# Patient Record
Sex: MALE | Race: WHITE | NOT HISPANIC OR LATINO | Employment: OTHER | URBAN - METROPOLITAN AREA
[De-identification: names, ages, dates, MRNs, and addresses within clinical notes are randomized per-mention and may not be internally consistent; named-entity substitution may affect disease eponyms.]

---

## 2017-01-11 ENCOUNTER — TRANSFERRED RECORDS (OUTPATIENT)
Dept: HEALTH INFORMATION MANAGEMENT | Facility: CLINIC | Age: 72
End: 2017-01-11

## 2017-02-08 DIAGNOSIS — J45.50 SEVERE PERSISTENT ASTHMA (H): Primary | ICD-10-CM

## 2017-02-08 DIAGNOSIS — J30.2 SEASONAL ALLERGIC RHINITIS: ICD-10-CM

## 2017-02-08 RX ORDER — MONTELUKAST SODIUM 10 MG/1
10 TABLET ORAL AT BEDTIME
Qty: 90 TABLET | Refills: 1 | Status: SHIPPED | OUTPATIENT
Start: 2017-02-08 | End: 2017-08-04

## 2017-02-08 NOTE — TELEPHONE ENCOUNTER
Montelukast Sodium 10 mg tablet   Last Written Prescription Date: 11/08/16  Last Fill Quantity: 90,  # refills: 0   Last Office Visit with [facility], [facility] or [facility] prescribing provider: 09/27/16    Aminah Mitchell CPhT  On Behalf of [facility]

## 2017-02-27 ENCOUNTER — OFFICE VISIT (OUTPATIENT)
Dept: ORTHOPEDICS | Facility: CLINIC | Age: 72
End: 2017-02-27
Payer: COMMERCIAL

## 2017-02-27 VITALS
SYSTOLIC BLOOD PRESSURE: 123 MMHG | WEIGHT: 160 LBS | BODY MASS INDEX: 25.06 KG/M2 | HEART RATE: 77 BPM | DIASTOLIC BLOOD PRESSURE: 75 MMHG

## 2017-02-27 DIAGNOSIS — G89.29 CHRONIC PAIN OF BOTH KNEES: Primary | ICD-10-CM

## 2017-02-27 DIAGNOSIS — M25.562 CHRONIC PAIN OF BOTH KNEES: Primary | ICD-10-CM

## 2017-02-27 DIAGNOSIS — M17.0 BILATERAL PRIMARY OSTEOARTHRITIS OF KNEE: ICD-10-CM

## 2017-02-27 DIAGNOSIS — M25.561 CHRONIC PAIN OF BOTH KNEES: Primary | ICD-10-CM

## 2017-02-27 PROCEDURE — 20610 DRAIN/INJ JOINT/BURSA W/O US: CPT | Mod: 50 | Performed by: PHYSICIAN ASSISTANT

## 2017-02-27 NOTE — PROGRESS NOTES
Sports Medicine Clinic Visit     Prior Visit Date 2/15/16    PCP: Connor Anderson    Raul Wilhelm is a 71 year old male who is seen in f/u up for    Chronic pain of both knees  Bilateral primary osteoarthritis of knee. Since last visit on 2/15/2016 patient has noted good relief with the steroid injections. He is requesting repeat today.      Symptoms are better with: Rest and steroid injections  Symptoms are worse with: increased time on feet.  Additional Features:   Positive:    Negative: swelling, bruising, popping, grinding, catching, locking, instability, paresthesias, numbness, weakness, pain in other joints and systemic symptoms    Social History: works for a [employer] company        Review of Systems  Musculoskeletal: as above  Remainder of review of systems is negative including constitutional, CV, pulmonary, GI, Skin and Neurologic except as noted in HPI or medical history.    Family history, medical history and surgical history have all been discussed with patient and appended to medical chart below.    Past Medical History   Diagnosis Date     Chronic airway obstruction, not elsewhere classified      Other and unspecified hyperlipidemia      Past Surgical History   Procedure Laterality Date     Ent surgery  as a child     tonsillectomy     Family History   Problem Relation Age of Onset     DIABETES Mother      HEART DISEASE Mother      C.A.D. Mother      CANCER Father      Hypertension Father      CEREBROVASCULAR DISEASE Paternal Grandfather      Asthma Sister      Breast Cancer No family hx of      Cancer - colorectal No family hx of      Prostate Cancer No family hx of      Objective  /75  Pulse 77  Wt 160 lb (72.6 kg)  BMI 25.06 kg/m2  Constitutional:well-developed, well-nourished, and in no distress.   Cardiovascular: Intact distal pulses.    Neurological: alert. Gait normal  Skin: Skin is warm and dry.   Psychiatric: Mood and affect normal.   Respiratory: unlabored, speaks in full  sentences  Hematologic/Lymphatic/Immunologic: neg lymphadenopathy, neg lymphedema    Exam:  Bilateral Knee exam    ROM:        Full active and passive ROM with flexion and extension    Inspection:       no visible ecchymosis        effusion noted mild    Patellar Motion:        Normal patellar tracking noted through range of motion       Crepitus with flexion and extension    Tender:        medial joint line       lateral joint line    Non Tender:         remainder of knee area    Special Tests:        neg (-) Memo       neg (-) Lachman       neg (-) varus at 0, 30       neg (-) valgus at 0, 30    Evaluation of ipsilateral kinetic chain       normal strength with hip extension and abduction       decreased strength single leg squat on  Both side(s)        Assessment:  1. Chronic pain of both knees    2. Bilateral primary osteoarthritis of knee        Plan:  Discussed the assessment with the patient.  Topical Treatments: Ice, Heat or Topical Analgesics  Over the counter medication: Acetaminophen (Tylenol) 1000mg every 6 hours with food (Maximum of 3000mg/day)  Naproxen (Aleve) maximum of 440mg two times a day with food  Observe and monitor symptoms  Activity:  As tolerated  Steroid injection of the bilateral knee was performed today in clinic  Icing for the next 1-2 days may be helpful for pain. Injection may take 10-14 days to see the full effect.  Follow up: as needed    PROCEEDURE:  Benefits, risks, and alternatives of the procedure were discussed with the patient, including bleeding, infection, and pain.  Patient elected to proceed and informed consent was signed. Area was prepped with betadine and alcohol preps and Ethyl Chloride was used for topical anesthetic purposes.  Using standard precautions, a 27-gauge, 1.5-inch was used to inject 2 cc of 1% Lidocaine without Epinephrine and 80 mg Depo-medrol into the intra-articular space of the bilateral knee via a inferior medial parapatellar approach.  I have  recommended ibuprofen and ice should soreness and stiffness develop.  An educational handout was provided to the patient.              Raquel Briscoe PA-C  [facility]  [facility]      Disclaimer: This note consists of symbols derived from keyboarding, dictation and/or voice recognition software. As a result, there may be errors in the script that have gone undetected. Please consider this when interpreting information found in this chart.

## 2017-02-27 NOTE — NURSING NOTE
"Chief Complaint   Patient presents with     Knee left     Knee right       Initial /75  Pulse 77  Wt 160 lb (72.6 kg)  BMI 25.06 kg/m2 Estimated body mass index is 25.06 kg/(m^2) as calculated from the following:    Height as of 9/27/16: 5' 7\" (1.702 m).    Weight as of this encounter: 160 lb (72.6 kg).  Medication Reconciliation: complete  "

## 2017-02-27 NOTE — MR AVS SNAPSHOT
"              After Visit Summary   2017    Raul Wilhelm    MRN: [id number]           Patient Information     Date Of Birth          1945        Visit Information        Provider Department      2017 8:40 AM Raquel Briscoe PA-C [facility]        Today's Diagnoses     Chronic pain of both knees    -  1    Bilateral primary osteoarthritis of knee           Follow-ups after your visit        Who to contact     If you have questions or need follow up information about today's clinic visit or your schedule please contact [facility] directly at [phone].  Normal or non-critical lab and imaging results will be communicated to you by Upshothart, letter or phone within 4 business days after the clinic has received the results. If you do not hear from us within 7 days, please contact the clinic through Sykiot or phone. If you have a critical or abnormal lab result, we will notify you by phone as soon as possible.  Submit refill requests through [employer] or call your pharmacy and they will forward the refill request to us. Please allow 3 business days for your refill to be completed.          Additional Information About Your Visit        MyChart Information     [employer] lets you send messages to your doctor, view your test results, renew your prescriptions, schedule appointments and more. To sign up, go to [email or web address] . Click on \"Log in\" on the left side of the screen, which will take you to the Welcome page. Then click on \"Sign up Now\" on the right side of the page.     You will be asked to enter the access code listed below, as well as some personal information. Please follow the directions to create your username and password.     Your access code is: [email or web address]  Expires: 2017 11:09 AM     Your access code will  in 90 days. If you need help or a new code, please call your [place] clinic or [phone].   "      Care EveryWhere ID     This is your Care EveryWhere ID. This could be used by other organizations to access your [place] medical records  [id number]        Your Vitals Were     Pulse BMI (Body Mass Index)                77 25.06 kg/m2           Blood Pressure from Last 3 Encounters:   02/27/17 123/75   11/10/16 120/72   09/27/16 131/82    Weight from Last 3 Encounters:   02/27/17 160 lb (72.6 kg)   11/10/16 164 lb (74.4 kg)   09/27/16 166 lb 6.4 oz (75.5 kg)              We Performed the Following     Depo Medrol  80 MG      []     Large Joint/Bursa injection and/or drainage (Shoulder, Knee)          Today's Medication Changes          These changes are accurate as of: 2/27/17 11:59 PM.  If you have any questions, ask your nurse or doctor.               Start taking these medicines.        Dose/Directions    methylPREDNISolone acetate 80 MG/ML injection   Commonly known as:  DEPO-MEDROL   Used for:  Chronic pain of both knees, Bilateral primary osteoarthritis of knee   Started by:  Raquel Briscoe PA-C        Dose:  80 mg   1 mL (80 mg) by INTRA-ARTICULAR route once for 1 dose   Quantity:  2 mL   Refills:  0            Where to get your medicines      Some of these will need a paper prescription and others can be bought over the counter.  Ask your nurse if you have questions.     You don't need a prescription for these medications     methylPREDNISolone acetate 80 MG/ML injection                Primary Care Provider Office Phone # Fax #    Connor Anderson [phone]       [facility] [address] [facility] [address]        Thank you!     Thank you for choosing [facility]  for your care. Our goal is always to provide you with excellent care. Hearing back from our patients is one way we can continue to improve our services. Please take a few minutes to complete the written survey that you may receive in the mail after your  visit with us. Thank you!             Your Updated Medication List - Protect others around you: Learn how to safely use, store and throw away your medicines at www.disposemymeds.org.          This list is accurate as of: 2/27/17 11:59 PM.  Always use your most recent med list.                   Brand Name Dispense Instructions for use    * albuterol 108 (90 BASE) MCG/ACT Inhaler    PROAIR HFA/PROVENTIL HFA/VENTOLIN HFA    1 Inhaler    Inhale 2 puffs into the lungs every 6 hours as needed for shortness of breath / dyspnea or wheezing       * albuterol (2.5 MG/3ML) 0.083% neb solution     30 vial    Take 1 vial (2.5 mg) by nebulization every 6 hours as needed for shortness of breath / dyspnea or wheezing       aspirin 81 MG EC tablet     30 tablet    Take 1 tablet (81 mg) by mouth daily       atorvastatin 20 MG tablet    LIPITOR    90 tablet    Take 1 tablet (20 mg) by mouth daily       carvedilol 6.25 MG tablet    COREG    180 tablet    Take 1 tablet (6.25 mg) by mouth 2 times daily       clobetasol 0.05 % ointment    TEMOVATE    45 g    Apply sparingly to affected area twice daily as needed.  Do not apply to face.       clopidogrel 75 MG tablet    PLAVIX    90 tablet    Take 1 tablet daily       EPINEPHrine 0.3 MG/0.3ML injection     0.6 mL    Inject 0.3 mLs (0.3 mg) into the muscle as needed for anaphylaxis       fexofenadine 180 MG tablet    ALLEGRA    90 tablet    Take 1 tablet by mouth daily.       ipratropium - albuterol 0.5 mg/2.5 mg/3 mL 0.5-2.5 (3) MG/3ML neb solution    DUONEB    1 Box    Take 1 vial (3 mLs) by nebulization every 4 hours as needed for shortness of breath / dyspnea       latanoprost 0.005 % ophthalmic solution    XALATAN     Place 1 drop into both eyes At Bedtime       losartan-hydrochlorothiazide 50-12.5 MG per tablet    HYZAAR    90 tablet    Take 1 tablet by mouth daily       methylPREDNISolone acetate 80 MG/ML injection    DEPO-MEDROL    2 mL    1 mL (80 mg) by INTRA-ARTICULAR route once  for 1 dose       montelukast 10 MG tablet    SINGULAIR    90 tablet    Take 1 tablet (10 mg) by mouth At Bedtime       nitroglycerin 0.4 MG sublingual tablet    NITROSTAT    25 tablet    Place 1 tablet (0.4 mg) under the tongue every 5 minutes as needed for chest pain If you are still having symptoms after 3 doses (15 minutes) call 911.       omeprazole 20 MG CR capsule    priLOSEC    90 capsule    Take 1 capsule (20 mg) by mouth daily Take 30-60 minutes before a meal.       order for DME     1 Device    Equipment being ordered: Nebulizer Respironics, MiniElite--Neb Machine and tubing and mouth piece.       order for DME     1 Device    Equipment being ordered: arm blood pressure cuff       * Notice:  This list has 2 medication(s) that are the same as other medications prescribed for you. Read the directions carefully, and ask your doctor or other care provider to review them with you.

## 2017-02-28 RX ORDER — METHYLPREDNISOLONE ACETATE 80 MG/ML
80 INJECTION, SUSPENSION INTRA-ARTICULAR; INTRALESIONAL; INTRAMUSCULAR; SOFT TISSUE ONCE
Qty: 2 ML | Refills: 0 | OUTPATIENT
Start: 2017-02-28 | End: 2017-02-28

## 2017-03-06 DIAGNOSIS — Z79.82 ON ASPIRIN AT HOME: ICD-10-CM

## 2017-03-06 NOTE — TELEPHONE ENCOUNTER
Omeprazole 20mg      Last Written Prescription Date: 8/31/16  Last Fill Quantity: 90,  # refills: 1   Last Office Visit with [facility], [facility] or [facility] prescribing provider: 9/27/16      Demetri Acosta CPhT  [facility]    On behalf of [facility]

## 2017-03-06 NOTE — TELEPHONE ENCOUNTER
Prescription approved per [facility] Refill Protocol.  Stephanie Alberts [facility]   [phone]

## 2017-04-10 DIAGNOSIS — J44.9 CHRONIC OBSTRUCTIVE PULMONARY DISEASE, UNSPECIFIED COPD TYPE (H): ICD-10-CM

## 2017-04-10 DIAGNOSIS — J45.50 UNCOMPLICATED SEVERE PERSISTENT ASTHMA (H): ICD-10-CM

## 2017-04-10 NOTE — TELEPHONE ENCOUNTER
Patient is requesting a refill of Prednisone 10mg from a previous ED visit. He is also looking to fill Duoneb. Please contact patient with questions.  Thanks,   Jeimy Escamilla  Certified Pharmacy Technician  [facility]  [phone]

## 2017-04-14 ENCOUNTER — ALLIED HEALTH/NURSE VISIT (OUTPATIENT)
Dept: FAMILY MEDICINE | Facility: CLINIC | Age: 72
End: 2017-04-14
Payer: COMMERCIAL

## 2017-04-14 DIAGNOSIS — J45.50 UNCOMPLICATED SEVERE PERSISTENT ASTHMA (H): Primary | ICD-10-CM

## 2017-04-14 PROCEDURE — 99207 ZZC NO CHARGE NURSE ONLY: CPT

## 2017-04-14 RX ORDER — IPRATROPIUM BROMIDE AND ALBUTEROL SULFATE 2.5; .5 MG/3ML; MG/3ML
3 SOLUTION RESPIRATORY (INHALATION) EVERY 4 HOURS PRN
Qty: 1 BOX | Refills: 2 | Status: SHIPPED | OUTPATIENT
Start: 2017-04-14 | End: 2018-03-27

## 2017-04-14 RX ORDER — PREDNISONE 10 MG/1
TABLET ORAL
Qty: 32 TABLET | Refills: 1 | Status: SHIPPED | OUTPATIENT
Start: 2017-04-14 | End: 2017-11-13

## 2017-04-14 NOTE — TELEPHONE ENCOUNTER
History of asthma, wanting refills of prednisone to have on hand as he usually needs this in the spring.  Also wanting a refill of duoneb for the same.    Monica Burgos RN

## 2017-04-14 NOTE — MR AVS SNAPSHOT
"              After Visit Summary   2017    Raul Wilhelm    MRN: [id number]           Patient Information     Date Of Birth          1945        Visit Information        Provider Department      2017 9:30 AM RAUL BERMUDEZ/SRINIVAS KISER [facility]        Today's Diagnoses     Uncomplicated severe persistent asthma    -  1       Follow-ups after your visit        Who to contact     If you have questions or need follow up information about today's clinic visit or your schedule please contact [facility] directly at [phone].  Normal or non-critical lab and imaging results will be communicated to you by Next Generation Systemshart, letter or phone within 4 business days after the clinic has received the results. If you do not hear from us within 7 days, please contact the clinic through Next Generation Systemshart or phone. If you have a critical or abnormal lab result, we will notify you by phone as soon as possible.  Submit refill requests through [employer] or call your pharmacy and they will forward the refill request to us. Please allow 3 business days for your refill to be completed.          Additional Information About Your Visit        MyChart Information     [employer] lets you send messages to your doctor, view your test results, renew your prescriptions, schedule appointments and more. To sign up, go to [email or web address] . Click on \"Log in\" on the left side of the screen, which will take you to the Welcome page. Then click on \"Sign up Now\" on the right side of the page.     You will be asked to enter the access code listed below, as well as some personal information. Please follow the directions to create your username and password.     Your access code is: [email or web address]  Expires: 2017 12:09 PM     Your access code will  in 90 days. If you need help or a new code, please call your [facility] or [phone].        Care EveryWhere ID     This is your Care EveryWhere ID. This could be used by " other organizations to access your [place] medical records  [id number]         Blood Pressure from Last 3 Encounters:   02/27/17 123/75   11/10/16 120/72   09/27/16 131/82    Weight from Last 3 Encounters:   02/27/17 160 lb (72.6 kg)   11/10/16 164 lb (74.4 kg)   09/27/16 166 lb 6.4 oz (75.5 kg)              Today, you had the following     No orders found for display       Primary Care Provider Office Phone # Fax #    Connor Anderson [phone]       [facility] [address] [facility] [address]        Thank you!     Thank you for choosing [facility]  for your care. Our goal is always to provide you with excellent care. Hearing back from our patients is one way we can continue to improve our services. Please take a few minutes to complete the written survey that you may receive in the mail after your visit with us. Thank you!             Your Updated Medication List - Protect others around you: Learn how to safely use, store and throw away your medicines at www.disposemymeds.org.          This list is accurate as of: 4/14/17  9:31 AM.  Always use your most recent med list.                   Brand Name Dispense Instructions for use    * albuterol 108 (90 BASE) MCG/ACT Inhaler    PROAIR HFA/PROVENTIL HFA/VENTOLIN HFA    1 Inhaler    Inhale 2 puffs into the lungs every 6 hours as needed for shortness of breath / dyspnea or wheezing       * albuterol (2.5 MG/3ML) 0.083% neb solution     30 vial    Take 1 vial (2.5 mg) by nebulization every 6 hours as needed for shortness of breath / dyspnea or wheezing       aspirin 81 MG EC tablet     30 tablet    Take 1 tablet (81 mg) by mouth daily       atorvastatin 20 MG tablet    LIPITOR    90 tablet    Take 1 tablet (20 mg) by mouth daily       carvedilol 6.25 MG tablet    COREG    180 tablet    Take 1 tablet (6.25 mg) by mouth 2 times daily       clobetasol 0.05 % ointment    TEMOVATE    45 g    Apply sparingly to affected  area twice daily as needed.  Do not apply to face.       clopidogrel 75 MG tablet    PLAVIX    90 tablet    Take 1 tablet daily       EPINEPHrine 0.3 MG/0.3ML injection     0.6 mL    Inject 0.3 mLs (0.3 mg) into the muscle as needed for anaphylaxis       fexofenadine 180 MG tablet    ALLEGRA    90 tablet    Take 1 tablet by mouth daily.       ipratropium - albuterol 0.5 mg/2.5 mg/3 mL 0.5-2.5 (3) MG/3ML neb solution    DUONEB    1 Box    Take 1 vial (3 mLs) by nebulization every 4 hours as needed for shortness of breath / dyspnea       latanoprost 0.005 % ophthalmic solution    XALATAN     Place 1 drop into both eyes At Bedtime       losartan-hydrochlorothiazide 50-12.5 MG per tablet    HYZAAR    90 tablet    Take 1 tablet by mouth daily       montelukast 10 MG tablet    SINGULAIR    90 tablet    Take 1 tablet (10 mg) by mouth At Bedtime       nitroglycerin 0.4 MG sublingual tablet    NITROSTAT    25 tablet    Place 1 tablet (0.4 mg) under the tongue every 5 minutes as needed for chest pain If you are still having symptoms after 3 doses (15 minutes) call 911.       omeprazole 20 MG CR capsule    priLOSEC    90 capsule    Take 1 capsule (20 mg) by mouth daily Take 30-60 minutes before a meal.       order for DME     1 Device    Equipment being ordered: Nebulizer Respironics, MiniElite--Neb Machine and tubing and mouth piece.       order for DME     1 Device    Equipment being ordered: arm blood pressure cuff       predniSONE 10 MG tablet    DELTASONE    32 tablet    Take 4 tablets daily for 5 days,  take 2 tablets daily for 3 days, take 1 tablet daily for 3 days, take half a tablet for 3 days.       * Notice:  This list has 2 medication(s) that are the same as other medications prescribed for you. Read the directions carefully, and ask your doctor or other care provider to review them with you.

## 2017-04-14 NOTE — NURSING NOTE
Patient here today to check the status of his refill request for duoneb and prednisone.  Patient explains that he has a history of asthma and likes to have these on hand during allergy season incase they are needed.  Denies any symptoms today.  Refill request sent to provider in the refill encounter.    Monica Burgos RN

## 2017-05-04 ENCOUNTER — OFFICE VISIT (OUTPATIENT)
Dept: FAMILY MEDICINE | Facility: CLINIC | Age: 72
End: 2017-05-04
Payer: COMMERCIAL

## 2017-05-04 ENCOUNTER — RADIANT APPOINTMENT (OUTPATIENT)
Dept: GENERAL RADIOLOGY | Facility: CLINIC | Age: 72
End: 2017-05-04
Attending: FAMILY MEDICINE
Payer: COMMERCIAL

## 2017-05-04 VITALS
TEMPERATURE: 98.2 F | HEART RATE: 86 BPM | HEIGHT: 67 IN | WEIGHT: 168.2 LBS | BODY MASS INDEX: 26.4 KG/M2 | DIASTOLIC BLOOD PRESSURE: 68 MMHG | SYSTOLIC BLOOD PRESSURE: 117 MMHG | OXYGEN SATURATION: 96 %

## 2017-05-04 DIAGNOSIS — Z23 NEED FOR PROPHYLACTIC VACCINATION AND INOCULATION AGAINST INFLUENZA: ICD-10-CM

## 2017-05-04 DIAGNOSIS — J45.50 UNCOMPLICATED SEVERE PERSISTENT ASTHMA (H): ICD-10-CM

## 2017-05-04 DIAGNOSIS — Z23 NEED FOR VACCINATION: ICD-10-CM

## 2017-05-04 DIAGNOSIS — J44.9 CHRONIC OBSTRUCTIVE PULMONARY DISEASE, UNSPECIFIED COPD TYPE (H): ICD-10-CM

## 2017-05-04 DIAGNOSIS — I21.21 ST ELEVATION MYOCARDIAL INFARCTION INVOLVING LEFT CIRCUMFLEX CORONARY ARTERY (H): ICD-10-CM

## 2017-05-04 DIAGNOSIS — M54.50 ACUTE BILATERAL LOW BACK PAIN WITHOUT SCIATICA: Primary | ICD-10-CM

## 2017-05-04 DIAGNOSIS — J67.2: ICD-10-CM

## 2017-05-04 DIAGNOSIS — Z11.59 NEED FOR HEPATITIS C SCREENING TEST: ICD-10-CM

## 2017-05-04 DIAGNOSIS — M54.50 ACUTE BILATERAL LOW BACK PAIN WITHOUT SCIATICA: ICD-10-CM

## 2017-05-04 LAB
ERYTHROCYTE [DISTWIDTH] IN BLOOD BY AUTOMATED COUNT: 12.2 % (ref 10–15)
HCT VFR BLD AUTO: 48.2 % (ref 40–53)
HGB BLD-MCNC: 16.9 G/DL (ref 13.3–17.7)
MCH RBC QN AUTO: 32.3 PG (ref 26.5–33)
MCHC RBC AUTO-ENTMCNC: 35.1 G/DL (ref 31.5–36.5)
MCV RBC AUTO: 92 FL (ref 78–100)
PLATELET # BLD AUTO: 246 10E9/L (ref 150–450)
RBC # BLD AUTO: 5.23 10E12/L (ref 4.4–5.9)
WBC # BLD AUTO: 8.9 10E9/L (ref 4–11)

## 2017-05-04 PROCEDURE — 86618 LYME DISEASE ANTIBODY: CPT | Performed by: FAMILY MEDICINE

## 2017-05-04 PROCEDURE — 36415 COLL VENOUS BLD VENIPUNCTURE: CPT | Performed by: FAMILY MEDICINE

## 2017-05-04 PROCEDURE — 85027 COMPLETE CBC AUTOMATED: CPT | Performed by: FAMILY MEDICINE

## 2017-05-04 PROCEDURE — 72100 X-RAY EXAM L-S SPINE 2/3 VWS: CPT

## 2017-05-04 PROCEDURE — 90670 PCV13 VACCINE IM: CPT | Performed by: FAMILY MEDICINE

## 2017-05-04 PROCEDURE — 99214 OFFICE O/P EST MOD 30 MIN: CPT | Mod: 25 | Performed by: FAMILY MEDICINE

## 2017-05-04 PROCEDURE — 86803 HEPATITIS C AB TEST: CPT | Performed by: FAMILY MEDICINE

## 2017-05-04 PROCEDURE — G0009 ADMIN PNEUMOCOCCAL VACCINE: HCPCS | Performed by: FAMILY MEDICINE

## 2017-05-04 NOTE — LETTER
[facility]  [address] [address]  [phone]      May 5, 2017      Raul Wilhelm  [address]  [place] [address]        Dear Raul,        We are writing to inform you of the results from your recent lab work, included is a copy of those results.  Component      Latest Ref Rng & Units 5/4/2017   WBC      4.0 - 11.0 10e9/L 8.9   RBC Count      4.4 - 5.9 10e12/L 5.23   Hemoglobin      13.3 - 17.7 g/dL 16.9   Hematocrit      40.0 - 53.0 % 48.2   MCV      78 - 100 fl 92   MCH      26.5 - 33.0 pg 32.3   MCHC      31.5 - 36.5 g/dL 35.1   RDW      10.0 - 15.0 % 12.2   Platelet Count      150 - 450 10e9/L 246   Lyme Disease Antibodies Serum      0.00 - 0.89 0.04   Hepatitis C Antibody      NR Nonreactive . . .     No Lyme's disease.  Normal Blood counts.  No Hepatitis C.  If you have any questions, please do not hesitate to call our office.      Sincerely,  Connor Anderson MD

## 2017-05-04 NOTE — LETTER
My Asthma Action Plan  Name: Raul Wilhelm   YOB: 1945  Date: 5/4/2017   My doctor: Connor Anderson MD   My clinic: [facility]        My Control Medicine: Montelukast (Singulair) -  10 mg daily  My Rescue Medicine: Albuterol/ipratroprium  (Duoneb) nebulizer solution neb every 4 hours if needed  My Oral Steroid Medicine: prednisone 40mg daily for 5 days My Asthma Severity: severe persistent  Avoid your asthma triggers: upper respiratory infections and pollens               GREEN ZONE     Good Control    I feel good    No cough or wheeze    Can work, sleep and play without asthma symptoms       Take your asthma control medicine every day.     1. If exercise triggers your asthma, take your rescue medication    15 minutes before exercise or sports, and    During exercise if you have asthma symptoms  2. Spacer to use with inhaler: If you have a spacer, make sure to use it with your inhaler             YELLOW ZONE     Getting Worse  I have ANY of these:    I do not feel good    Cough or wheeze    Chest feels tight    Wake up at night   1. Keep taking your Green Zone medications  2. Start taking your rescue medicine:    every 20 minutes for up to 1 hour. Then every 4 hours for 24-48 hours.  3. If you stay in the Yellow Zone for more than 12-24 hours, contact your doctor.  4. If you do not return to the Green Zone in 12-24 hours or you get worse, start taking your oral steroid medicine if prescribed by your provider.           RED ZONE     Medical Alert - Get Help  I have ANY of these:    I feel awful    Medicine is not helping    Breathing getting harder    Trouble walking or talking    Nose opens wide to breathe       1. Take your rescue medicine NOW  2. If your provider has prescribed an oral steroid medicine, start taking it NOW  3. Call your doctor NOW  4. If you are still in the Red Zone after 20 minutes and you have not reached your doctor:    Take your rescue medicine again  and    Call 911 or go to the emergency room right away    See your regular doctor within 2 weeks of an Emergency Room or Urgent Care visit for follow-up treatment.        Electronically signed by: Dr. Connor Anderson, May 4, 2017    Annual Reminders:  Meet with Asthma Educator,  Flu Shot in the Fall, consider Pneumonia Vaccination for patients with asthma (aged 19 and older).    Pharmacy: [facility] [place] [id number] [facility]                    Asthma Triggers  How To Control Things That Make Your Asthma Worse    Triggers are things that make your asthma worse.  Look at the list below to help you find your triggers and what you can do about them.  You can help prevent asthma flare-ups by staying away from your triggers.      Trigger                                                          What you can do   Cigarette Smoke  Tobacco smoke can make asthma worse. Do not allow smoking in your home, car or around you.  Be sure no one smokes at a child s day care or school.  If you smoke, ask your health care provider for ways to help you quit.  Ask family members to quit too.  Ask your health care provider for a referral to Quit Plan to help you quit smoking, or call 4-143-652-PLAN.     Colds, Flu, Bronchitis  These are common triggers of asthma. Wash your hands often.  Don t touch your eyes, nose or mouth.  Get a flu shot every year.     Dust Mites  These are tiny bugs that live in cloth or carpet. They are too small to see. Wash sheets and blankets in hot water every week.   Encase pillows and mattress in dust mite proof covers.  Avoid having carpet if you can. If you have carpet, vacuum weekly.   Use a dust mask and HEPA vacuum.   Pollen and Outdoor Mold  Some people are allergic to trees, grass, or weed pollen, or molds. Try to keep your windows closed.  Limit time out doors when pollen count is high.   Ask you health care provider about taking medicine during allergy season.     Animal Dander  Some  people are allergic to skin flakes, urine or saliva from pets with fur or feathers. Keep pets with fur or feathers out of your home.    If you can t keep the pet outdoors, then keep the pet out of your bedroom.  Keep the bedroom door closed.  Keep pets off cloth furniture and away from stuffed toys.     Mice, Rats, and Cockroaches  Some people are allergic to the waste from these pests.   Cover food and garbage.  Clean up spills and food crumbs.  Store grease in the refrigerator.   Keep food out of the bedroom.   Indoor Mold  This can be a trigger if your home has high moisture. Fix leaking faucets, pipes, or other sources of water.   Clean moldy surfaces.  Dehumidify basement if it is damp and smelly.   Smoke, Strong Odors, and Sprays  These can reduce air quality. Stay away from strong odors and sprays, such as perfume, powder, hair spray, paints, smoke incense, paint, cleaning products, candles and new carpet.   Exercise or Sports  Some people with asthma have this trigger. Be active!  Ask your doctor about taking medicine before sports or exercise to prevent symptoms.    Warm up for 5-10 minutes before and after sports or exercise.     Other Triggers of Asthma  Cold air:  Cover your nose and mouth with a scarf.  Sometimes laughing or crying can be a trigger.  Some medicines and food can trigger asthma.

## 2017-05-04 NOTE — PATIENT INSTRUCTIONS
1. To lab for blood work    2. Physical therapy will call you to schedule for the back      Thank you for choosing [facility].  You may be receiving a survey in the mail from Sally Kaufman regarding your visit today.  Please take a few minutes to complete and return the survey to let us know how we are doing.      If you have questions or concerns, please contact us via [employer] or you can contact your care team at [phone].    Our Clinic hours are:  Monday 6:40 am  to 7:00 pm  Tuesday -Friday 6:40 am to 5:00 pm    The [place] outpatient lab hours are:  Monday - Friday 6:10 am to 4:45 pm  Saturdays 7:00 am to 11:00 am  Appointments are required, call [phone]    If you have clinical questions after hours or would like to schedule an appointment,  call the clinic at [phone].

## 2017-05-04 NOTE — PROGRESS NOTES
"  Injectable Influenza Immunization Documentation    1.  Is the person to be vaccinated sick today? {YES/NO DEFAULT NO:76404::\" No\"}    2. Does the person to be vaccinated have an allergy to eggs or to a component of the vaccine? {YES/NO DEFAULT NO:72571::\" No\"}    3. Has the person to be vaccinated today ever had a serious reaction to influenza vaccine in the past? {YES/NO DEFAULT NO:78593::\" No\"}    4. Has the person to be vaccinated ever had Guillain-[place] syndrome? {YES/NO DEFAULT NO:20966::\" No\"}     Form completed by ***    "

## 2017-05-04 NOTE — NURSING NOTE
"Chief Complaint   Patient presents with     Back Pain     low back pain for about 1 month; has seen chiropractor with no relief; no known cause     Recheck Medication     patient wants to discuss use of ibuprofen - was told he should not take due to counterindication with other meds - takes Aleve with no relief to back pain       Initial /68  Pulse 86  Temp 98.2  F (36.8  C) (Tympanic)  Ht 5' 7\" (1.702 m)  Wt 168 lb 3.2 oz (76.3 kg)  SpO2 96%  BMI 26.34 kg/m2 Estimated body mass index is 26.34 kg/(m^2) as calculated from the following:    Height as of this encounter: 5' 7\" (1.702 m).    Weight as of this encounter: 168 lb 3.2 oz (76.3 kg).  Medication Reconciliation: complete    Screening Questionnaire for Adult Immunization    Are you sick today?   No   Do you have allergies to medications, food, a vaccine component or latex?   No   Have you ever had a serious reaction after receiving a vaccination?   No   Do you have a long-term health problem with heart disease, lung disease, asthma, kidney disease, metabolic disease (e.g. diabetes), anemia, or other blood disorder?   No   Do you have cancer, leukemia, HIV/AIDS, or any other immune system problem?   No   In the past 3 months, have you taken medications that affect  your immune system, such as prednisone, other steroids, or anticancer drugs; drugs for the treatment of rheumatoid arthritis, Crohn s disease, or psoriasis; or have you had radiation treatments?  YES   Have you had a seizure, or a brain or other nervous system problem?   No   During the past year, have you received a transfusion of blood or blood     products, or been given immune (gamma) globulin or antiviral drug?   YES   For women: Are you pregnant or is there a chance you could become        pregnant during the next month?   No   Have you received any vaccinations in the past 4 weeks?   No     Immunization questionnaire answers were all negative.      MNVFC doesn't apply on this " patient    Per orders of Dr. Connor Anderson, injection of PCV13 given by Kathryn Castellanos. Patient instructed to remain in clinic for 20 minutes afterwards, and to report any adverse reaction to me immediately.       Screening performed by Kathryn Castellanos on 5/4/2017 at 1:30 PM.

## 2017-05-04 NOTE — PROGRESS NOTES
SUBJECTIVE:                                                    Raul Wilhelm is a 71 year old male who presents to clinic today for the following health issues:      Back Pain      Duration: 1 month        Specific cause: none    Description:   Location of pain: low back both  Character of pain: sharp, stabbing and intermittent  Pain radiation:radiates into the right buttocks and radiates into the left buttocks  New numbness or weakness in legs, not attributed to pain:  no     Intensity: severe    History:   Pain interferes with job: YES  History of back problems: recurrent self limited episodes of low back pain in the past  Any previous MRI or X-rays: None  Sees a specialist for back pain:  No  Therapies tried without relief: chiropractor    Alleviating factors:   Improved by: sitting      Precipitating factors:  Worsened by: Lifting and Walking    Functional and Psychosocial Screen ([employer] STarT Back):      Most recent score:    FOSTER START BACK TOTAL SCORE 5/4/2017   Total Score (all 9) 5          Accompanying Signs & Symptoms:  Risk of Fracture:  None  Risk of Cauda Equina:  None  Risk of Infection:  None  Risk of Cancer:  None  Risk of Ankylosing Spondylitis:  Onset at age <35, male, AND morning back stiffness. no                Asthma/COPD: breathing has been stable on singulair and allegra.    Problem list and histories reviewed & adjusted, as indicated.  Additional history: as documented    Reviewed and updated as needed this visit by clinical staff  Tobacco  Allergies  Med Hx  Surg Hx  Fam Hx  Soc Hx      Reviewed and updated as needed this visit by Provider         ROS:  C: NEGATIVE for fever, chills, change in weight  R: NEGATIVE for significant change in cough or SOB  CV: NEGATIVE for chest pain, palpitations or peripheral edema    OBJECTIVE:                                                           Physical Exam:     Vitals:    05/04/17 1319   BP: 117/68   Pulse: 86   Temp: 98.2  F (36.8  C)  "  TempSrc: Tympanic   SpO2: 96%   Weight: 168 lb 3.2 oz (76.3 kg)   Height: 5' 7\" (1.702 m)     Body mass index is 26.34 kg/(m^2).  GENERAL:: healthy, alert and no distress  Lumber/Thoracic Spine Exam: Tender:  Very mild tenderness of left SI joint, right SI joint    Non-tender:  lumbar spinous processes, left para lumbar muscles, right para lumbar muscles, left sciatic notch, right sciatic notch    Range of Motion:  lumbar flexion  full, lumbar extension  decreased, left lateral lumbar bending  decreased, right lateral lumbar bending  decreased, left lateral lumbar rotation  full, right lateral lumbar rotation  full    Strength and Neuro:    L4: Squat and Rise 5/5, knee extension 5/5 symmetric and Patella reflex 2+ symmetric, medial foot normal sensation  L5:  Heel Walking, dorsiflexion of ankle/great toe 5/5 symmetric; Sensation dorsal foot normal  S1: Walk on toes, plantarflexion of ankle/toes 5/5 symmetric; Achilles 2+ symmetric sensation lateral foot normal;   Gait: significantly bow legged (varus), good arm swing  Minimal lumbar lordosis very flat  Hip Exam: Hip ROM full, left greater trocanter non-tender, right greater trocanter non-tender  NEURO: Lower extremity light touch sensory exam grossly normal, Reflexes 2+ symmetric at patella and achilles.          Diagnostic Test Results:  Lumbar spine Xray shows scoliosis with narrowing and degeneration because of the curvature at L2-3     ASSESSMENT/PLAN:                                                        Raul was seen today for back pain, recheck medication and imm/inj.    Diagnoses and all orders for this visit:    Acute bilateral low back pain without sciatica: with arthritis and degeneration of disk from scoliosis  -patient concerned with blood counts and possible Lymes so rule these out with labs  -Physical therapy will call to schedule  -     XR Lumbar Spine 2/3 Views; Future  -     **Lyme Disease Kalpana with reflex to WB Serum FUTURE 14d; Future  -     " CBC with platelets  -     PHYSICAL THERAPY REFERRAL    Uncomplicated severe persistent asthma: moderate to severe asthma/COPD    Need for hepatitis C screening test  -     **Hepatitis C Screen Reflex to RNA FUTURE anytime; Future  -     **Hepatitis C Screen Reflex to RNA FUTURE anytime    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

## 2017-05-04 NOTE — MR AVS SNAPSHOT
After Visit Summary   5/4/2017    Raul Wilhelm    MRN: [id number]           Patient Information     Date Of Birth          1945        Visit Information        Provider Department      5/4/2017 1:20 PM Connor Anderson MD [facility]        Today's Diagnoses     Acute bilateral low back pain without sciatica    -  1    Uncomplicated severe persistent asthma        Need for hepatitis C screening test        ST elevation myocardial infarction involving left circumflex coronary artery (H)        Chronic obstructive pulmonary disease, unspecified COPD type (H)        Pneumonitis, hypersensitivity, naa (H)        Need for vaccination        Need for prophylactic vaccination and inoculation against influenza          Care Instructions    1. To lab for blood work    2. Physical therapy will call you to schedule for the back      Thank you for choosing [facility].  You may be receiving a survey in the mail from [employer] regarding your visit today.  Please take a few minutes to complete and return the survey to let us know how we are doing.      If you have questions or concerns, please contact us via [employer] or you can contact your care team at [phone].    Our Clinic hours are:  Monday 6:40 am  to 7:00 pm  Tuesday -Friday 6:40 am to 5:00 pm    The [place] outpatient lab hours are:  Monday - Friday 6:10 am to 4:45 pm  Saturdays 7:00 am to 11:00 am  Appointments are required, call [phone]    If you have clinical questions after hours or would like to schedule an appointment,  call the clinic at [phone].        Follow-ups after your visit        Additional Services     PHYSICAL THERAPY REFERRAL       *This therapy referral will be filtered to a centralized scheduling office at [facility] and the patient will receive a call to schedule an appointment at a [place] location most convenient for them. *     [facility]  "provides Physical Therapy evaluation and treatment and many specialty services across the [place] system.  If requesting a specialty program, please choose from the list below.    If you have not heard from the scheduling office within 2 business days, please call [phone] for all locations, with the exception of [place], please call [phone].  Treatment: Evaluation & Treatment  Special Instructions/Modalities:   Special Programs: None    Please be aware that coverage of these services is subject to the terms and limitations of your health insurance plan.  Call member services at your health plan with any benefit or coverage questions.      **Note to Provider:  If you are referring outside of [facility] for the therapy appointment, please list the name of the location in the  special instructions  above, print the referral and give to the patient to schedule the appointment.                  Who to contact     If you have questions or need follow up information about today's clinic visit or your schedule please contact [facility] directly at [phone].  Normal or non-critical lab and imaging results will be communicated to you by MyChart, letter or phone within 4 business days after the clinic has received the results. If you do not hear from us within 7 days, please contact the clinic through MyChart or phone. If you have a critical or abnormal lab result, we will notify you by phone as soon as possible.  Submit refill requests through [facility] or call your pharmacy and they will forward the refill request to us. Please allow 3 business days for your refill to be completed.          Additional Information About Your Visit        [employer] Information     [facility] lets you send messages to your doctor, view your test results, renew your prescriptions, schedule appointments and more. To sign up, go to [email or web address] . Click on \"Log in\" on the left side of the screen, which will take you to " "the Welcome page. Then click on \"Sign up Now\" on the right side of the page.     You will be asked to enter the access code listed below, as well as some personal information. Please follow the directions to create your username and password.     Your access code is: [email or web address]  Expires: 2017 12:09 PM     Your access code will  in 90 days. If you need help or a new code, please call your [place] clinic or [phone].        Care EveryWhere ID     This is your Care EveryWhere ID. This could be used by other organizations to access your [place] medical records  [id number]        Your Vitals Were     Pulse Temperature Height Pulse Oximetry BMI (Body Mass Index)       86 98.2  F (36.8  C) (Tympanic) 5' 7\" (1.702 m) 96% 26.34 kg/m2        Blood Pressure from Last 3 Encounters:   17 117/68   17 123/75   11/10/16 120/72    Weight from Last 3 Encounters:   17 168 lb 3.2 oz (76.3 kg)   17 160 lb (72.6 kg)   11/10/16 164 lb (74.4 kg)              We Performed the Following     **Hepatitis C Screen Reflex to RNA FUTURE anytime     **Lyme Disease Kalpana with reflex to WB Serum FUTURE 14d     ADMIN MEDICARE: Pneumococcal Vaccine ()     Asthma Action Plan (AAP)     CBC with platelets     PHYSICAL THERAPY REFERRAL     Pneumococcal vaccine 13 valent PCV13 IM (Prevnar) [62395]        Primary Care Provider Office Phone # Fax #    Connor Anderson [phone]       [facility] [address] [facility] [address]        Thank you!     Thank you for choosing [facility]  for your care. Our goal is always to provide you with excellent care. Hearing back from our patients is one way we can continue to improve our services. Please take a few minutes to complete the written survey that you may receive in the mail after your visit with us. Thank you!             Your Updated Medication List - Protect others around you: Learn how to safely use, store " and throw away your medicines at www.disposemymeds.org.          This list is accurate as of: 5/4/17  2:10 PM.  Always use your most recent med list.                   Brand Name Dispense Instructions for use    * albuterol 108 (90 BASE) MCG/ACT Inhaler    PROAIR HFA/PROVENTIL HFA/VENTOLIN HFA    1 Inhaler    Inhale 2 puffs into the lungs every 6 hours as needed for shortness of breath / dyspnea or wheezing       * albuterol (2.5 MG/3ML) 0.083% neb solution     30 vial    Take 1 vial (2.5 mg) by nebulization every 6 hours as needed for shortness of breath / dyspnea or wheezing       aspirin 81 MG EC tablet     30 tablet    Take 1 tablet (81 mg) by mouth daily       atorvastatin 20 MG tablet    LIPITOR    90 tablet    Take 1 tablet (20 mg) by mouth daily       carvedilol 6.25 MG tablet    COREG    180 tablet    Take 1 tablet (6.25 mg) by mouth 2 times daily       clobetasol 0.05 % ointment    TEMOVATE    45 g    Apply sparingly to affected area twice daily as needed.  Do not apply to face.       clopidogrel 75 MG tablet    PLAVIX    90 tablet    Take 1 tablet daily       EPINEPHrine 0.3 MG/0.3ML injection     0.6 mL    Inject 0.3 mLs (0.3 mg) into the muscle as needed for anaphylaxis       fexofenadine 180 MG tablet    ALLEGRA    90 tablet    Take 1 tablet by mouth daily.       ipratropium - albuterol 0.5 mg/2.5 mg/3 mL 0.5-2.5 (3) MG/3ML neb solution    DUONEB    1 Box    Take 1 vial (3 mLs) by nebulization every 4 hours as needed for shortness of breath / dyspnea       latanoprost 0.005 % ophthalmic solution    XALATAN     Place 1 drop into both eyes At Bedtime       losartan-hydrochlorothiazide 50-12.5 MG per tablet    HYZAAR    90 tablet    Take 1 tablet by mouth daily       montelukast 10 MG tablet    SINGULAIR    90 tablet    Take 1 tablet (10 mg) by mouth At Bedtime       nitroglycerin 0.4 MG sublingual tablet    NITROSTAT    25 tablet    Place 1 tablet (0.4 mg) under the tongue every 5 minutes as needed for  chest pain If you are still having symptoms after 3 doses (15 minutes) call 911.       omeprazole 20 MG CR capsule    priLOSEC    90 capsule    Take 1 capsule (20 mg) by mouth daily Take 30-60 minutes before a meal.       order for DME     1 Device    Equipment being ordered: Nebulizer Respironics, MiniElite--Neb Machine and tubing and mouth piece.       order for DME     1 Device    Equipment being ordered: arm blood pressure cuff       predniSONE 10 MG tablet    DELTASONE    32 tablet    Take 4 tablets daily for 5 days,  take 2 tablets daily for 3 days, take 1 tablet daily for 3 days, take half a tablet for 3 days.       * Notice:  This list has 2 medication(s) that are the same as other medications prescribed for you. Read the directions carefully, and ask your doctor or other care provider to review them with you.

## 2017-05-05 LAB
B BURGDOR IGG+IGM SER QL: 0.04 (ref 0–0.89)
HCV AB SERPL QL IA: NORMAL

## 2017-05-05 ASSESSMENT — ASTHMA QUESTIONNAIRES: ACT_TOTALSCORE: 22

## 2017-05-10 ENCOUNTER — HOSPITAL ENCOUNTER (OUTPATIENT)
Dept: PHYSICAL THERAPY | Facility: CLINIC | Age: 72
Setting detail: THERAPIES SERIES
End: 2017-05-10
Attending: FAMILY MEDICINE
Payer: MEDICARE

## 2017-05-10 PROCEDURE — 40000718 ZZHC STATISTIC PT DEPARTMENT ORTHO VISIT: Performed by: PHYSICAL THERAPIST

## 2017-05-10 PROCEDURE — G8984 CARRY CURRENT STATUS: HCPCS | Mod: GP,CJ | Performed by: PHYSICAL THERAPIST

## 2017-05-10 PROCEDURE — 97161 PT EVAL LOW COMPLEX 20 MIN: CPT | Mod: GP | Performed by: PHYSICAL THERAPIST

## 2017-05-10 PROCEDURE — G8985 CARRY GOAL STATUS: HCPCS | Mod: GP,CI | Performed by: PHYSICAL THERAPIST

## 2017-05-10 PROCEDURE — 97110 THERAPEUTIC EXERCISES: CPT | Mod: GP | Performed by: PHYSICAL THERAPIST

## 2017-05-10 PROCEDURE — 97140 MANUAL THERAPY 1/> REGIONS: CPT | Mod: GP | Performed by: PHYSICAL THERAPIST

## 2017-05-10 NOTE — PROGRESS NOTES
[facility]          OUTPATIENT PHYSICAL THERAPY ORTHOPEDIC EVALUATION  PLAN OF TREATMENT FOR OUTPATIENT REHABILITATION  (COMPLETE FOR INITIAL CLAIMS ONLY)  Patient's Last Name, First Name, M.I.  YOB: 1945  Raul Wilhelm       Provider s Name:  [facility]   Medical Record No.  [id number]   Start of Care Date:  05/10/17   Onset Date:  03/15/17   Type:     _X__PT   ___OT   ___SLP Medical Diagnosis:  acute LBP     PT Diagnosis:  LBP   Visits from SOC:  1      _________________________________________________________________________________  Plan of Treatment/Functional Goals:  manual therapy, neuromuscular re-education, ROM, strengthening, stretching (posture/ body mechanics)     Goals  Goal Description: 1.  Pt will be able to walk X 10 min w/ pain no > 3/10  Target Date: 06/29/17    Goal Description: 2.  Pt able to lift and carry 10-15# for household tasks w/ pain no > 3/10  Target Date: 06/29/17    Goal Description: 3.  Independent and consistent w/ HEP and increased awareness of body mechanics  Target Date: 06/29/17    Therapy Frequency:  2 times/Week  Predicted Duration of Therapy Intervention:  2 weeks then 1X/wk X 4  = 8 visits    Kim Ramírez, PT                 I CERTIFY THE NEED FOR THESE SERVICES FURNISHED UNDER        THIS PLAN OF TREATMENT AND WHILE UNDER MY CARE     (Physician co-signature of this document indicates review and certification of the therapy plan).                       Certification Date From:  05/10/17   Certification Date To:  06/29/17    Referring Provider:  Connor Anderson MD    Initial Assessment        See Epic Evaluation Start of Care Date: 05/10/17

## 2017-05-10 NOTE — PROGRESS NOTES
05/10/17 0800   General Information   Type of Visit Initial OP Ortho PT Evaluation   Start of Care Date 05/10/17   Referring Physician Connor Anderson MD   Patient/Family Goals Statement To relieve some of the pain   Orders Evaluate and Treat   Date of Order 05/04/17   Insurance Type Medicare  ([employer])   Medical Diagnosis acute LBP   Body Part(s)   Body Part(s) Lumbar Spine/SI   Presentation and Etiology   Pertinent history of current problem (include personal factors and/or comorbidities that impact the POC) Pt presents w/ B LBP x approx 6 weeks.  No specific injury.  Pain @ best 1/10,  @ worst 9/10.  No radiating pain.  Negative numbness/ tingling/ bowel / bladder.   Negative cough / sneeze.   Xray in chart:  disc narrowing L2-L4.  Scoliotic curve.  Meds:  d/c taking alleve--no benefit.  PMHX:   MI 2015--2 stents.  Asthma carries inhaler.  HBP,  B knee arthritis.   Mod complexity: B knee arthritis   Impairments A. Pain;D. Decreased ROM;E. Decreased flexibility;H. Impaired gait   Onset date of current episode/exacerbation 03/15/17   Pain quality C. Aching   Pain exacerbation comment Immediate problem w/ walking but this varies.  Trying to avoid lifting/ carrying or minimizing his load.     Pain/symptoms eased by A. Sitting   Progression of symptoms since onset: Improved  (recently)   Prior Level of Function   Functional Level Prior Comment yardwork/ gardening/ cuts wood/ /  works on his cars.   Pt attends cardiac rehab 3X/wk 90 min sessions   Current Level of Function   Patient role/employment history A. Employed;F. Retired   Employment Comments Tool ----bothers to walk the length of the building.   Fall Risk Screen   Fall screen completed by PT   Per patient - Fall 2 or more times in past year? No   Per patient - Fall with injury in past year? No   Is patient a fall risk? No   Lumbar Spine/SI Objective Findings   Observation increased pronation   Posture Decreased lumbar lordosis.  R PSIS/  crest lower.  L LE longer supine   Gait/Locomotion trendelenburg gait (pt reports due to his knees).  Able to heel and toe walk but noted knee > back pain w/ heel walk   Flexion ROM 80% w/ stiffnes   Extension ROM 30% w/  a little LBP   Right Side Bending ROM 80%   Left Side Bending ROM 80%   Pelvic Screen + R FB test   Hip Screen PROM ER B WNL,  IR R 18*,  L 15*.  R scour and FADIR bothered LBP , L neg and B SHIRLEY noted some pull in legs   Hip Flexion (L2) Strength B 5/5   Hip Abduction Strength B 4/5   Knee Flexion Strength B 5/5   Knee Extension (L3) Strength B 5/5   Ankle Dorsiflexion (L4) Strength B 5/5   Great Toe Extension (L5) Strength B 5/5   Hamstring Flexibility mild to mod tightness   Obers Flexibility neg B   SLR neg B   Crossover SLR neg B    Slump Test B noted a little LBP   Lumbar/SI Special Tests Comments DKTC a little worse   Segmental Mobility significant hypomobility lumbar spine.  Mild tenderness w/ PA testing and pressure at SI   Palpation Mild tenderness B QL and lumbar paraspinals   Planned Therapy Interventions   Planned Therapy Interventions manual therapy;neuromuscular re-education;ROM;strengthening;stretching  (posture/ body mechanics)   Clinical Impression   Criteria for Skilled Therapeutic Interventions Met yes, treatment indicated   PT Diagnosis LBP   Influenced by the following impairments pain, stiffness   Functional limitations due to impairments walking, lifting/ carrying   Clinical Presentation Stable/Uncomplicated   Clinical Presentation Rationale No recent changes in LB complaints---gradually improving   Clinical Decision Making (Complexity) Low complexity   Therapy Frequency 2 times/Week   Predicted Duration of Therapy Intervention (days/wks) 2 weeks then 1X/wk X 4  = 8 visits   Risk & Benefits of therapy have been explained Yes   Patient, Family & other staff in agreement with plan of care Yes   Education Assessment   Barriers to Learning No barriers   Ortho Goal 1   Goal  Description 1.  Pt will be able to walk X 10 min w/ pain no > 3/10   Target Date 06/29/17   Ortho Goal 2   Goal Description 2.  Pt able to lift and carry 10-15# for household tasks w/ pain no > 3/10   Target Date 06/29/17   Ortho Goal 3   Goal Description 3.  Independent and consistent w/ HEP and increased awareness of body mechanics   Target Date 06/29/17   Total Evaluation Time   Total Evaluation Time 30   Therapy Certification   Certification date from 05/10/17   Certification date to 06/29/17   Medical Diagnosis acute LBP       Thank you for this referral,    Kim Ramírez, PT,  CEAS   #[phone]  [facility].  [phone]

## 2017-05-12 ENCOUNTER — HOSPITAL ENCOUNTER (OUTPATIENT)
Dept: PHYSICAL THERAPY | Facility: CLINIC | Age: 72
Setting detail: THERAPIES SERIES
End: 2017-05-12
Attending: FAMILY MEDICINE
Payer: MEDICARE

## 2017-05-12 PROCEDURE — 40000718 ZZHC STATISTIC PT DEPARTMENT ORTHO VISIT: Performed by: PHYSICAL THERAPIST

## 2017-05-12 PROCEDURE — 97110 THERAPEUTIC EXERCISES: CPT | Mod: GP | Performed by: PHYSICAL THERAPIST

## 2017-05-12 PROCEDURE — 97140 MANUAL THERAPY 1/> REGIONS: CPT | Mod: GP | Performed by: PHYSICAL THERAPIST

## 2017-05-19 ENCOUNTER — HOSPITAL ENCOUNTER (OUTPATIENT)
Dept: PHYSICAL THERAPY | Facility: CLINIC | Age: 72
Setting detail: THERAPIES SERIES
End: 2017-05-19
Attending: FAMILY MEDICINE
Payer: MEDICARE

## 2017-05-19 PROCEDURE — 97140 MANUAL THERAPY 1/> REGIONS: CPT | Mod: GP | Performed by: PHYSICAL THERAPIST

## 2017-05-19 PROCEDURE — 40000718 ZZHC STATISTIC PT DEPARTMENT ORTHO VISIT: Performed by: PHYSICAL THERAPIST

## 2017-05-19 PROCEDURE — 97110 THERAPEUTIC EXERCISES: CPT | Mod: GP | Performed by: PHYSICAL THERAPIST

## 2017-05-31 ENCOUNTER — HOSPITAL ENCOUNTER (OUTPATIENT)
Dept: PHYSICAL THERAPY | Facility: CLINIC | Age: 72
Setting detail: THERAPIES SERIES
End: 2017-05-31
Attending: FAMILY MEDICINE
Payer: MEDICARE

## 2017-05-31 PROCEDURE — 97110 THERAPEUTIC EXERCISES: CPT | Mod: GP | Performed by: PHYSICAL THERAPIST

## 2017-05-31 PROCEDURE — 97140 MANUAL THERAPY 1/> REGIONS: CPT | Mod: GP | Performed by: PHYSICAL THERAPIST

## 2017-05-31 PROCEDURE — 40000718 ZZHC STATISTIC PT DEPARTMENT ORTHO VISIT: Performed by: PHYSICAL THERAPIST

## 2017-06-07 DIAGNOSIS — I21.21 ST ELEVATION MYOCARDIAL INFARCTION INVOLVING LEFT CIRCUMFLEX CORONARY ARTERY (H): ICD-10-CM

## 2017-06-07 RX ORDER — NITROGLYCERIN 0.4 MG/1
0.4 TABLET SUBLINGUAL EVERY 5 MIN PRN
Qty: 25 TABLET | Refills: 1 | Status: SHIPPED | OUTPATIENT
Start: 2017-06-07 | End: 2020-05-19

## 2017-06-07 NOTE — TELEPHONE ENCOUNTER
Nitroglycerin      Last Written Prescription Date: unknown  Last Fill Quantity: 25,  # refills: 0   Last Office Visit with G, P or [facility] prescribing provider: 5/4/17                                            Thank you,  Gabriela Blair - Pharmacy Technician  [facility]  [phone]

## 2017-06-09 ENCOUNTER — HOSPITAL ENCOUNTER (OUTPATIENT)
Dept: PHYSICAL THERAPY | Facility: CLINIC | Age: 72
Setting detail: THERAPIES SERIES
End: 2017-06-09
Attending: FAMILY MEDICINE
Payer: MEDICARE

## 2017-06-09 PROCEDURE — 40000718 ZZHC STATISTIC PT DEPARTMENT ORTHO VISIT: Performed by: PHYSICAL THERAPIST

## 2017-06-09 PROCEDURE — 97140 MANUAL THERAPY 1/> REGIONS: CPT | Mod: GP | Performed by: PHYSICAL THERAPIST

## 2017-06-09 PROCEDURE — 97110 THERAPEUTIC EXERCISES: CPT | Mod: GP | Performed by: PHYSICAL THERAPIST

## 2017-06-19 ENCOUNTER — HOSPITAL ENCOUNTER (OUTPATIENT)
Dept: PHYSICAL THERAPY | Facility: CLINIC | Age: 72
Setting detail: THERAPIES SERIES
End: 2017-06-19
Attending: FAMILY MEDICINE
Payer: MEDICARE

## 2017-06-19 PROCEDURE — 97140 MANUAL THERAPY 1/> REGIONS: CPT | Mod: GP | Performed by: PHYSICAL THERAPIST

## 2017-06-19 PROCEDURE — 40000718 ZZHC STATISTIC PT DEPARTMENT ORTHO VISIT: Performed by: PHYSICAL THERAPIST

## 2017-06-19 PROCEDURE — 97110 THERAPEUTIC EXERCISES: CPT | Mod: GP | Performed by: PHYSICAL THERAPIST

## 2017-06-21 ENCOUNTER — OFFICE VISIT (OUTPATIENT)
Dept: FAMILY MEDICINE | Facility: CLINIC | Age: 72
End: 2017-06-21
Payer: COMMERCIAL

## 2017-06-21 VITALS
BODY MASS INDEX: 26.37 KG/M2 | WEIGHT: 168 LBS | DIASTOLIC BLOOD PRESSURE: 68 MMHG | TEMPERATURE: 96.7 F | HEART RATE: 86 BPM | HEIGHT: 67 IN | SYSTOLIC BLOOD PRESSURE: 100 MMHG

## 2017-06-21 DIAGNOSIS — B07.9 VIRAL WART ON FINGER: Primary | ICD-10-CM

## 2017-06-21 PROCEDURE — 99213 OFFICE O/P EST LOW 20 MIN: CPT | Performed by: NURSE PRACTITIONER

## 2017-06-21 ASSESSMENT — ANXIETY QUESTIONNAIRES
3. WORRYING TOO MUCH ABOUT DIFFERENT THINGS: SEVERAL DAYS
IF YOU CHECKED OFF ANY PROBLEMS ON THIS QUESTIONNAIRE, HOW DIFFICULT HAVE THESE PROBLEMS MADE IT FOR YOU TO DO YOUR WORK, TAKE CARE OF THINGS AT HOME, OR GET ALONG WITH OTHER PEOPLE: NOT DIFFICULT AT ALL
1. FEELING NERVOUS, ANXIOUS, OR ON EDGE: NOT AT ALL
6. BECOMING EASILY ANNOYED OR IRRITABLE: NOT AT ALL
GAD7 TOTAL SCORE: 1
2. NOT BEING ABLE TO STOP OR CONTROL WORRYING: NOT AT ALL
7. FEELING AFRAID AS IF SOMETHING AWFUL MIGHT HAPPEN: NOT AT ALL
4. TROUBLE RELAXING: NOT AT ALL
5. BEING SO RESTLESS THAT IT IS HARD TO SIT STILL: NOT AT ALL

## 2017-06-21 NOTE — PATIENT INSTRUCTIONS
Small wart on your pinky finger    Following treatment with liquid nitrogen your skin may get a blister. Try to keep this intact and keep the area clean and dry.  If no blister occurs, it is ok to use a pumice stone or nail file to gently file down the thickened warty tissue (do not use the same nail file that you use on your finger nails or it could spread the virus).  You may need to return for additional treatment if the wart does not completely resolve.  Plan to return after roughly 2-3 weeks once the area has healed, if still showing signs of persistent warty tissue.      May continue to use OTC treatments (Mediplast or Dr. Smith) if treatment ineffective.      The nodules on your fingers are Heberden's nodules, commonly seen in patients with rheumatoid arthritis and osteoarthritis, they are benign, but occasionally might cause some pain in fingers, unfortunately there is no treatment for ostearthritis.

## 2017-06-21 NOTE — MR AVS SNAPSHOT
After Visit Summary   6/21/2017    Raul Wilhelm    MRN: [id number]           Patient Information     Date Of Birth          1945        Visit Information        Provider Department      6/21/2017 8:00 AM Graciela Evans APRN CNP [facility]        Today's Diagnoses     Viral wart on finger    -  1      Care Instructions    Small wart on your pinky finger    Following treatment with liquid nitrogen your skin may get a blister. Try to keep this intact and keep the area clean and dry.  If no blister occurs, it is ok to use a pumice stone or nail file to gently file down the thickened warty tissue (do not use the same nail file that you use on your finger nails or it could spread the virus).  You may need to return for additional treatment if the wart does not completely resolve.  Plan to return after roughly 2-3 weeks once the area has healed, if still showing signs of persistent warty tissue.      May continue to use OTC treatments (Mediplast or Dr. Smith) if treatment ineffective.      The nodules on your fingers are Heberden's nodules, commonly seen in patients with rheumatoid arthritis and osteoarthritis, they are benign, but occasionally might cause some pain in fingers, unfortunately there is no treatment for ostearthritis.               Follow-ups after your visit        Your next 10 appointments already scheduled     Jun 28, 2017  7:30 AM CDT   Ortho Treatment with Kim Ramírez PT   [facility] ([facility])    [address] [address] [phone]              Who to contact     If you have questions or need follow up information about today's clinic visit or your schedule please contact [facility] directly at [phone].  Normal or non-critical lab and imaging results will be communicated to you by MyChart, letter or phone within 4 business days after the clinic has received the  "results. If you do not hear from us within 7 days, please contact the clinic through [employer] or phone. If you have a critical or abnormal lab result, we will notify you by phone as soon as possible.  Submit refill requests through [employer] or call your pharmacy and they will forward the refill request to us. Please allow 3 business days for your refill to be completed.          Additional Information About Your Visit        [employer] Information     [employer] lets you send messages to your doctor, view your test results, renew your prescriptions, schedule appointments and more. To sign up, go to [email or web address] . Click on \"Log in\" on the left side of the screen, which will take you to the Welcome page. Then click on \"Sign up Now\" on the right side of the page.     You will be asked to enter the access code listed below, as well as some personal information. Please follow the directions to create your username and password.     Your access code is: [email or web address]  Expires: 2017  8:16 AM     Your access code will  in 90 days. If you need help or a new code, please call your [facility] clinic or [phone].        Care EveryWhere ID     This is your Care EveryWhere ID. This could be used by other organizations to access your [facility] medical records  [id number]        Your Vitals Were     Pulse Temperature Height BMI (Body Mass Index)          86 96.7  F (35.9  C) (Tympanic) 5' 7\" (1.702 m) 26.31 kg/m2         Blood Pressure from Last 3 Encounters:   17 100/68   17 117/68   17 123/75    Weight from Last 3 Encounters:   17 168 lb (76.2 kg)   17 168 lb 3.2 oz (76.3 kg)   17 160 lb (72.6 kg)              Today, you had the following     No orders found for display       Primary Care Provider Office Phone # Fax #    Connor Anderson [phone]       [address] [address]        Equal Access to Services     [facility] " GAAR : Hadii aad ku wilver Valentine, waaxda luqadaha, qaybta kaedgardda gustavo, daniel lindy darekshey prescott deonnaalejandrina flores . [facility] [phone].    ATENCIÓN: Si habla español, tiene a mccarthy disposición servicios gratuitos de asistencia lingüística. Llame al [phone].    We comply with applicable federal civil rights laws and [place] laws. We do not discriminate on the basis of race, color, national origin, age, disability sex, sexual orientation or gender identity.            Thank you!     Thank you for choosing [facility]  for your care. Our goal is always to provide you with excellent care. Hearing back from our patients is one way we can continue to improve our services. Please take a few minutes to complete the written survey that you may receive in the mail after your visit with us. Thank you!             Your Updated Medication List - Protect others around you: Learn how to safely use, store and throw away your medicines at www.disposemymeds.org.          This list is accurate as of: 6/21/17  8:16 AM.  Always use your most recent med list.                   Brand Name Dispense Instructions for use Diagnosis    * albuterol 108 (90 BASE) MCG/ACT Inhaler    PROAIR HFA/PROVENTIL HFA/VENTOLIN HFA    1 Inhaler    Inhale 2 puffs into the lungs every 6 hours as needed for shortness of breath / dyspnea or wheezing    Uncomplicated severe persistent asthma, Chronic obstructive pulmonary disease, unspecified COPD type (H)       * albuterol (2.5 MG/3ML) 0.083% neb solution     30 vial    Take 1 vial (2.5 mg) by nebulization every 6 hours as needed for shortness of breath / dyspnea or wheezing    Acute bronchitis, unspecified organism       aspirin 81 MG EC tablet     30 tablet    Take 1 tablet (81 mg) by mouth daily    STEMI involving left circumflex coronary artery       atorvastatin 20 MG tablet    LIPITOR    90 tablet    Take 1 tablet (20 mg) by mouth daily    ST elevation myocardial infarction  involving left circumflex coronary artery (H)       carvedilol 6.25 MG tablet    COREG    180 tablet    Take 1 tablet (6.25 mg) by mouth 2 times daily    HTN (hypertension)       clobetasol 0.05 % ointment    TEMOVATE    45 g    Apply sparingly to affected area twice daily as needed.  Do not apply to face.    Psoriasis       clopidogrel 75 MG tablet    PLAVIX    90 tablet    Take 1 tablet daily    ST elevation myocardial infarction involving left circumflex coronary artery (H)       EPINEPHrine 0.3 MG/0.3ML injection     0.6 mL    Inject 0.3 mLs (0.3 mg) into the muscle as needed for anaphylaxis    Bee sting allergy       fexofenadine 180 MG tablet    ALLEGRA    90 tablet    Take 1 tablet by mouth daily.    Seasonal allergic rhinitis       ipratropium - albuterol 0.5 mg/2.5 mg/3 mL 0.5-2.5 (3) MG/3ML neb solution    DUONEB    1 Box    Take 1 vial (3 mLs) by nebulization every 4 hours as needed for shortness of breath / dyspnea    Uncomplicated severe persistent asthma, Chronic obstructive pulmonary disease, unspecified COPD type (H)       latanoprost 0.005 % ophthalmic solution    XALATAN     Place 1 drop into both eyes At Bedtime        losartan-hydrochlorothiazide 50-12.5 MG per tablet    HYZAAR    90 tablet    Take 1 tablet by mouth daily    Essential hypertension, benign       montelukast 10 MG tablet    SINGULAIR    90 tablet    Take 1 tablet (10 mg) by mouth At Bedtime    Severe persistent asthma, Seasonal allergic rhinitis       nitroglycerin 0.4 MG sublingual tablet    NITROSTAT    25 tablet    Place 1 tablet (0.4 mg) under the tongue every 5 minutes as needed for chest pain If you are still having symptoms after 3 doses (15 minutes) call 911.    ST elevation myocardial infarction involving left circumflex coronary artery (H)       omeprazole 20 MG CR capsule    priLOSEC    90 capsule    Take 1 capsule (20 mg) by mouth daily Take 30-60 minutes before a meal.    On aspirin at home       order for DME     1  Device    Equipment being ordered: Nebulizer Respironics, MiniElite--Neb Machine and tubing and mouth piece.    Extrinsic asthma, unspecified       order for DME     1 Device    Equipment being ordered: arm blood pressure cuff    Essential hypertension, benign       predniSONE 10 MG tablet    DELTASONE    32 tablet    Take 4 tablets daily for 5 days,  take 2 tablets daily for 3 days, take 1 tablet daily for 3 days, take half a tablet for 3 days.    Uncomplicated severe persistent asthma, Chronic obstructive pulmonary disease, unspecified COPD type (H)       * Notice:  This list has 2 medication(s) that are the same as other medications prescribed for you. Read the directions carefully, and ask your doctor or other care provider to review them with you.

## 2017-06-21 NOTE — NURSING NOTE
"Chief Complaint   Patient presents with     Finger     Bump on left pinky finger for 3 months.       Initial /68  Pulse 86  Temp 96.7  F (35.9  C) (Tympanic)  Ht 5' 7\" (1.702 m)  Wt 168 lb (76.2 kg)  BMI 26.31 kg/m2 Estimated body mass index is 26.31 kg/(m^2) as calculated from the following:    Height as of this encounter: 5' 7\" (1.702 m).    Weight as of this encounter: 168 lb (76.2 kg).  Medication Reconciliation: complete  "

## 2017-06-22 ASSESSMENT — PATIENT HEALTH QUESTIONNAIRE - PHQ9: SUM OF ALL RESPONSES TO PHQ QUESTIONS 1-9: 1

## 2017-06-22 ASSESSMENT — ANXIETY QUESTIONNAIRES: GAD7 TOTAL SCORE: 1

## 2017-06-28 ENCOUNTER — HOSPITAL ENCOUNTER (OUTPATIENT)
Dept: PHYSICAL THERAPY | Facility: CLINIC | Age: 72
Setting detail: THERAPIES SERIES
End: 2017-06-28
Attending: FAMILY MEDICINE
Payer: MEDICARE

## 2017-06-28 PROCEDURE — 97110 THERAPEUTIC EXERCISES: CPT | Mod: GP | Performed by: PHYSICAL THERAPIST

## 2017-06-28 PROCEDURE — G8984 CARRY CURRENT STATUS: HCPCS | Mod: GP,CI | Performed by: PHYSICAL THERAPIST

## 2017-06-28 PROCEDURE — 40000718 ZZHC STATISTIC PT DEPARTMENT ORTHO VISIT: Performed by: PHYSICAL THERAPIST

## 2017-06-28 PROCEDURE — G8985 CARRY GOAL STATUS: HCPCS | Mod: GP,CI | Performed by: PHYSICAL THERAPIST

## 2017-06-28 PROCEDURE — 97140 MANUAL THERAPY 1/> REGIONS: CPT | Mod: GP | Performed by: PHYSICAL THERAPIST

## 2017-06-28 NOTE — PROGRESS NOTES
OUTPATIENT PHYSICAL THERAPY PROGRESS NOTE   Connor Anderson MD 5/10/17 to 06/28/17 0700   Signing Clinician's Name / Credentials   Signing clinician's name / credentials Kim Ramírez,PT [id number]   Session Number   Session Number 7  [facility]/ [employer]   PT Medicare Only G-code   G-code Carry: Carrying, Moving & Handling Objects   Carry: Carrying, Moving & Handling Objects   Carry Current Status,  (eval/re-eval & every progress note) CI: 1-19% impairment   Current Carry Modifier Rationale improving tolerance to carrying groceries/ light items   Carry Goal,  (eval/re-eval, every progress note, & discharge) CI: 1-19% impairment   Ortho Goal 1   Goal Description 1.  Pt will be able to walk X 10 min w/ pain no > 3/10.  5/31/17 pt has not tried walking 10 min due to living on busy road.  6/19/17 walks around the yard pain 4-5/10.  6/28/17 able to walk 10-15 min pain 3-4/10.     Target Date 06/29/17   Ortho Goal 2   Goal Description 2.  Pt able to lift and carry 10-15# for household tasks w/ pain no > 3/10.  5/3/17  can go up to 6-7/10 depending on the time of day.  Pt notes he breaks up his activity.   6/28/17  able to do and pain level 3-4/10.     Target Date 06/29/17   Ortho Goal 3   Goal Description 3.  Independent and consistent w/ HEP and increased awareness of body mechanics.   6/28/17 doing ex 5X/wk and goes to cardiac rehab MET   Target Date 06/29/17   Subjective Report   Subjective Report LBP 3-4/10.   Pt notes the back is feeling pretty good now his knees are bothering him more.     Objective Measures   Objective Measure PSIS/ crest level.     Details Mild to Moderate tightness w/ SLR.   Objective Measure Lumbar flex 75%   Details Moderate trendelenburg   Therapeutic Procedure/exercise   Patient Response LTRS, SKTC, seated trunk flex, seated TA sets, clam , bridge  and piriformis stretch on own.     Pt cont to need some cuing w/ supine TA set and marching.     Treatment Detail Seated hip add w/ toes  in/out.   Cheng pose using counter also w/ rotation.  Supine TA set w/ marching.     Wall squats X 15   Manual Therapy   Patient Response SLR very mild tightness after.   LROM flex afterwards 90%   Treatment Detail  Activation to diaphrapm, psoas, quad and SI B ( for HS)     Plan   Home program ex as above.  Ice after increased activity   Plan Pt to cont on own w/ HEP.  Chart open X 30 days in case of a flareup   Comments   Comments Progress towards goals as noted above

## 2017-07-06 ENCOUNTER — OFFICE VISIT (OUTPATIENT)
Dept: FAMILY MEDICINE | Facility: CLINIC | Age: 72
End: 2017-07-06
Payer: COMMERCIAL

## 2017-07-06 VITALS
HEART RATE: 85 BPM | WEIGHT: 170.1 LBS | TEMPERATURE: 97.9 F | SYSTOLIC BLOOD PRESSURE: 123 MMHG | BODY MASS INDEX: 26.7 KG/M2 | DIASTOLIC BLOOD PRESSURE: 78 MMHG | HEIGHT: 67 IN

## 2017-07-06 DIAGNOSIS — B07.9 VIRAL WART ON FINGER: Primary | ICD-10-CM

## 2017-07-06 PROCEDURE — 99212 OFFICE O/P EST SF 10 MIN: CPT | Performed by: NURSE PRACTITIONER

## 2017-07-06 NOTE — PROGRESS NOTES
"  SUBJECTIVE:                                                    Raul Wilhelm is a 71 year old male who presents to clinic today for the following health issues: wart treatment follow up, one wart on the left pinky finger.      Problem list and histories reviewed & adjusted, as indicated.  Additional history: as documented.    Reviewed and updated as needed this visit by clinical staff  Tobacco  Allergies  Med Hx  Surg Hx  Fam Hx  Soc Hx      Reviewed and updated as needed this visit by Provider         ROS:  Constitutional, HEENT, cardiovascular, pulmonary, gi and gu systems are negative, except as otherwise noted.    OBJECTIVE:     /78  Pulse 85  Temp 97.9  F (36.6  C) (Tympanic)  Ht 5' 7\" (1.702 m)  Wt 170 lb 1.6 oz (77.2 kg)  BMI 26.64 kg/m2  Body mass index is 26.64 kg/(m^2).  GENERAL: healthy, alert and no distress  SKIN: small wart on the left pinky finger    Diagnostic Test Results:  none     ASSESSMENT/PLAN:     1. Viral wart on finger   The lesion was frozen with LN2 x6. Patient tolerated procedure well.   WART CARE DISCUSSED. USE OF OTC PRODUCT STARTING IN FEW DAYS. GENTLE ABRAISION WITH PUMICE STONE OR EMERY BOARD WITH GOOD HANDWASHING AFTER. RETURN IN TWO WEEKS FOR REFREEZING UNTIL RESOLVED.      See Patient Instructions    [facility]  "

## 2017-07-06 NOTE — MR AVS SNAPSHOT
"              After Visit Summary   7/6/2017    Raul Wilhelm    MRN: [id number]           Patient Information     Date Of Birth          1945        Visit Information        Provider Department      7/6/2017 1:00 PM Graciela Evans APRN CNP [facility]        Care Instructions    Use Mediplast patches every 48 hours              Follow-ups after your visit        Your next 10 appointments already scheduled     Jul 06, 2017  1:00 PM CDT   SHORT with OSCAR Sterling CNP   [facility]    [address] [address]   [phone]              Who to contact     If you have questions or need follow up information about today's clinic visit or your schedule please contact [facility] directly at [phone].  Normal or non-critical lab and imaging results will be communicated to you by MyChart, letter or phone within 4 business days after the clinic has received the results. If you do not hear from us within 7 days, please contact the clinic through MyChart or phone. If you have a critical or abnormal lab result, we will notify you by phone as soon as possible.  Submit refill requests through [employer] or call your pharmacy and they will forward the refill request to us. Please allow 3 business days for your refill to be completed.          Additional Information About Your Visit        MyChart Information     [employer] lets you send messages to your doctor, view your test results, renew your prescriptions, schedule appointments and more. To sign up, go to [email or web address] . Click on \"Log in\" on the left side of the screen, which will take you to the Welcome page. Then click on \"Sign up Now\" on the right side of the page.     You will be asked to enter the access code listed below, as well as some personal information. Please follow the directions to create your username and password.     Your access " "code is: [email or web address]  Expires: 2017  8:16 AM     Your access code will  in 90 days. If you need help or a new code, please call your [place] clinic or [phone].        Care EveryWhere ID     This is your Care EveryWhere ID. This could be used by other organizations to access your [place] medical records  [id number]        Your Vitals Were     Pulse Temperature Height BMI (Body Mass Index)          85 97.9  F (36.6  C) (Tympanic) 5' 7\" (1.702 m) 26.64 kg/m2         Blood Pressure from Last 3 Encounters:   17 123/78   17 100/68   17 117/68    Weight from Last 3 Encounters:   17 170 lb 1.6 oz (77.2 kg)   17 168 lb (76.2 kg)   17 168 lb 3.2 oz (76.3 kg)              Today, you had the following     No orders found for display       Primary Care Provider Office Phone # Fax #    Connor Anderson [phone]       [facility] [address] [address]        Equal Access to Services     DIDIER VINES AH: Hadii buffy ku hadasho Soomaali, waaxda luqadaha, qaybta kaalmada adeegyada, waxay brodyin hayfernandon genie flores ah. So [facility] [phone].    ATENCIÓN: Si habla español, tiene a mccarthy disposición servicios gratuitos de asistencia lingüística. Llame al [phone].    We comply with applicable federal civil rights laws and [place] laws. We do not discriminate on the basis of race, color, national origin, age, disability sex, sexual orientation or gender identity.            Thank you!     Thank you for choosing [facility]  for your care. Our goal is always to provide you with excellent care. Hearing back from our patients is one way we can continue to improve our services. Please take a few minutes to complete the written survey that you may receive in the mail after your visit with us. Thank you!             Your Updated Medication List - Protect others around you: Learn how to safely use, store and throw away " your medicines at www.disposemymeds.org.          This list is accurate as of: 7/6/17 12:59 PM.  Always use your most recent med list.                   Brand Name Dispense Instructions for use Diagnosis    * albuterol 108 (90 BASE) MCG/ACT Inhaler    PROAIR HFA/PROVENTIL HFA/VENTOLIN HFA    1 Inhaler    Inhale 2 puffs into the lungs every 6 hours as needed for shortness of breath / dyspnea or wheezing    Uncomplicated severe persistent asthma, Chronic obstructive pulmonary disease, unspecified COPD type (H)       * albuterol (2.5 MG/3ML) 0.083% neb solution     30 vial    Take 1 vial (2.5 mg) by nebulization every 6 hours as needed for shortness of breath / dyspnea or wheezing    Acute bronchitis, unspecified organism       aspirin 81 MG EC tablet     30 tablet    Take 1 tablet (81 mg) by mouth daily    STEMI involving left circumflex coronary artery       atorvastatin 20 MG tablet    LIPITOR    90 tablet    Take 1 tablet (20 mg) by mouth daily    ST elevation myocardial infarction involving left circumflex coronary artery (H)       carvedilol 6.25 MG tablet    COREG    180 tablet    Take 1 tablet (6.25 mg) by mouth 2 times daily    HTN (hypertension)       clobetasol 0.05 % ointment    TEMOVATE    45 g    Apply sparingly to affected area twice daily as needed.  Do not apply to face.    Psoriasis       clopidogrel 75 MG tablet    PLAVIX    90 tablet    Take 1 tablet daily    ST elevation myocardial infarction involving left circumflex coronary artery (H)       EPINEPHrine 0.3 MG/0.3ML injection     0.6 mL    Inject 0.3 mLs (0.3 mg) into the muscle as needed for anaphylaxis    Bee sting allergy       fexofenadine 180 MG tablet    ALLEGRA    90 tablet    Take 1 tablet by mouth daily.    Seasonal allergic rhinitis       ipratropium - albuterol 0.5 mg/2.5 mg/3 mL 0.5-2.5 (3) MG/3ML neb solution    DUONEB    1 Box    Take 1 vial (3 mLs) by nebulization every 4 hours as needed for shortness of breath / dyspnea     Uncomplicated severe persistent asthma, Chronic obstructive pulmonary disease, unspecified COPD type (H)       latanoprost 0.005 % ophthalmic solution    XALATAN     Place 1 drop into both eyes At Bedtime        losartan-hydrochlorothiazide 50-12.5 MG per tablet    HYZAAR    90 tablet    Take 1 tablet by mouth daily    Essential hypertension, benign       montelukast 10 MG tablet    SINGULAIR    90 tablet    Take 1 tablet (10 mg) by mouth At Bedtime    Severe persistent asthma, Seasonal allergic rhinitis       nitroGLYcerin 0.4 MG sublingual tablet    NITROSTAT    25 tablet    Place 1 tablet (0.4 mg) under the tongue every 5 minutes as needed for chest pain If you are still having symptoms after 3 doses (15 minutes) call 911.    ST elevation myocardial infarction involving left circumflex coronary artery (H)       omeprazole 20 MG CR capsule    priLOSEC    90 capsule    Take 1 capsule (20 mg) by mouth daily Take 30-60 minutes before a meal.    On aspirin at home       order for DME     1 Device    Equipment being ordered: Nebulizer Respironics, MiniElite--Neb Machine and tubing and mouth piece.    Extrinsic asthma, unspecified       order for DME     1 Device    Equipment being ordered: arm blood pressure cuff    Essential hypertension, benign       predniSONE 10 MG tablet    DELTASONE    32 tablet    Take 4 tablets daily for 5 days,  take 2 tablets daily for 3 days, take 1 tablet daily for 3 days, take half a tablet for 3 days.    Uncomplicated severe persistent asthma, Chronic obstructive pulmonary disease, unspecified COPD type (H)       * Notice:  This list has 2 medication(s) that are the same as other medications prescribed for you. Read the directions carefully, and ask your doctor or other care provider to review them with you.

## 2017-07-06 NOTE — NURSING NOTE
"Chief Complaint   Patient presents with     Wart     Second treatment for wart on left pinky finger.        Initial /78  Pulse 85  Temp 97.9  F (36.6  C) (Tympanic)  Ht 5' 7\" (1.702 m)  Wt 170 lb 1.6 oz (77.2 kg)  BMI 26.64 kg/m2 Estimated body mass index is 26.64 kg/(m^2) as calculated from the following:    Height as of this encounter: 5' 7\" (1.702 m).    Weight as of this encounter: 170 lb 1.6 oz (77.2 kg).  Medication Reconciliation: complete  "

## 2017-08-04 DIAGNOSIS — J30.2 SEASONAL ALLERGIC RHINITIS: ICD-10-CM

## 2017-08-04 DIAGNOSIS — J45.50 SEVERE PERSISTENT ASTHMA (H): ICD-10-CM

## 2017-08-04 NOTE — ADDENDUM NOTE
Encounter addended by: Kim Ramírez, PT on: 8/4/2017 11:42 AM<BR>     Actions taken: Sign clinical note, Flowsheet accepted, Episode resolved

## 2017-08-04 NOTE — TELEPHONE ENCOUNTER
Montelukast       Last Written Prescription Date: 02/08/17  Last Fill Quantity: 90, # refills: 1    Last Office Visit with [facility], [facility] or [facility] prescribing provider:  07/06/17   Future Office Visit:       Date of Last Asthma Action Plan Letter:   Asthma Action Plan Q1 Year    Topic Date Due     Asthma Action Plan - yearly  05/04/2018      Asthma Control Test:   ACT Total Scores 5/4/2017   ACT TOTAL SCORE -   ASTHMA ER VISITS -   ASTHMA HOSPITALIZATIONS -   ACT TOTAL SCORE (Goal Greater than or Equal to 20) 22   In the past 12 months, how many times did you visit the emergency room for your asthma without being admitted to the hospital? 0   In the past 12 months, how many times were you hospitalized overnight because of your asthma? 0       Date of Last Spirometry Test:   No results found for this or any previous visit.

## 2017-08-04 NOTE — PROGRESS NOTES
OUTPATIENT PHYSICAL THERAPY DISCHARGE SUMMARY   Connor Anderson MD 5/10/17 to 06/28/17 0700   Signing Clinician's Name / Credentials   Signing clinician's name / credentials Kim Ramírez,PT [id number]   Session Number   Session Number 7  [facility]   Ortho Goal 1   Goal Description 1.  Pt will be able to walk X 10 min w/ pain no > 3/10.  5/31/17 pt has not tried walking 10 min due to [place] busy [place].  6/19/17 walks around the yard pain 4-5/10.  6/28/17 able to walk 10-15 min pain 3-4/10.     Target Date 06/29/17   Ortho Goal 2   Goal Description 2.  Pt able to lift and carry 10-15# for household tasks w/ pain no > 3/10.  5/3/17  can go up to 6-7/10 depending on the time of day.  Pt notes he breaks up his activity.   6/28/17  able to do and pain level 3-4/10.     Target Date 06/29/17   Ortho Goal 3   Goal Description 3.  Independent and consistent w/ HEP and increased awareness of body mechanics.   6/28/17 doing ex 5X/wk and goes to cardiac rehab [facility]   Target Date 06/29/17   Subjective Report   Subjective Report LBP 3-4/10.   Pt notes the back is feeling pretty good now his knees are bothering him more.     Objective Measures   Objective Measure PSIS/ crest level.     Details Mild to Moderate tightness w/ SLR.   Objective Measure Lumbar flex 75%   Details Moderate trendelenburg   Therapeutic Procedure/exercise   Patient Response LTRS, SKTC, seated trunk flex, seated TA sets, clam , bridge  and piriformis stretch on own.     Pt cont to need some cuing w/ supine TA set and marching.     Treatment Detail Seated hip add w/ toes in/out.   Cheng pose using counter also w/ rotation.  Supine TA set w/ marching.     Wall squats X 15   Manual Therapy   Patient Response SLR very mild tightness after.   LROM flex afterwards 90%   Treatment Detail  Activation to diaphrapm, psoas, quad and SI B ( for HS)     Plan   Home program ex as above.  Ice after increased activity   Plan Pt to cont on own w/ HEP. Discharge from  physical therapy.   Comments   Comments Progress towards goals as noted above.  Completed updated G code on final visit day but not a d/c G code as chart was kept open in case of flare up.

## 2017-08-07 RX ORDER — MONTELUKAST SODIUM 10 MG/1
TABLET ORAL
Qty: 90 TABLET | Refills: 3 | Status: SHIPPED | OUTPATIENT
Start: 2017-08-07 | End: 2018-08-27

## 2017-09-06 ENCOUNTER — ALLIED HEALTH/NURSE VISIT (OUTPATIENT)
Dept: FAMILY MEDICINE | Facility: CLINIC | Age: 72
End: 2017-09-06
Payer: COMMERCIAL

## 2017-09-06 DIAGNOSIS — I10 HTN (HYPERTENSION): ICD-10-CM

## 2017-09-06 DIAGNOSIS — Z23 NEED FOR PROPHYLACTIC VACCINATION AND INOCULATION AGAINST INFLUENZA: Primary | ICD-10-CM

## 2017-09-06 PROCEDURE — G0008 ADMIN INFLUENZA VIRUS VAC: HCPCS

## 2017-09-06 PROCEDURE — 90662 IIV NO PRSV INCREASED AG IM: CPT

## 2017-09-06 PROCEDURE — 99207 ZZC NO CHARGE NURSE ONLY: CPT

## 2017-09-06 RX ORDER — CARVEDILOL 6.25 MG/1
TABLET ORAL
Qty: 180 TABLET | Refills: 2 | Status: SHIPPED | OUTPATIENT
Start: 2017-09-06 | End: 2018-07-13

## 2017-09-06 NOTE — MR AVS SNAPSHOT
"              After Visit Summary   2017    Raul Wilhelm    MRN: [id number]           Patient Information     Date Of Birth          1945        Visit Information        Provider Department      2017 8:45 AM [facility] FLU SHOT [facility]IC [facility]        Today's Diagnoses     Need for prophylactic vaccination and inoculation against influenza    -  1       Follow-ups after your visit        Who to contact     If you have questions or need follow up information about today's clinic visit or your schedule please contact [facility] directly at [phone].  Normal or non-critical lab and imaging results will be communicated to you by [employer], letter or phone within 4 business days after the clinic has received the results. If you do not hear from us within 7 days, please contact the clinic through "[employer]t or phone. If you have a critical or abnormal lab result, we will notify you by phone as soon as possible.  Submit refill requests through Netech[employer] or call your pharmacy and they will forward the refill request to us. Please allow 3 business days for your refill to be completed.          Additional Information About Your Visit        MyChart Information     [employer] lets you send messages to your doctor, view your test results, renew your prescriptions, schedule appointments and more. To sign up, go to [email or web address] . Click on \"Log in\" on the left side of the screen, which will take you to the Welcome page. Then click on \"Sign up Now\" on the right side of the page.     You will be asked to enter the access code listed below, as well as some personal information. Please follow the directions to create your username and password.     Your access code is: [email or web address]  Expires: 2017  8:16 AM     Your access code will  in 90 days. If you need help or a new code, please call your [facility] or [phone].        Care EveryWhere ID     This is your Care " EveryWhere ID. This could be used by other organizations to access your [place] medical records  [id number]         Blood Pressure from Last 3 Encounters:   07/06/17 123/78   06/21/17 100/68   05/04/17 117/68    Weight from Last 3 Encounters:   07/06/17 170 lb 1.6 oz (77.2 kg)   06/21/17 168 lb (76.2 kg)   05/04/17 168 lb 3.2 oz (76.3 kg)              We Performed the Following     ADMIN INFLUENZA (For MEDICARE Patients ONLY) []     FLU VACCINE, INCREASED ANTIGEN, PRESV FREE, AGE 65+ [69699]        Primary Care Provider Office Phone # Fax #    Connor Anderson [phone] [address] [address]        Equal Access to Services     DIDIER VINES : Hadii aad ku hadasho Sonikhil, waaxda luqadaha, qaybta kaalmada gustavo, daniel flores . So [facility] [phone].    ATENCIÓN: Si habla español, tiene a mccarthy disposición servicios gratuitos de asistencia lingüística. Desirae al [phone].    We comply with applicable federal civil rights laws and [place] laws. We do not discriminate on the basis of race, color, national origin, age, disability sex, sexual orientation or gender identity.            Thank you!     Thank you for choosing [facility]  for your care. Our goal is always to provide you with excellent care. Hearing back from our patients is one way we can continue to improve our services. Please take a few minutes to complete the written survey that you may receive in the mail after your visit with us. Thank you!             Your Updated Medication List - Protect others around you: Learn how to safely use, store and throw away your medicines at www.disposemymeds.org.          This list is accurate as of: 9/6/17  9:58 AM.  Always use your most recent med list.                   Brand Name Dispense Instructions for use Diagnosis    * albuterol 108 (90 BASE) MCG/ACT Inhaler    PROAIR HFA/PROVENTIL HFA/VENTOLIN HFA    1 Inhaler     Inhale 2 puffs into the lungs every 6 hours as needed for shortness of breath / dyspnea or wheezing    Uncomplicated severe persistent asthma, Chronic obstructive pulmonary disease, unspecified COPD type (H)       * albuterol (2.5 MG/3ML) 0.083% neb solution     30 vial    Take 1 vial (2.5 mg) by nebulization every 6 hours as needed for shortness of breath / dyspnea or wheezing    Acute bronchitis, unspecified organism       aspirin 81 MG EC tablet     30 tablet    Take 1 tablet (81 mg) by mouth daily    STEMI involving left circumflex coronary artery       atorvastatin 20 MG tablet    LIPITOR    90 tablet    Take 1 tablet (20 mg) by mouth daily    ST elevation myocardial infarction involving left circumflex coronary artery (H)       carvedilol 6.25 MG tablet    COREG    180 tablet    Take 1 tablet (6.25 mg) by mouth 2 times daily    HTN (hypertension)       clobetasol 0.05 % ointment    TEMOVATE    45 g    Apply sparingly to affected area twice daily as needed.  Do not apply to face.    Psoriasis       clopidogrel 75 MG tablet    PLAVIX    90 tablet    Take 1 tablet daily    ST elevation myocardial infarction involving left circumflex coronary artery (H)       EPINEPHrine 0.3 MG/0.3ML injection 2-pack    EPIPEN/ADRENACLICK/or ANY BX GENERIC EQUIV    0.6 mL    Inject 0.3 mLs (0.3 mg) into the muscle as needed for anaphylaxis    Bee sting allergy       fexofenadine 180 MG tablet    ALLEGRA    90 tablet    Take 1 tablet by mouth daily.    Seasonal allergic rhinitis       ipratropium - albuterol 0.5 mg/2.5 mg/3 mL 0.5-2.5 (3) MG/3ML neb solution    DUONEB    1 Box    Take 1 vial (3 mLs) by nebulization every 4 hours as needed for shortness of breath / dyspnea    Uncomplicated severe persistent asthma, Chronic obstructive pulmonary disease, unspecified COPD type (H)       latanoprost 0.005 % ophthalmic solution    XALATAN     Place 1 drop into both eyes At Bedtime        losartan-hydrochlorothiazide 50-12.5 MG per  tablet    HYZAAR    90 tablet    Take 1 tablet by mouth daily    Essential hypertension, benign       montelukast 10 MG tablet    SINGULAIR    90 tablet    TAKE ONE TABLET BY MOUTH EVERY NIGHT AT BEDTIME    Severe persistent asthma, Seasonal allergic rhinitis       nitroGLYcerin 0.4 MG sublingual tablet    NITROSTAT    25 tablet    Place 1 tablet (0.4 mg) under the tongue every 5 minutes as needed for chest pain If you are still having symptoms after 3 doses (15 minutes) call 911.    ST elevation myocardial infarction involving left circumflex coronary artery (H)       omeprazole 20 MG CR capsule    priLOSEC    90 capsule    Take 1 capsule (20 mg) by mouth daily Take 30-60 minutes before a meal.    On aspirin at home       order for DME     1 Device    Equipment being ordered: Nebulizer Respironics, MiniElite--Neb Machine and tubing and mouth piece.    Extrinsic asthma, unspecified       order for DME     1 Device    Equipment being ordered: arm blood pressure cuff    Essential hypertension, benign       predniSONE 10 MG tablet    DELTASONE    32 tablet    Take 4 tablets daily for 5 days,  take 2 tablets daily for 3 days, take 1 tablet daily for 3 days, take half a tablet for 3 days.    Uncomplicated severe persistent asthma, Chronic obstructive pulmonary disease, unspecified COPD type (H)       * Notice:  This list has 2 medication(s) that are the same as other medications prescribed for you. Read the directions carefully, and ask your doctor or other care provider to review them with you.

## 2017-09-06 NOTE — PROGRESS NOTES
Injectable Influenza Immunization Documentation    1.  Is the person to be vaccinated sick today?  No    2. Does the person to be vaccinated have an allergy to eggs or to a component of the vaccine?  No    3. Has the person to be vaccinated today ever had a serious reaction to influenza vaccine in the past?  No    4. Has the person to be vaccinated ever had Guillain-Medina syndrome?  No     Form completed by Tete Mitchell CMA ([facility]) 9:55 AM 9/6/2017

## 2017-09-06 NOTE — TELEPHONE ENCOUNTER
carvedilol (COREG) 6.25 MG tablet      Last Written Prescription Date: 12/27/16  Last Fill Quantity: 180, # refills: 2  Last Office Visit with G, P or [facility] prescribing provider: 7/6/17       Potassium   Date Value Ref Range Status   04/28/2016 4.5 3.4 - 5.3 mmol/L Final     Creatinine   Date Value Ref Range Status   04/28/2016 0.95 0.66 - 1.25 mg/dL Final     BP Readings from Last 3 Encounters:   07/06/17 123/78   06/21/17 100/68   05/04/17 117/68

## 2017-09-13 ENCOUNTER — OFFICE VISIT (OUTPATIENT)
Dept: ORTHOPEDICS | Facility: CLINIC | Age: 72
End: 2017-09-13
Payer: COMMERCIAL

## 2017-09-13 ENCOUNTER — RADIANT APPOINTMENT (OUTPATIENT)
Dept: GENERAL RADIOLOGY | Facility: CLINIC | Age: 72
End: 2017-09-13
Attending: PEDIATRICS
Payer: COMMERCIAL

## 2017-09-13 VITALS
SYSTOLIC BLOOD PRESSURE: 125 MMHG | DIASTOLIC BLOOD PRESSURE: 72 MMHG | HEIGHT: 67 IN | WEIGHT: 170 LBS | BODY MASS INDEX: 26.68 KG/M2

## 2017-09-13 DIAGNOSIS — M17.0 BILATERAL PRIMARY OSTEOARTHRITIS OF KNEE: Primary | ICD-10-CM

## 2017-09-13 DIAGNOSIS — M25.562 CHRONIC PAIN OF BOTH KNEES: ICD-10-CM

## 2017-09-13 DIAGNOSIS — M25.561 CHRONIC PAIN OF BOTH KNEES: ICD-10-CM

## 2017-09-13 DIAGNOSIS — G89.29 CHRONIC PAIN OF BOTH KNEES: ICD-10-CM

## 2017-09-13 DIAGNOSIS — M17.0 BILATERAL PRIMARY OSTEOARTHRITIS OF KNEE: ICD-10-CM

## 2017-09-13 PROCEDURE — 73562 X-RAY EXAM OF KNEE 3: CPT | Mod: RT

## 2017-09-13 PROCEDURE — 99213 OFFICE O/P EST LOW 20 MIN: CPT | Mod: 25 | Performed by: PEDIATRICS

## 2017-09-13 PROCEDURE — 20610 DRAIN/INJ JOINT/BURSA W/O US: CPT | Mod: RT | Performed by: PEDIATRICS

## 2017-09-13 NOTE — PROGRESS NOTES
Sports Medicine Clinic Visit - Interim History September 13, 2017        PCP: Connor Anderson    Raul Wilhelm is a 71 year old male who is seen in f/u up for    Bilateral primary osteoarthritis of knee  Chronic pain of both knees.    Since last visit on 2/27/17 with Raquel Briscoe where he underwent bilateral knee injections, the patient has begun to have an increase in pain over the past few months.      Symptoms are better with: activity   Symptoms are worse with: prolonged walking, stairs   Additional Features:   Positive: none   Negative: swelling, bruising, popping, grinding, catching, locking, instability, paresthesias, numbness, weakness, pain in other joints and systemic symptoms    Social History: tool , computer job     Review of Systems  Skin: no bruising, no swelling  Musculoskeletal: as above  Neurologic: no numbness, paresthesias  Remainder of review of systems is negative including constitutional, CV, pulmonary, GI, except as noted in HPI or medical history.    Patient's current problem list, past medical and surgical history, and family history were reviewed.    Patient Active Problem List   Diagnosis     Family hx of colon cancer     Glaucoma     Hypertension     Cerebral infarction (H)     Hyperlipidemia LDL goal <100     Pneumonitis, hypersensitivity, naa (H)     Severe persistent asthma     COPD (chronic obstructive pulmonary disease) (H)     OA (osteoarthritis) of knee     Hoarseness of voice     Eczema     Impaired fasting blood sugar     Hypokalemia     ST elevation myocardial infarction involving left circumflex coronary artery (H)     Past Medical History:   Diagnosis Date     Chronic airway obstruction, not elsewhere classified      Other and unspecified hyperlipidemia      Past Surgical History:   Procedure Laterality Date     ENT SURGERY  as a child    tonsillectomy     Family History   Problem Relation Age of Onset     DIABETES Mother      HEART DISEASE Mother       "C.A.D. Mother      CANCER Father      Hypertension Father      CEREBROVASCULAR DISEASE Paternal Grandfather      Asthma Sister      Breast Cancer No family hx of      Cancer - colorectal No family hx of      Prostate Cancer No family hx of        Objective  /72  Ht 5' 7\" (1.702 m)  Wt 170 lb (77.1 kg)  BMI 26.63 kg/m2    GENERAL APPEARANCE: healthy, alert and no distress   GAIT: NORMAL  SKIN: no suspicious lesions or rashes  HEENT: Sclera clear, anicteric  CV: good peripheral pulses  RESP: Breathing not labored  NEURO: Normal strength and tone, mentation intact and speech normal  PSYCH:  mentation appears normal and affect normal/bright    Bilateral Knee exam  Inspection:      no effusion, swelling of bruising bilateral    Patella:      Mobility -       hypomobile bilateral       Crepitus noted in the patellofemoral joint bilateral    Tender:      medial joint line bilateral    Non Tender:      remainder of knee area bilateral    Knee ROM:      Full active and passive ROM with flexion and extension bilateral    Hip ROM:     Full active and passive ROM bilateral    Strength:      5/5 with knee extension bilateral    Special Tests:     neg (-) Memo bilateral       neg (-) Lachmans bilateral       neg (-) anterior drawer bilateral       neg (-) posterior drawer bilateral       neg (-) varus at 0 deg and 30 deg bilateral       neg (-) valgus at 0 deg and 30 deg bilateral    Neurovascular:      2+ peripheral pulses bilaterally and brisk capillary refill       sensation grossly intact    Radiology  I ordered, visualized and reviewed these images with the patient  XR KNEE BILATERAL 3 VW 9/13/2017 1:42 PM  HISTORY: Bilateral osteoarthritis.  COMPARISON: 10/24/2012  FINDINGS: 3 views of the right knee. Complete loss of medial  tibiofemoral compartment joint space with associated marginal  osteophyte formation. Patellofemoral compartment joint space is  preserved. No joint effusion. No fracture or " malalignment.  3 views of the left knee. Moderate loss of medial tibiofemoral  compartment joint space. Moderate tricompartmental marginal  osteophytes. Patellofemoral compartment joint space is preserved. No  joint effusion. No fracture or malalignment.  IMPRESSION: Moderate osteoarthritis, right greater than left.    Assessment:  1. Bilateral primary osteoarthritis of knee    2. Chronic pain of both knees      Discussed nature of degenerative arthrosis of the knee. Discussed symptom treatment with over-the-counter medications, ice or heat, topical treatments, and rest if needed. Discussed use of sleeve or wrap for comfort. Discussed benefits of exercise and weight loss to reduce pressure at the knee. Discussed injection therapy. Also briefly discussed future consideration of referral to orthopedic surgery for further evaluation and discussion of arthroplasty.  - Will proceed with injection of right knee today.  Due to overlying scab/scar (well healing, no sign of infection) in the area I would inject the left knee, will defer left knee injection 2-3 weeks.    Plan:  - Today's Plan of Care:  Steroid injection of the right knee was performed today in clinic    -We also discussed other future treatment options:  Returning to clinic for injection of the left knee     Follow Up: 1-2 months    Concerning signs and symptoms were reviewed.  The patient expressed understanding of this management plan and all questions were answered at this time.    Sandra Grier MD CA  Primary Care Sports Medicine  [facility]

## 2017-09-13 NOTE — PATIENT INSTRUCTIONS
Plan:  - Today's Plan of Care:  Steroid injection of the right knee was performed today in clinic    -We also discussed other future treatment options:  Returning to clinic for injection of the left knee     Follow Up: 1-2 months        After the Injection     After the injection, strenuous and repetitive activity should be minimized for approximately 48 hours.   Ice should be applied to the injected area at least for the next 48 hours.   Apply ice to the injected area at least 3 - 4 times a day for 20 minutes each time for the next 48 hours. This can reduce the painful  flare  reaction that can follow an injection the next day. This reaction can cause the area that was injected to hurt more the next day just from the injection. This will resolve within a day if it does occur.     Use over-the-counter pain medications such as Tylenol to help with the pain if necessary.     After 48 hours, icing the area may be continued if you find it beneficial.     The lidocaine or marcaine (commonly called Novocain) is an anesthetic agent that is injected with the steroid will typically relieve your pain for a few hours following the injection. If the  Novocain  and steroid are injected near a nerve, you may experience local numbness or weakness from the nerve block until it wears off. After this wears off your pain may return until the steroid takes effect.   The steroid may be effective immediately after the injection. Do not be concerned if the injection is not effective in relieving your symptoms immediately. In some cases, it may take up to two weeks for the steroid to work.   If you are diabetic, the corticosteroid may cause your blood sugar to become elevated for several days following the injection. This response usually lasts about 2-4 days before it returns to your normal level.   You should report any adverse reaction to you doctor. Call if there are any questions.

## 2017-09-13 NOTE — MR AVS SNAPSHOT
After Visit Summary   9/13/2017    Raul Wilhelm    MRN: [id number]           Patient Information     Date Of Birth          1945        Visit Information        Provider Department      9/13/2017 1:20 PM Sandra Grier MD [facility]        Today's Diagnoses     Bilateral primary osteoarthritis of knee    -  1    Chronic pain of both knees          Care Instructions    After the Injection     After the injection, strenuous and repetitive activity should be minimized for approximately 48 hours.   Ice should be applied to the injected area at least for the next 48 hours.   Apply ice to the injected area at least 3 - 4 times a day for 20 minutes each time for the next 48 hours. This can reduce the painful  flare  reaction that can follow an injection the next day. This reaction can cause the area that was injected to hurt more the next day just from the injection. This will resolve within a day if it does occur.     Use over-the-counter pain medications such as Tylenol to help with the pain if necessary.     After 48 hours, icing the area may be continued if you find it beneficial.     The lidocaine or marcaine (commonly called Novocain) is an anesthetic agent that is injected with the steroid will typically relieve your pain for a few hours following the injection. If the  Novocain  and steroid are injected near a nerve, you may experience local numbness or weakness from the nerve block until it wears off. After this wears off your pain may return until the steroid takes effect.   The steroid may be effective immediately after the injection. Do not be concerned if the injection is not effective in relieving your symptoms immediately. In some cases, it may take up to two weeks for the steroid to work.   If you are diabetic, the corticosteroid may cause your blood sugar to become elevated for several days following the injection. This response usually lasts about  "2-4 days before it returns to your normal level.   You should report any adverse reaction to you doctor. Call if there are any questions.             Follow-ups after your visit        Who to contact     If you have questions or need follow up information about today's clinic visit or your schedule please contact [facility] directly at [phone].  Normal or non-critical lab and imaging results will be communicated to you by MyChart, letter or phone within 4 business days after the clinic has received the results. If you do not hear from us within 7 days, please contact the clinic through [employer] or phone. If you have a critical or abnormal lab result, we will notify you by phone as soon as possible.  Submit refill requests through [employer] or call your pharmacy and they will forward the refill request to us. Please allow 3 business days for your refill to be completed.          Additional Information About Your Visit        [employer] Information     [employer] lets you send messages to your doctor, view your test results, renew your prescriptions, schedule appointments and more. To sign up, go to [email or web address] . Click on \"Log in\" on the left side of the screen, which will take you to the Welcome page. Then click on \"Sign up Now\" on the right side of the page.     You will be asked to enter the access code listed below, as well as some personal information. Please follow the directions to create your username and password.     Your access code is: [email or web address]  Expires: 2017  8:16 AM     Your access code will  in 90 days. If you need help or a new code, please call your [facility] clinic or [phone].        Care EveryWhere ID     This is your Care EveryWhere ID. This could be used by other organizations to access your [facility] medical records  [id number]        Your Vitals Were     Height BMI (Body Mass Index)                5' 7\" (1.702 m) 26.63 kg/m2           " Blood Pressure from Last 3 Encounters:   09/13/17 125/72   07/06/17 123/78   06/21/17 100/68    Weight from Last 3 Encounters:   09/13/17 170 lb (77.1 kg)   07/06/17 170 lb 1.6 oz (77.2 kg)   06/21/17 168 lb (76.2 kg)               Primary Care Provider Office Phone # Fax #    Connor Anderson [phone] [address] [facility] [address]        Equal Access to Services     [facility] : Hadii aad ku hadasho Soomaali, waaxda luqadaha, qaybta kaalmada adeegyada, daniel flores . So [facility] [phone].    ATENCIÓN: Si habla español, tiene a mccarthy disposición servicios gratuitos de asistencia lingüística. [facility] [phone].    We comply with applicable federal civil rights laws and [place] laws. We do not discriminate on the basis of race, color, national origin, age, disability sex, sexual orientation or gender identity.            Thank you!     Thank you for choosing [facility]  for your care. Our goal is always to provide you with excellent care. Hearing back from our patients is one way we can continue to improve our services. Please take a few minutes to complete the written survey that you may receive in the mail after your visit with us. Thank you!             Your Updated Medication List - Protect others around you: Learn how to safely use, store and throw away your medicines at www.disposemymeds.org.          This list is accurate as of: 9/13/17  2:06 PM.  Always use your most recent med list.                   Brand Name Dispense Instructions for use Diagnosis    * albuterol 108 (90 BASE) MCG/ACT Inhaler    PROAIR HFA/PROVENTIL HFA/VENTOLIN HFA    1 Inhaler    Inhale 2 puffs into the lungs every 6 hours as needed for shortness of breath / dyspnea or wheezing    Uncomplicated severe persistent asthma, Chronic obstructive pulmonary disease, unspecified COPD type (H)       * albuterol (2.5 MG/3ML) 0.083% neb solution      30 vial    Take 1 vial (2.5 mg) by nebulization every 6 hours as needed for shortness of breath / dyspnea or wheezing    Acute bronchitis, unspecified organism       aspirin 81 MG EC tablet     30 tablet    Take 1 tablet (81 mg) by mouth daily    STEMI involving left circumflex coronary artery       atorvastatin 20 MG tablet    LIPITOR    90 tablet    Take 1 tablet (20 mg) by mouth daily    ST elevation myocardial infarction involving left circumflex coronary artery (H)       carvedilol 6.25 MG tablet    COREG    180 tablet    TAKE ONE TABLET BY MOUTH TWICE A DAY    HTN (hypertension)       clobetasol 0.05 % ointment    TEMOVATE    45 g    Apply sparingly to affected area twice daily as needed.  Do not apply to face.    Psoriasis       clopidogrel 75 MG tablet    PLAVIX    90 tablet    Take 1 tablet daily    ST elevation myocardial infarction involving left circumflex coronary artery (H)       EPINEPHrine 0.3 MG/0.3ML injection 2-pack    EPIPEN/ADRENACLICK/or ANY BX GENERIC EQUIV    0.6 mL    Inject 0.3 mLs (0.3 mg) into the muscle as needed for anaphylaxis    Bee sting allergy       fexofenadine 180 MG tablet    ALLEGRA    90 tablet    Take 1 tablet by mouth daily.    Seasonal allergic rhinitis       ipratropium - albuterol 0.5 mg/2.5 mg/3 mL 0.5-2.5 (3) MG/3ML neb solution    DUONEB    1 Box    Take 1 vial (3 mLs) by nebulization every 4 hours as needed for shortness of breath / dyspnea    Uncomplicated severe persistent asthma, Chronic obstructive pulmonary disease, unspecified COPD type (H)       latanoprost 0.005 % ophthalmic solution    XALATAN     Place 1 drop into both eyes At Bedtime        losartan-hydrochlorothiazide 50-12.5 MG per tablet    HYZAAR    90 tablet    Take 1 tablet by mouth daily    Essential hypertension, benign       montelukast 10 MG tablet    SINGULAIR    90 tablet    TAKE ONE TABLET BY MOUTH EVERY NIGHT AT BEDTIME    Severe persistent asthma, Seasonal allergic rhinitis        nitroGLYcerin 0.4 MG sublingual tablet    NITROSTAT    25 tablet    Place 1 tablet (0.4 mg) under the tongue every 5 minutes as needed for chest pain If you are still having symptoms after 3 doses (15 minutes) call 911.    ST elevation myocardial infarction involving left circumflex coronary artery (H)       omeprazole 20 MG CR capsule    priLOSEC    90 capsule    Take 1 capsule (20 mg) by mouth daily Take 30-60 minutes before a meal.    On aspirin at home       order for DME     1 Device    Equipment being ordered: Nebulizer Respironics, MiniElite--Neb Machine and tubing and mouth piece.    Extrinsic asthma, unspecified       order for DME     1 Device    Equipment being ordered: arm blood pressure cuff    Essential hypertension, benign       predniSONE 10 MG tablet    DELTASONE    32 tablet    Take 4 tablets daily for 5 days,  take 2 tablets daily for 3 days, take 1 tablet daily for 3 days, take half a tablet for 3 days.    Uncomplicated severe persistent asthma, Chronic obstructive pulmonary disease, unspecified COPD type (H)       * Notice:  This list has 2 medication(s) that are the same as other medications prescribed for you. Read the directions carefully, and ask your doctor or other care provider to review them with you.

## 2017-09-20 RX ORDER — TRIAMCINOLONE ACETONIDE 40 MG/ML
40 INJECTION, SUSPENSION INTRA-ARTICULAR; INTRAMUSCULAR ONCE
Qty: 1 ML | Refills: 0 | OUTPATIENT
Start: 2017-09-20 | End: 2017-09-20

## 2017-10-04 DIAGNOSIS — Z79.82 ON ASPIRIN AT HOME: ICD-10-CM

## 2017-10-04 NOTE — TELEPHONE ENCOUNTER
Omeprazole      Last Written Prescription Date: 03/06/2017  Last Fill Quantity: 90,  # refills: 1   Last Office Visit with G, P or [facility] prescribing provider: 05/04/2017                                         Next 5 appointments (look out 90 days)     Oct 06, 2017  1:20 PM CDT   Return Visit with Sandra Grier MD   [facility] ([facility])    [address] [address]   [phone]                  Nilay ESTRADA)

## 2017-10-05 ENCOUNTER — OFFICE VISIT (OUTPATIENT)
Dept: FAMILY MEDICINE | Facility: CLINIC | Age: 72
End: 2017-10-05
Payer: COMMERCIAL

## 2017-10-05 VITALS
RESPIRATION RATE: 16 BRPM | TEMPERATURE: 98 F | HEIGHT: 67 IN | OXYGEN SATURATION: 94 % | WEIGHT: 166.8 LBS | SYSTOLIC BLOOD PRESSURE: 113 MMHG | DIASTOLIC BLOOD PRESSURE: 71 MMHG | HEART RATE: 76 BPM | BODY MASS INDEX: 26.18 KG/M2

## 2017-10-05 DIAGNOSIS — Z51.89 VISIT FOR WOUND CHECK: Primary | ICD-10-CM

## 2017-10-05 PROCEDURE — 99213 OFFICE O/P EST LOW 20 MIN: CPT | Performed by: NURSE PRACTITIONER

## 2017-10-05 NOTE — PATIENT INSTRUCTIONS
Wound Check, No Infection  Your wound is healing as expected. There are no signs of infection.   Home care  Continue to care for your wound as directed.    Cover your wound with a bandage unless your healthcare provider tells you not to.    Gently clean your wound with soap and water when you shower.     Unless told otherwise, avoid swimming and taking tub baths until your wound has healed.  Follow-up care  Follow up with your healthcare provider as advised.    If you have sutures or staples, return as directed to have them removed. If they are not taken out on time, they may be harder to remove and scarring may be worse. Infection may develop.    If surgical tape strips were used, you can remove them yourself if they have not fallen off by 10 days after they were applied.   When to seek medical advice  Call your healthcare provider right away if any of these occur:    Fever of 100.4 F (38 C) or higher, or as directed by your health care provider    Symptoms of a wound infection, including:    Redness or swelling around the wound    Warmth coming from the wound    New or worsening pain    Red streaks around the wound    Draining pus  Date Last Reviewed: 8/24/2015 2000-2017 [employer]. [address] [place] [address], [place] [address]. All rights reserved. This information is not intended as a substitute for professional medical care. Always follow your healthcare professional's instructions.

## 2017-10-05 NOTE — NURSING NOTE
"Chief Complaint   Patient presents with     Derm Problem       Initial /71 (BP Location: Left arm, Patient Position: Sitting, Cuff Size: Adult Regular)  Pulse 76  Temp 98  F (36.7  C) (Tympanic)  Resp 16  Ht 5' 7\" (1.702 m)  Wt 166 lb 12.8 oz (75.7 kg)  SpO2 94%  BMI 26.12 kg/m2 Estimated body mass index is 26.12 kg/(m^2) as calculated from the following:    Height as of this encounter: 5' 7\" (1.702 m).    Weight as of this encounter: 166 lb 12.8 oz (75.7 kg).  Medication Reconciliation: complete  "

## 2017-10-05 NOTE — PROGRESS NOTES
SUBJECTIVE:   Raul Wilhelm is a 71 year old male who presents to clinic today for the following health issues:      Sore on knee.      Duration: 1 week ago- got some puss out 3-4 days ago and it was healing. Was kneeling on it yesterday and thinks he aggravated it but now today it seems mostly gone again. 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 Wants to get it checked because he has a knee injection scheduled tomorrow.    Description (location/character/radiation): Sore on left knee    Intensity:  Mild, improving    Accompanying signs and symptoms: redness, pain with applied pressure, did drain 3 or 4 days ago- after that he has been cleaning it.    History (similar episodes/previous evaluation): none, needs a knee injection tomorrow and wants to get it checked prior to this.    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  Additional history: as documented    Patient Active Problem List   Diagnosis     Family hx of colon cancer     Glaucoma     Hypertension     Cerebral infarction (H)     Hyperlipidemia LDL goal <100     Pneumonitis, hypersensitivity, naa (H)     Severe persistent asthma     COPD (chronic obstructive pulmonary disease) (H)     OA (osteoarthritis) of knee     Hoarseness of voice     Eczema     Impaired fasting blood sugar     Hypokalemia     ST elevation myocardial infarction involving left circumflex coronary artery (H)     Past Surgical History:   Procedure Laterality Date     ENT SURGERY  as a child    tonsillectomy       Social History   Substance Use Topics     Smoking status: Never Smoker     Smokeless tobacco: Never Used     Alcohol use Yes      Comment: Rare     Family History   Problem Relation Age of Onset     DIABETES Mother       "HEART DISEASE Mother      C.A.D. Mother      CANCER Father      Hypertension Father      CEREBROVASCULAR DISEASE Paternal Grandfather      Asthma Sister      Breast Cancer No family hx of      Cancer - colorectal No family hx of      Prostate Cancer No family hx of              Reviewed and updated as needed this visit by clinical staff     Reviewed and updated as needed this visit by Provider         ROS:  Constitutional, HEENT, cardiovascular, pulmonary, gi and gu systems are negative, except as otherwise noted.      OBJECTIVE:   /71 (BP Location: Left arm, Patient Position: Sitting, Cuff Size: Adult Regular)  Pulse 76  Temp 98  F (36.7  C) (Tympanic)  Resp 16  Ht 5' 7\" (1.702 m)  Wt 166 lb 12.8 oz (75.7 kg)  SpO2 94%  BMI 26.12 kg/m2  Body mass index is 26.12 kg/(m^2).  GENERAL: healthy, alert and no distress  MS: no gross musculoskeletal defects noted, no edema  SKIN: small pink 8-9mm macule on left knee with a central inflamed hair follicle. No drainage, tenderness, warmth, or erythema.   NEURO: Normal strength and tone, mentation intact and speech normal    Diagnostic Test Results:  none     ASSESSMENT/PLAN:       ICD-10-CM    1. Visit for wound check Z51.89        Suspect inflamed hair follicle, resolving, no infection, aggravated by frequent laborious work that requires kneeling. Patient is not wearing knee pads as they don't stay put. Suggest he use an ace bandage with some guaze for padding. No current infection, continue to monitor. May proceed with knee injection tomorrow as long as no signs of infection become evident tomorrow, see patient instructions.     Patient Instructions     Wound Check, No Infection  Your wound is healing as expected. There are no signs of infection.   Home care  Continue to care for your wound as directed.    Cover your wound with a bandage unless your healthcare provider tells you not to.    Gently clean your wound with soap and water when you shower.     Unless " told otherwise, avoid swimming and taking tub baths until your wound has healed.  Follow-up care  Follow up with your healthcare provider as advised.    If you have sutures or staples, return as directed to have them removed. If they are not taken out on time, they may be harder to remove and scarring may be worse. Infection may develop.    If surgical tape strips were used, you can remove them yourself if they have not fallen off by 10 days after they were applied.   When to seek medical advice  Call your healthcare provider right away if any of these occur:    Fever of 100.4 F (38 C) or higher, or as directed by your health care provider    Symptoms of a wound infection, including:    Redness or swelling around the wound    Warmth coming from the wound    New or worsening pain    Red streaks around the wound    Draining pus  Date Last Reviewed: 8/24/2015 2000-2017 [employer]. [address] [place] [address], [place] [address]. All rights reserved. This information is not intended as a substitute for professional medical care. Always follow your healthcare professional's instructions.            OSCAR Vasquez [facility]

## 2017-10-05 NOTE — MR AVS SNAPSHOT
After Visit Summary   10/5/2017    Raul Wilhelm    MRN: [id number]           Patient Information     Date Of Birth          1945        Visit Information        Provider Department      10/5/2017 1:00 PM Merissa Gleason APRN [facility]        Today's Diagnoses     Visit for wound check    -  1      Care Instructions      Wound Check, No Infection  Your wound is healing as expected. There are no signs of infection.   Home care  Continue to care for your wound as directed.    Cover your wound with a bandage unless your healthcare provider tells you not to.    Gently clean your wound with soap and water when you shower.     Unless told otherwise, avoid swimming and taking tub baths until your wound has healed.  Follow-up care  Follow up with your healthcare provider as advised.    If you have sutures or staples, return as directed to have them removed. If they are not taken out on time, they may be harder to remove and scarring may be worse. Infection may develop.    If surgical tape strips were used, you can remove them yourself if they have not fallen off by 10 days after they were applied.   When to seek medical advice  Call your healthcare provider right away if any of these occur:    Fever of 100.4 F (38 C) or higher, or as directed by your health care provider    Symptoms of a wound infection, including:    Redness or swelling around the wound    Warmth coming from the wound    New or worsening pain    Red streaks around the wound    Draining pus  Date Last Reviewed: 8/24/2015 2000-2017 [employer]. [address] [place] [address]. All rights reserved. This information is not intended as a substitute for professional medical care. Always follow your healthcare professional's instructions.                Follow-ups after your visit        Your next 10 appointments already scheduled     Oct 06, 2017  1:20 PM CDT   Return Visit with Sandra  "MD Cherrie   [facility] [place] ([facility])    [address]  [address]  [place] [address] [phone]              Who to contact     If you have questions or need follow up information about today's clinic visit or your schedule please contact [facility] directly at [phone].  Normal or non-critical lab and imaging results will be communicated to you by MyChart, letter or phone within 4 business days after the clinic has received the results. If you do not hear from us within 7 days, please contact the clinic through MyChart or phone. If you have a critical or abnormal lab result, we will notify you by phone as soon as possible.  Submit refill requests through [employer] or call your pharmacy and they will forward the refill request to us. Please allow 3 business days for your refill to be completed.          Additional Information About Your Visit        [employer] Information     [employer] lets you send messages to your doctor, view your test results, renew your prescriptions, schedule appointments and more. To sign up, go to [email or web address] . Click on \"Log in\" on the left side of the screen, which will take you to the Welcome page. Then click on \"Sign up Now\" on the right side of the page.     You will be asked to enter the access code listed below, as well as some personal information. Please follow the directions to create your username and password.     Your access code is: [email or web address]  Expires: 1/3/2018  1:15 PM     Your access code will  in 90 days. If you need help or a new code, please call your [facility] or [phone].        Care EveryWhere ID     This is your Care EveryWhere ID. This could be used by other organizations to access your [facility] medical records  [id number]        Your Vitals Were     Pulse Temperature Respirations Height Pulse Oximetry BMI (Body Mass Index)    76 98  F (36.7  C) (Tympanic) 16 5' 7\" " (1.702 m) 94% 26.12 kg/m2       Blood Pressure from Last 3 Encounters:   10/05/17 113/71   09/13/17 125/72   07/06/17 123/78    Weight from Last 3 Encounters:   10/05/17 166 lb 12.8 oz (75.7 kg)   09/13/17 170 lb (77.1 kg)   07/06/17 170 lb 1.6 oz (77.2 kg)              Today, you had the following     No orders found for display       Primary Care Provider Office Phone # Fax #    Connor Anderson [phone] [address] [facility] [address]        Equal Access to Services     DIDIER VINES : Hadii buffy pettit Sonikhil, waaxda rogerio, qagallota kaalmada adevijaya, daniel acuna. So [facility] [phone].    ATENCIÓN: Si habla español, tiene a mccarthy disposición servicios gratuitos de asistencia lingüística. Llame al [phone].    We comply with applicable federal civil rights laws and [place] laws. We do not discriminate on the basis of race, color, national origin, age, disability, sex, sexual orientation, or gender identity.            Thank you!     Thank you for choosing [facility]  for your care. Our goal is always to provide you with excellent care. Hearing back from our patients is one way we can continue to improve our services. Please take a few minutes to complete the written survey that you may receive in the mail after your visit with us. Thank you!             Your Updated Medication List - Protect others around you: Learn how to safely use, store and throw away your medicines at www.disposemymeds.org.          This list is accurate as of: 10/5/17  1:15 PM.  Always use your most recent med list.                   Brand Name Dispense Instructions for use Diagnosis    * albuterol 108 (90 BASE) MCG/ACT Inhaler    PROAIR HFA/PROVENTIL HFA/VENTOLIN HFA    1 Inhaler    Inhale 2 puffs into the lungs every 6 hours as needed for shortness of breath / dyspnea or wheezing    Uncomplicated severe persistent asthma, Chronic obstructive pulmonary  disease, unspecified COPD type (H)       * albuterol (2.5 MG/3ML) 0.083% neb solution     30 vial    Take 1 vial (2.5 mg) by nebulization every 6 hours as needed for shortness of breath / dyspnea or wheezing    Acute bronchitis, unspecified organism       aspirin 81 MG EC tablet     30 tablet    Take 1 tablet (81 mg) by mouth daily    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  Do not apply to face.    Psoriasis       clopidogrel 75 MG tablet    PLAVIX    90 tablet    Take 1 tablet daily    ST elevation myocardial infarction involving left circumflex coronary artery (H)       EPINEPHrine 0.3 MG/0.3ML injection 2-pack    EPIPEN/ADRENACLICK/or ANY BX GENERIC EQUIV    0.6 mL    Inject 0.3 mLs (0.3 mg) into the muscle as needed for anaphylaxis    Bee sting allergy       fexofenadine 180 MG tablet    ALLEGRA    90 tablet    Take 1 tablet by mouth daily.    Seasonal allergic rhinitis       ipratropium - albuterol 0.5 mg/2.5 mg/3 mL 0.5-2.5 (3) MG/3ML neb solution    DUONEB    1 Box    Take 1 vial (3 mLs) by nebulization every 4 hours as needed for shortness of breath / dyspnea    Uncomplicated severe persistent asthma, Chronic obstructive pulmonary disease, unspecified COPD type (H)       latanoprost 0.005 % ophthalmic solution    XALATAN     Place 1 drop into both eyes At Bedtime        losartan-hydrochlorothiazide 50-12.5 MG per tablet    HYZAAR    90 tablet    Take 1 tablet by mouth daily    Essential hypertension, benign       montelukast 10 MG tablet    SINGULAIR    90 tablet    TAKE ONE TABLET BY MOUTH  EVERY NIGHT AT BEDTIME    Severe persistent asthma, Seasonal allergic rhinitis       nitroGLYcerin 0.4 MG sublingual tablet    NITROSTAT    25 tablet    Place 1 tablet (0.4 mg) under the tongue every 5 minutes as needed for chest pain If you are still having symptoms after 3 doses (15 minutes) call 911.    ST elevation myocardial infarction involving left circumflex coronary artery (H)       omeprazole 20 MG CR capsule    priLOSEC    90 capsule    TAKE ONE CAPSULE BY MOUTH EVERY DAY 30 TO 60 MINUTES BEFORE A MEAL    On aspirin at home       order for DME     1 Device    Equipment being ordered: Nebulizer Respironics, MiniElite--Neb Machine and tubing and mouth piece.    Extrinsic asthma, unspecified       order for DME     1 Device    Equipment being ordered: arm blood pressure cuff    Essential hypertension, benign       predniSONE 10 MG tablet    DELTASONE    32 tablet    Take 4 tablets daily for 5 days,  take 2 tablets daily for 3 days, take 1 tablet daily for 3 days, take half a tablet for 3 days.    Uncomplicated severe persistent asthma, Chronic obstructive pulmonary disease, unspecified COPD type (H)       * Notice:  This list has 2 medication(s) that are the same as other medications prescribed for you. Read the directions carefully, and ask your doctor or other care provider to review them with you.

## 2017-10-06 ENCOUNTER — OFFICE VISIT (OUTPATIENT)
Dept: ORTHOPEDICS | Facility: CLINIC | Age: 72
End: 2017-10-06
Payer: COMMERCIAL

## 2017-10-06 VITALS
HEART RATE: 80 BPM | WEIGHT: 166.8 LBS | DIASTOLIC BLOOD PRESSURE: 71 MMHG | BODY MASS INDEX: 26.18 KG/M2 | SYSTOLIC BLOOD PRESSURE: 122 MMHG | HEIGHT: 67 IN

## 2017-10-06 DIAGNOSIS — M17.0 BILATERAL PRIMARY OSTEOARTHRITIS OF KNEE: Primary | ICD-10-CM

## 2017-10-06 PROCEDURE — 20610 DRAIN/INJ JOINT/BURSA W/O US: CPT | Mod: LT | Performed by: PEDIATRICS

## 2017-10-06 NOTE — MR AVS SNAPSHOT
After Visit Summary   10/6/2017    Raul Wilhelm    MRN: [id number]           Patient Information     Date Of Birth          1945        Visit Information        Provider Department      10/6/2017 1:20 PM Sandra Grier MD [facility]        Today's Diagnoses     Bilateral primary osteoarthritis of knee    -  1      Care Instructions    Plan:  - Today's Plan of Care:  Steroid injection of the left knee was performed today in clinic    Follow Up: as needed    After the Injection     After the injection, strenuous and repetitive activity should be minimized for approximately 48 hours.   Ice should be applied to the injected area at least for the next 48 hours.   Apply ice to the injected area at least 3 - 4 times a day for 20 minutes each time for the next 48 hours. This can reduce the painful  flare  reaction that can follow an injection the next day. This reaction can cause the area that was injected to hurt more the next day just from the injection. This will resolve within a day if it does occur.     Use over-the-counter pain medications such as Tylenol to help with the pain if necessary.     After 48 hours, icing the area may be continued if you find it beneficial.     The lidocaine or marcaine (commonly called Novocain) is an anesthetic agent that is injected with the steroid will typically relieve your pain for a few hours following the injection. If the  Novocain  and steroid are injected near a nerve, you may experience local numbness or weakness from the nerve block until it wears off. After this wears off your pain may return until the steroid takes effect.   The steroid may be effective immediately after the injection. Do not be concerned if the injection is not effective in relieving your symptoms immediately. In some cases, it may take up to two weeks for the steroid to work.   If you are diabetic, the corticosteroid may cause your blood sugar to  "become elevated for several days following the injection. This response usually lasts about 2-4 days before it returns to your normal level.   You should report any adverse reaction to you doctor. Call if there are any questions.             Follow-ups after your visit        Who to contact     If you have questions or need follow up information about today's clinic visit or your schedule please contact [facility] directly at [phone].  Normal or non-critical lab and imaging results will be communicated to you by [employer], letter or phone within 4 business days after the clinic has received the results. If you do not hear from us within 7 days, please contact the clinic through [employer] or phone. If you have a critical or abnormal lab result, we will notify you by phone as soon as possible.  Submit refill requests through [employer] or call your pharmacy and they will forward the refill request to us. Please allow 3 business days for your refill to be completed.          Additional Information About Your Visit        Unda Information     [employer] lets you send messages to your doctor, view your test results, renew your prescriptions, schedule appointments and more. To sign up, go to [email or web address] . Click on \"Log in\" on the left side of the screen, which will take you to the Welcome page. Then click on \"Sign up Now\" on the right side of the page.     You will be asked to enter the access code listed below, as well as some personal information. Please follow the directions to create your username and password.     Your access code is: [email or web address]  Expires: 1/3/2018  1:15 PM     Your access code will  in 90 days. If you need help or a new code, please call your [place] clinic or [phone].        Care EveryWhere ID     This is your Care EveryWhere ID. This could be used by other organizations to access your [place] medical records  [id number]        Your Vitals " "Were     Pulse Height BMI (Body Mass Index)             80 5' 7\" (1.702 m) 26.12 kg/m2          Blood Pressure from Last 3 Encounters:   10/06/17 122/71   10/05/17 113/71   09/13/17 125/72    Weight from Last 3 Encounters:   10/06/17 166 lb 12.8 oz (75.7 kg)   10/05/17 166 lb 12.8 oz (75.7 kg)   09/13/17 170 lb (77.1 kg)              Today, you had the following     No orders found for display       Primary Care Provider Office Phone # Fax #    Connor Anderson [phone] [address] [facility] [address]        Equal Access to Services     DIDIER VINES : Paige whaleno Sonikhil, waaxda luqadaha, qaybta kaalmada adeegyada, daniel acuna. So [facility] [phone].    ATENCIÓN: Si habla español, tiene a mccarthy disposición servicios gratuitos de asistencia lingüística. [facility] [phone].    We comply with applicable federal civil rights laws and [place] laws. We do not discriminate on the basis of race, color, national origin, age, disability, sex, sexual orientation, or gender identity.            Thank you!     Thank you for choosing [facility]  for your care. Our goal is always to provide you with excellent care. Hearing back from our patients is one way we can continue to improve our services. Please take a few minutes to complete the written survey that you may receive in the mail after your visit with us. Thank you!             Your Updated Medication List - Protect others around you: Learn how to safely use, store and throw away your medicines at www.disposemymeds.org.          This list is accurate as of: 10/6/17  1:21 PM.  Always use your most recent med list.                   Brand Name Dispense Instructions for use Diagnosis    * albuterol 108 (90 BASE) MCG/ACT Inhaler    PROAIR HFA/PROVENTIL HFA/VENTOLIN HFA    1 Inhaler    Inhale 2 puffs into the lungs every 6 hours as needed for shortness of breath / dyspnea or " wheezing    Uncomplicated severe persistent asthma, Chronic obstructive pulmonary disease, unspecified COPD type (H)       * albuterol (2.5 MG/3ML) 0.083% neb solution     30 vial    Take 1 vial (2.5 mg) by nebulization every 6 hours as needed for shortness of breath / dyspnea or wheezing    Acute bronchitis, unspecified organism       aspirin 81 MG EC tablet     30 tablet    Take 1 tablet (81 mg) by mouth daily    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  Do not apply to face.    Psoriasis       clopidogrel 75 MG tablet    PLAVIX    90 tablet    Take 1 tablet daily    ST elevation myocardial infarction involving left circumflex coronary artery (H)       EPINEPHrine 0.3 MG/0.3ML injection 2-pack    EPIPEN/ADRENACLICK/or ANY BX GENERIC EQUIV    0.6 mL    Inject 0.3 mLs (0.3 mg) into the muscle as needed for anaphylaxis    Bee sting allergy       fexofenadine 180 MG tablet    ALLEGRA    90 tablet    Take 1 tablet by mouth daily.    Seasonal allergic rhinitis       ipratropium - albuterol 0.5 mg/2.5 mg/3 mL 0.5-2.5 (3) MG/3ML neb solution    DUONEB    1 Box    Take 1 vial (3 mLs) by nebulization every 4 hours as needed for shortness of breath / dyspnea    Uncomplicated severe persistent asthma, Chronic obstructive pulmonary disease, unspecified COPD type (H)       latanoprost 0.005 % ophthalmic solution    XALATAN     Place 1 drop into both eyes At Bedtime        losartan-hydrochlorothiazide 50-12.5 MG per tablet    HYZAAR    90 tablet    Take 1 tablet by mouth daily    Essential hypertension, benign        montelukast 10 MG tablet    SINGULAIR    90 tablet    TAKE ONE TABLET BY MOUTH EVERY NIGHT AT BEDTIME    Severe persistent asthma, Seasonal allergic rhinitis       nitroGLYcerin 0.4 MG sublingual tablet    NITROSTAT    25 tablet    Place 1 tablet (0.4 mg) under the tongue every 5 minutes as needed for chest pain If you are still having symptoms after 3 doses (15 minutes) call 911.    ST elevation myocardial infarction involving left circumflex coronary artery (H)       omeprazole 20 MG CR capsule    priLOSEC    90 capsule    TAKE ONE CAPSULE BY MOUTH EVERY DAY 30 TO 60 MINUTES BEFORE A MEAL    On aspirin at home       order for DME     1 Device    Equipment being ordered: Nebulizer Respironics, MiniElite--Neb Machine and tubing and mouth piece.    Extrinsic asthma, unspecified       order for DME     1 Device    Equipment being ordered: arm blood pressure cuff    Essential hypertension, benign       predniSONE 10 MG tablet    DELTASONE    32 tablet    Take 4 tablets daily for 5 days,  take 2 tablets daily for 3 days, take 1 tablet daily for 3 days, take half a tablet for 3 days.    Uncomplicated severe persistent asthma, Chronic obstructive pulmonary disease, unspecified COPD type (H)       * Notice:  This list has 2 medication(s) that are the same as other medications prescribed for you. Read the directions carefully, and ask your doctor or other care provider to review them with you.

## 2017-10-06 NOTE — PATIENT INSTRUCTIONS
Plan:  - Today's Plan of Care:  Steroid injection of the left knee was performed today in clinic    Follow Up: as needed    After the Injection     After the injection, strenuous and repetitive activity should be minimized for approximately 48 hours.   Ice should be applied to the injected area at least for the next 48 hours.   Apply ice to the injected area at least 3 - 4 times a day for 20 minutes each time for the next 48 hours. This can reduce the painful  flare  reaction that can follow an injection the next day. This reaction can cause the area that was injected to hurt more the next day just from the injection. This will resolve within a day if it does occur.     Use over-the-counter pain medications such as Tylenol to help with the pain if necessary.     After 48 hours, icing the area may be continued if you find it beneficial.     The lidocaine or marcaine (commonly called Novocain) is an anesthetic agent that is injected with the steroid will typically relieve your pain for a few hours following the injection. If the  Novocain  and steroid are injected near a nerve, you may experience local numbness or weakness from the nerve block until it wears off. After this wears off your pain may return until the steroid takes effect.   The steroid may be effective immediately after the injection. Do not be concerned if the injection is not effective in relieving your symptoms immediately. In some cases, it may take up to two weeks for the steroid to work.   If you are diabetic, the corticosteroid may cause your blood sugar to become elevated for several days following the injection. This response usually lasts about 2-4 days before it returns to your normal level.   You should report any adverse reaction to you doctor. Call if there are any questions.

## 2017-10-06 NOTE — PROGRESS NOTES
Sports Medicine Clinic Visit - Interim History October 6, 2017    Initial Visit Date 9/13/2017    PCP: Connor Anderson    Raul Wilhelm is a 71 year old male who is seen in f/u up for Bilateral primary osteoarthritis of knee. Since last visit on 9/13/2017 patient has been the same.  Would like to proceede with left knee injection today.  Left knee scab is improved.    Initial History 9/13/2017: Since last visit on 2/27/17 with Raquel Briscoe where he underwent bilateral knee injections, the patient has begun to have an increase in pain over the past few months.      Symptoms are better with: activity   Symptoms are worse with: prolonged walking, stairs   Additional Features:                        Positive: none                        Negative: swelling, bruising, popping, grinding, catching, locking, instability, paresthesias, numbness, weakness, pain in other joints and systemic symptoms     Social History: tool , computer job     Review of Systems  Skin: no bruising, no swelling  Musculoskeletal: as above  Neurologic: no numbness, paresthesias  Remainder of review of systems is negative including constitutional, CV, pulmonary, GI, except as noted in HPI or medical history.    Patient's current problem list, past medical and surgical history, and family history were reviewed.    Patient Active Problem List   Diagnosis     Family hx of colon cancer     Glaucoma     Hypertension     Cerebral infarction (H)     Hyperlipidemia LDL goal <100     Pneumonitis, hypersensitivity, naa (H)     Severe persistent asthma     COPD (chronic obstructive pulmonary disease) (H)     OA (osteoarthritis) of knee     Hoarseness of voice     Eczema     Impaired fasting blood sugar     Hypokalemia     ST elevation myocardial infarction involving left circumflex coronary artery (H)     Past Medical History:   Diagnosis Date     Chronic airway obstruction, not elsewhere classified      Other and unspecified hyperlipidemia   "    Past Surgical History:   Procedure Laterality Date     ENT SURGERY  as a child    tonsillectomy     Family History   Problem Relation Age of Onset     DIABETES Mother      HEART DISEASE Mother      C.A.D. Mother      CANCER Father      Hypertension Father      CEREBROVASCULAR DISEASE Paternal Grandfather      Asthma Sister      Breast Cancer No family hx of      Cancer - colorectal No family hx of      Prostate Cancer No family hx of        Objective  /71  Pulse 80  Ht 5' 7\" (1.702 m)  Wt 166 lb 12.8 oz (75.7 kg)  BMI 26.12 kg/m2    GENERAL APPEARANCE: healthy, alert and no distress   GAIT: NORMAL  SKIN: no suspicious lesions or rashes  HEENT: Sclera clear, anicteric  CV: good peripheral pulses  RESP: Breathing not labored  NEURO: Normal strength and tone, mentation intact and speech normal  PSYCH:  mentation appears normal and affect normal/bright    Bilateral Knee exam  Inspection:      no effusion, swelling of bruising bilateral     Patella:      Mobility -       hypomobile bilateral       Crepitus noted in the patellofemoral joint bilateral     Tender:      medial joint line bilateral     Non Tender:      remainder of knee area bilateral     Knee ROM:      Full active and passive ROM with flexion and extension bilateral     Hip ROM:     Full active and passive ROM bilateral     Strength:      5/5 with knee extension bilateral     Special Tests:     neg (-) Memo bilateral       neg (-) Lachmans bilateral       neg (-) anterior drawer bilateral       neg (-) posterior drawer bilateral       neg (-) varus at 0 deg and 30 deg bilateral       neg (-) valgus at 0 deg and 30 deg bilateral     Neurovascular:      2+ peripheral pulses bilaterally and brisk capillary refill       sensation grossly intact    Radiology  I visualized and reviewed these images with the patient  XR KNEE BILATERAL 3 VW 9/13/2017 1:42 PM  HISTORY: Bilateral osteoarthritis.  COMPARISON: 10/24/2012  FINDINGS: 3 views of the right " knee. Complete loss of medial  tibiofemoral compartment joint space with associated marginal  osteophyte formation. Patellofemoral compartment joint space is  preserved. No joint effusion. No fracture or malalignment.  3 views of the left knee. Moderate loss of medial tibiofemoral  compartment joint space. Moderate tricompartmental marginal  osteophytes. Patellofemoral compartment joint space is preserved. No  joint effusion. No fracture or malalignment.  IMPRESSION: Moderate osteoarthritis, right greater than left.    Assessment:  1. Bilateral primary osteoarthritis of knee      Discussed nature of degenerative arthrosis of the knee. Discussed symptom treatment with over-the-counter medications, ice or heat, topical treatments, and rest if needed. Discussed use of sleeve or wrap for comfort. Discussed benefits of exercise and weight loss to reduce pressure at the knee. Discussed injection therapy. Also briefly discussed future consideration of referral to orthopedic surgery for further evaluation and discussion of arthroplasty.    Plan:  - Today's Plan of Care:  Steroid injection of the left knee was performed today in clinic    Follow Up: as needed    Procedure Note:  The risks, benefits and complications of steroid injection were discussed with the patient (including but not limited to: bleeding, infection, pain, scar, damage to adjacent structures, atrophy or necrosis of soft tissue, skin blanching, failure to relieve symptoms, worsening of symptoms, allergic reaction).  After this discussion all questions were addressed and answered and the patient elected to proceed.  Written informed consent was obtained.  The correct procedural site was identified and confirmed.  Using sterile technique, the area was first prepped with betadyne and an alcohol swab.  A 25 gauge  needle was used to inject 40 mg Kenalog and 2 cc of 1% lidocaine into the knee using an anterolateral approach on the left.  Sterile band aid  applied.  Raul  tolerated the procedure well without complications.  Also discussed that if diabetic, recommend close monitoring of blood sugars over the next week as cortisone injections can temporarily elevate blood sugars.    Concerning signs and symptoms were reviewed.  The patient expressed understanding of this management plan and all questions were answered at this time.    Sandra Grier MD CAQ  Primary Care Sports Medicine  [facility]

## 2017-10-06 NOTE — NURSING NOTE
"Chief Complaint   Patient presents with     Follow Up For     left knee       Initial /71  Pulse 80  Ht 5' 7\" (1.702 m)  Wt 166 lb 12.8 oz (75.7 kg)  BMI 26.12 kg/m2 Estimated body mass index is 26.12 kg/(m^2) as calculated from the following:    Height as of this encounter: 5' 7\" (1.702 m).    Weight as of this encounter: 166 lb 12.8 oz (75.7 kg).  Medication Reconciliation: complete    "

## 2017-10-09 RX ORDER — LIDOCAINE HYDROCHLORIDE 10 MG/ML
2 INJECTION, SOLUTION INFILTRATION; PERINEURAL ONCE
Qty: 2 ML | Refills: 0 | OUTPATIENT
Start: 2017-10-09 | End: 2017-10-09

## 2017-10-09 RX ORDER — TRIAMCINOLONE ACETONIDE 40 MG/ML
40 INJECTION, SUSPENSION INTRA-ARTICULAR; INTRAMUSCULAR ONCE
Qty: 1 ML | Refills: 0 | OUTPATIENT
Start: 2017-10-09 | End: 2017-10-09

## 2017-10-27 ENCOUNTER — HOSPITAL ENCOUNTER (OUTPATIENT)
Dept: CARDIOLOGY | Facility: CLINIC | Age: 72
Discharge: HOME | End: 2017-10-27
Attending: INTERNAL MEDICINE | Admitting: INTERNAL MEDICINE
Payer: MEDICARE

## 2017-10-27 DIAGNOSIS — I21.21 ST ELEVATION MYOCARDIAL INFARCTION INVOLVING LEFT CIRCUMFLEX CORONARY ARTERY (H): ICD-10-CM

## 2017-10-27 PROCEDURE — 93306 TTE W/DOPPLER COMPLETE: CPT

## 2017-10-27 PROCEDURE — 93306 TTE W/DOPPLER COMPLETE: CPT | Mod: 26 | Performed by: INTERNAL MEDICINE

## 2017-10-31 ENCOUNTER — OFFICE VISIT (OUTPATIENT)
Dept: CARDIOLOGY | Facility: CLINIC | Age: 72
End: 2017-10-31
Attending: INTERNAL MEDICINE
Payer: COMMERCIAL

## 2017-10-31 VITALS
BODY MASS INDEX: 26 KG/M2 | WEIGHT: 166 LBS | SYSTOLIC BLOOD PRESSURE: 122 MMHG | OXYGEN SATURATION: 95 % | HEART RATE: 86 BPM | DIASTOLIC BLOOD PRESSURE: 68 MMHG

## 2017-10-31 DIAGNOSIS — I21.21 ST ELEVATION MYOCARDIAL INFARCTION INVOLVING LEFT CIRCUMFLEX CORONARY ARTERY (H): ICD-10-CM

## 2017-10-31 DIAGNOSIS — E78.2 MIXED HYPERLIPIDEMIA: Primary | ICD-10-CM

## 2017-10-31 PROCEDURE — 99214 OFFICE O/P EST MOD 30 MIN: CPT | Performed by: PHYSICIAN ASSISTANT

## 2017-10-31 NOTE — LETTER
"10/31/2017    Connor Anderson MD  [id number] [facility] [id number]    RE: Raul Wilhelm       Dear Colleague,    I had the pleasure of seeing Raul Wilhelm in the [facility].    HPI:   I had the pleasure of seeing Raul today when he came for routine follow-up. He is a pleasant 71-year-old with a history of CVA and some resultant memory issues who in 11/2015 sustained an inferior wall myocardial infarction. This is treated with a thrombectomy and drug-eluting stent to the left PDA as well as a drug-eluting stent to the circumflex. He had a residual LAD lesion of 80% and 70% first obtuse marginal lesion for which follow-up stress test 1/2016 was negative. He saw Dr. Galindo in 11/2016 and atorvastatin was increased to 20 mg daily. Dr. Galindo noted that he had started him on a low-dose atorvastatin secondary to problems in the past with simvastatin. Annual follow-up was recommended and he comes back today.    Overall, Raul really feels good. He is eager to get off of this many medications as he can. He denies any problems with them, but \"doesn't like taking pills.\" He denies any from his with chest discomfort, tightness or pressure. He does note shortness of breath that correlates with his asthma/allergy season (Fall/Spring) he continues to exercise 3 days weekly at cardiac rehabilitation where he has no limiting chest discomfort or shortness of breath. He had any problems on the increased dose of atorvastatin, specifically denying change in his urine or increase in his trileaflet course is.    Overall, he is doing well.    Echo 10/2017 showed stable EF 50-55%  Carotid 1/2016 showed <50% stenoses bilaterally  Angiogram 11/2015 (with [facility]MI) Residual 80% LAD lesion , OM1 70%. Non-dominant RCA with mild disease. Had L PDA treated with thrombectomy and REDD and REDD to Cx done at that time.      Assessment & Plan:    1. CAD s/p STEMI 2015 - as above, he had 2 drug-eluting stents " placed. He continues to have LAD and obtuse marginal lesions, with a negative stress test 1/2016. Echocardiogram showed an EF of 50-55%.    He is on appropriate cardiac medications and Dr. Galindo had left him on dual antiplatelet therapy as he was having no issues and his DAPT score was 2. At this time, he would like to get off as much as he can wonders if he really needs to stay on both Plavix and aspirin. Today we discussed the risks and benefits of this, including increased risk of late stent thrombosis and his residual lesions.   * DC Plavix 75 mg daily   * Continue low dose ASA, BB, ARB and statin therapy (see below)   * Dr. Galindo to determine timing of routine stress tests to evaluate residual lesions    2. Dyslipidemia - he did not have his lipids rechecked after initiating a higher dose of atorvastatin. We will do so and I can contact him with the results.   * Check fasting lipids. I will contact him with results.    3. HTN - blood pressure looks good today.  I explained the rationale for continuing him on ARB and beta blockade therapy in particular. He understands the rationale for not discontinuing these. He will continue to get routine blood pressure checks and cardiac rehabilitation.   * Continue his current medications    I will contact him with results of his lipid panel, and otherwise he will see Dr. Galindo in 11/2018. We would be happy to see him in the interim if needed.      Roseline Bermudez PA-C, MSPAS      Orders Placed This Encounter   Procedures     Lipid Profile     ALT     No orders of the defined types were placed in this encounter.    Medications Discontinued During This Encounter   Medication Reason     clopidogrel (PLAVIX) 75 MG tablet Therapy completed     omeprazole (PRILOSEC) 20 MG CR capsule Stopped by Patient         Encounter Diagnoses   Name Primary?     ST elevation myocardial infarction involving left circumflex coronary artery (H)      Mixed hyperlipidemia Yes       CURRENT  MEDICATIONS:  Current Outpatient Prescriptions   Medication Sig Dispense Refill     carvedilol (COREG) 6.25 MG tablet TAKE ONE TABLET BY MOUTH TWICE A  tablet 2     montelukast (SINGULAIR) 10 MG tablet TAKE ONE TABLET BY MOUTH EVERY NIGHT AT BEDTIME 90 tablet 3     nitroglycerin (NITROSTAT) 0.4 MG sublingual tablet Place 1 tablet (0.4 mg) under the tongue every 5 minutes as needed for chest pain If you are still having symptoms after 3 doses (15 minutes) call 911. 25 tablet 1     ipratropium - albuterol 0.5 mg/2.5 mg/3 mL (DUONEB) 0.5-2.5 (3) MG/3ML neb solution Take 1 vial (3 mLs) by nebulization every 4 hours as needed for shortness of breath / dyspnea 1 Box 2     predniSONE (DELTASONE) 10 MG tablet Take 4 tablets daily for 5 days,  take 2 tablets daily for 3 days, take 1 tablet daily for 3 days, take half a tablet for 3 days. 32 tablet 1     losartan-hydrochlorothiazide (HYZAAR) 50-12.5 MG per tablet Take 1 tablet by mouth daily 90 tablet 3     atorvastatin (LIPITOR) 20 MG tablet Take 1 tablet (20 mg) by mouth daily 90 tablet 3     clobetasol (TEMOVATE) 0.05 % ointment Apply sparingly to affected area twice daily as needed.  Do not apply to face. 45 g 1     EPINEPHrine 0.3 MG/0.3ML injection 2-pack Inject 0.3 mLs (0.3 mg) into the muscle as needed for anaphylaxis 0.6 mL 0     albuterol (2.5 MG/3ML) 0.083% nebulizer solution Take 1 vial (2.5 mg) by nebulization every 6 hours as needed for shortness of breath / dyspnea or wheezing 30 vial 0     albuterol (PROAIR HFA, PROVENTIL HFA, VENTOLIN HFA) 108 (90 BASE) MCG/ACT inhaler Inhale 2 puffs into the lungs every 6 hours as needed for shortness of breath / dyspnea or wheezing 1 Inhaler 5     order for DME Equipment being ordered: arm blood pressure cuff 1 Device 0     aspirin EC 81 MG EC tablet Take 1 tablet (81 mg) by mouth daily 30 tablet 1     ORDER FOR DME Equipment being ordered: Nebulizer Respironics, MiniElite--Neb Machine and tubing and mouth piece. 1  Device 0     latanoprost (XALATAN) 0.005 % ophthalmic solution Place 1 drop into both eyes At Bedtime       fexofenadine (ALLEGRA) 180 MG tablet Take 1 tablet by mouth daily. 90 tablet 1       ALLERGIES     Allergies   Allergen Reactions     Simvastatin Swelling     Severe muscle pain, swelling, and bruising     Dust Mites        PAST MEDICAL HISTORY:  Past Medical History:   Diagnosis Date     Chronic airway obstruction, not elsewhere classified      Other and unspecified hyperlipidemia        PAST SURGICAL HISTORY:  Past Surgical History:   Procedure Laterality Date     ENT SURGERY  as a child    tonsillectomy       FAMILY HISTORY:  Family History   Problem Relation Age of Onset     DIABETES Mother      HEART DISEASE Mother      C.A.D. Mother      CANCER Father      Hypertension Father      CEREBROVASCULAR DISEASE Paternal Grandfather      Asthma Sister      Breast Cancer No family hx of      Cancer - colorectal No family hx of      Prostate Cancer No family hx of        SOCIAL HISTORY:  Social History     Social History     Marital status:      Spouse name: N/A     Number of children: N/A     Years of education: N/A     Social History Main Topics     Smoking status: Never Smoker     Smokeless tobacco: Never Used     Alcohol use Yes      Comment: Rare     Drug use: No     Sexual activity: Yes     Partners: Female     Other Topics Concern     Occupational Exposure Yes     worked for years as  exposed to carbon dust and other chemicals     Hobby Hazards Yes     Raises pigeons     Parent/Sibling W/ Cabg, Mi Or Angioplasty Before 65f 55m? No     Social History Narrative       Review of Systems:  Skin:  Positive for bruising   Eyes:  Positive for glasses  ENT:  Negative    Respiratory:  Negative for dyspnea on exertion;shortness of breath  Cardiovascular:  Negative for;palpitations;chest pain;edema;syncope or near-syncope;exercise intolerance;fatigue;lightheadedness;dizziness    Gastroenterology:  Negative for melena;hematochezia  Genitourinary:  Negative    Musculoskeletal:  Positive for joint pain  Neurologic:  Negative    Psychiatric:  Negative    Heme/Lymph/Imm:  Negative    Endocrine:  Negative      Physical Exam:  Vitals: /68  Pulse 86  Wt 75.3 kg (166 lb)  SpO2 95%  BMI 26 kg/m2    Constitutional:  cooperative, alert and oriented, well developed, well nourished, in no acute distress        Skin:  warm and dry to the touch, no apparent skin lesions or masses noted        Head:  normocephalic, no masses or lesions        Eyes:  pupils equal and round;conjunctivae and lids unremarkable;sclera white        ENT:  no pallor or cyanosis, dentition good        Neck:  no carotid bruit;JVP normal        Chest:  normal breath sounds, clear to auscultation, normal A-P diameter, normal symmetry, normal respiratory excursion, no use of accessory muscles        Cardiac: regular rhythm;no murmurs, gallops or rubs detected                  Abdomen:  abdomen soft        Vascular: pulses full and equal                                      Extremities and Back:  no deformities, clubbing, cyanosis, erythema observed;no edema        Neurological:  no gross motor deficits          Recent Lab Results:  LIPID RESULTS:  Lab Results   Component Value Date    CHOL 140 01/15/2016    HDL 43 01/15/2016    LDL 69 01/15/2016    TRIG 139 01/15/2016    CHOLHDLRATIO 3.4 03/13/2015       LIVER ENZYME RESULTS:  Lab Results   Component Value Date    AST 12 04/28/2016    ALT 34 04/28/2016       CBC RESULTS:  Lab Results   Component Value Date    WBC 8.9 05/04/2017    RBC 5.23 05/04/2017    HGB 16.9 05/04/2017    HCT 48.2 05/04/2017    MCV 92 05/04/2017    MCH 32.3 05/04/2017    MCHC 35.1 05/04/2017    RDW 12.2 05/04/2017     05/04/2017       BMP RESULTS:  Lab Results   Component Value Date     04/28/2016    POTASSIUM 4.5 04/28/2016    CHLORIDE 101 04/28/2016    CO2 30 04/28/2016    ANIONGAP 6 04/28/2016     (H)  04/28/2016    BUN 19 04/28/2016    CR 0.95 04/28/2016    GFRESTIMATED 78 04/28/2016    GFRESTBLACK >90   GFR Calc   04/28/2016    JOSE RAFAEL 9.2 04/28/2016        A1C RESULTS:  Lab Results   Component Value Date    A1C 5.5 01/15/2016       INR RESULTS:  Lab Results   Component Value Date    INR 0.87 11/14/2015       Thank you for allowing me to participate in the care of your patient.    Sincerely,     Barbara Bermudez PA-C     [facility]

## 2017-10-31 NOTE — MR AVS SNAPSHOT
After Visit Summary   10/31/2017    Raul Wilhelm    MRN: [id number]           Patient Information     Date Of Birth          1945        Visit Information        Provider Department      10/31/2017 1:30 PM Barbara Bermudez PA-C [facility]        Today's Diagnoses     Mixed hyperlipidemia    -  1    ST elevation myocardial infarction involving left circumflex coronary artery (H)          Care Instructions    Thank you for your  Heart Care visit today with Roseline Bermudez PA-C. We reviewed your medications and echocardiogram showing stable good pumping function.    Medication Changes:  As we discussed, continue aspirin 81 daily  OK to discontinue Plavix (clopidogrel) 75 mg daily as we discussed. This should improve your bruising/bleeding  OK from heart perspective to stop omeprazole (stomach medicine). Restart this if you get more heart burn    Recommendations:  Get fasting blood work done to check cholesterol and we'll contact you with results.    Follow-up:  See Dr. Galindo in 11/2018 but CALL if need to be seen sooner!!!!! [phone] (our nurses Hortencia/Elke)    We kindly ask that you call cardiology scheduling at [phone] three months prior to requested revisit date to schedule future cardiology appointments.    Reminder:  1. Please bring in your current medication list or your medication, over the counter supplements and vitamin bottles as we will review these at each office visit.                 [facility]~[address]~[place], [place]~[address]  Questions about your visit today?  Call your Cardiology Clinic RN's-Hortencia Butt and/or Elke Luna at [phone].                Follow-ups after your visit        Future tests that were ordered for you today     Open Future Orders        Priority Expected Expires Ordered    Lipid Profile Routine 11/7/2017 10/31/2018 10/31/2017    ALT  "Routine 2017 10/31/2018 10/31/2017            Who to contact     If you have questions or need follow up information about today's clinic visit or your schedule please contact [facility] directly at [phone].  Normal or non-critical lab and imaging results will be communicated to you by MyChart, letter or phone within 4 business days after the clinic has received the results. If you do not hear from us within 7 days, please contact the clinic through MyChart or phone. If you have a critical or abnormal lab result, we will notify you by phone as soon as possible.  Submit refill requests through [email or web address] or call your pharmacy and they will forward the refill request to us. Please allow 3 business days for your refill to be completed.          Additional Information About Your Visit        [employer] Information     [email or web address] lets you send messages to your doctor, view your test results, renew your prescriptions, schedule appointments and more. To sign up, go to [email or web address] . Click on \"Log in\" on the left side of the screen, which will take you to the Welcome page. Then click on \"Sign up Now\" on the right side of the page.     You will be asked to enter the access code listed below, as well as some personal information. Please follow the directions to create your username and password.     Your access code is: [email or web address]  Expires: 1/3/2018  1:15 PM     Your access code will  in 90 days. If you need help or a new code, please call your [facility] clinic or [phone].        Care EveryWhere ID     This is your Care EveryWhere ID. This could be used by other organizations to access your [facility] medical records  [id number]        Your Vitals Were     Pulse Pulse Oximetry BMI (Body Mass Index)             86 95% 26 kg/m2          Blood Pressure from Last 3 Encounters:   10/31/17 122/68   10/06/17 122/71   10/05/17 113/71    Weight from Last 3 Encounters: "   10/31/17 75.3 kg (166 lb)   10/06/17 75.7 kg (166 lb 12.8 oz)   10/05/17 75.7 kg (166 lb 12.8 oz)              We Performed the Following     Follow-Up with Cardiologist        Primary Care Provider Office Phone # Fax #    Connor Anderson [phone] [address] [address]        Equal Access to Services     DIDIER VINES : Hadii aad ku hadasho Soomaali, waaxda luqadaha, qaybta kaalmada adeegyada, waxay idiin hayaan adeeg kharash la'aan . So [facility] [phone].    ATENCIÓN: Si habla español, tiene a mccarthy disposición servicios gratuitos de asistencia lingüística. [facility] [phone].    We comply with applicable federal civil rights laws and [place] laws. We do not discriminate on the basis of race, color, national origin, age, disability, sex, sexual orientation, or gender identity.            Thank you!     Thank you for choosing [facility]  for your care. Our goal is always to provide you with excellent care. Hearing back from our patients is one way we can continue to improve our services. Please take a few minutes to complete the written survey that you may receive in the mail after your visit with us. Thank you!             Your Updated Medication List - Protect others around you: Learn how to safely use, store and throw away your medicines at www.disposemymeds.org.          This list is accurate as of: 10/31/17  1:45 PM.  Always use your most recent med list.                   Brand Name Dispense Instructions for use Diagnosis    * albuterol 108 (90 BASE) MCG/ACT Inhaler    PROAIR HFA/PROVENTIL HFA/VENTOLIN HFA    1 Inhaler    Inhale 2 puffs into the lungs every 6 hours as needed for shortness of breath / dyspnea or wheezing    Uncomplicated severe persistent asthma, Chronic obstructive pulmonary disease, unspecified COPD type (H)       * albuterol (2.5 MG/3ML) 0.083% neb solution     30 vial    Take 1 vial (2.5 mg) by  nebulization every 6 hours as needed for shortness of breath / dyspnea or wheezing    Acute bronchitis, unspecified organism       aspirin 81 MG EC tablet     30 tablet    Take 1 tablet (81 mg) by mouth daily    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  Do not apply to face.    Psoriasis       EPINEPHrine 0.3 MG/0.3ML injection 2-pack    EPIPEN/ADRENACLICK/or ANY BX GENERIC EQUIV    0.6 mL    Inject 0.3 mLs (0.3 mg) into the muscle as needed for anaphylaxis    Bee sting allergy       fexofenadine 180 MG tablet    ALLEGRA    90 tablet    Take 1 tablet by mouth daily.    Seasonal allergic rhinitis       ipratropium - albuterol 0.5 mg/2.5 mg/3 mL 0.5-2.5 (3) MG/3ML neb solution    DUONEB    1 Box    Take 1 vial (3 mLs) by nebulization every 4 hours as needed for shortness of breath / dyspnea    Uncomplicated severe persistent asthma, Chronic obstructive pulmonary disease, unspecified COPD type (H)       latanoprost 0.005 % ophthalmic solution    XALATAN     Place 1 drop into both eyes At Bedtime        losartan-hydrochlorothiazide 50-12.5 MG per tablet    HYZAAR    90 tablet    Take 1 tablet by mouth daily    Essential hypertension, benign       montelukast 10 MG tablet    SINGULAIR    90 tablet    TAKE ONE TABLET BY MOUTH EVERY NIGHT AT BEDTIME    Severe persistent asthma, Seasonal allergic rhinitis       nitroGLYcerin 0.4 MG sublingual tablet    NITROSTAT    25 tablet    Place 1 tablet (0.4 mg) under the tongue every 5 minutes as needed for chest pain If you are still having symptoms after 3 doses (15  minutes) call 911.    ST elevation myocardial infarction involving left circumflex coronary artery (H)       order for DME     1 Device    Equipment being ordered: Nebulizer Respironics, MiniElite--Neb Machine and tubing and mouth piece.    Extrinsic asthma, unspecified       order for DME     1 Device    Equipment being ordered: arm blood pressure cuff    Essential hypertension, benign       predniSONE 10 MG tablet    DELTASONE    32 tablet    Take 4 tablets daily for 5 days,  take 2 tablets daily for 3 days, take 1 tablet daily for 3 days, take half a tablet for 3 days.    Uncomplicated severe persistent asthma, Chronic obstructive pulmonary disease, unspecified COPD type (H)       * Notice:  This list has 2 medication(s) that are the same as other medications prescribed for you. Read the directions carefully, and ask your doctor or other care provider to review them with you.

## 2017-10-31 NOTE — PATIENT INSTRUCTIONS
Thank you for your [facility] visit today with Roseline Bermudez PA-C. We reviewed your medications and echocardiogram showing stable good pumping function.    Medication Changes:  As we discussed, continue aspirin 81 daily  OK to discontinue Plavix (clopidogrel) 75 mg daily as we discussed. This should improve your bruising/bleeding  OK from heart perspective to stop omeprazole (stomach medicine). Restart this if you get more heart burn    Recommendations:  Get fasting blood work done to check cholesterol and we'll contact you with results.    Follow-up:  See Dr. Galindo in 11/2018 but CALL if need to be seen sooner!!!!! [phone] (our nurses Hortencia/Elke)    We kindly ask that you call cardiology scheduling at [phone] three months prior to requested revisit date to schedule future cardiology appointments.    Reminder:  1. Please bring in your current medication list or your medication, over the counter supplements and vitamin bottles as we will review these at each office visit.                 [facility]ARE  [facility]~[address]~[place], [place]~[address]  Questions about your visit today?  Call your Cardiology Clinic RN's-Hortencia Butt and/or Elke Luna at [phone].

## 2017-10-31 NOTE — PROGRESS NOTES
"HPI:   I had the pleasure of seeing Raul today when he came for routine follow-up. He is a pleasant 71-year-old with a history of CVA and some resultant memory issues who in 11/2015 sustained an inferior wall myocardial infarction. This is treated with a thrombectomy and drug-eluting stent to the left PDA as well as a drug-eluting stent to the circumflex. He had a residual LAD lesion of 80% and 70% first obtuse marginal lesion for which follow-up stress test 1/2016 was negative. He saw Dr. Galindo in 11/2016 and atorvastatin was increased to 20 mg daily. Dr. Galindo noted that he had started him on a low-dose atorvastatin secondary to problems in the past with simvastatin. Annual follow-up was recommended and he comes back today.    Overall, Raul really feels good. He is eager to get off of this many medications as he can. He denies any problems with them, but \"doesn't like taking pills.\" He denies any from his with chest discomfort, tightness or pressure. He does note shortness of breath that correlates with his asthma/allergy season (Fall/Spring) he continues to exercise 3 days weekly at cardiac rehabilitation where he has no limiting chest discomfort or shortness of breath. He had any problems on the increased dose of atorvastatin, specifically denying change in his urine or increase in his trileaflet course is.    Overall, he is doing well.    Echo 10/2017 showed stable EF 50-55%  Carotid 1/2016 showed <50% stenoses bilaterally  Angiogram 11/2015 (with IWMI) Residual 80% LAD lesion , OM1 70%. Non-dominant RCA with mild disease. Had L PDA treated with thrombectomy and REDD and REDD to Cx done at that time.      Assessment & Plan:    1. CAD s/p STEMI 2015 - as above, he had 2 drug-eluting stents placed. He continues to have LAD and obtuse marginal lesions, with a negative stress test 1/2016. Echocardiogram showed an EF of 50-55%.    He is on appropriate cardiac medications and Dr. Galindo had left him on dual antiplatelet " therapy as he was having no issues and his DAPT score was 2. At this time, he would like to get off as much as he can wonders if he really needs to stay on both Plavix and aspirin. Today we discussed the risks and benefits of this, including increased risk of late stent thrombosis and his residual lesions.   * DC Plavix 75 mg daily   * Continue low dose ASA, BB, ARB and statin therapy (see below)   * Dr. Galindo to determine timing of routine stress tests to evaluate residual lesions    2. Dyslipidemia - he did not have his lipids rechecked after initiating a higher dose of atorvastatin. We will do so and I can contact him with the results.   * Check fasting lipids. I will contact him with results.    3. HTN - blood pressure looks good today.  I explained the rationale for continuing him on ARB and beta blockade therapy in particular. He understands the rationale for not discontinuing these. He will continue to get routine blood pressure checks and cardiac rehabilitation.   * Continue his current medications    I will contact him with results of his lipid panel, and otherwise he will see Dr. Galindo in 11/2018. We would be happy to see him in the interim if needed.      Roseline Bermudez PA-C, MSPAS      Orders Placed This Encounter   Procedures     Lipid Profile     ALT     No orders of the defined types were placed in this encounter.    Medications Discontinued During This Encounter   Medication Reason     clopidogrel (PLAVIX) 75 MG tablet Therapy completed     omeprazole (PRILOSEC) 20 MG CR capsule Stopped by Patient         Encounter Diagnoses   Name Primary?     ST elevation myocardial infarction involving left circumflex coronary artery (H)      Mixed hyperlipidemia Yes       CURRENT MEDICATIONS:  Current Outpatient Prescriptions   Medication Sig Dispense Refill     carvedilol (COREG) 6.25 MG tablet TAKE ONE TABLET BY MOUTH TWICE A  tablet 2     montelukast (SINGULAIR) 10 MG tablet TAKE ONE TABLET BY MOUTH EVERY  NIGHT AT BEDTIME 90 tablet 3     nitroglycerin (NITROSTAT) 0.4 MG sublingual tablet Place 1 tablet (0.4 mg) under the tongue every 5 minutes as needed for chest pain If you are still having symptoms after 3 doses (15 minutes) call 911. 25 tablet 1     ipratropium - albuterol 0.5 mg/2.5 mg/3 mL (DUONEB) 0.5-2.5 (3) MG/3ML neb solution Take 1 vial (3 mLs) by nebulization every 4 hours as needed for shortness of breath / dyspnea 1 Box 2     predniSONE (DELTASONE) 10 MG tablet Take 4 tablets daily for 5 days,  take 2 tablets daily for 3 days, take 1 tablet daily for 3 days, take half a tablet for 3 days. 32 tablet 1     losartan-hydrochlorothiazide (HYZAAR) 50-12.5 MG per tablet Take 1 tablet by mouth daily 90 tablet 3     atorvastatin (LIPITOR) 20 MG tablet Take 1 tablet (20 mg) by mouth daily 90 tablet 3     clobetasol (TEMOVATE) 0.05 % ointment Apply sparingly to affected area twice daily as needed.  Do not apply to face. 45 g 1     EPINEPHrine 0.3 MG/0.3ML injection 2-pack Inject 0.3 mLs (0.3 mg) into the muscle as needed for anaphylaxis 0.6 mL 0     albuterol (2.5 MG/3ML) 0.083% nebulizer solution Take 1 vial (2.5 mg) by nebulization every 6 hours as needed for shortness of breath / dyspnea or wheezing 30 vial 0     albuterol (PROAIR HFA, PROVENTIL HFA, VENTOLIN HFA) 108 (90 BASE) MCG/ACT inhaler Inhale 2 puffs into the lungs every 6 hours as needed for shortness of breath / dyspnea or wheezing 1 Inhaler 5     order for DME Equipment being ordered: arm blood pressure cuff 1 Device 0     aspirin EC 81 MG EC tablet Take 1 tablet (81 mg) by mouth daily 30 tablet 1     ORDER FOR DME Equipment being ordered: Nebulizer Respironics, MiniElite--Neb Machine and tubing and mouth piece. 1 Device 0     latanoprost (XALATAN) 0.005 % ophthalmic solution Place 1 drop into both eyes At Bedtime       fexofenadine (ALLEGRA) 180 MG tablet Take 1 tablet by mouth daily. 90 tablet 1       ALLERGIES     Allergies   Allergen Reactions      Simvastatin Swelling     Severe muscle pain, swelling, and bruising     Dust Mites        PAST MEDICAL HISTORY:  Past Medical History:   Diagnosis Date     Chronic airway obstruction, not elsewhere classified      Other and unspecified hyperlipidemia        PAST SURGICAL HISTORY:  Past Surgical History:   Procedure Laterality Date     ENT SURGERY  as a child    tonsillectomy       FAMILY HISTORY:  Family History   Problem Relation Age of Onset     DIABETES Mother      HEART DISEASE Mother      C.A.D. Mother      CANCER Father      Hypertension Father      CEREBROVASCULAR DISEASE Paternal Grandfather      Asthma Sister      Breast Cancer No family hx of      Cancer - colorectal No family hx of      Prostate Cancer No family hx of        SOCIAL HISTORY:  Social History     Social History     Marital status:      Spouse name: N/A     Number of children: N/A     Years of education: N/A     Social History Main Topics     Smoking status: Never Smoker     Smokeless tobacco: Never Used     Alcohol use Yes      Comment: Rare     Drug use: No     Sexual activity: Yes     Partners: Female     Other Topics Concern     Occupational Exposure Yes     worked for years as  exposed to carbon dust and other chemicals     Hobby Hazards Yes     Raises pigeons     Parent/Sibling W/ Cabg, Mi Or Angioplasty Before 65f 55m? No     Social History Narrative       Review of Systems:  Skin:  Positive for bruising   Eyes:  Positive for glasses  ENT:  Negative    Respiratory:  Negative for dyspnea on exertion;shortness of breath  Cardiovascular:  Negative for;palpitations;chest pain;edema;syncope or near-syncope;exercise intolerance;fatigue;lightheadedness;dizziness    Gastroenterology: Negative for melena;hematochezia  Genitourinary:  Negative    Musculoskeletal:  Positive for joint pain  Neurologic:  Negative    Psychiatric:  Negative    Heme/Lymph/Imm:  Negative    Endocrine:  Negative      Physical Exam:  Vitals: /68   Pulse 86  Wt 75.3 kg (166 lb)  SpO2 95%  BMI 26 kg/m2    Constitutional:  cooperative, alert and oriented, well developed, well nourished, in no acute distress        Skin:  warm and dry to the touch, no apparent skin lesions or masses noted        Head:  normocephalic, no masses or lesions        Eyes:  pupils equal and round;conjunctivae and lids unremarkable;sclera white        ENT:  no pallor or cyanosis, dentition good        Neck:  no carotid bruit;JVP normal        Chest:  normal breath sounds, clear to auscultation, normal A-P diameter, normal symmetry, normal respiratory excursion, no use of accessory muscles        Cardiac: regular rhythm;no murmurs, gallops or rubs detected                  Abdomen:  abdomen soft        Vascular: pulses full and equal                                      Extremities and Back:  no deformities, clubbing, cyanosis, erythema observed;no edema        Neurological:  no gross motor deficits          Recent Lab Results:  LIPID RESULTS:  Lab Results   Component Value Date    CHOL 140 01/15/2016    HDL 43 01/15/2016    LDL 69 01/15/2016    TRIG 139 01/15/2016    CHOLHDLRATIO 3.4 03/13/2015       LIVER ENZYME RESULTS:  Lab Results   Component Value Date    AST 12 04/28/2016    ALT 34 04/28/2016       CBC RESULTS:  Lab Results   Component Value Date    WBC 8.9 05/04/2017    RBC 5.23 05/04/2017    HGB 16.9 05/04/2017    HCT 48.2 05/04/2017    MCV 92 05/04/2017    MCH 32.3 05/04/2017    MCHC 35.1 05/04/2017    RDW 12.2 05/04/2017     05/04/2017       BMP RESULTS:  Lab Results   Component Value Date     04/28/2016    POTASSIUM 4.5 04/28/2016    CHLORIDE 101 04/28/2016    CO2 30 04/28/2016    ANIONGAP 6 04/28/2016     (H) 04/28/2016    BUN 19 04/28/2016    CR 0.95 04/28/2016    GFRESTIMATED 78 04/28/2016    GFRESTBLACK >90   GFR Calc   04/28/2016    JOSE RAFAEL 9.2 04/28/2016        A1C RESULTS:  Lab Results   Component Value Date    A1C 5.5 01/15/2016        INR RESULTS:  Lab Results   Component Value Date    INR 0.87 11/14/2015

## 2017-11-01 DIAGNOSIS — E78.2 MIXED HYPERLIPIDEMIA: ICD-10-CM

## 2017-11-01 LAB
ALT SERPL W P-5'-P-CCNC: 31 U/L (ref 0–70)
CHOLEST SERPL-MCNC: 157 MG/DL
HDLC SERPL-MCNC: 60 MG/DL
LDLC SERPL CALC-MCNC: 64 MG/DL
NONHDLC SERPL-MCNC: 97 MG/DL
TRIGL SERPL-MCNC: 163 MG/DL

## 2017-11-01 PROCEDURE — 80061 LIPID PANEL: CPT | Performed by: PHYSICIAN ASSISTANT

## 2017-11-01 PROCEDURE — 36415 COLL VENOUS BLD VENIPUNCTURE: CPT | Performed by: PHYSICIAN ASSISTANT

## 2017-11-01 PROCEDURE — 84460 ALANINE AMINO (ALT) (SGPT): CPT | Performed by: PHYSICIAN ASSISTANT

## 2017-11-13 DIAGNOSIS — J45.50 UNCOMPLICATED SEVERE PERSISTENT ASTHMA (H): ICD-10-CM

## 2017-11-13 DIAGNOSIS — J44.9 CHRONIC OBSTRUCTIVE PULMONARY DISEASE, UNSPECIFIED COPD TYPE (H): ICD-10-CM

## 2017-11-13 NOTE — TELEPHONE ENCOUNTER
PREDNISONE 10MG      Last Written Prescription Date: 04/14/2017  Last Fill Quantity: 32,  # refills: 1   Last Office Visit with [facility], [facility] or [facility] prescribing provider: 07/06/2017 W/FAMILY PRACTICE    Malka Rader  [facility]  [phone]

## 2017-11-14 RX ORDER — PREDNISONE 10 MG/1
TABLET ORAL
Qty: 32 TABLET | Refills: 1 | Status: SHIPPED | OUTPATIENT
Start: 2017-11-14 | End: 2018-02-16

## 2017-11-14 NOTE — TELEPHONE ENCOUNTER
Refill sent. Have discussed with patient in the past to use for asthma/COPD flares as on action plan.  Connor Anderson MD

## 2017-11-14 NOTE — TELEPHONE ENCOUNTER
Routing refill request to provider for review/approval because:  Drug not on the [facility] refill protocol     Ashly Hong RN

## 2017-12-06 ENCOUNTER — TELEPHONE (OUTPATIENT)
Dept: FAMILY MEDICINE | Facility: CLINIC | Age: 72
End: 2017-12-06

## 2017-12-06 NOTE — LETTER
[facility]  [address]  [place] [facility] [address]  [phone]        December 6, 2017  Raul Wilhelm  [address]  [place] [address]    Dear Raul,    I care about your health and have reviewed your health plan. I have reviewed your medical conditions, medication list, and lab results and am making recommendations based on this review, to better manage your health.     You are in particular need of attention regarding:  -Colon Cancer Screening    I am recommending that you:  -schedule a COLONOSCOPY to look for colon cancer (due every 10 years or 5 years in higher risk situations.)   Colon cancer is now the second leading cause of death in the [place] for both men and women and there are over 130,000 new cases and 50,000 deaths per year from colon cancer.  Colonoscopies can prevent 90-95% of these deaths.  Problem lesions can be removed before they ever become cancer.  This test is not only looking for cancer, but also getting rid of precancerious lesions.  If you do not wish to do a colonoscopy or cannot afford to do one, at this time, there is another option. It is called a FIT test or Fecal Immunochemical Occult Blood Test (take home stool sample kit).  It does not replace the colonoscopy for colorectal cancer screening, but it can detect hidden bleeding in the lower colon.  It does need to be repeated every year and if a positive result is obtained, you would be referred for a colonoscopy.  If you have completed either one of these tests at another facility, please have the records sent to our clinic so that we can best coordinate your care.    Here is a list of Health Maintenance topics that are due now or due soon:  Health Maintenance Due   Topic Date Due     COLON CANCER SCREEN (SYSTEM ASSIGNED)  12/25/1995     COPD ACTION PLAN Q1 YR  06/02/2016     ASTHMA CONTROL TEST Q6 MOS  11/04/2017       Please call us at [phone] (or use [employer]) to address  the above recommendations.     Thank you for trusting [facility] and we appreciate the opportunity to serve you.  We look forward to supporting your healthcare needs in the future.    Healthy Regards,    [facility] team

## 2017-12-06 NOTE — TELEPHONE ENCOUNTER
Panel Management Review      Patient has the following on his problem list: None      Composite cancer screening  Chart review shows that this patient is due/due soon for the following Colonoscopy  Summary:    Patient is due/failing the following:   COLONOSCOPY    Action needed:   Patient needs referral/order: Colonoscopy     Type of outreach:    Sent letter.    Questions for provider review:    None                                                                                                                                    Abby Gardner

## 2017-12-11 DIAGNOSIS — I10 ESSENTIAL HYPERTENSION, BENIGN: ICD-10-CM

## 2017-12-11 DIAGNOSIS — J20.9 ACUTE BRONCHITIS, UNSPECIFIED ORGANISM: ICD-10-CM

## 2017-12-11 RX ORDER — LOSARTAN POTASSIUM AND HYDROCHLOROTHIAZIDE 12.5; 5 MG/1; MG/1
1 TABLET ORAL DAILY
Qty: 90 TABLET | Refills: 3 | Status: SHIPPED | OUTPATIENT
Start: 2017-12-11 | End: 2019-01-18

## 2017-12-11 NOTE — TELEPHONE ENCOUNTER
Albuterol      Last Written Prescription Date: 04/25/16  Last Fill Quantity: 30 vial, # refills: 0    Last Office Visit with G, [facility] or [facility] prescribing provider:  10/05/17   Future Office Visit:       Date of Last Asthma Action Plan Letter:   Asthma Action Plan Q1 Year    Topic Date Due     Asthma Action Plan - yearly  05/04/2018      Asthma Control Test:   ACT Total Scores 5/4/2017   ACT TOTAL SCORE -   ASTHMA ER VISITS -   ASTHMA HOSPITALIZATIONS -   ACT TOTAL SCORE (Goal Greater than or Equal to 20) 22   In the past 12 months, how many times did you visit the emergency room for your asthma without being admitted to the hospital? 0   In the past 12 months, how many times were you hospitalized overnight because of your asthma? 0       Date of Last Spirometry Test:   No results found for this or any previous visit.

## 2017-12-12 RX ORDER — ALBUTEROL SULFATE 0.83 MG/ML
1 SOLUTION RESPIRATORY (INHALATION) EVERY 6 HOURS PRN
Qty: 30 VIAL | Refills: 1 | Status: SHIPPED | OUTPATIENT
Start: 2017-12-12 | End: 2018-10-22

## 2017-12-18 DIAGNOSIS — I21.21 ST ELEVATION MYOCARDIAL INFARCTION INVOLVING LEFT CIRCUMFLEX CORONARY ARTERY (H): ICD-10-CM

## 2017-12-18 RX ORDER — ATORVASTATIN CALCIUM 20 MG/1
20 TABLET, FILM COATED ORAL DAILY
Qty: 90 TABLET | Refills: 3 | Status: SHIPPED | OUTPATIENT
Start: 2017-12-18 | End: 2019-01-18

## 2017-12-18 RX ORDER — ATORVASTATIN CALCIUM 20 MG/1
20 TABLET, FILM COATED ORAL DAILY
Qty: 90 TABLET | Refills: 3 | Status: SHIPPED | OUTPATIENT
Start: 2017-12-18 | End: 2017-12-18

## 2017-12-18 NOTE — PROGRESS NOTES
Sent new Rx for atorvastatin 20 mg, 90 day with 3 refill. Will recheck lipids 11/2018 at BA's appt

## 2018-02-16 DIAGNOSIS — J44.9 CHRONIC OBSTRUCTIVE PULMONARY DISEASE, UNSPECIFIED COPD TYPE (H): ICD-10-CM

## 2018-02-16 DIAGNOSIS — J45.50 UNCOMPLICATED SEVERE PERSISTENT ASTHMA (H): ICD-10-CM

## 2018-02-20 RX ORDER — PREDNISONE 10 MG/1
TABLET ORAL
Qty: 32 TABLET | Refills: 1 | Status: SHIPPED | OUTPATIENT
Start: 2018-02-20 | End: 2018-03-23

## 2018-03-23 ENCOUNTER — OFFICE VISIT (OUTPATIENT)
Dept: FAMILY MEDICINE | Facility: CLINIC | Age: 73
End: 2018-03-23
Payer: COMMERCIAL

## 2018-03-23 ENCOUNTER — RADIANT APPOINTMENT (OUTPATIENT)
Dept: GENERAL RADIOLOGY | Facility: CLINIC | Age: 73
End: 2018-03-23
Attending: FAMILY MEDICINE
Payer: COMMERCIAL

## 2018-03-23 VITALS
OXYGEN SATURATION: 94 % | WEIGHT: 170.6 LBS | HEART RATE: 83 BPM | TEMPERATURE: 97.2 F | DIASTOLIC BLOOD PRESSURE: 75 MMHG | SYSTOLIC BLOOD PRESSURE: 121 MMHG | BODY MASS INDEX: 26.78 KG/M2 | HEIGHT: 67 IN

## 2018-03-23 DIAGNOSIS — J20.9 ACUTE BRONCHITIS WITH SYMPTOMS > 10 DAYS: ICD-10-CM

## 2018-03-23 DIAGNOSIS — R07.89 ATYPICAL CHEST PAIN: ICD-10-CM

## 2018-03-23 DIAGNOSIS — R07.89 ATYPICAL CHEST PAIN: Primary | ICD-10-CM

## 2018-03-23 PROCEDURE — 99214 OFFICE O/P EST MOD 30 MIN: CPT | Performed by: FAMILY MEDICINE

## 2018-03-23 PROCEDURE — 93000 ELECTROCARDIOGRAM COMPLETE: CPT | Performed by: FAMILY MEDICINE

## 2018-03-23 PROCEDURE — 71046 X-RAY EXAM CHEST 2 VIEWS: CPT | Mod: FY

## 2018-03-23 RX ORDER — PREDNISONE 10 MG/1
TABLET ORAL
COMMUNITY
Start: 2018-02-20 | End: 2018-03-30

## 2018-03-23 RX ORDER — PENICILLIN V POTASSIUM 500 MG/1
500 TABLET, FILM COATED ORAL 3 TIMES DAILY
Qty: 30 TABLET | Refills: 0 | Status: SHIPPED | OUTPATIENT
Start: 2018-03-23 | End: 2018-03-29

## 2018-03-23 ASSESSMENT — PAIN SCALES - GENERAL: PAINLEVEL: NO PAIN (0)

## 2018-03-23 NOTE — MR AVS SNAPSHOT
"              After Visit Summary   3/23/2018    Raul Wilhelm    MRN: [id number]           Patient Information     Date Of Birth          1945        Visit Information        Provider Department      3/23/2018 8:00 AM Justine Byrd MD [facility]        Today's Diagnoses     Atypical chest pain    -  1    Acute bronchitis with symptoms > 10 days           Follow-ups after your visit        Who to contact     If you have questions or need follow up information about today's clinic visit or your schedule please contact [facility] directly at [phone].  Normal or non-critical lab and imaging results will be communicated to you by [employer], letter or phone within 4 business days after the clinic has received the results. If you do not hear from us within 7 days, please contact the clinic through [employer] or phone. If you have a critical or abnormal lab result, we will notify you by phone as soon as possible.  Submit refill requests through [employer] or call your pharmacy and they will forward the refill request to us. Please allow 3 business days for your refill to be completed.          Additional Information About Your Visit        MyChart Information     [employer] lets you send messages to your doctor, view your test results, renew your prescriptions, schedule appointments and more. To sign up, go to [email or web address] . Click on \"Log in\" on the left side of the screen, which will take you to the Welcome page. Then click on \"Sign up Now\" on the right side of the page.     You will be asked to enter the access code listed below, as well as some personal information. Please follow the directions to create your username and password.     Your access code is: [email or web address]  Expires: 2018  8:46 AM     Your access code will  in 90 days. If you need help or a new code, please call your [facility] or [phone].        Care EveryWhere ID     This is your Care " "EveryWhere ID. This could be used by other organizations to access your [facility] medical records  [id number]        Your Vitals Were     Pulse Temperature Height Pulse Oximetry BMI (Body Mass Index)       83 97.2  F (36.2  C) (Tympanic) 5' 7\" (1.702 m) 94% 26.72 kg/m2        Blood Pressure from Last 3 Encounters:   03/23/18 121/75   10/31/17 122/68   10/06/17 122/71    Weight from Last 3 Encounters:   03/23/18 170 lb 9.6 oz (77.4 kg)   10/31/17 166 lb (75.3 kg)   10/06/17 166 lb 12.8 oz (75.7 kg)              We Performed the Following     EKG 12-lead complete w/read - Clinics          Today's Medication Changes          These changes are accurate as of 3/23/18  8:46 AM.  If you have any questions, ask your nurse or doctor.               Start taking these medicines.        Dose/Directions    penicillin V potassium 500 MG tablet   Commonly known as:  VEETID   Used for:  Acute bronchitis with symptoms > 10 days   Started by:  Justine Byrd MD        Dose:  500 mg   Take 1 tablet (500 mg) by mouth 3 times daily   Quantity:  30 tablet   Refills:  0         These medicines have changed or have updated prescriptions.        Dose/Directions    predniSONE 10 MG tablet   Commonly known as:  DELTASONE   This may have changed:  Another medication with the same name was removed. Continue taking this medication, and follow the directions you see here.   Changed by:  Justine Byrd MD        Refills:  0            Where to get your medicines      These medications were sent to [facility] [address]  [address] [address]     Phone:  [phone]     penicillin V potassium 500 MG tablet                Primary Care Provider Office Phone # Fax #    Connor Anderson [phone] [address] [address]        Equal Access to Services     DIDIER VINES AH: Paige Valentine, ana beatty, naomi chen, " daniel juarez genie hart'aan ah. So [facility] [phone].    ATENCIÓN: Si habla ellis, tiene a mccarthy disposición servicios gratuitos de asistencia lingüística. Desirae whitt [phone].    We comply with applicable federal civil rights laws and [place] laws. We do not discriminate on the basis of race, color, national origin, age, disability, sex, sexual orientation, or gender identity.            Thank you!     Thank you for choosing [facility]  for your care. Our goal is always to provide you with excellent care. Hearing back from our patients is one way we can continue to improve our services. Please take a few minutes to complete the written survey that you may receive in the mail after your visit with us. Thank you!             Your Updated Medication List - Protect others around you: Learn how to safely use, store and throw away your medicines at www.disposemymeds.org.          This list is accurate as of 3/23/18  8:46 AM.  Always use your most recent med list.                   Brand Name Dispense Instructions for use Diagnosis    * albuterol 108 (90 BASE) MCG/ACT Inhaler    PROAIR HFA/PROVENTIL HFA/VENTOLIN HFA    1 Inhaler    Inhale 2 puffs into the lungs every 6 hours as needed for shortness of breath / dyspnea or wheezing    Uncomplicated severe persistent asthma, Chronic obstructive pulmonary disease, unspecified COPD type (H)       * albuterol (2.5 MG/3ML) 0.083% neb solution     30 vial    Take 1 vial (2.5 mg) by nebulization every 6 hours as needed for shortness of breath / dyspnea or wheezing    Acute bronchitis, unspecified organism       aspirin 81 MG EC tablet     30 tablet    Take 1 tablet (81 mg) by mouth daily    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  Do not apply to face.    Psoriasis       EPINEPHrine 0.3 MG/0.3ML injection 2-pack    EPIPEN/ADRENACLICK/or ANY BX GENERIC EQUIV    0.6 mL    Inject 0.3 mLs (0.3 mg) into the muscle as needed for anaphylaxis    Bee sting allergy       fexofenadine 180 MG tablet    ALLEGRA    90 tablet    Take 1 tablet by mouth daily.    Seasonal allergic rhinitis       ipratropium - albuterol 0.5 mg/2.5 mg/3 mL 0.5-2.5 (3) MG/3ML neb solution    DUONEB    1 Box    Take 1 vial (3 mLs) by nebulization every 4 hours as needed for shortness of breath / dyspnea    Uncomplicated severe persistent asthma, Chronic obstructive pulmonary disease, unspecified COPD type (H)       latanoprost 0.005 % ophthalmic solution    XALATAN     Place 1 drop into both eyes At Bedtime        losartan-hydrochlorothiazide 50-12.5 MG per tablet    HYZAAR    90 tablet    Take 1 tablet by mouth daily    Essential hypertension, benign       montelukast 10 MG tablet    SINGULAIR    90 tablet    TAKE ONE TABLET BY MOUTH EVERY NIGHT AT BEDTIME    Severe persistent asthma, Seasonal allergic rhinitis       nitroGLYcerin 0.4 MG sublingual tablet    NITROSTAT    25 tablet    Place 1 tablet (0.4 mg) under the tongue every 5 minutes as needed for chest pain If you are still having symptoms after 3 doses (15 minutes) call 911.    ST elevation myocardial infarction involving left circumflex coronary artery (H)       omeprazole 20 MG CR capsule    priLOSEC          order for DME     1 Device    Equipment being ordered: Nebulizer Respironics, MiniElite--Neb Machine and tubing and mouth piece.    Extrinsic asthma, unspecified       order for DME     1 Device    Equipment being ordered: arm blood pressure cuff    Essential hypertension, benign       penicillin V potassium 500 MG tablet     VEETID    30 tablet    Take 1 tablet (500 mg) by mouth 3 times daily    Acute bronchitis with symptoms > 10 days       predniSONE 10 MG tablet    DELTASONE          * Notice:  This list has 2 medication(s) that are the same as other medications prescribed for you. Read the directions carefully, and ask your doctor or other care provider to review them with you.

## 2018-03-23 NOTE — NURSING NOTE
"Initial /75  Pulse 83  Temp 97.2  F (36.2  C) (Tympanic)  Ht 5' 7\" (1.702 m)  Wt 170 lb 9.6 oz (77.4 kg)  SpO2 94%  BMI 26.72 kg/m2 Estimated body mass index is 26.72 kg/(m^2) as calculated from the following:    Height as of this encounter: 5' 7\" (1.702 m).    Weight as of this encounter: 170 lb 9.6 oz (77.4 kg). .      "

## 2018-03-23 NOTE — PROGRESS NOTES
SUBJECTIVE:                                                    Raul Wilhelm is 72 year old male   Chief Complaint   Patient presents with     Ent Problem     Patient states that he has had very heavy phlegm for the last two weeks, is able to cough it up. States that he usually has issues with this in early spring all of his life (has COPD and asthma). He has tried nebulizers and inhalers, nebulizer is somehat helpful. Also states that he has been having low back and apper abdominal/chest pain. He is wondering if this is due to fluid in the lungs. States he is going out of town and the nearest hospital is an hour away.     No current pain in back or chest. He states he usually has random pains when he wakes int he morning that will go away within an hour.  States he had a high bp reading yesterday after work 145/90, checked it later on in the days and was much lower.    Problem list and histories reviewed & adjusted, as indicated.  Additional history: as documented    Patient Active Problem List   Diagnosis     Family hx of colon cancer     Glaucoma     Hypertension     Cerebral infarction (H)     Hyperlipidemia LDL goal <100     Pneumonitis, hypersensitivity, naa (H)     Severe persistent asthma     COPD (chronic obstructive pulmonary disease) (H)     OA (osteoarthritis) of knee     Hoarseness of voice     Eczema     Impaired fasting blood sugar     Hypokalemia     ST elevation myocardial infarction involving left circumflex coronary artery (H)     Past Surgical History:   Procedure Laterality Date     ENT SURGERY  as a child    tonsillectomy       Social History   Substance Use Topics     Smoking status: Never Smoker     Smokeless tobacco: Never Used     Alcohol use Yes      Comment: Rare     Family History   Problem Relation Age of Onset     DIABETES Mother      HEART DISEASE Mother      C.A.D. Mother      CANCER Father      Hypertension Father      CEREBROVASCULAR DISEASE Paternal Grandfather      Asthma  Sister      Breast Cancer No family hx of      Cancer - colorectal No family hx of      Prostate Cancer No family hx of          Current Outpatient Prescriptions   Medication Sig Dispense Refill     omeprazole (PRILOSEC) 20 MG CR capsule        atorvastatin (LIPITOR) 20 MG tablet Take 1 tablet (20 mg) by mouth daily 90 tablet 3     albuterol (2.5 MG/3ML) 0.083% neb solution Take 1 vial (2.5 mg) by nebulization every 6 hours as needed for shortness of breath / dyspnea or wheezing 30 vial 1     losartan-hydrochlorothiazide (HYZAAR) 50-12.5 MG per tablet Take 1 tablet by mouth daily 90 tablet 3     carvedilol (COREG) 6.25 MG tablet TAKE ONE TABLET BY MOUTH TWICE A  tablet 2     montelukast (SINGULAIR) 10 MG tablet TAKE ONE TABLET BY MOUTH EVERY NIGHT AT BEDTIME 90 tablet 3     clobetasol (TEMOVATE) 0.05 % ointment Apply sparingly to affected area twice daily as needed.  Do not apply to face. 45 g 1     albuterol (PROAIR HFA, PROVENTIL HFA, VENTOLIN HFA) 108 (90 BASE) MCG/ACT inhaler Inhale 2 puffs into the lungs every 6 hours as needed for shortness of breath / dyspnea or wheezing 1 Inhaler 5     aspirin EC 81 MG EC tablet Take 1 tablet (81 mg) by mouth daily 30 tablet 1     latanoprost (XALATAN) 0.005 % ophthalmic solution Place 1 drop into both eyes At Bedtime       nitroglycerin (NITROSTAT) 0.4 MG sublingual tablet Place 1 tablet (0.4 mg) under the tongue every 5 minutes as needed for chest pain If you are still having symptoms after 3 doses (15 minutes) call 911. (Patient not taking: Reported on 3/23/2018) 25 tablet 1     ipratropium - albuterol 0.5 mg/2.5 mg/3 mL (DUONEB) 0.5-2.5 (3) MG/3ML neb solution Take 1 vial (3 mLs) by nebulization every 4 hours as needed for shortness of breath / dyspnea 1 Box 2     EPINEPHrine 0.3 MG/0.3ML injection 2-pack Inject 0.3 mLs (0.3 mg) into the muscle as needed for anaphylaxis (Patient not taking: Reported on 3/23/2018) 0.6 mL 0     order for DME Equipment being ordered:  arm blood pressure cuff 1 Device 0     ORDER FOR DME Equipment being ordered: Nebulizer Respironics, MiniElite--Neb Machine and tubing and mouth piece. 1 Device 0     fexofenadine (ALLEGRA) 180 MG tablet Take 1 tablet by mouth daily. (Patient not taking: Reported on 3/23/2018) 90 tablet 1     Allergies   Allergen Reactions     Simvastatin Swelling     Severe muscle pain, swelling, and bruising     Dust Mites      Recent Labs   Lab Test  11/01/17   0611  04/28/16   1240  01/15/16   0825   11/15/15   0630   03/13/15   0707   11/19/13   0731   01/10/11   1744   03/22/10   0729   A1C   --    --   5.5   --   6.1*   --    --    --   5.4   < >   --    --    --    LDL  64   --   69   --    --    --   107   --   139*   < >   --    --   145*   HDL  60   --   43   --    --    --   54   --   49   < >   --    --   49   TRIG  163*   --   139   --    --    --   127   --   215*   < >   --    --   191*   ALT  31  34  34   --    --    < >   --    --   45   < >   --    < >  30   CR   --   0.95  1.07   < >  1.08   < >  1.16   < >   --    < >   --    < >  1.14   GFRESTIMATED   --   78  68   < >  68   < >  62   < >   --    < >   --    < >  65   GFRESTBLACK   --   >90   GFR Calc    83   < >  82   < >  75   < >   --    < >   --    < >  78   POTASSIUM   --   4.5  4.1   < >  4.0   < >  3.3*   < >   --    < >   --    < >  4.4   TSH   --    --    --    --    --    --    --    --    --    --   4.81   --   2.74    < > = values in this interval not displayed.      BP Readings from Last 3 Encounters:   03/23/18 121/75   10/31/17 122/68   10/06/17 122/71    Wt Readings from Last 3 Encounters:   03/23/18 170 lb 9.6 oz (77.4 kg)   10/31/17 166 lb (75.3 kg)   10/06/17 166 lb 12.8 oz (75.7 kg)         ROS:  Constitutional, HEENT, cardiovascular, pulmonary, gi and gu systems are negative, except as otherwise noted.    OBJECTIVE:                                                    /75  Pulse 83  Temp 97.2  F (36.2  C) (Tympanic)   "Ht 5' 7\" (1.702 m)  Wt 170 lb 9.6 oz (77.4 kg)  SpO2 94%  BMI 26.72 kg/m2  GENERAL APPEARANCE ADULT: alert, appears ill, no distress, tired appearing, hoarse voice  HENT: right TM abnormal, dull, left TM abnormal, dull, throat/mouth:mild erythema, mucous membranes moist  RESP: diminished breath sounds bilateraly  CV: normal rate, regular rhythm, no murmur or gallop  Diagnostic Test Results:  EKG was done and reviewed by myself and with patient.  EKG: appears normal, NSR, rate 77, normal axis, normal intervals, no acute ST/T changes c/w ischemia, no LVH by voltage criteria, unchanged from previous tracings  Chest xray reviewed with Raul, radiology reads normal.       ASSESSMENT/PLAN:                                                    1. Atypical chest pain  Ekg is normal, no other indications to evaluate further, chest pain resolved yesterday, muscular vs pleuritic  - XR Chest 2 Views; Future  - EKG 12-lead complete w/read - Clinics    2. Acute bronchitis with symptoms > 10 days  Viral vs bacterial.  Trial of antibiotics but if not effective viral infection and self limiting.    - penicillin V potassium (VEETID) 500 MG tablet; Take 1 tablet (500 mg) by mouth 3 times daily  Dispense: 30 tablet; Refill: 0    Justine Byrd MD  [facility]    "

## 2018-03-24 ASSESSMENT — ASTHMA QUESTIONNAIRES: ACT_TOTALSCORE: 16

## 2018-03-27 ENCOUNTER — OFFICE VISIT (OUTPATIENT)
Dept: FAMILY MEDICINE | Facility: CLINIC | Age: 73
End: 2018-03-27
Payer: COMMERCIAL

## 2018-03-27 VITALS
WEIGHT: 166 LBS | SYSTOLIC BLOOD PRESSURE: 122 MMHG | BODY MASS INDEX: 26.06 KG/M2 | OXYGEN SATURATION: 94 % | DIASTOLIC BLOOD PRESSURE: 76 MMHG | TEMPERATURE: 96.3 F | HEIGHT: 67 IN | HEART RATE: 90 BPM

## 2018-03-27 DIAGNOSIS — J45.50 UNCOMPLICATED SEVERE PERSISTENT ASTHMA (H): ICD-10-CM

## 2018-03-27 DIAGNOSIS — J40 BRONCHITIS: Primary | ICD-10-CM

## 2018-03-27 PROCEDURE — 99214 OFFICE O/P EST MOD 30 MIN: CPT | Performed by: FAMILY MEDICINE

## 2018-03-27 RX ORDER — PREDNISONE 10 MG/1
40 TABLET ORAL DAILY
Qty: 20 TABLET | Refills: 0 | Status: SHIPPED | OUTPATIENT
Start: 2018-03-27 | End: 2019-01-31

## 2018-03-27 RX ORDER — IPRATROPIUM BROMIDE AND ALBUTEROL SULFATE 2.5; .5 MG/3ML; MG/3ML
3 SOLUTION RESPIRATORY (INHALATION) EVERY 4 HOURS PRN
Qty: 1 BOX | Refills: 2 | Status: SHIPPED | OUTPATIENT
Start: 2018-03-27 | End: 2019-04-19

## 2018-03-27 NOTE — PATIENT INSTRUCTIONS
Thank you for choosing [facility].  You may be receiving a survey in the mail from Sally Kaufman regarding your visit today.  Please take a few minutes to complete and return the survey to let us know how we are doing.      If you have questions or concerns, please contact us via [employer] or you can contact your care team at [phone].    Our Clinic hours are:  Monday 6:40 am  to 7:00 pm  Tuesday -Friday 6:40 am to 5:00 pm    The [place] outpatient lab hours are:  Monday - Friday 6:10 am to 4:45 pm  Saturdays 7:00 am to 11:00 am  Appointments are required, call [phone]    If you have clinical questions after hours or would like to schedule an appointment,  call the clinic at [phone].  Bronchitis, Antibiotic Treatment (Adult)    Bronchitis is an infection of the air passages (bronchial tubes) in your lungs. It often occurs when you have a cold. This illness is contagious during the first few days and is spread through the air by coughing and sneezing, or by direct contact (touching the sick person and then touching your own eyes, nose, or mouth).  Symptoms of bronchitis include cough with mucus (phlegm) and low-grade fever. Bronchitis usually lasts 7 to 14 days. Mild cases can be treated with simple home remedies. More severe infection is treated with an antibiotic.  Home care  Follow these guidelines when caring for yourself at home:    If your symptoms are severe, rest at home for the first 2 to 3 days. When you go back to your usual activities, don't let yourself get too tired.    Do not smoke. Also avoid being exposed to secondhand smoke.    You may use over-the-counter medicines to control fever or pain, unless another medicine was prescribed. (Note: If you have chronic liver or kidney disease or have ever had a stomach ulcer or gastrointestinal bleeding, talk with your healthcare provider before using these medicines. Also talk to your provider if you are taking medicine to prevent blood  clots.) Aspirin should never be given to anyone younger than 18 years of age who is ill with a viral infection or fever. It may cause severe liver or brain damage.    Your appetite may be poor, so a light diet is fine. Avoid dehydration by drinking 6 to 8 glasses of fluids per day (such as water, soft drinks, sports drinks, juices, tea, or soup). Extra fluids will help loosen secretions in the nose and lungs.    Over-the-counter cough, cold, and sore-throat medicines will not shorten the length of the illness, but they may be helpful to reduce symptoms. (Note: Do not use decongestants if you have high blood pressure.)    Finish all antibiotic medicine. Do this even if you are feeling better after only a few days.  Follow-up care  Follow up with your healthcare provider, or as advised. If you had an X-ray or ECG (electrocardiogram), a specialist will review it. You will be notified of any new findings that may affect your care.  Note: If you are age 65 or older, or if you have a chronic lung disease or condition that affects your immune system, or you smoke, talk to your healthcare provider about having pneumococcal vaccinations and a yearly influenza vaccination (flu shot).  When to seek medical advice  Call your healthcare provider right away if any of these occur:    Fever of 100.4 F (38 C) or higher    Coughing up increased amounts of colored sputum    Weakness, drowsiness, headache, facial pain, ear pain, or a stiff neck  Call 911, or get immediate medical care  Contact emergency services right away if any of these occur.    Coughing up blood    Worsening weakness, drowsiness, headache, or stiff neck    Trouble breathing, wheezing, or pain with breathing  Date Last Reviewed: 9/13/2015 2000-2017 [employer] [address] [place] [address], [place] [address]. All rights reserved. This information is not intended as a substitute for professional medical care. Always follow your healthcare professional's  instructions.

## 2018-03-27 NOTE — PROGRESS NOTES
SUBJECTIVE:   Raul Wilhelm is a 72 year old male who presents to clinic today for the following health issues:      Acute Illness   Acute illness concerns: cough sob   Onset: 1 mo    Fever: no    Chills/Sweats: YES    Headache (location?): no    Sinus Pressure:no    Conjunctivitis:  no    Ear Pain: no    Rhinorrhea: no     Congestion: YES- chest     Sore Throat: no     Cough: YES-productive of yellow sputum, with shortness of breath    Wheeze: YES    Decreased Appetite: YES    Nausea: no     Vomiting: no    Diarrhea:  no    Dysuria/Freq.: no    Fatigue/Achiness: YES    Sick/Strep Exposure: no     Therapies Tried and outcome: Patient was seen 3-23-18 and given PCN x10 day still has 6 days left but  still is not feeling better       Patient is a 72 yr old male here for follow up on cough and shortness of breath. Was seen last week Friday started  on penicillins for a bronchitis. He reports minimal improvement. Still coughing but says sputum is clear. Still endorses some shortness of breath. Has been using medication as prescribed . Uses nebs twice a day .     Problem list and histories reviewed & adjusted, as indicated.  Additional history: as documented    Patient Active Problem List   Diagnosis     Family hx of colon cancer     Glaucoma     Hypertension     Cerebral infarction (H)     Hyperlipidemia LDL goal <100     Pneumonitis, hypersensitivity, naa (H)     Severe persistent asthma     COPD (chronic obstructive pulmonary disease) (H)     OA (osteoarthritis) of knee     Hoarseness of voice     Eczema     Impaired fasting blood sugar     Hypokalemia     ST elevation myocardial infarction involving left circumflex coronary artery (H)     Past Surgical History:   Procedure Laterality Date     ENT SURGERY  as a child    tonsillectomy       Social History   Substance Use Topics     Smoking status: Never Smoker     Smokeless tobacco: Never Used     Alcohol use Yes      Comment: Rare     Family History   Problem  Relation Age of Onset     DIABETES Mother      HEART DISEASE Mother      C.A.D. Mother      CANCER Father      Hypertension Father      CEREBROVASCULAR DISEASE Paternal Grandfather      Asthma Sister      Breast Cancer No family hx of      Cancer - colorectal No family hx of      Prostate Cancer No family hx of          Current Outpatient Prescriptions   Medication Sig Dispense Refill     predniSONE (DELTASONE) 10 MG tablet Take 4 tablets (40 mg) by mouth daily for 5 days 20 tablet 0     ipratropium - albuterol 0.5 mg/2.5 mg/3 mL (DUONEB) 0.5-2.5 (3) MG/3ML neb solution Take 1 vial (3 mLs) by nebulization every 4 hours as needed for shortness of breath / dyspnea 1 Box 2     omeprazole (PRILOSEC) 20 MG CR capsule        penicillin V potassium (VEETID) 500 MG tablet Take 1 tablet (500 mg) by mouth 3 times daily 30 tablet 0     atorvastatin (LIPITOR) 20 MG tablet Take 1 tablet (20 mg) by mouth daily 90 tablet 3     albuterol (2.5 MG/3ML) 0.083% neb solution Take 1 vial (2.5 mg) by nebulization every 6 hours as needed for shortness of breath / dyspnea or wheezing 30 vial 1     losartan-hydrochlorothiazide (HYZAAR) 50-12.5 MG per tablet Take 1 tablet by mouth daily 90 tablet 3     carvedilol (COREG) 6.25 MG tablet TAKE ONE TABLET BY MOUTH TWICE A  tablet 2     montelukast (SINGULAIR) 10 MG tablet TAKE ONE TABLET BY MOUTH EVERY NIGHT AT BEDTIME 90 tablet 3     clobetasol (TEMOVATE) 0.05 % ointment Apply sparingly to affected area twice daily as needed.  Do not apply to face. 45 g 1     EPINEPHrine 0.3 MG/0.3ML injection 2-pack Inject 0.3 mLs (0.3 mg) into the muscle as needed for anaphylaxis 0.6 mL 0     albuterol (PROAIR HFA, PROVENTIL HFA, VENTOLIN HFA) 108 (90 BASE) MCG/ACT inhaler Inhale 2 puffs into the lungs every 6 hours as needed for shortness of breath / dyspnea or wheezing 1 Inhaler 5     aspirin EC 81 MG EC tablet Take 1 tablet (81 mg) by mouth daily 30 tablet 1     latanoprost (XALATAN) 0.005 %  "ophthalmic solution Place 1 drop into both eyes At Bedtime       predniSONE (DELTASONE) 10 MG tablet        nitroglycerin (NITROSTAT) 0.4 MG sublingual tablet Place 1 tablet (0.4 mg) under the tongue every 5 minutes as needed for chest pain If you are still having symptoms after 3 doses (15 minutes) call 911. (Patient not taking: Reported on 3/23/2018) 25 tablet 1     [DISCONTINUED] ipratropium - albuterol 0.5 mg/2.5 mg/3 mL (DUONEB) 0.5-2.5 (3) MG/3ML neb solution Take 1 vial (3 mLs) by nebulization every 4 hours as needed for shortness of breath / dyspnea 1 Box 2     order for DME Equipment being ordered: arm blood pressure cuff 1 Device 0     ORDER FOR DME Equipment being ordered: Nebulizer Respironics, MiniElite--Neb Machine and tubing and mouth piece. 1 Device 0     fexofenadine (ALLEGRA) 180 MG tablet Take 1 tablet by mouth daily. (Patient not taking: Reported on 3/23/2018) 90 tablet 1     Allergies   Allergen Reactions     Simvastatin Swelling     Severe muscle pain, swelling, and bruising     Dust Mites      BP Readings from Last 3 Encounters:   03/27/18 122/76   03/23/18 121/75   10/31/17 122/68    Wt Readings from Last 3 Encounters:   03/27/18 166 lb (75.3 kg)   03/23/18 170 lb 9.6 oz (77.4 kg)   10/31/17 166 lb (75.3 kg)                  Labs reviewed in EPIC    Reviewed and updated as needed this visit by clinical staff  Tobacco  Allergies  Med Hx  Surg Hx  Fam Hx  Soc Hx      Reviewed and updated as needed this visit by Provider         ROS:  Constitutional, HEENT, cardiovascular, pulmonary, gi and gu systems are negative, except as otherwise noted.    OBJECTIVE:     /76 (BP Location: Left arm, Cuff Size: Adult Regular)  Pulse 90  Temp 96.3  F (35.7  C) (Tympanic)  Ht 5' 7\" (1.702 m)  Wt 166 lb (75.3 kg)  SpO2 94%  BMI 26 kg/m2  Body mass index is 26 kg/(m^2).  GENERAL: healthy, alert and no distress  NECK: no adenopathy, no asymmetry, masses, or scars and thyroid normal to " palpation  RESP: lungs clear to auscultation - no rales, rhonchi or wheezes and decreased breath sounds throughout  CV: regular rate and rhythm, normal S1 S2, no S3 or S4, no murmur, click or rub, no peripheral edema and peripheral pulses strong  ABDOMEN: soft, nontender, no hepatosplenomegaly, no masses and bowel sounds normal  MS: no gross musculoskeletal defects noted, no edema    Diagnostic Test Results:  none     ASSESSMENT/PLAN:       1. Bronchitis  Added prednisone to his regimen . Asked that he use the nebs at least three times a day   - predniSONE (DELTASONE) 10 MG tablet; Take 4 tablets (40 mg) by mouth daily for 5 days  Dispense: 20 tablet; Refill: 0    2. Uncomplicated severe persistent asthma  Medication faxed   - ipratropium - albuterol 0.5 mg/2.5 mg/3 mL (DUONEB) 0.5-2.5 (3) MG/3ML neb solution; Take 1 vial (3 mLs) by nebulization every 4 hours as needed for shortness of breath / dyspnea  Dispense: 1 Box; Refill: 2    FUTURE APPOINTMENTS:       - Follow-up visit as needed.  Patient Instructions         Thank you for choosing [facility].  You may be receiving a survey in the mail from [employer] regarding your visit today.  Please take a few minutes to complete and return the survey to let us know how we are doing.      If you have questions or concerns, please contact us via [facility] or you can contact your care team at [phone].    Our Clinic hours are:  Monday 6:40 am  to 7:00 pm  Tuesday -Friday 6:40 am to 5:00 pm    The [place] outpatient lab hours are:  Monday - Friday 6:10 am to 4:45 pm  Saturdays 7:00 am to 11:00 am  Appointments are required, call [phone]    If you have clinical questions after hours or would like to schedule an appointment,  call the clinic at [phone].  Bronchitis, Antibiotic Treatment (Adult)    Bronchitis is an infection of the air passages (bronchial tubes) in your lungs. It often occurs when you have a cold. This illness is contagious during the first  few days and is spread through the air by coughing and sneezing, or by direct contact (touching the sick person and then touching your own eyes, nose, or mouth).  Symptoms of bronchitis include cough with mucus (phlegm) and low-grade fever. Bronchitis usually lasts 7 to 14 days. Mild cases can be treated with simple home remedies. More severe infection is treated with an antibiotic.  Home care  Follow these guidelines when caring for yourself at home:    If your symptoms are severe, rest at home for the first 2 to 3 days. When you go back to your usual activities, don't let yourself get too tired.    Do not smoke. Also avoid being exposed to secondhand smoke.    You may use over-the-counter medicines to control fever or pain, unless another medicine was prescribed. (Note: If you have chronic liver or kidney disease or have ever had a stomach ulcer or gastrointestinal bleeding, talk with your healthcare provider before using these medicines. Also talk to your provider if you are taking medicine to prevent blood clots.) Aspirin should never be given to anyone younger than 18 years of age who is ill with a viral infection or fever. It may cause severe liver or brain damage.    Your appetite may be poor, so a light diet is fine. Avoid dehydration by drinking 6 to 8 glasses of fluids per day (such as water, soft drinks, sports drinks, juices, tea, or soup). Extra fluids will help loosen secretions in the nose and lungs.    Over-the-counter cough, cold, and sore-throat medicines will not shorten the length of the illness, but they may be helpful to reduce symptoms. (Note: Do not use decongestants if you have high blood pressure.)    Finish all antibiotic medicine. Do this even if you are feeling better after only a few days.  Follow-up care  Follow up with your healthcare provider, or as advised. If you had an X-ray or ECG (electrocardiogram), a specialist will review it. You will be notified of any new findings that may  affect your care.  Note: If you are age 65 or older, or if you have a chronic lung disease or condition that affects your immune system, or you smoke, talk to your healthcare provider about having pneumococcal vaccinations and a yearly influenza vaccination (flu shot).  When to seek medical advice  Call your healthcare provider right away if any of these occur:    Fever of 100.4 F (38 C) or higher    Coughing up increased amounts of colored sputum    Weakness, drowsiness, headache, facial pain, ear pain, or a stiff neck  Call 911, or get immediate medical care  Contact emergency services right away if any of these occur.    Coughing up blood    Worsening weakness, drowsiness, headache, or stiff neck    Trouble breathing, wheezing, or pain with breathing  Date Last Reviewed: 9/13/2015 2000-2017 [employer]. [address] [place] [address], [place] [address]. All rights reserved. This information is not intended as a substitute for professional medical care. Always follow your healthcare professional's instructions.            Bjorn Galvin MD  [facility]

## 2018-03-27 NOTE — NURSING NOTE
"Chief Complaint   Patient presents with     Cough       Initial /76 (BP Location: Left arm, Cuff Size: Adult Regular)  Pulse 90  Temp 96.3  F (35.7  C) (Tympanic)  Ht 5' 7\" (1.702 m)  Wt 166 lb (75.3 kg)  SpO2 94%  BMI 26 kg/m2 Estimated body mass index is 26 kg/(m^2) as calculated from the following:    Height as of this encounter: 5' 7\" (1.702 m).    Weight as of this encounter: 166 lb (75.3 kg).  Medication Reconciliation: complete  "

## 2018-03-28 ENCOUNTER — NURSE TRIAGE (OUTPATIENT)
Dept: NURSING | Facility: CLINIC | Age: 73
End: 2018-03-28

## 2018-03-28 ENCOUNTER — HOSPITAL ENCOUNTER (EMERGENCY)
Facility: CLINIC | Age: 73
Discharge: HOME | End: 2018-03-29
Attending: EMERGENCY MEDICINE | Admitting: EMERGENCY MEDICINE
Payer: MEDICARE

## 2018-03-28 DIAGNOSIS — R19.7 DIARRHEA, UNSPECIFIED TYPE: ICD-10-CM

## 2018-03-28 DIAGNOSIS — N28.9 RENAL INSUFFICIENCY: ICD-10-CM

## 2018-03-28 DIAGNOSIS — E86.0 DEHYDRATION: ICD-10-CM

## 2018-03-28 PROCEDURE — 99285 EMERGENCY DEPT VISIT HI MDM: CPT | Mod: 25 | Performed by: EMERGENCY MEDICINE

## 2018-03-28 NOTE — ED AVS SNAPSHOT
[facility] Emergency Department    [address] [address]    Phone:  [phone]    Fax:  [phone]                                       Raul Wilhelm   MRN: [id number]    Department:  [facility] Emergency Department   Date of Visit:  3/28/2018           After Visit Summary Signature Page     I have received my discharge instructions, and my questions have been answered. I have discussed any challenges I see with this plan with the nurse or doctor.    ..........................................................................................................................................  Patient/Patient Representative Signature      ..........................................................................................................................................  Patient Representative Print Name and Relationship to Patient    ..................................................               ................................................  Date                                            Time    ..........................................................................................................................................  Reviewed by Signature/Title    ...................................................              ..............................................  Date                                                            Time

## 2018-03-28 NOTE — ED AVS SNAPSHOT
[facility] Emergency Department    [address] [facility] [address]    Phone:  [phone]    Fax:  [phone]                                       Raul Wilhelm   MRN: [id number]    Department:  [facility] Emergency Department   Date of Visit:  3/28/2018           Patient Information     Date Of Birth          1945        Your diagnoses for this visit were:     Diarrhea, unspecified type Probable C. difficile colitis    Dehydration     Renal insufficiency        You were seen by Wicho Cruz MD.      Follow-up Information     Follow up with Connor Anderson MD. Schedule an appointment as soon as possible for a visit in 2 days.    Specialty:  Family Practice    Why:  For follow up evaluation and repeat blood work    Contact information:    [address] [place] [address]  [phone]          Go to [facility] Emergency Department.    Specialty:  EMERGENCY MEDICINE    Why:  As needed, If symptoms worsen    Contact information:    [address] [place] [id number] [phone]    Additional information:    The medical center is located at   [address] [place] [address] (between [address] and   [facility] in [place], [place] miles north   of [place]).      Discharge References/Attachments     DIARRHEA, UNKNOWN CAUSE (ENGLISH)    VOMITING OR DIARRHEA (ADULT), DIET FOR (ENGLISH)    CLOSTRIDIUM DIFFICILE TOXIN (STOOL) (ENGLISH)      24 Hour Appointment Hotline       To make an appointment at any [facility] clinic, call [phone] ([phone]). If you don't have a family doctor or clinic, we will help you find one. [facility] clinics are conveniently located to serve the needs of you and your family.             Review of your medicines      START taking        Dose / Directions Last dose taken    metroNIDAZOLE 500 MG tablet   Commonly known as:  FLAGYL   Dose:  500 mg   Quantity:  42 tablet        Take 1 tablet (500 mg) by mouth 3 times daily for 14 days    Refills:  0          Our records show that you are taking the medicines listed below. If these are incorrect, please call your family doctor or clinic.        Dose / Directions Last dose taken    * albuterol 108 (90 BASE) MCG/ACT Inhaler   Commonly known as:  PROAIR HFA/PROVENTIL HFA/VENTOLIN HFA   Dose:  2 puff   Quantity:  1 Inhaler        Inhale 2 puffs into the lungs every 6 hours as needed for shortness of breath / dyspnea or wheezing   Refills:  5        * albuterol (2.5 MG/3ML) 0.083% neb solution   Dose:  1 vial   Quantity:  30 vial        Take 1 vial (2.5 mg) by nebulization every 6 hours as needed for shortness of breath / dyspnea or wheezing   Refills:  1        aspirin 81 MG EC tablet   Dose:  81 mg   Quantity:  30 tablet        Take 1 tablet (81 mg) by mouth daily   Refills:  1        atorvastatin 20 MG tablet   Commonly known as:  LIPITOR   Dose:  20 mg   Quantity:  90 tablet        Take 1 tablet (20 mg) by mouth daily   Refills:  3        carvedilol 6.25 MG tablet   Commonly known as:  COREG   Quantity:  180 tablet        TAKE ONE TABLET BY MOUTH TWICE A DAY   Refills:  2        clobetasol 0.05 % ointment   Commonly known as:  TEMOVATE   Quantity:  45 g        Apply sparingly to affected area twice daily as needed.  Do not apply to face.   Refills:  1        EPINEPHrine 0.3 MG/0.3ML injection 2-pack   Commonly known as:  EPIPEN/ADRENACLICK/or ANY BX GENERIC EQUIV   Dose:  0.3 mg   Quantity:  0.6 mL        Inject 0.3 mLs (0.3 mg) into the muscle as needed for anaphylaxis   Refills:  0        fexofenadine 180 MG tablet   Commonly known as:  ALLEGRA   Dose:  180 mg   Quantity:  90 tablet        Take 1 tablet by mouth daily.   Refills:  1        ipratropium - albuterol 0.5 mg/2.5 mg/3 mL 0.5-2.5 (3) MG/3ML neb solution   Commonly known as:  DUONEB   Dose:  3 mL   Quantity:  1 Box        Take 1 vial (3 mLs) by nebulization every 4 hours as needed for shortness of breath / dyspnea   Refills:  2         latanoprost 0.005 % ophthalmic solution   Commonly known as:  XALATAN   Dose:  1 drop        Place 1 drop into both eyes At Bedtime   Refills:  0        losartan-hydrochlorothiazide 50-12.5 MG per tablet   Commonly known as:  HYZAAR   Dose:  1 tablet   Quantity:  90 tablet        Take 1 tablet by mouth daily   Refills:  3        montelukast 10 MG tablet   Commonly known as:  SINGULAIR   Quantity:  90 tablet        TAKE ONE TABLET BY MOUTH EVERY NIGHT AT BEDTIME   Refills:  3        nitroGLYcerin 0.4 MG sublingual tablet   Commonly known as:  NITROSTAT   Dose:  0.4 mg   Quantity:  25 tablet        Place 1 tablet (0.4 mg) under the tongue every 5 minutes as needed for chest pain If you are still having symptoms after 3 doses (15 minutes) call 911.   Refills:  1        omeprazole 20 MG CR capsule   Commonly known as:  priLOSEC        Refills:  0        order for DME   Quantity:  1 Device        Equipment being ordered: Nebulizer Respironics, MiniElite--Neb Machine and tubing and mouth piece.   Refills:  0        order for DME   Quantity:  1 Device        Equipment being ordered: arm blood pressure cuff   Refills:  0        * predniSONE 10 MG tablet   Commonly known as:  DELTASONE        Refills:  0        * predniSONE 10 MG tablet   Commonly known as:  DELTASONE   Dose:  40 mg   Quantity:  20 tablet        Take 4 tablets (40 mg) by mouth daily for 5 days   Refills:  0        * Notice:  This list has 4 medication(s) that are the same as other medications prescribed for you. Read the directions carefully, and ask your doctor or other care provider to review them with you.      STOP taking        Dose Reason for stopping Comments    penicillin V potassium 500 MG tablet   Commonly known as:  VEETID                      Prescriptions were sent or printed at these locations (1 Prescription)                   [facility] [place], [place] - [address] [facility] [address] [facility]   [address] [facility] [address]    Telephone:   [phone]   Fax:  [phone]   Hours:                  E-Prescribed (1 of 1)         metroNIDAZOLE (FLAGYL) 500 MG tablet                Procedures and tests performed during your visit     Abdomen, flat/upright (2 views)    CBC with platelets differential    Chest XR,  PA & LAT    Clostridium difficile toxin B PCR    Comprehensive metabolic panel    EKG 12-lead, tracing only    Enteric Bacteria and Virus Panel by ROBEL Stool    Lactic acid whole blood    Troponin I      Orders Needing Specimen Collection     None      Pending Results     Date and Time Order Name Status Description    3/29/2018 0028 Enteric Bacteria and Virus Panel by ROBEL Stool In process     3/29/2018 0028 Clostridium difficile toxin B PCR In process             Pending Culture Results     Date and Time Order Name Status Description    3/29/2018 0028 Enteric Bacteria and Virus Panel by ROBEL Stool In process     3/29/2018 0028 Clostridium difficile toxin B PCR In process             Pending Results Instructions     If you had any lab results that were not finalized at the time of your Discharge, you can call the ED Lab Result RN at [phone]. You will be contacted by this team for any positive Lab results or changes in treatment. The nurses are available 7 days a week from 10A to 6:30P.  You can leave a message 24 hours per day and they will return your call.        Test Results From Your Hospital Stay        3/29/2018 12:45 AM      Component Results     Component Value Ref Range & Units Status    WBC 10.3 4.0 - 11.0 10e9/L Final    RBC Count 5.69 4.4 - 5.9 10e12/L Final    Hemoglobin 17.1 13.3 - 17.7 g/dL Final    Hematocrit 50.5 40.0 - 53.0 % Final    MCV 89 78 - 100 fl Final    MCH 30.1 26.5 - 33.0 pg Final    MCHC 33.9 31.5 - 36.5 g/dL Final    RDW 12.4 10.0 - 15.0 % Final    Platelet Count 262 150 - 450 10e9/L Final    Diff Method Automated Method  Final    % Neutrophils 72.9 % Final    % Lymphocytes 13.0 % Final    % Monocytes 13.0 %  Final    % Eosinophils 0.7 % Final    % Basophils 0.1 % Final    % Immature Granulocytes 0.3 % Final    Absolute Neutrophil 7.5 1.6 - 8.3 10e9/L Final    Absolute Lymphocytes 1.3 0.8 - 5.3 10e9/L Final    Absolute Monocytes 1.3 0.0 - 1.3 10e9/L Final    Absolute Eosinophils 0.1 0.0 - 0.7 10e9/L Final    Absolute Basophils 0.0 0.0 - 0.2 10e9/L Final    Abs Immature Granulocytes 0.0 0 - 0.4 10e9/L Final         3/29/2018  1:04 AM      Component Results     Component Value Ref Range & Units Status    Sodium 138 133 - 144 mmol/L Final    Potassium 3.7 3.4 - 5.3 mmol/L Final    Chloride 104 94 - 109 mmol/L Final    Carbon Dioxide 24 20 - 32 mmol/L Final    Anion Gap 10 3 - 14 mmol/L Final    Glucose 147 (H) 70 - 99 mg/dL Final    Urea Nitrogen 32 (H) 7 - 30 mg/dL Final    Creatinine 1.32 (H) 0.66 - 1.25 mg/dL Final    GFR Estimate 53 (L) >60 mL/min/1.7m2 Final    Non  GFR Calc    GFR Estimate If Black 64 >60 mL/min/1.7m2 Final    African American GFR Calc    Calcium 8.2 (L) 8.5 - 10.1 mg/dL Final    Bilirubin Total 1.4 (H) 0.2 - 1.3 mg/dL Final    Albumin 3.9 3.4 - 5.0 g/dL Final    Protein Total 7.6 6.8 - 8.8 g/dL Final    Alkaline Phosphatase 61 40 - 150 U/L Final    ALT 33 0 - 70 U/L Final    AST 15 0 - 45 U/L Final         3/29/2018 12:57 AM      Component Results     Component Value Ref Range & Units Status    Lactic Acid 1.0 0.7 - 2.0 mmol/L Final         3/29/2018  1:04 AM      Component Results     Component Value Ref Range & Units Status    Troponin I ES <0.015 0.000 - 0.045 ug/L Final    The 99th percentile for upper reference range is 0.045 ug/L.  Troponin values   in the range of 0.045 - 0.120 ug/L may be associated with risks of adverse   clinical events.           3/29/2018  3:01 AM         3/29/2018  3:01 AM         3/29/2018  1:32 AM      Narrative     XR CHEST 2 VW   3/29/2018 1:20 AM     INDICATION: Cough and dyspnea.    COMPARISON: 3/23/2018.        Impression     IMPRESSION: No  "infiltrates or other acute findings. Heart size is  within normal limits. Faint, indeterminate 0.7 cm nodule in the right  lower lung laterally. Mild degenerative changes in the lower thoracic  spine.    GIA PÉREZ MD         3/29/2018  1:33 AM      Narrative     XR ABDOMEN 2 VW   3/29/2018 1:21 AM     INDICATION: Abdominal pain with diarrhea and nausea.    COMPARISON: None.        Impression     IMPRESSION: Air-fluid levels in the colon and in a few small bowel  loops. Findings may be due to enteritis. No convincing bowel  obstruction. No free air.    GIA PÉREZ MD                Thank you for choosing [facility]       Thank you for choosing [facility] for your care. Our goal is always to provide you with excellent care. Hearing back from our patients is one way we can continue to improve our services. Please take a few minutes to complete the written survey that you may receive in the mail after you visit with us. Thank you!        [employer] Information     [employer] lets you send messages to your doctor, view your test results, renew your prescriptions, schedule appointments and more. To sign up, go to [email or web address] . Click on \"Log in\" on the left side of the screen, which will take you to the Welcome page. Then click on \"Sign up Now\" on the right side of the page.     You will be asked to enter the access code listed below, as well as some personal information. Please follow the directions to create your username and password.     Your access code is: [email or web address]  Expires: 2018  8:46 AM     Your access code will  in 90 days. If you need help or a new code, please call your [facility] clinic or [phone].        Care EveryWhere ID     This is your Care EveryWhere ID. This could be used by other organizations to access your [facility] medical records  [id number]        Equal Access to Services     DIDIER VINES AH: ana Mclean qaybta kaalmada adeegyada, " daniel hart'aan ah. So [facility] [phone].    ATENCIÓN: Si habla español, tiene a mccarthy disposición servicios gratuitos de asistencia lingüística. Llame al [phone].    We comply with applicable federal civil rights laws and [place] laws. We do not discriminate on the basis of race, color, national origin, age, disability, sex, sexual orientation, or gender identity.            After Visit Summary       This is your record. Keep this with you and show to your community pharmacist(s) and doctor(s) at your next visit.

## 2018-03-29 ENCOUNTER — APPOINTMENT (OUTPATIENT)
Dept: GENERAL RADIOLOGY | Facility: CLINIC | Age: 73
End: 2018-03-29
Attending: EMERGENCY MEDICINE
Payer: MEDICARE

## 2018-03-29 ENCOUNTER — TELEPHONE (OUTPATIENT)
Dept: EMERGENCY MEDICINE | Facility: CLINIC | Age: 73
End: 2018-03-29

## 2018-03-29 VITALS
OXYGEN SATURATION: 95 % | RESPIRATION RATE: 18 BRPM | SYSTOLIC BLOOD PRESSURE: 140 MMHG | HEART RATE: 92 BPM | DIASTOLIC BLOOD PRESSURE: 88 MMHG | TEMPERATURE: 97.9 F

## 2018-03-29 LAB
ALBUMIN SERPL-MCNC: 3.9 G/DL (ref 3.4–5)
ALP SERPL-CCNC: 61 U/L (ref 40–150)
ALT SERPL W P-5'-P-CCNC: 33 U/L (ref 0–70)
ANION GAP SERPL CALCULATED.3IONS-SCNC: 10 MMOL/L (ref 3–14)
AST SERPL W P-5'-P-CCNC: 15 U/L (ref 0–45)
BASOPHILS # BLD AUTO: 0 10E9/L (ref 0–0.2)
BASOPHILS NFR BLD AUTO: 0.1 %
BILIRUB SERPL-MCNC: 1.4 MG/DL (ref 0.2–1.3)
BUN SERPL-MCNC: 32 MG/DL (ref 7–30)
C COLI+JEJUNI+LARI FUSA STL QL NAA+PROBE: NOT DETECTED
C DIFF TOX B STL QL: NEGATIVE
CALCIUM SERPL-MCNC: 8.2 MG/DL (ref 8.5–10.1)
CHLORIDE SERPL-SCNC: 104 MMOL/L (ref 94–109)
CO2 SERPL-SCNC: 24 MMOL/L (ref 20–32)
CREAT SERPL-MCNC: 1.32 MG/DL (ref 0.66–1.25)
DIFFERENTIAL METHOD BLD: NORMAL
EC STX1 GENE STL QL NAA+PROBE: NOT DETECTED
EC STX2 GENE STL QL NAA+PROBE: NOT DETECTED
ENTERIC PATHOGEN COMMENT: ABNORMAL
EOSINOPHIL # BLD AUTO: 0.1 10E9/L (ref 0–0.7)
EOSINOPHIL NFR BLD AUTO: 0.7 %
ERYTHROCYTE [DISTWIDTH] IN BLOOD BY AUTOMATED COUNT: 12.4 % (ref 10–15)
GFR SERPL CREATININE-BSD FRML MDRD: 53 ML/MIN/1.7M2
GLUCOSE SERPL-MCNC: 147 MG/DL (ref 70–99)
HCT VFR BLD AUTO: 50.5 % (ref 40–53)
HGB BLD-MCNC: 17.1 G/DL (ref 13.3–17.7)
IMM GRANULOCYTES # BLD: 0 10E9/L (ref 0–0.4)
IMM GRANULOCYTES NFR BLD: 0.3 %
LACTATE BLD-SCNC: 1 MMOL/L (ref 0.7–2)
LYMPHOCYTES # BLD AUTO: 1.3 10E9/L (ref 0.8–5.3)
LYMPHOCYTES NFR BLD AUTO: 13 %
MCH RBC QN AUTO: 30.1 PG (ref 26.5–33)
MCHC RBC AUTO-ENTMCNC: 33.9 G/DL (ref 31.5–36.5)
MCV RBC AUTO: 89 FL (ref 78–100)
MONOCYTES # BLD AUTO: 1.3 10E9/L (ref 0–1.3)
MONOCYTES NFR BLD AUTO: 13 %
NEUTROPHILS # BLD AUTO: 7.5 10E9/L (ref 1.6–8.3)
NEUTROPHILS NFR BLD AUTO: 72.9 %
NOROV GI+II ORF1-ORF2 JNC STL QL NAA+PR: ABNORMAL
PLATELET # BLD AUTO: 262 10E9/L (ref 150–450)
POTASSIUM SERPL-SCNC: 3.7 MMOL/L (ref 3.4–5.3)
PROT SERPL-MCNC: 7.6 G/DL (ref 6.8–8.8)
RBC # BLD AUTO: 5.69 10E12/L (ref 4.4–5.9)
RVA NSP5 STL QL NAA+PROBE: NOT DETECTED
SALMONELLA SP RPOD STL QL NAA+PROBE: NOT DETECTED
SHIGELLA SP+EIEC IPAH STL QL NAA+PROBE: NOT DETECTED
SODIUM SERPL-SCNC: 138 MMOL/L (ref 133–144)
SPECIMEN SOURCE: NORMAL
TROPONIN I SERPL-MCNC: <0.015 UG/L (ref 0–0.04)
V CHOL+PARA RFBL+TRKH+TNAA STL QL NAA+PR: NOT DETECTED
WBC # BLD AUTO: 10.3 10E9/L (ref 4–11)
Y ENTERO RECN STL QL NAA+PROBE: NOT DETECTED

## 2018-03-29 PROCEDURE — 74019 RADEX ABDOMEN 2 VIEWS: CPT

## 2018-03-29 PROCEDURE — 25000125 ZZHC RX 250: Performed by: EMERGENCY MEDICINE

## 2018-03-29 PROCEDURE — 83605 ASSAY OF LACTIC ACID: CPT | Performed by: EMERGENCY MEDICINE

## 2018-03-29 PROCEDURE — 94640 AIRWAY INHALATION TREATMENT: CPT

## 2018-03-29 PROCEDURE — 25000132 ZZH RX MED GY IP 250 OP 250 PS 637: Mod: GY | Performed by: EMERGENCY MEDICINE

## 2018-03-29 PROCEDURE — 25000128 H RX IP 250 OP 636: Performed by: EMERGENCY MEDICINE

## 2018-03-29 PROCEDURE — 87493 C DIFF AMPLIFIED PROBE: CPT | Performed by: EMERGENCY MEDICINE

## 2018-03-29 PROCEDURE — A9270 NON-COVERED ITEM OR SERVICE: HCPCS | Mod: GY | Performed by: EMERGENCY MEDICINE

## 2018-03-29 PROCEDURE — 96360 HYDRATION IV INFUSION INIT: CPT | Performed by: EMERGENCY MEDICINE

## 2018-03-29 PROCEDURE — 93005 ELECTROCARDIOGRAM TRACING: CPT | Performed by: EMERGENCY MEDICINE

## 2018-03-29 PROCEDURE — 84484 ASSAY OF TROPONIN QUANT: CPT | Performed by: EMERGENCY MEDICINE

## 2018-03-29 PROCEDURE — 85025 COMPLETE CBC W/AUTO DIFF WBC: CPT | Performed by: EMERGENCY MEDICINE

## 2018-03-29 PROCEDURE — 71046 X-RAY EXAM CHEST 2 VIEWS: CPT

## 2018-03-29 PROCEDURE — 80053 COMPREHEN METABOLIC PANEL: CPT | Performed by: EMERGENCY MEDICINE

## 2018-03-29 PROCEDURE — 96361 HYDRATE IV INFUSION ADD-ON: CPT | Performed by: EMERGENCY MEDICINE

## 2018-03-29 PROCEDURE — 87506 IADNA-DNA/RNA PROBE TQ 6-11: CPT | Performed by: EMERGENCY MEDICINE

## 2018-03-29 PROCEDURE — 93010 ELECTROCARDIOGRAM REPORT: CPT | Mod: Z6 | Performed by: EMERGENCY MEDICINE

## 2018-03-29 RX ORDER — METRONIDAZOLE 500 MG/1
500 TABLET ORAL ONCE
Status: COMPLETED | OUTPATIENT
Start: 2018-03-29 | End: 2018-03-29

## 2018-03-29 RX ORDER — IPRATROPIUM BROMIDE AND ALBUTEROL SULFATE 2.5; .5 MG/3ML; MG/3ML
3 SOLUTION RESPIRATORY (INHALATION) ONCE
Status: COMPLETED | OUTPATIENT
Start: 2018-03-29 | End: 2018-03-29

## 2018-03-29 RX ORDER — METRONIDAZOLE 500 MG/1
500 TABLET ORAL 3 TIMES DAILY
Qty: 42 TABLET | Refills: 0 | Status: SHIPPED | OUTPATIENT
Start: 2018-03-29 | End: 2019-01-31

## 2018-03-29 RX ORDER — ONDANSETRON 2 MG/ML
4 INJECTION INTRAMUSCULAR; INTRAVENOUS EVERY 30 MIN PRN
Status: DISCONTINUED | OUTPATIENT
Start: 2018-03-29 | End: 2018-03-29 | Stop reason: HOSPADM

## 2018-03-29 RX ADMIN — IPRATROPIUM BROMIDE AND ALBUTEROL SULFATE 3 ML: .5; 3 SOLUTION RESPIRATORY (INHALATION) at 01:29

## 2018-03-29 RX ADMIN — METRONIDAZOLE 500 MG: 500 TABLET ORAL at 02:40

## 2018-03-29 RX ADMIN — SODIUM CHLORIDE, POTASSIUM CHLORIDE, SODIUM LACTATE AND CALCIUM CHLORIDE 1000 ML: 600; 310; 30; 20 INJECTION, SOLUTION INTRAVENOUS at 00:32

## 2018-03-29 ASSESSMENT — ENCOUNTER SYMPTOMS
WEAKNESS: 0
VOMITING: 0
WHEEZING: 1
PALPITATIONS: 0
SHORTNESS OF BREATH: 1
LIGHT-HEADEDNESS: 0
APPETITE CHANGE: 1
CHILLS: 0
DYSURIA: 0
DIARRHEA: 1
NAUSEA: 1
ABDOMINAL PAIN: 1
HEADACHES: 0
ABDOMINAL DISTENTION: 1
FATIGUE: 1
BACK PAIN: 0
COUGH: 1
FEVER: 0
CHEST TIGHTNESS: 1

## 2018-03-29 NOTE — ED PROVIDER NOTES
History     Chief Complaint   Patient presents with     Diarrhea     Shortness of Breath     HPI  Raul Wilhelm is a 72 year old male with history of previous MI status post stenting, hypertension, hyperlipidemia, and COPD who presents for evaluation of difficulty breathing as well as abdominal cramping, nausea, diarrhea.  Patient was recently started on treatment for bronchitis with penicillin and prednisone.  Approximately 4 days after initiation of treatment he developed the abdominal cramping and nausea as well as profuse watery, foul-smelling diarrhea.  Denies blood in the stool.  Patient reports continued foul-smelling frequent diarrhea every few hours and feels he is having difficulty maintaining adequate hydration.  Patient is a poor p.o. intake as well due to his nausea.  Patient denies any chest pain but does report ongoing difficulty breathing associated with his bronchitis/COPD.  Last nebulizer treatment was done around noon today.    Problem List:    Patient Active Problem List    Diagnosis Date Noted     ST elevation myocardial infarction involving left circumflex coronary artery (H) 11/14/2015     Priority: Medium     Impaired fasting blood sugar 03/16/2015     Priority: Medium     Hypokalemia 03/16/2015     Priority: Medium     Hoarseness of voice 02/05/2013     Priority: Medium     Eczema 02/05/2013     Priority: Medium     OA (osteoarthritis) of knee 03/07/2012     Priority: Medium     Pneumonitis, hypersensitivity, naa (H) 05/03/2011     Priority: Medium     Severe persistent asthma 05/03/2011     Priority: Medium     COPD (chronic obstructive pulmonary disease) (H) 05/03/2011     Priority: Medium     Cerebral infarction (H) 02/25/2011     Priority: Medium     Small infarct - seen on MRI  Diagnosis updated by automated process. Provider to review and confirm.       Hyperlipidemia LDL goal <100 02/25/2011     Priority: Medium     Hypertension 03/15/2010     Priority: Medium     Glaucoma  03/12/2010     Priority: Medium     Worsened pressures with prednisone use.   Recommended by eye doctor to stop inhaled steroids if possible, but has been on for so many years would potentially put him into more resp problems.   Follows with Dr. [facility].  Needs eye exam if prednisone course is longer than 10 days         Family hx of colon cancer 02/18/2009     Priority: Medium     (Problem list name updated by automated process. Provider to review and confirm.)          Past Medical History:    Past Medical History:   Diagnosis Date     Chronic airway obstruction, not elsewhere classified      Other and unspecified hyperlipidemia        Past Surgical History:    Past Surgical History:   Procedure Laterality Date     ENT SURGERY  as a child    tonsillectomy       Family History:    Family History   Problem Relation Age of Onset     DIABETES Mother      HEART DISEASE Mother      C.A.D. Mother      CANCER Father      Hypertension Father      CEREBROVASCULAR DISEASE Paternal Grandfather      Asthma Sister      Breast Cancer No family hx of      Cancer - colorectal No family hx of      Prostate Cancer No family hx of        Social History:  Marital Status:   [2]  Social History   Substance Use Topics     Smoking status: Never Smoker     Smokeless tobacco: Never Used     Alcohol use Yes      Comment: Rare        Medications:      metroNIDAZOLE (FLAGYL) 500 MG tablet   predniSONE (DELTASONE) 10 MG tablet   ipratropium - albuterol 0.5 mg/2.5 mg/3 mL (DUONEB) 0.5-2.5 (3) MG/3ML neb solution   omeprazole (PRILOSEC) 20 MG CR capsule   predniSONE (DELTASONE) 10 MG tablet   atorvastatin (LIPITOR) 20 MG tablet   albuterol (2.5 MG/3ML) 0.083% neb solution   losartan-hydrochlorothiazide (HYZAAR) 50-12.5 MG per tablet   carvedilol (COREG) 6.25 MG tablet   montelukast (SINGULAIR) 10 MG tablet   nitroglycerin (NITROSTAT) 0.4 MG sublingual tablet   clobetasol (TEMOVATE) 0.05 % ointment   EPINEPHrine  0.3 MG/0.3ML injection 2-pack   albuterol (PROAIR HFA, PROVENTIL HFA, VENTOLIN HFA) 108 (90 BASE) MCG/ACT inhaler   order for DME   aspirin EC 81 MG EC tablet   ORDER FOR DME   latanoprost (XALATAN) 0.005 % ophthalmic solution   fexofenadine (ALLEGRA) 180 MG tablet         Review of Systems   Constitutional: Positive for appetite change and fatigue. Negative for chills and fever.   HENT: Negative for congestion.    Respiratory: Positive for cough, chest tightness, shortness of breath and wheezing.    Cardiovascular: Negative for chest pain, palpitations and leg swelling.   Gastrointestinal: Positive for abdominal distention, abdominal pain, diarrhea and nausea. Negative for vomiting.   Genitourinary: Positive for decreased urine volume. Negative for dysuria.   Musculoskeletal: Negative for back pain.   Skin: Negative for rash.   Neurological: Negative for weakness, light-headedness and headaches.   All other systems reviewed and are negative.      Physical Exam   BP: (!) 160/120  Pulse: 111  Heart Rate: 90  Temp: 97.7  F (36.5  C)  Resp: 18  SpO2: 95 %      Physical Exam   Constitutional: He is oriented to person, place, and time. He appears well-developed and well-nourished. No distress.   HENT:   Head: Normocephalic and atraumatic.   Eyes: Conjunctivae are normal.   Cardiovascular: Regular rhythm.  Tachycardia present.    Pulmonary/Chest: Effort normal. He has wheezes (diffuse).   Abdominal: Soft. He exhibits distension. Bowel sounds are increased. There is no tenderness.   Musculoskeletal: Normal range of motion. He exhibits no edema.   Neurological: He is alert and oriented to person, place, and time.   Skin: Skin is warm and dry.   Psychiatric: He has a normal mood and affect.   Nursing note and vitals reviewed.      ED Course     ED Course     Procedures               EKG Interpretation:      Interpreted by Wicho Cruz  Time reviewed: 0025  Symptoms at time of EKG: dyspnea   Rhythm: Sinus  tachycardia  Rate: 104  Axis: Normal  Ectopy: none  Conduction: normal  ST Segments/ T Waves: No acute ischemic changes  Q Waves: II, III and aVf  Comparison to prior: New sinus tachycardia but otherwise similar to previous EKG 3/23/2018    Clinical Impression: Sinus tachycardia with old inferior infarct but otherwise unchanged.          Results for orders placed or performed during the hospital encounter of 03/28/18 (from the past 24 hour(s))   CBC with platelets differential   Result Value Ref Range    WBC 10.3 4.0 - 11.0 10e9/L    RBC Count 5.69 4.4 - 5.9 10e12/L    Hemoglobin 17.1 13.3 - 17.7 g/dL    Hematocrit 50.5 40.0 - 53.0 %    MCV 89 78 - 100 fl    MCH 30.1 26.5 - 33.0 pg    MCHC 33.9 31.5 - 36.5 g/dL    RDW 12.4 10.0 - 15.0 %    Platelet Count 262 150 - 450 10e9/L    Diff Method Automated Method     % Neutrophils 72.9 %    % Lymphocytes 13.0 %    % Monocytes 13.0 %    % Eosinophils 0.7 %    % Basophils 0.1 %    % Immature Granulocytes 0.3 %    Absolute Neutrophil 7.5 1.6 - 8.3 10e9/L    Absolute Lymphocytes 1.3 0.8 - 5.3 10e9/L    Absolute Monocytes 1.3 0.0 - 1.3 10e9/L    Absolute Eosinophils 0.1 0.0 - 0.7 10e9/L    Absolute Basophils 0.0 0.0 - 0.2 10e9/L    Abs Immature Granulocytes 0.0 0 - 0.4 10e9/L   Comprehensive metabolic panel   Result Value Ref Range    Sodium 138 133 - 144 mmol/L    Potassium 3.7 3.4 - 5.3 mmol/L    Chloride 104 94 - 109 mmol/L    Carbon Dioxide 24 20 - 32 mmol/L    Anion Gap 10 3 - 14 mmol/L    Glucose 147 (H) 70 - 99 mg/dL    Urea Nitrogen 32 (H) 7 - 30 mg/dL    Creatinine 1.32 (H) 0.66 - 1.25 mg/dL    GFR Estimate 53 (L) >60 mL/min/1.7m2    GFR Estimate If Black 64 >60 mL/min/1.7m2    Calcium 8.2 (L) 8.5 - 10.1 mg/dL    Bilirubin Total 1.4 (H) 0.2 - 1.3 mg/dL    Albumin 3.9 3.4 - 5.0 g/dL    Protein Total 7.6 6.8 - 8.8 g/dL    Alkaline Phosphatase 61 40 - 150 U/L    ALT 33 0 - 70 U/L    AST 15 0 - 45 U/L   Lactic acid whole blood   Result Value Ref Range    Lactic Acid 1.0  0.7 - 2.0 mmol/L   Troponin I   Result Value Ref Range    Troponin I ES <0.015 0.000 - 0.045 ug/L   Chest XR,  PA & LAT    Narrative    XR CHEST 2 VW   3/29/2018 1:20 AM     INDICATION: Cough and dyspnea.    COMPARISON: 3/23/2018.      Impression    IMPRESSION: No infiltrates or other acute findings. Heart size is  within normal limits. Faint, indeterminate 0.7 cm nodule in the right  lower lung laterally. Mild degenerative changes in the lower thoracic  spine.    GIA PÉREZ MD   Abdomen, flat/upright (2 views)    Narrative    XR ABDOMEN 2 VW   3/29/2018 1:21 AM     INDICATION: Abdominal pain with diarrhea and nausea.    COMPARISON: None.      Impression    IMPRESSION: Air-fluid levels in the colon and in a few small bowel  loops. Findings may be due to enteritis. No convincing bowel  obstruction. No free air.    GIA PÉREZ MD   Clostridium difficile toxin B PCR   Result Value Ref Range    Specimen Description Feces     C Diff Toxin B PCR Negative NEG^Negative   Enteric Bacteria and Virus Panel by ROBEL Stool   Result Value Ref Range    Campylobacter group by ROBEL Not Detected NDET^Not Detected    Salmonella species by ROBEL Not Detected NDET^Not Detected    Shigella species by ROBEL Not Detected NDET^Not Detected    Vibrio group by ROBEL Not Detected NDET^Not Detected    Rotavirus A by ROBEL Not Detected NDET^Not Detected    Shiga toxin 1 gene by ROBEL Not Detected NDET^Not Detected    Shiga toxin 2 gene by ROBEL Not Detected NDET^Not Detected    Norovirus I and II by ROBEL Detected, Abnormal Result (A) NDET^Not Detected    Yersinia enterocolitica by ROBEL Not Detected NDET^Not Detected    Enteric pathogen comment       Testing performed by multiplexed, qualitative PCR using the [email or web address] Enteric   Pathogens Nucleic Acid Test. Results should not be used as the sole basis for diagnosis,   treatment, or other patient management decisions.         Medications   lactated ringers BOLUS 1,000 mL (0 mLs Intravenous  Stopped 3/29/18 0205)   ipratropium - albuterol 0.5 mg/2.5 mg/3 mL (DUONEB) neb solution 3 mL (3 mLs Nebulization Given 3/29/18 0129)   metroNIDAZOLE (FLAGYL) tablet 500 mg (500 mg Oral Given 3/29/18 0240)     2:32 AM: Reviewed labs.  There is collected foul-smelling green stool suggestive of C. difficile colitis.  He has no leukocytosis or left shift.  Creatinine is elevated but less than 1.5 times his baseline.  Normal heart rate but mildly hypertensive.  Symptoms consistent with a mild to moderate episode of C. difficile colitis.  Oral metronidazole started    2:45 AM: PT advised of plan and re-assessed. Still has not urinated but has had 4 bowel movements. Giving second liter LR now.     4:22 AM: PT re-assessed. Has urinated twice. Feeling better. Diarrhea slowing.  Patient feels comfortable enough to discharge home      Assessments & Plan (with Medical Decision Making)  72-year-old male with a history of COPD, coronary artery disease, and hypertension presenting for evaluation of severe diarrhea.  Patient was recently started on penicillin for an upper respiratory infection.  Symptoms of GI upset began approximately 4 days later.  He has had GI upset over the past day.  Tachycardic and hypertensive on arrival.  IV access established patient was given IV fluids as well as neb for his wheezing.  Patient provided stool sample which included green foul-smelling stool.  Suspicious given his recent antibiotic use for possible C. difficile colitis.  Screening x-rays showed no pneumonia and nonspecific findings on abdominal exam.  Other differentials to include neurovirus as this has been prevalent in the area recently.  Recommended.  Treatment with Flagyl given the potential high risk associated with this disease.  Stool culture results pending.  Patient had mild elevated creatinine as well although less than 50% increase from baseline.  Patient was given IV fluids and felt significantly better with normalization of  his vital signs.  Advised follow-up with primary care within 1-2 days for repeat blood work to make sure creatinine improves.  Patient encouraged to maintain oral hydration with a goal for urination approximately every 4 hours while awake.     I have reviewed the nursing notes.    I have reviewed the findings, diagnosis, plan and need for follow up with the patient.       Discharge Medication List as of 3/29/2018  4:26 AM      START taking these medications    Details   metroNIDAZOLE (FLAGYL) 500 MG tablet Take 1 tablet (500 mg) by mouth 3 times daily for 14 days, Disp-42 tablet, R-0, E-Prescribe             Final diagnoses:   Diarrhea, unspecified type - Probable C. difficile colitis   Dehydration   Renal insufficiency       3/28/2018   [facility] EMERGENCY DEPARTMENT     Cruz, Wicho Valentin MD  03/29/18 5918

## 2018-03-29 NOTE — TELEPHONE ENCOUNTER
[facility] Emergency Department Lab result notification:    [facility] ED lab result protocol used  Enteric bacteria and virus panel    Reason for call  Notify of lab results, assess symptoms,  review ED providers recommendations/discharge instructions (if necessary) and advise per ED lab result f/u protocol    Lab Result  Final Enteric Bacteria and Virus Panel by ROBEL Stool is POSITIVE for Norovirus I and II by ROBEL  Patient to be notified, symptom's assessed and advised per [facility] ED lab result f/u protocol.  Information table from ED Provider visit on 3/28/18  Symptoms reported at ED visit (Chief complaint, HPI) Chief Complaint   Patient presents with     Diarrhea     Shortness of Breath    HPI  Raul Wilhelm is a 72 year old male with history of previous MI status post stenting, hypertension, hyperlipidemia, and COPD who presents for evaluation of difficulty breathing as well as abdominal cramping, nausea, diarrhea.  Patient was recently started on treatment for bronchitis with penicillin and prednisone.  Approximately 4 days after initiation of treatment he developed the abdominal cramping and nausea as well as profuse watery, foul-smelling diarrhea.  Denies blood in the stool.  Patient reports continued foul-smelling frequent diarrhea every few hours and feels he is having difficulty maintaining adequate hydration.  Patient is a poor p.o. intake as well due to his nausea.  Patient denies any chest pain but does report ongoing difficulty breathing associated with his bronchitis/COPD.  Last nebulizer treatment was done around noon today.     ED providers Impression and Plan (applicable information) 2:32 AM: Reviewed labs.  There is collected foul-smelling green stool suggestive of C. difficile colitis.  He has no leukocytosis or left shift.  Creatinine is elevated but less than 1.5 times his baseline.  Normal heart rate but mildly hypertensive.  Symptoms consistent with a mild to moderate episode of C.  "difficile colitis.  Oral metronidazole started     2:45 AM: PT advised of plan and re-assessed. Still has not urinated but has had 4 bowel movements. Giving second liter LR now.      4:22 AM: PT re-assessed. Has urinated twice. Feeling better. Diarrhea slowing.  Patient feels comfortable enough to discharge home      Assessments & Plan (with Medical Decision Making)  72-year-old male with a history of COPD, coronary artery disease, and hypertension presenting for evaluation of severe diarrhea.  Patient was recently started on penicillin      Miscellaneous information Cruz, Wicho Valentin MD       RN Assessment (Patient s current Symptoms), include time called.  [Insert Left message here if message left]  4:45 pm Pt states, \"I am doing better. Diarrhea  has slowed up some.\" Pt said the ED provider stopped his Penicillin and started him on metronidazole because of possible C Difficile. His C diff test came back negative.   RN Recommendations/Instructions per [facility] ED lab result protocol  He is seeing his doctor tomorrow morning for ED follow up.  Advised to ask her if he should be on either antibiotic. He was started on Penicillin for Bronchitis 3/23/18. Stopped Penicillin and started on flagyl for C diff which is negative. 3/28/18   He was positive for Norovirus. He said his breathing didn't really improve on the penicillin. I put a note in his appt notes for the provider to decide on medication needed at this time.   Please Contact your PCP clinic or return to the Emergency department if your:    Symptoms return.    Symptoms do not improve after 3 days on antibiotic.    Symptoms do not resolve after completing antibiotic.    Symptoms worsen or other concerning symptom's.    PCP follow-up Questions asked: YES       Yudith Garcia RN  [facility] RN  Lung Nodule and ED Lab Result F/u RN  Epic pool (ED late result f/u RN): [id number]  Ph# [phone]            "

## 2018-03-29 NOTE — ED NOTES
Pt has completed total of 2 Liters IV fluids. Pt reports he was able to void small amount now. Pt reports slight improvement to dizziness/lightheadedness.

## 2018-03-29 NOTE — ED NOTES
Presents with c/o diarrhea for the past 30 hours, cough and SOB for 2 weeks. Has been seen for the cough, is on penicillin and prednisone. Had nausea with emesis yesterday, none today. Is concerned he is unable to keep hydrated. Is having 10-15 loose stools per day, stool watery/clear.

## 2018-03-29 NOTE — TELEPHONE ENCOUNTER
Reason for Disposition    Severe difficulty breathing (e.g., struggling for each breath, speaks in single words)    Protocols used: ABDOMINAL PAIN - UPPER-ADULT-    Raul calls and says that he has upper abdominal pain and has had the diarrhea 10-15 times today. Pt. Says that he feels nauseated and cannot eat or drink. Pt. Says that he does not feel good. Pt. Refuses to call 911. Pt. Says that his wife will take him to an ER now.

## 2018-03-30 ENCOUNTER — OFFICE VISIT (OUTPATIENT)
Dept: FAMILY MEDICINE | Facility: CLINIC | Age: 73
End: 2018-03-30
Payer: COMMERCIAL

## 2018-03-30 VITALS
HEART RATE: 85 BPM | RESPIRATION RATE: 24 BRPM | TEMPERATURE: 97.3 F | BODY MASS INDEX: 25.27 KG/M2 | SYSTOLIC BLOOD PRESSURE: 125 MMHG | OXYGEN SATURATION: 94 % | HEIGHT: 67 IN | WEIGHT: 161 LBS | DIASTOLIC BLOOD PRESSURE: 77 MMHG

## 2018-03-30 DIAGNOSIS — A08.11 GASTROENTERITIS DUE TO NOROVIRUS: Primary | ICD-10-CM

## 2018-03-30 PROCEDURE — 99213 OFFICE O/P EST LOW 20 MIN: CPT | Performed by: INTERNAL MEDICINE

## 2018-03-30 NOTE — PROGRESS NOTES
SUBJECTIVE:   Raul Wilhelm is a 72 year old male who presents to clinic today for the following health issues:      ED/UC Followup:    Facility:  [facility]  Date of visit: 3/28/2018  Reason for visit: Diarrhea, dehydration  Current Status: Tested positive for Norovirus.  Unsure if he should be back on the penicillin for his bronchitis that was discontinued on 3/28/18 or stay on Flagyl that was given in the ER for presumptive C. Diff that was negative.  Not feeling much better.  Still having diarrhea.  Stopped all medication that was given in the last week- Prednisone, Penicillin and Flagyle     Raul reports that he is still having 10-15 watery bowel movements per day.  However he does think this has improved slightly.  He has not noticed any blood in the stool.  He is having chills and gas.  He is having mild abdominal pain, but this is tolerated.  He did have some mild lightheadedness this morning.  His urinary frequency is down, but he does not feel that his urine is dark.  He has been trying to keep up on fluids and is drinking a lot.  He was also recently seen for bronchitis.  He says that he still has some cough and a bit of wheezing, however the symptoms are tolerable.  He is managing this with nebulizers at home with improvement.    Problem list and histories reviewed & adjusted, as indicated.  Additional history: as documented    Patient Active Problem List   Diagnosis     Family hx of colon cancer     Glaucoma     Hypertension     Cerebral infarction (H)     Hyperlipidemia LDL goal <100     Pneumonitis, hypersensitivity, naa (H)     Severe persistent asthma     COPD (chronic obstructive pulmonary disease) (H)     OA (osteoarthritis) of knee     Hoarseness of voice     Eczema     Impaired fasting blood sugar     Hypokalemia     ST elevation myocardial infarction involving left circumflex coronary artery (H)     Past Surgical History:   Procedure Laterality Date     ENT SURGERY  as a child     tonsillectomy       Social History   Substance Use Topics     Smoking status: Never Smoker     Smokeless tobacco: Never Used     Alcohol use Yes      Comment: Rare     Family History   Problem Relation Age of Onset     DIABETES Mother      HEART DISEASE Mother      C.A.D. Mother      CANCER Father      Hypertension Father      CEREBROVASCULAR DISEASE Paternal Grandfather      Asthma Sister      Breast Cancer No family hx of      Cancer - colorectal No family hx of      Prostate Cancer No family hx of          Current Outpatient Prescriptions   Medication Sig Dispense Refill     ipratropium - albuterol 0.5 mg/2.5 mg/3 mL (DUONEB) 0.5-2.5 (3) MG/3ML neb solution Take 1 vial (3 mLs) by nebulization every 4 hours as needed for shortness of breath / dyspnea 1 Box 2     omeprazole (PRILOSEC) 20 MG CR capsule        albuterol (2.5 MG/3ML) 0.083% neb solution Take 1 vial (2.5 mg) by nebulization every 6 hours as needed for shortness of breath / dyspnea or wheezing 30 vial 1     losartan-hydrochlorothiazide (HYZAAR) 50-12.5 MG per tablet Take 1 tablet by mouth daily 90 tablet 3     carvedilol (COREG) 6.25 MG tablet TAKE ONE TABLET BY MOUTH TWICE A  tablet 2     montelukast (SINGULAIR) 10 MG tablet TAKE ONE TABLET BY MOUTH EVERY NIGHT AT BEDTIME 90 tablet 3     albuterol (PROAIR HFA, PROVENTIL HFA, VENTOLIN HFA) 108 (90 BASE) MCG/ACT inhaler Inhale 2 puffs into the lungs every 6 hours as needed for shortness of breath / dyspnea or wheezing 1 Inhaler 5     latanoprost (XALATAN) 0.005 % ophthalmic solution Place 1 drop into both eyes At Bedtime       metroNIDAZOLE (FLAGYL) 500 MG tablet Take 1 tablet (500 mg) by mouth 3 times daily for 14 days 42 tablet 0     predniSONE (DELTASONE) 10 MG tablet Take 4 tablets (40 mg) by mouth daily for 5 days 20 tablet 0     atorvastatin (LIPITOR) 20 MG tablet Take 1 tablet (20 mg) by mouth daily 90 tablet 3     nitroglycerin (NITROSTAT) 0.4 MG sublingual tablet Place 1 tablet (0.4 mg)  "under the tongue every 5 minutes as needed for chest pain If you are still having symptoms after 3 doses (15 minutes) call 911. (Patient not taking: Reported on 3/23/2018) 25 tablet 1     clobetasol (TEMOVATE) 0.05 % ointment Apply sparingly to affected area twice daily as needed.  Do not apply to face. 45 g 1     EPINEPHrine 0.3 MG/0.3ML injection 2-pack Inject 0.3 mLs (0.3 mg) into the muscle as needed for anaphylaxis 0.6 mL 0     order for DME Equipment being ordered: arm blood pressure cuff 1 Device 0     aspirin EC 81 MG EC tablet Take 1 tablet (81 mg) by mouth daily 30 tablet 1     ORDER FOR DME Equipment being ordered: Nebulizer Respironics, MiniElite--Neb Machine and tubing and mouth piece. 1 Device 0     fexofenadine (ALLEGRA) 180 MG tablet Take 1 tablet by mouth daily. (Patient not taking: Reported on 3/23/2018) 90 tablet 1     Allergies   Allergen Reactions     Simvastatin Swelling     Severe muscle pain, swelling, and bruising     Dust Mites        Reviewed and updated as needed this visit by clinical staff       Reviewed and updated as needed this visit by Provider         ROS:  Constitutional, HEENT, cardiovascular, pulmonary, gi and gu systems are negative, except as otherwise noted.    OBJECTIVE:     /77 (BP Location: Left arm, Patient Position: Chair, Cuff Size: Adult Regular)  Pulse 85  Temp 97.3  F (36.3  C) (Tympanic)  Resp 24  Ht 5' 7\" (1.702 m)  Wt 161 lb (73 kg)  SpO2 94%  BMI 25.22 kg/m2  Body mass index is 25.22 kg/(m^2).    GENERAL: healthy, alert and no distress  RESP: lungs clear to auscultation - no rales, rhonchi or wheezes  CV: regular rate and rhythm, normal S1 S2, no S3 or S4, no murmur, click or rub  ABDOMEN: soft, mild generalized tenderness, no rebound or guarding, no hepatosplenomegaly, no masses and bowel sounds normal      ASSESSMENT/PLAN:       1. Gastroenteritis due to norovirus    Raul continues to have diarrhea from normal virus infection, however he does " think that he is maybe starting to improve.  Sounds like he is keeping up on his hydration okay at this point.  His vital signs are normal and his exam is fairly benign.  He is still having some mild cough shortness of breath from bronchitis.  However he feels that the symptoms are tolerable off the prednisone.  Continue cares as detailed in patient instructions below.      Patient Instructions     You can resume the prednisone if your breathing gets worse.  You do not need the antibiotics anymore.  I think it would be fine if you use an anti-diarrhea medication.      Hold the losartan-HCTZ for a couple more days until your diarrhea improves to make sure your blood pressure is not getting too low and your are not getting too dehydrated since this blood pressure medication has a water pill component in it.      Understanding Norovirus  Norovirus is a virus that can infect the stomach and intestines. It is the most common cause of diarrhea and vomiting. The virus spreads easily in areas of close human contact, such as schools and cruise ships.  What causes norovirus infection?  You can be infected with norovirus by coming into contact with a person who has the virus or by touching a contaminated surface. Norovirus is very contagious. Washing your hands well can lower your risk of getting the virus.  You can also get it by consuming food and water contaminated with the virus. Foods most likely to become tainted include:    Shellfish    Ready-to-eat salads and sandwiches    Produce such as celery, melons, and leafy vegetables  What are the symptoms of norovirus?  Some people may have no symptoms. In people who do, symptoms show up suddenly,  typically  within a day of being exposed. The illness lasts 1 to 3 days. Symptoms include:    Fever    Diarrhea    Nausea    Vomiting    Headache    Achiness    Stomach cramping  More severe cases are often seen in infants, older adults, and people with other health problems.  Symptoms may last longer and be more severe in these groups.  How is  norovirus treated?  There is no medicine to cure norovirus. Treatment includes:    Rest. You may feel better faster if you get plenty of rest.    Fluids. Drinking lots of fluids will help you stay hydrated. Don t drink alcohol or beverages with caffeine. They can make your symptoms worse.    Medicine. Over-the-counter medicines for diarrhea may ease symptoms. These should be used only by adults. Pain relievers, such as acetaminophen or ibuprofen, can help with headaches and body aches.  What are the possible complications of norovirus?  Dehydrationis the main concern with norovirus infection. Severe dehydration may need to be treated in the hospital. You may need to get fluids through an IV (directly into a vein).  When should I call my  healthcare provider?  Call your healthcare provider right away if you have any of these:    Fever of 100.4 F (38 C) or higher, or as directed    Belly (abdominal) pain that gets worse    Severe dizziness, especially when getting up from bed    Vomiting so severe that you can t keep fluids down   Date Last Reviewed: 3/28/2016    2029-2011 [employer]. [address] [place] [address]. All rights reserved. This information is not intended as a substitute for professional medical care. Always follow your healthcare professional's instructions.            Swapnil Moyer MD  [facility]    Chart documentation with done using Dragon dictation software. Although reviewed after completion, some errors may remain.

## 2018-03-30 NOTE — PATIENT INSTRUCTIONS
You can resume the prednisone if your breathing gets worse.  You do not need the antibiotics anymore.  I think it would be fine if you use an anti-diarrhea medication.      Hold the losartan-HCTZ for a couple more days until your diarrhea improves to make sure your blood pressure is not getting too low and your are not getting too dehydrated since this blood pressure medication has a water pill component in it.      Understanding Norovirus  Norovirus is a virus that can infect the stomach and intestines. It is the most common cause of diarrhea and vomiting. The virus spreads easily in areas of close human contact, such as schools and cruise ships.  What causes norovirus infection?  You can be infected with norovirus by coming into contact with a person who has the virus or by touching a contaminated surface. Norovirus is very contagious. Washing your hands well can lower your risk of getting the virus.  You can also get it by consuming food and water contaminated with the virus. Foods most likely to become tainted include:    Shellfish    Ready-to-eat salads and sandwiches    Produce such as celery, melons, and leafy vegetables  What are the symptoms of norovirus?  Some people may have no symptoms. In people who do, symptoms show up suddenly,  typically  within a day of being exposed. The illness lasts 1 to 3 days. Symptoms include:    Fever    Diarrhea    Nausea    Vomiting    Headache    Achiness    Stomach cramping  More severe cases are often seen in infants, older adults, and people with other health problems. Symptoms may last longer and be more severe in these groups.  How is  norovirus treated?  There is no medicine to cure norovirus. Treatment includes:    Rest. You may feel better faster if you get plenty of rest.    Fluids. Drinking lots of fluids will help you stay hydrated. Don t drink alcohol or beverages with caffeine. They can make your symptoms worse.    Medicine. Over-the-counter medicines for  diarrhea may ease symptoms. These should be used only by adults. Pain relievers, such as acetaminophen or ibuprofen, can help with headaches and body aches.  What are the possible complications of norovirus?  Dehydrationis the main concern with norovirus infection. Severe dehydration may need to be treated in the hospital. You may need to get fluids through an IV (directly into a vein).  When should I call my  healthcare provider?  Call your healthcare provider right away if you have any of these:    Fever of 100.4 F (38 C) or higher, or as directed    Belly (abdominal) pain that gets worse    Severe dizziness, especially when getting up from bed    Vomiting so severe that you can t keep fluids down   Date Last Reviewed: 3/28/2016    0935-6724 [employer] [address] [place] [address], [place] [address]. All rights reserved. This information is not intended as a substitute for professional medical care. Always follow your healthcare professional's instructions.

## 2018-03-30 NOTE — MR AVS SNAPSHOT
After Visit Summary   3/30/2018    Raul Wilhelm    MRN: [id number]           Patient Information     Date Of Birth          1945        Visit Information        Provider Department      3/30/2018 8:40 AM Swapnil Moyer MD [facility]        Care Instructions    You can resume the prednisone if your breathing gets worse.  You do not need the antibiotics anymore.  I think it would be fine if you use an anti-diarrhea medication.      Hold the losartan-HCTZ for a couple more days until your diarrhea improves to make sure your blood pressure is not getting too low and your are not getting too dehydrated since this blood pressure medication has a water pill component in it.      Understanding Norovirus  Norovirus is a virus that can infect the stomach and intestines. It is the most common cause of diarrhea and vomiting. The virus spreads easily in areas of close human contact, such as schools and cruise ships.  What causes norovirus infection?  You can be infected with norovirus by coming into contact with a person who has the virus or by touching a contaminated surface. Norovirus is very contagious. Washing your hands well can lower your risk of getting the virus.  You can also get it by consuming food and water contaminated with the virus. Foods most likely to become tainted include:    Shellfish    Ready-to-eat salads and sandwiches    Produce such as celery, melons, and leafy vegetables  What are the symptoms of norovirus?  Some people may have no symptoms. In people who do, symptoms show up suddenly,  typically  within a day of being exposed. The illness lasts 1 to 3 days. Symptoms include:    Fever    Diarrhea    Nausea    Vomiting    Headache    Achiness    Stomach cramping  More severe cases are often seen in infants, older adults, and people with other health problems. Symptoms may last longer and be more severe in these groups.  How is  norovirus treated?  There is no medicine  to cure norovirus. Treatment includes:    Rest. You may feel better faster if you get plenty of rest.    Fluids. Drinking lots of fluids will help you stay hydrated. Don t drink alcohol or beverages with caffeine. They can make your symptoms worse.    Medicine. Over-the-counter medicines for diarrhea may ease symptoms. These should be used only by adults. Pain relievers, such as acetaminophen or ibuprofen, can help with headaches and body aches.  What are the possible complications of norovirus?  Dehydrationis the main concern with norovirus infection. Severe dehydration may need to be treated in the hospital. You may need to get fluids through an IV (directly into a vein).  When should I call my  healthcare provider?  Call your healthcare provider right away if you have any of these:    Fever of 100.4 F (38 C) or higher, or as directed    Belly (abdominal) pain that gets worse    Severe dizziness, especially when getting up from bed    Vomiting so severe that you can t keep fluids down   Date Last Reviewed: 3/28/2016    0440-6964 [employer]. [address] [place] [address]. All rights reserved. This information is not intended as a substitute for professional medical care. Always follow your healthcare professional's instructions.                Follow-ups after your visit        Who to contact     If you have questions or need follow up information about today's clinic visit or your schedule please contact [facility] directly at [phone].  Normal or non-critical lab and imaging results will be communicated to you by MyChart, letter or phone within 4 business days after the clinic has received the results. If you do not hear from us within 7 days, please contact the clinic through MyChart or phone. If you have a critical or abnormal lab result, we will notify you by phone as soon as possible.  Submit refill requests through [employer] or call your pharmacy and they will  "forward the refill request to us. Please allow 3 business days for your refill to be completed.          Additional Information About Your Visit        [employer] Information     [employer] lets you send messages to your doctor, view your test results, renew your prescriptions, schedule appointments and more. To sign up, go to [email or web address] . Click on \"Log in\" on the left side of the screen, which will take you to the Welcome page. Then click on \"Sign up Now\" on the right side of the page.     You will be asked to enter the access code listed below, as well as some personal information. Please follow the directions to create your username and password.     Your access code is: [email or web address]  Expires: 2018  8:46 AM     Your access code will  in 90 days. If you need help or a new code, please call your [place] clinic or [phone].        Care EveryWhere ID     This is your Care EveryWhere ID. This could be used by other organizations to access your [place] medical records  [id number]        Your Vitals Were     Pulse Temperature Respirations Height Pulse Oximetry BMI (Body Mass Index)    85 97.3  F (36.3  C) (Tympanic) 24 5' 7\" (1.702 m) 94% 25.22 kg/m2       Blood Pressure from Last 3 Encounters:   18 125/77   18 140/88   18 122/76    Weight from Last 3 Encounters:   18 161 lb (73 kg)   18 166 lb (75.3 kg)   18 170 lb 9.6 oz (77.4 kg)              Today, you had the following     No orders found for display         Today's Medication Changes          These changes are accurate as of 3/30/18  9:15 AM.  If you have any questions, ask your nurse or doctor.               These medicines have changed or have updated prescriptions.        Dose/Directions    predniSONE 10 MG tablet   Commonly known as:  DELTASONE   This may have changed:  Another medication with the same name was removed. Continue taking this medication, and follow the directions you see here.   Used " for:  Bronchitis   Changed by:  Swapnil Moyer MD        Dose:  40 mg   Take 4 tablets (40 mg) by mouth daily for 5 days   Quantity:  20 tablet   Refills:  0                Primary Care Provider Office Phone # Fax #    Connor Anderson [phone] [address] [facility] [address]        Equal Access to Services     [facility] AH: Hadii aad ku hadasho Soomaali, waaxda luqadaha, qaybta kaalmada adeegyada, waxay idiin hayaan adeeg khjeramiealejandrina lamagan . So [facility] [phone].    ATENCIÓN: Si habla español, tiene a mccarthy disposición servicios gratuitos de asistencia lingüística. Llame al [phone].    We comply with applicable federal civil rights laws and [place] laws. We do not discriminate on the basis of race, color, national origin, age, disability, sex, sexual orientation, or gender identity.            Thank you!     Thank you for choosing [facility]  for your care. Our goal is always to provide you with excellent care. Hearing back from our patients is one way we can continue to improve our services. Please take a few minutes to complete the written survey that you may receive in the mail after your visit with us. Thank you!             Your Updated Medication List - Protect others around you: Learn how to safely use, store and throw away your medicines at www.disposemymeds.org.          This list is accurate as of 3/30/18  9:15 AM.  Always use your most recent med list.                   Brand Name Dispense Instructions for use Diagnosis    * albuterol 108 (90 BASE) MCG/ACT Inhaler    PROAIR HFA/PROVENTIL HFA/VENTOLIN HFA    1 Inhaler    Inhale 2 puffs into the lungs every 6 hours as needed for shortness of breath / dyspnea or wheezing    Uncomplicated severe persistent asthma, Chronic obstructive pulmonary disease, unspecified COPD type (H)       * albuterol (2.5 MG/3ML) 0.083% neb solution     30 vial    Take 1 vial (2.5 mg) by nebulization every 6 hours as needed for  shortness of breath / dyspnea or wheezing    Acute bronchitis, unspecified organism       aspirin 81 MG EC tablet     30 tablet    Take 1 tablet (81 mg) by mouth daily    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  Do not apply to face.    Psoriasis       EPINEPHrine 0.3 MG/0.3ML injection 2-pack    EPIPEN/ADRENACLICK/or ANY BX GENERIC EQUIV    0.6 mL    Inject 0.3 mLs (0.3 mg) into the muscle as needed for anaphylaxis    Bee sting allergy       fexofenadine 180 MG tablet    ALLEGRA    90 tablet    Take 1 tablet by mouth daily.    Seasonal allergic rhinitis       ipratropium - albuterol 0.5 mg/2.5 mg/3 mL 0.5-2.5 (3) MG/3ML neb solution    DUONEB    1 Box    Take 1 vial (3 mLs) by nebulization every 4 hours as needed for shortness of breath / dyspnea    Uncomplicated severe persistent asthma       latanoprost 0.005 % ophthalmic solution    XALATAN     Place 1 drop into both eyes At Bedtime        losartan-hydrochlorothiazide 50-12.5 MG per tablet    HYZAAR    90 tablet    Take 1 tablet by mouth daily    Essential hypertension, benign       metroNIDAZOLE 500 MG tablet    FLAGYL    42 tablet    Take 1 tablet (500 mg) by mouth 3 times daily for 14 days        montelukast 10 MG tablet    SINGULAIR    90 tablet    TAKE ONE TABLET BY MOUTH EVERY NIGHT AT BEDTIME    Severe persistent asthma, Seasonal allergic rhinitis       nitroGLYcerin 0.4 MG sublingual tablet    NITROSTAT    25 tablet    Place 1 tablet (0.4 mg) under the tongue every 5 minutes as needed for chest pain If you are still having symptoms after 3  doses (15 minutes) call 911.    ST elevation myocardial infarction involving left circumflex coronary artery (H)       omeprazole 20 MG CR capsule    priLOSEC          order for DME     1 Device    Equipment being ordered: Nebulizer Respironics, MiniElite--Neb Machine and tubing and mouth piece.    Extrinsic asthma, unspecified       order for DME     1 Device    Equipment being ordered: arm blood pressure cuff    Essential hypertension, benign       predniSONE 10 MG tablet    DELTASONE    20 tablet    Take 4 tablets (40 mg) by mouth daily for 5 days    Bronchitis       * Notice:  This list has 2 medication(s) that are the same as other medications prescribed for you. Read the directions carefully, and ask your doctor or other care provider to review them with you.

## 2018-03-30 NOTE — NURSING NOTE
"Chief Complaint   Patient presents with     ER F/U       Initial /77 (BP Location: Left arm, Patient Position: Chair, Cuff Size: Adult Regular)  Pulse 85  Temp 97.3  F (36.3  C) (Tympanic)  Resp 24  Ht 5' 7\" (1.702 m)  Wt 161 lb (73 kg)  SpO2 94%  BMI 25.22 kg/m2 Estimated body mass index is 25.22 kg/(m^2) as calculated from the following:    Height as of this encounter: 5' 7\" (1.702 m).    Weight as of this encounter: 161 lb (73 kg).      Health Maintenance that is potentially due pending provider review:  NONE        Is there anyone who you would like to be able to receive your results? No  If yes have patient fill out [id number]      "

## 2018-04-16 ENCOUNTER — ALLIED HEALTH/NURSE VISIT (OUTPATIENT)
Dept: FAMILY MEDICINE | Facility: CLINIC | Age: 73
End: 2018-04-16
Payer: COMMERCIAL

## 2018-04-16 ENCOUNTER — OFFICE VISIT (OUTPATIENT)
Dept: FAMILY MEDICINE | Facility: CLINIC | Age: 73
End: 2018-04-16
Payer: COMMERCIAL

## 2018-04-16 VITALS
RESPIRATION RATE: 24 BRPM | TEMPERATURE: 97.7 F | DIASTOLIC BLOOD PRESSURE: 83 MMHG | BODY MASS INDEX: 25.71 KG/M2 | WEIGHT: 163.8 LBS | HEIGHT: 67 IN | HEART RATE: 84 BPM | OXYGEN SATURATION: 97 % | SYSTOLIC BLOOD PRESSURE: 128 MMHG

## 2018-04-16 DIAGNOSIS — J44.9 CHRONIC OBSTRUCTIVE PULMONARY DISEASE, UNSPECIFIED COPD TYPE (H): Primary | ICD-10-CM

## 2018-04-16 DIAGNOSIS — J44.1 COPD EXACERBATION (H): Primary | ICD-10-CM

## 2018-04-16 PROCEDURE — 99214 OFFICE O/P EST MOD 30 MIN: CPT | Performed by: NURSE PRACTITIONER

## 2018-04-16 PROCEDURE — 99207 ZZC NO CHARGE NURSE ONLY: CPT

## 2018-04-16 RX ORDER — PREDNISONE 20 MG/1
40 TABLET ORAL DAILY
Qty: 10 TABLET | Refills: 0 | Status: SHIPPED | OUTPATIENT
Start: 2018-04-16 | End: 2019-01-31

## 2018-04-16 RX ORDER — AZITHROMYCIN 250 MG/1
TABLET, FILM COATED ORAL
Qty: 6 TABLET | Refills: 0 | Status: SHIPPED | OUTPATIENT
Start: 2018-04-16 | End: 2018-05-03

## 2018-04-16 NOTE — MR AVS SNAPSHOT
"              After Visit Summary   4/16/2018    Raul Wilhelm    MRN: [id number]           Patient Information     Date Of Birth          1945        Visit Information        Provider Department      4/16/2018 8:40 AM Ami Mahan APRN CNP [facility]        Today's Diagnoses     COPD exacerbation (H)    -  1       Follow-ups after your visit        Your next 10 appointments already scheduled     Apr 24, 2018  1:20 PM CDT   PROCEDURE with Karel Bustillos,    [facility] ([facility])    [address]  [address]  [place] [facility] [place] [address]   [phone]              Who to contact     If you have questions or need follow up information about today's clinic visit or your schedule please contact [facility] directly at [phone].  Normal or non-critical lab and imaging results will be communicated to you by MyChart, letter or phone within 4 business days after the clinic has received the results. If you do not hear from us within 7 days, please contact the clinic through MyChart or phone. If you have a critical or abnormal lab result, we will notify you by phone as soon as possible.  Submit refill requests through [employer] or call your pharmacy and they will forward the refill request to us. Please allow 3 business days for your refill to be completed.          Additional Information About Your Visit        MyChart Information     [employer] lets you send messages to your doctor, view your test results, renew your prescriptions, schedule appointments and more. To sign up, go to [email or web address] . Click on \"Log in\" on the left side of the screen, which will take you to the Welcome page. Then click on \"Sign up Now\" on the right side of the page.     You will be asked to enter the access code listed below, as well as some personal information. Please follow the directions to create your username and " "password.     Your access code is: [email or web address]  Expires: 2018  8:46 AM     Your access code will  in 90 days. If you need help or a new code, please call your [place] clinic or [phone].        Care EveryWhere ID     This is your Care EveryWhere ID. This could be used by other organizations to access your [place] medical records  [id number]        Your Vitals Were     Pulse Temperature Respirations Height Pulse Oximetry BMI (Body Mass Index)    84 97.7  F (36.5  C) (Tympanic) 24 5' 7\" (1.702 m) 97% 25.65 kg/m2       Blood Pressure from Last 3 Encounters:   18 128/83   18 125/77   18 140/88    Weight from Last 3 Encounters:   18 163 lb 12.8 oz (74.3 kg)   18 161 lb (73 kg)   18 166 lb (75.3 kg)              Today, you had the following     No orders found for display         Today's Medication Changes          These changes are accurate as of 18  9:00 AM.  If you have any questions, ask your nurse or doctor.               Start taking these medicines.        Dose/Directions    azithromycin 250 MG tablet   Commonly known as:  ZITHROMAX   Used for:  COPD exacerbation (H)   Started by:  Ami Mahan APRN CNP        Two tablets first day, then one tablet daily for four days.   Quantity:  6 tablet   Refills:  0       predniSONE 20 MG tablet   Commonly known as:  DELTASONE   Used for:  COPD exacerbation (H)   Started by:  Ami Mahan APRN CNP        Dose:  40 mg   Take 2 tablets (40 mg) by mouth daily for 5 days   Quantity:  10 tablet   Refills:  0            Where to get your medicines      These medications were sent to [facility] [address]  [address] [address]     Phone:  [phone]     azithromycin 250 MG tablet    predniSONE 20 MG tablet                Primary Care Provider Office Phone # Fax #    oCnnor Anderson [phone] [address]  [facility] [address]        Equal Access to Services     DIDIER VINES : Hadii aad ku hadshailamorgan Mariahali, waashlynda luqadaha, qaybta kaedgardcarol chen, daniel acuna. So [facility] [phone].    ATENCIÓN: Si habla español, tiene a mccarthy disposición servicios gratuitos de asistencia lingüística. Desirae al [phone].    We comply with applicable federal civil rights laws and [place] laws. We do not discriminate on the basis of race, color, national origin, age, disability, sex, sexual orientation, or gender identity.            Thank you!     Thank you for choosing [facility]  for your care. Our goal is always to provide you with excellent care. Hearing back from our patients is one way we can continue to improve our services. Please take a few minutes to complete the written survey that you may receive in the mail after your visit with us. Thank you!             Your Updated Medication List - Protect others around you: Learn how to safely use, store and throw away your medicines at www.disposemymeds.org.          This list is accurate as of 4/16/18  9:00 AM.  Always use your most recent med list.                   Brand Name Dispense Instructions for use Diagnosis    * albuterol 108 (90 Base) MCG/ACT Inhaler    PROAIR HFA/PROVENTIL HFA/VENTOLIN HFA    1 Inhaler    Inhale 2 puffs into the lungs every 6 hours as needed for shortness of breath / dyspnea or wheezing    Uncomplicated severe persistent asthma, Chronic obstructive pulmonary disease, unspecified COPD type (H)       * albuterol (2.5 MG/3ML) 0.083% neb solution     30 vial    Take 1 vial (2.5 mg) by nebulization every 6 hours as needed for shortness of breath / dyspnea or wheezing    Acute bronchitis, unspecified organism       aspirin 81 MG EC tablet     30 tablet    Take 1 tablet (81 mg) by mouth daily    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    COPD exacerbation (H)       carvedilol 6.25 MG tablet    COREG    180 tablet    TAKE ONE TABLET BY MOUTH TWICE A DAY    HTN (hypertension)       clobetasol 0.05 % ointment    TEMOVATE    45 g    Apply sparingly to affected area twice daily as needed.  Do not apply to face.    Psoriasis       EPINEPHrine 0.3 MG/0.3ML injection 2-pack    EPIPEN/ADRENACLICK/or ANY BX GENERIC EQUIV    0.6 mL    Inject 0.3 mLs (0.3 mg) into the muscle as needed for anaphylaxis    Bee sting allergy       fexofenadine 180 MG tablet    ALLEGRA    90 tablet    Take 1 tablet by mouth daily.    Seasonal allergic rhinitis       ipratropium - albuterol 0.5 mg/2.5 mg/3 mL 0.5-2.5 (3) MG/3ML neb solution    DUONEB    1 Box    Take 1 vial (3 mLs) by nebulization every 4 hours as needed for shortness of breath / dyspnea    Uncomplicated severe persistent asthma       latanoprost 0.005 % ophthalmic solution    XALATAN     Place 1 drop into both eyes At Bedtime        losartan-hydrochlorothiazide 50-12.5 MG per tablet    HYZAAR    90 tablet    Take 1 tablet by mouth daily    Essential hypertension, benign       montelukast 10 MG tablet    SINGULAIR    90 tablet    TAKE ONE TABLET BY MOUTH EVERY NIGHT AT BEDTIME    Severe persistent asthma, Seasonal allergic rhinitis       nitroGLYcerin 0.4 MG sublingual tablet    NITROSTAT    25 tablet    Place 1 tablet (0.4 mg) under the tongue every 5 minutes as needed for chest pain If you are still having symptoms after 3 doses (15 minutes) call 911.    ST elevation myocardial infarction involving left circumflex coronary artery (H)       omeprazole 20 MG CR capsule    priLOSEC          order for DME     1 Device    Equipment being ordered: Nebulizer Respironics, MiniElite--Neb  Machine and tubing and mouth piece.    Extrinsic asthma, unspecified       order for DME     1 Device    Equipment being ordered: arm blood pressure cuff    Essential hypertension, benign       predniSONE 20 MG tablet    DELTASONE    10 tablet    Take 2 tablets (40 mg) by mouth daily for 5 days    COPD exacerbation (H)       * Notice:  This list has 2 medication(s) that are the same as other medications prescribed for you. Read the directions carefully, and ask your doctor or other care provider to review them with you.

## 2018-04-16 NOTE — MR AVS SNAPSHOT
After Visit Summary   4/16/2018    Raul Wilhelm    MRN: [id number]           Patient Information     Date Of Birth          1945        Visit Information        Provider Department      4/16/2018 9:00 AM RAUL ALICEA FP/IM RN [facility]        Today's Diagnoses     Chronic obstructive pulmonary disease, unspecified COPD type (H)    -  1       Follow-ups after your visit        Your next 10 appointments already scheduled     Apr 16, 2018  8:40 AM CDT   SHORT with Ami Mahan APRN CNP   [facility]    [address] [address]   [phone]            Apr 16, 2018  9:00 AM CDT   Nurse Only with RAUL ALICEA FP/IM RN   [facility] ([facility])    [address] [facility] [address]   [phone]            Apr 24, 2018  1:20 PM CDT   PROCEDURE with Karel Bustillos DO   [facility] ([facility])    [address]  [address]  [place] [facility] [address]   [phone]              Who to contact     If you have questions or need follow up information about today's clinic visit or your schedule please contact [facility] directly at [phone].  Normal or non-critical lab and imaging results will be communicated to you by [employer] USAhart, letter or phone within 4 business days after the clinic has received the results. If you do not hear from us within 7 days, please contact the clinic through MyChart or phone. If you have a critical or abnormal lab result, we will notify you by phone as soon as possible.  Submit refill requests through [employer] or call your pharmacy and they will forward the refill request to us. Please allow 3 business days for your refill to be completed.          Additional Information About Your Visit        Serious USAharDacentec Information     [employer] lets you send messages to your doctor, view your test results,  "renew your prescriptions, schedule appointments and more. To sign up, go to [email or web address] . Click on \"Log in\" on the left side of the screen, which will take you to the Welcome page. Then click on \"Sign up Now\" on the right side of the page.     You will be asked to enter the access code listed below, as well as some personal information. Please follow the directions to create your username and password.     Your access code is: [email or web address]  Expires: 2018  8:46 AM     Your access code will  in 90 days. If you need help or a new code, please call your [place] clinic or [phone].        Care EveryWhere ID     This is your Care EveryWhere ID. This could be used by other organizations to access your [place] medical records  [id number]         Blood Pressure from Last 3 Encounters:   18 125/77   18 140/88   18 122/76    Weight from Last 3 Encounters:   18 161 lb (73 kg)   18 166 lb (75.3 kg)   18 170 lb 9.6 oz (77.4 kg)              Today, you had the following     No orders found for display       Primary Care Provider Office Phone # Fax #    Connor Anderson [phone] [address] [address]        Equal Access to Services     DIDIER VINES AH: Hadii aad ku hadasho Soomaali, waaxda luqadaha, qaybta kaalmada adeegyada, daniel flores . So [facility] [phone].    ATENCIÓN: Si habla español, tiene a mccarthy disposición servicios gratuitos de asistencia lingüística. Desirae whitt [phone].    We comply with applicable federal civil rights laws and [place] laws. We do not discriminate on the basis of race, color, national origin, age, disability, sex, sexual orientation, or gender identity.            Thank you!     Thank you for choosing [facility]  for your care. Our goal is always to provide you with excellent care. Hearing back from our patients is one way we can continue to improve " our services. Please take a few minutes to complete the written survey that you may receive in the mail after your visit with us. Thank you!             Your Updated Medication List - Protect others around you: Learn how to safely use, store and throw away your medicines at www.disposemymeds.org.          This list is accurate as of 4/16/18  8:29 AM.  Always use your most recent med list.                   Brand Name Dispense Instructions for use Diagnosis    * albuterol 108 (90 Base) MCG/ACT Inhaler    PROAIR HFA/PROVENTIL HFA/VENTOLIN HFA    1 Inhaler    Inhale 2 puffs into the lungs every 6 hours as needed for shortness of breath / dyspnea or wheezing    Uncomplicated severe persistent asthma, Chronic obstructive pulmonary disease, unspecified COPD type (H)       * albuterol (2.5 MG/3ML) 0.083% neb solution     30 vial    Take 1 vial (2.5 mg) by nebulization every 6 hours as needed for shortness of breath / dyspnea or wheezing    Acute bronchitis, unspecified organism       aspirin 81 MG EC tablet     30 tablet    Take 1 tablet (81 mg) by mouth daily    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  Do not apply to face.    Psoriasis       EPINEPHrine 0.3 MG/0.3ML injection 2-pack    EPIPEN/ADRENACLICK/or ANY BX GENERIC EQUIV    0.6 mL    Inject 0.3 mLs (0.3 mg) into the muscle as needed for anaphylaxis    Bee sting allergy       fexofenadine 180 MG tablet    ALLEGRA    90 tablet    Take 1 tablet by mouth daily.    Seasonal allergic rhinitis       ipratropium - albuterol 0.5 mg/2.5 mg/3 mL 0.5-2.5 (3)  MG/3ML neb solution    DUONEB    1 Box    Take 1 vial (3 mLs) by nebulization every 4 hours as needed for shortness of breath / dyspnea    Uncomplicated severe persistent asthma       latanoprost 0.005 % ophthalmic solution    XALATAN     Place 1 drop into both eyes At Bedtime        losartan-hydrochlorothiazide 50-12.5 MG per tablet    HYZAAR    90 tablet    Take 1 tablet by mouth daily    Essential hypertension, benign       montelukast 10 MG tablet    SINGULAIR    90 tablet    TAKE ONE TABLET BY MOUTH EVERY NIGHT AT BEDTIME    Severe persistent asthma, Seasonal allergic rhinitis       nitroGLYcerin 0.4 MG sublingual tablet    NITROSTAT    25 tablet    Place 1 tablet (0.4 mg) under the tongue every 5 minutes as needed for chest pain If you are still having symptoms after 3 doses (15 minutes) call 911.    ST elevation myocardial infarction involving left circumflex coronary artery (H)       omeprazole 20 MG CR capsule    priLOSEC          order for DME     1 Device    Equipment being ordered: Nebulizer Respironics, MiniElite--Neb Machine and tubing and mouth piece.    Extrinsic asthma, unspecified       order for DME     1 Device    Equipment being ordered: arm blood pressure cuff    Essential hypertension, benign       * Notice:  This list has 2 medication(s) that are the same as other medications prescribed for you. Read the directions carefully, and ask your doctor or other care provider to review them with you.

## 2018-04-16 NOTE — PROGRESS NOTES
"  SUBJECTIVE:   Raul Wilhelm is a 72 year old male who presents to clinic today for the following health issues:  Chief Complaint   Patient presents with     Cough     x 1 month, seen 3/23/18 an 3/27/18, a lot of phlegm      Health Maintenance     patient deferred colonoscopy     ENT Symptoms             Symptoms: cc Present Absent Comment   Fever/Chills   x    Fatigue  x     Muscle Aches   x    Eye Irritation   x    Sneezing  x     Nasal Felipe/Drg   x    Sinus Pressure/Pain   x    Loss of smell   x    Dental pain   x    Sore Throat  x  A little bit   Swollen Glands   x    Ear Pain/Fullness   x    Cough x x  A lot phlegm, cough up and spitting out in the a.m., at night a bit bad too.     Wheeze  x     Chest Pain  x  tightness   Shortness of breath  x     Rash   x    Other         Symptom duration:  x 1 month - felt like he was better, but then worsened again over the weekend   Symptom severity:  moderate   Treatments tried:  prednisone, nebulizer 2 - 3 times daily.    Contacts:  no known, Typically gets symptoms Spring and Fall     Nebs twice per day.  Albuterol inhaler if needed.  Started prednisone 20 mg daily 2 days ago          Problem list and histories reviewed & adjusted, as indicated.  Additional history: as documented    Reviewed and updated as needed this visit by clinical staff  Tobacco  Allergies  Med Hx  Surg Hx  Fam Hx  Soc Hx      Reviewed and updated as needed this visit by Provider         ROS:  Constitutional, HEENT, cardiovascular, pulmonary, gi and gu systems are negative, except as otherwise noted.    OBJECTIVE:     /83 (BP Location: Left arm, Patient Position: Chair, Cuff Size: Adult Regular)  Pulse 84  Temp 97.7  F (36.5  C) (Tympanic)  Resp 24  Ht 5' 7\" (1.702 m)  Wt 163 lb 12.8 oz (74.3 kg)  SpO2 97%  BMI 25.65 kg/m2  Body mass index is 25.65 kg/(m^2).  GENERAL: healthy, alert and no distress  NECK: no adenopathy, no asymmetry, masses, or scars and thyroid normal to " palpation  RESP: lungs clear to auscultation - no rales, rhonchi or wheezes  CV: regular rate and rhythm, normal S1 S2, no S3 or S4, no murmur, click or rub, no peripheral edema and peripheral pulses strong  MS: no gross musculoskeletal defects noted, no edema        ASSESSMENT/PLAN:       ICD-10-CM    1. COPD exacerbation (H) J44.1 predniSONE (DELTASONE) 20 MG tablet     azithromycin (ZITHROMAX) 250 MG tablet     Complete antibiotics as prescribed.  Complete prednisone as prescribed.  Continue to use nebulizer - increase to 3-4 times daily.  May continue albuterol inhaler if needed.  Call in 3-5 days if no improvement.      The risks, benefits and treatment options of prescribed medications or other treatments have been discussed with the patient. The patient verbalized their understanding and should call or follow up if no improvement or if they develop further problems.      OSCAR Perla [facility]

## 2018-04-16 NOTE — PROGRESS NOTES
Pt in for nurse visit asking if he should be on PCN or Flagyl - reviewed chart notes, he should be on neither right now. Still having cough - appt made for him to be seen today at 0840 with provider.    Magali KIRK RN

## 2018-04-16 NOTE — NURSING NOTE
"Chief Complaint   Patient presents with     Cough     x 1 month, seen 3/23/18 an 3/27/18, a lot of phlegm      Health Maintenance     patient deferred colonoscopy       Initial /83 (BP Location: Left arm, Patient Position: Chair, Cuff Size: Adult Regular)  Pulse 84  Temp 97.7  F (36.5  C) (Tympanic)  Resp 24  Ht 5' 7\" (1.702 m)  Wt 163 lb 12.8 oz (74.3 kg)  SpO2 91%  BMI 25.65 kg/m2 Estimated body mass index is 25.65 kg/(m^2) as calculated from the following:    Height as of this encounter: 5' 7\" (1.702 m).    Weight as of this encounter: 163 lb 12.8 oz (74.3 kg).  Medication Reconciliation: complete   Elke ALEXANDER CMA (AAMA)    "

## 2018-04-18 DIAGNOSIS — J45.50 UNCOMPLICATED SEVERE PERSISTENT ASTHMA (H): ICD-10-CM

## 2018-04-18 DIAGNOSIS — J44.9 CHRONIC OBSTRUCTIVE PULMONARY DISEASE, UNSPECIFIED COPD TYPE (H): ICD-10-CM

## 2018-04-18 NOTE — TELEPHONE ENCOUNTER
"Requested Prescriptions   Pending Prescriptions Disp Refills     albuterol (PROAIR HFA/PROVENTIL HFA/VENTOLIN HFA) 108 (90 Base) MCG/ACT Inhaler  Last Written Prescription Date:  02/26/2016  Last Fill Quantity: 1,  # refills: 5   Last office visit: 4/16/2018 with prescribing provider:  Kalli   Future Office Visit:     1 Inhaler 5     Sig: Inhale 2 puffs into the lungs every 6 hours as needed for shortness of breath / dyspnea or wheezing    Asthma Maintenance Inhalers - Anticholinergics Failed    4/18/2018  8:19 AM       Failed - Asthma control assessment score within normal limits in last 6 months    Please review ACT score.          Passed - Patient is age 12 years or older       Passed - Recent (6 mo) or future (30 days) visit within the authorizing provider's specialty    Patient had office visit in the last 6 months or has a visit in the next 30 days with authorizing provider or within the authorizing provider's specialty.  See \"Patient Info\" tab in inbasket, or \"Choose Columns\" in Meds & Orders section of the refill encounter.            Nilay Armstrong RT (R)    "

## 2018-04-19 RX ORDER — ALBUTEROL SULFATE 90 UG/1
2 AEROSOL, METERED RESPIRATORY (INHALATION) EVERY 6 HOURS PRN
Qty: 1 INHALER | Refills: 5 | Status: SHIPPED | OUTPATIENT
Start: 2018-04-19 | End: 2019-04-19

## 2018-04-24 ENCOUNTER — OFFICE VISIT (OUTPATIENT)
Dept: ORTHOPEDICS | Facility: CLINIC | Age: 73
End: 2018-04-24
Payer: COMMERCIAL

## 2018-04-24 VITALS
BODY MASS INDEX: 25.11 KG/M2 | HEIGHT: 67 IN | WEIGHT: 160 LBS | SYSTOLIC BLOOD PRESSURE: 138 MMHG | DIASTOLIC BLOOD PRESSURE: 84 MMHG

## 2018-04-24 DIAGNOSIS — M25.562 CHRONIC PAIN OF BOTH KNEES: ICD-10-CM

## 2018-04-24 DIAGNOSIS — M25.561 CHRONIC PAIN OF BOTH KNEES: ICD-10-CM

## 2018-04-24 DIAGNOSIS — M17.0 BILATERAL PRIMARY OSTEOARTHRITIS OF KNEE: Primary | ICD-10-CM

## 2018-04-24 DIAGNOSIS — G89.29 CHRONIC PAIN OF BOTH KNEES: ICD-10-CM

## 2018-04-24 PROCEDURE — 99213 OFFICE O/P EST LOW 20 MIN: CPT | Mod: 25 | Performed by: FAMILY MEDICINE

## 2018-04-24 PROCEDURE — 20611 DRAIN/INJ JOINT/BURSA W/US: CPT | Mod: 50 | Performed by: FAMILY MEDICINE

## 2018-04-24 NOTE — MR AVS SNAPSHOT
"              After Visit Summary   2018    Raul Wilhelm    MRN: [id number]           Patient Information     Date Of Birth          1945        Visit Information        Provider Department      2018 1:20 PM Karel Bustillos,  [facility]        Today's Diagnoses     Bilateral primary osteoarthritis of knee    -  1    Chronic pain of both knees           Follow-ups after your visit        Who to contact     If you have questions or need follow up information about today's clinic visit or your schedule please contact [facility] directly at [phone].  Normal or non-critical lab and imaging results will be communicated to you by Active Endpointshart, letter or phone within 4 business days after the clinic has received the results. If you do not hear from us within 7 days, please contact the clinic through Active Endpointshart or phone. If you have a critical or abnormal lab result, we will notify you by phone as soon as possible.  Submit refill requests through MediaV or call your pharmacy and they will forward the refill request to us. Please allow 3 business days for your refill to be completed.          Additional Information About Your Visit        MyChart Information     MediaV lets you send messages to your doctor, view your test results, renew your prescriptions, schedule appointments and more. To sign up, go to [email or web address] . Click on \"Log in\" on the left side of the screen, which will take you to the Welcome page. Then click on \"Sign up Now\" on the right side of the page.     You will be asked to enter the access code listed below, as well as some personal information. Please follow the directions to create your username and password.     Your access code is: [email or web address]  Expires: 2018  8:46 AM     Your access code will  in 90 days. If you need help or a new code, please call your [place] clinic or [phone].   " "     Care EveryWhere ID     This is your Care EveryWhere ID. This could be used by other organizations to access your [place] medical records  [id number]        Your Vitals Were     Height BMI (Body Mass Index)                5' 7\" (1.702 m) 25.06 kg/m2           Blood Pressure from Last 3 Encounters:   04/24/18 138/84   04/16/18 128/83   03/30/18 125/77    Weight from Last 3 Encounters:   04/24/18 160 lb (72.6 kg)   04/16/18 163 lb 12.8 oz (74.3 kg)   03/30/18 161 lb (73 kg)              We Performed the Following     HC ARTHROCENTESIS ASPIR&/INJ MAJOR JT/BURSA W/US     SYNVISC-ONE INJECTION          Today's Medication Changes          These changes are accurate as of 4/24/18  2:29 PM.  If you have any questions, ask your nurse or doctor.               Start taking these medicines.        Dose/Directions    hylan 48 MG/6ML injection   Commonly known as:  SYNVISC ONE   Used for:  Bilateral primary osteoarthritis of knee, Chronic pain of both knees   Started by:  Karel Bustillos DO        Dose:  96 mg   12 mLs (96 mg) by INTRA-ARTICULAR route once for 1 dose   Quantity:  12 mL   Refills:  0            Where to get your medicines      Some of these will need a paper prescription and others can be bought over the counter.  Ask your nurse if you have questions.     You don't need a prescription for these medications     hylan 48 MG/6ML injection                Primary Care Provider Office Phone # Fax #    Connor Anderson [phone] [address] [facility] [address]        Equal Access to Services     [facility]: Hadii buffy tesfaye hadasho Soomaali, waaxda luqadaha, qaybta kaalmada adeegyacarol, daniel acuna. So [facility] [place] [facility] [phone].    ATENCIÓN: Si habla español, tiene a mccarthy disposición servicios gratuitos de asistencia lingüística. Llame al [phone].    We comply with applicable federal civil rights laws and [place] laws. We do not discriminate on the " basis of race, color, national origin, age, disability, sex, sexual orientation, or gender identity.            Thank you!     Thank you for choosing [facility]  for your care. Our goal is always to provide you with excellent care. Hearing back from our patients is one way we can continue to improve our services. Please take a few minutes to complete the written survey that you may receive in the mail after your visit with us. Thank you!             Your Updated Medication List - Protect others around you: Learn how to safely use, store and throw away your medicines at www.disposemymeds.org.          This list is accurate as of 4/24/18  2:29 PM.  Always use your most recent med list.                   Brand Name Dispense Instructions for use Diagnosis    * albuterol (2.5 MG/3ML) 0.083% neb solution     30 vial    Take 1 vial (2.5 mg) by nebulization every 6 hours as needed for shortness of breath / dyspnea or wheezing    Acute bronchitis, unspecified organism       * albuterol 108 (90 Base) MCG/ACT Inhaler    PROAIR HFA/PROVENTIL HFA/VENTOLIN HFA    1 Inhaler    Inhale 2 puffs into the lungs every 6 hours as needed for shortness of breath / dyspnea or wheezing    Uncomplicated severe persistent asthma, Chronic obstructive pulmonary disease, unspecified COPD type (H)       aspirin 81 MG EC tablet     30 tablet    Take 1 tablet (81 mg) by mouth daily    STEMI involving left circumflex coronary artery (H)       atorvastatin 20 MG tablet    LIPITOR    90 tablet    Take 1 tablet (20 mg) by mouth daily    ST elevation myocardial infarction involving left circumflex coronary artery (H)       azithromycin 250 MG tablet    ZITHROMAX    6 tablet    Two tablets first day, then one tablet daily for four days.    COPD exacerbation (H)       carvedilol 6.25 MG tablet    COREG    180 tablet    TAKE ONE TABLET BY MOUTH TWICE A DAY    HTN (hypertension)       clobetasol 0.05 % ointment    TEMOVATE     45 g    Apply sparingly to affected area twice daily as needed.  Do not apply to face.    Psoriasis       EPINEPHrine 0.3 MG/0.3ML injection 2-pack    EPIPEN/ADRENACLICK/or ANY BX GENERIC EQUIV    0.6 mL    Inject 0.3 mLs (0.3 mg) into the muscle as needed for anaphylaxis    Bee sting allergy       fexofenadine 180 MG tablet    ALLEGRA    90 tablet    Take 1 tablet by mouth daily.    Seasonal allergic rhinitis       hylan 48 MG/6ML injection    SYNVISC ONE    12 mL    12 mLs (96 mg) by INTRA-ARTICULAR route once for 1 dose    Bilateral primary osteoarthritis of knee, Chronic pain of both knees       ipratropium - albuterol 0.5 mg/2.5 mg/3 mL 0.5-2.5 (3) MG/3ML neb solution    DUONEB    1 Box    Take 1 vial (3 mLs) by nebulization every 4 hours as needed for shortness of breath / dyspnea    Uncomplicated severe persistent asthma       latanoprost 0.005 % ophthalmic solution    XALATAN     Place 1 drop into both eyes At Bedtime        losartan-hydrochlorothiazide 50-12.5 MG per tablet    HYZAAR    90 tablet    Take 1 tablet by mouth daily    Essential hypertension, benign       montelukast 10 MG tablet    SINGULAIR    90 tablet    TAKE ONE TABLET BY MOUTH EVERY NIGHT AT BEDTIME    Severe persistent asthma, Seasonal allergic rhinitis       nitroGLYcerin 0.4 MG sublingual tablet    NITROSTAT    25 tablet    Place 1 tablet (0.4 mg) under the tongue every 5 minutes as needed for chest pain If you are still having symptoms after 3 doses (15 minutes) call 911.    ST elevation myocardial infarction involving left circumflex coronary artery (H)       omeprazole 20 MG CR capsule    priLOSEC          order for DME     1 Device    Equipment being ordered: Nebulizer Respironics, MiniElite--Neb Machine and tubing and mouth piece.    Extrinsic asthma, unspecified       order for DME     1 Device    Equipment being ordered: arm blood pressure cuff    Essential hypertension, benign       * Notice:  This list has 2 medication(s) that  are the same as other medications prescribed for you. Read the directions carefully, and ask your doctor or other care provider to review them with you.

## 2018-04-24 NOTE — LETTER
2018         RE: Raul Wilhelm  [address]  [place] [address]        Dear Colleague,    Thank you for referring your patient, Raul Wilhelm, to the [facility]. Please see a copy of my visit note below.    Raul Wilhelm  :  1945  DOS: 2018  MRN: [id number]    Sports Medicine Evaluation    Initial History 2017: Since last visit on 17 with Raquel Briscoe where he underwent bilateral knee injections, the patient has begun to have an increase in pain over the past few months.      Symptoms are better with: activity   Symptoms are worse with: prolonged walking, stairs   Additional Features:                        Positive: none                        Negative: swelling, bruising, popping, grinding, catching, locking, instability, paresthesias, numbness, weakness, pain in other joints and systemic symptoms     Social History: tool , computer job     Interim History - 2018  Since last visit on 10/2/17 with Dr Grier patient has moderate bilateral knee pain over the last ~ 2 months.  Bilateral knee steroid injection completed on 2017 provided good relief for ~ 5 - 6 months.  He reports that pain has been waxing/waning based off his activity.  Desires repeat injections today.  Moderate antalgic gait noted.  No new injury in the interim.    Review of Systems  Skin: no bruising, no swelling  Musculoskeletal: as above  Neurologic: no numbness, paresthesias  Remainder of review of systems is negative including constitutional, CV, pulmonary, GI, except as noted in HPI or medical history.    Patient's current problem list, past medical and surgical history, and family history were reviewed.    Patient Active Problem List   Diagnosis     Family hx of colon cancer     Glaucoma     Hypertension     Cerebral infarction (H)     Hyperlipidemia LDL goal <100     Pneumonitis, hypersensitivity, naa (H)     Severe persistent  "asthma     COPD (chronic obstructive pulmonary disease) (H)     OA (osteoarthritis) of knee     Hoarseness of voice     Eczema     Impaired fasting blood sugar     Hypokalemia     ST elevation myocardial infarction involving left circumflex coronary artery (H)     Past Medical History:   Diagnosis Date     Chronic airway obstruction, not elsewhere classified      Other and unspecified hyperlipidemia      Past Surgical History:   Procedure Laterality Date     ENT SURGERY  as a child    tonsillectomy     Family History   Problem Relation Age of Onset     DIABETES Mother      HEART DISEASE Mother      C.A.D. Mother      CANCER Father      Hypertension Father      CEREBROVASCULAR DISEASE Paternal Grandfather      Asthma Sister      Breast Cancer No family hx of      Cancer - colorectal No family hx of      Prostate Cancer No family hx of        Objective  Ht 5' 7\" (1.702 m)  Wt 160 lb (72.6 kg)  BMI 25.06 kg/m2    GENERAL APPEARANCE: healthy, alert and no distress   GAIT: NORMAL  SKIN: no suspicious lesions or rashes  HEENT: Sclera clear, anicteric  CV: good peripheral pulses  RESP: Breathing not labored  NEURO: Normal strength and tone, mentation intact and speech normal  PSYCH:  mentation appears normal and affect normal/bright    Bilateral Knee exam  Inspection:      no effusion, swelling of bruising bilateral     Patella:      Mobility -       hypomobile bilateral       Crepitus noted in the patellofemoral joint bilateral     Tender:      medial joint line bilateral     Non Tender:      remainder of knee area bilateral     Knee ROM:      Full active and passive ROM with flexion and extension bilateral     Hip ROM:     Full active and passive ROM bilateral     Strength:      5/5 with knee extension bilateral     Special Tests:     neg (-) Memo bilateral       neg (-) Lachmans bilateral       neg (-) anterior drawer bilateral       neg (-) posterior drawer bilateral       neg (-) varus at 0 deg and 30 deg " bilateral       neg (-) valgus at 0 deg and 30 deg bilateral     Neurovascular:      2+ peripheral pulses bilaterally and brisk capillary refill       sensation grossly intact    Sports Medicine Clinic Procedure  Ultrasound Guided Bilateral Intra-Articular Knee SynviscOne Injection    Technique: The risks of the procedure were explained to the patient.  A consent was signed for the intra-articular knee procedure.  The patient was evaluated with a [employer] ultrasound machine using a 12 MHz linear probe.   The Bilateral knee was prepped and draped in a sterile manner.  Ultrasound identification of the patella, suprapatellar pouch, quadriceps tendon and femur in both long and short axis. The probe was placed in long axis to the Bilateral femur.  A 1.5 inch 18 gauge needle was placed under ultrasound guidance into the superior knee joint.  12 ml of straw colored fluid was aspirated from left, 4 ml from right.   6ml of SynviscOne (48 units) into the bilateral knee. The needle was removed and there was good hemostasis without complications.  There was ultrasound documentation of needle placement and injection.    Radiology  I visualized and reviewed these images with the patient  XR KNEE BILATERAL 3 VW 9/13/2017 1:42 PM  HISTORY: Bilateral osteoarthritis.  COMPARISON: 10/24/2012  FINDINGS: 3 views of the right knee. Complete loss of medial  tibiofemoral compartment joint space with associated marginal  osteophyte formation. Patellofemoral compartment joint space is  preserved. No joint effusion. No fracture or malalignment.  3 views of the left knee. Moderate loss of medial tibiofemoral  compartment joint space. Moderate tricompartmental marginal  osteophytes. Patellofemoral compartment joint space is preserved. No  joint effusion. No fracture or malalignment.  IMPRESSION: Moderate osteoarthritis, right greater than left.    Assessment:  1. Bilateral primary osteoarthritis of knee    2. Chronic pain of both knees           Plan:  F/u prn  Trial of b/l SynviscOne today  Prior imaging reviewed in detail with patient at time of visit  Given recent prednisone bursts would prefer to avoid CSI if posible for now  Has had good response to visco in the past  Reviewed wt loss, activity modification and progressive increase in activity as tolerated and guided by pain  Reviewed options for potential steroid vs viscosupplementation injections and the possibility for future orthopedic referral prn  Reviewed safe and appropriate OTC medication choices, try tylenol first  Up to 3000mg daily of tylenol is generally safe, NSAID dosing and duration limitations reviewed  Discussed nature of degenerative arthrosis of the knee.   Discussed symptom treatment with ice or heat, topical treatments, and rest if needed.   Expectations and limitations of synvisc were reviewed in detail  Often 4-6 weeks before full effect may be noticed  Usually covered up to every 6 months by insurance, but does not need to be repeated unless pain returns, at which point we would re-evaluate  Potential use of CSI in future for flares of pain reviewed in detail  Encouraged modified progressive pain-free activity as tolerated  HEP and Supportive care reviewed  All questions were answered today  Contact us with additional questions or concerns  Signs and sx of concern reviewed      Karel Bustillos DO, CAARIA  Primary Care Sports Medicine  [facility]           Again, thank you for allowing me to participate in the care of your patient.        Sincerely,        Karel Bustillos DO

## 2018-04-24 NOTE — PROGRESS NOTES
Raul Wilhelm  :  1945  DOS: 2018  MRN: [id number]    Sports Medicine Evaluation    Initial History 2017: Since last visit on 17 with Raquel Briscoe where he underwent bilateral knee injections, the patient has begun to have an increase in pain over the past few months.      Symptoms are better with: activity   Symptoms are worse with: prolonged walking, stairs   Additional Features:                        Positive: none                        Negative: swelling, bruising, popping, grinding, catching, locking, instability, paresthesias, numbness, weakness, pain in other joints and systemic symptoms     Social History: tool , computer job     Interim History - 2018  Since last visit on 10/2/17 with Dr Grier patient has moderate bilateral knee pain over the last ~ 2 months.  Bilateral knee steroid injection completed on 2017 provided good relief for ~ 5 - 6 months.  He reports that pain has been waxing/waning based off his activity.  Desires repeat injections today.  Moderate antalgic gait noted.  No new injury in the interim.    Review of Systems  Skin: no bruising, no swelling  Musculoskeletal: as above  Neurologic: no numbness, paresthesias  Remainder of review of systems is negative including constitutional, CV, pulmonary, GI, except as noted in HPI or medical history.    Patient's current problem list, past medical and surgical history, and family history were reviewed.    Patient Active Problem List   Diagnosis     Family hx of colon cancer     Glaucoma     Hypertension     Cerebral infarction (H)     Hyperlipidemia LDL goal <100     Pneumonitis, hypersensitivity, naa (H)     Severe persistent asthma     COPD (chronic obstructive pulmonary disease) (H)     OA (osteoarthritis) of knee     Hoarseness of voice     Eczema     Impaired fasting blood sugar     Hypokalemia     ST elevation myocardial infarction involving left circumflex coronary artery (H)  "    Past Medical History:   Diagnosis Date     Chronic airway obstruction, not elsewhere classified      Other and unspecified hyperlipidemia      Past Surgical History:   Procedure Laterality Date     ENT SURGERY  as a child    tonsillectomy     Family History   Problem Relation Age of Onset     DIABETES Mother      HEART DISEASE Mother      C.A.D. Mother      CANCER Father      Hypertension Father      CEREBROVASCULAR DISEASE Paternal Grandfather      Asthma Sister      Breast Cancer No family hx of      Cancer - colorectal No family hx of      Prostate Cancer No family hx of        Objective  Ht 5' 7\" (1.702 m)  Wt 160 lb (72.6 kg)  BMI 25.06 kg/m2    GENERAL APPEARANCE: healthy, alert and no distress   GAIT: NORMAL  SKIN: no suspicious lesions or rashes  HEENT: Sclera clear, anicteric  CV: good peripheral pulses  RESP: Breathing not labored  NEURO: Normal strength and tone, mentation intact and speech normal  PSYCH:  mentation appears normal and affect normal/bright    Bilateral Knee exam  Inspection:      no effusion, swelling of bruising bilateral     Patella:      Mobility -       hypomobile bilateral       Crepitus noted in the patellofemoral joint bilateral     Tender:      medial joint line bilateral     Non Tender:      remainder of knee area bilateral     Knee ROM:      Full active and passive ROM with flexion and extension bilateral     Hip ROM:     Full active and passive ROM bilateral     Strength:      5/5 with knee extension bilateral     Special Tests:     neg (-) Memo bilateral       neg (-) Lachmans bilateral       neg (-) anterior drawer bilateral       neg (-) posterior drawer bilateral       neg (-) varus at 0 deg and 30 deg bilateral       neg (-) valgus at 0 deg and 30 deg bilateral     Neurovascular:      2+ peripheral pulses bilaterally and brisk capillary refill       sensation grossly intact    Sports Medicine Clinic Procedure  Ultrasound Guided Bilateral Intra-Articular Knee " SynviscOne Injection    Technique: The risks of the procedure were explained to the patient.  A consent was signed for the intra-articular knee procedure.  The patient was evaluated with a [employer] ultrasound machine using a 12 MHz linear probe.   The Bilateral knee was prepped and draped in a sterile manner.  Ultrasound identification of the patella, suprapatellar pouch, quadriceps tendon and femur in both long and short axis. The probe was placed in long axis to the Bilateral femur.  A 1.5 inch 18 gauge needle was placed under ultrasound guidance into the superior knee joint.  12 ml of straw colored fluid was aspirated from left, 4 ml from right.   6ml of SynviscOne (48 units) into the bilateral knee. The needle was removed and there was good hemostasis without complications.  There was ultrasound documentation of needle placement and injection.    Radiology  I visualized and reviewed these images with the patient  XR KNEE BILATERAL 3 VW 9/13/2017 1:42 PM  HISTORY: Bilateral osteoarthritis.  COMPARISON: 10/24/2012  FINDINGS: 3 views of the right knee. Complete loss of medial  tibiofemoral compartment joint space with associated marginal  osteophyte formation. Patellofemoral compartment joint space is  preserved. No joint effusion. No fracture or malalignment.  3 views of the left knee. Moderate loss of medial tibiofemoral  compartment joint space. Moderate tricompartmental marginal  osteophytes. Patellofemoral compartment joint space is preserved. No  joint effusion. No fracture or malalignment.  IMPRESSION: Moderate osteoarthritis, right greater than left.    Assessment:  1. Bilateral primary osteoarthritis of knee    2. Chronic pain of both knees          Plan:  F/u prn  Trial of b/l SynviscOne today  Prior imaging reviewed in detail with patient at time of visit  Given recent prednisone bursts would prefer to avoid CSI if posible for now  Has had good response to visco in the past  Reviewed wt loss, activity  modification and progressive increase in activity as tolerated and guided by pain  Reviewed options for potential steroid vs viscosupplementation injections and the possibility for future orthopedic referral prn  Reviewed safe and appropriate OTC medication choices, try tylenol first  Up to 3000mg daily of tylenol is generally safe, NSAID dosing and duration limitations reviewed  Discussed nature of degenerative arthrosis of the knee.   Discussed symptom treatment with ice or heat, topical treatments, and rest if needed.   Expectations and limitations of synvisc were reviewed in detail  Often 4-6 weeks before full effect may be noticed  Usually covered up to every 6 months by insurance, but does not need to be repeated unless pain returns, at which point we would re-evaluate  Potential use of CSI in future for flares of pain reviewed in detail  Encouraged modified progressive pain-free activity as tolerated  HEP and Supportive care reviewed  All questions were answered today  Contact us with additional questions or concerns  Signs and sx of concern reviewed      Karel Bustillos DO, MELINDA  Primary Care Sports Medicine  [facility]

## 2018-05-03 ENCOUNTER — OFFICE VISIT (OUTPATIENT)
Dept: FAMILY MEDICINE | Facility: CLINIC | Age: 73
End: 2018-05-03
Payer: COMMERCIAL

## 2018-05-03 VITALS
HEART RATE: 93 BPM | DIASTOLIC BLOOD PRESSURE: 90 MMHG | WEIGHT: 165 LBS | OXYGEN SATURATION: 94 % | SYSTOLIC BLOOD PRESSURE: 115 MMHG | TEMPERATURE: 98.6 F | BODY MASS INDEX: 25.84 KG/M2

## 2018-05-03 DIAGNOSIS — R25.2 MUSCLE CRAMPING: ICD-10-CM

## 2018-05-03 DIAGNOSIS — J45.51 SEVERE PERSISTENT ASTHMA WITH ACUTE EXACERBATION (H): Primary | ICD-10-CM

## 2018-05-03 PROCEDURE — 99214 OFFICE O/P EST MOD 30 MIN: CPT | Performed by: INTERNAL MEDICINE

## 2018-05-03 RX ORDER — PREDNISONE 20 MG/1
TABLET ORAL
Qty: 20 TABLET | Refills: 0 | Status: SHIPPED | OUTPATIENT
Start: 2018-05-03 | End: 2018-07-03

## 2018-05-03 NOTE — PROGRESS NOTES
SUBJECTIVE:   Raul Wilhelm is a 72 year old male who presents to clinic today for the following health issues:      Pt doent believe to have COPD  Pt concern about excess of Sputum in the morning.   Pt would like to have some medication for pain.     BP is being lower than usual: 120/60 - pt believe this numbers are too low. Pt is taking BP medication.     Asthma Follow-Up    Was ACT completed today?    Yes    ACT Total Scores 3/23/2018   ACT TOTAL SCORE -   ASTHMA ER VISITS -   ASTHMA HOSPITALIZATIONS -   ACT TOTAL SCORE (Goal Greater than or Equal to 20) 16   In the past 12 months, how many times did you visit the emergency room for your asthma without being admitted to the hospital? 0   In the past 12 months, how many times were you hospitalized overnight because of your asthma? 0       Recent asthma triggers that patient is dealing with: smoke, dust mites, animal dander, mold, emotions, cold air and Gastric Reflux    Amount of exercise or physical activity: None    Problems taking medications regularly: No    Medication side effects: none    Diet: regular (no restrictions)    He was seen 3/23, 3/27, 3/30, 4/16 for bronchitis - all different providers    He is not sure if he has asthma or COPD.    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    --since last visit on 4/16, he got zpak and prednisone and did feel that he got better, although not totally.  He still felt mildly wheezy.    --today, he does feel wheezy.  Is using nebulizer 2 x day.  It does help.    --his productive cough did improve somewhat, but not totally.  --he reports he cannot afford Advair.  He doesn't think he will qualify       Joint Pain    Onset: 4 to 5 days ago    Description:   Location: both calves   Character: Sharp and Stabbing    Intensity: severe, 8/10    Progression of Symptoms: worse, intermittent    Accompanying Signs &  Symptoms:  Other symptoms: radiation of pain to the foot.    History:   Previous similar pain: no       Precipitating factors:   Trauma or overuse: no     Alleviating factors:Improved by: nothing    Therapies Tried and outcome: gopi    Had 'rooster comb' injection on April 24th.      Since the injection, he has had bilateral calf cramping with tenderness to palpation.    He does cardiac rehab 3 days a week.      Concern - SOB  Onset: 4 to 5 days    Description:   Pt doesn't believe to have COPD. Pt verbalize to had bronchitis and feels like it never went away. Unable to walk more than 10 feet without stopping.  Pt verbalize that the medication for asthma is not working. The nebulizer medication is the only one that really helps.     Intensity: severe, 9/10 at worst.     Progression of Symptoms:  worsening and intermittent    Accompanying Signs & Symptoms:  No heart palpitation, no fever present in the past 2 weeks.    Previous history of similar problem:   na    Precipitating factors:   Worsened by: na    Alleviating factors:  Improved by: na    Therapies Tried and outcome: na      Blood pressure:  He thinks it is too low.  We discussed this is not pathologic.    Current Outpatient Prescriptions   Medication Sig Dispense Refill     albuterol (2.5 MG/3ML) 0.083% neb solution Take 1 vial (2.5 mg) by nebulization every 6 hours as needed for shortness of breath / dyspnea or wheezing 30 vial 1     albuterol (PROAIR HFA/PROVENTIL HFA/VENTOLIN HFA) 108 (90 Base) MCG/ACT Inhaler Inhale 2 puffs into the lungs every 6 hours as needed for shortness of breath / dyspnea or wheezing 1 Inhaler 5     aspirin EC 81 MG EC tablet Take 1 tablet (81 mg) by mouth daily 30 tablet 1     atorvastatin (LIPITOR) 20 MG tablet Take 1 tablet (20 mg) by mouth daily 90 tablet 3     azithromycin (ZITHROMAX) 250 MG tablet Two tablets first day, then one tablet daily for four days. 6 tablet 0     carvedilol (COREG) 6.25 MG tablet TAKE ONE TABLET BY  MOUTH TWICE A  tablet 2     ipratropium - albuterol 0.5 mg/2.5 mg/3 mL (DUONEB) 0.5-2.5 (3) MG/3ML neb solution Take 1 vial (3 mLs) by nebulization every 4 hours as needed for shortness of breath / dyspnea 1 Box 2     latanoprost (XALATAN) 0.005 % ophthalmic solution Place 1 drop into both eyes At Bedtime       losartan-hydrochlorothiazide (HYZAAR) 50-12.5 MG per tablet Take 1 tablet by mouth daily 90 tablet 3     montelukast (SINGULAIR) 10 MG tablet TAKE ONE TABLET BY MOUTH EVERY NIGHT AT BEDTIME 90 tablet 3     omeprazole (PRILOSEC) 20 MG CR capsule        ORDER FOR DME Equipment being ordered: Nebulizer Respironics, MiniElite--Neb Machine and tubing and mouth piece. 1 Device 0     order for DME Equipment being ordered: arm blood pressure cuff 1 Device 0     clobetasol (TEMOVATE) 0.05 % ointment Apply sparingly to affected area twice daily as needed.  Do not apply to face. (Patient not taking: Reported on 5/3/2018) 45 g 1     EPINEPHrine 0.3 MG/0.3ML injection 2-pack Inject 0.3 mLs (0.3 mg) into the muscle as needed for anaphylaxis (Patient not taking: Reported on 5/3/2018) 0.6 mL 0     fexofenadine (ALLEGRA) 180 MG tablet Take 1 tablet by mouth daily. (Patient not taking: Reported on 3/23/2018) 90 tablet 1     nitroglycerin (NITROSTAT) 0.4 MG sublingual tablet Place 1 tablet (0.4 mg) under the tongue every 5 minutes as needed for chest pain If you are still having symptoms after 3 doses (15 minutes) call 911. (Patient not taking: Reported on 3/23/2018) 25 tablet 1       Reviewed and updated as needed this visit by clinical staff  Tobacco  Allergies  Meds  Problems  Med Hx  Surg Hx  Fam Hx  Soc Hx        Reviewed and updated as needed this visit by Provider  Allergies  Meds  Problems         ROS:  Constitutional, HEENT, cardiovascular, pulmonary, gi and gu systems are negative, except as otherwise noted.    OBJECTIVE:     /90 (BP Location: Left arm, Patient Position: Chair, Cuff Size: Adult  Large)  Pulse 93  Temp 98.6  F (37  C) (Tympanic)  Wt 165 lb (74.8 kg)  SpO2 94%  BMI 25.84 kg/m2  Body mass index is 25.84 kg/(m^2).  GENERAL APPEARANCE: alert and no distress  EYES: Eyes grossly normal to inspection, PERRL and conjunctivae and sclerae normal  HENT: ear canals and TM's normal and nose and mouth without ulcers or lesions  NECK: no adenopathy, no asymmetry, masses, or scars and thyroid normal to palpation  RESP: Inspiratory and expiratory wheezing in all lung fields  CV: regular rates and rhythm, normal S1 S2, no S3 or S4 and no murmur, click or rub  MS: Mild pain with palpation in bilateral calves.  Negative Homans sign.  Normal gait.  Normal muscle tone.  SKIN: Normal skin overlying lower legs      ASSESSMENT/PLAN:     1. Severe persistent asthma with acute exacerbation patient now with his fifth visit since the end of March for-wheezing or other respiratory complaints.  His asthma is clearly poorly controlled, but he cannot afford controller inhaler.  Given contact information for financial wrap as well as advised him to speak with the pharmacist about a controller inhaler but is cheap or would be covered well under his insurance.  I would be willing to provide any neb or inhaler and inhaled steroids with his asthma.  We will do a prolonged course of prednisone since he is still very symptomatic from his asthma.  Return to clinic if worse.  No antibiotics indicated.  - predniSONE (DELTASONE) 20 MG tablet; Take 3 tabs (60 mg) by mouth daily x 3 days, 2 tabs (40 mg) daily x 3 days, 1 tab (20 mg) daily x 3 days, then 1/2 tab (10 mg) x 3 days.  Dispense: 20 tablet; Refill: 0    2. Muscle cramping -wonder about steroid myopathy, since he has had multiple courses of prednisone.  They have not been long enough or high enough for this to be a likely diagnosis.  Advised heat and rest and monitoring.      Patient Instructions   1. The blood pressure is not pathologically low.  Recommend to continue to  monitor.  2. Try heat to the calf.  If this pain is worsening, come back and see Dr. Hennessy   3. For the breathing, try mucinex  4. Tylenol or acetaminophen, max 3000 mg/day.  Try topicals such as Aspercream with Lidocaine, Capsaicin, Icy Hot, Biofreeze, Salon Pas  5.  Can contact Cyndie Cottrell at [phone] for prescription drug cost assistance.   Search engine for cost-savings:  www.[employer].org   6. Go to pharmacy and ask them for cheapest inhaled steroid or nebulizer.  Combo with long acting bronchodilator is fine.  7. Longer steroid course.  No antibiotic needed              Sarita Hennessy, DO  [facility]

## 2018-05-03 NOTE — MR AVS SNAPSHOT
After Visit Summary   5/3/2018    Raul Wilhelm    MRN: [id number]           Patient Information     Date Of Birth          1945        Visit Information        Provider Department      5/3/2018 1:40 PM Sarita Hennessy, DO [facility]        Today's Diagnoses     Severe persistent asthma with acute exacerbation    -  1    Severe persistent asthma without complication        Screen for colon cancer          Care Instructions    1. The blood pressure is not pathologically low.  Recommend to continue to monitor.  2. Try heat to the calf.  If this pain is worsening, come back and see Dr. Hennessy   3. For the breathing, try mucinex  4. Tylenol or acetaminophen, max 3000 mg/day.  Try topicals such as Aspercream with Lidocaine, Capsaicin, Icy Hot, Biofreeze, Salon Pas  5.  Can contact Cyndie Cottrell at [phone] for prescription drug cost assistance.   Search engine for cost-savings:  www.[employer].org   6. Go to pharmacy and ask them for cheapest inhaled steroid or nebulizer.  Combo with long acting bronchodilator is fine.  7. Longer steroid course.  No antibiotic needed                  Follow-ups after your visit        Who to contact     If you have questions or need follow up information about today's clinic visit or your schedule please contact [facility] directly at [phone].  Normal or non-critical lab and imaging results will be communicated to you by MyChart, letter or phone within 4 business days after the clinic has received the results. If you do not hear from us within 7 days, please contact the clinic through MyChart or phone. If you have a critical or abnormal lab result, we will notify you by phone as soon as possible.  Submit refill requests through [employer] or call your pharmacy and they will forward the refill request to us. Please allow 3 business days for your refill to be completed.          Additional Information About Your Visit       "  MyChart Information     [employer] lets you send messages to your doctor, view your test results, renew your prescriptions, schedule appointments and more. To sign up, go to [email or web address] . Click on \"Log in\" on the left side of the screen, which will take you to the Welcome page. Then click on \"Sign up Now\" on the right side of the page.     You will be asked to enter the access code listed below, as well as some personal information. Please follow the directions to create your username and password.     Your access code is: [email or web address]  Expires: 2018  8:46 AM     Your access code will  in 90 days. If you need help or a new code, please call your [facility] clinic or [phone].        Care EveryWhere ID     This is your Care EveryWhere ID. This could be used by other organizations to access your [facility] medical records  [id number]        Your Vitals Were     Pulse Temperature Pulse Oximetry BMI (Body Mass Index)          93 98.6  F (37  C) (Tympanic) 94% 25.84 kg/m2         Blood Pressure from Last 3 Encounters:   18 115/90   18 138/84   18 128/83    Weight from Last 3 Encounters:   18 165 lb (74.8 kg)   18 160 lb (72.6 kg)   18 163 lb 12.8 oz (74.3 kg)              Today, you had the following     No orders found for display         Today's Medication Changes          These changes are accurate as of 5/3/18  2:44 PM.  If you have any questions, ask your nurse or doctor.               Start taking these medicines.        Dose/Directions    predniSONE 20 MG tablet   Commonly known as:  DELTASONE   Used for:  Severe persistent asthma with acute exacerbation   Started by:  Sarita Hennessy, DO        Take 3 tabs (60 mg) by mouth daily x 3 days, 2 tabs (40 mg) daily x 3 days, 1 tab (20 mg) daily x 3 days, then 1/2 tab (10 mg) x 3 days.   Quantity:  20 tablet   Refills:  0         Stop taking these medicines if you haven't already. Please contact your care " team if you have questions.     azithromycin 250 MG tablet   Commonly known as:  ZITHROMAX   Stopped by:  Sarita Hennessy, DO                Where to get your medicines      These medications were sent to [facility] - [place], [place] - [address]  [address] [address]     Phone:  [phone]     predniSONE 20 MG tablet                Primary Care Provider Office Phone # Fax #    Connor Anderson [phone] [address] [address]        Equal Access to Services     [facility]: Hadii aad ku hadasho Soomaali, waaxda luqadaha, qaybta kaalmada adeegyada, waxlydia flores . So [facility] [phone].    ATENCIÓN: Si habla español, tiene a mccarthy disposición servicios gratuitos de asistencia lingüística. [facility] [phone].    We comply with applicable federal civil rights laws and [place] laws. We do not discriminate on the basis of race, color, national origin, age, disability, sex, sexual orientation, or gender identity.            Thank you!     Thank you for choosing [facility]  for your care. Our goal is always to provide you with excellent care. Hearing back from our patients is one way we can continue to improve our services. Please take a few minutes to complete the written survey that you may receive in the mail after your visit with us. Thank you!             Your Updated Medication List - Protect others around you: Learn how to safely use, store and throw away your medicines at www.disposemymeds.org.          This list is accurate as of 5/3/18  2:44 PM.  Always use your most recent med list.                   Brand Name Dispense Instructions for use Diagnosis    * albuterol (2.5 MG/3ML) 0.083% neb solution     30 vial    Take 1 vial (2.5 mg) by nebulization every 6 hours as needed for shortness of breath / dyspnea or wheezing    Acute bronchitis, unspecified organism       * albuterol 108 (90  Base) MCG/ACT Inhaler    PROAIR HFA/PROVENTIL HFA/VENTOLIN HFA    1 Inhaler    Inhale 2 puffs into the lungs every 6 hours as needed for shortness of breath / dyspnea or wheezing    Uncomplicated severe persistent asthma, Chronic obstructive pulmonary disease, unspecified COPD type (H)       aspirin 81 MG EC tablet     30 tablet    Take 1 tablet (81 mg) by mouth daily    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  Do not apply to face.    Psoriasis       EPINEPHrine 0.3 MG/0.3ML injection 2-pack    EPIPEN/ADRENACLICK/or ANY BX GENERIC EQUIV    0.6 mL    Inject 0.3 mLs (0.3 mg) into the muscle as needed for anaphylaxis    Bee sting allergy       fexofenadine 180 MG tablet    ALLEGRA    90 tablet    Take 1 tablet by mouth daily.    Seasonal allergic rhinitis       ipratropium - albuterol 0.5 mg/2.5 mg/3 mL 0.5-2.5 (3) MG/3ML neb solution    DUONEB    1 Box    Take 1 vial (3 mLs) by nebulization every 4 hours as needed for shortness of breath / dyspnea    Uncomplicated severe persistent asthma       latanoprost 0.005 % ophthalmic solution    XALATAN     Place 1 drop into both eyes At Bedtime        losartan-hydrochlorothiazide 50-12.5 MG per tablet    HYZAAR    90 tablet    Take 1 tablet by mouth daily    Essential hypertension, benign       montelukast 10 MG tablet    SINGULAIR    90 tablet    TAKE ONE TABLET BY MOUTH EVERY NIGHT AT BEDTIME    Severe persistent asthma, Seasonal allergic rhinitis       nitroGLYcerin 0.4 MG sublingual tablet    NITROSTAT    25 tablet    Place 1 tablet (0.4 mg) under the tongue  every 5 minutes as needed for chest pain If you are still having symptoms after 3 doses (15 minutes) call 911.    ST elevation myocardial infarction involving left circumflex coronary artery (H)       omeprazole 20 MG CR capsule    priLOSEC          order for DME     1 Device    Equipment being ordered: Nebulizer Respironics, MiniElite--Neb Machine and tubing and mouth piece.    Extrinsic asthma, unspecified       order for DME     1 Device    Equipment being ordered: arm blood pressure cuff    Essential hypertension, benign       predniSONE 20 MG tablet    DELTASONE    20 tablet    Take 3 tabs (60 mg) by mouth daily x 3 days, 2 tabs (40 mg) daily x 3 days, 1 tab (20 mg) daily x 3 days, then 1/2 tab (10 mg) x 3 days.    Severe persistent asthma with acute exacerbation       * Notice:  This list has 2 medication(s) that are the same as other medications prescribed for you. Read the directions carefully, and ask your doctor or other care provider to review them with you.

## 2018-05-03 NOTE — PATIENT INSTRUCTIONS
1. The blood pressure is not pathologically low.  Recommend to continue to monitor.  2. Try heat to the calf.  If this pain is worsening, come back and see Dr. Hennessy   3. For the breathing, try mucinex  4. Tylenol or acetaminophen, max 3000 mg/day.  Try topicals such as Aspercream with Lidocaine, Capsaicin, Icy Hot, Biofreeze, Salon Pas  5.  Can contact Cyndie Cottrell at [phone] for prescription drug cost assistance.   Search engine for cost-savings:  www.[employer].org   6. Go to pharmacy and ask them for cheapest inhaled steroid or nebulizer.  Combo with long acting bronchodilator is fine.  7. Longer steroid course.  No antibiotic needed

## 2018-05-23 ENCOUNTER — TELEPHONE (OUTPATIENT)
Dept: FAMILY MEDICINE | Facility: CLINIC | Age: 73
End: 2018-05-23

## 2018-05-23 DIAGNOSIS — J45.50 SEVERE PERSISTENT ASTHMA WITHOUT COMPLICATION (H): Primary | ICD-10-CM

## 2018-05-23 RX ORDER — FLUTICASONE PROPIONATE 220 UG/1
2 AEROSOL, METERED RESPIRATORY (INHALATION) 2 TIMES DAILY
Qty: 1 INHALER | Refills: 11 | Status: SHIPPED | OUTPATIENT
Start: 2018-05-23 | End: 2019-04-19

## 2018-05-23 NOTE — TELEPHONE ENCOUNTER
Order placed for Flovent.  Spoke with PHarmD - since he is medicare, doesn't qualify for coupons.  pharmD will work with patient to find cheapest inhaled steroid

## 2018-05-23 NOTE — TELEPHONE ENCOUNTER
Patient drops off a note addressed to Dr. Hennessy.  The note states he wants us to figure out what long term asthma medication he should be on and send it to our pharmacy.  Attempted to call the patient to clarify what this means.  There are a few things on his med list for asthma and last office visit he was to ask the pharmacy for a   MDI with a steroid in it that was cheap.  Cinthya NEWMAN RN

## 2018-05-23 NOTE — TELEPHONE ENCOUNTER
Dr. Hennessy,    Patient returns our call.  He never went to the pharmacy to ask about steroid inhalers.  I have looked up the formulary.  Most are tier 3,4,5.  The tier 3 medications listed are pulmicort, flovent, and arnuity ellipta.  I updated his ACT.  He states he feels better although the ACT is almost unchanged.  Went over his nebs and his MDI.  Please advise. Cinthya NEWMAN RN      Asthma Control Test 5/23/2018   ACT TOTAL SCORE    ACT Total Score (Goal Greater than or Equal to 20) 17

## 2018-05-24 ASSESSMENT — ASTHMA QUESTIONNAIRES: ACT_TOTALSCORE: 17

## 2018-07-03 ENCOUNTER — OFFICE VISIT (OUTPATIENT)
Dept: ORTHOPEDICS | Facility: CLINIC | Age: 73
End: 2018-07-03
Payer: COMMERCIAL

## 2018-07-03 VITALS — WEIGHT: 164 LBS | HEIGHT: 67 IN | BODY MASS INDEX: 25.74 KG/M2

## 2018-07-03 DIAGNOSIS — M25.562 CHRONIC PAIN OF BOTH KNEES: ICD-10-CM

## 2018-07-03 DIAGNOSIS — G89.29 CHRONIC PAIN OF BOTH KNEES: ICD-10-CM

## 2018-07-03 DIAGNOSIS — M25.561 CHRONIC PAIN OF BOTH KNEES: ICD-10-CM

## 2018-07-03 DIAGNOSIS — M17.0 BILATERAL PRIMARY OSTEOARTHRITIS OF KNEE: Primary | ICD-10-CM

## 2018-07-03 PROCEDURE — 20611 DRAIN/INJ JOINT/BURSA W/US: CPT | Mod: 50 | Performed by: FAMILY MEDICINE

## 2018-07-03 RX ADMIN — TRIAMCINOLONE ACETONIDE 40 MG: 40 INJECTION, SUSPENSION INTRA-ARTICULAR; INTRAMUSCULAR at 13:56

## 2018-07-03 NOTE — PROGRESS NOTES
Raul Wilhelm  :  1945  DOS: 18  MRN: [id number]    Sports Medicine Evaluation    Initial History 2017: Since last visit on 17 with Raquel Briscoe where he underwent bilateral knee injections, the patient has begun to have an increase in pain over the past few months.      Symptoms are better with: activity   Symptoms are worse with: prolonged walking, stairs   Additional Features:                        Positive: none                        Negative: swelling, bruising, popping, grinding, catching, locking, instability, paresthesias, numbness, weakness, pain in other joints and systemic symptoms     Social History: tool , computer job     Interim History - 2018  Since last visit on 10/2/17 with Dr Grier patient has moderate bilateral knee pain over the last ~ 2 months.  Bilateral knee steroid injection completed on 2017 provided good relief for ~ 5 - 6 months.  He reports that pain has been waxing/waning based off his activity.  Desires repeat injections today.  Moderate antalgic gait noted.  No new injury in the interim.    Interim History - July 3, 2018  Since last visit on 2018 patient has moderate bilateral knee pain.  Bilateral knee SynviscOne injection completed on / provided mild relief.  Continues to have significant discomfort, interested in repeat steroid injections today.  No new injury in the interim.    Review of Systems  Skin: no bruising, no swelling  Musculoskeletal: as above  Neurologic: no numbness, paresthesias  Remainder of review of systems is negative including constitutional, CV, pulmonary, GI, except as noted in HPI or medical history.    Patient's current problem list, past medical and surgical history, and family history were reviewed.    Patient Active Problem List   Diagnosis     Family hx of colon cancer     Glaucoma     Hypertension     Cerebral infarction (H)     Hyperlipidemia LDL goal <100     Pneumonitis,  "hypersensitivity, naa (H)     Severe persistent asthma     COPD (chronic obstructive pulmonary disease) (H)     OA (osteoarthritis) of knee     Hoarseness of voice     Eczema     Impaired fasting blood sugar     Hypokalemia     ST elevation myocardial infarction involving left circumflex coronary artery (H)     Past Medical History:   Diagnosis Date     Chronic airway obstruction, not elsewhere classified      Other and unspecified hyperlipidemia      Past Surgical History:   Procedure Laterality Date     ENT SURGERY  as a child    tonsillectomy     Family History   Problem Relation Age of Onset     Diabetes Mother      HEART DISEASE Mother      C.A.D. Mother      Cancer Father      Hypertension Father      Cerebrovascular Disease Paternal Grandfather      Asthma Sister      Breast Cancer No family hx of      Cancer - colorectal No family hx of      Prostate Cancer No family hx of        Objective  BP (P) 129/74  Ht 5' 7\" (1.702 m)  Wt 164 lb (74.4 kg)  BMI 25.69 kg/m2    GENERAL APPEARANCE: healthy, alert and no distress   GAIT: NORMAL  SKIN: no suspicious lesions or rashes  HEENT: Sclera clear, anicteric  CV: good peripheral pulses  RESP: Breathing not labored  NEURO: Normal strength and tone, mentation intact and speech normal  PSYCH:  mentation appears normal and affect normal/bright    Bilateral Knee exam  Inspection:      no effusion, swelling of bruising bilateral     Patella:      Mobility -       hypomobile bilateral       Crepitus noted in the patellofemoral joint bilateral     Tender:      medial joint line bilateral > lateral joint line, PF joint laterally     Non Tender:      remainder of knee area bilateral     Knee ROM:      Full active and passive ROM with flexion and extension bilateral     Hip ROM:     Full active and passive ROM bilateral     Strength:      5/5 with knee extension bilateral     Special Tests:     painful Memo bilateral      + PF grind       neg (-) varus at 0 deg and 30 " deg bilateral       neg (-) valgus at 0 deg and 30 deg bilateral     Neurovascular:      2+ peripheral pulses bilaterally and brisk capillary refill       sensation grossly intact    Large Joint Injection/Arthocentesis  Date/Time: 7/3/2018 1:56 PM  Performed by: BERNARDA PATINO  Authorized by: BERNARDA PATINO     Indications:  Osteoarthritis and pain  Needle Size:  18 G  Guidance: ultrasound    Approach:  Superolateral  Location:  Knee  Laterality:  Bilateral  Site:  Bilateral knee  Medications (Right):  40 mg triamcinolone acetonide 40 MG/ML  Aspirate amount (mL):  4  Aspirate:  Serous  Medications (Left):  40 mg triamcinolone acetonide 40 MG/ML  Aspirate amount (mL):  20  Aspirate:  Serous  Outcome:  Tolerated well, no immediate complications  Procedure discussed: discussed risks, benefits, and alternatives    Consent Given by:  Patient  Prep: patient was prepped and draped in usual sterile fashion        Radiology  I visualized and reviewed these images with the patient  XR KNEE BILATERAL 3 VW 9/13/2017 1:42 PM  HISTORY: Bilateral osteoarthritis.  COMPARISON: 10/24/2012  FINDINGS: 3 views of the right knee. Complete loss of medial  tibiofemoral compartment joint space with associated marginal  osteophyte formation. Patellofemoral compartment joint space is  preserved. No joint effusion. No fracture or malalignment.  3 views of the left knee. Moderate loss of medial tibiofemoral  compartment joint space. Moderate tricompartmental marginal  osteophytes. Patellofemoral compartment joint space is preserved. No  joint effusion. No fracture or malalignment.  IMPRESSION: Moderate osteoarthritis, right greater than left.    Assessment:  1. Bilateral primary osteoarthritis of knee    2. 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   Discussed symptom treatment with ice or heat, topical treatments, and rest if needed.   Expectations and goals of CSI reviewed  Often 2-3 days for steroid effect, and can take up to two weeks for maximum effect  We discussed modified progressive pain-free activity as tolerated  Do not overuse in first two weeks if feeling better due to concern for vulnerability while steroid is working  Supportive care reviewed  All questions were answered today  Contact us with additional questions or concerns  Signs and sx of concern reviewed      Karel Bustillos DO, MELINDA  Primary Care Sports Medicine  [facility]

## 2018-07-03 NOTE — MR AVS SNAPSHOT
"              After Visit Summary   7/3/2018    Raul Wilhelm    MRN: [id number]           Patient Information     Date Of Birth          1945        Visit Information        Provider Department      7/3/2018 1:00 PM Karel Bustillos DO [facility]        Today's Diagnoses     Bilateral primary osteoarthritis of knee    -  1    Chronic pain of both knees           Follow-ups after your visit        Who to contact     If you have questions or need follow up information about today's clinic visit or your schedule please contact [facility] directly at [phone].  Normal or non-critical lab and imaging results will be communicated to you by MyChart, letter or phone within 4 business days after the clinic has received the results. If you do not hear from us within 7 days, please contact the clinic through MyChart or phone. If you have a critical or abnormal lab result, we will notify you by phone as soon as possible.  Submit refill requests through [email or web address] or call your pharmacy and they will forward the refill request to us. Please allow 3 business days for your refill to be completed.          Additional Information About Your Visit        Care EveryWhere ID     This is your Care EveryWhere ID. This could be used by other organizations to access your [facility] medical records  [id number]        Your Vitals Were     Height BMI (Body Mass Index)                5' 7\" (1.702 m) 25.69 kg/m2           Blood Pressure from Last 3 Encounters:   07/03/18 (P) 129/74   05/03/18 115/90   04/24/18 138/84    Weight from Last 3 Encounters:   07/03/18 164 lb (74.4 kg)   05/03/18 165 lb (74.8 kg)   04/24/18 160 lb (72.6 kg)              We Performed the Following     Large Joint Injection/Arthocentesis        Primary Care Provider Office Phone # Fax #    Connor Anderson [phone] [address] [facility] [address]      "   Equal Access to Services     [facility] : Hadii buffy tesfaye kobymorgan Mariahali, waashlynda luqadaha, qaybta kaedgarddaniel mcconnell. So [facility] [phone].    ATENCIÓN: Si habla español, tiene a mccarthy disposición servicios gratuitos de asistencia lingüística. Travame al [phone].    We comply with applicable federal civil rights laws and [place] laws. We do not discriminate on the basis of race, color, national origin, age, disability, sex, sexual orientation, or gender identity.            Thank you!     Thank you for choosing [facility]  for your care. Our goal is always to provide you with excellent care. Hearing back from our patients is one way we can continue to improve our services. Please take a few minutes to complete the written survey that you may receive in the mail after your visit with us. Thank you!             Your Updated Medication List - Protect others around you: Learn how to safely use, store and throw away your medicines at www.disposemymeds.org.          This list is accurate as of 7/3/18 11:59 PM.  Always use your most recent med list.                   Brand Name Dispense Instructions for use Diagnosis    * albuterol (2.5 MG/3ML) 0.083% neb solution     30 vial    Take 1 vial (2.5 mg) by nebulization every 6 hours as needed for shortness of breath / dyspnea or wheezing    Acute bronchitis, unspecified organism       * albuterol 108 (90 Base) MCG/ACT Inhaler    PROAIR HFA/PROVENTIL HFA/VENTOLIN HFA    1 Inhaler    Inhale 2 puffs into the lungs every 6 hours as needed for shortness of breath / dyspnea or wheezing    Uncomplicated severe persistent asthma, Chronic obstructive pulmonary disease, unspecified COPD type (H)       aspirin 81 MG EC tablet     30 tablet    Take 1 tablet (81 mg) by mouth daily    STEMI involving left circumflex coronary artery (H)       atorvastatin 20 MG tablet    LIPITOR    90 tablet    Take 1 tablet (20  mg) by mouth daily    ST elevation myocardial infarction involving left circumflex coronary artery (H)       carvedilol 6.25 MG tablet    COREG    180 tablet    TAKE ONE TABLET BY MOUTH TWICE A DAY    HTN (hypertension)       clobetasol 0.05 % ointment    TEMOVATE    45 g    Apply sparingly to affected area twice daily as needed.  Do not apply to face.    Psoriasis       EPINEPHrine 0.3 MG/0.3ML injection 2-pack    EPIPEN/ADRENACLICK/or ANY BX GENERIC EQUIV    0.6 mL    Inject 0.3 mLs (0.3 mg) into the muscle as needed for anaphylaxis    Bee sting allergy       fexofenadine 180 MG tablet    ALLEGRA    90 tablet    Take 1 tablet by mouth daily.    Seasonal allergic rhinitis       fluticasone 220 MCG/ACT Inhaler    FLOVENT HFA    1 Inhaler    Inhale 2 puffs into the lungs 2 times daily    Severe persistent asthma without complication       ipratropium - albuterol 0.5 mg/2.5 mg/3 mL 0.5-2.5 (3) MG/3ML neb solution    DUONEB    1 Box    Take 1 vial (3 mLs) by nebulization every 4 hours as needed for shortness of breath / dyspnea    Uncomplicated severe persistent asthma       latanoprost 0.005 % ophthalmic solution    XALATAN     Place 1 drop into both eyes At Bedtime        losartan-hydrochlorothiazide 50-12.5 MG per tablet    HYZAAR    90 tablet    Take 1 tablet by mouth daily    Essential hypertension, benign       montelukast 10 MG tablet    SINGULAIR    90 tablet    TAKE ONE TABLET BY MOUTH EVERY NIGHT AT BEDTIME    Severe persistent asthma, Seasonal allergic rhinitis       nitroGLYcerin 0.4 MG sublingual tablet    NITROSTAT    25 tablet    Place 1 tablet (0.4 mg) under the tongue every 5 minutes as needed for chest pain If you are still having symptoms after 3 doses (15 minutes) call 911.    ST elevation myocardial infarction involving left circumflex coronary artery (H)       omeprazole 20 MG CR capsule    priLOSEC          order for DME     1 Device    Equipment being ordered: Nebulizer Respironics,  MiniElite--Neb Machine and tubing and mouth piece.    Extrinsic asthma, unspecified       order for DME     1 Device    Equipment being ordered: arm blood pressure cuff    Essential hypertension, benign       * Notice:  This list has 2 medication(s) that are the same as other medications prescribed for you. Read the directions carefully, and ask your doctor or other care provider to review them with you.

## 2018-07-03 NOTE — LETTER
7/3/2018         RE: Raul Wilhelm  [address]  [place] [address]        Dear Colleague,    Thank you for referring your patient, Raul Wilhelm, to the [facility]. Please see a copy of my visit note below.    Raul Wilhelm  :  1945  DOS: 18  MRN: [id number]    Sports Medicine Evaluation    Initial History 2017: Since last visit on 17 with Raquel Briscoe where he underwent bilateral knee injections, the patient has begun to have an increase in pain over the past few months.      Symptoms are better with: activity   Symptoms are worse with: prolonged walking, stairs   Additional Features:                        Positive: none                        Negative: swelling, bruising, popping, grinding, catching, locking, instability, paresthesias, numbness, weakness, pain in other joints and systemic symptoms     Social History: tool , computer job     Interim History - 2018  Since last visit on 10/2/17 with Dr Grier patient has moderate bilateral knee pain over the last ~ 2 months.  Bilateral knee steroid injection completed on 2017 provided good relief for ~ 5 - 6 months.  He reports that pain has been waxing/waning based off his activity.  Desires repeat injections today.  Moderate antalgic gait noted.  No new injury in the interim.    Interim History - July 3, 2018  Since last visit on 2018 patient has moderate bilateral knee pain.  Bilateral knee SynviscOne injection completed on / provided mild relief.  Continues to have significant discomfort, interested in repeat steroid injections today.  No new injury in the interim.    Review of Systems  Skin: no bruising, no swelling  Musculoskeletal: as above  Neurologic: no numbness, paresthesias  Remainder of review of systems is negative including constitutional, CV, pulmonary, GI, except as noted in HPI or medical history.    Patient's current problem list, past  "medical and surgical history, and family history were reviewed.    Patient Active Problem List   Diagnosis     Family hx of colon cancer     Glaucoma     Hypertension     Cerebral infarction (H)     Hyperlipidemia LDL goal <100     Pneumonitis, hypersensitivity, naa (H)     Severe persistent asthma     COPD (chronic obstructive pulmonary disease) (H)     OA (osteoarthritis) of knee     Hoarseness of voice     Eczema     Impaired fasting blood sugar     Hypokalemia     ST elevation myocardial infarction involving left circumflex coronary artery (H)     Past Medical History:   Diagnosis Date     Chronic airway obstruction, not elsewhere classified      Other and unspecified hyperlipidemia      Past Surgical History:   Procedure Laterality Date     ENT SURGERY  as a child    tonsillectomy     Family History   Problem Relation Age of Onset     Diabetes Mother      HEART DISEASE Mother      C.A.D. Mother      Cancer Father      Hypertension Father      Cerebrovascular Disease Paternal Grandfather      Asthma Sister      Breast Cancer No family hx of      Cancer - colorectal No family hx of      Prostate Cancer No family hx of        Objective  BP (P) 129/74  Ht 5' 7\" (1.702 m)  Wt 164 lb (74.4 kg)  BMI 25.69 kg/m2    GENERAL APPEARANCE: healthy, alert and no distress   GAIT: NORMAL  SKIN: no suspicious lesions or rashes  HEENT: Sclera clear, anicteric  CV: good peripheral pulses  RESP: Breathing not labored  NEURO: Normal strength and tone, mentation intact and speech normal  PSYCH:  mentation appears normal and affect normal/bright    Bilateral Knee exam  Inspection:      no effusion, swelling of bruising bilateral     Patella:      Mobility -       hypomobile bilateral       Crepitus noted in the patellofemoral joint bilateral     Tender:      medial joint line bilateral > lateral joint line, PF joint laterally     Non Tender:      remainder of knee area bilateral     Knee ROM:      Full active and passive ROM " with flexion and extension bilateral     Hip ROM:     Full active and passive ROM bilateral     Strength:      5/5 with knee extension bilateral     Special Tests:      painful Memo bilateral       + PF grind       neg (-) varus at 0 deg and 30 deg bilateral       neg (-) valgus at 0 deg and 30 deg bilateral     Neurovascular:      2+ peripheral pulses bilaterally and brisk capillary refill       sensation grossly intact    Large Joint Injection/Arthocentesis  Date/Time: 7/3/2018 1:56 PM  Performed by: BERNARDA PATINO  Authorized by: BERNARDA PATINO     Indications:  Osteoarthritis and pain  Needle Size:  18 G  Guidance: ultrasound    Approach:  Superolateral  Location:  Knee  Laterality:  Bilateral  Site:  Bilateral knee  Medications (Right):  40 mg triamcinolone acetonide 40 MG/ML  Aspirate amount (mL):  4  Aspirate:  Serous  Medications (Left):  40 mg triamcinolone acetonide 40 MG/ML  Aspirate amount (mL):  20  Aspirate:  Serous  Outcome:  Tolerated well, no immediate complications  Procedure discussed: discussed risks, benefits, and alternatives    Consent Given by:  Patient  Prep: patient was prepped and draped in usual sterile fashion        Radiology  I visualized and reviewed these images with the patient  XR KNEE BILATERAL 3 VW 9/13/2017 1:42 PM  HISTORY: Bilateral osteoarthritis.  COMPARISON: 10/24/2012  FINDINGS: 3 views of the right knee. Complete loss of medial  tibiofemoral compartment joint space with associated marginal  osteophyte formation. Patellofemoral compartment joint space is  preserved. No joint effusion. No fracture or malalignment.  3 views of the left knee. Moderate loss of medial tibiofemoral  compartment joint space. Moderate tricompartmental marginal  osteophytes. Patellofemoral compartment joint space is preserved. No  joint effusion. No fracture or malalignment.  IMPRESSION: Moderate osteoarthritis, right greater than left.    Assessment:  1. Bilateral primary  osteoarthritis of knee    2. 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   Discussed symptom treatment with ice or heat, topical treatments, and rest if needed.   Expectations and goals of CSI reviewed  Often 2-3 days for steroid effect, and can take up to two weeks for maximum effect  We discussed modified progressive pain-free activity as tolerated  Do not overuse in first two weeks if feeling better due to concern for vulnerability while steroid is working  Supportive care reviewed  All questions were answered today  Contact us with additional questions or concerns  Signs and sx of concern reviewed      Karel Bustillos DO, MELINDA  Primary Care Sports Medicine  [facility]           Again, thank you for allowing me to participate in the care of your patient.        Sincerely,        Karel Bustillos DO

## 2018-07-09 RX ORDER — TRIAMCINOLONE ACETONIDE 40 MG/ML
40 INJECTION, SUSPENSION INTRA-ARTICULAR; INTRAMUSCULAR
Status: DISCONTINUED | OUTPATIENT
Start: 2018-07-03 | End: 2019-01-31

## 2018-07-13 DIAGNOSIS — I10 HYPERTENSION: Primary | ICD-10-CM

## 2018-07-13 RX ORDER — CARVEDILOL 6.25 MG/1
6.25 TABLET ORAL 2 TIMES DAILY
Qty: 180 TABLET | Refills: 2 | Status: SHIPPED | OUTPATIENT
Start: 2018-07-13 | End: 2019-06-05

## 2018-07-13 NOTE — TELEPHONE ENCOUNTER
"Requested Prescriptions   Pending Prescriptions Disp Refills     carvedilol (COREG) 6.25 MG tablet  Last Written Prescription Date:  9/6/2017  Last Fill Quantity: 180,  # refills: 2   Last office visit: 5/3/2018 with  provider:  Gerhard   Future Office Visit:     180 tablet 2     Sig: Take 1 tablet (6.25 mg) by mouth 2 times daily    Beta-Blockers Protocol Failed    7/13/2018 11:20 AM       Failed - Blood pressure under 140/90 in past 12 months    BP Readings from Last 3 Encounters:   07/03/18 (P) 129/74   05/03/18 115/90   04/24/18 138/84                Passed - Patient is age 6 or older       Passed - Recent (12 mo) or future (30 days) visit within the authorizing provider's specialty    Patient had office visit in the last 12 months or has a visit in the next 30 days with authorizing provider or within the authorizing provider's specialty.  See \"Patient Info\" tab in inbasket, or \"Choose Columns\" in Meds & Orders section of the refill encounter.              "

## 2018-07-23 DIAGNOSIS — J45.51 SEVERE PERSISTENT ASTHMA WITH ACUTE EXACERBATION (H): ICD-10-CM

## 2018-07-23 NOTE — TELEPHONE ENCOUNTER
Requested Prescriptions   Pending Prescriptions Disp Refills     predniSONE (DELTASONE) 20 MG tablet  Last Written Prescription Date:  04/16/18  Ended: 04/21/18  Last Fill Quantity: 10,  # refills: 0   Last office visit: 5/3/2018 with prescribing provider:  05/03/   Future Office Visit:     20 tablet 0     Sig: Take 3 tabs (60 mg) by mouth daily x 3 days, 2 tabs (40 mg) daily x 3 days, 1 tab (20 mg) daily x 3 days, then 1/2 tab (10 mg) x 3 days.    There is no refill protocol information for this order

## 2018-07-25 NOTE — TELEPHONE ENCOUNTER
Patient returning call to clinic  He reports that he keeps an emergency stash of Prednisone on hand in case of an exacerbation of asthma, [place].  Last prescribed 5/3/18  LOV 5/3/18    Routing to provider.    Kristin SLADE Rn

## 2018-07-25 NOTE — TELEPHONE ENCOUNTER
Per pt he is seeing Dr eHnnessy and would like his medication refills to go through her. He uses the prednisone for emergencies and keeps some on hand at home.    [facility]  Clinic Station [place]

## 2018-07-31 RX ORDER — PREDNISONE 20 MG/1
TABLET ORAL
Qty: 20 TABLET | Refills: 1 | Status: SHIPPED | OUTPATIENT
Start: 2018-07-31 | End: 2019-01-10

## 2018-08-03 ENCOUNTER — TELEPHONE (OUTPATIENT)
Dept: FAMILY MEDICINE | Facility: CLINIC | Age: 73
End: 2018-08-03

## 2018-08-03 DIAGNOSIS — K21.9 GASTROESOPHAGEAL REFLUX DISEASE WITHOUT ESOPHAGITIS: Primary | ICD-10-CM

## 2018-08-03 NOTE — TELEPHONE ENCOUNTER
Routing refill request to provider for review/approval because:  Medication is reported/historical  Patient is 71 yo     Mayela BRISCOE RN

## 2018-08-03 NOTE — TELEPHONE ENCOUNTER
Pt would like dr. Hennessy to refill Omeprazole 20 mg. Medication was reported please call pt if have questions.                   Thank You,  Rene Moya, [facility]  On behalf of [facility]

## 2018-08-08 ENCOUNTER — TELEPHONE (OUTPATIENT)
Dept: FAMILY MEDICINE | Facility: CLINIC | Age: 73
End: 2018-08-08

## 2018-08-08 NOTE — LETTER
Raul Wilhelm  [address]  [place] [address]          08/24/18      Dear Raul Wilhelm        Your most recent blood pressure reading preformed at our clinic was higher than we like to see it. The goal is to have it under 140/90 in clinic.    Please call our clinic [phone] to schedule a blood pressure recheck with our RN staff. This appointment is free of charges and it takes about 15 minutes to be complete.     Be sure to take all of your blood pressure medications, and avoid stimulants like caffeine, cold medicines, sudafed, or tobacco prior to your recheck.    If your blood pressure medication was changed by your provider recently wait 2 weeks before making this recheck appointment.     Thank you for trusting us with your health care.    Sincerely,    Your [facility] Team

## 2018-08-08 NOTE — TELEPHONE ENCOUNTER
Panel Management:    Patient's blood pressure was elevated at last clinic visit with Dr. Hennessy.  It is important to get blood pressure < 140/80, especially for those with a history of diabetes or vascular disease.      Please contact the patient and have them schedule RN visit for free blood pressure check.

## 2018-08-09 NOTE — TELEPHONE ENCOUNTER
(1st attempt) Left a voice msg for pt to call back  When he does schedule BP recheck with RN (free visit).  Albina Puga CMA ([facility])   (aka: Adrianna Puga)

## 2018-08-16 NOTE — TELEPHONE ENCOUNTER
(2nd attempt) Left a voice msg for pt to call back  When he does schedule BP recheck with RN (free visit).  Albina Puga CMA ([facility])   (aka: Adrianna Puga)

## 2018-08-24 NOTE — TELEPHONE ENCOUNTER
(3rd attempt) Left a voice msg for pt to call back  When he does schedule BP recheck with RN (free visit).  Letter sent  Albina Puga CMA ([facility])   (aka: Adrianna Puga)

## 2018-08-27 DIAGNOSIS — J30.2 SEASONAL ALLERGIC RHINITIS: ICD-10-CM

## 2018-08-27 DIAGNOSIS — J45.50 SEVERE PERSISTENT ASTHMA (H): ICD-10-CM

## 2018-08-28 RX ORDER — MONTELUKAST SODIUM 10 MG/1
TABLET ORAL
Qty: 90 TABLET | Refills: 3 | Status: SHIPPED | OUTPATIENT
Start: 2018-08-28 | End: 2019-11-11

## 2018-08-28 NOTE — TELEPHONE ENCOUNTER
"Requested Prescriptions   Pending Prescriptions Disp Refills     montelukast (SINGULAIR) 10 MG tablet  Last Written Prescription Date:  8/7/2017  Last Fill Quantity: 90,  # refills: 3   Last office visit: 5/3/2018 with provider:  Justin    Future Office Visit:     90 tablet 3    Leukotriene Inhibitors Protocol Failed    8/27/2018  9:08 AM       Failed - Asthma control assessment score within normal limits in last 6 months    Please review ACT score.          Passed - Patient is age 12 or older    If patient is under 16, ok to refill using age based dosing.          Passed - Recent (6 mo) or future (30 days) visit within the authorizing provider's specialty    Patient had office visit in the last 6 months or has a visit in the next 30 days with authorizing provider or within the authorizing provider's specialty.  See \"Patient Info\" tab in inbasket, or \"Choose Columns\" in Meds & Orders section of the refill encounter.              "

## 2018-08-28 NOTE — TELEPHONE ENCOUNTER
Prescription approved per [facility] [place] [place] Refill Protocol.  Sona CORNEJO RN

## 2018-08-29 ASSESSMENT — ASTHMA QUESTIONNAIRES: ACT_TOTALSCORE: 20

## 2018-08-30 ENCOUNTER — TELEPHONE (OUTPATIENT)
Dept: FAMILY MEDICINE | Facility: CLINIC | Age: 73
End: 2018-08-30

## 2018-08-30 ENCOUNTER — ALLIED HEALTH/NURSE VISIT (OUTPATIENT)
Dept: FAMILY MEDICINE | Facility: CLINIC | Age: 73
End: 2018-08-30
Payer: COMMERCIAL

## 2018-08-30 VITALS — SYSTOLIC BLOOD PRESSURE: 138 MMHG | DIASTOLIC BLOOD PRESSURE: 80 MMHG

## 2018-08-30 DIAGNOSIS — I10 HYPERTENSION: Primary | ICD-10-CM

## 2018-08-30 PROCEDURE — 99207 ZZC NO CHARGE NURSE ONLY: CPT

## 2018-08-30 NOTE — TELEPHONE ENCOUNTER
Agree with RN advice for shortness of breath/wheezing.    No changes to blood pressure medications

## 2018-08-30 NOTE — MR AVS SNAPSHOT
After Visit Summary   8/30/2018    Raul Wilhelm    MRN: [id number]           Patient Information     Date Of Birth          1945        Visit Information        Provider Department      8/30/2018 1:15 PM RAUL BERMUDEZ/[facility]        Today's Diagnoses     Hypertension    -  1       Follow-ups after your visit        Who to contact     If you have questions or need follow up information about today's clinic visit or your schedule please contact [facility] directly at [phone].  Normal or non-critical lab and imaging results will be communicated to you by MyChart, letter or phone within 4 business days after the clinic has received the results. If you do not hear from us within 7 days, please contact the clinic through MyChart or phone. If you have a critical or abnormal lab result, we will notify you by phone as soon as possible.  Submit refill requests through [employer] or call your pharmacy and they will forward the refill request to us. Please allow 3 business days for your refill to be completed.          Additional Information About Your Visit        Care EveryWhere ID     This is your Care EveryWhere ID. This could be used by other organizations to access your [facility] medical records  [id number]         Blood Pressure from Last 3 Encounters:   08/30/18 138/80   07/03/18 (P) 129/74   05/03/18 115/90    Weight from Last 3 Encounters:   07/03/18 164 lb (74.4 kg)   05/03/18 165 lb (74.8 kg)   04/24/18 160 lb (72.6 kg)              Today, you had the following     No orders found for display       Primary Care Provider Office Phone # Fax #    Sarita Leonor Hennessy [phone] [address] [facility] [address]        Equal Access to Services     DIDIER VINES : Hadii buffy Valentine, ana beatty, qaybta daniel borden. So [facility] [phone].    ATENCIÓN: rohan Briones  a mccarthy disposición servicios gratuitos de asistencia lingüística. Desirae whitt [phone].    We comply with applicable federal civil rights laws and [place] laws. We do not discriminate on the basis of race, color, national origin, age, disability, sex, sexual orientation, or gender identity.            Thank you!     Thank you for choosing [facility]  for your care. Our goal is always to provide you with excellent care. Hearing back from our patients is one way we can continue to improve our services. Please take a few minutes to complete the written survey that you may receive in the mail after your visit with us. Thank you!             Your Updated Medication List - Protect others around you: Learn how to safely use, store and throw away your medicines at www.disposemymeds.org.          This list is accurate as of 8/30/18  1:30 PM.  Always use your most recent med list.                   Brand Name Dispense Instructions for use Diagnosis    * albuterol (2.5 MG/3ML) 0.083% neb solution     30 vial    Take 1 vial (2.5 mg) by nebulization every 6 hours as needed for shortness of breath / dyspnea or wheezing    Acute bronchitis, unspecified organism       * albuterol 108 (90 Base) MCG/ACT inhaler    PROAIR HFA/PROVENTIL HFA/VENTOLIN HFA    1 Inhaler    Inhale 2 puffs into the lungs every 6 hours as needed for shortness of breath / dyspnea or wheezing    Uncomplicated severe persistent asthma, Chronic obstructive pulmonary disease, unspecified COPD type (H)       aspirin 81 MG EC tablet     30 tablet    Take 1 tablet (81 mg) by mouth daily    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  Do not apply to face.    Psoriasis       EPINEPHrine 0.3 MG/0.3ML injection 2-pack    EPIPEN/ADRENACLICK/or ANY BX GENERIC EQUIV    0.6 mL    Inject 0.3 mLs (0.3 mg) into the muscle as needed for anaphylaxis    Bee sting allergy       fexofenadine 180 MG tablet    ALLEGRA    90 tablet    Take 1 tablet by mouth daily.    Seasonal allergic rhinitis       fluticasone 220 MCG/ACT Inhaler    FLOVENT HFA    1 Inhaler    Inhale 2 puffs into the lungs 2 times daily    Severe persistent asthma without complication       ipratropium - albuterol 0.5 mg/2.5 mg/3 mL 0.5-2.5 (3) MG/3ML neb solution    DUONEB    1 Box    Take 1 vial (3 mLs) by nebulization every 4 hours as needed for shortness of breath / dyspnea    Uncomplicated severe persistent asthma       latanoprost 0.005 % ophthalmic solution    XALATAN     Place 1 drop into both eyes At Bedtime        losartan-hydrochlorothiazide 50-12.5 MG per tablet    HYZAAR    90 tablet    Take 1 tablet by mouth daily    Essential hypertension, benign       montelukast 10 MG tablet    SINGULAIR    90 tablet    TAKE ONE TABLET BY MOUTH EVERY NIGHT AT BEDTIME    Severe persistent asthma, Seasonal allergic rhinitis       nitroGLYcerin 0.4 MG sublingual tablet    NITROSTAT    25 tablet    Place 1 tablet (0.4 mg) under the tongue every 5 minutes as needed for chest pain If you are still having symptoms after 3 doses (15 minutes) call 911.    ST elevation myocardial infarction involving left circumflex coronary artery (H)       omeprazole 20 MG CR capsule    priLOSEC    90 capsule    Take 1 capsule (20 mg) by mouth daily    Gastroesophageal reflux disease without esophagitis       order for DME     1 Device    Equipment being ordered: Nebulizer Respironics, MiniElite--Neb Machine and tubing and mouth piece.    Extrinsic asthma, unspecified       order for DME     1 Device    Equipment being  ordered: arm blood pressure cuff    Essential hypertension, benign       predniSONE 20 MG tablet    DELTASONE    20 tablet    Take 3 tabs (60 mg) by mouth daily x 3 days, 2 tabs (40 mg) daily x 3 days, 1 tab (20 mg) daily x 3 days, then 1/2 tab (10 mg) x 3 days.    Severe persistent asthma with acute exacerbation       * Notice:  This list has 2 medication(s) that are the same as other medications prescribed for you. Read the directions carefully, and ask your doctor or other care provider to review them with you.

## 2018-08-30 NOTE — TELEPHONE ENCOUNTER
Raul Wilhelm is a 72 year old year old patient who comes in today for a Blood Pressure check because of ongoing blood pressure monitoring.  Vital Signs as repeated by RASHEL García  Patient is taking medication as prescribed.  Patient is tolerating medications well.  Patient is not monitoring Blood Pressure at home.      Current complaints: some wheezing noted today due to asthma/COPD, patient reported coughing fit a few minutes before appointment. encouraged patient to use rescue albuterol inhalers for acute wheezing episodes, patient is currently only using Flovent inhaler daily.   Wheezing has decreased by end of visit today and is no longer audible.    No added salt in diet.     BP Readings from Last 3 Encounters:   08/30/18 138/80   07/03/18 (P) 129/74   05/03/18 115/90       Disposition:  Routed to provider for review.     Patient request message to be routed to Dr. Hennessy and for PC to be changed to Dr. Hennessy.     [facility]  [phone] ok to leave detailed message.

## 2018-08-30 NOTE — NURSING NOTE
Raul Wilhelm is a 72 year old year old patient who comes in today for a Blood Pressure check because of ongoing blood pressure monitoring.  Vital Signs as repeated by RASHEL García  Patient is taking medication as prescribed.  Patient is tolerating medications well.  Patient is not monitoring Blood Pressure at home.       Current complaints: some wheezing noted today due to asthma/COPD, patient reported coughing fit a few minutes before appointment. encouraged patient to use rescue albuterol inhalers for acute wheezing episodes, patient is currently only using Flovent inhaler daily.   Wheezing has decreased by end of visit today and is no longer audible.    No added salt in diet.          BP Readings from Last 3 Encounters:   08/30/18 138/80   07/03/18 (P) 129/74   05/03/18 115/90         Disposition:  Routed to provider for review.      Patient request message to be routed to Dr. Hennessy and for PC to be changed to Dr. Hennessy. Changed as requested.     [facility]  [phone] ok to leave detailed message.

## 2018-09-17 ENCOUNTER — ALLIED HEALTH/NURSE VISIT (OUTPATIENT)
Dept: FAMILY MEDICINE | Facility: CLINIC | Age: 73
End: 2018-09-17
Payer: COMMERCIAL

## 2018-09-17 DIAGNOSIS — Z23 NEED FOR PROPHYLACTIC VACCINATION AND INOCULATION AGAINST INFLUENZA: Primary | ICD-10-CM

## 2018-09-17 PROCEDURE — 90662 IIV NO PRSV INCREASED AG IM: CPT

## 2018-09-17 PROCEDURE — 90472 IMMUNIZATION ADMIN EACH ADD: CPT

## 2018-09-17 PROCEDURE — 90714 TD VACC NO PRESV 7 YRS+ IM: CPT

## 2018-09-17 PROCEDURE — 90471 IMMUNIZATION ADMIN: CPT

## 2018-09-17 NOTE — MR AVS SNAPSHOT
After Visit Summary   9/17/2018    Raul Wilhelm    MRN: [id number]           Patient Information     Date Of Birth          1945        Visit Information        Provider Department      9/17/2018 8:30 AM [facility]U [facility]OT [facility]        Today's Diagnoses     Need for prophylactic vaccination and inoculation against influenza    -  1       Follow-ups after your visit        Who to contact     If you have questions or need follow up information about today's clinic visit or your schedule please contact [facility] directly at [phone].  Normal or non-critical lab and imaging results will be communicated to you by MyChart, letter or phone within 4 business days after the clinic has received the results. If you do not hear from us within 7 days, please contact the clinic through MyChart or phone. If you have a critical or abnormal lab result, we will notify you by phone as soon as possible.  Submit refill requests through [employer] or call your pharmacy and they will forward the refill request to us. Please allow 3 business days for your refill to be completed.          Additional Information About Your Visit        Care EveryWhere ID     This is your Care EveryWhere ID. This could be used by other organizations to access your [facility] medical records  [id number]         Blood Pressure from Last 3 Encounters:   08/30/18 138/80   07/03/18 (P) 129/74   05/03/18 115/90    Weight from Last 3 Encounters:   07/03/18 164 lb (74.4 kg)   05/03/18 165 lb (74.8 kg)   04/24/18 160 lb (72.6 kg)              We Performed the Following     EA ADD'L VACCINE     FLU VACCINE, INCREASED ANTIGEN, PRESV FREE, AGE 65+ [64921]     TD PRSERV FREE >=7 YRS ADS IM     Vaccine Administration, Initial [99172]        Primary Care Provider Office Phone # Fax #    Sarita Hennessy [phone] [address] [facility] [address]        Equal Access to Services      DIDIER VINES : Hadii aad ku wilver Valentine, waaxda luqadaha, qaybta kaalmada gustavo, daniel lindy darekshey prescott deonnaalejandrina flores . So [facility] [phone].    ATENCIÓN: Si habla español, tiene a mccarthy disposición servicios gratuitos de asistencia lingüística. Llame al [phone].    We comply with applicable federal civil rights laws and [place] laws. We do not discriminate on the basis of race, color, national origin, age, disability, sex, sexual orientation, or gender identity.            Thank you!     Thank you for choosing [facility]  for your care. Our goal is always to provide you with excellent care. Hearing back from our patients is one way we can continue to improve our services. Please take a few minutes to complete the written survey that you may receive in the mail after your visit with us. Thank you!             Your Updated Medication List - Protect others around you: Learn how to safely use, store and throw away your medicines at www.disposemymeds.org.          This list is accurate as of 9/17/18  8:33 AM.  Always use your most recent med list.                   Brand Name Dispense Instructions for use Diagnosis    * albuterol (2.5 MG/3ML) 0.083% neb solution     30 vial    Take 1 vial (2.5 mg) by nebulization every 6 hours as needed for shortness of breath / dyspnea or wheezing    Acute bronchitis, unspecified organism       * albuterol 108 (90 Base) MCG/ACT inhaler    PROAIR HFA/PROVENTIL HFA/VENTOLIN HFA    1 Inhaler    Inhale 2 puffs into the lungs every 6 hours as needed for shortness of breath / dyspnea or wheezing    Uncomplicated severe persistent asthma, Chronic obstructive pulmonary disease, unspecified COPD type (H)       aspirin 81 MG EC tablet     30 tablet    Take 1 tablet (81 mg) by mouth daily    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  Do not apply to face.    Psoriasis       EPINEPHrine 0.3 MG/0.3ML injection 2-pack    EPIPEN/ADRENACLICK/or ANY BX GENERIC EQUIV    0.6 mL    Inject 0.3 mLs (0.3 mg) into the muscle as needed for anaphylaxis    Bee sting allergy       fexofenadine 180 MG tablet    ALLEGRA    90 tablet    Take 1 tablet by mouth daily.    Seasonal allergic rhinitis       fluticasone 220 MCG/ACT Inhaler    FLOVENT HFA    1 Inhaler    Inhale 2 puffs into the lungs 2 times daily    Severe persistent asthma without complication       ipratropium - albuterol 0.5 mg/2.5 mg/3 mL 0.5-2.5 (3) MG/3ML neb solution    DUONEB    1 Box    Take 1 vial (3 mLs) by nebulization every 4 hours as needed for shortness of breath / dyspnea    Uncomplicated severe persistent asthma       latanoprost 0.005 % ophthalmic solution    XALATAN     Place 1 drop into both eyes At Bedtime        losartan-hydrochlorothiazide 50-12.5 MG per tablet    HYZAAR    90 tablet    Take 1 tablet by mouth daily    Essential hypertension, benign       montelukast 10 MG tablet    SINGULAIR    90 tablet    TAKE ONE TABLET BY MOUTH EVERY NIGHT AT BEDTIME    Severe persistent asthma, Seasonal allergic rhinitis       nitroGLYcerin 0.4 MG sublingual tablet    NITROSTAT    25 tablet    Place 1 tablet (0.4 mg) under the tongue every 5 minutes as needed for chest pain If you are still having symptoms after 3 doses (15 minutes) call 911.    ST elevation myocardial infarction involving left circumflex coronary artery (H)       omeprazole 20 MG CR capsule    priLOSEC    90 capsule    Take 1 capsule (20 mg) by mouth daily    Gastroesophageal reflux disease without esophagitis       order for DME     1  Device    Equipment being ordered: Nebulizer Respironics, MiniElite--Neb Machine and tubing and mouth piece.    Extrinsic asthma, unspecified       order for DME     1 Device    Equipment being ordered: arm blood pressure cuff    Essential hypertension, benign       predniSONE 20 MG tablet    DELTASONE    20 tablet    Take 3 tabs (60 mg) by mouth daily x 3 days, 2 tabs (40 mg) daily x 3 days, 1 tab (20 mg) daily x 3 days, then 1/2 tab (10 mg) x 3 days.    Severe persistent asthma with acute exacerbation       * Notice:  This list has 2 medication(s) that are the same as other medications prescribed for you. Read the directions carefully, and ask your doctor or other care provider to review them with you.

## 2018-09-17 NOTE — PROGRESS NOTES

## 2018-10-22 DIAGNOSIS — J20.9 ACUTE BRONCHITIS, UNSPECIFIED ORGANISM: ICD-10-CM

## 2018-10-23 RX ORDER — ALBUTEROL SULFATE 0.83 MG/ML
2.5 SOLUTION RESPIRATORY (INHALATION) EVERY 6 HOURS PRN
Qty: 30 VIAL | Refills: 3 | Status: SHIPPED | OUTPATIENT
Start: 2018-10-23 | End: 2019-04-19

## 2018-10-23 NOTE — TELEPHONE ENCOUNTER
"Requested Prescriptions   Pending Prescriptions Disp Refills     albuterol (2.5 MG/3ML) 0.083% neb solution  Last Written Prescription Date:  12/12/2017  Last Fill Quantity: 30 vial,  # refills: 1   Last office visit: 5/30/2018 with prescribing provider:  Gerhard    Future Office Visit:     30 vial 1     Sig: Take 1 vial (2.5 mg) by nebulization every 6 hours as needed for shortness of breath / dyspnea or wheezing    Asthma Maintenance Inhalers - Anticholinergics Passed    10/22/2018 11:32 AM       Passed - Patient is age 12 years or older       Passed - Asthma control assessment score within normal limits in last 6 months    Please review ACT score.          Passed - Recent (6 mo) or future (30 days) visit within the authorizing provider's specialty    Patient had office visit in the last 6 months or has a visit in the next 30 days with authorizing provider or within the authorizing provider's specialty.  See \"Patient Info\" tab in inbasket, or \"Choose Columns\" in Meds & Orders section of the refill encounter.              "

## 2019-01-02 DIAGNOSIS — J45.51 SEVERE PERSISTENT ASTHMA WITH ACUTE EXACERBATION (H): ICD-10-CM

## 2019-01-02 NOTE — TELEPHONE ENCOUNTER
Covering for PCP:  Is he currently having respiratory symptoms?  Sounds like overall his breathing symptoms have not been well controlled per chart review.  I would recommend clinic visit to readdress.    Swapnil Moyer MD

## 2019-01-02 NOTE — TELEPHONE ENCOUNTER
Requested Prescriptions   Pending Prescriptions Disp Refills     predniSONE (DELTASONE) 20 MG tablet 20 tablet 1     Sig: Take 3 tabs (60 mg) by mouth daily x 3 days, 2 tabs (40 mg) daily x 3 days, 1 tab (20 mg) daily x 3 days, then 1/2 tab (10 mg) x 3 days..    There is no refill protocol information for this order         predniSONE (DELTASONE) 20 MG tablet        Last Written Prescription Date:  07/31/18  Last Fill Quantity: 20,   # refills: 1  Last Office Visit: 05/03/18  CATHERINE Hennessy  Future Office visit:       Routing refill request to provider for review/approval because:  Drug not on the [facility] or [facility] refill protocol or controlled substance

## 2019-01-03 RX ORDER — PREDNISONE 20 MG/1
TABLET ORAL
Qty: 20 TABLET | Refills: 1 | OUTPATIENT
Start: 2019-01-03

## 2019-01-03 NOTE — TELEPHONE ENCOUNTER
"Patient informed of message below from provider.  Patient reports he is not having respiraotory symptoms such as wheezing or Shortness of breath just requests to have on hand in emergency.   Patient states \"we will pass on it then.\"   "

## 2019-01-09 NOTE — PROGRESS NOTES
SUBJECTIVE:   Raul Wilhelm is a 73 year old male who presents to clinic today for the following health issues:    Chief Complaint   Patient presents with     Refill Request     Prednisone- states if his asthma flares up he likes to keep this on hand with him      Health Maintenance     Declined fit test and colonoscopy        Asthma Follow-Up    Was ACT completed today?  No      Respiratory symptoms:   Cough: No   Wheezing: No   Shortness of breath: No    Taking controlled (daily) meds as prescribed: Yes    ER/UC visits or hospital admissions since last visit: none     Recent asthma triggers that patient is dealing with: None       Amount of exercise or physical activity: 2-3 days/week for an average of greater than 60 minutes    Problems taking medications regularly: No    Medication side effects: none    Diet: regular (no restrictions)    He wants to have prednisone on hand.  He travels to [facility] and clinic is 1.5 hours from his cabin there.    Today he feels a little wheezy      He wasn't able to afford controller inhaler, but our pharmacist was able to help him get Flovent.  He is using 1 puff daily, forgets to use twice daily. He forgets it entirely 2-3 days/week.    Albuterol neb - use is 3 x week - finds very helpful.  Often uses pre-emptively.    Rarely uses albuterol inhaler.    Last prednisone use was 1 month ago but cannot recall the dose or length.  He has used 2 tapers of prednisone since July.  Symptoms that trigger prednisone are shortness of breath.    He has multiple courses of prednisone in 2018 - at least 4 maybe as much as 6.  He reports syncope during last PFT years ago.  Saw Pulm but years ago.  He thinks he has asthma due to 'small lung capacity'.    His eye doctor has noted increased eye pressure starting 6-7 years ago.      Current Outpatient Medications   Medication Sig Dispense Refill     albuterol (2.5 MG/3ML) 0.083% neb solution Take 1 vial (2.5 mg) by nebulization every 6 hours as  needed for shortness of breath / dyspnea or wheezing 30 vial 3     albuterol (PROAIR HFA/PROVENTIL HFA/VENTOLIN HFA) 108 (90 Base) MCG/ACT Inhaler Inhale 2 puffs into the lungs every 6 hours as needed for shortness of breath / dyspnea or wheezing 1 Inhaler 5     aspirin EC 81 MG EC tablet Take 1 tablet (81 mg) by mouth daily 30 tablet 1     atorvastatin (LIPITOR) 20 MG tablet Take 1 tablet (20 mg) by mouth daily 90 tablet 3     carvedilol (COREG) 6.25 MG tablet Take 1 tablet (6.25 mg) by mouth 2 times daily 180 tablet 2     fluticasone (FLOVENT HFA) 220 MCG/ACT Inhaler Inhale 2 puffs into the lungs 2 times daily 1 Inhaler 11     ipratropium - albuterol 0.5 mg/2.5 mg/3 mL (DUONEB) 0.5-2.5 (3) MG/3ML neb solution Take 1 vial (3 mLs) by nebulization every 4 hours as needed for shortness of breath / dyspnea 1 Box 2     losartan-hydrochlorothiazide (HYZAAR) 50-12.5 MG per tablet Take 1 tablet by mouth daily 90 tablet 3     montelukast (SINGULAIR) 10 MG tablet TAKE ONE TABLET BY MOUTH EVERY NIGHT AT BEDTIME 90 tablet 3     omeprazole (PRILOSEC) 20 MG CR capsule Take 1 capsule (20 mg) by mouth daily 90 capsule 3     clobetasol (TEMOVATE) 0.05 % ointment Apply sparingly to affected area twice daily as needed.  Do not apply to face. (Patient not taking: Reported on 5/3/2018) 45 g 1     EPINEPHrine 0.3 MG/0.3ML injection 2-pack Inject 0.3 mLs (0.3 mg) into the muscle as needed for anaphylaxis (Patient not taking: Reported on 1/10/2019) 0.6 mL 0     fexofenadine (ALLEGRA) 180 MG tablet Take 1 tablet by mouth daily. (Patient not taking: Reported on 3/23/2018) 90 tablet 1     latanoprost (XALATAN) 0.005 % ophthalmic solution Place 1 drop into both eyes At Bedtime       nitroglycerin (NITROSTAT) 0.4 MG sublingual tablet Place 1 tablet (0.4 mg) under the tongue every 5 minutes as needed for chest pain If you are still having symptoms after 3 doses (15 minutes) call 911. (Patient not taking: Reported on 3/23/2018) 25 tablet 1      "order for DME Equipment being ordered: arm blood pressure cuff 1 Device 0     ORDER FOR DME Equipment being ordered: Nebulizer Respironics, MiniElite--Neb Machine and tubing and mouth piece. 1 Device 0       Reviewed and updated as needed this visit by clinical staff       Reviewed and updated as needed this visit by Provider         ROS:  Constitutional, HEENT, cardiovascular, pulmonary, gi and gu systems are negative, except as otherwise noted.    OBJECTIVE:     /80 (BP Location: Left arm, Patient Position: Sitting, Cuff Size: Adult Large)   Pulse 85   Temp 97.3  F (36.3  C) (Tympanic)   Ht 1.702 m (5' 7\")   Wt 77.8 kg (171 lb 8 oz)   SpO2 95%   BMI 26.86 kg/m    Body mass index is 26.86 kg/m .  GENERAL APPEARANCE: healthy, alert and no distress  RESP: faint wheezing in all lung fields  CV: regular rates and rhythm, normal S1 S2, no S3 or S4 and no murmur, click or rub      ASSESSMENT/PLAN:     1. Pneumonitis, hypersensitivity, naa (H) -history of working/reason pigeons for decades.  He has seen a lung doctor but years ago, records are not available.  This likely contributes to his asthma.  Discussed getting CT, updated PFTs, referral to pulmonary medicine.  He has had multiple courses of steroids in 2018.  Patient is reluctant to pursue any further testing, and would prefer just to use prednisone.  Discussed the risks of intermittent bursts of prednisone.  Patient seems unconcerned with this risk.  He did have an episode of syncope after PFTs.  I discussed the pathophysiology of syncope during this test, discussed that it is not uncommon for this to occur, and should not preclude future PFTs.  Patient still hesitant despite lengthy discussion  - CT Chest w/o Contrast; Future    2. Severe persistent asthma without complication  - CT Chest w/o Contrast; Future  - General PFT Lab (Please always keep checked); Future  - Pulmonary Function Test; Future  - PULMONARY MEDICINE REFERRAL    3. Severe " persistent asthma with acute exacerbation -only gave 1 course of prednisone for patient to have on hand.  I would like to see him during his next flareup of asthma if possible, to ensure that he actually needs prednisone, to avoid overuse  - predniSONE (DELTASONE) 20 MG tablet; Take 3 tabs (60 mg) by mouth daily x 3 days, 2 tabs (40 mg) daily x 3 days, 1 tab (20 mg) daily x 3 days, then 1/2 tab (10 mg) x 3 days..  Dispense: 20 tablet; Refill: 0    4. Chronic obstructive pulmonary disease, unspecified COPD type (H) -last PFT in 2009 did show COPD, likely due to long-standing severe asthma.  May consider adding Spiriva, but cost is a concern for patient      Patient Instructions   1. Refill of prednisone  2. Fill the albuterol inhaler  3. You need to have breathing tests done.  Please call the Pulmonary lab at [phone] to schedule  4. Get CT scan  5. See Lung doctor      Sarita Hennessy,   [facility]

## 2019-01-10 ENCOUNTER — OFFICE VISIT (OUTPATIENT)
Dept: FAMILY MEDICINE | Facility: CLINIC | Age: 74
End: 2019-01-10
Payer: COMMERCIAL

## 2019-01-10 VITALS
BODY MASS INDEX: 26.92 KG/M2 | DIASTOLIC BLOOD PRESSURE: 80 MMHG | TEMPERATURE: 97.3 F | HEIGHT: 67 IN | WEIGHT: 171.5 LBS | SYSTOLIC BLOOD PRESSURE: 122 MMHG | OXYGEN SATURATION: 95 % | HEART RATE: 85 BPM

## 2019-01-10 DIAGNOSIS — J67.2: Primary | ICD-10-CM

## 2019-01-10 DIAGNOSIS — J45.50 SEVERE PERSISTENT ASTHMA WITHOUT COMPLICATION (H): ICD-10-CM

## 2019-01-10 DIAGNOSIS — J44.9 CHRONIC OBSTRUCTIVE PULMONARY DISEASE, UNSPECIFIED COPD TYPE (H): ICD-10-CM

## 2019-01-10 DIAGNOSIS — J45.51 SEVERE PERSISTENT ASTHMA WITH ACUTE EXACERBATION (H): ICD-10-CM

## 2019-01-10 PROCEDURE — 99214 OFFICE O/P EST MOD 30 MIN: CPT | Performed by: INTERNAL MEDICINE

## 2019-01-10 RX ORDER — PREDNISONE 20 MG/1
TABLET ORAL
Qty: 20 TABLET | Refills: 0 | Status: SHIPPED | OUTPATIENT
Start: 2019-01-10 | End: 2019-04-19

## 2019-01-10 ASSESSMENT — MIFFLIN-ST. JEOR: SCORE: 1481.55

## 2019-01-10 NOTE — PATIENT INSTRUCTIONS
1. Refill of prednisone  2. Fill the albuterol inhaler  3. You need to have breathing tests done.  Please call the Pulmonary lab at [phone] to schedule  4. Get CT scan  5. See Lung doctor

## 2019-01-11 ASSESSMENT — ASTHMA QUESTIONNAIRES: ACT_TOTALSCORE: 21

## 2019-01-18 ENCOUNTER — TELEPHONE (OUTPATIENT)
Dept: FAMILY MEDICINE | Facility: CLINIC | Age: 74
End: 2019-01-18

## 2019-01-18 DIAGNOSIS — I21.21 ST ELEVATION MYOCARDIAL INFARCTION INVOLVING LEFT CIRCUMFLEX CORONARY ARTERY (H): ICD-10-CM

## 2019-01-18 DIAGNOSIS — I10 ESSENTIAL HYPERTENSION, BENIGN: ICD-10-CM

## 2019-01-18 RX ORDER — LOSARTAN POTASSIUM AND HYDROCHLOROTHIAZIDE 12.5; 5 MG/1; MG/1
1 TABLET ORAL DAILY
Qty: 90 TABLET | Refills: 3 | Status: SHIPPED | OUTPATIENT
Start: 2019-01-18 | End: 2020-04-10

## 2019-01-18 RX ORDER — ATORVASTATIN CALCIUM 20 MG/1
20 TABLET, FILM COATED ORAL DAILY
Qty: 90 TABLET | Refills: 0 | Status: SHIPPED | OUTPATIENT
Start: 2019-01-18 | End: 2019-01-31

## 2019-01-18 NOTE — TELEPHONE ENCOUNTER
"Requested Prescriptions   Pending Prescriptions Disp Refills     losartan-hydrochlorothiazide (HYZAAR) 50-12.5 MG tablet 90 tablet 3     Sig: Take 1 tablet by mouth daily    Angiotensin-II Receptors Failed - 1/18/2019  9:28 AM       Failed - Normal serum creatinine on file in past 12 months    Recent Labs   Lab Test 03/29/18  0028   CR 1.32*            Passed - Blood pressure under 140/90 in past 12 months    BP Readings from Last 3 Encounters:   01/10/19 122/80   08/30/18 138/80   07/03/18 (P) 129/74                Passed - Recent (12 mo) or future (30 days) visit within the authorizing provider's specialty    Patient had office visit in the last 12 months or has a visit in the next 30 days with authorizing provider or within the authorizing provider's specialty.  See \"Patient Info\" tab in inbasket, or \"Choose Columns\" in Meds & Orders section of the refill encounter.             Passed - Medication is active on med list       Passed - Patient is age 18 or older       Passed - Normal serum potassium on file in past 12 months    Recent Labs   Lab Test 03/29/18  0028   POTASSIUM 3.7                      "

## 2019-01-18 NOTE — TELEPHONE ENCOUNTER
Routing refill request to provider for review/approval because:  Labs out of range:  Gautam SLADE RN

## 2019-01-25 DIAGNOSIS — E78.5 HYPERLIPIDEMIA LDL GOAL <100: Primary | ICD-10-CM

## 2019-01-26 DIAGNOSIS — E78.5 HYPERLIPIDEMIA LDL GOAL <100: ICD-10-CM

## 2019-01-26 LAB
ALT SERPL W P-5'-P-CCNC: 32 U/L (ref 0–70)
CHOLEST SERPL-MCNC: 163 MG/DL
HDLC SERPL-MCNC: 42 MG/DL
LDLC SERPL CALC-MCNC: 90 MG/DL
NONHDLC SERPL-MCNC: 121 MG/DL
TRIGL SERPL-MCNC: 157 MG/DL

## 2019-01-26 PROCEDURE — 80061 LIPID PANEL: CPT | Performed by: PHYSICIAN ASSISTANT

## 2019-01-26 PROCEDURE — 84460 ALANINE AMINO (ALT) (SGPT): CPT | Performed by: PHYSICIAN ASSISTANT

## 2019-01-26 PROCEDURE — 36415 COLL VENOUS BLD VENIPUNCTURE: CPT | Performed by: PHYSICIAN ASSISTANT

## 2019-01-31 ENCOUNTER — OFFICE VISIT (OUTPATIENT)
Dept: CARDIOLOGY | Facility: CLINIC | Age: 74
End: 2019-01-31
Payer: COMMERCIAL

## 2019-01-31 VITALS
SYSTOLIC BLOOD PRESSURE: 156 MMHG | HEART RATE: 88 BPM | BODY MASS INDEX: 26.63 KG/M2 | WEIGHT: 170 LBS | DIASTOLIC BLOOD PRESSURE: 84 MMHG | OXYGEN SATURATION: 97 %

## 2019-01-31 DIAGNOSIS — I21.21 ST ELEVATION MYOCARDIAL INFARCTION INVOLVING LEFT CIRCUMFLEX CORONARY ARTERY (H): Primary | ICD-10-CM

## 2019-01-31 PROCEDURE — 99214 OFFICE O/P EST MOD 30 MIN: CPT | Performed by: INTERNAL MEDICINE

## 2019-01-31 RX ORDER — ATORVASTATIN CALCIUM 40 MG/1
40 TABLET, FILM COATED ORAL DAILY
Qty: 90 TABLET | Refills: 3 | Status: SHIPPED | OUTPATIENT
Start: 2019-01-31 | End: 2020-02-05

## 2019-01-31 NOTE — LETTER
1/31/2019      Sarita Hennessy, DO  [address] [facility] [address]      RE: Raul Wilhelm       Dear Colleague,    I had the pleasure of seeing Raul Wilhelm in the [facility].    Service Date: 01/31/2019      HISTORY OF PRESENT ILLNESS:  I had the pleasure of seeing Mr. Wilhelm in followup at the [facility] today.  He is a very pleasant 73-year-old gentleman who I last saw in 11/2016.  He has a history of an acute inferior/inferolateral ST elevation myocardial infarction in 11/2015.  At that time, coronary angiography demonstrated a 99% stenosis in the mid circumflex as well as 100% thrombotic occlusion of the left-sided PDA.  He underwent successful revascularization of the left PDA as well as the mid circumflex with drug-eluting stents at the time.  Of note, he had an approximately 80% stenosis in the mid-LAD and a 70% stenosis in the first obtuse marginal.  These were not intervened on at the time, and a subsequent stress nuclear perfusion study demonstrated a large inferior and inferoseptal infarction with no evidence of rian-infarct ischemia.  Given these findings and his complete lack of symptoms, we have elected to treat his residual coronary artery disease medically.      At his office visit with me in 2016, we had discussed indefinite dual antiplatelet therapy given the extent and severity of his coronary artery disease.  At that point he was agreeable to taking Plavix long-term, but subsequently he saw Roseline Bermudez PA-C, in 10/2017 and discontinued Plavix at the time.      Today, he presents feeling well overall from a cardiac standpoint, denying chest discomfort, dyspnea on exertion, PND, orthopnea or lower extremity edema.  He denies any syncope or presyncope.  He continues to work out at cardiac rehab 3 times a week and also works on a regular basis without any limitations.  He has been mostly compliant with his medications,  although he does occasionally forget to take his daily medications.  He denies any syncope or presyncope.      PHYSICAL EXAMINATION:  Dictated below.  Of note, I did recheck his blood pressure and it was 150/65 on my recheck.      IMPRESSION:   1.  Coronary artery disease status post inferior/inferolateral ST elevation myocardial infarction with drug-eluting stent placement to the mid circumflex and left-sided PDA in 2015.   2.  80% LAD and 70% obtuse marginal lesions which were not intervened on upon the time of his myocardial infarction.  There is currently no evidence of angina and previous stress testing has been negative for ischemia.   3.  Dyslipidemia.   4.  Hypertension.      Mr. Wilhelm is doing well overall from a cardiovascular standpoint.  His risk factors, however, do need to be addressed further.      He insists that his systolic blood pressures have generally been around 120 and indeed most of his readings over the past year have been in the normotensive range.  I have asked him to keep track of his blood pressure for the next 2 weeks going forward so we can decide if any adjustment of his antihypertensive therapy is indicated.      His lipids have also shown an interval decline, most likely interval deterioration, most likely associated with weight gain.  His LDL on 01/26/2019 was 90 compared to 64 back in 2017.  He has been compliant with his statin therapy, so I have asked him to increase his atorvastatin to 40 mg daily.      To reassess the ischemic status of the myocardium, I will order a stress nuclear perfusion study.      Finally, we discussed indefinite dual antiplatelet therapy, which I still recommend.  The patient, however, is extremely reluctant to take any more medications as necessary and wishes to think about this further.      It was a pleasure seeing him in followup today.         SHAYAN CHIU MD             D: 01/31/2019   T: 01/31/2019   MT: MANISHA      Name:     LESLIEGIA    MRN:      [id number]        Account:      [id number]   :      1945           Service Date: 2019      Document: [id number]         Outpatient Encounter Medications as of 2019   Medication Sig Dispense Refill     albuterol (2.5 MG/3ML) 0.083% neb solution Take 1 vial (2.5 mg) by nebulization every 6 hours as needed for shortness of breath / dyspnea or wheezing 30 vial 3     albuterol (PROAIR HFA/PROVENTIL HFA/VENTOLIN HFA) 108 (90 Base) MCG/ACT Inhaler Inhale 2 puffs into the lungs every 6 hours as needed for shortness of breath / dyspnea or wheezing 1 Inhaler 5     aspirin EC 81 MG EC tablet Take 1 tablet (81 mg) by mouth daily 30 tablet 1     atorvastatin (LIPITOR) 40 MG tablet Take 1 tablet (40 mg) by mouth daily 90 tablet 3     carvedilol (COREG) 6.25 MG tablet Take 1 tablet (6.25 mg) by mouth 2 times daily 180 tablet 2     clobetasol (TEMOVATE) 0.05 % ointment Apply sparingly to affected area twice daily as needed.  Do not apply to face. 45 g 1     EPINEPHrine 0.3 MG/0.3ML injection 2-pack Inject 0.3 mLs (0.3 mg) into the muscle as needed for anaphylaxis 0.6 mL 0     fexofenadine (ALLEGRA) 180 MG tablet Take 1 tablet by mouth daily. 90 tablet 1     fluticasone (FLOVENT HFA) 220 MCG/ACT Inhaler Inhale 2 puffs into the lungs 2 times daily 1 Inhaler 11     ipratropium - albuterol 0.5 mg/2.5 mg/3 mL (DUONEB) 0.5-2.5 (3) MG/3ML neb solution Take 1 vial (3 mLs) by nebulization every 4 hours as needed for shortness of breath / dyspnea 1 Box 2     latanoprost (XALATAN) 0.005 % ophthalmic solution Place 1 drop into both eyes At Bedtime       losartan-hydrochlorothiazide (HYZAAR) 50-12.5 MG tablet Take 1 tablet by mouth daily 90 tablet 3     montelukast (SINGULAIR) 10 MG tablet TAKE ONE TABLET BY MOUTH EVERY NIGHT AT BEDTIME 90 tablet 3     nitroglycerin (NITROSTAT) 0.4 MG sublingual tablet Place 1 tablet (0.4 mg) under the tongue every 5 minutes as needed for chest pain If you are still having  symptoms after 3 doses (15 minutes) call 911. 25 tablet 1     omeprazole (PRILOSEC) 20 MG CR capsule Take 1 capsule (20 mg) by mouth daily 90 capsule 3     order for DME Equipment being ordered: arm blood pressure cuff 1 Device 0     ORDER FOR DME Equipment being ordered: Nebulizer Respironics, MiniElite--Neb Machine and tubing and mouth piece. 1 Device 0     [] hylan (SYNVISC ONE) 48 MG/6ML injection 12 mLs (96 mg) by INTRA-ARTICULAR route once for 1 dose 12 mL 0     predniSONE (DELTASONE) 20 MG tablet Take 3 tabs (60 mg) by mouth daily x 3 days, 2 tabs (40 mg) daily x 3 days, 1 tab (20 mg) daily x 3 days, then 1/2 tab (10 mg) x 3 days.. (Patient not taking: Reported on 2019.) 20 tablet 0     [DISCONTINUED] atorvastatin (LIPITOR) 20 MG tablet Take 1 tablet (20 mg) by mouth daily 90 tablet 0     [DISCONTINUED] metroNIDAZOLE (FLAGYL) 500 MG tablet Take 1 tablet (500 mg) by mouth 3 times daily for 14 days 42 tablet 0     [DISCONTINUED] predniSONE (DELTASONE) 10 MG tablet Take 4 tablets (40 mg) by mouth daily for 5 days 20 tablet 0     [DISCONTINUED] predniSONE (DELTASONE) 20 MG tablet Take 2 tablets (40 mg) by mouth daily for 5 days 10 tablet 0     [DISCONTINUED] triamcinolone acetonide (KENALOG-40) injection 40 mg        [DISCONTINUED] triamcinolone acetonide (KENALOG-40) injection 40 mg        No facility-administered encounter medications on file as of 2019.        Again, thank you for allowing me to participate in the care of your patient.      Sincerely,    Alcides Galindo MD     [facility]

## 2019-01-31 NOTE — LETTER
1/31/2019    Sarita Hennessy, DO  [address] [facility] [address]    RE: Raul Wilhelm       Dear Colleague,    I had the pleasure of seeing Raul Wilhelm in the [facility].    Service Date: 01/31/2019      HISTORY OF PRESENT ILLNESS:  I had the pleasure of seeing Mr. Wilhelm in followup at the [facility] today.  He is a very pleasant 73-year-old gentleman who I last saw in 11/2016.  He has a history of an acute inferior/inferolateral ST elevation myocardial infarction in 11/2015.  At that time, coronary angiography demonstrated a 99% stenosis in the mid circumflex as well as 100% thrombotic occlusion of the left-sided PDA.  He underwent successful revascularization of the left PDA as well as the mid circumflex with drug-eluting stents at the time.  Of note, he had an approximately 80% stenosis in the mid-LAD and a 70% stenosis in the first obtuse marginal.  These were not intervened on at the time, and a subsequent stress nuclear perfusion study demonstrated a large inferior and inferoseptal infarction with no evidence of rian-infarct ischemia.  Given these findings and his complete lack of symptoms, we have elected to treat his residual coronary artery disease medically.      At his office visit with me in 2016, we had discussed indefinite dual antiplatelet therapy given the extent and severity of his coronary artery disease.  At that point he was agreeable to taking Plavix long-term, but subsequently he saw Roseline Bermudez PA-C, in 10/2017 and discontinued Plavix at the time.      Today, he presents feeling well overall from a cardiac standpoint, denying chest discomfort, dyspnea on exertion, PND, orthopnea or lower extremity edema.  He denies any syncope or presyncope.  He continues to work out at cardiac rehab 3 times a week and also works on a regular basis without any limitations.  He has been mostly compliant with his medications, although  he does occasionally forget to take his daily medications.  He denies any syncope or presyncope.      PHYSICAL EXAMINATION:  Dictated below.  Of note, I did recheck his blood pressure and it was 150/65 on my recheck.      IMPRESSION:   1.  Coronary artery disease status post inferior/inferolateral ST elevation myocardial infarction with drug-eluting stent placement to the mid circumflex and left-sided PDA in 2015.   2.  80% LAD and 70% obtuse marginal lesions which were not intervened on upon the time of his myocardial infarction.  There is currently no evidence of angina and previous stress testing has been negative for ischemia.   3.  Dyslipidemia.   4.  Hypertension.      Mr. Nelson is doing well overall from a cardiovascular standpoint.  His risk factors, however, do need to be addressed further.      He insists that his systolic blood pressures have generally been around 120 and indeed most of his readings over the past year have been in the normotensive range.  I have asked him to keep track of his blood pressure for the next 2 weeks going forward so we can decide if any adjustment of his antihypertensive therapy is indicated.      His lipids have also shown an interval decline, most likely interval deterioration, most likely associated with weight gain.  His LDL on 01/26/2019 was 90 compared to 64 back in 2017.  He has been compliant with his statin therapy, so I have asked him to increase his atorvastatin to 40 mg daily.      To reassess the ischemic status of the myocardium, I will order a stress nuclear perfusion study.      Finally, we discussed indefinite dual antiplatelet therapy, which I still recommend.  The patient, however, is extremely reluctant to take any more medications as necessary and wishes to think about this further.      It was a pleasure seeing him in followup today.         SHAYAN CHIU MD             D: 01/31/2019   T: 01/31/2019   MT: MANISHA      Name:     GIA NELSON   MRN:       [id number]        Account:      [id number]   :      1945           Service Date: 2019      Document: [id number]      HPI and Plan:   See dictation    Orders Placed This Encounter   Procedures     NM Lexiscan stress test (nuc card)       Orders Placed This Encounter   Medications     atorvastatin (LIPITOR) 40 MG tablet     Sig: Take 1 tablet (40 mg) by mouth daily     Dispense:  90 tablet     Refill:  3       Medications Discontinued During This Encounter   Medication Reason     triamcinolone acetonide (KENALOG-40) injection 40 mg      triamcinolone acetonide (KENALOG-40) injection 40 mg      predniSONE (DELTASONE) 20 MG tablet      predniSONE (DELTASONE) 10 MG tablet      metroNIDAZOLE (FLAGYL) 500 MG tablet      atorvastatin (LIPITOR) 20 MG tablet          Encounter Diagnosis   Name Primary?     ST elevation myocardial infarction involving left circumflex coronary artery (H) Yes       CURRENT MEDICATIONS:  Current Outpatient Medications   Medication Sig Dispense Refill     albuterol (2.5 MG/3ML) 0.083% neb solution Take 1 vial (2.5 mg) by nebulization every 6 hours as needed for shortness of breath / dyspnea or wheezing 30 vial 3     albuterol (PROAIR HFA/PROVENTIL HFA/VENTOLIN HFA) 108 (90 Base) MCG/ACT Inhaler Inhale 2 puffs into the lungs every 6 hours as needed for shortness of breath / dyspnea or wheezing 1 Inhaler 5     aspirin EC 81 MG EC tablet Take 1 tablet (81 mg) by mouth daily 30 tablet 1     atorvastatin (LIPITOR) 40 MG tablet Take 1 tablet (40 mg) by mouth daily 90 tablet 3     carvedilol (COREG) 6.25 MG tablet Take 1 tablet (6.25 mg) by mouth 2 times daily 180 tablet 2     clobetasol (TEMOVATE) 0.05 % ointment Apply sparingly to affected area twice daily as needed.  Do not apply to face. 45 g 1     EPINEPHrine 0.3 MG/0.3ML injection 2-pack Inject 0.3 mLs (0.3 mg) into the muscle as needed for anaphylaxis 0.6 mL 0     fexofenadine (ALLEGRA) 180 MG tablet Take 1 tablet by mouth  daily. 90 tablet 1     fluticasone (FLOVENT HFA) 220 MCG/ACT Inhaler Inhale 2 puffs into the lungs 2 times daily 1 Inhaler 11     ipratropium - albuterol 0.5 mg/2.5 mg/3 mL (DUONEB) 0.5-2.5 (3) MG/3ML neb solution Take 1 vial (3 mLs) by nebulization every 4 hours as needed for shortness of breath / dyspnea 1 Box 2     latanoprost (XALATAN) 0.005 % ophthalmic solution Place 1 drop into both eyes At Bedtime       losartan-hydrochlorothiazide (HYZAAR) 50-12.5 MG tablet Take 1 tablet by mouth daily 90 tablet 3     montelukast (SINGULAIR) 10 MG tablet TAKE ONE TABLET BY MOUTH EVERY NIGHT AT BEDTIME 90 tablet 3     nitroglycerin (NITROSTAT) 0.4 MG sublingual tablet Place 1 tablet (0.4 mg) under the tongue every 5 minutes as needed for chest pain If you are still having symptoms after 3 doses (15 minutes) call 911. 25 tablet 1     omeprazole (PRILOSEC) 20 MG CR capsule Take 1 capsule (20 mg) by mouth daily 90 capsule 3     order for DME Equipment being ordered: arm blood pressure cuff 1 Device 0     ORDER FOR DME Equipment being ordered: Nebulizer Respironics, MiniElite--Neb Machine and tubing and mouth piece. 1 Device 0     predniSONE (DELTASONE) 20 MG tablet Take 3 tabs (60 mg) by mouth daily x 3 days, 2 tabs (40 mg) daily x 3 days, 1 tab (20 mg) daily x 3 days, then 1/2 tab (10 mg) x 3 days.. (Patient not taking: Reported on 1/31/2019.) 20 tablet 0       ALLERGIES     Allergies   Allergen Reactions     Simvastatin Swelling     Severe muscle pain, swelling, and bruising     Dust Mites        PAST MEDICAL HISTORY:  Past Medical History:   Diagnosis Date     Chronic airway obstruction, not elsewhere classified      Other and unspecified hyperlipidemia        PAST SURGICAL HISTORY:  Past Surgical History:   Procedure Laterality Date     ENT SURGERY  as a child    tonsillectomy       FAMILY HISTORY:  Family History   Problem Relation Age of Onset     Diabetes Mother      Heart Disease Mother      C.A.D. Mother      Cancer  Father      Hypertension Father      Cerebrovascular Disease Paternal Grandfather      Asthma Sister      Breast Cancer No family hx of      Cancer - colorectal No family hx of      Prostate Cancer No family hx of        SOCIAL HISTORY:  Social History     Socioeconomic History     Marital status:      Spouse name: None     Number of children: None     Years of education: None     Highest education level: None   Social Needs     Financial resource strain: None     Food insecurity - worry: None     Food insecurity - inability: None     Transportation needs - medical: None     Transportation needs - non-medical: None   Occupational History     None   Tobacco Use     Smoking status: Never Smoker     Smokeless tobacco: Never Used   Substance and Sexual Activity     Alcohol use: Yes     Comment: Rare     Drug use: No     Sexual activity: Yes     Partners: Female   Other Topics Concern      Service Not Asked     Blood Transfusions Not Asked     Caffeine Concern Not Asked     Occupational Exposure Yes     Comment: worked for years as  exposed to carbon dust and other chemicals     Hobby Hazards Yes     Comment: Raises pigeons     Sleep Concern Not Asked     Stress Concern Not Asked     Weight Concern Not Asked     Special Diet Not Asked     Back Care Not Asked     Exercise Not Asked     Bike Helmet Not Asked     Seat Belt Not Asked     Self-Exams Not Asked     Parent/sibling w/ CABG, MI or angioplasty before 65F 55M? No   Social History Narrative     None       Review of Systems:  Skin:  Positive for bruising     Eyes:  Positive for glasses    ENT:  Negative      Respiratory:  Positive for   COPD   Cardiovascular:  Negative for;palpitations;chest pain;edema;syncope or near-syncope;exercise intolerance;fatigue;lightheadedness;dizziness Positive for See HPI  Gastroenterology: Negative for melena;hematochezia    Genitourinary:  Negative      Musculoskeletal:  Positive for joint pain Erika  horses  Neurologic:  Negative      Psychiatric:  Negative      Heme/Lymph/Imm:  Negative      Endocrine:  Negative        Physical Exam:  Vitals: /84   Pulse 88   Wt 77.1 kg (170 lb)   SpO2 97%   BMI 26.63 kg/m       Constitutional:  cooperative, alert and oriented, well developed, well nourished, in no acute distress        Skin:  warm and dry to the touch, no apparent skin lesions or masses noted          Head:  normocephalic, no masses or lesions        Eyes:  pupils equal and round;conjunctivae and lids unremarkable;sclera white        Lymph:      ENT:  no pallor or cyanosis, dentition good        Neck:  no carotid bruit;JVP normal        Respiratory:  normal breath sounds, clear to auscultation, normal A-P diameter, normal symmetry, normal respiratory excursion, no use of accessory muscles expiratory wheezes(at bases)       Cardiac: regular rhythm;no murmurs, gallops or rubs detected                pulses full and equal                               right femoral bruit (-)   Fading ecchymosis R groin; 0.5 x 0.5 cm hematoma - nontender. No bruit    GI:  abdomen soft        Extremities and Muscular Skeletal:  no deformities, clubbing, cyanosis, erythema observed;no edema              Neurological:  no gross motor deficits        Psych:  Alert and Oriented x 3        CC  No referring provider defined for this encounter.                Thank you for allowing me to participate in the care of your patient.      Sincerely,     Alcides Galindo MD     [facility] [place]    cc:   No referring provider defined for this encounter.

## 2019-01-31 NOTE — PROGRESS NOTES
Service Date: 01/31/2019      HISTORY OF PRESENT ILLNESS:  I had the pleasure of seeing Mr. Wilhelm in followup at the [facility] today.  He is a very pleasant 73-year-old gentleman who I last saw in 11/2016.  He has a history of an acute inferior/inferolateral ST elevation myocardial infarction in 11/2015.  At that time, coronary angiography demonstrated a 99% stenosis in the mid circumflex as well as 100% thrombotic occlusion of the left-sided PDA.  He underwent successful revascularization of the left PDA as well as the mid circumflex with drug-eluting stents at the time.  Of note, he had an approximately 80% stenosis in the mid-LAD and a 70% stenosis in the first obtuse marginal.  These were not intervened on at the time, and a subsequent stress nuclear perfusion study demonstrated a large inferior and inferoseptal infarction with no evidence of rian-infarct ischemia.  Given these findings and his complete lack of symptoms, we have elected to treat his residual coronary artery disease medically.      At his office visit with me in 2016, we had discussed indefinite dual antiplatelet therapy given the extent and severity of his coronary artery disease.  At that point he was agreeable to taking Plavix long-term, but subsequently he saw Roseline Bermudez PA-C, in 10/2017 and discontinued Plavix at the time.      Today, he presents feeling well overall from a cardiac standpoint, denying chest discomfort, dyspnea on exertion, PND, orthopnea or lower extremity edema.  He denies any syncope or presyncope.  He continues to work out at cardiac rehab 3 times a week and also works on a regular basis without any limitations.  He has been mostly compliant with his medications, although he does occasionally forget to take his daily medications.  He denies any syncope or presyncope.      PHYSICAL EXAMINATION:  Dictated below.  Of note, I did recheck his blood pressure and it was 150/65 on my recheck.       IMPRESSION:   1.  Coronary artery disease status post inferior/inferolateral ST elevation myocardial infarction with drug-eluting stent placement to the mid circumflex and left-sided PDA in 2015.   2.  80% LAD and 70% obtuse marginal lesions which were not intervened on upon the time of his myocardial infarction.  There is currently no evidence of angina and previous stress testing has been negative for ischemia.   3.  Dyslipidemia.   4.  Hypertension.      Mr. Nelson is doing well overall from a cardiovascular standpoint.  His risk factors, however, do need to be addressed further.      He insists that his systolic blood pressures have generally been around 120 and indeed most of his readings over the past year have been in the normotensive range.  I have asked him to keep track of his blood pressure for the next 2 weeks going forward so we can decide if any adjustment of his antihypertensive therapy is indicated.      His lipids have also shown an interval decline, most likely interval deterioration, most likely associated with weight gain.  His LDL on 2019 was 90 compared to 64 back in 2017.  He has been compliant with his statin therapy, so I have asked him to increase his atorvastatin to 40 mg daily.      To reassess the ischemic status of the myocardium, I will order a stress nuclear perfusion study.      Finally, we discussed indefinite dual antiplatelet therapy, which I still recommend.  The patient, however, is extremely reluctant to take any more medications as necessary and wishes to think about this further.      It was a pleasure seeing him in followup today.         SHAYAN CHIU MD             D: 2019   T: 2019   MT: MANISHA      Name:     GIA NELSON   MRN:      [id number]        Account:      [id number]   :      1945           Service Date: 2019      Document: [id number]

## 2019-02-18 ENCOUNTER — TELEPHONE (OUTPATIENT)
Dept: CARDIOLOGY | Facility: CLINIC | Age: 74
End: 2019-02-18

## 2019-02-18 ENCOUNTER — HOSPITAL ENCOUNTER (OUTPATIENT)
Dept: NUCLEAR MEDICINE | Facility: CLINIC | Age: 74
Setting detail: NUCLEAR MEDICINE
Discharge: HOME | End: 2019-02-18
Attending: STUDENT IN AN ORGANIZED HEALTH CARE EDUCATION/TRAINING PROGRAM | Admitting: STUDENT IN AN ORGANIZED HEALTH CARE EDUCATION/TRAINING PROGRAM
Payer: COMMERCIAL

## 2019-02-18 DIAGNOSIS — I21.21 ST ELEVATION MYOCARDIAL INFARCTION INVOLVING LEFT CIRCUMFLEX CORONARY ARTERY (H): ICD-10-CM

## 2019-02-18 DIAGNOSIS — E78.5 HYPERLIPIDEMIA LDL GOAL <100: Primary | ICD-10-CM

## 2019-02-18 PROCEDURE — 93017 CV STRESS TEST TRACING ONLY: CPT

## 2019-02-18 PROCEDURE — 25000128 H RX IP 250 OP 636: Performed by: INTERNAL MEDICINE

## 2019-02-18 PROCEDURE — A9502 TC99M TETROFOSMIN: HCPCS

## 2019-02-18 PROCEDURE — 34300033 ZZH RX 343

## 2019-02-18 PROCEDURE — 78452 HT MUSCLE IMAGE SPECT MULT: CPT | Performed by: INTERNAL MEDICINE

## 2019-02-18 PROCEDURE — 93016 CV STRESS TEST SUPVJ ONLY: CPT | Performed by: FAMILY MEDICINE

## 2019-02-18 PROCEDURE — 78452 HT MUSCLE IMAGE SPECT MULT: CPT

## 2019-02-18 PROCEDURE — 93018 CV STRESS TEST I&R ONLY: CPT | Performed by: INTERNAL MEDICINE

## 2019-02-18 RX ORDER — REGADENOSON 0.08 MG/ML
0.4 INJECTION, SOLUTION INTRAVENOUS ONCE
Status: COMPLETED | OUTPATIENT
Start: 2019-02-18 | End: 2019-02-18

## 2019-02-18 RX ADMIN — TETROFOSMIN 10.7 MCI.: 1.38 INJECTION, POWDER, LYOPHILIZED, FOR SOLUTION INTRAVENOUS at 08:27

## 2019-02-18 RX ADMIN — REGADENOSON 0.4 MG: 0.08 INJECTION, SOLUTION INTRAVENOUS at 10:32

## 2019-02-18 RX ADMIN — TETROFOSMIN 32.8 MCI.: 1.38 INJECTION, POWDER, LYOPHILIZED, FOR SOLUTION INTRAVENOUS at 10:39

## 2019-02-18 NOTE — TELEPHONE ENCOUNTER
Pt had Lexiscan today the results of which are as follows: : infarct without significant rian-infarct ischemia. Will discuss with Dr Galindo re: follow up. Elke Luna RN Cardiology February 18, 2019, 3:34 PM

## 2019-02-18 NOTE — TELEPHONE ENCOUNTER
Follow up in a year is fine thanks.     ADDENDUM: LM for patient to call back to discuss. Order for follow up placed. Elke Luna RN Cardiology February 19, 2019, 8:15 AM    ADDENDUM: Pt called back to discuss. Questions answered. Confirmed one year follow up. Elke Luna RN Cardiology February 20, 2019, 9:32 AM

## 2019-02-18 NOTE — RESULT ENCOUNTER NOTE
Results noted: infarct without significant rian-infarct ischemia. Will discuss with Dr Galindo re: follow up

## 2019-02-28 ENCOUNTER — OFFICE VISIT (OUTPATIENT)
Dept: ORTHOPEDICS | Facility: CLINIC | Age: 74
End: 2019-02-28
Payer: COMMERCIAL

## 2019-02-28 VITALS
DIASTOLIC BLOOD PRESSURE: 70 MMHG | BODY MASS INDEX: 25.9 KG/M2 | SYSTOLIC BLOOD PRESSURE: 132 MMHG | HEIGHT: 67 IN | WEIGHT: 165 LBS

## 2019-02-28 DIAGNOSIS — M17.0 BILATERAL PRIMARY OSTEOARTHRITIS OF KNEE: Primary | ICD-10-CM

## 2019-02-28 DIAGNOSIS — M25.562 CHRONIC PAIN OF BOTH KNEES: ICD-10-CM

## 2019-02-28 DIAGNOSIS — G89.29 CHRONIC PAIN OF BOTH KNEES: ICD-10-CM

## 2019-02-28 DIAGNOSIS — M25.561 CHRONIC PAIN OF BOTH KNEES: ICD-10-CM

## 2019-02-28 PROCEDURE — 20611 DRAIN/INJ JOINT/BURSA W/US: CPT | Mod: 50 | Performed by: FAMILY MEDICINE

## 2019-02-28 PROCEDURE — 99213 OFFICE O/P EST LOW 20 MIN: CPT | Mod: 25 | Performed by: FAMILY MEDICINE

## 2019-02-28 RX ADMIN — TRIAMCINOLONE ACETONIDE 40 MG: 40 INJECTION, SUSPENSION INTRA-ARTICULAR; INTRAMUSCULAR at 14:30

## 2019-02-28 RX ADMIN — ROPIVACAINE HYDROCHLORIDE 3 ML: 5 INJECTION, SOLUTION EPIDURAL; INFILTRATION; PERINEURAL at 14:30

## 2019-02-28 ASSESSMENT — MIFFLIN-ST. JEOR: SCORE: 1452.07

## 2019-02-28 NOTE — LETTER
2019         RE: Raul Wilhelm  [address]  [place] [address]        Dear Colleague,    Thank you for referring your patient, Raul Wilhelm, to the [facility]. Please see a copy of my visit note below.    Raul Wilhelm  :  1945  DOS: 19  MRN: [id number]    Sports Medicine Evaluation    Initial History 2017: Since last visit on 17 with Raquel Briscoe where he underwent bilateral knee injections, the patient has begun to have an increase in pain over the past few months.      Symptoms are better with: activity   Symptoms are worse with: prolonged walking, stairs   Additional Features:                        Positive: none                        Negative: swelling, bruising, popping, grinding, catching, locking, instability, paresthesias, numbness, weakness, pain in other joints and systemic symptoms     Social History: tool , computer job     Interim History - 2018  Since last visit on 10/2/17 with Dr Grier patient has moderate bilateral knee pain over the last ~ 2 months.  Bilateral knee steroid injection completed on 2017 provided good relief for ~ 5 - 6 months.  He reports that pain has been waxing/waning based off his activity.  Desires repeat injections today.  Moderate antalgic gait noted.  No new injury in the interim.    Interim History - July 3, 2018  Since last visit on 2018 patient has moderate bilateral knee pain.  Bilateral knee SynviscOne injection completed on / provided mild relief.  Continues to have significant discomfort, interested in repeat steroid injections today.  No new injury in the interim.    Interim History - 2019  Since last visit on 7/3/18 patient has moderate intermittent bilateral knee pain.  Bilateral knee steroid injection completed on 7/3 provided good relief for ~ 4 - 5 months.  Patient reports waxing and waning pain over the last ~ 2 months.  Noted some  "relief in early Jan after completing prednisone course for his asthma.  No new injury in the interim.    Review of Systems  Skin: no bruising, no swelling  Musculoskeletal: as above  Neurologic: no numbness, paresthesias  Remainder of review of systems is negative including constitutional, CV, pulmonary, GI, except as noted in HPI or medical history.    Patient's current problem list, past medical and surgical history, and family history were reviewed.    Patient Active Problem List   Diagnosis     Family hx of colon cancer     Glaucoma     Hypertension     Cerebral infarction (H)     Hyperlipidemia LDL goal <100     Pneumonitis, hypersensitivity, naa (H)     Severe persistent asthma     COPD (chronic obstructive pulmonary disease) (H)     OA (osteoarthritis) of knee     Hoarseness of voice     Eczema     Impaired fasting blood sugar     Hypokalemia     ST elevation myocardial infarction involving left circumflex coronary artery (H)     Past Medical History:   Diagnosis Date     Chronic airway obstruction, not elsewhere classified      Other and unspecified hyperlipidemia      Past Surgical History:   Procedure Laterality Date     ENT SURGERY  as a child    tonsillectomy     Family History   Problem Relation Age of Onset     Diabetes Mother      Heart Disease Mother      C.A.D. Mother      Cancer Father      Hypertension Father      Cerebrovascular Disease Paternal Grandfather      Asthma Sister      Breast Cancer No family hx of      Cancer - colorectal No family hx of      Prostate Cancer No family hx of        Objective  /70   Ht 1.702 m (5' 7\")   Wt 74.8 kg (165 lb)   BMI 25.84 kg/m       GENERAL APPEARANCE: healthy, alert and no distress   GAIT: NORMAL  SKIN: no suspicious lesions or rashes  HEENT: Sclera clear, anicteric  CV: good peripheral pulses  RESP: Breathing not labored  NEURO: Normal strength and tone, mentation intact and speech normal  PSYCH:  mentation appears normal and affect " normal/bright    Bilateral Knee exam  Inspection:      no effusion, swelling of bruising bilateral     Patella:      Mobility -       hypomobile bilateral       Crepitus noted in the patellofemoral joint bilateral     Tender:      medial joint line bilateral > lateral joint line, PF joint laterally     Non Tender:      remainder of knee area bilateral     Knee ROM:      Full active and passive ROM with flexion and extension bilateral     Hip ROM:     Full active and passive ROM bilateral     Strength:      5/5 with knee extension bilateral     Special Tests:     painful Memo bilateral      + PF grind       neg (-) varus at 0 deg and 30 deg bilateral       neg (-) valgus at 0 deg and 30 deg bilateral     Neurovascular:      2+ peripheral pulses bilaterally and brisk capillary refill       sensation grossly intact    Large Joint Injection/Arthocentesis: bilateral knee  Date/Time: 2/28/2019 2:30 PM  Performed by: Karel Bustillos DO  Authorized by: Karel Bustillos DO     Indications:  Pain  Needle Size:  18 G  Guidance: ultrasound    Approach:  Superolateral  Location:  Knee  Laterality:  Bilateral  Site:  Bilateral knee  Medications (Right):  40 mg triamcinolone 40 MG/ML; 3 mL ropivacaine 5 MG/ML  Aspirate amount (mL):  7  Aspirate:  Serous and yellow  Medications (Left):  40 mg triamcinolone 40 MG/ML; 3 mL ropivacaine 5 MG/ML  Aspirate amount (mL):  17  Aspirate:  Serous and yellow  Outcome:  Tolerated well, no immediate complications  Procedure discussed: discussed risks, benefits, and alternatives    Consent Given by:  Patient  Prep: patient was prepped and draped in usual sterile fashion               Radiology  I visualized and reviewed these images with the patient  XR KNEE BILATERAL 3 VW 9/13/2017 1:42 PM  HISTORY: Bilateral osteoarthritis.  COMPARISON: 10/24/2012  FINDINGS: 3 views of the right knee. Complete loss of medial  tibiofemoral compartment joint space with associated  marginal  osteophyte formation. Patellofemoral compartment joint space is  preserved. No joint effusion. No fracture or malalignment.  3 views of the left knee. Moderate loss of medial tibiofemoral  compartment joint space. Moderate tricompartmental marginal  osteophytes. Patellofemoral compartment joint space is preserved. No  joint effusion. No fracture or malalignment.  IMPRESSION: Moderate osteoarthritis, right greater than left.    Assessment:  1. Bilateral primary osteoarthritis of knee    2. 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   Discussed symptom treatment with ice or heat, topical treatments, and rest if needed.   Expectations and goals of CSI reviewed  Often 2-3 days for steroid effect, and can take up to two weeks for maximum effect  We discussed modified progressive pain-free activity as tolerated  Do not overuse in first two weeks if feeling better due to concern for vulnerability while steroid is working  Supportive care reviewed  All questions were answered today  Contact us with additional questions or concerns  Signs and sx of concern reviewed      Karel Bustillos DO, CAARIA  Primary Care Sports Medicine  [facility]           Again, thank you for allowing me to participate in the care of your patient.        Sincerely,        Karel Bustillos, DO

## 2019-02-28 NOTE — PROGRESS NOTES
Raul Wilhelm  :  1945  DOS: 19  MRN: [id number]    Sports Medicine Evaluation    Initial History 2017: Since last visit on 17 with Raquel Briscoe where he underwent bilateral knee injections, the patient has begun to have an increase in pain over the past few months.      Symptoms are better with: activity   Symptoms are worse with: prolonged walking, stairs   Additional Features:                        Positive: none                        Negative: swelling, bruising, popping, grinding, catching, locking, instability, paresthesias, numbness, weakness, pain in other joints and systemic symptoms     Social History: tool , computer job     Interim History - 2018  Since last visit on 10/2/17 with Dr Grier patient has moderate bilateral knee pain over the last ~ 2 months.  Bilateral knee steroid injection completed on 2017 provided good relief for ~ 5 - 6 months.  He reports that pain has been waxing/waning based off his activity.  Desires repeat injections today.  Moderate antalgic gait noted.  No new injury in the interim.    Interim History - July 3, 2018  Since last visit on 2018 patient has moderate bilateral knee pain.  Bilateral knee SynviscOne injection completed on / provided mild relief.  Continues to have significant discomfort, interested in repeat steroid injections today.  No new injury in the interim.    Interim History - 2019  Since last visit on 7/3/18 patient has moderate intermittent bilateral knee pain.  Bilateral knee steroid injection completed on 7/3 provided good relief for ~ 4 - 5 months.  Patient reports waxing and waning pain over the last ~ 2 months.  Noted some relief in early  after completing prednisone course for his asthma.  No new injury in the interim.    Review of Systems  Skin: no bruising, no swelling  Musculoskeletal: as above  Neurologic: no numbness, paresthesias  Remainder of review of systems is  "negative including constitutional, CV, pulmonary, GI, except as noted in HPI or medical history.    Patient's current problem list, past medical and surgical history, and family history were reviewed.    Patient Active Problem List   Diagnosis     Family hx of colon cancer     Glaucoma     Hypertension     Cerebral infarction (H)     Hyperlipidemia LDL goal <100     Pneumonitis, hypersensitivity, naa (H)     Severe persistent asthma     COPD (chronic obstructive pulmonary disease) (H)     OA (osteoarthritis) of knee     Hoarseness of voice     Eczema     Impaired fasting blood sugar     Hypokalemia     ST elevation myocardial infarction involving left circumflex coronary artery (H)     Past Medical History:   Diagnosis Date     Chronic airway obstruction, not elsewhere classified      Other and unspecified hyperlipidemia      Past Surgical History:   Procedure Laterality Date     ENT SURGERY  as a child    tonsillectomy     Family History   Problem Relation Age of Onset     Diabetes Mother      Heart Disease Mother      C.A.D. Mother      Cancer Father      Hypertension Father      Cerebrovascular Disease Paternal Grandfather      Asthma Sister      Breast Cancer No family hx of      Cancer - colorectal No family hx of      Prostate Cancer No family hx of        Objective  /70   Ht 1.702 m (5' 7\")   Wt 74.8 kg (165 lb)   BMI 25.84 kg/m      GENERAL APPEARANCE: healthy, alert and no distress   GAIT: NORMAL  SKIN: no suspicious lesions or rashes  HEENT: Sclera clear, anicteric  CV: good peripheral pulses  RESP: Breathing not labored  NEURO: Normal strength and tone, mentation intact and speech normal  PSYCH:  mentation appears normal and affect normal/bright    Bilateral Knee exam  Inspection:      no effusion, swelling of bruising bilateral     Patella:      Mobility -       hypomobile bilateral       Crepitus noted in the patellofemoral joint bilateral     Tender:      medial joint line bilateral > " lateral joint line, PF joint laterally     Non Tender:      remainder of knee area bilateral     Knee ROM:      Full active and passive ROM with flexion and extension bilateral     Hip ROM:     Full active and passive ROM bilateral     Strength:      5/5 with knee extension bilateral     Special Tests:     painful Memo bilateral      + PF grind       neg (-) varus at 0 deg and 30 deg bilateral       neg (-) valgus at 0 deg and 30 deg bilateral     Neurovascular:      2+ peripheral pulses bilaterally and brisk capillary refill       sensation grossly intact    Large Joint Injection/Arthocentesis: bilateral knee  Date/Time: 2/28/2019 2:30 PM  Performed by: Karel Bustillos DO  Authorized by: Karel Bustillos DO     Indications:  Pain  Needle Size:  18 G  Guidance: ultrasound    Approach:  Superolateral  Location:  Knee  Laterality:  Bilateral  Site:  Bilateral knee  Medications (Right):  40 mg triamcinolone 40 MG/ML; 3 mL ropivacaine 5 MG/ML  Aspirate amount (mL):  7  Aspirate:  Serous and yellow  Medications (Left):  40 mg triamcinolone 40 MG/ML; 3 mL ropivacaine 5 MG/ML  Aspirate amount (mL):  17  Aspirate:  Serous and yellow  Outcome:  Tolerated well, no immediate complications  Procedure discussed: discussed risks, benefits, and alternatives    Consent Given by:  Patient  Prep: patient was prepped and draped in usual sterile fashion               Radiology  I visualized and reviewed these images with the patient  XR KNEE BILATERAL 3 VW 9/13/2017 1:42 PM  HISTORY: Bilateral osteoarthritis.  COMPARISON: 10/24/2012  FINDINGS: 3 views of the right knee. Complete loss of medial  tibiofemoral compartment joint space with associated marginal  osteophyte formation. Patellofemoral compartment joint space is  preserved. No joint effusion. No fracture or malalignment.  3 views of the left knee. Moderate loss of medial tibiofemoral  compartment joint space. Moderate tricompartmental marginal  osteophytes.  Patellofemoral compartment joint space is preserved. No  joint effusion. No fracture or malalignment.  IMPRESSION: Moderate osteoarthritis, right greater than left.    Assessment:  1. Bilateral primary osteoarthritis of knee    2. 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   Discussed symptom treatment with ice or heat, topical treatments, and rest if needed.   Expectations and goals of CSI reviewed  Often 2-3 days for steroid effect, and can take up to two weeks for maximum effect  We discussed modified progressive pain-free activity as tolerated  Do not overuse in first two weeks if feeling better due to concern for vulnerability while steroid is working  Supportive care reviewed  All questions were answered today  Contact us with additional questions or concerns  Signs and sx of concern reviewed      Karel Bustillos DO, MELINDA  Primary Care Sports Medicine  [facility]

## 2019-03-02 RX ORDER — TRIAMCINOLONE ACETONIDE 40 MG/ML
40 INJECTION, SUSPENSION INTRA-ARTICULAR; INTRAMUSCULAR
Status: DISCONTINUED | OUTPATIENT
Start: 2019-02-28 | End: 2019-09-24 | Stop reason: ALTCHOICE

## 2019-03-02 RX ORDER — ROPIVACAINE HYDROCHLORIDE 5 MG/ML
3 INJECTION, SOLUTION EPIDURAL; INFILTRATION; PERINEURAL
Status: DISCONTINUED | OUTPATIENT
Start: 2019-02-28 | End: 2019-09-24 | Stop reason: ALTCHOICE

## 2019-04-02 ENCOUNTER — ALLIED HEALTH/NURSE VISIT (OUTPATIENT)
Dept: FAMILY MEDICINE | Facility: CLINIC | Age: 74
End: 2019-04-02
Payer: COMMERCIAL

## 2019-04-02 DIAGNOSIS — J44.9 COPD (CHRONIC OBSTRUCTIVE PULMONARY DISEASE) (H): Primary | ICD-10-CM

## 2019-04-02 PROCEDURE — 99207 ZZC NO CHARGE NURSE ONLY: CPT

## 2019-04-03 ENCOUNTER — OFFICE VISIT (OUTPATIENT)
Dept: FAMILY MEDICINE | Facility: CLINIC | Age: 74
End: 2019-04-03
Payer: COMMERCIAL

## 2019-04-03 VITALS
SYSTOLIC BLOOD PRESSURE: 142 MMHG | BODY MASS INDEX: 26.21 KG/M2 | RESPIRATION RATE: 20 BRPM | HEIGHT: 67 IN | WEIGHT: 167 LBS | TEMPERATURE: 97.3 F | OXYGEN SATURATION: 94 % | DIASTOLIC BLOOD PRESSURE: 70 MMHG | HEART RATE: 81 BPM

## 2019-04-03 DIAGNOSIS — K14.3 COATED TONGUE: Primary | ICD-10-CM

## 2019-04-03 PROCEDURE — 99213 OFFICE O/P EST LOW 20 MIN: CPT | Performed by: NURSE PRACTITIONER

## 2019-04-03 ASSESSMENT — MIFFLIN-ST. JEOR: SCORE: 1461.14

## 2019-04-03 NOTE — PATIENT INSTRUCTIONS
Do the salt water gargles we talked about and brush your tongue.  Keep rinsing your mouth after Flovent inhaler.    If you start seeing white spots on tongue and/or sides of your mouth be sure and see your primary care provider for further evaluation.    Follow-up with your primary care provider next week and as needed.    Indications for emergent return to emergency department discussed with patient, who verbalized good understanding and agreement.  Patient understands the limitations of today's evaluation.

## 2019-04-03 NOTE — PROGRESS NOTES
SUBJECTIVE:   Raul Wilhelm is a 73 year old male who presents to clinic today for the following health issues:      Thrush       Duration: Monday     Description (location/character/radiation): Thrush     Intensity:  mild    Accompanying signs and symptoms: started spitting up green a substance, tongue is white, no cold sx's    History (similar episodes/previous evaluation): None    Precipitating or alleviating factors: None    Therapies tried and outcome: None           Problem list and histories reviewed & adjusted, as indicated.  Additional history: as documented    Patient Active Problem List   Diagnosis     Family hx of colon cancer     Glaucoma     Hypertension     Cerebral infarction (H)     Hyperlipidemia LDL goal <100     Pneumonitis, hypersensitivity, naa (H)     Severe persistent asthma     COPD (chronic obstructive pulmonary disease) (H)     OA (osteoarthritis) of knee     Hoarseness of voice     Eczema     Impaired fasting blood sugar     Hypokalemia     ST elevation myocardial infarction involving left circumflex coronary artery (H)     Past Surgical History:   Procedure Laterality Date     ENT SURGERY  as a child    tonsillectomy       Social History     Tobacco Use     Smoking status: Never Smoker     Smokeless tobacco: Never Used   Substance Use Topics     Alcohol use: Yes     Comment: Rare     Family History   Problem Relation Age of Onset     Diabetes Mother      Heart Disease Mother      C.A.D. Mother      Cancer Father      Hypertension Father      Cerebrovascular Disease Paternal Grandfather      Asthma Sister      Breast Cancer No family hx of      Cancer - colorectal No family hx of      Prostate Cancer No family hx of          Current Outpatient Medications   Medication Sig Dispense Refill     albuterol (2.5 MG/3ML) 0.083% neb solution Take 1 vial (2.5 mg) by nebulization every 6 hours as needed for shortness of breath / dyspnea or wheezing 30 vial 3     albuterol (PROAIR  HFA/PROVENTIL HFA/VENTOLIN HFA) 108 (90 Base) MCG/ACT Inhaler Inhale 2 puffs into the lungs every 6 hours as needed for shortness of breath / dyspnea or wheezing 1 Inhaler 5     aspirin EC 81 MG EC tablet Take 1 tablet (81 mg) by mouth daily 30 tablet 1     atorvastatin (LIPITOR) 40 MG tablet Take 1 tablet (40 mg) by mouth daily 90 tablet 3     carvedilol (COREG) 6.25 MG tablet Take 1 tablet (6.25 mg) by mouth 2 times daily 180 tablet 2     clobetasol (TEMOVATE) 0.05 % ointment Apply sparingly to affected area twice daily as needed.  Do not apply to face. 45 g 1     EPINEPHrine 0.3 MG/0.3ML injection 2-pack Inject 0.3 mLs (0.3 mg) into the muscle as needed for anaphylaxis 0.6 mL 0     fluticasone (FLOVENT HFA) 220 MCG/ACT Inhaler Inhale 2 puffs into the lungs 2 times daily 1 Inhaler 11     ipratropium - albuterol 0.5 mg/2.5 mg/3 mL (DUONEB) 0.5-2.5 (3) MG/3ML neb solution Take 1 vial (3 mLs) by nebulization every 4 hours as needed for shortness of breath / dyspnea 1 Box 2     latanoprost (XALATAN) 0.005 % ophthalmic solution Place 1 drop into both eyes At Bedtime       losartan-hydrochlorothiazide (HYZAAR) 50-12.5 MG tablet Take 1 tablet by mouth daily 90 tablet 3     montelukast (SINGULAIR) 10 MG tablet TAKE ONE TABLET BY MOUTH EVERY NIGHT AT BEDTIME 90 tablet 3     nitroglycerin (NITROSTAT) 0.4 MG sublingual tablet Place 1 tablet (0.4 mg) under the tongue every 5 minutes as needed for chest pain If you are still having symptoms after 3 doses (15 minutes) call 911. 25 tablet 1     omeprazole (PRILOSEC) 20 MG CR capsule Take 1 capsule (20 mg) by mouth daily 90 capsule 3     order for DME Equipment being ordered: arm blood pressure cuff 1 Device 0     ORDER FOR DME Equipment being ordered: Nebulizer Respironics, MiniElite--Neb Machine and tubing and mouth piece. 1 Device 0     predniSONE (DELTASONE) 20 MG tablet Take 3 tabs (60 mg) by mouth daily x 3 days, 2 tabs (40 mg) daily x 3 days, 1 tab (20 mg) daily x 3 days,  "then 1/2 tab (10 mg) x 3 days.. (Patient not taking: Reported on 1/31/2019.) 20 tablet 0     Allergies   Allergen Reactions     Simvastatin Swelling     Severe muscle pain, swelling, and bruising     Dust Mites      Labs reviewed in EPIC    Reviewed and updated as needed this visit by clinical staff  Tobacco  Allergies  Meds  Problems  Med Hx  Surg Hx  Fam Hx  Soc Hx        Reviewed and updated as needed this visit by Provider  Tobacco  Allergies  Meds  Problems  Med Hx  Surg Hx  Fam Hx         ROS:  Constitutional, HEENT, cardiovascular, pulmonary, GI, , musculoskeletal, neuro, skin, endocrine and psych systems are negative, except as otherwise noted.    OBJECTIVE:     /70   Pulse 81   Temp 97.3  F (36.3  C) (Tympanic)   Resp 20   Ht 1.702 m (5' 7\")   Wt 75.8 kg (167 lb)   SpO2 94%   BMI 26.16 kg/m    Body mass index is 26.16 kg/m .   GENERAL: healthy, alert and no distress, nontoxic in appearance  EYES: Eyes grossly normal to inspection, PERRL and conjunctivae and sclerae normal  HENT: ear canals and TM's normal, nose and mouth without ulcers or lesions, no thrush, tongue just has a light coating on it no infection and he states it is better.  NECK: no adenopathy, supple with full ROM  RESP: lungs clear to auscultation - no rales, rhonchi or wheezes  CV: regular rate and rhythm, normal S1 S2, no S3 or S4, no murmur, click or rub, no peripheral edema   ABDOMEN: soft, nontender, no hepatosplenomegaly, no masses and bowel sounds normal  MS: no gross musculoskeletal defects noted, no edema  No rash    Diagnostic Test Results:  No results found for this or any previous visit (from the past 24 hour(s)).    ASSESSMENT/PLAN:     Problem List Items Addressed This Visit     None      Visit Diagnoses     Coated tongue    -  Primary               Patient Instructions   Do the salt water gargles we talked about and brush your tongue.  Keep rinsing your mouth after Flovent inhaler.    If you start " seeing white spots on tongue and/or sides of your mouth be sure and see your primary care provider for further evaluation.    Follow-up with your primary care provider next week and as needed.    Indications for emergent return to emergency department discussed with patient, who verbalized good understanding and agreement.  Patient understands the limitations of today's evaluation.             OSCAR Monge [facility]

## 2019-04-19 ENCOUNTER — OFFICE VISIT (OUTPATIENT)
Dept: FAMILY MEDICINE | Facility: CLINIC | Age: 74
End: 2019-04-19
Payer: COMMERCIAL

## 2019-04-19 VITALS
SYSTOLIC BLOOD PRESSURE: 130 MMHG | WEIGHT: 166 LBS | DIASTOLIC BLOOD PRESSURE: 70 MMHG | RESPIRATION RATE: 12 BRPM | BODY MASS INDEX: 26 KG/M2 | OXYGEN SATURATION: 95 % | HEART RATE: 85 BPM | TEMPERATURE: 97.3 F

## 2019-04-19 DIAGNOSIS — J44.9 CHRONIC OBSTRUCTIVE PULMONARY DISEASE, UNSPECIFIED COPD TYPE (H): Primary | ICD-10-CM

## 2019-04-19 DIAGNOSIS — J67.2: ICD-10-CM

## 2019-04-19 DIAGNOSIS — J45.51 SEVERE PERSISTENT ASTHMA WITH ACUTE EXACERBATION (H): ICD-10-CM

## 2019-04-19 DIAGNOSIS — J45.50 SEVERE PERSISTENT ASTHMA WITHOUT COMPLICATION (H): ICD-10-CM

## 2019-04-19 PROCEDURE — 99214 OFFICE O/P EST MOD 30 MIN: CPT | Performed by: INTERNAL MEDICINE

## 2019-04-19 RX ORDER — ALBUTEROL SULFATE 90 UG/1
2 AEROSOL, METERED RESPIRATORY (INHALATION) EVERY 6 HOURS PRN
Qty: 18 G | Refills: 11 | Status: SHIPPED | OUTPATIENT
Start: 2019-04-19 | End: 2020-08-27

## 2019-04-19 RX ORDER — FEXOFENADINE HCL 180 MG/1
180 TABLET ORAL DAILY
Qty: 90 TABLET | Refills: 3 | Status: SHIPPED | OUTPATIENT
Start: 2019-04-19 | End: 2020-05-08

## 2019-04-19 RX ORDER — PREDNISONE 20 MG/1
TABLET ORAL
Qty: 20 TABLET | Refills: 0 | Status: SHIPPED | OUTPATIENT
Start: 2019-04-19 | End: 2019-09-05

## 2019-04-19 RX ORDER — FLUTICASONE PROPIONATE 220 UG/1
2 AEROSOL, METERED RESPIRATORY (INHALATION) 2 TIMES DAILY
Qty: 1 INHALER | Refills: 11 | Status: SHIPPED | OUTPATIENT
Start: 2019-04-19 | End: 2020-05-19

## 2019-04-19 RX ORDER — ALBUTEROL SULFATE 0.83 MG/ML
2.5 SOLUTION RESPIRATORY (INHALATION) EVERY 6 HOURS PRN
Qty: 30 VIAL | Refills: 11 | Status: SHIPPED | OUTPATIENT
Start: 2019-04-19 | End: 2020-05-08

## 2019-04-19 NOTE — PROGRESS NOTES
"  SUBJECTIVE:   Raul Wilhelm is a 73 year old male who presents to clinic today for the following   health issues:  Chief Complaint   Patient presents with     Asthma     Eye Problem       Vaccination: Pt would like to know if he should have a measles vaccine (MMR) nothing on [facility] or in his records shows that he was vaccinated - please review.    Pt verbalize that spring and fall are harder on his breathing.     Asthma Follow-Up    Was ACT completed today?    Yes    ACT Total Scores 1/10/2019   ACT TOTAL SCORE -   ASTHMA ER VISITS -   ASTHMA HOSPITALIZATIONS -   ACT TOTAL SCORE (Goal Greater than or Equal to 20) 21   In the past 12 months, how many times did you visit the emergency room for your asthma without being admitted to the hospital? 0   In the past 12 months, how many times were you hospitalized overnight because of your asthma? 0       Recent asthma triggers that patient is dealing with: smoke, upper respiratory infections, dust mites, mold, humidity, aspirin, strong odors and fumes, emotions, cold air and Gastric Reflux        Amount of exercise or physical activity: 2-3 days/week for an average of greater than 60 minutes    Problems taking medications regularly: No    Medication side effects: none    Diet: regular (no restrictions)    He reports increase in wheezing x 1 month.    We discussed spacer for inhaler but he declines.    Last course of prednisone was 1-2 months ago, does not recall.  I last filled 1/20.    From last visit in Jan \"He wasn't able to afford controller inhaler, but our pharmacist was able to help him get Flovent.  He is using 1 puff daily, forgets to use twice daily. He forgets it entirely 2-3 days/week.    Albuterol neb - use is 3 x week - finds very helpful.  Often uses pre-emptively.    Rarely uses albuterol inhaler.    Last prednisone use was 1 month ago but cannot recall the dose or length.  He has used 2 tapers of prednisone since July.  Symptoms that trigger " "prednisone are shortness of breath.     He has multiple courses of prednisone in 2018 - at least 4 maybe as much as 6.  He reports syncope during last PFT years ago in 2009.  Saw Pulm but years ago.  He thinks he has asthma due to 'small lung capacity'.\"  He then states his asthma is due to stress his mother had while she was pregnant w/him.  He then states his asthma is a result of hepatits he had as a child.    --last visit in Jan I recommended repeat PFT, CT, Pulm consultationg.        Concern - Eye problem  Onset: Since Jan/2019    Description:   Pt verbalize that he has a surgery cataract surgery back in Jan/2019 and has  Noticed that his eyes became sometimes very dry and other times watering, therefor pt would like an advise about this situation.    Intensity: moderate    Progression of Symptoms:  same and constant    Accompanying Signs & Symptoms:  No pain present    Previous history of similar problem:   na    Precipitating factors:   Worsened by: na    Alleviating factors:Improved by: na    Therapies Tried and outcome: na        Reviewed  and updated as needed this visit by clinical staff  Tobacco  Allergies  Meds  Med Hx  Surg Hx  Fam Hx  Soc Hx        Reviewed and updated as needed this visit by Provider         Current Outpatient Medications   Medication Sig Dispense Refill     albuterol (2.5 MG/3ML) 0.083% neb solution Take 1 vial (2.5 mg) by nebulization every 6 hours as needed for shortness of breath / dyspnea or wheezing 30 vial 3     albuterol (PROAIR HFA/PROVENTIL HFA/VENTOLIN HFA) 108 (90 Base) MCG/ACT inhaler Inhale 2 puffs into the lungs every 6 hours as needed for shortness of breath / dyspnea or wheezing 18 g 11     aspirin EC 81 MG EC tablet Take 1 tablet (81 mg) by mouth daily 30 tablet 1     atorvastatin (LIPITOR) 40 MG tablet Take 1 tablet (40 mg) by mouth daily 90 tablet 3     carvedilol (COREG) 6.25 MG tablet Take 1 tablet (6.25 mg) by mouth 2 times daily 180 tablet 2     " fexofenadine (ALLEGRA) 180 MG tablet Take 1 tablet (180 mg) by mouth daily 90 tablet 3     fluticasone (FLOVENT HFA) 220 MCG/ACT inhaler Inhale 2 puffs into the lungs 2 times daily 1 Inhaler 11     latanoprost (XALATAN) 0.005 % ophthalmic solution Place 1 drop into both eyes At Bedtime       losartan-hydrochlorothiazide (HYZAAR) 50-12.5 MG tablet Take 1 tablet by mouth daily 90 tablet 3     montelukast (SINGULAIR) 10 MG tablet TAKE ONE TABLET BY MOUTH EVERY NIGHT AT BEDTIME 90 tablet 3     omeprazole (PRILOSEC) 20 MG CR capsule Take 1 capsule (20 mg) by mouth daily 90 capsule 3     nitroglycerin (NITROSTAT) 0.4 MG sublingual tablet Place 1 tablet (0.4 mg) under the tongue every 5 minutes as needed for chest pain If you are still having symptoms after 3 doses (15 minutes) call 911. (Patient not taking: Reported on 4/19/2019) 25 tablet 1     order for DME Equipment being ordered: arm blood pressure cuff (Patient not taking: Reported on 4/19/2019) 1 Device 0     ORDER FOR DME Equipment being ordered: Nebulizer Respironics, MiniElite--Neb Machine and tubing and mouth piece. (Patient not taking: Reported on 4/19/2019) 1 Device 0     predniSONE (DELTASONE) 20 MG tablet Take 3 tabs (60 mg) by mouth daily x 3 days, 2 tabs (40 mg) daily x 3 days, 1 tab (20 mg) daily x 3 days, then 1/2 tab (10 mg) x 3 days.. (Patient not taking: Reported on 1/31/2019.) 20 tablet 0       ROS:  Constitutional, HEENT, cardiovascular, pulmonary, gi and gu systems are negative, except as otherwise noted.    OBJECTIVE:     /70   Pulse 85   Temp 97.3  F (36.3  C) (Tympanic)   Resp 12   Wt 75.3 kg (166 lb)   SpO2 95%   BMI 26.00 kg/m    Body mass index is 26 kg/m .  GENERAL APPEARANCE: alert and no distress  RESP: expiratory wheezing in all lung fields.  Very distant, hyperinflated  CV: regular rates and rhythm, normal S1 S2, no S3 or S4 and no murmur, click or rub      ASSESSMENT/PLAN:       1. Chronic obstructive pulmonary disease,  unspecified COPD type (H) -severe persistent asthma that has progressed to COPD.  He is not able to afford most inhalers, and is only able to afford the Flovent.  He often forgets to take it twice a day.  We discussed a spacer, he declines repeatedly.  He is firm in not repeating PFTs, due to his past syncope during PFTs.  Discussed that his need for 3+ courses of high-dose, prolonged prednisone per year put him at high risk for immunosuppression, bone density problems, diabetes, other complications.  Also discussed how this is an indication that his asthma is poorly controlled, putting him at high risk for hospitalization and even death.  I again reiterated the need to see a pulmonologist to help improve his asthma control, he is still resistant.    2. Severe persistent asthma without complication -refills provided  - fluticasone (FLOVENT HFA) 220 MCG/ACT inhaler; Inhale 2 puffs into the lungs 2 times daily  Dispense: 1 Inhaler; Refill: 11  - albuterol (PROAIR HFA/PROVENTIL HFA/VENTOLIN HFA) 108 (90 Base) MCG/ACT inhaler; Inhale 2 puffs into the lungs every 6 hours as needed for shortness of breath / dyspnea or wheezing  Dispense: 18 g; Refill: 11  - fexofenadine (ALLEGRA) 180 MG tablet; Take 1 tablet (180 mg) by mouth daily  Dispense: 90 tablet; Refill: 3  - albuterol (PROVENTIL) (2.5 MG/3ML) 0.083% neb solution; Take 1 vial (2.5 mg) by nebulization every 6 hours as needed for shortness of breath / dyspnea or wheezing  Dispense: 30 vial; Refill: 11    3. Pneumonitis, hypersensitivity, naa (H) -have discussed repeat CT, patient declines    4. Severe persistent asthma with acute exacerbation -advised to take the high-dose, prolonged course, as shorter courses of 40 mg daily for 5 days have not resulted in resolution of symptoms in the past for him.  - predniSONE (DELTASONE) 20 MG tablet; Take 3 tabs (60 mg) by mouth daily x 3 days, 2 tabs (40 mg) daily x 3 days, 1 tab (20 mg) daily x 3 days, then 1/2 tab (10 mg) x  3 days..  Dispense: 20 tablet; Refill: 0    Patient Instructions   1. Recommend to see Lung Doctor - [facility] [phone]  2. Refills available of all asthma medications  3. Refill of prednisone - due to wheezing, reasonable to start a course - longer course over 12 days.  4. Order for Allegra (allergy medication)  5. Try to remember to take the Flovent twice daily      Dr. Sarita Hennessy, DO  [facility]

## 2019-04-19 NOTE — LETTER
My COPD Action Plan     Name: Raul Wilhelm    YOB: 1945   Date: 4/19/2019    My doctor: Sarita Hennessy, DO   My [place]: [facility] [place]    [address] [address]  [phone]  My Controller Medicine: Flovent   Dose: twice daily     My Rescue Medicine: Albuterol (Proair/Ventolin/Proventil) inhaler   Dose: as needed     My Flare Up Medicine: Prednisone   Dose: as needed     My COPD Severity: Moderate = FeV1 < 79% -50%      Use of Oxygen: Oxygen Not Prescribed      Make sure you've had your pneumonia   vaccines.          GREEN ZONE       Doing well today      Usual level of activity and exercise    Usual amount of cough and mucus    No shortness of breath    Usual level of health (thinking clearly, sleeping well, feel like eating) Actions:      Take daily medicines    Use oxygen as prescribed    Follow regular exercise and diet plan    Avoid cigarette smoke and other irritants that harm the lungs           YELLOW ZONE          Having a bad day or flare up      Short of breath more than usual    A lot more sputum (mucus) than usual    Sputum looks yellow, green, tan, brown or bloody    More coughing or wheezing    Fever or chills    Less energy; trouble completing activities    Trouble thinking or focusing    Using quick relief inhaler or nebulizer more often    Poor sleep; symptoms wake me up    Do not feel like eating Actions:      Get plenty of rest    Take daily medicines    Use quick relief inhaler every 2-4 hours    If you use oxygen, call you doctor to see if you should adjust your oxygen    Do breathing exercises or other things to help you relax    Let a loved one, friend or neighbor know you are feeling worse    Call your care team if you have 2 or more symptoms.  Start taking steroids or antibiotics if directed by your care team           RED ZONE       Need medical care now      Severe shortness of breath (feel you can't  breathe)    Fever, chills    Not enough breath to do any activity    Trouble coughing up mucus, walking or talking    Blood in mucus    Frequent coughing   Rescue medicines are not working    Not able to sleep because of breathing    Feel confused or drowsy    Chest pain    Actions:      Call your health care team.  If you cannot reach your care team, call 911 or go to the emergency room.        Annual Reminders:  Meet with Care Team, Flu Shot every Fall  Pharmacy: [facility], [place] - [address]

## 2019-04-20 ASSESSMENT — ASTHMA QUESTIONNAIRES: ACT_TOTALSCORE: 18

## 2019-06-03 DIAGNOSIS — I10 HYPERTENSION: ICD-10-CM

## 2019-06-03 NOTE — TELEPHONE ENCOUNTER
"Requested Prescriptions   Pending Prescriptions Disp Refills     carvedilol (COREG) 6.25 MG tablet 180 tablet 2     Sig: Take 1 tablet (6.25 mg) by mouth 2 times daily   Last Written Prescription Date:  7/13/18  Last Fill Quantity: 180 tab,  # refills: 2   Last office visit: 4/19/2019 with prescribing provider:  Sarita Hennessy     Future Office Visit:        Beta-Blockers Protocol Passed - 6/3/2019  2:49 PM        Passed - Blood pressure under 140/90 in past 12 months     BP Readings from Last 3 Encounters:   04/19/19 130/70   04/03/19 142/70   02/28/19 132/70                 Passed - Patient is age 6 or older        Passed - Recent (12 mo) or future (30 days) visit within the authorizing provider's specialty     Patient had office visit in the last 12 months or has a visit in the next 30 days with authorizing provider or within the authorizing provider's specialty.  See \"Patient Info\" tab in inbasket, or \"Choose Columns\" in Meds & Orders section of the refill encounter.              Passed - Medication is active on med list          "

## 2019-06-05 RX ORDER — CARVEDILOL 6.25 MG/1
6.25 TABLET ORAL 2 TIMES DAILY
Qty: 180 TABLET | Refills: 3 | Status: SHIPPED | OUTPATIENT
Start: 2019-06-05 | End: 2020-05-08

## 2019-07-12 ENCOUNTER — OFFICE VISIT (OUTPATIENT)
Dept: FAMILY MEDICINE | Facility: CLINIC | Age: 74
End: 2019-07-12
Payer: COMMERCIAL

## 2019-07-12 VITALS
TEMPERATURE: 97.2 F | SYSTOLIC BLOOD PRESSURE: 112 MMHG | DIASTOLIC BLOOD PRESSURE: 62 MMHG | BODY MASS INDEX: 26.47 KG/M2 | OXYGEN SATURATION: 95 % | HEART RATE: 63 BPM | WEIGHT: 169 LBS | RESPIRATION RATE: 12 BRPM

## 2019-07-12 DIAGNOSIS — M21.42 ACQUIRED PES PLANUS OF BOTH FEET: Primary | ICD-10-CM

## 2019-07-12 DIAGNOSIS — M21.41 ACQUIRED PES PLANUS OF BOTH FEET: Primary | ICD-10-CM

## 2019-07-12 DIAGNOSIS — J44.9 CHRONIC OBSTRUCTIVE PULMONARY DISEASE, UNSPECIFIED COPD TYPE (H): ICD-10-CM

## 2019-07-12 PROCEDURE — 99213 OFFICE O/P EST LOW 20 MIN: CPT | Performed by: INTERNAL MEDICINE

## 2019-07-12 PROCEDURE — 99207 C PAF COMPLETED  NO CHARGE: CPT | Performed by: INTERNAL MEDICINE

## 2019-07-12 NOTE — PATIENT INSTRUCTIONS
1.  Recommend to see DR. Carver podiatrist for consideration of Orthotics  2. Can meet with Pharmacist - Jules or Evens      1. Can contact Cyndie Cottrell at [phone] for prescription drug cost assistance.  2. Search engine for cost-savings:  www.needymeds.org   3. List of websites for [place] pharmacies:    [email or web address]

## 2019-07-12 NOTE — PROGRESS NOTES
Subjective     Raul Wilhelm is a 73 year old male who presents to clinic today for the following health issues:    HPI     Quality: Pt declined Colonoscopy and FIT test.    Joint Swollen    Onset: not sure but noticed a week ago    Description:   Location: Right foot  Character: na    Intensity: mild    Progression of Symptoms: same    Accompanying Signs & Symptoms:  Other symptoms: swelling    History:   Previous similar pain: no       Precipitating factors:   Trauma or overuse: no     Alleviating factors:Improved by: nothing    Therapies Tried and outcome: na    MSK Cyst: in right thigh.    Asthma: has not made appointment with Pulm.  Too far to drive  Flovent he stopped because too expensive.  Was cheaper, now more expensive.  He doesn't think it helped.  He doesn't take regularly.  He is using albuterol neb 4x week.  He finds this very helpful.        Current Outpatient Medications   Medication Sig Dispense Refill     aspirin EC 81 MG EC tablet Take 1 tablet (81 mg) by mouth daily 30 tablet 1     atorvastatin (LIPITOR) 40 MG tablet Take 1 tablet (40 mg) by mouth daily 90 tablet 3     carvedilol (COREG) 6.25 MG tablet Take 1 tablet (6.25 mg) by mouth 2 times daily 180 tablet 3     fexofenadine (ALLEGRA) 180 MG tablet Take 1 tablet (180 mg) by mouth daily 90 tablet 3     fluticasone (FLOVENT HFA) 220 MCG/ACT inhaler Inhale 2 puffs into the lungs 2 times daily 1 Inhaler 11     latanoprost (XALATAN) 0.005 % ophthalmic solution Place 1 drop into both eyes At Bedtime       losartan-hydrochlorothiazide (HYZAAR) 50-12.5 MG tablet Take 1 tablet by mouth daily 90 tablet 3     montelukast (SINGULAIR) 10 MG tablet TAKE ONE TABLET BY MOUTH EVERY NIGHT AT BEDTIME 90 tablet 3     nitroglycerin (NITROSTAT) 0.4 MG sublingual tablet Place 1 tablet (0.4 mg) under the tongue every 5 minutes as needed for chest pain If you are still having symptoms after 3 doses (15 minutes) call 911. 25 tablet 1     omeprazole (PRILOSEC) 20 MG  CR capsule Take 1 capsule (20 mg) by mouth daily 90 capsule 3     order for DME Equipment being ordered: arm blood pressure cuff 1 Device 0     ORDER FOR DME Equipment being ordered: Nebulizer Respironics, MiniElite--Neb Machine and tubing and mouth piece. 1 Device 0     predniSONE (DELTASONE) 20 MG tablet Take 3 tabs (60 mg) by mouth daily x 3 days, 2 tabs (40 mg) daily x 3 days, 1 tab (20 mg) daily x 3 days, then 1/2 tab (10 mg) x 3 days.. 20 tablet 0     albuterol (PROAIR HFA/PROVENTIL HFA/VENTOLIN HFA) 108 (90 Base) MCG/ACT inhaler Inhale 2 puffs into the lungs every 6 hours as needed for shortness of breath / dyspnea or wheezing (Patient not taking: Reported on 7/12/2019) 18 g 11     albuterol (PROVENTIL) (2.5 MG/3ML) 0.083% neb solution Take 1 vial (2.5 mg) by nebulization every 6 hours as needed for shortness of breath / dyspnea or wheezing (Patient not taking: Reported on 7/12/2019) 30 vial 11         Reviewed and updated as needed this visit by Provider         Review of Systems   ROS COMP: Constitutional, HEENT, cardiovascular, pulmonary, gi and gu systems are negative, except as otherwise noted.      Objective    /62   Pulse 63   Temp 97.2  F (36.2  C) (Tympanic)   Resp 12   Wt 76.7 kg (169 lb)   SpO2 95%   BMI 26.47 kg/m    Body mass index is 26.47 kg/m .  Physical Exam   GENERAL APPEARANCE: alert and no distress  MS: significant pes planus bilateral but R>L  SKIN: no skin changes overlying medal arch          Assessment & Plan     1. Acquired pes planus of both feet - the 'lump' he is noting his anatomic changes due to his severe pes planus.  Advised to see podiatry for consideration of orthotics  - ORTHO  REFERRAL    2. Chronic obstructive pulmonary disease, unspecified COPD type (H) -cost is the main issue for patient.  He also has severe asthma that has developed into his COPD.  Gave patient several ways to help reduce the price of medications           Patient Instructions   1.   Recommend to see DR. Carver podiatrist for consideration of Orthotics  2. Can meet with Pharmacist - Jules or Evens      1. Can contact Cyndie Cottrell at [phone] for prescription drug cost assistance.  2. Search engine for cost-savings:  www.needEcaleds.org   3. List of websites for [place] pharmacies:    [email or web address]         Return in about 3 months (around 10/12/2019) for Routine Follow-Up/Med Check.    Dr. Sarita Hennessy,   [facility] Internal Medicine

## 2019-07-13 ASSESSMENT — ASTHMA QUESTIONNAIRES: ACT_TOTALSCORE: 21

## 2019-09-04 DIAGNOSIS — J45.51 SEVERE PERSISTENT ASTHMA WITH ACUTE EXACERBATION (H): ICD-10-CM

## 2019-09-04 NOTE — TELEPHONE ENCOUNTER
Requested Prescriptions   Pending Prescriptions Disp Refills     predniSONE (DELTASONE) 20 MG tablet 20 tablet 0     Sig: Take 3 tabs (60 mg) by mouth daily x 3 days, 2 tabs (40 mg) daily x 3 days, 1 tab (20 mg) daily x 3 days, then 1/2 tab (10 mg) x 3 days.       There is no refill protocol information for this order        Last Written Prescription Date:  4/19/19  Last Fill Quantity: 20,  # refills: 0   Last office visit: 12/8/2005 with prescribing provider:  Gerhard   Future Office Visit:   Next 5 appointments (look out 90 days)    Sep 12, 2019  1:00 PM CDT  Return Visit with Karel Bustillos DO  [facility] ([facility]) [address]  [address]  [facility] [address] [phone]

## 2019-09-05 RX ORDER — PREDNISONE 20 MG/1
TABLET ORAL
Qty: 20 TABLET | Refills: 0 | Status: SHIPPED | OUTPATIENT
Start: 2019-09-05 | End: 2020-02-27

## 2019-09-05 NOTE — TELEPHONE ENCOUNTER
Routing refill request to provider for review/approval because:  Drug not on the [facility] refill protocol   Associated Diagnoses   Severe persistent asthma with acute exacerbation [J45.51]          JUNE OwensN, RN

## 2019-09-16 ENCOUNTER — IMMUNIZATION (OUTPATIENT)
Dept: FAMILY MEDICINE | Facility: CLINIC | Age: 74
End: 2019-09-16
Payer: COMMERCIAL

## 2019-09-16 DIAGNOSIS — Z23 NEED FOR PROPHYLACTIC VACCINATION AND INOCULATION AGAINST INFLUENZA: Primary | ICD-10-CM

## 2019-09-16 PROCEDURE — 90662 IIV NO PRSV INCREASED AG IM: CPT

## 2019-09-16 PROCEDURE — G0008 ADMIN INFLUENZA VIRUS VAC: HCPCS

## 2019-09-16 PROCEDURE — 99207 ZZC NO CHARGE NURSE ONLY: CPT

## 2019-09-24 ENCOUNTER — OFFICE VISIT (OUTPATIENT)
Dept: ORTHOPEDICS | Facility: CLINIC | Age: 74
End: 2019-09-24
Payer: COMMERCIAL

## 2019-09-24 VITALS
WEIGHT: 169 LBS | HEIGHT: 67 IN | DIASTOLIC BLOOD PRESSURE: 76 MMHG | SYSTOLIC BLOOD PRESSURE: 129 MMHG | BODY MASS INDEX: 26.53 KG/M2

## 2019-09-24 DIAGNOSIS — M25.562 CHRONIC PAIN OF BOTH KNEES: ICD-10-CM

## 2019-09-24 DIAGNOSIS — G89.29 CHRONIC PAIN OF BOTH KNEES: ICD-10-CM

## 2019-09-24 DIAGNOSIS — M17.0 BILATERAL PRIMARY OSTEOARTHRITIS OF KNEE: Primary | ICD-10-CM

## 2019-09-24 DIAGNOSIS — M25.561 CHRONIC PAIN OF BOTH KNEES: ICD-10-CM

## 2019-09-24 PROCEDURE — 20611 DRAIN/INJ JOINT/BURSA W/US: CPT | Mod: 50 | Performed by: FAMILY MEDICINE

## 2019-09-24 PROCEDURE — 99213 OFFICE O/P EST LOW 20 MIN: CPT | Mod: 25 | Performed by: FAMILY MEDICINE

## 2019-09-24 RX ADMIN — TRIAMCINOLONE ACETONIDE 40 MG: 40 INJECTION, SUSPENSION INTRA-ARTICULAR; INTRAMUSCULAR at 14:05

## 2019-09-24 RX ADMIN — ROPIVACAINE HYDROCHLORIDE 3 ML: 5 INJECTION, SOLUTION EPIDURAL; INFILTRATION; PERINEURAL at 14:05

## 2019-09-24 ASSESSMENT — MIFFLIN-ST. JEOR: SCORE: 1470.21

## 2019-09-24 NOTE — PROGRESS NOTES
Raul Wilhelm  :  1945  DOS: 19  MRN: [id number]    Sports Medicine Evaluation    Initial History 2017: Since last visit on 17 with Raquel Briscoe where he underwent bilateral knee injections, the patient has begun to have an increase in pain over the past few months.      Symptoms are better with: activity   Symptoms are worse with: prolonged walking, stairs   Additional Features:                        Positive: none                        Negative: swelling, bruising, popping, grinding, catching, locking, instability, paresthesias, numbness, weakness, pain in other joints and systemic symptoms     Social History: tool , computer job     Interim History - 2018  Since last visit on 10/2/17 with Dr Grier patient has moderate bilateral knee pain over the last ~ 2 months.  Bilateral knee steroid injection completed on 2017 provided good relief for ~ 5 - 6 months.  He reports that pain has been waxing/waning based off his activity.  Desires repeat injections today.  Moderate antalgic gait noted.  No new injury in the interim.    Interim History - July 3, 2018  Since last visit on 2018 patient has moderate bilateral knee pain.  Bilateral knee SynviscOne injection completed on / provided mild relief.  Continues to have significant discomfort, interested in repeat steroid injections today.  No new injury in the interim.    Interim History - 2019  Since last visit on 7/3/18 patient has moderate intermittent bilateral knee pain.  Bilateral knee steroid injection completed on 7/3 provided good relief for ~ 4 - 5 months.  Patient reports waxing and waning pain over the last ~ 2 months.  Noted some relief in early  after completing prednisone course for his asthma.  No new injury in the interim.    Interim History - 2019  Since last visit on 19 patient has moderate bilateral knee pain over the past ~ 1 - 2 months.  Bilateral knees  "steroid injection completed on 2/28 provided good relief for ~ 5 - 6 months.  Desires repeat injection today.  Attending wellness program thru cardiac therapy 3x/week.  No new injury in the interim.    Review of Systems  Skin: no bruising, no swelling  Musculoskeletal: as above  Neurologic: no numbness, paresthesias  Remainder of review of systems is negative including constitutional, CV, pulmonary, GI, except as noted in HPI or medical history.    Patient's current problem list, past medical and surgical history, and family history were reviewed.    Patient Active Problem List   Diagnosis     Family hx of colon cancer     Glaucoma     Hypertension     Cerebral infarction (H)     Hyperlipidemia LDL goal <100     Pneumonitis, hypersensitivity, naa (H)     Severe persistent asthma     COPD (chronic obstructive pulmonary disease) (H)     OA (osteoarthritis) of knee     Hoarseness of voice     Eczema     Impaired fasting blood sugar     Hypokalemia     ST elevation myocardial infarction involving left circumflex coronary artery (H)     CKD (chronic kidney disease) stage 3, GFR 30-59 ml/min (H)     Past Medical History:   Diagnosis Date     Chronic airway obstruction, not elsewhere classified      Other and unspecified hyperlipidemia      Past Surgical History:   Procedure Laterality Date     ENT SURGERY  as a child    tonsillectomy     Family History   Problem Relation Age of Onset     Diabetes Mother      Heart Disease Mother      C.A.D. Mother      Cancer Father      Hypertension Father      Cerebrovascular Disease Paternal Grandfather      Asthma Sister      Breast Cancer No family hx of      Cancer - colorectal No family hx of      Prostate Cancer No family hx of        Objective  /76   Ht 1.702 m (5' 7\")   Wt 76.7 kg (169 lb)   BMI 26.47 kg/m      GENERAL APPEARANCE: healthy, alert and no distress   GAIT: NORMAL  SKIN: no suspicious lesions or rashes  HEENT: Sclera clear, anicteric  CV: good peripheral " pulses  RESP: Breathing not labored  NEURO: Normal strength and tone, mentation intact and speech normal  PSYCH:  mentation appears normal and affect normal/bright    Bilateral Knee exam  Inspection:      no effusion, swelling of bruising bilateral     Patella:      Mobility -       hypomobile bilateral       Crepitus noted in the patellofemoral joint bilateral     Tender:      medial joint line bilateral > lateral joint line, PF joint laterally     Non Tender:      remainder of knee area bilateral     Knee ROM:      Full active and passive ROM with flexion and extension bilateral     Hip ROM:     Full active and passive ROM bilateral     Strength:      5/5 with knee extension bilateral     Special Tests:     painful Memo bilateral      + PF grind       neg (-) varus at 0 deg and 30 deg bilateral       neg (-) valgus at 0 deg and 30 deg bilateral     Neurovascular:      2+ peripheral pulses bilaterally and brisk capillary refill       sensation grossly intact    Large Joint Injection/Arthocentesis: bilateral knee  Date/Time: 9/24/2019 2:05 PM  Performed by: Karel Bustillos DO  Authorized by: Karel Bustillos DO     Indications:  Pain  Needle Size:  21 G  Guidance: ultrasound    Approach:  Superolateral  Location:  Knee  Laterality:  Bilateral      Medications (Right):  40 mg triamcinolone 40 MG/ML; 3 mL ropivacaine 5 MG/ML  Aspirate amount (mL):  11  Aspirate:  Serous and yellow  Medications (Left):  40 mg triamcinolone 40 MG/ML; 3 mL ropivacaine 5 MG/ML  Aspirate amount (mL):  11  Aspirate:  Serous and yellow  Outcome:  Tolerated well, no immediate complications  Procedure discussed: discussed risks, benefits, and alternatives    Consent Given by:  Patient  Timeout: timeout called immediately prior to procedure    Prep: patient was prepped and draped in usual sterile fashion           Radiology  I visualized and reviewed these images with the patient  XR KNEE BILATERAL 3 VW 9/13/2017 1:42  PM  HISTORY: Bilateral osteoarthritis.  COMPARISON: 10/24/2012  FINDINGS: 3 views of the right knee. Complete loss of medial  tibiofemoral compartment joint space with associated marginal  osteophyte formation. Patellofemoral compartment joint space is  preserved. No joint effusion. No fracture or malalignment.  3 views of the left knee. Moderate loss of medial tibiofemoral  compartment joint space. Moderate tricompartmental marginal  osteophytes. Patellofemoral compartment joint space is preserved. No  joint effusion. No fracture or malalignment.  IMPRESSION: Moderate osteoarthritis, right greater than left.    Assessment:  1. Bilateral primary osteoarthritis of knee    2. Chronic pain of both knees          Plan:  F/u prn  Trial of b/l SynviscOne not helpful   Prior imaging reviewed in detail with patient at time of visit  Repeat CSI today, will continue to base future recommendations on quality and duration of benefit  Surgical referral available anytime prn for discussion of TKA  Reviewed activity modification and progressive increase in activity as tolerated and guided by pain  Reviewed options for potential steroid vs PRP injections and the possibility for future orthopedic referral prn  Reviewed safe and appropriate OTC medication choices, try tylenol first  Up to 3000mg daily of tylenol is generally safe, NSAID dosing and duration limitations reviewed  Discussed nature of degenerative arthrosis of the knee.   Discussed symptom treatment with ice or heat, topical treatments, and rest if needed.   Expectations and goals of CSI reviewed  Often 2-3 days for steroid effect, and can take up to two weeks for maximum effect  We discussed modified progressive pain-free activity as tolerated  Do not overuse in first two weeks if feeling better due to concern for vulnerability while steroid is working  Supportive care reviewed  All questions were answered today  Contact us with additional questions or concerns  Signs  and sx of concern reviewed      Karel Bustillos DO, MELINDA  Primary Care Sports Medicine  [facility]

## 2019-09-24 NOTE — LETTER
2019         RE: Raul Wilhelm  [address]  [place] [address]        Dear Colleague,    Thank you for referring your patient, Raul Wilhelm, to the [facility]. Please see a copy of my visit note below.    Raul Wilhelm  :  1945  DOS: 19  MRN: [id number]    Sports Medicine Evaluation    Initial History 2017: Since last visit on 17 with Raquel Briscoe where he underwent bilateral knee injections, the patient has begun to have an increase in pain over the past few months.      Symptoms are better with: activity   Symptoms are worse with: prolonged walking, stairs   Additional Features:                        Positive: none                        Negative: swelling, bruising, popping, grinding, catching, locking, instability, paresthesias, numbness, weakness, pain in other joints and systemic symptoms     Social History: tool , computer job     Interim History - 2018  Since last visit on 10/2/17 with Dr Grier patient has moderate bilateral knee pain over the last ~ 2 months.  Bilateral knee steroid injection completed on 2017 provided good relief for ~ 5 - 6 months.  He reports that pain has been waxing/waning based off his activity.  Desires repeat injections today.  Moderate antalgic gait noted.  No new injury in the interim.    Interim History - July 3, 2018  Since last visit on 2018 patient has moderate bilateral knee pain.  Bilateral knee SynviscOne injection completed on / provided mild relief.  Continues to have significant discomfort, interested in repeat steroid injections today.  No new injury in the interim.    Interim History - 2019  Since last visit on 7/3/18 patient has moderate intermittent bilateral knee pain.  Bilateral knee steroid injection completed on 7/3 provided good relief for ~ 4 - 5 months.  Patient reports waxing and waning pain over the last ~ 2 months.  Noted some  relief in early Jan after completing prednisone course for his asthma.  No new injury in the interim.    Interim History - September 24, 2019  Since last visit on 2/28/19 patient has moderate bilateral knee pain over the past ~ 1 - 2 months.  Bilateral knees steroid injection completed on 2/28 provided good relief for ~ 5 - 6 months.  Desires repeat injection today.  Attending wellness program thru cardiac therapy 3x/week.  No new injury in the interim.    Review of Systems  Skin: no bruising, no swelling  Musculoskeletal: as above  Neurologic: no numbness, paresthesias  Remainder of review of systems is negative including constitutional, CV, pulmonary, GI, except as noted in HPI or medical history.    Patient's current problem list, past medical and surgical history, and family history were reviewed.    Patient Active Problem List   Diagnosis     Family hx of colon cancer     Glaucoma     Hypertension     Cerebral infarction (H)     Hyperlipidemia LDL goal <100     Pneumonitis, hypersensitivity, naa (H)     Severe persistent asthma     COPD (chronic obstructive pulmonary disease) (H)     OA (osteoarthritis) of knee     Hoarseness of voice     Eczema     Impaired fasting blood sugar     Hypokalemia     ST elevation myocardial infarction involving left circumflex coronary artery (H)     CKD (chronic kidney disease) stage 3, GFR 30-59 ml/min (H)     Past Medical History:   Diagnosis Date     Chronic airway obstruction, not elsewhere classified      Other and unspecified hyperlipidemia      Past Surgical History:   Procedure Laterality Date     ENT SURGERY  as a child    tonsillectomy     Family History   Problem Relation Age of Onset     Diabetes Mother      Heart Disease Mother      C.A.D. Mother      Cancer Father      Hypertension Father      Cerebrovascular Disease Paternal Grandfather      Asthma Sister      Breast Cancer No family hx of      Cancer - colorectal No family hx of      Prostate Cancer No family hx  "of        Objective  /76   Ht 1.702 m (5' 7\")   Wt 76.7 kg (169 lb)   BMI 26.47 kg/m       GENERAL APPEARANCE: healthy, alert and no distress   GAIT: NORMAL  SKIN: no suspicious lesions or rashes  HEENT: Sclera clear, anicteric  CV: good peripheral pulses  RESP: Breathing not labored  NEURO: Normal strength and tone, mentation intact and speech normal  PSYCH:  mentation appears normal and affect normal/bright    Bilateral Knee exam  Inspection:      no effusion, swelling of bruising bilateral     Patella:      Mobility -       hypomobile bilateral       Crepitus noted in the patellofemoral joint bilateral     Tender:      medial joint line bilateral > lateral joint line, PF joint laterally     Non Tender:      remainder of knee area bilateral     Knee ROM:      Full active and passive ROM with flexion and extension bilateral     Hip ROM:     Full active and passive ROM bilateral     Strength:      5/5 with knee extension bilateral     Special Tests:     painful Memo bilateral      + PF grind       neg (-) varus at 0 deg and 30 deg bilateral       neg (-) valgus at 0 deg and 30 deg bilateral     Neurovascular:      2+ peripheral pulses bilaterally and brisk capillary refill       sensation grossly intact    Large Joint Injection/Arthocentesis: bilateral knee  Date/Time: 9/24/2019 2:05 PM  Performed by: Karel Bustillos DO  Authorized by: Karel Bustillos DO     Indications:  Pain  Needle Size:  21 G  Guidance: ultrasound    Approach:  Superolateral  Location:  Knee  Laterality:  Bilateral      Medications (Right):  40 mg triamcinolone 40 MG/ML; 3 mL ropivacaine 5 MG/ML  Aspirate amount (mL):  11  Aspirate:  Serous and yellow  Medications (Left):  40 mg triamcinolone 40 MG/ML; 3 mL ropivacaine 5 MG/ML  Aspirate amount (mL):  11  Aspirate:  Serous and yellow  Outcome:  Tolerated well, no immediate complications  Procedure discussed: discussed risks, benefits, and alternatives    Consent " Given by:  Patient  Timeout: timeout called immediately prior to procedure    Prep: patient was prepped and draped in usual sterile fashion           Radiology  I visualized and reviewed these images with the patient  XR KNEE BILATERAL 3 VW 9/13/2017 1:42 PM  HISTORY: Bilateral osteoarthritis.  COMPARISON: 10/24/2012  FINDINGS: 3 views of the right knee. Complete loss of medial  tibiofemoral compartment joint space with associated marginal  osteophyte formation. Patellofemoral compartment joint space is  preserved. No joint effusion. No fracture or malalignment.  3 views of the left knee. Moderate loss of medial tibiofemoral  compartment joint space. Moderate tricompartmental marginal  osteophytes. Patellofemoral compartment joint space is preserved. No  joint effusion. No fracture or malalignment.  IMPRESSION: Moderate osteoarthritis, right greater than left.    Assessment:  1. Bilateral primary osteoarthritis of knee    2. Chronic pain of both knees          Plan:  F/u prn  Trial of b/l SynviscOne not helpful   Prior imaging reviewed in detail with patient at time of visit  Repeat CSI today, will continue to base future recommendations on quality and duration of benefit  Surgical referral available anytime prn for discussion of TKA  Reviewed activity modification and progressive increase in activity as tolerated and guided by pain  Reviewed options for potential steroid vs PRP injections and the possibility for future orthopedic referral prn  Reviewed safe and appropriate OTC medication choices, try tylenol first  Up to 3000mg daily of tylenol is generally safe, NSAID dosing and duration limitations reviewed  Discussed nature of degenerative arthrosis of the knee.   Discussed symptom treatment with ice or heat, topical treatments, and rest if needed.   Expectations and goals of CSI reviewed  Often 2-3 days for steroid effect, and can take up to two weeks for maximum effect  We discussed modified progressive  pain-free activity as tolerated  Do not overuse in first two weeks if feeling better due to concern for vulnerability while steroid is working  Supportive care reviewed  All questions were answered today  Contact us with additional questions or concerns  Signs and sx of concern reviewed      Karel Bustillos DO, MELINDA  Primary Care Sports Medicine  [facility]           Again, thank you for allowing me to participate in the care of your patient.        Sincerely,        Karel Bustillos DO

## 2019-11-06 ENCOUNTER — OFFICE VISIT (OUTPATIENT)
Dept: FAMILY MEDICINE | Facility: CLINIC | Age: 74
End: 2019-11-06
Payer: COMMERCIAL

## 2019-11-06 VITALS
WEIGHT: 169.1 LBS | DIASTOLIC BLOOD PRESSURE: 70 MMHG | HEART RATE: 83 BPM | OXYGEN SATURATION: 94 % | SYSTOLIC BLOOD PRESSURE: 130 MMHG | HEIGHT: 67 IN | TEMPERATURE: 97.8 F | BODY MASS INDEX: 26.54 KG/M2

## 2019-11-06 DIAGNOSIS — N18.30 CKD (CHRONIC KIDNEY DISEASE) STAGE 3, GFR 30-59 ML/MIN (H): ICD-10-CM

## 2019-11-06 DIAGNOSIS — L30.9 DERMATITIS: Primary | ICD-10-CM

## 2019-11-06 LAB
ANION GAP SERPL CALCULATED.3IONS-SCNC: 1 MMOL/L (ref 3–14)
BUN SERPL-MCNC: 18 MG/DL (ref 7–30)
CALCIUM SERPL-MCNC: 9.4 MG/DL (ref 8.5–10.1)
CHLORIDE SERPL-SCNC: 105 MMOL/L (ref 94–109)
CO2 SERPL-SCNC: 31 MMOL/L (ref 20–32)
CREAT SERPL-MCNC: 0.96 MG/DL (ref 0.66–1.25)
GFR SERPL CREATININE-BSD FRML MDRD: 78 ML/MIN/{1.73_M2}
GLUCOSE SERPL-MCNC: 146 MG/DL (ref 70–99)
POTASSIUM SERPL-SCNC: 4.3 MMOL/L (ref 3.4–5.3)
SODIUM SERPL-SCNC: 137 MMOL/L (ref 133–144)

## 2019-11-06 PROCEDURE — 80048 BASIC METABOLIC PNL TOTAL CA: CPT | Performed by: NURSE PRACTITIONER

## 2019-11-06 PROCEDURE — 36415 COLL VENOUS BLD VENIPUNCTURE: CPT | Performed by: NURSE PRACTITIONER

## 2019-11-06 PROCEDURE — 99214 OFFICE O/P EST MOD 30 MIN: CPT | Performed by: NURSE PRACTITIONER

## 2019-11-06 RX ORDER — TRIAMCINOLONE ACETONIDE 1 MG/G
CREAM TOPICAL 2 TIMES DAILY
Qty: 80 G | Refills: 0 | Status: SHIPPED | OUTPATIENT
Start: 2019-11-06 | End: 2019-11-29

## 2019-11-06 ASSESSMENT — MIFFLIN-ST. JEOR: SCORE: 1470.66

## 2019-11-06 NOTE — LETTER
[facility]  [address]  [facility] [address]  Phone: [phone]      11/6/2019     Raul Wilhelm  [address] [place] [address]      Dear Raul:    Thank you for allowing me to participate in your care. Your recent test results were reviewed and listed below.      Your kidney function is improved and now within normal range. Electrolytes are normal. Glucose was slightly elevated, 146, however this can be from recent use of Prednisone.     Results for orders placed or performed in visit on 11/06/19   Basic metabolic panel     Status: Abnormal   Result Value Ref Range    Sodium 137 133 - 144 mmol/L    Potassium 4.3 3.4 - 5.3 mmol/L    Chloride 105 94 - 109 mmol/L    Carbon Dioxide 31 20 - 32 mmol/L    Anion Gap 1 (L) 3 - 14 mmol/L    Glucose 146 (H) 70 - 99 mg/dL    Urea Nitrogen 18 7 - 30 mg/dL    Creatinine 0.96 0.66 - 1.25 mg/dL    GFR Estimate 78 >60 mL/min/[1.73_m2]    GFR Estimate If Black 90 >60 mL/min/[1.73_m2]    Calcium 9.4 8.5 - 10.1 mg/dL     Thank you for choosing [facility]. If you have any further questions or concerns, please do not hesitate to contact us.      Sincerely,      Graciela MOORE CNP/Marina Joy, [facility]

## 2019-11-06 NOTE — PROGRESS NOTES
"Subjective     Raul Wilhelm is a 73 year old male who presents to clinic today for the following health issues:    HPI   Rash      Duration: off and one on for one year     Description  Location: left hand and left ankle   Itching: severe, oozing and draining yellow puss     Intensity:  moderate    Accompanying signs and symptoms: Was seen for this last year, was given an ointment and it was helpful     History (similar episodes/previous evaluation): YES     Precipitating or alleviating factors:  New exposures:  None  Recent travel: no      Therapies tried and outcome: otc ointment- has not been helpful       Reviewed and updated as needed this visit by Provider         Review of Systems   ROS COMP: Constitutional, HEENT, cardiovascular, pulmonary, gi and gu systems are negative, except as otherwise noted.      Objective    /70 (BP Location: Right arm, Patient Position: Sitting, Cuff Size: Adult Regular)   Pulse 83   Temp 97.8  F (36.6  C) (Tympanic)   Ht 1.702 m (5' 7\")   Wt 76.7 kg (169 lb 1.6 oz)   SpO2 94%   BMI 26.48 kg/m    Body mass index is 26.48 kg/m .  Physical Exam   GENERAL: healthy, alert and no distress  CV: regular rate and rhythm, normal S1 S2, no S3 or S4, no murmur, click or rub, no peripheral edema and peripheral pulses strong  SKIN: erythematous rash with dry scaly skin on the left hand and right ankle   PSYCH: mentation appears normal, affect normal/bright    Diagnostic Test Results:  Labs reviewed in Epic        Assessment & Plan     1. Dermatitis  -likely eczema   -Kenalog cream for up to 2 weeks, if no improvement follow up with dermatologist   - triamcinolone (KENALOG) 0.1 % external cream; Apply topically 2 times daily  Dispense: 80 g; Refill: 0    2. CKD (chronic kidney disease) stage 3, GFR 30-59 ml/min (H)  -the patient told me he was not aware that he has CKD  -he is due for labs  -BP well controlled   - Basic metabolic panel     See Patient Instructions    Return in " about 2 weeks (around 11/20/2019), or if symptoms worsen or fail to improve.    OSCAR Alvarado [facility]

## 2019-11-08 DIAGNOSIS — J45.50 SEVERE PERSISTENT ASTHMA (H): ICD-10-CM

## 2019-11-08 DIAGNOSIS — J30.2 SEASONAL ALLERGIC RHINITIS: ICD-10-CM

## 2019-11-08 NOTE — TELEPHONE ENCOUNTER
"Requested Prescriptions   Pending Prescriptions Disp Refills     montelukast (SINGULAIR) 10 MG tablet 90 tablet 3     Sig: TAKE ONE TABLET BY MOUTH EVERY NIGHT AT BEDTIME       Leukotriene Inhibitors Protocol Passed - 11/8/2019 10:56 AM        Passed - Patient is age 12 or older     If patient is under 16, ok to refill using age based dosing.           Passed - Asthma control assessment score within normal limits in last 6 months     Please review ACT score.   ACT Total Scores 1/10/2019 4/19/2019 7/12/2019   ACT TOTAL SCORE - - -   ASTHMA ER VISITS - - -   ASTHMA HOSPITALIZATIONS - - -   ACT TOTAL SCORE (Goal Greater than or Equal to 20) 21 18 21   In the past 12 months, how many times did you visit the emergency room for your asthma without being admitted to the hospital? 0 0 0   In the past 12 months, how many times were you hospitalized overnight because of your asthma? 0 0 0             Passed - Medication is active on med list        Passed - Recent (6 mo) or future (30 days) visit within the authorizing provider's specialty     Patient had office visit in the last 6 months or has a visit in the next 30 days with authorizing provider or within the authorizing provider's specialty.  See \"Patient Info\" tab in inbasket, or \"Choose Columns\" in Meds & Orders section of the refill encounter.              "

## 2019-11-08 NOTE — TELEPHONE ENCOUNTER
Last filled 07-   Last qty 90  Last office visit 11-      Mora Torre [facility]  Certified Pharmacy Technician  Phone:[phone]  Fax:[phone]

## 2019-11-11 RX ORDER — MONTELUKAST SODIUM 10 MG/1
TABLET ORAL
Qty: 90 TABLET | Refills: 3 | Status: SHIPPED | OUTPATIENT
Start: 2019-11-11 | End: 2020-08-27

## 2019-11-11 RX ORDER — TRIAMCINOLONE ACETONIDE 40 MG/ML
40 INJECTION, SUSPENSION INTRA-ARTICULAR; INTRAMUSCULAR
Status: DISCONTINUED | OUTPATIENT
Start: 2019-09-24 | End: 2020-02-11

## 2019-11-11 RX ORDER — ROPIVACAINE HYDROCHLORIDE 5 MG/ML
3 INJECTION, SOLUTION EPIDURAL; INFILTRATION; PERINEURAL
Status: DISCONTINUED | OUTPATIENT
Start: 2019-09-24 | End: 2020-02-11

## 2019-11-29 DIAGNOSIS — L30.9 DERMATITIS: ICD-10-CM

## 2019-11-29 NOTE — TELEPHONE ENCOUNTER
"Requested Prescriptions   Pending Prescriptions Disp Refills     triamcinolone (KENALOG) 0.1 % external cream [Pharmacy Med Name: TRIAMCINOLONE ACETONIDE 0.1% CREA] 80 g 0     Sig: APPLY TO AFFECTED AREA(S) TWO TIMES A DAY       Topical Steroids and Nonsteroidals Protocol Passed - 11/29/2019  8:23 AM        Passed - Patient is age 6 or older        Passed - Authorizing prescriber's most recent note related to this medication read.     If refill request is for ophthalmic use, please forward request to provider for approval.          Passed - High potency steroid not ordered        Passed - Recent (12 mo) or future (30 days) visit within the authorizing provider's specialty     Patient has had an office visit with the authorizing provider or a provider within the authorizing providers department within the previous 12 mos or has a future within next 30 days. See \"Patient Info\" tab in inbasket, or \"Choose Columns\" in Meds & Orders section of the refill encounter.              Passed - Medication is active on med list        Last Written Prescription Date:  11/6/19  Last Fill Quantity: 80,  # refills: 0   Last office visit: 11/6/2019 with prescribing provider:  Gerhard   Future Office Visit:   Next 5 appointments (look out 90 days)    Feb 11, 2020  2:30 PM CST  Return Visit with Barbara Bermudez PA-C  [facility] [address] [place] [address] [phone]           "

## 2019-12-02 NOTE — TELEPHONE ENCOUNTER
1. Dermatitis  -likely eczema   -Kenalog cream for up to 2 weeks, if no improvement follow up with dermatologist   - triamcinolone (KENALOG) 0.1 % external cream; Apply topically 2 times daily  Dispense: 80 g; Refill: 0  Return in about 2 weeks (around 11/20/2019), or if symptoms worsen or fail to improve.     OSCAR Alvarado [facility]    Left message on answering machine for patient to call back. May need to see dermatology now if not improved ?   Or return to see Graciela?     Arleen Ruiz RNC

## 2019-12-04 NOTE — TELEPHONE ENCOUNTER
"S; patient requested refill,   B: see note below, and I called him,   \"I got rid of it there, but now I have it in a couple of other spots. \"    Patient reports lower back and the other leg have now areas broken out, and the area that he was in with has cleared up.   A; rash, has moved, kenalog did help in the area he had it before.   Wants refill     R: note to provider:   Graciela, do you want to refill or should he see derm?   Thanks,   Arleen Ruiz RNC        "

## 2019-12-05 RX ORDER — TRIAMCINOLONE ACETONIDE 1 MG/G
CREAM TOPICAL
Qty: 80 G | Refills: 0 | Status: SHIPPED | OUTPATIENT
Start: 2019-12-05 | End: 2020-05-19

## 2020-01-28 DIAGNOSIS — E78.5 HYPERLIPIDEMIA LDL GOAL <100: Primary | ICD-10-CM

## 2020-01-31 DIAGNOSIS — E78.5 HYPERLIPIDEMIA LDL GOAL <100: ICD-10-CM

## 2020-01-31 LAB
ALT SERPL W P-5'-P-CCNC: 36 U/L (ref 0–70)
CHOLEST SERPL-MCNC: 135 MG/DL
HDLC SERPL-MCNC: 45 MG/DL
LDLC SERPL CALC-MCNC: 71 MG/DL
NONHDLC SERPL-MCNC: 90 MG/DL
TRIGL SERPL-MCNC: 95 MG/DL

## 2020-01-31 PROCEDURE — 36415 COLL VENOUS BLD VENIPUNCTURE: CPT | Performed by: INTERNAL MEDICINE

## 2020-01-31 PROCEDURE — 84460 ALANINE AMINO (ALT) (SGPT): CPT | Performed by: INTERNAL MEDICINE

## 2020-01-31 PROCEDURE — 80061 LIPID PANEL: CPT | Performed by: INTERNAL MEDICINE

## 2020-01-31 NOTE — RESULT ENCOUNTER NOTE
Results noted: lipids improved from previous and now at goal; ALT WNL. To be discussed at OV with Roseline CODY on 2/11/20

## 2020-02-05 DIAGNOSIS — I21.21 ST ELEVATION MYOCARDIAL INFARCTION INVOLVING LEFT CIRCUMFLEX CORONARY ARTERY (H): ICD-10-CM

## 2020-02-05 RX ORDER — ATORVASTATIN CALCIUM 40 MG/1
40 TABLET, FILM COATED ORAL DAILY
Qty: 90 TABLET | Refills: 0 | Status: SHIPPED | OUTPATIENT
Start: 2020-02-05 | End: 2020-02-11

## 2020-02-09 NOTE — PROGRESS NOTES
"HPI:   I had the pleasure of seeing Raul when he came for annual follow up.  This 74 year old sees Dr. Galindo for his history of:    1. CAD with acute inferior/inferolateral STEMI 11/2015. Mid Cx and thrombotic L PDA lesions treated with REDD. Residual mLAD 80% and OM1 70%, currently tx'd medically  2. Benign Essential HTN  3. Dyslipidemia. Atorvastatin increased from 20-40 mg 1/2019  4. Asthma and COPD with occasional prednisone bursts.  5. Knee arthritis  Sees Dr. Bustillos for injections. Plan for bilateral TKAs    Dr. Galindo saw him in routine follow-up 1/2019 at which time routine stress testing was recommended. He again recommended lifelong DAPT but Raul deferred.  Atorvastatin was increased from 20 to 40 mg daily. BP was high, and he was asked to check this routinely x 2 weeks.    Routine nuclear stress test showed no evidence of ischemia and annual follow-up was recommended.    Interval History:  Does Cardiac Rehab 3 days per week and brings BPs in ... they look great - 100-140s.  No c/o CP, SOB, edema, orthopnea, sudden weight gain.    Has been trying to eat a \"bit healthier\" and we note a drop in triglycerides as a result. No issues with the higher dose of atorvastatin.      Recent Diagnostic Testing:  Nuclear Stress Test 2/2019 showed fixed inferior/inferoseptal and basal inferolateral infarct. No rian-infarct ischemia. Normal EF. No change c/w 1/2016  Echocardiogam 10/2017 showed EF 50-55%. Grade I diastolic dysfunction. Normal RV. Trace MR. Trace TR with RVSP 23+RAP  Carotids 1/2016 without significant stenosis  Angiogram 11/2015 in setting of acute inferior STEMI showed normal LM. Mid LAD 80%, Mid Cx 99% terminating in large L % thrombotic occlusion. OM 1 70%. RCA mild luminal irregularities. Had 3.5 x 20 mm REDD to mCx. L PDA treated with 2.5 x 24 mm REDD.  Component      Latest Ref Rng & Units 11/1/2017 1/26/2019 1/31/2020   Cholesterol      <200 mg/dL 157 163 135   Triglycerides      <150 mg/dL 163 " (H) 157 (H) 95   HDL Cholesterol      >39 mg/dL 60 42 45   LDL Cholesterol Calculated      <100 mg/dL 64 90 71   Non HDL Cholesterol      <130 mg/dL 97 121 90   ALT      0 - 70 U/L 31 32 36     Assessment & Plan:    1. CAD/STEMI 2015    EF 50-55% on last echo; was normal on nuclear stress test 2/2019    Lifelong DAPT recommended given residual lesions. He has refused    Stress test 2/2019 without ischemia    Lipids as above    On Coreg 6.25 mg BID, losartan/HCTZ 50/12.5 daily & ASA 81 mg daily    PLAN:    Continue current medications    See me in 1 year with fasting lipids    2. Dyslipidemia    Lipids as above look really good    On atorvastatin 40    PLAN:    Continue atorvastatin 40 mg daily (I sent new Rx in)    See me in 1 year with lipids    3. Benign Essential HTN    On Coreg 12.5 mg BID and Hyzaar 50/12.5 mg daily    SBPs look great at Cardiac Rehab    PLAN:    No changes    4. Upcoming knee surgery    No date set yet, and he continues to get injections    No c/o CP, SOB or any concerning sxs    PLAN:    Reviewed his recent stress test 2/2019 showing no ischemia and normal EF.    OK from cardiac perspective to proceed with knee surgery without further cardiac testing. Would continue ASA and BB therapy in rian-operative if possible.  If ASA must be stopped before surgery, OK to do so and restart when OK'd by surgeon      Roseline Bermudez PA-C, MSPAS      Orders Placed This Encounter   Procedures     Lipid Profile     ALT     Follow-Up with Cardiac Advanced Practice Provider     Orders Placed This Encounter   Medications     atorvastatin (LIPITOR) 40 MG tablet     Sig: Take 1 tablet (40 mg) by mouth daily     Dispense:  90 tablet     Refill:  3     Medications Discontinued During This Encounter   Medication Reason     ropivacaine (NAROPIN) injection 3 mL      ropivacaine (NAROPIN) injection 3 mL      triamcinolone (KENALOG-40) injection 40 mg      triamcinolone (KENALOG-40) injection 40 mg      atorvastatin  (LIPITOR) 40 MG tablet          Encounter Diagnoses   Name Primary?     Hyperlipidemia LDL goal <100      ST elevation myocardial infarction involving left circumflex coronary artery (H)        CURRENT MEDICATIONS:  Current Outpatient Medications   Medication Sig Dispense Refill     albuterol (PROAIR HFA/PROVENTIL HFA/VENTOLIN HFA) 108 (90 Base) MCG/ACT inhaler Inhale 2 puffs into the lungs every 6 hours as needed for shortness of breath / dyspnea or wheezing 18 g 11     albuterol (PROVENTIL) (2.5 MG/3ML) 0.083% neb solution Take 1 vial (2.5 mg) by nebulization every 6 hours as needed for shortness of breath / dyspnea or wheezing 30 vial 11     aspirin EC 81 MG EC tablet Take 1 tablet (81 mg) by mouth daily 30 tablet 1     atorvastatin (LIPITOR) 40 MG tablet Take 1 tablet (40 mg) by mouth daily 90 tablet 3     carvedilol (COREG) 6.25 MG tablet Take 1 tablet (6.25 mg) by mouth 2 times daily 180 tablet 3     fexofenadine (ALLEGRA) 180 MG tablet Take 1 tablet (180 mg) by mouth daily 90 tablet 3     fluticasone (FLOVENT HFA) 220 MCG/ACT inhaler Inhale 2 puffs into the lungs 2 times daily 1 Inhaler 11     latanoprost (XALATAN) 0.005 % ophthalmic solution Place 1 drop into both eyes At Bedtime       losartan-hydrochlorothiazide (HYZAAR) 50-12.5 MG tablet Take 1 tablet by mouth daily 90 tablet 3     montelukast (SINGULAIR) 10 MG tablet TAKE ONE TABLET BY MOUTH EVERY NIGHT AT BEDTIME 90 tablet 3     nitroglycerin (NITROSTAT) 0.4 MG sublingual tablet Place 1 tablet (0.4 mg) under the tongue every 5 minutes as needed for chest pain If you are still having symptoms after 3 doses (15 minutes) call 911. 25 tablet 1     omeprazole (PRILOSEC) 20 MG DR capsule TAKE ONE CAPSULE BY MOUTH EVERY DAY 90 capsule 3     predniSONE (DELTASONE) 20 MG tablet Take 3 tabs (60 mg) by mouth daily x 3 days, 2 tabs (40 mg) daily x 3 days, 1 tab (20 mg) daily x 3 days, then 1/2 tab (10 mg) x 3 days. 20 tablet 0     triamcinolone (KENALOG) 0.1 %  external cream APPLY TO AFFECTED AREA(S) TWO TIMES A DAY 80 g 0     order for DME Equipment being ordered: arm blood pressure cuff (Patient not taking: Reported on 2/11/2020) 1 Device 0     ORDER FOR DME Equipment being ordered: Nebulizer Respironics, MiniElite--Neb Machine and tubing and mouth piece. (Patient not taking: Reported on 2/11/2020) 1 Device 0       ALLERGIES     Allergies   Allergen Reactions     Simvastatin Swelling     Severe muscle pain, swelling, and bruising     Dust Mites        PAST MEDICAL HISTORY:  Past Medical History:   Diagnosis Date     Chronic airway obstruction, not elsewhere classified      Other and unspecified hyperlipidemia        PAST SURGICAL HISTORY:  Past Surgical History:   Procedure Laterality Date     ENT SURGERY  as a child    tonsillectomy       FAMILY HISTORY:  Family History   Problem Relation Age of Onset     Diabetes Mother      Heart Disease Mother      C.A.D. Mother      Cancer Father      Hypertension Father      Cerebrovascular Disease Paternal Grandfather      Asthma Sister      Breast Cancer No family hx of      Cancer - colorectal No family hx of      Prostate Cancer No family hx of        SOCIAL HISTORY:  Social History     Socioeconomic History     Marital status:      Spouse name: None     Number of children: None     Years of education: None     Highest education level: None   Occupational History     None   Social Needs     Financial resource strain: None     Food insecurity:     Worry: None     Inability: None     Transportation needs:     Medical: None     Non-medical: None   Tobacco Use     Smoking status: Never Smoker     Smokeless tobacco: Never Used   Substance and Sexual Activity     Alcohol use: Yes     Comment: Rare     Drug use: No     Sexual activity: Yes     Partners: Female   Lifestyle     Physical activity:     Days per week: None     Minutes per session: None     Stress: None   Relationships     Social connections:     Talks on phone:  None     Gets together: None     Attends Jainism service: None     Active member of club or organization: None     Attends meetings of clubs or organizations: None     Relationship status: None     Intimate partner violence:     Fear of current or ex partner: None     Emotionally abused: None     Physically abused: None     Forced sexual activity: None   Other Topics Concern      Service Not Asked     Blood Transfusions Not Asked     Caffeine Concern Not Asked     Occupational Exposure Yes     Comment: worked for years as  exposed to carbon dust and other chemicals     Hobby Hazards Yes     Comment: Raises pigeons     Sleep Concern Not Asked     Stress Concern Not Asked     Weight Concern Not Asked     Special Diet Not Asked     Back Care Not Asked     Exercise Not Asked     Bike Helmet Not Asked     Seat Belt Not Asked     Self-Exams Not Asked     Parent/sibling w/ CABG, MI or angioplasty before 65F 55M? No   Social History Narrative     None       Review of Systems:  Skin:  Positive for itching;scaling   Eyes:  Positive for glasses  ENT:  Negative    Respiratory:  Negative for dyspnea on exertion;shortness of breath  Cardiovascular:  Negative for;palpitations;chest pain;dizziness;lightheadedness;fatigue    Gastroenterology: Negative for melena;hematochezia  Genitourinary:  Positive for urgency  Musculoskeletal:  Positive for joint pain;joint swelling;joint stiffness  Neurologic:  Negative    Psychiatric:  Positive for sleep disturbances  Heme/Lymph/Imm:  Negative    Endocrine:  Negative      Physical Exam:  Vitals: /63 (BP Location: Right arm, Patient Position: Sitting, Cuff Size: Adult Regular)   Pulse 80   Wt 77 kg (169 lb 12.8 oz)   SpO2 95%   BMI 26.59 kg/m      Constitutional:  cooperative, alert and oriented, well developed, well nourished, in no acute distress        Skin:  warm and dry to the touch, no apparent skin lesions or masses noted        Head:  normocephalic, no  masses or lesions        Eyes:  pupils equal and round;conjunctivae and lids unremarkable;sclera white        ENT:  no pallor or cyanosis, dentition good        Neck:  no carotid bruit;JVP normal        Chest:  normal breath sounds, clear to auscultation, normal A-P diameter, normal symmetry, normal respiratory excursion, no use of accessory muscles expiratory wheezes(at bases)      Cardiac: regular rhythm;no murmurs, gallops or rubs detected                  Abdomen:  abdomen soft        Vascular: pulses full and equal                                      Extremities and Back:  no deformities, clubbing, cyanosis, erythema observed;no edema        Neurological:  no gross motor deficits        Recent Lab Results:  LIPID RESULTS:  Lab Results   Component Value Date    CHOL 135 01/31/2020    HDL 45 01/31/2020    LDL 71 01/31/2020    TRIG 95 01/31/2020    CHOLHDLRATIO 3.4 03/13/2015       LIVER ENZYME RESULTS:  Lab Results   Component Value Date    AST 15 03/29/2018    ALT 36 01/31/2020       CBC RESULTS:  Lab Results   Component Value Date    WBC 10.3 03/29/2018    RBC 5.69 03/29/2018    HGB 17.1 03/29/2018    HCT 50.5 03/29/2018    MCV 89 03/29/2018    MCH 30.1 03/29/2018    MCHC 33.9 03/29/2018    RDW 12.4 03/29/2018     03/29/2018       BMP RESULTS:  Lab Results   Component Value Date     11/06/2019    POTASSIUM 4.3 11/06/2019    CHLORIDE 105 11/06/2019    CO2 31 11/06/2019    ANIONGAP 1 (L) 11/06/2019     (H) 11/06/2019    BUN 18 11/06/2019    CR 0.96 11/06/2019    GFRESTIMATED 78 11/06/2019    GFRESTBLACK 90 11/06/2019    JOSE RAFAEL 9.4 11/06/2019

## 2020-02-11 ENCOUNTER — OFFICE VISIT (OUTPATIENT)
Dept: CARDIOLOGY | Facility: CLINIC | Age: 75
End: 2020-02-11
Attending: INTERNAL MEDICINE
Payer: COMMERCIAL

## 2020-02-11 VITALS
SYSTOLIC BLOOD PRESSURE: 115 MMHG | DIASTOLIC BLOOD PRESSURE: 63 MMHG | OXYGEN SATURATION: 95 % | WEIGHT: 169.8 LBS | BODY MASS INDEX: 26.59 KG/M2 | HEART RATE: 80 BPM

## 2020-02-11 DIAGNOSIS — E78.5 HYPERLIPIDEMIA LDL GOAL <100: ICD-10-CM

## 2020-02-11 DIAGNOSIS — I21.21 ST ELEVATION MYOCARDIAL INFARCTION INVOLVING LEFT CIRCUMFLEX CORONARY ARTERY (H): ICD-10-CM

## 2020-02-11 PROCEDURE — 99214 OFFICE O/P EST MOD 30 MIN: CPT | Performed by: PHYSICIAN ASSISTANT

## 2020-02-11 RX ORDER — ATORVASTATIN CALCIUM 40 MG/1
40 TABLET, FILM COATED ORAL DAILY
Qty: 90 TABLET | Refills: 3 | Status: SHIPPED | OUTPATIENT
Start: 2020-02-11 | End: 2021-04-23

## 2020-02-11 NOTE — LETTER
"2/11/2020    Sarita Hennessy, DO  [address] [facility] [address]    RE: Raul Choco       Dear Colleague,    I had the pleasure of seeing Raul Wilhelm in the [facility].    HPI:   I had the pleasure of seeing Raul when he came for annual follow up.  This 74 year old sees Dr. Galindo for his history of:    1. CAD with acute inferior/inferolateral STEMI 11/2015. Mid Cx and thrombotic L PDA lesions treated with REDD. Residual mLAD 80% and OM1 70%, currently tx'd medically  2. Benign Essential HTN  3. Dyslipidemia. Atorvastatin increased from 20-40 mg 1/2019  4. Asthma and COPD with occasional prednisone bursts.  5. Knee arthritis  Sees Dr. Bustillos for injections. Plan for bilateral TKAs    Dr. Galindo saw him in routine follow-up 1/2019 at which time routine stress testing was recommended. He again recommended lifelong DAPT but Raul deferred.  Atorvastatin was increased from 20 to 40 mg daily. BP was high, and he was asked to check this routinely x 2 weeks.    Routine nuclear stress test showed no evidence of ischemia and annual follow-up was recommended.    Interval History:  Does Cardiac Rehab 3 days per week and brings BPs in ... they look great - 100-140s.  No c/o CP, SOB, edema, orthopnea, sudden weight gain.    Has been trying to eat a \"bit healthier\" and we note a drop in triglycerides as a result. No issues with the higher dose of atorvastatin.      Recent Diagnostic Testing:  Nuclear Stress Test 2/2019 showed fixed inferior/inferoseptal and basal inferolateral infarct. No rian-infarct ischemia. Normal EF. No change c/w 1/2016  Echocardiogam 10/2017 showed EF 50-55%. Grade I diastolic dysfunction. Normal RV. Trace MR. Trace TR with RVSP 23+RAP  Carotids 1/2016 without significant stenosis  Angiogram 11/2015 in setting of acute inferior STEMI showed normal LM. Mid LAD 80%, Mid Cx 99% terminating in large L % thrombotic occlusion. OM 1 70%. RCA mild luminal " irregularities. Had 3.5 x 20 mm REDD to mCx. L PDA treated with 2.5 x 24 mm REDD.  Component      Latest Ref Rng & Units 11/1/2017 1/26/2019 1/31/2020   Cholesterol      <200 mg/dL 157 163 135   Triglycerides      <150 mg/dL 163 (H) 157 (H) 95   HDL Cholesterol      >39 mg/dL 60 42 45   LDL Cholesterol Calculated      <100 mg/dL 64 90 71   Non HDL Cholesterol      <130 mg/dL 97 121 90   ALT      0 - 70 U/L 31 32 36     Assessment & Plan:    1. CAD/STEMI 2015    EF 50-55% on last echo; was normal on nuclear stress test 2/2019    Lifelong DAPT recommended given residual lesions. He has refused    Stress test 2/2019 without ischemia    Lipids as above    On Coreg 6.25 mg BID, losartan/HCTZ 50/12.5 daily & ASA 81 mg daily    PLAN:    Continue current medications    See me in 1 year with fasting lipids    2. Dyslipidemia    Lipids as above look really good    On atorvastatin 40    PLAN:    Continue atorvastatin 40 mg daily (I sent new Rx in)    See me in 1 year with lipids    3. Benign Essential HTN    On Coreg 12.5 mg BID and Hyzaar 50/12.5 mg daily    SBPs look great at Cardiac Rehab    PLAN:    No changes    4. Upcoming knee surgery    No date set yet, and he continues to get injections    No c/o CP, SOB or any concerning sxs    PLAN:    Reviewed his recent stress test 2/2019 showing no ischemia and normal EF.    OK from cardiac perspective to proceed with knee surgery without further cardiac testing. Would continue ASA and BB therapy in rian-operative if possible.  If ASA must be stopped before surgery, OK to do so and restart when OK'd by surgeon      Roseline Bermudez PA-C, MSPAS      Orders Placed This Encounter   Procedures     Lipid Profile     ALT     Follow-Up with Cardiac Advanced Practice Provider     Orders Placed This Encounter   Medications     atorvastatin (LIPITOR) 40 MG tablet     Sig: Take 1 tablet (40 mg) by mouth daily     Dispense:  90 tablet     Refill:  3     Medications Discontinued During This  Encounter   Medication Reason     ropivacaine (NAROPIN) injection 3 mL      ropivacaine (NAROPIN) injection 3 mL      triamcinolone (KENALOG-40) injection 40 mg      triamcinolone (KENALOG-40) injection 40 mg      atorvastatin (LIPITOR) 40 MG tablet          Encounter Diagnoses   Name Primary?     Hyperlipidemia LDL goal <100      ST elevation myocardial infarction involving left circumflex coronary artery (H)        CURRENT MEDICATIONS:  Current Outpatient Medications   Medication Sig Dispense Refill     albuterol (PROAIR HFA/PROVENTIL HFA/VENTOLIN HFA) 108 (90 Base) MCG/ACT inhaler Inhale 2 puffs into the lungs every 6 hours as needed for shortness of breath / dyspnea or wheezing 18 g 11     albuterol (PROVENTIL) (2.5 MG/3ML) 0.083% neb solution Take 1 vial (2.5 mg) by nebulization every 6 hours as needed for shortness of breath / dyspnea or wheezing 30 vial 11     aspirin EC 81 MG EC tablet Take 1 tablet (81 mg) by mouth daily 30 tablet 1     atorvastatin (LIPITOR) 40 MG tablet Take 1 tablet (40 mg) by mouth daily 90 tablet 3     carvedilol (COREG) 6.25 MG tablet Take 1 tablet (6.25 mg) by mouth 2 times daily 180 tablet 3     fexofenadine (ALLEGRA) 180 MG tablet Take 1 tablet (180 mg) by mouth daily 90 tablet 3     fluticasone (FLOVENT HFA) 220 MCG/ACT inhaler Inhale 2 puffs into the lungs 2 times daily 1 Inhaler 11     latanoprost (XALATAN) 0.005 % ophthalmic solution Place 1 drop into both eyes At Bedtime       losartan-hydrochlorothiazide (HYZAAR) 50-12.5 MG tablet Take 1 tablet by mouth daily 90 tablet 3     montelukast (SINGULAIR) 10 MG tablet TAKE ONE TABLET BY MOUTH EVERY NIGHT AT BEDTIME 90 tablet 3     nitroglycerin (NITROSTAT) 0.4 MG sublingual tablet Place 1 tablet (0.4 mg) under the tongue every 5 minutes as needed for chest pain If you are still having symptoms after 3 doses (15 minutes) call 911. 25 tablet 1     omeprazole (PRILOSEC) 20 MG DR capsule TAKE ONE CAPSULE BY MOUTH EVERY DAY 90 capsule 3      predniSONE (DELTASONE) 20 MG tablet Take 3 tabs (60 mg) by mouth daily x 3 days, 2 tabs (40 mg) daily x 3 days, 1 tab (20 mg) daily x 3 days, then 1/2 tab (10 mg) x 3 days. 20 tablet 0     triamcinolone (KENALOG) 0.1 % external cream APPLY TO AFFECTED AREA(S) TWO TIMES A DAY 80 g 0     order for DME Equipment being ordered: arm blood pressure cuff (Patient not taking: Reported on 2/11/2020) 1 Device 0     ORDER FOR DME Equipment being ordered: Nebulizer Respironics, MiniElite--Neb Machine and tubing and mouth piece. (Patient not taking: Reported on 2/11/2020) 1 Device 0       ALLERGIES     Allergies   Allergen Reactions     Simvastatin Swelling     Severe muscle pain, swelling, and bruising     Dust Mites        PAST MEDICAL HISTORY:  Past Medical History:   Diagnosis Date     Chronic airway obstruction, not elsewhere classified      Other and unspecified hyperlipidemia        PAST SURGICAL HISTORY:  Past Surgical History:   Procedure Laterality Date     ENT SURGERY  as a child    tonsillectomy       FAMILY HISTORY:  Family History   Problem Relation Age of Onset     Diabetes Mother      Heart Disease Mother      C.A.D. Mother      Cancer Father      Hypertension Father      Cerebrovascular Disease Paternal Grandfather      Asthma Sister      Breast Cancer No family hx of      Cancer - colorectal No family hx of      Prostate Cancer No family hx of        SOCIAL HISTORY:  Social History     Socioeconomic History     Marital status:      Spouse name: None     Number of children: None     Years of education: None     Highest education level: None   Occupational History     None   Social Needs     Financial resource strain: None     Food insecurity:     Worry: None     Inability: None     Transportation needs:     Medical: None     Non-medical: None   Tobacco Use     Smoking status: Never Smoker     Smokeless tobacco: Never Used   Substance and Sexual Activity     Alcohol use: Yes     Comment: Rare     Drug  use: No     Sexual activity: Yes     Partners: Female   Lifestyle     Physical activity:     Days per week: None     Minutes per session: None     Stress: None   Relationships     Social connections:     Talks on phone: None     Gets together: None     Attends Latter day service: None     Active member of club or organization: None     Attends meetings of clubs or organizations: None     Relationship status: None     Intimate partner violence:     Fear of current or ex partner: None     Emotionally abused: None     Physically abused: None     Forced sexual activity: None   Other Topics Concern      Service Not Asked     Blood Transfusions Not Asked     Caffeine Concern Not Asked     Occupational Exposure Yes     Comment: worked for years as  exposed to carbon dust and other chemicals     Hobby Hazards Yes     Comment: Raises pigeons     Sleep Concern Not Asked     Stress Concern Not Asked     Weight Concern Not Asked     Special Diet Not Asked     Back Care Not Asked     Exercise Not Asked     Bike Helmet Not Asked     Seat Belt Not Asked     Self-Exams Not Asked     Parent/sibling w/ CABG, MI or angioplasty before 65F 55M? No   Social History Narrative     None       Review of Systems:  Skin:  Positive for itching;scaling   Eyes:  Positive for glasses  ENT:  Negative    Respiratory:  Negative for dyspnea on exertion;shortness of breath  Cardiovascular:  Negative for;palpitations;chest pain;dizziness;lightheadedness;fatigue    Gastroenterology: Negative for melena;hematochezia  Genitourinary:  Positive for urgency  Musculoskeletal:  Positive for joint pain;joint swelling;joint stiffness  Neurologic:  Negative    Psychiatric:  Positive for sleep disturbances  Heme/Lymph/Imm:  Negative    Endocrine:  Negative      Physical Exam:  Vitals: /63 (BP Location: Right arm, Patient Position: Sitting, Cuff Size: Adult Regular)   Pulse 80   Wt 77 kg (169 lb 12.8 oz)   SpO2 95%   BMI 26.59 kg/m        Constitutional:  cooperative, alert and oriented, well developed, well nourished, in no acute distress        Skin:  warm and dry to the touch, no apparent skin lesions or masses noted        Head:  normocephalic, no masses or lesions        Eyes:  pupils equal and round;conjunctivae and lids unremarkable;sclera white        ENT:  no pallor or cyanosis, dentition good        Neck:  no carotid bruit;JVP normal        Chest:  normal breath sounds, clear to auscultation, normal A-P diameter, normal symmetry, normal respiratory excursion, no use of accessory muscles expiratory wheezes(at bases)      Cardiac: regular rhythm;no murmurs, gallops or rubs detected                  Abdomen:  abdomen soft        Vascular: pulses full and equal                                      Extremities and Back:  no deformities, clubbing, cyanosis, erythema observed;no edema        Neurological:  no gross motor deficits        Recent Lab Results:  LIPID RESULTS:  Lab Results   Component Value Date    CHOL 135 01/31/2020    HDL 45 01/31/2020    LDL 71 01/31/2020    TRIG 95 01/31/2020    CHOLHDLRATIO 3.4 03/13/2015       LIVER ENZYME RESULTS:  Lab Results   Component Value Date    AST 15 03/29/2018    ALT 36 01/31/2020       CBC RESULTS:  Lab Results   Component Value Date    WBC 10.3 03/29/2018    RBC 5.69 03/29/2018    HGB 17.1 03/29/2018    HCT 50.5 03/29/2018    MCV 89 03/29/2018    MCH 30.1 03/29/2018    MCHC 33.9 03/29/2018    RDW 12.4 03/29/2018     03/29/2018       BMP RESULTS:  Lab Results   Component Value Date     11/06/2019    POTASSIUM 4.3 11/06/2019    CHLORIDE 105 11/06/2019    CO2 31 11/06/2019    ANIONGAP 1 (L) 11/06/2019     (H) 11/06/2019    BUN 18 11/06/2019    CR 0.96 11/06/2019    GFRESTIMATED 78 11/06/2019    GFRESTBLACK 90 11/06/2019    JOSE RAFAEL 9.4 11/06/2019          Thank you for allowing me to participate in the care of your patient.      Sincerely,     Barbara Bermudez PA-C      [facility]

## 2020-02-11 NOTE — PATIENT INSTRUCTIONS
At your visit today we reviewed your improved cholesterol levels. Continue your healthier eating and the atorvatatin 40 mg daily    Your BPs look great!     Medication Changes:    No changes today    Recommendations:    OK to have knee surgery from a cardiac standpoint without further testing. Your stress test in 2019 looked good!    Follow-up:    See me or Dr. Galindo for cardiology follow up in 1 year with cholesterol panel but CALL Cardiology nurses Elke & Elizabeth @ [phone] for any issues, questions or concerns in the interim.      To schedule a future appointment, we kindly ask that you call cardiology scheduling at [phone] three months prior to requested visit date.    Important Phone Numbers for [facility]):    Cardiology [phone]  Lab [phone]  [facility]: [phone]  Nurses Elizabeth & Elke: [phone]

## 2020-02-15 ENCOUNTER — TELEPHONE (OUTPATIENT)
Dept: ORTHOPEDICS | Facility: CLINIC | Age: 75
End: 2020-02-15

## 2020-02-15 DIAGNOSIS — M17.0 BILATERAL PRIMARY OSTEOARTHRITIS OF KNEE: Primary | ICD-10-CM

## 2020-02-15 DIAGNOSIS — G89.29 CHRONIC PAIN OF BOTH KNEES: ICD-10-CM

## 2020-02-15 DIAGNOSIS — M25.561 CHRONIC PAIN OF BOTH KNEES: ICD-10-CM

## 2020-02-15 DIAGNOSIS — M25.562 CHRONIC PAIN OF BOTH KNEES: ICD-10-CM

## 2020-02-15 NOTE — TELEPHONE ENCOUNTER
Reviewed patient's chart prior to his appointment on 2/27/20 for follow up on his chronic bilateral knee pain.  Per appointment notes is appears that he would like to discuss surgery options.  Would be able to offer patient referral to Ortho Surgeon to discuss options, if desired without follow up appointment.  If he wanted to repeat steroid injections and discuss surgical referral then he may follow up in clinic as currently scheduled.  Ortho  order pended at this time.    M for return call to discuss his appointment on 2/27.  Clinic staff review information from above with patient when he returns phone call.    Angelito Snyder ATC

## 2020-02-17 NOTE — TELEPHONE ENCOUNTER
Pt LM with ortho AFR - Please return his call after 2 pm tomorrow @: [phone]    Denise Behrendt  [facility] CSS

## 2020-02-18 NOTE — TELEPHONE ENCOUNTER
Spoke to patient discussed surgical referral, which he desires at this time and scheduling info was provided for [facility].  Patient will follow up on 2/27 as currently scheduled for likely repeat bilateral knee steroid injections given previous relief.    Angelito Snyder ATC

## 2020-02-27 ENCOUNTER — OFFICE VISIT (OUTPATIENT)
Dept: ORTHOPEDICS | Facility: CLINIC | Age: 75
End: 2020-02-27
Payer: COMMERCIAL

## 2020-02-27 VITALS
HEIGHT: 67 IN | SYSTOLIC BLOOD PRESSURE: 144 MMHG | DIASTOLIC BLOOD PRESSURE: 73 MMHG | BODY MASS INDEX: 26.53 KG/M2 | WEIGHT: 169 LBS

## 2020-02-27 DIAGNOSIS — M25.562 CHRONIC PAIN OF BOTH KNEES: ICD-10-CM

## 2020-02-27 DIAGNOSIS — G89.29 CHRONIC PAIN OF BOTH KNEES: ICD-10-CM

## 2020-02-27 DIAGNOSIS — M17.0 BILATERAL PRIMARY OSTEOARTHRITIS OF KNEE: Primary | ICD-10-CM

## 2020-02-27 DIAGNOSIS — M25.561 CHRONIC PAIN OF BOTH KNEES: ICD-10-CM

## 2020-02-27 PROCEDURE — 99213 OFFICE O/P EST LOW 20 MIN: CPT | Mod: 25 | Performed by: FAMILY MEDICINE

## 2020-02-27 PROCEDURE — 20611 DRAIN/INJ JOINT/BURSA W/US: CPT | Mod: 50 | Performed by: FAMILY MEDICINE

## 2020-02-27 RX ORDER — ROPIVACAINE HYDROCHLORIDE 5 MG/ML
3 INJECTION, SOLUTION EPIDURAL; INFILTRATION; PERINEURAL
Status: DISCONTINUED | OUTPATIENT
Start: 2020-02-27 | End: 2020-05-28

## 2020-02-27 RX ORDER — TRIAMCINOLONE ACETONIDE 40 MG/ML
40 INJECTION, SUSPENSION INTRA-ARTICULAR; INTRAMUSCULAR
Status: DISCONTINUED | OUTPATIENT
Start: 2020-02-27 | End: 2020-05-28

## 2020-02-27 RX ADMIN — ROPIVACAINE HYDROCHLORIDE 3 ML: 5 INJECTION, SOLUTION EPIDURAL; INFILTRATION; PERINEURAL at 13:15

## 2020-02-27 RX ADMIN — TRIAMCINOLONE ACETONIDE 40 MG: 40 INJECTION, SUSPENSION INTRA-ARTICULAR; INTRAMUSCULAR at 13:15

## 2020-02-27 ASSESSMENT — MIFFLIN-ST. JEOR: SCORE: 1465.21

## 2020-02-27 NOTE — PROGRESS NOTES
Raul Wilhelm  :  1945  DOS: 20  MRN: [id number]    Sports Medicine Evaluation    Initial History 2017: Since last visit on 17 with Raquel Briscoe where he underwent bilateral knee injections, the patient has begun to have an increase in pain over the past few months.      Symptoms are better with: activity   Symptoms are worse with: prolonged walking, stairs   Additional Features:                        Positive: none                        Negative: swelling, bruising, popping, grinding, catching, locking, instability, paresthesias, numbness, weakness, pain in other joints and systemic symptoms     Social History: tool , computer job     Interim History - 2018  Since last visit on 10/2/17 with Dr Grier patient has moderate bilateral knee pain over the last ~ 2 months.  Bilateral knee steroid injection completed on 2017 provided good relief for ~ 5 - 6 months.  He reports that pain has been waxing/waning based off his activity.  Desires repeat injections today.  Moderate antalgic gait noted.  No new injury in the interim.    Interim History - July 3, 2018  Since last visit on 2018 patient has moderate bilateral knee pain.  Bilateral knee SynviscOne injection completed on / provided mild relief.  Continues to have significant discomfort, interested in repeat steroid injections today.  No new injury in the interim.    Interim History - 2019  Since last visit on 7/3/18 patient has moderate intermittent bilateral knee pain.  Bilateral knee steroid injection completed on 7/3 provided good relief for ~ 4 - 5 months.  Patient reports waxing and waning pain over the last ~ 2 months.  Noted some relief in early  after completing prednisone course for his asthma.  No new injury in the interim.    Interim History - 2019  Since last visit on 19 patient has moderate bilateral knee pain over the past ~ 1 - 2 months.  Bilateral knees  steroid injection completed on 2/28 provided good relief for ~ 5 - 6 months.  Desires repeat injection today.  Attending wellness program thru cardiac therapy 3x/week.  No new injury in the interim.    Interim History - February 27, 2020  Since last visit on 9/24/2019 patient has moderate bilateral knee pain, left>>>right.  Bilateral knee steroid injection completed on 9/24 provided good relief for ~ 3.5 - 4 months.  Patient consulted with Dr Downey @ [facility] and is scheduled for left total knee arthroplasty on 4/16/20.  No new injury in the interim.      Review of Systems  Skin: no bruising, no swelling  Musculoskeletal: as above  Neurologic: no numbness, paresthesias  Remainder of review of systems is negative including constitutional, CV, pulmonary, GI, except as noted in HPI or medical history.    Patient's current problem list, past medical and surgical history, and family history were reviewed.    Patient Active Problem List   Diagnosis     Family hx of colon cancer     Glaucoma     Hypertension     Cerebral infarction (H)     Hyperlipidemia LDL goal <100     Pneumonitis, hypersensitivity, naa (H)     Severe persistent asthma     COPD (chronic obstructive pulmonary disease) (H)     OA (osteoarthritis) of knee     Hoarseness of voice     Eczema     Impaired fasting blood sugar     Hypokalemia     ST elevation myocardial infarction involving left circumflex coronary artery (H)     CKD (chronic kidney disease) stage 3, GFR 30-59 ml/min (H)     Past Medical History:   Diagnosis Date     Chronic airway obstruction, not elsewhere classified      Other and unspecified hyperlipidemia      Past Surgical History:   Procedure Laterality Date     ENT SURGERY  as a child    tonsillectomy     Family History   Problem Relation Age of Onset     Diabetes Mother      Heart Disease Mother      C.A.D. Mother      Cancer Father      Hypertension Father      Cerebrovascular Disease Paternal Grandfather      Asthma Sister       "Breast Cancer No family hx of      Cancer - colorectal No family hx of      Prostate Cancer No family hx of        Objective  BP (!) 144/73   Ht 1.702 m (5' 7\")   Wt 76.7 kg (169 lb)   BMI 26.47 kg/m      GENERAL APPEARANCE: healthy, alert and no distress   GAIT: NORMAL  SKIN: no suspicious lesions or rashes  HEENT: Sclera clear, anicteric  CV: good peripheral pulses  RESP: Breathing not labored  NEURO: Normal strength and tone, mentation intact and speech normal  PSYCH:  mentation appears normal and affect normal/bright    Bilateral Knee exam  Inspection:      no effusion, swelling of bruising bilateral     Patella:      Mobility -       hypomobile bilateral       Crepitus noted in the patellofemoral joint bilateral     Tender:      medial joint line bilateral > lateral joint line, PF joint laterally     Non Tender:      remainder of knee area bilateral     Knee ROM:      Full active and passive ROM with flexion and extension bilateral     Hip ROM:     Full active and passive ROM bilateral     Strength:      5/5 with knee extension bilateral     Special Tests:     painful Memo bilateral      + PF grind       neg (-) varus at 0 deg and 30 deg bilateral       neg (-) valgus at 0 deg and 30 deg bilateral     Neurovascular:      2+ peripheral pulses bilaterally and brisk capillary refill       sensation grossly intact    Large Joint Injection/Arthocentesis: bilateral knee  Date/Time: 2/27/2020 1:15 PM  Performed by: Karel Bustillos DO  Authorized by: Karel Bustillos DO     Indications:  Pain  Needle Size:  21 G  Guidance: ultrasound    Approach:  Superolateral  Location:  Knee  Laterality:  Bilateral      Medications (Right):  40 mg triamcinolone 40 MG/ML; 3 mL ropivacaine 5 MG/ML  Aspirate amount (mL):  8  Aspirate:  Serous and yellow  Medications (Left):  40 mg triamcinolone 40 MG/ML; 3 mL ropivacaine 5 MG/ML  Aspirate amount (mL):  9  Aspirate:  Serous, yellow and blood-tinged  Outcome:  " Tolerated well, no immediate complications  Procedure discussed: discussed risks, benefits, and alternatives    Consent Given by:  Patient  Timeout: timeout called immediately prior to procedure    Prep: patient was prepped and draped in usual sterile fashion         Radiology  I visualized and reviewed these images with the patient  XR KNEE BILATERAL 3 VW 9/13/2017 1:42 PM  HISTORY: Bilateral osteoarthritis.  COMPARISON: 10/24/2012  FINDINGS: 3 views of the right knee. Complete loss of medial  tibiofemoral compartment joint space with associated marginal  osteophyte formation. Patellofemoral compartment joint space is  preserved. No joint effusion. No fracture or malalignment.  3 views of the left knee. Moderate loss of medial tibiofemoral  compartment joint space. Moderate tricompartmental marginal  osteophytes. Patellofemoral compartment joint space is preserved. No  joint effusion. No fracture or malalignment.  IMPRESSION: Moderate osteoarthritis, right greater than left.    Assessment:  1. Bilateral primary osteoarthritis of knee    2. Chronic pain of both knees          Plan:  F/u prn  Trial of b/l SynviscOne not helpful   Prior imaging reviewed in detail with patient at time of visit  Repeat CSI today, with permission from Dr Downey, having left TKA 4/16/2020  Reviewed safe and appropriate OTC medication choices, try tylenol first  Discussed nature of degenerative arthrosis of the knee.   Discussed symptom treatment with ice or heat, topical treatments, and rest if needed.   Expectations and goals of CSI reviewed  Often 2-3 days for steroid effect, and can take up to two weeks for maximum effect  We discussed modified progressive pain-free activity as tolerated  Do not overuse in first two weeks if feeling better due to concern for vulnerability while steroid is working  Supportive care reviewed  All questions were answered today  Contact us with additional questions or concerns  Signs and sx of concern  reviewed      Karel Bustillos DO, CAQ  Primary Care Sports Medicine  [facility]

## 2020-02-27 NOTE — LETTER
2020         RE: Raul Wilhelm  [address]  [place] [address]        Dear Colleague,    Thank you for referring your patient, Raul Wilhelm, to the [facility]. Please see a copy of my visit note below.    Raul Wilhelm  :  1945  DOS: 20  MRN: [id number]    Sports Medicine Evaluation    Initial History 2017: Since last visit on 17 with Raquel Briscoe where he underwent bilateral knee injections, the patient has begun to have an increase in pain over the past few months.      Symptoms are better with: activity   Symptoms are worse with: prolonged walking, stairs   Additional Features:                        Positive: none                        Negative: swelling, bruising, popping, grinding, catching, locking, instability, paresthesias, numbness, weakness, pain in other joints and systemic symptoms     Social History: tool , computer job     Interim History - 2018  Since last visit on 10/2/17 with Dr Grier patient has moderate bilateral knee pain over the last ~ 2 months.  Bilateral knee steroid injection completed on 2017 provided good relief for ~ 5 - 6 months.  He reports that pain has been waxing/waning based off his activity.  Desires repeat injections today.  Moderate antalgic gait noted.  No new injury in the interim.    Interim History - July 3, 2018  Since last visit on 2018 patient has moderate bilateral knee pain.  Bilateral knee SynviscOne injection completed on / provided mild relief.  Continues to have significant discomfort, interested in repeat steroid injections today.  No new injury in the interim.    Interim History - 2019  Since last visit on 7/3/18 patient has moderate intermittent bilateral knee pain.  Bilateral knee steroid injection completed on 7/3 provided good relief for ~ 4 - 5 months.  Patient reports waxing and waning pain over the last ~ 2 months.  Noted some  relief in early Jan after completing prednisone course for his asthma.  No new injury in the interim.    Interim History - September 24, 2019  Since last visit on 2/28/19 patient has moderate bilateral knee pain over the past ~ 1 - 2 months.  Bilateral knees steroid injection completed on 2/28 provided good relief for ~ 5 - 6 months.  Desires repeat injection today.  Attending wellness program thru cardiac therapy 3x/week.  No new injury in the interim.    Interim History - February 27, 2020  Since last visit on 9/24/2019 patient has moderate bilateral knee pain, left>>>right.  Bilateral knee steroid injection completed on 9/24 provided good relief for ~ 3.5 - 4 months.  Patient consulted with Dr Downey @ [facility] and is scheduled for left total knee arthroplasty on 4/16/20.  No new injury in the interim.      Review of Systems  Skin: no bruising, no swelling  Musculoskeletal: as above  Neurologic: no numbness, paresthesias  Remainder of review of systems is negative including constitutional, CV, pulmonary, GI, except as noted in HPI or medical history.    Patient's current problem list, past medical and surgical history, and family history were reviewed.    Patient Active Problem List   Diagnosis     Family hx of colon cancer     Glaucoma     Hypertension     Cerebral infarction (H)     Hyperlipidemia LDL goal <100     Pneumonitis, hypersensitivity, naa (H)     Severe persistent asthma     COPD (chronic obstructive pulmonary disease) (H)     OA (osteoarthritis) of knee     Hoarseness of voice     Eczema     Impaired fasting blood sugar     Hypokalemia     ST elevation myocardial infarction involving left circumflex coronary artery (H)     CKD (chronic kidney disease) stage 3, GFR 30-59 ml/min (H)     Past Medical History:   Diagnosis Date     Chronic airway obstruction, not elsewhere classified      Other and unspecified hyperlipidemia      Past Surgical History:   Procedure Laterality Date     ENT SURGERY  as a  "child    tonsillectomy     Family History   Problem Relation Age of Onset     Diabetes Mother      Heart Disease Mother      C.A.D. Mother      Cancer Father      Hypertension Father      Cerebrovascular Disease Paternal Grandfather      Asthma Sister      Breast Cancer No family hx of      Cancer - colorectal No family hx of      Prostate Cancer No family hx of        Objective  BP (!) 144/73   Ht 1.702 m (5' 7\")   Wt 76.7 kg (169 lb)   BMI 26.47 kg/m       GENERAL APPEARANCE: healthy, alert and no distress   GAIT: NORMAL  SKIN: no suspicious lesions or rashes  HEENT: Sclera clear, anicteric  CV: good peripheral pulses  RESP: Breathing not labored  NEURO: Normal strength and tone, mentation intact and speech normal  PSYCH:  mentation appears normal and affect normal/bright    Bilateral Knee exam  Inspection:      no effusion, swelling of bruising bilateral     Patella:      Mobility -       hypomobile bilateral       Crepitus noted in the patellofemoral joint bilateral     Tender:      medial joint line bilateral > lateral joint line, PF joint laterally     Non Tender:      remainder of knee area bilateral     Knee ROM:      Full active and passive ROM with flexion and extension bilateral     Hip ROM:     Full active and passive ROM bilateral     Strength:      5/5 with knee extension bilateral     Special Tests:     painful Meom bilateral      + PF grind       neg (-) varus at 0 deg and 30 deg bilateral       neg (-) valgus at 0 deg and 30 deg bilateral     Neurovascular:      2+ peripheral pulses bilaterally and brisk capillary refill       sensation grossly intact    Large Joint Injection/Arthocentesis: bilateral knee  Date/Time: 2/27/2020 1:15 PM  Performed by: Karel Bustillos DO  Authorized by: Karel Bustillos DO     Indications:  Pain  Needle Size:  21 G  Guidance: ultrasound    Approach:  Superolateral  Location:  Knee  Laterality:  Bilateral      Medications (Right):  40 mg " triamcinolone 40 MG/ML; 3 mL ropivacaine 5 MG/ML  Aspirate amount (mL):  8  Aspirate:  Serous and yellow  Medications (Left):  40 mg triamcinolone 40 MG/ML; 3 mL ropivacaine 5 MG/ML  Aspirate amount (mL):  9  Aspirate:  Serous, yellow and blood-tinged  Outcome:  Tolerated well, no immediate complications  Procedure discussed: discussed risks, benefits, and alternatives    Consent Given by:  Patient  Timeout: timeout called immediately prior to procedure    Prep: patient was prepped and draped in usual sterile fashion         Radiology  I visualized and reviewed these images with the patient  XR KNEE BILATERAL 3 VW 9/13/2017 1:42 PM  HISTORY: Bilateral osteoarthritis.  COMPARISON: 10/24/2012  FINDINGS: 3 views of the right knee. Complete loss of medial  tibiofemoral compartment joint space with associated marginal  osteophyte formation. Patellofemoral compartment joint space is  preserved. No joint effusion. No fracture or malalignment.  3 views of the left knee. Moderate loss of medial tibiofemoral  compartment joint space. Moderate tricompartmental marginal  osteophytes. Patellofemoral compartment joint space is preserved. No  joint effusion. No fracture or malalignment.  IMPRESSION: Moderate osteoarthritis, right greater than left.    Assessment:  1. Bilateral primary osteoarthritis of knee    2. Chronic pain of both knees          Plan:  F/u prn  Trial of b/l SynviscOne not helpful   Prior imaging reviewed in detail with patient at time of visit  Repeat CSI today, with permission from Dr Downey, having left TKA 4/16/2020  Reviewed safe and appropriate OTC medication choices, try tylenol first  Discussed nature of degenerative arthrosis of the knee.   Discussed symptom treatment with ice or heat, topical treatments, and rest if needed.   Expectations and goals of CSI reviewed  Often 2-3 days for steroid effect, and can take up to two weeks for maximum effect  We discussed modified progressive pain-free activity  as tolerated  Do not overuse in first two weeks if feeling better due to concern for vulnerability while steroid is working  Supportive care reviewed  All questions were answered today  Contact us with additional questions or concerns  Signs and sx of concern reviewed      Karel Bustillos DO, CAQ  Primary Care Sports Medicine  [facility]           Again, thank you for allowing me to participate in the care of your patient.        Sincerely,        Karel Bustillos DO

## 2020-03-18 DIAGNOSIS — J45.51 SEVERE PERSISTENT ASTHMA WITH ACUTE EXACERBATION (H): ICD-10-CM

## 2020-03-18 NOTE — TELEPHONE ENCOUNTER
Requested Prescriptions   Pending Prescriptions Disp Refills     predniSONE (DELTASONE) 20 MG tablet 20 tablet 0     Sig: Take 3 tabs (60 mg) by mouth daily x 3 days, 2 tabs (40 mg) daily x 3 days, 1 tab (20 mg) daily x 3 days, then 1/2 tab (10 mg) x 3 days.       There is no refill protocol information for this order        Last Written Prescription Date:  12/26/06  Last Fill Quantity: 10,  # refills: 0   Last office visit: 12/8/2005 with prescribing provider:  Gerhard   Future Office Visit:   Next 5 appointments (look out 90 days)    Mar 25, 2020  9:00 AM CDT  Pre-Op physical with Sarita Hennessy,   [facility]  Arrive at: Clinic [facility] [address] [facility] [id number] [phone]

## 2020-03-19 RX ORDER — PREDNISONE 20 MG/1
TABLET ORAL
Qty: 20 TABLET | Refills: 0 | Status: SHIPPED | OUTPATIENT
Start: 2020-03-19 | End: 2020-05-19

## 2020-03-20 ENCOUNTER — TELEPHONE (OUTPATIENT)
Dept: INTERNAL MEDICINE | Facility: CLINIC | Age: 75
End: 2020-03-20

## 2020-03-20 NOTE — TELEPHONE ENCOUNTER
Pt is schedule for a preop wih Dr. Hennessy. Staff called today and ask for pt to call back to see if surgery is still going, if so ASK THE  TRAVEL QUESTIONS.   If the surgery will be cancel than cancel Dr. Hennessy appointment as well.  Albina Puga CMA (ALBINA)   (aka: Adrianna Puga)

## 2020-04-01 ENCOUNTER — TRANSFERRED RECORDS (OUTPATIENT)
Dept: MULTI SPECIALTY CLINIC | Facility: CLINIC | Age: 75
End: 2020-04-01

## 2020-04-01 LAB — RETINOPATHY: NORMAL

## 2020-04-10 DIAGNOSIS — I10 ESSENTIAL HYPERTENSION, BENIGN: ICD-10-CM

## 2020-04-10 RX ORDER — LOSARTAN POTASSIUM AND HYDROCHLOROTHIAZIDE 12.5; 5 MG/1; MG/1
TABLET ORAL
Qty: 90 TABLET | Refills: 0 | Status: SHIPPED | OUTPATIENT
Start: 2020-04-10 | End: 2020-08-27

## 2020-04-10 NOTE — TELEPHONE ENCOUNTER
"Requested Prescriptions   Pending Prescriptions Disp Refills     losartan-hydrochlorothiazide (HYZAAR) 50-12.5 MG tablet [Pharmacy Med Name: LOSARTAN-HCTZ 50/12.5 MG TAB] 90 tablet 3     Sig: TAKE ONE TABLET BY MOUTH EVERY DAY       Angiotensin-II Receptors Failed - 4/10/2020  3:30 PM        Failed - Last blood pressure under 140/90 in past 12 months     BP Readings from Last 3 Encounters:   02/27/20 (!) 144/73   02/11/20 115/63   11/06/19 130/70                 Passed - Recent (12 mo) or future (30 days) visit within the authorizing provider's specialty     Patient has had an office visit with the authorizing provider or a provider within the authorizing providers department within the previous 12 mos or has a future within next 30 days. See \"Patient Info\" tab in inbasket, or \"Choose Columns\" in Meds & Orders section of the refill encounter.              Passed - Medication is active on med list        Passed - Patient is age 18 or older        Passed - Normal serum creatinine on file in past 12 months     Recent Labs   Lab Test 11/06/19  0859   CR 0.96       Ok to refill medication if creatinine is low          Passed - Normal serum potassium on file in past 12 months     Recent Labs   Lab Test 11/06/19  0859   POTASSIUM 4.3                   Diuretics (Including Combos) Protocol Failed - 4/10/2020  3:30 PM        Failed - Blood pressure under 140/90 in past 12 months     BP Readings from Last 3 Encounters:   02/27/20 (!) 144/73   02/11/20 115/63   11/06/19 130/70                 Passed - Recent (12 mo) or future (30 days) visit within the authorizing provider's specialty     Patient has had an office visit with the authorizing provider or a provider within the authorizing providers department within the previous 12 mos or has a future within next 30 days. See \"Patient Info\" tab in inbasket, or \"Choose Columns\" in Meds & Orders section of the refill encounter.              Passed - Medication is active on med list "        Passed - Patient is age 18 or older        Passed - Normal serum creatinine on file in past 12 months     Recent Labs   Lab Test 11/06/19  0859   CR 0.96              Passed - Normal serum potassium on file in past 12 months     Recent Labs   Lab Test 11/06/19  0859   POTASSIUM 4.3                    Passed - Normal serum sodium on file in past 12 months     Recent Labs   Lab Test 11/06/19  0859                    Routing refill request to provider for review/approval because:  Labs out of range:  BP

## 2020-05-04 DIAGNOSIS — J45.50 SEVERE PERSISTENT ASTHMA WITHOUT COMPLICATION (H): ICD-10-CM

## 2020-05-04 DIAGNOSIS — I10 ESSENTIAL HYPERTENSION: Primary | ICD-10-CM

## 2020-05-04 DIAGNOSIS — I10 HYPERTENSION: ICD-10-CM

## 2020-05-05 DIAGNOSIS — J45.50 SEVERE PERSISTENT ASTHMA WITHOUT COMPLICATION (H): ICD-10-CM

## 2020-05-05 NOTE — LETTER
May 8, 2020      Raul Wilhelm  [address] [place] [address]        Dear Raul,     We received a refill request for your albuterol inhaler medication.  This medication has been refilled however you are due for a video visit for an asthma/COPD follow up for further refills.  Please call [phone] to schedule an appointment.        Sincerely,        Sarita Hennessy, DO

## 2020-05-06 NOTE — TELEPHONE ENCOUNTER
"Requested Prescriptions   Pending Prescriptions Disp Refills     albuterol (PROVENTIL) (2.5 MG/3ML) 0.083% neb solution [Pharmacy Med Name: ALBUTEROL SULFATE 2.5 MG/3ML NEBU] 90 mL 11     Sig: NEBULIZE CONTENTS OF ONE VIAL EVERY 6 HOURS AS NEEDED FOR SHORTNESS OF BREATH/DYSPNEA OR WHEEZING       Asthma Maintenance Inhalers - Anticholinergics Failed - 5/5/2020  9:46 AM        Failed - Asthma control assessment score within normal limits in last 6 months     Please review ACT score.           Failed - Recent (6 mo) or future (30 days) visit within the authorizing provider's specialty     Patient had office visit in the last 6 months or has a visit in the next 30 days with authorizing provider or within the authorizing provider's specialty.  See \"Patient Info\" tab in inbasket, or \"Choose Columns\" in Meds & Orders section of the refill encounter.            Passed - Patient is age 12 years or older        Passed - Medication is active on med list       Short-Acting Beta Agonist Inhalers Protocol  Failed - 5/5/2020  9:46 AM        Failed - Asthma control assessment score within normal limits in last 6 months     Please review ACT score.           Failed - Recent (6 mo) or future (30 days) visit within the authorizing provider's specialty     Patient had office visit in the last 6 months or has a visit in the next 30 days with authorizing provider or within the authorizing provider's specialty.  See \"Patient Info\" tab in inbasket, or \"Choose Columns\" in Meds & Orders section of the refill encounter.            Passed - Patient is age 12 or older        Passed - Medication is active on med list           Last Written Prescription Date:  4/19/19  Last Fill Quantity: 30,  # refills: 11   Last office visit: 11/6/2019 with prescribing provider:  Gerhard   Future Office Visit:            "

## 2020-05-06 NOTE — TELEPHONE ENCOUNTER
"Requested Prescriptions   Pending Prescriptions Disp Refills     fexofenadine (ALLEGRA) 180 MG tablet [Pharmacy Med Name: FEXOFENADINE HCL 180MG TABS] 90 tablet 3     Sig: TAKE ONE TABLET BY MOUTH ONCE DAILY       Antihistamines Protocol Failed - 5/4/2020  2:34 PM        Failed - Patient is 3-64 years of age     Apply weight-based dosing for peds patients age 3 - 12 years of age.    Forward request to provider for patients under the age of 3 or over the age of 64.          Passed - Recent (12 mo) or future (30 days) visit within the authorizing provider's specialty     Patient has had an office visit with the authorizing provider or a provider within the authorizing providers department within the previous 12 mos or has a future within next 30 days. See \"Patient Info\" tab in inbasket, or \"Choose Columns\" in Meds & Orders section of the refill encounter.              Passed - Medication is active on med list      Last Written Prescription Date:  4/19/19  Last Fill Quantity: 90,  # refills: 3   Last office visit: 11/6/2019 with prescribing provider:  Gerhard   Future Office Visit:               carvedilol (COREG) 6.25 MG tablet [Pharmacy Med Name: CARVEDILOL 6.25MG TABS] 180 tablet 3     Sig: TAKE ONE TABLET BY MOUTH TWICE A DAY       Beta-Blockers Protocol Failed - 5/4/2020  2:34 PM        Failed - Blood pressure under 140/90 in past 12 months     BP Readings from Last 3 Encounters:   02/27/20 (!) 144/73   02/11/20 115/63   11/06/19 130/70                 Passed - Patient is age 6 or older        Passed - Recent (12 mo) or future (30 days) visit within the authorizing provider's specialty     Patient has had an office visit with the authorizing provider or a provider within the authorizing providers department within the previous 12 mos or has a future within next 30 days. See \"Patient Info\" tab in inbasket, or \"Choose Columns\" in Meds & Orders section of the refill encounter.              Passed - Medication is " active on med list           Last Written Prescription Date:  6/5/19  Last Fill Quantity: 180,  # refills: 3   Last office visit: 11/6/2019 with prescribing provider:  Gerhard Saavedra Office Visit:

## 2020-05-07 NOTE — TELEPHONE ENCOUNTER
Routing refill request to provider for review/approval because:  B/P not at goal.  Patient is greater than 64 years of age for the Allegra.    RASHEL Walsh

## 2020-05-08 RX ORDER — ALBUTEROL SULFATE 0.83 MG/ML
SOLUTION RESPIRATORY (INHALATION)
Qty: 30 ML | Refills: 0 | Status: SHIPPED | OUTPATIENT
Start: 2020-05-08 | End: 2020-05-19

## 2020-05-08 RX ORDER — CARVEDILOL 6.25 MG/1
TABLET ORAL
Qty: 180 TABLET | Refills: 3 | Status: SHIPPED | OUTPATIENT
Start: 2020-05-08 | End: 2021-06-02

## 2020-05-08 RX ORDER — FEXOFENADINE HCL 180 MG/1
TABLET ORAL
Qty: 90 TABLET | Refills: 3 | Status: SHIPPED | OUTPATIENT
Start: 2020-05-08 | End: 2021-06-02

## 2020-05-08 NOTE — TELEPHONE ENCOUNTER
#30 sent, please have patient schedule video visit with PCP for asthma/COPD follow up prior to more refills

## 2020-05-11 DIAGNOSIS — Z11.59 ENCOUNTER FOR SCREENING FOR OTHER VIRAL DISEASES: Primary | ICD-10-CM

## 2020-05-19 ENCOUNTER — OFFICE VISIT (OUTPATIENT)
Dept: FAMILY MEDICINE | Facility: CLINIC | Age: 75
End: 2020-05-19
Payer: COMMERCIAL

## 2020-05-19 VITALS
HEIGHT: 67 IN | TEMPERATURE: 97.9 F | DIASTOLIC BLOOD PRESSURE: 88 MMHG | SYSTOLIC BLOOD PRESSURE: 138 MMHG | HEART RATE: 111 BPM | RESPIRATION RATE: 18 BRPM | WEIGHT: 164.4 LBS | BODY MASS INDEX: 25.8 KG/M2 | OXYGEN SATURATION: 100 %

## 2020-05-19 DIAGNOSIS — Z86.73 HISTORY OF ARTERIAL ISCHEMIC STROKE: ICD-10-CM

## 2020-05-19 DIAGNOSIS — E11.9 TYPE 2 DIABETES MELLITUS WITHOUT COMPLICATION, WITHOUT LONG-TERM CURRENT USE OF INSULIN (H): Primary | ICD-10-CM

## 2020-05-19 DIAGNOSIS — I25.10 CORONARY ARTERY DISEASE INVOLVING NATIVE CORONARY ARTERY OF NATIVE HEART WITHOUT ANGINA PECTORIS: ICD-10-CM

## 2020-05-19 DIAGNOSIS — I21.21 ST ELEVATION MYOCARDIAL INFARCTION INVOLVING LEFT CIRCUMFLEX CORONARY ARTERY (H): ICD-10-CM

## 2020-05-19 DIAGNOSIS — N18.30 CKD (CHRONIC KIDNEY DISEASE) STAGE 3, GFR 30-59 ML/MIN (H): ICD-10-CM

## 2020-05-19 DIAGNOSIS — L30.9 DERMATITIS: ICD-10-CM

## 2020-05-19 DIAGNOSIS — J44.9 CHRONIC OBSTRUCTIVE PULMONARY DISEASE, UNSPECIFIED COPD TYPE (H): ICD-10-CM

## 2020-05-19 DIAGNOSIS — E78.5 HYPERLIPIDEMIA LDL GOAL <70: ICD-10-CM

## 2020-05-19 DIAGNOSIS — I10 ESSENTIAL HYPERTENSION: ICD-10-CM

## 2020-05-19 DIAGNOSIS — M17.12 PRIMARY OSTEOARTHRITIS OF LEFT KNEE: ICD-10-CM

## 2020-05-19 DIAGNOSIS — Z01.818 PREOP GENERAL PHYSICAL EXAM: Primary | ICD-10-CM

## 2020-05-19 DIAGNOSIS — R73.01 IMPAIRED FASTING BLOOD SUGAR: ICD-10-CM

## 2020-05-19 DIAGNOSIS — J67.2: ICD-10-CM

## 2020-05-19 DIAGNOSIS — R73.01 IMPAIRED FASTING BLOOD SUGAR: Primary | ICD-10-CM

## 2020-05-19 DIAGNOSIS — J45.50 SEVERE PERSISTENT ASTHMA WITHOUT COMPLICATION (H): ICD-10-CM

## 2020-05-19 LAB
ANION GAP SERPL CALCULATED.3IONS-SCNC: 3 MMOL/L (ref 3–14)
BUN SERPL-MCNC: 20 MG/DL (ref 7–30)
CALCIUM SERPL-MCNC: 9.1 MG/DL (ref 8.5–10.1)
CHLORIDE SERPL-SCNC: 103 MMOL/L (ref 94–109)
CO2 SERPL-SCNC: 31 MMOL/L (ref 20–32)
CREAT SERPL-MCNC: 1.09 MG/DL (ref 0.66–1.25)
ERYTHROCYTE [DISTWIDTH] IN BLOOD BY AUTOMATED COUNT: 12 % (ref 10–15)
GFR SERPL CREATININE-BSD FRML MDRD: 66 ML/MIN/{1.73_M2}
GLUCOSE SERPL-MCNC: 193 MG/DL (ref 70–99)
HBA1C MFR BLD: 8.7 % (ref 0–5.6)
HCT VFR BLD AUTO: 47.1 % (ref 40–53)
HGB BLD-MCNC: 15.6 G/DL (ref 13.3–17.7)
MCH RBC QN AUTO: 29.8 PG (ref 26.5–33)
MCHC RBC AUTO-ENTMCNC: 33.1 G/DL (ref 31.5–36.5)
MCV RBC AUTO: 90 FL (ref 78–100)
PLATELET # BLD AUTO: 263 10E9/L (ref 150–450)
POTASSIUM SERPL-SCNC: 4.1 MMOL/L (ref 3.4–5.3)
RBC # BLD AUTO: 5.23 10E12/L (ref 4.4–5.9)
SODIUM SERPL-SCNC: 137 MMOL/L (ref 133–144)
WBC # BLD AUTO: 7.8 10E9/L (ref 4–11)

## 2020-05-19 PROCEDURE — 99215 OFFICE O/P EST HI 40 MIN: CPT | Performed by: INTERNAL MEDICINE

## 2020-05-19 PROCEDURE — 93000 ELECTROCARDIOGRAM COMPLETE: CPT | Performed by: INTERNAL MEDICINE

## 2020-05-19 PROCEDURE — 80048 BASIC METABOLIC PNL TOTAL CA: CPT | Performed by: INTERNAL MEDICINE

## 2020-05-19 PROCEDURE — 83036 HEMOGLOBIN GLYCOSYLATED A1C: CPT | Performed by: INTERNAL MEDICINE

## 2020-05-19 PROCEDURE — 36415 COLL VENOUS BLD VENIPUNCTURE: CPT | Performed by: INTERNAL MEDICINE

## 2020-05-19 PROCEDURE — 85027 COMPLETE CBC AUTOMATED: CPT | Performed by: INTERNAL MEDICINE

## 2020-05-19 RX ORDER — NITROGLYCERIN 0.4 MG/1
0.4 TABLET SUBLINGUAL EVERY 5 MIN PRN
Qty: 25 TABLET | Refills: 1 | Status: SHIPPED | OUTPATIENT
Start: 2020-05-19 | End: 2021-09-29

## 2020-05-19 RX ORDER — TRIAMCINOLONE ACETONIDE 1 MG/G
CREAM TOPICAL
Qty: 80 G | Refills: 3 | Status: SHIPPED | OUTPATIENT
Start: 2020-05-19 | End: 2020-10-13

## 2020-05-19 RX ORDER — GLUCOSAMINE HCL/CHONDROITIN SU 500-400 MG
CAPSULE ORAL
Qty: 100 EACH | Refills: 3 | Status: SHIPPED | OUTPATIENT
Start: 2020-05-19 | End: 2021-08-21

## 2020-05-19 RX ORDER — LANCETS
EACH MISCELLANEOUS
Qty: 100 EACH | Refills: 6 | Status: SHIPPED | OUTPATIENT
Start: 2020-05-19 | End: 2021-08-21

## 2020-05-19 RX ORDER — ALBUTEROL SULFATE 0.83 MG/ML
2.5 SOLUTION RESPIRATORY (INHALATION) EVERY 6 HOURS PRN
Qty: 30 ML | Refills: 3 | Status: SHIPPED | OUTPATIENT
Start: 2020-05-19 | End: 2020-08-27

## 2020-05-19 ASSESSMENT — MIFFLIN-ST. JEOR: SCORE: 1444.34

## 2020-05-19 NOTE — H&P (VIEW-ONLY)
[facility]  [address] [address]  [phone]  Dept: [phone]    PRE-OP EVALUATION:  Today's date: 2020    Raul Wilhelm (: 1945) presents for pre-operative evaluation assessment as requested by Dr. Sanjay Downey.  He requires evaluation and anesthesia risk assessment prior to undergoing surgery/procedure for treatment of left knee .    Proposed Surgery/ Procedure:   Left Total Knee Arthroplasty  Left  Spinal        Date of Surgery/ Procedure: 2020  Time of Surgery/ Procedure: yet to be determined  Hospital/Surgical Facility: [facility]  Primary Physician: Sarita Hennessy  Type of Anesthesia Anticipated: Spinal    Patient has a Health Care Directive or Living Will:  YES     1. YES - Do you have a history of heart attack, stroke, stent, bypass or surgery on an artery in the head, neck, heart or legs? STEMI   2. NO - Do you ever have any pain or discomfort in your chest?  3. NO - Do you have a history of  Heart Failure?  4. NO - Are you troubled by shortness of breath when: walking on the level, up a slight hill or at night?  5. NO - Do you currently have a cold, bronchitis or other respiratory infection?  6. NO - Do you have a cough, shortness of breath or wheezing?  7. NO - Do you sometimes get pains in the calves of your legs when you walk?  8. NO - Do you or anyone in your family have previous history of blood clots?  9. NO - Do you or does anyone in your family have a serious bleeding problem such as prolonged bleeding following surgeries or cuts?  10. NO - Have you ever had problems with anemia or been told to take iron pills?  11. NO - Have you had any abnormal blood loss such as black, tarry or bloody stools, or abnormal vaginal bleeding?  12. NO - Have you ever had a blood transfusion?  13. NO - Have you or any of your relatives ever had problems with anesthesia?  14. YES - Do you have sleep apnea, excessive snoring or daytime  drowsiness? Some snoring  15. NO - Do you have any prosthetic heart valves?  16. NO - Do you have prosthetic joints?  17. NO - Is there any chance that you may be pregnant?      HPI:     HPI related to upcoming procedure: OA of knee failing conservative care    CAD:  STEMI 11/2015.  At that time, coronary angiography demonstrated a 99% stenosis in the mid circumflex as well as 100% thrombotic occlusion of the left-sided PDA.  He underwent successful revascularization of the left PDA as well as the mid circumflex with drug-eluting stents at the time.  Of note, he had an approximately 80% stenosis in the mid-LAD and a 70% stenosis in the first obtuse marginal.  These were not intervened on at the time, and a subsequent stress nuclear perfusion study demonstrated a large inferior and inferoseptal infarction with no evidence of rian-infarct ischemia.  Given these findings and his complete lack of symptoms, we have elected to treat his residual coronary artery disease medically.  Long term DAPT was recommended but he declined.  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  Patient had syncope with PFT and declines further PFT  FEV1 1.99 61% in 2009  --is not able to afford controller inhalers  --he feels breathing is well controlled.  Using albuterol neb 5 x week.  Finds it more helpful than inhaler.  Rare use of albuterol inhaler      See problem list for active medical problems.  Problems all longstanding and stable, except as noted/documented.  See ROS for pertinent symptoms related to these conditions.      MEDICAL HISTORY:     Patient Active Problem List    Diagnosis Date Noted     Coronary artery disease involving native coronary artery of native heart without angina pectoris 05/19/2020     Priority: Medium     STEMI 11/2015.  At that time, coronary angiography demonstrated a 99%  stenosis in the mid circumflex as well as 100% thrombotic occlusion of the left-sided PDA.  He underwent successful revascularization of the left PDA as well as the mid circumflex with drug-eluting stents at the time.  Of note, he had an approximately 80% stenosis in the mid-LAD and a 70% stenosis in the first obtuse marginal.  These were not intervened on at the time, and a subsequent stress nuclear perfusion study demonstrated a large inferior and inferoseptal infarction with no evidence of rian-infarct ischemia.  Given these findings and his complete lack of symptoms, we have elected to treat his residual coronary artery disease medically.  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  Patient had syncope with PFT and declines further PFT  FEV1 1.99 61% in 2009       History of arterial ischemic stroke 02/25/2011     Priority: Medium     Small infarct - seen on MRI  Diagnosis updated by automated process. Provider to review and confirm.       Hyperlipidemia LDL goal <70 02/25/2011     Priority: Medium     Essential hypertension 03/15/2010     Priority: Medium     Glaucoma 03/12/2010     Priority: Medium     Worsened pressures with prednisone use.   Recommended by eye doctor to stop inhaled steroids if possible,  but has been on for so many years would potentially put him into more resp problems.   Follows with Dr. Merrill Martin  [facility].  Needs eye exam if prednisone course is longer than 10 days         Family hx of colon cancer 02/18/2009     Priority: Medium     (Problem list name updated by automated process. Provider to review and confirm.)        Past Medical History:   Diagnosis Date     Chronic airway obstruction, not elsewhere classified      Other and unspecified hyperlipidemia      Past Surgical History:   Procedure Laterality Date     ENT SURGERY  as a child    tonsillectomy     Current Outpatient Medications   Medication Sig Dispense Refill     albuterol (PROAIR HFA/PROVENTIL HFA/VENTOLIN HFA) 108 (90 Base) MCG/ACT inhaler Inhale 2 puffs into the lungs every 6 hours as needed for shortness of breath / dyspnea or wheezing 18 g 11     albuterol (PROVENTIL) (2.5 MG/3ML) 0.083% neb solution NEBULIZE CONTENTS OF ONE VIAL EVERY 6 HOURS AS NEEDED FOR SHORTNESS OF BREATH/DYSPNEA OR WHEEZING 30 mL 0     aspirin EC 81 MG EC tablet Take 1 tablet (81 mg) by mouth daily 30 tablet 1     atorvastatin (LIPITOR) 40 MG tablet Take 1 tablet (40 mg) by mouth daily 90 tablet 3     carvedilol (COREG) 6.25 MG tablet TAKE ONE TABLET BY MOUTH TWICE A  tablet 3     fexofenadine (ALLEGRA) 180 MG tablet TAKE ONE TABLET BY MOUTH ONCE DAILY 90 tablet 3     losartan-hydrochlorothiazide (HYZAAR) 50-12.5 MG tablet TAKE ONE TABLET BY MOUTH EVERY DAY 90 tablet 0     montelukast (SINGULAIR) 10 MG tablet TAKE ONE TABLET BY MOUTH EVERY NIGHT AT BEDTIME 90 tablet 3     omeprazole (PRILOSEC) 20 MG DR capsule TAKE ONE CAPSULE BY MOUTH EVERY DAY 90 capsule 3     nitroglycerin (NITROSTAT) 0.4 MG sublingual tablet Place 1 tablet (0.4 mg) under the tongue every 5 minutes as needed for chest pain If you are still having symptoms after 3 doses (15 minutes) call 911. 25 tablet 1     order for DME Equipment being ordered: arm blood  "pressure cuff (Patient not taking: Reported on 2/11/2020) 1 Device 0     ORDER FOR DME Equipment being ordered: Nebulizer Respironics, MiniElite--Neb Machine and tubing and mouth piece. (Patient not taking: Reported on 2/11/2020) 1 Device 0     triamcinolone (KENALOG) 0.1 % external cream APPLY TO AFFECTED AREA(S) TWO TIMES A DAY (Patient not taking: Reported on 5/19/2020) 80 g 0     OTC products: None, except as noted above    Allergies   Allergen Reactions     Simvastatin Swelling     Severe muscle pain, swelling, and bruising     Dust Mites       Latex Allergy: NO    Social History     Tobacco Use     Smoking status: Never Smoker     Smokeless tobacco: Never Used   Substance Use Topics     Alcohol use: Yes     Comment: Rare     History   Drug Use No       REVIEW OF SYSTEMS:   Constitutional, neuro, ENT, endocrine, pulmonary, cardiac, gastrointestinal, genitourinary, musculoskeletal, integument and psychiatric systems are negative, except as otherwise noted.    EXAM:   /88   Pulse 111   Temp 97.9  F (36.6  C) (Tympanic)   Resp 18   Ht 1.702 m (5' 7\")   Wt 74.6 kg (164 lb 6.4 oz)   SpO2 100%   BMI 25.75 kg/m      GENERAL APPEARANCE: healthy, alert and no distress     EYES: EOMI,  PERRL     HENT: ear canals and TM's normal and nose and mouth without ulcers or lesions     NECK: no adenopathy, no asymmetry, masses, or scars and thyroid normal to palpation     RESP: lungs clear to auscultation - no rales, rhonchi or wheezes     CV: regular rates and rhythm, normal S1 S2, no S3 or S4 and no murmur, click or rub     ABDOMEN:  soft, nontender, no HSM or masses and bowel sounds normal     MS: extremities normal- no gross deformities noted, no evidence of inflammation in joints, FROM in all extremities.     SKIN: no suspicious lesions or rashes     NEURO: Normal strength and tone, sensory exam grossly normal, mentation intact and speech normal     PSYCH: mentation appears normal. and affect normal/bright     " LYMPHATICS: No cervical adenopathy    DIAGNOSTICS:     EKG: appears normal, NSR, normal axis, normal intervals, no acute ST/T changes c/w ischemia, no LVH by voltage criteria, unchanged from previous tracings  Labs Drawn and in Process:   Unresulted Labs Ordered in the Past 30 Days of this Admission     No orders found from 4/19/2020 to 5/20/2020.          Recent Labs   Lab Test 11/06/19  0859 03/29/18  0028 05/04/17  1413  01/15/16  0825  11/15/15  0630 11/14/15  0950   HGB  --  17.1 16.9   < >  --   --  15.8 16.7   PLT  --  262 246   < >  --   --  250 262   INR  --   --   --   --   --   --   --  0.87    138  --    < > 142   < > 138 138   POTASSIUM 4.3 3.7  --    < > 4.1   < > 4.0 4.3   CR 0.96 1.32*  --    < > 1.07   < > 1.08 0.97   A1C  --   --   --   --  5.5  --  6.1*  --     < > = values in this interval not displayed.        IMPRESSION:   Reason for surgery/procedure: knee replacement  Diagnosis/reason for consult: pre-op evaluation    The proposed surgical procedure is considered INTERMEDIATE risk.    REVISED CARDIAC RISK INDEX  The patient has the following serious cardiovascular risks for perioperative complications such as (MI, PE, VFib and 3  AV Block):  Coronary Artery Disease (MI, positive stress test, angina, Qs on EKG)  Cerebrovascular Disease (TIA or CVA)  INTERPRETATION: 2 risks: Class III (moderate risk - 6.6% complication rate)    The patient has the following additional risks for perioperative complications:  The ASCVD Risk score (Jess CARMEN Jr., et al., 2013) failed to calculate for the following reasons:    The patient has a prior MI or stroke diagnosis      ICD-10-CM    1. Preop general physical exam  Z01.818    2. Primary osteoarthritis of left knee  M17.12    3. Dermatitis  L30.9 triamcinolone (KENALOG) 0.1 % external cream   4. Chronic obstructive pulmonary disease, unspecified COPD type (H)  J44.9    5. CKD (chronic kidney disease) stage 3, GFR 30-59 ml/min (H)  N18.3 Basic metabolic  panel     CBC with platelets     EKG 12-lead complete w/read - Clinics   6. Essential hypertension  I10 EKG 12-lead complete w/read - Clinics   7. Hyperlipidemia LDL goal <70  E78.5    8. History of arterial ischemic stroke  Z86.73    9. Pneumonitis, hypersensitivity, naa (H)  J67.2    10. Severe persistent asthma without complication  J45.50    11. Coronary artery disease involving native coronary artery of native heart without angina pectoris  I25.10    12. Impaired fasting blood sugar  R73.01    13. ST elevation myocardial infarction involving left circumflex coronary artery (H)  I21.21        RECOMMENDATIONS:     --Consult hospital rounder / IM to assist post-op medical management    Cardiovascular Risk  Performs 4 METs exercise without symptoms (Light housework (dusting, washing dishes), Climb a flight of stairs and Walk on level ground at 15 minutes per mile (4 miles/hour)) .   Patient is already on a Beta Blocker. Continue Betablocker therapy after surgery, using Beta blocker order set as necessary for NPO status.      Pulmonary Risk  Incentive spirometry post op  Respiratory Therapy (Respiratory Care IP Consult)  post op      --Patient is to take all scheduled medications on the day of surgery EXCEPT for modifications listed below.    APPROVAL GIVEN to proceed with proposed procedure, without further diagnostic evaluation      Addendum: after visit, glucose was elevated, added on A1C which is elevated at 8.7, diagnostic of new diabetes.  Discussed the diagnosis with patient.  Advised to start metformin but he declines.  He is willing to meet with diabetes educator and nutritionist.  Advised to start checking blood sugar once a day.  Discussed risks of uncontrolled blood sugar on wound healing and infection in the perioperative period.  Did notify his surgeon about the new diagnosis and we felt it was safe to proceed with surgery       Signed Electronically by: Sarita Hennessy,     Copy of this  evaluation report is provided to requesting physician.    [facility] Preop Guidelines    Revised Cardiac Risk Index

## 2020-05-19 NOTE — PROGRESS NOTES
[facility]  [address] [place] [address]  [phone]  Dept: [phone]    PRE-OP EVALUATION:  Today's date: 2020    Raul Wilhelm (: 1945) presents for pre-operative evaluation assessment as requested by Dr. Sanjay Downey.  He requires evaluation and anesthesia risk assessment prior to undergoing surgery/procedure for treatment of left knee .    Proposed Surgery/ Procedure:   Left Total Knee Arthroplasty  Left  Spinal        Date of Surgery/ Procedure: 2020  Time of Surgery/ Procedure: yet to be determined  Hospital/Surgical Facility: [facility]  Primary Physician: Sarita Hennessy  Type of Anesthesia Anticipated: Spinal    Patient has a Health Care Directive or Living Will:  YES     1. YES - Do you have a history of heart attack, stroke, stent, bypass or surgery on an artery in the head, neck, heart or legs? STEMI   2. NO - Do you ever have any pain or discomfort in your chest?  3. NO - Do you have a history of  Heart Failure?  4. NO - Are you troubled by shortness of breath when: walking on the level, up a slight hill or at night?  5. NO - Do you currently have a cold, bronchitis or other respiratory infection?  6. NO - Do you have a cough, shortness of breath or wheezing?  7. NO - Do you sometimes get pains in the calves of your legs when you walk?  8. NO - Do you or anyone in your family have previous history of blood clots?  9. NO - Do you or does anyone in your family have a serious bleeding problem such as prolonged bleeding following surgeries or cuts?  10. NO - Have you ever had problems with anemia or been told to take iron pills?  11. NO - Have you had any abnormal blood loss such as black, tarry or bloody stools, or abnormal vaginal bleeding?  12. NO - Have you ever had a blood transfusion?  13. NO - Have you or any of your relatives ever had problems with anesthesia?  14. YES - Do you have sleep apnea, excessive snoring or daytime  drowsiness? Some snoring  15. NO - Do you have any prosthetic heart valves?  16. NO - Do you have prosthetic joints?  17. NO - Is there any chance that you may be pregnant?      HPI:     HPI related to upcoming procedure: OA of knee failing conservative care    CAD:  STEMI 11/2015.  At that time, coronary angiography demonstrated a 99% stenosis in the mid circumflex as well as 100% thrombotic occlusion of the left-sided PDA.  He underwent successful revascularization of the left PDA as well as the mid circumflex with drug-eluting stents at the time.  Of note, he had an approximately 80% stenosis in the mid-LAD and a 70% stenosis in the first obtuse marginal.  These were not intervened on at the time, and a subsequent stress nuclear perfusion study demonstrated a large inferior and inferoseptal infarction with no evidence of rian-infarct ischemia.  Given these findings and his complete lack of symptoms, we have elected to treat his residual coronary artery disease medically.  Long term DAPT was recommended but he declined.  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  Patient had syncope with PFT and declines further PFT  FEV1 1.99 61% in 2009  --is not able to afford controller inhalers  --he feels breathing is well controlled.  Using albuterol neb 5 x week.  Finds it more helpful than inhaler.  Rare use of albuterol inhaler      See problem list for active medical problems.  Problems all longstanding and stable, except as noted/documented.  See ROS for pertinent symptoms related to these conditions.      MEDICAL HISTORY:     Patient Active Problem List    Diagnosis Date Noted     Coronary artery disease involving native coronary artery of native heart without angina pectoris 05/19/2020     Priority: Medium     STEMI 11/2015.  At that time, coronary angiography demonstrated a 99%  stenosis in the mid circumflex as well as 100% thrombotic occlusion of the left-sided PDA.  He underwent successful revascularization of the left PDA as well as the mid circumflex with drug-eluting stents at the time.  Of note, he had an approximately 80% stenosis in the mid-LAD and a 70% stenosis in the first obtuse marginal.  These were not intervened on at the time, and a subsequent stress nuclear perfusion study demonstrated a large inferior and inferoseptal infarction with no evidence of rian-infarct ischemia.  Given these findings and his complete lack of symptoms, we have elected to treat his residual coronary artery disease medically.  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  Patient had syncope with PFT and declines further PFT  FEV1 1.99 61% in 2009       History of arterial ischemic stroke 02/25/2011     Priority: Medium     Small infarct - seen on MRI  Diagnosis updated by automated process. Provider to review and confirm.       Hyperlipidemia LDL goal <70 02/25/2011     Priority: Medium     Essential hypertension 03/15/2010     Priority: Medium     Glaucoma 03/12/2010     Priority: Medium     Worsened pressures with prednisone use.   Recommended by eye doctor to stop inhaled steroids if possible,  but has been on for so many years would potentially put him into more resp problems.   Follows with Dr. Merrill Martin  [facility].  Needs eye exam if prednisone course is longer than 10 days         Family hx of colon cancer 02/18/2009     Priority: Medium     (Problem list name updated by automated process. Provider to review and confirm.)        Past Medical History:   Diagnosis Date     Chronic airway obstruction, not elsewhere classified      Other and unspecified hyperlipidemia      Past Surgical History:   Procedure Laterality Date     ENT SURGERY  as a child    tonsillectomy     Current Outpatient Medications   Medication Sig Dispense Refill     albuterol (PROAIR HFA/PROVENTIL HFA/VENTOLIN HFA) 108 (90 Base) MCG/ACT inhaler Inhale 2 puffs into the lungs every 6 hours as needed for shortness of breath / dyspnea or wheezing 18 g 11     albuterol (PROVENTIL) (2.5 MG/3ML) 0.083% neb solution NEBULIZE CONTENTS OF ONE VIAL EVERY 6 HOURS AS NEEDED FOR SHORTNESS OF BREATH/DYSPNEA OR WHEEZING 30 mL 0     aspirin EC 81 MG EC tablet Take 1 tablet (81 mg) by mouth daily 30 tablet 1     atorvastatin (LIPITOR) 40 MG tablet Take 1 tablet (40 mg) by mouth daily 90 tablet 3     carvedilol (COREG) 6.25 MG tablet TAKE ONE TABLET BY MOUTH TWICE A  tablet 3     fexofenadine (ALLEGRA) 180 MG tablet TAKE ONE TABLET BY MOUTH ONCE DAILY 90 tablet 3     losartan-hydrochlorothiazide (HYZAAR) 50-12.5 MG tablet TAKE ONE TABLET BY MOUTH EVERY DAY 90 tablet 0     montelukast (SINGULAIR) 10 MG tablet TAKE ONE TABLET BY MOUTH EVERY NIGHT AT BEDTIME 90 tablet 3     omeprazole (PRILOSEC) 20 MG DR capsule TAKE ONE CAPSULE BY MOUTH EVERY DAY 90 capsule 3     nitroglycerin (NITROSTAT) 0.4 MG sublingual tablet Place 1 tablet (0.4 mg) under the tongue every 5 minutes as needed for chest pain If you are still having symptoms after 3 doses (15 minutes) call 911. 25 tablet 1     order for DME Equipment being ordered: arm blood  "pressure cuff (Patient not taking: Reported on 2/11/2020) 1 Device 0     ORDER FOR DME Equipment being ordered: Nebulizer Respironics, MiniElite--Neb Machine and tubing and mouth piece. (Patient not taking: Reported on 2/11/2020) 1 Device 0     triamcinolone (KENALOG) 0.1 % external cream APPLY TO AFFECTED AREA(S) TWO TIMES A DAY (Patient not taking: Reported on 5/19/2020) 80 g 0     OTC products: None, except as noted above    Allergies   Allergen Reactions     Simvastatin Swelling     Severe muscle pain, swelling, and bruising     Dust Mites       Latex Allergy: NO    Social History     Tobacco Use     Smoking status: Never Smoker     Smokeless tobacco: Never Used   Substance Use Topics     Alcohol use: Yes     Comment: Rare     History   Drug Use No       REVIEW OF SYSTEMS:   Constitutional, neuro, ENT, endocrine, pulmonary, cardiac, gastrointestinal, genitourinary, musculoskeletal, integument and psychiatric systems are negative, except as otherwise noted.    EXAM:   /88   Pulse 111   Temp 97.9  F (36.6  C) (Tympanic)   Resp 18   Ht 1.702 m (5' 7\")   Wt 74.6 kg (164 lb 6.4 oz)   SpO2 100%   BMI 25.75 kg/m      GENERAL APPEARANCE: healthy, alert and no distress     EYES: EOMI,  PERRL     HENT: ear canals and TM's normal and nose and mouth without ulcers or lesions     NECK: no adenopathy, no asymmetry, masses, or scars and thyroid normal to palpation     RESP: lungs clear to auscultation - no rales, rhonchi or wheezes     CV: regular rates and rhythm, normal S1 S2, no S3 or S4 and no murmur, click or rub     ABDOMEN:  soft, nontender, no HSM or masses and bowel sounds normal     MS: extremities normal- no gross deformities noted, no evidence of inflammation in joints, FROM in all extremities.     SKIN: no suspicious lesions or rashes     NEURO: Normal strength and tone, sensory exam grossly normal, mentation intact and speech normal     PSYCH: mentation appears normal. and affect normal/bright     " LYMPHATICS: No cervical adenopathy    DIAGNOSTICS:     EKG: appears normal, NSR, normal axis, normal intervals, no acute ST/T changes c/w ischemia, no LVH by voltage criteria, unchanged from previous tracings  Labs Drawn and in Process:   Unresulted Labs Ordered in the Past 30 Days of this Admission     No orders found from 4/19/2020 to 5/20/2020.          Recent Labs   Lab Test 11/06/19  0859 03/29/18  0028 05/04/17  1413  01/15/16  0825  11/15/15  0630 11/14/15  0950   HGB  --  17.1 16.9   < >  --   --  15.8 16.7   PLT  --  262 246   < >  --   --  250 262   INR  --   --   --   --   --   --   --  0.87    138  --    < > 142   < > 138 138   POTASSIUM 4.3 3.7  --    < > 4.1   < > 4.0 4.3   CR 0.96 1.32*  --    < > 1.07   < > 1.08 0.97   A1C  --   --   --   --  5.5  --  6.1*  --     < > = values in this interval not displayed.        IMPRESSION:   Reason for surgery/procedure: knee replacement  Diagnosis/reason for consult: pre-op evaluation    The proposed surgical procedure is considered INTERMEDIATE risk.    REVISED CARDIAC RISK INDEX  The patient has the following serious cardiovascular risks for perioperative complications such as (MI, PE, VFib and 3  AV Block):  Coronary Artery Disease (MI, positive stress test, angina, Qs on EKG)  Cerebrovascular Disease (TIA or CVA)  INTERPRETATION: 2 risks: Class III (moderate risk - 6.6% complication rate)    The patient has the following additional risks for perioperative complications:  The ASCVD Risk score (Jess CARMEN Jr., et al., 2013) failed to calculate for the following reasons:    The patient has a prior MI or stroke diagnosis      ICD-10-CM    1. Preop general physical exam  Z01.818    2. Primary osteoarthritis of left knee  M17.12    3. Dermatitis  L30.9 triamcinolone (KENALOG) 0.1 % external cream   4. Chronic obstructive pulmonary disease, unspecified COPD type (H)  J44.9    5. CKD (chronic kidney disease) stage 3, GFR 30-59 ml/min (H)  N18.3 Basic metabolic  panel     CBC with platelets     EKG 12-lead complete w/read - Clinics   6. Essential hypertension  I10 EKG 12-lead complete w/read - Clinics   7. Hyperlipidemia LDL goal <70  E78.5    8. History of arterial ischemic stroke  Z86.73    9. Pneumonitis, hypersensitivity, naa (H)  J67.2    10. Severe persistent asthma without complication  J45.50    11. Coronary artery disease involving native coronary artery of native heart without angina pectoris  I25.10    12. Impaired fasting blood sugar  R73.01    13. ST elevation myocardial infarction involving left circumflex coronary artery (H)  I21.21        RECOMMENDATIONS:     --Consult hospital rounder / IM to assist post-op medical management    Cardiovascular Risk  Performs 4 METs exercise without symptoms (Light housework (dusting, washing dishes), Climb a flight of stairs and Walk on level ground at 15 minutes per mile (4 miles/hour)) .   Patient is already on a Beta Blocker. Continue Betablocker therapy after surgery, using Beta blocker order set as necessary for NPO status.      Pulmonary Risk  Incentive spirometry post op  Respiratory Therapy (Respiratory Care IP Consult)  post op      --Patient is to take all scheduled medications on the day of surgery EXCEPT for modifications listed below.    APPROVAL GIVEN to proceed with proposed procedure, without further diagnostic evaluation      Addendum: after visit, glucose was elevated, added on A1C which is elevated at 8.7, diagnostic of new diabetes.  Discussed the diagnosis with patient.  Advised to start metformin but he declines.  He is willing to meet with diabetes educator and nutritionist.  Advised to start checking blood sugar once a day.  Discussed risks of uncontrolled blood sugar on wound healing and infection in the perioperative period.  Did notify his surgeon about the new diagnosis and we felt it was safe to proceed with surgery       Signed Electronically by: Sarita Hennessy,     Copy of this  evaluation report is provided to requesting physician.    [place] Preop Guidelines    Revised Cardiac Risk Index

## 2020-05-19 NOTE — PATIENT INSTRUCTIONS
Before Your Surgery      Call your surgeon if there is any change in your health. This includes signs of a cold or flu (such as a sore throat, runny nose, cough, rash or fever).    Do not smoke, drink alcohol or take over the counter medicine (unless your surgeon or primary care doctor tells you to) for the 24 hours before and after surgery.    If you take prescribed drugs: Follow your doctor s orders about which medicines to take and which to stop until after surgery.    Eating and drinking prior to surgery: follow the instructions from your surgeon    Take a shower or bath the night before surgery. Use the soap your surgeon gave you to gently clean your skin. If you do not have soap from your surgeon, use your regular soap. Do not shave or scrub the surgery site.  Wear clean pajamas and have clean sheets on your bed.       1. Labs and EKG today  2. Continue Aspirin up through surgery.  The Orthopedic office may say something different, but continuing aspirin up until surgery was recommended by your cardiologist  3. Continue all medications as normal up through surgery.  4. Someone will call you to schedule the COVID test  5. Nitroglycerin refilled to have on hand.  Also refilled triamcinolone cream

## 2020-05-22 ASSESSMENT — ACTIVITIES OF DAILY LIVING (ADL): COGNITION: 0 - NO COGNITION ISSUES REPORTED

## 2020-05-26 ENCOUNTER — TELEPHONE (OUTPATIENT)
Dept: INTERNAL MEDICINE | Facility: CLINIC | Age: 75
End: 2020-05-26

## 2020-05-26 DIAGNOSIS — Z11.59 ENCOUNTER FOR SCREENING FOR OTHER VIRAL DISEASES: ICD-10-CM

## 2020-05-26 PROCEDURE — 87635 SARS-COV-2 COVID-19 AMP PRB: CPT | Performed by: ORTHOPAEDIC SURGERY

## 2020-05-26 PROCEDURE — 99207 ZZC NO CHARGE NURSE ONLY: CPT

## 2020-05-26 PROCEDURE — 99000 SPECIMEN HANDLING OFFICE-LAB: CPT | Performed by: ORTHOPAEDIC SURGERY

## 2020-05-26 NOTE — TELEPHONE ENCOUNTER
Diabetes Education Scheduling Outreach #1:    Call to patient to schedule. Patient is having surgery soon and will call us back to schedule after surgery.    Plan for 2nd outreach attempt within 1 week.    Tere Morrison OnCall  Diabetes and Nutrition Scheduling

## 2020-05-26 NOTE — PROGRESS NOTES
Patient presents to clinic today for covid testing, NP swab obtained by Mert SLADE CMA  , assisted by Jeanine VILLANUEVA CMA.

## 2020-05-27 ENCOUNTER — ANESTHESIA EVENT (OUTPATIENT)
Dept: SURGERY | Facility: CLINIC | Age: 75
End: 2020-05-27
Payer: COMMERCIAL

## 2020-05-27 LAB
SARS-COV-2 RNA SPEC QL NAA+PROBE: NOT DETECTED
SPECIMEN SOURCE: NORMAL

## 2020-05-28 ENCOUNTER — HOSPITAL ENCOUNTER (OUTPATIENT)
Facility: CLINIC | Age: 75
Discharge: HOME | End: 2020-05-29
Attending: ORTHOPAEDIC SURGERY | Admitting: ORTHOPAEDIC SURGERY
Payer: COMMERCIAL

## 2020-05-28 ENCOUNTER — APPOINTMENT (OUTPATIENT)
Dept: GENERAL RADIOLOGY | Facility: CLINIC | Age: 75
End: 2020-05-28
Attending: ORTHOPAEDIC SURGERY
Payer: COMMERCIAL

## 2020-05-28 ENCOUNTER — ANESTHESIA (OUTPATIENT)
Dept: SURGERY | Facility: CLINIC | Age: 75
End: 2020-05-28
Payer: COMMERCIAL

## 2020-05-28 DIAGNOSIS — Z96.652 STATUS POST TOTAL LEFT KNEE REPLACEMENT: Primary | ICD-10-CM

## 2020-05-28 PROBLEM — N18.30 CKD (CHRONIC KIDNEY DISEASE) STAGE 3, GFR 30-59 ML/MIN (H): Status: ACTIVE | Noted: 2019-11-06

## 2020-05-28 PROBLEM — Z96.659 STATUS POST TOTAL KNEE REPLACEMENT: Status: ACTIVE | Noted: 2020-05-28

## 2020-05-28 LAB
GLUCOSE BLDC GLUCOMTR-MCNC: 144 MG/DL (ref 70–99)
GLUCOSE BLDC GLUCOMTR-MCNC: 157 MG/DL (ref 70–99)
GLUCOSE BLDC GLUCOMTR-MCNC: 167 MG/DL (ref 70–99)
GLUCOSE BLDC GLUCOMTR-MCNC: 262 MG/DL (ref 70–99)

## 2020-05-28 PROCEDURE — 82962 GLUCOSE BLOOD TEST: CPT

## 2020-05-28 PROCEDURE — 25000128 H RX IP 250 OP 636: Performed by: PHYSICIAN ASSISTANT

## 2020-05-28 PROCEDURE — 27210794 ZZH OR GENERAL SUPPLY STERILE: Performed by: ORTHOPAEDIC SURGERY

## 2020-05-28 PROCEDURE — 40000986 XR KNEE PORT LT 1/2 VW: Mod: LT

## 2020-05-28 PROCEDURE — 27110028 ZZH OR GENERAL SUPPLY NON-STERILE: Performed by: ORTHOPAEDIC SURGERY

## 2020-05-28 PROCEDURE — 27810169 ZZH OR IMPLANT GENERAL: Performed by: ORTHOPAEDIC SURGERY

## 2020-05-28 PROCEDURE — 25000128 H RX IP 250 OP 636: Performed by: ORTHOPAEDIC SURGERY

## 2020-05-28 PROCEDURE — 37000009 ZZH ANESTHESIA TECHNICAL FEE, EACH ADDTL 15 MIN: Performed by: ORTHOPAEDIC SURGERY

## 2020-05-28 PROCEDURE — 40000306 ZZH STATISTIC PRE PROC ASSESS II: Performed by: ORTHOPAEDIC SURGERY

## 2020-05-28 PROCEDURE — 25000132 ZZH RX MED GY IP 250 OP 250 PS 637: Performed by: NURSE ANESTHETIST, CERTIFIED REGISTERED

## 2020-05-28 PROCEDURE — 25800030 ZZH RX IP 258 OP 636: Performed by: NURSE ANESTHETIST, CERTIFIED REGISTERED

## 2020-05-28 PROCEDURE — 25000132 ZZH RX MED GY IP 250 OP 250 PS 637: Performed by: ORTHOPAEDIC SURGERY

## 2020-05-28 PROCEDURE — 71000012 ZZH RECOVERY PHASE 1 LEVEL 1 FIRST HR: Performed by: ORTHOPAEDIC SURGERY

## 2020-05-28 PROCEDURE — 25000128 H RX IP 250 OP 636: Performed by: NURSE ANESTHETIST, CERTIFIED REGISTERED

## 2020-05-28 PROCEDURE — 36000093 ZZH SURGERY LEVEL 4 1ST 30 MIN: Performed by: ORTHOPAEDIC SURGERY

## 2020-05-28 PROCEDURE — 25000125 ZZHC RX 250: Performed by: PHYSICIAN ASSISTANT

## 2020-05-28 PROCEDURE — 36000063 ZZH SURGERY LEVEL 4 EA 15 ADDTL MIN: Performed by: ORTHOPAEDIC SURGERY

## 2020-05-28 PROCEDURE — 25000132 ZZH RX MED GY IP 250 OP 250 PS 637: Performed by: PHYSICIAN ASSISTANT

## 2020-05-28 PROCEDURE — 25800030 ZZH RX IP 258 OP 636: Performed by: ORTHOPAEDIC SURGERY

## 2020-05-28 PROCEDURE — C1776 JOINT DEVICE (IMPLANTABLE): HCPCS | Performed by: ORTHOPAEDIC SURGERY

## 2020-05-28 PROCEDURE — 25000125 ZZHC RX 250: Performed by: NURSE ANESTHETIST, CERTIFIED REGISTERED

## 2020-05-28 PROCEDURE — 37000008 ZZH ANESTHESIA TECHNICAL FEE, 1ST 30 MIN: Performed by: ORTHOPAEDIC SURGERY

## 2020-05-28 DEVICE — IMPLANTABLE DEVICE: Type: IMPLANTABLE DEVICE | Site: KNEE | Status: FUNCTIONAL

## 2020-05-28 DEVICE — IMP TIB BASE JJ ATTUNE FX BR SYS CEM SZ4 150670004: Type: IMPLANTABLE DEVICE | Site: KNEE | Status: FUNCTIONAL

## 2020-05-28 DEVICE — BONE CEMENT PALACOS 00-1112-140-01: Type: IMPLANTABLE DEVICE | Site: KNEE | Status: FUNCTIONAL

## 2020-05-28 RX ORDER — MONTELUKAST SODIUM 10 MG/1
10 TABLET ORAL AT BEDTIME
Status: DISCONTINUED | OUTPATIENT
Start: 2020-05-28 | End: 2020-05-29 | Stop reason: HOSPADM

## 2020-05-28 RX ORDER — HYDROXYZINE HYDROCHLORIDE 25 MG/1
25-50 TABLET, FILM COATED ORAL EVERY 6 HOURS PRN
Qty: 50 TABLET | Refills: 0 | Status: SHIPPED | OUTPATIENT
Start: 2020-05-28 | End: 2020-08-27

## 2020-05-28 RX ORDER — CEFAZOLIN SODIUM 2 G/100ML
2 INJECTION, SOLUTION INTRAVENOUS
Status: COMPLETED | OUTPATIENT
Start: 2020-05-28 | End: 2020-05-28

## 2020-05-28 RX ORDER — CARVEDILOL 6.25 MG/1
6.25 TABLET ORAL 2 TIMES DAILY
Status: DISCONTINUED | OUTPATIENT
Start: 2020-05-28 | End: 2020-05-29 | Stop reason: HOSPADM

## 2020-05-28 RX ORDER — HYDROXYZINE HYDROCHLORIDE 25 MG/1
25-50 TABLET, FILM COATED ORAL EVERY 6 HOURS PRN
Status: DISCONTINUED | OUTPATIENT
Start: 2020-05-28 | End: 2020-05-29 | Stop reason: HOSPADM

## 2020-05-28 RX ORDER — BUPIVACAINE HYDROCHLORIDE 7.5 MG/ML
INJECTION, SOLUTION INTRASPINAL PRN
Status: DISCONTINUED | OUTPATIENT
Start: 2020-05-28 | End: 2020-05-28

## 2020-05-28 RX ORDER — TRANEXAMIC ACID 10 MG/ML
1 INJECTION, SOLUTION INTRAVENOUS
Status: COMPLETED | OUTPATIENT
Start: 2020-05-28 | End: 2020-05-28

## 2020-05-28 RX ORDER — ONDANSETRON 4 MG/1
4 TABLET, ORALLY DISINTEGRATING ORAL EVERY 6 HOURS PRN
Status: DISCONTINUED | OUTPATIENT
Start: 2020-05-28 | End: 2020-05-29 | Stop reason: HOSPADM

## 2020-05-28 RX ORDER — LIDOCAINE 40 MG/G
CREAM TOPICAL
Status: DISCONTINUED | OUTPATIENT
Start: 2020-05-28 | End: 2020-05-28 | Stop reason: HOSPADM

## 2020-05-28 RX ORDER — AMOXICILLIN 250 MG
1-2 CAPSULE ORAL DAILY PRN
Qty: 10 TABLET | Refills: 0 | Status: SHIPPED | OUTPATIENT
Start: 2020-05-28 | End: 2020-08-27

## 2020-05-28 RX ORDER — PROCHLORPERAZINE MALEATE 5 MG
5 TABLET ORAL EVERY 6 HOURS PRN
Status: DISCONTINUED | OUTPATIENT
Start: 2020-05-28 | End: 2020-05-29 | Stop reason: HOSPADM

## 2020-05-28 RX ORDER — ACETAMINOPHEN 325 MG/1
975 TABLET ORAL EVERY 8 HOURS
Status: DISCONTINUED | OUTPATIENT
Start: 2020-05-28 | End: 2020-05-29 | Stop reason: HOSPADM

## 2020-05-28 RX ORDER — SODIUM CHLORIDE, SODIUM LACTATE, POTASSIUM CHLORIDE, CALCIUM CHLORIDE 600; 310; 30; 20 MG/100ML; MG/100ML; MG/100ML; MG/100ML
INJECTION, SOLUTION INTRAVENOUS CONTINUOUS
Status: DISCONTINUED | OUTPATIENT
Start: 2020-05-28 | End: 2020-05-29 | Stop reason: HOSPADM

## 2020-05-28 RX ORDER — CALCIUM CARBONATE 500 MG/1
1000 TABLET, CHEWABLE ORAL 4 TIMES DAILY PRN
Status: DISCONTINUED | OUTPATIENT
Start: 2020-05-28 | End: 2020-05-29 | Stop reason: HOSPADM

## 2020-05-28 RX ORDER — SODIUM CHLORIDE, SODIUM LACTATE, POTASSIUM CHLORIDE, CALCIUM CHLORIDE 600; 310; 30; 20 MG/100ML; MG/100ML; MG/100ML; MG/100ML
INJECTION, SOLUTION INTRAVENOUS CONTINUOUS
Status: DISCONTINUED | OUTPATIENT
Start: 2020-05-28 | End: 2020-05-28 | Stop reason: HOSPADM

## 2020-05-28 RX ORDER — GABAPENTIN 300 MG/1
300 CAPSULE ORAL ONCE
Status: COMPLETED | OUTPATIENT
Start: 2020-05-28 | End: 2020-05-28

## 2020-05-28 RX ORDER — CEFAZOLIN SODIUM 2 G/100ML
2 INJECTION, SOLUTION INTRAVENOUS EVERY 8 HOURS
Status: COMPLETED | OUTPATIENT
Start: 2020-05-28 | End: 2020-05-29

## 2020-05-28 RX ORDER — OXYCODONE HYDROCHLORIDE 5 MG/1
5-10 TABLET ORAL
Qty: 56 TABLET | Refills: 0 | Status: SHIPPED | OUTPATIENT
Start: 2020-05-28 | End: 2020-08-27

## 2020-05-28 RX ORDER — HYDRALAZINE HYDROCHLORIDE 20 MG/ML
2.5-5 INJECTION INTRAMUSCULAR; INTRAVENOUS EVERY 10 MIN PRN
Status: DISCONTINUED | OUTPATIENT
Start: 2020-05-28 | End: 2020-05-28 | Stop reason: HOSPADM

## 2020-05-28 RX ORDER — METOCLOPRAMIDE 10 MG/1
10 TABLET ORAL EVERY 6 HOURS PRN
Status: DISCONTINUED | OUTPATIENT
Start: 2020-05-28 | End: 2020-05-28 | Stop reason: HOSPADM

## 2020-05-28 RX ORDER — CEFAZOLIN SODIUM 1 G/50ML
1 INJECTION, SOLUTION INTRAVENOUS SEE ADMIN INSTRUCTIONS
Status: DISCONTINUED | OUTPATIENT
Start: 2020-05-28 | End: 2020-05-28 | Stop reason: HOSPADM

## 2020-05-28 RX ORDER — DEXAMETHASONE SODIUM PHOSPHATE 4 MG/ML
4 INJECTION, SOLUTION INTRA-ARTICULAR; INTRALESIONAL; INTRAMUSCULAR; INTRAVENOUS; SOFT TISSUE EVERY 10 MIN PRN
Status: DISCONTINUED | OUTPATIENT
Start: 2020-05-28 | End: 2020-05-28 | Stop reason: HOSPADM

## 2020-05-28 RX ORDER — LIDOCAINE 40 MG/G
CREAM TOPICAL
Status: DISCONTINUED | OUTPATIENT
Start: 2020-05-28 | End: 2020-05-29 | Stop reason: HOSPADM

## 2020-05-28 RX ORDER — DEXTROSE MONOHYDRATE 25 G/50ML
25-50 INJECTION, SOLUTION INTRAVENOUS
Status: DISCONTINUED | OUTPATIENT
Start: 2020-05-28 | End: 2020-05-29 | Stop reason: HOSPADM

## 2020-05-28 RX ORDER — ROPIVACAINE HYDROCHLORIDE 7.5 MG/ML
INJECTION, SOLUTION EPIDURAL; PERINEURAL PRN
Status: DISCONTINUED | OUTPATIENT
Start: 2020-05-28 | End: 2020-05-28

## 2020-05-28 RX ORDER — ALBUTEROL SULFATE 0.83 MG/ML
2.5 SOLUTION RESPIRATORY (INHALATION) EVERY 4 HOURS PRN
Status: DISCONTINUED | OUTPATIENT
Start: 2020-05-28 | End: 2020-05-28 | Stop reason: HOSPADM

## 2020-05-28 RX ORDER — MEPERIDINE HYDROCHLORIDE 25 MG/ML
12.5 INJECTION INTRAMUSCULAR; INTRAVENOUS; SUBCUTANEOUS
Status: DISCONTINUED | OUTPATIENT
Start: 2020-05-28 | End: 2020-05-28 | Stop reason: HOSPADM

## 2020-05-28 RX ORDER — ONDANSETRON 2 MG/ML
4 INJECTION INTRAMUSCULAR; INTRAVENOUS EVERY 30 MIN PRN
Status: DISCONTINUED | OUTPATIENT
Start: 2020-05-28 | End: 2020-05-28 | Stop reason: HOSPADM

## 2020-05-28 RX ORDER — LIDOCAINE HYDROCHLORIDE 10 MG/ML
INJECTION, SOLUTION INFILTRATION; PERINEURAL PRN
Status: DISCONTINUED | OUTPATIENT
Start: 2020-05-28 | End: 2020-05-28

## 2020-05-28 RX ORDER — PROPOFOL 10 MG/ML
INJECTION, EMULSION INTRAVENOUS CONTINUOUS PRN
Status: DISCONTINUED | OUTPATIENT
Start: 2020-05-28 | End: 2020-05-28

## 2020-05-28 RX ORDER — OXYCODONE HYDROCHLORIDE 5 MG/1
5-10 TABLET ORAL
Status: DISCONTINUED | OUTPATIENT
Start: 2020-05-28 | End: 2020-05-29 | Stop reason: HOSPADM

## 2020-05-28 RX ORDER — ALBUTEROL SULFATE 0.83 MG/ML
2.5 SOLUTION RESPIRATORY (INHALATION) ONCE
Status: COMPLETED | OUTPATIENT
Start: 2020-05-28 | End: 2020-05-28

## 2020-05-28 RX ORDER — METOCLOPRAMIDE 5 MG/1
5 TABLET ORAL EVERY 6 HOURS PRN
Status: DISCONTINUED | OUTPATIENT
Start: 2020-05-28 | End: 2020-05-29 | Stop reason: HOSPADM

## 2020-05-28 RX ORDER — ONDANSETRON 2 MG/ML
4 INJECTION INTRAMUSCULAR; INTRAVENOUS EVERY 6 HOURS PRN
Status: DISCONTINUED | OUTPATIENT
Start: 2020-05-28 | End: 2020-05-29 | Stop reason: HOSPADM

## 2020-05-28 RX ORDER — ALBUTEROL SULFATE 0.83 MG/ML
2.5 SOLUTION RESPIRATORY (INHALATION) EVERY 6 HOURS PRN
Status: DISCONTINUED | OUTPATIENT
Start: 2020-05-28 | End: 2020-05-29 | Stop reason: HOSPADM

## 2020-05-28 RX ORDER — FENTANYL CITRATE 50 UG/ML
INJECTION, SOLUTION INTRAMUSCULAR; INTRAVENOUS PRN
Status: DISCONTINUED | OUTPATIENT
Start: 2020-05-28 | End: 2020-05-28

## 2020-05-28 RX ORDER — CELECOXIB 200 MG/1
200 CAPSULE ORAL DAILY
Qty: 30 CAPSULE | Refills: 0 | Status: SHIPPED | OUTPATIENT
Start: 2020-05-28 | End: 2020-08-27

## 2020-05-28 RX ORDER — NALOXONE HYDROCHLORIDE 0.4 MG/ML
.1-.4 INJECTION, SOLUTION INTRAMUSCULAR; INTRAVENOUS; SUBCUTANEOUS
Status: DISCONTINUED | OUTPATIENT
Start: 2020-05-28 | End: 2020-05-29 | Stop reason: HOSPADM

## 2020-05-28 RX ORDER — FENTANYL CITRATE 50 UG/ML
25-50 INJECTION, SOLUTION INTRAMUSCULAR; INTRAVENOUS
Status: DISCONTINUED | OUTPATIENT
Start: 2020-05-28 | End: 2020-05-28 | Stop reason: HOSPADM

## 2020-05-28 RX ORDER — HYDROMORPHONE HYDROCHLORIDE 1 MG/ML
.3-.5 INJECTION, SOLUTION INTRAMUSCULAR; INTRAVENOUS; SUBCUTANEOUS
Status: DISCONTINUED | OUTPATIENT
Start: 2020-05-28 | End: 2020-05-29 | Stop reason: HOSPADM

## 2020-05-28 RX ORDER — FENTANYL CITRATE 50 UG/ML
25-50 INJECTION, SOLUTION INTRAMUSCULAR; INTRAVENOUS
Status: DISCONTINUED | OUTPATIENT
Start: 2020-05-28 | End: 2020-05-28

## 2020-05-28 RX ORDER — ONDANSETRON 4 MG/1
4 TABLET, ORALLY DISINTEGRATING ORAL EVERY 30 MIN PRN
Status: DISCONTINUED | OUTPATIENT
Start: 2020-05-28 | End: 2020-05-28 | Stop reason: HOSPADM

## 2020-05-28 RX ORDER — DEXAMETHASONE SODIUM PHOSPHATE 4 MG/ML
4 INJECTION, SOLUTION INTRA-ARTICULAR; INTRALESIONAL; INTRAMUSCULAR; INTRAVENOUS; SOFT TISSUE ONCE
Status: COMPLETED | OUTPATIENT
Start: 2020-05-28 | End: 2020-05-28

## 2020-05-28 RX ORDER — ATORVASTATIN CALCIUM 20 MG/1
40 TABLET, FILM COATED ORAL AT BEDTIME
Status: DISCONTINUED | OUTPATIENT
Start: 2020-05-28 | End: 2020-05-29 | Stop reason: HOSPADM

## 2020-05-28 RX ORDER — ACETAMINOPHEN 325 MG/1
975 TABLET ORAL ONCE
Status: DISCONTINUED | OUTPATIENT
Start: 2020-05-28 | End: 2020-05-28 | Stop reason: HOSPADM

## 2020-05-28 RX ORDER — METOCLOPRAMIDE HYDROCHLORIDE 5 MG/ML
10 INJECTION INTRAMUSCULAR; INTRAVENOUS EVERY 6 HOURS PRN
Status: DISCONTINUED | OUTPATIENT
Start: 2020-05-28 | End: 2020-05-28 | Stop reason: HOSPADM

## 2020-05-28 RX ORDER — LOSARTAN POTASSIUM 50 MG/1
50 TABLET ORAL DAILY
Status: DISCONTINUED | OUTPATIENT
Start: 2020-05-29 | End: 2020-05-29 | Stop reason: HOSPADM

## 2020-05-28 RX ORDER — LOSARTAN POTASSIUM AND HYDROCHLOROTHIAZIDE 12.5; 5 MG/1; MG/1
1 TABLET ORAL DAILY
Status: DISCONTINUED | OUTPATIENT
Start: 2020-05-29 | End: 2020-05-28 | Stop reason: CLARIF

## 2020-05-28 RX ORDER — METOCLOPRAMIDE HYDROCHLORIDE 5 MG/ML
5 INJECTION INTRAMUSCULAR; INTRAVENOUS EVERY 6 HOURS PRN
Status: DISCONTINUED | OUTPATIENT
Start: 2020-05-28 | End: 2020-05-29 | Stop reason: HOSPADM

## 2020-05-28 RX ORDER — ACETAMINOPHEN 325 MG/1
650 TABLET ORAL EVERY 4 HOURS PRN
Qty: 100 TABLET | Refills: 0 | Status: ON HOLD
Start: 2020-05-28 | End: 2020-11-03

## 2020-05-28 RX ORDER — NICOTINE POLACRILEX 4 MG
15-30 LOZENGE BUCCAL
Status: DISCONTINUED | OUTPATIENT
Start: 2020-05-28 | End: 2020-05-29 | Stop reason: HOSPADM

## 2020-05-28 RX ORDER — CELECOXIB 200 MG/1
200 CAPSULE ORAL ONCE
Status: COMPLETED | OUTPATIENT
Start: 2020-05-28 | End: 2020-05-28

## 2020-05-28 RX ORDER — NALOXONE HYDROCHLORIDE 0.4 MG/ML
.1-.4 INJECTION, SOLUTION INTRAMUSCULAR; INTRAVENOUS; SUBCUTANEOUS
Status: DISCONTINUED | OUTPATIENT
Start: 2020-05-28 | End: 2020-05-28 | Stop reason: HOSPADM

## 2020-05-28 RX ORDER — ACETAMINOPHEN 325 MG/1
975 TABLET ORAL ONCE
Status: COMPLETED | OUTPATIENT
Start: 2020-05-28 | End: 2020-05-28

## 2020-05-28 RX ORDER — HYDROCHLOROTHIAZIDE 12.5 MG/1
12.5 CAPSULE ORAL DAILY
Status: DISCONTINUED | OUTPATIENT
Start: 2020-05-29 | End: 2020-05-29 | Stop reason: HOSPADM

## 2020-05-28 RX ADMIN — MIDAZOLAM 2 MG: 1 INJECTION INTRAMUSCULAR; INTRAVENOUS at 13:16

## 2020-05-28 RX ADMIN — ACETAMINOPHEN 975 MG: 325 TABLET, FILM COATED ORAL at 11:51

## 2020-05-28 RX ADMIN — BUPIVACAINE HYDROCHLORIDE IN DEXTROSE 1.4 ML: 7.5 INJECTION, SOLUTION SUBARACHNOID at 13:23

## 2020-05-28 RX ADMIN — CEFAZOLIN SODIUM 2 G: 2 INJECTION, SOLUTION INTRAVENOUS at 13:30

## 2020-05-28 RX ADMIN — LIDOCAINE HYDROCHLORIDE 30 MG: 10 INJECTION, SOLUTION INFILTRATION; PERINEURAL at 13:23

## 2020-05-28 RX ADMIN — PROPOFOL 25 MCG/KG/MIN: 10 INJECTION, EMULSION INTRAVENOUS at 13:35

## 2020-05-28 RX ADMIN — ACETAMINOPHEN 975 MG: 325 TABLET, FILM COATED ORAL at 17:43

## 2020-05-28 RX ADMIN — LIDOCAINE HYDROCHLORIDE 1 ML: 10 INJECTION, SOLUTION EPIDURAL; INFILTRATION; INTRACAUDAL; PERINEURAL at 11:50

## 2020-05-28 RX ADMIN — ATORVASTATIN CALCIUM 40 MG: 20 TABLET, FILM COATED ORAL at 21:49

## 2020-05-28 RX ADMIN — ALBUTEROL SULFATE 2.5 MG: 2.5 SOLUTION RESPIRATORY (INHALATION) at 12:17

## 2020-05-28 RX ADMIN — CEFAZOLIN SODIUM 2 G: 2 INJECTION, SOLUTION INTRAVENOUS at 22:01

## 2020-05-28 RX ADMIN — TRANEXAMIC ACID 1 G: 10 INJECTION, SOLUTION INTRAVENOUS at 13:16

## 2020-05-28 RX ADMIN — OXYCODONE HYDROCHLORIDE 5 MG: 5 TABLET ORAL at 17:43

## 2020-05-28 RX ADMIN — ASPIRIN 325 MG: 325 TABLET, COATED ORAL at 17:43

## 2020-05-28 RX ADMIN — MONTELUKAST 10 MG: 10 TABLET, FILM COATED ORAL at 21:49

## 2020-05-28 RX ADMIN — GABAPENTIN 300 MG: 300 CAPSULE ORAL at 11:55

## 2020-05-28 RX ADMIN — CELECOXIB 200 MG: 200 CAPSULE ORAL at 11:55

## 2020-05-28 RX ADMIN — SODIUM CHLORIDE, POTASSIUM CHLORIDE, SODIUM LACTATE AND CALCIUM CHLORIDE: 600; 310; 30; 20 INJECTION, SOLUTION INTRAVENOUS at 17:44

## 2020-05-28 RX ADMIN — HYDROMORPHONE HYDROCHLORIDE 0.5 MG: 1 INJECTION, SOLUTION INTRAMUSCULAR; INTRAVENOUS; SUBCUTANEOUS at 20:25

## 2020-05-28 RX ADMIN — OXYCODONE HYDROCHLORIDE 10 MG: 5 TABLET ORAL at 21:49

## 2020-05-28 RX ADMIN — SODIUM CHLORIDE, POTASSIUM CHLORIDE, SODIUM LACTATE AND CALCIUM CHLORIDE: 600; 310; 30; 20 INJECTION, SOLUTION INTRAVENOUS at 13:40

## 2020-05-28 RX ADMIN — CARVEDILOL 6.25 MG: 6.25 TABLET, FILM COATED ORAL at 20:11

## 2020-05-28 RX ADMIN — DEXAMETHASONE SODIUM PHOSPHATE 4 MG: 4 INJECTION, SOLUTION INTRAMUSCULAR; INTRAVENOUS at 17:44

## 2020-05-28 RX ADMIN — ROPIVACAINE HYDROCHLORIDE 20 ML: 7.5 INJECTION, SOLUTION EPIDURAL; PERINEURAL at 15:15

## 2020-05-28 RX ADMIN — SODIUM CHLORIDE, POTASSIUM CHLORIDE, SODIUM LACTATE AND CALCIUM CHLORIDE: 600; 310; 30; 20 INJECTION, SOLUTION INTRAVENOUS at 11:49

## 2020-05-28 RX ADMIN — FENTANYL CITRATE 100 MCG: 50 INJECTION, SOLUTION INTRAMUSCULAR; INTRAVENOUS at 13:17

## 2020-05-28 SDOH — HEALTH STABILITY: MENTAL HEALTH: CURRENT SMOKER: 0

## 2020-05-28 ASSESSMENT — COPD QUESTIONNAIRES: COPD: 1

## 2020-05-28 ASSESSMENT — MIFFLIN-ST. JEOR: SCORE: 1447.07

## 2020-05-28 NOTE — PROGRESS NOTES
"[facility] [place]    Patient admitted to room 2310 at approximately 1630 via cart from surgery. Patient was accompanied by transport tech.     Verbal SBAR report received from Katie prior to patient arrival.     Patient trasferred to bed via air vicki. Patient alert and oriented X 3. The patient is not having any pain. 0-10 Pain Scale: 4. Admission vital signs: Blood pressure (!) 146/82, pulse 66, temperature 97.5  F (36.4  C), temperature source Oral, resp. rate 20, height 1.702 m (5' 7\"), weight 74.8 kg (165 lb), SpO2 96 %. Patient was oriented to plan of care, call light, bed controls, tv, telephone, bathroom and visiting hours.     Risk Assessment    The following safety risks were identified during admission: none. Yellow risk band applied: NO.     Skin Initial Assessment    This writer admitted this patient and completed a full skin assessment and Joseph score in the Adult PCS flowsheet. Appropriate interventions initiated as needed.     Secondary skin check completed by CAREY Estrada RN.         Education    Patient has a Bryant to Observation order: No  Observation education completed and documented: N/A      Leela Foss RN    "

## 2020-05-28 NOTE — OR NURSING
preop meds given. cherelle well. States breathing is ok with asthma. Lungs diminished preop.  in to see pt preop. Glucose 157

## 2020-05-28 NOTE — PLAN OF CARE
Pt resting in bed, LLE in full extension. + CMS, +2 pedal pulse. States tingling in BLE. Premedicated with 5 mg Oxycodone. VSS, afebrile. Tolerating PO food & fluids well. Dressing CDI. Call light in reach. PCD on RLE. Leela Foss RN BSN

## 2020-05-28 NOTE — ANESTHESIA PROCEDURE NOTES
Procedure note : intrathecal  Staff -         Performed By: CRNA        Pre-Procedure    Location: OR      Pre-Anesthestic Checklist: patient identified, IV checked, site marked, risks and benefits discussed, informed consent, monitors and equipment checked, pre-op evaluation, at physician/surgeon's request and post-op pain management    Timeout  Correct Patient: Yes   Correct Procedure: Yes   Correct Site: Yes   Correct Laterality: Yes   Correct Position: Yes   Site Marked: Yes   .   Procedure Documentation  ASA 3  .    Procedure: intrathecal, .   Patient Position:sitting Insertion Site:L3-4  (midline approach)     Patient Prep/Sterile Barriers; mask, sterile gloves, povidone-iodine 7.5% surgical scrub, patient draped.  .  Needle:  Spinal Needle (gauge): 25  Spinal/LP Needle Length (inches): 3.5 # of attempts: 1 and  # of redirects:  1 Introducer used Introducer: 20 G .

## 2020-05-28 NOTE — PROGRESS NOTES
"SPIRITUAL HEALTH SERVICES  [facility] - Surgery    Referral Source: Raul had requested  visit prior to TKA surgery    - Raul talked about missing his regular workouts in the Cardiac Rehab department and about the \"great group of guys\" he would spend time with every M-W-F.      - He will be needing to have the other knee replaced soon.  He reflected on his many years of using his knees well - hockey, running, standing on cement floors - and is grateful for the chance for a replacement.    - He welcomed prayer for a successful surgery    Plan:  will remain available for any ongoing support needs during LOS.    Issac Mcgee M.A., [facility]  Lead   [facility]  Office: [phone]  Cell: [phone]  Pager [phone]    "

## 2020-05-28 NOTE — PROCEDURES
05/28/20    PREOPERATIVE DIAGNOSES: Primary left knee degenerative joint disease.   POSTOPERATIVE DIAGNOSIS: Primary left knee degenerative joint disease.   PROCEDURE: Left total knee arthroplasty.   SURGEON: Sanjay Downey MD   ASSISTANT: Lida Pinedo PA-C The presence of the PA was necessary for safe progression of the case.   ANESTHESIA: Spinal with MAC.   ESTIMATED BLOOD LOSS: 25 mL.   TOURNIQUET TIME: 40 minutes at 250 mmHg.   COMPLICATIONS: None apparent.   DISPOSITION: Stable to PACU.    IMPLANTS USED: DePuy Attune size 5 posterior stabilized femoral component with a size 4 S+ tibial component, size 5 by 7mm posterior stabilized polyethylene and 38 mm patella.     INDICATIONS: Raul is a 74 year old male with a history progressive left knee pain due to end stage arthritis. Unfortunately, she failed conservative management including therapy and injections. After discussing risks, benefits and surgery, she elected to proceed with a left total knee replacement understanding the risks of infection, damage to vessels and nerves, blood clots, stiffness, ongoing pain, need for revision surgery, need for transfusion.     PROCEDURE: The patient was brought to the preoperative holding area where the left knee was marked. Consent was reviewed. She then was transferred to the operating theater. After induction of successful spinal anesthesia, she was placed supine with a bump under the left hip.  A timeout was performed, verifying correct patient, surgery, location. She received preoperative antibiotics as well as transexemic acid. The left lower extremity was then prepped and draped in standard sterile fashion.     We exsanguinated the leg and inflated the tourniquet. We then made a longitudinal incision over the anterior aspect of the knee. Dissection was carried down through skin and subcutaneous tissue. A medial parapatellar arthrotomy was made, exposing end stage medial compartment arthritis.  Synovial tissue  from the suprapatellar pouch excised. The anterior horn of the medial meniscus was released and a medial release was performed. Then, the remainder of the fat pad was excised. We turned our attention to the patella. Using a free hand technique, this was resected down to 12 mm and sized to a 38mm, then punched and a metal protector plate was placed. We then turned our attention to the femur. Preoperatively, there was a 0 degree flexion contracture.  Therefore, using an intramedullary alignment guide, 9 mm of distal femur was resected in 5 degrees of valgus.     We then turned our attention to the tibia.  An extramedullary alignment cut was used to resect 1mm off the low medial side, perpendicular to the mechanical axis.  We then turned our attention back to the distal femur and sized it to a size 5.  The epicondylar axis was established and we placed our 4-in-1 cutting block perpendicular to it, in 2 degrees of external rotation. With this in place, our anterior, posterior and chamfer distal femur cuts were made.  We then used a laminar  to open the joint in order to remove medial and lateral meniscus remnants as well as posterior osteophytes.  The jig was utilized to cut the distal femoral box for the PS component.  A variety of polyethylene were trialed, and we felt a 7mm was best, with range of motion from full extension to 135 of flexion, stability to varus and valgus stress, normal POLO test, and the patella tracked well.  After this, sized our tibial component to a 4.  We then drilled and punched our tibial component, lining it with the medial 1/3 of the tibial tubercle. The knee was thoroughly irrigated using pulse lavage. The final components were then cemented in place. All excess cement was removed and the knee was placed into full extension while the cement was curing. Also, the wound was instilled with dilute Betadine solution. Once the cement had cured, we retrialed our components with a 7mm poly  with findings as above. We then placed the final poly.  The tourniquet was deflated with hemostasis achieved.  The capsular layer was closed with #1 Stratafix, at which point there was 130 degrees of flexion with gravity.  Subcutaneous tissues with 2-0 Vicryl, skin with a 3-0 Monocryl. A sterile dressing was applied and the patient was awoken from anesthesia and transferred to PACU in stable condition.     POSTOPERATIVE PLAN:   1. Weightbearing as tolerated left lower extremity with PT for mobilization.   2. Deep venous thrombosis prophylaxis: Aspirin 325mg daily x 6 weeks.   3. Perioperative antibiotics.   4. Follow up in 2 weeks for wound check.     MYNOR ENRIQUEZ MD

## 2020-05-28 NOTE — ANESTHESIA PREPROCEDURE EVALUATION
Anesthesia Pre-Procedure Evaluation    Patient: Raul Wilhelm   MRN: [id number] : 1945          Preoperative Diagnosis: Arthritis of left knee [M17.12]    Procedure(s):  Left Total Knee Arthroplasty    Past Medical History:   Diagnosis Date     Chronic airway obstruction, not elsewhere classified      Other and unspecified hyperlipidemia      Past Surgical History:   Procedure Laterality Date     ENT SURGERY  as a child    tonsillectomy       Anesthesia Evaluation     . Pt has had prior anesthetic.            ROS/MED HX    ENT/Pulmonary:     (+)asthma COPD, , . .    Neurologic:     (+)CVA     Cardiovascular:     (+) hypertension--CAD, --. : . . . :. .       METS/Exercise Tolerance:     Hematologic:         Musculoskeletal:         GI/Hepatic:         Renal/Genitourinary:         Endo:     (+) type II DM .      Psychiatric:         Infectious Disease:         Malignancy:         Other:                          Physical Exam  Normal systems: cardiovascular, pulmonary and dental    Airway   Mallampati: I  TM distance: >3 FB  Neck ROM: full    Dental     Cardiovascular   Rhythm and rate: regular and normal      Pulmonary    breath sounds clear to auscultation            Lab Results   Component Value Date    WBC 7.8 2020    HGB 15.6 2020    HCT 47.1 2020     2020    CRP 5.5 01/10/2011    SED 1 01/10/2011     2020    POTASSIUM 4.1 2020    CHLORIDE 103 2020    CO2 31 2020    BUN 20 2020    CR 1.09 2020     (H) 2020    JOSE RAFAEL 9.1 2020    MAG 2.4 (H) 2011    ALBUMIN 3.9 2018    PROTTOTAL 7.6 2018    ALT 36 2020    AST 15 2018    ALKPHOS 61 2018    BILITOTAL 1.4 (H) 2018    LIPASE 193 2015    INR 0.87 2015    TSH 4.81 01/10/2011       Preop Vitals  BP Readings from Last 3 Encounters:   20 (!) (P) 151/88   20 138/88   20 (!) 144/73    Pulse Readings from  "Last 3 Encounters:   05/19/20 111   02/11/20 80   11/06/19 83      Resp Readings from Last 3 Encounters:   05/28/20 (P) 20   05/19/20 18   07/12/19 12    SpO2 Readings from Last 3 Encounters:   05/28/20 95%   05/19/20 100%   02/11/20 95%      Temp Readings from Last 1 Encounters:   05/28/20 (P) 36.8  C (98.2  F)    Ht Readings from Last 1 Encounters:   05/28/20 (P) 1.702 m (5' 7\")      Wt Readings from Last 1 Encounters:   05/28/20 (P) 74.8 kg (165 lb)    Estimated body mass index is 25.84 kg/m  (pended) as calculated from the following:    Height as of this encounter: (P) 1.702 m (5' 7\").    Weight as of this encounter: (P) 74.8 kg (165 lb).       Anesthesia Plan      History & Physical Review  History and physical reviewed and following examination; no interval change.    ASA Status:  3 .    NPO Status:  > 6 hours    Plan for Spinal and Peripheral Nerve Block with Intravenous induction. Maintenance will be Balanced.        The patient is not a current smoker , Patient not instructed to abstain from smoking on day of procedure and patient did not smoke on day of surgery     Postoperative Care  Postoperative pain management:  IV analgesics, Oral pain medications and Neuraxial analgesia.      Consents  Anesthetic plan, risks, benefits and alternatives discussed with:  Patient..                 OSCAR Ann CRNA  "

## 2020-05-28 NOTE — ANESTHESIA PROCEDURE NOTES
Procedure note : Adductor canal  Staff -         Performed By: CRNA        Pre-Procedure    Location:     Pre-Anesthestic Checklist: patient identified, IV checked, site marked, risks and benefits discussed, informed consent, monitors and equipment checked, pre-op evaluation, at physician/surgeon's request and post-op pain management    Timeout  Correct Patient: Yes   Correct Procedure: Yes   Correct Site: Yes   Correct Laterality: Yes   Correct Position: Yes   Site Marked: Yes   .   Procedure Documentation    .    Procedure: Adductor canal, left.     Ultrasound used to identify targeted nerve, plexus, or vascular marker and placed a needle adjacent to it.   Patient Prep/Sterile Barriers; mask, sterile gloves, chlorhexidine gluconate and isopropyl alcohol, patient draped.  .  Needle: short bevel   Needle Gauge: 22.    Needle Length (Inches) 4   Insertion Method: Single Shot.        Assessment/Narrative  Paresthesias: No.  .  The placement was negative for: blood aspirated, painful injection and site bleeding.  .

## 2020-05-29 ENCOUNTER — APPOINTMENT (OUTPATIENT)
Dept: PHYSICAL THERAPY | Facility: CLINIC | Age: 75
End: 2020-05-29
Attending: ORTHOPAEDIC SURGERY
Payer: COMMERCIAL

## 2020-05-29 VITALS
BODY MASS INDEX: 25.9 KG/M2 | HEIGHT: 67 IN | HEART RATE: 87 BPM | OXYGEN SATURATION: 92 % | SYSTOLIC BLOOD PRESSURE: 117 MMHG | WEIGHT: 165 LBS | TEMPERATURE: 96.3 F | RESPIRATION RATE: 18 BRPM | DIASTOLIC BLOOD PRESSURE: 56 MMHG

## 2020-05-29 LAB
ANION GAP SERPL CALCULATED.3IONS-SCNC: 5 MMOL/L (ref 3–14)
BUN SERPL-MCNC: 20 MG/DL (ref 7–30)
CALCIUM SERPL-MCNC: 8.4 MG/DL (ref 8.5–10.1)
CHLORIDE SERPL-SCNC: 103 MMOL/L (ref 94–109)
CO2 SERPL-SCNC: 30 MMOL/L (ref 20–32)
CREAT SERPL-MCNC: 1.05 MG/DL (ref 0.66–1.25)
GFR SERPL CREATININE-BSD FRML MDRD: 69 ML/MIN/{1.73_M2}
GLUCOSE BLDC GLUCOMTR-MCNC: 163 MG/DL (ref 70–99)
GLUCOSE BLDC GLUCOMTR-MCNC: 214 MG/DL (ref 70–99)
GLUCOSE BLDC GLUCOMTR-MCNC: 288 MG/DL (ref 70–99)
GLUCOSE SERPL-MCNC: 242 MG/DL (ref 70–99)
HGB BLD-MCNC: 13.3 G/DL (ref 13.3–17.7)
POTASSIUM SERPL-SCNC: 4.8 MMOL/L (ref 3.4–5.3)
SODIUM SERPL-SCNC: 138 MMOL/L (ref 133–144)

## 2020-05-29 PROCEDURE — 97110 THERAPEUTIC EXERCISES: CPT | Mod: GP | Performed by: PHYSICAL THERAPIST

## 2020-05-29 PROCEDURE — 25000131 ZZH RX MED GY IP 250 OP 636 PS 637: Performed by: PHYSICIAN ASSISTANT

## 2020-05-29 PROCEDURE — 82962 GLUCOSE BLOOD TEST: CPT

## 2020-05-29 PROCEDURE — 25800030 ZZH RX IP 258 OP 636: Performed by: ORTHOPAEDIC SURGERY

## 2020-05-29 PROCEDURE — 25000128 H RX IP 250 OP 636: Performed by: ORTHOPAEDIC SURGERY

## 2020-05-29 PROCEDURE — 25000132 ZZH RX MED GY IP 250 OP 250 PS 637: Performed by: PHYSICIAN ASSISTANT

## 2020-05-29 PROCEDURE — 80048 BASIC METABOLIC PNL TOTAL CA: CPT | Performed by: ORTHOPAEDIC SURGERY

## 2020-05-29 PROCEDURE — 85018 HEMOGLOBIN: CPT | Performed by: ORTHOPAEDIC SURGERY

## 2020-05-29 PROCEDURE — 25000132 ZZH RX MED GY IP 250 OP 250 PS 637: Performed by: ORTHOPAEDIC SURGERY

## 2020-05-29 PROCEDURE — 36415 COLL VENOUS BLD VENIPUNCTURE: CPT | Performed by: ORTHOPAEDIC SURGERY

## 2020-05-29 PROCEDURE — 97161 PT EVAL LOW COMPLEX 20 MIN: CPT | Mod: GP | Performed by: PHYSICAL THERAPIST

## 2020-05-29 PROCEDURE — 97116 GAIT TRAINING THERAPY: CPT | Mod: GP | Performed by: PHYSICAL THERAPIST

## 2020-05-29 PROCEDURE — 99214 OFFICE O/P EST MOD 30 MIN: CPT | Performed by: PHYSICIAN ASSISTANT

## 2020-05-29 RX ADMIN — OXYCODONE HYDROCHLORIDE 10 MG: 5 TABLET ORAL at 00:39

## 2020-05-29 RX ADMIN — ACETAMINOPHEN 975 MG: 325 TABLET, FILM COATED ORAL at 08:16

## 2020-05-29 RX ADMIN — ACETAMINOPHEN 975 MG: 325 TABLET, FILM COATED ORAL at 00:39

## 2020-05-29 RX ADMIN — CARVEDILOL 6.25 MG: 6.25 TABLET, FILM COATED ORAL at 08:16

## 2020-05-29 RX ADMIN — SODIUM CHLORIDE, POTASSIUM CHLORIDE, SODIUM LACTATE AND CALCIUM CHLORIDE: 600; 310; 30; 20 INJECTION, SOLUTION INTRAVENOUS at 05:50

## 2020-05-29 RX ADMIN — ACETAMINOPHEN 975 MG: 325 TABLET, FILM COATED ORAL at 16:14

## 2020-05-29 RX ADMIN — LOSARTAN POTASSIUM 50 MG: 50 TABLET, FILM COATED ORAL at 08:16

## 2020-05-29 RX ADMIN — INSULIN ASPART 2 UNITS: 100 INJECTION, SOLUTION INTRAVENOUS; SUBCUTANEOUS at 12:24

## 2020-05-29 RX ADMIN — HYDROCHLOROTHIAZIDE 12.5 MG: 12.5 CAPSULE ORAL at 08:16

## 2020-05-29 RX ADMIN — CEFAZOLIN SODIUM 2 G: 2 INJECTION, SOLUTION INTRAVENOUS at 05:33

## 2020-05-29 RX ADMIN — ASPIRIN 325 MG: 325 TABLET, COATED ORAL at 08:16

## 2020-05-29 RX ADMIN — OXYCODONE HYDROCHLORIDE 10 MG: 5 TABLET ORAL at 10:25

## 2020-05-29 RX ADMIN — INSULIN ASPART 1 UNITS: 100 INJECTION, SOLUTION INTRAVENOUS; SUBCUTANEOUS at 08:17

## 2020-05-29 RX ADMIN — OXYCODONE HYDROCHLORIDE 10 MG: 5 TABLET ORAL at 05:33

## 2020-05-29 RX ADMIN — OXYCODONE HYDROCHLORIDE 10 MG: 5 TABLET ORAL at 14:55

## 2020-05-29 RX ADMIN — OMEPRAZOLE 20 MG: 20 CAPSULE, DELAYED RELEASE ORAL at 08:16

## 2020-05-29 NOTE — PLAN OF CARE
"A&O. Up with SBA and walker to bathroom. Urinal at bedside as well. Regular diet tolerated well with no reports of N&V. IV saline locked. 10 mg Oxy PRN x 2 today for pain management, which has improved. Ice in place.  & 214 today. ACE wrap is clean, dry, and intact. CMS remains intact and unchanged. O2 remains stable on RA. Patient likely to discharge today after Dr. Downey follows up this afternoon. /56 (BP Location: Right arm)   Pulse 87   Temp 96.3  F (35.7  C) (Oral)   Resp 18   Ht 1.702 m (5' 7\")   Wt 74.8 kg (165 lb)   SpO2 92%   BMI 25.84 kg/m  .      Faye Lopez RN on 5/29/2020 at 2:37 PM    "

## 2020-05-29 NOTE — PLAN OF CARE
Discharge Planner PT   Patient plan for discharge: home  Current status: SBA for sit > supine, CGA for sit <> stand, ambulated 60 feet with FWW and CGA, ascend/descend 3 steps x 4 times with CGA, completed LE strengthening exercises.   Barriers to return to prior living situation: medical status  Recommendations for discharge: home with OP PT  Rationale for recommendations: continued PT for improving strength and ROM, able to complete stairs in order to return home, wife at home to help a little bit as needed.        Entered by: Jenniffer Villanueva 05/29/2020 10:03 AM

## 2020-05-29 NOTE — PROGRESS NOTES
05/29/20 0800   Quick Adds   Type of Visit Initial PT Evaluation   Living Environment   Lives With spouse   Living Arrangements house   Home Accessibility stairs to enter home   Number of Stairs, Main Entrance 4   Stair Railings, Main Entrance railing on left side (ascending)   Transportation Anticipated family or friend will provide   Living Environment Comment grand daughter is planning to come stay with him during the day. Worried his wife may not be able to help much.    Self-Care   Regular Exercise No   Equipment Currently Used at Home none   Activity/Exercise/Self-Care Comment has a walker at home   Functional Level Prior   Ambulation 0-->independent   Transferring 0-->independent   Toileting 0-->independent   Bathing 0-->independent   Communication 0-->understands/communicates without difficulty   Swallowing 0-->swallows foods/liquids without difficulty   Cognition 0 - no cognition issues reported   Fall history within last six months no   Which of the above functional risks had a recent onset or change? ambulation   General Information   Onset of Illness/Injury or Date of Surgery - Date 05/28/20   Referring Physician Sanjay Downey   Patient/Family Goals Statement to go home    Pertinent History of Current Problem (include personal factors and/or comorbidities that impact the POC) L TKA   Precautions/Limitations no known precautions/limitations   General Info Comments WBAT, ambulate with assist   Cognitive Status Examination   Orientation orientation to person, place and time   Level of Consciousness alert   Follows Commands and Answers Questions 100% of the time   Personal Safety and Judgment intact   Memory intact   Integumentary/Edema   Integumentary/Edema Comments aCe bandage to L leg   Range of Motion (ROM)   ROM Comment knee flexion in supine: 50 degrees, knee extension supine 3 degrees from 0, hip flexion WNL B, knee flex/ext normal on righ t   Strength   Strength Comments hip flexion R 4+/5, L  "3+/5, knee flexion R 4+/5 L 3+/5,  knee extension R 4+/5, L 3+/5    Bed Mobility   Bed Mobility Comments sit > supine Mod Indp    Transfer Skills   Transfer Comments sit <> stand CGA   Gait   Gait Comments ambulated 60 feet with FWW and CGA   Balance   Balance Comments able to stand with no UE support for short period of time   Sensory Examination   Sensory Perception Comments in tact to light touch B LE    General Therapy Interventions   Planned Therapy Interventions balance training;bed mobility training;gait training;neuromuscular re-education;ROM;strengthening;stretching;transfer training   Clinical Impression   Criteria for Skilled Therapeutic Intervention yes, treatment indicated   PT Diagnosis decreased strength s/p L TKA   Influenced by the following impairments weakness, pain, swelling, decreased knee ROM   Functional limitations due to impairments bed mobility, transfers, gait, stairs   Clinical Presentation Stable/Uncomplicated   Clinical Presentation Rationale clinical decision making and chart review   Clinical Decision Making (Complexity) Low complexity   Therapy Frequency 2x/day   Predicted Duration of Therapy Intervention (days/wks) 2 days   Anticipated Discharge Disposition Home with Outpatient Therapy   Risk & Benefits of therapy have been explained Yes   Patient, Family & other staff in agreement with plan of care Yes   [facility] [employer] TM \"6 Clicks\"   2016, Trustees of [facility], under license to [employer].  All rights reserved.   6 Clicks Short Forms Basic Mobility Inpatient Short Form   [facility] AM-PAC  \"6 Clicks\" V.2 Basic Mobility Inpatient Short Form   1. Turning from your back to your side while in a flat bed without using bedrails? 4 - None   2. Moving from lying on your back to sitting on the side of a flat bed without using bedrails? 4 - None   3. Moving to and from a bed to a chair (including a wheelchair)? 4 - None   4. Standing up from a chair using your " arms (e.g., wheelchair, or bedside chair)? 4 - None   5. To walk in hospital room? 4 - None   6. Climbing 3-5 steps with a railing? 4 - None   Basic Mobility Raw Score (Score out of 24.Lower scores equate to lower levels of function) 24   Total Evaluation Time   Total Evaluation Time (Minutes) 8       Jenniffer Villanueva  PT, DPT       5/29/2020   [address] [address]  [email or web address]  Voicemail: [phone]

## 2020-05-29 NOTE — PROGRESS NOTES
Pt is a/ox3. Pt has dangled and stood at bedside. No numbness or tingling. CMS intact. +2 pulses. 10 oxy for pain. LR 75. Rocephin abx. Reg diet, eating/drinking fine. VSS, on 0.5 LPM NC.

## 2020-05-29 NOTE — PROGRESS NOTES
Ortho    No acute events overnight.  Pain well controlled  Urinating on his own  Up and moving around room  Worried about his wife's ability to care for him at home    AFVSS  Pleasant, NAD  Nonlabored breathing  Left LE: Dressing cdi.  Fires ehl/fhl/gsc/ta c SILT dp/sp/tib n.  Toes warm    IMPRESSION:   1.  s/p left TKA 5.29.20    PLAN:   --WBAT left leg.  PT for mobilization   --DVT prophylaxis: ASA 325mg daily x 42d   --Cont ASA 325mg daily for pain control    --Dispo: Home today vs tomorrow pending pain control, progress with PT.  Will check back later this afternoon    Sanjay Downey MD

## 2020-05-29 NOTE — ANESTHESIA POSTPROCEDURE EVALUATION
Patient: Raul Wilhelm    Procedure(s):  Left Total Knee Arthroplasty    Diagnosis:Arthritis of left knee [M17.12]  Diagnosis Additional Information: No value filed.    Anesthesia Type:  Spinal, Peripheral Nerve Block    Note:  Anesthesia Post Evaluation    Patient location during evaluation: Bedside  Patient participation: Able to fully participate in evaluation  Level of consciousness: awake and alert  Pain management: adequate  Airway patency: patent  Cardiovascular status: acceptable  Respiratory status: acceptable  Hydration status: acceptable  PONV: none     Anesthetic complications: None          Last vitals:  Vitals:    05/28/20 1736 05/28/20 1832 05/28/20 1919   BP: (!) 153/87 (!) 146/88 (!) 140/82   Pulse: 68 74 75   Resp: 18 18 18   Temp: 36.8  C (98.3  F) 36.4  C (97.5  F) 36.4  C (97.5  F)   SpO2: 95% 91% 92%         Electronically Signed By: OSCAR Moreno CRNA  May 28, 2020  7:29 PM

## 2020-05-29 NOTE — PROGRESS NOTES
Writer updated Dr. Downey and informed him that patient was ready to discharge. He said that was ok for patient to discharge this afternoon.

## 2020-05-29 NOTE — PROGRESS NOTES
[facility] Medicine Progress Note  Date of Service: 05/29/2020    Assessment & Plan   Raul Wilhelm is a 74 year old male who presented on 5/28/2020 for scheduled Procedure(s):  Left Total Knee Arthroplasty by Sanjay Downey MD and is being followed by the hospital medicine service for co-management of acute and/or chronic perioperative medical problems.    S/p Procedure(s):  Left Total Knee Arthroplasty   1 Day Post-Op   Hg 13.3.  Pain well managed.    - pain control, wound cares, physical therapy, occupational therapy and DVT prophylaxis per orthopedic surgery service    Coronary Artery Disease  Previously diagnosed, no current concerning symptoms. Follows with cardiology. History of STEMI 2015, underwent stenting to the PDA and mid-circumflex at that time with residual disease known in the mid-LAD (80%) and first obtuse marginal (70%).   - continue home statin, beta blocker, ARB  - hold home ASA while on full dose per ortho then resume 81 mg daily    Diabetes Mellitus, Type II  Chronic.  Last a1C 8.7% on 5/2020. He is not currently on medications for this, states he was recently given a glucometer but is unsure what to do with it or what the plan is for his management of this. Patient is somewhat resistant to discussing further at present states he is wanting to get past this surgery first before learning the glucometer or starting another medication.  - recommended patient call clinic to discuss management and glucometer    Hypertension  Chronic. Blood pressures reviewed, stable.   - continue home losartan-hydrochlorothiazide, carvedilol    History of arterial ischemic stroke  Hyperlipidemia  Chronic.  - continue home atorvastatin  - hold home aspirin while on full dose per ortho then resume daily 81 mg    Severe persistent asthma without complication / COPD   Hypersensitivity pneumonitis  Last PFTs in 2009. Unable to use control inhalers. Managed primarily on  albuterol nebulizer.  - continue home albuterol nebulizer.     CKD (chronic kidney disease) stage 3, GFR 30-59 ml/min (H)  Creatinine 1.05, GFR 69. Stable.    DVT Prophylaxis: as per orthopedic surgery service - aspirin 325 mg daily.  Code Status: Full Code    Lines: peripheral   Diaz catheter: none    Discussion: Medically, the patient appears to be recovering appropriately since surgery, wants to discharge home. Vital signs stable. no medical barriers to discharge at this time.    Disposition: Anticipate discharge today, per ortho     Attestation:  I have reviewed today's vital signs, notes, medications, labs and imaging.  Total time: 15 minutes    Supervising physician Dr. Kishan Trinidad. Plan as above.    NANCY BootheC   [facility] Medicine    Interval History   Patient was seen this afternoon he is dressed and hoping to leave. Pain well managed. Participated in PT without concerns. Good appetite. Passing Gas. no BM. Voiding regularly.    Denies headache, lightheadedness, dizziness, fever, chills, chest pain, palpitations, shortness of breath, cough, abdominal pain, nausea, vomiting, numbness or tingling in bilateral lower extremities.     Physical Exam   Temp:  [96.3  F (35.7  C)-98.3  F (36.8  C)] 96.3  F (35.7  C)  Pulse:  [65-87] 87  Heart Rate:  [65] 65  Resp:  [16-20] 18  BP: (117-153)/(56-95) 117/56  SpO2:  [91 %-96 %] 92 %    Weights:   Vitals:    05/28/20 1103   Weight: 74.8 kg (165 lb)    Body mass index is 25.84 kg/m .    General: Appears well, sitting up in bed dressed. Alert and oriented. Pleasant and cooperative. No distress. Non-toxic. Appears stated age.   CV: Regular rate, normal rhythm. Radial pulses are 2+ bilaterally. Pedal pulses are 2+. No lower extremity edema.  Respiratory: No accessory muscle usage. Lungs tight with scattered wheeze. No crackles or rhonchi.   GI: Bowel sounds normoactive in all quadrants. Soft, non-tender, non-distended.  Skin: Warm, dry, intact. Did not assess  incision directly.  Musculoskeletal: Muscle tone is appropriate. Moves all extremiteis freely with limited range of motion left lower extremity due to pain and bandage.  Neuro: Sensation to light touch of bilateral lower extremities is grossly intact.     Data   Recent Labs   Lab 05/29/20  0458   HGB 13.3      POTASSIUM 4.8   CHLORIDE 103   CO2 30   BUN 20   CR 1.05   ANIONGAP 5   JOSE RAFAEL 8.4*   *     Recent Labs   Lab 05/29/20  1143 05/29/20  0815 05/29/20  0458 05/29/20  0239 05/28/20  2107 05/28/20  1717 05/28/20  1500   GLC  --   --  242*  --   --   --   --    * 163*  --  288* 262* 167* 144*      Unresulted Labs Ordered in the Past 30 Days of this Admission     No orders found for last 31 day(s).         Imaging  Recent Results (from the past 24 hour(s))   XR Knee Port Left 1/2 Views    Narrative    XR PORTABLE LEFT KNEE ONE-TWO VIEWS   5/28/2020 3:31 PM     HISTORY: Postoperative total knee.    COMPARISON: 9/13/2017.      Impression    IMPRESSION: Left total knee arthroplasty in place. Typical soft tissue  air seen anteriorly in this postoperative patient. No evidence of  complication.    GARIMA MCNEAL MD      I reviewed all new labs and imaging results over the last 24 hours. I personally reviewed no images or EKG's today.    Medications     lactated ringers 75 mL/hr at 05/29/20 0550       acetaminophen  975 mg Oral Q8H     aspirin  325 mg Oral Daily     atorvastatin  40 mg Oral At Bedtime     carvedilol  6.25 mg Oral BID     losartan  50 mg Oral Daily    And     hydrochlorothiazide  12.5 mg Oral Daily     insulin aspart  1-7 Units Subcutaneous TID AC     insulin aspart  1-5 Units Subcutaneous At Bedtime     montelukast  10 mg Oral At Bedtime     omeprazole  20 mg Oral Daily     sodium chloride (PF)  3 mL Intracatheter Q8H

## 2020-05-29 NOTE — PLAN OF CARE
[facility] DISCHARGE NOTE    Patient discharged to home at 5:00 PM via wheel chair. Accompanied by staff, wife met patient at main entrance for discharge. Discharge instructions reviewed with patient, opportunity offered to ask questions. Prescriptions sent to pharmacy to be filled upon discharge. All belongings sent with patient.    Tere Mensah RN

## 2020-05-29 NOTE — PLAN OF CARE
Physical Therapy Discharge Summary    Reason for therapy discharge:    Discharged to home with outpatient therapy.    Progress towards therapy goal(s). See goals on Care Plan in [facility] electronic health record for goal details.  Goals partially met.  Barriers to achieving goals:   discharge from facility.    Therapy recommendation(s):    Continued therapy is recommended.  Rationale/Recommendations:  improving strength, ROM and mobility in order for patient to return to previous level of function following TKA.

## 2020-06-02 ENCOUNTER — TELEPHONE (OUTPATIENT)
Dept: INTERNAL MEDICINE | Facility: CLINIC | Age: 75
End: 2020-06-02

## 2020-06-02 NOTE — TELEPHONE ENCOUNTER
Discharge Referral Non Admitted/Order    Signed, faxed to [phone] and sent to [employer]. Gisela Jackson on 6/2/2020 at 11:43 AM

## 2020-06-11 ENCOUNTER — TRANSFERRED RECORDS (OUTPATIENT)
Dept: HEALTH INFORMATION MANAGEMENT | Facility: CLINIC | Age: 75
End: 2020-06-11

## 2020-06-15 ENCOUNTER — HOSPITAL ENCOUNTER (OUTPATIENT)
Dept: PHYSICAL THERAPY | Facility: CLINIC | Age: 75
Setting detail: THERAPIES SERIES
End: 2020-06-15
Attending: ORTHOPAEDIC SURGERY
Payer: COMMERCIAL

## 2020-06-15 PROCEDURE — 97110 THERAPEUTIC EXERCISES: CPT | Mod: GP | Performed by: PHYSICAL THERAPIST

## 2020-06-15 PROCEDURE — 97161 PT EVAL LOW COMPLEX 20 MIN: CPT | Mod: GP | Performed by: PHYSICAL THERAPIST

## 2020-06-15 NOTE — PROGRESS NOTES
"   Physical Therapy Evaluation  06/15/20 1300   General Information   Type of Visit Initial OP Ortho PT Evaluation   Start of Care Date 06/15/20   Referring Physician Dr. Sanjay Downey   Patient/Family Goals Statement \"to be able to walk a couple miles\"   Orders Evaluate and Treat   Orders Comment Knee ROM, quad strengthening and gait training with modalities as indicated   Date of Order 02/24/20   Certification Required? No   Medical Diagnosis S/P L TKA   Surgical/Medical history reviewed Yes   General Information Comments s/p L TKA 5/28/2020   Body Part(s)   Body Part(s) Knee   Presentation and Etiology   Pertinent history of current problem (include personal factors and/or comorbidities that impact the POC) Pt had L TKA on 5/28/2020.  Has been doing minimal exercises since at the hospital.  Wants to be able to garden.   Impairments A. Pain;C. Swelling;E. Decreased flexibility   Functional Limitations perform activities of daily living;perform required work activities;perform desired leisure / sports activities   Symptom Location L knee   How/Where did it occur Other  (Surgical)   Onset date of current episode/exacerbation 05/28/20   Chronicity New   Pain rating (0-10 point scale) Best (/10);Worst (/10)   Best (/10) 1   Worst (/10) 4   Pain quality B. Dull;C. Aching   Frequency of pain/symptoms C. With activity   Pain/symptoms are: The same all the time   Pain/symptoms exacerbated by B. Walking;C. Lifting;D. Carrying   Pain/symptoms eased by A. Sitting;C. Rest;H. Cold   Progression of symptoms since onset: Improved   Current Level of Function   Patient role/employment history A. Employed   Employment Comments Part time   [facility]   Home/community accessibility 3 JUSTA: 13 stairs to bedroom   Current equipment-Gait/Locomotion Walker   Current equipment-ADL Wall grab bar   Fall Risk Screen   Fall screen completed by PT   Have you fallen 2 or more times in the past year? No   Have you fallen " and had an injury in the past year? No   Is patient a fall risk? No   Abuse Screen (yes response referral indicated)   Feels Unsafe at Home or Work/School no   Feels Threatened by Someone no   Does Anyone Try to Keep You From Having Contact with Others or Doing Things Outside Your Home? no   Physical Signs of Abuse Present no   Knee Objective Findings   Side (if bilateral, select both right and left) Left   Integumentary  edema in L knee, minimal edema in L LE and ankle   Gait/Locomotion decreased knee flexion bilaterally, increased lateral shifting, decreased step length, ER feet   Knee Special Test Comments not assessed   Accessory Motion/Joint Mobility patellofemoral and tib-fib hypomobile L   Left Knee Extension AROM L: lacking 13*; R: 0*   Left Knee Extension PROM L lacking 10*   Left Knee Flexion AROM L: 125*; R: 135*   Left Knee Flexion Strength L: 4/5; R: 5/5   Left Knee Extension Strength L: 4-/5; R: 4+/5   Left Hip Abduction Strength 4/5 B   Left Quad Set Strength L fair   L VMO Strength able to perform 10 SLR with minimal extensor lag   Left Gastrocnemius Flexibility restricted L > R   Left Hamstring Flexibility restricted L   Planned Therapy Interventions   Planned Therapy Interventions balance training;gait training;joint mobilization;manual therapy;neuromuscular re-education;ROM;strengthening;stretching   Planned Modality Interventions   Planned Modality Interventions Cryotherapy;Electrical stimulation;Hot packs;TENS;Ultrasound   Planned Modality Interventions Comments only as needed   Clinical Impression   Criteria for Skilled Therapeutic Interventions Met yes, treatment indicated   PT Diagnosis L knee pain s/p L TKA   Influenced by the following impairments decreased ROM, weakness, decreased balance   Functional limitations due to impairments difficulty with ambulation, stairs, floor transfers, and functional mobility   Clinical Presentation Stable/Uncomplicated   Clinical Presentation Rationale (+)  motivation   Clinical Decision Making (Complexity) Low complexity   Therapy Frequency 2 times/Week   Predicted Duration of Therapy Intervention (days/wks) 10 weeks   Risk & Benefits of therapy have been explained Yes   Patient, Family & other staff in agreement with plan of care Yes   Education Assessment   Preferred Learning Style Listening;Demonstration   Barriers to Learning No barriers   ORTHO GOALS   PT Ortho Eval Goals 1;2;3   Ortho Goal 1   Goal Identifier Knee extension ROM   Goal Description Pt will demonstrate lacking 0-2* knee extension in order to improve gait mechanics.   Target Date 07/27/20   Ortho Goal 2   Goal Identifier Stairs   Goal Description Pt will tolerate ambulate a flight of stairs with step over pattern.   Target Date 08/10/20   Ortho Goal 3   Goal Identifier Walking   Goal Description Pt will tolerate walking 1 mile without an increase in symptoms in order to perform tasks around the house and at work.   Target Date 08/24/20   Total Evaluation Time   PT Eval, Low Complexity Minutes (32772) 25   Please contact me with any questions or concerns.    Thank you for your referral,     Avis Jackson, PT, DPT, CLT  Physical Therapist & Certified Lymphedema Therapist  [address] [address]  [phone]

## 2020-06-18 ENCOUNTER — HOSPITAL ENCOUNTER (OUTPATIENT)
Dept: PHYSICAL THERAPY | Facility: CLINIC | Age: 75
Setting detail: THERAPIES SERIES
End: 2020-06-18
Attending: ORTHOPAEDIC SURGERY
Payer: COMMERCIAL

## 2020-06-18 PROCEDURE — 97110 THERAPEUTIC EXERCISES: CPT | Mod: GP | Performed by: PHYSICAL THERAPIST

## 2020-06-22 ENCOUNTER — HOSPITAL ENCOUNTER (OUTPATIENT)
Dept: PHYSICAL THERAPY | Facility: CLINIC | Age: 75
Setting detail: THERAPIES SERIES
End: 2020-06-22
Attending: ORTHOPAEDIC SURGERY
Payer: COMMERCIAL

## 2020-06-22 PROCEDURE — 97110 THERAPEUTIC EXERCISES: CPT | Mod: GP | Performed by: PHYSICAL THERAPIST

## 2020-06-22 PROCEDURE — 97116 GAIT TRAINING THERAPY: CPT | Mod: GP | Performed by: PHYSICAL THERAPIST

## 2020-06-25 ENCOUNTER — HOSPITAL ENCOUNTER (OUTPATIENT)
Dept: PHYSICAL THERAPY | Facility: CLINIC | Age: 75
Setting detail: THERAPIES SERIES
End: 2020-06-25
Attending: ORTHOPAEDIC SURGERY
Payer: COMMERCIAL

## 2020-06-25 PROCEDURE — 97110 THERAPEUTIC EXERCISES: CPT | Mod: GP | Performed by: PHYSICAL THERAPIST

## 2020-06-25 PROCEDURE — 97116 GAIT TRAINING THERAPY: CPT | Mod: GP | Performed by: PHYSICAL THERAPIST

## 2020-06-29 ENCOUNTER — HOSPITAL ENCOUNTER (OUTPATIENT)
Dept: PHYSICAL THERAPY | Facility: CLINIC | Age: 75
Setting detail: THERAPIES SERIES
End: 2020-06-29
Attending: ORTHOPAEDIC SURGERY
Payer: COMMERCIAL

## 2020-06-29 PROCEDURE — 97110 THERAPEUTIC EXERCISES: CPT | Mod: GP | Performed by: PHYSICAL THERAPIST

## 2020-07-02 ENCOUNTER — HOSPITAL ENCOUNTER (OUTPATIENT)
Dept: PHYSICAL THERAPY | Facility: CLINIC | Age: 75
Setting detail: THERAPIES SERIES
End: 2020-07-02
Attending: ORTHOPAEDIC SURGERY
Payer: COMMERCIAL

## 2020-07-02 PROCEDURE — 97110 THERAPEUTIC EXERCISES: CPT | Mod: GP | Performed by: PHYSICAL THERAPIST

## 2020-07-06 ENCOUNTER — HOSPITAL ENCOUNTER (OUTPATIENT)
Dept: PHYSICAL THERAPY | Facility: CLINIC | Age: 75
Setting detail: THERAPIES SERIES
End: 2020-07-06
Attending: ORTHOPAEDIC SURGERY
Payer: COMMERCIAL

## 2020-07-06 PROCEDURE — 97110 THERAPEUTIC EXERCISES: CPT | Mod: GP | Performed by: PHYSICAL THERAPIST

## 2020-07-08 ENCOUNTER — HOSPITAL ENCOUNTER (OUTPATIENT)
Dept: PHYSICAL THERAPY | Facility: CLINIC | Age: 75
Setting detail: THERAPIES SERIES
End: 2020-07-08
Attending: ORTHOPAEDIC SURGERY
Payer: COMMERCIAL

## 2020-07-08 PROCEDURE — 97110 THERAPEUTIC EXERCISES: CPT | Mod: GP | Performed by: PHYSICAL THERAPIST

## 2020-07-08 PROCEDURE — 97140 MANUAL THERAPY 1/> REGIONS: CPT | Mod: GP | Performed by: PHYSICAL THERAPIST

## 2020-07-08 NOTE — PROGRESS NOTES
Outpatient Physical Therapy Progress Note     Patient: Raul Wilhelm  : 1945    Beginning/End Dates of Reporting Period:  6/15/2020 to 2020    Referring Provider: Dr. Sanjay Downey    Therapy Diagnosis: s/p L TKA     Client Self Report: Reports a lot of soreness following last session that lasted 2 days.  Feels better today.    Objective Measurements:  Objective Measure: L knee ROM  Details: 0-3-129    Goals:  Goal Identifier Knee extension ROM (lacking 3-6*)   Goal Description Pt will demonstrate lacking 0-2* knee extension in order to improve gait mechanics.   Target Date 20   Date Met      Progress:     Goal Identifier Stairs (circumducts LE)   Goal Description Pt will tolerate ambulate a flight of stairs with step over pattern.   Target Date 08/10/20   Date Met      Progress:     Goal Identifier Walking   Goal Description Pt will tolerate walking 1 mile without an increase in symptoms in order to perform tasks around the house and at work.   Target Date 20   Date Met      Progress:     Progress Toward Goals:   Progress this reporting period: Pt continues to demonstrate decreased knee extension, lacking 3 degrees.  Pt has good knee flexion ROM.  Also demonstrates decreased quad strength.    Plan:  Continue therapy per current plan of care.    Discharge:  No    Please contact me with any questions or concerns.    Thank you for your referral,     Avis Jackson, PT, DPT, CLT  Physical Therapist & Certified Lymphedema Therapist  [address] [address]  [phone]

## 2020-07-09 ENCOUNTER — TRANSFERRED RECORDS (OUTPATIENT)
Dept: HEALTH INFORMATION MANAGEMENT | Facility: CLINIC | Age: 75
End: 2020-07-09

## 2020-07-13 ENCOUNTER — HOSPITAL ENCOUNTER (OUTPATIENT)
Dept: PHYSICAL THERAPY | Facility: CLINIC | Age: 75
Setting detail: THERAPIES SERIES
End: 2020-07-13
Attending: ORTHOPAEDIC SURGERY
Payer: COMMERCIAL

## 2020-07-13 PROCEDURE — 97110 THERAPEUTIC EXERCISES: CPT | Mod: GP | Performed by: PHYSICAL THERAPIST

## 2020-07-16 ENCOUNTER — HOSPITAL ENCOUNTER (OUTPATIENT)
Dept: PHYSICAL THERAPY | Facility: CLINIC | Age: 75
Setting detail: THERAPIES SERIES
End: 2020-07-16
Attending: ORTHOPAEDIC SURGERY
Payer: COMMERCIAL

## 2020-07-16 PROCEDURE — 97110 THERAPEUTIC EXERCISES: CPT | Mod: GP | Performed by: PHYSICAL THERAPIST

## 2020-07-16 NOTE — PROGRESS NOTES
"Outpatient Physical Therapy Progress Note     Patient: Raul Wilhelm  : 1945    Beginning/End Dates of Reporting Period:  6/15/20 to 2020    Referring Provider: Dr Downey    Therapy Diagnosis: L knee pain s/p TKA     Client Self Report: Stiff in the mornings still. and stairs are hard. But otherwise doing well.     Objective Measurements:  Objective Measure: L knee ROM  Details: 0-4-130  Objective Measure: Strength  Details: Strength knee extension 4+/5, knee flexion 5/5          Goals:  Goal Identifier Knee extension ROM (lacking 3-6*)   Goal Description Pt will demonstrate lacking 0-2* knee extension in order to improve gait mechanics.   Target Date 20   Date Met  (4*)   Progress:     Goal Identifier Stairs    Goal Description Pt will tolerate ambulate a flight of stairs with step over pattern.   Target Date 08/10/20   Date Met  (circumducts LE, able to perform to 4\" step, hard for 6\" step)   Progress:     Goal Identifier Walking   Goal Description Pt will tolerate walking 1 mile without an increase in symptoms in order to perform tasks around the house and at work.   Target Date 20   Date Met  (feels could walk 2-3 blocks)   Progress:         Progress Toward Goals:   Progress this reporting period: Pt has completed x 10 sessions s/p TKA. Is demonstrating good gains in knee flexion, fair in knee extension and good strength gains. Is demonstrating progress toward goals, however appropriate for intervention to obtain terminal knee extension as well as improve strength/endurance to return to PLOF.           Plan:  Continue therapy per current plan of care .Will decrease to 1x/week frequency. Anticipate 4 more weeks.     Discharge:  No  "

## 2020-07-20 ENCOUNTER — HOSPITAL ENCOUNTER (OUTPATIENT)
Dept: PHYSICAL THERAPY | Facility: CLINIC | Age: 75
Setting detail: THERAPIES SERIES
End: 2020-07-20
Attending: ORTHOPAEDIC SURGERY
Payer: COMMERCIAL

## 2020-07-20 PROCEDURE — 97110 THERAPEUTIC EXERCISES: CPT | Mod: GP | Performed by: PHYSICAL THERAPIST

## 2020-07-23 ENCOUNTER — HOSPITAL ENCOUNTER (OUTPATIENT)
Dept: PHYSICAL THERAPY | Facility: CLINIC | Age: 75
Setting detail: THERAPIES SERIES
End: 2020-07-23
Attending: ORTHOPAEDIC SURGERY
Payer: COMMERCIAL

## 2020-07-23 PROCEDURE — 97110 THERAPEUTIC EXERCISES: CPT | Mod: GP | Performed by: PHYSICAL THERAPIST

## 2020-07-23 PROCEDURE — 97112 NEUROMUSCULAR REEDUCATION: CPT | Mod: GP | Performed by: PHYSICAL THERAPIST

## 2020-07-29 ENCOUNTER — HOSPITAL ENCOUNTER (OUTPATIENT)
Dept: PHYSICAL THERAPY | Facility: CLINIC | Age: 75
Setting detail: THERAPIES SERIES
End: 2020-07-29
Attending: ORTHOPAEDIC SURGERY
Payer: COMMERCIAL

## 2020-07-29 PROCEDURE — 97110 THERAPEUTIC EXERCISES: CPT | Mod: GP | Performed by: PHYSICAL THERAPIST

## 2020-07-29 PROCEDURE — 97112 NEUROMUSCULAR REEDUCATION: CPT | Mod: GP | Performed by: PHYSICAL THERAPIST

## 2020-08-05 ENCOUNTER — HOSPITAL ENCOUNTER (OUTPATIENT)
Dept: PHYSICAL THERAPY | Facility: CLINIC | Age: 75
Setting detail: THERAPIES SERIES
End: 2020-08-05
Attending: ORTHOPAEDIC SURGERY
Payer: COMMERCIAL

## 2020-08-05 PROCEDURE — 97112 NEUROMUSCULAR REEDUCATION: CPT | Mod: GP | Performed by: PHYSICAL THERAPIST

## 2020-08-05 PROCEDURE — 97110 THERAPEUTIC EXERCISES: CPT | Mod: GP | Performed by: PHYSICAL THERAPIST

## 2020-08-12 ENCOUNTER — HOSPITAL ENCOUNTER (OUTPATIENT)
Dept: PHYSICAL THERAPY | Facility: CLINIC | Age: 75
Setting detail: THERAPIES SERIES
End: 2020-08-12
Attending: ORTHOPAEDIC SURGERY
Payer: COMMERCIAL

## 2020-08-12 PROCEDURE — 97110 THERAPEUTIC EXERCISES: CPT | Mod: GP | Performed by: PHYSICAL THERAPIST

## 2020-08-12 NOTE — PROGRESS NOTES
Outpatient Physical Therapy Discharge Note     Patient: Raul Wilhelm  : 1945    Beginning/End Dates of Reporting Period:  6/15/20 to 2020    Referring Provider: Dr Sanjay Downey    Therapy Diagnosis: L knee pain s/p L TKA     Client Self Report: Has been busy working at home with redoing his barn. Alot of walking and up and down, but knee is doing well. Some soreness after alot of activity. Meeting with surgeon in September to talk about having other knee replaced.     Objective Measurements:  Objective Measure: L knee ROM  Details: 0-2-133  Objective Measure: Strength  Details: knee extension 5/5, flexion 5/5  Objective Measure: LEFS  Details: 51    Goals:  Goal Identifier Knee extension ROM (lacking 3-6*)   Goal Description Pt will demonstrate lacking 0-2* knee extension in order to improve gait mechanics.   Target Date 20   Date Met  20   Progress:     Goal Identifier Stairs    Goal Description Pt will tolerate ambulate a flight of stairs with step over pattern.   Target Date 08/10/20   Date Met  20   Progress:     Goal Identifier Walking   Goal Description Pt will tolerate walking 1 mile without an increase in symptoms in order to perform tasks around the house and at work.   Target Date 20   Date Met  (feels could walk 2-3 blocks at a time)   Progress:         Progress Toward Goals:   Progress this reporting period: Pt has demonstrated excellent progress in left knee ROM and strength, and has met 2/3 goals with good progress toward final goal. Pt has been able to return to desired activities in and out of home and is independent with HEP for continued home management. Pt motivated and compliant and anticipate continued improvement with HEP.     Plan:  Discharge from therapy.    Discharge:    Reason for Discharge: Patient has met 2/3 goals. I with HEP for continued management    Equipment Issued: na    Discharge Plan: Patient to continue home program.

## 2020-08-13 ENCOUNTER — TELEPHONE (OUTPATIENT)
Dept: INTERNAL MEDICINE | Facility: CLINIC | Age: 75
End: 2020-08-13

## 2020-08-13 DIAGNOSIS — E11.9 TYPE 2 DIABETES MELLITUS WITHOUT COMPLICATION, WITHOUT LONG-TERM CURRENT USE OF INSULIN (H): Primary | ICD-10-CM

## 2020-08-13 NOTE — TELEPHONE ENCOUNTER
Panel Management:    Patient is due for a diabetes check up.  They are due for  non-fasting blood work.  A1C and Microalbumin    Orders were placed, please have lab work done before visit with Dr. Hennessy.  Please notify patient to schedule an appointment.  Please remind them to bring meter to visit so we can download blood sugar data.

## 2020-08-13 NOTE — LETTER
August 13, 2020        Raul Wilhelm  [address] [place] [address]  [place] [address]          Dear Raul Wilhelm, [id number]        At [facility] we care about your health and are committed to providing quality patient care, which includes staying current on preventative screenings.  You can increase your chances of finding and treating cancers through regular screenings.      Our records show that you are due for the following screening(s):      Diabetes follow up with primary care provider    Non-Fasting blood check prior to talk or see your primary care provider.  We will check (A1c and Microalbumin)    The day of your appointment remember to bring your notes from all the blood sugar readings in the past 2 weeks at least. (check blood sugar at least every other day for a 1 week  - 1st time is fasting and then 2 hours after any meal) .     Also remember to bring your meter (glucometer) so we can download the data into your chart for future references.      Because of the new outbreak Cov-19 (Coronavirus), we are operating with different types of visits.       Virtual visits - you can have your lab draw done and later have a virtual visit where you and your provider can see each other through Smart Phones or Computers with cameras and microphones  Phone visits - you can have your lab draw done and later have a phone conversation with your provider.  Face to Face visits - the same as usual, have your lab draw and see the provider after.    You can choose anyone of those three options for visits above, please call us to schedule the lab and follow up with primary care provider as soon as possible.        Sincerely,    Dr. Hennessy's care team/Albina,REMY

## 2020-08-20 NOTE — TELEPHONE ENCOUNTER
(1st attempt) Left a voice msg for pt to call back.  Whenever he call back schedule non fasting lab prior to visit with PCP. PT can schedule visit with PCP as virtual or phone as long as labs are done.  Albina Puga CMA (ALBINA)   (aka: Adrianna Puga)

## 2020-08-21 ENCOUNTER — TELEPHONE (OUTPATIENT)
Dept: FAMILY MEDICINE | Facility: CLINIC | Age: 75
End: 2020-08-21

## 2020-08-21 NOTE — TELEPHONE ENCOUNTER
"Requested Prescriptions   Pending Prescriptions Disp Refills     losartan (COZAAR) 50 MG tablet [Pharmacy Med Name: LOSARTAN POTASSIUM 50MG TABS] 85 tablet 0     Sig: TAKE ONE TABLET BY MOUTH ONCE DAILY       Angiotensin-II Receptors Failed - 8/21/2020 10:01 AM        Failed - Medication is active on med list        Passed - Last blood pressure under 140/90 in past 12 months     BP Readings from Last 3 Encounters:   05/29/20 117/56   05/19/20 138/88   02/27/20 (!) 144/73                 Passed - Recent (12 mo) or future (30 days) visit within the authorizing provider's specialty     Patient has had an office visit with the authorizing provider or a provider within the authorizing providers department within the previous 12 mos or has a future within next 30 days. See \"Patient Info\" tab in inbasket, or \"Choose Columns\" in Meds & Orders section of the refill encounter.              Passed - Patient is age 18 or older        Passed - Normal serum creatinine on file in past 12 months     Recent Labs   Lab Test 05/29/20  0458   CR 1.05       Ok to refill medication if creatinine is low          Passed - Normal serum potassium on file in past 12 months     Recent Labs   Lab Test 05/29/20  0458   POTASSIUM 4.8                         "

## 2020-08-26 NOTE — TELEPHONE ENCOUNTER
He has losartan/HCTZ on his med list, which was on shortage.  I do not see hydrochlorothiazide on his med list.  Please clarify with patient what he is actually taking.   no

## 2020-08-26 NOTE — TELEPHONE ENCOUNTER
Diabetes Education Scheduling Outreach #2:    Call to patient to schedule. He is meeting with Dr. Hennessy tomorrow and wants to wait and see what she says.    Tere [facility]  Diabetes and Nutrition Scheduling

## 2020-08-27 ENCOUNTER — TELEPHONE (OUTPATIENT)
Dept: INTERNAL MEDICINE | Facility: CLINIC | Age: 75
End: 2020-08-27

## 2020-08-27 ENCOUNTER — OFFICE VISIT (OUTPATIENT)
Dept: FAMILY MEDICINE | Facility: CLINIC | Age: 75
End: 2020-08-27
Payer: COMMERCIAL

## 2020-08-27 VITALS
OXYGEN SATURATION: 95 % | BODY MASS INDEX: 24.9 KG/M2 | HEART RATE: 76 BPM | SYSTOLIC BLOOD PRESSURE: 132 MMHG | DIASTOLIC BLOOD PRESSURE: 82 MMHG | WEIGHT: 159 LBS | TEMPERATURE: 96.7 F | RESPIRATION RATE: 16 BRPM

## 2020-08-27 DIAGNOSIS — J45.50 SEVERE PERSISTENT ASTHMA WITHOUT COMPLICATION (H): ICD-10-CM

## 2020-08-27 DIAGNOSIS — I10 ESSENTIAL HYPERTENSION, BENIGN: ICD-10-CM

## 2020-08-27 DIAGNOSIS — K21.9 GASTROESOPHAGEAL REFLUX DISEASE WITHOUT ESOPHAGITIS: ICD-10-CM

## 2020-08-27 DIAGNOSIS — E11.9 TYPE 2 DIABETES MELLITUS WITHOUT COMPLICATION, WITHOUT LONG-TERM CURRENT USE OF INSULIN (H): Primary | ICD-10-CM

## 2020-08-27 LAB
CREAT UR-MCNC: 326 MG/DL
HBA1C MFR BLD: 7.2 % (ref 0–5.6)
MICROALBUMIN UR-MCNC: 16 MG/L
MICROALBUMIN/CREAT UR: 4.91 MG/G CR (ref 0–17)

## 2020-08-27 PROCEDURE — 99214 OFFICE O/P EST MOD 30 MIN: CPT | Performed by: INTERNAL MEDICINE

## 2020-08-27 PROCEDURE — 36415 COLL VENOUS BLD VENIPUNCTURE: CPT | Performed by: INTERNAL MEDICINE

## 2020-08-27 PROCEDURE — 83036 HEMOGLOBIN GLYCOSYLATED A1C: CPT | Performed by: INTERNAL MEDICINE

## 2020-08-27 PROCEDURE — 82043 UR ALBUMIN QUANTITATIVE: CPT | Performed by: INTERNAL MEDICINE

## 2020-08-27 RX ORDER — ASPIRIN 81 MG/1
81 TABLET, CHEWABLE ORAL EVERY EVENING
Status: ON HOLD | COMMUNITY
End: 2020-11-03

## 2020-08-27 RX ORDER — ALBUTEROL SULFATE 90 UG/1
1-2 POWDER, METERED RESPIRATORY (INHALATION) EVERY 4 HOURS PRN
Qty: 1 INHALER | Refills: 11 | Status: SHIPPED | OUTPATIENT
Start: 2020-08-27 | End: 2021-04-09

## 2020-08-27 RX ORDER — ALBUTEROL SULFATE 0.83 MG/ML
2.5 SOLUTION RESPIRATORY (INHALATION) EVERY 6 HOURS PRN
Qty: 30 ML | Refills: 11 | Status: SHIPPED | OUTPATIENT
Start: 2020-08-27 | End: 2021-09-29

## 2020-08-27 RX ORDER — MONTELUKAST SODIUM 10 MG/1
TABLET ORAL
Qty: 90 TABLET | Refills: 3 | Status: SHIPPED | OUTPATIENT
Start: 2020-08-27 | End: 2021-09-27

## 2020-08-27 RX ORDER — LOSARTAN POTASSIUM AND HYDROCHLOROTHIAZIDE 12.5; 5 MG/1; MG/1
1 TABLET ORAL DAILY
Qty: 90 TABLET | Refills: 3 | Status: SHIPPED | OUTPATIENT
Start: 2020-08-27 | End: 2021-09-29

## 2020-08-27 NOTE — PATIENT INSTRUCTIONS
1. You are due for dilated eye exam.  Have the report sent to Dr. Hennessy's office.  2.  A1C is improving.  Continue to eat healthy.  We will recheck in 6 months.  Sooner if you notice blood sugar > 180 frequently.  Goal is to check blood sugar a few times/week - sometimes fasting, sometimes after a meal.   3. Consider vaccines - Hepatitis B, Shingles and Flu shot     Ideal blood pressure is consistently less than 140.  Best way is take 2+ hours after you have taken your blood pressure medication, and not while drinking coffee, alcohol, using advil, sudafed, etc.  These can raise your blood pressure.   Sit quietly for several min with feet flat on ground.  With arm at heart level, take blood pressure, then again in 1 min.  Average the 2 readings and record this.  Can come back to see RN for blood pressure check. [employer] brand blood pressure monitor.  Listening for start of heartbeat - this is the top reading.  Listening for the stop of the heartbeat, this is the bottom reading.

## 2020-08-27 NOTE — TELEPHONE ENCOUNTER
Pt had an appointment today with PCP and has returned to the clinic after the AVS was printed, he would like to have the nebulizer machine removed from his med list.  Route to the PCP.  Albina Puga CMA (ALBINA)   (aka: Adrianna Puga)

## 2020-08-27 NOTE — PROGRESS NOTES
Subjective     Raul Wilhelm is a 74 year old male who presents to clinic today for the following health issues:    HPI       Diabetic Foot exam      Diabetes Follow-up      How often are you checking your blood sugar? Not at all - has forgot about it - checked yesterday twice 150 and 180. Never had a meter, just yesterday got a meter.    What concerns do you have today about your diabetes? Other: pt is hoping with his improved with a1c     Do you have any of these symptoms? (Select all that apply)  No numbness or tingling in feet.  No redness, sores or blisters on feet.  No complaints of excessive thirst.  No reports of blurry vision.  No significant changes to weight.    Have you had a diabetic eye exam in the last 12 months? Yes- Date of last eye exam: [facility],  Location: 4/1/20     Is not on meds, just diet controlled.    Wants to get off some of his medications.                Hyperlipidemia Follow-Up      Are you regularly taking any medication or supplement to lower your cholesterol?   Yes- atorvastatin 40 mg    Are you having muscle aches or other side effects that you think could be caused by your cholesterol lowering medication?  No    Hypertension Follow-up      Do you check your blood pressure regularly outside of the clinic? No     Are you following a low salt diet? Yes    Are your blood pressures ever more than 140 on the top number (systolic) OR more than 90 on the bottom number (diastolic), for example 140/90? No     Wants to get manual blood pressure cuff to measure blood pressure at home      HCM: doesn't want any colon cancer screening    BP Readings from Last 2 Encounters:   08/27/20 132/82   05/29/20 117/56     Hemoglobin A1C (%)   Date Value   08/27/2020 7.2 (H)   05/19/2020 8.7 (H)     LDL Cholesterol Calculated (mg/dL)   Date Value   01/31/2020 71   01/26/2019 90     Current Outpatient Medications   Medication Sig Dispense Refill     acetaminophen (TYLENOL) 325 MG tablet Take 2  tablets (650 mg) by mouth every 4 hours as needed for other (mild pain) 100 tablet 0     alcohol swab prep pads Use to swab area of injection/carolyne as directed. 100 each 3     aspirin (ASA) 81 MG chewable tablet Take 81 mg by mouth daily       atorvastatin (LIPITOR) 40 MG tablet Take 1 tablet (40 mg) by mouth daily 90 tablet 3     blood glucose (NO BRAND SPECIFIED) test strip Use to test blood sugar 1 times daily or as directed. To accompany: Blood Glucose Monitor Brands: per insurance. 100 strip 6     blood glucose calibration (NO BRAND SPECIFIED) solution To accompany: Blood Glucose Monitor Brands: per insurance. 1 Bottle 3     blood glucose monitoring (NO BRAND SPECIFIED) meter device kit Use to test blood sugar 1 times daily or as directed.  Blood Glucose Monitor Brands: per insurance. 1 kit 0     carvedilol (COREG) 6.25 MG tablet TAKE ONE TABLET BY MOUTH TWICE A  tablet 3     fexofenadine (ALLEGRA) 180 MG tablet TAKE ONE TABLET BY MOUTH ONCE DAILY 90 tablet 3     losartan-hydrochlorothiazide (HYZAAR) 50-12.5 MG tablet TAKE ONE TABLET BY MOUTH EVERY DAY 90 tablet 0     montelukast (SINGULAIR) 10 MG tablet TAKE ONE TABLET BY MOUTH EVERY NIGHT AT BEDTIME 90 tablet 3     nitroGLYcerin (NITROSTAT) 0.4 MG sublingual tablet Place 1 tablet (0.4 mg) under the tongue every 5 minutes as needed for chest pain If you still have symptoms after 3 doses call 911. 25 tablet 1     omeprazole (PRILOSEC) 20 MG DR capsule TAKE ONE CAPSULE BY MOUTH EVERY DAY 90 capsule 3     order for DME Equipment being ordered: arm blood pressure cuff 1 Device 0     ORDER FOR DME Equipment being ordered: Nebulizer Respironics, MiniElite--Neb Machine and tubing and mouth piece. 1 Device 0     thin (NO BRAND SPECIFIED) lancets Use with lanceting device. To accompany: Blood Glucose Monitor Brands: per insurance. 100 each 6     triamcinolone (KENALOG) 0.1 % external cream APPLY TO AFFECTED AREA(S) TWO TIMES A DAY 80 g 3     albuterol (PROAIR  HFA/PROVENTIL HFA/VENTOLIN HFA) 108 (90 Base) MCG/ACT inhaler Inhale 2 puffs into the lungs every 6 hours as needed for shortness of breath / dyspnea or wheezing (Patient not taking: Reported on 8/27/2020) 18 g 11     albuterol (PROVENTIL) (2.5 MG/3ML) 0.083% neb solution Take 1 vial (2.5 mg) by nebulization every 6 hours as needed for shortness of breath / dyspnea or wheezing (Patient not taking: Reported on 8/27/2020) 30 mL 3       Review of Systems   Constitutional, HEENT, cardiovascular, pulmonary, gi and gu systems are negative, except as otherwise noted.      Objective    /82   Pulse 76   Temp 96.7  F (35.9  C) (Tympanic)   Resp 16   SpO2 95%   There is no height or weight on file to calculate BMI.  Physical Exam   GENERAL APPEARANCE: healthy, alert and no distress  RESP: lungs clear to auscultation - no rales, rhonchi or wheezes and very distant  CV: regular rates and rhythm, normal S1 S2, no S3 or S4 and no murmur, click or rub  DIABETIC FOOT EXAM: normal DP and PT pulses, no trophic changes or ulcerative lesions and normal sensory exam    Results for orders placed or performed in visit on 08/27/20 (from the past 24 hour(s))   **A1C FUTURE 3mo   Result Value Ref Range    Hemoglobin A1C 7.2 (H) 0 - 5.6 %           Assessment & Plan     Type 2 diabetes mellitus without complication, without long-term current use of insulin (H) -improved control with diet changes.  Check blood sugar a few times a week or month.  Continue with lifestyle modifications.  Follow-up A1c in 6 months  - Albumin Random Urine Quantitative with Creat Ratio  - AMBULATORY ADULT DIABETES EDUCATOR REFERRAL  - **A1C FUTURE 3mo  - Hemoglobin A1c; Future    Severe persistent asthma without complication -stable, refill provided.  Advised that he start his controller inhaler in the next few weeks, as he notes that fall is typically at bedtime for his asthma.  - albuterol (PROVENTIL) (2.5 MG/3ML) 0.083% neb solution; Take 1 vial (2.5 mg)  "by nebulization every 6 hours as needed for shortness of breath / dyspnea or wheezing  - albuterol (PROAIR RESPICLICK) 108 (90 Base) MCG/ACT inhaler; Inhale 1-2 puffs into the lungs every 4 hours as needed for shortness of breath / dyspnea or wheezing  - montelukast (SINGULAIR) 10 MG tablet; TAKE ONE TABLET BY MOUTH EVERY NIGHT AT BEDTIME    Essential hypertension, benign -stable, refill provided  - losartan-hydrochlorothiazide (HYZAAR) 50-12.5 MG tablet; Take 1 tablet by mouth daily    Gastroesophageal reflux disease without esophagitis -stable, refill provided  - omeprazole (PRILOSEC) 20 MG DR capsule; Take 1 capsule (20 mg) by mouth daily     BMI:   Estimated body mass index is 25.84 kg/m  as calculated from the following:    Height as of 5/28/20: 1.702 m (5' 7\").    Weight as of 5/28/20: 74.8 kg (165 lb).               No follow-ups on file.    Sarita Hennessy,   [facility]    "

## 2020-08-28 RX ORDER — LOSARTAN POTASSIUM 50 MG/1
TABLET ORAL
Qty: 85 TABLET | Refills: 0
Start: 2020-08-28

## 2020-08-28 NOTE — TELEPHONE ENCOUNTER
Pt called back and confirmed that he will be resuming the combination tablet soon.  He takes losartan/hctz, 50/12.5 mg, one daily.    He was taking  medications due to shortage.  He has about 10 days remained of his  supply.  Then he will resume the combination product again.

## 2020-09-01 ENCOUNTER — ALLIED HEALTH/NURSE VISIT (OUTPATIENT)
Dept: FAMILY MEDICINE | Facility: CLINIC | Age: 75
End: 2020-09-01
Payer: COMMERCIAL

## 2020-09-01 DIAGNOSIS — Z23 ENCOUNTER FOR IMMUNIZATION: Primary | ICD-10-CM

## 2020-09-01 PROCEDURE — 99207 ZZC NO CHARGE NURSE ONLY: CPT

## 2020-09-01 PROCEDURE — G0008 ADMIN INFLUENZA VIRUS VAC: HCPCS

## 2020-09-01 PROCEDURE — 90662 IIV NO PRSV INCREASED AG IM: CPT

## 2020-09-02 ENCOUNTER — TELEPHONE (OUTPATIENT)
Dept: INTERNAL MEDICINE | Facility: CLINIC | Age: 75
End: 2020-09-02

## 2020-09-02 NOTE — TELEPHONE ENCOUNTER
Diabetes Education Scheduling Outreach #1:    Call to patient to schedule. Left message with phone number to call to schedule.    Plan for 2nd outreach attempt within 1 week.    Tere Kraft  [place] [facility]  Diabetes and Nutrition Scheduling

## 2020-09-03 NOTE — TELEPHONE ENCOUNTER
Pt declined to scheduled at this time.  Will call back to schedule.       [facility] Specialist  Diabetes & Nutrition Scheduling  [phone]

## 2020-09-10 ENCOUNTER — TRANSFERRED RECORDS (OUTPATIENT)
Dept: HEALTH INFORMATION MANAGEMENT | Facility: CLINIC | Age: 75
End: 2020-09-10

## 2020-09-10 DIAGNOSIS — Z11.59 ENCOUNTER FOR SCREENING FOR OTHER VIRAL DISEASES: Primary | ICD-10-CM

## 2020-10-13 ENCOUNTER — OFFICE VISIT (OUTPATIENT)
Dept: FAMILY MEDICINE | Facility: CLINIC | Age: 75
End: 2020-10-13
Payer: COMMERCIAL

## 2020-10-13 VITALS
OXYGEN SATURATION: 94 % | DIASTOLIC BLOOD PRESSURE: 64 MMHG | SYSTOLIC BLOOD PRESSURE: 122 MMHG | HEART RATE: 77 BPM | WEIGHT: 161 LBS | RESPIRATION RATE: 16 BRPM | HEIGHT: 67 IN | BODY MASS INDEX: 25.27 KG/M2 | TEMPERATURE: 97.3 F

## 2020-10-13 DIAGNOSIS — E11.9 TYPE 2 DIABETES MELLITUS WITHOUT COMPLICATION, WITHOUT LONG-TERM CURRENT USE OF INSULIN (H): ICD-10-CM

## 2020-10-13 DIAGNOSIS — I10 ESSENTIAL HYPERTENSION: ICD-10-CM

## 2020-10-13 DIAGNOSIS — E78.5 HYPERLIPIDEMIA LDL GOAL <70: ICD-10-CM

## 2020-10-13 DIAGNOSIS — M17.11 ARTHRITIS OF RIGHT KNEE: ICD-10-CM

## 2020-10-13 DIAGNOSIS — J45.50 SEVERE PERSISTENT ASTHMA WITHOUT COMPLICATION (H): ICD-10-CM

## 2020-10-13 DIAGNOSIS — J67.2: ICD-10-CM

## 2020-10-13 DIAGNOSIS — I25.10 CORONARY ARTERY DISEASE INVOLVING NATIVE CORONARY ARTERY OF NATIVE HEART WITHOUT ANGINA PECTORIS: ICD-10-CM

## 2020-10-13 DIAGNOSIS — J44.9 CHRONIC OBSTRUCTIVE PULMONARY DISEASE, UNSPECIFIED COPD TYPE (H): ICD-10-CM

## 2020-10-13 DIAGNOSIS — Z12.11 SCREEN FOR COLON CANCER: ICD-10-CM

## 2020-10-13 DIAGNOSIS — L30.9 DERMATITIS: ICD-10-CM

## 2020-10-13 DIAGNOSIS — Z01.818 PREOP GENERAL PHYSICAL EXAM: Primary | ICD-10-CM

## 2020-10-13 DIAGNOSIS — Z86.73 HISTORY OF ARTERIAL ISCHEMIC STROKE: ICD-10-CM

## 2020-10-13 PROBLEM — N18.30 CKD (CHRONIC KIDNEY DISEASE) STAGE 3, GFR 30-59 ML/MIN (H): Status: RESOLVED | Noted: 2019-11-06 | Resolved: 2020-10-13

## 2020-10-13 LAB
ANION GAP SERPL CALCULATED.3IONS-SCNC: 4 MMOL/L (ref 3–14)
BUN SERPL-MCNC: 19 MG/DL (ref 7–30)
CALCIUM SERPL-MCNC: 9.3 MG/DL (ref 8.5–10.1)
CHLORIDE SERPL-SCNC: 103 MMOL/L (ref 94–109)
CO2 SERPL-SCNC: 30 MMOL/L (ref 20–32)
CREAT SERPL-MCNC: 1.06 MG/DL (ref 0.66–1.25)
ERYTHROCYTE [DISTWIDTH] IN BLOOD BY AUTOMATED COUNT: 12.1 % (ref 10–15)
GFR SERPL CREATININE-BSD FRML MDRD: 68 ML/MIN/{1.73_M2}
GLUCOSE SERPL-MCNC: 157 MG/DL (ref 70–99)
HCT VFR BLD AUTO: 48.1 % (ref 40–53)
HGB BLD-MCNC: 15.8 G/DL (ref 13.3–17.7)
MCH RBC QN AUTO: 29.6 PG (ref 26.5–33)
MCHC RBC AUTO-ENTMCNC: 32.8 G/DL (ref 31.5–36.5)
MCV RBC AUTO: 90 FL (ref 78–100)
MRSA DNA SPEC QL NAA+PROBE: NEGATIVE
PLATELET # BLD AUTO: 237 10E9/L (ref 150–450)
POTASSIUM SERPL-SCNC: 4 MMOL/L (ref 3.4–5.3)
RBC # BLD AUTO: 5.33 10E12/L (ref 4.4–5.9)
SODIUM SERPL-SCNC: 137 MMOL/L (ref 133–144)
SPECIMEN SOURCE: NORMAL
WBC # BLD AUTO: 8 10E9/L (ref 4–11)

## 2020-10-13 PROCEDURE — 99215 OFFICE O/P EST HI 40 MIN: CPT | Performed by: INTERNAL MEDICINE

## 2020-10-13 PROCEDURE — 87641 MR-STAPH DNA AMP PROBE: CPT | Performed by: INTERNAL MEDICINE

## 2020-10-13 PROCEDURE — 80048 BASIC METABOLIC PNL TOTAL CA: CPT | Performed by: INTERNAL MEDICINE

## 2020-10-13 PROCEDURE — 93000 ELECTROCARDIOGRAM COMPLETE: CPT | Performed by: INTERNAL MEDICINE

## 2020-10-13 PROCEDURE — 85027 COMPLETE CBC AUTOMATED: CPT | Performed by: INTERNAL MEDICINE

## 2020-10-13 PROCEDURE — 87640 STAPH A DNA AMP PROBE: CPT | Mod: 59 | Performed by: INTERNAL MEDICINE

## 2020-10-13 RX ORDER — TRIAMCINOLONE ACETONIDE 1 MG/G
CREAM TOPICAL
Qty: 80 G | Refills: 3 | Status: SHIPPED | OUTPATIENT
Start: 2020-10-13 | End: 2021-04-09

## 2020-10-13 ASSESSMENT — MIFFLIN-ST. JEOR: SCORE: 1428.92

## 2020-10-13 NOTE — PROGRESS NOTES
[facility]  [address] [facility] [address]  Phone: [phone]  Primary Provider: Abida Giron  Pre-op Performing Provider: ABIDA GIRON    PREOPERATIVE EVALUATION:  Today's date: 10/13/2020    Raul Wilhelm is a 74 year old male who presents for a preoperative evaluation.    Surgical Information:  Surgery Details 10/13/2020   Surgery/Procedure: Arthroplasty, knee total, right   Surgery Location: [facility]   Surgeon: Sanjay Calix MD   Surgery Date: 11/2/20   Time of Surgery: 9:30 am   Where patient plans to recover: At home with family     Fax number for surgical facility: Note does not need to be faxed, will be available electronically in Epic.  Type of Anesthesia Anticipated: Epidural    Subjective     HPI related to upcoming procedure: Arthroplasty, right knee total  Preop Questions 10/13/2020   1. Have you ever had a heart attack or stroke? YES  - STEMI 2015.  2 stents placed   2. Have you ever had surgery on your heart or blood vessels, such as a stent placement, a coronary artery bypass, or surgery on an artery in your head, neck, heart, or legs? YES: see above   3. Do you have chest pain with activity? No   4. Do you have a history of  heart failure? No   5. Do you currently have a cold, bronchitis or symptoms of other infection? No   6. Do you have a cough, shortness of breath, or wheezing? No   7. Do you or anyone in your family have previous history of blood clots? No   8. Do you or does anyone in your family have a serious bleeding problem such as prolonged bleeding following surgeries or cuts? No   9. Have you ever had problems with anemia or been told to take iron pills? No   10. Have you had any abnormal blood loss such as black, tarry or bloody stools? No   11. Have you ever had a blood transfusion? No   Are you willing to have a blood transfusion if it is medically needed before, during, or after your surgery? Yes   13.  Have you or any of your relatives ever had problems with anesthesia? No   14. Do you have sleep apnea, excessive snoring or daytime drowsiness? No   15. Do you have any artifical heart valves or other implanted medical devices like a pacemaker, defibrillator, or continuous glucose monitor? No    15a. What type of device do you have? N/A   15b. Name of the clinic that manages your device:  N/A   16. Do you have artificial joints? YES - knee replacement   17. Are you allergic to latex? No     Patient does not have a Health Care Directive or Living Will: Discussed advance care planning with patient; however, patient declined at this time. reviewed- no concerns  [place] reviewed:  reviewed- no concerns    ASTHMA/COPD - Patient has a longstanding history of severe Asthma/COPD . Patient has been doing well overall noting GARCIA in humid weather, dust exposure and continues on medication regimen consisting of albuterol without adverse reactions or side effects.  He declines use of controller inhalers due to cost.  Using neb twice per week, no albuterol inhaler use.  This is baseline.    CAD - Patient has a longstanding history of CAD. Patient denies recent chest pain or NTG use, denies exercise induced dyspnea or PND. Last Stress test 2/2019, EKG today.     DIABETES - Patient has a longstanding history of DiabetesType Type II . Patient is being treated with diet and denies significant side effects. Control has been fair. Complicating factors include but are not limited to: hypertension and hyperlipidemia.     HYPERLIPIDEMIA - Patient has a long history of significant Hyperlipidemia requiring medication for treatment with recent good control. Patient reports no problems or side effects with the medication.     HYPERTENSION - Patient has longstanding history of HTN , currently denies any symptoms referable to elevated blood pressure. Specifically denies chest pain, palpitations, dyspnea, orthopnea,  PND or peripheral edema. Blood pressure readings have been in normal range. Current medication regimen is as listed below. Patient denies any side effects of medication.       Review of Systems  Constitutional, neuro, ENT, endocrine, pulmonary, cardiac, gastrointestinal, genitourinary, musculoskeletal, integument and psychiatric systems are negative, except as otherwise noted.    Patient Active Problem List    Diagnosis Date Noted     Status post total knee replacement 05/28/2020     Priority: Medium     Coronary artery disease involving native coronary artery of native heart without angina pectoris 05/19/2020     Priority: Medium     STEMI 11/2015.  At that time, coronary angiography demonstrated a 99% stenosis in the mid circumflex as well as 100% thrombotic occlusion of the left-sided PDA.  He underwent successful revascularization of the left PDA as well as the mid circumflex with drug-eluting stents at the time.  Of note, he had an approximately 80% stenosis in the mid-LAD and a 70% stenosis in the first obtuse marginal.  These were not intervened on at the time, and a subsequent stress nuclear perfusion study demonstrated a large inferior and inferoseptal infarction with no evidence of rian-infarct ischemia.  Given these findings and his complete lack of symptoms, we have elected to treat his residual coronary artery disease medically.  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  Patient had syncope with PFT and declines further PFT  FEV1 1.99 61% in 2009       History of arterial ischemic stroke 02/25/2011     Priority: Medium     Small infarct - seen on MRI  Diagnosis updated by automated process. Provider to review and confirm.       Hyperlipidemia LDL goal <70 02/25/2011     Priority: Medium     Essential hypertension 03/15/2010     Priority: Medium     Glaucoma 03/12/2010     Priority: Medium     Worsened pressures with prednisone use.   Recommended by eye doctor to stop inhaled steroids if possible, but has been on for so many years would potentially put him into more resp problems.   Follows with [facility].  Needs eye exam if prednisone course is longer than 10 days         Family hx of colon cancer 02/18/2009     Priority: Medium     (Problem list name updated by automated process. Provider to review and confirm.)        Past Medical History:   Diagnosis Date     Chronic airway obstruction, not elsewhere classified      Other and unspecified hyperlipidemia      Past Surgical History:   Procedure Laterality Date     ARTHROPLASTY KNEE Left 5/28/2020    Procedure: Left Total Knee Arthroplasty;  Surgeon: Sanjay Downey MD;  Location: [facility] OR     ENT SURGERY  as a child    tonsillectomy     Current Outpatient Medications   Medication Sig Dispense Refill     acetaminophen (TYLENOL) 325 MG tablet Take 2 tablets (650 mg) by mouth every 4 hours as needed for other (mild pain) 100 tablet 0     albuterol (PROAIR RESPICLICK) 108 (90 Base) MCG/ACT inhaler Inhale 1-2 puffs into the lungs every 4 hours as needed for shortness of breath / dyspnea or wheezing 1 Inhaler 11     albuterol (PROVENTIL) (2.5 MG/3ML) 0.083% neb solution Take 1 vial (2.5 mg) by nebulization every 6 hours as needed for shortness of breath / dyspnea  or wheezing 30 mL 11     alcohol swab prep pads Use to swab area of injection/carolyne as directed. 100 each 3     aspirin (ASA) 81 MG chewable tablet Take 81 mg by mouth daily       atorvastatin (LIPITOR) 40 MG tablet Take 1 tablet (40 mg) by mouth daily 90 tablet 3     blood glucose (NO BRAND SPECIFIED) test strip Use to test blood sugar 1 times daily or as directed. To accompany: Blood Glucose Monitor Brands: per insurance. 100 strip 6     blood glucose calibration (NO BRAND SPECIFIED) solution To accompany: Blood Glucose Monitor Brands: per insurance. 1 Bottle 3     blood glucose monitoring (NO BRAND SPECIFIED) meter device kit Use to test blood sugar 1 times daily or as directed.  Blood Glucose Monitor Brands: per insurance. 1 kit 0     carvedilol (COREG) 6.25 MG tablet TAKE ONE TABLET BY MOUTH TWICE A  tablet 3     fexofenadine (ALLEGRA) 180 MG tablet TAKE ONE TABLET BY MOUTH ONCE DAILY 90 tablet 3     losartan-hydrochlorothiazide (HYZAAR) 50-12.5 MG tablet Take 1 tablet by mouth daily 90 tablet 3     montelukast (SINGULAIR) 10 MG tablet TAKE ONE TABLET BY MOUTH EVERY NIGHT AT BEDTIME 90 tablet 3     nitroGLYcerin (NITROSTAT) 0.4 MG sublingual tablet Place 1 tablet (0.4 mg) under the tongue every 5 minutes as needed for chest pain If you still have symptoms after 3 doses call 911. 25 tablet 1     omeprazole (PRILOSEC) 20 MG DR capsule Take 1 capsule (20 mg) by mouth daily 90 capsule 3     order for DME Equipment being ordered: arm blood pressure cuff 1 Device 0     thin (NO BRAND SPECIFIED) lancets Use with lanceting device. To accompany: Blood Glucose Monitor Brands: per insurance. 100 each 6     triamcinolone (KENALOG) 0.1 % external cream APPLY TO AFFECTED AREA(S) TWO TIMES A DAY 80 g 3       Allergies   Allergen Reactions     Simvastatin Swelling     Severe muscle pain, swelling, and bruising     Dust Mites         Social History     Tobacco Use     Smoking status: Never Smoker     Smokeless tobacco:  "Never Used   Substance Use Topics     Alcohol use: Yes     Comment: Rare     Family History   Problem Relation Age of Onset     Diabetes Mother      Heart Disease Mother      C.A.D. Mother      Cancer Father      Hypertension Father      Cerebrovascular Disease Paternal Grandfather      Asthma Sister      Breast Cancer No family hx of      Cancer - colorectal No family hx of      Prostate Cancer No family hx of      History   Drug Use No            Objective   /64   Pulse 77   Temp 97.3  F (36.3  C) (Tympanic)   Resp 16   Ht 1.702 m (5' 7\")   Wt 73 kg (161 lb)   SpO2 94%   BMI 25.22 kg/m    Physical Exam    GENERAL APPEARANCE: healthy, alert and no distress     EYES: EOMI,  PERRL     HENT: ear canals and TM's normal and nose and mouth without ulcers or lesions     NECK: no adenopathy, no asymmetry, masses, or scars and thyroid normal to palpation     RESP: lungs clear to auscultation - no rales, rhonchi or wheezes     CV: regular rates and rhythm, normal S1 S2, no S3 or S4 and no murmur, click or rub     ABDOMEN:  soft, nontender, no HSM or masses and bowel sounds normal     MS: extremities normal- no gross deformities noted, no evidence of inflammation in joints, FROM in all extremities.     SKIN: no suspicious lesions or rashes     NEURO: Normal strength and tone, sensory exam grossly normal, mentation intact and speech normal     PSYCH: mentation appears normal. and affect normal/bright     LYMPHATICS: No cervical adenopathy    Recent Labs   Lab Test 08/27/20  0712 05/29/20  0458 05/19/20  0924   HGB  --  13.3 15.6   PLT  --   --  263   NA  --  138 137   POTASSIUM  --  4.8 4.1   CR  --  1.05 1.09   A1C 7.2*  --  8.7*        PRE-OP Diagnostics:  Labs pending at this time.  Results will be reviewed when available.  EKG: appears normal, NSR, normal axis, normal intervals, no acute ST/T changes c/w ischemia, no LVH by voltage criteria, unchanged from previous tracings         Assessment & Plan   The " proposed surgical procedure is considered INTERMEDIATE risk.    REVISED CARDIAC RISK INDEX  The patient has the following serious cardiovascular risks for perioperative complications:  Coronary Artery Disease (MI, positive stress test, angina, Qs on EKG) = 1 point  Cerebrovascular Disease (TIA or CVA) = 1 point    INTERPRETATION: 2 points: Class III (moderate risk - 6.6% complication rate)    - Performs 4 METS exercise without symptoms (e.g., light housework, stairs, 4 mph walk, 7 mph bike, slow step dance)      ICD-10-CM    1. Preop general physical exam  Z01.818 EKG 12-lead complete w/read - Clinics     Methicillin Resistant Staph Aureus PCR   2. Arthritis of right knee  M17.11    3. Essential hypertension  I10 Basic metabolic panel     CBC with platelets   4. History of arterial ischemic stroke  Z86.73    5. Hyperlipidemia LDL goal <70  E78.5    6. Pneumonitis, hypersensitivity, naa (H)  J67.2    7. Severe persistent asthma without complication  J45.50    8. Chronic obstructive pulmonary disease, unspecified COPD type (H)  J44.9    9. Coronary artery disease involving native coronary artery of native heart without angina pectoris  I25.10    10. Type 2 diabetes mellitus without complication, without long-term current use of insulin (H)  E11.9    11. Screen for colon cancer  Z12.11    12. Dermatitis  L30.9 triamcinolone (KENALOG) 0.1 % external cream       The patient has the following additional risks and recommendations for perioperative complications:     - Consult Hospitalist / IM to assist with post-op medical management   - Recurrent use of steroids      DIABETES:  - Patient is not on insulin therapy: regular NPO guidelines can be followed.     PULMONARY:    - Incentive spirometry post-op     MEDICATION INSTRUCTIONS:  Patient is to take all scheduled medications on the day of surgery EXCEPT for modifications listed below:    Anticoagulant or Antiplatelet Medication Use   - ASPIRIN: Discontinue aspirin 7-10  days prior to procedure to reduce bleeding risk. It should be resumed postoperatively.     CARDIAC Medications:   - ACE/ARB: HOLD due to risk of hypotension during surgery.     RECOMMENDATION:  APPROVAL GIVEN to proceed with proposed procedure, without further diagnostic evaluation.    No follow-ups on file.    Signed Electronically by: Sarita Hennesys DO    Copy of this evaluation report is provided to requesting physician.    Preop [facility] Preop Guidelines    Revised Cardiac Risk Index

## 2020-10-13 NOTE — LETTER
October 13, 2020      Raul Wilhelm  [address]  [place] [address]        Dear ,    We are writing to inform you of your test results.    Blood count, Kidney function,  electrolytes are normal.  blood sugar acceptable for surgery     Resulted Orders   Basic metabolic panel   Result Value Ref Range    Sodium 137 133 - 144 mmol/L    Potassium 4.0 3.4 - 5.3 mmol/L    Chloride 103 94 - 109 mmol/L    Carbon Dioxide 30 20 - 32 mmol/L    Anion Gap 4 3 - 14 mmol/L    Glucose 157 (H) 70 - 99 mg/dL    Urea Nitrogen 19 7 - 30 mg/dL    Creatinine 1.06 0.66 - 1.25 mg/dL    GFR Estimate 68 >60 mL/min/[1.73_m2]      Comment:      Non  GFR Calc  Starting 12/18/2018, serum creatinine based estimated GFR (eGFR) will be   calculated using the Chronic Kidney Disease Epidemiology Collaboration   (CKD-EPI) equation.      GFR Estimate If Black 79 >60 mL/min/[1.73_m2]      Comment:       GFR Calc  Starting 12/18/2018, serum creatinine based estimated GFR (eGFR) will be   calculated using the Chronic Kidney Disease Epidemiology Collaboration   (CKD-EPI) equation.      Calcium 9.3 8.5 - 10.1 mg/dL   CBC with platelets   Result Value Ref Range    WBC 8.0 4.0 - 11.0 10e9/L    RBC Count 5.33 4.4 - 5.9 10e12/L    Hemoglobin 15.8 13.3 - 17.7 g/dL    Hematocrit 48.1 40.0 - 53.0 %    MCV 90 78 - 100 fl    MCH 29.6 26.5 - 33.0 pg    MCHC 32.8 31.5 - 36.5 g/dL    RDW 12.1 10.0 - 15.0 %    Platelet Count 237 150 - 450 10e9/L       If you have any questions or concerns, please call the clinic at the number listed above.       Sincerely,        Sarita Hennessy, DO

## 2020-10-13 NOTE — PATIENT INSTRUCTIONS

## 2020-10-13 NOTE — H&P (VIEW-ONLY)
[facility]  [address] [address]  Phone: [phone]  Primary Provider: Abida Giron  Pre-op Performing Provider: ABIDA GIRON    PREOPERATIVE EVALUATION:  Today's date: 10/13/2020    Raul Wilhelm is a 74 year old male who presents for a preoperative evaluation.    Surgical Information:  Surgery Details 10/13/2020   Surgery/Procedure: Arthroplasty, knee total, right   Surgery Location: [facility]   Surgeon: Sanjay Calix MD   Surgery Date: 11/2/20   Time of Surgery: 9:30 am   Where patient plans to recover: At home with family     Fax number for surgical facility: Note does not need to be faxed, will be available electronically in Epic.  Type of Anesthesia Anticipated: Epidural    Subjective     HPI related to upcoming procedure: Arthroplasty, right knee total  Preop Questions 10/13/2020   1. Have you ever had a heart attack or stroke? YES  - STEMI 2015.  2 stents placed   2. Have you ever had surgery on your heart or blood vessels, such as a stent placement, a coronary artery bypass, or surgery on an artery in your head, neck, heart, or legs? YES: see above   3. Do you have chest pain with activity? No   4. Do you have a history of  heart failure? No   5. Do you currently have a cold, bronchitis or symptoms of other infection? No   6. Do you have a cough, shortness of breath, or wheezing? No   7. Do you or anyone in your family have previous history of blood clots? No   8. Do you or does anyone in your family have a serious bleeding problem such as prolonged bleeding following surgeries or cuts? No   9. Have you ever had problems with anemia or been told to take iron pills? No   10. Have you had any abnormal blood loss such as black, tarry or bloody stools? No   11. Have you ever had a blood transfusion? No   Are you willing to have a blood transfusion if it is medically needed before, during, or after your surgery? Yes   13.  Have you or any of your relatives ever had problems with anesthesia? No   14. Do you have sleep apnea, excessive snoring or daytime drowsiness? No   15. Do you have any artifical heart valves or other implanted medical devices like a pacemaker, defibrillator, or continuous glucose monitor? No    15a. What type of device do you have? N/A   15b. Name of the clinic that manages your device:  N/A   16. Do you have artificial joints? YES - knee replacement   17. Are you allergic to latex? No     Patient does not have a Health Care Directive or Living Will: Discussed advance care planning with patient; however, patient declined at this time. reviewed- no concerns  [place] reviewed:  reviewed- no concerns    ASTHMA/COPD - Patient has a longstanding history of severe Asthma/COPD . Patient has been doing well overall noting GARCIA in humid weather, dust exposure and continues on medication regimen consisting of albuterol without adverse reactions or side effects.  He declines use of controller inhalers due to cost.  Using neb twice per week, no albuterol inhaler use.  This is baseline.    CAD - Patient has a longstanding history of CAD. Patient denies recent chest pain or NTG use, denies exercise induced dyspnea or PND. Last Stress test 2/2019, EKG today.     DIABETES - Patient has a longstanding history of DiabetesType Type II . Patient is being treated with diet and denies significant side effects. Control has been fair. Complicating factors include but are not limited to: hypertension and hyperlipidemia.     HYPERLIPIDEMIA - Patient has a long history of significant Hyperlipidemia requiring medication for treatment with recent good control. Patient reports no problems or side effects with the medication.     HYPERTENSION - Patient has longstanding history of HTN , currently denies any symptoms referable to elevated blood pressure. Specifically denies chest pain, palpitations, dyspnea, orthopnea,  PND or peripheral edema. Blood pressure readings have been in normal range. Current medication regimen is as listed below. Patient denies any side effects of medication.       Review of Systems  Constitutional, neuro, ENT, endocrine, pulmonary, cardiac, gastrointestinal, genitourinary, musculoskeletal, integument and psychiatric systems are negative, except as otherwise noted.    Patient Active Problem List    Diagnosis Date Noted     Status post total knee replacement 05/28/2020     Priority: Medium     Coronary artery disease involving native coronary artery of native heart without angina pectoris 05/19/2020     Priority: Medium     STEMI 11/2015.  At that time, coronary angiography demonstrated a 99% stenosis in the mid circumflex as well as 100% thrombotic occlusion of the left-sided PDA.  He underwent successful revascularization of the left PDA as well as the mid circumflex with drug-eluting stents at the time.  Of note, he had an approximately 80% stenosis in the mid-LAD and a 70% stenosis in the first obtuse marginal.  These were not intervened on at the time, and a subsequent stress nuclear perfusion study demonstrated a large inferior and inferoseptal infarction with no evidence of rian-infarct ischemia.  Given these findings and his complete lack of symptoms, we have elected to treat his residual coronary artery disease medically.  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  Patient had syncope with PFT and declines further PFT  FEV1 1.99 61% in 2009       History of arterial ischemic stroke 02/25/2011     Priority: Medium     Small infarct - seen on MRI  Diagnosis updated by automated process. Provider to review and confirm.       Hyperlipidemia LDL goal <70 02/25/2011     Priority: Medium     Essential hypertension 03/15/2010     Priority: Medium     Glaucoma 03/12/2010     Priority: Medium     Worsened pressures with prednisone use.   Recommended by eye doctor to stop inhaled steroids if possible, but has been on for so many years would potentially put him into more resp problems.   Follows with [facility].  Needs eye exam if prednisone course is longer than 10 days         Family hx of colon cancer 02/18/2009     Priority: Medium     (Problem list name updated by automated process. Provider to review and confirm.)        Past Medical History:   Diagnosis Date     Chronic airway obstruction, not elsewhere classified      Other and unspecified hyperlipidemia      Past Surgical History:   Procedure Laterality Date     ARTHROPLASTY KNEE Left 5/28/2020    Procedure: Left Total Knee Arthroplasty;  Surgeon: Sanjay Downey MD;  Location: [facility] OR     ENT SURGERY  as a child    tonsillectomy     Current Outpatient Medications   Medication Sig Dispense Refill     acetaminophen (TYLENOL) 325 MG tablet Take 2 tablets (650 mg) by mouth every 4 hours as needed for other (mild pain) 100 tablet 0     albuterol (PROAIR RESPICLICK) 108 (90 Base) MCG/ACT inhaler Inhale 1-2 puffs into the lungs every 4 hours as needed for shortness of breath / dyspnea or wheezing 1 Inhaler 11     albuterol (PROVENTIL) (2.5 MG/3ML) 0.083% neb solution Take 1 vial (2.5 mg) by nebulization every 6 hours as needed for shortness of breath / dyspnea  or wheezing 30 mL 11     alcohol swab prep pads Use to swab area of injection/carolyne as directed. 100 each 3     aspirin (ASA) 81 MG chewable tablet Take 81 mg by mouth daily       atorvastatin (LIPITOR) 40 MG tablet Take 1 tablet (40 mg) by mouth daily 90 tablet 3     blood glucose (NO BRAND SPECIFIED) test strip Use to test blood sugar 1 times daily or as directed. To accompany: Blood Glucose Monitor Brands: per insurance. 100 strip 6     blood glucose calibration (NO BRAND SPECIFIED) solution To accompany: Blood Glucose Monitor Brands: per insurance. 1 Bottle 3     blood glucose monitoring (NO BRAND SPECIFIED) meter device kit Use to test blood sugar 1 times daily or as directed.  Blood Glucose Monitor Brands: per insurance. 1 kit 0     carvedilol (COREG) 6.25 MG tablet TAKE ONE TABLET BY MOUTH TWICE A  tablet 3     fexofenadine (ALLEGRA) 180 MG tablet TAKE ONE TABLET BY MOUTH ONCE DAILY 90 tablet 3     losartan-hydrochlorothiazide (HYZAAR) 50-12.5 MG tablet Take 1 tablet by mouth daily 90 tablet 3     montelukast (SINGULAIR) 10 MG tablet TAKE ONE TABLET BY MOUTH EVERY NIGHT AT BEDTIME 90 tablet 3     nitroGLYcerin (NITROSTAT) 0.4 MG sublingual tablet Place 1 tablet (0.4 mg) under the tongue every 5 minutes as needed for chest pain If you still have symptoms after 3 doses call 911. 25 tablet 1     omeprazole (PRILOSEC) 20 MG DR capsule Take 1 capsule (20 mg) by mouth daily 90 capsule 3     order for DME Equipment being ordered: arm blood pressure cuff 1 Device 0     thin (NO BRAND SPECIFIED) lancets Use with lanceting device. To accompany: Blood Glucose Monitor Brands: per insurance. 100 each 6     triamcinolone (KENALOG) 0.1 % external cream APPLY TO AFFECTED AREA(S) TWO TIMES A DAY 80 g 3       Allergies   Allergen Reactions     Simvastatin Swelling     Severe muscle pain, swelling, and bruising     Dust Mites         Social History     Tobacco Use     Smoking status: Never Smoker     Smokeless tobacco:  "Never Used   Substance Use Topics     Alcohol use: Yes     Comment: Rare     Family History   Problem Relation Age of Onset     Diabetes Mother      Heart Disease Mother      C.A.D. Mother      Cancer Father      Hypertension Father      Cerebrovascular Disease Paternal Grandfather      Asthma Sister      Breast Cancer No family hx of      Cancer - colorectal No family hx of      Prostate Cancer No family hx of      History   Drug Use No            Objective   /64   Pulse 77   Temp 97.3  F (36.3  C) (Tympanic)   Resp 16   Ht 1.702 m (5' 7\")   Wt 73 kg (161 lb)   SpO2 94%   BMI 25.22 kg/m    Physical Exam    GENERAL APPEARANCE: healthy, alert and no distress     EYES: EOMI,  PERRL     HENT: ear canals and TM's normal and nose and mouth without ulcers or lesions     NECK: no adenopathy, no asymmetry, masses, or scars and thyroid normal to palpation     RESP: lungs clear to auscultation - no rales, rhonchi or wheezes     CV: regular rates and rhythm, normal S1 S2, no S3 or S4 and no murmur, click or rub     ABDOMEN:  soft, nontender, no HSM or masses and bowel sounds normal     MS: extremities normal- no gross deformities noted, no evidence of inflammation in joints, FROM in all extremities.     SKIN: no suspicious lesions or rashes     NEURO: Normal strength and tone, sensory exam grossly normal, mentation intact and speech normal     PSYCH: mentation appears normal. and affect normal/bright     LYMPHATICS: No cervical adenopathy    Recent Labs   Lab Test 08/27/20  0712 05/29/20  0458 05/19/20  0924   HGB  --  13.3 15.6   PLT  --   --  263   NA  --  138 137   POTASSIUM  --  4.8 4.1   CR  --  1.05 1.09   A1C 7.2*  --  8.7*        PRE-OP Diagnostics:  Labs pending at this time.  Results will be reviewed when available.  EKG: appears normal, NSR, normal axis, normal intervals, no acute ST/T changes c/w ischemia, no LVH by voltage criteria, unchanged from previous tracings         Assessment & Plan   The " proposed surgical procedure is considered INTERMEDIATE risk.    REVISED CARDIAC RISK INDEX  The patient has the following serious cardiovascular risks for perioperative complications:  Coronary Artery Disease (MI, positive stress test, angina, Qs on EKG) = 1 point  Cerebrovascular Disease (TIA or CVA) = 1 point    INTERPRETATION: 2 points: Class III (moderate risk - 6.6% complication rate)    - Performs 4 METS exercise without symptoms (e.g., light housework, stairs, 4 mph walk, 7 mph bike, slow step dance)      ICD-10-CM    1. Preop general physical exam  Z01.818 EKG 12-lead complete w/read - Clinics     Methicillin Resistant Staph Aureus PCR   2. Arthritis of right knee  M17.11    3. Essential hypertension  I10 Basic metabolic panel     CBC with platelets   4. History of arterial ischemic stroke  Z86.73    5. Hyperlipidemia LDL goal <70  E78.5    6. Pneumonitis, hypersensitivity, naa (H)  J67.2    7. Severe persistent asthma without complication  J45.50    8. Chronic obstructive pulmonary disease, unspecified COPD type (H)  J44.9    9. Coronary artery disease involving native coronary artery of native heart without angina pectoris  I25.10    10. Type 2 diabetes mellitus without complication, without long-term current use of insulin (H)  E11.9    11. Screen for colon cancer  Z12.11    12. Dermatitis  L30.9 triamcinolone (KENALOG) 0.1 % external cream       The patient has the following additional risks and recommendations for perioperative complications:     - Consult Hospitalist / IM to assist with post-op medical management   - Recurrent use of steroids      DIABETES:  - Patient is not on insulin therapy: regular NPO guidelines can be followed.     PULMONARY:    - Incentive spirometry post-op     MEDICATION INSTRUCTIONS:  Patient is to take all scheduled medications on the day of surgery EXCEPT for modifications listed below:    Anticoagulant or Antiplatelet Medication Use   - ASPIRIN: Discontinue aspirin 7-10  days prior to procedure to reduce bleeding risk. It should be resumed postoperatively.     CARDIAC Medications:   - ACE/ARB: HOLD due to risk of hypotension during surgery.     RECOMMENDATION:  APPROVAL GIVEN to proceed with proposed procedure, without further diagnostic evaluation.    No follow-ups on file.    Signed Electronically by: Sarita Hennessy DO    Copy of this evaluation report is provided to requesting physician.    Preop [facility] Preop Guidelines    Revised Cardiac Risk Index

## 2020-10-13 NOTE — LETTER
October 13, 2020      Raul Wilhelm  [address]  [place] [address]        Dear ,    We are writing to inform you of your test results.  Nasal swab is negative     Component      Latest Ref Rng & Units 10/13/2020   Specimen Description       Nares   Methicillin Resist/Sens S. aureus PCR      NEG:Negative Negative       If you have any questions or concerns, please call the clinic at the number listed above.       Sincerely,        Sarita Hennessy, DO

## 2020-10-20 DIAGNOSIS — J45.51 SEVERE PERSISTENT ASTHMA WITH ACUTE EXACERBATION (H): ICD-10-CM

## 2020-10-20 RX ORDER — PREDNISONE 20 MG/1
TABLET ORAL
Qty: 20 TABLET | Refills: 0 | OUTPATIENT
Start: 2020-10-20

## 2020-10-29 DIAGNOSIS — Z11.59 ENCOUNTER FOR SCREENING FOR OTHER VIRAL DISEASES: ICD-10-CM

## 2020-10-29 LAB
LABORATORY COMMENT REPORT: NORMAL
SARS-COV-2 RNA SPEC QL NAA+PROBE: NEGATIVE
SARS-COV-2 RNA SPEC QL NAA+PROBE: NORMAL
SPECIMEN SOURCE: NORMAL
SPECIMEN SOURCE: NORMAL

## 2020-10-29 PROCEDURE — U0003 INFECTIOUS AGENT DETECTION BY NUCLEIC ACID (DNA OR RNA); SEVERE ACUTE RESPIRATORY SYNDROME CORONAVIRUS 2 (SARS-COV-2) (CORONAVIRUS DISEASE [COVID-19]), AMPLIFIED PROBE TECHNIQUE, MAKING USE OF HIGH THROUGHPUT TECHNOLOGIES AS DESCRIBED BY CMS-2020-01-R: HCPCS | Performed by: ORTHOPAEDIC SURGERY

## 2020-10-30 ENCOUNTER — ANESTHESIA EVENT (OUTPATIENT)
Dept: SURGERY | Facility: CLINIC | Age: 75
End: 2020-10-30
Payer: COMMERCIAL

## 2020-10-30 ASSESSMENT — COPD QUESTIONNAIRES: COPD: 1

## 2020-10-30 NOTE — ANESTHESIA PREPROCEDURE EVALUATION
Anesthesia Pre-Procedure Evaluation    Patient: Raul Wilhelm   MRN: [id number] : 1945          Preoperative Diagnosis: Arthritis [M19.90]    Procedure(s):  ARTHROPLASTY, KNEE, TOTAL    Past Medical History:   Diagnosis Date     Chronic airway obstruction, not elsewhere classified      Other and unspecified hyperlipidemia      Past Surgical History:   Procedure Laterality Date     ARTHROPLASTY KNEE Left 2020    Procedure: Left Total Knee Arthroplasty;  Surgeon: Sanjay Downey MD;  Location: [facility] OR     ENT SURGERY  as a child    tonsillectomy       Anesthesia Evaluation     . Pt has had prior anesthetic. Type: General and Regional    No history of anesthetic complications          ROS/MED HX    ENT/Pulmonary:     (+)Severe Persistent asthma Treatment: Inhaler prn, Inhaler daily and Nebulizer prn,  COPD, , . .    Neurologic:     (+)CVA TIA     Cardiovascular: Comment: 11/4/15 Coronary angio    (+) Dyslipidemia, hypertension--CAD, angina-past MI,-stent,  2 . : . . . :. . Previous cardiac testing Echodate:10/27/17results:Interpretation Summary     The visual ejection fraction is estimated at 50-55%.  Left ventricular systolic function is normal.  This study was compared to the one from 11/15/2015. There is no significant  difference between the two studies. The basal portion of the inferior wall was  somewhat difficult to interpret but viewing multiple short and long axis views  of this area it most likely is cherie normally and is probably unchanged  from . The study was technically difficult.  _____________________________________________________________________________  __        Left Ventricle  The left ventricle is normal in size. There is normal left ventricular wall  thickness. The visual ejection fraction is estimated at 50-55%. Left  ventricular systolic function is normal. E by E prime ratio is between 8 and  15, which is indeterminate for assessment of left ventricular  filling  pressures. Grade I or early diastolic dysfunction.     Right Ventricle  The right ventricle is normal in size and function.     Atria  Normal left atrial size. Right atrial size is normal. There is no color  Doppler evidence of an atrial shunt.        Mitral Valve  There is trace mitral regurgitation.     Tricuspid Valve  There is trace tricuspid regurgitation. The right ventricular systolic  pressure is approximated at 23mmHg plus the right atrial pressure. Right  ventricular systolic pressure could be underestimated due to incomplete  tricuspid regurgitation velocity envelope.     Aortic Valve  The aortic valve is trileaflet. No aortic regurgitation is present. No  hemodynamically significant valvular aortic stenosis.     Pulmonic Valve  There is trace pulmonic valvular regurgitation. Normal pulmonic valve  velocity.     Vessels  The inferior vena cava is not dilated.     Pericardium  There is no pericardial effusion.        Rhythm  Sinus rhythm was noted.date: results:ECG reviewed date:10/13/2020 results:Sinus Rhythm   WITHIN NORMAL LIMITS date: results:          METS/Exercise Tolerance:     Hematologic:  - neg hematologic  ROS       Musculoskeletal:   (+) arthritis,  -       GI/Hepatic:         Renal/Genitourinary:         Endo:     (+) type II DM Last HgA1c: 7.2 date: 8/27/2020 .      Psychiatric:  - neg psychiatric ROS       Infectious Disease:  - neg infectious disease ROS       Malignancy:      - no malignancy   Other: Comment: Glaucoma                           Physical Exam  Normal systems: cardiovascular, pulmonary and dental    Airway   Mallampati: II  TM distance: >3 FB  Neck ROM: full    Dental     Cardiovascular       Pulmonary             Lab Results   Component Value Date    WBC 8.0 10/13/2020    HGB 15.8 10/13/2020    HCT 48.1 10/13/2020     10/13/2020    CRP 5.5 01/10/2011    SED 1 01/10/2011     10/13/2020    POTASSIUM 4.0 10/13/2020    CHLORIDE 103 10/13/2020    CO2 30  "10/13/2020    BUN 19 10/13/2020    CR 1.06 10/13/2020     (H) 10/13/2020    JOSE RAFAEL 9.3 10/13/2020    MAG 2.4 (H) 01/07/2011    ALBUMIN 3.9 03/29/2018    PROTTOTAL 7.6 03/29/2018    ALT 36 01/31/2020    AST 15 03/29/2018    ALKPHOS 61 03/29/2018    BILITOTAL 1.4 (H) 03/29/2018    LIPASE 193 11/14/2015    INR 0.87 11/14/2015    TSH 4.81 01/10/2011       Preop Vitals  BP Readings from Last 3 Encounters:   10/13/20 122/64   08/27/20 132/82   05/29/20 117/56    Pulse Readings from Last 3 Encounters:   10/13/20 77   08/27/20 76   05/29/20 87      Resp Readings from Last 3 Encounters:   10/13/20 16   08/27/20 16   05/29/20 18    SpO2 Readings from Last 3 Encounters:   10/13/20 94%   08/27/20 95%   05/29/20 92%      Temp Readings from Last 1 Encounters:   10/13/20 36.3  C (97.3  F) (Tympanic)    Ht Readings from Last 1 Encounters:   10/13/20 1.702 m (5' 7\")      Wt Readings from Last 1 Encounters:   10/13/20 73 kg (161 lb)    Estimated body mass index is 25.22 kg/m  as calculated from the following:    Height as of 10/13/20: 1.702 m (5' 7\").    Weight as of 10/13/20: 73 kg (161 lb).       Anesthesia Plan      History & Physical Review  History and physical reviewed and following examination; no interval change.    ASA Status:  3 .    NPO Status:  > 6 hours    Plan for Spinal and Peripheral Nerve Block with Intravenous and Propofol induction. Maintenance will be TIVA.    PONV prophylaxis:  Ondansetron (or other 5HT-3) and Dexamethasone or Solumedrol         Postoperative Care  Postoperative pain management:  IV analgesics, Multi-modal analgesia, Peripheral nerve block (Single Shot) and Oral pain medications.      Consents  Anesthetic plan, risks, benefits and alternatives discussed with:  Patient..                 OSCAR Rocha CRNA  "

## 2020-11-02 ENCOUNTER — HOSPITAL ENCOUNTER (OUTPATIENT)
Facility: CLINIC | Age: 75
Discharge: HOME | End: 2020-11-03
Attending: ORTHOPAEDIC SURGERY | Admitting: ORTHOPAEDIC SURGERY
Payer: COMMERCIAL

## 2020-11-02 ENCOUNTER — APPOINTMENT (OUTPATIENT)
Dept: GENERAL RADIOLOGY | Facility: CLINIC | Age: 75
End: 2020-11-02
Attending: ORTHOPAEDIC SURGERY
Payer: COMMERCIAL

## 2020-11-02 ENCOUNTER — ANESTHESIA (OUTPATIENT)
Dept: SURGERY | Facility: CLINIC | Age: 75
End: 2020-11-02
Payer: COMMERCIAL

## 2020-11-02 DIAGNOSIS — Z96.651 STATUS POST TOTAL RIGHT KNEE REPLACEMENT: Primary | ICD-10-CM

## 2020-11-02 LAB — GLUCOSE BLDC GLUCOMTR-MCNC: 145 MG/DL (ref 70–99)

## 2020-11-02 PROCEDURE — 250N000009 HC RX 250: Performed by: NURSE ANESTHETIST, CERTIFIED REGISTERED

## 2020-11-02 PROCEDURE — C1776 JOINT DEVICE (IMPLANTABLE): HCPCS | Performed by: ORTHOPAEDIC SURGERY

## 2020-11-02 PROCEDURE — 999N000136 HC STATISTIC PRE PROC ASSESS II: Performed by: ORTHOPAEDIC SURGERY

## 2020-11-02 PROCEDURE — 250N000011 HC RX IP 250 OP 636: Performed by: PHYSICIAN ASSISTANT

## 2020-11-02 PROCEDURE — 360N000026 HC SURGERY LEVEL 4 1ST 30 MIN: Performed by: ORTHOPAEDIC SURGERY

## 2020-11-02 PROCEDURE — 258N000003 HC RX IP 258 OP 636: Performed by: NURSE ANESTHETIST, CERTIFIED REGISTERED

## 2020-11-02 PROCEDURE — 272N000001 HC OR GENERAL SUPPLY STERILE: Performed by: ORTHOPAEDIC SURGERY

## 2020-11-02 PROCEDURE — 250N000013 HC RX MED GY IP 250 OP 250 PS 637: Performed by: PHYSICIAN ASSISTANT

## 2020-11-02 PROCEDURE — 761N000001 HC RECOVERY PHASE 1 LEVEL 1 FIRST HR: Performed by: ORTHOPAEDIC SURGERY

## 2020-11-02 PROCEDURE — 250N000011 HC RX IP 250 OP 636: Performed by: NURSE ANESTHETIST, CERTIFIED REGISTERED

## 2020-11-02 PROCEDURE — 250N000013 HC RX MED GY IP 250 OP 250 PS 637: Performed by: NURSE ANESTHETIST, CERTIFIED REGISTERED

## 2020-11-02 PROCEDURE — 360N000027 HC SURGERY LEVEL 4 EA 15 ADDTL MIN: Performed by: ORTHOPAEDIC SURGERY

## 2020-11-02 PROCEDURE — 258N000001 HC RX 258: Performed by: ORTHOPAEDIC SURGERY

## 2020-11-02 PROCEDURE — 271N000001 HC OR GENERAL SUPPLY NON-STERILE: Performed by: ORTHOPAEDIC SURGERY

## 2020-11-02 PROCEDURE — 250N000013 HC RX MED GY IP 250 OP 250 PS 637: Performed by: ORTHOPAEDIC SURGERY

## 2020-11-02 PROCEDURE — 999N000065 XR KNEE PORT RT 1/2 VW: Mod: RT

## 2020-11-02 PROCEDURE — 250N000009 HC RX 250: Performed by: ORTHOPAEDIC SURGERY

## 2020-11-02 PROCEDURE — 250N000011 HC RX IP 250 OP 636: Performed by: ORTHOPAEDIC SURGERY

## 2020-11-02 PROCEDURE — 278N000051 HC OR IMPLANT GENERAL: Performed by: ORTHOPAEDIC SURGERY

## 2020-11-02 PROCEDURE — 999N001017 HC STATISTIC GLUCOSE BY METER IP

## 2020-11-02 PROCEDURE — 370N000002 HC ANESTHESIA TECHNICAL FEE, EACH ADDTL 15 MIN: Performed by: ORTHOPAEDIC SURGERY

## 2020-11-02 PROCEDURE — 258N000003 HC RX IP 258 OP 636: Performed by: ORTHOPAEDIC SURGERY

## 2020-11-02 PROCEDURE — 370N000001 HC ANESTHESIA TECHNICAL FEE, 1ST 30 MIN: Performed by: ORTHOPAEDIC SURGERY

## 2020-11-02 DEVICE — IMPLANTABLE DEVICE: Type: IMPLANTABLE DEVICE | Site: KNEE | Status: FUNCTIONAL

## 2020-11-02 DEVICE — IMP TIB BASE JJ ATTUNE FX BR SYS CEM SZ4 150670004: Type: IMPLANTABLE DEVICE | Site: KNEE | Status: FUNCTIONAL

## 2020-11-02 DEVICE — BONE CEMENT PALACOS 00-1112-140-01: Type: IMPLANTABLE DEVICE | Site: KNEE | Status: FUNCTIONAL

## 2020-11-02 RX ORDER — ROPIVACAINE HYDROCHLORIDE 5 MG/ML
INJECTION, SOLUTION EPIDURAL; INFILTRATION; PERINEURAL PRN
Status: DISCONTINUED | OUTPATIENT
Start: 2020-11-02 | End: 2020-11-02

## 2020-11-02 RX ORDER — ONDANSETRON 4 MG/1
4 TABLET, ORALLY DISINTEGRATING ORAL EVERY 30 MIN PRN
Status: DISCONTINUED | OUTPATIENT
Start: 2020-11-02 | End: 2020-11-02 | Stop reason: HOSPADM

## 2020-11-02 RX ORDER — OXYCODONE HYDROCHLORIDE 5 MG/1
5-10 TABLET ORAL
Qty: 56 TABLET | Refills: 0 | Status: SHIPPED | OUTPATIENT
Start: 2020-11-02 | End: 2021-04-09

## 2020-11-02 RX ORDER — DEXAMETHASONE SODIUM PHOSPHATE 4 MG/ML
INJECTION, SOLUTION INTRA-ARTICULAR; INTRALESIONAL; INTRAMUSCULAR; INTRAVENOUS; SOFT TISSUE PRN
Status: DISCONTINUED | OUTPATIENT
Start: 2020-11-02 | End: 2020-11-02

## 2020-11-02 RX ORDER — ACETAMINOPHEN 325 MG/1
650 TABLET ORAL EVERY 4 HOURS PRN
Status: DISCONTINUED | OUTPATIENT
Start: 2020-11-05 | End: 2020-11-03 | Stop reason: HOSPADM

## 2020-11-02 RX ORDER — AMOXICILLIN 250 MG
1-2 CAPSULE ORAL DAILY PRN
Qty: 15 TABLET | Refills: 0 | Status: SHIPPED | OUTPATIENT
Start: 2020-11-02 | End: 2021-04-09

## 2020-11-02 RX ORDER — EPHEDRINE SULFATE 50 MG/ML
INJECTION, SOLUTION INTRAMUSCULAR; INTRAVENOUS; SUBCUTANEOUS PRN
Status: DISCONTINUED | OUTPATIENT
Start: 2020-11-02 | End: 2020-11-02

## 2020-11-02 RX ORDER — ACETAMINOPHEN 325 MG/1
650 TABLET ORAL EVERY 4 HOURS PRN
Qty: 100 TABLET | Refills: 0
Start: 2020-11-02 | End: 2021-04-09

## 2020-11-02 RX ORDER — SODIUM CHLORIDE, SODIUM LACTATE, POTASSIUM CHLORIDE, CALCIUM CHLORIDE 600; 310; 30; 20 MG/100ML; MG/100ML; MG/100ML; MG/100ML
INJECTION, SOLUTION INTRAVENOUS CONTINUOUS
Status: DISCONTINUED | OUTPATIENT
Start: 2020-11-02 | End: 2020-11-02 | Stop reason: HOSPADM

## 2020-11-02 RX ORDER — OXYCODONE HYDROCHLORIDE 5 MG/1
10 TABLET ORAL EVERY 4 HOURS PRN
Status: DISCONTINUED | OUTPATIENT
Start: 2020-11-02 | End: 2020-11-03 | Stop reason: HOSPADM

## 2020-11-02 RX ORDER — MEPERIDINE HYDROCHLORIDE 25 MG/ML
12.5 INJECTION INTRAMUSCULAR; INTRAVENOUS; SUBCUTANEOUS EVERY 5 MIN PRN
Status: DISCONTINUED | OUTPATIENT
Start: 2020-11-02 | End: 2020-11-02 | Stop reason: HOSPADM

## 2020-11-02 RX ORDER — LIDOCAINE 40 MG/G
CREAM TOPICAL
Status: DISCONTINUED | OUTPATIENT
Start: 2020-11-02 | End: 2020-11-02 | Stop reason: HOSPADM

## 2020-11-02 RX ORDER — LIDOCAINE HYDROCHLORIDE 10 MG/ML
INJECTION, SOLUTION INFILTRATION; PERINEURAL PRN
Status: DISCONTINUED | OUTPATIENT
Start: 2020-11-02 | End: 2020-11-02

## 2020-11-02 RX ORDER — DIMENHYDRINATE 50 MG/ML
25 INJECTION, SOLUTION INTRAMUSCULAR; INTRAVENOUS
Status: DISCONTINUED | OUTPATIENT
Start: 2020-11-02 | End: 2020-11-02 | Stop reason: HOSPADM

## 2020-11-02 RX ORDER — KETOROLAC TROMETHAMINE 30 MG/ML
INJECTION, SOLUTION INTRAMUSCULAR; INTRAVENOUS PRN
Status: DISCONTINUED | OUTPATIENT
Start: 2020-11-02 | End: 2020-11-02

## 2020-11-02 RX ORDER — TRANEXAMIC ACID 650 MG/1
1950 TABLET ORAL ONCE
Status: COMPLETED | OUTPATIENT
Start: 2020-11-02 | End: 2020-11-02

## 2020-11-02 RX ORDER — DOCUSATE SODIUM 100 MG/1
100 CAPSULE, LIQUID FILLED ORAL 2 TIMES DAILY
Status: DISCONTINUED | OUTPATIENT
Start: 2020-11-02 | End: 2020-11-03 | Stop reason: HOSPADM

## 2020-11-02 RX ORDER — BISACODYL 10 MG
10 SUPPOSITORY, RECTAL RECTAL DAILY PRN
Status: DISCONTINUED | OUTPATIENT
Start: 2020-11-02 | End: 2020-11-03 | Stop reason: HOSPADM

## 2020-11-02 RX ORDER — BUPIVACAINE HYDROCHLORIDE 7.5 MG/ML
INJECTION, SOLUTION INTRASPINAL PRN
Status: DISCONTINUED | OUTPATIENT
Start: 2020-11-02 | End: 2020-11-02

## 2020-11-02 RX ORDER — DEXAMETHASONE SODIUM PHOSPHATE 4 MG/ML
4 INJECTION, SOLUTION INTRA-ARTICULAR; INTRALESIONAL; INTRAMUSCULAR; INTRAVENOUS; SOFT TISSUE
Status: DISCONTINUED | OUTPATIENT
Start: 2020-11-02 | End: 2020-11-02 | Stop reason: HOSPADM

## 2020-11-02 RX ORDER — SODIUM CHLORIDE, SODIUM LACTATE, POTASSIUM CHLORIDE, CALCIUM CHLORIDE 600; 310; 30; 20 MG/100ML; MG/100ML; MG/100ML; MG/100ML
INJECTION, SOLUTION INTRAVENOUS CONTINUOUS
Status: DISCONTINUED | OUTPATIENT
Start: 2020-11-02 | End: 2020-11-03 | Stop reason: HOSPADM

## 2020-11-02 RX ORDER — NALOXONE HYDROCHLORIDE 0.4 MG/ML
.1-.4 INJECTION, SOLUTION INTRAMUSCULAR; INTRAVENOUS; SUBCUTANEOUS
Status: ACTIVE | OUTPATIENT
Start: 2020-11-02 | End: 2020-11-03

## 2020-11-02 RX ORDER — CELECOXIB 200 MG/1
200 CAPSULE ORAL ONCE
Status: COMPLETED | OUTPATIENT
Start: 2020-11-02 | End: 2020-11-02

## 2020-11-02 RX ORDER — GABAPENTIN 300 MG/1
300 CAPSULE ORAL ONCE
Status: COMPLETED | OUTPATIENT
Start: 2020-11-02 | End: 2020-11-02

## 2020-11-02 RX ORDER — HYDROMORPHONE HYDROCHLORIDE 1 MG/ML
0.2 INJECTION, SOLUTION INTRAMUSCULAR; INTRAVENOUS; SUBCUTANEOUS
Status: DISCONTINUED | OUTPATIENT
Start: 2020-11-02 | End: 2020-11-03 | Stop reason: HOSPADM

## 2020-11-02 RX ORDER — PROPOFOL 10 MG/ML
INJECTION, EMULSION INTRAVENOUS CONTINUOUS PRN
Status: DISCONTINUED | OUTPATIENT
Start: 2020-11-02 | End: 2020-11-02

## 2020-11-02 RX ORDER — ONDANSETRON 2 MG/ML
4 INJECTION INTRAMUSCULAR; INTRAVENOUS EVERY 30 MIN PRN
Status: DISCONTINUED | OUTPATIENT
Start: 2020-11-02 | End: 2020-11-02 | Stop reason: HOSPADM

## 2020-11-02 RX ORDER — OXYCODONE HYDROCHLORIDE 5 MG/1
5 TABLET ORAL EVERY 4 HOURS PRN
Status: DISCONTINUED | OUTPATIENT
Start: 2020-11-02 | End: 2020-11-03 | Stop reason: HOSPADM

## 2020-11-02 RX ORDER — HYDROMORPHONE HYDROCHLORIDE 1 MG/ML
.3-.5 INJECTION, SOLUTION INTRAMUSCULAR; INTRAVENOUS; SUBCUTANEOUS EVERY 5 MIN PRN
Status: DISCONTINUED | OUTPATIENT
Start: 2020-11-02 | End: 2020-11-02 | Stop reason: HOSPADM

## 2020-11-02 RX ORDER — CEFAZOLIN SODIUM 2 G/100ML
2 INJECTION, SOLUTION INTRAVENOUS EVERY 8 HOURS
Status: COMPLETED | OUTPATIENT
Start: 2020-11-02 | End: 2020-11-03

## 2020-11-02 RX ORDER — POLYETHYLENE GLYCOL 3350 17 G/17G
17 POWDER, FOR SOLUTION ORAL DAILY
Status: DISCONTINUED | OUTPATIENT
Start: 2020-11-03 | End: 2020-11-03 | Stop reason: HOSPADM

## 2020-11-02 RX ORDER — HYDROXYZINE HYDROCHLORIDE 50 MG/1
50 TABLET, FILM COATED ORAL EVERY 6 HOURS PRN
Status: DISCONTINUED | OUTPATIENT
Start: 2020-11-02 | End: 2020-11-02 | Stop reason: HOSPADM

## 2020-11-02 RX ORDER — FENTANYL CITRATE 50 UG/ML
INJECTION, SOLUTION INTRAMUSCULAR; INTRAVENOUS PRN
Status: DISCONTINUED | OUTPATIENT
Start: 2020-11-02 | End: 2020-11-02

## 2020-11-02 RX ORDER — ACETAMINOPHEN 325 MG/1
975 TABLET ORAL ONCE
Status: COMPLETED | OUTPATIENT
Start: 2020-11-02 | End: 2020-11-02

## 2020-11-02 RX ORDER — CEFAZOLIN SODIUM 1 G/50ML
1 INJECTION, SOLUTION INTRAVENOUS SEE ADMIN INSTRUCTIONS
Status: DISCONTINUED | OUTPATIENT
Start: 2020-11-02 | End: 2020-11-02 | Stop reason: HOSPADM

## 2020-11-02 RX ORDER — CEFAZOLIN SODIUM 2 G/100ML
2 INJECTION, SOLUTION INTRAVENOUS
Status: COMPLETED | OUTPATIENT
Start: 2020-11-02 | End: 2020-11-02

## 2020-11-02 RX ORDER — ONDANSETRON 2 MG/ML
INJECTION INTRAMUSCULAR; INTRAVENOUS PRN
Status: DISCONTINUED | OUTPATIENT
Start: 2020-11-02 | End: 2020-11-02

## 2020-11-02 RX ORDER — ACETAMINOPHEN 325 MG/1
975 TABLET ORAL EVERY 8 HOURS
Status: DISCONTINUED | OUTPATIENT
Start: 2020-11-02 | End: 2020-11-03 | Stop reason: HOSPADM

## 2020-11-02 RX ORDER — FENTANYL CITRATE 50 UG/ML
25-50 INJECTION, SOLUTION INTRAMUSCULAR; INTRAVENOUS
Status: DISCONTINUED | OUTPATIENT
Start: 2020-11-02 | End: 2020-11-02 | Stop reason: HOSPADM

## 2020-11-02 RX ORDER — HYDROMORPHONE HYDROCHLORIDE 1 MG/ML
0.4 INJECTION, SOLUTION INTRAMUSCULAR; INTRAVENOUS; SUBCUTANEOUS
Status: DISCONTINUED | OUTPATIENT
Start: 2020-11-02 | End: 2020-11-03 | Stop reason: HOSPADM

## 2020-11-02 RX ORDER — HYDROXYZINE HYDROCHLORIDE 25 MG/1
25 TABLET, FILM COATED ORAL EVERY 6 HOURS PRN
Status: DISCONTINUED | OUTPATIENT
Start: 2020-11-02 | End: 2020-11-02 | Stop reason: HOSPADM

## 2020-11-02 RX ORDER — LIDOCAINE 40 MG/G
CREAM TOPICAL
Status: DISCONTINUED | OUTPATIENT
Start: 2020-11-02 | End: 2020-11-03 | Stop reason: HOSPADM

## 2020-11-02 RX ORDER — KETAMINE HYDROCHLORIDE 10 MG/ML
INJECTION, SOLUTION INTRAMUSCULAR; INTRAVENOUS PRN
Status: DISCONTINUED | OUTPATIENT
Start: 2020-11-02 | End: 2020-11-02

## 2020-11-02 RX ORDER — AMOXICILLIN 250 MG
1 CAPSULE ORAL 2 TIMES DAILY
Status: DISCONTINUED | OUTPATIENT
Start: 2020-11-02 | End: 2020-11-03 | Stop reason: HOSPADM

## 2020-11-02 RX ORDER — KETOROLAC TROMETHAMINE 15 MG/ML
15 INJECTION, SOLUTION INTRAMUSCULAR; INTRAVENOUS EVERY 6 HOURS
Status: DISCONTINUED | OUTPATIENT
Start: 2020-11-02 | End: 2020-11-03 | Stop reason: HOSPADM

## 2020-11-02 RX ORDER — LIDOCAINE HCL/EPINEPHRINE/PF 2%-1:200K
VIAL (ML) INJECTION PRN
Status: DISCONTINUED | OUTPATIENT
Start: 2020-11-02 | End: 2020-11-02

## 2020-11-02 RX ORDER — ONDANSETRON 4 MG/1
4 TABLET, ORALLY DISINTEGRATING ORAL EVERY 6 HOURS PRN
Status: DISCONTINUED | OUTPATIENT
Start: 2020-11-02 | End: 2020-11-03 | Stop reason: HOSPADM

## 2020-11-02 RX ORDER — PROCHLORPERAZINE MALEATE 5 MG
5 TABLET ORAL EVERY 6 HOURS PRN
Status: DISCONTINUED | OUTPATIENT
Start: 2020-11-02 | End: 2020-11-03 | Stop reason: HOSPADM

## 2020-11-02 RX ORDER — ONDANSETRON 2 MG/ML
4 INJECTION INTRAMUSCULAR; INTRAVENOUS EVERY 6 HOURS PRN
Status: DISCONTINUED | OUTPATIENT
Start: 2020-11-02 | End: 2020-11-03 | Stop reason: HOSPADM

## 2020-11-02 RX ORDER — KETOROLAC TROMETHAMINE 15 MG/ML
15 INJECTION, SOLUTION INTRAMUSCULAR; INTRAVENOUS EVERY 6 HOURS
Status: DISCONTINUED | OUTPATIENT
Start: 2020-11-02 | End: 2020-11-02 | Stop reason: CLARIF

## 2020-11-02 RX ORDER — HYDROXYZINE HYDROCHLORIDE 25 MG/1
25 TABLET, FILM COATED ORAL EVERY 6 HOURS PRN
Qty: 40 TABLET | Refills: 0 | Status: SHIPPED | OUTPATIENT
Start: 2020-11-02 | End: 2021-04-09

## 2020-11-02 RX ADMIN — MIDAZOLAM 2 MG: 1 INJECTION INTRAMUSCULAR; INTRAVENOUS at 09:39

## 2020-11-02 RX ADMIN — PROPOFOL 75 MCG/KG/MIN: 10 INJECTION, EMULSION INTRAVENOUS at 09:55

## 2020-11-02 RX ADMIN — PHENYLEPHRINE HYDROCHLORIDE 100 MCG: 10 INJECTION INTRAVENOUS at 10:11

## 2020-11-02 RX ADMIN — KETAMINE HYDROCHLORIDE 10 MG: 10 INJECTION INTRAMUSCULAR; INTRAVENOUS at 10:45

## 2020-11-02 RX ADMIN — OXYCODONE HYDROCHLORIDE 5 MG: 5 TABLET ORAL at 20:01

## 2020-11-02 RX ADMIN — CEFAZOLIN SODIUM 2 G: 2 INJECTION, SOLUTION INTRAVENOUS at 09:39

## 2020-11-02 RX ADMIN — KETOROLAC TROMETHAMINE 15 MG: 30 INJECTION, SOLUTION INTRAMUSCULAR at 10:50

## 2020-11-02 RX ADMIN — Medication 10 MG: at 10:02

## 2020-11-02 RX ADMIN — CEFAZOLIN SODIUM 2 G: 2 INJECTION, SOLUTION INTRAVENOUS at 17:55

## 2020-11-02 RX ADMIN — PHENYLEPHRINE HYDROCHLORIDE 100 MCG: 10 INJECTION INTRAVENOUS at 10:58

## 2020-11-02 RX ADMIN — DOCUSATE SODIUM AND SENNOSIDES 1 TABLET: 8.6; 5 TABLET ORAL at 20:01

## 2020-11-02 RX ADMIN — CELECOXIB 200 MG: 200 CAPSULE ORAL at 07:42

## 2020-11-02 RX ADMIN — PHENYLEPHRINE HYDROCHLORIDE 100 MCG: 10 INJECTION INTRAVENOUS at 10:28

## 2020-11-02 RX ADMIN — FENTANYL CITRATE 50 MCG: 50 INJECTION, SOLUTION INTRAMUSCULAR; INTRAVENOUS at 09:45

## 2020-11-02 RX ADMIN — SODIUM CHLORIDE, POTASSIUM CHLORIDE, SODIUM LACTATE AND CALCIUM CHLORIDE: 600; 310; 30; 20 INJECTION, SOLUTION INTRAVENOUS at 08:17

## 2020-11-02 RX ADMIN — KETOROLAC TROMETHAMINE 15 MG: 15 INJECTION, SOLUTION INTRAMUSCULAR; INTRAVENOUS at 20:01

## 2020-11-02 RX ADMIN — LIDOCAINE HYDROCHLORIDE 100 MG: 10 INJECTION, SOLUTION INFILTRATION; PERINEURAL at 09:55

## 2020-11-02 RX ADMIN — PHENYLEPHRINE HYDROCHLORIDE 150 MCG: 10 INJECTION INTRAVENOUS at 10:53

## 2020-11-02 RX ADMIN — DEXAMETHASONE SODIUM PHOSPHATE 10 MG: 4 INJECTION, SOLUTION INTRA-ARTICULAR; INTRALESIONAL; INTRAMUSCULAR; INTRAVENOUS; SOFT TISSUE at 09:55

## 2020-11-02 RX ADMIN — ROPIVACAINE HYDROCHLORIDE 22 ML: 5 INJECTION, SOLUTION EPIDURAL; INFILTRATION; PERINEURAL at 11:42

## 2020-11-02 RX ADMIN — ACETAMINOPHEN 975 MG: 325 TABLET, FILM COATED ORAL at 07:41

## 2020-11-02 RX ADMIN — SODIUM CHLORIDE, POTASSIUM CHLORIDE, SODIUM LACTATE AND CALCIUM CHLORIDE: 600; 310; 30; 20 INJECTION, SOLUTION INTRAVENOUS at 14:50

## 2020-11-02 RX ADMIN — Medication 7.5 MG: at 09:58

## 2020-11-02 RX ADMIN — OXYCODONE HYDROCHLORIDE 5 MG: 5 TABLET ORAL at 15:50

## 2020-11-02 RX ADMIN — PHENYLEPHRINE HYDROCHLORIDE 100 MCG: 10 INJECTION INTRAVENOUS at 10:03

## 2020-11-02 RX ADMIN — ACETAMINOPHEN 975 MG: 325 TABLET, FILM COATED ORAL at 23:19

## 2020-11-02 RX ADMIN — PHENYLEPHRINE HYDROCHLORIDE 100 MCG: 10 INJECTION INTRAVENOUS at 10:34

## 2020-11-02 RX ADMIN — BUPIVACAINE HYDROCHLORIDE IN DEXTROSE 1.7 ML: 7.5 INJECTION, SOLUTION SUBARACHNOID at 09:50

## 2020-11-02 RX ADMIN — ONDANSETRON 4 MG: 2 INJECTION INTRAMUSCULAR; INTRAVENOUS at 09:42

## 2020-11-02 RX ADMIN — PHENYLEPHRINE HYDROCHLORIDE 150 MCG: 10 INJECTION INTRAVENOUS at 10:23

## 2020-11-02 RX ADMIN — PHENYLEPHRINE HYDROCHLORIDE 150 MCG: 10 INJECTION INTRAVENOUS at 10:43

## 2020-11-02 RX ADMIN — KETAMINE HYDROCHLORIDE 20 MG: 10 INJECTION INTRAMUSCULAR; INTRAVENOUS at 09:45

## 2020-11-02 RX ADMIN — PHENYLEPHRINE HYDROCHLORIDE 100 MCG: 10 INJECTION INTRAVENOUS at 11:16

## 2020-11-02 RX ADMIN — SODIUM CHLORIDE, POTASSIUM CHLORIDE, SODIUM LACTATE AND CALCIUM CHLORIDE: 600; 310; 30; 20 INJECTION, SOLUTION INTRAVENOUS at 10:45

## 2020-11-02 RX ADMIN — PHENYLEPHRINE HYDROCHLORIDE 100 MCG: 10 INJECTION INTRAVENOUS at 10:17

## 2020-11-02 RX ADMIN — LIDOCAINE HYDROCHLORIDE,EPINEPHRINE BITARTRATE 3 ML: 20; .005 INJECTION, SOLUTION EPIDURAL; INFILTRATION; INTRACAUDAL; PERINEURAL at 11:41

## 2020-11-02 RX ADMIN — Medication 7.5 MG: at 10:20

## 2020-11-02 RX ADMIN — ASPIRIN 325 MG: 325 TABLET, COATED ORAL at 14:44

## 2020-11-02 RX ADMIN — PHENYLEPHRINE HYDROCHLORIDE 100 MCG: 10 INJECTION INTRAVENOUS at 10:50

## 2020-11-02 RX ADMIN — GABAPENTIN 300 MG: 300 CAPSULE ORAL at 07:42

## 2020-11-02 RX ADMIN — LIDOCAINE HYDROCHLORIDE 1 ML: 10 INJECTION, SOLUTION EPIDURAL; INFILTRATION; INTRACAUDAL; PERINEURAL at 08:17

## 2020-11-02 RX ADMIN — TRANEXAMIC ACID 1950 MG: 650 TABLET, FILM COATED ORAL at 07:41

## 2020-11-02 RX ADMIN — ACETAMINOPHEN 975 MG: 325 TABLET, FILM COATED ORAL at 15:50

## 2020-11-02 NOTE — ANESTHESIA PROCEDURE NOTES
Procedure note : Adductor canal      Staff -   CRNA: Kim Fountain APRN CRNA  Performed By: CRNA  Pre-Procedure    Location: post-op      Pre-Anesthestic Checklist: patient identified, IV checked, site marked, risks and benefits discussed, informed consent, monitors and equipment checked, pre-op evaluation, at physician/surgeon's request and post-op pain management    Timeout  Correct Patient: Yes   Correct Procedure: Yes   Correct Site: Yes   Correct Laterality: Yes   Correct Position: Yes   Site Marked: Yes   .   Procedure Documentation    Diagnosis:POST OP PAIN.    Procedure: Adductor canal, right.   Patient Position:supine   Ultrasound used to identify targeted nerve, plexus, or vascular marker and placed a needle adjacent to it., Ultrasound was used to visualize the spread of the anesthetic in close proximity to the above stated nerve. A permanent image is entered into the patient's record.  Patient Prep/Sterile Barriers; mask, sterile gloves, chlorhexidine gluconate and isopropyl alcohol, patient draped.  .  Needle: insulated   Needle Gauge: 21.    Needle Length (Inches) 4   Insertion Method: Single Shot.        Assessment/Narrative  Paresthesias: No.  Injection made incrementally with aspirations every 5 mL..  The placement was negative for: blood aspirated, painful injection and site bleeding.  Bolus given via needle. No blood aspirated via catheter.   Secured via.   Complications: none. Test dose of 3 mL lidocaine 2% w/ 1:200,000 epinephrine at 11:41.  Test dose negative for signs of intravascular, subdural or intrathecal injection. Comments:  Pt under spinal anesthesia.  Tolerated procedure well.

## 2020-11-02 NOTE — OR NURSING
Patient awaiting room placement. Brought to [facility] to await room. Wife called at this time.

## 2020-11-02 NOTE — PROCEDURES
11/02/20    PREOPERATIVE DIAGNOSES: Primary right knee degenerative joint disease.   POSTOPERATIVE DIAGNOSIS: Primary right knee degenerative joint disease.   PROCEDURE: Right total knee arthroplasty.   SURGEON: Sanjay Downey MD   ASSISTANT: Lida Pinedo PA-C The presence of the PA was necessary for safe progression of the case.   ANESTHESIA: Spinal with MAC.   ESTIMATED BLOOD LOSS: 50 mL.   TOURNIQUET TIME: 42 minutes at 250 mmHg.   COMPLICATIONS: None apparent.   DISPOSITION: Stable to PACU.    IMPLANTS USED: DePuy Attune size 5 posterior stabilized femoral component with a size 4 S+ tibial component, size 5 by 10mm posterior stabilized polyethylene and 38 mm patella.     INDICATIONS: Raul is a 74 year old male with a history of progressive right knee pain due to end stage arthritis. Unfortunately, he failed conservative management including therapy and injections. After discussing risks, benefits and surgery, he elected to proceed with a right total knee replacement understanding the risks of infection, damage to vessels and nerves, blood clots, stiffness, ongoing pain, need for revision surgery, need for transfusion.  He previously had his left knee replaced with a DePuy Attune and has done very well.  We therefore elected to match hsi right side.    PROCEDURE: The patient was brought to the preoperative holding area where the right knee was marked. Consent was reviewed. He then was transferred to the operating theater. After induction of successful spinal anesthesia, he was placed supine with a bump under the right hip.  A timeout was performed, verifying correct patient, surgery, location. He received preoperative antibiotics as well as transexemic acid. The right lower extremity was then prepped and draped in standard sterile fashion.     We exsanguinated the leg and inflated the tourniquet. We then made a longitudinal incision over the anterior aspect of the knee. Dissection was carried down through  skin and subcutaneous tissue. A medial parapatellar arthrotomy was made, exposing end stage medial compartment arthritis.  Synovial tissue from the suprapatellar pouch excised. The anterior horn of the medial meniscus was released and a medial release was performed. Then, the remainder of the fat pad was excised. We turned our attention to the patella. Using a free hand technique, this was resected down to 12 mm and sized to a 38mm, then punched and a metal protector plate was placed. We then turned our attention to the femur. Preoperatively, there was a 0 degree flexion contracture.  Therefore, using an intramedullary alignment guide, 9 mm of distal femur was resected in 5 degrees of valgus.     We then turned our attention to the tibia.  An extramedullary alignment cut was used to resect 2mm off the low medial side, perpendicular to the mechanical axis.  We then turned our attention back to the distal femur and sized it to a size 5.  The epicondylar axis was established and we placed our 4-in-1 cutting block perpendicular to it, in 2 degrees of external rotation. With this in place, our anterior, posterior and chamfer distal femur cuts were made.  We then used a laminar  to open the joint in order to remove medial and lateral meniscus remnants as well as posterior osteophytes.  The jig was utilized to cut the distal femoral box for the PS component.  A variety of polyethylene were trialed, and we felt a 10mm was best, with range of motion from full extension to 135 of flexion, stability to varus and valgus stress, normal POLO test, and the patella tracked well.  After this, sized our tibial component to a 4.  We then drilled and punched our tibial component, lining it with the medial 1/3 of the tibial tubercle. The knee was thoroughly irrigated using pulse lavage. The final components were then cemented in place. All excess cement was removed and the knee was placed into full extension while the cement was  curing. Also, the wound was instilled with dilute Betadine solution. Once the cement had cured, we retrialed our components with a 10mm poly with findings as above. We then placed the final poly.  The tourniquet was deflated with hemostasis achieved.  The capsular layer was closed with #1 Stratafix, at which point there was 130 degrees of flexion with gravity.  Subcutaneous tissues with 2-0 Vicryl, skin with a 3-0 Monocryl. A sterile dressing was applied and the patient was awoken from anesthesia and transferred to PACU in stable condition.     POSTOPERATIVE PLAN:   1. Weightbearing as tolerated right lower extremity with PT for mobilization.   2. Deep venous thrombosis prophylaxis: Aspirin 325mg daily x 30 days   3. Perioperative antibiotics.   4. Follow up in 2 weeks for wound check.     MYNOR ENRIQUEZ MD

## 2020-11-02 NOTE — PLAN OF CARE
Patient alert/oriented.  Eating and drinking well.  Denies n/v or pain currently.  95% on 2.5 L of oxygen. VSS. /76   Pulse 68   Temp 97.9  F (36.6  C) (Axillary)   Resp 22   Wt 70.3 kg (155 lb)   SpO2 95%   BMI 24.28 kg/m      Patient vital signs are at baseline: Yes, except oxygen  Patient able to ambulate as they were prior to admission or with assist devices provided by therapies during their stay:  No,  Reason:  Surgery earlier today  Patient MUST void prior to discharge:  Yes  Patient able to tolerate oral intake:  Yes  Pain has adequate pain control using Oral analgesics:  Yes    Ami Álvarez RN on 11/2/2020 at 3:10 PM

## 2020-11-02 NOTE — ANESTHESIA PROCEDURE NOTES
Procedure note : intrathecal      Staff -   CRNA: India Cintron APRN CRNA  Performed By: CRNA  Pre-Procedure    Location: OR      Pre-Anesthestic Checklist: patient identified, IV checked, risks and benefits discussed, informed consent, monitors and equipment checked and pre-op evaluation    Timeout  Correct Patient: Yes   Correct Procedure: Yes   Correct Site: Yes   Correct Laterality: N/A   Correct Position: Yes   Site Marked: N/A   .   Procedure Documentation  ASA 3  .    Procedure: intrathecal, .   Patient Position:sitting Insertion Site:L3-4  (midline approach)     Patient Prep/Sterile Barriers; mask, sterile gloves, povidone-iodine 7.5% surgical scrub, patient draped.  .  Needle:  Spinal Needle (gauge): 25  Spinal/LP Needle Length (inches): 3.5 # of attempts: 1 and  # of redirects:  1 Introducer used Introducer: 20 G .        Assessment/Narrative  .  .  clear CSF fluid removed . Time Injected: 09:50while sitting

## 2020-11-02 NOTE — ANESTHESIA POSTPROCEDURE EVALUATION
Patient: Raul Wilhelm    Procedure(s):  ARTHROPLASTY, KNEE, TOTAL    Diagnosis:Arthritis [M19.90]  Diagnosis Additional Information: No value filed.    Anesthesia Type:  Spinal, Peripheral Nerve Block    Note:  Anesthesia Post Evaluation    Patient location during evaluation: Bedside  Patient participation: Able to participate in evaluation but full recovery from regional anesthesia has not yet ocurrred but is anticipated to occur within 48 hours  Level of consciousness: awake and alert  Pain management: adequate  Airway patency: patent  Cardiovascular status: acceptable  Respiratory status: acceptable  Hydration status: acceptable  PONV: none     Anesthetic complications: None          Last vitals:  Vitals:    11/02/20 1123 11/02/20 1130 11/02/20 1145   BP: 100/65 101/63 119/78   Pulse: 60 57 66   Resp: 14 14 19   Temp: 36.6  C (97.8  F)     SpO2: 94% 94% 97%         Electronically Signed By: OSCAR Moreno CRNA  November 2, 2020  12:05 PM

## 2020-11-02 NOTE — ANESTHESIA CARE TRANSFER NOTE
Patient: Raul Wilhelm    Procedure(s):  ARTHROPLASTY, KNEE, TOTAL    Diagnosis: Arthritis [M19.90]  Diagnosis Additional Information: No value filed.    Anesthesia Type:   Spinal, Peripheral Nerve Block     Note:  Airway :Face Mask  Patient transferred to:PACU  Handoff Report: Identifed the Patient, Identified the Reponsible Provider, Reviewed the pertinent medical history, Discussed the surgical course, Reviewed Intra-OP anesthesia mangement and issues during anesthesia, Set expectations for post-procedure period and Allowed opportunity for questions and acknowledgement of understanding      Vitals: (Last set prior to Anesthesia Care Transfer)    CRNA VITALS  11/2/2020 1049 - 11/2/2020 1121      11/2/2020             Pulse:  58    SpO2:  97 %                Electronically Signed By: OSCAR Rocha CRNA  November 2, 2020  11:21 AM

## 2020-11-03 ENCOUNTER — APPOINTMENT (OUTPATIENT)
Dept: PHYSICAL THERAPY | Facility: CLINIC | Age: 75
End: 2020-11-03
Attending: ORTHOPAEDIC SURGERY
Payer: COMMERCIAL

## 2020-11-03 VITALS
DIASTOLIC BLOOD PRESSURE: 63 MMHG | RESPIRATION RATE: 18 BRPM | HEART RATE: 65 BPM | OXYGEN SATURATION: 97 % | TEMPERATURE: 97.8 F | SYSTOLIC BLOOD PRESSURE: 124 MMHG | BODY MASS INDEX: 24.28 KG/M2 | WEIGHT: 155 LBS

## 2020-11-03 LAB
GLUCOSE SERPL-MCNC: 280 MG/DL (ref 70–99)
HGB BLD-MCNC: 12.9 G/DL (ref 13.3–17.7)

## 2020-11-03 PROCEDURE — 82947 ASSAY GLUCOSE BLOOD QUANT: CPT | Performed by: ORTHOPAEDIC SURGERY

## 2020-11-03 PROCEDURE — 36415 COLL VENOUS BLD VENIPUNCTURE: CPT | Performed by: ORTHOPAEDIC SURGERY

## 2020-11-03 PROCEDURE — 97110 THERAPEUTIC EXERCISES: CPT | Mod: GP | Performed by: PHYSICAL THERAPIST

## 2020-11-03 PROCEDURE — 250N000013 HC RX MED GY IP 250 OP 250 PS 637: Performed by: ORTHOPAEDIC SURGERY

## 2020-11-03 PROCEDURE — 97161 PT EVAL LOW COMPLEX 20 MIN: CPT | Mod: GP | Performed by: PHYSICAL THERAPIST

## 2020-11-03 PROCEDURE — 97116 GAIT TRAINING THERAPY: CPT | Mod: GP | Performed by: PHYSICAL THERAPIST

## 2020-11-03 PROCEDURE — 250N000011 HC RX IP 250 OP 636: Performed by: ORTHOPAEDIC SURGERY

## 2020-11-03 PROCEDURE — 85018 HEMOGLOBIN: CPT | Performed by: ORTHOPAEDIC SURGERY

## 2020-11-03 RX ADMIN — ACETAMINOPHEN 975 MG: 325 TABLET, FILM COATED ORAL at 08:11

## 2020-11-03 RX ADMIN — POLYETHYLENE GLYCOL 3350 17 G: 17 POWDER, FOR SOLUTION ORAL at 08:12

## 2020-11-03 RX ADMIN — CEFAZOLIN SODIUM 2 G: 2 INJECTION, SOLUTION INTRAVENOUS at 01:37

## 2020-11-03 RX ADMIN — ASPIRIN 325 MG: 325 TABLET, COATED ORAL at 08:11

## 2020-11-03 RX ADMIN — KETOROLAC TROMETHAMINE 15 MG: 15 INJECTION, SOLUTION INTRAMUSCULAR; INTRAVENOUS at 01:45

## 2020-11-03 RX ADMIN — BENZOCAINE AND MENTHOL 1 LOZENGE: 15; 3.6 LOZENGE ORAL at 03:51

## 2020-11-03 RX ADMIN — OXYCODONE HYDROCHLORIDE 5 MG: 5 TABLET ORAL at 05:24

## 2020-11-03 RX ADMIN — KETOROLAC TROMETHAMINE 15 MG: 15 INJECTION, SOLUTION INTRAMUSCULAR; INTRAVENOUS at 08:11

## 2020-11-03 NOTE — PLAN OF CARE
Patient vital signs are at baseline: YES  Patient able to ambulate as they were prior to admission or with assist devices provided by therapies during their stay:  YES  Patient MUST void prior to discharge:  Yes  Patient able to tolerate oral intake:  Yes  Pain has adequate pain control using Oral analgesics:  Yes    Vital signs stable.  Tolerating regular diet.  Ambulated in [place] with walker and sba/gait belt.  Voided without difficulty.  Scheduled tylenol and 5mg oxycodone given, patient is comfortable.

## 2020-11-03 NOTE — PLAN OF CARE
[facility]      OUTPATIENT PHYSICAL THERAPY EVALUATION  PLAN OF TREATMENT FOR OUTPATIENT REHABILITATION  (COMPLETE FOR INITIAL CLAIMS ONLY)  Patient's Last Name, First Name, M.I.  YOB: 1945  Raul Wilhelm                        Provider's Name  [facility] Medical Record No.  [id number]                               Onset Date:  11/02/20   Start of Care Date:  11/03/20      Type:     _X_PT   ___OT   ___SLP Medical Diagnosis:  RIght Total knee Arthroplasty                        PT Diagnosis:  Right total knee arthroplasty   Visits from SOC:  1   _________________________________________________________________________________  Plan of Treatment/Functional Goals    Planned Interventions: gait training, home exercise program, ROM (range of motion), stair training, strengthening     Goals: See Physical Therapy Goals on Care Plan in [facility] [employer] electronic health record.    Therapy Frequency: One time eval and treatment only  Predicted Duration of Therapy Intervention:    _________________________________________________________________________________    I CERTIFY THE NEED FOR THESE SERVICES FURNISHED UNDER        THIS PLAN OF TREATMENT AND WHILE UNDER MY CARE     (Physician co-signature of this document indicates review and certification of the therapy plan).                Certification date from: 11/03/20, Certification date to: 11/03/20    Referring Physician: Dr Sanjay Downey MD             Initial Assessment        See Physical Therapy evaluation dated 11/03/20 in Epic electronic health record.

## 2020-11-03 NOTE — CONSULTS
Care Management Discharge Note     Care Transitions received referral to assist with discharge planning.     Per IDT rounds & reviewing the chart, PT states that pt is able to discharge to home & work with Out Patient therapies.     Care Coordination identifies no discharge needs at this time.        JACEK Bey

## 2020-11-03 NOTE — PLAN OF CARE
Pt alert/oriented. Rating pain in right knee 2/10 & states he is comfortable. Declined oxycodone this morning. Taking toradol & scheduled tylenol. Up to the bathroom with SBA, moving well. Right knee ace removed, aquacel dry/intact. +CMS. Using IS. Tolerating reg diet.

## 2020-11-03 NOTE — PLAN OF CARE
OT: Per discussion with physical therapy no IP OT needs identified. Will discontinue orders at this time.    pt aox4 resting comfortable in no acute distress; will continue to monitor/assess

## 2020-11-03 NOTE — PROGRESS NOTES
[facility] DISCHARGE NOTE    Patient discharged to home at 12:41 PM via wheel chair. Accompanied by spouse and staff. Discharge instructions reviewed with patient, opportunity offered to ask questions. Prescriptions sent to patients preferred pharmacy. All belongings sent with patient.    Manasa Hays RN

## 2020-11-03 NOTE — DISCHARGE SUMMARY
DATE OF ADMISSION: 11/2/2020  DATE OF DISCHARGE: 11/3/2020    DISCHARGE DIAGNOSIS: s/p right total knee arthroplasty    HPI AND HOSPITAL COURSE: Raul is a 74 year old male who underwent a right total knee arthroplasty on 11/2/2020. Postoperatively, she was admitted to the floor and followed by the hospitalist.  The postoperative course was uneventful and by post op day 1 , he had progressed with PT, tolerated PO diet, voided, and pain was well controlled on orals meds.      DVT PROPHYLAXIS: ASA 325mg daily x 30d       Review of your medicines      START taking      Dose / Directions   aspirin 325 MG EC tablet  Commonly known as: ASA  Replaces: aspirin 81 MG chewable tablet      Dose: 325 mg  Take 1 tablet (325 mg) by mouth daily  Quantity: 30 tablet  Refills: 0     hydrOXYzine 25 MG tablet  Commonly known as: ATARAX      Dose: 25 mg  Take 1 tablet (25 mg) by mouth every 6 hours as needed for itching or anxiety (with pain, moderate pain)  Quantity: 40 tablet  Refills: 0     oxyCODONE 5 MG tablet  Commonly known as: ROXICODONE      Dose: 5-10 mg  Take 1-2 tablets (5-10 mg) by mouth every 3 hours as needed for pain (Moderate to Severe)  Quantity: 56 tablet  Refills: 0     senna-docusate 8.6-50 MG tablet  Commonly known as: SENOKOT-S/PERICOLACE      Dose: 1-2 tablet  Take 1-2 tablets by mouth daily as needed for constipation Take while on oral narcotics to prevent or treat constipation.  Quantity: 15 tablet  Refills: 0        CONTINUE these medicines which may have CHANGED, or have new prescriptions. If we are uncertain of the size of tablets/capsules you have at home, strength may be listed as something that might have changed.      Dose / Directions   carvedilol 6.25 MG tablet  Commonly known as: COREG  This may have changed: when to take this  Used for: Essential hypertension      TAKE ONE TABLET BY MOUTH TWICE A DAY  Quantity: 180 tablet  Refills: 3        CONTINUE these medicines which have NOT CHANGED      Dose  / Directions   acetaminophen 325 MG tablet  Commonly known as: TYLENOL      Dose: 650 mg  Take 2 tablets (650 mg) by mouth every 4 hours as needed for other (mild pain)  Quantity: 100 tablet  Refills: 0     * albuterol (2.5 MG/3ML) 0.083% neb solution  Commonly known as: PROVENTIL  Used for: Severe persistent asthma without complication      Dose: 2.5 mg  Take 1 vial (2.5 mg) by nebulization every 6 hours as needed for shortness of breath / dyspnea or wheezing  Quantity: 30 mL  Refills: 11     * ProAir RespiClick 108 (90 Base) MCG/ACT inhaler  Used for: Severe persistent asthma without complication  Generic drug: albuterol      Dose: 1-2 puff  Inhale 1-2 puffs into the lungs every 4 hours as needed for shortness of breath / dyspnea or wheezing  Quantity: 1 Inhaler  Refills: 11     alcohol swab prep pads  Used for: Type 2 diabetes mellitus without complication, without long-term current use of insulin (H)      Use to swab area of injection/carolyne as directed.  Quantity: 100 each  Refills: 3     atorvastatin 40 MG tablet  Commonly known as: LIPITOR  Used for: ST elevation myocardial infarction involving left circumflex coronary artery (H)      Dose: 40 mg  Take 1 tablet (40 mg) by mouth daily  Quantity: 90 tablet  Refills: 3     blood glucose monitoring meter device kit  Commonly known as: NO BRAND SPECIFIED  Used for: Type 2 diabetes mellitus without complication, without long-term current use of insulin (H)      Use to test blood sugar 1 times daily or as directed.  Blood Glucose Monitor Brands: per insurance.  Quantity: 1 kit  Refills: 0     fexofenadine 180 MG tablet  Commonly known as: ALLEGRA  Used for: Severe persistent asthma without complication      TAKE ONE TABLET BY MOUTH ONCE DAILY  Quantity: 90 tablet  Refills: 3     losartan-hydrochlorothiazide 50-12.5 MG tablet  Commonly known as: HYZAAR  Used for: Essential hypertension, benign      Dose: 1 tablet  Take 1 tablet by mouth daily  Quantity: 90  tablet  Refills: 3     montelukast 10 MG tablet  Commonly known as: SINGULAIR  Used for: Severe persistent asthma without complication      TAKE ONE TABLET BY MOUTH EVERY NIGHT AT BEDTIME  Quantity: 90 tablet  Refills: 3     nitroGLYcerin 0.4 MG sublingual tablet  Commonly known as: NITROSTAT  Used for: ST elevation myocardial infarction involving left circumflex coronary artery (H)      Dose: 0.4 mg  Place 1 tablet (0.4 mg) under the tongue every 5 minutes as needed for chest pain If you still have symptoms after 3 doses call 911.  Quantity: 25 tablet  Refills: 1     omeprazole 20 MG DR capsule  Commonly known as: priLOSEC  Used for: Gastroesophageal reflux disease without esophagitis      Dose: 20 mg  Take 1 capsule (20 mg) by mouth daily  Quantity: 90 capsule  Refills: 3     order for DME  Used for: Essential hypertension, benign      Equipment being ordered: arm blood pressure cuff  Quantity: 1 Device  Refills: 0     thin lancets  Commonly known as: NO BRAND SPECIFIED  Used for: Type 2 diabetes mellitus without complication, without long-term current use of insulin (H)      Use with lanceting device. To accompany: Blood Glucose Monitor Brands: per insurance.  Quantity: 100 each  Refills: 6     triamcinolone 0.1 % external cream  Commonly known as: KENALOG  Used for: Dermatitis      APPLY TO AFFECTED AREA(S) TWO TIMES A DAY  Quantity: 80 g  Refills: 3         * This list has 2 medication(s) that are the same as other medications prescribed for you. Read the directions carefully, and ask your doctor or other care provider to review them with you.            STOP taking    aspirin 81 MG chewable tablet  Commonly known as: ASA  Replaced by: aspirin 325 MG EC tablet              Where to get your medicines      These medications were sent to [facility] [place], [place] - [address]  [address] [address]    Phone: [phone]     aspirin 325 MG EC tablet    hydrOXYzine 25 MG  tablet    senna-docusate 8.6-50 MG tablet     Some of these will need a paper prescription and others can be bought over the counter. Ask your nurse if you have questions.    Bring a paper prescription for each of these medications    oxyCODONE 5 MG tablet         DISCHARGE INSTRUCTIONS   1. Weight bearing as tolerated.     2. Aquacel dressing is waterproof.  May shower with dressing in place.  Dressing will be removed at 2 week post op check   3. Return to clinic: in 2 weeks as scheduled.  Call  with questions    DISCHARGE DISPOSITION: Home    MYNOR ENRIQUEZ MD

## 2020-11-03 NOTE — PLAN OF CARE
"Pt alert/oriented. Managing pain with toradol, tylenol & oxycodone. Voiding without problem. RA sats 90%, pt states hx asthma & \"has had low oxygen all my life\". Oxygen on at 1 liter, sats 95%. Lungs diminished, clear. Capnography on. Encouraging IS & pulls to 1250. Dressing to right knee intact/dry. Ice on. SCDs on.   "

## 2020-11-03 NOTE — PROGRESS NOTES
11/03/20 0830   Quick Adds   Type of Visit Initial PT Evaluation   Living Environment   People in home spouse   Current Living Arrangements house   Home Accessibility stairs to enter home   Number of Stairs, Main Entrance 3   Living Environment Comments The pt reports his granddaughter will be assisting  at DC; plans to reside  on the main level    Self-Care   Activity/Exercise/Self-Care Comment [place]   Disability/Function   Hearing Difficulty or Deaf no   Wear Glasses or Blind no   Concentrating, Remembering or Making Decisions Difficulty no   Difficulty Communicating no   Walking or Climbing Stairs Difficulty yes   Mobility Management PLOF-  Pt reports indep. ambulation using an AD - prn  following the first surgery , no device recently.   Dressing/Bathing Difficulty no   Toileting no   Doing Errands Independently Difficulty (such as shopping) no   Fall history within last six months no   Change in Functional Status Since Onset of Current Illness/Injury yes  (Ambulation)   General Information   Onset of Illness/Injury or Date of Surgery 11/02/20   Referring Physician Dr Sanjay Downey MD    Patient/Family Therapy Goals Statement (PT) return home   Pertinent History of Current Problem (include personal factors and/or comorbidities that impact the POC) Primary right knee degenerative joint disease. ; s/p Right total knee arthroplasty   ; hx of L TKA  5//20   Weight-Bearing Status - RLE weight-bearing as tolerated   General Observations Pt alert- reports very minimal pain at rest   Pain Assessment   Patient Currently in Pain Yes, see Vital Sign flowsheet   Integumentary/Edema   Integumentary/Edema no deficits were identifed   Posture    Posture Comments bilat. pes planus posture.     Range of Motion (ROM)   ROM Comment 0-4-95   Strength   Manual Muscle Testing Quick Adds Able to perform R SLR   MMT: Knee, Rehab Eval   Knee Flexion - Right Side (3+/5) fair plus, right   Bed Mobility   Comment (Bed  Mobility) Indep   Transfers   Transfer Safety Comments SBA sit<> stand w/ RW    Gait/Stairs (Locomotion)   [place] Level (Gait) supervision   Assistive Device (Gait) walker, front-wheeled   Distance in Feet (Required for LE Total Joints)   (125 feet x2 with RW. SBA)   Pattern (Gait) swing-through   Deviations/Abnormal Patterns (Gait) marcia decreased  (incr lat sway)   Comment (Gait/Stairs) Pt amb 125 feet x2 with RW. SBA; including up/ down steps w/ use of railings/ SBA- aware of the sequence for increased ease- does at times amb w/ carryover pattern.     Balance   Balance Comments good dynamic standing balance   Sensory Examination   Sensory Perception patient reports no sensory changes   Clinical Impression   Criteria for Skilled Therapeutic Intervention yes, treatment indicated   PT Diagnosis (PT) Right total knee arthroplasty   Influenced by the following impairments Decreased  ROM/ strength   Right LE   Functional limitations due to impairments Altered mobility   Clinical Presentation Stable/Uncomplicated   Clinical Presentation Rationale clinical judgement   Clinical Decision Making (Complexity) low complexity   Therapy Frequency (PT) One time eval and treatment only   Planned Therapy Interventions (PT) gait training;home exercise program;ROM (range of motion);stair training;strengthening   Anticipated Equipment Needs at Discharge (PT)   (Pt has a RW, SEC for home use)   Risk & Benefits of therapy have been explained care plan/treatment goals reviewed;patient   PT Discharge Planning    PT Discharge Recommendation (DC Rec) home with outpatient physical therapy   PT Rationale for DC Rec   (POD 1 status)   PT Brief overview of current status  Eval completed-  Pain controlled-  Pt amb w/ RW including up/ down steps w/o difficulty, SBA.  HEP issued.  Pt declines home care- does not want home care-   Pt indep/ active- recommend OP PT; familiar w/ process due to previous surgery on L LE/ motivated-   No OT  needs as pt does not anticipate difficulty w/ ADLS-  ie dressing, showering.

## 2020-11-03 NOTE — PROGRESS NOTES
Ortho    No acute events overnight.  Pain well controlled with tylenol and intermittent oxycodone  Moving well.  Able to urinate this am    AFVSS  Pleasant, NAD  Nonlabored breathing  Right LE: Dressing cdi.  Fires ehl/fhl/gsc/ta c SILT dp/sp/tib n.  Toes warm    IMPRESSION:   1.  s/p right TKA 11.2.20    PLAN:   --WBAT right leg.  PT for mobilization   --DVT prophylaxis: ASA 325mg daily x 30d   --Cont oxycodone for pain control    --Appreciate social working seeing about home care   --Dispo: Home today pending pain control, progress with PT    Sanjay Downey MD

## 2020-11-03 NOTE — PLAN OF CARE
Physical Therapy Discharge Summary    Reason for therapy discharge:    Discharged to home with outpatient therapy.    Progress towards therapy goal(s). See goals on Care Plan in [facility] electronic health record for goal details.  Goals met   Eval completed-  Pain controlled-  Pt amb w/ RW including up/ down steps w/o difficulty, SBA.  HEP issued.  Pt declines home care- does not want home care-   Pt indep/ active- recommend OP PT; familiar w/ process due to previous surgery on L LE/ motivated-       Therapy recommendation(s):    Continued therapy is recommended.  Rationale/Recommendations:  OP PT to progress  ROM/ strength and gait activities .

## 2020-11-09 ENCOUNTER — HOSPITAL ENCOUNTER (OUTPATIENT)
Dept: PHYSICAL THERAPY | Facility: CLINIC | Age: 75
Setting detail: THERAPIES SERIES
End: 2020-11-09
Attending: ORTHOPAEDIC SURGERY
Payer: COMMERCIAL

## 2020-11-09 DIAGNOSIS — Z96.651 STATUS POST TOTAL RIGHT KNEE REPLACEMENT: ICD-10-CM

## 2020-11-09 PROCEDURE — 97110 THERAPEUTIC EXERCISES: CPT | Mod: GP | Performed by: PHYSICAL THERAPIST

## 2020-11-09 PROCEDURE — 97161 PT EVAL LOW COMPLEX 20 MIN: CPT | Mod: GP | Performed by: PHYSICAL THERAPIST

## 2020-11-09 NOTE — PROGRESS NOTES
11/09/20 1000   General Information   Type of Visit Initial OP Ortho PT Evaluation   Start of Care Date 11/09/20   Referring Physician Dr Sanjay Downey   Patient/Family Goals Statement less pain, be able to walk   Orders Evaluate and Treat   Date of Order 11/03/20   Medical Diagnosis Status post total right knee replacement   Surgical/Medical history reviewed Yes   Precautions/Limitations no known precautions/limitations   General Information Comments s/p R TKA 11/2/20, PMH L TKA 5/28/20   Presentation and Etiology   Pertinent history of current problem (include personal factors and/or comorbidities that impact the POC) Pt notes had R TKA performed Nov 2. Having more pain and soreness with this knee. Reports that was able to walk and do stairs before left the hospital without trouble but having quite a bit more difficulty at home.    Impairments A. Pain;B. Decreased WB tolerance;C. Swelling;D. Decreased ROM;E. Decreased flexibility;H. Impaired gait;G. Impaired balance;F. Decreased strength and endurance;J. Burning;K. Numbness   Functional Limitations perform activities of daily living;perform required work activities;perform desired leisure / sports activities   Symptom Location R knee   How/Where did it occur Other  (surgical)   Onset date of current episode/exacerbation 11/02/20   Chronicity New   Pain rating (0-10 point scale) Best (/10);Worst (/10)   Best (/10) 2   Worst (/10) 6   Pain quality A. Sharp;B. Dull;D. Burning;C. Aching;G. Cramping   Frequency of pain/symptoms C. With activity   Pain/symptoms are: Worse during the night   Pain/symptoms exacerbated by B. Walking;C. Lifting;G. Certain positions   Pain/symptoms eased by A. Sitting;C. Rest;E. Changing positions   Progression of symptoms since onset: Unchanged   Prior Level of Function   Prior Level of Function-Mobility Independant   Prior Level of Function-ADLs Independant   Current Level of Function   Patient role/employment history A. Employed    Employment Comments Part time   Living environment [facility]   Home/community accessibility 3 JUSTA, 13 stairs to bedroom   Current equipment-Gait/Locomotion Walker   Current equipment-ADL Wall grab bar   Fall Risk Screen   Have you fallen 2 or more times in the past year? No   Have you fallen and had an injury in the past year? No   Is patient a fall risk? No   Abuse Screen (yes response referral indicated)   Feels Unsafe at Home or Work/School no   Feels Threatened by Someone no   Does Anyone Try to Keep You From Having Contact with Others or Doing Things Outside Your Home? no   Physical Signs of Abuse Present no   Vitals Signs   Heart Rate 92   Blood Pressure 125/77   Knee Objective Findings   Side (if bilateral, select both right and left) Right   Integumentary  Edema in R knee, minimal edema in RLE and ankle   Gait/Locomotion decreased knee flexion bilaterally, increased lateral shifting, decreased step length, ER feet. Lacking terminal knee extension R, heavy UE assist with 2WW   Knee Special Test Comments not assessed due to post surgical status   Accessory Motion/Joint Mobility patellofemoral and tib-fib hypomobile R   Right Knee Extension AROM Lacking 20*, L: lacking 4*   Right Knee Extension PROM Lacking 18*   Right Knee Flexion AROM 100   Right Knee Flexion PROM 104   Right Knee Flexion Strength 4/5, R 5/5   Right Knee Extension Strength 3/5, R 5/5   Right Hip Abduction Strength 4/5 B   Right Quad Set Strength poor   R VMO Strength unable perform SLR   Right Gastrocnemius Flexibility decreased   Right Hamstring Flexibility decreased   Balance/Proprioception (Single Leg Stance) unable   Planned Therapy Interventions   Planned Therapy Interventions balance training;gait training;joint mobilization;manual therapy;neuromuscular re-education;ROM;strengthening;stretching   Planned Modality Interventions   Planned Modality Interventions Cryotherapy;Electrical stimulation;Hot packs;TENS;Ultrasound   Planned  Modality Interventions Comments only as needed   Clinical Impression   Criteria for Skilled Therapeutic Interventions Met yes, treatment indicated   PT Diagnosis R knee pain, decreased ROM and strength s/p R TKA   Influenced by the following impairments decreased ROM, weakness, decreased balance, impaired gait   Functional limitations due to impairments difficulty with ambulation, stairs, floor transfers, and functional mobility   Clinical Presentation Stable/Uncomplicated   Clinical Presentation Rationale motivated, few comorbidities   Clinical Decision Making (Complexity) Low complexity   Therapy Frequency 2 times/Week   Predicted Duration of Therapy Intervention (days/wks) 8 weeks   Risk & Benefits of therapy have been explained Yes   Patient, Family & other staff in agreement with plan of care Yes   Education Assessment   Preferred Learning Style Listening;Demonstration   Barriers to Learning No barriers   ORTHO GOALS   PT Ortho Eval Goals 1;2;3   Ortho Goal 1   Goal Identifier 1   Goal Description Pt will demonstrate < 5* knee extension to improve gait mechanics   Target Date 01/04/21   Ortho Goal 2   Goal Identifier 2   Goal Description Pt will improve quad strength to >/= 4+/5 for ambulation without gait aid   Target Date 01/04/21   Ortho Goal 3   Goal Identifier 3   Goal Description Pt will tolerate ambulation without gait aid for 10 minutes on even and uneven ground for ambulation around house/outdoors    Target Date 01/04/21   Total Evaluation Time   PT Eval, Low Complexity Minutes (87804) 15

## 2020-11-16 ENCOUNTER — HOSPITAL ENCOUNTER (OUTPATIENT)
Dept: PHYSICAL THERAPY | Facility: CLINIC | Age: 75
Setting detail: THERAPIES SERIES
End: 2020-11-16
Attending: ORTHOPAEDIC SURGERY
Payer: COMMERCIAL

## 2020-11-16 PROCEDURE — 97535 SELF CARE MNGMENT TRAINING: CPT | Mod: GP | Performed by: PHYSICAL THERAPIST

## 2020-11-16 PROCEDURE — 97110 THERAPEUTIC EXERCISES: CPT | Mod: GP | Performed by: PHYSICAL THERAPIST

## 2020-11-18 ENCOUNTER — HOSPITAL ENCOUNTER (OUTPATIENT)
Dept: PHYSICAL THERAPY | Facility: CLINIC | Age: 75
Setting detail: THERAPIES SERIES
End: 2020-11-18
Attending: ORTHOPAEDIC SURGERY
Payer: COMMERCIAL

## 2020-11-18 PROCEDURE — 97110 THERAPEUTIC EXERCISES: CPT | Mod: GP | Performed by: PHYSICAL THERAPIST

## 2020-11-19 ENCOUNTER — TRANSFERRED RECORDS (OUTPATIENT)
Dept: HEALTH INFORMATION MANAGEMENT | Facility: CLINIC | Age: 75
End: 2020-11-19

## 2020-11-23 ENCOUNTER — HOSPITAL ENCOUNTER (OUTPATIENT)
Dept: PHYSICAL THERAPY | Facility: CLINIC | Age: 75
Setting detail: THERAPIES SERIES
End: 2020-11-23
Attending: ORTHOPAEDIC SURGERY
Payer: COMMERCIAL

## 2020-11-23 PROCEDURE — 97110 THERAPEUTIC EXERCISES: CPT | Mod: GP | Performed by: PHYSICAL THERAPIST

## 2020-11-23 PROCEDURE — 97116 GAIT TRAINING THERAPY: CPT | Mod: GP | Performed by: PHYSICAL THERAPIST

## 2020-11-25 ENCOUNTER — HOSPITAL ENCOUNTER (OUTPATIENT)
Dept: PHYSICAL THERAPY | Facility: CLINIC | Age: 75
Setting detail: THERAPIES SERIES
End: 2020-11-25
Attending: ORTHOPAEDIC SURGERY
Payer: COMMERCIAL

## 2020-11-25 PROCEDURE — 97110 THERAPEUTIC EXERCISES: CPT | Mod: GP | Performed by: PHYSICAL THERAPIST

## 2020-11-30 ENCOUNTER — HOSPITAL ENCOUNTER (OUTPATIENT)
Dept: PHYSICAL THERAPY | Facility: CLINIC | Age: 75
Setting detail: THERAPIES SERIES
End: 2020-11-30
Attending: ORTHOPAEDIC SURGERY
Payer: COMMERCIAL

## 2020-11-30 DIAGNOSIS — J45.50 SEVERE PERSISTENT ASTHMA WITHOUT COMPLICATION (H): ICD-10-CM

## 2020-11-30 PROCEDURE — 97116 GAIT TRAINING THERAPY: CPT | Mod: GP | Performed by: PHYSICAL THERAPIST

## 2020-11-30 PROCEDURE — 97110 THERAPEUTIC EXERCISES: CPT | Mod: GP | Performed by: PHYSICAL THERAPIST

## 2020-12-02 ENCOUNTER — HOSPITAL ENCOUNTER (OUTPATIENT)
Dept: PHYSICAL THERAPY | Facility: CLINIC | Age: 75
Setting detail: THERAPIES SERIES
End: 2020-12-02
Attending: ORTHOPAEDIC SURGERY
Payer: COMMERCIAL

## 2020-12-02 PROCEDURE — 97110 THERAPEUTIC EXERCISES: CPT | Mod: GP | Performed by: PHYSICAL THERAPIST

## 2020-12-02 RX ORDER — MONTELUKAST SODIUM 10 MG/1
TABLET ORAL
Qty: 90 TABLET | Refills: 3 | OUTPATIENT
Start: 2020-12-02

## 2020-12-04 DIAGNOSIS — J45.50 SEVERE PERSISTENT ASTHMA WITHOUT COMPLICATION (H): ICD-10-CM

## 2020-12-04 RX ORDER — MONTELUKAST SODIUM 10 MG/1
TABLET ORAL
Qty: 90 TABLET | Refills: 3 | Status: CANCELLED | OUTPATIENT
Start: 2020-12-04

## 2020-12-04 NOTE — TELEPHONE ENCOUNTER
"Requested Prescriptions   Pending Prescriptions Disp Refills     montelukast (SINGULAIR) 10 MG tablet 90 tablet 3     Sig: TAKE ONE TABLET BY MOUTH EVERY NIGHT AT BEDTIME       Leukotriene Inhibitors Protocol Failed - 12/4/2020  2:04 PM        Failed - Asthma control assessment score within normal limits in last 6 months     Please review ACT score.           Failed - Recent (6 mo) or future (30 days) visit within the authorizing provider's specialty     Patient had office visit in the last 6 months or has a visit in the next 30 days with authorizing provider or within the authorizing provider's specialty.  See \"Patient Info\" tab in inbasket, or \"Choose Columns\" in Meds & Orders section of the refill encounter.            Passed - Patient is age 12 or older     If patient is under 16, ok to refill using age based dosing.         Passed - Medication is active on med list             "

## 2020-12-07 ENCOUNTER — HOSPITAL ENCOUNTER (OUTPATIENT)
Dept: PHYSICAL THERAPY | Facility: CLINIC | Age: 75
Setting detail: THERAPIES SERIES
End: 2020-12-07
Attending: ORTHOPAEDIC SURGERY
Payer: COMMERCIAL

## 2020-12-07 PROCEDURE — 97110 THERAPEUTIC EXERCISES: CPT | Mod: GP | Performed by: PHYSICAL THERAPIST

## 2020-12-09 ENCOUNTER — HOSPITAL ENCOUNTER (OUTPATIENT)
Dept: PHYSICAL THERAPY | Facility: CLINIC | Age: 75
Setting detail: THERAPIES SERIES
End: 2020-12-09
Attending: ORTHOPAEDIC SURGERY
Payer: COMMERCIAL

## 2020-12-09 PROCEDURE — 97110 THERAPEUTIC EXERCISES: CPT | Mod: GP | Performed by: PHYSICAL THERAPIST

## 2020-12-14 ENCOUNTER — HOSPITAL ENCOUNTER (OUTPATIENT)
Dept: PHYSICAL THERAPY | Facility: CLINIC | Age: 75
Setting detail: THERAPIES SERIES
End: 2020-12-14
Attending: ORTHOPAEDIC SURGERY
Payer: COMMERCIAL

## 2020-12-14 PROCEDURE — 97110 THERAPEUTIC EXERCISES: CPT | Mod: GP | Performed by: PHYSICAL THERAPIST

## 2020-12-14 NOTE — PROGRESS NOTES
Outpatient Physical Therapy Progress Note     Patient: Raul Wilhelm  : 1945    Beginning/End Dates of Reporting Period:  20 to 2020    Referring Provider: Dr Downey    Therapy Diagnosis: R knee pain, decreased ROM and strength s/p R TKA     Client Self Report: F/u with MD Thursday. Most difficulty still with stairs. Sleeping is improving. Still not feeling like walking as much as normally would.     Objective Measurements:  Objective Measure: R knee ROM  Details: 0-5-120  Objective Measure: R knee strength  Details: knee extension 5/5  Objective Measure: LEFS  Details: 34 (initial 18)       Goals:  Goal Identifier 1   Goal Description Pt will demonstrate < 5* knee extension to improve gait mechanics   Target Date 21   Date Met      Progress: improving see ROM above     Goal Identifier 2   Goal Description Pt will improve quad strength to >/= 4+/5 for ambulation without gait aid   Target Date 21   Date Met  20   Progress:     Goal Identifier 3   Goal Description Pt will tolerate ambulation without gait aid for 10 minutes on even and uneven ground for ambulation around house/outdoors    Target Date 21   Date Met      Progress: still using SEC outside of house       Progress Toward Goals:   Progress this reporting period: Pt has completed 10 PT sessions s/p TKA, demonstrating excellent gains in ROM and strength. Progress toward goals noted above. Continues to require intervention to obtain full knee extension and normalize gait mechanics.       Plan:  Changes to therapy plan of care: decrease frequency to 1x/week x 4 additional weeks    Discharge:  No

## 2020-12-17 ENCOUNTER — TRANSFERRED RECORDS (OUTPATIENT)
Dept: HEALTH INFORMATION MANAGEMENT | Facility: CLINIC | Age: 75
End: 2020-12-17

## 2020-12-21 ENCOUNTER — HOSPITAL ENCOUNTER (OUTPATIENT)
Dept: PHYSICAL THERAPY | Facility: CLINIC | Age: 75
Setting detail: THERAPIES SERIES
End: 2020-12-21
Attending: ORTHOPAEDIC SURGERY
Payer: COMMERCIAL

## 2020-12-21 PROCEDURE — 97110 THERAPEUTIC EXERCISES: CPT | Mod: GP | Performed by: PHYSICAL THERAPIST

## 2020-12-28 ENCOUNTER — HOSPITAL ENCOUNTER (OUTPATIENT)
Dept: PHYSICAL THERAPY | Facility: CLINIC | Age: 75
Setting detail: THERAPIES SERIES
End: 2020-12-28
Attending: ORTHOPAEDIC SURGERY
Payer: COMMERCIAL

## 2020-12-28 PROCEDURE — 97116 GAIT TRAINING THERAPY: CPT | Mod: GP | Performed by: PHYSICAL THERAPIST

## 2020-12-28 PROCEDURE — 97110 THERAPEUTIC EXERCISES: CPT | Mod: GP | Performed by: PHYSICAL THERAPIST

## 2021-01-04 ENCOUNTER — HOSPITAL ENCOUNTER (OUTPATIENT)
Dept: PHYSICAL THERAPY | Facility: CLINIC | Age: 76
Setting detail: THERAPIES SERIES
End: 2021-01-04
Attending: ORTHOPAEDIC SURGERY
Payer: COMMERCIAL

## 2021-01-04 PROCEDURE — 97110 THERAPEUTIC EXERCISES: CPT | Mod: GP | Performed by: PHYSICAL MEDICINE & REHABILITATION

## 2021-01-10 ENCOUNTER — TRANSFERRED RECORDS (OUTPATIENT)
Dept: HEALTH INFORMATION MANAGEMENT | Facility: CLINIC | Age: 76
End: 2021-01-10

## 2021-01-10 LAB — RETINOPATHY: NORMAL

## 2021-01-11 ENCOUNTER — HOSPITAL ENCOUNTER (OUTPATIENT)
Dept: PHYSICAL THERAPY | Facility: CLINIC | Age: 76
Setting detail: THERAPIES SERIES
End: 2021-01-11
Attending: ORTHOPAEDIC SURGERY
Payer: COMMERCIAL

## 2021-01-11 PROCEDURE — 97110 THERAPEUTIC EXERCISES: CPT | Mod: GP | Performed by: PHYSICAL MEDICINE & REHABILITATION

## 2021-01-11 PROCEDURE — 97535 SELF CARE MNGMENT TRAINING: CPT | Mod: GP | Performed by: PHYSICAL MEDICINE & REHABILITATION

## 2021-01-11 NOTE — PROGRESS NOTES
Outpatient Physical Therapy Discharge Note     Patient: Raul Wilhelm  : 1945    Beginning/End Dates of Reporting Period:  2020 to 2021    Referring Provider: Dr Downey    Therapy Diagnosis: R knee pain, decreased ROM and strength s/p R TKA     Client Self Report: Pt reports knee feels a little sore today. Notes HEP went well over the last week and stretches seem to help. States knee feels a little swollen. Feels ready to cont HEP independently at home but wondering what he should do if it swells. Notes no difficulty with walking around [employer] for 10+ minutes.     Objective Measurements:  Objective Measure: R knee ROM  Details: 0-127  Objective Measure: R knee strength  Details: flexion: 5/5, extension: 5/5     Outcome Measures (most recent score):  LEFS: 46/80 (18/80 at intial eval)    Goals:  Goal Identifier 1   Goal Description Pt will demonstrate < 5* knee extension to improve gait mechanics   Target Date 21   Date Met  21   Progress:     Goal Identifier 2   Goal Description Pt will improve quad strength to >/= 4+/5 for ambulation without gait aid   Target Date 21   Date Met  20   Progress:     Goal Identifier 3   Goal Description Pt will tolerate ambulation without gait aid for 10 minutes on even and uneven ground for ambulation around house/outdoors    Target Date 21   Date Met  21   Progress:     Progress Toward Goals:   Progress this reporting period: Pt attended 14 PT sessions, achieving 3/3 short term and long term goals. Pt's ROM, strength, pain and gait have improved and pt has returned to PLOF. Pt is appropriate for D/C at this time as he has met all goals and is independent with HEP.     Plan:  Discharge from therapy.    Discharge:    Reason for Discharge: Patient has met all goals.  Patient chooses to discontinue therapy.    Equipment Issued: [employer]    Discharge Plan: Patient to continue home program.    Please contact me with any  questions or concerns.    Thank you for your referral,     Roz Kerns, PT, DPT  Physical Therapist  [address] [address] [phone]

## 2021-02-23 ENCOUNTER — IMMUNIZATION (OUTPATIENT)
Dept: FAMILY MEDICINE | Facility: CLINIC | Age: 76
End: 2021-02-23
Payer: COMMERCIAL

## 2021-02-23 PROCEDURE — 91300 PR COVID VAC PFIZER DIL RECON 30 MCG/0.3 ML IM: CPT

## 2021-02-23 PROCEDURE — 0001A PR COVID VAC PFIZER DIL RECON 30 MCG/0.3 ML IM: CPT

## 2021-03-16 ENCOUNTER — IMMUNIZATION (OUTPATIENT)
Dept: FAMILY MEDICINE | Facility: CLINIC | Age: 76
End: 2021-03-16
Attending: FAMILY MEDICINE
Payer: COMMERCIAL

## 2021-03-16 PROCEDURE — 91300 PR COVID VAC PFIZER DIL RECON 30 MCG/0.3 ML IM: CPT

## 2021-03-16 PROCEDURE — 0002A PR COVID VAC PFIZER DIL RECON 30 MCG/0.3 ML IM: CPT

## 2021-04-09 ENCOUNTER — OFFICE VISIT (OUTPATIENT)
Dept: FAMILY MEDICINE | Facility: CLINIC | Age: 76
End: 2021-04-09
Payer: COMMERCIAL

## 2021-04-09 VITALS
WEIGHT: 164 LBS | BODY MASS INDEX: 25.69 KG/M2 | TEMPERATURE: 97.6 F | OXYGEN SATURATION: 96 % | DIASTOLIC BLOOD PRESSURE: 72 MMHG | HEART RATE: 75 BPM | RESPIRATION RATE: 16 BRPM | SYSTOLIC BLOOD PRESSURE: 124 MMHG

## 2021-04-09 DIAGNOSIS — E11.9 TYPE 2 DIABETES MELLITUS WITHOUT COMPLICATION, WITHOUT LONG-TERM CURRENT USE OF INSULIN (H): ICD-10-CM

## 2021-04-09 DIAGNOSIS — J44.9 CHRONIC OBSTRUCTIVE PULMONARY DISEASE, UNSPECIFIED COPD TYPE (H): ICD-10-CM

## 2021-04-09 DIAGNOSIS — J45.51 SEVERE PERSISTENT ASTHMA WITH ACUTE EXACERBATION (H): Primary | ICD-10-CM

## 2021-04-09 DIAGNOSIS — J45.50 SEVERE PERSISTENT ASTHMA WITHOUT COMPLICATION (H): ICD-10-CM

## 2021-04-09 LAB
CHOLEST SERPL-MCNC: 174 MG/DL
HBA1C MFR BLD: 8.5 % (ref 0–5.6)
HDLC SERPL-MCNC: 46 MG/DL
LDLC SERPL CALC-MCNC: 75 MG/DL
NONHDLC SERPL-MCNC: 128 MG/DL
TRIGL SERPL-MCNC: 263 MG/DL

## 2021-04-09 PROCEDURE — 99214 OFFICE O/P EST MOD 30 MIN: CPT | Performed by: INTERNAL MEDICINE

## 2021-04-09 PROCEDURE — 80061 LIPID PANEL: CPT | Performed by: INTERNAL MEDICINE

## 2021-04-09 PROCEDURE — 36415 COLL VENOUS BLD VENIPUNCTURE: CPT | Performed by: INTERNAL MEDICINE

## 2021-04-09 PROCEDURE — 83036 HEMOGLOBIN GLYCOSYLATED A1C: CPT | Performed by: INTERNAL MEDICINE

## 2021-04-09 RX ORDER — ALBUTEROL SULFATE 90 UG/1
1-2 POWDER, METERED RESPIRATORY (INHALATION) EVERY 4 HOURS PRN
Qty: 1 EACH | Refills: 11 | Status: SHIPPED | OUTPATIENT
Start: 2021-04-09 | End: 2022-03-11

## 2021-04-09 RX ORDER — PREDNISONE 20 MG/1
TABLET ORAL
Qty: 20 TABLET | Refills: 1 | Status: SHIPPED | OUTPATIENT
Start: 2021-04-09 | End: 2022-04-14

## 2021-04-09 NOTE — LETTER
April 12, 2021      Raul Wilhelm  [address]  [place] [address]        Dear ,    We are writing to inform you of your test results.  The cholesterol is higher than 1 year ago.     Resulted Orders   Lipid panel reflex to direct LDL Non-fasting   Result Value Ref Range    Cholesterol 174 <200 mg/dL    Triglycerides 263 (H) <150 mg/dL      Comment:      Borderline high:  150-199 mg/dl  High:             200-499 mg/dl  Very high:       >499 mg/dl      HDL Cholesterol 46 >39 mg/dL    LDL Cholesterol Calculated 75 <100 mg/dL      Comment:      Desirable:       <100 mg/dl    Non HDL Cholesterol 128 <130 mg/dL   Hemoglobin A1c   Result Value Ref Range    Hemoglobin A1C 8.5 (H) 0 - 5.6 %      Comment:      Normal <5.7% Prediabetes 5.7-6.4%  Diabetes 6.5% or higher - adopted from ADA   consensus guidelines.         If you have any questions or concerns, please call the clinic at the number listed above.       Sincerely,      Sarita Hennessy, DO

## 2021-04-09 NOTE — PATIENT INSTRUCTIONS
1. Order for prednisone with 1 refill  2. Prednisone can raise blood sugar  3. Continue with albuterol as you have been  4. Follow-up if symptoms not improving

## 2021-04-09 NOTE — PROGRESS NOTES
"    Assessment & Plan     Severe persistent asthma with acute exacerbation - asthma predominent, longstanding with features of COPD.  Start steroids; provided a refill since he [place] easy access to healthcare  - predniSONE (DELTASONE) 20 MG tablet; Take 3 tabs by mouth daily x 3 days, then 2 tabs daily x 3 days, then 1 tab daily x 3 days, then 1/2 tab daily x 3 days.    Chronic obstructive pulmonary disease, unspecified COPD type (H)  - predniSONE (DELTASONE) 20 MG tablet; Take 3 tabs by mouth daily x 3 days, then 2 tabs daily x 3 days, then 1 tab daily x 3 days, then 1/2 tab daily x 3 days.    Type 2 diabetes mellitus without complication, without long-term current use of insulin (H)  -discussed the prednisone will raise the blood sugar.  He has follow-up with me next week to review blood sugar more detail.  - Lipid panel reflex to direct LDL Fasting; Future             BMI:   Estimated body mass index is 25.69 kg/m  as calculated from the following:    Height as of 10/13/20: 1.702 m (5' 7\").    Weight as of this encounter: 74.4 kg (164 lb).       Patient Instructions   1. Order for prednisone with 1 refill  2. Prednisone can raise blood sugar  3. Continue with albuterol as you have been  4. Follow-up if symptoms not improving      No follow-ups on file.    Sarita Hennessy DO  [facility]    Jonathon Carter is a 75 year old who presents for the following health issues     HPI     Schedule DM follow unit(s)p - LABS TODAY  -  Schedule for next Wednesday face to face   Medicare Visit : do with the DM  Colonoscopy: need orders  Shingles: Check insurances first      Pt never use the glucometer - doesn't know how to used  Schedule RN visit next Wednesday after PCP for DM/    Need a refill for albuterol     Asthma Follow-Up    Was ACT completed today?    Yes    ACT Total Scores 4/9/2021   ACT TOTAL SCORE -   ASTHMA ER VISITS -   ASTHMA " HOSPITALIZATIONS -   ACT TOTAL SCORE (Goal Greater than or Equal to 20) 20   In the past 12 months, how many times did you visit the emergency room for your asthma without being admitted to the hospital? 0   In the past 12 months, how many times were you hospitalized overnight because of your asthma? 0          How many days per week do you miss taking your asthma controller medication?  0    Please describe any recent triggers for your asthma: smoke, pollens, mold, humidity, strong odors and fumes, exercise or sports, emotions, cold air and Gastric Reflux    Have you had any Emergency Room Visits, Urgent Care Visits, or Hospital Admissions since your last office visit?  No    Spring and fall are worse times of year.  Is noting productive cough    Using albuterol 2-4 x day.  Finds it helpful.  Normally doesn't need to use.    Has been on controller inhalers in the past, but never felt like it helped    Would like to have prednisone on [place]      Choking: he reports food can get stuck and needs to cough it out.  Ongoing for decades, not worse      Review of Systems   Constitutional, HEENT, cardiovascular, pulmonary, gi and gu systems are negative, except as otherwise noted.      Objective    /72   Pulse 75   Temp 97.6  F (36.4  C) (Tympanic)   Resp 16   Wt 74.4 kg (164 lb)   SpO2 96%   BMI 25.69 kg/m    Body mass index is 25.69 kg/m .  Physical Exam   GENERAL APPEARANCE: healthy, alert and no distress  EYES: Eyes grossly normal to inspection, PERRL and conjunctivae and sclerae normal  HENT: ear canals and TM's normal and nose and mouth without ulcers or lesions  NECK: no adenopathy, no asymmetry, masses, or scars and thyroid normal to palpation  RESP: diminshed breath sounds in all lung fields, no wheezing, rhonchi or crackles  CV: regular rates and rhythm, normal S1 S2, no S3 or S4 and no murmur, click or rub

## 2021-04-10 ASSESSMENT — ASTHMA QUESTIONNAIRES: ACT_TOTALSCORE: 20

## 2021-04-14 ENCOUNTER — TELEPHONE (OUTPATIENT)
Dept: INTERNAL MEDICINE | Facility: CLINIC | Age: 76
End: 2021-04-14

## 2021-04-14 ENCOUNTER — OFFICE VISIT (OUTPATIENT)
Dept: FAMILY MEDICINE | Facility: CLINIC | Age: 76
End: 2021-04-14
Payer: COMMERCIAL

## 2021-04-14 ENCOUNTER — ALLIED HEALTH/NURSE VISIT (OUTPATIENT)
Dept: FAMILY MEDICINE | Facility: CLINIC | Age: 76
End: 2021-04-14
Payer: COMMERCIAL

## 2021-04-14 VITALS
OXYGEN SATURATION: 94 % | BODY MASS INDEX: 25.43 KG/M2 | SYSTOLIC BLOOD PRESSURE: 120 MMHG | HEIGHT: 67 IN | TEMPERATURE: 97.6 F | DIASTOLIC BLOOD PRESSURE: 70 MMHG | HEART RATE: 77 BPM | RESPIRATION RATE: 16 BRPM | WEIGHT: 162 LBS

## 2021-04-14 DIAGNOSIS — E11.9 TYPE 2 DIABETES MELLITUS WITHOUT COMPLICATION, WITHOUT LONG-TERM CURRENT USE OF INSULIN (H): Primary | ICD-10-CM

## 2021-04-14 DIAGNOSIS — E11.9 TYPE 2 DIABETES MELLITUS WITHOUT COMPLICATION, WITHOUT LONG-TERM CURRENT USE OF INSULIN (H): ICD-10-CM

## 2021-04-14 DIAGNOSIS — Z00.00 ENCOUNTER FOR MEDICARE ANNUAL WELLNESS EXAM: Primary | ICD-10-CM

## 2021-04-14 PROCEDURE — 99207 PR NO CHARGE NURSE ONLY: CPT

## 2021-04-14 PROCEDURE — 99397 PER PM REEVAL EST PAT 65+ YR: CPT | Performed by: INTERNAL MEDICINE

## 2021-04-14 PROCEDURE — 99213 OFFICE O/P EST LOW 20 MIN: CPT | Mod: 25 | Performed by: INTERNAL MEDICINE

## 2021-04-14 ASSESSMENT — MIFFLIN-ST. JEOR: SCORE: 1428.46

## 2021-04-14 ASSESSMENT — ACTIVITIES OF DAILY LIVING (ADL): CURRENT_FUNCTION: NO ASSISTANCE NEEDED

## 2021-04-14 NOTE — PROGRESS NOTES
"  SUBJECTIVE:   Raul Wilhelm is a 75 year old male who presents for Preventive Visit.      Patient has been advised of split billing requirements and indicates understanding: Yes   Are you in the first 12 months of your Medicare coverage?  No    Healthy Habits:    In general, how would you rate your overall health?  Good    Frequency of exercise:  4-5 days/week    Duration of exercise:  15-30 minutes    Do you usually eat at least 4 servings of fruit and vegetables a day, include whole grains    & fiber and avoid regularly eating high fat or \"junk\" foods?  Yes    Taking medications regularly:  Yes    Barriers to taking medications:  Other (Problems swallowing some pills)    Medication side effects:  None    Ability to successfully perform activities of daily living:  No assistance needed    Home Safety:  No safety concerns identified    Hearing Impairment:  No hearing concerns    In the past 6 months, have you been bothered by leaking of urine?  No    In general, how would you rate your overall mental or emotional health?  Good      PHQ-2 Total Score:    Additional concerns today:  Yes         Medicare visit:  Shingles: Check insurance      Diabetes Follow-up      How often are you checking your blood sugar? Not at all because he is too busy    What concerns do you have today about your diabetes? Wants to know who the glucometer works     Do you have any of these symptoms? (Select all that apply)  No numbness or tingling in feet.  No redness, sores or blisters on feet.  No complaints of excessive thirst.  No reports of blurry vision.  No significant changes to weight.    Have you had a diabetic eye exam in the last 12 months? Yes- Date of last eye exam: [facility] ,  Location: 1/10/21    He reports poor diet recently.  Eats a lot of sweets    He has never met with RD or diabetes RN    Has never been on meds for diabetes     He reports he is very busy and cannot make any appointments with diabetes RN.  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    Breakfast - banana with honey, cereal and milk  Lunch - sometimes skips, leftovers, PBJ sandwich   Dinner - sandwich or meat/potatoes                 Hyperlipidemia Follow-Up      Are you regularly taking any medication or supplement to lower your cholesterol?   Yes- atorvastatin 40 mg    Are you having muscle aches or other side effects that you think could be caused by your cholesterol lowering medication?  No    Hypertension Follow-up      Do you check your blood pressure regularly outside of the clinic? No     Are you following a low salt diet? Yes    Are your blood pressures ever more than 140 on the top number (systolic) OR more   than 90 on the bottom number (diastolic), for example 140/90? No    BP Readings from Last 2 Encounters:   04/09/21 124/72   11/03/20 124/63     Hemoglobin A1C (%)   Date Value   04/09/2021 8.5 (H)   08/27/2020 7.2 (H)     LDL Cholesterol Calculated (mg/dL)   Date Value   04/09/2021 75   01/31/2020 71           Do you feel safe in your environment? Yes    Have you ever done Advance Care Planning? (For example, a Health Directive, POLST, or a discussion with a medical provider or your loved ones about your wishes): No, advance care planning information given to patient to review.  Advanced care planning was discussed at today's visit.       Fall risk  Fallen 2 or more times in the past year?: No  Any fall with injury in the past year?: No    Cognitive Screening   1) Repeat 3 items (Leader, Season, Table)  All 3 words repeated back.  2) Clock draw: NORMAL  3) 3 item recall: Recalls 3 objects  Results: 3 items recalled: COGNITIVE IMPAIRMENT LESS LIKELY    Mini-CogTM Copyright S Rafael. Licensed by the author for use in [facility]; reprinted with permission (kev@.[facility]). All rights reserved.      Do you have sleep apnea, excessive snoring or daytime drowsiness?: no    Reviewed and updated as needed this visit by clinical  staff  Tobacco  Allergies    Med Hx  Surg Hx  Fam Hx  Soc Hx        Reviewed and updated as needed this visit by Provider                Social History     Tobacco Use     Smoking status: Never Smoker     Smokeless tobacco: Never Used   Substance Use Topics     Alcohol use: Yes     Comment: Rare     If you drink alcohol do you typically have >3 drinks per day or >7 drinks per week? No    Alcohol Use 4/14/2021   Prescreen: >3 drinks/day or >7 drinks/week? No       Current providers sharing in care for this patient include:     Patient Care Team:  Sarita Hennessy DO as PCP - General (Internal Medicine)  Sarita Hennessy DO as Assigned PCP  Karel Bustillos DO as Assigned Musculoskeletal Provider  Barbara Bermudez PA-C as Assigned Heart and Vascular Provider    The following health maintenance items are reviewed in Epic and correct as of today:  Health Maintenance Due   Topic Date Due     ZOSTER IMMUNIZATION (2 of 3) 02/23/2011     FALL RISK ASSESSMENT  01/10/2020     EYE EXAM  04/01/2021     Current Outpatient Medications   Medication Sig Dispense Refill     albuterol (PROAIR RESPICLICK) 108 (90 Base) MCG/ACT inhaler Inhale 1-2 puffs into the lungs every 4 hours as needed for shortness of breath / dyspnea or wheezing 1 each 11     albuterol (PROVENTIL) (2.5 MG/3ML) 0.083% neb solution Take 1 vial (2.5 mg) by nebulization every 6 hours as needed for shortness of breath / dyspnea or wheezing 30 mL 11     alcohol swab prep pads Use to swab area of injection/carolyne as directed. (Patient not taking: Reported on 4/9/2021) 100 each 3     aspirin (ASA) 325 MG EC tablet Take 1 tablet (325 mg) by mouth daily 30 tablet 0     atorvastatin (LIPITOR) 40 MG tablet Take 1 tablet (40 mg) by mouth daily 90 tablet 3     blood glucose monitoring (NO BRAND SPECIFIED) meter device kit Use to test blood sugar 1 times daily or as directed.  Blood Glucose Monitor Brands: per insurance. 1 kit 0     carvedilol  "(COREG) 6.25 MG tablet TAKE ONE TABLET BY MOUTH TWICE A DAY (Patient taking differently: Take 6.25 mg by mouth 2 times daily (with meals) ) 180 tablet 3     fexofenadine (ALLEGRA) 180 MG tablet TAKE ONE TABLET BY MOUTH ONCE DAILY (Patient taking differently: Take 180 mg by mouth daily ) 90 tablet 3     losartan-hydrochlorothiazide (HYZAAR) 50-12.5 MG tablet Take 1 tablet by mouth daily 90 tablet 3     montelukast (SINGULAIR) 10 MG tablet TAKE ONE TABLET BY MOUTH EVERY NIGHT AT BEDTIME 90 tablet 3     nitroGLYcerin (NITROSTAT) 0.4 MG sublingual tablet Place 1 tablet (0.4 mg) under the tongue every 5 minutes as needed for chest pain If you still have symptoms after 3 doses call 911. (Patient not taking: Reported on 4/9/2021) 25 tablet 1     omeprazole (PRILOSEC) 20 MG DR capsule Take 1 capsule (20 mg) by mouth daily 90 capsule 3     order for DME Equipment being ordered: arm blood pressure cuff 1 Device 0     predniSONE (DELTASONE) 20 MG tablet Take 3 tabs by mouth daily x 3 days, then 2 tabs daily x 3 days, then 1 tab daily x 3 days, then 1/2 tab daily x 3 days. 20 tablet 1     thin (NO BRAND SPECIFIED) lancets Use with lanceting device. To accompany: Blood Glucose Monitor Brands: per insurance. 100 each 6           Review of Systems  Constitutional, HEENT, cardiovascular, pulmonary, GI, , musculoskeletal, neuro, skin, endocrine and psych systems are negative, except as otherwise noted.    OBJECTIVE:   /70   Pulse 77   Temp 97.6  F (36.4  C) (Tympanic)   Resp 16   Ht 1.702 m (5' 7\")   Wt 73.5 kg (162 lb)   SpO2 94%   BMI 25.37 kg/m   Estimated body mass index is 25.37 kg/m  as calculated from the following:    Height as of this encounter: 1.702 m (5' 7\").    Weight as of this encounter: 73.5 kg (162 lb).  Physical Exam  GENERAL APPEARANCE: healthy, alert and no distress        ASSESSMENT / PLAN:   1. Encounter for Medicare annual wellness exam    2. Type 2 diabetes mellitus without complication, " "without long-term current use of insulin (H) - he strongly declined starting meds, seeing RD or diabetes RN.  Feels he can make diet changes on his own.  Discussed healthy diet and exercise.  Follow-up in 3 months.  - **A1C FUTURE 3mo; Future  - OFFICE/OUTPT VISIT,ELAYNE SALAZAR III    Patient has been advised of split billing requirements and indicates understanding: Yes  COUNSELING:  Reviewed preventive health counseling, as reflected in patient instructions    Estimated body mass index is 25.37 kg/m  as calculated from the following:    Height as of this encounter: 1.702 m (5' 7\").    Weight as of this encounter: 73.5 kg (162 lb).        He reports that he has never smoked. He has never used smokeless tobacco.      Appropriate preventive services were discussed with this patient, including applicable screening as appropriate for cardiovascular disease, diabetes, osteopenia/osteoporosis, and glaucoma.  As appropriate for age/gender, discussed screening for colorectal cancer, prostate cancer, breast cancer, and cervical cancer. Checklist reviewing preventive services available has been given to the patient.    Reviewed patients plan of care and provided an AVS. The Intermediate Care Plan ( asthma action plan, low back pain action plan, and migraine action plan) for Raul meets the Care Plan requirement. This Care Plan has been established and reviewed with the Patient.    Counseling Resources:  ATP IV Guidelines  Pooled Cohorts Equation Calculator  Breast Cancer Risk Calculator  Breast Cancer: Medication to Reduce Risk  FRAX Risk Assessment  ICSI Preventive Guidelines  Dietary Guidelines for Americans, 2010  USDA's MyPlate  ASA Prophylaxis  Lung CA Screening    Sarita Hennessy DO  [facility]    Identified Health Risks:  "

## 2021-04-14 NOTE — PATIENT INSTRUCTIONS
Patient Education   Personalized Prevention Plan  You are due for the preventive services outlined below.  Your care team is available to assist you in scheduling these services.  If you have already completed any of these items, please share that information with your care team to update in your medical record.  Health Maintenance Due   Topic Date Due     Annual Wellness Visit  Never done     Zoster (Shingles) Vaccine (2 of 3) 02/23/2011     FALL RISK ASSESSMENT  01/10/2020     Eye Exam  04/01/2021           1. We discussed seeing Nutritionist and diabetes RN educator but you declined  2. Check blood sugar every day or every other day, change times you check, sometimes fasting, sometimes after a meal  3. Work to add more fresh veggies, decrease sugar, decrease carbs (rice, pasta, bread) portion size and frequency.  4. Follow-up in 3 months, non-fasting labs prior to visit.  If blood sugar is still elevated, should consider starting medication        Dr. Hennessy's Tips for a Healthy Diet    1. Add more fresh fruits and vegetables to your diet.  The more colorful with the fruit or vegetable (think berries, spinach, carrots, peppers) the healthier it tends to be.  Juice is not a fruit.  Prepare the vegetables in a healthy way - steam, bake.  Avoid batter/breading, butter/oil to cook.  2. Add more fiber to your diet.  Swap out white bread, white rice, white pasta for whole grain versions.  Reduce the portion size and frequency of carbohydrates/starches.  3. Choose healthier fats such as nuts, olive oil, avocado, etc. Stay away from lard, butter.  4. Decrease the frequency and portion size of 'junk food' -pizza, candy, cookies, potato chips, etc.  5. Watch liquid calories such as coffee drinks, juice, soda, teas.  There tends to be excessive sugar in these beverages.  6. Increase protein in your diet.  Eggs, cheese, yogurt, nuts, lentils, chicken, fish are good healthy choices.  Protein keeps you madrid longer, and you  are less likely to have blood sugar spikes  7. Eat healthy at least 80% of the time.  It is ok for a special treat (Mom's spaghetti dinner, cake on your birthday) every once in a while, just not every day.  When are you going to indulge (think [place] [employer] time), be sure you are eating extra healthy the day before and after.      Resources:    1. [employer] Resources for Health and Wellness:[email or web address]-yes-foods    2.   [employer] Ways To Wellness:    https://www.[employer].[email or web address]/services/ways-to-wellness  Ways to Wellness offers:    Nutrition and weight management     Corrective exercise and fitness training     Lifestyle and behavioral change     Healing services  Our team includes registered dietitians, certified personal trainers, life coaches, as well as a Culinary Educator.    3. [facility] for Cardiovascular Disease Prevention  Consider making an appointment at the [facility] if either of the following describes you:    You are an adult who has risk factors for cardiovascular disease. Risk factors include cholesterol elevations, diabetes, high blood pressure, elevated weight, inactive living, and history of tobacco use.     You don t have traditional risk factors but have a strong family history of cardiovascular disease, which may mean you have a higher of risk of cardiovascular disease.     4. Atomic Habits by Homero Banerjee.  This a good book that looks into our habits and how to sustain good healthy habits and get rid of bad habits.       Glucose Log

## 2021-04-14 NOTE — TELEPHONE ENCOUNTER
Please abstract the following data from this visit with this patient into the appropriate field in Epic:    Tests that can be patient reported without a hard copy:    Eye exam with ophthalmology on this date: 1/10/21 - [facility]     Other Tests found in the patient's chart through Chart Review/Care Everywhere:  Note to Abstraction: If this section is blank, no results were found via Chart Review/Care Everywhere.

## 2021-04-14 NOTE — NURSING NOTE
The patient comes in with his home glucometer and supplies.  This was his wife's glucometer at one time.  I have opened pouch and taken all items out.  Inserted new lancet into clicker.  Showed the patient how this works and where on his fingers to poke.  Turned on the meter and the strips barrel has the correct code as in meter.  Showed the patient this.  The date and time are correct on meter.  The strip is shown to the patient what end goes into meter and what end he puts to his blood.  Patient demonstrates good technique.  Strip is applied to meter, patient cleans his finger with alcohol and lets it dry.  Patient pokes his finger and applies blood to strip.  215.  Advised patient to call us if he has questions he thinks of later on. Cinthya NEWMAN RN

## 2021-04-15 ASSESSMENT — ASTHMA QUESTIONNAIRES: ACT_TOTALSCORE: 22

## 2021-04-23 DIAGNOSIS — I21.21 ST ELEVATION MYOCARDIAL INFARCTION INVOLVING LEFT CIRCUMFLEX CORONARY ARTERY (H): ICD-10-CM

## 2021-04-23 RX ORDER — ATORVASTATIN CALCIUM 40 MG/1
40 TABLET, FILM COATED ORAL DAILY
Qty: 90 TABLET | Refills: 0 | Status: SHIPPED | OUTPATIENT
Start: 2021-04-23 | End: 2021-08-30

## 2021-04-30 ENCOUNTER — ALLIED HEALTH/NURSE VISIT (OUTPATIENT)
Dept: FAMILY MEDICINE | Facility: CLINIC | Age: 76
End: 2021-04-30
Payer: COMMERCIAL

## 2021-04-30 DIAGNOSIS — E11.9 TYPE 2 DIABETES MELLITUS WITHOUT COMPLICATION, WITHOUT LONG-TERM CURRENT USE OF INSULIN (H): Primary | ICD-10-CM

## 2021-04-30 PROCEDURE — 99207 PR NO CHARGE NURSE ONLY: CPT

## 2021-04-30 NOTE — NURSING NOTE
Patient reports he checked his BS this am and got a reading of 400.  He was in for labs today, mentioned this to the lab techs and was then placed on RN schedule.  Patient denies all s/s of hyperglycemia.  States he is taking prednisone prescribed 4-9-21 for asthma and COPD exacerbation.  He was not taking this consistently, just resumed this 2 days ago.  He is not taking insulin or oral medications.  RN advised patient to continue to monitor BS during prednisone treatment.  Rn advised of s/s that would warrant OV and/or ER visit.  Patient agrees with plan and verbalized understanding.    Carmen SLADE, MSN, RN

## 2021-05-21 ENCOUNTER — OFFICE VISIT (OUTPATIENT)
Dept: FAMILY MEDICINE | Facility: CLINIC | Age: 76
End: 2021-05-21
Payer: COMMERCIAL

## 2021-05-21 VITALS
DIASTOLIC BLOOD PRESSURE: 80 MMHG | WEIGHT: 160 LBS | RESPIRATION RATE: 14 BRPM | OXYGEN SATURATION: 96 % | TEMPERATURE: 97.1 F | HEART RATE: 81 BPM | BODY MASS INDEX: 25.06 KG/M2 | SYSTOLIC BLOOD PRESSURE: 128 MMHG

## 2021-05-21 DIAGNOSIS — E11.9 TYPE 2 DIABETES MELLITUS WITHOUT COMPLICATION, WITHOUT LONG-TERM CURRENT USE OF INSULIN (H): Primary | ICD-10-CM

## 2021-05-21 DIAGNOSIS — R35.0 URINARY FREQUENCY: ICD-10-CM

## 2021-05-21 DIAGNOSIS — E11.9 TYPE 2 DIABETES MELLITUS WITHOUT COMPLICATION, WITHOUT LONG-TERM CURRENT USE OF INSULIN (H): ICD-10-CM

## 2021-05-21 LAB
ALBUMIN UR-MCNC: NEGATIVE MG/DL
APPEARANCE UR: CLEAR
BILIRUB UR QL STRIP: NEGATIVE
COLOR UR AUTO: YELLOW
GLUCOSE UR STRIP-MCNC: >=1000 MG/DL
HGB UR QL STRIP: ABNORMAL
KETONES UR STRIP-MCNC: NEGATIVE MG/DL
LEUKOCYTE ESTERASE UR QL STRIP: NEGATIVE
NITRATE UR QL: NEGATIVE
NON-SQ EPI CELLS #/AREA URNS LPF: ABNORMAL /LPF
PH UR STRIP: 5.5 PH (ref 5–7)
RBC #/AREA URNS AUTO: ABNORMAL /HPF
SOURCE: ABNORMAL
SP GR UR STRIP: >1.03 (ref 1–1.03)
URATE CRY #/AREA URNS HPF: ABNORMAL /HPF
UROBILINOGEN UR STRIP-ACNC: 0.2 EU/DL (ref 0.2–1)
WBC #/AREA URNS AUTO: ABNORMAL /HPF

## 2021-05-21 PROCEDURE — 81001 URINALYSIS AUTO W/SCOPE: CPT | Performed by: INTERNAL MEDICINE

## 2021-05-21 PROCEDURE — 99214 OFFICE O/P EST MOD 30 MIN: CPT | Performed by: INTERNAL MEDICINE

## 2021-05-21 NOTE — PATIENT INSTRUCTIONS
diabetes  1. Start metformin 500 mg daily x 2 weeks, then 500 mg twice daily  2. Watch for nausea and diarrhea; if you have these symptoms and are mild, they often go away after a few days  3. Meet with Hannah diabetes educator; can help teach proper meter techniques, calibration of meter, etc.  4. Follow-up with Dr. Hennessy in 3 months, non-fasting labs prior to visit    Tick Bite  1. We would consider preventative antibiotic if the tick attached is a deer tick, has been attached > 36 hours or is engorged

## 2021-05-21 NOTE — PROGRESS NOTES
Assessment & Plan   Problem List Items Addressed This Visit     Type 2 diabetes mellitus without complication, without long-term current use of insulin (H) - Primary    Relevant Medications    metFORMIN (GLUCOPHAGE) 500 MG tablet    Other Relevant Orders    AMBULATORY ADULT DIABETES EDUCATOR REFERRAL    Urine Microscopic (Completed)      Other Visit Diagnoses     Urinary frequency        Relevant Orders    UA reflex to Microscopic and Culture (Completed)                    Patient Instructions   diabetes  1. Start metformin 500 mg daily x 2 weeks, then 500 mg twice daily  2. Watch for nausea and diarrhea; if you have these symptoms and are mild, they often go away after a few days  3. Meet with Hannah, diabetes educator; can help teach proper meter techniques, calibration of meter, etc.  4. Follow-up with Dr. Hennessy in 3 months, non-fasting labs prior to visit    Tick Bite  1. We would consider preventative antibiotic if the tick attached is a deer tick, has been attached > 36 hours or is engorged        No follow-ups on file.    Sarita Hennessy, [facility] TITO Carter is a 75 year old who presents for the following health issues     HPI     Calibration: wants to know if need calibration on the meter        Diabetes Follow-up    How often are you checking your blood sugar? One time daily  What time of day are you checking your blood sugars (select all that apply)?  Before meals  Have you had any blood sugars above 200?  Yes highest 424  Have you had any blood sugars below 70?  No lowest 149    What symptoms do you notice when your blood sugar is low?  None    What concerns do you have today about your diabetes? Blood sugar is often over 200     Do you have any of these symptoms? (Select all that apply)  No numbness or tingling in feet.  No redness, sores or blisters on feet.  No complaints of excessive thirst.  No reports of blurry vision.  No significant changes to  "weight.     From last visit on 4/14    \"He reports poor diet recently.  Eats a lot of sweets    He has never met with RD or diabetes RN    Has never been on meds for diabetes     He reports he is very busy and cannot make any appointments with diabetes RN.  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.\"    He is eating more veggies; trying to eat less sweets      BP Readings from Last 2 Encounters:   05/21/21 128/80   04/14/21 120/70     Hemoglobin A1C (%)   Date Value   04/09/2021 8.5 (H)   08/27/2020 7.2 (H)     LDL Cholesterol Calculated (mg/dL)   Date Value   04/09/2021 75   01/31/2020 71                Genitourinary - Male  Onset/Duration: 1 month  Description:   Dysuria (painful urination): no}  Hematuria (blood in urine): no  Frequency: YES  Waking at night to urinate: YES- once  Hesitancy (delay in urine): no  Retention (unable to empty): no  Decrease in urinary flow: no  Incontinence: no  Progression of Symptoms:  same and constant  Accompanying Signs & Symptoms:  Fever: no  Back/Flank pain: no  Urethral discharge: no  Testicle lumps/masses/pain: no  Nausea and/or vomiting: no  Abdominal pain: no  History:   History of frequent UTI s: YES  History of kidney stones: no  History of hernias: no  Personal or Family history of Prostate problems: no  Sexually active: no  Precipitating or alleviating factors: None  Therapies tried and outcome: none        Tick bites: wants topical treatment to prevent infection from tick bite    Review of Systems   Constitutional, HEENT, cardiovascular, pulmonary, gi and gu systems are negative, except as otherwise noted.      Objective    /80   Pulse 81   Temp 97.1  F (36.2  C) (Tympanic)   Resp 14   Wt 72.6 kg (160 lb)   SpO2 96%   BMI 25.06 kg/m    Body mass index is 25.06 kg/m .  Physical Exam   GENERAL APPEARANCE: healthy, alert and no distress    Results for orders placed or performed in visit on 05/21/21 (from the past 24 hour(s))   UA reflex to " Microscopic and Culture    Specimen: Midstream Urine   Result Value Ref Range    Color Urine Yellow     Appearance Urine Clear     Glucose Urine >=1000 (A) NEG^Negative mg/dL    Bilirubin Urine Negative NEG^Negative    Ketones Urine Negative NEG^Negative mg/dL    Specific Gravity Urine >1.030 1.003 - 1.035    Blood Urine Trace (A) NEG^Negative    pH Urine 5.5 5.0 - 7.0 pH    Protein Albumin Urine Negative NEG^Negative mg/dL    Urobilinogen Urine 0.2 0.2 - 1.0 EU/dL    Nitrite Urine Negative NEG^Negative    Leukocyte Esterase Urine Negative NEG^Negative    Source Midstream Urine    Urine Microscopic   Result Value Ref Range    WBC Urine 0 - 5 OTO5^0 - 5 /HPF    RBC Urine O - 2 OTO2^O - 2 /HPF    Squamous Epithelial /LPF Urine Few FEW^Few /LPF    Uric Acid Crystals Few (A) NEG^Negative /HPF

## 2021-05-24 RX ORDER — BLOOD SUGAR DIAGNOSTIC
STRIP MISCELLANEOUS
Qty: 100 STRIP | Refills: 6 | Status: SHIPPED | OUTPATIENT
Start: 2021-05-24 | End: 2021-08-21

## 2021-05-24 NOTE — TELEPHONE ENCOUNTER
Routing refill request for test strips to provider for review/approval because:Not active on patient's medication list  5/21/21 OV note: How often are you checking your blood sugar? One time daily    Magali KIRK RN, BSN       Subjective:       Patient ID: Alan Fairbanks Jr. is a 70 y.o. White male who presents for new evaluation of JEREMIAS.     Interval Hx:  Mr Alan Fairbanks Jr. is here for follow for Recent hospital secondary to JEREMIAS due to pre-renal from extra renal fluid losses. 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 Today he is here with his wife and she tells me he is trying to make him match his I/O but that the ileostomy can sometimes put out 4.5 lts. So far he has been able to keep up. He will be seeing the surgeon for evaluation for re-connection. His sCr is better than his hospital one, 1.6 mg/dL his baseline is 0.9-1.1 mg/dL. He takes Imodium 4 times a day. His K was eleevated last time and he has a Hx for hyperkalemia we discussed low K diet and information was shared with them.    HPI   68 y/o M with CAD, HTN, DM, HAMMER cirrhosis s/p OLT in 2016 now with PTLD who presents for new evaluation of JEREMIAS.  Patient initially admitted in Oct 2017 with SOB and found to be in JEREMIAS/TLS requiring RRT. Found to have PTLD and currently undergoing chemotherapy under the care of Dr. Montez.  Most recent creatinine 1.3 (baseline creatinine 0.9-1.0).  He denies any chest pain, shortness of breath or lower extremity pain on mild to moderate activity. His appetite is good and denies any nausea, vomiting, diarrhea, abdominal pain, melena or bright red bleeding per rectum. His bowel movements are regular and his weight is stable. He urinates regularly and denies any frequency, urgency or hematuria. His BP is stable at home.       Past Medical History:   Diagnosis Date    Abdominal wall abscess 4/6/2018    JEREMIAS (acute kidney injury) 10/9/2017    Ascites 10/10/2017    CAD (coronary artery disease), native coronary artery     2 stents performed  2001 & 2007    Cancer 2017    lymphoma    Deep vein thrombosis     Diabetes mellitus     Diagnosed 2003    Diabetes mellitus, type 2     Diastolic dysfunction     Fatty liver disease, nonalcoholic      Hypertension     Intra-abdominal abscess 2/16/2018    Liver cirrhosis secondary to HAMMER 1/2/2016    Liver transplant recipient 12/30/15    Obesity     AIDE (obstructive sleep apnea)     Severe sepsis 10/29/2017    Thyroid disease     Hypothyroid diagnosed 2011         Review of Systems    Constitutional: Negative for fatigue.   Eyes: Negative for discharge.   Respiratory: Negative for cough, shortness of breath and wheezing.   Cardiovascular: Negative for chest pain and palpitations.   Gastrointestinal: Negative for nausea, vomiting, abdominal pain and diarrhea.   Genitourinary: Negative for dysuria, urgency, frequency and hematuria.   Skin: Negative for color change and rash.   Psychiatric/Behavioral: Negative for confusion.    Objective:      Physical Exam    Gen: AAOx3, NAD  HEENT: mmm  Neck: no bruit, no JVD  CV: RRR, no m/r  Resp: CTAx2, normal effort  GI: soft, ND, NTTP, +BS  Extr: ++ LE edema, + right UE edema  Neuro: normal reflexes, no focal deficits    Assessment:       1. Renovascular hypertension    2. CKD (chronic kidney disease) stage 3, GFR 30-59 ml/min    3. Tacrolimus-induced nephrotoxicity    4. Anemia due to chemotherapy    5. Diffuse large B-cell lymphoma of intra-abdominal lymph nodes        Plan:       1. JEREMIAS on CKD stage G3a/A1 eGFR 1.3 from baseline ~1. No clear etiology but creatinine currently down trending from 1.4. Will monitor closely renal function while under chemotherapy.      Lab Results   Component Value Date    CREATININE 1.6 (H) 12/10/2018     Protein Creatinine Ratios: no significant proteinuria.     Prot/Creat Ratio, Ur   Date Value Ref Range Status   12/13/2018 0.15 0.00 - 0.20 Final   11/30/2018 0.15 0.00 - 0.20 Final   02/19/2018 0.21 (H) 0.00 - 0.20 Final       Acid-Base: stable  Lab Results   Component Value Date     12/10/2018    K 5.3 (H) 12/10/2018    CO2 21 (L) 12/10/2018   · Hyperkalemia noted he has had hyperkalemia and has been treated medially we  discussed a low K diet  · Will repeat K in 2 weeks  ·   2. HTN: Blood pressures stable on current regimen  - not optimized but improving  - contimu current regiemt  - Low salt diet K diet information provided    3. CKD-MBD: will check PTH, vit D.   Lab Results   Component Value Date    CALCIUM 9.6 12/10/2018    CAION 1.11 12/04/2018    PHOS 2.5 (L) 12/04/2018       4. Anemia: per H/O  Lab Results   Component Value Date    HGB 10.8 (L) 12/10/2018        5. DM:  Last HbA1C   Lab Results   Component Value Date    HGBA1C 5.2 11/14/2018           Follow up in 3 months with labs (RFP, PTH, vit D, UA, UPC)  Marco Antonio Sheriff MD  Nephrology Fellow   [phone]

## 2021-05-25 ENCOUNTER — TELEPHONE (OUTPATIENT)
Dept: FAMILY MEDICINE | Facility: CLINIC | Age: 76
End: 2021-05-25

## 2021-05-25 NOTE — TELEPHONE ENCOUNTER
Diabetes Education Scheduling Outreach #1:    Call to patient to schedule. Patient declined to schedule.    Tere Kraft  [facility]  Diabetes and Nutrition Scheduling

## 2021-06-19 ENCOUNTER — MEDICAL CORRESPONDENCE (OUTPATIENT)
Dept: HEALTH INFORMATION MANAGEMENT | Facility: CLINIC | Age: 76
End: 2021-06-19

## 2021-07-06 NOTE — PROGRESS NOTES
Rehabilitation Services          OUTPATIENT PHYSICAL THERAPY ORTHOPEDIC EVALUATION  PLAN OF TREATMENT FOR OUTPATIENT REHABILITATION  (COMPLETE FOR INITIAL CLAIMS ONLY)  Patient's Last Name, First Name, M.I.  YOB: 1945  ChocoRaul MCKENZIE    Provider s Name:  Araceli Perez PT   Medical Record No.  [id number]   Start of Care Date:  11/09/20   Onset Date:  11/02/20   Type:     _X__PT   ___OT   ___SLP Medical Diagnosis:  Status post total right knee replacement     PT Diagnosis:  R knee pain, decreased ROM and strength s/p R TKA   Visits from SOC:  1      _________________________________________________________________________________  Plan of Treatment/Functional Goals:  balance training, gait training, joint mobilization, manual therapy, neuromuscular re-education, ROM, strengthening, stretching     Cryotherapy, Electrical stimulation, Hot packs, TENS, Ultrasound  only as needed  Goals  Goal Identifier: 1  Goal Description: Pt will demonstrate < 5* knee extension to improve gait mechanics  Target Date: 01/04/21    Goal Identifier: 2  Goal Description: Pt will improve quad strength to >/= 4+/5 for ambulation without gait aid  Target Date: 01/04/21    Goal Identifier: 3  Goal Description: Pt will tolerate ambulation without gait aid for 10 minutes on even and uneven ground for ambulation around house/outdoors   Target Date: 01/04/21        Therapy Frequency:  2 times/Week  Predicted Duration of Therapy Intervention:  8 weeks    Araceli Perez PT                 I CERTIFY THE NEED FOR THESE SERVICES FURNISHED UNDER        THIS PLAN OF TREATMENT AND WHILE UNDER MY CARE .             Physician Signature               Date    X_____________________________________________________                             Certification Date From:  11/09/21   Certification Date To:  01/11/21    Referring Provider:  Dr Sanjay Downey    Initial Assessment        See Epic Evaluation Start of Care Date: 11/09/20

## 2021-07-13 ENCOUNTER — TRANSFERRED RECORDS (OUTPATIENT)
Dept: HEALTH INFORMATION MANAGEMENT | Facility: CLINIC | Age: 76
End: 2021-07-13

## 2021-08-11 ENCOUNTER — TELEPHONE (OUTPATIENT)
Dept: FAMILY MEDICINE | Facility: CLINIC | Age: 76
End: 2021-08-11

## 2021-08-11 DIAGNOSIS — E11.9 TYPE 2 DIABETES MELLITUS WITHOUT COMPLICATION, WITHOUT LONG-TERM CURRENT USE OF INSULIN (H): Primary | ICD-10-CM

## 2021-08-11 RX ORDER — METFORMIN HCL 500 MG
TABLET, EXTENDED RELEASE 24 HR ORAL
Qty: 120 TABLET | Refills: 0 | Status: SHIPPED | OUTPATIENT
Start: 2021-08-11 | End: 2021-09-29 | Stop reason: SINTOL

## 2021-08-11 NOTE — TELEPHONE ENCOUNTER
Health Maintenance Summary     Topic Due On Due Status Completed On    MAMMOGRAM - BREAST CANCER SCREENING Feb 11, 2017 Overdue Feb 11, 2015    Colorectal Cancer Screening - Colonoscopy May 10, 2016 Overdue Mar 25, 2010    Pap Smear - Cervical Cancer Screening  Apr 24, 2017 Overdue Apr 24, 2012    Immunization - TDAP Pregnancy  Hidden     IMMUNIZATION - DTaP/Tdap/Td May 19, 2021 Not Due May 19, 2011    Immunization-Influenza Sep 1, 2017 Not Due Oct 9, 2012          Patient is due for topics as listed above, she wishes to discuss with provider .     Reason for Call:  Medication :    Do you use a [facility]?  Name of the pharmacy and phone number for the current request:  [facility] [phone]    Name of the medication requested: Metformin ER    Other request: Patient is on Metformin and it is not agreeing with patient, he would like to change to Metformin ER    Can we leave a detailed message on this number? YES    Phone number patient can be reached at: Home number on file [phone] (home)    Best Time: Any    Call taken on 8/11/2021 at 4:03 PM by Gisela Jackson

## 2021-08-11 NOTE — TELEPHONE ENCOUNTER
Changed Metformin to extended release. He is due for diabetes recheck, recommend patient to schedule office visit    OSCAR Alvarado CNP, covering for PCP

## 2021-08-20 DIAGNOSIS — E11.9 TYPE 2 DIABETES MELLITUS WITHOUT COMPLICATION, WITHOUT LONG-TERM CURRENT USE OF INSULIN (H): ICD-10-CM

## 2021-08-21 RX ORDER — BLOOD SUGAR DIAGNOSTIC
STRIP MISCELLANEOUS
Qty: 100 STRIP | Refills: 6 | Status: SHIPPED | OUTPATIENT
Start: 2021-08-21 | End: 2022-05-04

## 2021-08-21 RX ORDER — LANCETS 33 GAUGE
EACH MISCELLANEOUS
Qty: 100 EACH | Refills: 6 | Status: SHIPPED | OUTPATIENT
Start: 2021-08-21

## 2021-08-21 RX ORDER — UBIQUINOL 100 MG
CAPSULE ORAL
Qty: 100 EACH | Refills: 3 | Status: SHIPPED | OUTPATIENT
Start: 2021-08-21 | End: 2023-06-08

## 2021-08-30 DIAGNOSIS — I21.21 ST ELEVATION MYOCARDIAL INFARCTION INVOLVING LEFT CIRCUMFLEX CORONARY ARTERY (H): ICD-10-CM

## 2021-08-30 RX ORDER — ATORVASTATIN CALCIUM 40 MG/1
40 TABLET, FILM COATED ORAL DAILY
Qty: 90 TABLET | Refills: 0 | Status: SHIPPED | OUTPATIENT
Start: 2021-08-30 | End: 2021-09-29

## 2021-09-02 ENCOUNTER — TRANSFERRED RECORDS (OUTPATIENT)
Dept: HEALTH INFORMATION MANAGEMENT | Facility: CLINIC | Age: 76
End: 2021-09-02

## 2021-09-29 ENCOUNTER — OFFICE VISIT (OUTPATIENT)
Dept: FAMILY MEDICINE | Facility: CLINIC | Age: 76
End: 2021-09-29
Payer: COMMERCIAL

## 2021-09-29 VITALS
BODY MASS INDEX: 25.69 KG/M2 | DIASTOLIC BLOOD PRESSURE: 80 MMHG | OXYGEN SATURATION: 94 % | RESPIRATION RATE: 16 BRPM | HEART RATE: 79 BPM | TEMPERATURE: 97 F | SYSTOLIC BLOOD PRESSURE: 126 MMHG | WEIGHT: 164 LBS

## 2021-09-29 DIAGNOSIS — E11.9 TYPE 2 DIABETES MELLITUS WITHOUT COMPLICATION, WITHOUT LONG-TERM CURRENT USE OF INSULIN (H): Primary | ICD-10-CM

## 2021-09-29 DIAGNOSIS — I25.10 CORONARY ARTERY DISEASE INVOLVING NATIVE CORONARY ARTERY OF NATIVE HEART WITHOUT ANGINA PECTORIS: ICD-10-CM

## 2021-09-29 DIAGNOSIS — J45.50 SEVERE PERSISTENT ASTHMA WITHOUT COMPLICATION (H): ICD-10-CM

## 2021-09-29 DIAGNOSIS — Z23 NEED FOR PROPHYLACTIC VACCINATION AND INOCULATION AGAINST INFLUENZA: ICD-10-CM

## 2021-09-29 DIAGNOSIS — K21.9 GASTROESOPHAGEAL REFLUX DISEASE WITHOUT ESOPHAGITIS: ICD-10-CM

## 2021-09-29 DIAGNOSIS — I10 ESSENTIAL HYPERTENSION: ICD-10-CM

## 2021-09-29 LAB
ANION GAP SERPL CALCULATED.3IONS-SCNC: 3 MMOL/L (ref 3–14)
BUN SERPL-MCNC: 21 MG/DL (ref 7–30)
CALCIUM SERPL-MCNC: 9.9 MG/DL (ref 8.5–10.1)
CHLORIDE BLD-SCNC: 105 MMOL/L (ref 94–109)
CO2 SERPL-SCNC: 30 MMOL/L (ref 20–32)
CREAT SERPL-MCNC: 1.13 MG/DL (ref 0.66–1.25)
GFR SERPL CREATININE-BSD FRML MDRD: 63 ML/MIN/1.73M2
GLUCOSE BLD-MCNC: 226 MG/DL (ref 70–99)
HBA1C MFR BLD: 8.2 % (ref 0–5.6)
HOLD SPECIMEN: NORMAL
POTASSIUM BLD-SCNC: 4.4 MMOL/L (ref 3.4–5.3)
SODIUM SERPL-SCNC: 138 MMOL/L (ref 133–144)

## 2021-09-29 PROCEDURE — 83036 HEMOGLOBIN GLYCOSYLATED A1C: CPT | Performed by: INTERNAL MEDICINE

## 2021-09-29 PROCEDURE — 99214 OFFICE O/P EST MOD 30 MIN: CPT | Mod: 25 | Performed by: INTERNAL MEDICINE

## 2021-09-29 PROCEDURE — 36415 COLL VENOUS BLD VENIPUNCTURE: CPT | Performed by: INTERNAL MEDICINE

## 2021-09-29 PROCEDURE — 90662 IIV NO PRSV INCREASED AG IM: CPT | Performed by: INTERNAL MEDICINE

## 2021-09-29 PROCEDURE — 80048 BASIC METABOLIC PNL TOTAL CA: CPT | Performed by: INTERNAL MEDICINE

## 2021-09-29 PROCEDURE — G0008 ADMIN INFLUENZA VIRUS VAC: HCPCS | Performed by: INTERNAL MEDICINE

## 2021-09-29 RX ORDER — GLIPIZIDE 5 MG/1
5 TABLET, FILM COATED, EXTENDED RELEASE ORAL DAILY
Qty: 90 TABLET | Refills: 3 | Status: SHIPPED | OUTPATIENT
Start: 2021-09-29 | End: 2022-04-27

## 2021-09-29 RX ORDER — FEXOFENADINE HCL 180 MG/1
180 TABLET ORAL DAILY
Qty: 90 TABLET | Refills: 3 | Status: SHIPPED | OUTPATIENT
Start: 2021-09-29 | End: 2022-10-06

## 2021-09-29 RX ORDER — CARVEDILOL 6.25 MG/1
6.25 TABLET ORAL 2 TIMES DAILY
Qty: 180 TABLET | Refills: 3 | Status: SHIPPED | OUTPATIENT
Start: 2021-09-29 | End: 2023-01-10

## 2021-09-29 RX ORDER — MONTELUKAST SODIUM 10 MG/1
TABLET ORAL
Qty: 90 TABLET | Refills: 3 | Status: SHIPPED | OUTPATIENT
Start: 2021-09-29 | End: 2023-01-04

## 2021-09-29 RX ORDER — AMOXICILLIN 500 MG/1
CAPSULE ORAL
COMMUNITY
Start: 2021-05-28 | End: 2021-09-29

## 2021-09-29 RX ORDER — NITROGLYCERIN 0.4 MG/1
0.4 TABLET SUBLINGUAL EVERY 5 MIN PRN
Qty: 25 TABLET | Refills: 1 | Status: SHIPPED | OUTPATIENT
Start: 2021-09-29 | End: 2024-01-16

## 2021-09-29 RX ORDER — LOSARTAN POTASSIUM AND HYDROCHLOROTHIAZIDE 12.5; 5 MG/1; MG/1
1 TABLET ORAL DAILY
Qty: 90 TABLET | Refills: 3 | Status: SHIPPED | OUTPATIENT
Start: 2021-09-29 | End: 2022-10-06

## 2021-09-29 RX ORDER — ALBUTEROL SULFATE 0.83 MG/ML
2.5 SOLUTION RESPIRATORY (INHALATION) EVERY 6 HOURS PRN
Qty: 30 ML | Refills: 11 | Status: SHIPPED | OUTPATIENT
Start: 2021-09-29 | End: 2023-03-10

## 2021-09-29 RX ORDER — ATORVASTATIN CALCIUM 40 MG/1
40 TABLET, FILM COATED ORAL DAILY
Qty: 90 TABLET | Refills: 3 | Status: SHIPPED | OUTPATIENT
Start: 2021-09-29 | End: 2022-11-22

## 2021-09-29 NOTE — PATIENT INSTRUCTIONS
Diabetes:   1. Stop metformin due to diarrhea  2. Start Glipizide - watch for low blood sugar (less than 70).  Follow-up with DR moore if this occurs.  Would use Trulicity or Ozempic  3. Have refills remaining of lancets- tell pharmacy you misplaced them  4. Other refills sent to pharmacy - call to fill  5. Follow-up in 3 months, labs prior to visit    Dental Antibiotic  1. No need due to joint replacement       Healthcare maintenance:  1. Consider shingles shot          The guidelines for who needs antibiotics prior to routine dental work have changed in the last several years.  Many people were being over treated with antibiotics that did not need them.  The current guidelines state that only people with the following heart conditions need antibiotics prior to routine dental work.  Person with:  --prosthetic heart valves  --prior history of an infected heart valve, called endocarditis  --an unrepaired cyanotic congential heart disease - person would be following very closely with cardiology and would know they have this  --congential heart defect with prosthetic material, repaired in the last 6 months  --incompletely repaired congential heart defect with prosthetic material  --valve leak in a transplanted heart.  In addition, dental procedures are not associated with an increased risk of prosthetic joint infection, and use of routine antibiotic prophylaxis prior to dental procedures does not alter the risk of subsequent total hip or knee infection.    On chart review of this patient, I do not see that they have any of the above conditions, and therefore I do not recommend antibiotic prior to dental work.  Please call patient to clarify that they do not have any of these conditions.

## 2021-09-29 NOTE — LETTER
September 29, 2021      Raul Wilhelm  [address]  [place] [address]        Dear ,    We are writing to inform you of your test results.    Kidney function and electrolytes are normal.    Resulted Orders   **A1C FUTURE 3mo   Result Value Ref Range    Hemoglobin A1C 8.2 (H) 0.0 - 5.6 %      Comment:      Normal <5.7%   Prediabetes 5.7-6.4%    Diabetes 6.5% or higher     Note: Adopted from ADA consensus guidelines.   Extra Green Top (Lithium Heparin) Tube   Result Value Ref Range    Hold Specimen [facility]    BASIC METABOLIC PANEL   Result Value Ref Range    Sodium 138 133 - 144 mmol/L    Potassium 4.4 3.4 - 5.3 mmol/L    Chloride 105 94 - 109 mmol/L    Carbon Dioxide (CO2) 30 20 - 32 mmol/L    Anion Gap 3 3 - 14 mmol/L    Urea Nitrogen 21 7 - 30 mg/dL    Creatinine 1.13 0.66 - 1.25 mg/dL    Calcium 9.9 8.5 - 10.1 mg/dL    Glucose 226 (H) 70 - 99 mg/dL    GFR Estimate 63 >60 mL/min/1.73m2      Comment:      As of July 11, 2021, eGFR is calculated by the CKD-EPI creatinine equation, without race adjustment. eGFR can be influenced by muscle mass, exercise, and diet. The reported eGFR is an estimation only and is only applicable if the renal function is stable.       If you have any questions or concerns, please call the clinic at the number listed above.       Sincerely,      Sarita Hennessy, DO

## 2021-09-29 NOTE — PROGRESS NOTES
Assessment & Plan     Type 2 diabetes mellitus without complication, without long-term current use of insulin (H) -uncontrolled.  Having diarrhea on Metformin, only taking a few days per week.  Started glipizide.  Advised to watch for low blood sugar and advised to take with a meal.  If has low blood sugar or other side effects, would use something like Trulicity, Victoza, etc.  - **A1C FUTURE 3mo  - Albumin Random Urine Quantitative with Creat Ratio; Future  - BASIC METABOLIC PANEL; Future  - BASIC METABOLIC PANEL  - glipiZIDE (GLUCOTROL XL) 5 MG 24 hr tablet; Take 1 tablet (5 mg) by mouth daily  - atorvastatin (LIPITOR) 40 MG tablet; Take 1 tablet (40 mg) by mouth daily  - **A1C FUTURE 3mo; Future    Coronary artery disease involving native coronary artery of native heart without angina pectoris -has follow-up with cardiology in 1 month.  Refills provided  - nitroGLYcerin (NITROSTAT) 0.4 MG sublingual tablet; Place 1 tablet (0.4 mg) under the tongue every 5 minutes as needed for chest pain If you still have symptoms after 3 doses call 911.  - atorvastatin (LIPITOR) 40 MG tablet; Take 1 tablet (40 mg) by mouth daily  - losartan-hydrochlorothiazide (HYZAAR) 50-12.5 MG tablet; Take 1 tablet by mouth daily    Severe persistent asthma without complication  - stable, refill provided  - albuterol (PROVENTIL) (2.5 MG/3ML) 0.083% neb solution; Take 1 vial (2.5 mg) by nebulization every 6 hours as needed for shortness of breath / dyspnea or wheezing  - montelukast (SINGULAIR) 10 MG tablet; TAKE ONE TABLET BY MOUTH AT BEDTIME  - fexofenadine (ALLEGRA) 180 MG tablet; Take 1 tablet (180 mg) by mouth daily    Essential hypertension  - stable, refill provided  - carvedilol (COREG) 6.25 MG tablet; Take 1 tablet (6.25 mg) by mouth 2 times daily  - losartan-hydrochlorothiazide (HYZAAR) 50-12.5 MG tablet; Take 1 tablet by mouth daily    Gastroesophageal reflux disease without esophagitis  - stable, refill provided  - omeprazole  (PRILOSEC) 20 MG DR capsule; Take 1 capsule (20 mg) by mouth daily    Need for prophylactic vaccination and inoculation against influenza    - INFLUENZA, QUAD, HIGH DOSE, PF, 65YR + (FLUZONE HD)             Patient Instructions   Diabetes:   1. Stop metformin due to diarrhea  2. Start Glipizide - watch for low blood sugar (less than 70).  Follow-up with DR moore if this occurs.  Would use Trulicity or Ozempic  3. Have refills remaining of lancets- tell pharmacy you misplaced them  4. Other refills sent to pharmacy - call to fill  5. Follow-up in 3 months, labs prior to visit    Dental Antibiotic  1. No need due to joint replacement       Healthcare maintenance:  1. Consider shingles shot          The guidelines for who needs antibiotics prior to routine dental work have changed in the last several years.  Many people were being over treated with antibiotics that did not need them.  The current guidelines state that only people with the following heart conditions need antibiotics prior to routine dental work.  Person with:  --prosthetic heart valves  --prior history of an infected heart valve, called endocarditis  --an unrepaired cyanotic congential heart disease - person would be following very closely with cardiology and would know they have this  --congential heart defect with prosthetic material, repaired in the last 6 months  --incompletely repaired congential heart defect with prosthetic material  --valve leak in a transplanted heart.  In addition, dental procedures are not associated with an increased risk of prosthetic joint infection, and use of routine antibiotic prophylaxis prior to dental procedures does not alter the risk of subsequent total hip or knee infection.    On chart review of this patient, I do not see that they have any of the above conditions, and therefore I do not recommend antibiotic prior to dental work.  Please call patient to clarify that they do not have any of these  conditions.            No follow-ups on file.    Sarita Hennessy, DO  [facility] Clinic[facility]AYLA[facility] Carter is a 75 year old who presents for the following health issues     HPI     Covid-19- 3rd dose - need schedule on the    Flu shot: Today         Medication today: Antibiotic for dental work    nitroglycerin - refill      Diabetic Foot exam today    Diabetes Follow-up      How often are you checking your blood sugar? One time daily  What time of day are you checking your blood sugars (select all that apply)?  Before meals  Have you had any blood sugars above 200?  No - highest 203  Have you had any blood sugars below 70?  No- lowest 183    What symptoms do you notice when your blood sugar is low?  None    What concerns do you have today about your diabetes? None     Do you have any of these symptoms? (Select all that apply)  No numbness or tingling in feet.  No redness, sores or blisters on feet.  No complaints of excessive thirst.  No reports of blurry vision.  No significant changes to weight.     Having diarrhea on metformin, so only taking 4/7 days    Has cookies and sandwhich every day for lunch              Hyperlipidemia Follow-Up      Are you regularly taking any medication or supplement to lower your cholesterol?   Yes- ATORVASTATIN 40 MG    Are you having muscle aches or other side effects that you think could be caused by your cholesterol lowering medication?  No    Hypertension Follow-up      Do you check your blood pressure regularly outside of the clinic? Yes     Are you following a low salt diet? Yes    Are your blood pressures ever more than 140 on the top number (systolic) OR more than 90 on the bottom number (diastolic), for example 140/90? No    BP Readings from Last 2 Encounters:   09/29/21 126/80   05/21/21 128/80     Hemoglobin A1C (%)   Date Value   09/29/2021 8.2 (H)   04/09/2021 8.5 (H)   08/27/2020 7.2 (H)     LDL Cholesterol Calculated (mg/dL)   Date  Value   04/09/2021 75   01/31/2020 71     Antibiotic for dental work  --history of knee replacement    Review of Systems   Constitutional, HEENT, cardiovascular, pulmonary, gi and gu systems are negative, except as otherwise noted.      Objective    /80   Pulse 79   Temp 97  F (36.1  C) (Tympanic)   Resp 16   Wt 74.4 kg (164 lb)   SpO2 94%   BMI 25.69 kg/m    Body mass index is 25.69 kg/m .  Physical Exam   GENERAL APPEARANCE: alert and no distress  RESP: Hyperinflation and diminished breath sounds in all fields with no wheezing  CV: regular rates and rhythm, normal S1 S2, no S3 or S4 and no murmur, click or rub  DIABETIC FOOT EXAM: normal DP and PT pulses, no trophic changes or ulcerative lesions and normal sensory exam    Results for orders placed or performed in visit on 09/29/21 (from the past 24 hour(s))   Extra Tube    Narrative    The following orders were created for panel order Extra Tube.  Procedure                               Abnormality         Status                     ---------                               -----------         ------                     Extra Green Top (Lithium...[[id number]]                      In process                   Please view results for these tests on the individual orders.   **A1C FUTURE 3mo   Result Value Ref Range    Hemoglobin A1C 8.2 (H) 0.0 - 5.6 %

## 2021-09-30 ENCOUNTER — IMMUNIZATION (OUTPATIENT)
Dept: FAMILY MEDICINE | Facility: CLINIC | Age: 76
End: 2021-09-30
Payer: COMMERCIAL

## 2021-09-30 PROCEDURE — 0004A PR COVID VAC PFIZER DIL RECON 30 MCG/0.3 ML IM: CPT

## 2021-09-30 PROCEDURE — 91300 PR COVID VAC PFIZER DIL RECON 30 MCG/0.3 ML IM: CPT

## 2021-10-21 NOTE — PROGRESS NOTES
[facility]    I had the pleasure of seeing Raul when he came for routine follow-up.  This 75 year old sees Dr. Galindo for his history of:    1. CAD with acute inferior/inferolateral STEMI 11/2015. Mid Cx and thrombotic L PDA lesions treated with REDD. Residual mLAD 80% and OM1 70%, currently tx'd medically. Lifelong DAPT rec'd, which pt has declined   2. Benign Essential HTN  3. Dyslipidemia. Atorvastatin increased from 20-40 mg 1/2019  4. Asthma and COPD with occasional prednisone bursts.  5. Knee arthritis - s/p L TKA 5/2020 and R TKA 11/2020  6. DM - A1c 8.2% in 9/2021.       I saw Raul 2/2020 at which time he was doing well, doing Cardiac Rehab 3x/week. BPs at home were great in 100-140s. His triglycerides had improved with an improved diet. No med changes were made and annual follow-up rec'd.    He underwent L TKA 5/2020 and R TKA 11/2020.    Interval History:  Raul is doing well from a CV/EP perspective. No c/o CP, SOB. Still works 2 days/week.  He recently replaced the roof on his barn and gardened this past summer without issues.     Since knee surgery he's noted no LE edema. No orthopnea/PND.     No problems with palpitations, dizziness or lightheadedness.      Admits that his diet has slipped quite a bit, which is evident based on recent labs.      VITALS:  Vitals: /76 (BP Location: Left arm, Patient Position: Sitting, Cuff Size: Adult Regular)   Pulse 76   Wt 75.4 kg (166 lb 3.2 oz)   SpO2 96%   BMI 26.03 kg/m      Diagnostic Testing:  EKG 10/13/2020 showed SR 77 bpm  Nuclear Stress Test 2/2019 showed fixed inferior/inferoseptal and basal inferolateral infarct. No rian-infarct ischemia. Normal EF. No change c/w 1/2016  Echocardiogam 10/2017 showed EF 50-55%. Grade I diastolic dysfunction. Normal RV. Trace MR. Trace TR with RVSP 23+RAP  Carotids 1/2016 without significant stenosis  Angiogram 11/2015 in setting of acute inferior STEMI showed normal LM. Mid LAD 80%, Mid Cx  99% terminating in large L % thrombotic occlusion. OM 1 70%. RCA mild luminal irregularities. Had 3.5 x 20 mm REDD to mCx. L PDA treated with 2.5 x 24 mm REDD.  Component      Latest Ref Rng & Units 1/31/2020 4/9/2021   Cholesterol      <200 mg/dL 135 174   Triglycerides      <150 mg/dL 95 263 (H)   HDL Cholesterol      >39 mg/dL 45 46   LDL Cholesterol Calculated      <100 mg/dL 71 75   Non HDL Cholesterol      <130 mg/dL 90 128     Component      Latest Ref Rng & Units 10/13/2020 9/29/2021   Sodium      133 - 144 mmol/L 137 138   Potassium      3.4 - 5.3 mmol/L 4.0 4.4   Chloride      94 - 109 mmol/L 103 105   Carbon Dioxide      20 - 32 mmol/L 30 30   Anion Gap      3 - 14 mmol/L 4 3   Glucose      70 - 99 mg/dL 157 (H) 226 (H)   Urea Nitrogen      7 - 30 mg/dL 19 21   Creatinine      0.66 - 1.25 mg/dL 1.06 1.13   GFR Estimate      >60 mL/min/1.73m2 68 63   GFR Estimate If Black      >60 mL/min/1.73:m2 79    Calcium      8.5 - 10.1 mg/dL 9.3 9.9       Plan:  Annual follow-up with routine echo (last in 2017)    Assessment/Plan:    1. CAD/STEMI 2015    EF 50-55% on last echo; was normal on nuclear stress test 2/2019    Lifelong DAPT recommended given residual lesions. He has refused    Stress test 2/2019 without ischemia    Lipids as above    On Coreg 6.25 mg BID, losartan/HCTZ 50/12.5 daily & ASA 81 mg daily    PLAN:    Continue ASA    Continue Lipitor 40, Coreg 6.25 mg BID, losartan 50    Annual follow-up with Dr. Galindo and routine echo. Dr. Galindo will determine if/when routine stress tests should be done given residual CAD    2. Dyslipidemia    Lipids as above - trigs increased dramatically     LDL near goal on atorvastatin 40 mg daily    PLAN:    Reviewed diet recommendations    3. HTN    On Coreg 12.5 mg BID and Hyzaar 50/12.5 mg daily    BMP as above looks fine    BP looks good    PLAN:    Continue current medications        Roseline Bermudez PA-C, MSPAS      Orders Placed This Encounter   Procedures     Follow-Up  with Cardiologist     Echocardiogram Complete     Orders Placed This Encounter   Medications     aspirin (ASA) 81 MG EC tablet     Sig: Take 1 tablet (81 mg) by mouth daily     Medications Discontinued During This Encounter   Medication Reason     aspirin (ASA) 325 MG EC tablet          Encounter Diagnoses   Name Primary?     Hyperlipidemia LDL goal <100      ST elevation myocardial infarction involving left circumflex coronary artery (H)      Status post total right knee replacement        CURRENT MEDICATIONS:  Current Outpatient Medications   Medication Sig Dispense Refill     albuterol (PROAIR RESPICLICK) 108 (90 Base) MCG/ACT inhaler Inhale 1-2 puffs into the lungs every 4 hours as needed for shortness of breath / dyspnea or wheezing 1 each 11     albuterol (PROVENTIL) (2.5 MG/3ML) 0.083% neb solution Take 1 vial (2.5 mg) by nebulization every 6 hours as needed for shortness of breath / dyspnea or wheezing 30 mL 11     Alcohol Swabs (ALCOHOL PREP) 70 % PADS USE TO SWAB AREA OF INJECTION / JENISE AS DIRECTED 100 each 3     aspirin (ASA) 81 MG EC tablet Take 1 tablet (81 mg) by mouth daily       atorvastatin (LIPITOR) 40 MG tablet Take 1 tablet (40 mg) by mouth daily 90 tablet 3     blood glucose monitoring (NO BRAND SPECIFIED) meter device kit Use to test blood sugar 1 times daily or as directed.  Blood Glucose Monitor Brands: per insurance. 1 kit 0     carvedilol (COREG) 6.25 MG tablet Take 1 tablet (6.25 mg) by mouth 2 times daily 180 tablet 3     fexofenadine (ALLEGRA) 180 MG tablet Take 1 tablet (180 mg) by mouth daily 90 tablet 3     glipiZIDE (GLUCOTROL XL) 5 MG 24 hr tablet Take 1 tablet (5 mg) by mouth daily (Patient taking differently: Take 5 mg by mouth daily Not taking daily (worried about GI symptoms if he's at work)) 90 tablet 3     Lancets (ONETOUCH DELICA PLUS [id number]) MISC USE AS DIRECTED WITH LANCING DEVICE TO TEST ONCE DAILY 100 each 6     losartan-hydrochlorothiazide (HYZAAR) 50-12.5 MG tablet  Take 1 tablet by mouth daily 90 tablet 3     montelukast (SINGULAIR) 10 MG tablet TAKE ONE TABLET BY MOUTH AT BEDTIME 90 tablet 3     nitroGLYcerin (NITROSTAT) 0.4 MG sublingual tablet Place 1 tablet (0.4 mg) under the tongue every 5 minutes as needed for chest pain If you still have symptoms after 3 doses call 911. 25 tablet 1     omeprazole (PRILOSEC) 20 MG DR capsule Take 1 capsule (20 mg) by mouth daily 90 capsule 3     ONETOUCH ULTRA test strip USE TO TEST BLOOD SUGAR 1 TIME DAILY OR AS DIRECTED 100 strip 6     order for DME Equipment being ordered: arm blood pressure cuff 1 Device 0     predniSONE (DELTASONE) 20 MG tablet Take 3 tabs by mouth daily x 3 days, then 2 tabs daily x 3 days, then 1 tab daily x 3 days, then 1/2 tab daily x 3 days. (Patient not taking: Reported on 9/29/2021) 20 tablet 1       ALLERGIES     Allergies   Allergen Reactions     Simvastatin Swelling     Severe muscle pain, swelling, and bruising     Dust Mites          Review of Systems:  Skin:  Negative     Eyes:  Negative    ENT:  Positive for hearing loss  Respiratory:  Negative for dyspnea on exertion;shortness of breath  Cardiovascular:    Positive for;chest pain  Gastroenterology: Positive for heartburn  Genitourinary:  Positive for urgency  Musculoskeletal:  Positive for joint pain;joint swelling;joint stiffness;muscular weakness  Neurologic:  Positive for memory problems  Psychiatric:  Positive for sleep disturbances  Heme/Lymph/Imm:  Negative    Endocrine:  Negative      Physical Exam:  Vitals: /76 (BP Location: Left arm, Patient Position: Sitting, Cuff Size: Adult Regular)   Pulse 76   Wt 75.4 kg (166 lb 3.2 oz)   SpO2 96%   BMI 26.03 kg/m      Constitutional:  cooperative, alert and oriented, well developed, well nourished, in no acute distress        Skin:  warm and dry to the touch, no apparent skin lesions or masses noted        Head:  normocephalic, no masses or lesions        Eyes:  pupils equal and  round;conjunctivae and lids unremarkable;sclera white        ENT:  no pallor or cyanosis, dentition good        Neck:  no carotid bruit;JVP normal        Chest:  normal breath sounds, clear to auscultation, normal A-P diameter, normal symmetry, normal respiratory excursion, no use of accessory muscles expiratory wheezes (at bases)      Cardiac: regular rhythm;no murmurs, gallops or rubs detected                  Abdomen:  abdomen soft        Vascular: pulses full and equal                                      Extremities and Back:  no deformities, clubbing, cyanosis, erythema observed;no edema        Neurological:  no gross motor deficits            PAST MEDICAL HISTORY:  Past Medical History:   Diagnosis Date     Chronic airway obstruction, not elsewhere classified      Other and unspecified hyperlipidemia        PAST SURGICAL HISTORY:  Past Surgical History:   Procedure Laterality Date     ARTHROPLASTY KNEE Left 5/28/2020    Procedure: Left Total Knee Arthroplasty;  Surgeon: Sanjay Downey MD;  Location: [facility] OR     ARTHROPLASTY KNEE Right 11/2/2020    Procedure: ARTHROPLASTY, KNEE, TOTAL;  Surgeon: Sanjay Downey MD;  Location: [facility] OR     ENT SURGERY  as a child    tonsillectomy       FAMILY HISTORY:  Family History   Problem Relation Age of Onset     Diabetes Mother      Heart Disease Mother      C.A.D. Mother      Cancer Father      Hypertension Father      Cerebrovascular Disease Paternal Grandfather      Asthma Sister      Breast Cancer No family hx of      Cancer - colorectal No family hx of      Prostate Cancer No family hx of        SOCIAL HISTORY:  Social History     Socioeconomic History     Marital status:      Spouse name: None     Number of children: None     Years of education: None     Highest education level: None   Occupational History     None   Tobacco Use     Smoking status: Never Smoker     Smokeless tobacco: Never Used   Substance and Sexual Activity     Alcohol use: Yes      Comment: Rare     Drug use: No     Sexual activity: Yes     Partners: Female   Other Topics Concern      Service Not Asked     Blood Transfusions Not Asked     Caffeine Concern Not Asked     Occupational Exposure Yes     Comment: worked for years as  exposed to carbon dust and other chemicals     Hobby Hazards Yes     Comment: Raises pigeons     Sleep Concern Not Asked     Stress Concern Not Asked     Weight Concern Not Asked     Special Diet Not Asked     Back Care Not Asked     Exercise Not Asked     Bike Helmet Not Asked     Seat Belt Not Asked     Self-Exams Not Asked     Parent/sibling w/ CABG, MI or angioplasty before 65F 55M? No   Social History Narrative     None     Social Determinants of Health     Financial Resource Strain:      Difficulty of Paying Living Expenses:    Food Insecurity:      Worried About Running Out of Food in the Last Year:      Ran Out of Food in the Last Year:    Transportation Needs:      Lack of Transportation (Medical):      Lack of Transportation (Non-Medical):    Physical Activity:      Days of Exercise per Week:      Minutes of Exercise per Session:    Stress:      Feeling of Stress :    Social Connections:      Frequency of Communication with Friends and Family:      Frequency of Social Gatherings with Friends and Family:      Attends [employer] Services:      Active Member of Clubs or Organizations:      Attends Club or Organization Meetings:      Marital Status:    Intimate Partner Violence:      Fear of Current or Ex-Partner:      Emotionally Abused:      Physically Abused:      Sexually Abused:

## 2021-10-26 ENCOUNTER — OFFICE VISIT (OUTPATIENT)
Dept: CARDIOLOGY | Facility: CLINIC | Age: 76
End: 2021-10-26
Payer: COMMERCIAL

## 2021-10-26 VITALS
OXYGEN SATURATION: 96 % | BODY MASS INDEX: 26.03 KG/M2 | HEART RATE: 76 BPM | DIASTOLIC BLOOD PRESSURE: 76 MMHG | SYSTOLIC BLOOD PRESSURE: 121 MMHG | WEIGHT: 166.2 LBS

## 2021-10-26 DIAGNOSIS — E78.5 HYPERLIPIDEMIA LDL GOAL <100: ICD-10-CM

## 2021-10-26 DIAGNOSIS — I21.21 ST ELEVATION MYOCARDIAL INFARCTION INVOLVING LEFT CIRCUMFLEX CORONARY ARTERY (H): ICD-10-CM

## 2021-10-26 DIAGNOSIS — Z96.651 STATUS POST TOTAL RIGHT KNEE REPLACEMENT: ICD-10-CM

## 2021-10-26 PROCEDURE — 99213 OFFICE O/P EST LOW 20 MIN: CPT | Performed by: PHYSICIAN ASSISTANT

## 2021-10-26 NOTE — PATIENT INSTRUCTIONS
Raul - it was nice to see you today!     At your visit today we reviewed:    1. That you're doing well from a heart standpoint  2. Reviewed cholesterol level with much higher triglyceride levels. This is likely due to the simple sugars (cookies, mini candy bars, desserts, sugared soda)     Medication Changes:    1. No changes    Recommendations:    1. Limit simple carbohydrates!  For TRIGLYCERIDES (goal <150 mg/dL):   Decrease carbohydrates in your diet    Limit desserts, candy, sweets    Limit chips, crackers    Limit alcohol, juices, sweetened tea, soda, coffee drinks with syrups   Exercise - aim for at least 150 minutes of AEROBIC exercise per week   Eat fatty fish (salmon, herring, etc) 2 times per week    Follow-up:    See us for cardiology follow up in 1 year with routine echocardigoram but CALL Cardiology nurse Elke @ [phone] for any issues, questions or concerns in the interim.      To schedule a future appointment, we kindly ask that you call cardiology scheduling at [phone] three months prior to requested visit date.    Important Phone Numbers for [facility]):    [place] [facility]: [phone]  Cardiology [phone]  [place] [facility] [phone]  [place] [facility] [phone]  [place] [facility]: [phone]

## 2021-10-26 NOTE — LETTER
10/26/2021    Sarita Leonorandreia Hennessy, DO  [address] [facility] [address]    RE: Raul Wilhelm       Dear Colleague,    I had the pleasure of seeing Raul Wilhelm in the [facility].    [facility]    I had the pleasure of seeing Raul when he came for routine follow-up.  This 75 year old sees Dr. Galindo for his history of:    1. CAD with acute inferior/inferolateral STEMI 11/2015. Mid Cx and thrombotic L PDA lesions treated with REDD. Residual mLAD 80% and OM1 70%, currently tx'd medically. Lifelong DAPT rec'd, which pt has declined   2. Benign Essential HTN  3. Dyslipidemia. Atorvastatin increased from 20-40 mg 1/2019  4. Asthma and COPD with occasional prednisone bursts.  5. Knee arthritis - s/p L TKA 5/2020 and R TKA 11/2020  6. DM - A1c 8.2% in 9/2021.       I saw Raul 2/2020 at which time he was doing well, doing Cardiac Rehab 3x/week. BPs at home were great in 100-140s. His triglycerides had improved with an improved diet. No med changes were made and annual follow-up rec'd.    He underwent L TKA 5/2020 and R TKA 11/2020.    Interval History:  Raul is doing well from a CV/EP perspective. No c/o CP, SOB. Still works 2 days/week.  He recently replaced the roof on his barn and gardened this past summer without issues.     Since knee surgery he's noted no LE edema. No orthopnea/PND.     No problems with palpitations, dizziness or lightheadedness.      Admits that his diet has slipped quite a bit, which is evident based on recent labs.      VITALS:  Vitals: /76 (BP Location: Left arm, Patient Position: Sitting, Cuff Size: Adult Regular)   Pulse 76   Wt 75.4 kg (166 lb 3.2 oz)   SpO2 96%   BMI 26.03 kg/m      Diagnostic Testing:  EKG 10/13/2020 showed SR 77 bpm  Nuclear Stress Test 2/2019 showed fixed inferior/inferoseptal and basal inferolateral infarct. No rian-infarct ischemia. Normal EF. No change c/w  1/2016  Echocardiogam 10/2017 showed EF 50-55%. Grade I diastolic dysfunction. Normal RV. Trace MR. Trace TR with RVSP 23+RAP  Carotids 1/2016 without significant stenosis  Angiogram 11/2015 in setting of acute inferior STEMI showed normal LM. Mid LAD 80%, Mid Cx 99% terminating in large L % thrombotic occlusion. OM 1 70%. RCA mild luminal irregularities. Had 3.5 x 20 mm REDD to mCx. L PDA treated with 2.5 x 24 mm REDD.  Component      Latest Ref Rng & Units 1/31/2020 4/9/2021   Cholesterol      <200 mg/dL 135 174   Triglycerides      <150 mg/dL 95 263 (H)   HDL Cholesterol      >39 mg/dL 45 46   LDL Cholesterol Calculated      <100 mg/dL 71 75   Non HDL Cholesterol      <130 mg/dL 90 128     Component      Latest Ref Rng & Units 10/13/2020 9/29/2021   Sodium      133 - 144 mmol/L 137 138   Potassium      3.4 - 5.3 mmol/L 4.0 4.4   Chloride      94 - 109 mmol/L 103 105   Carbon Dioxide      20 - 32 mmol/L 30 30   Anion Gap      3 - 14 mmol/L 4 3   Glucose      70 - 99 mg/dL 157 (H) 226 (H)   Urea Nitrogen      7 - 30 mg/dL 19 21   Creatinine      0.66 - 1.25 mg/dL 1.06 1.13   GFR Estimate      >60 mL/min/1.73m2 68 63   GFR Estimate If Black      >60 mL/min/1.73:m2 79    Calcium      8.5 - 10.1 mg/dL 9.3 9.9       Plan:  Annual follow-up with routine echo (last in 2017)    Assessment/Plan:    1. CAD/STEMI 2015    EF 50-55% on last echo; was normal on nuclear stress test 2/2019    Lifelong DAPT recommended given residual lesions. He has refused    Stress test 2/2019 without ischemia    Lipids as above    On Coreg 6.25 mg BID, losartan/HCTZ 50/12.5 daily & ASA 81 mg daily    PLAN:    Continue ASA    Continue Lipitor 40, Coreg 6.25 mg BID, losartan 50    Annual follow-up with Dr. Galindo and routine echo. Dr. Galindo will determine if/when routine stress tests should be done given residual CAD    2. Dyslipidemia    Lipids as above - trigs increased dramatically     LDL near goal on atorvastatin 40 mg  daily    PLAN:    Reviewed diet recommendations    3. HTN    On Coreg 12.5 mg BID and Hyzaar 50/12.5 mg daily    BMP as above looks fine    BP looks good    PLAN:    Continue current medications        Roseline Bermudez PA-C, MSPAS      Orders Placed This Encounter   Procedures     Follow-Up with Cardiologist     Echocardiogram Complete     Orders Placed This Encounter   Medications     aspirin (ASA) 81 MG EC tablet     Sig: Take 1 tablet (81 mg) by mouth daily     Medications Discontinued During This Encounter   Medication Reason     aspirin (ASA) 325 MG EC tablet          Encounter Diagnoses   Name Primary?     Hyperlipidemia LDL goal <100      ST elevation myocardial infarction involving left circumflex coronary artery (H)      Status post total right knee replacement        CURRENT MEDICATIONS:  Current Outpatient Medications   Medication Sig Dispense Refill     albuterol (PROAIR RESPICLICK) 108 (90 Base) MCG/ACT inhaler Inhale 1-2 puffs into the lungs every 4 hours as needed for shortness of breath / dyspnea or wheezing 1 each 11     albuterol (PROVENTIL) (2.5 MG/3ML) 0.083% neb solution Take 1 vial (2.5 mg) by nebulization every 6 hours as needed for shortness of breath / dyspnea or wheezing 30 mL 11     Alcohol Swabs (ALCOHOL PREP) 70 % PADS USE TO SWAB AREA OF INJECTION / JENISE AS DIRECTED 100 each 3     aspirin (ASA) 81 MG EC tablet Take 1 tablet (81 mg) by mouth daily       atorvastatin (LIPITOR) 40 MG tablet Take 1 tablet (40 mg) by mouth daily 90 tablet 3     blood glucose monitoring (NO BRAND SPECIFIED) meter device kit Use to test blood sugar 1 times daily or as directed.  Blood Glucose Monitor Brands: per insurance. 1 kit 0     carvedilol (COREG) 6.25 MG tablet Take 1 tablet (6.25 mg) by mouth 2 times daily 180 tablet 3     fexofenadine (ALLEGRA) 180 MG tablet Take 1 tablet (180 mg) by mouth daily 90 tablet 3     glipiZIDE (GLUCOTROL XL) 5 MG 24 hr tablet Take 1 tablet (5 mg) by mouth daily (Patient taking  differently: Take 5 mg by mouth daily Not taking daily (worried about GI symptoms if he's at work)) 90 tablet 3     Lancets (ONETOUCH DELICA PLUS [id number]) MISC USE AS DIRECTED WITH LANCING DEVICE TO TEST ONCE DAILY 100 each 6     losartan-hydrochlorothiazide (HYZAAR) 50-12.5 MG tablet Take 1 tablet by mouth daily 90 tablet 3     montelukast (SINGULAIR) 10 MG tablet TAKE ONE TABLET BY MOUTH AT BEDTIME 90 tablet 3     nitroGLYcerin (NITROSTAT) 0.4 MG sublingual tablet Place 1 tablet (0.4 mg) under the tongue every 5 minutes as needed for chest pain If you still have symptoms after 3 doses call 911. 25 tablet 1     omeprazole (PRILOSEC) 20 MG DR capsule Take 1 capsule (20 mg) by mouth daily 90 capsule 3     ONETOUCH ULTRA test strip USE TO TEST BLOOD SUGAR 1 TIME DAILY OR AS DIRECTED 100 strip 6     order for DME Equipment being ordered: arm blood pressure cuff 1 Device 0     predniSONE (DELTASONE) 20 MG tablet Take 3 tabs by mouth daily x 3 days, then 2 tabs daily x 3 days, then 1 tab daily x 3 days, then 1/2 tab daily x 3 days. (Patient not taking: Reported on 9/29/2021) 20 tablet 1       ALLERGIES     Allergies   Allergen Reactions     Simvastatin Swelling     Severe muscle pain, swelling, and bruising     Dust Mites          Review of Systems:  Skin:  Negative     Eyes:  Negative    ENT:  Positive for hearing loss  Respiratory:  Negative for dyspnea on exertion;shortness of breath  Cardiovascular:    Positive for;chest pain  Gastroenterology: Positive for heartburn  Genitourinary:  Positive for urgency  Musculoskeletal:  Positive for joint pain;joint swelling;joint stiffness;muscular weakness  Neurologic:  Positive for memory problems  Psychiatric:  Positive for sleep disturbances  Heme/Lymph/Imm:  Negative    Endocrine:  Negative      Physical Exam:  Vitals: /76 (BP Location: Left arm, Patient Position: Sitting, Cuff Size: Adult Regular)   Pulse 76   Wt 75.4 kg (166 lb 3.2 oz)   SpO2 96%   BMI 26.03  kg/m      Constitutional:  cooperative, alert and oriented, well developed, well nourished, in no acute distress        Skin:  warm and dry to the touch, no apparent skin lesions or masses noted        Head:  normocephalic, no masses or lesions        Eyes:  pupils equal and round;conjunctivae and lids unremarkable;sclera white        ENT:  no pallor or cyanosis, dentition good        Neck:  no carotid bruit;JVP normal        Chest:  normal breath sounds, clear to auscultation, normal A-P diameter, normal symmetry, normal respiratory excursion, no use of accessory muscles expiratory wheezes (at bases)      Cardiac: regular rhythm;no murmurs, gallops or rubs detected                  Abdomen:  abdomen soft        Vascular: pulses full and equal                                      Extremities and Back:  no deformities, clubbing, cyanosis, erythema observed;no edema        Neurological:  no gross motor deficits            PAST MEDICAL HISTORY:  Past Medical History:   Diagnosis Date     Chronic airway obstruction, not elsewhere classified      Other and unspecified hyperlipidemia        PAST SURGICAL HISTORY:  Past Surgical History:   Procedure Laterality Date     ARTHROPLASTY KNEE Left 5/28/2020    Procedure: Left Total Knee Arthroplasty;  Surgeon: Sanjay Downey MD;  Location: [facility] OR     ARTHROPLASTY KNEE Right 11/2/2020    Procedure: ARTHROPLASTY, KNEE, TOTAL;  Surgeon: Sanjay Downey MD;  Location: [facility] OR     ENT SURGERY  as a child    tonsillectomy       FAMILY HISTORY:  Family History   Problem Relation Age of Onset     Diabetes Mother      Heart Disease Mother      C.A.D. Mother      Cancer Father      Hypertension Father      Cerebrovascular Disease Paternal Grandfather      Asthma Sister      Breast Cancer No family hx of      Cancer - colorectal No family hx of      Prostate Cancer No family hx of        SOCIAL HISTORY:  Social History     Socioeconomic History     Marital status:       Spouse name: None     Number of children: None     Years of education: None     Highest education level: None   Occupational History     None   Tobacco Use     Smoking status: Never Smoker     Smokeless tobacco: Never Used   Substance and Sexual Activity     Alcohol use: Yes     Comment: Rare     Drug use: No     Sexual activity: Yes     Partners: Female   Other Topics Concern      Service Not Asked     Blood Transfusions Not Asked     Caffeine Concern Not Asked     Occupational Exposure Yes     Comment: worked for years as  exposed to carbon dust and other chemicals     Hobby Hazards Yes     Comment: Raises pigeons     Sleep Concern Not Asked     Stress Concern Not Asked     Weight Concern Not Asked     Special Diet Not Asked     Back Care Not Asked     Exercise Not Asked     Bike Helmet Not Asked     Seat Belt Not Asked     Self-Exams Not Asked     Parent/sibling w/ CABG, MI or angioplasty before 65F 55M? No   Social History Narrative     None     Social Determinants of Health     Financial Resource Strain:      Difficulty of Paying Living Expenses:    Food Insecurity:      Worried About Running Out of Food in the Last Year:      Ran Out of Food in the Last Year:    Transportation Needs:      Lack of Transportation (Medical):      Lack of Transportation (Non-Medical):    Physical Activity:      Days of Exercise per Week:      Minutes of Exercise per Session:    Stress:      Feeling of Stress :    Social Connections:      Frequency of Communication with Friends and Family:      Frequency of Social Gatherings with Friends and Family:      Attends [employer] Services:      Active Member of Clubs or Organizations:      Attends Club or Organization Meetings:      Marital Status:    Intimate Partner Violence:      Fear of Current or Ex-Partner:      Emotionally Abused:      Physically Abused:      Sexually Abused:            Thank you for allowing me to participate in the care of your  patient.      Sincerely,     Barbara Bermudez PA-C     [facility]  cc:   Barbara Bermudez PA-C  [address] [place] [id number]

## 2021-12-16 ENCOUNTER — TELEPHONE (OUTPATIENT)
Dept: FAMILY MEDICINE | Facility: CLINIC | Age: 76
End: 2021-12-16
Payer: COMMERCIAL

## 2021-12-16 NOTE — LETTER
December 16, 2021        Raul Wilhelm  [address]  [place] [address]          Dear Raul Wilhelm, [id number]        At [facility] we care about your health and are committed to providing quality patient care, which includes staying current on preventative screenings.  You can increase your chances of finding and treating cancers through regular screenings.      Our records show that you are due for the following screening(s):      Diabetes follow up with primary care provider    Non-fasting blood check prior to talk or see your primary care provider.  We will check (A1C and Microalbumin)    The day of your appointment remember to bring your notes from all the blood sugar readings in the past 2 weeks at least. (check blood sugar at least every other day for a 1 week  - 1st time is fasting and then 2 hours after any meal) .     Also remember to bring your meter (glucometer) so we can download the data into your chart for future references.      Because of the new outbreak Cov-19 (Coronavirus), we are operating with different types of visits.       Virtual visits - you can have your lab draw done and later have a virtual visit where you and your provider can see each other through Smart Phones or Computers with cameras and microphones  Phone visits - you can have your lab draw done and later have a phone conversation with your provider.  Face to Face visits - the same as usual, have your lab draw and see the provider after.    You can choose anyone of those three options for visits above, please call us to schedule the lab and follow up with primary care provider as soon as possible.        Sincerely,    Dr. Hennessy's care team/[place],REMY

## 2021-12-27 ENCOUNTER — VIRTUAL VISIT (OUTPATIENT)
Dept: FAMILY MEDICINE | Facility: CLINIC | Age: 76
End: 2021-12-27
Payer: COMMERCIAL

## 2021-12-27 DIAGNOSIS — Z20.822 EXPOSURE TO 2019 NOVEL CORONAVIRUS: Primary | ICD-10-CM

## 2021-12-27 PROCEDURE — 99212 OFFICE O/P EST SF 10 MIN: CPT | Mod: 95 | Performed by: NURSE PRACTITIONER

## 2021-12-27 NOTE — PROGRESS NOTES
"Raul is a 76 year old who is being evaluated via a billable telephone visit.      What phone number would you like to be contacted at? [phone]   How would you like to obtain your AVS? Mail a copy    Assessment & Plan     Exposure to 2019 novel coronavirus  Needs negative test to be able to bring supplies to his wife who is in a nursing home.   - Asymptomatic COVID-19 Virus (Coronavirus) by PCR Nose; Future       BMI:   Estimated body mass index is 26.03 kg/m  as calculated from the following:    Height as of 4/14/21: 1.702 m (5' 7\").    Weight as of 10/26/21: 75.4 kg (166 lb 3.2 oz).       Patient Instructions   COVID-19 testing at [facility] is by appointment only. You'll need to schedule a time to get tested. If you have symptoms (signs) of COVID, please log in to [employer] to complete an e-visit (virtual visit). This is the first step to getting tested.    If you don't have COVID symptoms and want to get tested, you should also log in to [employer] for an e-visit. This includes people who:    have had close contact with a COVID-positive person    want to be tested before or after travel    have taken part in high-risk activities    have a school testing mandate, or     were told to get tested by their care team or the health department.     A [employer] e-visit is the fastest way for you to be seen by our care team. Please choose  Next available provider  to complete an e-visit. When you choose this option, the average response time is less than one hour.  After the e-visit, you'll be able to self-schedule your test at one of our testing locations. To learn more about our testing locations or for other details, please visit our COVID-19 Resource Hub.    [employer] is also the fastest way to get your test results. You'll get your results in [employer] within 3 days. If you don't use [employer], you'll get your results in the mail in 7 to 10 days. If your test is positive and you don't view your result in [employer] " within 1 business day, you'll get a phone call with your result. A positive result means that you have COVID-19.    If you have an upcoming procedure at [facility], you'll need to be tested for COVID. The test needs to happen 2 to 4 days before your procedure. If you have an upcoming procedure, we will contact you to schedule a COVID test.    If you don't have a [email or web address] account, please call [phone] to set up a virtual visit. You can also find community testing sites in [place] at [place]/get-tested/testing-locations. If you live in [place], please visit [email or web address]/[email or web address]-19/community-testing.htm.            Return in about 1 week (around 1/3/2022) for worsening or continued symptoms.    OSCAR Regan [facility] TITO Carter is a 76 year old who presents for the following health issues     Chief Complaint   Patient presents with     Orders     His grand daughter tested positive for covid yesterday and she was at his house on [address]. He does not have any symptoms but he needs a covid test in order to see his wife who is in a nursing home.,        HPI       Review of Systems   Constitutional, HEENT, cardiovascular, pulmonary, gi and gu systems are negative, except as otherwise noted.      Objective           Vitals:  No vitals were obtained today due to virtual visit.    Physical Exam   healthy, alert and no distress  PSYCH: Alert and oriented times 3; coherent speech, normal   rate and volume, able to articulate logical thoughts, able   to abstract reason, no tangential thoughts, no hallucinations   or delusions  His affect is normal  RESP: No cough, no audible wheezing, able to talk in full sentences  Remainder of exam unable to be completed due to telephone visits                Phone call duration: 5 minutes

## 2021-12-27 NOTE — PATIENT INSTRUCTIONS
COVID-19 testing at [facility] is by appointment only. You'll need to schedule a time to get tested. If you have symptoms (signs) of COVID, please log in to [employer] to complete an e-visit (virtual visit). This is the first step to getting tested.    If you don't have COVID symptoms and want to get tested, you should also log in to [employer] for an e-visit. This includes people who:    have had close contact with a COVID-positive person    want to be tested before or after travel    have taken part in high-risk activities    have a school testing mandate, or     were told to get tested by their care team or the health department.     A [employer] e-visit is the fastest way for you to be seen by our care team. Please choose  Next available provider  to complete an e-visit. When you choose this option, the average response time is less than one hour.  After the e-visit, you'll be able to self-schedule your test at one of our testing locations. To learn more about our testing locations or for other details, please visit our COVID-19 Resource Hub.    [employer] is also the fastest way to get your test results. You'll get your results in [employer] within 3 days. If you don't use [employer], you'll get your results in the mail in 7 to 10 days. If your test is positive and you don't view your result in [employer] within 1 business day, you'll get a phone call with your result. A positive result means that you have COVID-19.    If you have an upcoming procedure at [facility], you'll need to be tested for COVID. The test needs to happen 2 to 4 days before your procedure. If you have an upcoming procedure, we will contact you to schedule a COVID test.    If you don't have a [employer] account, please call [phone] to set up a virtual visit. You can also find community testing sites in [place] at [place]/get-tested/testing-locations. If you live in [place], please visit  [email or web address].gov/covid-19/community-testing.htm.

## 2021-12-28 ENCOUNTER — LAB (OUTPATIENT)
Dept: LAB | Facility: CLINIC | Age: 76
End: 2021-12-28
Attending: NURSE PRACTITIONER
Payer: COMMERCIAL

## 2021-12-28 DIAGNOSIS — Z20.822 EXPOSURE TO 2019 NOVEL CORONAVIRUS: ICD-10-CM

## 2021-12-28 PROCEDURE — U0003 INFECTIOUS AGENT DETECTION BY NUCLEIC ACID (DNA OR RNA); SEVERE ACUTE RESPIRATORY SYNDROME CORONAVIRUS 2 (SARS-COV-2) (CORONAVIRUS DISEASE [COVID-19]), AMPLIFIED PROBE TECHNIQUE, MAKING USE OF HIGH THROUGHPUT TECHNOLOGIES AS DESCRIBED BY CMS-2020-01-R: HCPCS

## 2021-12-28 PROCEDURE — U0005 INFEC AGEN DETEC AMPLI PROBE: HCPCS

## 2021-12-29 LAB — SARS-COV-2 RNA RESP QL NAA+PROBE: NEGATIVE

## 2022-01-21 ENCOUNTER — OFFICE VISIT (OUTPATIENT)
Dept: FAMILY MEDICINE | Facility: CLINIC | Age: 77
End: 2022-01-21
Payer: COMMERCIAL

## 2022-01-21 VITALS
DIASTOLIC BLOOD PRESSURE: 66 MMHG | OXYGEN SATURATION: 92 % | HEIGHT: 68 IN | BODY MASS INDEX: 24.71 KG/M2 | HEART RATE: 70 BPM | TEMPERATURE: 96.7 F | RESPIRATION RATE: 14 BRPM | WEIGHT: 163 LBS | SYSTOLIC BLOOD PRESSURE: 118 MMHG

## 2022-01-21 DIAGNOSIS — E11.9 TYPE 2 DIABETES MELLITUS WITHOUT COMPLICATION, WITHOUT LONG-TERM CURRENT USE OF INSULIN (H): Primary | ICD-10-CM

## 2022-01-21 DIAGNOSIS — Z63.6 CAREGIVER STRESS: ICD-10-CM

## 2022-01-21 DIAGNOSIS — E78.5 HYPERLIPIDEMIA LDL GOAL <70: ICD-10-CM

## 2022-01-21 DIAGNOSIS — J44.9 CHRONIC OBSTRUCTIVE PULMONARY DISEASE, UNSPECIFIED COPD TYPE (H): ICD-10-CM

## 2022-01-21 LAB
HBA1C MFR BLD: 8.1 % (ref 0–5.6)
HOLD SPECIMEN: NORMAL

## 2022-01-21 PROCEDURE — 83036 HEMOGLOBIN GLYCOSYLATED A1C: CPT | Performed by: INTERNAL MEDICINE

## 2022-01-21 PROCEDURE — 36415 COLL VENOUS BLD VENIPUNCTURE: CPT | Performed by: INTERNAL MEDICINE

## 2022-01-21 PROCEDURE — 99214 OFFICE O/P EST MOD 30 MIN: CPT | Performed by: INTERNAL MEDICINE

## 2022-01-21 SDOH — SOCIAL STABILITY - SOCIAL INSECURITY: DEPENDENT RELATIVE NEEDING CARE AT HOME: Z63.6

## 2022-01-21 ASSESSMENT — MIFFLIN-ST. JEOR: SCORE: 1439.89

## 2022-01-21 NOTE — PROGRESS NOTES
Assessment & Plan     Type 2 diabetes mellitus without complication, without long-term current use of insulin (H) -uncontrolled due to poor compliance with diet and medications.  Encourage compliance.  He has significant caregiver fatigue/stress which is contributing.  Follow-up 3-4 months  - Albumin Random Urine Quantitative with Creat Ratio  - **A1C FUTURE 3mo  - **A1C FUTURE 3mo; Future    Chronic obstructive pulmonary disease, unspecified COPD type (H) -stable.  Has not needed prednisone in some time    Hyperlipidemia LDL goal <70 Known issue that I take into account for their medical decisions, no current exacerbations or new concerns    Caregiver stress -spent most of the visit discussing his wife's health and how he can take better care of her and the burden that this places on him.               Patient Instructions   Diabetes:  1. Work to take medications consistently  2. Follow-up in 3 months with labs prior to visit  3. You are due for dilated eye exam.  Have the report sent to Dr. Hennessy's office.        Return in about 3 months (around 4/21/2022) for Diabetes Check with lab prior.    Sarita Hennessy, [facility] is a 76 year old who presents for the following health issues     HPI     Pt would like to not fill prescriptions filled unless told.    Diabetes Follow-up    How often are you checking your blood sugar? A few times a week  What time of day are you checking your blood sugars (select all that apply)?  Before meals  Have you had any blood sugars above 200?  Yes a couple  Have you had any blood sugars below 70?  No    What symptoms do you notice when your blood sugar is low?  None    What concerns do you have today about your diabetes? Discuss medication, feels as if it upsetting stomach      Do you have any of these symptoms? (Select all that apply)  Excessive thirst and No numbness or tingling in feet.  No redness, sores or blisters on feet.   No complaints of excessive thirst.  No reports of blurry vision.  No significant changes to weight.    Have you had a diabetic eye exam in the last 12 months? No     Going on Monday to [facility]     Often forgets to take his AM medications    Has a pillbox but still forgets    Is not following diet as close              Hyperlipidemia Follow-Up      Are you regularly taking any medication or supplement to lower your cholesterol?   Yes- atorvastatin     Are you having muscle aches or other side effects that you think could be caused by your cholesterol lowering medication?  No    Hypertension Follow-up      Do you check your blood pressure regularly outside of the clinic? No     Are you following a low salt diet? Yes    Are your blood pressures ever more than 140 on the top number (systolic) OR more than 90 on the bottom number (diastolic), for example 140/90? No    BP Readings from Last 2 Encounters:   01/21/22 118/66   10/26/21 121/76     Hemoglobin A1C POCT (%)   Date Value   04/09/2021 8.5 (H)   08/27/2020 7.2 (H)     Hemoglobin A1C (%)   Date Value   01/21/2022 8.1 (H)   09/29/2021 8.2 (H)     LDL Cholesterol Calculated (mg/dL)   Date Value   04/09/2021 75   01/31/2020 71         How many servings of fruits and vegetables do you eat daily?  0-1    On average, how many sweetened beverages do you drink each day (Examples: soda, juice, sweet tea, etc.  Do NOT count diet or artificially sweetened beverages)?   1    How many days per week do you exercise enough to make your heart beat faster? 5    How many minutes a day do you exercise enough to make your heart beat faster? 20 - 29  How many days per week do you miss taking your medication? 3-4    What makes it hard for you to take your medications?  side effects and remembering to take, pt gets a upset stomach     Current Outpatient Medications   Medication Sig Dispense Refill     albuterol (PROAIR RESPICLICK) 108 (90 Base) MCG/ACT inhaler Inhale 1-2 puffs  into the lungs every 4 hours as needed for shortness of breath / dyspnea or wheezing 1 each 11     albuterol (PROVENTIL) (2.5 MG/3ML) 0.083% neb solution Take 1 vial (2.5 mg) by nebulization every 6 hours as needed for shortness of breath / dyspnea or wheezing 30 mL 11     aspirin (ASA) 81 MG EC tablet Take 1 tablet (81 mg) by mouth daily       atorvastatin (LIPITOR) 40 MG tablet Take 1 tablet (40 mg) by mouth daily 90 tablet 3     carvedilol (COREG) 6.25 MG tablet Take 1 tablet (6.25 mg) by mouth 2 times daily 180 tablet 3     fexofenadine (ALLEGRA) 180 MG tablet Take 1 tablet (180 mg) by mouth daily 90 tablet 3     glipiZIDE (GLUCOTROL XL) 5 MG 24 hr tablet Take 1 tablet (5 mg) by mouth daily (Patient taking differently: Take 5 mg by mouth daily Not taking daily (worried about GI symptoms if he's at work)) 90 tablet 3     losartan-hydrochlorothiazide (HYZAAR) 50-12.5 MG tablet Take 1 tablet by mouth daily 90 tablet 3     montelukast (SINGULAIR) 10 MG tablet TAKE ONE TABLET BY MOUTH AT BEDTIME 90 tablet 3     nitroGLYcerin (NITROSTAT) 0.4 MG sublingual tablet Place 1 tablet (0.4 mg) under the tongue every 5 minutes as needed for chest pain If you still have symptoms after 3 doses call 911. 25 tablet 1     omeprazole (PRILOSEC) 20 MG DR capsule Take 1 capsule (20 mg) by mouth daily 90 capsule 3     Alcohol Swabs (ALCOHOL PREP) 70 % PADS USE TO SWAB AREA OF INJECTION / JENISE AS DIRECTED 100 each 3     blood glucose monitoring (NO BRAND SPECIFIED) meter device kit Use to test blood sugar 1 times daily or as directed.  Blood Glucose Monitor Brands: per insurance. 1 kit 0     Lancets (ONETOUCH DELICA PLUS ISVUTV50G) MISC USE AS DIRECTED WITH LANCING DEVICE TO TEST ONCE DAILY 100 each 6     ONETOUCH ULTRA test strip USE TO TEST BLOOD SUGAR 1 TIME DAILY OR AS DIRECTED 100 strip 6     order for DME Equipment being ordered: arm blood pressure cuff 1 Device 0     predniSONE (DELTASONE) 20 MG tablet Take 3 tabs by mouth daily  "x 3 days, then 2 tabs daily x 3 days, then 1 tab daily x 3 days, then 1/2 tab daily x 3 days. (Patient not taking: Reported on 9/29/2021) 20 tablet 1         Review of Systems   Constitutional, HEENT, cardiovascular, pulmonary, gi and gu systems are negative, except as otherwise noted.      Objective    /66   Pulse 70   Temp (!) 96.7  F (35.9  C) (Tympanic)   Resp 14   Ht 1.721 m (5' 7.75\")   Wt 73.9 kg (163 lb)   SpO2 92%   BMI 24.97 kg/m    Body mass index is 24.97 kg/m .  Physical Exam   GENERAL APPEARANCE: alert and no distress    Results for orders placed or performed in visit on 01/21/22 (from the past 24 hour(s))   **A1C FUTURE 3mo   Result Value Ref Range    Hemoglobin A1C 8.1 (H) 0.0 - 5.6 %   Extra Tube    Narrative    The following orders were created for panel order Extra Tube.  Procedure                               Abnormality         Status                     ---------                               -----------         ------                     Extra Green Top (Lithium...[[id number]]                      In process                   Please view results for these tests on the individual orders.               "

## 2022-04-27 ENCOUNTER — OFFICE VISIT (OUTPATIENT)
Dept: FAMILY MEDICINE | Facility: CLINIC | Age: 77
End: 2022-04-27
Payer: COMMERCIAL

## 2022-04-27 VITALS
TEMPERATURE: 97.6 F | BODY MASS INDEX: 24.8 KG/M2 | SYSTOLIC BLOOD PRESSURE: 110 MMHG | WEIGHT: 163.6 LBS | DIASTOLIC BLOOD PRESSURE: 80 MMHG | HEART RATE: 81 BPM | HEIGHT: 68 IN | RESPIRATION RATE: 16 BRPM | OXYGEN SATURATION: 95 %

## 2022-04-27 DIAGNOSIS — E11.9 TYPE 2 DIABETES MELLITUS WITHOUT COMPLICATION, WITHOUT LONG-TERM CURRENT USE OF INSULIN (H): Primary | ICD-10-CM

## 2022-04-27 DIAGNOSIS — Z23 HIGH PRIORITY FOR 2019-NCOV VACCINE: ICD-10-CM

## 2022-04-27 DIAGNOSIS — J44.9 CHRONIC OBSTRUCTIVE PULMONARY DISEASE, UNSPECIFIED COPD TYPE (H): ICD-10-CM

## 2022-04-27 DIAGNOSIS — H10.13 ALLERGIC CONJUNCTIVITIS, BILATERAL: ICD-10-CM

## 2022-04-27 LAB
CHOLEST SERPL-MCNC: 127 MG/DL
CREAT UR-MCNC: 159 MG/DL
FASTING STATUS PATIENT QL REPORTED: YES
HBA1C MFR BLD: 8.6 % (ref 0–5.6)
HDLC SERPL-MCNC: 51 MG/DL
LDLC SERPL CALC-MCNC: 44 MG/DL
MICROALBUMIN UR-MCNC: 11 MG/L
MICROALBUMIN/CREAT UR: 6.92 MG/G CR (ref 0–17)
NONHDLC SERPL-MCNC: 76 MG/DL
TRIGL SERPL-MCNC: 158 MG/DL

## 2022-04-27 PROCEDURE — 83036 HEMOGLOBIN GLYCOSYLATED A1C: CPT | Performed by: INTERNAL MEDICINE

## 2022-04-27 PROCEDURE — 36415 COLL VENOUS BLD VENIPUNCTURE: CPT | Performed by: INTERNAL MEDICINE

## 2022-04-27 PROCEDURE — 82043 UR ALBUMIN QUANTITATIVE: CPT | Performed by: INTERNAL MEDICINE

## 2022-04-27 PROCEDURE — 80061 LIPID PANEL: CPT | Performed by: INTERNAL MEDICINE

## 2022-04-27 PROCEDURE — 99214 OFFICE O/P EST MOD 30 MIN: CPT | Performed by: INTERNAL MEDICINE

## 2022-04-27 PROCEDURE — 0054A COVID-19,PF,PFIZER (12+ YRS): CPT | Performed by: INTERNAL MEDICINE

## 2022-04-27 PROCEDURE — 91305 COVID-19,PF,PFIZER (12+ YRS): CPT | Performed by: INTERNAL MEDICINE

## 2022-04-27 RX ORDER — GLIPIZIDE 10 MG/1
10 TABLET, FILM COATED, EXTENDED RELEASE ORAL DAILY
Qty: 90 TABLET | Refills: 3 | Status: SHIPPED | OUTPATIENT
Start: 2022-04-27 | End: 2023-09-19

## 2022-04-27 ASSESSMENT — PAIN SCALES - GENERAL: PAINLEVEL: NO PAIN (0)

## 2022-04-27 NOTE — MR AVS SNAPSHOT
After Visit Summary   3/27/2018    Raul Wilhelm    MRN: [id number]           Patient Information     Date Of Birth          1945        Visit Information        Provider Department      3/27/2018 11:20 AM Bjorn Galvin MD [facility]        Today's Diagnoses     Bronchitis    -  1    Uncomplicated severe persistent asthma        Chronic obstructive pulmonary disease, unspecified COPD type (H)          Care Instructions          Thank you for choosing [facility].  You may be receiving a survey in the mail from Sally Kaufman regarding your visit today.  Please take a few minutes to complete and return the survey to let us know how we are doing.      If you have questions or concerns, please contact us via [employer] or you can contact your care team at [phone].    Our Clinic hours are:  Monday 6:40 am  to 7:00 pm  Tuesday -Friday 6:40 am to 5:00 pm    The [place] outpatient lab hours are:  Monday - Friday 6:10 am to 4:45 pm  Saturdays 7:00 am to 11:00 am  Appointments are required, call [phone]    If you have clinical questions after hours or would like to schedule an appointment,  call the clinic at [phone].  Bronchitis, Antibiotic Treatment (Adult)    Bronchitis is an infection of the air passages (bronchial tubes) in your lungs. It often occurs when you have a cold. This illness is contagious during the first few days and is spread through the air by coughing and sneezing, or by direct contact (touching the sick person and then touching your own eyes, nose, or mouth).  Symptoms of bronchitis include cough with mucus (phlegm) and low-grade fever. Bronchitis usually lasts 7 to 14 days. Mild cases can be treated with simple home remedies. More severe infection is treated with an antibiotic.  Home care  Follow these guidelines when caring for yourself at home:    If your symptoms are severe, rest at home for the first 2 to 3 days. When you go back to your  usual activities, don't let yourself get too tired.    Do not smoke. Also avoid being exposed to secondhand smoke.    You may use over-the-counter medicines to control fever or pain, unless another medicine was prescribed. (Note: If you have chronic liver or kidney disease or have ever had a stomach ulcer or gastrointestinal bleeding, talk with your healthcare provider before using these medicines. Also talk to your provider if you are taking medicine to prevent blood clots.) Aspirin should never be given to anyone younger than 18 years of age who is ill with a viral infection or fever. It may cause severe liver or brain damage.    Your appetite may be poor, so a light diet is fine. Avoid dehydration by drinking 6 to 8 glasses of fluids per day (such as water, soft drinks, sports drinks, juices, tea, or soup). Extra fluids will help loosen secretions in the nose and lungs.    Over-the-counter cough, cold, and sore-throat medicines will not shorten the length of the illness, but they may be helpful to reduce symptoms. (Note: Do not use decongestants if you have high blood pressure.)    Finish all antibiotic medicine. Do this even if you are feeling better after only a few days.  Follow-up care  Follow up with your healthcare provider, or as advised. If you had an X-ray or ECG (electrocardiogram), a specialist will review it. You will be notified of any new findings that may affect your care.  Note: If you are age 65 or older, or if you have a chronic lung disease or condition that affects your immune system, or you smoke, talk to your healthcare provider about having pneumococcal vaccinations and a yearly influenza vaccination (flu shot).  When to seek medical advice  Call your healthcare provider right away if any of these occur:    Fever of 100.4 F (38 C) or higher    Coughing up increased amounts of colored sputum    Weakness, drowsiness, headache, facial pain, ear pain, or a stiff neck  Call 911, or get immediate  "medical care  Contact emergency services right away if any of these occur.    Coughing up blood    Worsening weakness, drowsiness, headache, or stiff neck    Trouble breathing, wheezing, or pain with breathing  Date Last Reviewed: 2015-2017 [employer]. [address] [place] [address]. All rights reserved. This information is not intended as a substitute for professional medical care. Always follow your healthcare professional's instructions.                Follow-ups after your visit        Who to contact     If you have questions or need follow up information about today's clinic visit or your schedule please contact [facility] directly at [phone].  Normal or non-critical lab and imaging results will be communicated to you by MyChart, letter or phone within 4 business days after the clinic has received the results. If you do not hear from us within 7 days, please contact the clinic through [employer] or phone. If you have a critical or abnormal lab result, we will notify you by phone as soon as possible.  Submit refill requests through [employer] or call your pharmacy and they will forward the refill request to us. Please allow 3 business days for your refill to be completed.          Additional Information About Your Visit        MyChart Information     [employer] lets you send messages to your doctor, view your test results, renew your prescriptions, schedule appointments and more. To sign up, go to [email or web address] . Click on \"Log in\" on the left side of the screen, which will take you to the Welcome page. Then click on \"Sign up Now\" on the right side of the page.     You will be asked to enter the access code listed below, as well as some personal information. Please follow the directions to create your username and password.     Your access code is: [email or web address]  Expires: 2018  8:46 AM     Your access code will  in 90 days. If you need help or a " "new code, please call your [place] clinic or [phone].        Care EveryWhere ID     This is your Care EveryWhere ID. This could be used by other organizations to access your [place] medical records  [id number]        Your Vitals Were     Pulse Temperature Height Pulse Oximetry BMI (Body Mass Index)       90 96.3  F (35.7  C) (Tympanic) 5' 7\" (1.702 m) 94% 26 kg/m2        Blood Pressure from Last 3 Encounters:   03/27/18 122/76   03/23/18 121/75   10/31/17 122/68    Weight from Last 3 Encounters:   03/27/18 166 lb (75.3 kg)   03/23/18 170 lb 9.6 oz (77.4 kg)   10/31/17 166 lb (75.3 kg)              Today, you had the following     No orders found for display         Today's Medication Changes          These changes are accurate as of 3/27/18 11:47 AM.  If you have any questions, ask your nurse or doctor.               These medicines have changed or have updated prescriptions.        Dose/Directions    * predniSONE 10 MG tablet   Commonly known as:  DELTASONE   This may have changed:  Another medication with the same name was added. Make sure you understand how and when to take each.   Changed by:  Bjorn Galvin MD        Refills:  0       * predniSONE 10 MG tablet   Commonly known as:  DELTASONE   This may have changed:  You were already taking a medication with the same name, and this prescription was added. Make sure you understand how and when to take each.   Used for:  Bronchitis   Changed by:  Bjorn Galvin MD        Dose:  40 mg   Take 4 tablets (40 mg) by mouth daily for 5 days   Quantity:  20 tablet   Refills:  0       * Notice:  This list has 2 medication(s) that are the same as other medications prescribed for you. Read the directions carefully, and ask your doctor or other care provider to review them with you.         Where to get your medicines      These medications were sent to [facility] [place], [place] - [address]  [address], [place] " [place] [address]     Phone:  [phone]     ipratropium - albuterol 0.5 mg/2.5 mg/3 mL 0.5-2.5 (3) MG/3ML neb solution    predniSONE 10 MG tablet                Primary Care Provider Office Phone # Fax #    Connor Anderson [phone] [address] [facility] [address]        Equal Access to Services     DIDIER VINES : Hadii aad ku hadasho Soomaali, waaxda luqadaha, qaybta kaalmada adeegyada, waxay idiin hayfernandon adetalat deonnaalejandrina flores . So [facility] [phone].    ATENCIÓN: Si habla español, tiene a mccarthy disposición servicios gratuitos de asistencia lingüística. Travame al [phone].    We comply with applicable federal civil rights laws and [place] laws. We do not discriminate on the basis of race, color, national origin, age, disability, sex, sexual orientation, or gender identity.            Thank you!     Thank you for choosing [facility]  for your care. Our goal is always to provide you with excellent care. Hearing back from our patients is one way we can continue to improve our services. Please take a few minutes to complete the written survey that you may receive in the mail after your visit with us. Thank you!             Your Updated Medication List - Protect others around you: Learn how to safely use, store and throw away your medicines at www.disposemymeds.org.          This list is accurate as of 3/27/18 11:47 AM.  Always use your most recent med list.                   Brand Name Dispense Instructions for use Diagnosis    * albuterol 108 (90 BASE) MCG/ACT Inhaler    PROAIR HFA/PROVENTIL HFA/VENTOLIN HFA    1 Inhaler    Inhale 2 puffs into the lungs every 6 hours as needed for shortness of breath / dyspnea or wheezing    Uncomplicated severe persistent asthma, Chronic obstructive pulmonary disease, unspecified COPD type (H)       * albuterol (2.5 MG/3ML) 0.083% neb solution     30 vial    Take 1 vial (2.5 mg) by nebulization every 6 hours as needed for shortness of breath  / dyspnea or wheezing    Acute bronchitis, unspecified organism       aspirin 81 MG EC tablet     30 tablet    Take 1 tablet (81 mg) by mouth daily    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  Do not apply to face.    Psoriasis       EPINEPHrine 0.3 MG/0.3ML injection 2-pack    EPIPEN/ADRENACLICK/or ANY BX GENERIC EQUIV    0.6 mL    Inject 0.3 mLs (0.3 mg) into the muscle as needed for anaphylaxis    Bee sting allergy       fexofenadine 180 MG tablet    ALLEGRA    90 tablet    Take 1 tablet by mouth daily.    Seasonal allergic rhinitis       ipratropium - albuterol 0.5 mg/2.5 mg/3 mL 0.5-2.5 (3) MG/3ML neb solution    DUONEB    1 Box    Take 1 vial (3 mLs) by nebulization every 4 hours as needed for shortness of breath / dyspnea    Uncomplicated severe persistent asthma, Chronic obstructive pulmonary disease, unspecified COPD type (H)       latanoprost 0.005 % ophthalmic solution    XALATAN     Place 1 drop into both eyes At Bedtime        losartan-hydrochlorothiazide 50-12.5 MG per tablet    HYZAAR    90 tablet    Take 1 tablet by mouth daily    Essential hypertension, benign       montelukast 10 MG tablet    SINGULAIR    90 tablet    TAKE ONE TABLET BY MOUTH EVERY NIGHT AT BEDTIME    Severe persistent asthma, Seasonal allergic rhinitis       nitroGLYcerin 0.4 MG sublingual tablet    NITROSTAT    25 tablet    Place 1 tablet (0.4 mg) under the tongue every 5 minutes as needed for chest pain If you are still having symptoms after 3 doses (15 minutes) call 911.    ST elevation myocardial infarction  involving left circumflex coronary artery (H)       omeprazole 20 MG CR capsule    priLOSEC          order for DME     1 Device    Equipment being ordered: Nebulizer Respironics, MiniElite--Neb Machine and tubing and mouth piece.    Extrinsic asthma, unspecified       order for DME     1 Device    Equipment being ordered: arm blood pressure cuff    Essential hypertension, benign       penicillin V potassium 500 MG tablet    VEETID    30 tablet    Take 1 tablet (500 mg) by mouth 3 times daily    Acute bronchitis with symptoms > 10 days       * predniSONE 10 MG tablet    DELTASONE          * predniSONE 10 MG tablet    DELTASONE    20 tablet    Take 4 tablets (40 mg) by mouth daily for 5 days    Bronchitis       * Notice:  This list has 4 medication(s) that are the same as other medications prescribed for you. Read the directions carefully, and ask your doctor or other care provider to review them with you.       Plan: Consider a 3rd round of isotretinoin in the future. Initiate Treatment: BPO cleanser QAM, topical clindamycin solution, Spironolactone Discontinue Regimen: Doxycycline Detail Level: Zone Continue Regimen: Tretinoin

## 2022-04-27 NOTE — LETTER
April 27, 2022      Raul Wilhelm  [address]  [place] [address]        Dear ,    We are writing to inform you of your test results.    The urine is negative for protein which is good.  The cholesterol is improved from 1 year ago.  A1c discussed at the time of visit       Resulted Orders   **A1C FUTURE 3mo   Result Value Ref Range    Hemoglobin A1C 8.6 (H) 0.0 - 5.6 %      Comment:      Normal <5.7%   Prediabetes 5.7-6.4%    Diabetes 6.5% or higher     Note: Adopted from ADA consensus guidelines.   Lipid panel reflex to direct LDL Fasting   Result Value Ref Range    Cholesterol 127 <200 mg/dL    Triglycerides 158 (H) <150 mg/dL    Direct Measure HDL 51 >=40 mg/dL    LDL Cholesterol Calculated 44 <=100 mg/dL    Non HDL Cholesterol 76 <130 mg/dL    Patient Fasting > 8hrs? Yes     Narrative    Cholesterol  Desirable:  <200 mg/dL    Triglycerides  Normal:  Less than 150 mg/dL  Borderline High:  150-199 mg/dL  High:  200-499 mg/dL  Very High:  Greater than or equal to 500 mg/dL    Direct Measure HDL  Female:  Greater than or equal to 50 mg/dL   Male:  Greater than or equal to 40 mg/dL    LDL Cholesterol  Desirable:  <100mg/dL  Above Desirable:  100-129 mg/dL   Borderline High:  130-159 mg/dL   High:  160-189 mg/dL   Very High:  >= 190 mg/dL    Non HDL Cholesterol  Desirable:  130 mg/dL  Above Desirable:  130-159 mg/dL  Borderline High:  160-189 mg/dL  High:  190-219 mg/dL  Very High:  Greater than or equal to 220 mg/dL   Albumin Random Urine Quantitative with Creat Ratio   Result Value Ref Range    Creatinine Urine mg/dL 159 mg/dL    Albumin Urine mg/L 11 mg/L    Albumin Urine mg/g Cr 6.92 0.00 - 17.00 mg/g Cr       If you have any questions or concerns, please call the clinic at the number listed above.       Sincerely,      Sarita Hennessy, DO

## 2022-04-27 NOTE — PATIENT INSTRUCTIONS
Diabetes:  Work to have foods that are low glycemic index scale  Work to cut on portion size and frequency of high glycemic index foods  Recommend to increase Glipizide from 5 mg daily to 10 mg daily. Take before a meal  Follow-up in 3 months for diabetes check-up; non-fasting blood work prior to visit  You are due for dilated eye exam.  Have the report sent to Dr. Hennessy's office.    Eye symptoms:  May be allergy related?  Can use Flonase to help with eye allergy symptoms   Over the counter eye allergy - Pataday        Tips for a Healthy Diet    Add more fresh fruits and vegetables to your diet.  The more colorful with the fruit or vegetable (think berries, spinach, carrots, peppers) the healthier it tends to be.  Juice is not a fruit.  Prepare the vegetables in a healthy way - steam, bake.  Avoid breading, butter/oil to cook.  Use herbs and spices instead of salt to season food.  Add more fiber to your diet.  Swap out white bread, white rice, white pasta for whole grain versions.  Reduce the portion size and frequency of carbohydrates/starches.  Look for foods that have a lower glycemic index    https://www.health.[employer].edu/healthbeat/a-good-guide-to-good-carbs-the-glycemic-index    Choose healthier fats such as nuts, olive oil, avocado, etc. Stay away from lard, butter.  Decrease the frequency and portion size of 'junk food' -pizza, candy, cookies, potato chips, etc.  Watch liquid calories such as coffee drinks, juice, soda, teas.  There tends to be excessive sugar in these beverages.  Increase protein in your diet.  Eggs, cheese, yogurt, nuts, lentils, chicken, fish are good healthy choices.  Protein keeps you madrid longer, and you are less likely to have blood sugar spikes  Eat healthy at least 80% of the time.  It is ok for a special treat every once in a while, just not every day.  When are you going to indulge (think [place] time), be sure you are eating extra healthy the day before and  after.      Resources:  [employer] Resources for Health and Wellness:https://www.takingpk.[facility]/dig-yes-foods    2.   [employer] Ways To Wellness:    https://www.[employer].org/services/ways-to-wellness  Ways to Wellness offers:  Nutrition and weight management   Corrective exercise and fitness training   Lifestyle and behavioral change   Healing services  Our team includes registered dietitians, certified personal trainers, life coaches, as well as a Culinary Educator.  3. Book - Atomic Habits by Homero Banerjee.  This a good book that looks into our habits and how to sustain good healthy habits and get rid of bad habits.

## 2022-04-27 NOTE — RESULT ENCOUNTER NOTE
The urine is negative for protein which is good.  The cholesterol is improved from 1 year ago.  A1c discussed at the time of visit

## 2022-04-27 NOTE — PROGRESS NOTES
Assessment & Plan     Type 2 diabetes mellitus without complication, without long-term current use of insulin (H) -uncontrolled.  He is resistant to significant diet changes.  He is resistant to more medication, but in the end agreed to double the dose of the glipizide.  He is not having hypoglycemia.  If he were to develop hypoglycemia, would consider switching to Trulicity.  Cost may be a barrier.  Actos is another alternative  - **A1C FUTURE 3mo  - Lipid panel reflex to direct LDL Fasting  - Albumin Random Urine Quantitative with Creat Ratio  - glipiZIDE (GLUCOTROL XL) 10 MG 24 hr tablet; Take 1 tablet (10 mg) by mouth daily  - **A1C FUTURE 3mo; Future    Chronic obstructive pulmonary disease, unspecified COPD type (H) -advised patient not to use the prednisone as needed and should only have a few courses of prednisone per year.  He is not able to afford controller inhalers and declines any further trials of them as he has felt that they have not benefited him.  Discussed the risk of using prednisone as needed, especially risk of hyperglycemia.  Patient strongly declines changing his practice of prednisone use and declines to see a pulmonologist    Allergic conjunctivitis, bilateral -advised over-the-counter Pataday or Flonase               Patient Instructions   Diabetes:  1. Work to have foods that are low glycemic index scale  2. Work to cut on portion size and frequency of high glycemic index foods  3. Recommend to increase Glipizide from 5 mg daily to 10 mg daily. Take before a meal  4. Follow-up in 3 months for diabetes check-up; non-fasting blood work prior to visit  5. You are due for dilated eye exam.  Have the report sent to Dr. Hennessy's office.    Eye symptoms:  1. May be allergy related?  Can use Flonase to help with eye allergy symptoms   2. Over the counter eye allergy - Pataday        Tips for a Healthy Diet    1. Add more fresh fruits and vegetables to your diet.  The more colorful with the  fruit or vegetable (think berries, spinach, carrots, peppers) the healthier it tends to be.  Juice is not a fruit.  Prepare the vegetables in a healthy way - steam, bake.  Avoid breading, butter/oil to cook.  Use herbs and spices instead of salt to season food.  2. Add more fiber to your diet.  Swap out white bread, white rice, white pasta for whole grain versions.  Reduce the portion size and frequency of carbohydrates/starches.  Look for foods that have a lower glycemic index    https://www.health.[place].edu/healthbeat/a-good-guide-to-good-carbs-the-glycemic-index    3. Choose healthier fats such as nuts, olive oil, avocado, etc. Stay away from lard, butter.  4. Decrease the frequency and portion size of 'junk food' -pizza, candy, cookies, potato chips, etc.  5. Watch liquid calories such as coffee drinks, juice, soda, teas.  There tends to be excessive sugar in these beverages.  6. Increase protein in your diet.  Eggs, cheese, yogurt, nuts, lentils, chicken, fish are good healthy choices.  Protein keeps you madrid longer, and you are less likely to have blood sugar spikes  7. Eat healthy at least 80% of the time.  It is ok for a special treat every once in a while, just not every day.  When are you going to indulge (think [place] time), be sure you are eating extra healthy the day before and after.      Resources:  1. [employer] Resources for Health and Wellness:https://www.takingjonorgayle.[facility].edu/dig-yes-foods    2.   [employer] Ways To Wellness:    https://www.[employer].org/services/ways-to-wellness  Ways to Wellness offers:    Nutrition and weight management     Corrective exercise and fitness training     Lifestyle and behavioral change     Healing services  Our team includes registered dietitians, certified personal trainers, life coaches, as well as a Culinary Educator.  3. Book - Atomic Habits by Homero Banerjee.  This a good book that looks into our habits and how to sustain good healthy habits and get rid of  bad habits.        Return in about 3 months (around 7/27/2022) for Diabetes Check with lab prior.    Sarita Hennessy DO  [facility]    Jonathon Carter is a 76 year old who presents for the following health issues  accompanied by his self.    History of Present Illness       Diabetes:   He presents for follow up of diabetes.  He is checking home blood glucose a few times a week. He checks blood glucose before meals.  Blood glucose is sometimes over 200 and never under 70. When his blood glucose is low, the patient is asymptomatic for confusion, blurred vision, lethargy and reports not feeling dizzy, shaky, or weak.  He has no concerns regarding his diabetes at this time.  He is not experiencing numbness or burning in feet, excessive thirst, blurry vision, weight changes or redness, sores or blisters on feet. The patient has not had a diabetic eye exam in the last 12 months.         He eats 2-3 servings of fruits and vegetables daily.He consumes 1 sweetened beverage(s) daily.He exercises with enough effort to increase his heart rate 10 to 19 minutes per day.  He exercises with enough effort to increase his heart rate 5 days per week.   He is taking medications regularly.       Diabetes Follow-up    How often are you checking your blood sugar? A few times a week  What time of day are you checking your blood sugars (select all that apply)?  Breakfast  Have you had any blood sugars above 200?  Yes sometimes if eat a lot of sugar  Have you had any blood sugars below 70?  No    What symptoms do you notice when your blood sugar is low?  None    What concerns do you have today about your diabetes? None     Do you have any of these symptoms? (Select all that apply)  No numbness or tingling in feet.  No redness, sores or blisters on feet.  No complaints of excessive thirst.  No reports of blurry vision.  No significant changes to weight.    Have you had a diabetic eye exam in the last 12 months?  Yes- Date of last eye exam: 2/27/22,  Location: [facility]     Glipizide 5 mg daily    Last visit we discussed compliance with diet/meds which he promised to improve on;  Is still not compliant with diet and taking med regularly. Last visit he said he would just forget to take despite having pill box.    Diarrhea on metformin XR    He strongly prefers to avoid more medication    He feels stress of caring for his ill wife is leading to high blood sugar     Hemoglobin A1C POCT   Date Value Ref Range Status   04/09/2021 8.5 (H) 0 - 5.6 % Final     Comment:     Normal <5.7% Prediabetes 5.7-6.4%  Diabetes 6.5% or higher - adopted from ADA   consensus guidelines.     08/27/2020 7.2 (H) 0 - 5.6 % Final     Comment:     Normal <5.7% Prediabetes 5.7-6.4%  Diabetes 6.5% or higher - adopted from ADA   consensus guidelines.     05/19/2020 8.7 (H) 0 - 5.6 % Final     Comment:     Normal <5.7% Prediabetes 5.7-6.4%  Diabetes 6.5% or higher - adopted from ADA   consensus guidelines.       Hemoglobin A1C   Date Value Ref Range Status   04/27/2022 8.6 (H) 0.0 - 5.6 % Final     Comment:     Normal <5.7%   Prediabetes 5.7-6.4%    Diabetes 6.5% or higher     Note: Adopted from ADA consensus guidelines.   01/21/2022 8.1 (H) 0.0 - 5.6 % Final     Comment:     Normal <5.7%   Prediabetes 5.7-6.4%    Diabetes 6.5% or higher     Note: Adopted from ADA consensus guidelines.   09/29/2021 8.2 (H) 0.0 - 5.6 % Final     Comment:     Normal <5.7%   Prediabetes 5.7-6.4%    Diabetes 6.5% or higher     Note: Adopted from ADA consensus guidelines.                 Hyperlipidemia Follow-Up      Are you regularly taking any medication or supplement to lower your cholesterol?   Yes- AM    Are you having muscle aches or other side effects that you think could be caused by your cholesterol lowering medication?  No    Hypertension Follow-up      Do you check your blood pressure regularly outside of the clinic? Yes     Are you following a low salt diet?  Yes    Are your blood pressures ever more than 140 on the top number (systolic) OR more than 90 on the bottom number (diastolic), for example 140/90? No    BP Readings from Last 2 Encounters:   04/27/22 110/80   01/21/22 118/66     Hemoglobin A1C POCT (%)   Date Value   04/09/2021 8.5 (H)   08/27/2020 7.2 (H)     Hemoglobin A1C (%)   Date Value   04/27/2022 8.6 (H)   01/21/2022 8.1 (H)     LDL Cholesterol Calculated (mg/dL)   Date Value   04/09/2021 75   01/31/2020 71         How many servings of fruits and vegetables do you eat daily?  2-3    On average, how many sweetened beverages do you drink each day (Examples: soda, juice, sweet tea, etc.  Do NOT count diet or artificially sweetened beverages)?   1    How many days per week do you exercise enough to make your heart beat faster? 5    How many minutes a day do you exercise enough to make your heart beat faster? 10 - 19    How many days per week do you miss taking your medication? 0    Asthma:  --he wants to use prednisone PRN  --he strongly declines to see Pulm  --he cannot afford other inhalers and strongly declines to work any more to get other asthma meds    Dry skin:  --under eye area, sometimes crusting or scaling; lifelong allergy symptoms     Current Outpatient Medications   Medication Sig Dispense Refill     albuterol (PROVENTIL) (2.5 MG/3ML) 0.083% neb solution Take 1 vial (2.5 mg) by nebulization every 6 hours as needed for shortness of breath / dyspnea or wheezing 30 mL 11     Alcohol Swabs (ALCOHOL PREP) 70 % PADS USE TO SWAB AREA OF INJECTION / JENISE AS DIRECTED 100 each 3     aspirin (ASA) 81 MG EC tablet Take 1 tablet (81 mg) by mouth daily       atorvastatin (LIPITOR) 40 MG tablet Take 1 tablet (40 mg) by mouth daily 90 tablet 3     blood glucose monitoring (NO BRAND SPECIFIED) meter device kit Use to test blood sugar 1 times daily or as directed.  Blood Glucose Monitor Brands: per insurance. 1 kit 0     carvedilol (COREG) 6.25 MG tablet Take 1  "tablet (6.25 mg) by mouth 2 times daily 180 tablet 3     fexofenadine (ALLEGRA) 180 MG tablet Take 1 tablet (180 mg) by mouth daily 90 tablet 3     glipiZIDE (GLUCOTROL XL) 5 MG 24 hr tablet Take 1 tablet (5 mg) by mouth daily (Patient taking differently: Take 5 mg by mouth daily Not taking daily (worried about GI symptoms if he's at work)) 90 tablet 3     Lancets (ONETOUCH DELICA PLUS [id number]) MISC USE AS DIRECTED WITH LANCING DEVICE TO TEST ONCE DAILY 100 each 6     losartan-hydrochlorothiazide (HYZAAR) 50-12.5 MG tablet Take 1 tablet by mouth daily 90 tablet 3     montelukast (SINGULAIR) 10 MG tablet TAKE ONE TABLET BY MOUTH AT BEDTIME 90 tablet 3     nitroGLYcerin (NITROSTAT) 0.4 MG sublingual tablet Place 1 tablet (0.4 mg) under the tongue every 5 minutes as needed for chest pain If you still have symptoms after 3 doses call 911. 25 tablet 1     omeprazole (PRILOSEC) 20 MG DR capsule Take 1 capsule (20 mg) by mouth daily 90 capsule 3     ONETOUCH ULTRA test strip USE TO TEST BLOOD SUGAR 1 TIME DAILY OR AS DIRECTED 100 strip 6     order for DME Equipment being ordered: arm blood pressure cuff 1 Device 0     PROAIR RESPICLICK 108 (90 Base) MCG/ACT inhaler INHALE 1 TO 2 PUFFS INTO THE LUNGS EVERY 4 HOURS AS NEEDED FOR SHORTNESS OF BREATH, DIFFICULTY BREATHING OR WHEEZING. 1 each 0         Review of Systems   Constitutional, HEENT, cardiovascular, pulmonary, gi and gu systems are negative, except as otherwise noted.      Objective    /80 (BP Location: Right arm, Patient Position: Sitting, Cuff Size: Adult Regular)   Pulse 81   Temp 97.6  F (36.4  C) (Tympanic)   Resp 16   Ht 1.725 m (5' 7.91\")   Wt 74.2 kg (163 lb 9.6 oz)   SpO2 95%   BMI 24.94 kg/m    Body mass index is 24.94 kg/m .  Physical Exam   GENERAL APPEARANCE: alert and no distress    Results for orders placed or performed in visit on 04/27/22 (from the past 24 hour(s))   **A1C FUTURE 3mo   Result Value Ref Range    Hemoglobin A1C 8.6 (H) " 0.0 - 5.6 %

## 2022-05-02 DIAGNOSIS — E11.9 TYPE 2 DIABETES MELLITUS WITHOUT COMPLICATION, WITHOUT LONG-TERM CURRENT USE OF INSULIN (H): ICD-10-CM

## 2022-05-04 RX ORDER — BLOOD SUGAR DIAGNOSTIC
STRIP MISCELLANEOUS
Qty: 100 STRIP | Refills: 6 | Status: SHIPPED | OUTPATIENT
Start: 2022-05-04

## 2022-05-20 ENCOUNTER — TELEPHONE (OUTPATIENT)
Dept: FAMILY MEDICINE | Facility: CLINIC | Age: 77
End: 2022-05-20
Payer: COMMERCIAL

## 2022-05-20 DIAGNOSIS — J44.1 CHRONIC OBSTRUCTIVE PULMONARY DISEASE WITH ACUTE EXACERBATION (H): Primary | ICD-10-CM

## 2022-05-20 NOTE — TELEPHONE ENCOUNTER
Patient is requesting refill, not on current med list. Call patient with questions.    Thanks,   Jeimy Escamilla  Certified Pharmacy Technician  [facility]  [phone]

## 2022-05-20 NOTE — TELEPHONE ENCOUNTER
Writer called patient.  Patient did not answer phone.  Writer left message requesting a call back to the clinic.    [facility]

## 2022-05-21 NOTE — TELEPHONE ENCOUNTER
Reason for Call:  Other returning call    Detailed comments: Returning Tyler's phone call that patient missed yesterday in regards to the refill request of the prednisone.      Phone Number Patient can be reached at: Home number on file [phone] (home)    Best Time: Early morning    Can we leave a detailed message on this number? Not Applicable    Call taken on 5/21/2022 at 9:46 AM by Samira Rose

## 2022-05-23 RX ORDER — PREDNISONE 20 MG/1
40 TABLET ORAL DAILY
Qty: 10 TABLET | Refills: 1 | Status: SHIPPED | OUTPATIENT
Start: 2022-05-23 | End: 2022-05-28

## 2022-05-23 NOTE — TELEPHONE ENCOUNTER
Dr. Hennessy,    S-(situation): prednisone    B-(background): has to use this twice a year for asthma flares in the spring and fall.  Would like it sent to [facility] pharmacy    A-(assessment): asthma flare    R-(recommendations): refill of prednisone. Cinthya NEWMAN RN

## 2022-05-24 NOTE — TELEPHONE ENCOUNTER
Pt was notified, and verbalized understanding. Reports that he's already picked up the medication.    Criselda Arias RN  [facility]

## 2022-07-06 ENCOUNTER — TELEPHONE (OUTPATIENT)
Dept: FAMILY MEDICINE | Facility: CLINIC | Age: 77
End: 2022-07-06

## 2022-07-06 DIAGNOSIS — J45.50 SEVERE PERSISTENT ASTHMA WITHOUT COMPLICATION (H): Primary | ICD-10-CM

## 2022-07-06 NOTE — TELEPHONE ENCOUNTER
"Requested Prescriptions   Pending Prescriptions Disp Refills    albuterol (PROAIR HFA/PROVENTIL HFA/VENTOLIN HFA) 108 (90 Base) MCG/ACT inhaler [Pharmacy Med Name: ALBUTEROL SULFATE  AERS] 8.5 g 10     Sig: INHALE 1-2 PUFFS INTO THE LUNGS EVERY 4 HOURS AS NEEDED FOR SHORTNESS OF BREATH / DYSPNEA OR WHEEZING        Asthma Maintenance Inhalers - Anticholinergics Failed - 7/6/2022  7:14 AM        Failed - Asthma control assessment score within normal limits in last 6 months     Please review ACT score.           Passed - Patient is age 12 years or older        Passed - Medication is active on med list        Passed - Recent (6 mo) or future (30 days) visit within the authorizing provider's specialty     Patient had office visit in the last 6 months or has a visit in the next 30 days with authorizing provider or within the authorizing provider's specialty.  See \"Patient Info\" tab in inbasket, or \"Choose Columns\" in Meds & Orders section of the refill encounter.           Short-Acting Beta Agonist Inhalers Protocol  Failed - 7/6/2022  7:14 AM        Failed - Asthma control assessment score within normal limits in last 6 months     Please review ACT score.           Passed - Patient is age 12 or older        Passed - Medication is active on med list        Passed - Recent (6 mo) or future (30 days) visit within the authorizing provider's specialty     Patient had office visit in the last 6 months or has a visit in the next 30 days with authorizing provider or within the authorizing provider's specialty.  See \"Patient Info\" tab in inbasket, or \"Choose Columns\" in Meds & Orders section of the refill encounter.                  "

## 2022-07-07 RX ORDER — ALBUTEROL SULFATE 90 UG/1
AEROSOL, METERED RESPIRATORY (INHALATION)
Qty: 8.5 G | Refills: 10 | Status: SHIPPED | OUTPATIENT
Start: 2022-07-07 | End: 2022-09-21

## 2022-07-12 NOTE — TELEPHONE ENCOUNTER
"Requested Prescriptions   Pending Prescriptions Disp Refills     montelukast (SINGULAIR) 10 MG tablet [Pharmacy Med Name: MONTELUKAST SODIUM 10MG TABS] 90 tablet 3     Sig: TAKE ONE TABLET BY MOUTH EVERY NIGHT AT BEDTIME       Leukotriene Inhibitors Protocol Failed - 11/30/2020  9:28 AM        Failed - Asthma control assessment score within normal limits in last 6 months     Please review ACT score.           Failed - Recent (6 mo) or future (30 days) visit within the authorizing provider's specialty     Patient had office visit in the last 6 months or has a visit in the next 30 days with authorizing provider or within the authorizing provider's specialty.  See \"Patient Info\" tab in inbasket, or \"Choose Columns\" in Meds & Orders section of the refill encounter.            Passed - Patient is age 12 or older     If patient is under 16, ok to refill using age based dosing.           Passed - Medication is active on med list             "
4 = No assist / stand by assistance

## 2022-07-18 ENCOUNTER — LAB (OUTPATIENT)
Dept: FAMILY MEDICINE | Facility: CLINIC | Age: 77
End: 2022-07-18
Payer: COMMERCIAL

## 2022-07-18 ENCOUNTER — NURSE TRIAGE (OUTPATIENT)
Dept: NURSING | Facility: CLINIC | Age: 77
End: 2022-07-18

## 2022-07-18 DIAGNOSIS — Z20.822 CLOSE EXPOSURE TO 2019 NOVEL CORONAVIRUS: ICD-10-CM

## 2022-07-18 LAB — SARS-COV-2 RNA RESP QL NAA+PROBE: NEGATIVE

## 2022-07-18 PROCEDURE — 99207 PR NO CHARGE LOS: CPT

## 2022-07-18 PROCEDURE — U0003 INFECTIOUS AGENT DETECTION BY NUCLEIC ACID (DNA OR RNA); SEVERE ACUTE RESPIRATORY SYNDROME CORONAVIRUS 2 (SARS-COV-2) (CORONAVIRUS DISEASE [COVID-19]), AMPLIFIED PROBE TECHNIQUE, MAKING USE OF HIGH THROUGHPUT TECHNOLOGIES AS DESCRIBED BY CMS-2020-01-R: HCPCS

## 2022-07-18 PROCEDURE — U0005 INFEC AGEN DETEC AMPLI PROBE: HCPCS

## 2022-07-18 NOTE — TELEPHONE ENCOUNTER
"Patient had close contact with a person who tested positive for Covid. Patient was with the person Friday and Saturday. Patient is fully vaccinated and has had both booster shots but is concerned as he has \"severe asthma\".  Patient not having any symptoms at this time. Patient requesting testing through [facility].   Informed patient to schedule on the 5 th day from exposure, mask for 10 days from last exposure, and test sooner if symptoms develop.  Patient will call back with any worsening symptoms or positive test.   Protocol recommends home care  Care advice given.   Erendira Lyons RN   07/18/22 8:30 AM  [facility] Nurse Advisor    COVID 19 Nurse Triage Plan/Patient Instructions    Please be aware that novel coronavirus (COVID-19) may be circulating in the community. If you develop symptoms such as fever, cough, or SOB or if you have concerns about the presence of another infection including coronavirus (COVID-19), please contact your health care provider or visit [email or web address].     Disposition/Instructions    Home care recommended. Follow home care protocol based instructions.    Thank you for taking steps to prevent the spread of this virus.  o Limit your contact with others.  o Wear a simple mask to cover your cough.  o Wash your hands well and often.    Resources    [facility]: About COVID-19: [email or web address].org/covid19/    CDC: What to Do If You're Sick: www.cdc.gov/coronavirus/2019-ncov/about/steps-when-sick.html    CDC: Ending Home Isolation: www.cdc.gov/coronavirus/2019-ncov/hcp/disposition-in-home-patients.html     CDC: Caring for Someone: www.cdc.gov/coronavirus/2019-ncov/if-you-are-sick/care-for-someone.html     [facility]: Interim Guidance for Hospital Discharge to Home: www.health.[facility].[place]/diseases/coronavirus/hcp/hospdischarge.pdf    [facility] clinical trials (COVID-19 research studies): clinicalaffairs.[facility]/umn-clinical-trials     Below are the COVID-19 " hotlines at the [place] Department of Health ([facility]). Interpreters are available.   o For health questions: Call 070-447-3131 or 1-552.831.1019 (7 a.m. to 7 p.m.)  o For questions about schools and childcare: Call 744-708-2612 or 1-644.953.6933 (7 a.m. to 7 p.m.)                     Reason for Disposition    [1] CLOSE CONTACT COVID-19 EXPOSURE within last 14 days AND [2] NO symptoms    Additional Information    Negative: COVID-19 lab test positive    Negative: [1] Lives with someone known to have influenza (flu test positive) AND [2] flu-like symptoms (e.g., cough, runny nose, sore throat, SOB; with or without fever)    Negative: [1] Symptoms of COVID-19 (e.g., cough, fever, SOB, or others) AND [2] doctor (or NP/PA) diagnosed COVID-19 based on symptoms    Negative: [1] Symptoms of COVID-19 (e.g., cough, fever, SOB, or others) AND [2] lives in an area with community spread    Negative: [1] Symptoms of COVID-19 (e.g., cough, fever, SOB, or others) AND [2] within 14 days of EXPOSURE (close contact) with diagnosed or suspected COVID-19 patient    Negative: [1] Symptoms of COVID-19 (e.g., cough, fever, SOB, or others) AND [2] within 14 days of travel from high-risk area for COVID-19 community spread (identified by CDC)    Negative: [1] Difficulty breathing (shortness of breath) occurs AND [2] onset > 14 days after COVID-19 EXPOSURE (Close Contact)    Negative: [1] Cough occurs AND [2] onset > 14 days after COVID-19 EXPOSURE    Negative: [1] Common cold symptoms AND [2] onset > 14 days after COVID-19 EXPOSURE    Negative: COVID-19 vaccine reaction suspected (e.g., fever, headache, muscle aches) occurring during days 1-3 after getting vaccine    Negative: COVID-19 vaccine, questions about    Negative: [1] CLOSE CONTACT COVID-19 EXPOSURE within last 14 days AND [2] needs COVID-19 lab test to return to work AND [3] NO symptoms    Negative: [1] CLOSE CONTACT COVID-19 EXPOSURE within last 14 days AND [2] exposed person is a   (e.g., police or paramedic) AND [3] NO symptoms    Negative: [1] CLOSE CONTACT COVID-19 EXPOSURE within last 14 days AND [2] exposed person is a healthcare worker who was NOT using all recommended personal protective equipment (e.g., a respirator-N95 mask, eye protection, gloves, and gown) AND [3] NO symptoms    Negative: [1] Living or working in a correctional facility, long-term care facility, or shelter (i.e., congregate setting; densely populated) AND [2] where an outbreak has occurred AND [3] NO symptoms    Negative: [1] CLOSE CONTACT COVID-19 EXPOSURE within last 14 days AND [2] weak immune system (e.g., HIV positive, cancer chemo, splenectomy, organ transplant, chronic steroids) AND [3] NO symptoms    Protocols used: CORONAVIRUS (COVID-19) EXPOSURE-A-OH 1.18.2022

## 2022-07-20 ENCOUNTER — TELEPHONE (OUTPATIENT)
Dept: FAMILY MEDICINE | Facility: CLINIC | Age: 77
End: 2022-07-20

## 2022-07-20 NOTE — TELEPHONE ENCOUNTER
Reason for call:    Symptom or request:     Patient called stating that he was tested for covid on 07/18 results were negative, however patient is concerned as he spent 2 days with exposure. 10+hr a day for 2 days in a car with a positive coworker. Patient reports having asthma, but reports no symptoms. His wife has an appt this week, he is questioning if he should keep the visit.       Best Time:  Any- patient will be in and out of house all day.     Can we leave a detailed message on this number?  YES     Joann JUNIOR  Station

## 2022-07-20 NOTE — TELEPHONE ENCOUNTER
Patient's wife is reached.  No sx, home testing. Should test prior to appt.  If sx test again and cancel appt.  Other monson keep appt. Cinthya NEWMAN RN

## 2022-09-01 ENCOUNTER — TRANSFERRED RECORDS (OUTPATIENT)
Dept: MULTI SPECIALTY CLINIC | Facility: CLINIC | Age: 77
End: 2022-09-01

## 2022-09-01 LAB — RETINOPATHY: NORMAL

## 2022-09-21 DIAGNOSIS — J45.50 SEVERE PERSISTENT ASTHMA WITHOUT COMPLICATION (H): ICD-10-CM

## 2022-09-21 RX ORDER — ALBUTEROL SULFATE 90 UG/1
AEROSOL, METERED RESPIRATORY (INHALATION)
Qty: 8.5 G | Refills: 10 | Status: SHIPPED | OUTPATIENT
Start: 2022-09-21 | End: 2023-11-07

## 2022-09-21 NOTE — TELEPHONE ENCOUNTER
"Requested Prescriptions   Pending Prescriptions Disp Refills    albuterol (PROAIR HFA/PROVENTIL HFA/VENTOLIN HFA) 108 (90 Base) MCG/ACT inhaler [Pharmacy Med Name: ALBUTEROL SULFATE  AERS] 8.5 g 10     Sig: INHALE 1-2 PUFFS INTO THE LUNGS EVERY 4 HOURS AS NEEDED FOR SHORTNESS OF BREATH / DYSPNEA OR WHEEZING        Asthma Maintenance Inhalers - Anticholinergics Failed - 9/21/2022 11:07 AM        Failed - Asthma control assessment score within normal limits in last 6 months     Please review ACT score.           Passed - Patient is age 12 years or older        Passed - Medication is active on med list        Passed - Recent (6 mo) or future (30 days) visit within the authorizing provider's specialty     Patient had office visit in the last 6 months or has a visit in the next 30 days with authorizing provider or within the authorizing provider's specialty.  See \"Patient Info\" tab in inbasket, or \"Choose Columns\" in Meds & Orders section of the refill encounter.           Short-Acting Beta Agonist Inhalers Protocol  Failed - 9/21/2022 11:07 AM        Failed - Asthma control assessment score within normal limits in last 6 months     Please review ACT score.           Passed - Patient is age 12 or older        Passed - Medication is active on med list        Passed - Recent (6 mo) or future (30 days) visit within the authorizing provider's specialty     Patient had office visit in the last 6 months or has a visit in the next 30 days with authorizing provider or within the authorizing provider's specialty.  See \"Patient Info\" tab in inbasket, or \"Choose Columns\" in Meds & Orders section of the refill encounter.                  "

## 2022-10-06 ENCOUNTER — TRANSFERRED RECORDS (OUTPATIENT)
Dept: FAMILY MEDICINE | Facility: CLINIC | Age: 77
End: 2022-10-06

## 2022-10-17 ENCOUNTER — HOSPITAL ENCOUNTER (OUTPATIENT)
Dept: CARDIOLOGY | Facility: CLINIC | Age: 77
Discharge: HOME | End: 2022-10-17
Attending: PHYSICIAN ASSISTANT | Admitting: PHYSICIAN ASSISTANT
Payer: COMMERCIAL

## 2022-10-17 DIAGNOSIS — I21.21 ST ELEVATION MYOCARDIAL INFARCTION INVOLVING LEFT CIRCUMFLEX CORONARY ARTERY (H): ICD-10-CM

## 2022-10-17 DIAGNOSIS — Z96.651 STATUS POST TOTAL RIGHT KNEE REPLACEMENT: ICD-10-CM

## 2022-10-17 LAB — LVEF ECHO: NORMAL

## 2022-10-17 PROCEDURE — 93306 TTE W/DOPPLER COMPLETE: CPT

## 2022-10-17 PROCEDURE — 93306 TTE W/DOPPLER COMPLETE: CPT | Mod: 26 | Performed by: INTERNAL MEDICINE

## 2022-10-29 NOTE — PROGRESS NOTES
"[facility]    I had the pleasure of seeing Raul when he came for follow up of recent echo results.  This 76 year old sees Dr. Galindo for his history of:    1. CAD with acute inferior/inferolateral STEMI 11/2015. Mid Cx and thrombotic L PDA lesions treated with REDD. Residual mLAD 80% and OM1 70%, currently tx'd medically. Lifelong DAPT rec'd, which pt has declined   2. Benign Essential HTN  3. Dyslipidemia. Atorvastatin increased from 20-40 mg 1/2019  4. Asthma and COPD with occasional prednisone bursts.  5. Knee arthritis - s/p L TKA 5/2020 and R TKA 11/2020  6. DM - A1c 8.6% 4/2022       I saw Raul 10/2021 at which time he was doing well, just replacing the roof on his bladder and gardening without any issues.  He had done well after his knee surgery.  Dietary changes were recommended d/t increasing triglycerides.  These improved significantly when rechecked 4/2022 with PCP. Routine follow-up with updated echo and labs    Interval History:  Raul notes that he's had more congestion this fall and notes that causes more SOB with exertion. He has also noted some L parasternal chest discomfort, usually at night after working really hard all day. He thinks he has \"chest arthritis.\" He's not sure if he gets this with doing exertion as he thinks he might \"block it out.\"     No issues with edema, orthopnea, PND. No palpitations, dizziness, lightheadedness.     VITALS:  Vitals: /86 (BP Location: Right arm, Patient Position: Sitting, Cuff Size: Adult Regular)   Pulse 78   Wt 76.9 kg (169 lb 9.6 oz)   SpO2 98%   BMI 25.85 kg/m      Diagnostic Testing:  Echocardiogram 10/2022-LVEF 55 to 60%. GIDD. No RWMA. Nl RV.  No sig valve abnormalities.  Mild aortic sclerosis.  Stress Testing 2/2019-inferior/inferoseptal and basal inferolateral infarct without rian-infarct ischemia.  No changes c/w 2016  EKG 10/13/2020 showed SR 77 bpm  Echocardiogam 10/2017 showed EF 50-55%. Grade I diastolic " dysfunction. Normal RV. Trace MR. Trace TR with RVSP 23+RAP  Carotids 1/2016 without significant stenosis  Angiogram 11/2015 in setting of acute inferior STEMI showed normal LM. Mid LAD 80%, Mid Cx 99% terminating in large L % thrombotic occlusion. OM 1 70%. RCA mild luminal irregularities. Had 3.5 x 20 mm REDD to mCx. L PDA treated with 2.5 x 24 mm REDD.  Component      Latest Ref Rng & Units 1/31/2020 4/9/2021 4/27/2022   Cholesterol      <200 mg/dL 135 174 127   Triglycerides      <150 mg/dL 95 263 (H) 158 (H)   HDL Cholesterol      >=40 mg/dL 45 46 51   LDL Cholesterol Calculated      <=100 mg/dL 71 75 44   Non HDL Cholesterol      <130 mg/dL 90 128 76   Patient Fasting > 8hrs?         Yes       Plan:  Annual ollow-up with me, Dr. Galindo or Faye (as no EP issues)  Nuclear Stress Test - will call with results    Assessment/Plan:    1. CAD/STEMI 2015    EF 50-55% on echo 2017, up to 55 to 60% 10/2022    Lifelong DAPT recommended giving residual lesions, though he refused this    Stress test (2019 without ischemia    He remains quite active with taking care of his wife, working outside      Remains on carvedilol 6.25 BID, ASA 81 mg daily, losartan/HCTZ 50/12.5 BID and atorvastatin 40 mg daily    Now with some atypical CP, usually at rest after a lot of physical work. Notes he had very atypical pain prior to MI as well    PLAN:    Exercise nuc stress test. Will HOLD Coreg prior    We'll call with results    2. Dyslipidemia    Remains on atorvastatin 40 mg daily    Lipids 4/2022 look much improved, with triglycerides near goal    PLAN:    Continue current therapy      3. HTN    BPs look good!    Remains on losartanHTCT 50/12.5, Coreg 6.25 mg BID    PLAN:    Continue current meds        Roseline Bermudez PA-C, MSPAS      Orders Placed This Encounter   Procedures     Follow-Up with Cardiology [facility]     No orders of the defined types were placed in this encounter.    There are no discontinued medications.      Encounter  Diagnoses   Name Primary?     Coronary artery disease involving native coronary artery of native heart without angina pectoris Yes     Hyperlipidemia LDL goal <100      Essential hypertension        CURRENT MEDICATIONS:  Current Outpatient Medications   Medication Sig Dispense Refill     albuterol (PROAIR HFA/PROVENTIL HFA/VENTOLIN HFA) 108 (90 Base) MCG/ACT inhaler INHALE 1-2 PUFFS INTO THE LUNGS EVERY 4 HOURS AS NEEDED FOR SHORTNESS OF BREATH / DYSPNEA OR WHEEZING 8.5 g 10     albuterol (PROVENTIL) (2.5 MG/3ML) 0.083% neb solution Take 1 vial (2.5 mg) by nebulization every 6 hours as needed for shortness of breath / dyspnea or wheezing 30 mL 11     Alcohol Swabs (ALCOHOL PREP) 70 % PADS USE TO SWAB AREA OF INJECTION / JENISE AS DIRECTED 100 each 3     aspirin (ASA) 81 MG EC tablet Take 1 tablet (81 mg) by mouth daily       atorvastatin (LIPITOR) 40 MG tablet Take 1 tablet (40 mg) by mouth daily 90 tablet 3     carvedilol (COREG) 6.25 MG tablet Take 1 tablet (6.25 mg) by mouth 2 times daily 180 tablet 3     fexofenadine (ALLEGRA) 180 MG tablet TAKE 1 TABLET (180 MG) BY MOUTH DAILY 90 tablet 3     glipiZIDE (GLUCOTROL XL) 10 MG 24 hr tablet Take 1 tablet (10 mg) by mouth daily 90 tablet 3     Lancets (ONETOUCH DELICA PLUS [id number]) MISC USE AS DIRECTED WITH LANCING DEVICE TO TEST ONCE DAILY 100 each 6     losartan-hydrochlorothiazide (HYZAAR) 50-12.5 MG tablet TAKE ONE TABLET BY MOUTH ONCE DAILY 90 tablet 3     montelukast (SINGULAIR) 10 MG tablet TAKE ONE TABLET BY MOUTH AT BEDTIME 90 tablet 3     nitroGLYcerin (NITROSTAT) 0.4 MG sublingual tablet Place 1 tablet (0.4 mg) under the tongue every 5 minutes as needed for chest pain If you still have symptoms after 3 doses call 911. 25 tablet 1     omeprazole (PRILOSEC) 20 MG DR capsule Take 1 capsule (20 mg) by mouth daily 90 capsule 3     ONETOUCH ULTRA test strip USE TO TEST BLOOD SUGAR 1 TIME DAILY OR AS DIRECTED 100 strip 6     order for DME Equipment being  ordered: arm blood pressure cuff 1 Device 0     PROAIR RESPICLICK 108 (90 Base) MCG/ACT inhaler INHALE 1 TO 2 PUFFS INTO THE LUNGS EVERY 4 HOURS AS NEEDED FOR SHORTNESS OF BREATH, DIFFICULTY BREATHING OR WHEEZING. 1 each 0     blood glucose monitoring (NO BRAND SPECIFIED) meter device kit Use to test blood sugar 1 times daily or as directed.  Blood Glucose Monitor Brands: per insurance. (Patient not taking: Reported on 11/1/2022) 1 kit 0       ALLERGIES     Allergies   Allergen Reactions     Simvastatin Swelling     Severe muscle pain, swelling, and bruising     Dust Mites          Review of Systems:  Skin:  Negative     Eyes:  Negative    ENT:  Negative    Respiratory:  Positive for shortness of breath;dyspnea on exertion;wheezing  Cardiovascular:  edema;syncope or near-syncope;fatigue;lightheadedness;dizziness;Negative for Positive for;chest pain  Gastroenterology: Negative for melena;hematochezia  Genitourinary:  Positive for nocturia  Musculoskeletal:  Positive for arthritis  Neurologic:  Negative    Psychiatric:  Negative    Heme/Lymph/Imm:  Negative    Endocrine:  Negative      Physical Exam:  Vitals: /86 (BP Location: Right arm, Patient Position: Sitting, Cuff Size: Adult Regular)   Pulse 78   Wt 76.9 kg (169 lb 9.6 oz)   SpO2 98%   BMI 25.85 kg/m      Constitutional:  cooperative, alert and oriented, well developed, well nourished, in no acute distress        Skin:  warm and dry to the touch, no apparent skin lesions or masses noted        Head:  normocephalic, no masses or lesions        Eyes:  pupils equal and round;conjunctivae and lids unremarkable;sclera white        ENT:  no pallor or cyanosis, dentition good        Neck:  no carotid bruit;JVP normal        Chest:  normal breath sounds, clear to auscultation, normal A-P diameter, normal symmetry, normal respiratory excursion, no use of accessory muscles  (at bases)      Cardiac: regular rhythm;no murmurs, gallops or rubs detected                   Abdomen:  abdomen soft        Vascular: pulses full and equal                                      Extremities and Back:  no deformities, clubbing, cyanosis, erythema observed;no edema        Neurological:  no gross motor deficits            PAST MEDICAL HISTORY:  Past Medical History:   Diagnosis Date     Chronic airway obstruction, not elsewhere classified      Other and unspecified hyperlipidemia        PAST SURGICAL HISTORY:  Past Surgical History:   Procedure Laterality Date     ARTHROPLASTY KNEE Left 5/28/2020    Procedure: Left Total Knee Arthroplasty;  Surgeon: Sanjay Downey MD;  Location: [facility] OR     ARTHROPLASTY KNEE Right 11/2/2020    Procedure: ARTHROPLASTY, KNEE, TOTAL;  Surgeon: Sanjay Downey MD;  Location: [facility] OR     ENT SURGERY  as a child    tonsillectomy       FAMILY HISTORY:  Family History   Problem Relation Age of Onset     Diabetes Mother      Heart Disease Mother      C.A.D. Mother      Cancer Father      Hypertension Father      Cerebrovascular Disease Paternal Grandfather      Asthma Sister      Breast Cancer No family hx of      Cancer - colorectal No family hx of      Prostate Cancer No family hx of        SOCIAL HISTORY:  Social History     Socioeconomic History     Marital status:      Spouse name: None     Number of children: None     Years of education: None     Highest education level: None   Tobacco Use     Smoking status: Never     Smokeless tobacco: Never   Vaping Use     Vaping Use: Never used   Substance and Sexual Activity     Alcohol use: Yes     Comment: Rare     Drug use: No     Sexual activity: Yes     Partners: Female   Other Topics Concern     Occupational Exposure Yes     Comment: worked for years as  exposed to carbon dust and other chemicals     Hobby Hazards Yes     Comment: Raises pigeons     Parent/sibling w/ CABG, MI or angioplasty before 65F 55M? No

## 2022-11-01 ENCOUNTER — OFFICE VISIT (OUTPATIENT)
Dept: CARDIOLOGY | Facility: CLINIC | Age: 77
End: 2022-11-01
Payer: COMMERCIAL

## 2022-11-01 VITALS
WEIGHT: 169.6 LBS | OXYGEN SATURATION: 98 % | HEART RATE: 78 BPM | BODY MASS INDEX: 25.85 KG/M2 | DIASTOLIC BLOOD PRESSURE: 86 MMHG | SYSTOLIC BLOOD PRESSURE: 129 MMHG

## 2022-11-01 DIAGNOSIS — I25.10 CORONARY ARTERY DISEASE INVOLVING NATIVE CORONARY ARTERY OF NATIVE HEART WITHOUT ANGINA PECTORIS: Primary | ICD-10-CM

## 2022-11-01 DIAGNOSIS — E78.5 HYPERLIPIDEMIA LDL GOAL <100: ICD-10-CM

## 2022-11-01 DIAGNOSIS — I10 ESSENTIAL HYPERTENSION: ICD-10-CM

## 2022-11-01 PROCEDURE — 99214 OFFICE O/P EST MOD 30 MIN: CPT | Performed by: PHYSICIAN ASSISTANT

## 2022-11-01 NOTE — PATIENT INSTRUCTIONS
Meron - it was nice to see you today!     At your visit today we reviewed:    You've had some chest discomfort, usually after a long day of working  You're under a significant amount of stress right now  Echocardiogram looked really good! Normal heart strength and no significant valve issues  Cholesterol levels looked better!  Your triglycerides improved dramatically between 2021 and 2022. This is good news!      Medication Changes:    No changes    Recommendations:    Let's get a stress test before the end of the year to make sure this funny chest discomfort is not related to heart blockages.    Follow-up:    See us for cardiology follow up in 1 year but CALL Cardiology nurse Elke @ [phone] for any issues, questions or concerns in the interim.      To schedule a future appointment, we kindly ask that you call cardiology scheduling at [phone] three months prior to requested visit date.    Important Phone Numbers for [facility]):    [place] [facility]: [phone]  Cardiology [phone]  [place] [facility] [phone]  [place] [facility] [phone]  [facility] [facility]: [phone]

## 2022-11-01 NOTE — LETTER
"11/1/2022    Sarita Hennessy, DO  [address] [address]    RE: Raul Wilhelm       Dear Colleague,     I had the pleasure of seeing Raul Wilhelm in the [facility].  [facility]    I had the pleasure of seeing Raul when he came for follow up of recent echo results.  This 76 year old sees Dr. Galindo for his history of:    1. CAD with acute inferior/inferolateral STEMI 11/2015. Mid Cx and thrombotic L PDA lesions treated with REDD. Residual mLAD 80% and OM1 70%, currently tx'd medically. Lifelong DAPT rec'd, which pt has declined   2. Benign Essential HTN  3. Dyslipidemia. Atorvastatin increased from 20-40 mg 1/2019  4. Asthma and COPD with occasional prednisone bursts.  5. Knee arthritis - s/p L TKA 5/2020 and R TKA 11/2020  6. DM - A1c 8.6% 4/2022       I saw Raul 10/2021 at which time he was doing well, just replacing the roof on his bladder and gardening without any issues.  He had done well after his knee surgery.  Dietary changes were recommended d/t increasing triglycerides.  These improved significantly when rechecked 4/2022 with PCP. Routine follow-up with updated echo and labs    Interval History:  Raul notes that he's had more congestion this fall and notes that causes more SOB with exertion. He has also noted some L parasternal chest discomfort, usually at night after working really hard all day. He thinks he has \"chest arthritis.\" He's not sure if he gets this with doing exertion as he thinks he might \"block it out.\"     No issues with edema, orthopnea, PND. No palpitations, dizziness, lightheadedness.     VITALS:  Vitals: /86 (BP Location: Right arm, Patient Position: Sitting, Cuff Size: Adult Regular)   Pulse 78   Wt 76.9 kg (169 lb 9.6 oz)   SpO2 98%   BMI 25.85 kg/m      Diagnostic Testing:  Echocardiogram 10/2022-LVEF 55 to 60%. GIDD. No RWMA. Nl RV.  No sig valve abnormalities.  Mild aortic sclerosis.  Stress Testing " 2/2019-inferior/inferoseptal and basal inferolateral infarct without rian-infarct ischemia.  No changes c/w 2016  EKG 10/13/2020 showed SR 77 bpm  Echocardiogam 10/2017 showed EF 50-55%. Grade I diastolic dysfunction. Normal RV. Trace MR. Trace TR with RVSP 23+RAP  Carotids 1/2016 without significant stenosis  Angiogram 11/2015 in setting of acute inferior STEMI showed normal LM. Mid LAD 80%, Mid Cx 99% terminating in large L % thrombotic occlusion. OM 1 70%. RCA mild luminal irregularities. Had 3.5 x 20 mm REDD to mCx. L PDA treated with 2.5 x 24 mm REDD.  Component      Latest Ref Rng & Units 1/31/2020 4/9/2021 4/27/2022   Cholesterol      <200 mg/dL 135 174 127   Triglycerides      <150 mg/dL 95 263 (H) 158 (H)   HDL Cholesterol      >=40 mg/dL 45 46 51   LDL Cholesterol Calculated      <=100 mg/dL 71 75 44   Non HDL Cholesterol      <130 mg/dL 90 128 76   Patient Fasting > 8hrs?         Yes       Plan:  Annual ollow-up with me, Dr. Galindo or Faye (as no EP issues)  Nuclear Stress Test - will call with results    Assessment/Plan:    1. CAD/STEMI 2015    EF 50-55% on echo 2017, up to 55 to 60% 10/2022    Lifelong DAPT recommended giving residual lesions, though he refused this    Stress test (2019 without ischemia    He remains quite active with taking care of his wife, working outside      Remains on carvedilol 6.25 BID, ASA 81 mg daily, losartan/HCTZ 50/12.5 BID and atorvastatin 40 mg daily    Now with some atypical CP, usually at rest after a lot of physical work. Notes he had very atypical pain prior to MI as well    PLAN:    Exercise nuc stress test. Will HOLD Coreg prior    We'll call with results    2. Dyslipidemia    Remains on atorvastatin 40 mg daily    Lipids 4/2022 look much improved, with triglycerides near goal    PLAN:    Continue current therapy      3. HTN    BPs look good!    Remains on losartanHTCT 50/12.5, Coreg 6.25 mg BID    PLAN:    Continue current meds        Roseline Bermudez PA-C,  MSPAS      Orders Placed This Encounter   Procedures     Follow-Up with Cardiology [facility]     No orders of the defined types were placed in this encounter.    There are no discontinued medications.      Encounter Diagnoses   Name Primary?     Coronary artery disease involving native coronary artery of native heart without angina pectoris Yes     Hyperlipidemia LDL goal <100      Essential hypertension        CURRENT MEDICATIONS:  Current Outpatient Medications   Medication Sig Dispense Refill     albuterol (PROAIR HFA/PROVENTIL HFA/VENTOLIN HFA) 108 (90 Base) MCG/ACT inhaler INHALE 1-2 PUFFS INTO THE LUNGS EVERY 4 HOURS AS NEEDED FOR SHORTNESS OF BREATH / DYSPNEA OR WHEEZING 8.5 g 10     albuterol (PROVENTIL) (2.5 MG/3ML) 0.083% neb solution Take 1 vial (2.5 mg) by nebulization every 6 hours as needed for shortness of breath / dyspnea or wheezing 30 mL 11     Alcohol Swabs (ALCOHOL PREP) 70 % PADS USE TO SWAB AREA OF INJECTION / JENISE AS DIRECTED 100 each 3     aspirin (ASA) 81 MG EC tablet Take 1 tablet (81 mg) by mouth daily       atorvastatin (LIPITOR) 40 MG tablet Take 1 tablet (40 mg) by mouth daily 90 tablet 3     carvedilol (COREG) 6.25 MG tablet Take 1 tablet (6.25 mg) by mouth 2 times daily 180 tablet 3     fexofenadine (ALLEGRA) 180 MG tablet TAKE 1 TABLET (180 MG) BY MOUTH DAILY 90 tablet 3     glipiZIDE (GLUCOTROL XL) 10 MG 24 hr tablet Take 1 tablet (10 mg) by mouth daily 90 tablet 3     Lancets (ONETOUCH DELICA PLUS [id number]) MISC USE AS DIRECTED WITH LANCING DEVICE TO TEST ONCE DAILY 100 each 6     losartan-hydrochlorothiazide (HYZAAR) 50-12.5 MG tablet TAKE ONE TABLET BY MOUTH ONCE DAILY 90 tablet 3     montelukast (SINGULAIR) 10 MG tablet TAKE ONE TABLET BY MOUTH AT BEDTIME 90 tablet 3     nitroGLYcerin (NITROSTAT) 0.4 MG sublingual tablet Place 1 tablet (0.4 mg) under the tongue every 5 minutes as needed for chest pain If you still have symptoms after 3 doses call 911. 25 tablet 1     omeprazole  (PRILOSEC) 20 MG DR capsule Take 1 capsule (20 mg) by mouth daily 90 capsule 3     ONETOUCH ULTRA test strip USE TO TEST BLOOD SUGAR 1 TIME DAILY OR AS DIRECTED 100 strip 6     order for DME Equipment being ordered: arm blood pressure cuff 1 Device 0     PROAIR RESPICLICK 108 (90 Base) MCG/ACT inhaler INHALE 1 TO 2 PUFFS INTO THE LUNGS EVERY 4 HOURS AS NEEDED FOR SHORTNESS OF BREATH, DIFFICULTY BREATHING OR WHEEZING. 1 each 0     blood glucose monitoring (NO BRAND SPECIFIED) meter device kit Use to test blood sugar 1 times daily or as directed.  Blood Glucose Monitor Brands: per insurance. (Patient not taking: Reported on 11/1/2022) 1 kit 0       ALLERGIES     Allergies   Allergen Reactions     Simvastatin Swelling     Severe muscle pain, swelling, and bruising     Dust Mites          Review of Systems:  Skin:  Negative     Eyes:  Negative    ENT:  Negative    Respiratory:  Positive for shortness of breath;dyspnea on exertion;wheezing  Cardiovascular:  edema;syncope or near-syncope;fatigue;lightheadedness;dizziness;Negative for Positive for;chest pain  Gastroenterology: Negative for melena;hematochezia  Genitourinary:  Positive for nocturia  Musculoskeletal:  Positive for arthritis  Neurologic:  Negative    Psychiatric:  Negative    Heme/Lymph/Imm:  Negative    Endocrine:  Negative      Physical Exam:  Vitals: /86 (BP Location: Right arm, Patient Position: Sitting, Cuff Size: Adult Regular)   Pulse 78   Wt 76.9 kg (169 lb 9.6 oz)   SpO2 98%   BMI 25.85 kg/m      Constitutional:  cooperative, alert and oriented, well developed, well nourished, in no acute distress        Skin:  warm and dry to the touch, no apparent skin lesions or masses noted        Head:  normocephalic, no masses or lesions        Eyes:  pupils equal and round;conjunctivae and lids unremarkable;sclera white        ENT:  no pallor or cyanosis, dentition good        Neck:  no carotid bruit;JVP normal        Chest:  normal breath sounds,  clear to auscultation, normal A-P diameter, normal symmetry, normal respiratory excursion, no use of accessory muscles  (at bases)      Cardiac: regular rhythm;no murmurs, gallops or rubs detected                  Abdomen:  abdomen soft        Vascular: pulses full and equal                                      Extremities and Back:  no deformities, clubbing, cyanosis, erythema observed;no edema        Neurological:  no gross motor deficits           PAST MEDICAL HISTORY:  Past Medical History:   Diagnosis Date     Chronic airway obstruction, not elsewhere classified      Other and unspecified hyperlipidemia        PAST SURGICAL HISTORY:  Past Surgical History:   Procedure Laterality Date     ARTHROPLASTY KNEE Left 5/28/2020    Procedure: Left Total Knee Arthroplasty;  Surgeon: Sanjay Downey MD;  Location: [facility] OR     ARTHROPLASTY KNEE Right 11/2/2020    Procedure: ARTHROPLASTY, KNEE, TOTAL;  Surgeon: Sanjay Downey MD;  Location: [facility] OR     ENT SURGERY  as a child    tonsillectomy       FAMILY HISTORY:  Family History   Problem Relation Age of Onset     Diabetes Mother      Heart Disease Mother      C.A.D. Mother      Cancer Father      Hypertension Father      Cerebrovascular Disease Paternal Grandfather      Asthma Sister      Breast Cancer No family hx of      Cancer - colorectal No family hx of      Prostate Cancer No family hx of        SOCIAL HISTORY:  Social History     Socioeconomic History     Marital status:      Spouse name: None     Number of children: None     Years of education: None     Highest education level: None   Tobacco Use     Smoking status: Never     Smokeless tobacco: Never   Vaping Use     Vaping Use: Never used   Substance and Sexual Activity     Alcohol use: Yes     Comment: Rare     Drug use: No     Sexual activity: Yes     Partners: Female   Other Topics Concern     Occupational Exposure Yes     Comment: worked for years as  exposed to carbon dust and  other chemicals     Hobby Hazards Yes     Comment: Raises pigeons     Parent/sibling w/ CABG, MI or angioplasty before 65F 55M? No             Thank you for allowing me to participate in the care of your patient.      Sincerely,     Barbara Bermudez PA-C     [facility]  cc:   No referring provider defined for this encounter.

## 2022-11-01 NOTE — NURSING NOTE
Chief Complaint   Patient presents with     Coronary Artery Disease     Follow up CAD/HTN and HLD  Follow up echo results       Vitals:    11/01/22 0839   BP: 129/86   BP Location: Right arm   Patient Position: Sitting   Cuff Size: Adult Regular   Pulse: 78   SpO2: 98%   Weight: 76.9 kg (169 lb 9.6 oz)     Wt Readings from Last 1 Encounters:   11/01/22 76.9 kg (169 lb 9.6 oz)     .Gabriela Degroot MA

## 2022-11-21 ENCOUNTER — IMMUNIZATION (OUTPATIENT)
Dept: FAMILY MEDICINE | Facility: CLINIC | Age: 77
End: 2022-11-21
Payer: COMMERCIAL

## 2022-11-21 PROCEDURE — G0008 ADMIN INFLUENZA VIRUS VAC: HCPCS

## 2022-11-21 PROCEDURE — 90662 IIV NO PRSV INCREASED AG IM: CPT

## 2022-12-21 ENCOUNTER — HOSPITAL ENCOUNTER (OUTPATIENT)
Dept: NUCLEAR MEDICINE | Facility: CLINIC | Age: 77
Setting detail: NUCLEAR MEDICINE
Discharge: HOME | End: 2022-12-21
Attending: PHYSICIAN ASSISTANT
Payer: COMMERCIAL

## 2022-12-21 ENCOUNTER — HOSPITAL ENCOUNTER (OUTPATIENT)
Dept: CARDIOLOGY | Facility: CLINIC | Age: 77
Discharge: HOME | End: 2022-12-21
Attending: PHYSICIAN ASSISTANT
Payer: COMMERCIAL

## 2022-12-21 DIAGNOSIS — I25.10 CORONARY ARTERY DISEASE INVOLVING NATIVE CORONARY ARTERY OF NATIVE HEART WITHOUT ANGINA PECTORIS: ICD-10-CM

## 2022-12-21 LAB
CV BLOOD PRESSURE: 64 MMHG
CV STRESS MAX HR HE: 137
NUC STRESS EJECTION FRACTION: 67 %
RATE PRESSURE PRODUCT: NORMAL
STRESS ECHO BASELINE DIASTOLIC HE: 64
STRESS ECHO BASELINE HR: 86 BPM
STRESS ECHO BASELINE SYSTOLIC BP: 132
STRESS ECHO CALCULATED PERCENT HR: 95 %
STRESS ECHO LAST STRESS DIASTOLIC BP: 80
STRESS ECHO LAST STRESS SYSTOLIC BP: 174
STRESS ECHO POST ESTIMATED WORKLOAD: 5.3 METS
STRESS ECHO POST EXERCISE DUR MIN: 5 MIN
STRESS ECHO POST EXERCISE DUR SEC: 0 SEC
STRESS ECHO TARGET HR: 144
STRESS/REST PERFUSION RATIO: 0.97

## 2022-12-21 PROCEDURE — 78452 HT MUSCLE IMAGE SPECT MULT: CPT

## 2022-12-21 PROCEDURE — 343N000001 HC RX 343: Performed by: PHYSICIAN ASSISTANT

## 2022-12-21 PROCEDURE — 93017 CV STRESS TEST TRACING ONLY: CPT

## 2022-12-21 PROCEDURE — 93016 CV STRESS TEST SUPVJ ONLY: CPT | Performed by: INTERNAL MEDICINE

## 2022-12-21 PROCEDURE — A9502 TC99M TETROFOSMIN: HCPCS | Performed by: PHYSICIAN ASSISTANT

## 2022-12-21 PROCEDURE — 78452 HT MUSCLE IMAGE SPECT MULT: CPT | Mod: 26 | Performed by: INTERNAL MEDICINE

## 2022-12-21 RX ADMIN — TETROFOSMIN 10.8 MCI.: 1.38 INJECTION, POWDER, LYOPHILIZED, FOR SOLUTION INTRAVENOUS at 12:30

## 2022-12-21 RX ADMIN — TETROFOSMIN 32.5 MCI.: 1.38 INJECTION, POWDER, LYOPHILIZED, FOR SOLUTION INTRAVENOUS at 14:00

## 2022-12-22 ENCOUNTER — DOCUMENTATION ONLY (OUTPATIENT)
Dept: CARDIOLOGY | Facility: CLINIC | Age: 77
End: 2022-12-22

## 2022-12-22 NOTE — PROGRESS NOTES
Pls let Raul know that I reviewed stress test done yesterday, ordered d/t c/o increased SOB/congestion as well as L parasternal chest discomfort during OV 11/1.    This looked OK!  Evidence of heart attack that he's had in the past, but no ischemia. Nl LVEf. No change c/w stress test done 2/2019.    No changes needed  See us 11/2023 as previously ordered.    Ronalx Roseline    12/21/2022  Result Text        The nuclear stress test is abnormal.     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 No significant reversible ischemia.     Left ventricular function is normal.     The left ventricular ejection fraction at rest is 64%.  The left ventricular ejection fraction at stress is 67%.     LV cavity size small.     Stress to rest cavity ratio is 0.97.  TID is absent.     A prior study was conducted on 2/18/2019.  This study has no significant change when compared with the prior study.

## 2022-12-22 NOTE — PROGRESS NOTES
Attempted to call pt to review normal stress test results. No answer, left message to return call to discuss.    Krupa Arce RN

## 2022-12-22 NOTE — PROGRESS NOTES
Pt called back. Reviewed providers message below with results. Pt grateful for the news and the call. He will continue current treatment plan without change and f/u as planned next Nov. 2023 or sooner with new or concerning symptoms.    Krupa Arce RN

## 2023-01-02 DIAGNOSIS — J45.50 SEVERE PERSISTENT ASTHMA WITHOUT COMPLICATION (H): ICD-10-CM

## 2023-01-03 NOTE — TELEPHONE ENCOUNTER
"Requested Prescriptions   Pending Prescriptions Disp Refills    montelukast (SINGULAIR) 10 MG tablet [Pharmacy Med Name: MONTELUKAST SODIUM 10MG TABS] 90 tablet 3     Sig: TAKE ONE TABLET BY MOUTH AT BEDTIME       Leukotriene Inhibitors Protocol Failed - 1/2/2023 12:24 PM        Failed - Asthma control assessment score within normal limits in last 6 months     Please review ACT score.           Failed - Recent (6 mo) or future (30 days) visit within the authorizing provider's specialty     Patient had office visit in the last 6 months or has a visit in the next 30 days with authorizing provider or within the authorizing provider's specialty.  See \"Patient Info\" tab in inbasket, or \"Choose Columns\" in Meds & Orders section of the refill encounter.            Passed - Patient is age 12 or older     If patient is under 16, ok to refill using age based dosing.           Passed - Medication is active on med list             "

## 2023-01-04 RX ORDER — MONTELUKAST SODIUM 10 MG/1
TABLET ORAL
Qty: 90 TABLET | Refills: 1 | Status: SHIPPED | OUTPATIENT
Start: 2023-01-04 | End: 2023-08-28

## 2023-01-09 DIAGNOSIS — I10 ESSENTIAL HYPERTENSION: ICD-10-CM

## 2023-01-10 RX ORDER — CARVEDILOL 6.25 MG/1
6.25 TABLET ORAL 2 TIMES DAILY
Qty: 180 TABLET | Refills: 0 | Status: SHIPPED | OUTPATIENT
Start: 2023-01-10 | End: 2023-04-17

## 2023-02-10 ENCOUNTER — TELEPHONE (OUTPATIENT)
Dept: FAMILY MEDICINE | Facility: CLINIC | Age: 78
End: 2023-02-10
Payer: COMMERCIAL

## 2023-02-10 DIAGNOSIS — I25.10 CORONARY ARTERY DISEASE INVOLVING NATIVE CORONARY ARTERY OF NATIVE HEART WITHOUT ANGINA PECTORIS: ICD-10-CM

## 2023-02-10 DIAGNOSIS — E11.9 TYPE 2 DIABETES MELLITUS WITHOUT COMPLICATION, WITHOUT LONG-TERM CURRENT USE OF INSULIN (H): ICD-10-CM

## 2023-02-11 NOTE — TELEPHONE ENCOUNTER
Atorvastatin 40        Last written prescription date:         Last fill quantity: 90, # refills:         Last office visit:         Future office visit:         Is this a controlled substance?  No       Routing refill request to provider for review/approval because:     Thank You!  Giselle Suarez  Certified Pharmacy Technician  [facility]  P: [phone] F:[phone]

## 2023-02-14 RX ORDER — ATORVASTATIN CALCIUM 40 MG/1
40 TABLET, FILM COATED ORAL DAILY
Qty: 90 TABLET | Refills: 0 | Status: SHIPPED | OUTPATIENT
Start: 2023-02-14 | End: 2023-09-25

## 2023-02-15 NOTE — TELEPHONE ENCOUNTER
Pt notified.  He was busy at the moment but will call back to schedule appt.    Stephanie Pantoja RN

## 2023-03-07 ENCOUNTER — TELEPHONE (OUTPATIENT)
Dept: FAMILY MEDICINE | Facility: CLINIC | Age: 78
End: 2023-03-07
Payer: COMMERCIAL

## 2023-03-07 NOTE — TELEPHONE ENCOUNTER
Patient is calling to ask if he should be seen sooner than his scheduled appointment on 3/23/23 with Dr. Hennessy.  Patient reports he has a history of asthma. He usually has phlegm but reports that since Gwendolyn his phlegm has become thick. Describes as yellow green and sometimes notices blood in it. He also becomes short of breath at times when he works to cough up the secretions. He also has history of COPD. He denies fevers. He has been using nebulizer which is helpful. He does not have a humidifier to help loosen secretions. Discussed that shower can provide humidity. Dr. Hennessy is not in clinic this week. Appointment changed to 3/10 with Meaghan. Patient will reach out for worsening symptoms. Will go to the emergency department for shortness of breath not related to coughing.     Marlin Barrera RN

## 2023-03-10 ENCOUNTER — OFFICE VISIT (OUTPATIENT)
Dept: FAMILY MEDICINE | Facility: CLINIC | Age: 78
End: 2023-03-10
Payer: COMMERCIAL

## 2023-03-10 VITALS
RESPIRATION RATE: 20 BRPM | HEIGHT: 68 IN | DIASTOLIC BLOOD PRESSURE: 78 MMHG | OXYGEN SATURATION: 92 % | HEART RATE: 86 BPM | WEIGHT: 165.2 LBS | BODY MASS INDEX: 25.04 KG/M2 | TEMPERATURE: 97.7 F | SYSTOLIC BLOOD PRESSURE: 134 MMHG

## 2023-03-10 DIAGNOSIS — J45.50 SEVERE PERSISTENT ASTHMA WITHOUT COMPLICATION (H): ICD-10-CM

## 2023-03-10 DIAGNOSIS — R06.2 WHEEZING: Primary | ICD-10-CM

## 2023-03-10 DIAGNOSIS — E11.9 TYPE 2 DIABETES MELLITUS WITHOUT COMPLICATION, WITHOUT LONG-TERM CURRENT USE OF INSULIN (H): ICD-10-CM

## 2023-03-10 DIAGNOSIS — J44.9 CHRONIC OBSTRUCTIVE PULMONARY DISEASE, UNSPECIFIED COPD TYPE (H): ICD-10-CM

## 2023-03-10 PROCEDURE — 99214 OFFICE O/P EST MOD 30 MIN: CPT | Performed by: STUDENT IN AN ORGANIZED HEALTH CARE EDUCATION/TRAINING PROGRAM

## 2023-03-10 PROCEDURE — 99207 PR FOOT EXAM NO CHARGE: CPT | Performed by: STUDENT IN AN ORGANIZED HEALTH CARE EDUCATION/TRAINING PROGRAM

## 2023-03-10 RX ORDER — FLUTICASONE PROPIONATE 44 UG/1
1 AEROSOL, METERED RESPIRATORY (INHALATION) 2 TIMES DAILY
Qty: 10.6 G | Refills: 1 | Status: SHIPPED | OUTPATIENT
Start: 2023-03-10 | End: 2023-11-07

## 2023-03-10 RX ORDER — ALBUTEROL SULFATE 0.83 MG/ML
2.5 SOLUTION RESPIRATORY (INHALATION) EVERY 6 HOURS PRN
Qty: 30 ML | Refills: 11 | Status: SHIPPED | OUTPATIENT
Start: 2023-03-10 | End: 2023-11-07

## 2023-03-10 RX ORDER — AZITHROMYCIN 250 MG/1
TABLET, FILM COATED ORAL
Qty: 6 TABLET | Refills: 0 | Status: SHIPPED | OUTPATIENT
Start: 2023-03-10 | End: 2023-03-15

## 2023-03-10 ASSESSMENT — PAIN SCALES - GENERAL: PAINLEVEL: MILD PAIN (3)

## 2023-03-10 NOTE — PROGRESS NOTES
1. Type 2 diabetes mellitus without complication, without long-term current use of insulin (H)  - FOOT EXAM indicates patient has no sensory deficits on bilateral feet    2. Wheezing  3. Chronic obstructive pulmonary disease, unspecified COPD type (H)  - fluticasone (FLOVENT HFA) 44 MCG/ACT inhaler; Inhale 1 puff into the lungs 2 times daily  Dispense: 10.6 g; Refill: 1  - azithromycin (ZITHROMAX) 250 MG tablet; Take 2 tablets (500 mg) by mouth daily for 1 day, THEN 1 tablet (250 mg) daily for 4 days.  Dispense: 6 tablet; Refill: 0  - still taking allegra   - will trial azithromycin   - given patient with poorly controlled diabetes, PCP recommendations to avoid oral sterids when possible, at this time can try steroid inhaler to see if that alleviates symptoms   -Encourage nebulizer use    Follow up plan:   - return to clinic in 1 week for follow up on symptoms if at that time his symptoms are still not improved can consider placing him on oral steroids as well as adding on Symbicort inhaler  -Patient's wife is currently in the ER, since he is coming back next week will recheck A1c and urine at that visit so that he can quickly return to her bedside    Jonathon Carter is a 77 year old, presenting for the following health issues:  chest congestion      HPI     Acute Illness   Acute illness concerns: lots of congestion and phlegm, chest and ribs hurt from coughing so much  Onset: a few months    Fever: no    Chills/Sweats: no    Headache (location?): no    Sinus Pressure:no    Conjunctivitis:  no    Ear Pain: no    Rhinorrhea: no    Congestion: YES    Sore Throat: not anymore, he did a couple weeks ago and used lozenges, salt water gargle and tea with honey which helped     Cough: YES-productive of yellow sputum, productive of green sputum, with shortness of breath, barking, worsening over time. Has recently been having some blood in the mucous    Wheeze: YES    Decreased Appetite: no    Nausea: no    Vomiting:  "no    Diarrhea:  no    Dysuria/Freq.: no     Therapies Tried and outcome: lozenges have helped some but not much. He said in the past prednisone has worked really well but says Dr. 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 A few times a month  What time of day are you checking your blood sugars (select all that apply)?  Not applicable  Have you had any blood sugars above 200?  Yes 300 one time  Have you had any blood sugars below 70?  No    What symptoms do you notice when your blood sugar is low?  None and Not applicable    What concerns do you have today about your diabetes? Blood sugar is often over 200     Do you have any of these symptoms? (Select all that apply)  No numbness or tingling in feet.  No redness, sores or blisters on feet.  No complaints of excessive thirst.  No reports of blurry vision.  No significant changes to weight.    Have you had a diabetic eye exam in the last 12 months? Yes- Date of last eye exam: September 2022,  Location: [facility]        BP Readings from Last 2 Encounters:   03/10/23 134/78   11/01/22 129/86     Hemoglobin A1C (%)   Date Value   04/27/2022 8.6 (H)   01/21/2022 8.1 (H)   04/09/2021 8.5 (H)   08/27/2020 7.2 (H)     LDL Cholesterol Calculated (mg/dL)   Date Value   04/27/2022 44   04/09/2021 75   01/31/2020 71                 How many servings of fruits and vegetables do you eat daily?  2-3    On average, how many sweetened beverages do you drink each day (Examples: soda, juice, sweet tea, etc.  Do NOT count diet or artificially sweetened beverages)?   2    How many days per week do you exercise enough to make your heart beat faster? 3 or less    How many minutes a day do you exercise enough to make " "your heart beat faster? 20 - 29  How many days per week do you miss taking your medication? 1    What makes it hard for you to take your medications?  remembering to take      Review of Systems   As above       Objective    /78 (BP Location: Right arm, Patient Position: Sitting, Cuff Size: Adult Regular)   Pulse 86   Temp 97.7  F (36.5  C) (Tympanic)   Resp 20   Ht 1.73 m (5' 8.11\")   Wt 74.9 kg (165 lb 3.2 oz)   SpO2 92%   BMI 25.04 kg/m    Body mass index is 25.04 kg/m .  Physical Exam  Constitutional:       General: He is not in acute distress.  HENT:      Head: Normocephalic and atraumatic.      Right Ear: External ear normal.      Left Ear: External ear normal.      Mouth/Throat:      Mouth: Mucous membranes are moist.      Pharynx: Oropharynx is clear. No oropharyngeal exudate or posterior oropharyngeal erythema.   Eyes:      Extraocular Movements: Extraocular movements intact.   Cardiovascular:      Rate and Rhythm: Normal rate and regular rhythm.      Heart sounds: Normal heart sounds.   Pulmonary:      Effort: Pulmonary effort is normal. No respiratory distress.      Breath sounds: Wheezing present. No rhonchi or rales.      Comments: Diffuse wheezing throughout bilateral lung fields  Abdominal:      Palpations: Abdomen is soft. There is no mass.      Tenderness: There is no abdominal tenderness.   Musculoskeletal:         General: No deformity. Normal range of motion.      Cervical back: Normal range of motion and neck supple.   Skin:     General: Skin is warm.      Findings: No rash.   Neurological:      General: No focal deficit present.      Mental Status: He is alert and oriented to person, place, and time.      Comments: Sensory exam of the foot is normal, tested with the monofilament. No lesions or ulcers   Psychiatric:         Mood and Affect: Mood normal.                            "

## 2023-04-14 DIAGNOSIS — I10 ESSENTIAL HYPERTENSION: ICD-10-CM

## 2023-04-17 RX ORDER — CARVEDILOL 6.25 MG/1
TABLET ORAL
Qty: 180 TABLET | Refills: 0 | Status: SHIPPED | OUTPATIENT
Start: 2023-04-17 | End: 2023-09-18

## 2023-04-18 ENCOUNTER — OFFICE VISIT (OUTPATIENT)
Dept: FAMILY MEDICINE | Facility: CLINIC | Age: 78
End: 2023-04-18
Payer: COMMERCIAL

## 2023-04-18 VITALS
RESPIRATION RATE: 18 BRPM | HEIGHT: 68 IN | HEART RATE: 95 BPM | OXYGEN SATURATION: 91 % | BODY MASS INDEX: 24.69 KG/M2 | WEIGHT: 162.9 LBS | TEMPERATURE: 97.8 F | DIASTOLIC BLOOD PRESSURE: 76 MMHG | SYSTOLIC BLOOD PRESSURE: 134 MMHG

## 2023-04-18 DIAGNOSIS — J44.9 CHRONIC OBSTRUCTIVE PULMONARY DISEASE, UNSPECIFIED COPD TYPE (H): ICD-10-CM

## 2023-04-18 DIAGNOSIS — E11.9 TYPE 2 DIABETES MELLITUS WITHOUT COMPLICATION, WITHOUT LONG-TERM CURRENT USE OF INSULIN (H): ICD-10-CM

## 2023-04-18 DIAGNOSIS — J45.50 SEVERE PERSISTENT ASTHMA WITHOUT COMPLICATION (H): Primary | ICD-10-CM

## 2023-04-18 LAB
ANION GAP SERPL CALCULATED.3IONS-SCNC: 11 MMOL/L (ref 7–15)
BUN SERPL-MCNC: 19.1 MG/DL (ref 8–23)
CALCIUM SERPL-MCNC: 9.8 MG/DL (ref 8.8–10.2)
CHLORIDE SERPL-SCNC: 102 MMOL/L (ref 98–107)
CREAT SERPL-MCNC: 1.2 MG/DL (ref 0.67–1.17)
CREAT UR-MCNC: 158.7 MG/DL
DEPRECATED HCO3 PLAS-SCNC: 30 MMOL/L (ref 22–29)
GFR SERPL CREATININE-BSD FRML MDRD: 62 ML/MIN/1.73M2
GLUCOSE SERPL-MCNC: 142 MG/DL (ref 70–99)
HBA1C MFR BLD: 8.2 % (ref 0–5.6)
MICROALBUMIN UR-MCNC: <12 MG/L
MICROALBUMIN/CREAT UR: NORMAL MG/G{CREAT}
POTASSIUM SERPL-SCNC: 3.6 MMOL/L (ref 3.4–5.3)
SODIUM SERPL-SCNC: 143 MMOL/L (ref 136–145)

## 2023-04-18 PROCEDURE — 36415 COLL VENOUS BLD VENIPUNCTURE: CPT | Performed by: STUDENT IN AN ORGANIZED HEALTH CARE EDUCATION/TRAINING PROGRAM

## 2023-04-18 PROCEDURE — 83036 HEMOGLOBIN GLYCOSYLATED A1C: CPT | Performed by: STUDENT IN AN ORGANIZED HEALTH CARE EDUCATION/TRAINING PROGRAM

## 2023-04-18 PROCEDURE — 82570 ASSAY OF URINE CREATININE: CPT | Performed by: STUDENT IN AN ORGANIZED HEALTH CARE EDUCATION/TRAINING PROGRAM

## 2023-04-18 PROCEDURE — 82043 UR ALBUMIN QUANTITATIVE: CPT | Performed by: STUDENT IN AN ORGANIZED HEALTH CARE EDUCATION/TRAINING PROGRAM

## 2023-04-18 PROCEDURE — 99214 OFFICE O/P EST MOD 30 MIN: CPT | Performed by: STUDENT IN AN ORGANIZED HEALTH CARE EDUCATION/TRAINING PROGRAM

## 2023-04-18 PROCEDURE — 80048 BASIC METABOLIC PNL TOTAL CA: CPT | Performed by: STUDENT IN AN ORGANIZED HEALTH CARE EDUCATION/TRAINING PROGRAM

## 2023-04-18 RX ORDER — BUDESONIDE AND FORMOTEROL FUMARATE DIHYDRATE 80; 4.5 UG/1; UG/1
2 AEROSOL RESPIRATORY (INHALATION) 2 TIMES DAILY
Qty: 10.2 G | Refills: 0 | Status: SHIPPED | OUTPATIENT
Start: 2023-04-18 | End: 2023-11-07

## 2023-04-18 ASSESSMENT — ASTHMA QUESTIONNAIRES: ACT_TOTALSCORE: 16

## 2023-04-18 ASSESSMENT — PAIN SCALES - GENERAL: PAINLEVEL: NO PAIN (0)

## 2023-04-18 NOTE — RESULT ENCOUNTER NOTE
Yajaira Wilhelm,     It is a pleasure providing you with medical care. I have received and reviewed your lab results, and have the following recommendations:     Per the American diabetes Association for somebody in your age group they do recommend an A1c between 8 to 8.5%.  According to the American geriatric Society a goal A1c for a person your age group should be between 7.5 to 8%.  Your A1c is currently 8.2%, at this time no medication changes or further work-up is required, and we can continue to monitor you for now.    Additionally your urine albumin creatinine indicates that you are not losing protein via your kidneys which is within normal limits.  You may have seen a mildly elevated creatinine on your basic metabolic panel.  This can indicate a potential strain on your kidneys, at this time I would just recommend making sure you are getting at least 64 ounces of daily water intake.    Sincerely,     Portia Harding MD

## 2023-04-18 NOTE — LETTER
April 19, 2023      Raul Wilhelm  [address]  [place] [address]        Dear ,    We are writing to inform you of your test results.    It is a pleasure providing you with medical care. I have received and reviewed your lab results, and have the following recommendations:     Per the American diabetes Association for somebody in your age group they do recommend an A1c between 8 to 8.5%.  According to the American geriatric Society a goal A1c for a person your age group should be between 7.5 to 8%.  Your A1c is currently 8.2%, at this time no medication changes or further work-up is required, and we can continue to monitor you for now.     Additionally your urine albumin creatinine indicates that you are not losing protein via your kidneys which is within normal limits.  You may have seen a mildly elevated creatinine on your basic metabolic panel.  This can indicate a potential strain on your kidneys, at this time I would just recommend making sure you are getting at least 64 ounces of daily water intake.      Resulted Orders   Albumin Random Urine Quantitative with Creat Ratio   Result Value Ref Range    Creatinine Urine mg/dL 158.7 mg/dL      Comment:      The reference ranges have not been established in urine creatinine. The results should be integrated into the clinical context for interpretation.    Albumin Urine mg/L <12.0 mg/L      Comment:      The reference ranges have not been established in urine albumin. The results should be integrated into the clinical context for interpretation.    Albumin Urine mg/g Cr        Comment:      Unable to calculate, urine albumin and/or urine creatinine is outside detectable limits.  Microalbuminuria is defined as an albumin:creatinine ratio of 17 to 299 for males and 25 to 299 for females. A ratio of albumin:creatinine of 300 or higher is indicative of overt proteinuria.  Due to biologic variability, positive results should be confirmed by a second,  first-morning random or 24-hour timed urine specimen. If there is discrepancy, a third specimen is recommended. When 2 out of 3 results are in the microalbuminuria range, this is evidence for incipient nephropathy and warrants increased efforts at glucose control, blood pressure control, and institution of therapy with an angiotensin-converting-enzyme (ACE) inhibitor (if the patient can tolerate it).     HEMOGLOBIN A1C   Result Value Ref Range    Hemoglobin A1C 8.2 (H) 0.0 - 5.6 %      Comment:      Normal <5.7%   Prediabetes 5.7-6.4%    Diabetes 6.5% or higher     Note: Adopted from ADA consensus guidelines.       If you have any questions or concerns, please call the clinic at the number listed above.       Sincerely,      Portia Harding MD

## 2023-04-18 NOTE — PROGRESS NOTES
1. Severe persistent asthma without complication  2. Chronic obstructive pulmonary disease, unspecified COPD type (H)  > Patient states he did endorse some improvement of symptoms from his previous visit with me 1 month ago  -Patient states that although he picked up his prescription for Flovent, he ended up not using the inhaler   -Prescription sent for budesonide-formoterol (SYMBICORT) 80-4.5 MCG/ACT Inhaler; Inhale 2 puffs into the lungs 2 times daily  Dispense: 10.2 g; Refill: 0  -At this time given the fact that the patient did not take Flovent, I will send a prescription for Symbicort since that is both LABA + steroid vs Flovent which is just steroid, per MARIA R guidelines for asthma the recommendation is to use an as needed low-dose ICS formoterol based inhaler     3. Type 2 diabetes mellitus without complication, without long-term current use of insulin (H)  - Albumin Random Urine Quantitative with Creat Ratio  - Basic metabolic panel  (Ca, Cl, CO2, Creat, Gluc, K, Na, BUN)  - HEMOGLOBIN A1C      Jonathon Carter is a 77 year old, presenting for the following health issues:  Asthma        4/18/2023     2:38 PM   Additional Questions   Roomed by Ami ENGLE LPN   Accompanied by self         4/18/2023     2:38 PM   Patient Reported Additional Medications   Patient reports taking the following new medications no new meds     History of Present Illness     Asthma:  He presents for follow up of asthma.  He has no cough, some wheezing, and some shortness of breath. He is using a relief medication 2-3 times per day. He does not have a controller medication. Patient is aware of the following triggers: dust mites and pollens. The patient has not had a visit to the Emergency Room, Urgent Care or Hospital due to asthma since the last clinic visit. He has been to the Emergency Room or Urgent Care 0 times.He has had a Hospitalization 0 times.    He eats 0-1 servings of fruits and vegetables daily.He consumes 0  "sweetened beverage(s) daily.He exercises with enough effort to increase his heart rate 9 or less minutes per day.  He exercises with enough effort to increase his heart rate 3 or less days per week.   He is taking medications regularly.    Review of Systems   Asthma Follow-Up  As above   Was ACT completed today?  Yes        4/18/2023     3:32 PM   ACT Total Scores   ACT TOTAL SCORE (Goal Greater than or Equal to 20) 16   In the past 12 months, how many times did you visit the emergency room for your asthma without being admitted to the hospital? 0   In the past 12 months, how many times were you hospitalized overnight because of your asthma? 0         Objective    /76 (BP Location: Right arm, Patient Position: Sitting, Cuff Size: Adult Regular)   Pulse 95   Temp 97.8  F (36.6  C) (Tympanic)   Resp 18   Ht 1.73 m (5' 8.11\")   Wt 73.9 kg (162 lb 14.4 oz)   SpO2 91%   BMI 24.69 kg/m    Body mass index is 24.69 kg/m .  Physical Exam  Constitutional:       General: He is not in acute distress.     Appearance: Normal appearance.   HENT:      Head: Normocephalic.   Eyes:      General: No scleral icterus.        Right eye: No discharge.         Left eye: No discharge.      Extraocular Movements: Extraocular movements intact.      Conjunctiva/sclera: Conjunctivae normal.   Cardiovascular:      Rate and Rhythm: Normal rate and regular rhythm.      Heart sounds: Normal heart sounds. No murmur heard.     No friction rub. No gallop.   Pulmonary:      Effort: Pulmonary effort is normal. No respiratory distress.      Breath sounds: No stridor. Wheezing present. No rhonchi or rales.   Musculoskeletal:         General: Normal range of motion.      Cervical back: Normal range of motion.   Skin:     General: Skin is warm.      Findings: No rash.      Comments: No rash on exposed skin   Neurological:      General: No focal deficit present.      Mental Status: He is alert and oriented to person, place, and time. "   Psychiatric:         Mood and Affect: Mood normal.         Behavior: Behavior normal.

## 2023-04-23 NOTE — LETTER
My Asthma Action Plan  Name: Raul Wilhelm   YOB: 1945  Date: 4/19/2019   My doctor: Sarita Hennessy, DO   My clinic: [facility]        My Control Medicine: Fluticasone propionate (Flovent) -   mcg twice daily  My Rescue Medicine: Albuterol nebulizer solution as needed  Albuterol (Proair/Ventolin/Proventil) inhaler as needed  My Oral Steroid Medicine: prednisone  My Asthma Severity: severe persistent  Avoid your asthma triggers:   smoke  upper respiratory infections  dust mites  mold  humidity  aspirin  strong odors and fumes  emotions  cold air  Gastric Reflux            GREEN ZONE   Good Control    I feel good    No cough or wheeze    Can work, sleep and play without asthma symptoms       Take your asthma control medicine every day.     1. If exercise triggers your asthma, take your rescue medication    15 minutes before exercise or sports, and    During exercise if you have asthma symptoms  2. Spacer to use with inhaler: If you have a spacer, make sure to use it with your inhaler             YELLOW ZONE Getting Worse  I have ANY of these:    I do not feel good    Cough or wheeze    Chest feels tight    Wake up at night   1. Keep taking your Green Zone medications  2. Start taking your rescue medicine:    every 20 minutes for up to 1 hour. Then every 4 hours for 24-48 hours.  3. If you stay in the Yellow Zone for more than 12-24 hours, contact your doctor.  4. If you do not return to the Green Zone in 12-24 hours or you get worse, start taking your oral steroid medicine if prescribed by your provider.           RED ZONE Medical Alert - Get Help  I have ANY of these:    I feel awful    Medicine is not helping    Breathing getting harder    Trouble walking or talking    Nose opens wide to breathe       1. Take your rescue medicine NOW  2. If your provider has prescribed an oral steroid medicine, start taking it NOW  3. Call your doctor NOW  4. If you are  still in the Red Zone after 20 minutes and you have not reached your doctor:    Take your rescue medicine again and    Call 911 or go to the emergency room right away    See your regular doctor within 2 weeks of an Emergency Room or Urgent Care visit for follow-up treatment.          Annual Reminders:  Meet with Asthma Educator,  Flu Shot in the Fall, consider Pneumonia Vaccination for patients with asthma (aged 19 and older).    Pharmacy: [facility] [place], [place] - [address]                      Asthma Triggers  How To Control Things That Make Your Asthma Worse    Triggers are things that make your asthma worse.  Look at the list below to help you find your triggers and what you can do about them.  You can help prevent asthma flare-ups by staying away from your triggers.      Trigger                                                          What you can do   Cigarette Smoke  Tobacco smoke can make asthma worse. Do not allow smoking in your home, car or around you.  Be sure no one smokes at a child s day care or school.  If you smoke, ask your health care provider for ways to help you quit.  Ask family members to quit too.  Ask your health care provider for a referral to Quit Plan to help you quit smoking, or call 3-608-560-PLAN.     Colds, Flu, Bronchitis  These are common triggers of asthma. Wash your hands often.  Don t touch your eyes, nose or mouth.  Get a flu shot every year.     Dust Mites  These are tiny bugs that live in cloth or carpet. They are too small to see. Wash sheets and blankets in hot water every week.   Encase pillows and mattress in dust mite proof covers.  Avoid having carpet if you can. If you have carpet, vacuum weekly.   Use a dust mask and HEPA vacuum.   Pollen and Outdoor Mold  Some people are allergic to trees, grass, or weed pollen, or molds. Try to keep your windows closed.  Limit time out doors when pollen count is high.   Ask you health care provider about  taking medicine during allergy season.     Animal Dander  Some people are allergic to skin flakes, urine or saliva from pets with fur or feathers. Keep pets with fur or feathers out of your home.    If you can t keep the pet outdoors, then keep the pet out of your bedroom.  Keep the bedroom door closed.  Keep pets off cloth furniture and away from stuffed toys.     Mice, Rats, and Cockroaches  Some people are allergic to the waste from these pests.   Cover food and garbage.  Clean up spills and food crumbs.  Store grease in the refrigerator.   Keep food out of the bedroom.   Indoor Mold  This can be a trigger if your home has high moisture. Fix leaking faucets, pipes, or other sources of water.   Clean moldy surfaces.  Dehumidify basement if it is damp and smelly.   Smoke, Strong Odors, and Sprays  These can reduce air quality. Stay away from strong odors and sprays, such as perfume, powder, hair spray, paints, smoke incense, paint, cleaning products, candles and new carpet.   Exercise or Sports  Some people with asthma have this trigger. Be active!  Ask your doctor about taking medicine before sports or exercise to prevent symptoms.    Warm up for 5-10 minutes before and after sports or exercise.     Other Triggers of Asthma  Cold air:  Cover your nose and mouth with a scarf.  Sometimes laughing or crying can be a trigger.  Some medicines and food can trigger asthma.      Alert and oriented to person, place and time

## 2023-07-12 ENCOUNTER — TRANSFERRED RECORDS (OUTPATIENT)
Dept: HEALTH INFORMATION MANAGEMENT | Facility: CLINIC | Age: 78
End: 2023-07-12

## 2023-07-12 LAB — RETINOPATHY: NEGATIVE

## 2023-08-28 DIAGNOSIS — J45.50 SEVERE PERSISTENT ASTHMA WITHOUT COMPLICATION (H): ICD-10-CM

## 2023-08-28 RX ORDER — MONTELUKAST SODIUM 10 MG/1
TABLET ORAL
Qty: 90 TABLET | Refills: 1 | Status: SHIPPED | OUTPATIENT
Start: 2023-08-28 | End: 2024-04-29

## 2023-09-18 DIAGNOSIS — I10 ESSENTIAL HYPERTENSION: ICD-10-CM

## 2023-09-18 DIAGNOSIS — E11.9 TYPE 2 DIABETES MELLITUS WITHOUT COMPLICATION, WITHOUT LONG-TERM CURRENT USE OF INSULIN (H): ICD-10-CM

## 2023-09-18 RX ORDER — CARVEDILOL 6.25 MG/1
TABLET ORAL
Qty: 180 TABLET | Refills: 1 | Status: SHIPPED | OUTPATIENT
Start: 2023-09-18 | End: 2024-01-16

## 2023-09-18 NOTE — TELEPHONE ENCOUNTER
"Routing refill request to provider for review/approval because:  Labs not current:  creatinine A1c         Requested Prescriptions   Pending Prescriptions Disp Refills    carvedilol (COREG) 6.25 MG tablet [Pharmacy Med Name: CARVEDILOL 6.25MG TABS] 180 tablet 0     Sig: TAKE ONE TABLET BY MOUTH TWICE A DAY       Beta-Blockers Protocol Passed - 9/18/2023  9:42 AM        Passed - Blood pressure under 140/90 in past 12 months     BP Readings from Last 3 Encounters:   04/18/23 134/76   03/10/23 134/78   11/01/22 129/86                 Passed - Patient is age 6 or older        Passed - Recent (12 mo) or future (30 days) visit within the authorizing provider's specialty     Patient has had an office visit with the authorizing provider or a provider within the authorizing providers department within the previous 12 mos or has a future within next 30 days. See \"Patient Info\" tab in inbasket, or \"Choose Columns\" in Meds & Orders section of the refill encounter.              Passed - Medication is active on med list          glipiZIDE (GLUCOTROL XL) 10 MG 24 hr tablet [Pharmacy Med Name: GLIPIZIDE ER 10MG TB24] 90 tablet 3     Sig: TAKE 1 TABLET (10 MG) BY MOUTH DAILY       Sulfonylurea Agents Failed - 9/18/2023  9:42 AM        Failed - Patient has documented A1c within the specified period of time.     If HgbA1C is 8 or greater, it needs to be on file within the past 3 months.  If less than 8, must be on file within the past 6 months.     Recent Labs   Lab Test 04/18/23  1538   A1C 8.2*             Failed - Patient has a recent creatinine (normal) within the past 12 mos.     Recent Labs   Lab Test 04/18/23  1538   CR 1.20*       Ok to refill medication if creatinine is low          Passed - Medication is active on med list        Passed - Patient is age 18 or older        Passed - Recent (6 mo) or future (30 days) visit within the authorizing provider's specialty     Patient had office visit in the last 6 months or has a " "visit in the next 30 days with authorizing provider or within the authorizing provider's specialty.  See \"Patient Info\" tab in inbasket, or \"Choose Columns\" in Meds & Orders section of the refill encounter.                     Susan Castro RN 09/18/23 4:13 PM   "

## 2023-09-19 RX ORDER — GLIPIZIDE 10 MG/1
10 TABLET, FILM COATED, EXTENDED RELEASE ORAL DAILY
Qty: 90 TABLET | Refills: 3 | Status: SHIPPED | OUTPATIENT
Start: 2023-09-19

## 2023-09-19 NOTE — TELEPHONE ENCOUNTER
LM on patient VM rx approved and sent to pharmacy. Patient will need to call and schedule appt for further refills.Gisela Jackson on 9/19/2023 at 3:40 PM

## 2023-09-25 DIAGNOSIS — I25.10 CORONARY ARTERY DISEASE INVOLVING NATIVE CORONARY ARTERY OF NATIVE HEART WITHOUT ANGINA PECTORIS: ICD-10-CM

## 2023-09-25 DIAGNOSIS — E11.9 TYPE 2 DIABETES MELLITUS WITHOUT COMPLICATION, WITHOUT LONG-TERM CURRENT USE OF INSULIN (H): ICD-10-CM

## 2023-09-25 RX ORDER — ATORVASTATIN CALCIUM 40 MG/1
40 TABLET, FILM COATED ORAL DAILY
Qty: 90 TABLET | Refills: 0 | Status: SHIPPED | OUTPATIENT
Start: 2023-09-25 | End: 2024-01-16

## 2023-09-25 NOTE — TELEPHONE ENCOUNTER
"Requested Prescriptions   Pending Prescriptions Disp Refills    atorvastatin (LIPITOR) 40 MG tablet [Pharmacy Med Name: ATORVASTATIN CALCIUM 40MG TABS] 90 tablet 0     Sig: TAKE ONE TABLET BY MOUTH ONCE DAILY       Statins Protocol Failed - 9/25/2023 11:25 AM        Failed - LDL on file in past 12 months     Recent Labs   Lab Test 04/27/22  0856   LDL 44             Passed - No abnormal creatine kinase in past 12 months     No lab results found.             Passed - Recent (12 mo) or future (30 days) visit within the authorizing provider's specialty     Patient has had an office visit with the authorizing provider or a provider within the authorizing providers department within the previous 12 mos or has a future within next 30 days. See \"Patient Info\" tab in inbasket, or \"Choose Columns\" in Meds & Orders section of the refill encounter.              Passed - Medication is active on med list        Passed - Patient is age 18 or older             "

## 2023-10-02 NOTE — ANESTHESIA POSTPROCEDURE EVALUATION
Patient: Raul Wilhelm    Procedure(s):  Left Total Knee Arthroplasty    Diagnosis:Arthritis of left knee [M17.12]  Diagnosis Additional Information: No value filed.    Anesthesia Type:  Spinal, Peripheral Nerve Block    Note:  Anesthesia Post Evaluation    Patient location during evaluation: Bedside  Patient participation: Able to participate in evaluation but full recovery from regional anesthesia has not yet ocurrred but is anticipated to occur within 48 hours  Level of consciousness: awake and alert  Pain management: adequate  multimodal analgesia used between 6 hours prior to anesthesia start to PACU dischargeAirway patency: patent  Cardiovascular status: acceptable  Respiratory status: acceptable  two or more mitigation strategies used for obstructive sleep apneaHydration status: acceptable  PONV: none     Anesthetic complications: None          Last vitals:  Vitals:    05/28/20 1510 05/28/20 1515 05/28/20 1530   BP: 125/77 125/83 136/88   Pulse: 67 69 65   Resp: 8 10 23   Temp:      SpO2: 94% 96% 97%         Electronically Signed By: OSCAR Ann CRNA  May 28, 2020  3:34 PM   The patient is a 52y Female complaining of headache.

## 2023-10-12 ENCOUNTER — NURSE TRIAGE (OUTPATIENT)
Dept: FAMILY MEDICINE | Facility: CLINIC | Age: 78
End: 2023-10-12
Payer: COMMERCIAL

## 2023-10-12 NOTE — TELEPHONE ENCOUNTER
"Entered in error, please disregard    Criselda Arias RN  [facility]      Reason for Disposition   ALL other penis - scrotum symptoms  (Exception: Painless rash < 24 hours duration.)    Additional Information   Negative: Followed a genital area injury (e.g., penis, scrotum)   Negative: Pain or burning with passing urine (urination) is main symptom   Negative: Blood in urine (red, pink, or tea-colored)   Negative: Pain in scrotum or testicle is main symptom   Negative: Swollen scrotum OR lump in the scrotum/groin area   Negative: Pubic lice suspected   Negative: Sexually Transmitted Infection (STI) exposure and prevention, question about   Negative: SEVERE pain (e.g., excruciating)   Negative: Foreskin pulled back and stuck (not circumcised)   Negative: Fever > 100.4 F (38.0 C)   Negative: Unable to urinate (or only a few drops) and bladder feels very full   Negative: Prolonged unwanted erection (i.e., not related to sexual interest) and lasting > 4 hours   Negative: Patient sounds very sick or weak to the triager   Negative: Entire penis is swollen (i.e., edema)   Negative: Looks infected (e.g., draining sore, spreading redness)   Negative: Pain or burning with passing urine (urination)   Negative: Pus (white, yellow) or bloody discharge from end of penis   Negative: Swollen foreskin (not circumcised)   Negative: Tiny water blisters rash, 3 or more   Negative: Antibiotic treatment > 3 days for STI (e.g., penile discharge from gonorrhea, chlamydia) and painful urination not improved   Negative: Patient wants to be seen    Answer Assessment - Initial Assessment Questions  1. SYMPTOM: \"What's the main symptom you're concerned about?\" (e.g., discharge from penis, rash, pain, itching, swelling)      Tip of penis redness  2. LOCATION: \"Where is the *No Answer* located?\"      *No Answer*  3. ONSET: \"When did *No Answer*  start?\"      About a month ago  4. PAIN: \"Is there any pain?\" If Yes, ask: \"How bad " Chief Complaint  Pre-op Exam (Knee surgery )    Subjective          Ty Bergeron presents to [facility]  History of Present Illness      Has  H/o HLD, HTN, DVT, obesity, gerd, asplenia, anemia, anxiety, oa, lung nodule, aleep apnea    Is here today seeking surgical clearance for left knee replacement surgery with Dr. Hammonds    His cardiologist is Dr. Meza, will need cardiology clearance as well - has an appointment on 4/27 with VIKTORIYA Reeves at the cardiology office    There is FH of diabetes in father and grandmother    He endorses that he is limited on physical activity related to his chronic knee pain   He does endorse that he is able to do light house keeping, he denies that he does any other physical activity related to his knee pain      Review of Systems   Constitutional: Negative.  Negative for appetite change, fatigue and fever.   Respiratory: Positive for cough. Negative for chest tightness, shortness of breath and wheezing.         Endorses that he will cough on occasion related to allergies   Cardiovascular: Positive for leg swelling. Negative for chest pain and palpitations.        Endorses intermittent swelling in b/l in the feet and ankles over the past year or so   Gastrointestinal: Negative.  Negative for abdominal pain, diarrhea, nausea and vomiting.   Genitourinary: Negative.  Negative for dysuria, frequency and urgency.        Endorses that he will get up 2-3 times at night to urinate   Musculoskeletal: Positive for arthralgias. Negative for back pain and myalgias.        B/l knee pain  H/o right shoulder replacement surgery about 2 years ago   Allergic/Immunologic: Positive for environmental allergies.   Neurological: Positive for dizziness and light-headedness. Negative for weakness and headaches.        Has had some light headedness and dizziness, has seen cardiology and his medicines are being adjusted currently  Today he is not having any problems  "  Psychiatric/Behavioral: Positive for sleep disturbance.        Sleep is disrupted by knee pain when turning over    Endorses that the medicine he takes for depression continues to work well         Objective   Vital Signs:  /66 (BP Location: Right arm, Patient Position: Sitting)   Pulse (!) 46   Resp 18   Ht 177.8 cm (70\")   Wt (!) 137 kg (302 lb)   SpO2 97%   BMI 43.33 kg/m²     BP Readings from Last 3 Encounters:   04/24/23 118/66   02/27/23 91/61   07/21/22 112/80        Wt Readings from Last 3 Encounters:   04/24/23 (!) 137 kg (302 lb)   02/27/23 132 kg (292 lb)   07/21/22 (!) 137 kg (302 lb)              Physical Exam  Vitals reviewed.   Constitutional:       Appearance: Normal appearance. He is obese.   HENT:      Right Ear: Tympanic membrane normal.      Left Ear: Tympanic membrane and ear canal normal.      Nose: Nose normal.      Mouth/Throat:      Mouth: Mucous membranes are moist.      Pharynx: Oropharynx is clear.   Neck:      Thyroid: No thyromegaly.      Vascular: No carotid bruit.   Cardiovascular:      Rate and Rhythm: Normal rate and regular rhythm.      Pulses: Normal pulses.      Heart sounds: Normal heart sounds.   Pulmonary:      Effort: Pulmonary effort is normal.      Breath sounds: Normal breath sounds.   Abdominal:      General: Bowel sounds are normal.      Palpations: Abdomen is soft.      Tenderness: There is no abdominal tenderness. There is no right CVA tenderness or left CVA tenderness.   Musculoskeletal:      Cervical back: Neck supple. No tenderness.      Right lower leg: No edema.      Left lower leg: No edema.   Lymphadenopathy:      Cervical: No cervical adenopathy.   Skin:     General: Skin is warm.   Neurological:      Mental Status: He is alert and oriented to person, place, and time.   Psychiatric:         Mood and Affect: Mood normal.        Result Review :       Data reviewed: Radiologic studies CT 9-     IMPRESSION:  1.Calcium score of 0. No specific " "is it?\"  (Scale 1-10; or mild, moderate, severe)      Painful if he doesn't dry it off after urinating, clothes rubbing   5. URINE: \"Any difficulty passing urine?\" If Yes, ask: \"When was the last time?\"      No  6. CAUSE: \"What do you think is causing the symptoms?\"      Soap, or wondering if it could be r/t dealing with wife's urine  7. OTHER SYMPTOMS: \"Do you have any other symptoms?\" (e.g., fever, abdomen pain, blood in urine)      No    Protocols used: Penis and Scrotum Symptoms-A-OH    " further follow-up would be recommended  at this time.  2.Groundglass nodules within the right upper lobe measuring up to 7 mm  in size. This is nonspecific and may be infectious or inflammatory.  Follow-up chest CT in 3-6 months is recommended to assess for stability  or resolution.     Assessment and Plan    Diagnoses and all orders for this visit:    1. Pre-operative clearance (Primary)  -     Comprehensive metabolic panel; Future  -     CBC No Differential; Future    2. Incidental lung nodule  -     CT Chest Without Contrast; Future    3. Family history of diabetes mellitus in father  -     Hemoglobin A1c; Future    will check CT, labs, advised that he will need cardiac clearance as well       Follow Up   Return as needed, for Next scheduled follow up, Annual physical.  Patient was given instructions and counseling regarding his condition or for health maintenance advice. Please see specific information pulled into the AVS if appropriate.        18.9

## 2023-10-12 NOTE — TELEPHONE ENCOUNTER
S-(situation): Pt is here for an appt with his wife, and asks writer about redness on his penis.     B-(background): Pt says that the symptoms started about a month ago.     A-(assessment): Says that the redness is at the tip of his penis, under the foreskin. Says that it's painful when the area comes into contact with his urine or when it rubs on his clothes. See triage assessment below for more information.     R-(recommendations): Per triage protocol recommendation, pt was advised to be seen by provider within 24 hours. Pt has an appt scheduled for 10/24, writer rescheduled for tomorrow 10/13/23. Routing to PCP as RADHAI.    Reason for Disposition   Looks infected (e.g., draining sore, ulcer, rash is painful to touch)    Additional Information   Negative: Followed a genital area injury (e.g., penis, scrotum)   Negative: Pain or burning with passing urine is main symptom   Negative: Pain in scrotum or testicle is main symptom   Negative: Swollen scrotum OR lump in the scrotum/groin area   Negative: Pubic lice suspected   Negative: [1] Blood from end of penis AND [2] large amount   Negative: [1] Not circumcised AND [2] foreskin pulled back and stuck   Negative: [1] Looks infected (e.g., draining sore, ulcer, rash is painful to touch) AND [2] fever > 100.4 F (38.0 C)   Negative: [1] Unable to urinate (or only a few drops) > 4 hours AND [2] bladder feels very full (e.g., palpable bladder or strong urge to urinate)   Negative: [1] Prolonged unwanted erection (i.e., not related to sexual interest) AND [2] lasting > 4 hours   Negative: SEVERE pain (e.g., excruciating)   Negative: Patient sounds very sick or weak to the triager   Negative: [1] Pus or bloody discharge from the end of the penis AND [2] fever   Negative: [1] Pain or burning with passing urine AND [2] fever > 100.4 F (38.0 C)   Negative: [1] Pain or burning with passing urine AND [2] side (flank) or back pain present   Negative: Entire penis is swollen (i.e.,  "edema)   Negative: [1] Antibiotic treatment > 3 days for STI (e.g., penile discharge from gonorrhea, chlamydia) AND [2] painful urination not improved   Negative: Pus (white, yellow) or bloody discharge from end of penis   Negative: Pain or burning with passing urine   Negative: [1] Not circumcised AND [2] swollen foreskin   Negative: [1] Tiny water blisters rash AND [2] 3 or more    Answer Assessment - Initial Assessment Questions  1. SYMPTOM: \"What's the main symptom you're concerned about?\" (e.g., discharge from penis, rash, pain, itching, swelling)      Redness at the tip of his penis  2. LOCATION: \"Where is the redness located?\"      The tip of his penis, under foreskin  3. ONSET: \"When did redness  start?\"      About a month ago.  4. PAIN: \"Is there any pain?\" If Yes, ask: \"How bad is it?\"  (Scale 1-10; or mild, moderate, severe)      Yes, says that it's painful if he doesn't dry off his penis after urinating, and is also uncomfortable from clothes rubbing against it.   5. URINE: \"Any difficulty passing urine?\" If Yes, ask: \"When was the last time?\"      No  6. CAUSE: \"What do you think is causing the symptoms?\"      Pt wonders if it could be d/t a laundry detergent, or possibly coming into contact with with wife's urine when assisting her or doing their laundry.  7. OTHER SYMPTOMS: \"Do you have any other symptoms?\" (e.g., fever, abdomen pain, blood in urine)      No    Protocols used: Penis and Scrotum Symptoms-A-    Criselda Arias RN  [facility]  "

## 2023-10-13 ENCOUNTER — OFFICE VISIT (OUTPATIENT)
Dept: FAMILY MEDICINE | Facility: CLINIC | Age: 78
End: 2023-10-13
Payer: COMMERCIAL

## 2023-10-13 VITALS
HEIGHT: 68 IN | OXYGEN SATURATION: 95 % | HEART RATE: 89 BPM | DIASTOLIC BLOOD PRESSURE: 86 MMHG | BODY MASS INDEX: 25.05 KG/M2 | SYSTOLIC BLOOD PRESSURE: 150 MMHG | RESPIRATION RATE: 16 BRPM | TEMPERATURE: 97.5 F | WEIGHT: 165.3 LBS

## 2023-10-13 DIAGNOSIS — B37.42 CANDIDIASIS OF PENIS: ICD-10-CM

## 2023-10-13 DIAGNOSIS — E11.9 TYPE 2 DIABETES MELLITUS WITHOUT COMPLICATION, WITHOUT LONG-TERM CURRENT USE OF INSULIN (H): ICD-10-CM

## 2023-10-13 DIAGNOSIS — R35.0 URINARY FREQUENCY: ICD-10-CM

## 2023-10-13 DIAGNOSIS — Z00.00 ENCOUNTER FOR MEDICARE ANNUAL WELLNESS EXAM: Primary | ICD-10-CM

## 2023-10-13 LAB
ALBUMIN UR-MCNC: NEGATIVE MG/DL
ANION GAP SERPL CALCULATED.3IONS-SCNC: 11 MMOL/L (ref 7–15)
APPEARANCE UR: CLEAR
BACTERIA #/AREA URNS HPF: ABNORMAL /HPF
BILIRUB UR QL STRIP: NEGATIVE
BUN SERPL-MCNC: 21.6 MG/DL (ref 8–23)
CALCIUM SERPL-MCNC: 9.9 MG/DL (ref 8.8–10.2)
CHLORIDE SERPL-SCNC: 103 MMOL/L (ref 98–107)
CHOLEST SERPL-MCNC: 179 MG/DL
COLOR UR AUTO: YELLOW
CREAT SERPL-MCNC: 0.98 MG/DL (ref 0.67–1.17)
DEPRECATED HCO3 PLAS-SCNC: 24 MMOL/L (ref 22–29)
EGFRCR SERPLBLD CKD-EPI 2021: 79 ML/MIN/1.73M2
GLUCOSE SERPL-MCNC: 359 MG/DL (ref 70–99)
GLUCOSE UR STRIP-MCNC: >=1000 MG/DL
HBA1C MFR BLD: 9.1 % (ref 0–5.6)
HDLC SERPL-MCNC: 42 MG/DL
HGB UR QL STRIP: ABNORMAL
KETONES UR STRIP-MCNC: NEGATIVE MG/DL
LDLC SERPL CALC-MCNC: 95 MG/DL
LEUKOCYTE ESTERASE UR QL STRIP: NEGATIVE
NITRATE UR QL: NEGATIVE
NONHDLC SERPL-MCNC: 137 MG/DL
PH UR STRIP: 5.5 [PH] (ref 5–7)
POTASSIUM SERPL-SCNC: 4.6 MMOL/L (ref 3.4–5.3)
RBC #/AREA URNS AUTO: ABNORMAL /HPF
SODIUM SERPL-SCNC: 138 MMOL/L (ref 135–145)
SP GR UR STRIP: 1.02 (ref 1–1.03)
SQUAMOUS #/AREA URNS AUTO: ABNORMAL /LPF
TRIGL SERPL-MCNC: 212 MG/DL
UROBILINOGEN UR STRIP-ACNC: 0.2 E.U./DL
WBC #/AREA URNS AUTO: ABNORMAL /HPF

## 2023-10-13 PROCEDURE — G0008 ADMIN INFLUENZA VIRUS VAC: HCPCS | Mod: 59 | Performed by: INTERNAL MEDICINE

## 2023-10-13 PROCEDURE — 80061 LIPID PANEL: CPT | Performed by: INTERNAL MEDICINE

## 2023-10-13 PROCEDURE — 80048 BASIC METABOLIC PNL TOTAL CA: CPT | Performed by: INTERNAL MEDICINE

## 2023-10-13 PROCEDURE — 99214 OFFICE O/P EST MOD 30 MIN: CPT | Mod: 25 | Performed by: INTERNAL MEDICINE

## 2023-10-13 PROCEDURE — 91320 SARSCV2 VAC 30MCG TRS-SUC IM: CPT | Performed by: INTERNAL MEDICINE

## 2023-10-13 PROCEDURE — G0438 PPPS, INITIAL VISIT: HCPCS | Performed by: INTERNAL MEDICINE

## 2023-10-13 PROCEDURE — 90480 ADMN SARSCOV2 VAC 1/ONLY CMP: CPT | Performed by: INTERNAL MEDICINE

## 2023-10-13 PROCEDURE — 81001 URINALYSIS AUTO W/SCOPE: CPT | Performed by: INTERNAL MEDICINE

## 2023-10-13 PROCEDURE — 83036 HEMOGLOBIN GLYCOSYLATED A1C: CPT | Performed by: INTERNAL MEDICINE

## 2023-10-13 PROCEDURE — 90662 IIV NO PRSV INCREASED AG IM: CPT | Performed by: INTERNAL MEDICINE

## 2023-10-13 PROCEDURE — 36415 COLL VENOUS BLD VENIPUNCTURE: CPT | Performed by: INTERNAL MEDICINE

## 2023-10-13 RX ORDER — FLUCONAZOLE 150 MG/1
150 TABLET ORAL ONCE
Qty: 1 TABLET | Refills: 0 | Status: SHIPPED | OUTPATIENT
Start: 2023-10-13 | End: 2023-10-13

## 2023-10-13 ASSESSMENT — PAIN SCALES - GENERAL: PAINLEVEL: NO PAIN (0)

## 2023-10-13 NOTE — RESULT ENCOUNTER NOTE
A1c is elevated at 9.1 as expected. I recommend to start Ozempic 0.25 mg once weekly x 4 weeks, then 0.5 mg weekly. If it is expensive, let me know.  Once daily Lantus would be the cheapest alternative.    Recommend diabetes check up in 3 months.

## 2023-10-13 NOTE — LETTER
October 13, 2023      Raul Wilhelm  [address]  [place] [address]        Dear ,    We are writing to inform you of your test results.    Kidney function, electrolytes are normal.   Blood sugar is quite elevated at 359, consistent with very elevated a1c     Resulted Orders   UA Macroscopic with reflex to Microscopic and Culture - Clinic Collect   Result Value Ref Range    Color Urine Yellow Colorless, Straw, Light Yellow, Yellow    Appearance Urine Clear Clear    Glucose Urine >=1000 (A) Negative mg/dL    Bilirubin Urine Negative Negative    Ketones Urine Negative Negative mg/dL    Specific Gravity Urine 1.020 1.003 - 1.035    Blood Urine Trace (A) Negative    pH Urine 5.5 5.0 - 7.0    Protein Albumin Urine Negative Negative mg/dL    Urobilinogen Urine 0.2 0.2, 1.0 E.U./dL    Nitrite Urine Negative Negative    Leukocyte Esterase Urine Negative Negative   UA Microscopic with Reflex to Culture   Result Value Ref Range    Bacteria Urine Few (A) None Seen /HPF    RBC Urine 0-2 0-2 /HPF /HPF    WBC Urine 0-5 0-5 /HPF /HPF    Squamous Epithelials Urine Few (A) None Seen /LPF    Narrative    Urine Culture not indicated   Basic metabolic panel  (Ca, Cl, CO2, Creat, Gluc, K, Na, BUN)   Result Value Ref Range    Sodium 138 135 - 145 mmol/L      Comment:      Reference intervals for this test were updated on 09/26/2023 to more accurately reflect our healthy population. There may be differences in the flagging of prior results with similar values performed with this method. Interpretation of those prior results can be made in the context of the updated reference intervals.     Potassium 4.6 3.4 - 5.3 mmol/L    Chloride 103 98 - 107 mmol/L    Carbon Dioxide (CO2) 24 22 - 29 mmol/L    Anion Gap 11 7 - 15 mmol/L    Urea Nitrogen 21.6 8.0 - 23.0 mg/dL    Creatinine 0.98 0.67 - 1.17 mg/dL    GFR Estimate 79 >60 mL/min/1.73m2    Calcium 9.9 8.8 - 10.2 mg/dL    Glucose 359 (H) 70 - 99 mg/dL   Hemoglobin A1c    Result Value Ref Range    Hemoglobin A1C 9.1 (H) 0.0 - 5.6 %      Comment:      Normal <5.7%   Prediabetes 5.7-6.4%    Diabetes 6.5% or higher     Note: Adopted from ADA consensus guidelines.   Lipid panel reflex to direct LDL Fasting   Result Value Ref Range    Cholesterol 179 <200 mg/dL    Triglycerides 212 (H) <150 mg/dL    Direct Measure HDL 42 >=40 mg/dL    LDL Cholesterol Calculated 95 <=100 mg/dL    Non HDL Cholesterol 137 (H) <130 mg/dL    Narrative    Cholesterol  Desirable:  <200 mg/dL    Triglycerides  Normal:  Less than 150 mg/dL  Borderline High:  150-199 mg/dL  High:  200-499 mg/dL  Very High:  Greater than or equal to 500 mg/dL    Direct Measure HDL  Female:  Greater than or equal to 50 mg/dL   Male:  Greater than or equal to 40 mg/dL    LDL Cholesterol  Desirable:  <100mg/dL  Above Desirable:  100-129 mg/dL   Borderline High:  130-159 mg/dL   High:  160-189 mg/dL   Very High:  >= 190 mg/dL    Non HDL Cholesterol  Desirable:  130 mg/dL  Above Desirable:  130-159 mg/dL  Borderline High:  160-189 mg/dL  High:  190-219 mg/dL  Very High:  Greater than or equal to 220 mg/dL       If you have any questions or concerns, please call the clinic at the number listed above.       Sincerely,      Sarita Hennessy, DO

## 2023-10-13 NOTE — PROGRESS NOTES
Assessment & Plan     Encounter for Medicare annual wellness exam      Urinary frequency  Due to glycosuria  - UA Macroscopic with reflex to Microscopic and Culture - Clinic Collect  - UA Microscopic with Reflex to Culture    Candidiasis of penis  Start treatment.  May  need 1 refill.  If persisting symptoms after 2 rounds, would need another face to face visit to discuss longer course  - fluconazole (DIFLUCAN) 150 MG tablet; Take 1 tablet (150 mg) by mouth once for 1 dose    Type 2 diabetes mellitus without complication, without long-term current use of insulin (H)  Not controlled. He is not checking blood sugar, nor is interested in doing this.  Not wanting to change lifestyle.  Did not want to have an indepth discussion of medications. A1c came back after visit; we briefly discussed adding ozempic at time of visit. If ozempic is too expensive, would recommend lantus 15 units once daily.  Follow-up in 3 mo for diabetes check up   - Basic metabolic panel  (Ca, Cl, CO2, Creat, Gluc, K, Na, BUN); Future  - Basic metabolic panel  (Ca, Cl, CO2, Creat, Gluc, K, Na, BUN)  - Hemoglobin A1c  - Lipid panel reflex to direct LDL Fasting  - semaglutide (OZEMPIC) 2 MG/3ML pen; Inject 0.25 mg Subcutaneous every 7 days for 28 days, THEN 0.5 mg every 7 days for 360 days.  - Hemoglobin A1c; Future    Sarita Hennessy [facility]    Jonathon Carter is a 77 year old, presenting for the following health issues:  Sore and AWV        10/13/2023     7:23 AM   Additional Questions   Roomed by ranjan beckham   Accompanied by self         10/13/2023     7:23 AM   Patient Reported Additional Medications   Patient reports taking the following new medications none       HPI     Chief Complaint   Patient presents with    Sore    AWV         Concern - sore on penis  Onset: 1 month  Description: red sore not open  Intensity: moderate  Progression of Symptoms:  improving  Accompanying Signs & Symptoms: redness,  "burns, no odor, have to go to bathroom a lot , burn when goes to bathroom   Previous history of similar problem: no  Precipitating factors:        Worsened by: no  Alleviating factors:        Improved by: taking a bath  Therapies tried and outcome: taking a bath, baking soda in bathtub   --is having dysuria and urinary frequency; no incontinence   --1 x nocturia - not new  --no prior history of this      Diabetes  --his last diabetes check was 4/2022;  he has been resistent to diet or medication changes and reluctantly increased glipizide in 4/2022  --diarrhea on metformin  --cost of meds has been a concern  --he is not checking his blood sugar   --he reports he is taking glipizide; but later states he doesn't know his medications    Annual Wellness Visit    Patient has been advised of split billing requirements and indicates understanding: Yes     Are you in the first 12 months of your Medicare Part B coverage?  No    Physical Health:  In general, how would you rate your overall physical health? good  Outside of work, how many days during the week do you exercise?2-3 days/week  Outside of work, approximately how many minutes a day do you exercise?15-30 minutes  If you drink alcohol do you typically have >3 drinks per day or >7 drinks per week? No  Do you usually eat at least 4 servings of fruit and vegetables a day, include whole grains & fiber and avoid regularly eating high fat or \"junk\" foods? No  Do you have any problems taking medications regularly? No  Do you have any side effects from medications? none  Needs assistance for the following daily activities: no assistance needed  Which of the following safety concerns are present in your home?  none identified   Hearing impairment: Yes, Feel that people are mumbling or not speaking clearly.  Need to ask people to speak up or repeat themselves.  Find that men's voices are easier to understand than women's.  Difficulty understanding soft or whispered " speech.  Difficulty understanding speech on the telephone.  In the past 6 months, have you been bothered by leaking of urine? yes    Mental Health:  In general, how would you rate your overall mental or emotional health? excellent      Today's PHQ-9 Score:       2017     8:00 AM   PHQ-9 SCORE   PHQ-9 Total Score 1       Do you feel safe in your environment? Yes    Have you ever done Advance Care Planning? (For example, a Health Directive, POLST, or a discussion with a medical provider or your loved ones about your wishes)? Yes, advance care planning is on file.    Fall risk:  Fallen 2 or more times in the past year?: No  Any fall with injury in the past year?: No    Cognitive Screenin) Repeat 3 items (Leader, Season, Table)    2) Clock draw: NORMAL  3) 3 item recall: Recalls 1 object   Results: NORMAL clock, 1-2 items recalled: COGNITIVE IMPAIRMENT LESS LIKELY    Mini-CogTM Copyright S Rafael. Licensed by the author for use in [facility]; reprinted with permission (soob@[facility]). All rights reserved.      Do you have sleep apnea, excessive snoring or daytime drowsiness? : yes daytime drowsiness    Social History     Tobacco Use    Smoking status: Never    Smokeless tobacco: Never   Substance Use Topics    Alcohol use: Yes     Comment: Rare           Do you have a current opioid prescription? No  Do you use any other controlled substances or medications that are not prescribed by a provider? None    Current providers sharing in care for this patient include:   Patient Care Team:  Sarita Hennessy DO as PCP - General (Internal Medicine)  Barbara Bermudez PA-C as Assigned Heart and Vascular Provider  Portia Harding MD as Assigned PCP    The following health maintenance items are reviewed in Epic and correct as of today:  Health Maintenance   Topic Date Due    HEPATITIS B IMMUNIZATION (1 of 3 - Risk 3-dose series) Never done    RSV VACCINE 60+ (1 - 1-dose 60+ series) Never done     ZOSTER IMMUNIZATION (2 of 3) 02/23/2011    MEDICARE ANNUAL WELLNESS VISIT  04/14/2022    ANNUAL REVIEW OF HM ORDERS  09/29/2022    LIPID  04/27/2023    A1C  07/18/2023    COVID-19 Vaccine (7 - 2023-24 season) 12/08/2023    DIABETIC FOOT EXAM  03/10/2024    BMP  04/18/2024    MICROALBUMIN  04/18/2024    EYE EXAM  07/12/2024    FALL RISK ASSESSMENT  10/13/2024    DTAP/TDAP/TD IMMUNIZATION (3 - Td or Tdap) 09/17/2028    ADVANCE CARE PLANNING  10/13/2028    SPIROMETRY  Completed    HEPATITIS C SCREENING  Completed    COPD ACTION PLAN  Completed    PHQ-2 (once per calendar year)  Completed    INFLUENZA VACCINE  Completed    Pneumococcal Vaccine: 65+ Years  Completed    IPV IMMUNIZATION  Aged Out    HPV IMMUNIZATION  Aged Out    MENINGITIS IMMUNIZATION  Aged Out    COLORECTAL CANCER SCREENING  Discontinued       Patient has been advised of split billing requirements and indicates understanding: Yes    Appropriate preventive services were discussed with this patient, including applicable screening as appropriate for fall prevention, nutrition, physical activity, Tobacco-use cessation, weight loss and cognition.  Checklist reviewing preventive services available has been given to the patient.      Current Outpatient Medications   Medication Sig Dispense Refill    albuterol (PROAIR HFA/PROVENTIL HFA/VENTOLIN HFA) 108 (90 Base) MCG/ACT inhaler INHALE 1-2 PUFFS INTO THE LUNGS EVERY 4 HOURS AS NEEDED FOR SHORTNESS OF BREATH / DYSPNEA OR WHEEZING 8.5 g 10    albuterol (PROVENTIL) (2.5 MG/3ML) 0.083% neb solution Take 1 vial (2.5 mg) by nebulization every 6 hours as needed for shortness of breath or wheezing 30 mL 11    Alcohol Swabs (B-D SINGLE USE SWABS REGULAR) PADS USE TO SWAB AREA OF INJECTION / JENISE AS DIRECTED 100 each 3    aspirin (ASA) 81 MG EC tablet Take 1 tablet (81 mg) by mouth daily      atorvastatin (LIPITOR) 40 MG tablet TAKE ONE TABLET BY MOUTH ONCE DAILY 90 tablet 0    blood glucose monitoring (NO BRAND  "SPECIFIED) meter device kit Use to test blood sugar 1 times daily or as directed.  Blood Glucose Monitor Brands: per insurance. 1 kit 0    budesonide-formoterol (SYMBICORT) 80-4.5 MCG/ACT Inhaler Inhale 2 puffs into the lungs 2 times daily 10.2 g 0    carvedilol (COREG) 6.25 MG tablet TAKE ONE TABLET BY MOUTH TWICE A  tablet 1    fexofenadine (ALLEGRA) 180 MG tablet TAKE 1 TABLET (180 MG) BY MOUTH DAILY 90 tablet 3    fluticasone (FLOVENT HFA) 44 MCG/ACT inhaler Inhale 1 puff into the lungs 2 times daily 10.6 g 1    glipiZIDE (GLUCOTROL XL) 10 MG 24 hr tablet TAKE 1 TABLET (10 MG) BY MOUTH DAILY 90 tablet 3    Lancets (ONETOUCH DELICA PLUS OMACWA45Z) MISC USE AS DIRECTED WITH LANCING DEVICE TO TEST ONCE DAILY 100 each 6    losartan-hydrochlorothiazide (HYZAAR) 50-12.5 MG tablet TAKE ONE TABLET BY MOUTH ONCE DAILY 90 tablet 3    montelukast (SINGULAIR) 10 MG tablet TAKE ONE TABLET BY MOUTH AT BEDTIME 90 tablet 1    nitroGLYcerin (NITROSTAT) 0.4 MG sublingual tablet Place 1 tablet (0.4 mg) under the tongue every 5 minutes as needed for chest pain If you still have symptoms after 3 doses call 911. 25 tablet 1    omeprazole (PRILOSEC) 20 MG DR capsule TAKE 1 CAPSULE BY MOUTH DAILY 90 capsule 3    ONETOUCH ULTRA test strip USE TO TEST BLOOD SUGAR 1 TIME DAILY OR AS DIRECTED 100 strip 6    order for DME Equipment being ordered: arm blood pressure cuff 1 Device 0    PROAIR RESPICLICK 108 (90 Base) MCG/ACT inhaler INHALE 1 TO 2 PUFFS INTO THE LUNGS EVERY 4 HOURS AS NEEDED FOR SHORTNESS OF BREATH, DIFFICULTY BREATHING OR WHEEZING. 1 each 0         Review of Systems   Constitutional, HEENT, cardiovascular, pulmonary, GI, , musculoskeletal, neuro, skin, endocrine and psych systems are negative, except as otherwise noted.      Objective    BP (!) 150/86 (BP Location: Right arm, Patient Position: Sitting, Cuff Size: Adult Regular)   Pulse 89   Temp 97.5  F (36.4  C) (Tympanic)   Resp 16   Ht 1.73 m (5' 8.11\")   Wt " 75 kg (165 lb 4.8 oz)   SpO2 95%   BMI 25.05 kg/m    Body mass index is 25.05 kg/m .  Physical Exam                       The patient was counseled and encouraged to consider modifying their diet and eating habits. He was provided with information on recommended healthy diet options.  The patient was provided with written information regarding signs of hearing loss.  Information on urinary incontinence and treatment options given to patient.

## 2023-10-13 NOTE — RESULT ENCOUNTER NOTE
Kidney function, electrolytes are normal.  Blood sugar is quite elevated at 359, consistent with very elevated a1c

## 2023-10-13 NOTE — PATIENT INSTRUCTIONS
Diabetes  Blood work today  If A1c is above 8, would recommend Ozempic once weekly    Yeast infection  Start Fluconazole (diflucan) 150 mg x 1 dose; ok to call for refill if needed; if it recurs after that, would need face to face visit.      Health Care Maintenance  You are due for  RSV and Shingles vaccine(s)          Patient Education   Personalized Prevention Plan  You are due for the preventive services outlined below.  Your care team is available to assist you in scheduling these services.  If you have already completed any of these items, please share that information with your care team to update in your medical record.  Health Maintenance Due   Topic Date Due    Hepatitis B Vaccine (1 of 3 - Risk 3-dose series) Never done    RSV VACCINE 60+ (1 - 1-dose 60+ series) Never done    Zoster (Shingles) Vaccine (2 of 3) 02/23/2011    ANNUAL REVIEW OF HM ORDERS  09/29/2022    Cholesterol Lab  04/27/2023    A1C Lab  07/18/2023     Learning About Dietary Guidelines  What are the Dietary Guidelines for Americans?     Dietary Guidelines for Americans provide tips for eating well and staying healthy. This helps reduce the risk for long-term (chronic) diseases.  These guidelines recommend that you:  Eat and drink the right amount for you. The U.S[place] government's food guide is called MyPlate. It can help you make your own well-balanced eating plan.  Try to balance your eating with your activity. This helps you stay at a healthy weight.  Drink alcohol in moderation, if at all.  Limit foods high in salt, saturated fat, trans fat, and added sugar.  These guidelines are from the U.S. Department of Agriculture and the U.S. Department of Health and Human Services. They are updated every 5 years.  What is MyPlate?  MyPlate is the U.S. government's food guide. It can help you make your own well-balanced eating plan. A balanced eating plan means that you eat enough, but not too much, and that your food gives you the nutrients you  "need to stay healthy.  MyPlate focuses on eating plenty of whole grains, fruits, and vegetables, and on limiting fat and sugar. It is available online at www.ChooseMyPlate.gov.  How can you get started?  If you're trying to eat healthier, you can slowly change your eating habits over time. You don't have to make big changes all at once. Start by adding one or two healthy foods to your meals each day.  Grains  Choose whole-grain breads and cereals and whole-wheat pasta and whole-grain crackers.  Vegetables  Eat a variety of vegetables every day. They have lots of nutrients and are part of a heart-healthy diet.  Fruits  Eat a variety of fruits every day. Fruits contain lots of nutrients. Choose fresh fruit instead of fruit juice.  Protein foods  Choose fish and lean poultry more often. Eat red meat and fried meats less often. Dried beans, tofu, and nuts are also good sources of protein.  Dairy  Choose low-fat or fat-free products from this food group. If you have problems digesting milk, try eating cheese or yogurt instead.  Fats and oils  Limit fats and oils if you're trying to cut calories. Choose healthy fats when you cook. These include canola oil and olive oil.  Where can you learn more?  Go to https://www.[employer].net/patiented  Enter D676 in the search box to learn more about \"Learning About Dietary Guidelines.\"  Current as of: March 1, 2023               Content Version: 13.7    8185-1577 [employer].   Care instructions adapted under license by your healthcare professional. If you have questions about a medical condition or this instruction, always ask your healthcare professional. [employer] disclaims any warranty or liability for your use of this information.      Hearing Loss: Care Instructions  Overview     Hearing loss is a sudden or slow decrease in how well you hear. It can range from slight to profound. Permanent hearing loss can occur with aging. It also can happen when " you are exposed long-term to loud noise. Examples include listening to loud music, riding motorcycles, or being around other loud machines.  Hearing loss can affect your work and home life. It can make you feel lonely or depressed. You may feel that you have lost your independence. But hearing aids and other devices can help you hear better and feel connected to others.  Follow-up care is a key part of your treatment and safety. Be sure to make and go to all appointments, and call your doctor if you are having problems. It's also a good idea to know your test results and keep a list of the medicines you take.  How can you care for yourself at home?  Avoid loud noises whenever possible. This helps keep your hearing from getting worse.  Always wear hearing protection around loud noises.  Wear a hearing aid as directed.  A professional can help you pick a hearing aid that will work best for you.  You can also get hearing aids over the counter for mild to moderate hearing loss.  Have hearing tests as your doctor suggests. They can show whether your hearing has changed. Your hearing aid may need to be adjusted.  Use other devices as needed. These may include:  Telephone amplifiers and hearing aids that can connect to a television, stereo, radio, or microphone.  Devices that use lights or vibrations. These alert you to the doorbell, a ringing telephone, or a baby monitor.  Television closed-captioning. This shows the words at the bottom of the screen. Most new TVs can do this.  TTY (text telephone). This lets you type messages back and forth on the telephone instead of talking or listening. These devices are also called TDD. When messages are typed on the keyboard, they are sent over the phone line to a receiving TTY. The message is shown on a monitor.  Use text messaging, social media, and email if it is hard for you to communicate by telephone.  Try to learn a listening technique called speechreading. It is not  "lipreading. You pay attention to people's gestures, expressions, posture, and tone of voice. These clues can help you understand what a person is saying. Face the person you are talking to, and have them face you. Make sure the lighting is good. You need to see the other person's face clearly.  Think about counseling if you need help to adjust to your hearing loss.  When should you call for help?  Watch closely for changes in your health, and be sure to contact your doctor if:    You think your hearing is getting worse.     You have new symptoms, such as dizziness or nausea.   Where can you learn more?  Go to https://www.[employer].net/patiented  Enter R798 in the search box to learn more about \"Hearing Loss: Care Instructions.\"  Current as of: March 1, 2023               Content Version: 13.7    7197-1935 [email or web address].   Care instructions adapted under license by your healthcare professional. If you have questions about a medical condition or this instruction, always ask your healthcare professional. [email or web address] disclaims any warranty or liability for your use of this information.      Bladder Training: Care Instructions  Your Care Instructions     Bladder training is used to treat urge incontinence and stress incontinence. Urge incontinence means that the need to urinate comes on so fast that you can't get to a toilet in time. Stress incontinence means that you leak urine because of pressure on your bladder. For example, it may happen when you laugh, cough, or lift something heavy.  Bladder training can increase how long you can wait before you have to urinate. It can also help your bladder hold more urine. And it can give you better control over the urge to urinate.  It is important to remember that bladder training takes a few weeks to a few months to make a difference. You may not see results right away, but don't give up.  Follow-up care is a key part of your treatment and safety. Be " sure to make and go to all appointments, and call your doctor if you are having problems. It's also a good idea to know your test results and keep a list of the medicines you take.  How can you care for yourself at home?  Work with your doctor to come up with a bladder training program that is right for you. You may use one or more of the following methods.  Delayed urination  In the beginning, try to keep from urinating for 5 minutes after you first feel the need to go.  While you wait, take deep, slow breaths to relax. Kegel exercises can also help you delay the need to go to the bathroom.  After some practice, when you can easily wait 5 minutes to urinate, try to wait 10 minutes before you urinate.  Slowly increase the waiting period until you are able to control when you have to urinate.  Scheduled urination  Empty your bladder when you first wake up in the morning.  Schedule times throughout the day when you will urinate.  Start by going to the bathroom every hour, even if you don't need to go.  Slowly increase the time between trips to the bathroom.  When you have found a schedule that works well for you, keep doing it.  If you wake up during the night and have to urinate, do it. Apply your schedule to waking hours only.  Kegel exercises  These tighten and strengthen pelvic muscles, which can help you control the flow of urine. (If doing these exercises causes pain, stop doing them and talk with your doctor.) To do Kegel exercises:  Squeeze your muscles as if you were trying not to pass gas. Or squeeze your muscles as if you were stopping the flow of urine. Your belly, legs, and buttocks shouldn't move.  Hold the squeeze for 3 seconds, then relax for 5 to 10 seconds.  Start with 3 seconds, then add 1 second each week until you are able to squeeze for 10 seconds.  Repeat the exercise 10 times a session. Do 3 to 8 sessions a day.  When should you call for help?  Watch closely for changes in your health, and be  "sure to contact your doctor if:    Your incontinence is getting worse.     You do not get better as expected.   Where can you learn more?  Go to https://www.[employer].net/patiented  Enter V684 in the search box to learn more about \"Bladder Training: Care Instructions.\"  Current as of: March 1, 2023               Content Version: 13.7    7556-6042 [employer].   Care instructions adapted under license by your healthcare professional. If you have questions about a medical condition or this instruction, always ask your healthcare professional. [employer] disclaims any warranty or liability for your use of this information.         "

## 2023-10-19 ENCOUNTER — TELEPHONE (OUTPATIENT)
Dept: FAMILY MEDICINE | Facility: CLINIC | Age: 78
End: 2023-10-19

## 2023-10-19 ENCOUNTER — ALLIED HEALTH/NURSE VISIT (OUTPATIENT)
Dept: FAMILY MEDICINE | Facility: CLINIC | Age: 78
End: 2023-10-19
Payer: COMMERCIAL

## 2023-10-19 DIAGNOSIS — Z76.0 ENCOUNTER FOR MEDICATION REFILL: Primary | ICD-10-CM

## 2023-10-19 DIAGNOSIS — B37.2 YEAST INFECTION OF THE SKIN: Primary | ICD-10-CM

## 2023-10-19 PROCEDURE — 99207 PR NO CHARGE NURSE ONLY: CPT

## 2023-10-19 NOTE — TELEPHONE ENCOUNTER
Dr Hennessy  Pt walked in to RN visit. States diflucan did not work. Took on 10/13. Per office notes stated might need 2nd dose.   Pt will be waiting in clinic for refill or new med as he does not want to go home and come back.       Scotty Carvajal RN  \

## 2023-10-19 NOTE — TELEPHONE ENCOUNTER
New Medication Request        What medication are you requesting?: new med for yeast infection, med prescribed is not helping      Controlled Substance Agreement on file:   CSA -- Patient Level:    CSA: None found at the patient level.         Patient offered an appointment? No    Preferred Pharmacy:   [facility] [place], [place] - [address]  [address] [address]  Phone: [phone] Fax: [phone] Alternate Fax: [phone], [phone]      Okay to leave a detailed message?: Yes at Home number on file [phone] (home)

## 2023-10-19 NOTE — PROGRESS NOTES
Pt requesting refill or new med as diflucan did not work.   Sent provider message      Scotty Carvajal RN

## 2023-10-20 RX ORDER — FLUCONAZOLE 150 MG/1
150 TABLET ORAL
Qty: 3 TABLET | Refills: 0 | Status: SHIPPED | OUTPATIENT
Start: 2023-10-20 | End: 2023-10-27

## 2023-10-31 ENCOUNTER — TELEPHONE (OUTPATIENT)
Dept: FAMILY MEDICINE | Facility: CLINIC | Age: 78
End: 2023-10-31
Payer: COMMERCIAL

## 2023-10-31 NOTE — TELEPHONE ENCOUNTER
Patient Quality Outreach    Patient is due for the following:   Hypertension -  BP check    Next Steps:   Schedule a nurse only visit for BP    Type of outreach:    Sent letter.    Next Steps:  Reach out within 90 days via Letter.    Max number of attempts reached: Yes. Will try again in 90 days if patient still on fail list.    Questions for provider review:    None           Maggie Florez MA  Chart routed to .

## 2023-11-07 ENCOUNTER — OFFICE VISIT (OUTPATIENT)
Dept: FAMILY MEDICINE | Facility: CLINIC | Age: 78
End: 2023-11-07
Payer: COMMERCIAL

## 2023-11-07 VITALS
TEMPERATURE: 97.2 F | HEIGHT: 68 IN | WEIGHT: 165.2 LBS | OXYGEN SATURATION: 95 % | DIASTOLIC BLOOD PRESSURE: 80 MMHG | HEART RATE: 80 BPM | SYSTOLIC BLOOD PRESSURE: 130 MMHG | RESPIRATION RATE: 20 BRPM | BODY MASS INDEX: 25.04 KG/M2

## 2023-11-07 DIAGNOSIS — I10 ESSENTIAL HYPERTENSION: ICD-10-CM

## 2023-11-07 DIAGNOSIS — E11.9 TYPE 2 DIABETES MELLITUS WITHOUT COMPLICATION, WITHOUT LONG-TERM CURRENT USE OF INSULIN (H): ICD-10-CM

## 2023-11-07 DIAGNOSIS — L20.82 FLEXURAL ECZEMA: ICD-10-CM

## 2023-11-07 DIAGNOSIS — R22.9 SKIN NODULE: ICD-10-CM

## 2023-11-07 DIAGNOSIS — I25.10 CORONARY ARTERY DISEASE INVOLVING NATIVE CORONARY ARTERY OF NATIVE HEART WITHOUT ANGINA PECTORIS: ICD-10-CM

## 2023-11-07 DIAGNOSIS — J44.9 CHRONIC OBSTRUCTIVE PULMONARY DISEASE, UNSPECIFIED COPD TYPE (H): ICD-10-CM

## 2023-11-07 DIAGNOSIS — J45.50 SEVERE PERSISTENT ASTHMA WITHOUT COMPLICATION (H): Primary | ICD-10-CM

## 2023-11-07 PROCEDURE — 99214 OFFICE O/P EST MOD 30 MIN: CPT | Performed by: INTERNAL MEDICINE

## 2023-11-07 RX ORDER — BUDESONIDE AND FORMOTEROL FUMARATE DIHYDRATE 80; 4.5 UG/1; UG/1
2 AEROSOL RESPIRATORY (INHALATION) 2 TIMES DAILY
Qty: 10.2 G | Refills: 11 | Status: SHIPPED | OUTPATIENT
Start: 2023-11-07

## 2023-11-07 RX ORDER — LOSARTAN POTASSIUM AND HYDROCHLOROTHIAZIDE 12.5; 5 MG/1; MG/1
2 TABLET ORAL DAILY
Qty: 90 TABLET | Refills: 3 | Status: SHIPPED | OUTPATIENT
Start: 2023-11-07 | End: 2024-02-13

## 2023-11-07 RX ORDER — ALBUTEROL SULFATE 0.83 MG/ML
2.5 SOLUTION RESPIRATORY (INHALATION) EVERY 6 HOURS PRN
Qty: 30 ML | Refills: 11 | Status: SHIPPED | OUTPATIENT
Start: 2023-11-07

## 2023-11-07 RX ORDER — ALBUTEROL SULFATE 90 UG/1
1-2 AEROSOL, METERED RESPIRATORY (INHALATION) EVERY 4 HOURS PRN
Qty: 8.5 G | Refills: 10 | Status: SHIPPED | OUTPATIENT
Start: 2023-11-07

## 2023-11-07 RX ORDER — TRIAMCINOLONE ACETONIDE 1 MG/G
CREAM TOPICAL 2 TIMES DAILY
Qty: 15 G | Refills: 1 | Status: SHIPPED | OUTPATIENT
Start: 2023-11-07

## 2023-11-07 ASSESSMENT — ASTHMA QUESTIONNAIRES: ACT_TOTALSCORE: 15

## 2023-11-07 ASSESSMENT — PAIN SCALES - GENERAL: PAINLEVEL: NO PAIN (0)

## 2023-11-07 NOTE — LETTER
My Asthma Action Plan    Name: Raul Wilhelm   YOB: 1945  Date: 11/7/2023   My doctor: Sarita Hennessy, DO   My clinic: [facility]        My Control Medicine: Budesonide + formoterol (Symbicort HFA) -  80/4.5 mcg 1 puff twice daily  My Rescue Medicine: Albuterol (Proair/Ventolin/Proventil HFA) 2-4 puffs EVERY 4 HOURS as needed. Use a spacer if recommended by your provider.   My Asthma Severity:   Severe Persistent  Know your asthma triggers:   None            GREEN ZONE   Good Control  I feel good  No cough or wheeze  Can work, sleep and play without asthma symptoms       Take your asthma control medicine every day.     If exercise triggers your asthma, take your rescue medication  15 minutes before exercise or sports, and  During exercise if you have asthma symptoms  Spacer to use with inhaler: If you have a spacer, make sure to use it with your inhaler             YELLOW ZONE Getting Worse  I have ANY of these:  I do not feel good  Cough or wheeze  Chest feels tight  Wake up at night   Keep taking your Green Zone medications  Start taking your rescue medicine:  every 20 minutes for up to 1 hour. Then every 4 hours for 24-48 hours.  If you stay in the Yellow Zone for more than 12-24 hours, contact your doctor.  If you do not return to the Green Zone in 12-24 hours or you get worse, start taking your oral steroid medicine if prescribed by your provider.           RED ZONE Medical Alert - Get Help  I have ANY of these:  I feel awful  Medicine is not helping  Breathing getting harder  Trouble walking or talking  Nose opens wide to breathe       Take your rescue medicine NOW  If your provider has prescribed an oral steroid medicine, start taking it NOW  Call your doctor NOW  If you are still in the Red Zone after 20 minutes and you have not reached your doctor:  Take your rescue medicine again and  Call 911 or go to the emergency room right away    See your regular doctor  within 2 weeks of an Emergency Room or Urgent Care visit for follow-up treatment.          Annual Reminders:  Meet with Asthma Educator,  Flu Shot in the Fall, consider Pneumonia Vaccination for patients with asthma (aged 19 and older).    Pharmacy:    [place], [place] - [address] [facility] [place] - [place], [place] - [address] [address]    Electronically signed by Sarita Hennessy, DO   Date: 11/07/23                      Asthma Triggers  How To Control Things That Make Your Asthma Worse    Triggers are things that make your asthma worse.  Look at the list below to help you find your triggers and what you can do about them.  You can help prevent asthma flare-ups by staying away from your triggers.      Trigger                                                          What you can do   Cigarette Smoke  Tobacco smoke can make asthma worse. Do not allow smoking in your home, car or around you.  Be sure no one smokes at a child s day care or school.  If you smoke, ask your health care provider for ways to help you quit.  Ask family members to quit too.  Ask your health care provider for a referral to Quit Plan to help you quit smoking, or call 3-284-373-PLAN.     Colds, Flu, Bronchitis  These are common triggers of asthma. Wash your hands often.  Don t touch your eyes, nose or mouth.  Get a flu shot every year.     Dust Mites  These are tiny bugs that live in cloth or carpet. They are too small to see. Wash sheets and blankets in hot water every week.   Encase pillows and mattress in dust mite proof covers.  Avoid having carpet if you can. If you have carpet, vacuum weekly.   Use a dust mask and HEPA vacuum.   Pollen and Outdoor Mold  Some people are allergic to trees, grass, or weed pollen, or molds. Try to keep your windows closed.  Limit time out doors when pollen count is high.   Ask you health care provider about taking medicine during allergy season.     Animal  Dander  Some people are allergic to skin flakes, urine or saliva from pets with fur or feathers. Keep pets with fur or feathers out of your home.    If you can t keep the pet outdoors, then keep the pet out of your bedroom.  Keep the bedroom door closed.  Keep pets off cloth furniture and away from stuffed toys.     Mice, Rats, and Cockroaches   Some people are allergic to the waste from these pests.   Cover food and garbage.  Clean up spills and food crumbs.  Store grease in the refrigerator.   Keep food out of the bedroom.   Indoor Mold  This can be a trigger if your home has high moisture. Fix leaking faucets, pipes, or other sources of water.   Clean moldy surfaces.  Dehumidify basement if it is damp and smelly.   Smoke, Strong Odors, and Sprays  These can reduce air quality. Stay away from strong odors and sprays, such as perfume, powder, hair spray, paints, smoke incense, paint, cleaning products, candles and new carpet.   Exercise or Sports  Some people with asthma have this trigger. Be active!  Ask your doctor about taking medicine before sports or exercise to prevent symptoms.    Warm up for 5-10 minutes before and after sports or exercise.     Other Triggers of Asthma  Cold air:  Cover your nose and mouth with a scarf.  Sometimes laughing or crying can be a trigger.  Some medicines and food can trigger asthma.

## 2023-11-07 NOTE — PATIENT INSTRUCTIONS
Hypertension  Continue carvedilol (Coreg)  Increase losartan/hctz from 1 pill to 2 pills once daily    Diabetes  1. Continue ozempic 0.5 mg weekly until follow-up for diabetes in Jan; get A1c prior to visit  2. Pharmacy should give you a box of needles to go with Ozempic     Asthma  Albuterol is the 'rescue' inhaler - to use as needed for cough, shortness of breath,wheezing  Recommend 'controller inhaler' - Symbicort twice daily - to keep asthma symptoms under control    Forehead nodule  Probable basal cell cancer, see Derm    Ankle rash  Eczema, use the triamcinolone cream twice daily for 1-2 weeks, not longer;  longer use can cause skin thinning

## 2023-11-07 NOTE — PROGRESS NOTES
Assessment & Plan   Problem List Items Addressed This Visit       COPD (chronic obstructive pulmonary disease) (H)    Relevant Medications    albuterol (PROVENTIL) (2.5 MG/3ML) 0.083% neb solution    albuterol (PROAIR HFA/PROVENTIL HFA/VENTOLIN HFA) 108 (90 Base) MCG/ACT inhaler    budesonide-formoterol (SYMBICORT) 80-4.5 MCG/ACT Inhaler    Coronary artery disease involving native coronary artery of native heart without angina pectoris    Relevant Medications    losartan-hydrochlorothiazide (HYZAAR) 50-12.5 MG tablet    Eczema    Relevant Medications    albuterol (PROVENTIL) (2.5 MG/3ML) 0.083% neb solution    albuterol (PROAIR HFA/PROVENTIL HFA/VENTOLIN HFA) 108 (90 Base) MCG/ACT inhaler    budesonide-formoterol (SYMBICORT) 80-4.5 MCG/ACT Inhaler    triamcinolone (KENALOG) 0.1 % external cream    Essential hypertension    Relevant Medications    losartan-hydrochlorothiazide (HYZAAR) 50-12.5 MG tablet    Severe persistent asthma without complication (H28) - Primary    Relevant Medications    albuterol (PROVENTIL) (2.5 MG/3ML) 0.083% neb solution    albuterol (PROAIR HFA/PROVENTIL HFA/VENTOLIN HFA) 108 (90 Base) MCG/ACT inhaler    budesonide-formoterol (SYMBICORT) 80-4.5 MCG/ACT Inhaler    Type 2 diabetes mellitus without complication, without long-term current use of insulin (H)     Other Visit Diagnoses       Skin nodule        Relevant Medications    triamcinolone (KENALOG) 0.1 % external cream    Other Relevant Orders    Adult Dermatology  Referral                 Patient Instructions   Hypertension  Continue carvedilol (Coreg)  Increase losartan/hctz from 1 pill to 2 pills once daily    Diabetes  1. Continue ozempic 0.5 mg weekly until follow-up for diabetes in Jan; get A1c prior to visit  2. Pharmacy should give you a box of needles to go with Ozempic     Asthma  Albuterol is the 'rescue' inhaler - to use as needed for cough, shortness of breath,wheezing  Recommend 'controller inhaler' - Symbicort  twice daily - to keep asthma symptoms under control    Forehead nodule  Probable basal cell cancer, see Derm    Ankle rash  Eczema, use the triamcinolone cream twice daily for 1-2 weeks, not longer;  longer use can cause skin thinning    Sarita Hennessy DO  [facility]    Jonathon Carter is a 77 year old, presenting for the following health issues:  Asthma and Derm Problem        11/7/2023     7:58 AM   Additional Questions   Roomed by ranjan beckham   Accompanied by self         11/7/2023     7:58 AM   Patient Reported Additional Medications   Patient reports taking the following new medications none       HPI     Chief Complaint   Patient presents with    Asthma    Derm Problem         Asthma Follow-Up    Was ACT completed today?  Yes        4/18/2023     3:32 PM   ACT Total Scores   ACT TOTAL SCORE (Goal Greater than or Equal to 20) 16   In the past 12 months, how many times did you visit the emergency room for your asthma without being admitted to the hospital? 0   In the past 12 months, how many times were you hospitalized overnight because of your asthma? 0        How many days per week do you miss taking your asthma controller medication?  I do not have an asthma controller medication  Please describe any recent triggers for your asthma: None  Have you had any Emergency Room Visits, Urgent Care Visits, or Hospital Admissions since your last office visit?  No  He has been prescribed controller inhalers, but cost has been a concern  He has only been using albuterol, using BID            How many servings of fruits and vegetables do you eat daily?  2-3  On average, how many sweetened beverages do you drink each day (Examples: soda, juice, sweet tea, etc.  Do NOT count diet or artificially sweetened beverages)?   0  How many days per week do you exercise enough to make your heart beat faster? 7  How many minutes a day do you exercise enough to make your heart beat faster? 10 - 19  How  many days per week do you miss taking your medication? 0      Concern - rash   Onset: 6 m  Description: arm, temple right side  Intensity: mild  Progression of Symptoms:  intermittent  Accompanying Signs & Symptoms: comes and goes skin tags, rash  Previous history of similar problem: have had this for 6 months, red in color, itches    Precipitating factors:        Worsened by: none  Alleviating factors:        Improved by: bath with baking soda   Therapies tried and outcome: bath with baking soda      Right temple lesion    Hypertension  --is noting blood pressure running high at home  --[id number]  --no NSAID of benadryl use  --no added salt in diet.    Current Outpatient Medications   Medication Sig Dispense Refill    albuterol (PROAIR HFA/PROVENTIL HFA/VENTOLIN HFA) 108 (90 Base) MCG/ACT inhaler INHALE 1-2 PUFFS INTO THE LUNGS EVERY 4 HOURS AS NEEDED FOR SHORTNESS OF BREATH / DYSPNEA OR WHEEZING 8.5 g 10    albuterol (PROVENTIL) (2.5 MG/3ML) 0.083% neb solution Take 1 vial (2.5 mg) by nebulization every 6 hours as needed for shortness of breath or wheezing 30 mL 11    Alcohol Swabs (B-D SINGLE USE SWABS REGULAR) PADS USE TO SWAB AREA OF INJECTION / JENISE AS DIRECTED 100 each 3    aspirin (ASA) 81 MG EC tablet Take 1 tablet (81 mg) by mouth daily      atorvastatin (LIPITOR) 40 MG tablet TAKE ONE TABLET BY MOUTH ONCE DAILY 90 tablet 0    blood glucose monitoring (NO BRAND SPECIFIED) meter device kit Use to test blood sugar 1 times daily or as directed.  Blood Glucose Monitor Brands: per insurance. 1 kit 0    carvedilol (COREG) 6.25 MG tablet TAKE ONE TABLET BY MOUTH TWICE A  tablet 1    fexofenadine (ALLEGRA) 180 MG tablet TAKE 1 TABLET (180 MG) BY MOUTH DAILY 90 tablet 3    glipiZIDE (GLUCOTROL XL) 10 MG 24 hr tablet TAKE 1 TABLET (10 MG) BY MOUTH DAILY 90 tablet 3    Lancets (ONETOUCH DELICA PLUS [id number]) MISC USE AS DIRECTED WITH LANCING DEVICE TO TEST ONCE DAILY 100 each 6     "losartan-hydrochlorothiazide (HYZAAR) 50-12.5 MG tablet TAKE ONE TABLET BY MOUTH ONCE DAILY 90 tablet 3    montelukast (SINGULAIR) 10 MG tablet TAKE ONE TABLET BY MOUTH AT BEDTIME 90 tablet 1    nitroGLYcerin (NITROSTAT) 0.4 MG sublingual tablet Place 1 tablet (0.4 mg) under the tongue every 5 minutes as needed for chest pain If you still have symptoms after 3 doses call 911. 25 tablet 1    omeprazole (PRILOSEC) 20 MG DR capsule TAKE 1 CAPSULE BY MOUTH DAILY 90 capsule 3    ONETOUCH ULTRA test strip USE TO TEST BLOOD SUGAR 1 TIME DAILY OR AS DIRECTED 100 strip 6    order for DME Equipment being ordered: arm blood pressure cuff 1 Device 0    semaglutide (OZEMPIC) 2 MG/3ML pen Inject 0.25 mg Subcutaneous every 7 days for 28 days, THEN 0.5 mg every 7 days for 360 days. 9 mL 1    budesonide-formoterol (SYMBICORT) 80-4.5 MCG/ACT Inhaler Inhale 2 puffs into the lungs 2 times daily 10.2 g 0    fluticasone (FLOVENT HFA) 44 MCG/ACT inhaler Inhale 1 puff into the lungs 2 times daily 10.6 g 1    PROAIR RESPICLICK 108 (90 Base) MCG/ACT inhaler INHALE 1 TO 2 PUFFS INTO THE LUNGS EVERY 4 HOURS AS NEEDED FOR SHORTNESS OF BREATH, DIFFICULTY BREATHING OR WHEEZING. 1 each 0         Review of Systems   Constitutional, HEENT, cardiovascular, pulmonary, gi and gu systems are negative, except as otherwise noted.      Objective    /80 (BP Location: Right arm, Patient Position: Sitting, Cuff Size: Adult Regular)   Pulse 80   Temp 97.2  F (36.2  C) (Temporal)   Resp 20   Ht 1.73 m (5' 8.11\")   Wt 74.9 kg (165 lb 3.2 oz)   SpO2 95%   BMI 25.04 kg/m    Body mass index is 25.04 kg/m .  Physical Exam   GENERAL APPEARANCE: alert and no distress  RESP: lungs clear to auscultation - no rales, rhonchi or wheezes  CV: regular rates and rhythm, normal S1 S2, no S3 or S4, and no murmur, click or rub  SKIN: pearly papule over right temple.  Excoriated erythematous flaky rash over left lateral malleolus                      "

## 2023-11-10 ENCOUNTER — TELEPHONE (OUTPATIENT)
Dept: DERMATOLOGY | Facility: CLINIC | Age: 78
End: 2023-11-10
Payer: COMMERCIAL

## 2023-11-10 NOTE — TELEPHONE ENCOUNTER
Called patients- appointment moved.  Thank you,    Elke BARTLETTRN BSN  [facility]- [phone]

## 2023-11-10 NOTE — TELEPHONE ENCOUNTER
[facility] Call Center    Phone Message    May a detailed message be left on voicemail: yes     Reason for Call: Appointment Intake      Referring Provider Name:   Sarita Hennessy,  in [facility]    Diagnosis and/or Symptoms:   Skin nodule; Skin Lesion; probable basal cell      Referral Order in Epic  Records in Epic  No outside Records      Pt said he lives 15 mins away and can come in anytime you get a cancellation  Pt does not have MyChart so requests call with any openings you may get  Pt thinks he needs Biopsy soon to see if this is Skin Cancer as referring provider thought maybe it could be    Thanks for reaching out if you can ever move him up - he appreciates it too      Action Taken: Message routed to:  Other: [facility]    Travel Screening: Not Applicable

## 2023-11-17 NOTE — TELEPHONE ENCOUNTER
Raul has decided not to continue to use Ozempic due to the high cost.  He was wondering if there is anything else you would like him to take instead. The Ozepmic is covered by insurance but it is $94 per month.    Thanks,    Erasmo Zhong, Pharm D  [facility]  [phone]

## 2023-11-20 NOTE — TELEPHONE ENCOUNTER
The only Cheaper alternative is lantus 15 units once daily; I was unable to removec ozempic from his med list for some reason

## 2023-11-28 ENCOUNTER — OFFICE VISIT (OUTPATIENT)
Dept: DERMATOLOGY | Facility: CLINIC | Age: 78
End: 2023-11-28
Payer: COMMERCIAL

## 2023-11-28 DIAGNOSIS — D18.01 ANGIOMA OF SKIN: ICD-10-CM

## 2023-11-28 DIAGNOSIS — L30.8 ASTEATOTIC DERMATITIS: ICD-10-CM

## 2023-11-28 DIAGNOSIS — L82.0 INFLAMED SEBORRHEIC KERATOSIS: ICD-10-CM

## 2023-11-28 DIAGNOSIS — D23.9 DERMATOFIBROMA: ICD-10-CM

## 2023-11-28 DIAGNOSIS — L81.4 LENTIGO: ICD-10-CM

## 2023-11-28 DIAGNOSIS — D23.9 DERMAL NEVUS: ICD-10-CM

## 2023-11-28 DIAGNOSIS — L82.1 SEBORRHEIC KERATOSES: Primary | ICD-10-CM

## 2023-11-28 PROCEDURE — 17110 DESTRUCTION B9 LES UP TO 14: CPT | Performed by: DERMATOLOGY

## 2023-11-28 PROCEDURE — 99203 OFFICE O/P NEW LOW 30 MIN: CPT | Mod: 25 | Performed by: DERMATOLOGY

## 2023-11-28 RX ORDER — FLUOCINONIDE 0.5 MG/G
CREAM TOPICAL 2 TIMES DAILY
Qty: 120 G | Refills: 6 | Status: SHIPPED | OUTPATIENT
Start: 2023-11-28

## 2023-11-28 NOTE — PATIENT INSTRUCTIONS
WOUND CARE INSTRUCTIONS   FOR CRYOSURGERY   This area treated with liquid nitrogen should form a blister (areas treated may or may not blister-skin may just turn dark and slough off). You do not need to bandage the area unless a blister forms and breaks (which may be a few days). When the blister breaks, begin daily dressing changes as follows:  1) Clean and dry the area with tap water using clean Q-tip or sterile gauze pad.   2) Apply Polysporin ointment or Bacitracin ointment over entire wound. Do NOT use Neosporin ointment.   3) Cover the wound with a band-aid or sterile non-stick gauze pad and micropore paper tape.   REPEAT THESE INSTRUCTIONS AT LEAST ONCE A DAY UNTIL THE WOUND HAS COMPLETELY HEALED.   It is an old wives tale that a wound heals better when it is exposed to air and allowed to dry out. The wound will heal faster with a better cosmetic result if it is kept moist with ointment and covered with a bandage.   Do not let the wound dry out.   IMPORTANT INFORMATION ON REVERSE SIDE   Supplies Needed:   *Cotton tipped applicators (Q-tips)   *Polysporin ointment or Bacitracin ointment (NOT NEOSPORIN)   *Band-aids, or non stick gauze pads and micropore paper tape   PATIENT INFORMATION   During the healing process you will notice a number of changes. All wounds develop a small halo of redness surrounding the wound. This means healing is occurring. Severe itching with extensive redness usually indicates sensitivity to the ointment or bandage tape used to dress the wound. You should call our office if this develops.   Swelling and/or discoloration around your surgical site is common, particularly when performed around the eye.   All wounds normally drain. The larger the wound the more drainage there will be. After 7-10 days, you will notice the wound beginning to shrink and new skin will begin to grow. The wound is healed when you can see skin has formed over the entire area. A healed wound has a healthy, shiny  look to the surface and is red to dark pink in color to normalize. Wounds may take approximately 4-6 weeks to heal. Larger wounds may take 6-8 weeks. After the wound is healed you may discontinue dressing changes.   You may experience a sensation of tightness as your wound heals. This is normal and will gradually subside.   Your healed wound may be sensitive to temperature changes. This sensitivity improves with time, but if you re having a lot of discomfort, try to avoid temperature extremes.   Patients frequently experience itching after their wound appears to have healed because of the continue healing under the skin. Plain Vaseline will help relieve the itching.      Morning:  Apply fluocinonide cream to affected areas on the body, taper off with improvement. Do NOT use fluocinonide cream on the face, it is too strong. Please let us know if you need something for your face.       Evening:  Apply fluocinonide cream to affected areas on the body, taper off with improvement      Proper skin care from Dr. Montgomery- [facility]                  Eliminate harsh soaps, i.e. Dial, Zest, Urdu Spring;             Use mild soaps such as Cetaphil or Dove Sensitive Skin             Avoid hot or cold showers             After showering, lightly dry off.              Aggressive use of a moisturizer (including Vanicream, Cetaphil, Aquaphor or Cerave)   We recommend using a tub that needs to be scooped out, not a pump. This has more of an oil base. It will hold moisture in your skin much better than a water base moisturizer. The ones recommended are non- pore clogging.                  If you have any questions call [phone] and follow the prompts to Dr. Montgomery's office.

## 2023-11-28 NOTE — LETTER
11/28/2023         RE: Raul Wilhelm  [address]  [place] [address]        Dear Colleague,    Thank you for referring your patient, Raul Wilhelm, to the [facility]. Please see a copy of my visit note below.    Raul Wilhelm , a 77 year old year old male patient, I was asked to see by Dr. Hennessy for spot on skin.  Patient has no other skin complaints today.  Remainder of the HPI, Meds, PMH, Allergies, FH, and SH was reviewed in chart.      Past Medical History:   Diagnosis Date     Chronic airway obstruction, not elsewhere classified      Other and unspecified hyperlipidemia        Past Surgical History:   Procedure Laterality Date     ARTHROPLASTY KNEE Left 5/28/2020    Procedure: Left Total Knee Arthroplasty;  Surgeon: Sanjay Downey MD;  Location: [facility]     ARTHROPLASTY KNEE Right 11/2/2020    Procedure: ARTHROPLASTY, KNEE, TOTAL;  Surgeon: Sanjay Downey MD;  Location: [facility]     ENT SURGERY  as a child    tonsillectomy        Family History   Problem Relation Age of Onset     Diabetes Mother      Heart Disease Mother      C.A.D. Mother      Cancer Father      Hypertension Father      Cerebrovascular Disease Paternal Grandfather      Asthma Sister      Breast Cancer No family hx of      Cancer - colorectal No family hx of      Prostate Cancer No family hx of        Social History     Socioeconomic History     Marital status:      Spouse name: Not on file     Number of children: Not on file     Years of education: Not on file     Highest education level: Not on file   Occupational History     Not on file   Tobacco Use     Smoking status: Never     Smokeless tobacco: Never   Vaping Use     Vaping Use: Never used   Substance and Sexual Activity     Alcohol use: Yes     Comment: Rare     Drug use: No     Sexual activity: Yes     Partners: Female   Other Topics Concern      Service Not Asked     Blood Transfusions Not Asked     Caffeine  Concern Not Asked     Occupational Exposure Yes     Comment: worked for years as  exposed to carbon dust and other chemicals     Hobby Hazards Yes     Comment: Raises pigeons     Sleep Concern Not Asked     Stress Concern Not Asked     Weight Concern Not Asked     Special Diet Not Asked     Back Care Not Asked     Exercise Not Asked     Bike Helmet Not Asked     Seat Belt Not Asked     Self-Exams Not Asked     Parent/sibling w/ CABG, MI or angioplasty before 65F 55M? No   Social History Narrative     Not on file     Social Determinants of Health     Financial Resource Strain: Not on file   Food Insecurity: Not on file   Transportation Needs: Not on file   Physical Activity: Not on file   Stress: Not on file   Social Connections: Not on file   Interpersonal Safety: Low Risk  (10/13/2023)    Interpersonal Safety      Do you feel physically and emotionally safe where you currently live?: Yes      Within the past 12 months, have you been hit, slapped, kicked or otherwise physically hurt by someone?: No      Within the past 12 months, have you been humiliated or emotionally abused in other ways by your partner or ex-partner?: No   Housing Stability: Not on file       Outpatient Encounter Medications as of 11/28/2023   Medication Sig Dispense Refill     albuterol (PROAIR HFA/PROVENTIL HFA/VENTOLIN HFA) 108 (90 Base) MCG/ACT inhaler Inhale 1-2 puffs into the lungs every 4 hours as needed for shortness of breath or wheezing 8.5 g 10     albuterol (PROVENTIL) (2.5 MG/3ML) 0.083% neb solution Take 1 vial (2.5 mg) by nebulization every 6 hours as needed for shortness of breath or wheezing 30 mL 11     Alcohol Swabs (B-D SINGLE USE SWABS REGULAR) PADS USE TO SWAB AREA OF INJECTION / JENISE AS DIRECTED 100 each 3     aspirin (ASA) 81 MG EC tablet Take 1 tablet (81 mg) by mouth daily       atorvastatin (LIPITOR) 40 MG tablet TAKE ONE TABLET BY MOUTH ONCE DAILY 90 tablet 0     blood glucose monitoring (NO BRAND SPECIFIED)  meter device kit Use to test blood sugar 1 times daily or as directed.  Blood Glucose Monitor Brands: per insurance. 1 kit 0     budesonide-formoterol (SYMBICORT) 80-4.5 MCG/ACT Inhaler Inhale 2 puffs into the lungs 2 times daily 10.2 g 11     carvedilol (COREG) 6.25 MG tablet TAKE ONE TABLET BY MOUTH TWICE A  tablet 1     fexofenadine (ALLEGRA) 180 MG tablet TAKE 1 TABLET (180 MG) BY MOUTH DAILY 90 tablet 3     glipiZIDE (GLUCOTROL XL) 10 MG 24 hr tablet TAKE 1 TABLET (10 MG) BY MOUTH DAILY 90 tablet 3     insulin glargine (LANTUS PEN) 100 UNIT/ML pen Inject 15 Units Subcutaneous at bedtime 15 mL 1     Lancets (ONETOUCH DELICA PLUS [id number]) MISC USE AS DIRECTED WITH LANCING DEVICE TO TEST ONCE DAILY 100 each 6     losartan-hydrochlorothiazide (HYZAAR) 50-12.5 MG tablet Take 2 tablets by mouth daily 90 tablet 3     montelukast (SINGULAIR) 10 MG tablet TAKE ONE TABLET BY MOUTH AT BEDTIME 90 tablet 1     nitroGLYcerin (NITROSTAT) 0.4 MG sublingual tablet Place 1 tablet (0.4 mg) under the tongue every 5 minutes as needed for chest pain If you still have symptoms after 3 doses call 911. 25 tablet 1     omeprazole (PRILOSEC) 20 MG DR capsule TAKE 1 CAPSULE BY MOUTH DAILY 90 capsule 3     ONETOUCH ULTRA test strip USE TO TEST BLOOD SUGAR 1 TIME DAILY OR AS DIRECTED 100 strip 6     order for DME Equipment being ordered: arm blood pressure cuff 1 Device 0     semaglutide (OZEMPIC) 2 MG/3ML pen Inject 0.25 mg Subcutaneous every 7 days for 28 days, THEN 0.5 mg every 7 days for 360 days. 9 mL 1     triamcinolone (KENALOG) 0.1 % external cream Apply topically 2 times daily Left ankle 15 g 1     No facility-administered encounter medications on file as of 11/28/2023.             Review Of Systems  Skin: As above  Eyes: negative  Ears/Nose/Throat: negative  Respiratory: No shortness of breath, dyspnea on exertion, cough, or hemoptysis  Cardiovascular: negative  Gastrointestinal: negative  Genitourinary:  negative  Musculoskeletal: negative  Neurologic: negative  Psychiatric: negative  Hematologic/Lymphatic/Immunologic: negative  Endocrine: negative      O:   NAD, WDWN, Alert & Oriented, Mood & Affect wnl, Vitals stable   General appearance funmi ii   Vitals stable   Alert, oriented and in no acute distress     R temple inflamed seborrheic keratosis   L thigh firm papule  Asteatotic derm on legs   Stuck on papules and brown macules on trunk and ext    Red papules on trunk   Flesh colored papules on trunk          Eyes: Conjunctivae/lids:Normal     ENT: Lips, buccal mucosa, tongue: normal    MSK:Normal    Cardiovascular: peripheral edema none    Pulm: Breathing Normal    Lymph Nodes: No Head and Neck Lymphadenopathy     Neuro/Psych: Orientation:Normal; Mood/Affect:Normal      A/P:  1. Seborrheic keratosis, lentigo, angioma, dermal nevus, dermatofibroma  2. R temple inflamed seborrheic keratosis   LN2:  Treated with LN2 for 5s for 1-2 cycles. Warned risks of blistering, pain, pigment change, scarring, and incomplete resolution.  Advised patient to return if lesions do not completely resolve.  Wound care sheet given.  3. Asteatotic derm  Lidex prn   Skin care discussed with patient   It was a pleasure speaking to Raul Wilhelm today.  Previous clinic  notes and pertinent laboratory tests were reviewed prior to Raul Wilhelm's visit.  Nature and genetics of benign skin lesions dicussed with patient.  Signs and Symptoms of skin cancer discussed with patient.  Patient encouraged to perform monthly skin exams.  UV precautions reviewed with patient.  Skin care regimen reviewed with patient: Eliminate harsh soaps, i.e. Dial, zest, irsih spring; Mild soaps such as Cetaphil or Dove sensitive skin, avoid hot or cold showers, aggressive use of emollients including vanicream, cetaphil or cerave discussed with patient.    Return to clinic 12 months      Again, thank you for allowing me to participate in the care of your  patient.        Sincerely,        Vinod Montgomery MD

## 2023-11-28 NOTE — PROGRESS NOTES
Raul Wilhelm , a 77 year old year old male patient, I was asked to see by Dr. Hennessy for spot on skin.  Patient has no other skin complaints today.  Remainder of the HPI, Meds, PMH, Allergies, FH, and SH was reviewed in chart.      Past Medical History:   Diagnosis Date    Chronic airway obstruction, not elsewhere classified     Other and unspecified hyperlipidemia        Past Surgical History:   Procedure Laterality Date    ARTHROPLASTY KNEE Left 5/28/2020    Procedure: Left Total Knee Arthroplasty;  Surgeon: Sanjay Downey MD;  Location: [facility]    ARTHROPLASTY KNEE Right 11/2/2020    Procedure: ARTHROPLASTY, KNEE, TOTAL;  Surgeon: Sanjay Downey MD;  Location: [facility]    ENT SURGERY  as a child    tonsillectomy        Family History   Problem Relation Age of Onset    Diabetes Mother     Heart Disease Mother     C.A.D. Mother     Cancer Father     Hypertension Father     Cerebrovascular Disease Paternal Grandfather     Asthma Sister     Breast Cancer No family hx of     Cancer - colorectal No family hx of     Prostate Cancer No family hx of        Social History     Socioeconomic History    Marital status:      Spouse name: Not on file    Number of children: Not on file    Years of education: Not on file    Highest education level: Not on file   Occupational History    Not on file   Tobacco Use    Smoking status: Never    Smokeless tobacco: Never   Vaping Use    Vaping Use: Never used   Substance and Sexual Activity    Alcohol use: Yes     Comment: Rare    Drug use: No    Sexual activity: Yes     Partners: Female   Other Topics Concern     Service Not Asked    Blood Transfusions Not Asked    Caffeine Concern Not Asked    Occupational Exposure Yes     Comment: worked for years as  exposed to carbon dust and other chemicals    Hobby Hazards Yes     Comment: Raises pigeons    Sleep Concern Not Asked    Stress Concern Not Asked    Weight Concern Not Asked    Special Diet Not  Asked    Back Care Not Asked    Exercise Not Asked    Bike Helmet Not Asked    Seat Belt Not Asked    Self-Exams Not Asked    Parent/sibling w/ CABG, MI or angioplasty before 65F 55M? No   Social History Narrative    Not on file     Social Determinants of Health     Financial Resource Strain: Not on file   Food Insecurity: Not on file   Transportation Needs: Not on file   Physical Activity: Not on file   Stress: Not on file   Social Connections: Not on file   Interpersonal Safety: Low Risk  (10/13/2023)    Interpersonal Safety     Do you feel physically and emotionally safe where you currently live?: Yes     Within the past 12 months, have you been hit, slapped, kicked or otherwise physically hurt by someone?: No     Within the past 12 months, have you been humiliated or emotionally abused in other ways by your partner or ex-partner?: No   Housing Stability: Not on file       Outpatient Encounter Medications as of 11/28/2023   Medication Sig Dispense Refill    albuterol (PROAIR HFA/PROVENTIL HFA/VENTOLIN HFA) 108 (90 Base) MCG/ACT inhaler Inhale 1-2 puffs into the lungs every 4 hours as needed for shortness of breath or wheezing 8.5 g 10    albuterol (PROVENTIL) (2.5 MG/3ML) 0.083% neb solution Take 1 vial (2.5 mg) by nebulization every 6 hours as needed for shortness of breath or wheezing 30 mL 11    Alcohol Swabs (B-D SINGLE USE SWABS REGULAR) PADS USE TO SWAB AREA OF INJECTION / JENISE AS DIRECTED 100 each 3    aspirin (ASA) 81 MG EC tablet Take 1 tablet (81 mg) by mouth daily      atorvastatin (LIPITOR) 40 MG tablet TAKE ONE TABLET BY MOUTH ONCE DAILY 90 tablet 0    blood glucose monitoring (NO BRAND SPECIFIED) meter device kit Use to test blood sugar 1 times daily or as directed.  Blood Glucose Monitor Brands: per insurance. 1 kit 0    budesonide-formoterol (SYMBICORT) 80-4.5 MCG/ACT Inhaler Inhale 2 puffs into the lungs 2 times daily 10.2 g 11    carvedilol (COREG) 6.25 MG tablet TAKE ONE TABLET BY MOUTH TWICE  A  tablet 1    fexofenadine (ALLEGRA) 180 MG tablet TAKE 1 TABLET (180 MG) BY MOUTH DAILY 90 tablet 3    glipiZIDE (GLUCOTROL XL) 10 MG 24 hr tablet TAKE 1 TABLET (10 MG) BY MOUTH DAILY 90 tablet 3    insulin glargine (LANTUS PEN) 100 UNIT/ML pen Inject 15 Units Subcutaneous at bedtime 15 mL 1    Lancets (ONETOUCH DELICA PLUS [id number]) MISC USE AS DIRECTED WITH LANCING DEVICE TO TEST ONCE DAILY 100 each 6    losartan-hydrochlorothiazide (HYZAAR) 50-12.5 MG tablet Take 2 tablets by mouth daily 90 tablet 3    montelukast (SINGULAIR) 10 MG tablet TAKE ONE TABLET BY MOUTH AT BEDTIME 90 tablet 1    nitroGLYcerin (NITROSTAT) 0.4 MG sublingual tablet Place 1 tablet (0.4 mg) under the tongue every 5 minutes as needed for chest pain If you still have symptoms after 3 doses call 911. 25 tablet 1    omeprazole (PRILOSEC) 20 MG DR capsule TAKE 1 CAPSULE BY MOUTH DAILY 90 capsule 3    ONETOUCH ULTRA test strip USE TO TEST BLOOD SUGAR 1 TIME DAILY OR AS DIRECTED 100 strip 6    order for DME Equipment being ordered: arm blood pressure cuff 1 Device 0    semaglutide (OZEMPIC) 2 MG/3ML pen Inject 0.25 mg Subcutaneous every 7 days for 28 days, THEN 0.5 mg every 7 days for 360 days. 9 mL 1    triamcinolone (KENALOG) 0.1 % external cream Apply topically 2 times daily Left ankle 15 g 1     No facility-administered encounter medications on file as of 11/28/2023.             Review Of Systems  Skin: As above  Eyes: negative  Ears/Nose/Throat: negative  Respiratory: No shortness of breath, dyspnea on exertion, cough, or hemoptysis  Cardiovascular: negative  Gastrointestinal: negative  Genitourinary: negative  Musculoskeletal: negative  Neurologic: negative  Psychiatric: negative  Hematologic/Lymphatic/Immunologic: negative  Endocrine: negative      O:   NAD, WDWN, Alert & Oriented, Mood & Affect wnl, Vitals stable   General appearance funmi ii   Vitals stable   Alert, oriented and in no acute distress     R temple inflamed  seborrheic keratosis   L thigh firm papule  Asteatotic derm on legs   Stuck on papules and brown macules on trunk and ext    Red papules on trunk   Flesh colored papules on trunk          Eyes: Conjunctivae/lids:Normal     ENT: Lips, buccal mucosa, tongue: normal    MSK:Normal    Cardiovascular: peripheral edema none    Pulm: Breathing Normal    Lymph Nodes: No Head and Neck Lymphadenopathy     Neuro/Psych: Orientation:Normal; Mood/Affect:Normal      A/P:  1. Seborrheic keratosis, lentigo, angioma, dermal nevus, dermatofibroma  2. R temple inflamed seborrheic keratosis   LN2:  Treated with LN2 for 5s for 1-2 cycles. Warned risks of blistering, pain, pigment change, scarring, and incomplete resolution.  Advised patient to return if lesions do not completely resolve.  Wound care sheet given.  3. Asteatotic derm  Lidex prn   Skin care discussed with patient   It was a pleasure speaking to Raul Wilhelm today.  Previous clinic  notes and pertinent laboratory tests were reviewed prior to Raul Wilhelm's visit.  Nature and genetics of benign skin lesions dicussed with patient.  Signs and Symptoms of skin cancer discussed with patient.  Patient encouraged to perform monthly skin exams.  UV precautions reviewed with patient.  Skin care regimen reviewed with patient: Eliminate harsh soaps, i.e. Dial, zest, irsih spring; Mild soaps such as Cetaphil or Dove sensitive skin, avoid hot or cold showers, aggressive use of emollients including vanicream, cetaphil or cerave discussed with patient.    Return to clinic 12 months

## 2023-11-30 ENCOUNTER — TELEPHONE (OUTPATIENT)
Dept: FAMILY MEDICINE | Facility: CLINIC | Age: 78
End: 2023-11-30

## 2023-11-30 ENCOUNTER — ALLIED HEALTH/NURSE VISIT (OUTPATIENT)
Dept: FAMILY MEDICINE | Facility: CLINIC | Age: 78
End: 2023-11-30
Payer: COMMERCIAL

## 2023-11-30 DIAGNOSIS — J45.50 SEVERE PERSISTENT ASTHMA WITHOUT COMPLICATION (H): ICD-10-CM

## 2023-11-30 DIAGNOSIS — J45.50 SEVERE PERSISTENT ASTHMA WITHOUT COMPLICATION (H): Primary | ICD-10-CM

## 2023-11-30 PROCEDURE — 99207 PR NO CHARGE NURSE ONLY: CPT

## 2023-11-30 RX ORDER — FEXOFENADINE HCL 180 MG/1
180 TABLET ORAL DAILY
Qty: 90 TABLET | Refills: 3 | Status: SHIPPED | OUTPATIENT
Start: 2023-11-30

## 2023-11-30 NOTE — NURSING NOTE
"S-(situation): Pt presents to walk-in RN clinic today, with questions about a recent asthma attack. States \"last night I really had a bout with it\".    B-(background): Pt says that he started noticing symptoms at bedtime last night, did take an albuterol nebulizer at that time which he said was somewhat helpful. Woke up around 5am with worsening of symptoms; says that he had some  prednisone 20mg tablets left over, took two of them (total of 40mg dose) and says that it helped significantly. Pt says that he doesn't feel like the albuterol inhalers have been helping as much.    A-(assessment): Pt's breathing is currently at baseline per his report, no s/s of respiratory distress noted. O2 sat is currently  93% on room air.  Pt says that his daughters have been doing some cleaning in his house, and he wonders if dust could be contributing to his symptoms.     R-(recommendations): Pt says that he does keep an inhaler with him and also at his bedside; showed writer the inhaler that he has with him currently, and writer notes that it's empty. Advised that pt check the inhaler at home to ensure that it's not empty as well, and he verbalized understanding. Does have inhaler refills available. Pt asks if PCP would order some more prednisone for him to have on hand; writer explained that pt will need a visit for this. Pt does have an appt scheduled on 24, was seen by PCP for asthma on 23. Routing to PCP for review/recommendation; pt says that he doesn't usually do virtual or telephone visits. OK to use same day to discuss next week, or ok to wait until January? Best number to reach pt is [phone].    Criselda Arias RN  [facility]  "

## 2023-11-30 NOTE — TELEPHONE ENCOUNTER
He has fever and cough x 2 days Okay for same-day visit this week with any provider if I do not have any further openings

## 2023-11-30 NOTE — TELEPHONE ENCOUNTER
Pt came to NOD office & was triaged & msg sent to provider. Awaiting response.   Closing this encounter.    Tete Ross RN

## 2023-11-30 NOTE — TELEPHONE ENCOUNTER
Writer notes that pt is on the RN schedule today for the following: triage for asthma. Writer attempted to contact the patient, to discuss via phone; no answer, VM left to return call to clinic RN.    Criselda Arias RN  [facility]

## 2023-11-30 NOTE — TELEPHONE ENCOUNTER
"S-(situation): Pt presents to walk-in RN clinic today, with questions about a recent asthma attack. States \"last night I really had a bout with it\".     B-(background): Pt says that he started noticing symptoms at bedtime last night, did take an albuterol nebulizer at that time which he said was somewhat helpful. Woke up around 5am with worsening of symptoms; says that he had some  prednisone 20mg tablets left over, took two of them (total of 40mg dose) and says that it helped significantly. Pt says that he doesn't feel like the albuterol inhalers have been helping as much.     A-(assessment): Pt's breathing is currently at baseline per his report, no s/s of respiratory distress noted. O2 sat is currently  93% on room air.  Pt says that his daughters have been doing some cleaning in his house, and he wonders if dust could be contributing to his symptoms.      R-(recommendations): Pt says that he does keep an inhaler with him and also at his bedside; showed writer the inhaler that he has with him currently, and writer notes that it's empty. Advised that pt check the inhaler at home to ensure that it's not empty as well, and he verbalized understanding. Does have inhaler refills available. Pt asks if PCP would order some more prednisone for him to have on hand; writer explained that pt will need a visit for this. Pt does have an appt scheduled on 24, was seen by PCP for asthma on 23. Routing to PCP for review/recommendation; pt says that he doesn't usually do virtual or telephone visits. OK to use same day to discuss next week, or ok to wait until January? Best number to reach pt is [phone].     Criselda Arias RN  [facility]  "

## 2023-12-01 ENCOUNTER — OFFICE VISIT (OUTPATIENT)
Dept: FAMILY MEDICINE | Facility: CLINIC | Age: 78
End: 2023-12-01
Payer: COMMERCIAL

## 2023-12-01 VITALS
TEMPERATURE: 97.2 F | HEART RATE: 86 BPM | OXYGEN SATURATION: 92 % | HEIGHT: 68 IN | BODY MASS INDEX: 24.71 KG/M2 | DIASTOLIC BLOOD PRESSURE: 78 MMHG | SYSTOLIC BLOOD PRESSURE: 134 MMHG | WEIGHT: 163 LBS | RESPIRATION RATE: 16 BRPM

## 2023-12-01 DIAGNOSIS — J45.51 SEVERE PERSISTENT ASTHMA WITH EXACERBATION (H): Primary | ICD-10-CM

## 2023-12-01 PROCEDURE — 99214 OFFICE O/P EST MOD 30 MIN: CPT | Mod: 24 | Performed by: NURSE PRACTITIONER

## 2023-12-01 RX ORDER — PREDNISONE 20 MG/1
TABLET ORAL
Qty: 10 TABLET | Refills: 1 | Status: SHIPPED | OUTPATIENT
Start: 2023-12-01 | End: 2024-04-14

## 2023-12-01 ASSESSMENT — PAIN SCALES - GENERAL: PAINLEVEL: NO PAIN (1)

## 2023-12-01 ASSESSMENT — ASTHMA QUESTIONNAIRES: ACT_TOTALSCORE: 16

## 2023-12-01 NOTE — PROGRESS NOTES
Assessment & Plan     Severe persistent asthma with exacerbation (H28)  Is having exacerbation today.  Ideally, would like to avoid steroids given history of diabetes, however he does have significant bilateral wheezing and is using albuterol every 4 hours.  Seems he is not using his Symbicort inhaler, we reviewed using this twice daily for asthma control.  He will either follow-up with me or his primary care provider if things are not improving as expected.  - predniSONE (DELTASONE) 20 MG tablet; Take 2 tablets by mouth daily for 5 days.       See Patient Instructions    [facility]    Jonathon Carter is a 77 year old, presenting for the following health issues:  Asthma        12/1/2023    12:29 PM   Additional Questions   Roomed by Katalina GARCIA   Accompanied by self       HPI     Asthma Follow-Up    Was ACT completed today?  No    Do you have a cough?  No  Are you experiencing any wheezing in your chest?  YES  Do you have any shortness of breath?  YES   How often are you using a short-acting (rescue) inhaler or nebulizer, such as Albuterol?  Daily  How many days per week do you miss taking your asthma controller medication?  0  Please describe any recent triggers for your asthma:  daughters have been cleaning house so could have stirred up some dust  Have you had any Emergency Room Visits, Urgent Care Visits, or Hospital Admissions since your last office visit?  No  How many servings of fruits and vegetables do you eat daily?  2-3  On average, how many sweetened beverages do you drink each day (Examples: soda, juice, sweet tea, etc.  Do NOT count diet or artificially sweetened beverages)?   0  How many days per week do you exercise enough to make your heart beat faster? 3 or less  How many minutes a day do you exercise enough to make your heart beat faster? 9 or less  How many days per week do you miss taking your medication? 0     Above HPI reviewed.  "Additionally, worsened asthma symptoms over the past week.  Notes that his daughters came to clean out his house and there was a significant amount of dust, symptoms were so severe the other night that he nearly went to the hospital.  He was able to resolve this with an albuterol neb and a dose of prednisone.  He notes he is continuing to have chest tightness and shortness of breath.  No cough.  He is not using his Symbicort inhaler.  Seems he was unaware he was supposed to use this twice daily.  He has been using his albuterol inhaler every 4 hours.  Denies chest pain.        Review of Systems   Constitutional, HEENT, cardiovascular, pulmonary, gi and gu systems are negative, except as otherwise noted.      Objective    /78   Pulse 86   Temp 97.2  F (36.2  C) (Tympanic)   Resp 16   Ht 1.727 m (5' 8\")   Wt 73.9 kg (163 lb)   SpO2 92%   BMI 24.78 kg/m    Body mass index is 24.78 kg/m .  Physical Exam  Vitals and nursing note reviewed.   Constitutional:       Appearance: Normal appearance.   HENT:      Head: Normocephalic and atraumatic.      Right Ear: Tympanic membrane and ear canal normal.      Left Ear: Tympanic membrane and ear canal normal.      Nose: Nose normal. No rhinorrhea.      Right Sinus: No maxillary sinus tenderness or frontal sinus tenderness.      Left Sinus: No maxillary sinus tenderness or frontal sinus tenderness.      Mouth/Throat:      Lips: Pink.      Mouth: Mucous membranes are moist.      Pharynx: Oropharynx is clear.   Eyes:      General: Lids are normal.      Conjunctiva/sclera: Conjunctivae normal.      Comments: Non icteric   Cardiovascular:      Rate and Rhythm: Normal rate and regular rhythm.      Pulses: Normal pulses.      Heart sounds: Normal heart sounds, S1 normal and S2 normal.   Pulmonary:      Effort: Pulmonary effort is normal.      Breath sounds: Normal air entry. Wheezing (Significant expiratory, scattered) present.   Musculoskeletal:      Cervical back: Neck " supple.   Lymphadenopathy:      Cervical: No cervical adenopathy.   Skin:     General: Skin is warm and dry.   Neurological:      General: No focal deficit present.      Mental Status: He is alert and oriented to person, place, and time.   Psychiatric:         Mood and Affect: Mood normal.         Behavior: Behavior normal.         Thought Content: Thought content normal.         Judgment: Judgment normal.

## 2023-12-01 NOTE — PATIENT INSTRUCTIONS
Take prednisone for 5 days.  Use your budesonide inhaler twice daily every day. This is for control.  Use albuterol as needed for flares.

## 2024-01-16 ENCOUNTER — OFFICE VISIT (OUTPATIENT)
Dept: FAMILY MEDICINE | Facility: CLINIC | Age: 79
End: 2024-01-16
Payer: COMMERCIAL

## 2024-01-16 VITALS
RESPIRATION RATE: 12 BRPM | SYSTOLIC BLOOD PRESSURE: 106 MMHG | HEART RATE: 85 BPM | HEIGHT: 68 IN | WEIGHT: 160 LBS | BODY MASS INDEX: 24.25 KG/M2 | TEMPERATURE: 97.7 F | OXYGEN SATURATION: 93 % | DIASTOLIC BLOOD PRESSURE: 70 MMHG

## 2024-01-16 DIAGNOSIS — E11.9 TYPE 2 DIABETES MELLITUS WITHOUT COMPLICATION, WITHOUT LONG-TERM CURRENT USE OF INSULIN (H): Primary | ICD-10-CM

## 2024-01-16 DIAGNOSIS — I10 ESSENTIAL HYPERTENSION: ICD-10-CM

## 2024-01-16 DIAGNOSIS — I25.10 CORONARY ARTERY DISEASE INVOLVING NATIVE CORONARY ARTERY OF NATIVE HEART WITHOUT ANGINA PECTORIS: ICD-10-CM

## 2024-01-16 LAB — HBA1C MFR BLD: 8 % (ref 0–5.6)

## 2024-01-16 PROCEDURE — 83036 HEMOGLOBIN GLYCOSYLATED A1C: CPT | Performed by: INTERNAL MEDICINE

## 2024-01-16 PROCEDURE — 36415 COLL VENOUS BLD VENIPUNCTURE: CPT | Performed by: INTERNAL MEDICINE

## 2024-01-16 PROCEDURE — 90480 ADMN SARSCOV2 VAC 1/ONLY CMP: CPT | Performed by: INTERNAL MEDICINE

## 2024-01-16 PROCEDURE — 91320 SARSCV2 VAC 30MCG TRS-SUC IM: CPT | Performed by: INTERNAL MEDICINE

## 2024-01-16 PROCEDURE — 99214 OFFICE O/P EST MOD 30 MIN: CPT | Performed by: INTERNAL MEDICINE

## 2024-01-16 RX ORDER — CARVEDILOL 6.25 MG/1
6.25 TABLET ORAL 2 TIMES DAILY
Qty: 180 TABLET | Refills: 3 | Status: SHIPPED | OUTPATIENT
Start: 2024-01-16

## 2024-01-16 RX ORDER — ATORVASTATIN CALCIUM 40 MG/1
40 TABLET, FILM COATED ORAL DAILY
Qty: 90 TABLET | Refills: 3 | Status: SHIPPED | OUTPATIENT
Start: 2024-01-16

## 2024-01-16 ASSESSMENT — PAIN SCALES - GENERAL: PAINLEVEL: NO PAIN (0)

## 2024-01-16 ASSESSMENT — ASTHMA QUESTIONNAIRES: ACT_TOTALSCORE: 23

## 2024-01-16 NOTE — PATIENT INSTRUCTIONS
Diabetes  Blood sugars much improved but they are still running higher than recommended  I recommend to resume Ozempic; you declined  Follow-up diabetes check in 3 months. If blood sugar remains high, I would still recommend the Ozempic  Refills of medications sent to pharmacy

## 2024-01-16 NOTE — PROGRESS NOTES
"  Assessment & Plan     Type 2 diabetes mellitus without complication, without long-term current use of insulin (H)  Patient stopped ozempic weeks ago, timeline unclear.  Not due to to side effects but because 'there were no refills\"; refills were available.  He declines to resume,w ants to work on lifestyle changes.  Recommend follow-up in 3-4 mo  - Hemoglobin A1c  - atorvastatin (LIPITOR) 40 MG tablet; Take 1 tablet (40 mg) by mouth daily  - insulin glargine (LANTUS PEN) 100 UNIT/ML pen; Inject 15 Units Subcutaneous at bedtime  - Hemoglobin A1c; Future    Coronary artery disease involving native coronary artery of native heart without angina pectoris   - stable, refill provided  - atorvastatin (LIPITOR) 40 MG tablet; Take 1 tablet (40 mg) by mouth daily    Essential hypertension   - stable, refill provided  - carvedilol (COREG) 6.25 MG tablet; Take 1 tablet (6.25 mg) by mouth 2 times daily       Patient Instructions   Diabetes  Blood sugars much improved but they are still running higher than recommended  I recommend to resume Ozempic; you declined  Follow-up diabetes check in 3 months. If blood sugar remains high, I would still recommend the Ozempic  Refills of medications sent to pharmacy    [facility]    Jonathon Carter is a 78 year old, presenting for the following health issues:  Hypertension, Diabetes, Lipids, Asthma, and Health Maintenance (Due for covid /Will do covid shot)        1/16/2024     7:04 AM   Additional Questions   Roomed by ranjan beckham   Accompanied by self         1/16/2024     7:04 AM   Patient Reported Additional Medications   Patient reports taking the following new medications none       HPI     Chief Complaint   Patient presents with    Hypertension    Diabetes    Lipids    Asthma    Health Maintenance     Due for covid   Will do covid shot         Diabetes Follow-up    How often are you checking your blood sugar? A few times a " month  What time of day are you checking your blood sugars (select all that apply)?   AM  Have you had any blood sugars above 200?  No  Have you had any blood sugars below 70?  No  What symptoms do you notice when your blood sugar is low?  None  What concerns do you have today about your diabetes? None   Do you have any of these symptoms? (Select all that apply)  No numbness or tingling in feet.  No redness, sores or blisters on feet.  No complaints of excessive thirst.  No reports of blurry vision.  No significant changes to weight.  He had an asthma flareup 12/1 and took a 5-day course of prednisone  At our previous visit in Oct, I added Ozempic.  He was having urinary frequency and  yeast infection with the hyperglycemia  Today, he reports he ran out of the medication and is totally unsure when he last used the medication; under a lot of stress caring for his frail wife  He thinks he is on too many medications; however, he is taking many vitamins/supplements  He strongly declines to resume ozempic because of life stressors          Hyperlipidemia Follow-Up    Are you regularly taking any medication or supplement to lower your cholesterol?   Yes- AM  Are you having muscle aches or other side effects that you think could be caused by your cholesterol lowering medication?  No    Hypertension Follow-up    Do you check your blood pressure regularly outside of the clinic? Yes sometimes  Are you following a low salt diet? Yes  Are your blood pressures ever more than 140 on the top number (systolic) OR more than 90 on the bottom number (diastolic), for example 140/90? Yes sometimes    BP Readings from Last 2 Encounters:   01/16/24 106/70   12/01/23 134/78     Hemoglobin A1C (%)   Date Value   01/16/2024 8.0 (H)   10/13/2023 9.1 (H)   04/09/2021 8.5 (H)   08/27/2020 7.2 (H)     LDL Cholesterol Calculated (mg/dL)   Date Value   10/13/2023 95   04/27/2022 44   04/09/2021 75   01/31/2020 71         Asthma Follow-Up    Was  ACT completed today?  Yes        1/16/2024     9:22 AM   ACT Total Scores   ACT TOTAL SCORE (Goal Greater than or Equal to 20) 23   In the past 12 months, how many times did you visit the emergency room for your asthma without being admitted to the hospital? 0   In the past 12 months, how many times were you hospitalized overnight because of your asthma? 0        How many days per week do you miss taking your asthma controller medication?  I do not have an asthma controller medication  Please describe any recent triggers for your asthma: Patient is unaware of triggers  Have you had any Emergency Room Visits, Urgent Care Visits, or Hospital Admissions since your last office visit?  No  How many servings of fruits and vegetables do you eat daily?  4 or more  On average, how many sweetened beverages do you drink each day (Examples: soda, juice, sweet tea, etc.  Do NOT count diet or artificially sweetened beverages)?   0  How many days per week do you exercise enough to make your heart beat faster? 7  How many minutes a day do you exercise enough to make your heart beat faster? 10 - 19  How many days per week do you miss taking your medication? 0  Is taking symbicort BID regularly      Current Outpatient Medications   Medication Sig Dispense Refill    albuterol (PROAIR HFA/PROVENTIL HFA/VENTOLIN HFA) 108 (90 Base) MCG/ACT inhaler Inhale 1-2 puffs into the lungs every 4 hours as needed for shortness of breath or wheezing 8.5 g 10    albuterol (PROVENTIL) (2.5 MG/3ML) 0.083% neb solution Take 1 vial (2.5 mg) by nebulization every 6 hours as needed for shortness of breath or wheezing 30 mL 11    Alcohol Swabs (B-D SINGLE USE SWABS REGULAR) PADS USE TO SWAB AREA OF INJECTION / JENISE AS DIRECTED 100 each 3    aspirin (ASA) 81 MG EC tablet Take 1 tablet (81 mg) by mouth daily      atorvastatin (LIPITOR) 40 MG tablet TAKE ONE TABLET BY MOUTH ONCE DAILY 90 tablet 0    blood glucose monitoring (NO BRAND SPECIFIED) meter device  "kit Use to test blood sugar 1 times daily or as directed.  Blood Glucose Monitor Brands: per insurance. 1 kit 0    budesonide-formoterol (SYMBICORT) 80-4.5 MCG/ACT Inhaler Inhale 2 puffs into the lungs 2 times daily 10.2 g 11    carvedilol (COREG) 6.25 MG tablet TAKE ONE TABLET BY MOUTH TWICE A  tablet 1    fexofenadine (ALLEGRA) 180 MG tablet TAKE ONE TABLET BY MOUTH ONCE DAILY 90 tablet 3    fluocinonide (LIDEX) 0.05 % external cream Apply topically 2 times daily 120 g 6    glipiZIDE (GLUCOTROL XL) 10 MG 24 hr tablet TAKE 1 TABLET (10 MG) BY MOUTH DAILY 90 tablet 3    insulin glargine (LANTUS PEN) 100 UNIT/ML pen Inject 15 Units Subcutaneous at bedtime 15 mL 1    Lancets (ONETOUCH DELICA PLUS [id number]) MISC USE AS DIRECTED WITH LANCING DEVICE TO TEST ONCE DAILY 100 each 6    losartan-hydrochlorothiazide (HYZAAR) 50-12.5 MG tablet Take 2 tablets by mouth daily 90 tablet 3    montelukast (SINGULAIR) 10 MG tablet TAKE ONE TABLET BY MOUTH AT BEDTIME 90 tablet 1    omeprazole (PRILOSEC) 20 MG DR capsule TAKE 1 CAPSULE BY MOUTH DAILY 90 capsule 3    ONETOUCH ULTRA test strip USE TO TEST BLOOD SUGAR 1 TIME DAILY OR AS DIRECTED 100 strip 6    order for DME Equipment being ordered: arm blood pressure cuff 1 Device 0    predniSONE (DELTASONE) 20 MG tablet Take 2 tablets by mouth daily for 5 days. 10 tablet 1    semaglutide (OZEMPIC) 2 MG/3ML pen Inject 0.25 mg Subcutaneous every 7 days for 28 days, THEN 0.5 mg every 7 days for 360 days. 9 mL 1    triamcinolone (KENALOG) 0.1 % external cream Apply topically 2 times daily Left ankle 15 g 1           Review of Systems   Constitutional, HEENT, cardiovascular, pulmonary, gi and gu systems are negative, except as otherwise noted.      Objective    /70 (BP Location: Right arm, Patient Position: Sitting, Cuff Size: Adult Regular)   Pulse 85   Temp 97.7  F (36.5  C) (Tympanic)   Resp 12   Ht 1.727 m (5' 8\")   Wt 72.6 kg (160 lb)   SpO2 93%   BMI 24.33 kg/m  "   Body mass index is 24.33 kg/m .  Physical Exam   GENERAL APPEARANCE: alert and no distress    Results for orders placed or performed in visit on 01/16/24 (from the past 24 hour(s))   Hemoglobin A1c   Result Value Ref Range    Hemoglobin A1C 8.0 (H) 0.0 - 5.6 %

## 2024-02-02 DIAGNOSIS — K21.9 GASTROESOPHAGEAL REFLUX DISEASE WITHOUT ESOPHAGITIS: ICD-10-CM

## 2024-02-12 ENCOUNTER — TELEPHONE (OUTPATIENT)
Dept: FAMILY MEDICINE | Facility: CLINIC | Age: 79
End: 2024-02-12
Payer: COMMERCIAL

## 2024-02-12 DIAGNOSIS — I10 ESSENTIAL HYPERTENSION: ICD-10-CM

## 2024-02-12 DIAGNOSIS — I25.10 CORONARY ARTERY DISEASE INVOLVING NATIVE CORONARY ARTERY OF NATIVE HEART WITHOUT ANGINA PECTORIS: ICD-10-CM

## 2024-02-12 NOTE — TELEPHONE ENCOUNTER
Raul Wilhelm was identified as being non-adherent to his/her losartan/hctz (medication name) in the last 30-90 days.  Reason(s) identified for non-adherence: patient does not see value in therapy  Intervention(s) offered to assist patient with adherence:  Requested change in prescription He says he has backed down to just one tablet a day    Recommendation to Provider: Discuss with Raul and send in new order with 1 daily sig if appropriate  Recommended follow-up: none  Completed by: Roseline Jc, PharmD  [facility]

## 2024-02-13 RX ORDER — LOSARTAN POTASSIUM AND HYDROCHLOROTHIAZIDE 12.5; 5 MG/1; MG/1
1 TABLET ORAL DAILY
Qty: 90 TABLET | Refills: 3 | Status: SHIPPED | OUTPATIENT
Start: 2024-02-13

## 2024-02-13 NOTE — TELEPHONE ENCOUNTER
Called and notified patient of change. He verbalizes understanding and agreement.     Dee Sanchez RN

## 2024-04-11 DIAGNOSIS — J45.51 SEVERE PERSISTENT ASTHMA WITH EXACERBATION (H): ICD-10-CM

## 2024-04-14 ENCOUNTER — OFFICE VISIT (OUTPATIENT)
Dept: URGENT CARE | Facility: URGENT CARE | Age: 79
End: 2024-04-14
Payer: COMMERCIAL

## 2024-04-14 ENCOUNTER — TELEPHONE (OUTPATIENT)
Dept: FAMILY MEDICINE | Facility: CLINIC | Age: 79
End: 2024-04-14

## 2024-04-14 ENCOUNTER — NURSE TRIAGE (OUTPATIENT)
Dept: NURSING | Facility: CLINIC | Age: 79
End: 2024-04-14
Payer: COMMERCIAL

## 2024-04-14 VITALS
BODY MASS INDEX: 24.33 KG/M2 | HEART RATE: 77 BPM | DIASTOLIC BLOOD PRESSURE: 80 MMHG | TEMPERATURE: 97.1 F | OXYGEN SATURATION: 93 % | WEIGHT: 160 LBS | RESPIRATION RATE: 16 BRPM | SYSTOLIC BLOOD PRESSURE: 163 MMHG

## 2024-04-14 DIAGNOSIS — J44.1 CHRONIC OBSTRUCTIVE PULMONARY DISEASE WITH ACUTE EXACERBATION (H): ICD-10-CM

## 2024-04-14 DIAGNOSIS — J45.50 SEVERE PERSISTENT ASTHMA WITHOUT COMPLICATION (H): ICD-10-CM

## 2024-04-14 PROCEDURE — 99213 OFFICE O/P EST LOW 20 MIN: CPT | Performed by: FAMILY MEDICINE

## 2024-04-14 RX ORDER — PREDNISONE 20 MG/1
40 TABLET ORAL DAILY
Qty: 10 TABLET | Refills: 1 | Status: SHIPPED | OUTPATIENT
Start: 2024-04-14 | End: 2024-04-19

## 2024-04-14 RX ORDER — GUAIFENESIN 600 MG/1
1200 TABLET, EXTENDED RELEASE ORAL 2 TIMES DAILY
Qty: 120 TABLET | Refills: 1 | Status: SHIPPED | OUTPATIENT
Start: 2024-04-14 | End: 2024-06-03

## 2024-04-14 RX ORDER — PREDNISONE 20 MG/1
TABLET ORAL
Qty: 10 TABLET | Refills: 1 | OUTPATIENT
Start: 2024-04-14

## 2024-04-14 NOTE — TELEPHONE ENCOUNTER
"Triage Call:         S-(situation): Pt calling to report that he has had a cough for a month and is asking how to proceed today    B-(background): Pt with hx of asthma and COPD    A-(assessment): Pt states that he has had a phlegmy cough for a month. He can be heard wheezing over the phone and states that it is not new for him, but that the wheezing is worse than his baseline.     He has an appt on Thursday, but wants to know what to do until then. He states that the \"prednisode tablets I started taking are keeping me alive\". He is also using his inhaler twice a day and nebulizer treatment about three times a day.     He has not been seen for his cough since it started  No fever    R-(recommendations): See in office today. 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     Reason for Disposition   Wheezing is present   MILD difficulty breathing (e.g., minimal/no SOB at rest, SOB with walking, pulse <100) and still present when not coughing    Additional Information   Negative: Bluish (or gray) lips or face   Negative: SEVERE difficulty breathing (e.g., struggling for each breath, speaks in single words)   Negative: Rapid onset of cough and has hives   Negative: Difficulty breathing after exposure to flames, smoke, or fumes   Negative: Coughing started suddenly after medicine, an allergic food or bee sting   Negative: Sounds like a life-threatening emergency to the triager   Negative: Previous asthma attacks and this feels like asthma attack   Negative: Chest pain present when not coughing   Negative: Fever > 103 F (39.4 C)   Negative: Fever > 101 F (38.3 C) and over 60 years of age   Negative: Fever > 100.0 F (37.8 C) and has diabetes mellitus or a weak immune system (e.g., HIV positive, cancer chemotherapy, organ transplant, splenectomy, chronic steroids)   Negative: Fever > 100.0 F (37.8 C) and bedridden (e.g., CVA, chronic " illness, recovering from surgery)   Negative: MODERATE difficulty breathing (e.g., speaks in phrases, SOB even at rest, pulse 100-120) and still present when not coughing   Negative: Passed out (i.e., fainted, collapsed and was not responding)   Negative: Patient sounds very sick or weak to the triager    Protocols used: Moustapha-A-ARSH Arellano RN  [facility] Nurse Advisor 9:45 AM 4/14/2024

## 2024-04-14 NOTE — PROGRESS NOTES
SUBJECTIVE:  Raul Wilhelm is a 78 year old male who presents to the clinic today with a chief complaint of cough  for 5 day(s).  His cough is described as persistent and productive tenacious and yellow.    The patient's symptoms are moderate and worsening.  Associated symptoms include congestion and nasal congestion. The patient's symptoms are exacerbated by no particular triggers  Patient has been using albuterol nebs and copd medications   to improve symptoms.    Past Medical History:   Diagnosis Date    Chronic airway obstruction, not elsewhere classified     Other and unspecified hyperlipidemia        Current Outpatient Medications   Medication Sig Dispense Refill    albuterol (PROAIR HFA/PROVENTIL HFA/VENTOLIN HFA) 108 (90 Base) MCG/ACT inhaler Inhale 1-2 puffs into the lungs every 4 hours as needed for shortness of breath or wheezing 8.5 g 10    albuterol (PROVENTIL) (2.5 MG/3ML) 0.083% neb solution Take 1 vial (2.5 mg) by nebulization every 6 hours as needed for shortness of breath or wheezing 30 mL 11    Alcohol Swabs (B-D SINGLE USE SWABS REGULAR) PADS USE TO SWAB AREA OF INJECTION / JENISE AS DIRECTED 100 each 3    aspirin (ASA) 81 MG EC tablet Take 1 tablet (81 mg) by mouth daily      atorvastatin (LIPITOR) 40 MG tablet Take 1 tablet (40 mg) by mouth daily 90 tablet 3    blood glucose monitoring (NO BRAND SPECIFIED) meter device kit Use to test blood sugar 1 times daily or as directed.  Blood Glucose Monitor Brands: per insurance. 1 kit 0    budesonide-formoterol (SYMBICORT) 80-4.5 MCG/ACT Inhaler Inhale 2 puffs into the lungs 2 times daily 10.2 g 11    carvedilol (COREG) 6.25 MG tablet Take 1 tablet (6.25 mg) by mouth 2 times daily 180 tablet 3    fexofenadine (ALLEGRA) 180 MG tablet TAKE ONE TABLET BY MOUTH ONCE DAILY 90 tablet 3    fluocinonide (LIDEX) 0.05 % external cream Apply topically 2 times daily 120 g 6    glipiZIDE (GLUCOTROL XL) 10 MG 24 hr tablet TAKE 1 TABLET (10 MG) BY MOUTH DAILY 90  tablet 3    insulin glargine (LANTUS PEN) 100 UNIT/ML pen Inject 15 Units Subcutaneous at bedtime 15 mL 3    Lancets (ONETOUCH DELICA PLUS [id number]) MISC USE AS DIRECTED WITH LANCING DEVICE TO TEST ONCE DAILY 100 each 6    losartan-hydrochlorothiazide (HYZAAR) 50-12.5 MG tablet Take 1 tablet by mouth daily 90 tablet 3    montelukast (SINGULAIR) 10 MG tablet TAKE ONE TABLET BY MOUTH AT BEDTIME 90 tablet 1    omeprazole (PRILOSEC) 20 MG DR capsule TAKE ONE CAPSULE BY MOUTH ONCE DAILY 90 capsule 2    ONETOUCH ULTRA test strip USE TO TEST BLOOD SUGAR 1 TIME DAILY OR AS DIRECTED 100 strip 6    order for DME Equipment being ordered: arm blood pressure cuff 1 Device 0    triamcinolone (KENALOG) 0.1 % external cream Apply topically 2 times daily Left ankle 15 g 1       Social History     Tobacco Use    Smoking status: Never    Smokeless tobacco: Never   Substance Use Topics    Alcohol use: Yes     Comment: Rare       ROS  Review of systems negative except as stated above.    OBJECTIVE:  BP (!) 163/80   Pulse 77   Temp 97.1  F (36.2  C) (Tympanic)   Resp 16   Wt 72.6 kg (160 lb)   SpO2 93%   BMI 24.33 kg/m    GENERAL APPEARANCE: healthy, alert and no distress  EYES: EOMI,  PERRL, conjunctiva clear  HENT: ear canals and TM's normal.  Nose and mouth without ulcers, erythema or lesions  NECK: supple, nontender, no lymphadenopathy  RESP: expiratory wheezes bibasilar  CV: regular rates and rhythm, normal S1 S2, no murmur noted  ABDOMEN:  soft, nontender, no HSM or masses and bowel sounds normal    ASSESSMENT: 1. Chronic obstructive pulmonary disease with acute exacerbation (H)    - predniSONE (DELTASONE) 20 MG tablet; Take 2 tablets (40 mg) by mouth daily for 5 days  Dispense: 10 tablet; Refill: 1  - guaiFENesin (MUCINEX) 600 MG 12 hr tablet; Take 2 tablets (1,200 mg) by mouth 2 times daily  Dispense: 120 tablet; Refill: 1    2. Severe persistent asthma without complication (H28)  Follow up with pcp for further management  of the copd/asthma  If not improving in the next 24 hours recommend being seen in emergency room   Beatriz Laurent M.D.

## 2024-04-14 NOTE — PATIENT INSTRUCTIONS
You can try some mucinex /guafenesin to help thin the mucus   Keep the appointment with your pcp this week

## 2024-04-14 NOTE — TELEPHONE ENCOUNTER
General Call      Reason for Call:  prescription declined    What are your questions or concerns:  Please call patient back to discuss why his request for prednisone is denied.    Date of last appointment with provider:  01/16/2024    Okay to leave a detailed message?: Yes at Cell number on file:    [phone]

## 2024-04-16 ENCOUNTER — TELEPHONE (OUTPATIENT)
Dept: FAMILY MEDICINE | Facility: CLINIC | Age: 79
End: 2024-04-16
Payer: COMMERCIAL

## 2024-04-16 NOTE — TELEPHONE ENCOUNTER
RN does not see that prednisone Rx denied, in fact an Rx was sent to [facility] on 4/14/24 with a refill. Perhaps there was a duplicate request that was denied?  RN returned call to patient to notify of above.  Patient was instructed to return call to [facility] main line at [phone] to speak with an RN.    Jessica Canada RN  [facility]

## 2024-04-16 NOTE — TELEPHONE ENCOUNTER
Patient Quality Outreach    Patient is due for the following:   Hypertension -  BP check    Next Steps:   Patient has upcoming appointment, these items will be addressed at that time.    Type of outreach:    Chart review performed, no outreach needed.    Next Steps:  Reach out within 90 days via Letter.    Max number of attempts reached: Yes. Will try again in 90 days if patient still on fail list.    Questions for provider review:    None           Maggie Florez MA  Chart routed to .

## 2024-04-18 ENCOUNTER — ANCILLARY PROCEDURE (OUTPATIENT)
Dept: GENERAL RADIOLOGY | Facility: CLINIC | Age: 79
End: 2024-04-18
Attending: NURSE PRACTITIONER
Payer: COMMERCIAL

## 2024-04-18 ENCOUNTER — OFFICE VISIT (OUTPATIENT)
Dept: FAMILY MEDICINE | Facility: CLINIC | Age: 79
End: 2024-04-18
Payer: COMMERCIAL

## 2024-04-18 VITALS
HEART RATE: 71 BPM | TEMPERATURE: 97.7 F | RESPIRATION RATE: 24 BRPM | WEIGHT: 164.4 LBS | BODY MASS INDEX: 24.92 KG/M2 | SYSTOLIC BLOOD PRESSURE: 152 MMHG | DIASTOLIC BLOOD PRESSURE: 90 MMHG | HEIGHT: 68 IN | OXYGEN SATURATION: 96 %

## 2024-04-18 DIAGNOSIS — J44.1 COPD EXACERBATION (H): Primary | ICD-10-CM

## 2024-04-18 DIAGNOSIS — J44.1 COPD EXACERBATION (H): ICD-10-CM

## 2024-04-18 PROCEDURE — 99214 OFFICE O/P EST MOD 30 MIN: CPT | Performed by: NURSE PRACTITIONER

## 2024-04-18 PROCEDURE — 71046 X-RAY EXAM CHEST 2 VIEWS: CPT | Mod: TC | Performed by: RADIOLOGY

## 2024-04-18 RX ORDER — PREDNISONE 20 MG/1
TABLET ORAL
Qty: 8 TABLET | Refills: 0 | Status: SHIPPED | OUTPATIENT
Start: 2024-04-18 | End: 2024-04-28

## 2024-04-18 RX ORDER — DOXYCYCLINE HYCLATE 100 MG
100 TABLET ORAL 2 TIMES DAILY
Qty: 20 TABLET | Refills: 0 | Status: SHIPPED | OUTPATIENT
Start: 2024-04-18 | End: 2024-04-28

## 2024-04-18 ASSESSMENT — PAIN SCALES - GENERAL: PAINLEVEL: NO PAIN (0)

## 2024-04-18 NOTE — PATIENT INSTRUCTIONS
Recommend increasing albuterol nebulizer use to every 6 hours.  May also use albuterol inhaler in between the nebulizer treatments as needed.  Continue Symbicort inhaler twice daily.    Continue prednisone taper.  You just finished 5 days of 40 mg/day.  Now continue with 20 mg once a day for 5 days, then 10 mg once daily for 5 days.    Add an antibiotic: Doxycycline 1 tablet twice daily for 10 days.    If not improved by early next week, follow-up in clinic again.

## 2024-04-18 NOTE — PROGRESS NOTES
Assessment & Plan     COPD exacerbation (H)  Ongoing.  Somewhat improved with prednisone given in urgent care, but not enough.  - XR Chest 2 Views; Future  - doxycycline hyclate (VIBRA-TABS) 100 MG tablet; Take 1 tablet (100 mg) by mouth 2 times daily for 10 days  - predniSONE (DELTASONE) 20 MG tablet; Take 1 tablet (20 mg) by mouth daily for 5 days, THEN 0.5 tablets (10 mg) daily for 5 days.      Patient Instructions   Recommend increasing albuterol nebulizer use to every 6 hours.  May also use albuterol inhaler in between the nebulizer treatments as needed.  Continue Symbicort inhaler twice daily.    Continue prednisone taper.  You just finished 5 days of 40 mg/day.  Now continue with 20 mg once a day for 5 days, then 10 mg once daily for 5 days.    Add an antibiotic: Doxycycline 1 tablet twice daily for 10 days.    If not improved by early next week, follow-up in clinic again.      The risks, benefits and treatment options of prescribed medications or other treatments have been discussed with the patient. The patient verbalized their understanding and should call or follow up if no improvement or if they develop further problems.  Ami Mahan, BRET              Subjective   Raul is a 78 year old, presenting for the following health issues:  Asthma (Increased phlegm , was in UC 4/14 - very thick phlegm )        4/18/2024     7:41 AM   Additional Questions   Roomed by Mert MARTINI CMA   Accompanied by self     History of Present Illness     Asthma:  He presents for follow up of asthma.  He has some cough, some wheezing, and some shortness of breath.  He is using a relief medication 2-3 times per day. He does not miss any doses of his controller medication throughout the week. Patient is aware of the following triggers: none. The patient has had a visit to the Emergency Room, Urgent Care or Hospital due to asthma since the last clinic visit. He has been to the Emergency Room or Urgent Care 1 time.He has had a  "Hospitalization 0 times.    COPD:  He presents for follow up of COPD.   Overall, COPD symptoms are better since last visit. He has more than usual fatigue or shortness of breath with exertion and no shortness of breath at rest.  He often coughs and does have change in sputum. No recent fever. He can walk 2-5 blocks without stopping to rest. He can walk 1 flights of stairs without resting. The patient has had an ED, urgent care, or hospital admission because of COPD since the last visit. He states he has had 1 visit(s) to an ED, Urgent Care, or Hospital due to his COPD.    He eats 2-3 servings of fruits and vegetables daily.He consumes 4 sweetened beverage(s) daily.He exercises with enough effort to increase his heart rate 20 to 29 minutes per day.  He exercises with enough effort to increase his heart rate 5 days per week.   He is taking medications regularly.     Heavy phlegm - hard to bring up.  Coughing some - not too much  Shortness of breath sometimes  Wheezing - using albuterol neb which is helpful. Using neb twice per day  Also using Symbicort  Denies any fever or chills    Was seen in urgent care and given prednisone and guaifenesin  He feels that the is medications are somewhat helpful              Review of Systems  Constitutional, HEENT, cardiovascular, pulmonary, gi and gu systems are negative, except as otherwise noted.        Objective    BP (!) 152/90 (BP Location: Right arm, Patient Position: Sitting, Cuff Size: Adult Regular)   Pulse 71   Temp 97.7  F (36.5  C) (Tympanic)   Resp 24   Ht 1.715 m (5' 7.5\")   Wt 74.6 kg (164 lb 6.4 oz)   SpO2 96%   BMI 25.37 kg/m    Body mass index is 25.37 kg/m .  Physical Exam   GENERAL: alert and no distress  NECK: no adenopathy, no asymmetry, masses, or scars  RESP: lungs clear to auscultation - no rales, rhonchi or wheezes  CV: regular rate and rhythm, normal S1 S2, no S3 or S4, no murmur, click or rub, no peripheral edema  MS: no gross musculoskeletal " defects noted, no edema    Xray - Reviewed and interpreted by me.  Negative        Signed Electronically by: OSCAR Ca CNP

## 2024-04-27 DIAGNOSIS — J45.50 SEVERE PERSISTENT ASTHMA WITHOUT COMPLICATION (H): ICD-10-CM

## 2024-04-29 RX ORDER — MONTELUKAST SODIUM 10 MG/1
TABLET ORAL
Qty: 90 TABLET | Refills: 1 | Status: SHIPPED | OUTPATIENT
Start: 2024-04-29

## 2024-05-02 ENCOUNTER — OFFICE VISIT (OUTPATIENT)
Dept: FAMILY MEDICINE | Facility: CLINIC | Age: 79
End: 2024-05-02
Payer: COMMERCIAL

## 2024-05-02 VITALS
DIASTOLIC BLOOD PRESSURE: 78 MMHG | SYSTOLIC BLOOD PRESSURE: 128 MMHG | BODY MASS INDEX: 24.86 KG/M2 | OXYGEN SATURATION: 94 % | WEIGHT: 164 LBS | HEART RATE: 80 BPM | RESPIRATION RATE: 20 BRPM | HEIGHT: 68 IN | TEMPERATURE: 97.6 F

## 2024-05-02 DIAGNOSIS — E11.9 TYPE 2 DIABETES MELLITUS WITHOUT COMPLICATION, WITHOUT LONG-TERM CURRENT USE OF INSULIN (H): Primary | ICD-10-CM

## 2024-05-02 DIAGNOSIS — J44.9 CHRONIC OBSTRUCTIVE PULMONARY DISEASE, UNSPECIFIED COPD TYPE (H): ICD-10-CM

## 2024-05-02 DIAGNOSIS — I10 ESSENTIAL HYPERTENSION: ICD-10-CM

## 2024-05-02 LAB
ANION GAP SERPL CALCULATED.3IONS-SCNC: 11 MMOL/L (ref 7–15)
BUN SERPL-MCNC: 14.9 MG/DL (ref 8–23)
CALCIUM SERPL-MCNC: 9 MG/DL (ref 8.8–10.2)
CHLORIDE SERPL-SCNC: 104 MMOL/L (ref 98–107)
CREAT SERPL-MCNC: 0.9 MG/DL (ref 0.67–1.17)
CREAT UR-MCNC: 181.7 MG/DL
DEPRECATED HCO3 PLAS-SCNC: 26 MMOL/L (ref 22–29)
EGFRCR SERPLBLD CKD-EPI 2021: 87 ML/MIN/1.73M2
GLUCOSE SERPL-MCNC: 219 MG/DL (ref 70–99)
HBA1C MFR BLD: 9.3 % (ref 0–5.6)
MICROALBUMIN UR-MCNC: <12 MG/L
MICROALBUMIN/CREAT UR: NORMAL MG/G{CREAT}
POTASSIUM SERPL-SCNC: 4.1 MMOL/L (ref 3.4–5.3)
SODIUM SERPL-SCNC: 141 MMOL/L (ref 135–145)

## 2024-05-02 PROCEDURE — 82043 UR ALBUMIN QUANTITATIVE: CPT | Performed by: NURSE PRACTITIONER

## 2024-05-02 PROCEDURE — 82570 ASSAY OF URINE CREATININE: CPT | Performed by: NURSE PRACTITIONER

## 2024-05-02 PROCEDURE — 83036 HEMOGLOBIN GLYCOSYLATED A1C: CPT | Performed by: NURSE PRACTITIONER

## 2024-05-02 PROCEDURE — 99214 OFFICE O/P EST MOD 30 MIN: CPT | Performed by: NURSE PRACTITIONER

## 2024-05-02 PROCEDURE — 36415 COLL VENOUS BLD VENIPUNCTURE: CPT | Performed by: NURSE PRACTITIONER

## 2024-05-02 PROCEDURE — 80048 BASIC METABOLIC PNL TOTAL CA: CPT | Performed by: NURSE PRACTITIONER

## 2024-05-02 ASSESSMENT — PAIN SCALES - GENERAL: PAINLEVEL: NO PAIN (0)

## 2024-05-02 NOTE — PROGRESS NOTES
"  Assessment & Plan     Type 2 diabetes mellitus without complication, without long-term current use of insulin (H)  Poorly controlled diabetes.  Recent course of prednisone likely contributing.  Increase Lantus insulin to 17 units daily.  Call next week if blood sugars are still over 180.  - HEMOGLOBIN A1C; Future  - Albumin Random Urine Quantitative with Creat Ratio; Future  - Basic metabolic panel  (Ca, Cl, CO2, Creat, Gluc, K, Na, BUN); Future  - Basic metabolic panel  (Ca, Cl, CO2, Creat, Gluc, K, Na, BUN)  - Albumin Random Urine Quantitative with Creat Ratio  - HEMOGLOBIN A1C    Chronic obstructive pulmonary disease, unspecified COPD type (H)  Improved.  Patient reports that he is now at baseline.  Encourage patient to continue Symbicort 2 puffs twice daily.  He may continue albuterol as needed    Essential hypertension  Well-controlled  - Basic metabolic panel  (Ca, Cl, CO2, Creat, Gluc, K, Na, BUN); Future  - Basic metabolic panel  (Ca, Cl, CO2, Creat, Gluc, K, Na, BUN)            BMI  Estimated body mass index is 25.31 kg/m  as calculated from the following:    Height as of this encounter: 1.715 m (5' 7.5\").    Weight as of this encounter: 74.4 kg (164 lb).       The risks, benefits and treatment options of prescribed medications or other treatments have been discussed with the patient. The patient verbalized their understanding and should call or follow up if no improvement or if they develop further problems.  BRET Ca   Raul is a 78 year old, presenting for the following health issues:  COPD (Recheck from last visit-  copd exacerbation)        4/18/2024     7:41 AM   Additional Questions   Roomed by Mert MARTINI CMA   Accompanied by self     HPI       COPD Follow-Up-  follow up from last office visit  Copd exacerbation  Finished antibiotics  Using guaifenesin 600mg  Overall, how are your COPD symptoms since your last clinic visit?  Better  How much fatigue or " shortness of breath do you have when you are walking?  Same as usual  How much shortness of breath do you have when you are resting?  None  How often do you cough? Sometimes  Have you noticed any change in your sputum/phlegm?  Yes- reduced from last office visit.   Have you experienced a recent fever? No  Please describe how far you can walk without stopping to rest:  2-5 blocks  How many flights of stairs are you able to walk up without stopping?  3 or more  Have you had any Emergency Room Visits, Urgent Care Visits, or Hospital Admissions because of your COPD since your last office visit?  1- urgent care    History   Smoking Status    Never   Smokeless Tobacco    Never       How many servings of fruits and vegetables do you eat daily?  2-3  On average, how many sweetened beverages do you drink each day (Examples: soda, juice, sweet tea, etc.  Do NOT count diet or artificially sweetened beverages)?   4  How many days per week do you exercise enough to make your heart beat faster? 3 or less  How many minutes a day do you exercise enough to make your heart beat faster? 9 or less-  tries to use his bike, but lately hasn't had anytime to exercise  How many days per week do you miss taking your medication? 0      Diabetes Follow-up    How often are you checking your blood sugar? A few times a week  What time of day are you checking your blood sugars (select all that apply)?  Before meals  Have you had any blood sugars above 200?  Yes   Have you had any blood sugars below 70?  No  What symptoms do you notice when your blood sugar is low?  None  What concerns do you have today about your diabetes? None   Do you have any of these symptoms? (Select all that apply)  No numbness or tingling in feet.  No redness, sores or blisters on feet.  No complaints of excessive thirst.  No reports of blurry vision.  No significant changes to weight.      BP Readings from Last 2 Encounters:   05/02/24 128/78   04/18/24 (!) 152/90  "    Hemoglobin A1C (%)   Date Value   05/02/2024 9.3 (H)   01/16/2024 8.0 (H)   04/09/2021 8.5 (H)   08/27/2020 7.2 (H)     LDL Cholesterol Calculated (mg/dL)   Date Value   10/13/2023 95   04/27/2022 44   04/09/2021 75   01/31/2020 71             Hypertension Follow-up    Do you check your blood pressure regularly outside of the clinic? No   Are you following a low salt diet? Yes  Are your blood pressures ever more than 140 on the top number (systolic) OR more   than 90 on the bottom number (diastolic), for example 140/90? No      Review of Systems  Constitutional, HEENT, cardiovascular, pulmonary, gi and gu systems are negative, except as otherwise noted.      Objective    /78 (BP Location: Right arm, Patient Position: Sitting, Cuff Size: Adult Regular)   Pulse 80   Temp 97.6  F (36.4  C) (Tympanic)   Resp 20   Ht 1.715 m (5' 7.5\")   Wt 74.4 kg (164 lb)   SpO2 94%   BMI 25.31 kg/m    Body mass index is 25.31 kg/m .  Physical Exam   GENERAL: alert and no distress  NECK: no adenopathy, no asymmetry, masses, or scars  RESP: lungs clear to auscultation - no rales, rhonchi or wheezes  CV: regular rate and rhythm, normal S1 S2, no S3 or S4, no murmur, click or rub, no peripheral edema  MS: no gross musculoskeletal defects noted, no edema    Results for orders placed or performed in visit on 05/02/24 (from the past 24 hour(s))   HEMOGLOBIN A1C   Result Value Ref Range    Hemoglobin A1C 9.3 (H) 0.0 - 5.6 %           Signed Electronically by: OSCAR Ca CNP    "

## 2024-05-03 ENCOUNTER — TELEPHONE (OUTPATIENT)
Dept: FAMILY MEDICINE | Facility: CLINIC | Age: 79
End: 2024-05-03
Payer: COMMERCIAL

## 2024-05-03 ENCOUNTER — OFFICE VISIT (OUTPATIENT)
Dept: URGENT CARE | Facility: URGENT CARE | Age: 79
End: 2024-05-03
Payer: COMMERCIAL

## 2024-05-03 VITALS
BODY MASS INDEX: 25.46 KG/M2 | HEART RATE: 81 BPM | DIASTOLIC BLOOD PRESSURE: 81 MMHG | WEIGHT: 165 LBS | OXYGEN SATURATION: 96 % | RESPIRATION RATE: 20 BRPM | TEMPERATURE: 97.5 F | SYSTOLIC BLOOD PRESSURE: 135 MMHG

## 2024-05-03 DIAGNOSIS — B37.42 CANDIDIASIS OF PENIS: Primary | ICD-10-CM

## 2024-05-03 DIAGNOSIS — R30.0 DYSURIA: ICD-10-CM

## 2024-05-03 LAB
ALBUMIN UR-MCNC: NEGATIVE MG/DL
APPEARANCE UR: CLEAR
BILIRUB UR QL STRIP: NEGATIVE
COLOR UR AUTO: YELLOW
GLUCOSE UR STRIP-MCNC: 500 MG/DL
HGB UR QL STRIP: NEGATIVE
KETONES UR STRIP-MCNC: NEGATIVE MG/DL
LEUKOCYTE ESTERASE UR QL STRIP: NEGATIVE
NITRATE UR QL: NEGATIVE
PH UR STRIP: 5.5 [PH] (ref 5–7)
SP GR UR STRIP: 1.02 (ref 1–1.03)
UROBILINOGEN UR STRIP-ACNC: 0.2 E.U./DL

## 2024-05-03 PROCEDURE — 81003 URINALYSIS AUTO W/O SCOPE: CPT | Performed by: PHYSICIAN ASSISTANT

## 2024-05-03 PROCEDURE — 99213 OFFICE O/P EST LOW 20 MIN: CPT | Performed by: PHYSICIAN ASSISTANT

## 2024-05-03 RX ORDER — FLUCONAZOLE 150 MG/1
150 TABLET ORAL
Qty: 3 TABLET | Refills: 0 | Status: SHIPPED | OUTPATIENT
Start: 2024-05-03 | End: 2024-05-10

## 2024-05-03 NOTE — PROGRESS NOTES
Assessment & Plan     Candidiasis of penis  Start topical over the counter antifungal at the onset of symptoms. Will treat with diflucan x 3 days. Keep area clean and dry. Discussed importance of good diabetic control. Return to clinic if symptoms worsen or do not improve; otherwise follow up as needed      - fluconazole (DIFLUCAN) 150 MG tablet; Take 1 tablet (150 mg) by mouth every 3 days for 3 doses    Dysuria    - UA Macroscopic with reflex to Microscopic and Culture                  Return in about 1 week (around 5/10/2024), or if symptoms worsen or fail to improve.          Subjective   Chief Complaint   Patient presents with    Urinary Problem     Thinks has yeast infection on tip of penis, and has had urinary frequency. Picked up antifungal cream from pharmacy.       HPI     UTI    Has redness and irritation of penis. Was diagnosed with yeast infection last fall and treated with fluconazole x 3 and symptoms resolved. Patient would like to try that again.                     Objective    /81   Pulse 81   Temp 97.5  F (36.4  C) (Tympanic)   Resp 20   Wt 74.8 kg (165 lb)   SpO2 96%   BMI 25.46 kg/m    Body mass index is 25.46 kg/m .  Physical Exam  Constitutional:       General: He is not in acute distress.  Genitourinary:     Comments: Penis has diffuse mild redness/irritation. No lesions   Neurological:      Mental Status: He is alert.                    Signed Electronically by: Kylah Robertson PA-C

## 2024-05-03 NOTE — TELEPHONE ENCOUNTER
Patient Returning Call    Reason for call:  Pt would like a call to discuss the results of his test that were completed yesterday    Information relayed to patient:  advised that someone from team would callback    Patient has additional questions:  No      Okay to leave a detailed message?: Yes at Home number on file [phone] (home)

## 2024-05-03 NOTE — TELEPHONE ENCOUNTER
Patient returned call, reviewed lab results and detailed message from Sarita Hennessy DO on 5/2/24 and he verbalized good understanding.     States he has developed a rash on the tip of his penis which he noticed today along with urinary frequency and was told no same day appointment available per [place] nurse and he will be going to  for eval.    Julie Behrendt RN

## 2024-05-30 ENCOUNTER — TELEPHONE (OUTPATIENT)
Dept: FAMILY MEDICINE | Facility: CLINIC | Age: 79
End: 2024-05-30
Payer: COMMERCIAL

## 2024-05-30 NOTE — TELEPHONE ENCOUNTER
Reason for Call:  Appointment Request    Patient requesting this type of appt:    Balance issues, heavy phlegm is coming back and has asthma /84 today    Requested provider:  OSCAR Ca CNP    Reason patient unable to be scheduled: Not within requested timeframe    When does patient want to be seen/preferred time:  TBD    Comments: none    Okay to leave a detailed message?: Yes at Home number on file [phone] (home)    Call taken on 5/30/2024 at 8:46 AM by Tasneem Burgos

## 2024-05-31 NOTE — TELEPHONE ENCOUNTER
Patient made an appointment for Monday with Ami Mahan. Patient denies any further questions or concerns at this time.     Yuliya ENGLE RN  [facility]  [phone]

## 2024-06-03 ENCOUNTER — OFFICE VISIT (OUTPATIENT)
Dept: FAMILY MEDICINE | Facility: CLINIC | Age: 79
End: 2024-06-03
Payer: COMMERCIAL

## 2024-06-03 VITALS
OXYGEN SATURATION: 95 % | RESPIRATION RATE: 28 BRPM | SYSTOLIC BLOOD PRESSURE: 170 MMHG | HEART RATE: 69 BPM | WEIGHT: 167.6 LBS | HEIGHT: 68 IN | TEMPERATURE: 97.8 F | BODY MASS INDEX: 25.4 KG/M2 | DIASTOLIC BLOOD PRESSURE: 92 MMHG

## 2024-06-03 DIAGNOSIS — J44.1 COPD EXACERBATION (H): Primary | ICD-10-CM

## 2024-06-03 PROCEDURE — 99214 OFFICE O/P EST MOD 30 MIN: CPT | Performed by: NURSE PRACTITIONER

## 2024-06-03 RX ORDER — GUAIFENESIN 600 MG/1
1200 TABLET, EXTENDED RELEASE ORAL 2 TIMES DAILY
Qty: 120 TABLET | Refills: 1 | Status: SHIPPED | OUTPATIENT
Start: 2024-06-03

## 2024-06-03 RX ORDER — DOXYCYCLINE HYCLATE 100 MG
100 TABLET ORAL 2 TIMES DAILY
Qty: 20 TABLET | Refills: 0 | Status: SHIPPED | OUTPATIENT
Start: 2024-06-03 | End: 2024-06-13

## 2024-06-03 RX ORDER — PREDNISONE 20 MG/1
40 TABLET ORAL DAILY
Qty: 10 TABLET | Refills: 0 | Status: SHIPPED | OUTPATIENT
Start: 2024-06-03 | End: 2024-06-08

## 2024-06-03 ASSESSMENT — PAIN SCALES - GENERAL: PAINLEVEL: NO PAIN (0)

## 2024-06-03 NOTE — PROGRESS NOTES
Assessment & Plan     COPD exacerbation (H)  - guaiFENesin (MUCINEX) 600 MG 12 hr tablet; Take 2 tablets (1,200 mg) by mouth 2 times daily  - predniSONE (DELTASONE) 20 MG tablet; Take 2 tablets (40 mg) by mouth daily for 5 days  - doxycycline hyclate (VIBRA-TABS) 100 MG tablet; Take 1 tablet (100 mg) by mouth 2 times daily for 10 days      Patient Instructions   Albuterol nebulizer every 6 hours while you are sick.  Mucinex 2 tabs twice daily while you are sick.      May still use the albuterol inhaler every 4 hours if needed.      Use Symbicort (budesonide) inhalers 2 puffs twice daily, every day, no matter how you feel.    Doxycycline (antibiotic) one tablet twice daily for 10 days.    Prednisone 2 tablets daily for 5 days.    Follow up in one week.    The risks, benefits and treatment options of prescribed medications or other treatments have been discussed with the patient. The patient verbalized their understanding and should call or follow up if no improvement or if they develop further problems.  BRET Ca   Raul is a 78 year old, presenting for the following health issues:  Copd Exacerbation and Imm/Inj (covid)        6/3/2024     7:38 AM   Additional Questions   Roomed by Mert MARTINI CMA   Accompanied by self     Imm/Inj    History of Present Illness       COPD:  He presents for follow up of COPD.   Overall, COPD symptoms are slightly worse since last visit. He has same as usual fatigue or shortness of breath with exertion and no shortness of breath at rest.  He often coughs and does have change in sputum. No recent fever. He can walk 2-5 blocks without stopping to rest. He can walk 3 or more flights of stairs without resting. The patient has had no ED, urgent care, or hospital admissions because of COPD since the last visit.     He eats 2-3 servings of fruits and vegetables daily.He consumes 2 sweetened beverage(s) daily.He exercises with enough effort to increase his  "heart rate 30 to 60 minutes per day.  He exercises with enough effort to increase his heart rate 3 or less days per week. He is missing 1 dose(s) of medications per week.  He is not taking prescribed medications regularly due to remembering to take.     He has been more compliant with his Symbicort - taking at least once daily, but still forgetting in the mornings.          BP Readings from Last 6 Encounters:   06/03/24 (!) 170/92   05/03/24 135/81   05/02/24 128/78   04/18/24 (!) 152/90   04/14/24 (!) 163/80   01/16/24 106/70             Review of Systems  Constitutional, HEENT, cardiovascular, pulmonary, gi and gu systems are negative, except as otherwise noted.      Objective    BP (!) 170/92 (BP Location: Right arm, Patient Position: Sitting, Cuff Size: Adult Regular)   Pulse 69   Temp 97.8  F (36.6  C) (Tympanic)   Resp 28   Ht 1.715 m (5' 7.5\")   Wt 76 kg (167 lb 9.6 oz)   SpO2 95%   BMI 25.86 kg/m    Body mass index is 25.86 kg/m .  Physical Exam   GENERAL: alert and no distress  RESP: rhonchi throughout            Signed Electronically by: OSCAR Ca CNP    "

## 2024-06-03 NOTE — PATIENT INSTRUCTIONS
Albuterol nebulizer every 6 hours while you are sick.  Mucinex 2 tabs twice daily while you are sick.      May still use the albuterol inhaler every 4 hours if needed.      Use Symbicort (budesonide) inhalers 2 puffs twice daily, every day, no matter how you feel.    Doxycycline (antibiotic) one tablet twice daily for 10 days.    Prednisone 2 tablets daily for 5 days.

## 2024-07-10 ENCOUNTER — TRANSFERRED RECORDS (OUTPATIENT)
Dept: HEALTH INFORMATION MANAGEMENT | Facility: CLINIC | Age: 79
End: 2024-07-10
Payer: COMMERCIAL

## 2024-08-22 ENCOUNTER — TELEPHONE (OUTPATIENT)
Dept: FAMILY MEDICINE | Facility: CLINIC | Age: 79
End: 2024-08-22
Payer: COMMERCIAL

## 2024-08-22 NOTE — TELEPHONE ENCOUNTER
Patient Quality Outreach    Patient is due for the following:   Diabetes -  Diabetic Follow-Up Visit  Hypertension -  Hypertension follow-up visit    Next Steps:   Schedule a office visit for dm    Type of outreach:    Sent letter.    Next Steps:  Reach out within 90 days via Letter.    Max number of attempts reached: Yes. Will try again in 90 days if patient still on fail list.    Questions for provider review:    None           Maggie Florez MA  Chart routed to .

## 2024-10-01 DIAGNOSIS — E11.9 TYPE 2 DIABETES MELLITUS WITHOUT COMPLICATION, WITHOUT LONG-TERM CURRENT USE OF INSULIN (H): Primary | ICD-10-CM

## 2024-10-02 RX ORDER — PEN NEEDLE, DIABETIC 32GX 5/32"
1 NEEDLE, DISPOSABLE MISCELLANEOUS DAILY
Qty: 100 EACH | Refills: 1 | Status: SHIPPED | OUTPATIENT
Start: 2024-10-02

## 2024-10-02 NOTE — TELEPHONE ENCOUNTER
Prescription approved per [facility] Refill Protocol     Raisa Tolbert     RN MSN

## 2024-11-07 DIAGNOSIS — J45.50 SEVERE PERSISTENT ASTHMA WITHOUT COMPLICATION (H): ICD-10-CM

## 2024-11-08 DIAGNOSIS — J44.1 COPD EXACERBATION (H): ICD-10-CM

## 2024-11-08 RX ORDER — PREDNISONE 20 MG/1
40 TABLET ORAL DAILY
Qty: 10 TABLET | Refills: 0 | OUTPATIENT
Start: 2024-11-08 | End: 2024-11-13

## 2024-11-08 RX ORDER — ALBUTEROL SULFATE 0.83 MG/ML
2.5 SOLUTION RESPIRATORY (INHALATION) EVERY 6 HOURS PRN
Qty: 90 ML | Refills: 11 | Status: SHIPPED | OUTPATIENT
Start: 2024-11-08

## 2024-11-08 NOTE — TELEPHONE ENCOUNTER
"Requested Prescriptions   Pending Prescriptions Disp Refills    albuterol (PROVENTIL) (2.5 MG/3ML) 0.083% neb solution [Pharmacy Med Name: ALBUTEROL SULFATE 2.5 MG/3ML NEBU] 90 mL 11     Sig: TAKE 1 VIAL (2.5 MG) BY NEBULIZATION EVERY 6 HOURS AS NEEDED FOR SHORTNESS OF BREATH OR WHEEZING       Short-Acting Beta Agonist Inhalers Protocol  Failed - 11/8/2024  9:01 AM        Failed - Asthma control assessment score within normal limits in last 6 months     Please review ACT score.           Passed - Patient is age 12 or older        Passed - Medication is active on med list        Passed - Recent (6 mo) or future (30 days) visit within the authorizing provider's specialty     Patient had office visit in the last 6 months or has a visit in the next 30 days with authorizing provider or within the authorizing provider's specialty.  See \"Patient Info\" tab in inbasket, or \"Choose Columns\" in Meds & Orders section of the refill encounter.                 "

## 2024-11-08 NOTE — TELEPHONE ENCOUNTER
Attempted to call pt.Left message for patient to callback.    Jeanine GARZA RN  [facility]

## 2024-11-11 NOTE — TELEPHONE ENCOUNTER
Pt called back. He is unavailable today and tomorrow but I did get him an appt on Wednesday. He is confident it is his asthma flaring and nothing else.    Samira Clark on 11/11/2024 at 7:04 AM

## 2024-11-13 ENCOUNTER — ANCILLARY PROCEDURE (OUTPATIENT)
Dept: GENERAL RADIOLOGY | Facility: CLINIC | Age: 79
End: 2024-11-13
Attending: FAMILY MEDICINE
Payer: COMMERCIAL

## 2024-11-13 ENCOUNTER — OFFICE VISIT (OUTPATIENT)
Dept: FAMILY MEDICINE | Facility: CLINIC | Age: 79
End: 2024-11-13
Payer: COMMERCIAL

## 2024-11-13 VITALS
HEART RATE: 84 BPM | TEMPERATURE: 98 F | SYSTOLIC BLOOD PRESSURE: 126 MMHG | DIASTOLIC BLOOD PRESSURE: 64 MMHG | OXYGEN SATURATION: 92 % | RESPIRATION RATE: 24 BRPM

## 2024-11-13 DIAGNOSIS — I25.10 CORONARY ARTERY DISEASE INVOLVING NATIVE CORONARY ARTERY OF NATIVE HEART WITHOUT ANGINA PECTORIS: ICD-10-CM

## 2024-11-13 DIAGNOSIS — E11.9 TYPE 2 DIABETES MELLITUS WITHOUT COMPLICATION, WITHOUT LONG-TERM CURRENT USE OF INSULIN (H): ICD-10-CM

## 2024-11-13 DIAGNOSIS — R05.8 COUGH PRODUCTIVE OF YELLOW SPUTUM: Primary | ICD-10-CM

## 2024-11-13 DIAGNOSIS — J45.50 SEVERE PERSISTENT ASTHMA WITHOUT COMPLICATION (H): ICD-10-CM

## 2024-11-13 LAB
CHOLEST SERPL-MCNC: 124 MG/DL
EST. AVERAGE GLUCOSE BLD GHB EST-MCNC: 203 MG/DL
FASTING STATUS PATIENT QL REPORTED: YES
HBA1C MFR BLD: 8.7 % (ref 0–5.6)
HDLC SERPL-MCNC: 41 MG/DL
LDLC SERPL CALC-MCNC: 59 MG/DL
NONHDLC SERPL-MCNC: 83 MG/DL
TRIGL SERPL-MCNC: 122 MG/DL

## 2024-11-13 PROCEDURE — 99214 OFFICE O/P EST MOD 30 MIN: CPT | Performed by: FAMILY MEDICINE

## 2024-11-13 PROCEDURE — 71046 X-RAY EXAM CHEST 2 VIEWS: CPT | Mod: TC | Performed by: RADIOLOGY

## 2024-11-13 PROCEDURE — 36415 COLL VENOUS BLD VENIPUNCTURE: CPT | Performed by: FAMILY MEDICINE

## 2024-11-13 PROCEDURE — 80061 LIPID PANEL: CPT | Performed by: FAMILY MEDICINE

## 2024-11-13 PROCEDURE — 83036 HEMOGLOBIN GLYCOSYLATED A1C: CPT | Performed by: FAMILY MEDICINE

## 2024-11-13 RX ORDER — PREDNISONE 20 MG/1
40 TABLET ORAL DAILY
Qty: 10 TABLET | Refills: 0 | Status: SHIPPED | OUTPATIENT
Start: 2024-11-13 | End: 2024-11-18

## 2024-11-13 ASSESSMENT — ASTHMA QUESTIONNAIRES: ACT_TOTALSCORE: 16

## 2024-11-13 ASSESSMENT — PAIN SCALES - GENERAL: PAINLEVEL_OUTOF10: NO PAIN (0)

## 2024-11-13 NOTE — PROGRESS NOTES
Assessment & Plan     Cough productive of yellow sputum  Patient has increased sputum production - has COPD and asthma diagnosis.  CXR obtained to rule out pneumonia vs other occult lung process.  - XR Chest 2 Views    Severe persistent asthma without complication (H)  Discussed CXR images with no focal infiltrate or consolidation.  No weheezing today so not in exacerbation.  Reviewed with patient asthma action plan  and when to take prednisone. Printed AAP document and gave patient.  Reasons to go to ER discussed in detail with patient.   - predniSONE (DELTASONE) 20 MG tablet  Dispense: 10 tablet; Refill: 0    Type 2 diabetes mellitus without complication, without long-term current use of insulin (H)  Lab ordered for surveillance.  Advised if he takes prednisone his glucose could increase.  - HEMOGLOBIN A1C  - HEMOGLOBIN A1C    Coronary artery disease involving native coronary artery of native heart without angina pectoris  Lab ordered for surveillance.  Reinforced heart healthy lifestyle.   - Lipid panel reflex to direct LDL Fasting  - Lipid panel reflex to direct LDL Fasting      Patient Instructions   Final reading of your xray will be available in 24 hours.    You are not wheezing today so no need to start prednisone yet.  However, a prescription for this has bene sent to your pharmacy. If you start wheezing or breathing becomes tighter in the next 1-2 days again, start that and use your albuterol rescue treatments as prescribed.    If your breathing continues to worsen, or if you spike a fever or feel very week, go to the ER.    Do drink 1-2 glasses of water every 2-3 hours or even more often as you r mouth is dry suggesting you lack hydration.    Be consistent with low salt, low trans fat and low saturated fat diet.  Eat food rich in omega-3-fatty acids as you tolerate. (salmon, olive oil)  Eat 5 cups of vegetables, fruits and whole grains per day.  Limit starchy food (white rice, white bread, white pasta,  white potatoes) to less than a cup per meal.  Minimize sweets, junk food and fastfood. Limit soda beverages to one serving per day; best to avoid it altogether though.    Exercise: moderate intensity sustained for at least 30 mins per episode, goal of 150 mins per week at least  Combine cardiovascular and resistance exercises.  These exercise recommendations are in addition to your daily activity at work or home.          Jonathon Carter is a 78 year old, presenting for the following health issues:  Asthma and URI        11/13/2024     7:15 AM   Additional Questions   Roomed by Genet PENN   Accompanied by self     HPI       Asthma Follow-Up    Was ACT completed today?  Yes        11/13/2024     7:24 AM   ACT Total Scores   ACT TOTAL SCORE (Goal Greater than or Equal to 20) 16   In the past 12 months, how many times did you visit the emergency room for your asthma without being admitted to the hospital? 0   In the past 12 months, how many times were you hospitalized overnight because of your asthma? 0       How many days per week do you miss taking your asthma controller medication?  0  Please describe any recent triggers for your asthma: upper respiratory infections  Have you had any Emergency Room Visits, Urgent Care Visits, or Hospital Admissions since your last office visit?  No      Acute Illness  Acute illness concerns: cough, congestion  Onset/Duration: 1 week  Symptoms:  Fever: unsure, does feel warm  Chills/Sweats: YES- slight  Headache (location?): YES  Sinus Pressure: No  Conjunctivitis:  No  Ear Pain: no  Rhinorrhea: YES  Congestion: YES  Sore Throat: No  Cough: YES-productive of green sputum  Wheeze: YES  Decreased Appetite: No  Nausea: No  Vomiting: No  Diarrhea: No  Dysuria/Freq.: No  Dysuria or Hematuria: No  Fatigue/Achiness: YES  Sick/Strep Exposure: No  Therapies tried and outcome: mucinex, inhaler    Patient has hx of asthma.   Responds well to prednisone in past.  Patient said if he uses  albuterol neb, breathing improves transiently.    Patient is fasting for DM labs as well.    Has not tested for covid19 at home.    Review of Systems  Constitutional, HEENT, cardiovascular, pulmonary, GI, , musculoskeletal, neuro, skin, endocrine and psych systems are negative, except as otherwise noted.      Objective    /64   Pulse 84   Temp 98  F (36.7  C) (Tympanic)   Resp 24   SpO2 92%   There is no height or weight on file to calculate BMI.  Physical Exam   GENERAL:  alert and no distress  EYES: pink conjunctivae, no icterus  NECK: mild cervical adenopathy, nontender  HEENT: tympanic membrane intact and pearly bilaterally, nose with mild congestion, no sinus tenderness, throat not erythematous, no exudates, no oral ulcers  RESP: good air entry; equal sounds bilaterally; scattered mild end-expiratory wheezing; no crackles/rales  CV: regular rates and rhythm, normal S1 S2, no S3 or S4 and no murmur  SKIN:  Good turgor, no rashes     Results for orders placed or performed in visit on 11/13/24   HEMOGLOBIN A1C     Status: Abnormal   Result Value Ref Range    Estimated Average Glucose 203 (H) <117 mg/dL    Hemoglobin A1C 8.7 (H) 0.0 - 5.6 %    Narrative    Results consistent with previous, repeat testing unnecessary            Signed Electronically by: Richard Sawant MD

## 2024-11-13 NOTE — LETTER
My Asthma Action Plan    Name: Raul Wilhelm   YOB: 1945  Date: 11/13/2024   My doctor: Richard Sawant MD   My clinic: [facility]        My Control Medicine: Budesonide + formoterol (Symbicort HFA) -  160/4.5 mcg 1 puff twice a day  My Rescue Medicine: Albuterol nebulizer solution every 4 hours as needed for wheezing  Albuterol (Proair/Ventolin/Proventil HFA) 2-4 puffs EVERY 4 HOURS as needed. Use a spacer if recommended by your provider.  My Oral Steroid Medicine: prednisone 20 mg 2 tablets orally once a day in the morning for 5 days if persisetent wheezing My Asthma Severity:   Severe Persistent  Know your asthma triggers: upper respiratory infections  upper respiratory infections            GREEN ZONE   Good Control  I feel good  No cough or wheeze  Can work, sleep and play without asthma symptoms       Take your asthma control medicine every day.     If exercise triggers your asthma, take your rescue medication  15 minutes before exercise or sports, and  During exercise if you have asthma symptoms  Spacer to use with inhaler: If you have a spacer, make sure to use it with your inhaler             YELLOW ZONE Getting Worse  I have ANY of these:  I do not feel good  Cough or wheeze  Chest feels tight  Wake up at night   Keep taking your Green Zone medications  Start taking your rescue medicine:  every 20 minutes for up to 1 hour. Then every 4 hours for 24-48 hours.  If you stay in the Yellow Zone for more than 12-24 hours, contact your doctor.  If you do not return to the Green Zone in 12-24 hours or you get worse, start taking your oral steroid medicine if prescribed by your provider.           RED ZONE Medical Alert - Get Help  I have ANY of these:  I feel awful  Medicine is not helping  Breathing getting harder  Trouble walking or talking  Nose opens wide to breathe       Take your rescue medicine NOW  If your provider has prescribed an oral steroid medicine,  start taking it NOW  Call your doctor NOW  If you are still in the Red Zone after 20 minutes and you have not reached your doctor:  Take your rescue medicine again and  Call 911 or go to the emergency room right away    See your regular doctor within 2 weeks of an Emergency Room or Urgent Care visit for follow-up treatment.          Annual Reminders:  Meet with Asthma Educator,  Flu Shot in the Fall, consider Pneumonia Vaccination for patients with asthma (aged 19 and older).    Pharmacy:    [place], [place] - [address] [facility], [place] - [address] [address]    Electronically signed by Richard Sawant MD   Date: 11/13/24                      Asthma Triggers  How To Control Things That Make Your Asthma Worse    Triggers are things that make your asthma worse.  Look at the list below to help you find your triggers and what you can do about them.  You can help prevent asthma flare-ups by staying away from your triggers.      Trigger                                                          What you can do   Cigarette Smoke  Tobacco smoke can make asthma worse. Do not allow smoking in your home, car or around you.  Be sure no one smokes at a child s day care or school.  If you smoke, ask your health care provider for ways to help you quit.  Ask family members to quit too.  Ask your health care provider for a referral to Quit Plan to help you quit smoking, or call 9-704-054-PLAN.     Colds, Flu, Bronchitis  These are common triggers of asthma. Wash your hands often.  Don t touch your eyes, nose or mouth.  Get a flu shot every year.     Dust Mites  These are tiny bugs that live in cloth or carpet. They are too small to see. Wash sheets and blankets in hot water every week.   Encase pillows and mattress in dust mite proof covers.  Avoid having carpet if you can. If you have carpet, vacuum weekly.   Use a dust mask and HEPA vacuum.   Pollen and Outdoor  Mold  Some people are allergic to trees, grass, or weed pollen, or molds. Try to keep your windows closed.  Limit time out doors when pollen count is high.   Ask you health care provider about taking medicine during allergy season.     Animal Dander  Some people are allergic to skin flakes, urine or saliva from pets with fur or feathers. Keep pets with fur or feathers out of your home.    If you can t keep the pet outdoors, then keep the pet out of your bedroom.  Keep the bedroom door closed.  Keep pets off cloth furniture and away from stuffed toys.     Mice, Rats, and Cockroaches   Some people are allergic to the waste from these pests.   Cover food and garbage.  Clean up spills and food crumbs.  Store grease in the refrigerator.   Keep food out of the bedroom.   Indoor Mold  This can be a trigger if your home has high moisture. Fix leaking faucets, pipes, or other sources of water.   Clean moldy surfaces.  Dehumidify basement if it is damp and smelly.   Smoke, Strong Odors, and Sprays  These can reduce air quality. Stay away from strong odors and sprays, such as perfume, powder, hair spray, paints, smoke incense, paint, cleaning products, candles and new carpet.   Exercise or Sports  Some people with asthma have this trigger. Be active!  Ask your doctor about taking medicine before sports or exercise to prevent symptoms.    Warm up for 5-10 minutes before and after sports or exercise.     Other Triggers of Asthma  Cold air:  Cover your nose and mouth with a scarf.  Sometimes laughing or crying can be a trigger.  Some medicines and food can trigger asthma.

## 2024-11-13 NOTE — PATIENT INSTRUCTIONS
Final reading of your xray will be available in 24 hours.    You are not wheezing today so no need to start prednisone yet.  However, a prescription for this has bene sent to your pharmacy. If you start wheezing or breathing becomes tighter in the next 1-2 days again, start that and use your albuterol rescue treatments as prescribed.    If your breathing continues to worsen, or if you spike a fever or feel very week, go to the ER.    Do drink 1-2 glasses of water every 2-3 hours or even more often as you r mouth is dry suggesting you lack hydration.    Be consistent with low salt, low trans fat and low saturated fat diet.  Eat food rich in omega-3-fatty acids as you tolerate. (salmon, olive oil)  Eat 5 cups of vegetables, fruits and whole grains per day.  Limit starchy food (white rice, white bread, white pasta, white potatoes) to less than a cup per meal.  Minimize sweets, junk food and fastfood. Limit soda beverages to one serving per day; best to avoid it altogether though.    Exercise: moderate intensity sustained for at least 30 mins per episode, goal of 150 mins per week at least  Combine cardiovascular and resistance exercises.  These exercise recommendations are in addition to your daily activity at work or home.

## 2024-12-07 DIAGNOSIS — L20.82 FLEXURAL ECZEMA: ICD-10-CM

## 2024-12-09 RX ORDER — TRIAMCINOLONE ACETONIDE 1 MG/G
CREAM TOPICAL
Qty: 15 G | Refills: 1 | Status: SHIPPED | OUTPATIENT
Start: 2024-12-09

## 2024-12-11 ENCOUNTER — APPOINTMENT (OUTPATIENT)
Dept: CT IMAGING | Facility: CLINIC | Age: 79
End: 2024-12-11
Attending: FAMILY MEDICINE
Payer: COMMERCIAL

## 2024-12-11 ENCOUNTER — ANESTHESIA EVENT (OUTPATIENT)
Dept: EMERGENCY MEDICINE | Facility: CLINIC | Age: 79
End: 2024-12-11
Payer: COMMERCIAL

## 2024-12-11 ENCOUNTER — HOSPITAL ENCOUNTER (OUTPATIENT)
Facility: CLINIC | Age: 79
Setting detail: OBSERVATION
Discharge: HOME | End: 2024-12-12
Attending: FAMILY MEDICINE
Payer: COMMERCIAL

## 2024-12-11 ENCOUNTER — ANESTHESIA (OUTPATIENT)
Dept: EMERGENCY MEDICINE | Facility: CLINIC | Age: 79
End: 2024-12-11
Payer: COMMERCIAL

## 2024-12-11 DIAGNOSIS — S22.41XA CLOSED FRACTURE OF MULTIPLE RIBS OF RIGHT SIDE, INITIAL ENCOUNTER: ICD-10-CM
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
## 2024-12-11 LAB
ALBUMIN SERPL BCG-MCNC: 4.2 G/DL (ref 3.5–5.2)
ALLEN'S TEST: YES
ALP SERPL-CCNC: 95 U/L (ref 40–150)
ALT SERPL W P-5'-P-CCNC: 24 U/L (ref 0–70)
ANION GAP SERPL CALCULATED.3IONS-SCNC: 10 MMOL/L (ref 7–15)
AST SERPL W P-5'-P-CCNC: 18 U/L (ref 0–45)
BASE EXCESS BLDA CALC-SCNC: 1.4 MMOL/L (ref -3–3)
BASOPHILS # BLD AUTO: 0.1 10E3/UL (ref 0–0.2)
BASOPHILS NFR BLD AUTO: 1 %
BILIRUB SERPL-MCNC: 1.7 MG/DL
BUN SERPL-MCNC: 14.3 MG/DL (ref 8–23)
CALCIUM SERPL-MCNC: 9.7 MG/DL (ref 8.8–10.4)
CHLORIDE SERPL-SCNC: 98 MMOL/L (ref 98–107)
COHGB MFR BLD: 97.1 % (ref 95–96)
CREAT SERPL-MCNC: 0.99 MG/DL (ref 0.67–1.17)
EGFRCR SERPLBLD CKD-EPI 2021: 78 ML/MIN/1.73M2
EOSINOPHIL # BLD AUTO: 0.1 10E3/UL (ref 0–0.7)
EOSINOPHIL NFR BLD AUTO: 1 %
ERYTHROCYTE [DISTWIDTH] IN BLOOD BY AUTOMATED COUNT: 12.2 % (ref 10–15)
GLUCOSE BLDC GLUCOMTR-MCNC: 264 MG/DL (ref 70–99)
GLUCOSE SERPL-MCNC: 207 MG/DL (ref 70–99)
HCO3 BLD-SCNC: 28 MMOL/L (ref 21–28)
HCO3 SERPL-SCNC: 28 MMOL/L (ref 22–29)
HCT VFR BLD AUTO: 46.7 % (ref 40–53)
HGB BLD-MCNC: 15.4 G/DL (ref 13.3–17.7)
HOLD SPECIMEN: NORMAL
IMM GRANULOCYTES # BLD: 0.1 10E3/UL
IMM GRANULOCYTES NFR BLD: 1 %
LYMPHOCYTES # BLD AUTO: 0.9 10E3/UL (ref 0.8–5.3)
LYMPHOCYTES NFR BLD AUTO: 8 %
MCH RBC QN AUTO: 30.4 PG (ref 26.5–33)
MCHC RBC AUTO-ENTMCNC: 33 G/DL (ref 31.5–36.5)
MCV RBC AUTO: 92 FL (ref 78–100)
MONOCYTES # BLD AUTO: 0.7 10E3/UL (ref 0–1.3)
MONOCYTES NFR BLD AUTO: 7 %
NEUTROPHILS # BLD AUTO: 9 10E3/UL (ref 1.6–8.3)
NEUTROPHILS NFR BLD AUTO: 83 %
NRBC # BLD AUTO: 0 10E3/UL
NRBC BLD AUTO-RTO: 0 /100
O2/TOTAL GAS SETTING VFR VENT: 28 %
PCO2 BLD: 51 MM HG (ref 35–45)
PH BLD: 7.35 [PH] (ref 7.35–7.45)
PLATELET # BLD AUTO: 247 10E3/UL (ref 150–450)
PO2 BLD: 84 MM HG (ref 80–105)
POTASSIUM SERPL-SCNC: 5 MMOL/L (ref 3.4–5.3)
PROT SERPL-MCNC: 6.9 G/DL (ref 6.4–8.3)
RBC # BLD AUTO: 5.06 10E6/UL (ref 4.4–5.9)
SAO2 % BLDA: 95 % (ref 92–100)
SODIUM SERPL-SCNC: 136 MMOL/L (ref 135–145)
WBC # BLD AUTO: 10.8 10E3/UL (ref 4–11)

## 2024-12-11 PROCEDURE — 99285 EMERGENCY DEPT VISIT HI MDM: CPT | Performed by: FAMILY MEDICINE

## 2024-12-11 PROCEDURE — G0378 HOSPITAL OBSERVATION PER HR: HCPCS

## 2024-12-11 PROCEDURE — 94150 VITAL CAPACITY TEST: CPT

## 2024-12-11 PROCEDURE — 99223 1ST HOSP IP/OBS HIGH 75: CPT

## 2024-12-11 PROCEDURE — 250N000012 HC RX MED GY IP 250 OP 636 PS 637

## 2024-12-11 PROCEDURE — 36600 WITHDRAWAL OF ARTERIAL BLOOD: CPT

## 2024-12-11 PROCEDURE — 94640 AIRWAY INHALATION TREATMENT: CPT

## 2024-12-11 PROCEDURE — 96374 THER/PROPH/DIAG INJ IV PUSH: CPT | Mod: 59 | Performed by: FAMILY MEDICINE

## 2024-12-11 PROCEDURE — 84155 ASSAY OF PROTEIN SERUM: CPT | Performed by: FAMILY MEDICINE

## 2024-12-11 PROCEDURE — 85004 AUTOMATED DIFF WBC COUNT: CPT | Performed by: FAMILY MEDICINE

## 2024-12-11 PROCEDURE — 999N000157 HC STATISTIC RCP TIME EA 10 MIN

## 2024-12-11 PROCEDURE — 71260 CT THORAX DX C+: CPT

## 2024-12-11 PROCEDURE — 99285 EMERGENCY DEPT VISIT HI MDM: CPT | Mod: 25 | Performed by: FAMILY MEDICINE

## 2024-12-11 PROCEDURE — 96376 TX/PRO/DX INJ SAME DRUG ADON: CPT

## 2024-12-11 PROCEDURE — 250N000009 HC RX 250

## 2024-12-11 PROCEDURE — 82962 GLUCOSE BLOOD TEST: CPT

## 2024-12-11 PROCEDURE — 250N000009 HC RX 250: Performed by: NURSE ANESTHETIST, CERTIFIED REGISTERED

## 2024-12-11 PROCEDURE — 84520 ASSAY OF UREA NITROGEN: CPT | Performed by: FAMILY MEDICINE

## 2024-12-11 PROCEDURE — 250N000011 HC RX IP 250 OP 636: Performed by: FAMILY MEDICINE

## 2024-12-11 PROCEDURE — 250N000011 HC RX IP 250 OP 636: Performed by: NURSE ANESTHETIST, CERTIFIED REGISTERED

## 2024-12-11 PROCEDURE — 64999 UNLISTED PX NERVOUS SYSTEM: CPT

## 2024-12-11 PROCEDURE — 250N000013 HC RX MED GY IP 250 OP 250 PS 637

## 2024-12-11 PROCEDURE — 96375 TX/PRO/DX INJ NEW DRUG ADDON: CPT | Performed by: FAMILY MEDICINE

## 2024-12-11 PROCEDURE — 250N000009 HC RX 250: Performed by: FAMILY MEDICINE

## 2024-12-11 PROCEDURE — 36415 COLL VENOUS BLD VENIPUNCTURE: CPT | Performed by: FAMILY MEDICINE

## 2024-12-11 PROCEDURE — 370N000003 HC ANESTHESIA WARD SERVICE: Performed by: NURSE ANESTHETIST, CERTIFIED REGISTERED

## 2024-12-11 PROCEDURE — 82805 BLOOD GASES W/O2 SATURATION: CPT

## 2024-12-11 RX ORDER — ONDANSETRON 4 MG/1
4 TABLET, ORALLY DISINTEGRATING ORAL EVERY 6 HOURS PRN
Status: DISCONTINUED | OUTPATIENT
Start: 2024-12-11 | End: 2024-12-12 | Stop reason: HOSPADM

## 2024-12-11 RX ORDER — ONDANSETRON 2 MG/ML
4 INJECTION INTRAMUSCULAR; INTRAVENOUS ONCE
Status: COMPLETED | OUTPATIENT
Start: 2024-12-11 | End: 2024-12-11

## 2024-12-11 RX ORDER — DEXTROSE MONOHYDRATE 25 G/50ML
25-50 INJECTION, SOLUTION INTRAVENOUS
Status: DISCONTINUED | OUTPATIENT
Start: 2024-12-11 | End: 2024-12-11

## 2024-12-11 RX ORDER — NALOXONE HYDROCHLORIDE 0.4 MG/ML
0.2 INJECTION, SOLUTION INTRAMUSCULAR; INTRAVENOUS; SUBCUTANEOUS
Status: DISCONTINUED | OUTPATIENT
Start: 2024-12-11 | End: 2024-12-12 | Stop reason: HOSPADM

## 2024-12-11 RX ORDER — BUPIVACAINE HYDROCHLORIDE AND EPINEPHRINE 2.5; 5 MG/ML; UG/ML
INJECTION, SOLUTION INFILTRATION; PERINEURAL PRN
Status: DISCONTINUED | OUTPATIENT
Start: 2024-12-11 | End: 2024-12-11

## 2024-12-11 RX ORDER — NALOXONE HYDROCHLORIDE 0.4 MG/ML
0.4 INJECTION, SOLUTION INTRAMUSCULAR; INTRAVENOUS; SUBCUTANEOUS
Status: DISCONTINUED | OUTPATIENT
Start: 2024-12-11 | End: 2024-12-12 | Stop reason: HOSPADM

## 2024-12-11 RX ORDER — DEXAMETHASONE SODIUM PHOSPHATE 4 MG/ML
INJECTION, SOLUTION INTRA-ARTICULAR; INTRALESIONAL; INTRAMUSCULAR; INTRAVENOUS; SOFT TISSUE PRN
Status: DISCONTINUED | OUTPATIENT
Start: 2024-12-11 | End: 2024-12-11

## 2024-12-11 RX ORDER — LIDOCAINE 4 G/G
1 PATCH TOPICAL
Status: DISCONTINUED | OUTPATIENT
Start: 2024-12-12 | End: 2024-12-12 | Stop reason: HOSPADM

## 2024-12-11 RX ORDER — VITAMIN E 268 MG
400 CAPSULE ORAL DAILY
COMMUNITY

## 2024-12-11 RX ORDER — GABAPENTIN 100 MG/1
100 CAPSULE ORAL 3 TIMES DAILY
Status: DISCONTINUED | OUTPATIENT
Start: 2024-12-11 | End: 2024-12-12 | Stop reason: HOSPADM

## 2024-12-11 RX ORDER — PANTOPRAZOLE SODIUM 40 MG/1
40 TABLET, DELAYED RELEASE ORAL
Status: DISCONTINUED | OUTPATIENT
Start: 2024-12-12 | End: 2024-12-12 | Stop reason: HOSPADM

## 2024-12-11 RX ORDER — LOSARTAN POTASSIUM 50 MG/1
50 TABLET ORAL DAILY
Status: DISCONTINUED | OUTPATIENT
Start: 2024-12-12 | End: 2024-12-12 | Stop reason: HOSPADM

## 2024-12-11 RX ORDER — ATORVASTATIN CALCIUM 20 MG/1
40 TABLET, FILM COATED ORAL EVERY EVENING
Status: DISCONTINUED | OUTPATIENT
Start: 2024-12-11 | End: 2024-12-12 | Stop reason: HOSPADM

## 2024-12-11 RX ORDER — CALCIUM CARBONATE 500 MG/1
1000 TABLET, CHEWABLE ORAL 4 TIMES DAILY PRN
Status: DISCONTINUED | OUTPATIENT
Start: 2024-12-11 | End: 2024-12-12 | Stop reason: HOSPADM

## 2024-12-11 RX ORDER — IPRATROPIUM BROMIDE AND ALBUTEROL SULFATE 2.5; .5 MG/3ML; MG/3ML
3 SOLUTION RESPIRATORY (INHALATION)
Status: DISCONTINUED | OUTPATIENT
Start: 2024-12-11 | End: 2024-12-12 | Stop reason: HOSPADM

## 2024-12-11 RX ORDER — AMOXICILLIN 250 MG
2 CAPSULE ORAL 2 TIMES DAILY PRN
Status: DISCONTINUED | OUTPATIENT
Start: 2024-12-11 | End: 2024-12-12 | Stop reason: HOSPADM

## 2024-12-11 RX ORDER — PROCHLORPERAZINE MALEATE 5 MG/1
5 TABLET ORAL EVERY 6 HOURS PRN
Status: DISCONTINUED | OUTPATIENT
Start: 2024-12-11 | End: 2024-12-12 | Stop reason: HOSPADM

## 2024-12-11 RX ORDER — OXYCODONE HYDROCHLORIDE 5 MG/1
5 TABLET ORAL EVERY 4 HOURS PRN
Status: DISCONTINUED | OUTPATIENT
Start: 2024-12-11 | End: 2024-12-12 | Stop reason: HOSPADM

## 2024-12-11 RX ORDER — LOSARTAN POTASSIUM AND HYDROCHLOROTHIAZIDE 12.5; 5 MG/1; MG/1
1 TABLET ORAL DAILY
Status: DISCONTINUED | OUTPATIENT
Start: 2024-12-12 | End: 2024-12-11

## 2024-12-11 RX ORDER — HYDROMORPHONE HYDROCHLORIDE 1 MG/ML
0.5 INJECTION, SOLUTION INTRAMUSCULAR; INTRAVENOUS; SUBCUTANEOUS EVERY 30 MIN PRN
Status: DISCONTINUED | OUTPATIENT
Start: 2024-12-11 | End: 2024-12-11

## 2024-12-11 RX ORDER — NICOTINE POLACRILEX 4 MG
15-30 LOZENGE BUCCAL
Status: DISCONTINUED | OUTPATIENT
Start: 2024-12-11 | End: 2024-12-11

## 2024-12-11 RX ORDER — CARVEDILOL 6.25 MG/1
6.25 TABLET ORAL 2 TIMES DAILY
Status: DISCONTINUED | OUTPATIENT
Start: 2024-12-11 | End: 2024-12-12 | Stop reason: HOSPADM

## 2024-12-11 RX ORDER — MONTELUKAST SODIUM 10 MG/1
10 TABLET ORAL AT BEDTIME
Status: DISCONTINUED | OUTPATIENT
Start: 2024-12-11 | End: 2024-12-12 | Stop reason: HOSPADM

## 2024-12-11 RX ORDER — DEXTROSE MONOHYDRATE 25 G/50ML
25-50 INJECTION, SOLUTION INTRAVENOUS
Status: DISCONTINUED | OUTPATIENT
Start: 2024-12-11 | End: 2024-12-12 | Stop reason: HOSPADM

## 2024-12-11 RX ORDER — ACETAMINOPHEN 325 MG/1
975 TABLET ORAL EVERY 8 HOURS
Status: DISCONTINUED | OUTPATIENT
Start: 2024-12-11 | End: 2024-12-12 | Stop reason: HOSPADM

## 2024-12-11 RX ORDER — ASPIRIN 81 MG/1
81 TABLET ORAL DAILY
Status: DISCONTINUED | OUTPATIENT
Start: 2024-12-12 | End: 2024-12-12 | Stop reason: HOSPADM

## 2024-12-11 RX ORDER — NICOTINE POLACRILEX 4 MG
15-30 LOZENGE BUCCAL
Status: DISCONTINUED | OUTPATIENT
Start: 2024-12-11 | End: 2024-12-12 | Stop reason: HOSPADM

## 2024-12-11 RX ORDER — HYDROCHLOROTHIAZIDE 12.5 MG/1
12.5 TABLET ORAL DAILY
Status: DISCONTINUED | OUTPATIENT
Start: 2024-12-12 | End: 2024-12-12 | Stop reason: HOSPADM

## 2024-12-11 RX ORDER — METHOCARBAMOL 500 MG/1
500 TABLET, FILM COATED ORAL EVERY 6 HOURS PRN
Status: DISCONTINUED | OUTPATIENT
Start: 2024-12-11 | End: 2024-12-12 | Stop reason: HOSPADM

## 2024-12-11 RX ORDER — IOPAMIDOL 755 MG/ML
82 INJECTION, SOLUTION INTRAVASCULAR ONCE
Status: COMPLETED | OUTPATIENT
Start: 2024-12-11 | End: 2024-12-11

## 2024-12-11 RX ORDER — OXYCODONE HYDROCHLORIDE 5 MG/1
10 TABLET ORAL EVERY 4 HOURS PRN
Status: DISCONTINUED | OUTPATIENT
Start: 2024-12-11 | End: 2024-12-12 | Stop reason: HOSPADM

## 2024-12-11 RX ORDER — ONDANSETRON 2 MG/ML
4 INJECTION INTRAMUSCULAR; INTRAVENOUS EVERY 6 HOURS PRN
Status: DISCONTINUED | OUTPATIENT
Start: 2024-12-11 | End: 2024-12-12 | Stop reason: HOSPADM

## 2024-12-11 RX ORDER — AMOXICILLIN 250 MG
1 CAPSULE ORAL 2 TIMES DAILY PRN
Status: DISCONTINUED | OUTPATIENT
Start: 2024-12-11 | End: 2024-12-12 | Stop reason: HOSPADM

## 2024-12-11 RX ADMIN — IOPAMIDOL 82 ML: 755 INJECTION, SOLUTION INTRAVENOUS at 16:56

## 2024-12-11 RX ADMIN — ONDANSETRON 4 MG: 2 INJECTION, SOLUTION INTRAMUSCULAR; INTRAVENOUS at 16:20

## 2024-12-11 RX ADMIN — HYDROMORPHONE HYDROCHLORIDE 0.5 MG: 1 INJECTION, SOLUTION INTRAMUSCULAR; INTRAVENOUS; SUBCUTANEOUS at 16:20

## 2024-12-11 RX ADMIN — ATORVASTATIN CALCIUM 40 MG: 20 TABLET, FILM COATED ORAL at 22:22

## 2024-12-11 RX ADMIN — SODIUM CHLORIDE 61 ML: 9 INJECTION, SOLUTION INTRAVENOUS at 16:57

## 2024-12-11 RX ADMIN — BUPIVACAINE HYDROCHLORIDE AND EPINEPHRINE BITARTRATE 10 ML: 2.5; .005 INJECTION, SOLUTION INFILTRATION; PERINEURAL at 19:20

## 2024-12-11 RX ADMIN — GABAPENTIN 100 MG: 100 CAPSULE ORAL at 22:21

## 2024-12-11 RX ADMIN — MIDAZOLAM 1 MG: 1 INJECTION INTRAMUSCULAR; INTRAVENOUS at 19:03

## 2024-12-11 RX ADMIN — MONTELUKAST 10 MG: 10 TABLET, FILM COATED ORAL at 21:55

## 2024-12-11 RX ADMIN — DEXAMETHASONE SODIUM PHOSPHATE 4 MG: 4 INJECTION, SOLUTION INTRA-ARTICULAR; INTRALESIONAL; INTRAMUSCULAR; INTRAVENOUS; SOFT TISSUE at 19:20

## 2024-12-11 RX ADMIN — HYDROMORPHONE HYDROCHLORIDE 0.5 MG: 1 INJECTION, SOLUTION INTRAMUSCULAR; INTRAVENOUS; SUBCUTANEOUS at 18:42

## 2024-12-11 RX ADMIN — CARVEDILOL 6.25 MG: 6.25 TABLET, FILM COATED ORAL at 22:21

## 2024-12-11 RX ADMIN — IPRATROPIUM BROMIDE AND ALBUTEROL SULFATE 3 ML: 2.5; .5 SOLUTION RESPIRATORY (INHALATION) at 21:41

## 2024-12-11 RX ADMIN — INSULIN GLARGINE 20 UNITS: 100 INJECTION, SOLUTION SUBCUTANEOUS at 22:34

## 2024-12-11 RX ADMIN — ACETAMINOPHEN 975 MG: 325 TABLET ORAL at 21:55

## 2024-12-11 RX ADMIN — BUPIVACAINE HYDROCHLORIDE AND EPINEPHRINE BITARTRATE 10 ML: 2.5; .005 INJECTION, SOLUTION INFILTRATION; PERINEURAL at 19:25

## 2024-12-11 RX ADMIN — BUPIVACAINE HYDROCHLORIDE AND EPINEPHRINE BITARTRATE 10 ML: 2.5; .005 INJECTION, SOLUTION INFILTRATION; PERINEURAL at 19:14

## 2024-12-11 ASSESSMENT — ENCOUNTER SYMPTOMS
NECK PAIN: 0
SHORTNESS OF BREATH: 1
VOMITING: 0
WHEEZING: 0
NAUSEA: 0
WEAKNESS: 0
FEVER: 0
CHILLS: 0
DIARRHEA: 0
COUGH: 0
MEMORY LOSS: 0
FALLS: 1
BACK PAIN: 0
ABDOMINAL PAIN: 0

## 2024-12-11 ASSESSMENT — ACTIVITIES OF DAILY LIVING (ADL)
ADLS_ACUITY_SCORE: 47
ADLS_ACUITY_SCORE: 26
ADLS_ACUITY_SCORE: 47
ADLS_ACUITY_SCORE: 26
ADLS_ACUITY_SCORE: 47

## 2024-12-11 ASSESSMENT — COPD QUESTIONNAIRES: COPD: 1

## 2024-12-11 NOTE — ED PROVIDER NOTES
History     Chief Complaint   Patient presents with    Fall     HPI    Raul Wilhelm is a 78 year old male who comes in from home with his daughter after an injury to his chest.  He was working on his truck bed had some boxes on the tail gate and was trying to get his leg over them and was having difficulty doing this and on the fourth try he ended up falling and striking the boxes and then falling off the truck bed onto the ground striking the right chest.  Now has pleuritic chest pain on the right side.  He says he did not hit his head and does not have any headache or neck pain.  He does not have any low back pain.  He has been able to walk without difficulty since the injury.  He is not having any extremity symptoms.  He has a past history of COPD/asthma and uses inhalers but does not currently smoke.  He took 2 baby aspirin for the pain earlier today and then another 2 prior to coming in.  He has diabetes and is on insulin as well as coronary artery disease with the below note medications.  He does not take any blood thinners.  He has not recently been ill with any cough, cold, fever, flu symptoms.  He is equivocal about whether he has any abdominal pain which is difficult to assess with the right anterior lower rib pain.    Allergies:  Allergies   Allergen Reactions    Simvastatin Swelling     Severe muscle pain, swelling, and bruising    Dust Mites        Problem List:    Patient Active Problem List    Diagnosis Date Noted    Status post total knee replacement 05/28/2020     Priority: Medium    Coronary artery disease involving native coronary artery of native heart without angina pectoris 05/19/2020     Priority: Medium     STEMI 11/2015.  At that time, coronary angiography demonstrated a 99% stenosis in the mid circumflex as well as 100% thrombotic occlusion of the left-sided PDA.  He underwent successful revascularization of the left PDA as well as the mid circumflex with drug-eluting stents at the  time.  Of note, he had an approximately 80% stenosis in the mid-LAD and a 70% stenosis in the first obtuse marginal.  These were not intervened on at the time, and a subsequent stress nuclear perfusion study demonstrated a large inferior and inferoseptal infarction with no evidence of rian-infarct ischemia.  Given these findings and his complete lack of symptoms, we have elected to treat his residual coronary artery disease medically.  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  Patient had syncope with PFT and declines further PFT  FEV1 1.99 61% in 2009      History of arterial ischemic stroke 02/25/2011     Priority: Medium     Small infarct - seen on MRI  Diagnosis updated by automated process. Provider to review and confirm.      Hyperlipidemia LDL goal <70 02/25/2011     Priority: Medium    Essential hypertension 03/15/2010     Priority: Medium    Glaucoma 03/12/2010     Priority: Medium     Worsened pressures with prednisone use.   Recommended by eye doctor to stop inhaled steroids if possible, but has been on for so many years would potentially put him into more resp problems.   Follows with [facility].    Needs eye exam if prednisone course is longer than 10 days        Family hx of  colon cancer 02/18/2009     Priority: Medium     (Problem list name updated by automated process. Provider to review and confirm.)          Past Medical History:    Past Medical History:   Diagnosis Date    Chronic airway obstruction, not elsewhere classified     Other and unspecified hyperlipidemia        Past Surgical History:    Past Surgical History:   Procedure Laterality Date    ARTHROPLASTY KNEE Left 5/28/2020    Procedure: Left Total Knee Arthroplasty;  Surgeon: Sanjay Downey MD;  Location: [facility] OR    ARTHROPLASTY KNEE Right 11/2/2020    Procedure: ARTHROPLASTY, KNEE, TOTAL;  Surgeon: Sanjay Downey MD;  Location: [facility] OR    ENT SURGERY  as a child    tonsillectomy       Family History:    Family History   Problem Relation Age of Onset    Diabetes Mother     Heart Disease Mother     C.A.D. Mother     Cancer Father     Hypertension Father     Cerebrovascular Disease Paternal Grandfather     Asthma Sister     Breast Cancer No family hx of     Cancer - colorectal No family hx of     Prostate Cancer No family hx of        Social History:  Marital Status:   [2]  Social History     Tobacco Use    Smoking status: Never    Smokeless tobacco: Never   Vaping Use    Vaping status: Never Used   Substance Use Topics    Alcohol use: Yes     Comment: Rare    Drug use: No        Medications:    albuterol (PROAIR HFA/PROVENTIL HFA/VENTOLIN HFA) 108 (90 Base) MCG/ACT inhaler  albuterol (PROVENTIL) (2.5 MG/3ML) 0.083% neb solution  Alcohol Swabs (B-D SINGLE USE SWABS REGULAR) PADS  aspirin (ASA) 81 MG EC tablet  atorvastatin (LIPITOR) 40 MG tablet  blood glucose monitoring (NO BRAND SPECIFIED) meter device kit  budesonide-formoterol (SYMBICORT) 80-4.5 MCG/ACT Inhaler  carvedilol (COREG) 6.25 MG tablet  fexofenadine (ALLEGRA) 180 MG tablet  fluocinonide (LIDEX) 0.05 % external cream  glipiZIDE (GLUCOTROL XL) 10 MG 24 hr tablet  guaiFENesin (MUCINEX) 600 MG 12 hr tablet  insulin glargine (LANTUS PEN) 100  UNIT/ML pen  insulin pen needle (BD LIZBETH U/F) 32G X 4 MM miscellaneous  Lancets (ONETOUCH DELICA PLUS [id number]) MISC  losartan-hydrochlorothiazide (HYZAAR) 50-12.5 MG tablet  montelukast (SINGULAIR) 10 MG tablet  omeprazole (PRILOSEC) 20 MG DR capsule  ONETOUCH ULTRA test strip  order for DME  triamcinolone (KENALOG) 0.1 % external cream      Review of Systems  All other systems are reviewed and are negative    Physical Exam   BP: (!) 176/87  Pulse: 76  Temp: 98.2  F (36.8  C)  Resp: 20  SpO2: 92 %      Physical Exam    Nursing note and vitals were reviewed.  Constitutional: Awake and alert, adequately nourished and developed appearing 78-year-old in moderate discomfort, who does not appear acutely ill, and who answers questions appropriately and cooperates with examination.  HEENT: Atraumatic and normocephalic.  No Anaya sign.  No raccoon eyes.  PERRL.  EOMI.   Neck: Freely mobile.  No pain with range of motion of the neck.  No tenderness to palpation or percussion over the cervical spine  Cardiovascular: Cardiac examination reveals normal heart rate and regular rhythm without murmur.  Pulmonary/Chest: Breathing is unlabored.  Breath sounds are clear and equal bilaterally.  There no retractions, tachypnea, rales, wheezes, or rhonchi.  Chest wall is very tender to palpation in the right anterior lower ribs without crepitus or subcutaneous emphysema and without ecchymosis in this area.  Abdomen: Firm with tenderness in the right upper quadrant but no rebound or guarding HSM or masses and no other abdominal tenderness.  Musculoskeletal: Extremities are warm and well-perfused.  Both his hips knees and ankles bilaterally through full range of motion with no discomfort and he can actively move them as well.  He can move his shoulders elbows and wrists through full range of motion with no discomfort in these areas but some of it causes pain in his chest.  Neurological: Alert, oriented, thought content logical, coherent    Skin: Warm, dry, no rashes.  Psychiatric: Affect broad and appropriate.    ED Course        Procedures              Critical Care time:  none              Results for orders placed or performed during the hospital encounter of 12/11/24 (from the past 24 hours)   CBC with platelets differential    Narrative    The following orders were created for panel order CBC with platelets differential.  Procedure                               Abnormality         Status                     ---------                               -----------         ------                     CBC with platelets and d...[[id number]]  Abnormal            Final result                 Please view results for these tests on the individual orders.   Comprehensive metabolic panel   Result Value Ref Range    Sodium 136 135 - 145 mmol/L    Potassium 5.0 3.4 - 5.3 mmol/L    Carbon Dioxide (CO2) 28 22 - 29 mmol/L    Anion Gap 10 7 - 15 mmol/L    Urea Nitrogen 14.3 8.0 - 23.0 mg/dL    Creatinine 0.99 0.67 - 1.17 mg/dL    GFR Estimate 78 >60 mL/min/1.73m2    Calcium 9.7 8.8 - 10.4 mg/dL    Chloride 98 98 - 107 mmol/L    Glucose 207 (H) 70 - 99 mg/dL    Alkaline Phosphatase 95 40 - 150 U/L    AST 18 0 - 45 U/L    ALT 24 0 - 70 U/L    Protein Total 6.9 6.4 - 8.3 g/dL    Albumin 4.2 3.5 - 5.2 g/dL    Bilirubin Total 1.7 (H) <=1.2 mg/dL   CBC with platelets and differential   Result Value Ref Range    WBC Count 10.8 4.0 - 11.0 10e3/uL    RBC Count 5.06 4.40 - 5.90 10e6/uL    Hemoglobin 15.4 13.3 - 17.7 g/dL    Hematocrit 46.7 40.0 - 53.0 %    MCV 92 78 - 100 fL    MCH 30.4 26.5 - 33.0 pg    MCHC 33.0 31.5 - 36.5 g/dL    RDW 12.2 10.0 - 15.0 %    Platelet Count 247 150 - 450 10e3/uL    % Neutrophils 83 %    % Lymphocytes 8 %    % Monocytes 7 %    % Eosinophils 1 %    % Basophils 1 %    % Immature Granulocytes 1 %    NRBCs per 100 WBC 0 <1 /100    Absolute Neutrophils 9.0 (H) 1.6 - 8.3 10e3/uL    Absolute Lymphocytes 0.9 0.8 - 5.3 10e3/uL    Absolute Monocytes 0.7  0.0 - 1.3 10e3/uL    Absolute Eosinophils 0.1 0.0 - 0.7 10e3/uL    Absolute Basophils 0.1 0.0 - 0.2 10e3/uL    Absolute Immature Granulocytes 0.1 <=0.4 10e3/uL    Absolute NRBCs 0.0 10e3/uL   [place] Draw    Narrative    The following orders were created for panel order [place] Draw.  Procedure                               Abnormality         Status                     ---------                               -----------         ------                     Extra Blue Top Tube[[id number]]                              Final result                 Please view results for these tests on the individual orders.   Extra Blue Top Tube   Result Value Ref Range    Hold Specimen [facility]    CT Chest/Abdomen/Pelvis w Contrast    Narrative    EXAM: CT CHEST/ABDOMEN/PELVIS W CONTRAST  LOCATION: [facility]  DATE: 12/11/2024    INDICATION: Fall; chest and abdominal pain, right side.  COMPARISON: None.  TECHNIQUE: CT scan of the chest, abdomen, and pelvis was performed following injection of IV contrast. Multiplanar reformats were obtained. Dose reduction techniques were used.   CONTRAST: 82 mL Isovue 370.    FINDINGS:   LUNGS AND PLEURA: Calcified granuloma in the right upper lobe. Left basilar atelectasis.    MEDIASTINUM/AXILLAE: Normal.    CORONARY ARTERY CALCIFICATION: Severe.    HEPATOBILIARY: Fatty liver.    PANCREAS: Normal.    SPLEEN: Normal.    ADRENAL GLANDS: Normal.    KIDNEYS/BLADDER: A few benign cysts in the kidneys. No follow-up is needed.    BOWEL: Colonic diverticula without CT evidence for diverticulitis.    LYMPH NODES: Normal.    VASCULATURE: Normal.    PELVIC ORGANS: Mild prostatic enlargement.    MUSCULOSKELETAL: Mildly displaced right lateral fifth through seventh rib fractures. Small periumbilical hernia containing a loop of small bowel without evidence of obstruction.      Impression    IMPRESSION:  1.  Mildly displaced acute right fifth through seventh rib fractures. No  pneumothorax.    2.  Small periumbilical hernia containing a decompressed loop of small bowel. Nothing for current obstruction.    3.  Fatty liver.    4.  Mild prostatic enlargement.       Medications   HYDROmorphone (PF) (DILAUDID) injection 0.5 mg (0.5 mg Intravenous $Given 12/11/24 1620)   ondansetron (ZOFRAN) injection 4 mg (4 mg Intravenous $Given 12/11/24 1620)   iopamidol (ISOVUE-370) solution 82 mL (82 mLs Intravenous $Given 12/11/24 1656)   sodium chloride 0.9 % bag 500mL for CT scan flush use (61 mLs Intravenous $Given 12/11/24 1657)       Assessments & Plan (with Medical Decision Making)     78-year-old male presented after a fall out of his truck bed is describing about landing on his right side of his chest and without head injury spine injury or extremity injury.  Physical examination was significant for normal vital signs other than O2 sat of 90 to 93% on room air with mild tachypnea.  Physical examination was significant for rib tenderness on the right without crepitus or subcutaneous emphysema.  CT scan of the chest abdomen pelvis showed rib fractures at .  Given his age and comorbidities with a history of asthma and COPD I recommended hospitalization for observation, pain control, and pulmonary toilet.  We consulted anesthesia for regional anesthesia and they will assess the patient.  He received a dose of hydromorphone intravenously in the emergency department with good control of his pain.  We will initiate incentive spirometry.  I discussed his case with Dr. Chaudhry, in General surgery, who will consult in the morning.  Feel comfortable the patient can be adequately observed on the medical surgical victor and does not need an ICU admission.  I discussed his case with  Sharyn Iyer, of the hospitalist service and they will assume care on admission.    I have reviewed the nursing notes.    I have reviewed the findings, diagnosis, plan and need for follow up with the patient.             New  Prescriptions    No medications on file       Final diagnoses:   Closed fracture of multiple ribs of right side, initial encounter   Fall, initial encounter       12/11/2024   [facility] EMERGENCY DEPT       Antonio Koo MD  12/11/24 [id number]

## 2024-12-11 NOTE — ED TRIAGE NOTES
Fell out of the back of  truck today. Did not hit head. No LOC. Not on thinners. Does complain of rib pain and some SOB, is an asthmatic.

## 2024-12-12 ENCOUNTER — APPOINTMENT (OUTPATIENT)
Dept: PHYSICAL THERAPY | Facility: CLINIC | Age: 79
End: 2024-12-12
Payer: COMMERCIAL

## 2024-12-12 VITALS
HEART RATE: 74 BPM | DIASTOLIC BLOOD PRESSURE: 83 MMHG | OXYGEN SATURATION: 92 % | BODY MASS INDEX: 24.86 KG/M2 | RESPIRATION RATE: 16 BRPM | HEIGHT: 68 IN | SYSTOLIC BLOOD PRESSURE: 160 MMHG | WEIGHT: 164.02 LBS | TEMPERATURE: 97.3 F

## 2024-12-12 LAB
ANION GAP SERPL CALCULATED.3IONS-SCNC: 9 MMOL/L (ref 7–15)
BUN SERPL-MCNC: 16.2 MG/DL (ref 8–23)
CALCIUM SERPL-MCNC: 9 MG/DL (ref 8.8–10.4)
CHLORIDE SERPL-SCNC: 100 MMOL/L (ref 98–107)
CREAT SERPL-MCNC: 1.05 MG/DL (ref 0.67–1.17)
EGFRCR SERPLBLD CKD-EPI 2021: 73 ML/MIN/1.73M2
ERYTHROCYTE [DISTWIDTH] IN BLOOD BY AUTOMATED COUNT: 12 % (ref 10–15)
GLUCOSE BLDC GLUCOMTR-MCNC: 192 MG/DL (ref 70–99)
GLUCOSE BLDC GLUCOMTR-MCNC: 212 MG/DL (ref 70–99)
GLUCOSE BLDC GLUCOMTR-MCNC: 293 MG/DL (ref 70–99)
GLUCOSE SERPL-MCNC: 231 MG/DL (ref 70–99)
HCO3 SERPL-SCNC: 27 MMOL/L (ref 22–29)
HCT VFR BLD AUTO: 44.1 % (ref 40–53)
HGB BLD-MCNC: 14.2 G/DL (ref 13.3–17.7)
MCH RBC QN AUTO: 30.2 PG (ref 26.5–33)
MCHC RBC AUTO-ENTMCNC: 32.2 G/DL (ref 31.5–36.5)
MCV RBC AUTO: 94 FL (ref 78–100)
PLATELET # BLD AUTO: 241 10E3/UL (ref 150–450)
POTASSIUM SERPL-SCNC: 4.8 MMOL/L (ref 3.4–5.3)
RBC # BLD AUTO: 4.7 10E6/UL (ref 4.4–5.9)
SODIUM SERPL-SCNC: 136 MMOL/L (ref 135–145)
WBC # BLD AUTO: 8.8 10E3/UL (ref 4–11)

## 2024-12-12 PROCEDURE — 94150 VITAL CAPACITY TEST: CPT

## 2024-12-12 PROCEDURE — 85041 AUTOMATED RBC COUNT: CPT

## 2024-12-12 PROCEDURE — 85014 HEMATOCRIT: CPT

## 2024-12-12 PROCEDURE — 97161 PT EVAL LOW COMPLEX 20 MIN: CPT | Mod: GP | Performed by: PHYSICAL THERAPIST

## 2024-12-12 PROCEDURE — 82962 GLUCOSE BLOOD TEST: CPT

## 2024-12-12 PROCEDURE — 999N000126 HC STATISTIC PEAK FLOW MEASUREMENT

## 2024-12-12 PROCEDURE — 250N000009 HC RX 250

## 2024-12-12 PROCEDURE — 999N000157 HC STATISTIC RCP TIME EA 10 MIN

## 2024-12-12 PROCEDURE — 94640 AIRWAY INHALATION TREATMENT: CPT

## 2024-12-12 PROCEDURE — G0378 HOSPITAL OBSERVATION PER HR: HCPCS

## 2024-12-12 PROCEDURE — 99239 HOSP IP/OBS DSCHRG MGMT >30: CPT | Performed by: INTERNAL MEDICINE

## 2024-12-12 PROCEDURE — 250N000013 HC RX MED GY IP 250 OP 250 PS 637: Performed by: INTERNAL MEDICINE

## 2024-12-12 PROCEDURE — 80048 BASIC METABOLIC PNL TOTAL CA: CPT

## 2024-12-12 PROCEDURE — 36415 COLL VENOUS BLD VENIPUNCTURE: CPT

## 2024-12-12 PROCEDURE — 250N000013 HC RX MED GY IP 250 OP 250 PS 637

## 2024-12-12 PROCEDURE — 82565 ASSAY OF CREATININE: CPT

## 2024-12-12 PROCEDURE — 99204 OFFICE O/P NEW MOD 45 MIN: CPT | Performed by: SURGERY

## 2024-12-12 PROCEDURE — 250N000012 HC RX MED GY IP 250 OP 636 PS 637

## 2024-12-12 PROCEDURE — 97530 THERAPEUTIC ACTIVITIES: CPT | Mod: GP | Performed by: PHYSICAL THERAPIST

## 2024-12-12 RX ORDER — ACETAMINOPHEN 325 MG/1
650 TABLET ORAL EVERY 8 HOURS
Qty: 42 TABLET | Refills: 0 | Status: ON HOLD | OUTPATIENT
Start: 2024-12-12 | End: 2024-12-15

## 2024-12-12 RX ORDER — LIDOCAINE 4 G/G
1 PATCH TOPICAL EVERY 24 HOURS
Qty: 7 PATCH | Refills: 0 | Status: SHIPPED | OUTPATIENT
Start: 2024-12-12 | End: 2024-12-19

## 2024-12-12 RX ORDER — METHOCARBAMOL 500 MG/1
500 TABLET, FILM COATED ORAL EVERY 6 HOURS PRN
Qty: 30 TABLET | Refills: 0 | Status: SHIPPED | OUTPATIENT
Start: 2024-12-12

## 2024-12-12 RX ADMIN — ASPIRIN 81 MG: 81 TABLET, COATED ORAL at 08:29

## 2024-12-12 RX ADMIN — GABAPENTIN 100 MG: 100 CAPSULE ORAL at 08:29

## 2024-12-12 RX ADMIN — PANTOPRAZOLE SODIUM 40 MG: 40 TABLET, DELAYED RELEASE ORAL at 05:32

## 2024-12-12 RX ADMIN — METHOCARBAMOL 500 MG: 500 TABLET ORAL at 14:17

## 2024-12-12 RX ADMIN — ACETAMINOPHEN 975 MG: 325 TABLET ORAL at 05:32

## 2024-12-12 RX ADMIN — METHOCARBAMOL 500 MG: 500 TABLET ORAL at 08:29

## 2024-12-12 RX ADMIN — HYDROCHLOROTHIAZIDE 12.5 MG: 12.5 TABLET ORAL at 08:29

## 2024-12-12 RX ADMIN — LOSARTAN POTASSIUM 50 MG: 50 TABLET, FILM COATED ORAL at 08:29

## 2024-12-12 RX ADMIN — ACETAMINOPHEN 975 MG: 325 TABLET ORAL at 13:06

## 2024-12-12 RX ADMIN — INSULIN ASPART 2 UNITS: 100 INJECTION, SOLUTION INTRAVENOUS; SUBCUTANEOUS at 08:44

## 2024-12-12 RX ADMIN — GABAPENTIN 100 MG: 100 CAPSULE ORAL at 13:06

## 2024-12-12 RX ADMIN — LIDOCAINE 1 PATCH: 4 PATCH TOPICAL at 08:44

## 2024-12-12 RX ADMIN — IPRATROPIUM BROMIDE AND ALBUTEROL SULFATE 3 ML: 2.5; .5 SOLUTION RESPIRATORY (INHALATION) at 11:26

## 2024-12-12 RX ADMIN — INSULIN ASPART 2 UNITS: 100 INJECTION, SOLUTION INTRAVENOUS; SUBCUTANEOUS at 12:25

## 2024-12-12 RX ADMIN — CARVEDILOL 6.25 MG: 6.25 TABLET, FILM COATED ORAL at 08:29

## 2024-12-12 RX ADMIN — METHOCARBAMOL 500 MG: 500 TABLET ORAL at 01:44

## 2024-12-12 ASSESSMENT — ACTIVITIES OF DAILY LIVING (ADL)
ADLS_ACUITY_SCORE: 33
ADLS_ACUITY_SCORE: 26
ADLS_ACUITY_SCORE: 29
ADLS_ACUITY_SCORE: 26
ADLS_ACUITY_SCORE: 29
ADLS_ACUITY_SCORE: 33
ADLS_ACUITY_SCORE: 26
ADLS_ACUITY_SCORE: 33
ADLS_ACUITY_SCORE: 26
ADLS_ACUITY_SCORE: 33
ADLS_ACUITY_SCORE: 26
ADLS_ACUITY_SCORE: 29
ADLS_ACUITY_SCORE: 26
ADLS_ACUITY_SCORE: 33
ADLS_ACUITY_SCORE: 26

## 2024-12-12 NOTE — ANESTHESIA PROCEDURE NOTES
"Erector spinae Procedure Note    Pre-Procedure   Staff -        CRNA: Roge Velasco APRN CRNA       Performed By: CRNA       Pre-Anesthestic Checklist: patient identified, IV checked, site marked, risks and benefits discussed, informed consent, monitors and equipment checked, pre-op evaluation, at physician/surgeon's request and post-op pain management  Timeout:       Correct Patient: Yes        Correct Procedure: Yes        Correct Site: Yes        Correct Position: Yes        Correct Laterality: Yes        Site Marked: Yes  Procedure Documentation  Procedure: Erector spinae       Laterality: right       Patient Position: supine       Patient Prep/Sterile Barriers: sterile gloves, mask, patient draped       Skin prep: Chloraprep       Local skin infiltrated with 3 mL of 1% lidocaine.        Needle Gauge: 21.        Needle Length (millimeters): 100        Ultrasound guided       1. Ultrasound was used to identify targeted nerve, plexus, vascular marker, or fascial plane and place a needle adjacent to it in real-time.       2. Ultrasound was used to visualize the spread of anesthetic in close proximity to the above referenced structure.       3. A permanent image is entered into the patient's record.       4. The visualized anatomic structures appeared normal.       5. There were no apparent abnormal pathologic findings.    Assessment/Narrative         The placement was negative for: blood aspirated, painful injection and site bleeding       Paresthesias: No.       Bolus given via needle. no blood aspirated via catheter.        Secured via.        Insertion/Infusion Method: Continuous Infusion and Single Shot       Complications: none       Injection made incrementally with aspirations every 5 mL.      FOR [facility] ([facility]) ONLY:   Pain Team Contact information: please page the Pain Team Via [employer]. Search \"Pain\". During daytime hours, please page the attending first. At night please page the resident " first.

## 2024-12-12 NOTE — ED NOTES
[facility]   Admission Handoff    The patient is Raul Wilhelm, 78 year old who arrived in the ED by CAR from home with a complaint of Fall  . The patient's current symptoms are new and during this time the symptoms have remained the same. In the ED, patient was diagnosed with   Final diagnoses:   Closed fracture of multiple ribs of right side, initial encounter   Fall, initial encounter         Needed?: No    Allergies:    Allergies   Allergen Reactions    Simvastatin Swelling     Severe muscle pain, swelling, and bruising    Dust Mites        Past Medical Hx:   Past Medical History:   Diagnosis Date    Chronic airway obstruction, not elsewhere classified     Other and unspecified hyperlipidemia        Initial vitals were: BP: (!) 176/87  Pulse: 76  Temp: 98.2  F (36.8  C)  Resp: 20  SpO2: 92 %   Recent vital Signs: BP (!) 153/85   Pulse 89   Temp 98.2  F (36.8  C) (Oral)   Resp 20   SpO2 91%     Elimination Status: Continent: Yes     Activity Level: SBA- assist of one due to broken ribs    Fall Status: Reason for falls risk:  Mobility  activity supervised    Baseline Mental status: WDL  Current Mental Status changes: at basesline    Infection present or suspected this encounter: no  Sepsis suspected: No    Isolation type: n/a    Bariatric equipment needed?: No    In the ED these meds were given:   Medications   HYDROmorphone (PF) (DILAUDID) injection 0.5 mg (0.5 mg Intravenous $Given 12/11/24 1620)   ondansetron (ZOFRAN) injection 4 mg (4 mg Intravenous $Given 12/11/24 1620)   iopamidol (ISOVUE-370) solution 82 mL (82 mLs Intravenous $Given 12/11/24 1656)   sodium chloride 0.9 % bag 500mL for CT scan flush use (61 mLs Intravenous $Given 12/11/24 1657)       Drips running?  No    Home pump  No    Current LDAs: Peripheral IV: Site Left AC; Gauge 20  none     Results:   Labs/Imaging  Ordered and Resulted from Time of ED Arrival Up to the Time of Departure from the  ED  Results for orders placed or performed during the hospital encounter of 12/11/24 (from the past 24 hours)   CBC with platelets differential    Narrative    The following orders were created for panel order CBC with platelets differential.  Procedure                               Abnormality         Status                     ---------                               -----------         ------                     CBC with platelets and d...[[id number]]  Abnormal            Final result                 Please view results for these tests on the individual orders.   Comprehensive metabolic panel   Result Value Ref Range    Sodium 136 135 - 145 mmol/L    Potassium 5.0 3.4 - 5.3 mmol/L    Carbon Dioxide (CO2) 28 22 - 29 mmol/L    Anion Gap 10 7 - 15 mmol/L    Urea Nitrogen 14.3 8.0 - 23.0 mg/dL    Creatinine 0.99 0.67 - 1.17 mg/dL    GFR Estimate 78 >60 mL/min/1.73m2    Calcium 9.7 8.8 - 10.4 mg/dL    Chloride 98 98 - 107 mmol/L    Glucose 207 (H) 70 - 99 mg/dL    Alkaline Phosphatase 95 40 - 150 U/L    AST 18 0 - 45 U/L    ALT 24 0 - 70 U/L    Protein Total 6.9 6.4 - 8.3 g/dL    Albumin 4.2 3.5 - 5.2 g/dL    Bilirubin Total 1.7 (H) <=1.2 mg/dL   CBC with platelets and differential   Result Value Ref Range    WBC Count 10.8 4.0 - 11.0 10e3/uL    RBC Count 5.06 4.40 - 5.90 10e6/uL    Hemoglobin 15.4 13.3 - 17.7 g/dL    Hematocrit 46.7 40.0 - 53.0 %    MCV 92 78 - 100 fL    MCH 30.4 26.5 - 33.0 pg    MCHC 33.0 31.5 - 36.5 g/dL    RDW 12.2 10.0 - 15.0 %    Platelet Count 247 150 - 450 10e3/uL    % Neutrophils 83 %    % Lymphocytes 8 %    % Monocytes 7 %    % Eosinophils 1 %    % Basophils 1 %    % Immature Granulocytes 1 %    NRBCs per 100 WBC 0 <1 /100    Absolute Neutrophils 9.0 (H) 1.6 - 8.3 10e3/uL    Absolute Lymphocytes 0.9 0.8 - 5.3 10e3/uL    Absolute Monocytes 0.7 0.0 - 1.3 10e3/uL    Absolute Eosinophils 0.1 0.0 - 0.7 10e3/uL    Absolute Basophils 0.1 0.0 - 0.2 10e3/uL    Absolute Immature Granulocytes 0.1 <=0.4  10e3/uL    Absolute NRBCs 0.0 10e3/uL   Morton Draw    Narrative    The following orders were created for panel order Morton Draw.  Procedure                               Abnormality         Status                     ---------                               -----------         ------                     Extra Blue Top Tube[[id number]]                              Final result                 Please view results for these tests on the individual orders.   Extra Blue Top Tube   Result Value Ref Range    Hold Specimen JIC    CT Chest/Abdomen/Pelvis w Contrast    Narrative    EXAM: CT CHEST/ABDOMEN/PELVIS W CONTRAST  LOCATION: [facility]  DATE: 12/11/2024    INDICATION: Fall; chest and abdominal pain, right side.  COMPARISON: None.  TECHNIQUE: CT scan of the chest, abdomen, and pelvis was performed following injection of IV contrast. Multiplanar reformats were obtained. Dose reduction techniques were used.   CONTRAST: 82 mL Isovue 370.    FINDINGS:   LUNGS AND PLEURA: Calcified granuloma in the right upper lobe. Left basilar atelectasis.    MEDIASTINUM/AXILLAE: Normal.    CORONARY ARTERY CALCIFICATION: Severe.    HEPATOBILIARY: Fatty liver.    PANCREAS: Normal.    SPLEEN: Normal.    ADRENAL GLANDS: Normal.    KIDNEYS/BLADDER: A few benign cysts in the kidneys. No follow-up is needed.    BOWEL: Colonic diverticula without CT evidence for diverticulitis.    LYMPH NODES: Normal.    VASCULATURE: Normal.    PELVIC ORGANS: Mild prostatic enlargement.    MUSCULOSKELETAL: Mildly displaced right lateral fifth through seventh rib fractures. Small periumbilical hernia containing a loop of small bowel without evidence of obstruction.      Impression    IMPRESSION:  1.  Mildly displaced acute right fifth through seventh rib fractures. No pneumothorax.    2.  Small periumbilical hernia containing a decompressed loop of small bowel. Nothing for current obstruction.    3.  Fatty liver.    4.  Mild  prostatic enlargement.       For the majority of the shift this patient's behavior was Green     Cardiac Rhythm: Normal Sinus  Pt needs tele? No  Skin/wound Issues: None    Code Status: Full Code    Pain control: good    Nausea control: good    Abnormal labs/tests/findings requiring intervention: n/a    Patient tested for COVID 19 prior to admission: NO     OBS brochure/video discussed/provided to patient/family: No     Family present during ED course? Yes     Family Comments/Social Situation comments: daughter at bedside    Tasks needing completion: None    Rachel Nash RN

## 2024-12-12 NOTE — PLAN OF CARE
Goal Outcome Evaluation:      Plan of Care Reviewed With: patient    Overall Patient Progress: improvingOverall Patient Progress: improving    Outcome Evaluation: Pt is now on RA - maintained O2 sats between 91% and 95% during 10 minute conversation. Pt is using pillow to splint Rt side chest when moving or during coughing. Up with SBA. Pain rated 4/10 to Rt anterior ribs/chest. PRN Robaxin and Lido patch placed for pain. Ate 100%% of breakfast. Anticipate discharge home today after discussion with surgery ESCOBAR Davis. Dr. Golden updated.    Ramon Hollins RN on 12/12/2024 at 9:36 AM

## 2024-12-12 NOTE — ANESTHESIA CARE TRANSFER NOTE
Patient: Raul Wilhelm    Procedure: * No procedures listed *       Diagnosis: * No pre-op diagnosis entered *  Diagnosis Additional Information: No value filed.    Anesthesia Type:   No value filed.     Note:    Oropharynx: oropharynx clear of all foreign objects  Level of Consciousness: awake  Oxygen Supplementation: nasal cannula  Level of Supplemental Oxygen (L/min / FiO2): 3  Independent Airway: airway patency satisfactory and stable  Dentition: dentition unchanged  Vital Signs Stable: post-procedure vital signs reviewed and stable  Report to RN Given: handoff report given  Patient transferred to: Emergency Department    Handoff Report: Identifed the Patient, Identified the Reponsible Provider, Reviewed the pertinent medical history, Discussed the surgical course, Reviewed Intra-OP anesthesia mangement and issues during anesthesia, Set expectations for post-procedure period and Allowed opportunity for questions and acknowledgement of understanding      Vitals:  Vitals Value Taken Time   /71 12/11/24 2000   Temp     Pulse 68 12/11/24 2004   Resp     SpO2 97 % 12/11/24 2004   Vitals shown include unfiled device data.    Electronically Signed By: OSCAR Alas CRNA  December 11, 2024  8:05 PM

## 2024-12-12 NOTE — PLAN OF CARE
Problem: Adult Inpatient Plan of Care  Goal: Plan of Care Review  Description: The Plan of Care Review/Shift note should be completed every shift.  The Outcome Evaluation is a brief statement about your assessment that the patient is improving, declining, or no change.  This information will be displayed automatically on your shift  note.  Flowsheets (Taken 12/11/2024 2306)  Plan of Care Reviewed With: patient  Overall Patient Progress: no change     Problem: Orthopaedic Fracture  Goal: Optimal Functional Ability  Outcome: Progressing     Problem: Orthopaedic Fracture  Goal: Optimal Pain Control and Function  Outcome: Progressing     Problem: Orthopaedic Fracture  Goal: Effective Oxygenation and Ventilation  Intervention: Promote Airway Secretion Clearance  Recent Flowsheet Documentation  Taken 12/11/2024 2211 by Karli Blair RN  Cough And Deep Breathing: done with encouragement  Taken 12/11/2024 2127 by Karli Blair RN  Cough And Deep Breathing: done with encouragement   Goal Outcome Evaluation:      Plan of Care Reviewed With: patient    Overall Patient Progress: no change    Pt instructed to splint R side rib area w/ pillow during movement or coughing. He has required reminders to do this. He has been given instruction on use of IS by both RN & RT; he tries but is having difficulty maintaining a slow deep breath. He continues to take short gasps on the IS & only able to get it up to 750. Reagan CODY is aware. Oxygen @ 2L due to shallow breathing, sats 97%.

## 2024-12-12 NOTE — ED NOTES
Assisted setting patient up for nerve block procedure. Held patient in upright position for procedure and assisted monitoring vitals and watching patient for signs of distress.

## 2024-12-12 NOTE — PLAN OF CARE
Physical Therapy Discharge Summary    Reason for therapy discharge:    All goals and outcomes met, no further needs identified.    Progress towards therapy goal(s). See goals on Care Plan in [facility] electronic health record for goal details.  Goals met    Therapy recommendation(s):    No further therapy is recommended.

## 2024-12-12 NOTE — MEDICATION SCRIBE - ADMISSION MEDICATION HISTORY
Medication Scribe Admission Medication History    Admission medication history is complete. The information provided in this note is only as accurate as the sources available at the time of the update.    Information Source(s): Patient via in-person    Pertinent Information: The patient was interviewed in his room with family. He stated he no longer takes the allegra or mucinex. We added Vitamin E to his list. We went through his list and he stated that all were taken in the evening. At the end of our discussion the patient then stated that he takes half of his medications in the morning and forgets to take his morning meds for 3 to 4 days at a time but will then start taking them with his night medications. I suggested a pill organizer but the patient stated he has one.    Changes made to PTA medication list:  Added: Vitamin E  Deleted: Allegra and Mucinex  Changed: None    Allergies reviewed with patient and updates made in EHR: yes    Medication History Completed By: ELEANOR WONG 12/11/2024 6:47 PM    PTA Med List   Medication Sig Last Dose/Taking    albuterol (PROAIR HFA/PROVENTIL HFA/VENTOLIN HFA) 108 (90 Base) MCG/ACT inhaler Inhale 1-2 puffs into the lungs every 4 hours as needed for shortness of breath or wheezing Past Week    albuterol (PROVENTIL) (2.5 MG/3ML) 0.083% neb solution TAKE 1 VIAL (2.5 MG) BY NEBULIZATION EVERY 6 HOURS AS NEEDED FOR SHORTNESS OF BREATH OR WHEEZING 12/11/2024 at  9:00 AM    Alcohol Swabs (B-D SINGLE USE SWABS REGULAR) PADS USE TO SWAB AREA OF INJECTION / JENISE AS DIRECTED 12/10/2024 Bedtime    atorvastatin (LIPITOR) 40 MG tablet Take 1 tablet (40 mg) by mouth daily 12/10/2024 Evening    budesonide-formoterol (SYMBICORT) 80-4.5 MCG/ACT Inhaler Inhale 2 puffs into the lungs 2 times daily 12/10/2024 at  9:00 PM    carvedilol (COREG) 6.25 MG tablet Take 1 tablet (6.25 mg) by mouth 2 times daily 12/10/2024 at  9:00 PM    fluocinonide (LIDEX) 0.05 % external cream Apply topically 2  times daily 12/10/2024 at  9:00 PM    glipiZIDE (GLUCOTROL XL) 10 MG 24 hr tablet TAKE 1 TABLET (10 MG) BY MOUTH DAILY 12/10/2024 at  9:00 PM    insulin glargine (LANTUS PEN) 100 UNIT/ML pen Inject 20 Units subcutaneously at bedtime. 12/10/2024 at  9:00 PM    insulin pen needle (BD LIZBETH U/F) 32G X 4 MM miscellaneous USE 1 PEN NEEDLE DAILY 12/10/2024 at  9:00 PM    losartan-hydrochlorothiazide (HYZAAR) 50-12.5 MG tablet Take 1 tablet by mouth daily 12/10/2024 at  9:00 PM    montelukast (SINGULAIR) 10 MG tablet TAKE ONE TABLET BY MOUTH AT BEDTIME 12/10/2024 at  9:00 PM    omeprazole (PRILOSEC) 20 MG DR capsule TAKE ONE CAPSULE BY MOUTH ONCE DAILY 12/10/2024 at  9:00 PM    triamcinolone (KENALOG) 0.1 % external cream APPLY TOPICALLY 2 TIMES DAILY TO LEFT ANKLE 12/10/2024 at  9:00 PM    vitamin E (TOCOPHEROL) 400 units (180 mg) capsule Take 400 Units by mouth daily. 12/10/2024 at  9:00 PM

## 2024-12-12 NOTE — CONSULTS
Surgical Consultation  [facility]    Raul is seen in consultation for rib fractures, at the request of Dr. Golden/Dr. Koo.    Chief Complaint:  mechanical fall    History of Present Illness: Raul Wilhelm is a 78 year old male with PMHx DM Type 2, COPD and asthma, HTN, CAD with stent placement in 2015 presents with mechanical fall, found to have multiple rib fractures on the right. Patient was admitted for observation and pain management and given erector spinae block in ED. Patient did have some hypoxia which improved with O2 per NC. Patient is now on room air.   On exam, patient endorses mild right sided chest wall pain and cough.   He denies feeling short of breath, or difficulty breathing. He is using a pillow to splint his coughing which improves his pain. He denies headache, vision changes or feeling light headed.   He denies any other falls. He does not have any abdominal pain or extremity pain. He does not take any blood thinning medications, but per chart review was recommend platelet therapy and declined after his stent/MI.   Patient states that he wants to go home and feels well.       Patient Active Problem List   Diagnosis    Family hx of colon cancer    Glaucoma    Essential hypertension    History of arterial ischemic stroke    Hyperlipidemia LDL goal <70    Pneumonitis, hypersensitivity, naa (H)    Severe persistent asthma without complication (H)    COPD (chronic obstructive pulmonary disease) (H)    Hoarseness of voice    Eczema    Hypokalemia    Coronary artery disease involving native coronary artery of native heart without angina pectoris    Type 2 diabetes mellitus without complication, without long-term current use of insulin (H)    Status post total knee replacement    Fall, initial encounter    Closed fracture of multiple ribs of right side, initial encounter       Past Medical History:   Diagnosis Date    Chronic airway obstruction, not elsewhere classified      Other and unspecified hyperlipidemia        Past Surgical History:   Procedure Laterality Date    ARTHROPLASTY KNEE Left 5/28/2020    Procedure: Left Total Knee Arthroplasty;  Surgeon: Sanjay Downey MD;  Location: [facility] OR    ARTHROPLASTY KNEE Right 11/2/2020    Procedure: ARTHROPLASTY, KNEE, TOTAL;  Surgeon: Sanjay Downey MD;  Location: [facility] OR    ENT SURGERY  as a child    tonsillectomy       Family History   Problem Relation Age of Onset    Diabetes Mother     Heart Disease Mother     C.A.D. Mother     Cancer Father     Hypertension Father     Cerebrovascular Disease Paternal Grandfather     Asthma Sister     Breast Cancer No family hx of     Cancer - colorectal No family hx of     Prostate Cancer No family hx of        Social History     Tobacco Use    Smoking status: Never    Smokeless tobacco: Never   Substance Use Topics    Alcohol use: Yes     Comment: Rare        History   Drug Use No       No current outpatient medications on file.       Allergies   Allergen Reactions    Simvastatin Swelling     Severe muscle pain, swelling, and bruising    Dust Mites        Review of Systems:   Constitutional - Denies fevers, weight loss, malaise, lethargy  Neuro - Denies tremors or seizures  Pulmon - Denies SOB, dyspnea, hemoptysis, chronic cough, intermittent use of an inhaler for asthma/COPD, endorses anterior chest pain at rib fractures  CV - Denies CP, SOB, lower extremity edema, difficulty w/ stairs, has never used NTG  GI - Denies hematemesis, BRBPR, melena, chronic diarrhea or epigastric pain   - Denies hematuria, difficulty voiding, h/o STDs  Hematology - Denies blood clotting disorders, chronic anemias  Dermatology - No melanomas or skin cancers  Rheumatology - No h/o RA  Pysch - Denies depression, bipolar d/o or schizophrenia                            Physical Exam:   Blood pressure (!) 168/82, pulse 72, temperature 97.5  F (36.4  C), temperature source Oral, resp. rate 16, height 1.727 m (5'  "8\"), weight 74.4 kg (164 lb 0.3 oz), SpO2 93%.  Temperature Temp  Av.7  F (36.5  C)  Min: 97.4  F (36.3  C)  Max: 98.2  F (36.8  C)   I/O last 3 completed shifts:  In: -   Out: 825 [Urine:825]    Constitutional- No acute distress, well nourished, non-toxic  Eyes: Anicteric, no injection.  PERRL  ENT:  Normocephalic, atraumatic  Neck - supple, no LAD  Respiratory- nonlabored breathing on room air, O2 saturations at 93% on room air. No palpable deformities to right chest. No bruising or abrasions. No flail chest. No palpable crepitus.   Cardiovascular - Heart Rate normal  Abdomen - Soft, nontender, nondistended. No palpable masses or organomegaly.  Neuro - No focal neuro deficits, Alert and oriented x 3  Psych: Appropriate mood and affect  Musculoskeletal: Normal gait, symmetric strength.  FROM upper and lower extremities. No abrasions or bruising to bilateral upper extremities or lower extremities   Skin: Warm, Dry         Data:   CT Chest/Abd/Pelvis 2024  IMPRESSION:  1.  Mildly displaced acute right fifth through seventh rib fractures. No pneumothorax.     2.  Small periumbilical hernia containing a decompressed loop of small bowel. Nothing for current obstruction.     3.  Fatty liver.     4.  Mild prostatic enlargement.    Recent Labs   Lab Test 24  0545 24  1609 20  0509 10/13/20  0948   WBC 8.8 10.8  --  8.0   HGB 14.2 15.4 12.9* 15.8   HCT 44.1 46.7  --  48.1    247  --  237      Recent Labs   Lab Test 24  0545 24  1609 24  0651    136 141   POTASSIUM 4.8 5.0 4.1   CHLORIDE 100 98 104   CO2 27 28 26   BUN 16.2 14.3 14.9   CR 1.05 0.99 0.90     Recent Labs   Lab Test 24  1609 20  0616 19  0842 18  0028   BILITOTAL 1.7*  --   --  1.4*   ALT 24 36 32 33   AST 18  --   --  15   ALKPHOS 95  --   --  61      No lab results found.  Recent Labs   Lab Test 24  0545 24  1609 24  0651   JOSE RAFAEL 9.0 9.7 9.0     Recent Labs "   Lab Test 12/12/24  0545 12/11/24  1609 05/03/24  1137 05/02/24  0651 10/13/23  1111 10/13/23  0937 09/29/21  0740 05/21/21  0655 11/06/19  0859 03/29/18  0028   ANIONGAP 9 10  --  11   < >  --    < >  --    < > 10   PROTEIN  --   --  Negative  --   --  Negative  --  Negative  --   --    ALBUMIN  --  4.2  --   --   --   --   --   --   --  3.9    < > = values in this interval not displayed.          Assessment and Plan:     Raul Wilhelm is a 78 year old male who presents with 5th through 7th rib fractures without pneumothorax after mechanical fall on 12/11/2024    - Regular diet  - Pain: block, lidocaine patches, acetaminophen scheduled, can consider ibuprofen/toradol scheduled (checking creatinine) and oxycodone PRN   - Labs ok  - Encourage ambulation and IS  -Aggressive pulmonary toilet  -Recommend follow up outpatient with Dr. Chaudhry for umbilical hernia  -Recommend discharge when cleared from medicine team and PT/OT               COOPER MICHAEL PA-C  12/12/2024 at 8:54 AM

## 2024-12-12 NOTE — H&P
[facility]    History and Physical  Hospital Medicine       Date of Admission:  12/11/2024  Date of Service: 12/11/2024     Assessment & Plan   Raul Wilhelm is a 78 year old male who presents on 12/11/2024 with right sided chest wall pain after sustaining fall from approximately 4 feet. He was evaluated with CT CAP showing 3 right sided rib fractures without other acute findings. Having pleuritic chest pains however without hypoxia or respiratory compromise. Trauma clearance by general surgery.    Right sided rib fractures 5th-7th 2/2 fall    Sustained mechanical fall from approximately 4 ft landing on his right side. Having significant right sided chest wall pain that is worsened with breathing. Non-hypoxic (94%) on presentation with desaturation into the upper 80's requiring 2 liters supplemental oxygen after IV hydromorphone. Feeling more short of breath beyond his asthma baseline. Denies cough or concern for respiratory infection PTA. Pulmonary auscultation clear. No tripoding. No altered mental status. CT CAP showing mildly displaced acute right fifth-seventh rib fractures without evidence for pneumothorax. No other acute findings. General surgery consulted and clear from trauma. Denies injury to head or LOC. He is not on anticoagulation, however did take ASA 81 mg x 4 for pain PTA. Denies injury to extremities or joints and was able to ambulated on presentation. PTA he was given nerve block by anesthesia.  - General surgery consultation  - Rib fracture protocol in place  - Pain management with scheduled acetaminophen 975 mg q 8hrs, lidocaine patch q 12hrs, gabapentin 100 mg TID, methocarbamol 500 mg q 6hrs PRN, and oxycodone PO 5 mg/10 mg q4hrs PRN for moderate/severe pain  - Supplemental oxygen PRN to keep saturation above 90%  - Physical therapy and occupational therapy assessment    Asthma and chronic obstructive pulmonary disease (COPD) overlap    Appears stable. No hypoxia  or respiratory distress, however at higher risk for exacerbation as has x 3 acute rib fractures (see above). CT CAP without acute pulmonary findings. COPD managed PTA with montelukast 10 mg, Symbicort inhaler BID, albuterol inhaler PRN, albuterol nebs PRN.   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 Admit blood glucose 207. Last hemoglobin A1c 8.7% 11/2024. Managed PTA with insulin glargine 20 U HS and glipizide 10 mg.  - Continue PTA insulin glargine 20 U  - Medium resistance sliding scale insulin  - Blood glucose checks 4 times daily with meals and HS  - Hypo/hyperglycemia protocol with blood glucose monitoring    Essential hypertension    Chronic. Blood pressures reviewed, largely controlled. Managed PTA with losartan-hydrochlorothiazide 50-12.5 mg.  - Continue losartan-hydrochlorothiazide    Coronary artery disease (CAD)  Dyslipidemia    H/o CAD with acute inferior/inferolateral STEMI 11/2015 and mild Cx and thrombotic L PDA lesions treated with REDD. Residual mLAD 80% and OM1 70%, currently tx'd medically. Lifelong DAPT recommended however has been declined by patient. Currently managed with ASA 81 mg, atorvastatin 40 mg, losartan-hydrochlorothiazide 20-12.5 mg, and carvedilol 6.25 mg BID  - Continue ASA, atorvastatin, carvedilol, and losartan-hydrochlorothiazide    Gastroesophageal reflux disease (GERD)    Chronic. Managed PTA with omeprazole 20 mg.  - Continue omeprazole    Active Problems:    Essential hypertension    Hyperlipidemia LDL goal <70    Severe persistent asthma without complication (H)    COPD (chronic obstructive pulmonary disease) (H)    Coronary artery disease involving native coronary artery of native heart without angina pectoris    Type 2 diabetes mellitus without complication, without long-term current use of insulin (H)    Fall, initial encounter    Closed fracture  of multiple ribs of right side, initial encounter    Clinically Significant Risk Factors Present on Admission     # Drug Induced Platelet Defect: home medication list includes an antiplatelet medication   # Hypertension: Noted on problem list   # DMII: A1C = 8.7 % (Ref range: 0.0 - 5.6 %) within past 6 months       # Asthma: noted on problem list     Diet: Low Consistent Carb (45 g CHO per Meal) Diet    DVT Prophylaxis: Low Risk/Ambulatory with no VTE prophylaxis indicated  Diaz Catheter: Not present  Code Status: Full code  Lines: PIV    Disposition Plan   Medically Ready for Discharge: Anticipated Tomorrow    I have discussed patient and formulated plan with attending hospitalist physician, Dr. Golden.  ROSALINA COLE PA-C        Primary Care Physician   Sarita Hennessy [phone]    History is obtained from the patient (reliable), emergency department physician, and review of old records via the EMR.    History of Present Illness   Raul Wilhelm is a 78 year old male with past medical history of hypertension, dyslipidemia, coronary artery disease, h/o STEMI 2015, insulin dependent type 2 diabetes, asthma/COPD, and GERD now presents on 12/11/2024 with right sided chest wall pain after sustaining fall from approximately 4 feet.    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 He states that he was trying to move some boxes in the bed of his truck and was tying to maneuver over boxes. He was attempting to get his leg over a box with difficulty and on the fourth attempt lost his balance. He first fell onto a box and subsequently fell on the tailgate afterwards landing on his right side. He had immediate pain in the right side of his chest. He denies hitting his head or LOC. Not taking PTA anticoagulation, however does take aspirin 81 mg for coronary artery disease, in which he states taking 4 pills for pain. He is able to ambulated  without issue or pain, denying injuries to extremities or joints. Denies nausea, vomiting, or abdominal pain. He denies cough or concern for respiratory infection.     Review of Systems   Review of Systems   Constitutional:  Negative for chills and fever.   Respiratory:  Positive for shortness of breath. Negative for cough and wheezing.    Cardiovascular:  Positive for chest pain.   Gastrointestinal:  Negative for abdominal pain, diarrhea, nausea and vomiting.   Musculoskeletal:  Positive for falls. Negative for back pain, joint pain and neck pain.        Right sided chest wall pain   Neurological:  Negative for weakness.   Psychiatric/Behavioral:  Negative for memory loss.      Past Medical History    Past Medical History:   Diagnosis Date    Chronic airway obstruction, not elsewhere classified     Other and unspecified hyperlipidemia      Past Surgical History   Past Surgical History:   Procedure Laterality Date    ARTHROPLASTY KNEE Left 5/28/2020    Procedure: Left Total Knee Arthroplasty;  Surgeon: Sanjay Downey MD;  Location: [facility] OR    ARTHROPLASTY KNEE Right 11/2/2020    Procedure: ARTHROPLASTY, KNEE, TOTAL;  Surgeon: Sanjay Downey MD;  Location: [facility] OR    ENT SURGERY  as a child    tonsillectomy      Prior to Admission Medications   Prior to Admission Medications   Prescriptions Last Dose Informant Patient Reported? Taking?   Alcohol Swabs (B-D SINGLE USE SWABS REGULAR) PADS 12/10/2024 Bedtime Self No Yes   Sig: USE TO SWAB AREA OF INJECTION / JENISE AS DIRECTED   Lancets (ONETOUCH DELICA PLUS [id number]) MISC  Self No No   Sig: USE AS DIRECTED WITH LANCING DEVICE TO TEST ONCE DAILY   ONETOUCH ULTRA test strip  Self No No   Sig: USE TO TEST BLOOD SUGAR 1 TIME DAILY OR AS DIRECTED   albuterol (PROAIR HFA/PROVENTIL HFA/VENTOLIN HFA) 108 (90 Base) MCG/ACT inhaler Past Week Self No Yes   Sig: Inhale 1-2 puffs into the lungs every 4 hours as needed for shortness of breath or wheezing   albuterol  (PROVENTIL) (2.5 MG/3ML) 0.083% neb solution 12/11/2024 at  9:00 AM Self No Yes   Sig: TAKE 1 VIAL (2.5 MG) BY NEBULIZATION EVERY 6 HOURS AS NEEDED FOR SHORTNESS OF BREATH OR WHEEZING   aspirin (ASA) 81 MG EC tablet  9:00 PM Self Yes No   Sig: Take 1 tablet (81 mg) by mouth daily   atorvastatin (LIPITOR) 40 MG tablet 12/10/2024 Evening Self No Yes   Sig: Take 1 tablet (40 mg) by mouth daily   blood glucose monitoring (NO BRAND SPECIFIED) meter device kit  Self No No   Sig: Use to test blood sugar 1 times daily or as directed.  Blood Glucose Monitor Brands: per insurance.   budesonide-formoterol (SYMBICORT) 80-4.5 MCG/ACT Inhaler 12/10/2024 at  9:00 PM Self No Yes   Sig: Inhale 2 puffs into the lungs 2 times daily   carvedilol (COREG) 6.25 MG tablet 12/10/2024 at  9:00 PM Self No Yes   Sig: Take 1 tablet (6.25 mg) by mouth 2 times daily   fluocinonide (LIDEX) 0.05 % external cream 12/10/2024 at  9:00 PM Self No Yes   Sig: Apply topically 2 times daily   glipiZIDE (GLUCOTROL XL) 10 MG 24 hr tablet 12/10/2024 at  9:00 PM Self No Yes   Sig: TAKE 1 TABLET (10 MG) BY MOUTH DAILY   insulin glargine (LANTUS PEN) 100 UNIT/ML pen 12/10/2024 at  9:00 PM Self No Yes   Sig: Inject 20 Units subcutaneously at bedtime.   insulin pen needle (BD LIZBETH U/F) 32G X 4 MM miscellaneous 12/10/2024 at  9:00 PM Self No Yes   Sig: USE 1 PEN NEEDLE DAILY   losartan-hydrochlorothiazide (HYZAAR) 50-12.5 MG tablet 12/10/2024 at  9:00 PM Self No Yes   Sig: Take 1 tablet by mouth daily   montelukast (SINGULAIR) 10 MG tablet 12/10/2024 at  9:00 PM Self No Yes   Sig: TAKE ONE TABLET BY MOUTH AT BEDTIME   omeprazole (PRILOSEC) 20 MG DR capsule 12/10/2024 at  9:00 PM Self No Yes   Sig: TAKE ONE CAPSULE BY MOUTH ONCE DAILY   order for DME  Self No No   Sig: Equipment being ordered: arm blood pressure cuff   triamcinolone (KENALOG) 0.1 % external cream 12/10/2024 at  9:00 PM Self No Yes   Sig: APPLY TOPICALLY 2 TIMES DAILY TO LEFT ANKLE   vitamin E  (TOCOPHEROL) 400 units (180 mg) capsule 12/10/2024 at  9:00 PM Self Yes Yes   Sig: Take 400 Units by mouth daily.      Facility-Administered Medications: None     Allergies   Allergies   Allergen Reactions    Simvastatin Swelling     Severe muscle pain, swelling, and bruising    Dust Mites      Family History    Family History   Problem Relation Age of Onset    Diabetes Mother     Heart Disease Mother     C.A.D. Mother     Cancer Father     Hypertension Father     Cerebrovascular Disease Paternal Grandfather     Asthma Sister     Breast Cancer No family hx of     Cancer - colorectal No family hx of     Prostate Cancer No family hx of      Social History   Social History     Socioeconomic History    Marital status:      Spouse name: Not on file    Number of children: Not on file    Years of education: Not on file    Highest education level: Not on file   Occupational History    Not on file   Tobacco Use    Smoking status: Never    Smokeless tobacco: Never   Vaping Use    Vaping status: Never Used   Substance and Sexual Activity    Alcohol use: Yes     Comment: Rare    Drug use: No    Sexual activity: Yes     Partners: Female   Other Topics Concern     Service Not Asked    Blood Transfusions Not Asked    Caffeine Concern Not Asked    Occupational Exposure Yes     Comment: worked for years as  exposed to carbon dust and other chemicals    Hobby Hazards Yes     Comment: Raises pigeons    Sleep Concern Not Asked    Stress Concern Not Asked    Weight Concern Not Asked    Special Diet Not Asked    Back Care Not Asked    Exercise Not Asked    Bike Helmet Not Asked    Seat Belt Not Asked    Self-Exams Not Asked    Parent/sibling w/ CABG, MI or angioplasty before 65F 55M? No   Social History Narrative    Not on file     Social Drivers of Health     Financial Resource Strain: Not on file   Food Insecurity: Not on file   Transportation Needs: Not on file   Physical Activity: Not on file   Stress: Not on  file   Social Connections: Not on file   Interpersonal Safety: Low Risk  (11/13/2024)    Interpersonal Safety     Do you feel physically and emotionally safe where you currently live?: Yes     Within the past 12 months, have you been hit, slapped, kicked or otherwise physically hurt by someone?: No     Within the past 12 months, have you been humiliated or emotionally abused in other ways by your partner or ex-partner?: No   Housing Stability: Not on file     Physical Exam   BP (!) 153/85   Pulse 89   Temp 98.2  F (36.8  C) (Oral)   Resp 20   SpO2 91%      Weight: 0 lbs 0 oz There is no height or weight on file to calculate BMI.     Constitutional: Elderly male who appears stated age, sitting upright in bed, alert, oriented x 4, cooperative, appears distress aggravated by movement, and appears nontoxic.  Eyes: Eyes are clear, pupils are reactive.  HENT: Oropharynx is clear and moist. No evidence of cranial trauma.  Cardiovascular: Regular rate and rhythm, normal S1 and S2, and no murmur noted. JVP is normal. No lower extremity edema bilaterally. Pleuritic chest pain present.  Respiratory: Clear to auscultation bilaterally. Normal respiratory effort on 2 liters supplemental oxygen with saturation 100%.   GI: Soft, flat, non-tender to palpation throughout, normal bowel sounds, no hepatomegaly.  Genitourinary: Deferred  Musculoskeletal: Palpable right sided chest wall pain at level of L5-L7. No ecchymosis. Normal muscle bulk and tone.  Skin: Warm and dry, no rashes.   Neurologic: Neck supple. Cranial nerves are grossly intact.  is symmetric.     Data   Data reviewed today:     I have personally reviewed the following data over the past 24 hrs:    10.8  \   15.4   / 247     136 98 14.3 /  207 (H)   5.0 28 0.99 \     ALT: 24 AST: 18 AP: 95 TBILI: 1.7 (H)   ALB: 4.2 TOT PROTEIN: 6.9 LIPASE: N/A       Recent Results (from the past 24 hours)   CT Chest/Abdomen/Pelvis w Contrast    Narrative    EXAM: CT  CHEST/ABDOMEN/PELVIS W CONTRAST  LOCATION: [facility]  DATE: 12/11/2024    INDICATION: Fall; chest and abdominal pain, right side.  COMPARISON: None.  TECHNIQUE: CT scan of the chest, abdomen, and pelvis was performed following injection of IV contrast. Multiplanar reformats were obtained. Dose reduction techniques were used.   CONTRAST: 82 mL Isovue 370.    FINDINGS:   LUNGS AND PLEURA: Calcified granuloma in the right upper lobe. Left basilar atelectasis.    MEDIASTINUM/AXILLAE: Normal.    CORONARY ARTERY CALCIFICATION: Severe.    HEPATOBILIARY: Fatty liver.    PANCREAS: Normal.    SPLEEN: Normal.    ADRENAL GLANDS: Normal.    KIDNEYS/BLADDER: A few benign cysts in the kidneys. No follow-up is needed.    BOWEL: Colonic diverticula without CT evidence for diverticulitis.    LYMPH NODES: Normal.    VASCULATURE: Normal.    PELVIC ORGANS: Mild prostatic enlargement.    MUSCULOSKELETAL: Mildly displaced right lateral fifth through seventh rib fractures. Small periumbilical hernia containing a loop of small bowel without evidence of obstruction.      Impression    IMPRESSION:  1.  Mildly displaced acute right fifth through seventh rib fractures. No pneumothorax.    2.  Small periumbilical hernia containing a decompressed loop of small bowel. Nothing for current obstruction.    3.  Fatty liver.    4.  Mild prostatic enlargement.

## 2024-12-12 NOTE — PROGRESS NOTES
[facility]  OUTPATIENT PHYSICAL THERAPY EVALUATION  PLAN OF TREATMENT FOR OUTPATIENT REHABILITATION  (COMPLETE FOR INITIAL CLAIMS ONLY)  Patient's Last Name, First Name, M.I.  YOB: 1945  Raul Wilhelm                        Provider's Name  [facility] Medical Record No.  [id number]                             Onset Date:  12/11/24   Start of Care Date:  12/12/24   Type:     _X_PT   ___OT   ___SLP Medical Diagnosis:  rib Fx, fall              PT Diagnosis:  impaired functional mobility Visits from SOC:  1     See note for plan of treatment, functional goals and certification details    I CERTIFY THE NEED FOR THESE SERVICES FURNISHED UNDER        THIS PLAN OF TREATMENT AND WHILE UNDER MY CARE     (Physician co-signature of this document indicates review and certification of the therapy plan).                12/12/24 0839   Appointment Info   Signing Clinician's Name / Credentials (PT) Leonor Michelle PT   Quick Adds   Quick Adds Certification   Living Environment   People in Home spouse   Current Living Arrangements house   Home Accessibility stairs to enter home;stairs within home   Number of Stairs, Main Entrance 3   Stair Railings, Main Entrance   (grab bar)   Number of Stairs, Within Home, Primary   (up to bedroom)   Living Environment Comments bedroom upstairs, wife is at TCU currently   Self-Care   Usual Activity Tolerance good   Current Activity Tolerance moderate   Equipment Currently Used at Home none   Fall history within last six months yes   Activity/Exercise/Self-Care Comment indep wihtcuong AD, [place], uses skidsteer to shovel out to the barn, DTR and GDTR help occ with cleaning, pt working on selling assets as trying to get wife into LTC   General Information   Onset of Illness/Injury or Date of Surgery 12/11/24   Referring Physician Reagan Bradford PA-C   Patient/Family Therapy Goals Statement (PT) to return home if  able   Pertinent History of Current Problem (include personal factors and/or comorbidities that impact the POC) fall from truck bed, rib Fx R 5-7   Existing Precautions/Restrictions fall   Cognition   Affect/Mental Status (Cognition) WFL   Cognitive Status Comments with questions about things that happened yesterday while here did have trouble with recall, no trouble with recall about longer term memory items   Pain Assessment   Patient Currently in Pain Yes, see Vital Sign flowsheet  (2/10)   Bed Mobility   Comment, (Bed Mobility) attempted supine to sit on his own, bracing R side with pillow, was not able to get self up, min A for shoulders to sitting   Transfers   Comment, (Transfers) S to indep sit<>stand   Gait/Stairs (Locomotion)   [place] Level (Gait) supervision   Assistive Device (Gait)   (none)   Distance in Feet (Gait) 25'   Pattern (Gait) step-through   Deviations/Abnormal Patterns (Gait) stride length decreased   Balance   Balance Comments static stance no LOB, turns no LOB   Clinical Impression   Criteria for Skilled Therapeutic Intervention Yes, treatment indicated   PT Diagnosis (PT) impaired functional mobility   Influenced by the following impairments pain   Functional limitations due to impairments impaired bed mobility and transfers/gait from baseline   Clinical Presentation (PT Evaluation Complexity) stable   Clinical Presentation Rationale clinical judgement   Clinical Decision Making (Complexity) low complexity   PT Total Evaluation Time   PT Eval, Low Complexity Minutes (80531) 10   Therapy Certification   Start of care date 12/12/24   Certification date from 12/12/24   Certification date to 12/14/24   Medical Diagnosis rib Fx, fall   Physical Therapy Goals   PT Frequency Daily   PT Predicted Duration/Target Date for Goal Attainment 12/14/24   PT Goals Bed Mobility;Transfers;Gait;Stairs   PT: Bed Mobility Independent;Within precautions   PT: Transfers Independent;Within precautions   PT:  Gait Independent;Within precautions;100 feet;Completed   PT: Stairs Modified independent;5 stairs;Within precautions;Rail on left;Completed   Interventions   Interventions Quick Adds Therapeutic Activity   Therapeutic Activity   Therapeutic Activities: dynamic activities to improve functional performance Minutes (20863) 25   Symptoms Noted During/After Treatment Increased pain   Treatment Detail/Skilled Intervention instruction for logroll for bed mobility and sit to supine min A, edu on possibly sleeping in recliner if getting out of bed alone at home is not an option as currently needing a little help, edu about dressing putting R arm in 1st, button up shirt if able, not lifting and carrying asmita R side but no heavy lifting, not shoveling, not carrying things on the steps, having help with cleaning floors and groceries, edu that [facility] wife is at should know he would not be able to physically assist her if she came home soon, ambulation 200 ft S to indep, bracing with pillow, steps with L rail reciprocal, edu on making sure feet fully on the step before stepping as pillow blocks view down.   PT Discharge Planning   PT Plan daily- bed mob, transfers, likely 1 more session   PT Discharge Recommendation (DC Rec) home;home with assist   PT Rationale for DC Rec may need assist for groceries, snow removal, cleaning floors   PT Brief overview of current status 225' supervision to indep, steps mod I safely, min A bed mob (may need to sleep in recliner at home)   Physical Therapy Time and Intention   Timed Code Treatment Minutes 25   Total Session Time (sum of timed and untimed services) 35

## 2024-12-12 NOTE — ANESTHESIA PREPROCEDURE EVALUATION
Anesthesia Pre-Procedure Evaluation    Patient: Raul Wilhelm   MRN: [id number] : 1945        Procedure :           Past Medical History:   Diagnosis Date    Chronic airway obstruction, not elsewhere classified     Other and unspecified hyperlipidemia       Past Surgical History:   Procedure Laterality Date    ARTHROPLASTY KNEE Left 2020    Procedure: Left Total Knee Arthroplasty;  Surgeon: Sanjay Downey MD;  Location: [facility]    ARTHROPLASTY KNEE Right 2020    Procedure: ARTHROPLASTY, KNEE, TOTAL;  Surgeon: Sanjay Downey MD;  Location: [facility]    ENT SURGERY  as a child    tonsillectomy      Allergies   Allergen Reactions    Simvastatin Swelling     Severe muscle pain, swelling, and bruising    Dust Mites       Social History     Tobacco Use    Smoking status: Never    Smokeless tobacco: Never   Substance Use Topics    Alcohol use: Yes     Comment: Rare      Wt Readings from Last 1 Encounters:   24 76 kg (167 lb 9.6 oz)        Anesthesia Evaluation   Pt has had prior anesthetic.         ROS/MED HX  ENT/Pulmonary:     (+)                     Severe Persistent, asthma    COPD,              Neurologic:     (+)          CVA,                      Cardiovascular:     (+) Dyslipidemia hypertension- -  CAD -  - -                                 Previous cardiac testing   Echo: Date: 10/2022 Results:  Interpretation Summary     Left ventricular systolic function is normal.  The visual ejection fraction is 55-60%.  No regional wall motion abnormalities noted.  The right ventricle is normal in size and function.  Trace mitral and tricuspid valve regurgitation.  No significant aortic valve disease.  Normal inferior vena cava.     Compared to prior study, there is no significant change.  ______________________________________________________________________________  Left Ventricle  The left ventricle is normal in size. There is normal left ventricular wall  thickness. Left ventricular  systolic function is normal. The visual ejection  fraction is 55-60%. Grade I or early diastolic dysfunction. No regional wall  motion abnormalities noted.     Right Ventricle  The right ventricle is normal in size and function.     Atria  Normal left atrial size. Right atrial size is normal. There is no color  Doppler evidence of an atrial shunt.     Mitral Valve  The mitral valve leaflets appear normal. There is no evidence of stenosis,  fluttering, or prolapse. There is trace mitral regurgitation.     Tricuspid Valve  Normal tricuspid valve. There is trace tricuspid regurgitation. Right  ventricular systolic pressure could not be approximated due to inadequate  tricuspid regurgitation.     Aortic Valve  There is mild trileaflet aortic sclerosis. No aortic regurgitation is present.  No aortic stenosis is present.     Pulmonic Valve  The pulmonic valve is not well visualized. There is trace pulmonic valvular  regurgitation.     Vessels  The aortic root is normal size. Normal size ascending aorta. The inferior vena  cava is normal.     Pericardium  There is no pericardial effusion.     Rhythm  Sinus rhythm was noted.  ________________________________________________________________    Stress Test:  Date: 12/2022 Results:  The nuclear stress test is abnormal.     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 No significant reversible ischemia.     Left ventricular function is normal.     The left ventricular ejection fraction at rest is 64%.  The left ventricular ejection fraction at stress is 67%.     LV cavity size small.     Stress to rest cavity ratio is 0.97.  TID is absent.     A prior study was conducted on 2/18/2019.  This study has no significant change when compared with the prior study.    ECG Reviewed:  Date: Results:    Cath:  Date: Results:      METS/Exercise Tolerance:     Hematologic:       Musculoskeletal:        GI/Hepatic:       Renal/Genitourinary:       Endo:     (+)  type II DM, Last HgA1c: 8.7, date: 11/24,                  Psychiatric/Substance Use:       Infectious Disease:       Malignancy:       Other:            Physical Exam    Airway  airway exam normal      Mallampati: I   TM distance: > 3 FB   Neck ROM: full   Mouth opening: > 3 cm    Respiratory Devices and Support         Dental       (+) Minor Abnormalities - some fillings, tiny chips      Cardiovascular   cardiovascular exam normal       Rhythm and rate: regular and normal     Pulmonary           breath sounds clear to auscultation           OUTSIDE LABS:  CBC:   Lab Results   Component Value Date    WBC 10.8 12/11/2024    WBC 8.0 10/13/2020    HGB 15.4 12/11/2024    HGB 12.9 (L) 11/03/2020    HCT 46.7 12/11/2024    HCT 48.1 10/13/2020     12/11/2024     10/13/2020     BMP:   Lab Results   Component Value Date     12/11/2024     05/02/2024    POTASSIUM 5.0 12/11/2024    POTASSIUM 4.1 05/02/2024    CHLORIDE 98 12/11/2024    CHLORIDE 104 05/02/2024    CO2 28 12/11/2024    CO2 26 05/02/2024    BUN 14.3 12/11/2024    BUN 14.9 05/02/2024    CR 0.99 12/11/2024    CR 0.90 05/02/2024     (H) 12/11/2024     (H) 05/02/2024     COAGS:   Lab Results   Component Value Date    INR 0.87 11/14/2015     POC:   Lab Results   Component Value Date     (H) 11/02/2020     HEPATIC:   Lab Results   Component Value Date    ALBUMIN 4.2 12/11/2024    PROTTOTAL 6.9 12/11/2024    ALT 24 12/11/2024    AST 18 12/11/2024    ALKPHOS 95 12/11/2024    BILITOTAL 1.7 (H) 12/11/2024     OTHER:   Lab Results   Component Value Date    LACT 1.0 03/29/2018    A1C 8.7 (H) 11/13/2024    JOSE RAFAEL 9.7 12/11/2024    MAG 2.4 (H) 01/07/2011    LIPASE 193 11/14/2015    TSH 4.81 01/10/2011    CRP 5.5 01/10/2011    SED 1 01/10/2011       Anesthesia Plan    ASA Status:  3    NPO Status:  NPO Appropriate                  Consents    Anesthesia Plan(s) and associated  risks, benefits, and realistic alternatives discussed. Questions answered and patient/representative(s) expressed understanding.     - Discussed: Risks, Benefits and Alternatives for BOTH SEDATION and the PROCEDURE were discussed     - Discussed with:  Patient      - Extended Intubation/Ventilatory Support Discussed: No.      - Patient is DNR/DNI Status: No     Use of blood products discussed: No .     Postoperative Care    Pain management: Peripheral nerve block (Single Shot).        Comments:               OSCAR Alas CRNA    I have reviewed the pertinent notes and labs in the chart from the past 30 days and (re)examined the patient.  Any updates or changes from those notes are reflected in this note.             # Drug Induced Platelet Defect: home medication list includes an antiplatelet medication   # Hypertension: Noted on problem list          # DMII: A1C = 8.7 % (Ref range: 0.0 - 5.6 %) within past 6 months       # Asthma: noted on problem list

## 2024-12-12 NOTE — ED NOTES
"Patient has  [place] to Observation  order. Patient has been given the Observation brochure -  What does Observation mean to me.\"  Patient has been given the opportunity to ask questions about observation status and their plan of care.      Jenny Downey RN   "

## 2024-12-12 NOTE — PROGRESS NOTES
"[facility] ADMISSION NOTE    Patient admitted to room 2211 at approximately 2054 via cart from emergency room. Patient was accompanied by transport tech.     Verbal SBAR report received from Rachel KISER prior to patient arrival.     Patient ambulated to bed with one assist. Patient alert and oriented X 4  . Pain is controlled with current analgesics.  Medication(s) being used: acetaminophen, narcotic analgesics including dilaudid, and nerve block.  . Admission vital signs: Blood pressure (!) 154/80, pulse 74, temperature 97.4  F (36.3  C), temperature source Oral, resp. rate 20, height 1.727 m (5' 8\"), weight 74.8 kg (164 lb 14.5 oz), SpO2 97%. Patient was oriented to plan of care, call light, bed controls, tv, telephone, bathroom, and visiting hours.     Risk Assessment    The following safety risks were identified during admission: fall. Yellow risk band applied: YES.     Skin Initial Assessment    This writer admitted this patient and completed a full skin assessment and Joseph score in the Adult PCS flowsheet.   Photo documentation of skin problem and/or wound competed via [employer] application (located under Media):  N/A    Appropriate interventions initiated as needed.     Secondary skin check completed by Shawna KISER.    Joseph Risk Assessment  Sensory Perception: 4-->no impairment  Moisture: 4-->rarely moist  Activity: 3-->walks occasionally  Mobility: 3-->slightly limited  Nutrition: 3-->adequate  Friction and Shear: 3-->no apparent problem  Joseph Score: 20  Mattress: Standard gel/foam mattress (IsoFlex, Atmos Air, etc.)  Bed Frame: Standard width and length    Education    Patient has a [place] to Observation order: Yes  Observation education completed and documented: Yes      Karli Blair RN      "

## 2024-12-12 NOTE — PROGRESS NOTES
Patient is alert and orientated and will let his needs be known. He will use the urinal at the bedside. He has a left PIV that is saline locked. He does the incentive spirometer at a fair amt and has gotten it up to 1000. He has shallow breathing and the MD is aware from prior shift. He will have PT/OT. He is on 2liters of oxygen via nasal cannula. He splints with a pillow, but needs to be reminded of this. He is a type 2 diabetic and his BG was 293 at 0200. He received Robaxin for muscle pain, and it helped him.

## 2024-12-12 NOTE — ED NOTES
Patient given block for Right sided broken ribs. Patient given 1mg Versed by [facility] Guan just after receiving Dilaudid,(anesthesia aware) patient on EtC02, oxygen turned up to 5 Liters during sedation, patient maintaining oxygen saturations and position (over bed side table) with assistance of tech.  Patient awake to verbal stimulus throughout procedure.  Block uneventful.  Patient awake, but sleepy after.  VSS, patient oxygen turned down to 2Lpm NC once block complete.  Patient will be monitored for 1 hour in the emergency department per CRNA prior to transferring to med- surg.

## 2024-12-12 NOTE — PLAN OF CARE
Goal Outcome Evaluation:      Plan of Care Reviewed With: patient    Overall Patient Progress: improvingOverall Patient Progress: improving    Outcome Evaluation: Pt's medications have been paid for over the phone via the Pt's [employer] card. Pt's medications have been picked up from Pharmacy and delivered to the Pt's room. All medications and discharge instructions reviewed with the Pt. Pt is ready for discharge when he daughter arrives at 1500.        [facility] DISCHARGE NOTE    Patient discharged to home at 3:10 PM via wheel chair. Accompanied by daughter and staff. Discharge instructions reviewed with patient and daughter, opportunity offered to ask questions. Prescriptions filled and sent with patient upon discharge. All belongings sent with patient.    Ramon Hollins RN on 12/12/2024 at 3:20 PM

## 2024-12-12 NOTE — DISCHARGE SUMMARY
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  The following labs/tests are recommended: CBC and BMP.              Discharge Disposition   Discharged to home    Hospital Course   Raul Wilhelm is a 78 year old male who presents on 12/11/2024 with right sided chest wall pain after sustaining fall from approximately 4 feet. He was evaluated with CT CAP showing 3 right sided rib fractures without other acute findings. Having pleuritic chest pains however without hypoxia or respiratory compromise. Trauma clearance by general surgery.    Right sided rib fractures 5th-7th 2/2 fall    Sustained mechanical fall from approximately 4 ft landing on his right side. Having significant right sided chest wall pain that is worsened with breathing. Non-hypoxic (94%) on presentation with desaturation into the upper 80's requiring 2 liters supplemental oxygen after IV hydromorphone. Feeling more short of breath beyond his asthma baseline. Denies cough or concern for respiratory infection PTA. Pulmonary auscultation clear. No tripoding. No altered mental status. CT CAP showing mildly displaced acute right fifth-seventh rib fractures without evidence for pneumothorax. No other acute findings. General surgery consulted and clear from trauma. Denies injury to head or LOC. He is not on  anticoagulation, however did take ASA 81 mg x 4 for pain PTA. Denies injury to extremities or joints and was able to ambulated on presentation. PTA he was given nerve block by anesthesia.  - General surgery trauma consultation complete  - Rib fracture protocol in place  - No therapy needs per physical therapy  - Discharge home with acetaminophen, Robaxin, and lidocaine patch    Asthma and chronic obstructive pulmonary disease (COPD) overlap    Appears stable. No hypoxia or respiratory distress, however at higher risk for exacerbation as has x 3 acute rib fractures (see above). CT CAP without acute pulmonary findings. COPD managed PTA with montelukast 10 mg, Symbicort inhaler BID, albuterol inhaler PRN, albuterol nebs PRN.   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 Admit blood glucose 207. Last hemoglobin A1c 8.7% 11/2024. Managed PTA with insulin glargine 20 U HS and glipizide 10 mg.  - Continue PTA insulin glargine 20 U  - Resume glipizide at discharge    Essential hypertension    Chronic. Blood pressures reviewed, largely controlled. Managed PTA with losartan-hydrochlorothiazide 50-12.5 mg.  - Continue losartan-hydrochlorothiazide    Coronary artery disease (CAD)  Dyslipidemia    H/o CAD with acute inferior/inferolateral STEMI 11/2015 and mild Cx and thrombotic L PDA lesions treated with REDD. Residual mLAD 80% and OM1 70%, currently tx'd medically. Lifelong DAPT recommended however has been declined by patient. Currently managed with ASA 81 mg, atorvastatin 40 mg, losartan-hydrochlorothiazide 20-12.5 mg, and carvedilol 6.25 mg BID  - Continue ASA, atorvastatin, carvedilol, and losartan-hydrochlorothiazide    Gastroesophageal reflux disease (GERD)    Chronic. Managed PTA with omeprazole 20 mg.  - Continue omeprazole    Clinically Significant Risk Factors Present on Admission              # Drug Induced  Platelet Defect: home medication list includes an antiplatelet medication   # Hypertension: Noted on problem list          # DMII: A1C = 8.7 % (Ref range: 0.0 - 5.6 %) within past 6 months       # Asthma: noted on problem list      Consultations This Hospital Stay   SURGERY GENERAL IP CONSULT  PHYSICAL THERAPY ADULT IP CONSULT  OCCUPATIONAL THERAPY ADULT IP CONSULT    Code Status   Full Code    40 MINUTES SPENT BY ME on the date of service doing chart review, history, exam, documentation & further activities per the note.         Issac Golden MD  [facility]  ______________________________________________________________________    Physical Exam   Vital Signs: Temp: 97.3  F (36.3  C) Temp src: Oral BP: (!) 160/83 Pulse: 74   Resp: 16 SpO2: 92 % O2 Device: None (Room air) Oxygen Delivery: 1.5 LPM  Weight: 164 lbs .32 oz       Gen: Well nourished, well developed, elderly male, alert and oriented x 3, no acute distressed  HEENT: Atraumatic, normocephalic; sclera non-injected, anicterric; oral mucosa moist, no lesion, no exudate  Lungs: Hyperinflated with decreased breath sounds, no wheezes, no rhonchi, no rales  Heart: Regular rate, regular rhythm, no gallops, no rubs, no murmurs  GI: Bowel sound normal, no hepatosplenomegaly, no masses, non-tender, non-distended, no guarding, no rebound tenderness  Lymph: No lymphadenopathy, no edema  Skin: No rashes, no chronic venous stasis     Primary Care Physician   Sarita Hennessy    Discharge Orders      Reason for your hospital stay    This is a 78-year-old male admitted with fractures of the right fifth, sixth, and seventh ribs, after a fall.     Follow-up and recommended labs and tests     Follow up with primary care provider, Sarita Hennessy, within 7 days for hospital follow- up.  The following labs/tests are recommended: CBC and BMP.     Activity    Your activity upon discharge: activity as tolerated     Full Code     Diet     Follow this diet upon discharge: Orders Placed This Encounter      Consistent Carbohydrate Diet Moderate Consistent Carb (60 g CHO per Meal) Diet         Significant Results and Procedures     Discharge Medications   Current Discharge Medication List        START taking these medications    Details   acetaminophen (TYLENOL) 325 MG tablet Take 2 tablets (650 mg) by mouth every 8 hours for 7 days.  Qty: 42 tablet, Refills: 0    Associated Diagnoses: Closed fracture of multiple ribs of right side, initial encounter      Lidocaine (LIDOCARE) 4 % Patch Place 1 patch over 12 hours onto the skin every 24 hours for 7 days. To prevent lidocaine toxicity, patient should be patch free for 12 hrs daily.  Qty: 7 patch, Refills: 0    Associated Diagnoses: Closed fracture of multiple ribs of right side, initial encounter      methocarbamol (ROBAXIN) 500 MG tablet Take 1 tablet (500 mg) by mouth every 6 hours as needed for muscle spasms.  Qty: 30 tablet, Refills: 0    Associated Diagnoses: Closed fracture of multiple ribs of right side, initial encounter           CONTINUE these medications which have NOT CHANGED    Details   albuterol (PROAIR HFA/PROVENTIL HFA/VENTOLIN HFA) 108 (90 Base) MCG/ACT inhaler Inhale 1-2 puffs into the lungs every 4 hours as needed for shortness of breath or wheezing  Qty: 8.5 g, Refills: 10    Comments: Pharmacy may dispense brand covered by insurance (Proair, or proventil or ventolin or generic albuterol inhaler) Profile Rx: patient will contact pharmacy when needed  Associated Diagnoses: Severe persistent asthma without complication (H)      albuterol (PROVENTIL) (2.5 MG/3ML) 0.083% neb solution TAKE 1 VIAL (2.5 MG) BY NEBULIZATION EVERY 6 HOURS AS NEEDED FOR SHORTNESS OF BREATH OR WHEEZING  Qty: 90 mL, Refills: 11    Associated Diagnoses: Severe persistent asthma without complication (H)      Alcohol Swabs (B-D SINGLE USE SWABS REGULAR) PADS USE TO SWAB AREA OF INJECTION / JENISE AS DIRECTED  Qty: 100  each, Refills: 3    Associated Diagnoses: Type 2 diabetes mellitus without complication, without long-term current use of insulin (H)      atorvastatin (LIPITOR) 40 MG tablet Take 1 tablet (40 mg) by mouth daily  Qty: 90 tablet, Refills: 3    Associated Diagnoses: Type 2 diabetes mellitus without complication, without long-term current use of insulin (H); Coronary artery disease involving native coronary artery of native heart without angina pectoris      budesonide-formoterol (SYMBICORT) 80-4.5 MCG/ACT Inhaler Inhale 2 puffs into the lungs 2 times daily  Qty: 10.2 g, Refills: 11    Associated Diagnoses: Severe persistent asthma without complication (H); Chronic obstructive pulmonary disease, unspecified COPD type (H)      carvedilol (COREG) 6.25 MG tablet Take 1 tablet (6.25 mg) by mouth 2 times daily  Qty: 180 tablet, Refills: 3    Associated Diagnoses: Essential hypertension      fluocinonide (LIDEX) 0.05 % external cream Apply topically 2 times daily  Qty: 120 g, Refills: 6    Associated Diagnoses: Seborrheic keratoses; Lentigo; Angioma of skin; Dermal nevus; Dermatofibroma; Inflamed seborrheic keratosis; Asteatotic dermatitis      glipiZIDE (GLUCOTROL XL) 10 MG 24 hr tablet TAKE 1 TABLET (10 MG) BY MOUTH DAILY  Qty: 90 tablet, Refills: 3    Associated Diagnoses: Type 2 diabetes mellitus without complication, without long-term current use of insulin (H)      insulin glargine (LANTUS PEN) 100 UNIT/ML pen Inject 20 Units subcutaneously at bedtime.  Qty: 15 mL, Refills: 3    Comments: If Lantus is not covered by insurance, may substitute Basaglar or Semglee or other insulin glargine product per insurance preference at same dose and frequency.    Associated Diagnoses: Type 2 diabetes mellitus without complication, without long-term current use of insulin (H)      insulin pen needle (BD LIZBETH U/F) 32G X 4 MM miscellaneous USE 1 PEN NEEDLE DAILY  Qty: 100 each, Refills: 1    Associated Diagnoses: Type 2 diabetes  mellitus without complication, without long-term current use of insulin (H)      losartan-hydrochlorothiazide (HYZAAR) 50-12.5 MG tablet Take 1 tablet by mouth daily  Qty: 90 tablet, Refills: 3    Associated Diagnoses: Coronary artery disease involving native coronary artery of native heart without angina pectoris; Essential hypertension      montelukast (SINGULAIR) 10 MG tablet TAKE ONE TABLET BY MOUTH AT BEDTIME  Qty: 90 tablet, Refills: 1    Associated Diagnoses: Severe persistent asthma without complication (H)      omeprazole (PRILOSEC) 20 MG DR capsule TAKE ONE CAPSULE BY MOUTH ONCE DAILY  Qty: 90 capsule, Refills: 2    Associated Diagnoses: Gastroesophageal reflux disease without esophagitis      triamcinolone (KENALOG) 0.1 % external cream APPLY TOPICALLY 2 TIMES DAILY TO LEFT ANKLE  Qty: 15 g, Refills: 1    Associated Diagnoses: Flexural eczema      vitamin E (TOCOPHEROL) 400 units (180 mg) capsule Take 400 Units by mouth daily.      aspirin (ASA) 81 MG EC tablet Take 1 tablet (81 mg) by mouth daily    Associated Diagnoses: Status post total right knee replacement      blood glucose monitoring (NO BRAND SPECIFIED) meter device kit Use to test blood sugar 1 times daily or as directed.  Blood Glucose Monitor Brands: per insurance.  Qty: 1 kit, Refills: 0    Associated Diagnoses: Type 2 diabetes mellitus without complication, without long-term current use of insulin (H)      Lancets (ONETOUCH DELICA PLUS [id number]) MISC USE AS DIRECTED WITH LANCING DEVICE TO TEST ONCE DAILY  Qty: 100 each, Refills: 6    Associated Diagnoses: Type 2 diabetes mellitus without complication, without long-term current use of insulin (H)      ONETOUCH ULTRA test strip USE TO TEST BLOOD SUGAR 1 TIME DAILY OR AS DIRECTED  Qty: 100 strip, Refills: 6    Associated Diagnoses: Type 2 diabetes mellitus without complication, without long-term current use of insulin (H)      order for DME Equipment being ordered: arm blood pressure  cuff  Qty: 1 Device, Refills: 0    Associated Diagnoses: Essential hypertension, benign           Allergies   Allergies   Allergen Reactions    Simvastatin Swelling     Severe muscle pain, swelling, and bruising    Dust Mites

## 2024-12-13 ENCOUNTER — HOSPITAL ENCOUNTER (OUTPATIENT)
Facility: CLINIC | Age: 79
Setting detail: OBSERVATION
Discharge: HOME | End: 2024-12-15
Attending: FAMILY MEDICINE | Admitting: INTERNAL MEDICINE
Payer: COMMERCIAL

## 2024-12-13 ENCOUNTER — APPOINTMENT (OUTPATIENT)
Dept: GENERAL RADIOLOGY | Facility: CLINIC | Age: 79
End: 2024-12-13
Attending: FAMILY MEDICINE
Payer: COMMERCIAL

## 2024-12-13 DIAGNOSIS — S22.41XA CLOSED FRACTURE OF MULTIPLE RIBS OF RIGHT SIDE, INITIAL ENCOUNTER: ICD-10-CM

## 2024-12-13 DIAGNOSIS — R13.10 DYSPHAGIA, UNSPECIFIED TYPE: Primary | ICD-10-CM

## 2024-12-13 LAB
ALBUMIN SERPL BCG-MCNC: 4 G/DL (ref 3.5–5.2)
ALP SERPL-CCNC: 83 U/L (ref 40–150)
ALT SERPL W P-5'-P-CCNC: 18 U/L (ref 0–70)
ANION GAP SERPL CALCULATED.3IONS-SCNC: 11 MMOL/L (ref 7–15)
AST SERPL W P-5'-P-CCNC: 14 U/L (ref 0–45)
BASE EXCESS BLDV CALC-SCNC: 3 MMOL/L (ref -3–3)
BASOPHILS # BLD AUTO: 0.1 10E3/UL (ref 0–0.2)
BASOPHILS NFR BLD AUTO: 1 %
BILIRUB SERPL-MCNC: 0.9 MG/DL
BUN SERPL-MCNC: 22.5 MG/DL (ref 8–23)
CALCIUM SERPL-MCNC: 9.1 MG/DL (ref 8.8–10.4)
CHLORIDE SERPL-SCNC: 98 MMOL/L (ref 98–107)
CREAT SERPL-MCNC: 1.1 MG/DL (ref 0.67–1.17)
EGFRCR SERPLBLD CKD-EPI 2021: 69 ML/MIN/1.73M2
EOSINOPHIL # BLD AUTO: 0.1 10E3/UL (ref 0–0.7)
EOSINOPHIL NFR BLD AUTO: 1 %
ERYTHROCYTE [DISTWIDTH] IN BLOOD BY AUTOMATED COUNT: 12.2 % (ref 10–15)
GLUCOSE SERPL-MCNC: 298 MG/DL (ref 70–99)
HCO3 BLDV-SCNC: 29 MMOL/L (ref 21–28)
HCO3 SERPL-SCNC: 28 MMOL/L (ref 22–29)
HCT VFR BLD AUTO: 43.8 % (ref 40–53)
HGB BLD-MCNC: 15 G/DL (ref 13.3–17.7)
HOLD SPECIMEN: NORMAL
IMM GRANULOCYTES # BLD: 0.1 10E3/UL
IMM GRANULOCYTES NFR BLD: 1 %
LYMPHOCYTES # BLD AUTO: 0.8 10E3/UL (ref 0.8–5.3)
LYMPHOCYTES NFR BLD AUTO: 9 %
MCH RBC QN AUTO: 30.7 PG (ref 26.5–33)
MCHC RBC AUTO-ENTMCNC: 34.2 G/DL (ref 31.5–36.5)
MCV RBC AUTO: 90 FL (ref 78–100)
MONOCYTES # BLD AUTO: 1 10E3/UL (ref 0–1.3)
MONOCYTES NFR BLD AUTO: 10 %
NEUTROPHILS # BLD AUTO: 7.5 10E3/UL (ref 1.6–8.3)
NEUTROPHILS NFR BLD AUTO: 79 %
NRBC # BLD AUTO: 0 10E3/UL
NRBC BLD AUTO-RTO: 0 /100
O2/TOTAL GAS SETTING VFR VENT: 21 %
OXYHGB MFR BLDV: 68 % (ref 70–75)
PCO2 BLDV: 48 MM HG (ref 40–50)
PH BLDV: 7.39 [PH] (ref 7.32–7.43)
PLATELET # BLD AUTO: 260 10E3/UL (ref 150–450)
PO2 BLDV: 37 MM HG (ref 25–47)
POTASSIUM SERPL-SCNC: 4.4 MMOL/L (ref 3.4–5.3)
PROT SERPL-MCNC: 6.7 G/DL (ref 6.4–8.3)
RBC # BLD AUTO: 4.88 10E6/UL (ref 4.4–5.9)
SAO2 % BLDV: 69.3 % (ref 70–75)
SODIUM SERPL-SCNC: 137 MMOL/L (ref 135–145)
WBC # BLD AUTO: 9.5 10E3/UL (ref 4–11)

## 2024-12-13 PROCEDURE — 82805 BLOOD GASES W/O2 SATURATION: CPT | Performed by: FAMILY MEDICINE

## 2024-12-13 PROCEDURE — 93005 ELECTROCARDIOGRAM TRACING: CPT | Performed by: FAMILY MEDICINE

## 2024-12-13 PROCEDURE — G0378 HOSPITAL OBSERVATION PER HR: HCPCS

## 2024-12-13 PROCEDURE — 36415 COLL VENOUS BLD VENIPUNCTURE: CPT | Performed by: FAMILY MEDICINE

## 2024-12-13 PROCEDURE — 99285 EMERGENCY DEPT VISIT HI MDM: CPT | Mod: 25 | Performed by: FAMILY MEDICINE

## 2024-12-13 PROCEDURE — 71046 X-RAY EXAM CHEST 2 VIEWS: CPT

## 2024-12-13 PROCEDURE — 99285 EMERGENCY DEPT VISIT HI MDM: CPT | Performed by: FAMILY MEDICINE

## 2024-12-13 PROCEDURE — 96375 TX/PRO/DX INJ NEW DRUG ADDON: CPT | Performed by: FAMILY MEDICINE

## 2024-12-13 PROCEDURE — 93010 ELECTROCARDIOGRAM REPORT: CPT | Performed by: FAMILY MEDICINE

## 2024-12-13 PROCEDURE — 82947 ASSAY GLUCOSE BLOOD QUANT: CPT | Performed by: FAMILY MEDICINE

## 2024-12-13 PROCEDURE — 96374 THER/PROPH/DIAG INJ IV PUSH: CPT | Mod: 59 | Performed by: FAMILY MEDICINE

## 2024-12-13 PROCEDURE — 250N000011 HC RX IP 250 OP 636: Performed by: FAMILY MEDICINE

## 2024-12-13 PROCEDURE — 250N000013 HC RX MED GY IP 250 OP 250 PS 637: Performed by: FAMILY MEDICINE

## 2024-12-13 PROCEDURE — 85025 COMPLETE CBC W/AUTO DIFF WBC: CPT | Performed by: FAMILY MEDICINE

## 2024-12-13 PROCEDURE — 82310 ASSAY OF CALCIUM: CPT | Performed by: FAMILY MEDICINE

## 2024-12-13 RX ORDER — HYDROMORPHONE HYDROCHLORIDE 1 MG/ML
0.3 INJECTION, SOLUTION INTRAMUSCULAR; INTRAVENOUS; SUBCUTANEOUS
Status: DISCONTINUED | OUTPATIENT
Start: 2024-12-13 | End: 2024-12-14

## 2024-12-13 RX ORDER — KETOROLAC TROMETHAMINE 15 MG/ML
10 INJECTION, SOLUTION INTRAMUSCULAR; INTRAVENOUS ONCE
Status: COMPLETED | OUTPATIENT
Start: 2024-12-13 | End: 2024-12-13

## 2024-12-13 RX ORDER — OXYCODONE HYDROCHLORIDE 5 MG/1
5 TABLET ORAL ONCE
Status: COMPLETED | OUTPATIENT
Start: 2024-12-13 | End: 2024-12-13

## 2024-12-13 RX ORDER — CALCIUM CARBONATE 500 MG/1
1000 TABLET, CHEWABLE ORAL 4 TIMES DAILY PRN
Status: DISCONTINUED | OUTPATIENT
Start: 2024-12-13 | End: 2024-12-15 | Stop reason: HOSPADM

## 2024-12-13 RX ORDER — OXYCODONE HYDROCHLORIDE 5 MG/1
5 TABLET ORAL EVERY 4 HOURS PRN
Status: DISCONTINUED | OUTPATIENT
Start: 2024-12-13 | End: 2024-12-15 | Stop reason: HOSPADM

## 2024-12-13 RX ORDER — ACETAMINOPHEN 325 MG/1
650 TABLET ORAL ONCE
Status: COMPLETED | OUTPATIENT
Start: 2024-12-13 | End: 2024-12-13

## 2024-12-13 RX ADMIN — KETOROLAC TROMETHAMINE 10 MG: 15 INJECTION, SOLUTION INTRAMUSCULAR; INTRAVENOUS at 17:20

## 2024-12-13 RX ADMIN — HYDROMORPHONE HYDROCHLORIDE 0.3 MG: 1 INJECTION, SOLUTION INTRAMUSCULAR; INTRAVENOUS; SUBCUTANEOUS at 17:16

## 2024-12-13 RX ADMIN — OXYCODONE 5 MG: 5 TABLET ORAL at 17:16

## 2024-12-13 RX ADMIN — OXYCODONE HYDROCHLORIDE 5 MG: 5 TABLET ORAL at 19:56

## 2024-12-13 RX ADMIN — ACETAMINOPHEN 650 MG: 325 TABLET ORAL at 17:20

## 2024-12-13 ASSESSMENT — ENCOUNTER SYMPTOMS
FEVER: 0
FREQUENCY: 0
BLOOD IN STOOL: 0
VOMITING: 0
ABDOMINAL PAIN: 0
SHORTNESS OF BREATH: 1
CHILLS: 0
SINUS PRESSURE: 0
DIAPHORESIS: 0
HEADACHES: 0
COUGH: 1
DYSURIA: 0
CONSTIPATION: 0
SORE THROAT: 0
NAUSEA: 0
WHEEZING: 0
DIARRHEA: 0
PALPITATIONS: 0

## 2024-12-13 ASSESSMENT — ACTIVITIES OF DAILY LIVING (ADL)
ADLS_ACUITY_SCORE: 52

## 2024-12-13 NOTE — ED TRIAGE NOTES
Pt. C/o difficulty breathing and difficulty swallowing his pills this morning with spitting up phlegm. Pt states he has 9/10 pain in his abdomen where he has recently dx 3 broken ribs on R side from falling off a truck.

## 2024-12-13 NOTE — ED PROVIDER NOTES
History     Chief Complaint   Patient presents with    Dysphagia    Abdominal Pain     HPI  Raul Wilhelm is a 78 year old male who presents with history of right 5th through 7th rib fractures and no pneumothorax after presenting 2 hours after a fall from truck 2 days ago on December 11.   He was evaluated here and admitted to hospitalist service.  He was discharged from hospital yesterday.  He returns today for increased cough and increased pain lateral chest.  Increasing dyspnea.  Borderline O2 sat.  No associated fever.  Not on anticoagulation.  productive cough      Allergies:  Allergies   Allergen Reactions    Simvastatin Swelling     Severe muscle pain, swelling, and bruising    Dust Mites        Problem List:    Patient Active Problem List    Diagnosis Date Noted    Fall, initial encounter 12/11/2024     Priority: Medium    Closed fracture of multiple ribs of right side, initial encounter 12/11/2024     Priority: Medium    Status post total knee replacement 05/28/2020     Priority: Medium    Coronary artery disease involving native coronary artery of native heart without angina pectoris 05/19/2020     Priority: Medium     STEMI 11/2015.  At that time, coronary angiography demonstrated a 99% stenosis in the mid circumflex as well as 100% thrombotic occlusion of the left-sided PDA.  He underwent successful revascularization of the left PDA as well as the mid circumflex with drug-eluting stents at the time.  Of note, he had an approximately 80% stenosis in the mid-LAD and a 70% stenosis in the first obtuse marginal.  These were not intervened on at the time, and a subsequent stress nuclear perfusion study demonstrated a large inferior and inferoseptal infarction with no evidence of rian-infarct ischemia.  Given these findings and his complete lack of symptoms, we have elected to treat his residual coronary artery disease medically.  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  Patient had syncope with PFT and declines further PFT  FEV1 1.99 61% in 2009      History of arterial ischemic stroke 02/25/2011     Priority: Medium     Small infarct - seen on MRI  Diagnosis updated by automated process. Provider to review and confirm.      Hyperlipidemia LDL goal <70 02/25/2011     Priority: Medium    Essential hypertension 03/15/2010     Priority: Medium    Glaucoma 03/12/2010     Priority: Medium     Worsened pressures with prednisone use.   Recommended by eye doctor to stop inhaled steroids if possible, but has been on for so many years would potentially put him into more resp problems.   Follows with Dr. [facility].    Needs eye exam if prednisone course is longer than 10 days        Family hx of colon cancer 02/18/2009     Priority: Medium     (Problem list name updated by automated process. Provider to review and confirm.)          Past Medical History:    Past Medical History:   Diagnosis Date    Chronic airway obstruction, not elsewhere classified     Other and unspecified hyperlipidemia        Past Surgical History:    Past Surgical History:   Procedure Laterality Date    ARTHROPLASTY KNEE Left 5/28/2020    Procedure: Left Total Knee Arthroplasty;  Surgeon: Sanjay Downey MD;   Location: [facility] OR    ARTHROPLASTY KNEE Right 11/2/2020    Procedure: ARTHROPLASTY, KNEE, TOTAL;  Surgeon: Sanjay Downey MD;  Location: [facility] OR    ENT SURGERY  as a child    tonsillectomy       Family History:    Family History   Problem Relation Age of Onset    Diabetes Mother     Heart Disease Mother     C.A.D. Mother     Cancer Father     Hypertension Father     Cerebrovascular Disease Paternal Grandfather     Asthma Sister     Breast Cancer No family hx of     Cancer - colorectal No family hx of     Prostate Cancer No family hx of        Social History:  Marital Status:   [2]  Social History     Tobacco Use    Smoking status: Never    Smokeless tobacco: Never   Vaping Use    Vaping status: Never Used   Substance Use Topics    Alcohol use: Yes     Comment: Rare    Drug use: No        Medications:    acetaminophen (TYLENOL) 325 MG tablet  albuterol (PROAIR HFA/PROVENTIL HFA/VENTOLIN HFA) 108 (90 Base) MCG/ACT inhaler  albuterol (PROVENTIL) (2.5 MG/3ML) 0.083% neb solution  Alcohol Swabs (B-D SINGLE USE SWABS REGULAR) PADS  aspirin (ASA) 81 MG EC tablet  atorvastatin (LIPITOR) 40 MG tablet  blood glucose monitoring (NO BRAND SPECIFIED) meter device kit  budesonide-formoterol (SYMBICORT) 80-4.5 MCG/ACT Inhaler  carvedilol (COREG) 6.25 MG tablet  fluocinonide (LIDEX) 0.05 % external cream  glipiZIDE (GLUCOTROL XL) 10 MG 24 hr tablet  insulin glargine (LANTUS PEN) 100 UNIT/ML pen  insulin pen needle (BD LIZBETH U/F) 32G X 4 MM miscellaneous  Lancets (ONETOUCH DELICA PLUS [id number]) MISC  Lidocaine (LIDOCARE) 4 % Patch  losartan-hydrochlorothiazide (HYZAAR) 50-12.5 MG tablet  methocarbamol (ROBAXIN) 500 MG tablet  montelukast (SINGULAIR) 10 MG tablet  omeprazole (PRILOSEC) 20 MG DR capsule  ONETOUCH ULTRA test strip  order for DME  triamcinolone (KENALOG) 0.1 % external cream  vitamin E (TOCOPHEROL) 400 units (180 mg) capsule          Review of Systems   Constitutional:  Negative for chills, diaphoresis and  "fever.   HENT:  Negative for ear pain, sinus pressure and sore throat.    Eyes:  Negative for visual disturbance.   Respiratory:  Positive for cough and shortness of breath. Negative for wheezing.    Cardiovascular:  Negative for chest pain and palpitations.   Gastrointestinal:  Negative for abdominal pain, blood in stool, constipation, diarrhea, nausea and vomiting.   Genitourinary:  Negative for dysuria, frequency and urgency.   Skin:  Negative for rash.   Neurological:  Negative for headaches.   All other systems reviewed and are negative.      Physical Exam   BP: (!) 143/92  Pulse: 93  Temp: 98.3  F (36.8  C)  Resp: 16  Height: 172.7 cm (5' 8\")  Weight: 74.4 kg (164 lb)  SpO2: 90 %      Physical Exam  Constitutional:       General: He is in acute distress.      Appearance: He is not diaphoretic.   Eyes:      Conjunctiva/sclera: Conjunctivae normal.   Cardiovascular:      Rate and Rhythm: Normal rate.      Heart sounds: No murmur heard.  Pulmonary:      Effort: Pulmonary effort is normal. No respiratory distress.      Breath sounds: Normal breath sounds. No stridor. No wheezing.   Chest:      Chest wall: Tenderness (right chest) present.   Abdominal:      General: Abdomen is flat. There is no distension.      Palpations: There is no mass.      Tenderness: There is no abdominal tenderness. There is no guarding.   Musculoskeletal:      Cervical back: Neck supple.      Right lower leg: No edema.      Left lower leg: No edema.   Skin:     Coloration: Skin is not pale.      Findings: No rash.   Neurological:      Mental Status: He is alert.         ED Course        Procedures                EKG Interpretation:      Interpreted by Karel Hopper MD  EKG done at 1714 hrs. demonstrates a sinus rhythm at 86 bpm with a normal axis.  There is no ST change no T wave changes.  There is a PAC present.  There is normal R progression.  No Q waves.  Normal intervals.  Normal conduction.  No ectopy.  Impression sinus rhythm 86 " bpm no significant acute changes      Critical Care time:  none              Results for orders placed or performed during the hospital encounter of 12/13/24 (from the past 24 hours)   EKG 12 lead   Result Value Ref Range    Systolic Blood Pressure  mmHg    Diastolic Blood Pressure  mmHg    Ventricular Rate 86 BPM    Atrial Rate 86 BPM    SD Interval 138 ms    QRS Duration 78 ms     ms    QTc 435 ms    P Axis -18 degrees    R AXIS 13 degrees    T Axis 29 degrees    Interpretation ECG       Sinus rhythm with Premature atrial complexes  Otherwise normal ECG  When compared with ECG of 14-Nov-2015 19:07,  Premature atrial complexes are now Present  Nonspecific T wave abnormality now evident in Inferior leads     Blood gas venous   Result Value Ref Range    pH Venous 7.39 7.32 - 7.43    pCO2 Venous 48 40 - 50 mm Hg    pO2 Venous 37 25 - 47 mm Hg    Bicarbonate Venous 29 (H) 21 - 28 mmol/L    Base Excess/Deficit Venous 3.0 -3.0 - 3.0 mmol/L    FIO2 21     Oxyhemoglobin Venous 68 (L) 70 - 75 %    O2 Sat, Venous 69.3 (L) 70.0 - 75.0 %    Narrative    In healthy individuals, oxyhemoglobin (O2Hb) and oxygen saturation (SO2) are approximately equal. In the presence of dyshemoglobins, oxyhemoglobin can be considerably lower than oxygen saturation.   CBC with platelets differential    Narrative    The following orders were created for panel order CBC with platelets differential.  Procedure                               Abnormality         Status                     ---------                               -----------         ------                     CBC with platelets and d...[[id number]]                      Final result                 Please view results for these tests on the individual orders.   Comprehensive metabolic panel   Result Value Ref Range    Sodium 137 135 - 145 mmol/L    Potassium 4.4 3.4 - 5.3 mmol/L    Carbon Dioxide (CO2) 28 22 - 29 mmol/L    Anion Gap 11 7 - 15 mmol/L    Urea Nitrogen 22.5 8.0 - 23.0  mg/dL    Creatinine 1.10 0.67 - 1.17 mg/dL    GFR Estimate 69 >60 mL/min/1.73m2    Calcium 9.1 8.8 - 10.4 mg/dL    Chloride 98 98 - 107 mmol/L    Glucose 298 (H) 70 - 99 mg/dL    Alkaline Phosphatase 83 40 - 150 U/L    AST 14 0 - 45 U/L    ALT 18 0 - 70 U/L    Protein Total 6.7 6.4 - 8.3 g/dL    Albumin 4.0 3.5 - 5.2 g/dL    Bilirubin Total 0.9 <=1.2 mg/dL   [place] Draw    Narrative    The following orders were created for panel order [place] Draw.  Procedure                               Abnormality         Status                     ---------                               -----------         ------                     Extra Blue Top Tube[[id number]]                              Final result                 Please view results for these tests on the individual orders.   CBC with platelets and differential   Result Value Ref Range    WBC Count 9.5 4.0 - 11.0 10e3/uL    RBC Count 4.88 4.40 - 5.90 10e6/uL    Hemoglobin 15.0 13.3 - 17.7 g/dL    Hematocrit 43.8 40.0 - 53.0 %    MCV 90 78 - 100 fL    MCH 30.7 26.5 - 33.0 pg    MCHC 34.2 31.5 - 36.5 g/dL    RDW 12.2 10.0 - 15.0 %    Platelet Count 260 150 - 450 10e3/uL    % Neutrophils 79 %    % Lymphocytes 9 %    % Monocytes 10 %    % Eosinophils 1 %    % Basophils 1 %    % Immature Granulocytes 1 %    NRBCs per 100 WBC 0 <1 /100    Absolute Neutrophils 7.5 1.6 - 8.3 10e3/uL    Absolute Lymphocytes 0.8 0.8 - 5.3 10e3/uL    Absolute Monocytes 1.0 0.0 - 1.3 10e3/uL    Absolute Eosinophils 0.1 0.0 - 0.7 10e3/uL    Absolute Basophils 0.1 0.0 - 0.2 10e3/uL    Absolute Immature Granulocytes 0.1 <=0.4 10e3/uL    Absolute NRBCs 0.0 10e3/uL   Extra Blue Top Tube   Result Value Ref Range    Hold Specimen JI    XR Chest 2 Views    Narrative    EXAM: XR CHEST 2 VIEWS  LOCATION: [facility]  DATE: 12/13/2024    INDICATION: chest pain, cough after multiple rib fractures  COMPARISON: CT chest 12/11/2024      Impression    IMPRESSION:     No focal airspace  disease. No pleural effusion or pneumothorax.    The cardiomediastinal silhouette is unremarkable.    Right rib fractures are not well appreciated on radiographs.   POC US GUIDANCE NEEDLE PLACEMENT    Impression    The visualized anatomic structures appeared normal. There were no apparent pathologic findings.         Medications   HYDROmorphone (PF) (DILAUDID) injection 0.3 mg (has no administration in time range)   acetaminophen (TYLENOL) tablet 650 mg (has no administration in time range)   oxyCODONE (ROXICODONE) tablet 5 mg (has no administration in time range)   ketorolac (TORADOL) injection 10 mg (has no administration in time range)       Assessments & Plan (with Medical Decision Making)     MDM:  Rual Wilhelm is a 78 year old male presenting with a chest wall injury on the 11th and was admitted to the hospital for overnight and then discharged home.  Returns today with significant pain sense of dyspnea productive cough.      X-ray demonstrates no obvious pneumonia.  Oxygen saturations still at 91% without oxygen history of COPD.  Normal respiratory rate improved analgesia.  Contacted anesthesia for block.  Discussed with Dr. Chaudhry who believed it was reasonable to have him back in the hospital for better pain control and prevention of complications such as pneumonia.  Did asked that I run it by trauma surgery at [place].  I did speak Dr. Mon and he agreed with the overall plan.  He notes that they would be willing to consult at any time regarding the patient should complications arise but starting at [facility] is appropriate.  He agreed with the block.  He also agreed with the more aggressive analgesia and with spirometry        I have reviewed the nursing notes.    I have reviewed the findings, diagnosis, plan and need for follow up with the patient.           Medical Decision Making  The patient's presentation was of moderate complexity (an acute complicated injury).    The patient's  evaluation involved:  ordering and/or review of 3+ test(s) in this encounter (see separate area of note for details)    The patient's management necessitated high risk (a decision regarding hospitalization).        New Prescriptions    No medications on file       Final diagnoses:   Closed fracture of multiple ribs of right side, initial encounter       12/13/2024   [facility] EMERGENCY DEPT       Karel Hopper MD  12/14/24 0124

## 2024-12-14 LAB
ATRIAL RATE - MUSE: 86 BPM
DIASTOLIC BLOOD PRESSURE - MUSE: NORMAL MMHG
GLUCOSE BLDC GLUCOMTR-MCNC: 131 MG/DL (ref 70–99)
GLUCOSE BLDC GLUCOMTR-MCNC: 221 MG/DL (ref 70–99)
GLUCOSE BLDC GLUCOMTR-MCNC: 279 MG/DL (ref 70–99)
GLUCOSE BLDC GLUCOMTR-MCNC: 95 MG/DL (ref 70–99)
INTERPRETATION ECG - MUSE: NORMAL
P AXIS - MUSE: -18 DEGREES
PR INTERVAL - MUSE: 138 MS
QRS DURATION - MUSE: 78 MS
QT - MUSE: 364 MS
QTC - MUSE: 435 MS
R AXIS - MUSE: 13 DEGREES
SYSTOLIC BLOOD PRESSURE - MUSE: NORMAL MMHG
T AXIS - MUSE: 29 DEGREES
VENTRICULAR RATE- MUSE: 86 BPM

## 2024-12-14 PROCEDURE — 250N000011 HC RX IP 250 OP 636: Performed by: INTERNAL MEDICINE

## 2024-12-14 PROCEDURE — 250N000012 HC RX MED GY IP 250 OP 636 PS 637: Performed by: INTERNAL MEDICINE

## 2024-12-14 PROCEDURE — 250N000013 HC RX MED GY IP 250 OP 250 PS 637: Performed by: INTERNAL MEDICINE

## 2024-12-14 PROCEDURE — 99232 SBSQ HOSP IP/OBS MODERATE 35: CPT | Performed by: INTERNAL MEDICINE

## 2024-12-14 PROCEDURE — 82962 GLUCOSE BLOOD TEST: CPT

## 2024-12-14 PROCEDURE — 96375 TX/PRO/DX INJ NEW DRUG ADDON: CPT

## 2024-12-14 PROCEDURE — G0378 HOSPITAL OBSERVATION PER HR: HCPCS

## 2024-12-14 PROCEDURE — 99207 PR NOT IN PERSON INPATIENT CONSULT STATISTICAL MARKER: CPT | Performed by: INTERNAL MEDICINE

## 2024-12-14 PROCEDURE — 99214 OFFICE O/P EST MOD 30 MIN: CPT | Mod: FS | Performed by: SURGERY

## 2024-12-14 PROCEDURE — 96376 TX/PRO/DX INJ SAME DRUG ADON: CPT

## 2024-12-14 RX ORDER — ASPIRIN 81 MG/1
81 TABLET ORAL DAILY
Status: DISCONTINUED | OUTPATIENT
Start: 2024-12-14 | End: 2024-12-15 | Stop reason: HOSPADM

## 2024-12-14 RX ORDER — CARVEDILOL 6.25 MG/1
6.25 TABLET ORAL 2 TIMES DAILY
Status: DISCONTINUED | OUTPATIENT
Start: 2024-12-14 | End: 2024-12-15 | Stop reason: HOSPADM

## 2024-12-14 RX ORDER — FLUTICASONE FUROATE AND VILANTEROL 100; 25 UG/1; UG/1
1 POWDER RESPIRATORY (INHALATION) DAILY
Status: DISCONTINUED | OUTPATIENT
Start: 2024-12-14 | End: 2024-12-15 | Stop reason: HOSPADM

## 2024-12-14 RX ORDER — FLUOCINONIDE 0.5 MG/G
CREAM TOPICAL 2 TIMES DAILY
Status: DISCONTINUED | OUTPATIENT
Start: 2024-12-14 | End: 2024-12-15 | Stop reason: HOSPADM

## 2024-12-14 RX ORDER — ACETAMINOPHEN 325 MG/1
650 TABLET ORAL EVERY 8 HOURS
Status: DISCONTINUED | OUTPATIENT
Start: 2024-12-14 | End: 2024-12-15 | Stop reason: HOSPADM

## 2024-12-14 RX ORDER — LOSARTAN POTASSIUM AND HYDROCHLOROTHIAZIDE 12.5; 5 MG/1; MG/1
1 TABLET ORAL DAILY
Status: DISCONTINUED | OUTPATIENT
Start: 2024-12-14 | End: 2024-12-14

## 2024-12-14 RX ORDER — ALBUTEROL SULFATE 90 UG/1
1-2 INHALANT RESPIRATORY (INHALATION) EVERY 4 HOURS PRN
Status: DISCONTINUED | OUTPATIENT
Start: 2024-12-14 | End: 2024-12-15 | Stop reason: HOSPADM

## 2024-12-14 RX ORDER — NICOTINE POLACRILEX 4 MG
15-30 LOZENGE BUCCAL
Status: DISCONTINUED | OUTPATIENT
Start: 2024-12-14 | End: 2024-12-15 | Stop reason: HOSPADM

## 2024-12-14 RX ORDER — HYDROCHLOROTHIAZIDE 12.5 MG/1
12.5 TABLET ORAL DAILY
Status: DISCONTINUED | OUTPATIENT
Start: 2024-12-14 | End: 2024-12-15 | Stop reason: HOSPADM

## 2024-12-14 RX ORDER — ALBUTEROL SULFATE 0.83 MG/ML
2.5 SOLUTION RESPIRATORY (INHALATION) EVERY 6 HOURS PRN
Status: DISCONTINUED | OUTPATIENT
Start: 2024-12-14 | End: 2024-12-15 | Stop reason: HOSPADM

## 2024-12-14 RX ORDER — LOSARTAN POTASSIUM 50 MG/1
50 TABLET ORAL DAILY
Status: DISCONTINUED | OUTPATIENT
Start: 2024-12-14 | End: 2024-12-15 | Stop reason: HOSPADM

## 2024-12-14 RX ORDER — DEXTROSE MONOHYDRATE 25 G/50ML
25-50 INJECTION, SOLUTION INTRAVENOUS
Status: DISCONTINUED | OUTPATIENT
Start: 2024-12-14 | End: 2024-12-15 | Stop reason: HOSPADM

## 2024-12-14 RX ORDER — SENNOSIDES 8.6 MG
8.6 TABLET ORAL 2 TIMES DAILY PRN
Status: DISCONTINUED | OUTPATIENT
Start: 2024-12-14 | End: 2024-12-15 | Stop reason: HOSPADM

## 2024-12-14 RX ORDER — METHOCARBAMOL 500 MG/1
500 TABLET, FILM COATED ORAL EVERY 6 HOURS PRN
Status: DISCONTINUED | OUTPATIENT
Start: 2024-12-14 | End: 2024-12-15 | Stop reason: HOSPADM

## 2024-12-14 RX ORDER — ATORVASTATIN CALCIUM 20 MG/1
40 TABLET, FILM COATED ORAL DAILY
Status: DISCONTINUED | OUTPATIENT
Start: 2024-12-14 | End: 2024-12-15 | Stop reason: HOSPADM

## 2024-12-14 RX ORDER — GLIPIZIDE 10 MG/1
10 TABLET, FILM COATED, EXTENDED RELEASE ORAL DAILY
Status: DISCONTINUED | OUTPATIENT
Start: 2024-12-14 | End: 2024-12-15 | Stop reason: HOSPADM

## 2024-12-14 RX ORDER — MONTELUKAST SODIUM 10 MG/1
10 TABLET ORAL AT BEDTIME
Status: DISCONTINUED | OUTPATIENT
Start: 2024-12-14 | End: 2024-12-15 | Stop reason: HOSPADM

## 2024-12-14 RX ADMIN — OXYCODONE 5 MG: 5 TABLET ORAL at 20:23

## 2024-12-14 RX ADMIN — SENNOSIDES 8.6 MG: 8.6 TABLET, FILM COATED ORAL at 10:33

## 2024-12-14 RX ADMIN — ACETAMINOPHEN 650 MG: 325 TABLET ORAL at 09:26

## 2024-12-14 RX ADMIN — ACETAMINOPHEN 650 MG: 325 TABLET ORAL at 02:51

## 2024-12-14 RX ADMIN — GLIPIZIDE 10 MG: 10 TABLET, EXTENDED RELEASE ORAL at 07:54

## 2024-12-14 RX ADMIN — OXYCODONE 5 MG: 5 TABLET ORAL at 12:12

## 2024-12-14 RX ADMIN — FAMOTIDINE 20 MG: 10 INJECTION INTRAVENOUS at 02:51

## 2024-12-14 RX ADMIN — INSULIN GLARGINE 20 UNITS: 100 INJECTION, SOLUTION SUBCUTANEOUS at 02:51

## 2024-12-14 RX ADMIN — LOSARTAN POTASSIUM 50 MG: 50 TABLET, FILM COATED ORAL at 07:54

## 2024-12-14 RX ADMIN — OXYCODONE 5 MG: 5 TABLET ORAL at 07:54

## 2024-12-14 RX ADMIN — FAMOTIDINE 20 MG: 10 INJECTION INTRAVENOUS at 14:44

## 2024-12-14 RX ADMIN — HYDROCHLOROTHIAZIDE 12.5 MG: 12.5 TABLET ORAL at 07:54

## 2024-12-14 RX ADMIN — ACETAMINOPHEN 650 MG: 325 TABLET ORAL at 17:17

## 2024-12-14 RX ADMIN — INSULIN GLARGINE 20 UNITS: 100 INJECTION, SOLUTION SUBCUTANEOUS at 21:52

## 2024-12-14 RX ADMIN — MONTELUKAST 10 MG: 10 TABLET, FILM COATED ORAL at 21:52

## 2024-12-14 RX ADMIN — CARVEDILOL 6.25 MG: 6.25 TABLET, FILM COATED ORAL at 07:54

## 2024-12-14 RX ADMIN — ASPIRIN 81 MG: 81 TABLET, COATED ORAL at 07:54

## 2024-12-14 RX ADMIN — ATORVASTATIN CALCIUM 40 MG: 20 TABLET, FILM COATED ORAL at 07:54

## 2024-12-14 RX ADMIN — CARVEDILOL 6.25 MG: 6.25 TABLET, FILM COATED ORAL at 20:20

## 2024-12-14 RX ADMIN — MONTELUKAST 10 MG: 10 TABLET, FILM COATED ORAL at 02:57

## 2024-12-14 ASSESSMENT — ACTIVITIES OF DAILY LIVING (ADL)
ADLS_ACUITY_SCORE: 46
ADLS_ACUITY_SCORE: 44
ADLS_ACUITY_SCORE: 44
ADLS_ACUITY_SCORE: 46
ADLS_ACUITY_SCORE: 45
ADLS_ACUITY_SCORE: 44
ADLS_ACUITY_SCORE: 46
ADLS_ACUITY_SCORE: 46
ADLS_ACUITY_SCORE: 45
ADLS_ACUITY_SCORE: 46
ADLS_ACUITY_SCORE: 44
ADLS_ACUITY_SCORE: 46
ADLS_ACUITY_SCORE: 45
ADLS_ACUITY_SCORE: 44
ADLS_ACUITY_SCORE: 44
ADLS_ACUITY_SCORE: 46

## 2024-12-14 NOTE — PLAN OF CARE
"  Problem: Adult Inpatient Plan of Care  Goal: Absence of Hospital-Acquired Illness or Injury  Intervention: Identify and Manage Fall Risk  Recent Flowsheet Documentation  Taken 12/14/2024 0900 by Merissa Almeida RN  Safety Promotion/Fall Prevention:   assistive device/personal items within reach   clutter free environment maintained   nonskid shoes/slippers when out of bed   room door open   room near nurse's station  Intervention: Prevent Skin Injury  Recent Flowsheet Documentation  Taken 12/14/2024 0754 by Merissa Almeida RN  Body Position: position changed independently  Intervention: Prevent Infection  Recent Flowsheet Documentation  Taken 12/14/2024 0754 by Merissa Almeida RN  Infection Prevention: single patient room provided     Problem: Fall Injury Risk  Goal: Absence of Fall and Fall-Related Injury  Intervention: Identify and Manage Contributors  Recent Flowsheet Documentation  Taken 12/14/2024 0900 by Merissa Almeida RN  Medication Review/Management: medications reviewed  Intervention: Promote Injury-Free Environment  Recent Flowsheet Documentation  Taken 12/14/2024 0900 by Merissa Almeida RN  Safety Promotion/Fall Prevention:   assistive device/personal items within reach   clutter free environment maintained   nonskid shoes/slippers when out of bed   room door open   room near nurse's station   Goal Outcome Evaluation:PRIMARY DIAGNOSIS: \"GENERIC\" NURSING  OUTPATIENT/OBSERVATION GOALS TO BE MET BEFORE DISCHARGE:  ADLs back to baseline: Yes    Activity and level of assistance: Ambulating independently.    Pain status: Improved-controlled with oral pain medications.    Return to near baseline physical activity: Yes     Discharge Planner Nurse   Safe discharge environment identified: Yes  Barriers to discharge: Yes age and swallowing issues       Entered by: Merissa Almeida RN 12/14/2024 2:25 PM     Please review provider order for any additional goals. " "  Nurse to notify provider when observation goals have been met and patient is ready for discharge.       Patient pain controlled with PRN medication, stated pain 2-3/10 when just sitting or walking. When getting up/sitting down, cough or adjusting position pain 10/10. After medication pain given patient stated it was better.  When patient was taking a bit of rice they began to cough and stated they felt like there was something stuck. It took approximately 20 min for the patient to feel like they were able to clear their throat. Patient was able to take breaths cough and talk during this time. Patient asked for hard candy, \"it helps\". When this nurse asked if has ever happened before they stated yes for about the last 6 months. Most recent occurrence 6 months ago. Patient also said that they stay away from certain food such as rice. Doctor made aware ordered a out patient swallow study  A/O independent in the room, intermittent cough.               "

## 2024-12-14 NOTE — PROGRESS NOTES
[facility]    Hospitalist Progress Note    Assessment & Plan   Raul Wilhelm is a 78 year old male who presents on 12/11/2024 with right sided chest wall pain after sustaining fall from approximately 4 feet. He was evaluated with CT CAP showing 3 right sided rib fractures without other acute findings. Having pleuritic chest pains however without hypoxia or respiratory compromise. Trauma clearance by general surgery.     Right sided rib fractures 5th-7th 2/2 fall    Sustained mechanical fall from approximately 4 ft landing on his right side. Having significant right sided chest wall pain that is worsened with breathing. Non-hypoxic (94%) on presentation with desaturation into the upper 80's requiring 2 liters supplemental oxygen after IV hydromorphone. Feeling more short of breath beyond his asthma baseline. Denies cough or concern for respiratory infection PTA. Pulmonary auscultation clear. No tripoding. No altered mental status. CT CAP showing mildly displaced acute right fifth-seventh rib fractures without evidence for pneumothorax. No other acute findings. General surgery consulted and clear from trauma. Denies injury to head or LOC. He is not on anticoagulation, however did take ASA 81 mg x 4 for pain PTA. Denies injury to extremities or joints and was able to ambulated on presentation. PTA he was given nerve block by anesthesia.  - General surgery trauma consultation complete  - Completed rib fracture protocol in previous admission  - No therapy needs per physical therapy  - Discharged home with acetaminophen, Robaxin, and lidocaine patch, but pain uncontrolled  - Patient reluctant to use oxycodone, will encourage use    Dysphagia  Patient complains of feeling that food is stuck in his airway.  Nurses note that the patient is coughing and producing up phlegm.  Patient states that his coughing was improved after sucking on hard candy.  At home, he has similar episodes weekly to  monthly.  - Consider speech therapy consult on Monday and/or esophagram or outpatient referral  - Consider general surgery consult or clinic referral for EGD      Asthma and chronic obstructive pulmonary disease (COPD) overlap    Appears stable. No hypoxia or respiratory distress, however at higher risk for exacerbation as has x 3 acute rib fractures (see above). CT CAP without acute pulmonary findings. COPD managed PTA with montelukast 10 mg, Symbicort inhaler BID, albuterol inhaler PRN, albuterol nebs PRN.   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 Admit blood glucose 207. Last hemoglobin A1c 8.7% 11/2024. Managed PTA with insulin glargine 20 U HS and glipizide 10 mg.  - Continue PTA insulin glargine 20 U and glipizide 10 mg daily    Essential hypertension    Chronic. Blood pressures reviewed, largely controlled. Managed PTA with losartan-hydrochlorothiazide 50-12.5 mg.  - Continue losartan-hydrochlorothiazide     Coronary artery disease (CAD)  Dyslipidemia    H/o CAD with acute inferior/inferolateral STEMI 11/2015 and mild Cx and thrombotic L PDA lesions treated with REDD. Residual mLAD 80% and OM1 70%, currently tx'd medically. Lifelong DAPT recommended however has been declined by patient. Currently managed with ASA 81 mg, atorvastatin 40 mg, losartan-hydrochlorothiazide 20-12.5 mg, and carvedilol 6.25 mg BID  - Continue ASA, atorvastatin, carvedilol, and losartan-hydrochlorothiazide     Gastroesophageal reflux disease (GERD)    Chronic. Managed PTA with omeprazole 20 mg.  - Continue omeprazole    Clinically Significant Risk Factors Present on Admission              # Drug Induced Platelet Defect: home medication list includes an antiplatelet medication   # Hypertension: Noted on problem list          # DMII: A1C = 8.7 % (Ref range: 0.0 - 5.6 %) within past 6 months       # Asthma: noted on problem list         Date of Service (when I saw the patient): 12/14/2024    Disposition:   Medically Ready for Discharge: Anticipated Tomorrow    Issac Golden MD    Interval History   Patient states his pain was controlled earlier today with oxycodone, and was moving well about the room.  After lunch, patient complains that his lunch is stuck in his  airway  Nurses note that he is coughing up phlegm.    -Data reviewed today: I reviewed all new labs and imaging results over the last 24 hours. I personally reviewed no images or EKG's today.    Physical Exam   Temp: 97.3  F (36.3  C) Temp src: Oral BP: (!) 146/76 Pulse: 74   Resp: 18 SpO2: 93 % O2 Device: None (Room air)    Vitals:    12/13/24 1644   Weight: 74.4 kg (164 lb)     Vital Signs with Ranges  Temp:  [97.3  F (36.3  C)-98.3  F (36.8  C)] 97.3  F (36.3  C)  Pulse:  [63-93] 74  Resp:  [12-22] 18  BP: (103-178)/() 146/76  SpO2:  [90 %-96 %] 93 %  No intake/output data recorded.    Gen: Well nourished, elderly male, alert and oriented x 3, no acute distressed  HEENT: Atraumatic, normocephalic; sclera non-injected, anicterric; oral mucosa moist, no lesion, no exudate  Lungs: Clear to ausculation, no wheezes, no rhonchi, no rales  Heart: Regular rate, regular rhythm, no gallops, no rubs, no murmurs  GI: Bowel sound normal, no hepatosplenomegaly, no masses, non-tender, non-distended, no guarding, no rebound tenderness  Lymph: No lymphadenopathy, no edema  Skin: No rashes, no chronic venous stasis     Medications   Current Facility-Administered Medications   Medication Dose Route Frequency Provider Last Rate Last Admin     Current Facility-Administered Medications   Medication Dose Route Frequency Provider Last Rate Last Admin    acetaminophen (TYLENOL) tablet 650 mg  650 mg Oral Q8H Barbara Li MD   650 mg at 12/14/24 0926    aspirin EC tablet 81 mg  81 mg Oral Daily Barbara Li MD   81 mg at 12/14/24 0754    atorvastatin (LIPITOR) tablet 40 mg  40  mg Oral Daily Barbara Li MD   40 mg at 12/14/24 0754    carvedilol (COREG) tablet 6.25 mg  6.25 mg Oral BID Barbara Li MD   6.25 mg at 12/14/24 0754    famotidine (PEPCID) injection 20 mg  20 mg Intravenous Q12H Babrara Li MD   20 mg at 12/14/24 0251    [Held by provider] fluocinonide (LIDEX) 0.05 % cream   Topical BID Barbara Li MD        [Held by provider] fluticasone-vilanterol (BREO ELLIPTA) 100-25 MCG/ACT inhaler 1 puff  1 puff Inhalation Daily Barbara Li MD        glipiZIDE (GLUCOTROL XL) 24 hr tablet 10 mg  10 mg Oral Daily Barbara Li MD   10 mg at 12/14/24 0754    hydroCHLOROthiazide tablet 12.5 mg  12.5 mg Oral Daily Barbara Li MD   12.5 mg at 12/14/24 0754    And    losartan (COZAAR) tablet 50 mg  50 mg Oral Daily Barbara Li MD   50 mg at 12/14/24 0754    insulin glargine (LANTUS PEN) injection 20 Units  20 Units Subcutaneous At Bedtime Barbara Li MD   20 Units at 12/14/24 0251    montelukast (SINGULAIR) tablet 10 mg  10 mg Oral At Bedtime Barbara Li MD   10 mg at 12/14/24 0257       Data   Recent Labs   Lab 12/14/24  1153 12/14/24  0751 12/14/24  0207 12/13/24  1717 12/12/24  0757 12/12/24  0545 12/11/24  2208 12/11/24  1609   WBC  --   --   --  9.5  --  8.8  --  10.8   HGB  --   --   --  15.0  --  14.2  --  15.4   MCV  --   --   --  90  --  94  --  92   PLT  --   --   --  260  --  241  --  247   NA  --   --   --  137  --  136  --  136   POTASSIUM  --   --   --  4.4  --  4.8  --  5.0   CHLORIDE  --   --   --  98  --  100  --  98   CO2  --   --   --  28  --  27  --  28   BUN  --   --   --  22.5  --  16.2  --  14.3   CR  --   --   --  1.10  --  1.05  --  0.99   ANIONGAP  --   --   --  11  --  9  --  10   JOSE RAFAEL  --   --   --  9.1  --  9.0  --  9.7   * 221* 279* 298*   < > 231*   < > 207*   ALBUMIN  --   --   --  4.0  --   --   --  4.2   PROTTOTAL  --   --   --  6.7  --   --   --  6.9   BILITOTAL  --   --    --  0.9  --   --   --  1.7*   ALKPHOS  --   --   --  83  --   --   --  95   ALT  --   --   --  18  --   --   --  24   AST  --   --   --  14  --   --   --  18    < > = values in this interval not displayed.       Recent Results (from the past 24 hours)   XR Chest 2 Views    Narrative    EXAM: XR CHEST 2 VIEWS  LOCATION: [facility]  DATE: 12/13/2024    INDICATION: chest pain, cough after multiple rib fractures  COMPARISON: CT chest 12/11/2024      Impression    IMPRESSION:     No focal airspace disease. No pleural effusion or pneumothorax.    The cardiomediastinal silhouette is unremarkable.    Right rib fractures are not well appreciated on radiographs.   POC US GUIDANCE NEEDLE PLACEMENT    Impression    The visualized anatomic structures appeared normal. There were no apparent pathologic findings.

## 2024-12-14 NOTE — H&P
HOSPITALIST TELEMED ADMISSION H&P    Service Date : 12/14/2024  Dr. Barbara PETERSON am located in [place]  Raul Wilhelm is located in [place] at [facility]     RNMerissa on Med/Surg is assisting me today with this patient.    chief complaint:  Rib pain     History of Present Illness:  Raul Wilhelm is a 78 year old male,  with essential hypertension, hyperlipidemia, history of arterial ischemic stroke, coronary artery disease, type 2 diabetes, COPD presenting to ED with intractable rib pain. The patient had a fall on December 11, 2024.  Patient said that he was working out of the back of his truck when he fell to the ground and sustained 3 rib fractures.  He was seen in the emergency room at that time and discharged home however he refused any narcotic medication.  States that after he got home he started having intense pain over the next couple of days and came back to the emergency room.  He denied hitting his head at that time of the fall.    In the emergency room the patient was given a analgesic block by anesthesia to help alleviate his rib pain.  When I evaluated the patient on the virtual exam the patient was pain-free.  The patient denied any fever, chills, nausea or vomiting.   He states he has chest pain and shortness of breath which he equates to his rib fractures.      Emergency Room Course - in the emergency room, serologies for CMP, CBC,   VBG     EKG:  normal sinus rhythm with premature atrial contraction, no acute ST-T wave elevations were noted     Radiological diagnostics included:      Narrative & Impression   EXAM: XR CHEST 2 VIEWS  LOCATION: [facility]  DATE: 12/13/2024     INDICATION: chest pain, cough after multiple rib fractures  COMPARISON: CT chest 12/11/2024                                                                      IMPRESSION:      No focal airspace disease. No pleural effusion or pneumothorax.     The cardiomediastinal  silhouette is unremarkable.     Right rib fractures are not well appreciated on radiographs.        Past Medical History  Past Medical History:   Diagnosis Date    Chronic airway obstruction, not elsewhere classified     Other and unspecified hyperlipidemia       Patient Active Problem List   Diagnosis    Family hx of colon cancer    Glaucoma    Essential hypertension    History of arterial ischemic stroke    Hyperlipidemia LDL goal <70    Pneumonitis, hypersensitivity, naa (H)    Severe persistent asthma without complication (H)    COPD (chronic obstructive pulmonary disease) (H)    Hoarseness of voice    Eczema    Hypokalemia    Coronary artery disease involving native coronary artery of native heart without angina pectoris    Type 2 diabetes mellitus without complication, without long-term current use of insulin (H)    Status post total knee replacement    Fall, initial encounter    Closed fracture of multiple ribs of right side, initial encounter         Past Surgical History  Past Surgical History:   Procedure Laterality Date    ARTHROPLASTY KNEE Left 5/28/2020    Procedure: Left Total Knee Arthroplasty;  Surgeon: Sanjay Downey MD;  Location: [facility]    ARTHROPLASTY KNEE Right 11/2/2020    Procedure: ARTHROPLASTY, KNEE, TOTAL;  Surgeon: Sanjay Downey MD;  Location: [facility] OR    ENT SURGERY  as a child    tonsillectomy      Social History  Raul  reports that he has never smoked. He has never used smokeless tobacco. He reports current alcohol use. He reports that he does not use drugs.    Family History  Raul's family history includes Asthma in his sister; C.A.D. in his mother; Cancer in his father; Cerebrovascular Disease in his paternal grandfather; Diabetes in his mother; Heart Disease in his mother; Hypertension in his father.    Home Medications  Current Outpatient Medications   Medication Instructions    acetaminophen (TYLENOL) 650 mg, Oral, EVERY 8 HOURS    albuterol (PROAIR  HFA/PROVENTIL HFA/VENTOLIN HFA) 108 (90 Base) MCG/ACT inhaler 1-2 puffs, Inhalation, EVERY 4 HOURS PRN    albuterol (PROVENTIL) 2.5 mg, Nebulization, EVERY 6 HOURS PRN    Alcohol Swabs (B-D SINGLE USE SWABS REGULAR) PADS USE TO SWAB AREA OF INJECTION / JENISE AS DIRECTED    aspirin (ASA) 81 mg, DAILY    atorvastatin (LIPITOR) 40 mg, Oral, DAILY    blood glucose monitoring (NO BRAND SPECIFIED) meter device kit Use to test blood sugar 1 times daily or as directed.  Blood Glucose Monitor Brands: per insurance.    budesonide-formoterol (SYMBICORT) 80-4.5 MCG/ACT Inhaler 2 puffs, Inhalation, 2 TIMES DAILY    carvedilol (COREG) 6.25 mg, Oral, 2 TIMES DAILY    fluocinonide (LIDEX) 0.05 % external cream Topical, 2 TIMES DAILY    glipiZIDE (GLUCOTROL XL) 10 mg, Oral, DAILY    insulin glargine (LANTUS PEN) 20 Units, Subcutaneous, AT BEDTIME    insulin pen needle (BD LIZBETH U/F) 32G X 4 MM miscellaneous 1 Pen Needle, DAILY    Lancets (ONETOUCH DELICA PLUS [id number]) MISC USE AS DIRECTED WITH LANCING DEVICE TO TEST ONCE DAILY    Lidocaine (LIDOCARE) 4 % Patch 1 patch, Transdermal, EVERY 24 HOURS, To prevent lidocaine toxicity, patient should be patch free for 12 hrs daily.    losartan-hydrochlorothiazide (HYZAAR) 50-12.5 MG tablet 1 tablet, Oral, DAILY    methocarbamol (ROBAXIN) 500 mg, Oral, EVERY 6 HOURS PRN    montelukast (SINGULAIR) 10 MG tablet TAKE ONE TABLET BY MOUTH AT BEDTIME    omeprazole (PRILOSEC) 20 mg, Oral, DAILY    ONETOUCH ULTRA test strip USE TO TEST BLOOD SUGAR 1 TIME DAILY OR AS DIRECTED    order for DME Equipment being ordered: arm blood pressure cuff    triamcinolone (KENALOG) 0.1 % external cream APPLY TOPICALLY 2 TIMES DAILY TO LEFT ANKLE    vitamin E (TOCOPHEROL) 400 Units, DAILY       Allergies  Allergies   Allergen Reactions    Simvastatin Swelling     Severe muscle pain, swelling, and bruising    Dust Mites         10 pt ROS neg except as noted in HPI    Physical Exam:  I performed all aspects of the  "physical examination via Telemedicine associated with two way audio and video communication.    Vital Signs: Blood pressure (!) 158/86, pulse 77, temperature 97.5  F (36.4  C), temperature source Oral, resp. rate 22, height 1.727 m (5' 8\"), weight 74.4 kg (164 lb), SpO2 95%.    Physical Exam    Gen:  Well-developed, well-nourished, in no acute distress, lying semi-supine in his/her hospital bed  HEENT: NC/AT,  hearing is intact to my voice  Resp:  No accessory muscle use, breath sounds clear; no wheezes no rales no rhonchi  Card:  No murmur, normal S1, S2   Abd:  Soft per RN exam, no TTP, non-distended, normoactive bowel sounds are present  Ext: No clubbing, cyanosis or edema was noted  Skin: No rashes or skin breakdown  Psych:  Not anxious, not agitated    DATA:    I&O: No intake/output data recorded.          Labs:  I have personally reviewed the following studies:  Recent Labs   Lab 12/13/24  1717   POTASSIUM 4.4   CHLORIDE 98   CO2 28   BUN 22.5   ALBUMIN 4.0   ALKPHOS 83   AST 14   ALT 18      Recent Labs   Lab 12/13/24  1717   WBC 9.5   HGB 15.0   HCT 43.8            Imaging: ER imaging reviewed    Misc  DVT prophylaxis: Up ad zane  Code Status: Full code   Cardiac Monitoring:   No cardiac monitoring  Diaz: none  Lines: peripheral IV  Diet:  diabetic    ASSESSMENT/PLAN:       78-year-old male with 3 rib fractures, will be placed in observation for intractable pain.  Patient did receive  and anesthetic block in the emergency room.       This patient's current admission to an acute care setting is essential for the treatment of   Intractable rib pain     The patient's recovery is dependent on the following treatments of     -  oral analgesics  -  oxygen supplementation  - incentive spirometer  - Correcting any electrolyte abnormalities to stabilize this condition.     The patient is at risk of developing further complications of  pneumonia if not treated in an acute care setting.     I expect the " patient's hospital stay to require  1 to 2 days      Our patient will be admitted under the hospitalist services and further management to be based on patient's clinical progress.         # ACTIVE PROBLEM LISTS:     #Type 2 Diabetes mellitus  - glipizide 10 mg daily  -m glargine 20 units nightly    #hypertension  -  hydrochlorothiazide 12.5 mg daily  -  Cozaar 50 mg daily  - Coreg 6.25 bid    #Asthma  -  montelukast 10 mg daily  -  albuterol nebulizer, as needed  -  glycopyrrolate?,  Patient indicated that he has thick secretions at times and sometimes it is difficult for him to swallow, I am wondering if glycopyrrolate nebulizer would be an option for the patient to use at home.    Clinically Significant Risk Factors Present on Admission                        Our patient will be admitted under the hospitalist services and further management to be based on patient's clinical progress.      As the provider for the telehealth service, I attest that I introduced myself to the patient and provided my credentials. Based on review of the patient s chart and/or a discussion with members of the patient s treatment team, I determined that telemedicine via a real-time, two-way, interactive audio and video platform is an appropriate means of providing this service.     The encounter was approximately 15 minutes. The nurse was present for the duration of the encounter.    Chart reviewed,labs and available imaging reviewed,consultant notes reviewed. Previous available medical records have been reviewed  Care plan discussed with care team    I have seen and examined the patient today. Documentation for this visit was completed using a template. Everything documented was personally performed today with the necessary additions, deletions and changes made as appropriate with the diagnoses being listed in no particular order of clinical relevance.    Barbara Li MD, FACP  Securely message with the Vocera Web Console (learn  more here)  Text page via [facility] Paging/Directory

## 2024-12-14 NOTE — PROGRESS NOTES
Chart assessed for admit to med/surg. Called for report with no answer. Will attempt again shortly.

## 2024-12-14 NOTE — PROGRESS NOTES
"[facility] ADMISSION NOTE    Patient admitted to room 2213 at approximately 2245 via cart from emergency room. Patient was accompanied by transport tech.     Verbal SBAR report received from RASHEL Desai prior to patient arrival.     Patient ambulated to bed with stand-by assist. Patient alert and oriented X 3. Pain is controlled with current analgesics.  Medication(s) being used: Nerve block given in ED.   Admission vital signs: Blood pressure (!) 158/86, pulse 77, temperature 97.5  F (36.4  C), temperature source Oral, resp. rate 22, height 1.727 m (5' 8\"), weight 74.4 kg (164 lb), SpO2 95%. Patient was oriented to plan of care, call light, bed controls, tv, telephone, bathroom, and visiting hours.     Risk Assessment    The following safety risks were identified during admission: fall. Yellow risk band applied: YES.     Skin Initial Assessment    This writer admitted this patient and completed a full skin assessment and Joseph score in the Adult PCS flowsheet.   Photo documentation of skin problem and/or wound competed via [employer] application (located under Media):  N/A    Appropriate interventions initiated as needed.     Secondary skin check not completed.    Joseph Risk Assessment  Sensory Perception: 4-->no impairment  Moisture: 4-->rarely moist  Activity: 3-->walks occasionally  Mobility: 3-->slightly limited  Nutrition: 3-->adequate  Friction and Shear: 3-->no apparent problem  Joseph Score: 20  Mattress: Standard gel/foam mattress (IsoFlex, Atmos Air, etc.)  Bed Frame: Standard width and length    Education    Patient has a [place] to Observation order: Yes  Observation education completed and documented: No - Obs brochure given to bedside nurse to complete.       Alma Hubbard RN      "

## 2024-12-14 NOTE — ED NOTES
"[facility]   Admission Handoff    The patient is Raul Wilhelm, 78 year old who arrived in the ED by CAR from home with a complaint of Dysphagia and Abdominal Pain  . The patient's current symptoms are a recurrence of a past episode and during this time the symptoms have remained the same. In the ED, patient was diagnosed with   Final diagnoses:   Closed fracture of multiple ribs of right side, initial encounter         Needed?: No    Allergies:    Allergies   Allergen Reactions    Simvastatin Swelling     Severe muscle pain, swelling, and bruising    Dust Mites        Past Medical Hx:   Past Medical History:   Diagnosis Date    Chronic airway obstruction, not elsewhere classified     Other and unspecified hyperlipidemia        Initial vitals were: BP: (!) 143/92  Pulse: 93  Temp: 98.3  F (36.8  C)  Resp: 16  Height: 172.7 cm (5' 8\")  Weight: 74.4 kg (164 lb)  SpO2: 90 %   Recent vital Signs: BP (!) 163/100   Pulse 92   Temp 98.3  F (36.8  C) (Oral)   Resp 16   Ht 1.727 m (5' 8\")   Wt 74.4 kg (164 lb)   SpO2 91%   BMI 24.94 kg/m      Elimination Status: Continent: Yes     Activity Level: SBA w/ walker    Fall Status: Reason for falls risk:  Mobility and Reason for falls risk: High Risk Medications  bed/chair alarm on, nonskid shoes/slippers when out of bed, arm band in place, patient and family education, assistive device/personal items within reach, and activity supervised    Baseline Mental status: WDL  Current Mental Status changes: at basesline    Infection present or suspected this encounter: no  Sepsis suspected: No    Isolation type: None    Bariatric equipment needed?: No    In the ED these meds were given:   Medications   HYDROmorphone (PF) (DILAUDID) injection 0.3 mg (0.3 mg Intravenous $Given 12/13/24 1716)   oxyCODONE (ROXICODONE) tablet 5 mg (5 mg Oral $Given 12/13/24 1716)   acetaminophen (TYLENOL) tablet 650 mg (650 mg Oral $Given 12/13/24 1720) "   ketorolac (TORADOL) injection 10 mg (10 mg Intravenous $Given 12/13/24 1720)   oxyCODONE (ROXICODONE) tablet 5 mg (5 mg Oral $Given 12/13/24 1956)       Drips running?  No    Home pump  No    Current LDAs: Peripheral IV: Site L AC; Gauge 18  none     Results:   Labs/Imaging  Ordered and Resulted from Time of ED Arrival Up to the Time of Departure from the ED  Results for orders placed or performed during the hospital encounter of 12/13/24 (from the past 24 hours)   EKG 12 lead   Result Value Ref Range    Systolic Blood Pressure  mmHg    Diastolic Blood Pressure  mmHg    Ventricular Rate 86 BPM    Atrial Rate 86 BPM    OH Interval 138 ms    QRS Duration 78 ms     ms    QTc 435 ms    P Axis -18 degrees    R AXIS 13 degrees    T Axis 29 degrees    Interpretation ECG       Sinus rhythm with Premature atrial complexes  Otherwise normal ECG  When compared with ECG of 14-Nov-2015 19:07,  Premature atrial complexes are now Present  Nonspecific T wave abnormality now evident in Inferior leads     Blood gas venous   Result Value Ref Range    pH Venous 7.39 7.32 - 7.43    pCO2 Venous 48 40 - 50 mm Hg    pO2 Venous 37 25 - 47 mm Hg    Bicarbonate Venous 29 (H) 21 - 28 mmol/L    Base Excess/Deficit Venous 3.0 -3.0 - 3.0 mmol/L    FIO2 21     Oxyhemoglobin Venous 68 (L) 70 - 75 %    O2 Sat, Venous 69.3 (L) 70.0 - 75.0 %    Narrative    In healthy individuals, oxyhemoglobin (O2Hb) and oxygen saturation (SO2) are approximately equal. In the presence of dyshemoglobins, oxyhemoglobin can be considerably lower than oxygen saturation.   CBC with platelets differential    Narrative    The following orders were created for panel order CBC with platelets differential.  Procedure                               Abnormality         Status                     ---------                               -----------         ------                     CBC with platelets and d...[[id number]]                      Final result                  Please view results for these tests on the individual orders.   Comprehensive metabolic panel   Result Value Ref Range    Sodium 137 135 - 145 mmol/L    Potassium 4.4 3.4 - 5.3 mmol/L    Carbon Dioxide (CO2) 28 22 - 29 mmol/L    Anion Gap 11 7 - 15 mmol/L    Urea Nitrogen 22.5 8.0 - 23.0 mg/dL    Creatinine 1.10 0.67 - 1.17 mg/dL    GFR Estimate 69 >60 mL/min/1.73m2    Calcium 9.1 8.8 - 10.4 mg/dL    Chloride 98 98 - 107 mmol/L    Glucose 298 (H) 70 - 99 mg/dL    Alkaline Phosphatase 83 40 - 150 U/L    AST 14 0 - 45 U/L    ALT 18 0 - 70 U/L    Protein Total 6.7 6.4 - 8.3 g/dL    Albumin 4.0 3.5 - 5.2 g/dL    Bilirubin Total 0.9 <=1.2 mg/dL   [place] Draw    Narrative    The following orders were created for panel order [place] Draw.  Procedure                               Abnormality         Status                     ---------                               -----------         ------                     Extra Blue Top Tube[[id number]]                              Final result                 Please view results for these tests on the individual orders.   CBC with platelets and differential   Result Value Ref Range    WBC Count 9.5 4.0 - 11.0 10e3/uL    RBC Count 4.88 4.40 - 5.90 10e6/uL    Hemoglobin 15.0 13.3 - 17.7 g/dL    Hematocrit 43.8 40.0 - 53.0 %    MCV 90 78 - 100 fL    MCH 30.7 26.5 - 33.0 pg    MCHC 34.2 31.5 - 36.5 g/dL    RDW 12.2 10.0 - 15.0 %    Platelet Count 260 150 - 450 10e3/uL    % Neutrophils 79 %    % Lymphocytes 9 %    % Monocytes 10 %    % Eosinophils 1 %    % Basophils 1 %    % Immature Granulocytes 1 %    NRBCs per 100 WBC 0 <1 /100    Absolute Neutrophils 7.5 1.6 - 8.3 10e3/uL    Absolute Lymphocytes 0.8 0.8 - 5.3 10e3/uL    Absolute Monocytes 1.0 0.0 - 1.3 10e3/uL    Absolute Eosinophils 0.1 0.0 - 0.7 10e3/uL    Absolute Basophils 0.1 0.0 - 0.2 10e3/uL    Absolute Immature Granulocytes 0.1 <=0.4 10e3/uL    Absolute NRBCs 0.0 10e3/uL   Extra Blue Top Tube   Result Value Ref Range     Hold Specimen JIC    XR Chest 2 Views    Narrative    EXAM: XR CHEST 2 VIEWS  LOCATION: [facility]  DATE: 12/13/2024    INDICATION: chest pain, cough after multiple rib fractures  COMPARISON: CT chest 12/11/2024      Impression    IMPRESSION:     No focal airspace disease. No pleural effusion or pneumothorax.    The cardiomediastinal silhouette is unremarkable.    Right rib fractures are not well appreciated on radiographs.       For the majority of the shift this patient's behavior was Green     Cardiac Rhythm: N/A  Pt needs tele? No  Skin/wound Issues:  None identified    Code Status: Full Code    Pain control: fair    Nausea control: pt had none    Abnormal labs/tests/findings requiring intervention: None    Patient tested for COVID 19 prior to admission: NO     OBS brochure/video discussed/provided to patient/family: Yes     Family present during ED course? Yes     Family Comments/Social Situation comments: Lives independently    Tasks needing completion: None    Giselle Duque RN

## 2024-12-14 NOTE — CONSULTS
Surgical Consultation  [facility]    Raul is seen in consultation for rib fracture, at the request of Dr. Golden.    Chief Complaint:  rib fractures, pain     History of Present Illness: Raul Wilhelm is a 78 year old male presents with persistent pain after a mechanical fall. Patient was discharged after admission for multiple rib fractures.   At discharge, patient declined oxycodone prescription. He was sent with tylenol and robaxin as well as lidocaine patches. He has filled these prescriptions.   Patient has not used the prescriptions and has not taken any medication.  He presented to ED due to pain.   Patient states that his pain was out of control, and that he did not take any of the medications.     Patient states that he does not want to go home with oxycodone, because he does not want to be addicted to the medication.     Patient states he has pain. Patient is asking to go home.     Patient Active Problem List   Diagnosis    Family hx of colon cancer    Glaucoma    Essential hypertension    History of arterial ischemic stroke    Hyperlipidemia LDL goal <70    Pneumonitis, hypersensitivity, naa (H)    Severe persistent asthma without complication (H)    COPD (chronic obstructive pulmonary disease) (H)    Hoarseness of voice    Eczema    Hypokalemia    Coronary artery disease involving native coronary artery of native heart without angina pectoris    Type 2 diabetes mellitus without complication, without long-term current use of insulin (H)    Status post total knee replacement    Fall, initial encounter    Closed fracture of multiple ribs of right side, initial encounter       Past Medical History:   Diagnosis Date    Chronic airway obstruction, not elsewhere classified     Other and unspecified hyperlipidemia        Past Surgical History:   Procedure Laterality Date    ARTHROPLASTY KNEE Left 5/28/2020    Procedure: Left Total Knee Arthroplasty;  Surgeon: Sanjay Downey,  "MD;  Location: [facility] OR    ARTHROPLASTY KNEE Right 11/2/2020    Procedure: ARTHROPLASTY, KNEE, TOTAL;  Surgeon: Sanjay Downey MD;  Location: [facility] OR    ENT SURGERY  as a child    tonsillectomy       Family History   Problem Relation Age of Onset    Diabetes Mother     Heart Disease Mother     C.A.D. Mother     Cancer Father     Hypertension Father     Cerebrovascular Disease Paternal Grandfather     Asthma Sister     Breast Cancer No family hx of     Cancer - colorectal No family hx of     Prostate Cancer No family hx of        Social History     Tobacco Use    Smoking status: Never    Smokeless tobacco: Never   Substance Use Topics    Alcohol use: Yes     Comment: Rare        History   Drug Use No       No current outpatient medications on file.       Allergies   Allergen Reactions    Simvastatin Swelling     Severe muscle pain, swelling, and bruising    Dust Mites        Review of Systems:   Constitutional - Denies fevers, weight loss, malaise, lethargy  Neuro - Denies tremors or seizures  Pulmon - Denies SOB, dyspnea, hemoptysis, chronic cough or use of an inhaler. Endorse right rib pain  CV - Denies CP, SOB, lower extremity edema, difficulty w/ stairs, has never used NTG  GI - Denies hematemesis, BRBPR, melena, chronic diarrhea or epigastric pain   - Denies hematuria, difficulty voiding, h/o STDs  Hematology - Denies blood clotting disorders, chronic anemias  Dermatology - No melanomas or skin cancers  Rheumatology - No h/o RA  Pysch - Denies depression, bipolar d/o or schizophrenia    Physical Exam:  BP (!) 178/90 (BP Location: Right arm)   Pulse 80   Temp 97.5  F (36.4  C) (Oral)   Resp 20   Ht 1.727 m (5' 8\")   Wt 74.4 kg (164 lb)   SpO2 95%   BMI 24.94 kg/m      Constitutional- No acute distress, well nourished, non-toxic  Eyes: Anicteric, no injection.  PERRL  ENT:  Normocephalic, atraumatic, Nose midline, moist mucus membranes  Respiratory- no flail chest, no hematoma, symmetrical chest rise, " good inspiratory effort  Cardiovascular - Heart Rate normal per tele, brisk cap refill <2sec  Abdomen - Soft, non-tender,  no palpable masses  Neuro - No focal neuro deficits, Alert and oriented x 3  Psych: Appropriate mood and affect  Musculoskeletal: Moves upper and lower extremities.  Skin: Warm, Dry                      Data:   CT Chest/Abd/Pelvis 12/11/20024    IMPRESSION:  1.  Mildly displaced acute right fifth through seventh rib fractures. No pneumothorax.     2.  Small periumbilical hernia containing a decompressed loop of small bowel. Nothing for current obstruction.     3.  Fatty liver.     4.  Mild prostatic enlargement.    Recent Labs   Lab Test 12/13/24 1717 12/12/24  0545 12/11/24  1609   WBC 9.5 8.8 10.8   HGB 15.0 14.2 15.4   HCT 43.8 44.1 46.7    241 247      Recent Labs   Lab Test 12/13/24 1717 12/12/24  0545 12/11/24  1609    136 136   POTASSIUM 4.4 4.8 5.0   CHLORIDE 98 100 98   CO2 28 27 28   BUN 22.5 16.2 14.3   CR 1.10 1.05 0.99     Recent Labs   Lab Test 12/13/24 1717 12/11/24  1609 01/31/20  0616 01/26/19  0842 03/29/18  0028   BILITOTAL 0.9 1.7*  --   --  1.4*   ALT 18 24 36   < > 33   AST 14 18  --   --  15   ALKPHOS 83 95  --   --  61    < > = values in this interval not displayed.      No lab results found.  Recent Labs   Lab Test 12/13/24 1717 12/12/24  0545 12/11/24  1609   JOSE RAFAEL 9.1 9.0 9.7     Recent Labs   Lab Test 12/13/24 1717 12/12/24  0545 12/11/24  1609 05/03/24  1137 10/13/23  1111 10/13/23  0937 09/29/21  0740 05/21/21  0655 11/06/19  0859 03/29/18  0028   ANIONGAP 11 9 10  --    < >  --    < >  --    < > 10   PROTEIN  --   --   --  Negative  --  Negative  --  Negative  --   --    ALBUMIN 4.0  --  4.2  --   --   --   --   --   --  3.9    < > = values in this interval not displayed.                  Assessment and Plan:     Raul Wilhelm is a 78 year old male with multiple rib fractures, right 5th - 7th. He presents for pain issues after not taking any  pain medication and declining oxycodone at discharge.     - Pain medications scheduled: acetaminophen, lidocaine patches, toradol/ibuprofen (if able to take NSAID) and oxycodone as well as robaxin  -PT/OT evaluations if appropriate   -Regular diet  - Encourage ambulation and IS               COOPER MICHAEL PA-C  12/14/2024 at 9:23 AM

## 2024-12-15 VITALS
WEIGHT: 164 LBS | BODY MASS INDEX: 24.86 KG/M2 | OXYGEN SATURATION: 93 % | TEMPERATURE: 97.1 F | HEIGHT: 68 IN | SYSTOLIC BLOOD PRESSURE: 147 MMHG | RESPIRATION RATE: 16 BRPM | DIASTOLIC BLOOD PRESSURE: 83 MMHG | HEART RATE: 78 BPM

## 2024-12-15 LAB
ANION GAP SERPL CALCULATED.3IONS-SCNC: 8 MMOL/L (ref 7–15)
BUN SERPL-MCNC: 16.9 MG/DL (ref 8–23)
CALCIUM SERPL-MCNC: 9.1 MG/DL (ref 8.8–10.4)
CHLORIDE SERPL-SCNC: 100 MMOL/L (ref 98–107)
CREAT SERPL-MCNC: 1.05 MG/DL (ref 0.67–1.17)
EGFRCR SERPLBLD CKD-EPI 2021: 73 ML/MIN/1.73M2
ERYTHROCYTE [DISTWIDTH] IN BLOOD BY AUTOMATED COUNT: 12.2 % (ref 10–15)
GLUCOSE BLDC GLUCOMTR-MCNC: 188 MG/DL (ref 70–99)
GLUCOSE SERPL-MCNC: 152 MG/DL (ref 70–99)
HCO3 SERPL-SCNC: 31 MMOL/L (ref 22–29)
HCT VFR BLD AUTO: 45.5 % (ref 40–53)
HGB BLD-MCNC: 14.5 G/DL (ref 13.3–17.7)
MCH RBC QN AUTO: 30.3 PG (ref 26.5–33)
MCHC RBC AUTO-ENTMCNC: 31.9 G/DL (ref 31.5–36.5)
MCV RBC AUTO: 95 FL (ref 78–100)
PLATELET # BLD AUTO: 252 10E3/UL (ref 150–450)
POTASSIUM SERPL-SCNC: 4.5 MMOL/L (ref 3.4–5.3)
RBC # BLD AUTO: 4.79 10E6/UL (ref 4.4–5.9)
SODIUM SERPL-SCNC: 139 MMOL/L (ref 135–145)
WBC # BLD AUTO: 9 10E3/UL (ref 4–11)

## 2024-12-15 PROCEDURE — 96376 TX/PRO/DX INJ SAME DRUG ADON: CPT

## 2024-12-15 PROCEDURE — 36415 COLL VENOUS BLD VENIPUNCTURE: CPT | Performed by: INTERNAL MEDICINE

## 2024-12-15 PROCEDURE — 85027 COMPLETE CBC AUTOMATED: CPT | Performed by: INTERNAL MEDICINE

## 2024-12-15 PROCEDURE — 80048 BASIC METABOLIC PNL TOTAL CA: CPT | Performed by: INTERNAL MEDICINE

## 2024-12-15 PROCEDURE — G0378 HOSPITAL OBSERVATION PER HR: HCPCS

## 2024-12-15 PROCEDURE — 94640 AIRWAY INHALATION TREATMENT: CPT

## 2024-12-15 PROCEDURE — 250N000013 HC RX MED GY IP 250 OP 250 PS 637: Performed by: INTERNAL MEDICINE

## 2024-12-15 PROCEDURE — 999N000157 HC STATISTIC RCP TIME EA 10 MIN

## 2024-12-15 PROCEDURE — 250N000011 HC RX IP 250 OP 636: Performed by: INTERNAL MEDICINE

## 2024-12-15 PROCEDURE — 250N000009 HC RX 250: Performed by: INTERNAL MEDICINE

## 2024-12-15 PROCEDURE — 82962 GLUCOSE BLOOD TEST: CPT

## 2024-12-15 PROCEDURE — 99239 HOSP IP/OBS DSCHRG MGMT >30: CPT | Performed by: INTERNAL MEDICINE

## 2024-12-15 RX ORDER — OXYCODONE HYDROCHLORIDE 5 MG/1
5 TABLET ORAL EVERY 6 HOURS PRN
Qty: 30 TABLET | Refills: 0 | Status: SHIPPED | OUTPATIENT
Start: 2024-12-15

## 2024-12-15 RX ORDER — SENNOSIDES 8.6 MG
1 TABLET ORAL 2 TIMES DAILY PRN
Qty: 30 TABLET | Refills: 0 | Status: SHIPPED | OUTPATIENT
Start: 2024-12-15

## 2024-12-15 RX ORDER — ACETAMINOPHEN 325 MG/1
650 TABLET ORAL EVERY 8 HOURS
COMMUNITY
Start: 2024-12-15

## 2024-12-15 RX ADMIN — ALBUTEROL SULFATE 2.5 MG: 2.5 SOLUTION RESPIRATORY (INHALATION) at 07:11

## 2024-12-15 RX ADMIN — GLIPIZIDE 10 MG: 10 TABLET, EXTENDED RELEASE ORAL at 08:30

## 2024-12-15 RX ADMIN — HYDROCHLOROTHIAZIDE 12.5 MG: 12.5 TABLET ORAL at 08:29

## 2024-12-15 RX ADMIN — ACETAMINOPHEN 650 MG: 325 TABLET ORAL at 01:09

## 2024-12-15 RX ADMIN — CARVEDILOL 6.25 MG: 6.25 TABLET, FILM COATED ORAL at 08:30

## 2024-12-15 RX ADMIN — FAMOTIDINE 20 MG: 10 INJECTION INTRAVENOUS at 01:09

## 2024-12-15 RX ADMIN — ASPIRIN 81 MG: 81 TABLET, COATED ORAL at 08:30

## 2024-12-15 RX ADMIN — ATORVASTATIN CALCIUM 40 MG: 20 TABLET, FILM COATED ORAL at 08:30

## 2024-12-15 RX ADMIN — SENNOSIDES 8.6 MG: 8.6 TABLET, FILM COATED ORAL at 09:28

## 2024-12-15 RX ADMIN — ACETAMINOPHEN 650 MG: 325 TABLET ORAL at 08:30

## 2024-12-15 RX ADMIN — OXYCODONE 5 MG: 5 TABLET ORAL at 01:09

## 2024-12-15 RX ADMIN — LOSARTAN POTASSIUM 50 MG: 50 TABLET, FILM COATED ORAL at 08:31

## 2024-12-15 RX ADMIN — OXYCODONE 5 MG: 5 TABLET ORAL at 09:28

## 2024-12-15 ASSESSMENT — ACTIVITIES OF DAILY LIVING (ADL)
ADLS_ACUITY_SCORE: 46
ADLS_ACUITY_SCORE: 45
ADLS_ACUITY_SCORE: 46
ADLS_ACUITY_SCORE: 45
ADLS_ACUITY_SCORE: 45
ADLS_ACUITY_SCORE: 46
ADLS_ACUITY_SCORE: 46
ADLS_ACUITY_SCORE: 45
ADLS_ACUITY_SCORE: 46

## 2024-12-15 NOTE — PLAN OF CARE
Problem: Adult Inpatient Plan of Care  Goal: Plan of Care Review  Description: The Plan of Care Review/Shift note should be completed every shift.  The Outcome Evaluation is a brief statement about your assessment that the patient is improving, declining, or no change.  This information will be displayed automatically on your shift  note.  Flowsheets (Taken 12/15/2024 0627)  Outcome Evaluation: Pt alert and oriented x4. Pt independent in his room and having some discomfort in right side from fractures. Pt ready to discharge home with pain management.  Plan of Care Reviewed With: patient  Overall Patient Progress: improving  Goal: Absence of Hospital-Acquired Illness or Injury  Intervention: Prevent Skin Injury  Recent Flowsheet Documentation  Taken 12/15/2024 0120 by Merissa Vidal RN  Body Position: position changed independently       Goal Outcome Evaluation:      Plan of Care Reviewed With: patient    Overall Patient Progress: improvingOverall Patient Progress: improving    Outcome Evaluation: Pt alert and oriented x4. Pt independent in his room and having some discomfort in right side from fractures. Pt ready to discharge home with pain management.

## 2024-12-15 NOTE — DISCHARGE SUMMARY
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  The following labs/tests are recommended: CBC and CMP.              Discharge Disposition   Discharged to home    Hospital Course   Raul Wilhelm is a 78 year old male who presents on 12/11/2024 with right sided chest wall pain after sustaining fall from approximately 4 feet. He was evaluated with CT CAP showing 3 right sided rib fractures without other acute findings. Having pleuritic chest pains however without hypoxia or respiratory compromise. Trauma clearance by general surgery.     Right sided rib fractures 5th-7th 2/2 fall    Sustained mechanical fall from approximately 4 ft landing on his right side. Having significant right sided chest wall pain that is worsened with breathing. Non-hypoxic (94%) on presentation with desaturation into the upper 80's requiring 2 liters supplemental oxygen after IV hydromorphone. Feeling more short of breath beyond his asthma baseline. Denies cough or concern for respiratory infection PTA. Pulmonary auscultation clear. No tripoding. No altered mental status. CT CAP showing mildly displaced acute right fifth-seventh rib fractures without evidence for pneumothorax. No other acute findings. General surgery consulted  and clear from trauma. Denies injury to head or LOC. He is not on anticoagulation, however did take ASA 81 mg x 4 for pain PTA. Denies injury to extremities or joints and was able to ambulated on presentation. PTA he was given nerve block by anesthesia.  - General surgery trauma consultation complete  - Completed rib fracture protocol in previous admission  - No therapy needs per physical therapy  - Continue home with acetaminophen, Robaxin, and lidocaine patch, but pain uncontrolled  - Patient reluctant to use oxycodone, encouraged use     Dysphagia  Patient complains of feeling that food is stuck in his airway.  Nurses note that the patient is coughing and producing up phlegm.  Patient states that his coughing was improved after sucking on hard candy.  At home, he has similar episodes weekly to monthly.  - Speech therapy outpatient referral      Asthma and chronic obstructive pulmonary disease (COPD) overlap    Appears stable. No hypoxia or respiratory distress, however at higher risk for exacerbation as has x 3 acute rib fractures (see above). CT CAP without acute pulmonary findings. COPD managed PTA with montelukast 10 mg, Symbicort inhaler BID, albuterol inhaler PRN, albuterol nebs PRN.   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 Admit blood glucose 207. Last hemoglobin A1c 8.7% 11/2024. Managed PTA with insulin glargine 20 U HS and glipizide 10 mg.  - Continue PTA insulin glargine 20 U and glipizide 10 mg daily     Essential hypertension    Chronic. Blood pressures reviewed, largely controlled. Managed PTA with losartan-hydrochlorothiazide 50-12.5 mg.  - Continue losartan-hydrochlorothiazide     Coronary artery disease (CAD)  Dyslipidemia    H/o CAD with acute inferior/inferolateral STEMI 11/2015 and mild Cx and thrombotic L PDA lesions treated with REDD. Residual mLAD 80% and OM1 70%, currently tx'd  medically. Lifelong DAPT recommended however has been declined by patient. Currently managed with ASA 81 mg, atorvastatin 40 mg, losartan-hydrochlorothiazide 20-12.5 mg, and carvedilol 6.25 mg BID  - Continue ASA, atorvastatin, carvedilol, and losartan-hydrochlorothiazide     Gastroesophageal reflux disease (GERD)    Chronic. Managed PTA with omeprazole 20 mg.  - Continue omeprazole       Clinically Significant Risk Factors Present on Admission   # Drug Induced Platelet Defect: home medication list includes an antiplatelet medication   # Hypertension: Noted on problem list          # DMII: A1C = 8.7 % (Ref range: 0.0 - 5.6 %) within past 6 months       # Asthma: noted on problem list      Consultations This Hospital Stay   SURGERY GENERAL IP CONSULT    Code Status   Full Code    35 MINUTES SPENT BY ME on the date of service doing chart review, history, exam, documentation & further activities per the note.         Issac Golden MD  [facility]  ______________________________________________________________________    Physical Exam   Vital Signs: Temp: 97.1  F (36.2  C) Temp src: Oral BP: (!) 147/83 Pulse: 78   Resp: 16 SpO2: 93 % O2 Device: None (Room air)    Weight: 164 lbs 0 oz       Gen: Well nourished, elderly male, alert and oriented x 3, no acute distressed  HEENT: Atraumatic, normocephalic; sclera non-injected, anicterric; oral mucosa moist, no lesion, no exudate  Lungs: Clear to ausculation, no wheezes, no rhonchi, no rales  Heart: Regular rate, regular rhythm, no gallops, no rubs, no murmurs  GI: Bowel sound normal, no hepatosplenomegaly, no masses, non-tender, non-distended, no guarding, no rebound tenderness  Lymph: No lymphadenopathy, no edema  Skin: No rashes, no chronic venous stasis     Primary Care Physician   Sarita Hennessy    Discharge Orders      Speech Therapy  Referral      Reason for your hospital stay    This is a 78 year old male with rib  fractures readmitted for pain control.     Follow-up and recommended labs and tests     Follow up with primary care provider, Sarita Hennessy, within 7 days for hospital follow- up.  The following labs/tests are recommended: CBC and CMP.     Activity    Your activity upon discharge: activity as tolerated     Full Code     Diet    Follow this diet upon discharge: Orders Placed This Encounter      Full Liquid Diet         Significant Results and Procedures     Discharge Medications   Current Discharge Medication List        START taking these medications    Details   oxyCODONE (ROXICODONE) 5 MG tablet Take 1 tablet (5 mg) by mouth every 6 hours as needed for moderate pain.  Qty: 30 tablet, Refills: 0    Associated Diagnoses: Closed fracture of multiple ribs of right side, initial encounter      sennosides (SENOKOT) 8.6 MG tablet Take 1 tablet by mouth 2 times daily as needed for constipation.  Qty: 30 tablet, Refills: 0    Associated Diagnoses: Closed fracture of multiple ribs of right side, initial encounter           CONTINUE these medications which have CHANGED    Details   acetaminophen (TYLENOL) 325 MG tablet Take 2 tablets (650 mg) by mouth every 8 hours.    Associated Diagnoses: Closed fracture of multiple ribs of right side, initial encounter           CONTINUE these medications which have NOT CHANGED    Details   albuterol (PROAIR HFA/PROVENTIL HFA/VENTOLIN HFA) 108 (90 Base) MCG/ACT inhaler Inhale 1-2 puffs into the lungs every 4 hours as needed for shortness of breath or wheezing  Qty: 8.5 g, Refills: 10    Comments: Pharmacy may dispense brand covered by insurance (Proair, or proventil or ventolin or generic albuterol inhaler) Profile Rx: patient will contact pharmacy when needed  Associated Diagnoses: Severe persistent asthma without complication (H)      albuterol (PROVENTIL) (2.5 MG/3ML) 0.083% neb solution TAKE 1 VIAL (2.5 MG) BY NEBULIZATION EVERY 6 HOURS AS NEEDED FOR SHORTNESS OF BREATH OR  WHEEZING  Qty: 90 mL, Refills: 11    Associated Diagnoses: Severe persistent asthma without complication (H)      Alcohol Swabs (B-D SINGLE USE SWABS REGULAR) PADS USE TO SWAB AREA OF INJECTION / JENISE AS DIRECTED  Qty: 100 each, Refills: 3    Associated Diagnoses: Type 2 diabetes mellitus without complication, without long-term current use of insulin (H)      aspirin (ASA) 81 MG EC tablet Take 1 tablet (81 mg) by mouth daily    Associated Diagnoses: Status post total right knee replacement      atorvastatin (LIPITOR) 40 MG tablet Take 1 tablet (40 mg) by mouth daily  Qty: 90 tablet, Refills: 3    Associated Diagnoses: Type 2 diabetes mellitus without complication, without long-term current use of insulin (H); Coronary artery disease involving native coronary artery of native heart without angina pectoris      blood glucose monitoring (NO BRAND SPECIFIED) meter device kit Use to test blood sugar 1 times daily or as directed.  Blood Glucose Monitor Brands: per insurance.  Qty: 1 kit, Refills: 0    Associated Diagnoses: Type 2 diabetes mellitus without complication, without long-term current use of insulin (H)      budesonide-formoterol (SYMBICORT) 80-4.5 MCG/ACT Inhaler Inhale 2 puffs into the lungs 2 times daily  Qty: 10.2 g, Refills: 11    Associated Diagnoses: Severe persistent asthma without complication (H); Chronic obstructive pulmonary disease, unspecified COPD type (H)      carvedilol (COREG) 6.25 MG tablet Take 1 tablet (6.25 mg) by mouth 2 times daily  Qty: 180 tablet, Refills: 3    Associated Diagnoses: Essential hypertension      glipiZIDE (GLUCOTROL XL) 10 MG 24 hr tablet TAKE 1 TABLET (10 MG) BY MOUTH DAILY  Qty: 90 tablet, Refills: 3    Associated Diagnoses: Type 2 diabetes mellitus without complication, without long-term current use of insulin (H)      insulin glargine (LANTUS PEN) 100 UNIT/ML pen Inject 20 Units subcutaneously at bedtime.  Qty: 15 mL, Refills: 3    Comments: If Lantus is not covered  by insurance, may substitute Basaglar or Semglee or other insulin glargine product per insurance preference at same dose and frequency.    Associated Diagnoses: Type 2 diabetes mellitus without complication, without long-term current use of insulin (H)      insulin pen needle (BD LIZBETH U/F) 32G X 4 MM miscellaneous USE 1 PEN NEEDLE DAILY  Qty: 100 each, Refills: 1    Associated Diagnoses: Type 2 diabetes mellitus without complication, without long-term current use of insulin (H)      Lancets (ONETOUCH DELICA PLUS [id number]) MISC USE AS DIRECTED WITH LANCING DEVICE TO TEST ONCE DAILY  Qty: 100 each, Refills: 6    Associated Diagnoses: Type 2 diabetes mellitus without complication, without long-term current use of insulin (H)      Lidocaine (LIDOCARE) 4 % Patch Place 1 patch over 12 hours onto the skin every 24 hours for 7 days. To prevent lidocaine toxicity, patient should be patch free for 12 hrs daily.  Qty: 7 patch, Refills: 0    Associated Diagnoses: Closed fracture of multiple ribs of right side, initial encounter      losartan-hydrochlorothiazide (HYZAAR) 50-12.5 MG tablet Take 1 tablet by mouth daily  Qty: 90 tablet, Refills: 3    Associated Diagnoses: Coronary artery disease involving native coronary artery of native heart without angina pectoris; Essential hypertension      methocarbamol (ROBAXIN) 500 MG tablet Take 1 tablet (500 mg) by mouth every 6 hours as needed for muscle spasms.  Qty: 30 tablet, Refills: 0    Associated Diagnoses: Closed fracture of multiple ribs of right side, initial encounter      montelukast (SINGULAIR) 10 MG tablet TAKE ONE TABLET BY MOUTH AT BEDTIME  Qty: 90 tablet, Refills: 1    Associated Diagnoses: Severe persistent asthma without complication (H)      omeprazole (PRILOSEC) 20 MG DR capsule TAKE ONE CAPSULE BY MOUTH ONCE DAILY  Qty: 90 capsule, Refills: 2    Associated Diagnoses: Gastroesophageal reflux disease without esophagitis      ONETOUCH ULTRA test strip USE TO TEST  BLOOD SUGAR 1 TIME DAILY OR AS DIRECTED  Qty: 100 strip, Refills: 6    Associated Diagnoses: Type 2 diabetes mellitus without complication, without long-term current use of insulin (H)      order for DME Equipment being ordered: arm blood pressure cuff  Qty: 1 Device, Refills: 0    Associated Diagnoses: Essential hypertension, benign      triamcinolone (KENALOG) 0.1 % external cream APPLY TOPICALLY 2 TIMES DAILY TO LEFT ANKLE  Qty: 15 g, Refills: 1    Associated Diagnoses: Flexural eczema      vitamin E (TOCOPHEROL) 400 units (180 mg) capsule Take 400 Units by mouth daily.           STOP taking these medications       fluocinonide (LIDEX) 0.05 % external cream Comments:   Reason for Stopping:             Allergies   Allergies   Allergen Reactions    Simvastatin Swelling     Severe muscle pain, swelling, and bruising    Dust Mites

## 2024-12-15 NOTE — PLAN OF CARE
[facility] DISCHARGE NOTE    Patient discharged to home at 12:00 PM via wheel chair. Accompanied by daughter and staff. Discharge instructions reviewed with patient and daughter, opportunity offered to ask questions. Prescriptions sent to patients preferred pharmacy. All belongings sent with patient.    Merissa Almeida, RNGoal Outcome Evaluation:

## 2024-12-15 NOTE — PLAN OF CARE
"  Problem: Swallowing Impairment  Goal: Optimal Eating and Swallowing Without Aspiration  Outcome: Not Progressing     Problem: Adult Inpatient Plan of Care  Goal: Plan of Care Review  Description: The Plan of Care Review/Shift note should be completed every shift.  The Outcome Evaluation is a brief statement about your assessment that the patient is improving, declining, or no change.  This information will be displayed automatically on your shift  note.  Outcome: Progressing  Goal: Patient-Specific Goal (Individualized)  Description: You can add care plan individualizations to a care plan. Examples of Individualization might be:  \"Parent requests to be called daily at 9am for status\", \"I have a hard time hearing out of my right ear\", or \"Do not touch me to wake me up as it startles  me\".  Outcome: Progressing  Goal: Absence of Hospital-Acquired Illness or Injury  Outcome: Progressing  Intervention: Identify and Manage Fall Risk  Recent Flowsheet Documentation  Taken 12/14/2024 1606 by Tiny Zabala, RN  Safety Promotion/Fall Prevention:   assistive device/personal items within reach   clutter free environment maintained   increased rounding and observation   increase visualization of patient   nonskid shoes/slippers when out of bed   room near nurse's station   room organization consistent  Intervention: Prevent and Manage VTE (Venous Thromboembolism) Risk  Recent Flowsheet Documentation  Taken 12/14/2024 1606 by Tiny Zabala, RN  VTE Prevention/Management: SCDs off (sequential compression devices)  Intervention: Prevent Infection  Recent Flowsheet Documentation  Taken 12/14/2024 1606 by Tiny Zabala, RN  Infection Prevention:   rest/sleep promoted   single patient room provided  Goal: Optimal Comfort and Wellbeing  Outcome: Progressing  Intervention: Monitor Pain and Promote Comfort  Recent Flowsheet Documentation  Taken 12/14/2024 1606 by Tiny Zabala, RN  Pain Management Interventions:   " rest   repositioned  Goal: Readiness for Transition of Care  Outcome: Progressing     Problem: Fall Injury Risk  Goal: Absence of Fall and Fall-Related Injury  Outcome: Progressing  Intervention: Identify and Manage Contributors  Recent Flowsheet Documentation  Taken 12/14/2024 1606 by Tiny Zabala RN  Medication Review/Management: medications reviewed  Intervention: Promote Injury-Free Environment  Recent Flowsheet Documentation  Taken 12/14/2024 1606 by Tiny Zabala, RN  Safety Promotion/Fall Prevention:   assistive device/personal items within reach   clutter free environment maintained   increased rounding and observation   increase visualization of patient   nonskid shoes/slippers when out of bed   room near nurse's station   room organization consistent   Goal Outcome Evaluation:       Alert and oriented x4. Independent in room. Pain rated 7/10 with activity, but at rest 1-2/10. PRN oxycodone effective for pain control. Pt was placed on a full liquid diet per their request. Tolerated well. Calls appropriately and able to make needs known.

## 2024-12-16 ENCOUNTER — PATIENT OUTREACH (OUTPATIENT)
Dept: CARE COORDINATION | Facility: CLINIC | Age: 79
End: 2024-12-16
Payer: COMMERCIAL

## 2024-12-16 NOTE — PROGRESS NOTES
Clinic Care Coordination Contact  Transitions of Care Outreach  Chief Complaint   Patient presents with    Clinic Care Coordination - Post Hospital       Most Recent Admission Date: 12/13/2024   Most Recent Admission Diagnosis: Closed fracture of multiple ribs of right side, initial encounter - S22.41XA     Most Recent Discharge Date: 12/15/2024   Most Recent Discharge Diagnosis: Closed fracture of multiple ribs of right side, initial encounter - S22.41XA  Dysphagia, unspecified type - R13.10     Transitions of Care Assessment    Discharge Assessment  How are you doing now that you are home?: Patient shares that he is not doing that great and explained why he was in the hospital. He is doing good so far at home though and was able to take his medications. Plans to rest the rest of the day. Patient is not sure if he will be able to make PCP appt or not and asked for phone number to cancel in case. Writer let him know he could always change to virtual/phone if needed. Encouraged patient to call triage if symptoms change or get worst.  How are your symptoms? (Red Flag symptoms escalate to triage hotline per guidelines): Improved  Do you know how to contact your clinic care team if you have future questions or changes to your health status? : Yes  Does the patient have their discharge instructions? : Yes  Does the patient have questions regarding their discharge instructions? : No  Were you started on any new medications or were there changes to any of your previous medications? : Yes  Does the patient have all of their medications?: Yes  Do you have questions regarding any of your medications? : No  Do you have all of your needed medical supplies or equipment (DME)?  (i.e. oxygen tank, CPAP, cane, etc.): Yes    Post-op ([facility] Only)  If the patient had a surgery or procedure, do they have any questions for a nurse?: No    Post-op (Clinicians Only)  Did the patient have surgery or a procedure: No      Follow up Plan      Discharge Follow-Up  Discharge Follow Up Appointment Date: 12/31/24  Discharge Follow Up Appointment Scheduled with?: Primary Care Provider    Future Appointments   Date Time Provider Department Center   12/31/2024 10:00 AM Sarita Hennessy DO [facility]   2/19/2025  9:00 AM [facility]   2/19/2025 10:00 AM [facility] Sawant MD [facility]       Outpatient Plan as outlined on AVS reviewed with patient.    For any urgent concerns, please contact our 24 hour nurse triage line: [phone] ([phone])       JACEK Diaz

## 2024-12-23 ENCOUNTER — THERAPY VISIT (OUTPATIENT)
Dept: SPEECH THERAPY | Facility: CLINIC | Age: 79
End: 2024-12-23
Attending: INTERNAL MEDICINE
Payer: COMMERCIAL

## 2024-12-23 DIAGNOSIS — R13.10 DYSPHAGIA, UNSPECIFIED TYPE: ICD-10-CM

## 2024-12-23 PROCEDURE — 92610 EVALUATE SWALLOWING FUNCTION: CPT | Mod: GN | Performed by: SPEECH-LANGUAGE PATHOLOGIST

## 2024-12-23 NOTE — PROGRESS NOTES
SPEECH LANGUAGE PATHOLOGY EVALUATION       Fall Risk Screen:  Fall screen completed by: SLP  Have you fallen 2 or more times in the past year?: No  Have you fallen and had an injury in the past year?: No  Is patient a fall risk?: No    Subjective        Presenting condition or subjective complaint: (Patient-Rptd) trouble swallowing pills  Date of onset:      Relevant medical history:   Patient complains of feeling that food is stuck in his airway. Nurses note that the patient is coughing and producing up phlegm. Patient states that his coughing was improved after sucking on hard candy. At home, he has similar episodes weekly to monthly.      Prior diagnostic imaging/testing results:     currently recovering from right side rib Fractures 5th-7th 2/2 fall (from truck bed)  Prior therapy history for the same diagnosis, illness or injury: (Patient-Rptd) No      Living Environment  Social support: (Patient-Rptd) Alone   Help at home: (Patient-Rptd) Home management tasks (cooking, cleaning); Home and Yard maintenance tasks  Equipment owned:       Employment: (Patient-Rptd) No    Hobbies/Interests: (Patient-Rptd) none right now    Patient goals for therapy: (Patient-Rptd) swallow pills    Pain assessment: Location: ribs/Ratin     Objective     SWALLOW EVALUTION  Dysphagia history: Pt reports difficulty swallowing for years, not worsening, but had a choking episode two weeks ago where he almost needed the heimlich maneuver.  Pt reports difficulty especially with pills.  Current Diet/Method of Nutritional Intake: regular diet        CLINICAL SWALLOW EVALUATION  Oral Motor Function: generally intact     Level of assist required for feeding: no assistance needed   Textures Trialed:   Clinical Swallow Eval: Thin Liquids  Mode of presentation: cup, self-fed   Volume presented: single and consecutive sips  Preparatory Phase: WFL  Oral Phase: WFL  Pharyngeal phase of swallow: intact   Strategies trialed during procedure: LETY  "SLP CLINICAL EVAL STRATEGIES: N/A    Diagnostic statement: WNL    Clinical Swallow Eval: Purees  Mode of presentation: spoon, self-fed   Volume presented: appropriate bite sizes  Preparatory Phase: WFL  Oral Phase: WFL  Pharyngeal phase of swallow: intact   Strategies trialed during procedure: LETY SLP CLINICAL EVAL STRATEGIES: N/A   Diagnostic statement: WNL    Clinical Swallow Eval: Solids  Mode of presentation: self-fed   Volume presented: appropriate bite sizes  Preparatory Phase: WFL  Oral Phase: WFL  Pharyngeal phase of swallow: intact   Strategies trialed during procedure: LETY SLP CLINICAL EVAL STRATEGIES: N/A   Diagnostic statement: WNL     ESOPHAGEAL PHASE OF SWALLOW  patient reports symptoms of esophageal dysphagia Pt reports he feels like there is a pocket in his throat 'where pills and food hang up for a long time\".    SWALLOW ASSESSMENT CLINICAL IMPRESSIONS AND RATIONALE  Diet Consistency Recommendations: regular diet     Medication Administration Recommendations: crush and take with applesauce/pudding if pills can be crushed.  Pt states most difficult pill is Tylenol.  Instrumental Assessment Recommendations: VFSS (videofluoroscopic swallowing study), Esophagram to r/o diverticulum      Assessment & Plan   CLINICAL IMPRESSIONS   Medical Diagnosis: Dysphagia, unspecified type    Treatment Diagnosis:     Impression/Assessment: Pt is a 78 year old male with swallowing and choking complaints. The following significant findings have been identified:  Oral and pharyngeal stages WFL on clinical exam and pt reported no symptoms during visit.  Due to frequent difficulty with solids and pills and pt report of residue and sticking of solids, recommend video swallow study and esophagram for further assessment.    Education Assessment:   Learner/Method: Patient;Listening;No Barriers to Learning  Education Comments: referral for video swallow and esophagram    Risks and benefits of evaluation/treatment have been " explained.   Patient/Family/caregiver agrees with Plan of Care.     Evaluation Time:    SLP Eval: oral/pharyngeal swallow function, clinical minutes (93007): 45    Evaluation Only     Signing Clinician: LEVON Bradley      [facility]                                                                                   OUTPATIENT SPEECH LANGUAGE PATHOLOGY

## 2024-12-26 PROBLEM — R13.10 DYSPHAGIA, UNSPECIFIED TYPE: Status: ACTIVE | Noted: 2024-12-26

## 2024-12-29 ENCOUNTER — TELEPHONE (OUTPATIENT)
Dept: FAMILY MEDICINE | Facility: CLINIC | Age: 79
End: 2024-12-29
Payer: COMMERCIAL

## 2024-12-29 DIAGNOSIS — R13.19 OTHER DYSPHAGIA: Primary | ICD-10-CM

## 2024-12-29 NOTE — TELEPHONE ENCOUNTER
----- Message from Josselyn Marie sent at 12/23/2024  2:15 PM CST -----  Regarding: video swallow and esophagram  Hello,    I saw Raul today in speech therapy for a swallow evaluation.  He had a choking episode while in the hospital earlier this month and reports he's had problems for 10+years.  He is scheduled to see Dr. Hennessy in clinic on 12/31/24.    I would like for him to have a video swallow and also an esophagram  (2 separate tests and 2 separate orders) for further assessment of pharyngeal and esophageal swallow and rule out a diverticulum.  Would you be willing to order those- pt is also wanting more assessment of this.    Let me know if any questions.    Thank you,  Josselyn Marie MA, CCC/SLP

## 2024-12-30 ENCOUNTER — HOSPITAL ENCOUNTER (EMERGENCY)
Facility: CLINIC | Age: 79
Discharge: HOME | End: 2024-12-30
Attending: EMERGENCY MEDICINE
Payer: COMMERCIAL

## 2024-12-30 PROCEDURE — 99281 EMR DPT VST MAYX REQ PHY/QHP: CPT | Performed by: EMERGENCY MEDICINE

## 2024-12-30 RX ORDER — PREDNISONE 20 MG/1
20 TABLET ORAL ONCE
Status: DISCONTINUED | OUTPATIENT
Start: 2024-12-30 | End: 2024-12-30 | Stop reason: HOSPADM

## 2024-12-30 RX ORDER — PREDNISONE 20 MG/1
20 TABLET ORAL DAILY
Qty: 30 TABLET | Refills: 1 | Status: ON HOLD | OUTPATIENT
Start: 2024-12-30 | End: 2025-01-09

## 2024-12-30 NOTE — TELEPHONE ENCOUNTER
Called and spoke with patient, he handed the phone to his Daughter Nancy and I gave patient imaging information to schedule the swallowing and esophagram.     Patient states he was up all night because he is having troubles breathing because of his asthma. He states between the coughing and phlegm he couldn't sleep. Patient states he would like some prednisone for his asthma. He has had this before. He states he might need refills on his inhalers as well. Patient will be with his daughter and they might be out and about. Patient states to call his cell phone.    [phone] patients cell number.    Yamilet Kent MA

## 2024-12-30 NOTE — TELEPHONE ENCOUNTER
"I spoke with pt.  He says that he is asthmatic and \"had a terrible night.  I could not breathe.\"    Pt says that he had 2 prednisone tabs left over from a previous prescription.  Pt took one and is breathing better now per his report.   However, pt is speaking in phrases.  Pt is with his daughter now who took him to visit his wife in the nursing home.    Advised urgent care today since we are out of clinic appts.  Pt says his daughter will take him to urgent care.  They plan to leave in \"couple hours\" and go to urgent care at [place] or [facility].    Stephanie Pantoja RN       "

## 2024-12-30 NOTE — ED NOTES
Raul Wilhelm is a 79 year old male who presents with a request for prednisone. I planned to see this patient but he was in the waiting room, which made an exam difficult. I observed him in the waiting room but did not have a chance to formally examine him. He then left while I was seeing another patient. I called him and he relayed that he has asthma, needs a refill of prednisone, has some at home but is running low, and is very familiar with prednisone. He has no other asthma medication needs. He has been having some asthma exacerbations of late but feels well overall.     He was speaking full sentences. I refilled his prednisone. He has an appointment with his PCP tomorrow. I gave him ER precautions and he expressed understanding.      Rich Shetty MD  12/30/24 [id number]       Rich Shetty MD  01/02/25 [id number]

## 2024-12-31 ENCOUNTER — OFFICE VISIT (OUTPATIENT)
Dept: FAMILY MEDICINE | Facility: CLINIC | Age: 79
End: 2024-12-31
Payer: COMMERCIAL

## 2024-12-31 VITALS
TEMPERATURE: 97.8 F | HEIGHT: 68 IN | RESPIRATION RATE: 28 BRPM | OXYGEN SATURATION: 96 % | DIASTOLIC BLOOD PRESSURE: 80 MMHG | HEART RATE: 81 BPM | WEIGHT: 161.6 LBS | SYSTOLIC BLOOD PRESSURE: 136 MMHG | BODY MASS INDEX: 24.49 KG/M2

## 2024-12-31 DIAGNOSIS — K21.9 GASTROESOPHAGEAL REFLUX DISEASE WITHOUT ESOPHAGITIS: ICD-10-CM

## 2024-12-31 DIAGNOSIS — J44.9 CHRONIC OBSTRUCTIVE PULMONARY DISEASE, UNSPECIFIED COPD TYPE (H): ICD-10-CM

## 2024-12-31 DIAGNOSIS — I25.10 CORONARY ARTERY DISEASE INVOLVING NATIVE CORONARY ARTERY OF NATIVE HEART WITHOUT ANGINA PECTORIS: ICD-10-CM

## 2024-12-31 DIAGNOSIS — J45.50 SEVERE PERSISTENT ASTHMA WITHOUT COMPLICATION (H): Primary | ICD-10-CM

## 2024-12-31 DIAGNOSIS — I10 ESSENTIAL HYPERTENSION: ICD-10-CM

## 2024-12-31 PROCEDURE — G2211 COMPLEX E/M VISIT ADD ON: HCPCS | Performed by: INTERNAL MEDICINE

## 2024-12-31 PROCEDURE — G0008 ADMIN INFLUENZA VIRUS VAC: HCPCS | Performed by: INTERNAL MEDICINE

## 2024-12-31 PROCEDURE — 90662 IIV NO PRSV INCREASED AG IM: CPT | Performed by: INTERNAL MEDICINE

## 2024-12-31 PROCEDURE — 99214 OFFICE O/P EST MOD 30 MIN: CPT | Performed by: INTERNAL MEDICINE

## 2024-12-31 RX ORDER — BUDESONIDE AND FORMOTEROL FUMARATE DIHYDRATE 80; 4.5 UG/1; UG/1
2 AEROSOL RESPIRATORY (INHALATION) 2 TIMES DAILY
Qty: 10.2 G | Refills: 11 | Status: SHIPPED | OUTPATIENT
Start: 2024-12-31

## 2024-12-31 RX ORDER — LOSARTAN POTASSIUM AND HYDROCHLOROTHIAZIDE 12.5; 5 MG/1; MG/1
1 TABLET ORAL DAILY
Qty: 90 TABLET | Refills: 3 | Status: SHIPPED | OUTPATIENT
Start: 2024-12-31

## 2024-12-31 RX ORDER — MONTELUKAST SODIUM 10 MG/1
TABLET ORAL
Qty: 90 TABLET | Refills: 3 | Status: SHIPPED | OUTPATIENT
Start: 2024-12-31

## 2024-12-31 RX ORDER — ALBUTEROL SULFATE 90 UG/1
1-2 INHALANT RESPIRATORY (INHALATION) EVERY 4 HOURS PRN
Qty: 8.5 G | Refills: 10 | Status: SHIPPED | OUTPATIENT
Start: 2024-12-31

## 2024-12-31 RX ORDER — CARVEDILOL 6.25 MG/1
6.25 TABLET ORAL 2 TIMES DAILY
Qty: 180 TABLET | Refills: 3 | Status: SHIPPED | OUTPATIENT
Start: 2024-12-31

## 2024-12-31 ASSESSMENT — PAIN SCALES - GENERAL: PAINLEVEL_OUTOF10: NO PAIN (1)

## 2024-12-31 NOTE — PATIENT INSTRUCTIONS
Asthma Flare  Take prednisone 40 mg daily x 5 days for flare    Rib fracture  Ok to use Tylenol, heat or ice for rib pain  Do not drive within ~ 4 hrs of taking oxycodone  Will probably take another 1-3 weeks for total healing of ribs    Diabetes  Follow-up with me in Feb/March for diabetes check, non-fasting lab prior to visit

## 2024-12-31 NOTE — PROGRESS NOTES
Assessment & Plan   Problem List Items Addressed This Visit       COPD (chronic obstructive pulmonary disease) (H)    Relevant Medications    albuterol (PROAIR HFA/PROVENTIL HFA/VENTOLIN HFA) 108 (90 Base) MCG/ACT inhaler    budesonide-formoterol (SYMBICORT) 80-4.5 MCG/ACT Inhaler    montelukast (SINGULAIR) 10 MG tablet    Coronary artery disease involving native coronary artery of native heart without angina pectoris    Relevant Medications    losartan-hydrochlorothiazide (HYZAAR) 50-12.5 MG tablet    Essential hypertension    Relevant Medications    carvedilol (COREG) 6.25 MG tablet    losartan-hydrochlorothiazide (HYZAAR) 50-12.5 MG tablet    Severe persistent asthma without complication (H) - Primary    Relevant Medications    albuterol (PROAIR HFA/PROVENTIL HFA/VENTOLIN HFA) 108 (90 Base) MCG/ACT inhaler    budesonide-formoterol (SYMBICORT) 80-4.5 MCG/ACT Inhaler    montelukast (SINGULAIR) 10 MG tablet     Other Visit Diagnoses       Gastroesophageal reflux disease without esophagitis        Relevant Medications    omeprazole (PRILOSEC) 20 MG DR capsule                MED REC REQUIRED  Post Medication Reconciliation Status: discharge medications reconciled and changed, per note/orders  Patient Instructions   Asthma Flare  Take prednisone 40 mg daily x 5 days for flare    Rib fracture  Ok to use Tylenol, heat or ice for rib pain  Do not drive within ~ 4 hrs of taking oxycodone  Will probably take another 1-3 weeks for total healing of ribs    Diabetes  Follow-up with me in Feb/March for diabetes check, non-fasting lab prior to visit      Jonathon Carter is a 79 year old, presenting for the following health issues:  Hospital F/U        12/31/2024     9:43 AM   Additional Questions   Roomed by ranjan         12/31/2024     9:43 AM   Patient Reported Additional Medications   Patient reports taking the following new medications none     HPI         Hospital Follow-up Visit:    Hospital/Nursing Home/IP Rehab  Facility: [facility]  Date of Admission: 12/13/24  Date of Discharge: 12/15/24  Reason(s) for Admission: Right sided rib fractures 5th-7th 2/2 fall   Was the patient in the ICU or did the patient experience delirium during hospitalization?  Yes       Do you have any other stressors you would like to discuss with your provider? Health Concerns and OTHER: predniSONE (DELTASONE) 20 MG tablet would like a standing order for this     Problems taking medications regularly:  None  Medication changes since discharge:        START taking these medications     Details   oxyCODONE (ROXICODONE) 5 MG tablet Take 1 tablet (5 mg) by mouth every 6 hours as needed for moderate pain.  Qty: 30 tablet, Refills: 0     Associated Diagnoses: Closed fracture of multiple ribs of right side, initial encounter       sennosides (SENOKOT) 8.6 MG tablet Take 1 tablet by mouth 2 times daily as needed for constipation.  Qty: 30 tablet, Refills: 0     Associated Diagnoses: Closed fracture of multiple ribs of right side, initial encounter     CONTINUE these medications which have CHANGED     Details   acetaminophen (TYLENOL) 325 MG tablet Take 2 tablets (650 mg) by mouth every 8 hours.     Associated Diagnoses: Closed fracture of multiple ribs of right side, initial encounter          STOP taking these medications         fluocinonide (LIDEX) 0.05 % external cream Comments:   Reason for Stopping:      Problems adhering to non-medication therapy:  None    Summary of hospitalization:  [facility] discharge summary reviewed  Diagnostic Tests/Treatments reviewed.  Follow up needed: none  Other Healthcare Providers Involved in Patient s Care:         Care Coordination  Update since discharge: fluctuating course.         Plan of care communicated with patient           Fall  Right sided rib fractures  --he needed another refill of oxycodone on 12/27  --he has several left, but has not taken in a few days  --still  has some pain but manageable     Dysphagia  --referred to speech, who recommended esophagram and VSS which I have ordered.    stress  --under a lot of stress;  moving wife to long term care nursing home;  financial stress    Asthma Flare  ER visit from yesterday   --doesn't take symbicort regularly because he misplaces it         Raul Wilhelm is a 79 year old male who presents with a request for prednisone. I planned to see this patient but he was in the waiting room, which made an exam difficult. I observed him in the waiting room but did not have a chance to formally examine him. He then left while I was seeing another patient. I called him and he relayed that he has asthma, needs a refill of prednisone, has some at home but is running low, and is very familiar with prednisone. He has not other asthma medication needs. He has been having some asthma exacerbations of late but feels well overall.      He was speaking full sentences. I refilled his prednisone. He has an appointment with his PCP tomorrow. I gave him ER precautions and he expressed understanding.         --he started prednisone yesterday but is unsure what dose he took  --had leftover prednisone and used some of that    Current Outpatient Medications   Medication Sig Dispense Refill    acetaminophen (TYLENOL) 325 MG tablet Take 2 tablets (650 mg) by mouth every 8 hours.      albuterol (PROAIR HFA/PROVENTIL HFA/VENTOLIN HFA) 108 (90 Base) MCG/ACT inhaler Inhale 1-2 puffs into the lungs every 4 hours as needed for shortness of breath or wheezing 8.5 g 10    albuterol (PROVENTIL) (2.5 MG/3ML) 0.083% neb solution TAKE 1 VIAL (2.5 MG) BY NEBULIZATION EVERY 6 HOURS AS NEEDED FOR SHORTNESS OF BREATH OR WHEEZING 90 mL 11    Alcohol Swabs (B-D SINGLE USE SWABS REGULAR) PADS USE TO SWAB AREA OF INJECTION / JENISE AS DIRECTED 100 each 3    aspirin (ASA) 81 MG EC tablet Take 1 tablet (81 mg) by mouth daily      atorvastatin (LIPITOR) 40 MG tablet Take 1 tablet  (40 mg) by mouth daily 90 tablet 3    blood glucose monitoring (NO BRAND SPECIFIED) meter device kit Use to test blood sugar 1 times daily or as directed.  Blood Glucose Monitor Brands: per insurance. 1 kit 0    budesonide-formoterol (SYMBICORT) 80-4.5 MCG/ACT Inhaler Inhale 2 puffs into the lungs 2 times daily 10.2 g 11    carvedilol (COREG) 6.25 MG tablet Take 1 tablet (6.25 mg) by mouth 2 times daily 180 tablet 3    glipiZIDE (GLUCOTROL XL) 10 MG 24 hr tablet TAKE 1 TABLET (10 MG) BY MOUTH DAILY 90 tablet 3    insulin glargine (LANTUS PEN) 100 UNIT/ML pen Inject 20 Units subcutaneously at bedtime. 15 mL 3    insulin pen needle (BD LIZBETH U/F) 32G X 4 MM miscellaneous USE 1 PEN NEEDLE DAILY 100 each 1    Lancets (ONETOUCH DELICA PLUS [id number]) MISC USE AS DIRECTED WITH LANCING DEVICE TO TEST ONCE DAILY 100 each 6    losartan-hydrochlorothiazide (HYZAAR) 50-12.5 MG tablet Take 1 tablet by mouth daily 90 tablet 3    montelukast (SINGULAIR) 10 MG tablet TAKE ONE TABLET BY MOUTH AT BEDTIME 90 tablet 1    omeprazole (PRILOSEC) 20 MG DR capsule TAKE ONE CAPSULE BY MOUTH ONCE DAILY 90 capsule 2    ONETOUCH ULTRA test strip USE TO TEST BLOOD SUGAR 1 TIME DAILY OR AS DIRECTED 100 strip 6    order for DME Equipment being ordered: arm blood pressure cuff 1 Device 0    oxyCODONE (ROXICODONE) 5 MG tablet Take 1 tablet (5 mg) by mouth every 6 hours as needed for moderate pain. 10 tablet 0    predniSONE (DELTASONE) 20 MG tablet Take 1 tablet (20 mg) by mouth daily. 30 tablet 1    triamcinolone (KENALOG) 0.1 % external cream APPLY TOPICALLY 2 TIMES DAILY TO LEFT ANKLE (Patient taking differently: Apply topically 2 times daily.) 15 g 1    vitamin E (TOCOPHEROL) 400 units (180 mg) capsule Take 400 Units by mouth daily.             Review of Systems  Constitutional, neuro, ENT, endocrine, pulmonary, cardiac, gastrointestinal, genitourinary, musculoskeletal, integument and psychiatric systems are negative, except as otherwise noted.    "   Objective    /80 (BP Location: Right arm, Patient Position: Sitting, Cuff Size: Adult Regular)   Pulse 81   Temp 97.8  F (36.6  C) (Tympanic)   Resp 28   Ht 1.727 m (5' 8\")   Wt 73.3 kg (161 lb 9.6 oz)   SpO2 96%   BMI 24.57 kg/m    Body mass index is 24.57 kg/m .  Physical Exam   GENERAL: alert and no distress  RESP: expiratory wheezes throughout, prolonged expiratory phase, and decreased breath sounds throughout  CV: regular rate and rhythm, normal S1 S2, no S3 or S4, no murmur, click or rub, no peripheral edema             Signed Electronically by: Sarita Hennessy DO    "

## 2025-01-04 ENCOUNTER — HOSPITAL ENCOUNTER (INPATIENT)
Facility: HOSPITAL | Age: 80
Discharge: HOME | DRG: 321 | End: 2025-01-04
Attending: INTERNAL MEDICINE
Payer: COMMERCIAL

## 2025-01-04 ENCOUNTER — HOSPITAL ENCOUNTER (EMERGENCY)
Facility: CLINIC | Age: 80
Discharge: HOME | End: 2025-01-04
Payer: COMMERCIAL

## 2025-01-04 ENCOUNTER — APPOINTMENT (OUTPATIENT)
Dept: RADIOLOGY | Facility: HOSPITAL | Age: 80
DRG: 321 | End: 2025-01-04
Attending: INTERNAL MEDICINE
Payer: COMMERCIAL

## 2025-01-04 VITALS
SYSTOLIC BLOOD PRESSURE: 195 MMHG | RESPIRATION RATE: 25 BRPM | HEIGHT: 68 IN | HEART RATE: 106 BPM | OXYGEN SATURATION: 93 % | BODY MASS INDEX: 24.57 KG/M2 | DIASTOLIC BLOOD PRESSURE: 103 MMHG | TEMPERATURE: 99 F

## 2025-01-04 DIAGNOSIS — I21.3 ST ELEVATION MI (STEMI) (H): ICD-10-CM

## 2025-01-04 DIAGNOSIS — I21.21 ST ELEVATION MYOCARDIAL INFARCTION INVOLVING LEFT CIRCUMFLEX CORONARY ARTERY (H): ICD-10-CM

## 2025-01-04 DIAGNOSIS — J45.50 SEVERE PERSISTENT ASTHMA WITHOUT COMPLICATION (H): ICD-10-CM

## 2025-01-04 DIAGNOSIS — I21.19 ACUTE ST ELEVATION MYOCARDIAL INFARCTION (STEMI) OF INFERIOR WALL (H): Primary | ICD-10-CM

## 2025-01-04 DIAGNOSIS — J10.1 INFLUENZA A: ICD-10-CM

## 2025-01-04 DIAGNOSIS — I24.9 ACS (ACUTE CORONARY SYNDROME) (H): Primary | ICD-10-CM

## 2025-01-04 DIAGNOSIS — J44.9 CHRONIC OBSTRUCTIVE PULMONARY DISEASE, UNSPECIFIED COPD TYPE (H): ICD-10-CM

## 2025-01-04 PROBLEM — Z98.61 PERCUTANEOUS TRANSLUMINAL CORONARY ANGIOPLASTY STATUS: Status: ACTIVE | Noted: 2025-01-04

## 2025-01-04 LAB
ANION GAP SERPL CALCULATED.3IONS-SCNC: 13 MMOL/L (ref 7–15)
APTT PPP: 25 SECONDS (ref 22–38)
BASE EXCESS BLDV CALC-SCNC: 4.7 MMOL/L (ref -3–3)
BASOPHILS # BLD AUTO: 0 10E3/UL (ref 0–0.2)
BASOPHILS NFR BLD AUTO: 0 %
BUN SERPL-MCNC: 19.3 MG/DL (ref 8–23)
CALCIUM SERPL-MCNC: 9.9 MG/DL (ref 8.8–10.4)
CHLORIDE SERPL-SCNC: 100 MMOL/L (ref 98–107)
CHOLEST SERPL-MCNC: 148 MG/DL
CREAT SERPL-MCNC: 0.96 MG/DL (ref 0.67–1.17)
EGFRCR SERPLBLD CKD-EPI 2021: 80 ML/MIN/1.73M2
EOSINOPHIL # BLD AUTO: 0 10E3/UL (ref 0–0.7)
EOSINOPHIL NFR BLD AUTO: 0 %
ERYTHROCYTE [DISTWIDTH] IN BLOOD BY AUTOMATED COUNT: 12.2 % (ref 10–15)
EST. AVERAGE GLUCOSE BLD GHB EST-MCNC: 186 MG/DL
FLUAV RNA SPEC QL NAA+PROBE: POSITIVE
FLUBV RNA RESP QL NAA+PROBE: NEGATIVE
GLUCOSE BLDC GLUCOMTR-MCNC: 221 MG/DL (ref 70–99)
GLUCOSE SERPL-MCNC: 423 MG/DL (ref 70–99)
HBA1C MFR BLD: 8.1 %
HCO3 BLDV-SCNC: 29 MMOL/L (ref 21–28)
HCO3 SERPL-SCNC: 26 MMOL/L (ref 22–29)
HCT VFR BLD AUTO: 47.1 % (ref 40–53)
HDLC SERPL-MCNC: 64 MG/DL
HGB BLD-MCNC: 15.7 G/DL (ref 13.3–17.7)
IMM GRANULOCYTES # BLD: 0.2 10E3/UL
IMM GRANULOCYTES NFR BLD: 1 %
INR PPP: 1.02 (ref 0.85–1.15)
LDLC SERPL CALC-MCNC: 72 MG/DL
LYMPHOCYTES # BLD AUTO: 0.4 10E3/UL (ref 0.8–5.3)
LYMPHOCYTES NFR BLD AUTO: 2 %
MCH RBC QN AUTO: 30.4 PG (ref 26.5–33)
MCHC RBC AUTO-ENTMCNC: 33.3 G/DL (ref 31.5–36.5)
MCV RBC AUTO: 91 FL (ref 78–100)
MONOCYTES # BLD AUTO: 0.8 10E3/UL (ref 0–1.3)
MONOCYTES NFR BLD AUTO: 5 %
NEUTROPHILS # BLD AUTO: 13.9 10E3/UL (ref 1.6–8.3)
NEUTROPHILS NFR BLD AUTO: 91 %
NONHDLC SERPL-MCNC: 84 MG/DL
NRBC # BLD AUTO: 0 10E3/UL
NRBC BLD AUTO-RTO: 0 /100
NT-PROBNP SERPL-MCNC: 562 PG/ML (ref 0–1800)
O2/TOTAL GAS SETTING VFR VENT: 50 %
OXYHGB MFR BLDV: 62 % (ref 70–75)
PCO2 BLDV: 42 MM HG (ref 40–50)
PH BLDV: 7.45 [PH] (ref 7.32–7.43)
PLATELET # BLD AUTO: 262 10E3/UL (ref 150–450)
PO2 BLDV: 31 MM HG (ref 25–47)
POTASSIUM SERPL-SCNC: 4.1 MMOL/L (ref 3.4–5.3)
RBC # BLD AUTO: 5.16 10E6/UL (ref 4.4–5.9)
RSV RNA SPEC NAA+PROBE: NEGATIVE
SAO2 % BLDV: 63.1 % (ref 70–75)
SARS-COV-2 RNA RESP QL NAA+PROBE: NEGATIVE
SODIUM SERPL-SCNC: 139 MMOL/L (ref 135–145)
TRIGL SERPL-MCNC: 60 MG/DL
TROPONIN T SERPL HS-MCNC: 14 NG/L
WBC # BLD AUTO: 15.2 10E3/UL (ref 4–11)

## 2025-01-04 PROCEDURE — 85610 PROTHROMBIN TIME: CPT

## 2025-01-04 PROCEDURE — 99153 MOD SED SAME PHYS/QHP EA: CPT | Performed by: INTERNAL MEDICINE

## 2025-01-04 PROCEDURE — 93005 ELECTROCARDIOGRAM TRACING: CPT

## 2025-01-04 PROCEDURE — 83880 ASSAY OF NATRIURETIC PEPTIDE: CPT | Performed by: INTERNAL MEDICINE

## 2025-01-04 PROCEDURE — 85730 THROMBOPLASTIN TIME PARTIAL: CPT

## 2025-01-04 PROCEDURE — 36415 COLL VENOUS BLD VENIPUNCTURE: CPT

## 2025-01-04 PROCEDURE — 250N000011 HC RX IP 250 OP 636

## 2025-01-04 PROCEDURE — 82465 ASSAY BLD/SERUM CHOLESTEROL: CPT | Performed by: INTERNAL MEDICINE

## 2025-01-04 PROCEDURE — C9606 PERC D-E COR REVASC W AMI S: HCPCS | Mod: LC | Performed by: INTERNAL MEDICINE

## 2025-01-04 PROCEDURE — 36415 COLL VENOUS BLD VENIPUNCTURE: CPT | Performed by: INTERNAL MEDICINE

## 2025-01-04 PROCEDURE — 96375 TX/PRO/DX INJ NEW DRUG ADDON: CPT

## 2025-01-04 PROCEDURE — 96374 THER/PROPH/DIAG INJ IV PUSH: CPT

## 2025-01-04 PROCEDURE — 999N000156 HC STATISTIC RCP CONSULT EA 30 MIN

## 2025-01-04 PROCEDURE — 80048 BASIC METABOLIC PNL TOTAL CA: CPT

## 2025-01-04 PROCEDURE — 250N000012 HC RX MED GY IP 250 OP 636 PS 637: Performed by: INTERNAL MEDICINE

## 2025-01-04 PROCEDURE — 250N000011 HC RX IP 250 OP 636: Performed by: INTERNAL MEDICINE

## 2025-01-04 PROCEDURE — 250N000013 HC RX MED GY IP 250 OP 250 PS 637: Performed by: INTERNAL MEDICINE

## 2025-01-04 PROCEDURE — C1725 CATH, TRANSLUMIN NON-LASER: HCPCS | Performed by: INTERNAL MEDICINE

## 2025-01-04 PROCEDURE — 99291 CRITICAL CARE FIRST HOUR: CPT

## 2025-01-04 PROCEDURE — 250N000009 HC RX 250: Performed by: INTERNAL MEDICINE

## 2025-01-04 PROCEDURE — 99223 1ST HOSP IP/OBS HIGH 75: CPT | Performed by: INTERNAL MEDICINE

## 2025-01-04 PROCEDURE — C1894 INTRO/SHEATH, NON-LASER: HCPCS | Performed by: INTERNAL MEDICINE

## 2025-01-04 PROCEDURE — 272N000001 HC OR GENERAL SUPPLY STERILE: Performed by: INTERNAL MEDICINE

## 2025-01-04 PROCEDURE — C1887 CATHETER, GUIDING: HCPCS | Performed by: INTERNAL MEDICINE

## 2025-01-04 PROCEDURE — 93454 CORONARY ARTERY ANGIO S&I: CPT | Performed by: INTERNAL MEDICINE

## 2025-01-04 PROCEDURE — 93010 ELECTROCARDIOGRAM REPORT: CPT

## 2025-01-04 PROCEDURE — 92929 PR PRQ TRLUML CORONARY BM STENT W/ANGIO ADDL ART/BRNCH: CPT | Mod: LC | Performed by: INTERNAL MEDICINE

## 2025-01-04 PROCEDURE — C1874 STENT, COATED/COV W/DEL SYS: HCPCS | Performed by: INTERNAL MEDICINE

## 2025-01-04 PROCEDURE — 93454 CORONARY ARTERY ANGIO S&I: CPT | Mod: 26 | Performed by: INTERNAL MEDICINE

## 2025-01-04 PROCEDURE — 83036 HEMOGLOBIN GLYCOSYLATED A1C: CPT | Performed by: INTERNAL MEDICINE

## 2025-01-04 PROCEDURE — 027135Z DILATION OF CORONARY ARTERY, TWO ARTERIES WITH TWO DRUG-ELUTING INTRALUMINAL DEVICES, PERCUTANEOUS APPROACH: ICD-10-PCS | Performed by: INTERNAL MEDICINE

## 2025-01-04 PROCEDURE — 99223 1ST HOSP IP/OBS HIGH 75: CPT | Mod: 25 | Performed by: INTERNAL MEDICINE

## 2025-01-04 PROCEDURE — 94640 AIRWAY INHALATION TREATMENT: CPT

## 2025-01-04 PROCEDURE — 87637 SARSCOV2&INF A&B&RSV AMP PRB: CPT

## 2025-01-04 PROCEDURE — 99152 MOD SED SAME PHYS/QHP 5/>YRS: CPT | Performed by: INTERNAL MEDICINE

## 2025-01-04 PROCEDURE — 250N000013 HC RX MED GY IP 250 OP 250 PS 637

## 2025-01-04 PROCEDURE — C9601 PERC DRUG-EL COR STENT BRAN: HCPCS | Performed by: INTERNAL MEDICINE

## 2025-01-04 PROCEDURE — 80061 LIPID PANEL: CPT | Performed by: INTERNAL MEDICINE

## 2025-01-04 PROCEDURE — 82435 ASSAY OF BLOOD CHLORIDE: CPT

## 2025-01-04 PROCEDURE — 999N000157 HC STATISTIC RCP TIME EA 10 MIN

## 2025-01-04 PROCEDURE — 94640 AIRWAY INHALATION TREATMENT: CPT | Mod: 76

## 2025-01-04 PROCEDURE — 93010 ELECTROCARDIOGRAM REPORT: CPT | Performed by: INTERNAL MEDICINE

## 2025-01-04 PROCEDURE — 92941 PRQ TRLML REVSC TOT OCCL AMI: CPT | Mod: LC | Performed by: INTERNAL MEDICINE

## 2025-01-04 PROCEDURE — 84484 ASSAY OF TROPONIN QUANT: CPT

## 2025-01-04 PROCEDURE — 272N000054 HC CANNULA HIGH FLOW, ADULT

## 2025-01-04 PROCEDURE — 87070 CULTURE OTHR SPECIMN AEROBIC: CPT | Performed by: INTERNAL MEDICINE

## 2025-01-04 PROCEDURE — 99285 EMERGENCY DEPT VISIT HI MDM: CPT

## 2025-01-04 PROCEDURE — 272N000272 HC CONTINUOUS NEBULIZER MICRO PUMP

## 2025-01-04 PROCEDURE — 85004 AUTOMATED DIFF WBC COUNT: CPT

## 2025-01-04 PROCEDURE — 255N000002 HC RX 255 OP 636: Performed by: INTERNAL MEDICINE

## 2025-01-04 PROCEDURE — C1769 GUIDE WIRE: HCPCS | Performed by: INTERNAL MEDICINE

## 2025-01-04 PROCEDURE — B2111ZZ FLUOROSCOPY OF MULTIPLE CORONARY ARTERIES USING LOW OSMOLAR CONTRAST: ICD-10-PCS | Performed by: INTERNAL MEDICINE

## 2025-01-04 PROCEDURE — 200N000001 HC R&B ICU

## 2025-01-04 PROCEDURE — 82805 BLOOD GASES W/O2 SATURATION: CPT | Performed by: INTERNAL MEDICINE

## 2025-01-04 PROCEDURE — 71045 X-RAY EXAM CHEST 1 VIEW: CPT

## 2025-01-04 DEVICE — STENT COR ONYX FRONTIER 38X3MM ONYXNG30038UX: Type: IMPLANTABLE DEVICE | Site: CORONARY | Status: FUNCTIONAL

## 2025-01-04 DEVICE — STENT COR ONYX FRONTIER 18X4MM ONYXNG40018UX: Type: IMPLANTABLE DEVICE | Site: CORONARY | Status: FUNCTIONAL

## 2025-01-04 RX ORDER — HEPARIN SODIUM 1000 [USP'U]/ML
INJECTION, SOLUTION INTRAVENOUS; SUBCUTANEOUS
Status: DISCONTINUED | OUTPATIENT
Start: 2025-01-04 | End: 2025-01-04 | Stop reason: HOSPADM

## 2025-01-04 RX ORDER — FENTANYL CITRATE 50 UG/ML
INJECTION, SOLUTION INTRAMUSCULAR; INTRAVENOUS
Status: DISCONTINUED | OUTPATIENT
Start: 2025-01-04 | End: 2025-01-04 | Stop reason: HOSPADM

## 2025-01-04 RX ORDER — TRIAMCINOLONE ACETONIDE 1 MG/G
CREAM TOPICAL 2 TIMES DAILY
Status: DISCONTINUED | OUTPATIENT
Start: 2025-01-04 | End: 2025-01-11 | Stop reason: HOSPADM

## 2025-01-04 RX ORDER — ACETYLCYSTEINE 100 MG/ML
4 SOLUTION ORAL; RESPIRATORY (INHALATION) EVERY 4 HOURS
Status: DISCONTINUED | OUTPATIENT
Start: 2025-01-04 | End: 2025-01-04

## 2025-01-04 RX ORDER — OXYCODONE HYDROCHLORIDE 5 MG/1
10 TABLET ORAL EVERY 4 HOURS PRN
Status: DISCONTINUED | OUTPATIENT
Start: 2025-01-04 | End: 2025-01-11 | Stop reason: HOSPADM

## 2025-01-04 RX ORDER — ASPIRIN 81 MG/1
81 TABLET ORAL DAILY
Status: DISCONTINUED | OUTPATIENT
Start: 2025-01-05 | End: 2025-01-04

## 2025-01-04 RX ORDER — HYDRALAZINE HYDROCHLORIDE 20 MG/ML
10 INJECTION INTRAMUSCULAR; INTRAVENOUS EVERY 4 HOURS PRN
Status: DISCONTINUED | OUTPATIENT
Start: 2025-01-04 | End: 2025-01-11 | Stop reason: HOSPADM

## 2025-01-04 RX ORDER — FENTANYL CITRATE 50 UG/ML
25 INJECTION, SOLUTION INTRAMUSCULAR; INTRAVENOUS
Status: DISCONTINUED | OUTPATIENT
Start: 2025-01-04 | End: 2025-01-07

## 2025-01-04 RX ORDER — DEXTROSE MONOHYDRATE 25 G/50ML
25-50 INJECTION, SOLUTION INTRAVENOUS
Status: DISCONTINUED | OUTPATIENT
Start: 2025-01-04 | End: 2025-01-11 | Stop reason: HOSPADM

## 2025-01-04 RX ORDER — LIDOCAINE 4 G/G
1 PATCH TOPICAL
Status: DISCONTINUED | OUTPATIENT
Start: 2025-01-04 | End: 2025-01-11 | Stop reason: HOSPADM

## 2025-01-04 RX ORDER — DEXTROSE MONOHYDRATE 25 G/50ML
25-50 INJECTION, SOLUTION INTRAVENOUS
Status: DISCONTINUED | OUTPATIENT
Start: 2025-01-04 | End: 2025-01-04

## 2025-01-04 RX ORDER — ASPIRIN 81 MG/1
324 TABLET, CHEWABLE ORAL ONCE
Status: COMPLETED | OUTPATIENT
Start: 2025-01-04 | End: 2025-01-04

## 2025-01-04 RX ORDER — ATORVASTATIN CALCIUM 40 MG/1
40 TABLET, FILM COATED ORAL AT BEDTIME
Status: DISCONTINUED | OUTPATIENT
Start: 2025-01-04 | End: 2025-01-05

## 2025-01-04 RX ORDER — CARVEDILOL 3.12 MG/1
6.25 TABLET ORAL 2 TIMES DAILY
Status: DISCONTINUED | OUTPATIENT
Start: 2025-01-04 | End: 2025-01-09

## 2025-01-04 RX ORDER — LOSARTAN POTASSIUM AND HYDROCHLOROTHIAZIDE 12.5; 5 MG/1; MG/1
1 TABLET ORAL DAILY
Status: DISCONTINUED | OUTPATIENT
Start: 2025-01-04 | End: 2025-01-11 | Stop reason: HOSPADM

## 2025-01-04 RX ORDER — IPRATROPIUM BROMIDE AND ALBUTEROL SULFATE 2.5; .5 MG/3ML; MG/3ML
3 SOLUTION RESPIRATORY (INHALATION)
Status: DISCONTINUED | OUTPATIENT
Start: 2025-01-04 | End: 2025-01-07

## 2025-01-04 RX ORDER — MONTELUKAST SODIUM 10 MG/1
10 TABLET ORAL AT BEDTIME
Status: DISCONTINUED | OUTPATIENT
Start: 2025-01-04 | End: 2025-01-11 | Stop reason: HOSPADM

## 2025-01-04 RX ORDER — DOXYCYCLINE 100 MG/1
100 CAPSULE ORAL EVERY 12 HOURS SCHEDULED
Status: DISCONTINUED | OUTPATIENT
Start: 2025-01-04 | End: 2025-01-05

## 2025-01-04 RX ORDER — ACETAMINOPHEN 325 MG/1
650 TABLET ORAL EVERY 4 HOURS PRN
Status: DISCONTINUED | OUTPATIENT
Start: 2025-01-04 | End: 2025-01-11 | Stop reason: HOSPADM

## 2025-01-04 RX ORDER — ALBUTEROL SULFATE 0.83 MG/ML
2.5 SOLUTION RESPIRATORY (INHALATION) EVERY 6 HOURS PRN
Status: DISCONTINUED | OUTPATIENT
Start: 2025-01-04 | End: 2025-01-11 | Stop reason: HOSPADM

## 2025-01-04 RX ORDER — NITROGLYCERIN 0.4 MG/1
0.4 TABLET SUBLINGUAL EVERY 5 MIN PRN
Status: DISCONTINUED | OUTPATIENT
Start: 2025-01-04 | End: 2025-01-11 | Stop reason: HOSPADM

## 2025-01-04 RX ORDER — MORPHINE SULFATE 2 MG/ML
2 INJECTION, SOLUTION INTRAMUSCULAR; INTRAVENOUS ONCE
Status: COMPLETED | OUTPATIENT
Start: 2025-01-04 | End: 2025-01-04

## 2025-01-04 RX ORDER — ASPIRIN 81 MG/1
81 TABLET, CHEWABLE ORAL DAILY
Qty: 30 TABLET | Refills: 3 | Status: SHIPPED | OUTPATIENT
Start: 2025-01-04 | End: 2025-01-09

## 2025-01-04 RX ORDER — ATROPINE SULFATE 0.1 MG/ML
0.5 INJECTION INTRAVENOUS
Status: ACTIVE | OUTPATIENT
Start: 2025-01-04 | End: 2025-01-05

## 2025-01-04 RX ORDER — ASPIRIN 81 MG/1
81 TABLET, CHEWABLE ORAL ONCE
Status: DISCONTINUED | OUTPATIENT
Start: 2025-01-04 | End: 2025-01-04

## 2025-01-04 RX ORDER — ACETYLCYSTEINE 100 MG/ML
4 SOLUTION ORAL; RESPIRATORY (INHALATION) EVERY 4 HOURS
Status: DISCONTINUED | OUTPATIENT
Start: 2025-01-05 | End: 2025-01-07

## 2025-01-04 RX ORDER — GLIPIZIDE 10 MG/1
10 TABLET, FILM COATED, EXTENDED RELEASE ORAL DAILY
Status: DISCONTINUED | OUTPATIENT
Start: 2025-01-04 | End: 2025-01-04

## 2025-01-04 RX ORDER — METOPROLOL TARTRATE 1 MG/ML
5 INJECTION, SOLUTION INTRAVENOUS
Status: DISCONTINUED | OUTPATIENT
Start: 2025-01-04 | End: 2025-01-11 | Stop reason: HOSPADM

## 2025-01-04 RX ORDER — NITROGLYCERIN 5 MG/ML
VIAL (ML) INTRAVENOUS
Status: DISCONTINUED | OUTPATIENT
Start: 2025-01-04 | End: 2025-01-04 | Stop reason: HOSPADM

## 2025-01-04 RX ORDER — OXYCODONE HYDROCHLORIDE 5 MG/1
5 TABLET ORAL EVERY 4 HOURS PRN
Status: DISCONTINUED | OUTPATIENT
Start: 2025-01-04 | End: 2025-01-11 | Stop reason: HOSPADM

## 2025-01-04 RX ORDER — PANTOPRAZOLE SODIUM 40 MG/1
40 TABLET, DELAYED RELEASE ORAL
Status: DISCONTINUED | OUTPATIENT
Start: 2025-01-05 | End: 2025-01-11 | Stop reason: HOSPADM

## 2025-01-04 RX ORDER — PREDNISONE 20 MG/1
20 TABLET ORAL DAILY
Status: DISCONTINUED | OUTPATIENT
Start: 2025-01-05 | End: 2025-01-04

## 2025-01-04 RX ORDER — ASPIRIN 81 MG/1
81 TABLET ORAL DAILY
Status: DISCONTINUED | OUTPATIENT
Start: 2025-01-05 | End: 2025-01-11 | Stop reason: HOSPADM

## 2025-01-04 RX ORDER — PREDNISONE 20 MG/1
40 TABLET ORAL DAILY
Status: DISCONTINUED | OUTPATIENT
Start: 2025-01-05 | End: 2025-01-05

## 2025-01-04 RX ORDER — GLIPIZIDE 10 MG/1
10 TABLET, FILM COATED, EXTENDED RELEASE ORAL DAILY
Status: DISCONTINUED | OUTPATIENT
Start: 2025-01-04 | End: 2025-01-11 | Stop reason: HOSPADM

## 2025-01-04 RX ORDER — FLUTICASONE FUROATE AND VILANTEROL 100; 25 UG/1; UG/1
1 POWDER RESPIRATORY (INHALATION) DAILY
Status: DISCONTINUED | OUTPATIENT
Start: 2025-01-05 | End: 2025-01-11 | Stop reason: HOSPADM

## 2025-01-04 RX ORDER — FLUMAZENIL 0.1 MG/ML
0.2 INJECTION, SOLUTION INTRAVENOUS
Status: ACTIVE | OUTPATIENT
Start: 2025-01-04 | End: 2025-01-05

## 2025-01-04 RX ORDER — ONDANSETRON 2 MG/ML
4 INJECTION INTRAMUSCULAR; INTRAVENOUS EVERY 6 HOURS PRN
Status: DISCONTINUED | OUTPATIENT
Start: 2025-01-04 | End: 2025-01-11 | Stop reason: HOSPADM

## 2025-01-04 RX ORDER — NICOTINE POLACRILEX 4 MG
15-30 LOZENGE BUCCAL
Status: DISCONTINUED | OUTPATIENT
Start: 2025-01-04 | End: 2025-01-11 | Stop reason: HOSPADM

## 2025-01-04 RX ORDER — OXYCODONE HYDROCHLORIDE 5 MG/1
5 TABLET ORAL EVERY 6 HOURS PRN
Status: DISCONTINUED | OUTPATIENT
Start: 2025-01-04 | End: 2025-01-04

## 2025-01-04 RX ORDER — NALOXONE HYDROCHLORIDE 0.4 MG/ML
0.2 INJECTION, SOLUTION INTRAMUSCULAR; INTRAVENOUS; SUBCUTANEOUS
Status: ACTIVE | OUTPATIENT
Start: 2025-01-04 | End: 2025-01-05

## 2025-01-04 RX ORDER — NICOTINE POLACRILEX 4 MG
15-30 LOZENGE BUCCAL
Status: DISCONTINUED | OUTPATIENT
Start: 2025-01-04 | End: 2025-01-04

## 2025-01-04 RX ORDER — OSELTAMIVIR PHOSPHATE 75 MG/1
75 CAPSULE ORAL 2 TIMES DAILY
Status: DISCONTINUED | OUTPATIENT
Start: 2025-01-04 | End: 2025-01-05

## 2025-01-04 RX ORDER — ONDANSETRON 4 MG/1
4 TABLET, ORALLY DISINTEGRATING ORAL EVERY 6 HOURS PRN
Status: DISCONTINUED | OUTPATIENT
Start: 2025-01-04 | End: 2025-01-11 | Stop reason: HOSPADM

## 2025-01-04 RX ORDER — IODIXANOL 320 MG/ML
INJECTION, SOLUTION INTRAVASCULAR
Status: DISCONTINUED | OUTPATIENT
Start: 2025-01-04 | End: 2025-01-04 | Stop reason: HOSPADM

## 2025-01-04 RX ORDER — ALBUTEROL SULFATE 90 UG/1
1-2 INHALANT RESPIRATORY (INHALATION) EVERY 4 HOURS PRN
Status: DISCONTINUED | OUTPATIENT
Start: 2025-01-04 | End: 2025-01-11 | Stop reason: HOSPADM

## 2025-01-04 RX ORDER — NALOXONE HYDROCHLORIDE 0.4 MG/ML
0.4 INJECTION, SOLUTION INTRAMUSCULAR; INTRAVENOUS; SUBCUTANEOUS
Status: ACTIVE | OUTPATIENT
Start: 2025-01-04 | End: 2025-01-05

## 2025-01-04 RX ORDER — ACETAMINOPHEN 325 MG/1
650 TABLET ORAL 3 TIMES DAILY
Status: DISCONTINUED | OUTPATIENT
Start: 2025-01-04 | End: 2025-01-06

## 2025-01-04 RX ADMIN — LOSARTAN POTASSIUM AND HYDROCHLOROTHIAZIDE 1 TABLET: 50; 12.5 TABLET, FILM COATED ORAL at 18:30

## 2025-01-04 RX ADMIN — IPRATROPIUM BROMIDE AND ALBUTEROL SULFATE 3 ML: .5; 3 SOLUTION RESPIRATORY (INHALATION) at 19:04

## 2025-01-04 RX ADMIN — MONTELUKAST 10 MG: 10 TABLET, FILM COATED ORAL at 21:03

## 2025-01-04 RX ADMIN — TRIAMCINOLONE ACETONIDE: 1 CREAM TOPICAL at 21:05

## 2025-01-04 RX ADMIN — DOXYCYCLINE 100 MG: 100 CAPSULE ORAL at 21:04

## 2025-01-04 RX ADMIN — IPRATROPIUM BROMIDE AND ALBUTEROL SULFATE 3 ML: .5; 3 SOLUTION RESPIRATORY (INHALATION) at 23:19

## 2025-01-04 RX ADMIN — ACETYLCYSTEINE 4 ML: 100 INHALANT RESPIRATORY (INHALATION) at 23:19

## 2025-01-04 RX ADMIN — INSULIN ASPART 1 UNITS: 100 INJECTION, SOLUTION INTRAVENOUS; SUBCUTANEOUS at 22:10

## 2025-01-04 RX ADMIN — ALBUTEROL SULFATE 2 PUFF: 90 AEROSOL, METERED RESPIRATORY (INHALATION) at 17:28

## 2025-01-04 RX ADMIN — MORPHINE SULFATE 2 MG: 2 INJECTION, SOLUTION INTRAMUSCULAR; INTRAVENOUS at 14:39

## 2025-01-04 RX ADMIN — CARVEDILOL 6.25 MG: 6.25 TABLET, FILM COATED ORAL at 21:05

## 2025-01-04 RX ADMIN — ATORVASTATIN CALCIUM 40 MG: 40 TABLET, FILM COATED ORAL at 21:03

## 2025-01-04 RX ADMIN — OSELTAMAVIR PHOSPHATE 75 MG: 75 CAPSULE ORAL at 21:02

## 2025-01-04 RX ADMIN — ASPIRIN 81 MG CHEWABLE TABLET 324 MG: 81 TABLET CHEWABLE at 14:33

## 2025-01-04 RX ADMIN — INSULIN GLARGINE 20 UNITS: 100 INJECTION, SOLUTION SUBCUTANEOUS at 22:11

## 2025-01-04 RX ADMIN — ACETAMINOPHEN 650 MG: 325 TABLET ORAL at 21:03

## 2025-01-04 RX ADMIN — TICAGRELOR 90 MG: 90 TABLET ORAL at 21:04

## 2025-01-04 RX ADMIN — HEPARIN SODIUM 5000 UNITS: 1000 INJECTION INTRAVENOUS; SUBCUTANEOUS at 14:37

## 2025-01-04 RX ADMIN — GLIPIZIDE 10 MG: 10 TABLET, FILM COATED, EXTENDED RELEASE ORAL at 21:03

## 2025-01-04 RX ADMIN — LIDOCAINE 1 PATCH: 4 PATCH TOPICAL at 21:03

## 2025-01-04 RX ADMIN — TICAGRELOR 180 MG: 90 TABLET ORAL at 14:33

## 2025-01-04 RX ADMIN — ALBUTEROL SULFATE 2 PUFF: 90 AEROSOL, METERED RESPIRATORY (INHALATION) at 21:16

## 2025-01-04 ASSESSMENT — ACTIVITIES OF DAILY LIVING (ADL)
ADLS_ACUITY_SCORE: 50
ADLS_ACUITY_SCORE: 35
ADLS_ACUITY_SCORE: 35
ADLS_ACUITY_SCORE: 38
ADLS_ACUITY_SCORE: 50
ADLS_ACUITY_SCORE: 50

## 2025-01-04 NOTE — Clinical Note
The first balloon was inserted into the circumflex and marginal circumflex.Max pressure = 10 berry. Total duration = 8 seconds.     Max pressure = 8 berry. Total duration = 11 seconds.    Balloon reinflated a second time: Max pressure = 8 berry. Total duration = 11 seconds.

## 2025-01-04 NOTE — Clinical Note
The first balloon was inserted into the circumflex and proximal circumflex.Max pressure = 16 berry. Total duration = 12 seconds.     Max pressure = 16 berry. Total duration = 10 seconds.

## 2025-01-04 NOTE — CONSULTS
CARDIOLOGY INPATIENT CONSULT NOTE    [facility] HEART [facility]   [address], [place] [address]   [email or web address]   OFFICE: [phone]          Impression and Plan     Assessment:  Inferior STEMI post PCI of dominant pLCx and large OM.  Residual disease of the ostial to distal LAD and large D1.   Influenza A  Metabolic syndrome with hypertension, hyperlipidemia, and type 2 diabetes  Recent fall with right-sided rib fractures  Chronic obstructive lung disease and asthma  Dysphagia, unclear etiology, undergoing outpatient workup    Plan:  DAPT, continue high intensity statin, continue home beta-blocker and antihypertensives  Transthoracic echocardiogram  Recommend heart team discussion regarding management of residual coronary artery disease - medical therapy vs percutaneous revascularization (will require multiple long stents extending into the left main given diffuse LAD disease) versus surgical revascularization.    Aggressive risk factor modification for secondary prevention.  Defer further management of noncardiac issues to our hospitalist colleagues.    History of Present Illness      Mr. Raul Wilhelm is a 79 year old male with complex past medical history as above who presents with new onset chest pain and found to have inferior ST elevation MI.  Underwent coronary angiogram post PCI of the proximal left circumflex and large OM branch.  Of note, patient was recently hospitalized for fall with rib fractures, reports chest pain improved post PCI, continues to have pain on the right side at the site of rib fractures.  Also, reports acute on chronic cough and dyspnea, for which she has been taking prednisone. Other than noted above, Mr. Wilhelm denies any light headedness/dizziness, pre-syncope, syncope, lower extremity swelling, palpitations, paroxysmal nocturnal dyspnea (PND), or orthopnea.      Review of Systems:  Further review of systems is otherwise  negative/noncontributory (based on review of medical record (admission H&P) and 13 point review of systems reviewed. Pertinent positives noted).    Cardiac Diagnostics     Electrocardiogram  (personally reviewed): Sinus tachycardia with PACs, borderline ST elevations in the inferior leads    Cath  (personally reviewed):     Severe multivessel coronary artery disease involving the ostial to proximal LAD, the mid LAD, large diagonal branch, proximal left circumflex (culprit for current presentation), and large OM branch.  Moderate nonobstructive disease elsewhere.  Inferior ST elevation MI post successful percutaneous coronary artery intervention of the dominant proximal left circumflex with a 4.0 x 18 mm Resolute [place] drug-eluting stent.  Successful percutaneous coronary artery intervention of the large OM branch with a 3.0 x 38 mm Resolute [place] drug-eluting stent that was postdilated with a 3.5 mm NC balloon.      Medical History  Surgical History Family History Social History   Past Medical History:   Diagnosis Date    Chronic airway obstruction, not elsewhere classified     Other and unspecified hyperlipidemia      Past Surgical History:   Procedure Laterality Date    ARTHROPLASTY KNEE Left 5/28/2020    Procedure: Left Total Knee Arthroplasty;  Surgeon: Sanjay Downey MD;  Location: [facility] OR    ARTHROPLASTY KNEE Right 11/2/2020    Procedure: ARTHROPLASTY, KNEE, TOTAL;  Surgeon: Sanjay Downey MD;  Location: [facility] OR    ENT SURGERY  as a child    tonsillectomy     Family History   Problem Relation Age of Onset    Diabetes Mother     Heart Disease Mother     C.A.D. Mother     Cancer Father     Hypertension Father     Cerebrovascular Disease Paternal Grandfather     Asthma Sister     Breast Cancer No family hx of     Cancer - colorectal No family hx of     Prostate Cancer No family hx of            Social History     Socioeconomic History    Marital status:      Spouse name: Not on  file    Number of children: Not on file    Years of education: Not on file    Highest education level: Not on file   Occupational History    Not on file   Tobacco Use    Smoking status: Never    Smokeless tobacco: Never   Vaping Use    Vaping status: Never Used   Substance and Sexual Activity    Alcohol use: Yes     Comment: Rare    Drug use: No    Sexual activity: Yes     Partners: Female   Other Topics Concern     Service Not Asked    Blood Transfusions Not Asked    Caffeine Concern Not Asked    Occupational Exposure Yes     Comment: worked for years as  exposed to carbon dust and other chemicals    Hobby Hazards Yes     Comment: Raises pigeons    Sleep Concern Not Asked    Stress Concern Not Asked    Weight Concern Not Asked    Special Diet Not Asked    Back Care Not Asked    Exercise Not Asked    Bike Helmet Not Asked    Seat Belt Not Asked    Self-Exams Not Asked    Parent/sibling w/ CABG, MI or angioplasty before 65F 55M? No   Social History Narrative    Not on file     Social Drivers of Health     Financial Resource Strain: Low Risk  (12/13/2024)    Financial Resource Strain     Within the past 12 months, have you or your family members you live with been unable to get utilities (heat, electricity) when it was really needed?: No   Food Insecurity: Low Risk  (12/13/2024)    Food Insecurity     Within the past 12 months, did you worry that your food would run out before you got money to buy more?: No     Within the past 12 months, did the food you bought just not last and you didn t have money to get more?: No   Transportation Needs: Low Risk  (12/13/2024)    Transportation Needs     Within the past 12 months, has lack of transportation kept you from medical appointments, getting your medicines, non-medical meetings or appointments, work, or from getting things that you need?: No   Physical Activity: Not on file   Stress: Not on file   Social Connections: Not on file   Interpersonal Safety:  Low Risk  (11/13/2024)    Interpersonal Safety     Do you feel physically and emotionally safe where you currently live?: Yes     Within the past 12 months, have you been hit, slapped, kicked or otherwise physically hurt by someone?: No     Within the past 12 months, have you been humiliated or emotionally abused in other ways by your partner or ex-partner?: No   Housing Stability: High Risk (12/13/2024)    Housing Stability     Do you have housing? : Yes     Are you worried about losing your housing?: Yes             Physical Examination   VITALS: There were no vitals taken for this visit.  BMI: There is no height or weight on file to calculate BMI.  Wt Readings from Last 3 Encounters:   12/31/24 73.3 kg (161 lb 9.6 oz)   12/13/24 74.4 kg (164 lb)   12/12/24 74.4 kg (164 lb 0.3 oz)     No intake or output data in the 24 hours ending 01/04/25 1518    General: pleasant male.  Mild distress due to chest pain, improved post revascularization.  HEENT: JVP not elevated, no HJR  Lungs: Expiratory wheezing  COR:  regular  rhythm, systolic murmur  Abd: soft, nt, nd  Extrem: no edema         Non-cardiac Imaging Studies Reviewed      None       Lab Results Reviewed    Chemistry/lipid CBC Cardiac Enzymes/BNP/TSH/INR   Recent Labs   Lab Test 11/13/24  0846   CHOL 124   HDL 41   LDL 59   TRIG 122     Recent Labs   Lab Test 11/13/24  0846 10/13/23  1111 04/27/22  0856   LDL 59 95 44     Recent Labs   Lab Test 01/04/25  1435      POTASSIUM 4.1   CHLORIDE 100   CO2 26   *   BUN 19.3   CR 0.96   GFRESTIMATED 80   JOSE RAFAEL 9.9     Recent Labs   Lab Test 01/04/25  1435 12/15/24  0526 12/13/24  1717   CR 0.96 1.05 1.10     Recent Labs   Lab Test 11/13/24  0846 05/02/24  0651 01/16/24  0918   A1C 8.7* 9.3* 8.0*          Recent Labs   Lab Test 01/04/25  1435   WBC 15.2*   HGB 15.7   HCT 47.1   MCV 91        Recent Labs   Lab Test 01/04/25  1435 12/15/24  0526 12/13/24  1717   HGB 15.7 14.5 15.0    No results for input(s):  "\"TROPONINI\" in the last 78766 hours.  No results for input(s): \"BNP\", \"NTBNPI\", \"NTBNP\" in the last 81945 hours.  No results for input(s): \"TSH\" in the last 48020 hours.  Recent Labs   Lab Test 01/04/25  1435   INR 1.02           Current Inpatient Scheduled Medications   Scheduled Meds:  Current Facility-Administered Medications   Medication Dose Route Frequency Provider Last Rate Last Admin    acetaminophen (TYLENOL) tablet 650 mg  650 mg Oral Q8H Jacobo Montgomery MD        [START ON 1/5/2025] aspirin EC tablet 81 mg  81 mg Oral Daily Jacobo Montgomery MD        atorvastatin (LIPITOR) tablet 40 mg  40 mg Oral Daily Jacobo Montgomery MD        carvedilol (COREG) tablet 6.25 mg  6.25 mg Oral BID Jacobo Montgomery MD        fluticasone-vilanterol (BREO ELLIPTA) 100-25 MCG/ACT inhaler 1 puff  1 puff Inhalation Daily Jacobo Montgomery MD        glipiZIDE (GLUCOTROL XL) 24 hr tablet 10 mg  10 mg Oral Daily Jacobo Montgomery MD        insulin glargine (LANTUS PEN) injection 20 Units  20 Units Subcutaneous At Bedtime Jacobo Montgomery MD        losartan-hydrochlorothiazide (HYZAAR) 50-12.5 MG per tablet 1 tablet  1 tablet Oral Daily Jacobo Montgomery MD        montelukast (SINGULAIR) tablet 10 mg  10 mg Oral At Bedtime Jacobo Montgomery MD        omeprazole (PriLOSEC) CR capsule 20 mg  20 mg Oral Daily Jacobo Montgomery MD        predniSONE (DELTASONE) tablet 20 mg  20 mg Oral Daily Jacobo Montgomery MD        ticagrelor (BRILINTA) tablet 90 mg  90 mg Oral Q12H Jacobo Montgomery MD        triamcinolone (KENALOG) 0.1 % cream   Topical BID Jacobo Montgomery MD         Continuous Infusions:  Current Facility-Administered Medications   Medication Dose Route Frequency Provider Last Rate Last Admin    Continuing beta blocker from home medication list OR beta blocker order already placed during this visit   Does not apply DOES NOT GO TO Jacobo Ewing MD        Continuing statin from home medication list OR statin order " already placed during this visit   Does not apply DOES NOT GO TO Jacobo Ewing MD        Percutaneous Coronary Intervention orders placed (this is information for BPA alerting)   Does not apply DOES NOT GO TO Jacobo Ewing MD           Current Outpatient Medications   Medication Sig Dispense Refill    aspirin (ASA) 81 MG chewable tablet Take 1 tablet (81 mg) by mouth daily. Starting tomorrow. 30 tablet 3    [START ON 1/5/2025] ticagrelor (BRILINTA) 90 MG tablet Take 1 tablet (90 mg) by mouth 2 times daily. Dose to start tomorrow morning. 180 tablet 3          Medications Prior to Admission   Prior to Admission medications    Medication Sig Start Date End Date Taking? Authorizing Provider   acetaminophen (TYLENOL) 325 MG tablet Take 2 tablets (650 mg) by mouth every 8 hours. 12/15/24   Issac Golden MD   albuterol (PROAIR HFA/PROVENTIL HFA/VENTOLIN HFA) 108 (90 Base) MCG/ACT inhaler Inhale 1-2 puffs into the lungs every 4 hours as needed for shortness of breath or wheezing. 12/31/24   Sarita Hennessy, DO   albuterol (PROVENTIL) (2.5 MG/3ML) 0.083% neb solution TAKE 1 VIAL (2.5 MG) BY NEBULIZATION EVERY 6 HOURS AS NEEDED FOR SHORTNESS OF BREATH OR WHEEZING 11/8/24   Sarita Hennessy DO   Alcohol Swabs (B-D SINGLE USE SWABS REGULAR) PADS USE TO SWAB AREA OF INJECTION / JENISE AS DIRECTED 6/8/23   Sarita Hennessy DO   aspirin (ASA) 81 MG EC tablet Take 1 tablet (81 mg) by mouth daily 10/26/21   Barbara Bermudez PA-C   atorvastatin (LIPITOR) 40 MG tablet Take 1 tablet (40 mg) by mouth daily 1/16/24   Sarita Hennessy DO   blood glucose monitoring (NO BRAND SPECIFIED) meter device kit Use to test blood sugar 1 times daily or as directed.  Blood Glucose Monitor Brands: per insurance. 5/19/20   Sarita Hennessy DO   budesonide-formoterol (SYMBICORT) 80-4.5 MCG/ACT Inhaler Inhale 2 puffs into the lungs 2 times daily. 12/31/24   Sarita Hennessy, DO   carvedilol  (COREG) 6.25 MG tablet Take 1 tablet (6.25 mg) by mouth 2 times daily. 12/31/24   Sarita Hennessy DO   glipiZIDE (GLUCOTROL XL) 10 MG 24 hr tablet TAKE 1 TABLET (10 MG) BY MOUTH DAILY 12/20/24   Sarita Hennessy DO   insulin glargine (LANTUS PEN) 100 UNIT/ML pen Inject 20 Units subcutaneously at bedtime. 11/13/24   Richard Sawant MD   insulin pen needle (BD LIZBETH U/F) 32G X 4 MM miscellaneous USE 1 PEN NEEDLE DAILY 10/2/24   Sarita Hennessy DO   Lancets (ONETOUCH DELICA PLUS [id number]) MISC USE AS DIRECTED WITH LANCING DEVICE TO TEST ONCE DAILY 8/21/21   Sarita Hennessy DO   losartan-hydrochlorothiazide (HYZAAR) 50-12.5 MG tablet Take 1 tablet by mouth daily. 12/31/24   Sarita Hennessy DO   montelukast (SINGULAIR) 10 MG tablet TAKE ONE TABLET BY MOUTH AT BEDTIME 12/31/24   Sarita Hennessy DO   omeprazole (PRILOSEC) 20 MG DR capsule Take 1 capsule (20 mg) by mouth daily. 12/31/24   Sarita Hennessy DO   ONETOUCH ULTRA test strip USE TO TEST BLOOD SUGAR 1 TIME DAILY OR AS DIRECTED 5/4/22   Sarita Hennessy DO   order for DME Equipment being ordered: arm blood pressure cuff 11/19/15   Connor Anderson MD   oxyCODONE (ROXICODONE) 5 MG tablet Take 1 tablet (5 mg) by mouth every 6 hours as needed for moderate pain. 12/27/24   Graciela Evans APRN CNP   predniSONE (DELTASONE) 20 MG tablet Take 1 tablet (20 mg) by mouth daily. 12/30/24   Rich Shetty MD   triamcinolone (KENALOG) 0.1 % external cream APPLY TOPICALLY 2 TIMES DAILY TO LEFT ANKLE  Patient taking differently: Apply topically 2 times daily. 12/9/24   Sarita Hennessy DO   vitamin E (TOCOPHEROL) 400 units (180 mg) capsule Take 400 Units by mouth daily.    Reported, Patient              Clinically Significant Risk Factors Present on Admission                 # Drug Induced Platelet Defect: home medication list includes an antiplatelet medication   # Hypertension: Noted on problem list           # DMII: A1C = 8.7 % (Ref range: 0.0 - 5.6 %) within past 6 months       # Asthma: noted on problem list       Jacobo Montgomery MD   Interventional Cardiology

## 2025-01-04 NOTE — ED PROVIDER NOTES
"[facility]  Emergency Department Visit Note    PATIENT:  Raul Wilhelm     79 year old     male      [id number]    Chief complaint:  Chief Complaint   Patient presents with    Chest Pain        History of present illness:  Patient is a 79 year old male with HTN, HLD, COPD, CAD status post PCI, type 2 diabetes presenting for evaluation of chest pain.    Started around 8:00 this morning while at rest.  Localized left chest, but also diffusely across the chest, into the right shoulder, and into the back.  Describes it as if he \"pulled a muscle\".  Has been constant.  Worse with activity, though not clearly worse with exertion.  Has chronic dyspnea, somewhat worse than normal.  No fevers.  Does report \"heavy phlegm\" over the past several days.  He feels like this feels different than when he needed stents in the past.      Review of Systems:  As in HPI above    BP (!) 181/99   Pulse 105   Temp 99  F (37.2  C) (Oral)   Resp 25   SpO2 91%       Physical Exam:  Constitutional: Sitting up on edge of hospital bed, alert, oriented, not in distress  HEENT: normocephalic, atraumatic and sclerae anicteric  Neck: no stridor  Cardiovascular: tachycardic and no murmurs, rubs, or extra heart sounds  Pulmonary: breathing comfortably on room air and lungs clear to auscultation bilaterally  Abdominal: soft, non-tender, non-distended  Extremities/MSK: no peripheral edema  Skin: warm, dry  Neurologic: moves all four extremities spontaneously  Psychiatric: calm, appropriate      MDM:  Patient is a 79 year old male with above history presenting for evaluation of chest pain.    Vitals notable for hypertension, though afebrile and satting well on room air. Exam generally reassuring, he appears quite comfortable.    ECG on arrival with inferior ST elevation with ST depression and T wave inversion in aVL consistent with inferior STEMI.  Code STEMI activated immediately on arrival.  Patient given ticagrelor, aspirin, " heparin bolus, 2 mg IV morphine prior to transfer.  Defibrillation pads applied.  Radiation to back does raise the question of dissection though feel acute occlusive cardiac disease more likely explanation and so prioritizing transfer to PCI capable facility.  PE also on the differential, though again unlikely given more convincing diagnosis above.    Spoke with Dr. Montgomery with interventional cardiology through [facility], he will be awaiting patient's arrival there.  I updated patient's daughter, Nancy, via telephone.    Disposition  transfer . Remainder of ED course below.    ED COURSE:       Encounter Diagnoses:  Final diagnoses:   Acute ST elevation myocardial infarction (STEMI) of inferior wall (H)       Final disposition: transferred to [facility]    Antonio Wheeler MD  1/4/2025  2:40 PM   Emergency Medicine  [facility]      Antonio Wheeler MD  01/04/25 4506

## 2025-01-04 NOTE — Clinical Note
The first balloon was inserted into the circumflex and proximal circumflex.Max pressure = 14 berry. Total duration = 16 seconds.

## 2025-01-04 NOTE — Clinical Note
The first balloon was inserted into the circumflex and proximal circumflex.Max pressure = 8 berry. Total duration = 5 seconds.     Max pressure = 8 berry. Total duration = 11 seconds.    Balloon reinflated a second time: Max pressure = 8 berry. Total duration = 11 seconds.  Balloon reinflated a third time: Max pressure = 6 berry. Total duration = 10 seconds.

## 2025-01-04 NOTE — ED TRIAGE NOTES
Sudden onset of chest pain and 2hrs ago. Pain radiates to back. Shortness of breath x2 days. Hx of stents place. Broken ribs on 12/11, pt reports having a cough since.

## 2025-01-04 NOTE — Clinical Note
The first balloon was inserted into the circumflex and proximal circumflex.Max pressure = 12 berry. Total duration = 9 seconds.     Max pressure = 16 berry. Total duration = 10 seconds.    Balloon reinflated a second time: Max pressure = 16 berry. Total duration = 10 seconds.

## 2025-01-04 NOTE — ED NOTES
Chest pain that started this morning that radiates to back.  Patient stated that he has also had a cough.  History of STEMI with stents placed approximately 10 years ago.

## 2025-01-04 NOTE — Clinical Note
The first balloon was inserted into the circumflex and proximal circumflex.Max pressure = 12 berry. Total duration = 11 seconds.

## 2025-01-04 NOTE — Clinical Note
The first balloon was inserted into the circumflex.Max pressure = 8 berry. Total duration = 11 seconds.     Max pressure = 16 berry. Total duration = 14 seconds.    Balloon reinflated a second time: Max pressure = 16 berry. Total duration = 14 seconds.

## 2025-01-04 NOTE — Clinical Note
The first balloon was inserted into the circumflex and marginal circumflex.Max pressure = 12 berry. Total duration = 6 seconds.     Max pressure = 16 berry. Total duration = 9 seconds.    Balloon reinflated a second time: Max pressure = 16 berry. Total duration = 9 seconds.

## 2025-01-05 ENCOUNTER — APPOINTMENT (OUTPATIENT)
Dept: CARDIOLOGY | Facility: HOSPITAL | Age: 80
DRG: 321 | End: 2025-01-05
Attending: INTERNAL MEDICINE
Payer: COMMERCIAL

## 2025-01-05 ENCOUNTER — APPOINTMENT (OUTPATIENT)
Dept: SPEECH THERAPY | Facility: HOSPITAL | Age: 80
DRG: 321 | End: 2025-01-05
Attending: INTERNAL MEDICINE
Payer: COMMERCIAL

## 2025-01-05 LAB
ANION GAP SERPL CALCULATED.3IONS-SCNC: 10 MMOL/L (ref 7–15)
ATRIAL RATE - MUSE: 103 BPM
ATRIAL RATE - MUSE: 85 BPM
ATRIAL RATE - MUSE: 87 BPM
BASOPHILS # BLD AUTO: 0 10E3/UL (ref 0–0.2)
BASOPHILS NFR BLD AUTO: 0 %
BUN SERPL-MCNC: 16.9 MG/DL (ref 8–23)
CALCIUM SERPL-MCNC: 9.3 MG/DL (ref 8.8–10.4)
CHLORIDE SERPL-SCNC: 99 MMOL/L (ref 98–107)
CREAT SERPL-MCNC: 0.84 MG/DL (ref 0.67–1.17)
DIASTOLIC BLOOD PRESSURE - MUSE: NORMAL MMHG
EGFRCR SERPLBLD CKD-EPI 2021: 89 ML/MIN/1.73M2
EOSINOPHIL # BLD AUTO: 0 10E3/UL (ref 0–0.7)
EOSINOPHIL NFR BLD AUTO: 0 %
ERYTHROCYTE [DISTWIDTH] IN BLOOD BY AUTOMATED COUNT: 12.4 % (ref 10–15)
GLUCOSE BLDC GLUCOMTR-MCNC: 160 MG/DL (ref 70–99)
GLUCOSE BLDC GLUCOMTR-MCNC: 294 MG/DL (ref 70–99)
GLUCOSE BLDC GLUCOMTR-MCNC: 315 MG/DL (ref 70–99)
GLUCOSE BLDC GLUCOMTR-MCNC: 326 MG/DL (ref 70–99)
GLUCOSE SERPL-MCNC: 203 MG/DL (ref 70–99)
HCO3 SERPL-SCNC: 26 MMOL/L (ref 22–29)
HCT VFR BLD AUTO: 39.8 % (ref 40–53)
HGB BLD-MCNC: 13.6 G/DL (ref 13.3–17.7)
IMM GRANULOCYTES # BLD: 0.1 10E3/UL
IMM GRANULOCYTES NFR BLD: 1 %
INTERPRETATION ECG - MUSE: NORMAL
LVEF ECHO: NORMAL
LYMPHOCYTES # BLD AUTO: 0.6 10E3/UL (ref 0.8–5.3)
LYMPHOCYTES NFR BLD AUTO: 5 %
MCH RBC QN AUTO: 30.6 PG (ref 26.5–33)
MCHC RBC AUTO-ENTMCNC: 34.2 G/DL (ref 31.5–36.5)
MCV RBC AUTO: 90 FL (ref 78–100)
MONOCYTES # BLD AUTO: 1 10E3/UL (ref 0–1.3)
MONOCYTES NFR BLD AUTO: 9 %
NEUTROPHILS # BLD AUTO: 9.7 10E3/UL (ref 1.6–8.3)
NEUTROPHILS NFR BLD AUTO: 85 %
NRBC # BLD AUTO: 0 10E3/UL
NRBC BLD AUTO-RTO: 0 /100
P AXIS - MUSE: -15 DEGREES
P AXIS - MUSE: 17 DEGREES
P AXIS - MUSE: 72 DEGREES
PLATELET # BLD AUTO: 217 10E3/UL (ref 150–450)
POTASSIUM SERPL-SCNC: 3.8 MMOL/L (ref 3.4–5.3)
PR INTERVAL - MUSE: 138 MS
PR INTERVAL - MUSE: 138 MS
PR INTERVAL - MUSE: 144 MS
QRS DURATION - MUSE: 74 MS
QRS DURATION - MUSE: 78 MS
QRS DURATION - MUSE: 80 MS
QT - MUSE: 358 MS
QT - MUSE: 372 MS
QT - MUSE: 374 MS
QTC - MUSE: 445 MS
QTC - MUSE: 447 MS
QTC - MUSE: 468 MS
R AXIS - MUSE: -8 DEGREES
R AXIS - MUSE: 18 DEGREES
R AXIS - MUSE: 36 DEGREES
RBC # BLD AUTO: 4.44 10E6/UL (ref 4.4–5.9)
SODIUM SERPL-SCNC: 135 MMOL/L (ref 135–145)
SYSTOLIC BLOOD PRESSURE - MUSE: NORMAL MMHG
T AXIS - MUSE: 41 DEGREES
T AXIS - MUSE: 6 DEGREES
T AXIS - MUSE: 87 DEGREES
VENTRICULAR RATE- MUSE: 103 BPM
VENTRICULAR RATE- MUSE: 85 BPM
VENTRICULAR RATE- MUSE: 87 BPM
WBC # BLD AUTO: 11.4 10E3/UL (ref 4–11)

## 2025-01-05 PROCEDURE — 200N000001 HC R&B ICU

## 2025-01-05 PROCEDURE — 94640 AIRWAY INHALATION TREATMENT: CPT

## 2025-01-05 PROCEDURE — 250N000013 HC RX MED GY IP 250 OP 250 PS 637: Performed by: STUDENT IN AN ORGANIZED HEALTH CARE EDUCATION/TRAINING PROGRAM

## 2025-01-05 PROCEDURE — 250N000009 HC RX 250: Performed by: INTERNAL MEDICINE

## 2025-01-05 PROCEDURE — 999N000208 ECHOCARDIOGRAM COMPLETE

## 2025-01-05 PROCEDURE — 93005 ELECTROCARDIOGRAM TRACING: CPT

## 2025-01-05 PROCEDURE — 80048 BASIC METABOLIC PNL TOTAL CA: CPT | Performed by: INTERNAL MEDICINE

## 2025-01-05 PROCEDURE — 36415 COLL VENOUS BLD VENIPUNCTURE: CPT | Performed by: INTERNAL MEDICINE

## 2025-01-05 PROCEDURE — 99233 SBSQ HOSP IP/OBS HIGH 50: CPT | Performed by: INTERNAL MEDICINE

## 2025-01-05 PROCEDURE — 94640 AIRWAY INHALATION TREATMENT: CPT | Mod: 76

## 2025-01-05 PROCEDURE — 250N000013 HC RX MED GY IP 250 OP 250 PS 637: Performed by: INTERNAL MEDICINE

## 2025-01-05 PROCEDURE — 85018 HEMOGLOBIN: CPT | Performed by: INTERNAL MEDICINE

## 2025-01-05 PROCEDURE — 250N000012 HC RX MED GY IP 250 OP 636 PS 637: Performed by: INTERNAL MEDICINE

## 2025-01-05 PROCEDURE — 93010 ELECTROCARDIOGRAM REPORT: CPT | Performed by: INTERNAL MEDICINE

## 2025-01-05 PROCEDURE — 255N000002 HC RX 255 OP 636: Performed by: INTERNAL MEDICINE

## 2025-01-05 PROCEDURE — 99232 SBSQ HOSP IP/OBS MODERATE 35: CPT | Performed by: STUDENT IN AN ORGANIZED HEALTH CARE EDUCATION/TRAINING PROGRAM

## 2025-01-05 PROCEDURE — 999N000157 HC STATISTIC RCP TIME EA 10 MIN

## 2025-01-05 PROCEDURE — 85041 AUTOMATED RBC COUNT: CPT | Performed by: INTERNAL MEDICINE

## 2025-01-05 PROCEDURE — 93306 TTE W/DOPPLER COMPLETE: CPT | Mod: 26 | Performed by: INTERNAL MEDICINE

## 2025-01-05 PROCEDURE — 94799 UNLISTED PULMONARY SVC/PX: CPT

## 2025-01-05 PROCEDURE — 250N000011 HC RX IP 250 OP 636: Performed by: INTERNAL MEDICINE

## 2025-01-05 PROCEDURE — 999N000215 HC STATISTIC HFNC ADULT NON-CPAP

## 2025-01-05 PROCEDURE — 85004 AUTOMATED DIFF WBC COUNT: CPT | Performed by: INTERNAL MEDICINE

## 2025-01-05 PROCEDURE — 82310 ASSAY OF CALCIUM: CPT | Performed by: INTERNAL MEDICINE

## 2025-01-05 PROCEDURE — 92610 EVALUATE SWALLOWING FUNCTION: CPT | Mod: GN

## 2025-01-05 PROCEDURE — C8929 TTE W OR WO FOL WCON,DOPPLER: HCPCS

## 2025-01-05 RX ORDER — PREDNISONE 20 MG/1
40 TABLET ORAL DAILY
Status: COMPLETED | OUTPATIENT
Start: 2025-01-06 | End: 2025-01-09

## 2025-01-05 RX ORDER — OSELTAMIVIR PHOSPHATE 75 MG/1
75 CAPSULE ORAL 2 TIMES DAILY
Status: COMPLETED | OUTPATIENT
Start: 2025-01-05 | End: 2025-01-09

## 2025-01-05 RX ORDER — CEFTRIAXONE 2 G/1
2 INJECTION, POWDER, FOR SOLUTION INTRAMUSCULAR; INTRAVENOUS EVERY 24 HOURS
Status: DISCONTINUED | OUTPATIENT
Start: 2025-01-05 | End: 2025-01-09

## 2025-01-05 RX ORDER — NALOXONE HYDROCHLORIDE 0.4 MG/ML
0.2 INJECTION, SOLUTION INTRAMUSCULAR; INTRAVENOUS; SUBCUTANEOUS
Status: DISCONTINUED | OUTPATIENT
Start: 2025-01-05 | End: 2025-01-11 | Stop reason: HOSPADM

## 2025-01-05 RX ORDER — NALOXONE HYDROCHLORIDE 0.4 MG/ML
0.4 INJECTION, SOLUTION INTRAMUSCULAR; INTRAVENOUS; SUBCUTANEOUS
Status: DISCONTINUED | OUTPATIENT
Start: 2025-01-05 | End: 2025-01-11 | Stop reason: HOSPADM

## 2025-01-05 RX ORDER — DOXYCYCLINE 100 MG/1
100 CAPSULE ORAL EVERY 12 HOURS SCHEDULED
Status: COMPLETED | OUTPATIENT
Start: 2025-01-05 | End: 2025-01-09

## 2025-01-05 RX ORDER — ATORVASTATIN CALCIUM 40 MG/1
80 TABLET, FILM COATED ORAL AT BEDTIME
Status: DISCONTINUED | OUTPATIENT
Start: 2025-01-05 | End: 2025-01-11 | Stop reason: HOSPADM

## 2025-01-05 RX ADMIN — IPRATROPIUM BROMIDE AND ALBUTEROL SULFATE 3 ML: .5; 3 SOLUTION RESPIRATORY (INHALATION) at 20:13

## 2025-01-05 RX ADMIN — CARVEDILOL 6.25 MG: 6.25 TABLET, FILM COATED ORAL at 08:17

## 2025-01-05 RX ADMIN — PERFLUTREN 4 ML: 6.52 INJECTION, SUSPENSION INTRAVENOUS at 09:48

## 2025-01-05 RX ADMIN — TICAGRELOR 90 MG: 90 TABLET ORAL at 08:17

## 2025-01-05 RX ADMIN — IPRATROPIUM BROMIDE AND ALBUTEROL SULFATE 3 ML: .5; 3 SOLUTION RESPIRATORY (INHALATION) at 12:49

## 2025-01-05 RX ADMIN — ACETAMINOPHEN 650 MG: 325 TABLET ORAL at 08:16

## 2025-01-05 RX ADMIN — INSULIN GLARGINE 20 UNITS: 100 INJECTION, SOLUTION SUBCUTANEOUS at 21:09

## 2025-01-05 RX ADMIN — ATORVASTATIN CALCIUM 80 MG: 40 TABLET, FILM COATED ORAL at 21:06

## 2025-01-05 RX ADMIN — IPRATROPIUM BROMIDE AND ALBUTEROL SULFATE 3 ML: .5; 3 SOLUTION RESPIRATORY (INHALATION) at 23:48

## 2025-01-05 RX ADMIN — PANTOPRAZOLE SODIUM 40 MG: 40 TABLET, DELAYED RELEASE ORAL at 06:40

## 2025-01-05 RX ADMIN — OSELTAMAVIR PHOSPHATE 75 MG: 75 CAPSULE ORAL at 08:17

## 2025-01-05 RX ADMIN — TICAGRELOR 90 MG: 90 TABLET ORAL at 21:06

## 2025-01-05 RX ADMIN — ACETAMINOPHEN 650 MG: 325 TABLET ORAL at 21:05

## 2025-01-05 RX ADMIN — MONTELUKAST 10 MG: 10 TABLET, FILM COATED ORAL at 21:07

## 2025-01-05 RX ADMIN — INSULIN ASPART 2 UNITS: 100 INJECTION, SOLUTION INTRAVENOUS; SUBCUTANEOUS at 21:09

## 2025-01-05 RX ADMIN — ACETYLCYSTEINE 4 ML: 100 INHALANT RESPIRATORY (INHALATION) at 20:13

## 2025-01-05 RX ADMIN — ACETYLCYSTEINE 4 ML: 100 INHALANT RESPIRATORY (INHALATION) at 15:40

## 2025-01-05 RX ADMIN — IPRATROPIUM BROMIDE AND ALBUTEROL SULFATE 3 ML: .5; 3 SOLUTION RESPIRATORY (INHALATION) at 07:36

## 2025-01-05 RX ADMIN — ACETYLCYSTEINE 4 ML: 100 INHALANT RESPIRATORY (INHALATION) at 23:48

## 2025-01-05 RX ADMIN — CARVEDILOL 6.25 MG: 6.25 TABLET, FILM COATED ORAL at 21:08

## 2025-01-05 RX ADMIN — LOSARTAN POTASSIUM AND HYDROCHLOROTHIAZIDE 1 TABLET: 50; 12.5 TABLET, FILM COATED ORAL at 08:17

## 2025-01-05 RX ADMIN — OSELTAMIVIR PHOSPHATE 75 MG: 75 CAPSULE ORAL at 21:07

## 2025-01-05 RX ADMIN — TRIAMCINOLONE ACETONIDE: 1 CREAM TOPICAL at 08:18

## 2025-01-05 RX ADMIN — ACETYLCYSTEINE 4 ML: 100 INHALANT RESPIRATORY (INHALATION) at 03:58

## 2025-01-05 RX ADMIN — IPRATROPIUM BROMIDE AND ALBUTEROL SULFATE 3 ML: .5; 3 SOLUTION RESPIRATORY (INHALATION) at 03:58

## 2025-01-05 RX ADMIN — ASPIRIN 81 MG: 81 TABLET, COATED ORAL at 08:17

## 2025-01-05 RX ADMIN — ACETYLCYSTEINE 4 ML: 100 INHALANT RESPIRATORY (INHALATION) at 12:49

## 2025-01-05 RX ADMIN — ACETYLCYSTEINE 4 ML: 100 INHALANT RESPIRATORY (INHALATION) at 07:36

## 2025-01-05 RX ADMIN — ACETAMINOPHEN 650 MG: 325 TABLET ORAL at 14:06

## 2025-01-05 RX ADMIN — CEFTRIAXONE SODIUM 2 G: 2 INJECTION, POWDER, FOR SOLUTION INTRAMUSCULAR; INTRAVENOUS at 09:48

## 2025-01-05 RX ADMIN — DOXYCYCLINE 100 MG: 100 CAPSULE ORAL at 21:07

## 2025-01-05 RX ADMIN — LIDOCAINE 1 PATCH: 4 PATCH TOPICAL at 21:12

## 2025-01-05 RX ADMIN — IPRATROPIUM BROMIDE AND ALBUTEROL SULFATE 3 ML: .5; 3 SOLUTION RESPIRATORY (INHALATION) at 15:40

## 2025-01-05 RX ADMIN — PREDNISONE 40 MG: 20 TABLET ORAL at 08:16

## 2025-01-05 RX ADMIN — TRIAMCINOLONE ACETONIDE: 1 CREAM TOPICAL at 21:21

## 2025-01-05 ASSESSMENT — ACTIVITIES OF DAILY LIVING (ADL)
ADLS_ACUITY_SCORE: 40
ADLS_ACUITY_SCORE: 49
ADLS_ACUITY_SCORE: 38
ADLS_ACUITY_SCORE: 38
ADLS_ACUITY_SCORE: 40
ADLS_ACUITY_SCORE: 38
ADLS_ACUITY_SCORE: 38
ADLS_ACUITY_SCORE: 40
ADLS_ACUITY_SCORE: 38
ADLS_ACUITY_SCORE: 40
ADLS_ACUITY_SCORE: 38
ADLS_ACUITY_SCORE: 38
ADLS_ACUITY_SCORE: 49
ADLS_ACUITY_SCORE: 40

## 2025-01-05 NOTE — PROGRESS NOTES
Patient is currently on HFNC 50L 65% spo2 93%. Duoneb and 10% mucomyst nebs Q4 given. Patient has strong productive cough. Patient looks more relaxed and no increased work of breathing observed. RT will continue to follow.          Aminah Jalloh, RT

## 2025-01-05 NOTE — PROGRESS NOTES
Per pt, breathing feeling okay. Mild tachypnea noted. HFNC titrated to 50L 55%. Using Aerobika. Encouraged deep breathing.cough.    Sandra Sharma, RT

## 2025-01-05 NOTE — PHARMACY-ADMISSION MEDICATION HISTORY
"Pharmacist Admission Medication History    Admission medication history is complete. The information provided in this note is only as accurate as the sources available at the time of the update.    Information Source(s): Patient, Hospital records, Prescription bottles, and CareEverywhere/SureScripts via in-person. Patient had prednisone and OTC acetaminophen bottles at his bedside.    Pertinent Information:   Medication adherence - patient reports he has been \"busy\" and forgets to take some medication doses. He reports life stressors (such as wife being in a nursing home and trying to keep his house) as factors that have recently impacted his medication adherence.   Prednisone - prescription bottle at bedside had instructions to take 40 mg BID x5 days. Patient reports he took 3 tablets (60 mg) this morning. Patient also has a new prescription filled for prednisone 20 mg daily from 12/30/24.     Changes made to PTA medication list:  Added: None  Deleted: oxycodone (no longer taking)  Changed: None    Allergies reviewed with patient and updates made in EHR: yes    Medication History Completed By: Nicollette McMann, RPH 1/4/2025 6:04 PM    PTA Med List   Medication Sig Note Last Dose/Taking    acetaminophen (TYLENOL) 325 MG tablet Take 2 tablets (650 mg) by mouth every 8 hours.  1/4/2025 Morning    albuterol (PROAIR HFA/PROVENTIL HFA/VENTOLIN HFA) 108 (90 Base) MCG/ACT inhaler Inhale 1-2 puffs into the lungs every 4 hours as needed for shortness of breath or wheezing.  1/4/2025    albuterol (PROVENTIL) (2.5 MG/3ML) 0.083% neb solution TAKE 1 VIAL (2.5 MG) BY NEBULIZATION EVERY 6 HOURS AS NEEDED FOR SHORTNESS OF BREATH OR WHEEZING  Taking As Needed    aspirin (ASA) 81 MG chewable tablet Take 1 tablet (81 mg) by mouth daily. Starting tomorrow.  Taking    aspirin (ASA) 81 MG EC tablet Take 1 tablet (81 mg) by mouth daily  1/4/2025    atorvastatin (LIPITOR) 40 MG tablet Take 1 tablet (40 mg) by mouth daily  1/3/2025 " Evening    budesonide-formoterol (SYMBICORT) 80-4.5 MCG/ACT Inhaler Inhale 2 puffs into the lungs 2 times daily.  1/3/2025 Evening    carvedilol (COREG) 6.25 MG tablet Take 1 tablet (6.25 mg) by mouth 2 times daily.  1/3/2025 Evening    glipiZIDE (GLUCOTROL XL) 10 MG 24 hr tablet TAKE 1 TABLET (10 MG) BY MOUTH DAILY  1/3/2025    insulin glargine (LANTUS PEN) 100 UNIT/ML pen Inject 20 Units subcutaneously at bedtime.  1/3/2025 Evening    losartan-hydrochlorothiazide (HYZAAR) 50-12.5 MG tablet Take 1 tablet by mouth daily.  1/3/2025    montelukast (SINGULAIR) 10 MG tablet TAKE ONE TABLET BY MOUTH AT BEDTIME  1/3/2025 Evening    omeprazole (PRILOSEC) 20 MG DR capsule Take 1 capsule (20 mg) by mouth daily.  1/3/2025    predniSONE (DELTASONE) 20 MG tablet Take 1 tablet (20 mg) by mouth daily. 1/4/2025: 1/5 Patient reported taking 3 tablets (60 mg) today. 1/4/2025 Morning    triamcinolone (KENALOG) 0.1 % external cream APPLY TOPICALLY 2 TIMES DAILY TO LEFT ANKLE (Patient taking differently: Apply topically 2 times daily.)  Past Week    vitamin E (TOCOPHEROL) 400 units (180 mg) capsule Take 400 Units by mouth daily.  1/3/2025

## 2025-01-05 NOTE — H&P
[facility]    History and Physical - Hospitalist Service       Date of Admission:  1/4/2025  Patient is a 79 year old male with HTN, HLD, COPD, CAD status post PCI, type 2 diabetes presenting to [place] ED this morning evaluation of chest pain.    Patient found to have EKG showing inferior STEMI, Cath Lab urgently activated, he was transferred to [facility] for emergency angiogram.  Patient is postprocedure, seen in the ICU chest pain-free, hospitalizing consulted for medical management      Assessment & Plan   Chest pain, acute onset now resolved  History of CAD in the past post stent placement  Inferior STEMI post PCI of dominant pLCx and large OM.  Residual disease of the ostial to distal LAD and large D1  Continue with DAPT therapy, statin, home beta-blocker  TTE ordered for tomorrow  Cardiology recommend heart team discussion regarding management of residual disease, formal cardiology consult placed    Acute hypoxic respiratory failure, on 2 L currently  Influenza A, negative COVID test  History of COPD/asthma overlap, recent exacerbation  Begin scheduled DuoNebs, check chest x-ray now  Increase home steroids to 40 mg daily  Add p.o. doxycycline, begin Tamiflu renal dose  RT consulted to manage, VBG if still hypoxic    History of hypertension,  Home medication resumed with parameters  Follow BP closely    Hyperlipidemia  Continue high intensity statin as above    Type 2 diabetes, on long-term insulin  Home insulin dose resumed  Continue sliding scale insulin  DM diet ordered    Recent fall with right-sided rib fractures 12/13  CT showed right-sided rib fractures 5-7,  Cleared by general surgery at that time  Scheduled Tylenol, add lidocaine patch    Chronic obstructive lung disease and asthma  Management as above, RT following    Dysphagia, unclear etiology, undergoing outpatient workup  Consult speech therapy while admitted  Nursing informed given challenges with pill  use    Full code    Hospital medicine will follow along, labs ordered for tomorrow          Diet: Low Saturated Fat Na <2400 mg    DVT Prophylaxis: Low Risk/Ambulatory with no VTE prophylaxis indicated  Diaz Catheter: Not present  Lines: None     Cardiac Monitoring: ACTIVE order. Indication: Post- PCI/Angiogram (24 hours)  Code Status: Full Code      Clinically Significant Risk Factors Present on Admission                 # Drug Induced Platelet Defect: home medication list includes an antiplatelet medication   # Hypertension: Noted on problem list          # DMII: A1C = 8.1 % (Ref range: <5.7 %) within past 6 months       # Asthma: noted on problem list        Disposition Plan     Medically Ready for Discharge: Anticipated in 2-4 Days           Yan Paige MD  Hospitalist Service  [facility]  Securely message with [employer] (more info)  Text page via [employer] Paging/Directory     ______________________________________________________________________    Chief Complaint   Chest pain this morning post angiogram today    History is obtained from the patient    History of Present Illness   Raul Wilhelm is a 79 year old male who transferred from [facility] today for acute onset chest pain radiating to the back.  Does have a history of CAD in the past.  Status post previous stent placement.  EKG at that time showed inferior STEMI, he was transferred to this facility for evaluation.  He is postprocedure, emergency angiography, please see epic for full report.  Currently chest pain-free, minimal SOB at bedside, complains of productive cough few days duration.  No fever or chills, no PND orthopnea.    Vital signs noted stable in the ED, mildly hypoxic and currently on 2 L of oxygen, patient is not in respiratory status at bedside      Past Medical History    Past Medical History:   Diagnosis Date    Chronic airway obstruction, not elsewhere classified     Other and unspecified  hyperlipidemia        Past Surgical History   Past Surgical History:   Procedure Laterality Date    ARTHROPLASTY KNEE Left 5/28/2020    Procedure: Left Total Knee Arthroplasty;  Surgeon: Sanjay Downey MD;  Location: [facility] OR    ARTHROPLASTY KNEE Right 11/2/2020    Procedure: ARTHROPLASTY, KNEE, TOTAL;  Surgeon: Sanjay Downey MD;  Location: [facility] OR    ENT SURGERY  as a child    tonsillectomy       Prior to Admission Medications   Prior to Admission Medications   Prescriptions Last Dose Informant Patient Reported? Taking?   Alcohol Swabs (B-D SINGLE USE SWABS REGULAR) PADS  Self No No   Sig: USE TO SWAB AREA OF INJECTION / JENISE AS DIRECTED   Lancets (ONETOUCH DELICA PLUS [id number]) MISC  Self No No   Sig: USE AS DIRECTED WITH LANCING DEVICE TO TEST ONCE DAILY   ONETOUCH ULTRA test strip  Self No No   Sig: USE TO TEST BLOOD SUGAR 1 TIME DAILY OR AS DIRECTED   acetaminophen (TYLENOL) 325 MG tablet 1/4/2025 Morning  Yes Yes   Sig: Take 2 tablets (650 mg) by mouth every 8 hours.   albuterol (PROAIR HFA/PROVENTIL HFA/VENTOLIN HFA) 108 (90 Base) MCG/ACT inhaler 1/4/2025  No Yes   Sig: Inhale 1-2 puffs into the lungs every 4 hours as needed for shortness of breath or wheezing.   albuterol (PROVENTIL) (2.5 MG/3ML) 0.083% neb solution  Self No Yes   Sig: TAKE 1 VIAL (2.5 MG) BY NEBULIZATION EVERY 6 HOURS AS NEEDED FOR SHORTNESS OF BREATH OR WHEEZING   aspirin (ASA) 81 MG EC tablet 1/4/2025 Self Yes Yes   Sig: Take 1 tablet (81 mg) by mouth daily   atorvastatin (LIPITOR) 40 MG tablet 1/3/2025 Evening Self No Yes   Sig: Take 1 tablet (40 mg) by mouth daily   blood glucose monitoring (NO BRAND SPECIFIED) meter device kit  Self No No   Sig: Use to test blood sugar 1 times daily or as directed.  Blood Glucose Monitor Brands: per insurance.   budesonide-formoterol (SYMBICORT) 80-4.5 MCG/ACT Inhaler 1/3/2025 Evening  No Yes   Sig: Inhale 2 puffs into the lungs 2 times daily.   carvedilol (COREG) 6.25 MG tablet 1/3/2025  Evening  No Yes   Sig: Take 1 tablet (6.25 mg) by mouth 2 times daily.   glipiZIDE (GLUCOTROL XL) 10 MG 24 hr tablet 1/3/2025  No Yes   Sig: TAKE 1 TABLET (10 MG) BY MOUTH DAILY   insulin glargine (LANTUS PEN) 100 UNIT/ML pen 1/3/2025 Evening Self No Yes   Sig: Inject 20 Units subcutaneously at bedtime.   insulin pen needle (BD LIZBETH U/F) 32G X 4 MM miscellaneous  Self No No   Sig: USE 1 PEN NEEDLE DAILY   losartan-hydrochlorothiazide (HYZAAR) 50-12.5 MG tablet 1/3/2025  No Yes   Sig: Take 1 tablet by mouth daily.   montelukast (SINGULAIR) 10 MG tablet 1/3/2025 Evening  No Yes   Sig: TAKE ONE TABLET BY MOUTH AT BEDTIME   omeprazole (PRILOSEC) 20 MG DR capsule 1/3/2025  No Yes   Sig: Take 1 capsule (20 mg) by mouth daily.   order for DME  Self No No   Sig: Equipment being ordered: arm blood pressure cuff   predniSONE (DELTASONE) 20 MG tablet 1/4/2025 Morning  No Yes   Sig: Take 1 tablet (20 mg) by mouth daily.   triamcinolone (KENALOG) 0.1 % external cream Past Week Self No Yes   Sig: APPLY TOPICALLY 2 TIMES DAILY TO LEFT ANKLE   Patient taking differently: Apply topically 2 times daily.   vitamin E (TOCOPHEROL) 400 units (180 mg) capsule 1/3/2025 Self Yes Yes   Sig: Take 400 Units by mouth daily.      Facility-Administered Medications: None        Physical Exam   Vital Signs:     BP: (!) 155/70 Pulse: 82   Resp: 29 SpO2: 96 % O2 Device: Nasal cannula Oxygen Delivery: 2 LPM  Weight: 0 lbs 0 oz    General Appearance: AAOx3,  Respiratory: Minimal crackles/wheeze  Cardiovascular: S1-S2 only  GI: Nondistended, BS heard, nontender  Skin: No erythema  Other: No edema bilaterally  Neurology no focal weakness    Medical Decision Making       75 MINUTES SPENT BY ME on the date of service doing chart review, history, exam, documentation & further activities per the note.      Data   Imaging results reviewed over the past 24 hrs:   Recent Results (from the past 24 hours)   Cardiac Catheterization    Narrative    CARDIAC  CATHETERIZATION AND INTERVENTION REPORT    PATIENT NAME:  Raul Wilhelm          MEDICAL RECORD NUMBER: [id number]  : 1945       PROCEDURE DATE: 2025        CONCLUSIONS:   Severe multivessel coronary artery disease involving the ostial to   proximal LAD, the mid LAD, large diagonal branch, proximal left circumflex   (culprit for current presentation), and large OM branch.  Moderate   nonobstructive disease elsewhere.  Inferior ST elevation MI post successful percutaneous coronary artery   intervention of the dominant proximal left circumflex with a 4.0 x 18 mm   Resolute Veguita Huger drug-eluting stent.  Successful percutaneous coronary artery intervention of the large OM   branch with a 3.0 x 38 mm Resolute Veguita Huger drug-eluting stent that   was postdilated with a 3.5 mm NC balloon.      RECOMMENDATIONS:   Usual postprocedure cares, please see orders.  DAPT for minimum of 1 year, favor indefinitely if without increased risk   of bleeding given multiple overlapping stents.  Recommend heart team discussion regarding management of residual coronary   artery disease (will discuss at our next Thursday multidisciplinary   conference).  Medical therapy versus percutaneous revascularization (will   require multiple long stents extending into the left main given diffuse   LAD disease) versus surgical revascularization.  Can be done as outpatient   unless patient remains symptomatic.  Aggressive risk factor modification for secondary prevention.      PROCEDURE PERFORMED:   Selective right and left coronary angiography  PCI of the proximal left circumflex  PCI of the large OM branch    PRE-OP DIAGNOSIS:  Inferior ST elevation MI  Influenza A    POST-OP DIAGNOSIS:   Inferior ST elevation MI  Influenza A    PROCEDURALISTS: Jacobo Montgomery MD      DIAGNOSTIC PROCEDURAL DETAILS:   Signed, informed consent was obtained. The patient was placed on the table   and prepped and draped in the usual  fashion. Time out and site   verification performed.   Access: Right radial artery  Catheters: EBU 3.75 guide, 3 DRC  Hemostasis: TR band    PROCEDURAL MEDICATIONS:  Conscious sedation - midazolam and fentanyl, please see procedure log for   dosing.   Local anesthesia - 1% lidocaine   Procedural anticoagulation - 75 units/kg of heparin and intermittent   boluses to maintain ACT above 250-300.  Patient was loaded with 180 mg of   Ticagrelor prior to presentation.    CONTRAST VOLUME: 189 mL Visipaque  BLOOD LOSS: minimal   FLUIDS: None   SPECIMENS: None  COMPLICATIONS: None    Coronary Angiography:  LEFT MAIN: Normal caliber vessel that bifurcates into left anterior   descending, ramus intermedius, left circumflex arteries.  The proximal   left main has mild disease followed by 30 to 40% calcific stenosis in the   distal portion.  LEFT ANTERIOR DESCENDING: Caliber vessel that gives rise to a large   diagonal branch and multiple septal perforators.  The LAD wraps around the   apex distally.  The ostial to proximal LAD has diffuse 50 to 70% stenosis   followed by diffuse 70% stenosis in the midportion to distal portion.  The   apical LAD has moderate diffuse disease.  The large diagonal branch has a   70% proximal stenosis followed by mild disease distally.  LEFT CIRCUMFLEX: Large dominant vessel that gives rise to a large OM1   branch, and terminates into a large posterior descending artery and   posterolateral system distally.  The ostial to proximal left circumflex   has a 99% in-stent restenosis followed by mild disease distally.  The OM1   branch has a 90% possible stenosis followed by moderate disease in the   midportion and mild disease distally.  The posterolateral branch has mild   disease.  The posterior descending artery stent is widely patent with mild   disease.  RIGHT CORONARY ARTERY: Small nondominant vessel with mild disease.  RAMUS: Small to medium size vessel with 95% ostial to proximal stenosis    followed by mild disease distally.    INTERVENTIONAL DETAILS:   Lesion # 1 : 99% in-stent stenosis of the proximal dominant left   circumflex  Lesion # 2 : 90% stenosis of the large OM1 branch    We considered left ventricular assist device placement and evaluation for   surgical revascularization given multivessel coronary artery disease, but   given patient's ST elevations and ongoing chest pain we elected to proceed   with PCI.  The left main coronary artery was engaged using an EBU 3.75   guide catheter that provide adequate coaptation and support.  The left   circumflex artery lesion was traversed with a Runthrough wire and a second   Runthrough was placed in the LAD branch.  The left circumflex lesion was   serially predilated with a 2.0, 2.5, 3.0, and 3.5 mm NC balloons.  The   lesion was then treated with a 4.0 x 18 mm Resolute Jaya Hemet   drug-eluting stent that was further postdilated with a 4.0 mm NC balloon   at high atmospheres.  Given ongoing chest pain, we elected to intervene on   the OM branch as well.  The OM branch lesion was predilated with a 2.5 mm   balloon and was treated with a 3.0 x 38 mm Resolute Jaya Hemet   drug-eluting stent that was postdilated with a 3.5 mm NC balloon at high   atmospheres.  Preintervention the proximal left circumflex lesion length   was 14 mm with YESENIA III flow and the proximal OM lesion length was 30 mm   with YESENIA-3 flow.  Postintervention there is 0% residual stenosis within   treated segments with YESENIA-3 flow.      TIME SPENT: Due to the complex nature of the procedure an additional 60   minutes was spent to perform this procedure due to  difficulty with stent   advancement requiring multiple balloon inflations due to difficulty   dilating the lesion and multiple treated lesions.  A 22 modifier should be   applied.    Please do not hesitate to contact me if you have any questions or   concerns. I was present for the procedure in its entirety. Moderate    conscious sedation at level 3-4 with fentanyl and midazolam was   administered, and I spent continuous face to face time with the patient   which encompassed the duration of the procedure.  Please refer to the   sedation flow sheet for additional information.  I have reviewed and agree   with the documentation provided in the RN sedation flow sheet as outlined.   I concluded sedation and recovery was monitored by the trained independent   observer. I attest that I have ordered all medications administered,   equipment used, and tests performed during the procedure.        Jacobo Montgomery MD  Interventional Cardiology

## 2025-01-05 NOTE — PLAN OF CARE
[facility] - ICU    RN Progress Note:            Pertinent Assessments:      Please refer to flowsheet rows for full assessment     - Alert and orientedx4. Calm and cooperative.        Key Events - This Shift:     - Patient felt more Shortness of breathing around 7pm. SBP of 180's. Sinus Tachy 120's. Tachypnic(40's). Air hunger and Using abdominal accessory muscle. Nebulizer and Chest Xray ordered. Respiratory Therapy put him on HFNC 45L/45% and still trouble of breathing. VBG ordered and sputum sent. Increased HFNC to 50L/65%. 02 Sats of 91% to 92%. Breathing much better and no use of muscle accessory noted. Hospitalist and Intensivist updated/aware.  - TR band removed without complication at 21:45.           Barriers to Discharge / Downgrade:     - High oxygen needs,.    Problem: Cardiac Catheterization (Diagnostic/Interventional)  Goal: Absence of Bleeding  1/4/2025 2334 by Hugo Nuñez RN  Outcome: Progressing  Goal: Stable Heart Rate and Rhythm  1/4/2025 2334 by Hugo Nuñez RN  Outcome: Progressing  Goal: Absence of Embolism Signs and Symptoms  Outcome: Progressing  Goal: Anesthesia/Sedation Recovery  Intervention: Optimize Anesthesia Recovery  Goal: Optimal Pain Control and Function  1/4/2025 2334 by Hugo Nuñez RN  Outcome: Progressing  Goal: Absence of Vascular Access Complication  Intervention: Prevent and Manage Access Complications    Problem: Gas Exchange Impaired  Goal: Optimal Gas Exchange  Outcome: Progressing  Intervention: Optimize Oxygenation and Ventilation

## 2025-01-05 NOTE — PROGRESS NOTES
[facility] HEART [facility]   [address], [place] [address]   [email or web address]   OFFICE: [phone]     CARDIOLOGY INPATIENT PROGRESS NOTE     Impression and Plan     Assessment/Plan:  Inferior STEMI post PCI of dominant pLCx and large OM.  Residual disease of the ostial to distal LAD and large D1.   Influenza A  Metabolic syndrome with hypertension, hyperlipidemia, and type 2 diabetes  Recent fall with right-sided rib fractures  Chronic obstructive lung disease and asthma  Dysphagia, unclear etiology, undergoing outpatient workup     Plan:  DAPT, continue high intensity statin, increase to 80mg to target LDL <70, continue home beta-blocker and antihypertensives  Recommend heart team discussion regarding management of residual coronary artery disease - medical therapy vs percutaneous revascularization (will require multiple long stents extending into the left main given diffuse LAD disease) versus surgical revascularization.   Given normal LVEF and resolving symptoms as well as active influenza A, can plan for outpt PCI/surgery if this is decided.  Medical management may be reasonable as well.     Will continue to follow    Subjective     Raul Wilhelm is feeling so so.  Residual chest pain which is improving.        Review of Systems:  Further review of systems is otherwise negative/noncontributory (based on review of medical record (admission H&P) and 13 point review of systems reviewed. Pertinent positives noted).    Cardiac Diagnostics     ECG: Personally reviewed and interpreted: 1/5/2025  NSR, PAC  Inferior infarct    Telemetry (personally reviewed): NSR    Most recent:  Echocardiogram (results reviewed): 1/5/2025  Interpretation Summary     1. The left ventricle is normal in size. Left ventricular function is  normal.The ejection fraction is 55-60%. There is mild concentric left  ventricular hypertrophy. No regional wall motion abnormalities noted.  2. Normal  right ventricle size and systolic function.  3. IVC diameter <2.1 cm collapsing >50% with sniff suggests a normal RA  pressure of 3 mmHg.  4.  No hemodynamically significant valvular abnormalities on 2D or color flow  imaging.    Cardiac Cath (results reviewed): 1/4/2025  CONCLUSIONS:   Severe multivessel coronary artery disease involving the ostial to proximal LAD, the mid LAD, large diagonal branch, proximal left circumflex (culprit for current presentation), and large OM branch.  Moderate nonobstructive disease elsewhere.  Inferior ST elevation MI post successful percutaneous coronary artery intervention of the dominant proximal left circumflex with a 4.0 x 18 mm Resolute Jaya Gary drug-eluting stent.  Successful percutaneous coronary artery intervention of the large OM branch with a 3.0 x 38 mm Resolute [place] Gary drug-eluting stent that was postdilated with a 3.5 mm NC balloon.        RECOMMENDATIONS:   Usual postprocedure cares, please see orders.  DAPT for minimum of 1 year, favor indefinitely if without increased risk of bleeding given multiple overlapping stents.  Recommend heart team discussion regarding management of residual coronary artery disease (will discuss at our next Thursday multidisciplinary conference).  Medical therapy versus percutaneous revascularization (will require multiple long stents extending into the left main given diffuse LAD disease) versus surgical revascularization.  Can be done as outpatient unless patient remains symptomatic.  Aggressive risk factor modification for secondary prevention.    NM stress 12/21/2022    The nuclear stress test is abnormal.    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 No significant reversible ischemia.    Left ventricular function is normal.    The left ventricular ejection fraction at rest is 64%.  The left ventricular ejection fraction at stress is  67%.    LV cavity size small.    Stress to rest cavity ratio is 0.97.  TID is absent.    A prior study was conducted on 2/18/2019.  This study has no significant change when compared with the prior study.    Medical History  Surgical History Family History Social History   Past Medical History:   Diagnosis Date    Chronic airway obstruction, not elsewhere classified     Other and unspecified hyperlipidemia      Past Surgical History:   Procedure Laterality Date    ARTHROPLASTY KNEE Left 5/28/2020    Procedure: Left Total Knee Arthroplasty;  Surgeon: Sanjay Downey MD;  Location: [facility] OR    ARTHROPLASTY KNEE Right 11/2/2020    Procedure: ARTHROPLASTY, KNEE, TOTAL;  Surgeon: Sanjay Downey MD;  Location: [facility] OR    ENT SURGERY  as a child    tonsillectomy     Family History   Problem Relation Age of Onset    Diabetes Mother     Heart Disease Mother     C.A.D. Mother     Cancer Father     Hypertension Father     Cerebrovascular Disease Paternal Grandfather     Asthma Sister     Breast Cancer No family hx of     Cancer - colorectal No family hx of     Prostate Cancer No family hx of            Social History     Socioeconomic History    Marital status:      Spouse name: Not on file    Number of children: Not on file    Years of education: Not on file    Highest education level: Not on file   Occupational History    Not on file   Tobacco Use    Smoking status: Never    Smokeless tobacco: Never   Vaping Use    Vaping status: Never Used   Substance and Sexual Activity    Alcohol use: Yes     Comment: Rare    Drug use: No    Sexual activity: Yes     Partners: Female   Other Topics Concern     Service Not Asked    Blood Transfusions Not Asked    Caffeine Concern Not Asked    Occupational Exposure Yes     Comment: worked for years as  exposed to carbon dust and other chemicals    Hobby Hazards Yes     Comment: Raises pigeons    Sleep Concern Not Asked    Stress Concern Not Asked    Weight  Concern Not Asked    Special Diet Not Asked    Back Care Not Asked    Exercise Not Asked    Bike Helmet Not Asked    Seat Belt Not Asked    Self-Exams Not Asked    Parent/sibling w/ CABG, MI or angioplasty before 65F 55M? No   Social History Narrative    Not on file     Social Drivers of Health     Financial Resource Strain: Low Risk  (1/5/2025)    Financial Resource Strain     Within the past 12 months, have you or your family members you live with been unable to get utilities (heat, electricity) when it was really needed?: No   Food Insecurity: Low Risk  (1/5/2025)    Food Insecurity     Within the past 12 months, did you worry that your food would run out before you got money to buy more?: No     Within the past 12 months, did the food you bought just not last and you didn t have money to get more?: No   Transportation Needs: Low Risk  (1/5/2025)    Transportation Needs     Within the past 12 months, has lack of transportation kept you from medical appointments, getting your medicines, non-medical meetings or appointments, work, or from getting things that you need?: No   Physical Activity: Not on file   Stress: Not on file   Social Connections: Not on file   Interpersonal Safety: Low Risk  (1/4/2025)    Interpersonal Safety     Do you feel physically and emotionally safe where you currently live?: Yes     Within the past 12 months, have you been hit, slapped, kicked or otherwise physically hurt by someone?: No     Within the past 12 months, have you been humiliated or emotionally abused in other ways by your partner or ex-partner?: No   Housing Stability: High Risk (1/5/2025)    Housing Stability     Do you have housing? : Yes     Are you worried about losing your housing?: Yes             Physical Examination   VITALS: /65   Pulse 79   Temp 99.2  F (37.3  C) (Oral)   Resp (!) 35   Wt 69.1 kg (152 lb 5.4 oz)   SpO2 93%   BMI 23.16 kg/m    BMI: Body mass index is 23.16 kg/m .  Wt Readings from Last 3  "Encounters:   01/05/25 69.1 kg (152 lb 5.4 oz)   12/31/24 73.3 kg (161 lb 9.6 oz)   12/13/24 74.4 kg (164 lb)       Intake/Output Summary (Last 24 hours) at 1/5/2025 1124  Last data filed at 1/5/2025 1057  Gross per 24 hour   Intake 450 ml   Output 1045 ml   Net -595 ml       General: pleasant male. No acute distress.   HENT: external ears normal. Nares patent. Mucous membranes moist.  Neck: No JVD  Lungs: clear to auscultation  COR:  regular rate and rhythm, No murmurs, rubs, or gallops  Abd: nondistended, BS present  Extrem: No edema          Lab Results Reviewed    Chemistry/lipid CBC Cardiac Enzymes/BNP/TSH/INR   Recent Labs   Lab Test 01/04/25  1709   CHOL 148   HDL 64   LDL 72   TRIG 60     Recent Labs   Lab Test 01/04/25  1709 11/13/24  0846 10/13/23  1111   LDL 72 59 95     Recent Labs   Lab Test 01/05/25  0805 01/05/25  0425   NA  --  135   POTASSIUM  --  3.8   CHLORIDE  --  99   CO2  --  26   * 203*   BUN  --  16.9   CR  --  0.84   GFRESTIMATED  --  89   JOSE RAFAEL  --  9.3     Recent Labs   Lab Test 01/05/25  0425 01/04/25  1435 12/15/24  0526   CR 0.84 0.96 1.05     Recent Labs   Lab Test 01/04/25  1709 11/13/24  0846 05/02/24  0651   A1C 8.1* 8.7* 9.3*          Recent Labs   Lab Test 01/05/25  0425   WBC 11.4*   HGB 13.6   HCT 39.8*   MCV 90        Recent Labs   Lab Test 01/05/25  0425 01/04/25  1435 12/15/24  0526   HGB 13.6 15.7 14.5    No results for input(s): \"TROPONINI\" in the last 65942 hours.  Recent Labs   Lab Test 01/04/25  1954   NTBNPI 562     No results for input(s): \"TSH\" in the last 59862 hours.  Recent Labs   Lab Test 01/04/25  1435   INR 1.02           Current Inpatient Scheduled Medications   Scheduled Meds:  Current Facility-Administered Medications   Medication Dose Route Frequency Provider Last Rate Last Admin    acetaminophen (TYLENOL) tablet 650 mg  650 mg Oral TID Jacobo Montgomery MD   650 mg at 01/05/25 0816    acetylcysteine (MUCOMYST) 10 % nebulizer solution 4 mL  4 mL " Nebulization Q4H Yan Paige MD   4 mL at 01/05/25 0736    aspirin EC tablet 81 mg  81 mg Oral Daily Jacobo Montgomery MD   81 mg at 01/05/25 0817    atorvastatin (LIPITOR) tablet 80 mg  80 mg Oral At Bedtime Phan Xie DO        carvedilol (COREG) tablet 6.25 mg  6.25 mg Oral BID Jacobo Montgomery MD   6.25 mg at 01/05/25 0817    cefTRIAXone (ROCEPHIN) 2 g vial to attach to  ml bag for ADULTS or NS 50 ml bag for PEDS  2 g Intravenous Q24H Yamini Vyas MD   2 g at 01/05/25 0948    doxycycline monohydrate (MONODOX) capsule 100 mg  100 mg Oral Q12H [facility] (08/20) Yamini Vyas MD        [Held by provider] fluticasone-vilanterol (BREO ELLIPTA) 100-25 MCG/ACT inhaler 1 puff  1 puff Inhalation Daily Jacobo Montgomery MD        [Held by provider] glipiZIDE (GLUCOTROL XL) 24 hr tablet 10 mg  10 mg Oral Daily Yan Paige MD   10 mg at 01/04/25 2103    insulin aspart (NovoLOG) injection (RAPID ACTING)  1-7 Units Subcutaneous TID AC Yan Paige MD   1 Units at 01/05/25 0818    insulin aspart (NovoLOG) injection (RAPID ACTING)  1-5 Units Subcutaneous At Bedtime Yan Paige MD   1 Units at 01/04/25 2210    insulin glargine (LANTUS PEN) injection 20 Units  20 Units Subcutaneous At Bedtime Jacobo Montgomery MD   20 Units at 01/04/25 2211    ipratropium - albuterol 0.5 mg/2.5 mg/3 mL (DUONEB) neb solution 3 mL  3 mL Nebulization Q4H Yan Paige MD   3 mL at 01/05/25 0736    Lidocaine (LIDOCARE) 4 % Patch 1 patch  1 patch Transdermal Q24h Yan Paige MD   1 patch at 01/04/25 2103    losartan-hydrochlorothiazide (HYZAAR) 50-12.5 MG per tablet 1 tablet  1 tablet Oral Daily Jacobo Montgomery MD   1 tablet at 01/05/25 0817    montelukast (SINGULAIR) tablet 10 mg  10 mg Oral At Bedtime Jacobo Montgomery MD   10 mg at 01/04/25 2103    oseltamivir (TAMIFLU) capsule 75 mg  75 mg Oral BID Yan Paige MD   75 mg at 01/05/25 0817    pantoprazole (PROTONIX) EC tablet 40 mg  40 mg Oral [facility]  Jacobo Montgomery MD   40 mg at 01/05/25 0640    predniSONE (DELTASONE) tablet 40 mg  40 mg Oral Daily Yan Paige MD   40 mg at 01/05/25 0816    ticagrelor (BRILINTA) tablet 90 mg  90 mg Oral Q12H Jacobo Montgomery MD   90 mg at 01/05/25 0817    triamcinolone (KENALOG) 0.1 % cream   Topical BID Jacobo Montgomery MD   Given at 01/05/25 0818     Continuous Infusions:  Current Facility-Administered Medications   Medication Dose Route Frequency Provider Last Rate Last Admin    Continuing beta blocker from home medication list OR beta blocker order already placed during this visit   Does not apply DOES NOT GO TO Jacobo Ewing MD        Continuing statin from home medication list OR statin order already placed during this visit   Does not apply DOES NOT GO TO Jacobo Ewing MD        Percutaneous Coronary Intervention orders placed (this is information for BPA alerting)   Does not apply DOES NOT GO TO Jacobo Ewing MD           Current Outpatient Medications   Medication Sig Dispense Refill    aspirin (ASA) 81 MG chewable tablet Take 1 tablet (81 mg) by mouth daily. Starting tomorrow. 30 tablet 3    ticagrelor (BRILINTA) 90 MG tablet Take 1 tablet (90 mg) by mouth 2 times daily. Dose to start tomorrow morning. 180 tablet 3          Medications Prior to Admission   Prior to Admission medications    Medication Sig Start Date End Date Taking? Authorizing Provider   acetaminophen (TYLENOL) 325 MG tablet Take 2 tablets (650 mg) by mouth every 8 hours. 12/15/24  Yes Issac Golden MD   albuterol (PROAIR HFA/PROVENTIL HFA/VENTOLIN HFA) 108 (90 Base) MCG/ACT inhaler Inhale 1-2 puffs into the lungs every 4 hours as needed for shortness of breath or wheezing. 12/31/24  Yes Sarita Hennessy, DO   albuterol (PROVENTIL) (2.5 MG/3ML) 0.083% neb solution TAKE 1 VIAL (2.5 MG) BY NEBULIZATION EVERY 6 HOURS AS NEEDED FOR SHORTNESS OF BREATH OR WHEEZING 11/8/24  Yes Sarita Hennessy DO   aspirin  (ASA) 81 MG chewable tablet Take 1 tablet (81 mg) by mouth daily. Starting tomorrow. 1/4/25  Yes Jacobo Montgomery MD   aspirin (ASA) 81 MG EC tablet Take 1 tablet (81 mg) by mouth daily 10/26/21  Yes Barbara Bermudez PA-C   atorvastatin (LIPITOR) 40 MG tablet Take 1 tablet (40 mg) by mouth daily 1/16/24  Yes Sarita Hennessy DO   budesonide-formoterol (SYMBICORT) 80-4.5 MCG/ACT Inhaler Inhale 2 puffs into the lungs 2 times daily. 12/31/24  Yes Sarita Hennessy DO   carvedilol (COREG) 6.25 MG tablet Take 1 tablet (6.25 mg) by mouth 2 times daily. 12/31/24  Yes Sarita Hennessy DO   glipiZIDE (GLUCOTROL XL) 10 MG 24 hr tablet TAKE 1 TABLET (10 MG) BY MOUTH DAILY 12/20/24  Yes Sarita Hennessy DO   insulin glargine (LANTUS PEN) 100 UNIT/ML pen Inject 20 Units subcutaneously at bedtime. 11/13/24  Yes Richard Sawant MD   losartan-hydrochlorothiazide (HYZAAR) 50-12.5 MG tablet Take 1 tablet by mouth daily. 12/31/24  Yes Sarita Hennessy DO   montelukast (SINGULAIR) 10 MG tablet TAKE ONE TABLET BY MOUTH AT BEDTIME 12/31/24  Yes Sarita Hennessy DO   omeprazole (PRILOSEC) 20 MG DR capsule Take 1 capsule (20 mg) by mouth daily. 12/31/24  Yes Sarita Hennessy DO   predniSONE (DELTASONE) 20 MG tablet Take 1 tablet (20 mg) by mouth daily. 12/30/24  Yes Rich Shetty MD   ticagrelor (BRILINTA) 90 MG tablet Take 1 tablet (90 mg) by mouth 2 times daily. Dose to start tomorrow morning. 1/5/25  Yes Jacobo Montgomery MD   triamcinolone (KENALOG) 0.1 % external cream APPLY TOPICALLY 2 TIMES DAILY TO LEFT ANKLE  Patient taking differently: Apply topically 2 times daily. 12/9/24  Yes Sarita Hennessy,    vitamin E (TOCOPHEROL) 400 units (180 mg) capsule Take 400 Units by mouth daily.   Yes Reported, Patient   Alcohol Swabs (B-D SINGLE USE SWABS REGULAR) PADS USE TO SWAB AREA OF INJECTION / JENISE AS DIRECTED 6/8/23   Sarita Hennessy DO   blood glucose monitoring  (NO BRAND SPECIFIED) meter device kit Use to test blood sugar 1 times daily or as directed.  Blood Glucose Monitor Brands: per insurance. 5/19/20   Sarita Hennessy,    insulin pen needle (BD LIZBETH U/F) 32G X 4 MM miscellaneous USE 1 PEN NEEDLE DAILY 10/2/24   Sarita Hennessy DO   Lancets (ONETOUCH DELICA PLUS [id number]) MISC USE AS DIRECTED WITH LANCING DEVICE TO TEST ONCE DAILY 8/21/21   Sarita Hennessy,    ONETOUCH ULTRA test strip USE TO TEST BLOOD SUGAR 1 TIME DAILY OR AS DIRECTED 5/4/22   Sarita Hennessy DO   order for DME Equipment being ordered: arm blood pressure cuff 11/19/15   Connor Anderson MD Timothy Shih,  [facility]  Non-invasive Cardiologist  [facility]

## 2025-01-05 NOTE — PROGRESS NOTES
"Speech-Language Pathology: Clinical Swallow Evaluation     01/05/25 1300   Appointment Info   Signing Clinician's Name / Credentials (SLP) Sravanthi Hickman MA, CCC-SLP   General Information   Onset of Illness/Injury or Date of Surgery 01/04/25   Referring Physician Jacobo Montgomery MD   Pertinent History of Current Problem Per ordering provider \"Raul Wilhelm is a 79 year old male who transferred from [facility] today for acute onset chest pain radiating to the back.  Does have a history of CAD in the past.  Status post previous stent placement.  EKG at that time showed inferior STEMI, he was transferred to this facility for evaluation.  He is postprocedure, emergency angiography, please see epic for full report.  Currently chest pain-free, minimal SOB at bedside, complains of productive cough few days duration.  No fever or chills, no PND orthopnea.     Vital signs noted stable in the ED, mildly hypoxic and currently on 2 L of oxygen, patient is not in respiratory status at bedside\".   General Observations Alert and cooperative   Type of Evaluation   Type of Evaluation Swallow Evaluation   Oral Motor   Oral Musculature generally intact   Mucosal Quality adequate   Dentition (Oral Motor)   Dentition (Oral Motor) adequate dentition   Facial Symmetry (Oral Motor)   Facial Symmetry (Oral Motor) WNL   Lip Function (Oral Motor)   Lip Range of Motion (Oral Motor) WNL   Lip Strength (Oral Motor) WNL   Tongue Function (Oral Motor)   Tongue Strength (Oral Motor) WNL   Tongue Coordination/Speed (Oral Motor) WNL   Tongue ROM (Oral Motor) WNL   Jaw Function (Oral Motor)   Jaw Function (Oral Motor) WNL   Cough/Swallow/Gag Reflex (Oral Motor)   Volitional Throat Clear/Cough (Oral Motor) WNL   Volitional Swallow (Oral Motor) WNL   Vocal Quality/Secretion Management (Oral Motor)   Vocal Quality (Oral Motor) WNL   Secretion Management (Oral Motor) WNL   General Swallowing Observations   Past History of Dysphagia Pt had CSE " on 12/23/24 and presented with no s/s aspiration. At that time he did report difficulty swallowing medication and occasional choking episodes.  A VFSS and Esophagram was recommended and is scheduled for 1/24/25. Pt reports that he has had baseline coughing and phlegm since he had hepatitis as a child.   Comment, General Swallowing Observations RN reports no overt s/s aspiration and stated that he has been handeling eating well despite high O2 needs.   Respiratory Support high-flow  (50L 65%)   Current Diet/Method of Nutritional Intake (General Swallowing Observations, NIS) regular diet;thin liquids (level 0)   Swallowing Evaluation Clinical swallow evaluation   Clinical Swallow Evaluation   Clinical Swallow Evaluation Textures Trialed thin liquids;solid foods   Clinical Swallow Eval: Thin Liquid Texture Trial   Mode of Presentation, Thin Liquids cup;straw;self-fed   Volume of Liquid or Food Presented 4oz   Oral Phase of Swallow WFL   Pharyngeal Phase of Swallow intact   Diagnostic Statement NO overt s/s aspiration   Clinical Swallow Evaluation: Solid Food Texture Trial   Mode of Presentation self-fed   Volume Presented 1 cracker   Oral Phase WFL   Pharyngeal Phase intact   Diagnostic Statement No overt s/s aspiration   Esophageal Phase of Swallow   Esophageal comments Esophagram recommended following OP CSE on 12/23/24   Swallowing Recommendations   Diet Consistency Recommendations regular diet;thin liquids (level 0)   Mode of Delivery Recommendations bolus size, small;slow rate of intake   Monitoring/Assistance Required (Eating/Swallowing) monitor for cough or change in vocal quality with intake;stop eating activities when fatigue is present   Recommended Feeding/Eating Techniques (Swallow Eval) maintain upright sitting position for eating   Medication Administration Recommendations, Swallowing (SLP) One at a time   Instrumental Assessment Recommendations VFSS (videofluoroscopic swallowing study)  (when O2 needs  are reduced)   General Therapy Interventions   Planned Therapy Interventions Dysphagia Treatment   Dysphagia treatment Modified diet education;Instruction of safe swallow strategies;Compensatory strategies for swallowing   Clinical Impression   Criteria for Skilled Therapeutic Interventions Met (SLP Eval) Yes, treatment indicated   Risks & Benefits of therapy have been explained evaluation/treatment results reviewed;care plan/treatment goals reviewed;participants included;patient;participants voiced agreement with care plan   SLP Total Evaluation Time   Eval: oral/pharyngeal swallow function, clinical swallow Minutes (22818) 20   SLP Goals   Therapy Frequency (SLP Eval) 5 times/week   SLP Discharge Planning   SLP Brief overview of current status  Clinical Swallow Evaluation completed. Patient had no s/s aspiration with any intake. Oral motor function was WFL. Mastication was WFL. Hyolaryngeal elevation appears present upon visualization and palpation. Recommend diet of regular and thin with strategies of small bites/sips, slow rate, upright posture. SLP to follow for ongoing dysphagia management and possible VFSS.   SLP Time and Intention   Total Session Time (sum of timed and untimed services) 20

## 2025-01-05 NOTE — PLAN OF CARE
"Goal Outcome Evaluation:      Plan of Care Reviewed With: patient    Overall Patient Progress: improvingOverall Patient Progress: improving    Outcome Evaluation: Denies chest pain. Remained tachypneic with RR mid 30's. \" Breathing better \" . Able to stood up at the edge of the bed to urinate. Saturation >92% on HFNC 65%/50 Liter Flow rate. Had tan/yeimy thick oral secretions. NSR 80's at rest, 110's with activity.      Problem: Gas Exchange Impaired  Goal: Optimal Gas Exchange  Outcome: Progressing  Intervention: Optimize Oxygenation and Ventilation  Recent Flowsheet Documentation  Taken 1/5/2025 0400 by Polly Frias, RN  Head of Bed (HOB) Positioning: HOB at 45 degrees  Taken 1/5/2025 0000 by Polly Frias, RN  Head of Bed (HOB) Positioning: HOB at 45 degrees     Problem: Cardiac Catheterization (Diagnostic/Interventional)  Goal: Stable Heart Rate and Rhythm  Outcome: Progressing     Problem: Cardiac Catheterization (Diagnostic/Interventional)  Goal: Optimal Pain Control and Function  Outcome: Progressing     "

## 2025-01-05 NOTE — PROGRESS NOTES
[facility]    Medicine Progress Note - Hospitalist Service    Date of Admission:  1/4/2025    Assessment & Plan   Raul Wilhelm a 79-year-old male with history of CAD status post PCI, type 2 diabetes on insulin, COPD (never smoked but had occupational exposure) on intermittent oral prednisone who was admitted on 1/4 for inferior STEMI status post coronary angiogram with stenting.  He was also found to have influenza A with acute respiratory failure on high flow nasal cannula.  Medicine service was consulted to manage noncardiac medical conditions.      # STEMI s/p PCI 1/4/25  Managed by cardiology.  Currently on aspirin and Brilinta.  Also 80mg atorvastatin and Coreg 6.25 mg twice daily.     # Acute respiratory  failure  # Influenza A infection  # Possible bacterial infection (positive sputum culture)  # COPD exacerbation (not on home O2)  -Start Tamiflu to complete 5-day course (started on 1/4)  -Start ceftriaxone 2 g daily and doxycycline for at least 5 days for empiric treatment of bacterial pneumonia  -Prednisone 40 mg daily for 5 days  -Continue high flow nasal cannula  -DuoNebs every 4 hours with RT    # Type 2 diabetes   -Continue home Lantus 20 units daily  -Sliding scale insulin.   -Hold PTA glipizide    # Recent fall with right-sided rib fractures 12/13  CT showed right-sided rib fractures 5-7. Cleared by general surgery at that time  - Scheduled Tylenol, add lidocaine patch    # GERD  - Continue daily PPI        Diet: Low Saturated Fat Na <2400 mg    DVT Prophylaxis: Pneumatic Compression Devices  Diaz Catheter: Not present  Lines: None     Cardiac Monitoring: ACTIVE order. Indication: Post- PCI/Angiogram (24 hours)  Code Status: Full Code      Clinically Significant Risk Factors Present on Admission                 # Drug Induced Platelet Defect: home medication list includes an antiplatelet medication   # Hypertension: Noted on problem list          # DMII: A1C = 8.1 % (Ref  range: <5.7 %) within past 6 months       # Asthma: noted on problem list        Social Drivers of Health    Housing Stability: High Risk (1/5/2025)    Housing Stability     Do you have housing? : Yes     Are you worried about losing your housing?: Yes          Disposition Plan     Medically Ready for Discharge: Anticipated in 2-4 Days         Yamini Vyas MD  Hospitalist Service  [facility]  Securely message with [employer] (more info)  Text page via [employer] Paging/Directory   ______________________________________________________________________    Interval History   He reports doing ok but has slight pain on the chest which he thinks could be due to recent rib fractures.     Physical Exam   Vital Signs: Temp: 99.2  F (37.3  C) Temp src: Oral BP: 107/65 Pulse: 80   Resp: (!) 32 SpO2: 91 % O2 Device: High Flow Nasal Cannula (HFNC) Oxygen Delivery: 50 LPM  Weight: 152 lbs 5.41 oz    GEN: NAD, A&Ox3, appropriate  HEENT: EOMI, PERRLA, MMM, hearing grossly intact  Heart: RRR,   Lungs: Slightly dyspneic, on high flow nasal cannula, coarse lung sound  Abdominal: soft, nontender, nondistended, no HSM, no rebound, no guarding, normoactive BS  Skin: no jaundice, no gross lesions on visible skin  Neuro: A&Ox3, grossly intact motor/sensation, gait intact  MSK: no gross deformity, moves arms/legs equally  Psych: normal affect, congruent with mood     Medical Decision Making       50 MINUTES SPENT BY ME on the date of service doing chart review, history, exam, documentation & further activities per the note.      Data

## 2025-01-05 NOTE — TREATMENT PLAN
RCAT Treatment Plan    Patient Score: 13  Patient Acuity: 3    Clinical Indication for Therapy: history of bronchospasm, productive cough, and history of mucous producing disease, Influenza, COPD    Therapy Ordered: Duoneb and 10% Mucomyst Q4. Flutter QID.     Assessment Summary: Patient having increased WOB placed on 50L 50% spo2 93%.Patient coughing up thick secretions. States hard to get them up . Started new Therapy above. RT will continue to follow.     Aminah Jalloh, RT  1/4/2025

## 2025-01-06 ENCOUNTER — APPOINTMENT (OUTPATIENT)
Dept: SPEECH THERAPY | Facility: HOSPITAL | Age: 80
DRG: 321 | End: 2025-01-06
Attending: INTERNAL MEDICINE
Payer: COMMERCIAL

## 2025-01-06 ENCOUNTER — APPOINTMENT (OUTPATIENT)
Dept: OCCUPATIONAL THERAPY | Facility: HOSPITAL | Age: 80
DRG: 321 | End: 2025-01-06
Attending: INTERNAL MEDICINE
Payer: COMMERCIAL

## 2025-01-06 LAB
ANION GAP SERPL CALCULATED.3IONS-SCNC: 10 MMOL/L (ref 7–15)
BACTERIA SPT CULT: ABNORMAL
BASOPHILS # BLD AUTO: 0 10E3/UL (ref 0–0.2)
BASOPHILS NFR BLD AUTO: 0 %
BUN SERPL-MCNC: 24.5 MG/DL (ref 8–23)
CALCIUM SERPL-MCNC: 9.6 MG/DL (ref 8.8–10.4)
CHLORIDE SERPL-SCNC: 102 MMOL/L (ref 98–107)
CREAT SERPL-MCNC: 0.93 MG/DL (ref 0.67–1.17)
EGFRCR SERPLBLD CKD-EPI 2021: 84 ML/MIN/1.73M2
EOSINOPHIL # BLD AUTO: 0 10E3/UL (ref 0–0.7)
EOSINOPHIL NFR BLD AUTO: 0 %
ERYTHROCYTE [DISTWIDTH] IN BLOOD BY AUTOMATED COUNT: 12.3 % (ref 10–15)
GLUCOSE BLDC GLUCOMTR-MCNC: 186 MG/DL (ref 70–99)
GLUCOSE BLDC GLUCOMTR-MCNC: 237 MG/DL (ref 70–99)
GLUCOSE BLDC GLUCOMTR-MCNC: 261 MG/DL (ref 70–99)
GLUCOSE BLDC GLUCOMTR-MCNC: 285 MG/DL (ref 70–99)
GLUCOSE BLDC GLUCOMTR-MCNC: 330 MG/DL (ref 70–99)
GLUCOSE SERPL-MCNC: 198 MG/DL (ref 70–99)
GRAM STAIN RESULT: ABNORMAL
HCO3 SERPL-SCNC: 27 MMOL/L (ref 22–29)
HCT VFR BLD AUTO: 42.3 % (ref 40–53)
HGB BLD-MCNC: 14 G/DL (ref 13.3–17.7)
IMM GRANULOCYTES # BLD: 0.1 10E3/UL
IMM GRANULOCYTES NFR BLD: 1 %
LYMPHOCYTES # BLD AUTO: 0.8 10E3/UL (ref 0.8–5.3)
LYMPHOCYTES NFR BLD AUTO: 8 %
MCH RBC QN AUTO: 30.4 PG (ref 26.5–33)
MCHC RBC AUTO-ENTMCNC: 33.1 G/DL (ref 31.5–36.5)
MCV RBC AUTO: 92 FL (ref 78–100)
MONOCYTES # BLD AUTO: 0.8 10E3/UL (ref 0–1.3)
MONOCYTES NFR BLD AUTO: 8 %
NEUTROPHILS # BLD AUTO: 8.2 10E3/UL (ref 1.6–8.3)
NEUTROPHILS NFR BLD AUTO: 83 %
NRBC # BLD AUTO: 0 10E3/UL
NRBC BLD AUTO-RTO: 0 /100
PLATELET # BLD AUTO: 205 10E3/UL (ref 150–450)
POTASSIUM SERPL-SCNC: 4 MMOL/L (ref 3.4–5.3)
RBC # BLD AUTO: 4.61 10E6/UL (ref 4.4–5.9)
SODIUM SERPL-SCNC: 139 MMOL/L (ref 135–145)
WBC # BLD AUTO: 9.9 10E3/UL (ref 4–11)

## 2025-01-06 PROCEDURE — 97535 SELF CARE MNGMENT TRAINING: CPT | Mod: GO

## 2025-01-06 PROCEDURE — 99232 SBSQ HOSP IP/OBS MODERATE 35: CPT | Performed by: STUDENT IN AN ORGANIZED HEALTH CARE EDUCATION/TRAINING PROGRAM

## 2025-01-06 PROCEDURE — 120N000001 HC R&B MED SURG/OB

## 2025-01-06 PROCEDURE — 94799 UNLISTED PULMONARY SVC/PX: CPT

## 2025-01-06 PROCEDURE — 97166 OT EVAL MOD COMPLEX 45 MIN: CPT | Mod: GO

## 2025-01-06 PROCEDURE — 85014 HEMATOCRIT: CPT | Performed by: INTERNAL MEDICINE

## 2025-01-06 PROCEDURE — 99233 SBSQ HOSP IP/OBS HIGH 50: CPT | Performed by: GENERAL ACUTE CARE HOSPITAL

## 2025-01-06 PROCEDURE — 999N000157 HC STATISTIC RCP TIME EA 10 MIN

## 2025-01-06 PROCEDURE — 999N000215 HC STATISTIC HFNC ADULT NON-CPAP

## 2025-01-06 PROCEDURE — 84132 ASSAY OF SERUM POTASSIUM: CPT | Performed by: INTERNAL MEDICINE

## 2025-01-06 PROCEDURE — 250N000009 HC RX 250: Performed by: INTERNAL MEDICINE

## 2025-01-06 PROCEDURE — 94640 AIRWAY INHALATION TREATMENT: CPT | Mod: 76

## 2025-01-06 PROCEDURE — 250N000013 HC RX MED GY IP 250 OP 250 PS 637: Performed by: INTERNAL MEDICINE

## 2025-01-06 PROCEDURE — 85004 AUTOMATED DIFF WBC COUNT: CPT | Performed by: INTERNAL MEDICINE

## 2025-01-06 PROCEDURE — 36415 COLL VENOUS BLD VENIPUNCTURE: CPT | Performed by: INTERNAL MEDICINE

## 2025-01-06 PROCEDURE — 80048 BASIC METABOLIC PNL TOTAL CA: CPT | Performed by: INTERNAL MEDICINE

## 2025-01-06 PROCEDURE — 250N000013 HC RX MED GY IP 250 OP 250 PS 637: Performed by: STUDENT IN AN ORGANIZED HEALTH CARE EDUCATION/TRAINING PROGRAM

## 2025-01-06 PROCEDURE — 92526 ORAL FUNCTION THERAPY: CPT | Mod: GN | Performed by: REHABILITATION PRACTITIONER

## 2025-01-06 PROCEDURE — 250N000011 HC RX IP 250 OP 636: Performed by: INTERNAL MEDICINE

## 2025-01-06 PROCEDURE — 250N000012 HC RX MED GY IP 250 OP 636 PS 637: Performed by: INTERNAL MEDICINE

## 2025-01-06 RX ORDER — ACETAMINOPHEN 650 MG/1
650 SUPPOSITORY RECTAL 3 TIMES DAILY
Status: DISCONTINUED | OUTPATIENT
Start: 2025-01-06 | End: 2025-01-11 | Stop reason: HOSPADM

## 2025-01-06 RX ORDER — ACETAMINOPHEN 325 MG/1
650 TABLET ORAL 3 TIMES DAILY
Status: DISCONTINUED | OUTPATIENT
Start: 2025-01-06 | End: 2025-01-11 | Stop reason: HOSPADM

## 2025-01-06 RX ORDER — CARBOXYMETHYLCELLULOSE SODIUM 5 MG/ML
1 SOLUTION/ DROPS OPHTHALMIC
Status: DISCONTINUED | OUTPATIENT
Start: 2025-01-06 | End: 2025-01-11 | Stop reason: HOSPADM

## 2025-01-06 RX ADMIN — LIDOCAINE 1 PATCH: 4 PATCH TOPICAL at 20:27

## 2025-01-06 RX ADMIN — ATORVASTATIN CALCIUM 80 MG: 40 TABLET, FILM COATED ORAL at 20:35

## 2025-01-06 RX ADMIN — CEFTRIAXONE SODIUM 2 G: 2 INJECTION, POWDER, FOR SOLUTION INTRAMUSCULAR; INTRAVENOUS at 09:01

## 2025-01-06 RX ADMIN — OSELTAMIVIR PHOSPHATE 75 MG: 75 CAPSULE ORAL at 08:40

## 2025-01-06 RX ADMIN — IPRATROPIUM BROMIDE AND ALBUTEROL SULFATE 3 ML: .5; 3 SOLUTION RESPIRATORY (INHALATION) at 17:32

## 2025-01-06 RX ADMIN — PREDNISONE 40 MG: 20 TABLET ORAL at 08:40

## 2025-01-06 RX ADMIN — IPRATROPIUM BROMIDE AND ALBUTEROL SULFATE 3 ML: .5; 3 SOLUTION RESPIRATORY (INHALATION) at 04:08

## 2025-01-06 RX ADMIN — CARVEDILOL 6.25 MG: 6.25 TABLET, FILM COATED ORAL at 20:36

## 2025-01-06 RX ADMIN — TRIAMCINOLONE ACETONIDE: 1 CREAM TOPICAL at 20:39

## 2025-01-06 RX ADMIN — ACETYLCYSTEINE 4 ML: 100 INHALANT RESPIRATORY (INHALATION) at 04:08

## 2025-01-06 RX ADMIN — CARVEDILOL 6.25 MG: 6.25 TABLET, FILM COATED ORAL at 08:40

## 2025-01-06 RX ADMIN — PANTOPRAZOLE SODIUM 40 MG: 40 TABLET, DELAYED RELEASE ORAL at 06:35

## 2025-01-06 RX ADMIN — INSULIN ASPART 2 UNITS: 100 INJECTION, SOLUTION INTRAVENOUS; SUBCUTANEOUS at 20:33

## 2025-01-06 RX ADMIN — OSELTAMIVIR PHOSPHATE 75 MG: 75 CAPSULE ORAL at 20:36

## 2025-01-06 RX ADMIN — TRIAMCINOLONE ACETONIDE: 1 CREAM TOPICAL at 08:39

## 2025-01-06 RX ADMIN — ACETYLCYSTEINE 4 ML: 100 INHALANT RESPIRATORY (INHALATION) at 12:58

## 2025-01-06 RX ADMIN — MONTELUKAST 10 MG: 10 TABLET, FILM COATED ORAL at 22:46

## 2025-01-06 RX ADMIN — DOXYCYCLINE 100 MG: 100 CAPSULE ORAL at 08:40

## 2025-01-06 RX ADMIN — IPRATROPIUM BROMIDE AND ALBUTEROL SULFATE 3 ML: .5; 3 SOLUTION RESPIRATORY (INHALATION) at 12:58

## 2025-01-06 RX ADMIN — ACETAMINOPHEN 650 MG: 325 TABLET ORAL at 08:40

## 2025-01-06 RX ADMIN — ACETYLCYSTEINE 4 ML: 100 INHALANT RESPIRATORY (INHALATION) at 17:32

## 2025-01-06 RX ADMIN — DOXYCYCLINE 100 MG: 100 CAPSULE ORAL at 20:36

## 2025-01-06 RX ADMIN — ASPIRIN 81 MG: 81 TABLET, COATED ORAL at 08:40

## 2025-01-06 RX ADMIN — IPRATROPIUM BROMIDE AND ALBUTEROL SULFATE 3 ML: .5; 3 SOLUTION RESPIRATORY (INHALATION) at 07:10

## 2025-01-06 RX ADMIN — ACETAMINOPHEN 650 MG: 325 TABLET ORAL at 20:36

## 2025-01-06 RX ADMIN — IPRATROPIUM BROMIDE AND ALBUTEROL SULFATE 3 ML: .5; 3 SOLUTION RESPIRATORY (INHALATION) at 19:51

## 2025-01-06 RX ADMIN — ACETYLCYSTEINE 4 ML: 100 INHALANT RESPIRATORY (INHALATION) at 07:10

## 2025-01-06 RX ADMIN — ACETYLCYSTEINE 4 ML: 100 INHALANT RESPIRATORY (INHALATION) at 19:51

## 2025-01-06 RX ADMIN — CARBOXYMETHYLCELLULOSE SODIUM 1 DROP: 5 SOLUTION/ DROPS OPHTHALMIC at 12:30

## 2025-01-06 RX ADMIN — LOSARTAN POTASSIUM AND HYDROCHLOROTHIAZIDE 1 TABLET: 50; 12.5 TABLET, FILM COATED ORAL at 08:40

## 2025-01-06 RX ADMIN — TICAGRELOR 90 MG: 90 TABLET ORAL at 08:40

## 2025-01-06 RX ADMIN — TICAGRELOR 90 MG: 90 TABLET ORAL at 20:38

## 2025-01-06 ASSESSMENT — ACTIVITIES OF DAILY LIVING (ADL)
DEPENDENT_IADLS:: INDEPENDENT
ADLS_ACUITY_SCORE: 49
ADLS_ACUITY_SCORE: 46
ADLS_ACUITY_SCORE: 49
ADLS_ACUITY_SCORE: 46
ADLS_ACUITY_SCORE: 46
ADLS_ACUITY_SCORE: 49
ADLS_ACUITY_SCORE: 49
ADLS_ACUITY_SCORE: 46
ADLS_ACUITY_SCORE: 49
ADLS_ACUITY_SCORE: 46
ADLS_ACUITY_SCORE: 49
ADLS_ACUITY_SCORE: 46
ADLS_ACUITY_SCORE: 49
ADLS_ACUITY_SCORE: 46
ADLS_ACUITY_SCORE: 46
ADLS_ACUITY_SCORE: 48

## 2025-01-06 NOTE — CONSULTS
Care Management Initial Consult    General Information  Assessment completed with: Patient,    Type of CM/SW Visit: Initial Assessment    Primary Care Provider verified and updated as needed: Yes   Readmission within the last 30 days: other (see comments)   Return Category: New Diagnosis     Advance Care Planning: Advance Care Planning Reviewed:  (no HCD)          Communication Assessment  Patient's communication style: spoken language (English or Bilingual)    Hearing Difficulty or Deaf: yes   Wear Glasses or Blind: yes    Cognitive  Cognitive/Neuro/Behavioral: WDL  Level of Consciousness: alert  Arousal Level: opens eyes spontaneously  Orientation: oriented x 4  Mood/Behavior: behavior appropriate to situation, cooperative, calm  Best Language: 0 - No aphasia  Speech: clear, spontaneous, logical    Living Environment:   People in home:       Current living Arrangements: house      Able to return to prior arrangements: yes       Family/Social Support:  Care provided by: self, child(petey) (granddtr)  Provides care for: no one  Marital Status:   Support system: Children (granddtr)          Description of Support System: Supportive, Involved         Current Resources:   Patient receiving home care services: No        Community Resources: None  Equipment currently used at home: none  Supplies currently used at home: None    Employment/Financial:  Employment Status:          Financial Concerns:             Does the patient's insurance plan have a 3 day qualifying hospital stay waiver?  Yes     Which insurance plan 3 day waiver is available? Alternative insurance waiver    Will the waiver be used for post-acute placement? Undetermined at this time    Lifestyle & Psychosocial Needs:  Social Drivers of Health     Food Insecurity: Low Risk  (1/5/2025)    Food Insecurity     Within the past 12 months, did you worry that your food would run out before you got money to buy more?: No     Within the past 12 months, did the  food you bought just not last and you didn t have money to get more?: No   Depression: Not at risk (1/16/2024)    PHQ-2     PHQ-2 Score: 0   Housing Stability: High Risk (1/5/2025)    Housing Stability     Do you have housing? : Yes     Are you worried about losing your housing?: Yes   Tobacco Use: Low Risk  (1/4/2025)    Patient History     Smoking Tobacco Use: Never     Smokeless Tobacco Use: Never     Passive Exposure: Not on file   Financial Resource Strain: Low Risk  (1/5/2025)    Financial Resource Strain     Within the past 12 months, have you or your family members you live with been unable to get utilities (heat, electricity) when it was really needed?: No   Alcohol Use: Not on file   Transportation Needs: Low Risk  (1/5/2025)    Transportation Needs     Within the past 12 months, has lack of transportation kept you from medical appointments, getting your medicines, non-medical meetings or appointments, work, or from getting things that you need?: No   Physical Activity: Not on file   Interpersonal Safety: Low Risk  (1/4/2025)    Interpersonal Safety     Do you feel physically and emotionally safe where you currently live?: Yes     Within the past 12 months, have you been hit, slapped, kicked or otherwise physically hurt by someone?: No     Within the past 12 months, have you been humiliated or emotionally abused in other ways by your partner or ex-partner?: No   Stress: Not on file   Social Connections: Not on file   Health Literacy: Not on file       Functional Status:  Prior to admission patient needed assistance:   Dependent ADLs:: Independent  Dependent IADLs:: Independent       Discussed  Partnership in Safe Discharge Planning  document with patient/family: No        Additional Information:     Assessment completed with patient. Patient reports recently his wife has been at [facility] for LTC.  He lives independently in his house with some support from family. He ambulates without devices and has no  services in community.  Daughters Nancy and April are primary family contacts. Family willing to transport.          Alice Dover RN

## 2025-01-06 NOTE — PROGRESS NOTES
01/06/25 1430   Appointment Info   Signing Clinician's Name / Credentials (OT) Samira Burrows MER OTR/L CLT   Living Environment   People in Home spouse   Current Living Arrangements house   Home Accessibility stairs to enter home;stairs within home   Number of Stairs, Main Entrance 3   Transportation Anticipated family or friend will provide   Living Environment Comments Currenlty they are working on getting spouse to a SNF, can stay on the main level   Self-Care   Equipment Currently Used at Home none   Activity/Exercise/Self-Care Comment I with ADLs/IADLs at baseline   General Information   Onset of Illness/Injury or Date of Surgery 01/04/25   Referring Physician    Additional Occupational Profile Info/Pertinent History of Current Problem Raul Wilhelm a 79-year-old male with history of CAD status post PCI, type 2 diabetes on insulin, COPD (never smoked but had occupational exposure) on intermittent oral prednisone who was admitted on 1/4 for inferior STEMI status post coronary angiogram with stenting.  He was also found to have influenza A with acute respiratory failure on high flow nasal cannula.  Medicine service was consulted to manage noncardiac medical conditions.   Existing Precautions/Restrictions cardiac   Cognitive Status Examination   Affect/Mental Status (Cognitive) WNL   Follows Commands WNL   Bed Mobility   Bed Mobility supine-sit   Supine-Sit [place] (Bed Mobility) supervision   Transfers   Transfers sit-stand transfer   Sit-Stand Transfer   Sit-Stand [place] (Transfers) contact guard   Balance   Balance Assessment sitting static balance   Sitting Balance: Static supervision   Activities of Daily Living   BADL Assessment/Intervention lower body dressing   Lower Body Dressing Assessment/Training   [place] Level (Lower Body Dressing) socks;supervision   Clinical Impression   Criteria for Skilled Therapeutic Interventions Met (OT) Yes, treatment indicated   OT  Diagnosis decreased activity tolerance   OT Problem List-Impairments impacting ADL problems related to;activity tolerance impaired;strength;post-surgical precautions   Assessment of Occupational Performance 1-3 Performance Deficits   Identified Performance Deficits trsf, activity tolerance, post Angio ed   Planned Therapy Interventions (OT) ADL retraining;home program guidelines;progressive activity/exercise   Clinical Decision Making Complexity (OT) problem focused assessment/low complexity   Risk & Benefits of therapy have been explained care plan/treatment goals reviewed   OT Total Evaluation Time   OT Eval, Moderate Complexity Minutes (76643) 8   OT Goals   Therapy Frequency (OT) Daily   OT Goals Cardiac Phase 1   OT: Understanding of cardiac education to maximize quality of life, condition management, and health outcomes Patient;Caregiver;Verbalize   OT: Perform aerobic activity with stable cardiovascular response 5 minutes;ambulation   OT: Functional/aerobic ambulation tolerance with stable cardiovascular response in order to return to home and community environment Supervision/SBA;200 feet   OT: Navigation of stairs simulating home set up with stable cardiovascular response in order to return to home and community environment Supervision/SBA;3 stairs   Interventions   Interventions Quick Adds Self-Care/Home Management   Self-Care/Home Management   Self-Care/Home Mgmt/ADL, Compensatory, Meal Prep Minutes (63537) 8   Treatment Detail/Skilled Intervention Patient currenlty on HF- limiting further activity. Additionally has rib fxs on R side from a few recently. Static standing with SBA/CGA for several minutes. Tolerated well with sats remaining above 90%. Rtn'd to sitting with SBA.  Additional STS from bed with CGA, Pivot trsf to commode with CGA. Tolerated well. After toileting rtn'd to bed with SBA/CGA. Rtn'd to supine with SBA. Education on post Angio precaution   OT Discharge Planning   OT Plan advance amb  as able (currently on HF),trsfs, stairs   OT Discharge Recommendation (DC Rec) Transitional Care Facility   OT Rationale for DC Rec Pending LOS and o2 needs. Currently on HF limiting his activity. May tolerate going home if resp status improves   OT Brief overview of current status CGA   Total Session Time   Timed Code Treatment Minutes 8   Total Session Time (sum of timed and untimed services) 16

## 2025-01-06 NOTE — PROGRESS NOTES
Pt remains on humidified HFNC. Titrated down to 35 LPM/45%. Receiving Duoneb and mucomist nebs scheduled Q4. BS clear in upper lobes with fine crackles in lower lobes. RR 24. Still experiencing SOB with exertion. RT will continue to follow.

## 2025-01-06 NOTE — PLAN OF CARE
Goal Outcome Evaluation:      Plan of Care Reviewed With: patient    Overall Patient Progress: improvingOverall Patient Progress: improving         [facility] - ICU    RN Progress Note:            Pertinent Assessments:      Please refer to flowsheet rows for full assessment     VSS. Afebrile.            Key Events - This Shift:       Dyspnea on exertion. Tachypneic. Continued on HFNC 50L 45%.   Up to bedside commode. Pt had a bm.   Otherwise uneventful night.           Barriers to Discharge / Downgrade:     HFNC         Problem: Gas Exchange Impaired  Goal: Optimal Gas Exchange  Outcome: Progressing  Intervention: Optimize Oxygenation and Ventilation

## 2025-01-06 NOTE — PROGRESS NOTES
Impression and Plan     Impression:   Inferior ST elevation myocardial infarction status post drug-eluting stenting x2 1/4/2025. He has several residual disease noted in his left anterior descending artery system.  Coronary artery disease with history of inferior ST elevation myocardial infarction status post drug-eluting stenting x2 to the mid left circumflex and left posterior descending artery 11/14/2015.  Essential hypertension.  Hyperlipidemia. 72 mg/dL.  Insulin-dependent diabetes mellitus type 2. Last hemoglobin A1c 8.1%.  Acute hypoxic respiratory failure felt to be due to a combination of influenza A infection, possible superimposed bacterial pneumonia and acute exacerbation of chronic obstructive pulmonary disease. He was not requiring supplemental oxygen prior to admission.    Plan:  Continue dual antiplatelet therapy with aspirin 81 mg and ticagrelor 90 mg twice daily for at least 12 months total (end date 1/4/2026).  Atorvastatin 80 mg daily.  Carvedilol 6.25 mg twice daily  He will be evaluated further at our multidisciplinary heart conference to determine the best course of action for revascularization of his residual coronary artery disease    Primary Cardiologist: Barbara Bermuedz PA-C    Subjective     - attempted to see the patient today but he needed to have a bowel movement when I came to the room    Cardiac Diagnostics   Telemetry (personally reviewed): SR, no arrhythmias    ECG 1/5/2025 (personally reviewed and interpreted): SR with PACs, inferior infarct    Echocardiogram 1/5/2024 (results reviewed):   1. The left ventricle is normal in size. Left ventricular function is normal.The ejection fraction is 55-60%. There is mild concentric left ventricular hypertrophy. No regional wall motion abnormalities noted.  2. Normal right ventricle size and systolic function.  3. IVC diameter <2.1 cm collapsing >50% with sniff suggests a normal RA pressure of 3 mmHg.   4.No hemodynamically significant  valvular abnormalities on 2D or color flow imaging.    Cardiac Cath 1/4/2025 (results reviewed):   CONCLUSIONS:   Severe multivessel coronary artery disease involving the ostial to proximal LAD, the mid LAD, large diagonal branch, proximal left circumflex (culprit for current presentation), and large OM branch.  Moderate nonobstructive disease elsewhere.  Inferior ST elevation MI post successful percutaneous coronary artery intervention of the dominant proximal left circumflex with a 4.0 x 18 mm Resolute Jaya Barren drug-eluting stent.  Successful percutaneous coronary artery intervention of the large OM branch with a 3.0 x 38 mm Resolute Otwell Barren drug-eluting stent that was postdilated with a 3.5 mm NC balloon.     RECOMMENDATIONS:   Usual postprocedure cares, please see orders.  DAPT for minimum of 1 year, favor indefinitely if without increased risk of bleeding given multiple overlapping stents.  Recommend heart team discussion regarding management of residual coronary artery disease (will discuss at our next Thursday multidisciplinary conference).  Medical therapy versus percutaneous revascularization (will require multiple long stents extending into the left main given diffuse LAD disease) versus surgical revascularization.  Can be done as outpatient unless patient remains symptomatic.  Aggressive risk factor modification for secondary prevention.    Cardiac Stress Testing 12/21/2022 (results reviewed):     The nuclear stress test is abnormal.    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 No significant reversible ischemia.    Left ventricular function is normal.    The left ventricular ejection fraction at rest is 64%.  The left ventricular ejection fraction at stress is 67%.    LV cavity size small.    Stress to rest cavity ratio is 0.97.  TID is absent.    A prior study was conducted on 2/18/2019.  This study has  no significant change when compared with the prior study.    Physical Examination       BP (!) 167/81   Pulse 86   Temp 98.9  F (37.2  C) (Oral)   Resp (!) 31   Wt 67.9 kg (149 lb 11.2 oz)   SpO2 94%   BMI 22.76 kg/m          Wt Readings from Last 3 Encounters:   01/06/25 67.9 kg (149 lb 11.2 oz)   12/31/24 73.3 kg (161 lb 9.6 oz)   12/13/24 74.4 kg (164 lb)             Intake/Output Summary (Last 24 hours) at 1/6/2025 0957  Last data filed at 1/6/2025 0100  Gross per 24 hour   Intake --   Output 575 ml   Net -575 ml       General: seen from door. Patient appeared alert and in no acute distress.         Imaging      CXR 1/4/2025 (results reviewed):  Few healing right rib fractures.   Chest otherwise negative.   Lungs clear.   No substantial change.     Lab Results   Lab Results   Component Value Date     01/06/2025     10/13/2020    CO2 27 01/06/2025    CO2 30 09/29/2021    CO2 30 10/13/2020    BUN 24.5 01/06/2025    BUN 21 09/29/2021    BUN 19 10/13/2020     Lab Results   Component Value Date    WBC 9.9 01/06/2025    WBC 8.0 10/13/2020    HGB 14.0 01/06/2025    HGB 12.9 11/03/2020    HCT 42.3 01/06/2025    HCT 48.1 10/13/2020    MCV 92 01/06/2025    MCV 90 10/13/2020     01/06/2025     10/13/2020     Lab Results   Component Value Date    CHOL 148 01/04/2025    CHOL 174 04/09/2021    TRIG 60 01/04/2025    TRIG 263 04/09/2021    HDL 64 01/04/2025    HDL 46 04/09/2021     Lab Results   Component Value Date    LDL 72 01/04/2025    A1C 8.1 (H) 01/04/2025     Lab Results   Component Value Date    INR 1.02 01/04/2025    INR 0.87 11/14/2015     Lab Results   Component Value Date    TSH 4.81 01/10/2011           Current Inpatient Scheduled Medications   Scheduled Meds:  Current Facility-Administered Medications   Medication Dose Route Frequency Provider Last Rate Last Admin    acetaminophen (TYLENOL) tablet 650 mg  650 mg Oral TID Jacobo Montgomery MD   650 mg at 01/06/25 0840    Or     acetaminophen (TYLENOL) Suppository 650 mg  650 mg Rectal TID Jacobo Montgomery MD        acetylcysteine (MUCOMYST) 10 % nebulizer solution 4 mL  4 mL Nebulization Q4H Yan Paige MD   4 mL at 01/06/25 0710    aspirin EC tablet 81 mg  81 mg Oral Daily Jacobo Montgomery MD   81 mg at 01/06/25 0840    atorvastatin (LIPITOR) tablet 80 mg  80 mg Oral At Bedtime Phan Xie DO   80 mg at 01/05/25 2106    carvedilol (COREG) tablet 6.25 mg  6.25 mg Oral BID Jacobo Montgomery MD   6.25 mg at 01/06/25 0840    cefTRIAXone (ROCEPHIN) 2 g vial to attach to  ml bag for ADULTS or NS 50 ml bag for PEDS  2 g Intravenous Q24H Yamini Vyas MD   2 g at 01/06/25 0901    doxycycline monohydrate (MONODOX) capsule 100 mg  100 mg Oral Q12H [facility] (08/20) Yamini Vyas MD   100 mg at 01/06/25 0840    [Held by provider] fluticasone-vilanterol (BREO ELLIPTA) 100-25 MCG/ACT inhaler 1 puff  1 puff Inhalation Daily Jacobo Montgomery MD        [Held by provider] glipiZIDE (GLUCOTROL XL) 24 hr tablet 10 mg  10 mg Oral Daily Yan Paige MD   10 mg at 01/04/25 2103    insulin aspart (NovoLOG) injection (RAPID ACTING)  1-7 Units Subcutaneous TID AC Yan Paige MD   1 Units at 01/06/25 0839    insulin aspart (NovoLOG) injection (RAPID ACTING)  1-5 Units Subcutaneous At Bedtime Yan Paige MD   2 Units at 01/05/25 2109    insulin glargine (LANTUS PEN) injection 20 Units  20 Units Subcutaneous At Bedtime Jacobo Montgomery MD   20 Units at 01/05/25 2109    ipratropium - albuterol 0.5 mg/2.5 mg/3 mL (DUONEB) neb solution 3 mL  3 mL Nebulization Q4H Yan Paige MD   3 mL at 01/06/25 0710    Lidocaine (LIDOCARE) 4 % Patch 1 patch  1 patch Transdermal Q24h Yan Paige MD   1 patch at 01/05/25 2112    losartan-hydrochlorothiazide (HYZAAR) 50-12.5 MG per tablet 1 tablet  1 tablet Oral Daily Jacobo Montgomery MD   1 tablet at 01/06/25 0840    montelukast (SINGULAIR) tablet 10 mg  10 mg Oral At Bedtime Ulises  MD Jacobo   10 mg at 01/05/25 2107    oseltamivir (TAMIFLU) capsule 75 mg  75 mg Oral BID Yamini Vyas MD   75 mg at 01/06/25 0840    pantoprazole (PROTONIX) EC tablet 40 mg  40 mg Oral QAM AC Jacobo Montgomery MD   40 mg at 01/06/25 0635    predniSONE (DELTASONE) tablet 40 mg  40 mg Oral Daily Yamini Vyas MD   40 mg at 01/06/25 0840    ticagrelor (BRILINTA) tablet 90 mg  90 mg Oral Q12H Jacobo Montgomery MD   90 mg at 01/06/25 0840    triamcinolone (KENALOG) 0.1 % cream   Topical BID Jacobo Montgomery MD   Given at 01/06/25 0839          Medications Prior to Admission   Prior to Admission medications    Medication Sig Start Date End Date Taking? Authorizing Provider   acetaminophen (TYLENOL) 325 MG tablet Take 2 tablets (650 mg) by mouth every 8 hours. 12/15/24  Yes Issac Golden MD   albuterol (PROAIR HFA/PROVENTIL HFA/VENTOLIN HFA) 108 (90 Base) MCG/ACT inhaler Inhale 1-2 puffs into the lungs every 4 hours as needed for shortness of breath or wheezing. 12/31/24  Yes Sarita Hennessy, DO   albuterol (PROVENTIL) (2.5 MG/3ML) 0.083% neb solution TAKE 1 VIAL (2.5 MG) BY NEBULIZATION EVERY 6 HOURS AS NEEDED FOR SHORTNESS OF BREATH OR WHEEZING 11/8/24  Yes Sarita Hennessy DO   aspirin (ASA) 81 MG chewable tablet Take 1 tablet (81 mg) by mouth daily. Starting tomorrow. 1/4/25  Yes Jacobo Montgomery MD   aspirin (ASA) 81 MG EC tablet Take 1 tablet (81 mg) by mouth daily 10/26/21  Yes Barbara Bermudez PA-C   atorvastatin (LIPITOR) 40 MG tablet Take 1 tablet (40 mg) by mouth daily 1/16/24  Yes Sarita Hennessy DO   budesonide-formoterol (SYMBICORT) 80-4.5 MCG/ACT Inhaler Inhale 2 puffs into the lungs 2 times daily. 12/31/24  Yes Sarita Hennessy DO   carvedilol (COREG) 6.25 MG tablet Take 1 tablet (6.25 mg) by mouth 2 times daily. 12/31/24  Yes Sarita Hennessy DO   glipiZIDE (GLUCOTROL XL) 10 MG 24 hr tablet TAKE 1 TABLET (10 MG) BY MOUTH DAILY 12/20/24  Yes Sarita Hennessy  Leonor,    insulin glargine (LANTUS PEN) 100 UNIT/ML pen Inject 20 Units subcutaneously at bedtime. 11/13/24  Yes Richard Sawant MD   losartan-hydrochlorothiazide (HYZAAR) 50-12.5 MG tablet Take 1 tablet by mouth daily. 12/31/24  Yes Sarita Hennessy DO   montelukast (SINGULAIR) 10 MG tablet TAKE ONE TABLET BY MOUTH AT BEDTIME 12/31/24  Yes Sarita Hennessy DO   omeprazole (PRILOSEC) 20 MG DR capsule Take 1 capsule (20 mg) by mouth daily. 12/31/24  Yes Sarita Hennessy DO   predniSONE (DELTASONE) 20 MG tablet Take 1 tablet (20 mg) by mouth daily. 12/30/24  Yes Rich Shetty MD   ticagrelor (BRILINTA) 90 MG tablet Take 1 tablet (90 mg) by mouth 2 times daily. Dose to start tomorrow morning. 1/5/25  Yes Jacobo Montgomery MD   triamcinolone (KENALOG) 0.1 % external cream APPLY TOPICALLY 2 TIMES DAILY TO LEFT ANKLE  Patient taking differently: Apply topically 2 times daily. 12/9/24  Yes Sarita Hennessy DO   vitamin E (TOCOPHEROL) 400 units (180 mg) capsule Take 400 Units by mouth daily.   Yes Reported, Patient   Alcohol Swabs (B-D SINGLE USE SWABS REGULAR) PADS USE TO SWAB AREA OF INJECTION / JENISE AS DIRECTED 6/8/23   Sarita Hennessy DO   blood glucose monitoring (NO BRAND SPECIFIED) meter device kit Use to test blood sugar 1 times daily or as directed.  Blood Glucose Monitor Brands: per insurance. 5/19/20   Sarita Hennessy DO   insulin pen needle (BD LIZBETH U/F) 32G X 4 MM miscellaneous USE 1 PEN NEEDLE DAILY 10/2/24   Sarita Hennessy DO   Lancets (ONETOUCH DELICA PLUS [id number]) MISC USE AS DIRECTED WITH LANCING DEVICE TO TEST ONCE DAILY 8/21/21   Sarita Hennessy DO   ONETOUCH ULTRA test strip USE TO TEST BLOOD SUGAR 1 TIME DAILY OR AS DIRECTED 5/4/22   Sarita Hennessy DO   order for DME Equipment being ordered: arm blood pressure cuff 11/19/15   Connor Anderson MD Brent E. White, MD [facility]  Non-Invasive Cardiologist  [facility]  Heart Care  Pager [phone]

## 2025-01-06 NOTE — PROGRESS NOTES
Patient currently on HFNC 50L 45%. Spo2 94%. BS diminished. Q4 nebs given. Patient tolerating well . Will titrate down as Patient tolerates.      Aminah Jalloh, RT

## 2025-01-06 NOTE — PROGRESS NOTES
[facility]    Medicine Progress Note - Hospitalist Service    Date of Admission:  1/4/2025    Assessment & Plan   Raul Wilhelm a 79-year-old male with history of CAD status post PCI, type 2 diabetes on insulin, COPD (never smoked but had occupational exposure) on intermittent oral prednisone who was admitted on 1/4 for inferior STEMI status post coronary angiogram with stenting.  He was also found to have influenza A with acute respiratory failure on high flow nasal cannula.  Medicine service was consulted to manage noncardiac medical conditions.      # STEMI s/p PCI 1/4/25  Managed by cardiology.  Currently on aspirin and Brilinta.  Also 80mg atorvastatin and Coreg 6.25 mg twice daily.     # Acute respiratory  failure  # Influenza A infection  # Possible bacterial infection (positive sputum culture)  # COPD exacerbation (not on home O2)  -Tamiflu to complete 5-day course (started on 1/4)  -ceftriaxone 2 g daily and doxycycline for at least 5 days for empiric treatment of bacterial pneumonia  -Prednisone 40 mg daily for 5 days  -Continue high flow nasal cannula, wean O2 as able  -DuoNebs every 4 hours with RT    # Type 2 diabetes   -Lantus 22 units daily  -Sliding scale insulin.   -Hold PTA glipizide    # Recent fall with right-sided rib fractures 12/13  CT showed right-sided rib fractures 5-7. Cleared by general surgery at that time  - Scheduled Tylenol, add lidocaine patch    # GERD  - Continue daily PPI        Diet: Low Saturated Fat Na <2400 mg    DVT Prophylaxis: Pneumatic Compression Devices  Diaz Catheter: Not present  Lines: None     Cardiac Monitoring: ACTIVE order. Indication: Post- PCI/Angiogram (24 hours)  Code Status: Full Code      Clinically Significant Risk Factors                   # Hypertension: Noted on problem list           # DMII: A1C = 8.1 % (Ref range: <5.7 %) within past 6 months       # Asthma: noted on problem list        Social Drivers of Health    Housing  Stability: High Risk (1/5/2025)    Housing Stability     Do you have housing? : Yes     Are you worried about losing your housing?: Yes          Disposition Plan     Medically Ready for Discharge: Anticipated in 2-4 Days         Jorge Cano DO  Hospitalist Service  [facility]  Securely message with I and love and you (more info)  Text page via [employer] Paging/Directory   ______________________________________________________________________    Interval History   He reports doing ok but has slight pain on the chest which he thinks could be due to recent rib fractures.     Physical Exam   Vital Signs: Temp: 98.8  F (37.1  C) Temp src: Oral BP: (!) 148/87 Pulse: 86   Resp: (!) 32 SpO2: 96 % O2 Device: High Flow Nasal Cannula (HFNC) Oxygen Delivery: 35 LPM  Weight: 149 lbs 11.2 oz    GEN: NAD, A&Ox3, appropriate  HEENT: EOMI, PERRLA, MMM, hearing grossly intact  Heart: RRR,   Lungs: Slightly dyspneic, on high flow nasal cannula, coarse lung sound  Abdominal: soft, nontender, nondistended, no HSM, no rebound, no guarding, normoactive BS  Skin: no jaundice, no gross lesions on visible skin  Neuro: A&Ox3, grossly intact motor/sensation, gait intact  MSK: no gross deformity, moves arms/legs equally  Psych: normal affect, congruent with mood     Medical Decision Making       40 MINUTES SPENT BY ME on the date of service doing chart review, history, exam, documentation & further activities per the note.      Data

## 2025-01-06 NOTE — CONSULTS
CLINICAL NUTRITION SERVICES - ASSESSMENT NOTE    RECOMMENDATIONS FOR MDs/PROVIDERS TO ORDER:  Consider referral for outpatient allergy eval.     Malnutrition Status:    Severe in chronic illness    Registered Dietitian Interventions:  Supplements-Ensure Enlive, Gelatein, Magic cup daily    Future/Additional Recommendations:       REASON FOR ASSESSMENT  Provider order - Nutrition education - Heart Healthy    SUBJECTIVE INFORMATION  Assessed patient in room.  Respiratory failure  Influenza A  COPD exacerbation  DM  Recent rib fractures  GERD    NUTRITION HISTORY  Patient eats a regular diet at home.  Patient reports he is having problems eating due to phelgm, this is not new though lately has gotten worse. Patient uses the Younker to clear his phlegm.  Patient tried to eat potato soup today but had issues with it going down.  Patient reports that sometimes food feels like it gets stuck.  At home he uses lozenges as well as water to help clear phlegm.  Patient has not had an allergy eval to look into this.  Speech therapist saw patient yesterday and recommended small bites/sips of regular texture.  Patient brought up home concerns in his life, he is trying to get his spouse into a care center as well as other concerns.       CURRENT NUTRITION ORDERS  Diet: Low Saturated Fat/2400 mg Sodium      CURRENT INTAKE/TOLERANCE  Eating % of small meals.     LABS  Nutrition-relevant labs:  glucose range 186-326 (H)    MEDICATIONS  Nutrition-relevant medications:     Current Facility-Administered Medications   Medication Dose Route Frequency Provider Last Rate Last Admin    acetaminophen (TYLENOL) tablet 650 mg  650 mg Oral TID Jacobo Montgomery MD   650 mg at 01/06/25 0840    Or    acetaminophen (TYLENOL) Suppository 650 mg  650 mg Rectal TID Jacobo Montgomery MD        acetylcysteine (MUCOMYST) 10 % nebulizer solution 4 mL  4 mL Nebulization Q4H Yan Paige MD   4 mL at 01/06/25 1258    aspirin EC tablet 81 mg  81 mg  Oral Daily Jacobo Montgomery MD   81 mg at 01/06/25 0840    atorvastatin (LIPITOR) tablet 80 mg  80 mg Oral At Bedtime Phan Xie DO   80 mg at 01/05/25 2106    carvedilol (COREG) tablet 6.25 mg  6.25 mg Oral BID Jacobo Montgomery MD   6.25 mg at 01/06/25 0840    cefTRIAXone (ROCEPHIN) 2 g vial to attach to  ml bag for ADULTS or NS 50 ml bag for PEDS  2 g Intravenous Q24H Yamini Vyas MD   2 g at 01/06/25 0901    doxycycline monohydrate (MONODOX) capsule 100 mg  100 mg Oral Q12H [facility] (08/20) Yamini Vyas MD   100 mg at 01/06/25 0840    [Held by provider] fluticasone-vilanterol (BREO ELLIPTA) 100-25 MCG/ACT inhaler 1 puff  1 puff Inhalation Daily Jacobo Montgomery MD        [Held by provider] glipiZIDE (GLUCOTROL XL) 24 hr tablet 10 mg  10 mg Oral Daily Yan Paige MD   10 mg at 01/04/25 2103    insulin aspart (NovoLOG) injection (RAPID ACTING)  1-7 Units Subcutaneous TID AC Yan Paige MD   2 Units at 01/06/25 1230    insulin aspart (NovoLOG) injection (RAPID ACTING)  1-5 Units Subcutaneous At Bedtime Yan Paige MD   2 Units at 01/05/25 2109    insulin glargine (LANTUS PEN) injection 22 Units  22 Units Subcutaneous At Bedtime Jorge Cano DO        ipratropium - albuterol 0.5 mg/2.5 mg/3 mL (DUONEB) neb solution 3 mL  3 mL Nebulization Q4H Yan Paige MD   3 mL at 01/06/25 1258    Lidocaine (LIDOCARE) 4 % Patch 1 patch  1 patch Transdermal Q24h Yan Paige MD   1 patch at 01/05/25 2112    losartan-hydrochlorothiazide (HYZAAR) 50-12.5 MG per tablet 1 tablet  1 tablet Oral Daily Jacobo Montgomery MD   1 tablet at 01/06/25 0840    montelukast (SINGULAIR) tablet 10 mg  10 mg Oral At Bedtime Jacobo Montgomery MD   10 mg at 01/05/25 2107    oseltamivir (TAMIFLU) capsule 75 mg  75 mg Oral BID Yamini Vyas MD   75 mg at 01/06/25 0840    pantoprazole (PROTONIX) EC tablet 40 mg  [facility]   40 mg at 01/06/25 0635    predniSONE (DELTASONE) tablet  40 mg  40 mg Oral Daily Yamini Vyas MD   40 mg at 01/06/25 0840    ticagrelor (BRILINTA) tablet 90 mg  90 mg Oral Q12H Jacobo Montgomery MD   90 mg at 01/06/25 0840    triamcinolone (KENALOG) 0.1 % cream   Topical BID Jacobo Montgomery MD   Given at 01/06/25 0839      Current Facility-Administered Medications   Medication Dose Route Frequency Provider Last Rate Last Admin    Continuing beta blocker from home medication list OR beta blocker order already placed during this visit   Does not apply DOES NOT GO TO Jacobo Ewing MD        Continuing statin from home medication list OR statin order already placed during this visit   Does not apply DOES NOT GO TO Jacobo Ewing MD        Percutaneous Coronary Intervention orders placed (this is information for BPA alerting)   Does not apply DOES NOT GO TO Jacobo Ewing MD          Current Facility-Administered Medications   Medication Dose Route Frequency Provider Last Rate Last Admin    acetaminophen (TYLENOL) tablet 650 mg  650 mg Oral Q4H PRN Jacobo Montgomery MD        albuterol (PROVENTIL HFA/VENTOLIN HFA) inhaler  1-2 puff Inhalation Q4H PRN Jacobo Montgomery MD   2 puff at 01/04/25 2116    albuterol (PROVENTIL) neb solution 2.5 mg  2.5 mg Nebulization Q6H PRN Jacobo Montgomery MD        carboxymethylcellulose PF (REFRESH PLUS) 0.5 % ophthalmic solution 1 drop  1 drop Both Eyes Q1H PRN Jorge Cano, DO   1 drop at 01/06/25 1230    Continuing beta blocker from home medication list OR beta blocker order already placed during this visit   Does not apply DOES NOT GO TO Jacobo Ewing MD        Continuing statin from home medication list OR statin order already placed during this visit   Does not apply DOES NOT GO TO Jacobo Ewing MD        glucose gel 15-30 g  15-30 g Oral Q15 Min PRYan Coleman MD        Or    dextrose 50 % injection 25-50 mL  25-50 mL Intravenous Q15 Min PRN Yan Paige MD        Or    glucagon  injection 1 mg  1 mg Subcutaneous Q15 Min PRN Yan Paige MD        fentaNYL (PF) (SUBLIMAZE) injection 25 mcg  25 mcg Intravenous Q15 Min PRN Jacobo Montgomery MD        HOLD: Metformin and metformin containing medications on day of procedure and 48 hours after IV contrast given for patients with acute kidney injury or severe chronic kidney disease (stage IV or stage V; i.e., eGFR less than 30)   Does not apply HOLD Jacobo Montgomery MD        hydrALAZINE (APRESOLINE) injection 10 mg  10 mg Intravenous Q4H PRN Jacobo Montgomery MD        metoprolol (LOPRESSOR) injection 5 mg  5 mg Intravenous Q15 Min PRN Jacobo Montgomery MD        midazolam (VERSED) injection 0.5 mg  0.5 mg Intravenous Q5 Min PRN Jacobo Montgomery MD        naloxone (NARCAN) injection 0.2 mg  0.2 mg Intravenous Q2 Min PRN Jacobo Montgomery MD        Or    naloxone (NARCAN) injection 0.4 mg  0.4 mg Intravenous Q2 Min PRN Jacobo Montgomery MD        Or    naloxone (NARCAN) injection 0.2 mg  0.2 mg Intramuscular Q2 Min PRN Jacobo Montgomery MD        Or    naloxone (NARCAN) injection 0.4 mg  0.4 mg Intramuscular Q2 Min PRN Jacobo Montgomery MD        nitroGLYcerin (NITROSTAT) sublingual tablet 0.4 mg  0.4 mg Sublingual Q5 Min PRN Jacobo Montgomery MD        ondansetron (ZOFRAN ODT) ODT tab 4 mg  4 mg Oral Q6H PRN Jacobo Montgomery MD        Or    ondansetron (ZOFRAN) injection 4 mg  4 mg Intravenous Q6H PRN Jacobo Montgomery MD        oxyCODONE (ROXICODONE) tablet 5 mg  5 mg Oral Q4H PRN Jacobo Montgomery MD        Or    oxyCODONE (ROXICODONE) tablet 10 mg  10 mg Oral Q4H PRN Jacobo Montgomery MD        Percutaneous Coronary Intervention orders placed (this is information for BPA alerting)   Does not apply DOES NOT GO TO Jacobo Ewing MD            ANTHROPOMETRICS  Height: 0 cm (Data Unavailable)  Most Recent Weight: 67.9 kg (149 lb 11.2 oz)  BMI (kg/m ): Normal BMI  Weight History:   01/06/25 : 67.9 kg (149 lb 11.2 oz)   12/31/24 :  73.3 kg (161 lb 9.6 oz) 7-9% loss in 1 month  12/13/24 : 74.4 kg (164 lb)   12/12/24 : 74.4 kg (164 lb 0.3 oz)   06/03/24 : 76 kg (167 lb 9.6 oz)   05/03/24 : 74.8 kg (165 lb)   05/02/24 : 74.4 kg (164 lb)   04/18/24 : 74.6 kg (164 lb 6.4 oz)   04/14/24 : 72.6 kg (160 lb)   01/16/24 : 72.6 kg (160 lb)       Dosing Weight: 67.9 kg, based on actual wt    ASSESSED NUTRITION NEEDS  Estimated Energy Needs: 1700+ kcals/day ( 25+ kcal/kg )  Justification: Maintenance  Estimated Protein Needs: 68+ grams protein/day ( 1+ g/kg )  Justification: Increased needs  Estimated Fluid Needs: 1700+ mL/day (1 mL/kcal)  Justification: Maintenance    SYSTEM FINDINGS    Last BM 1/6/25    MALNUTRITION  % Intake: </=75% for >/= 1 month (severe)  % Weight Loss: > 5% in 1 month (severe)   Subcutaneous Fat Loss: Orbital: Mild  Muscle Loss: Clavicles (pectoralis and deltoids): Mild  Fluid Accumulation/Edema: None noted  Malnutrition Diagnosis: Severe malnutrition in the context of chronic illness  Malnutrition Present on Admission: Yes    NUTRITION DIAGNOSIS  Malnutrition (undernutrition) related to chronic illness as evidenced by wt loss, decreased intake    INTERVENTIONS  Nutrition education-we discussed low sodium, low fat diet with good protein intake.    Patient with concerns of phlegm and swallowing and would like to see Speech therapy again.  RD assisted patient with ordering supper.     Start supplements-Ensure Enlive, Magic cup, Gelatein daily.    Goals  Patient to consume % of nutritionally adequate meal trays TID, or the equivalent with supplements/snacks.     Monitoring/Evaluation  Progress toward goals will be monitored and evaluated per policy.

## 2025-01-07 ENCOUNTER — APPOINTMENT (OUTPATIENT)
Dept: OCCUPATIONAL THERAPY | Facility: HOSPITAL | Age: 80
DRG: 321 | End: 2025-01-07
Attending: INTERNAL MEDICINE
Payer: COMMERCIAL

## 2025-01-07 DIAGNOSIS — I25.119 CORONARY ARTERY DISEASE INVOLVING NATIVE CORONARY ARTERY OF NATIVE HEART WITH ANGINA PECTORIS: Primary | ICD-10-CM

## 2025-01-07 LAB
GLUCOSE BLDC GLUCOMTR-MCNC: 166 MG/DL (ref 70–99)
GLUCOSE BLDC GLUCOMTR-MCNC: 176 MG/DL (ref 70–99)
GLUCOSE BLDC GLUCOMTR-MCNC: 199 MG/DL (ref 70–99)
GLUCOSE BLDC GLUCOMTR-MCNC: 325 MG/DL (ref 70–99)
GLUCOSE BLDC GLUCOMTR-MCNC: 338 MG/DL (ref 70–99)

## 2025-01-07 PROCEDURE — 97535 SELF CARE MNGMENT TRAINING: CPT | Mod: GO

## 2025-01-07 PROCEDURE — 999N000157 HC STATISTIC RCP TIME EA 10 MIN

## 2025-01-07 PROCEDURE — 999N000156 HC STATISTIC RCP CONSULT EA 30 MIN

## 2025-01-07 PROCEDURE — 94799 UNLISTED PULMONARY SVC/PX: CPT

## 2025-01-07 PROCEDURE — 250N000013 HC RX MED GY IP 250 OP 250 PS 637: Performed by: STUDENT IN AN ORGANIZED HEALTH CARE EDUCATION/TRAINING PROGRAM

## 2025-01-07 PROCEDURE — 99232 SBSQ HOSP IP/OBS MODERATE 35: CPT | Performed by: GENERAL ACUTE CARE HOSPITAL

## 2025-01-07 PROCEDURE — 250N000011 HC RX IP 250 OP 636: Performed by: INTERNAL MEDICINE

## 2025-01-07 PROCEDURE — 250N000009 HC RX 250: Performed by: INTERNAL MEDICINE

## 2025-01-07 PROCEDURE — 99232 SBSQ HOSP IP/OBS MODERATE 35: CPT | Performed by: STUDENT IN AN ORGANIZED HEALTH CARE EDUCATION/TRAINING PROGRAM

## 2025-01-07 PROCEDURE — 250N000012 HC RX MED GY IP 250 OP 636 PS 637: Performed by: INTERNAL MEDICINE

## 2025-01-07 PROCEDURE — 94640 AIRWAY INHALATION TREATMENT: CPT

## 2025-01-07 PROCEDURE — 250N000013 HC RX MED GY IP 250 OP 250 PS 637: Performed by: INTERNAL MEDICINE

## 2025-01-07 PROCEDURE — 94640 AIRWAY INHALATION TREATMENT: CPT | Mod: 76

## 2025-01-07 PROCEDURE — 120N000001 HC R&B MED SURG/OB

## 2025-01-07 RX ORDER — IPRATROPIUM BROMIDE AND ALBUTEROL SULFATE 2.5; .5 MG/3ML; MG/3ML
3 SOLUTION RESPIRATORY (INHALATION) 3 TIMES DAILY
Status: DISCONTINUED | OUTPATIENT
Start: 2025-01-07 | End: 2025-01-08

## 2025-01-07 RX ORDER — ACETYLCYSTEINE 100 MG/ML
4 SOLUTION ORAL; RESPIRATORY (INHALATION) 3 TIMES DAILY
Status: DISCONTINUED | OUTPATIENT
Start: 2025-01-07 | End: 2025-01-08

## 2025-01-07 RX ADMIN — TICAGRELOR 90 MG: 90 TABLET ORAL at 19:58

## 2025-01-07 RX ADMIN — IPRATROPIUM BROMIDE AND ALBUTEROL SULFATE 3 ML: .5; 3 SOLUTION RESPIRATORY (INHALATION) at 09:54

## 2025-01-07 RX ADMIN — TRIAMCINOLONE ACETONIDE: 1 CREAM TOPICAL at 09:23

## 2025-01-07 RX ADMIN — IPRATROPIUM BROMIDE AND ALBUTEROL SULFATE 3 ML: .5; 3 SOLUTION RESPIRATORY (INHALATION) at 04:24

## 2025-01-07 RX ADMIN — CARVEDILOL 6.25 MG: 6.25 TABLET, FILM COATED ORAL at 09:21

## 2025-01-07 RX ADMIN — IPRATROPIUM BROMIDE AND ALBUTEROL SULFATE 3 ML: .5; 3 SOLUTION RESPIRATORY (INHALATION) at 00:39

## 2025-01-07 RX ADMIN — ACETAMINOPHEN 650 MG: 325 TABLET ORAL at 09:21

## 2025-01-07 RX ADMIN — CEFTRIAXONE SODIUM 2 G: 2 INJECTION, POWDER, FOR SOLUTION INTRAMUSCULAR; INTRAVENOUS at 09:19

## 2025-01-07 RX ADMIN — ACETYLCYSTEINE 4 ML: 100 INHALANT RESPIRATORY (INHALATION) at 04:24

## 2025-01-07 RX ADMIN — LOSARTAN POTASSIUM AND HYDROCHLOROTHIAZIDE 1 TABLET: 50; 12.5 TABLET, FILM COATED ORAL at 09:19

## 2025-01-07 RX ADMIN — ACETYLCYSTEINE 4 ML: 100 INHALANT RESPIRATORY (INHALATION) at 00:43

## 2025-01-07 RX ADMIN — INSULIN ASPART 3 UNITS: 100 INJECTION, SOLUTION INTRAVENOUS; SUBCUTANEOUS at 20:22

## 2025-01-07 RX ADMIN — OSELTAMIVIR PHOSPHATE 75 MG: 75 CAPSULE ORAL at 09:20

## 2025-01-07 RX ADMIN — DOXYCYCLINE 100 MG: 100 CAPSULE ORAL at 09:20

## 2025-01-07 RX ADMIN — ACETAMINOPHEN 650 MG: 325 TABLET ORAL at 20:00

## 2025-01-07 RX ADMIN — DOXYCYCLINE 100 MG: 100 CAPSULE ORAL at 19:58

## 2025-01-07 RX ADMIN — CARBOXYMETHYLCELLULOSE SODIUM 1 DROP: 5 SOLUTION/ DROPS OPHTHALMIC at 12:37

## 2025-01-07 RX ADMIN — TRIAMCINOLONE ACETONIDE: 1 CREAM TOPICAL at 19:52

## 2025-01-07 RX ADMIN — ATORVASTATIN CALCIUM 80 MG: 40 TABLET, FILM COATED ORAL at 20:01

## 2025-01-07 RX ADMIN — TICAGRELOR 90 MG: 90 TABLET ORAL at 09:20

## 2025-01-07 RX ADMIN — ACETAMINOPHEN 650 MG: 325 TABLET ORAL at 13:57

## 2025-01-07 RX ADMIN — ACETYLCYSTEINE 4 ML: 100 INHALANT RESPIRATORY (INHALATION) at 09:54

## 2025-01-07 RX ADMIN — PANTOPRAZOLE SODIUM 40 MG: 40 TABLET, DELAYED RELEASE ORAL at 09:20

## 2025-01-07 RX ADMIN — PREDNISONE 40 MG: 20 TABLET ORAL at 09:21

## 2025-01-07 RX ADMIN — ASPIRIN 81 MG: 81 TABLET, COATED ORAL at 09:19

## 2025-01-07 RX ADMIN — IPRATROPIUM BROMIDE AND ALBUTEROL SULFATE 3 ML: .5; 3 SOLUTION RESPIRATORY (INHALATION) at 13:14

## 2025-01-07 RX ADMIN — OSELTAMIVIR PHOSPHATE 75 MG: 75 CAPSULE ORAL at 20:03

## 2025-01-07 RX ADMIN — ACETYLCYSTEINE 4 ML: 100 INHALANT RESPIRATORY (INHALATION) at 13:18

## 2025-01-07 RX ADMIN — ACETYLCYSTEINE 4 ML: 100 INHALANT RESPIRATORY (INHALATION) at 19:48

## 2025-01-07 RX ADMIN — CARVEDILOL 6.25 MG: 6.25 TABLET, FILM COATED ORAL at 19:58

## 2025-01-07 RX ADMIN — MONTELUKAST 10 MG: 10 TABLET, FILM COATED ORAL at 22:39

## 2025-01-07 RX ADMIN — IPRATROPIUM BROMIDE AND ALBUTEROL SULFATE 3 ML: .5; 3 SOLUTION RESPIRATORY (INHALATION) at 19:48

## 2025-01-07 ASSESSMENT — ACTIVITIES OF DAILY LIVING (ADL)
ADLS_ACUITY_SCORE: 46

## 2025-01-07 NOTE — PLAN OF CARE
Problem: Gas Exchange Impaired  Goal: Optimal Gas Exchange  Outcome: Not Progressing  Intervention: Optimize Oxygenation and Ventilation  Recent Flowsheet Documentation  Taken 1/7/2025 0921 by Farhana Beaver RN  Head of Bed (HOB) Positioning: HOB at 20-30 degrees     Problem: Cardiac Catheterization (Diagnostic/Interventional)  Goal: Stable Heart Rate and Rhythm  Outcome: Progressing  Goal: Optimal Pain Control and Function  Outcome: Progressing   Goal Outcome Evaluation:               Tele status WNL and NSR.  Pt denies pain.  Pt remains on High Flow oxygen at 35 liters at 45% FiO2

## 2025-01-07 NOTE — PROGRESS NOTES
Pt remains on HFNC,see the flowsheet for the settings,sat's at upper 90's tolerating well,still having some shortness of breath with exertion RT will continue to follow and wean off from high flow when its appropriate.

## 2025-01-07 NOTE — CONSULTS
SPIRITUAL HEALTH SERVICES Progress Note  [facility], P4    Saw pt Raul Wilhelm per consult order.    Patient/Family Understanding of Illness and Goals of Care - He shared about his two recent stents placed. He shared about his aging process and is hoping that he will be able to go home soon and help with some family affairs.     Distress and Loss - He shared some distress related to his wife's medical condition (short term memory loss) and nursing home placement. There are several stressors in his life, so he is wondering what he is doing wrong.     Strengths, Coping, and Resources - Family is source of support. He shared about his two children and grandchildren.     Meaning, Beliefs, and Spirituality - He comes from Taoist jules background; no jules community at this time. He shared about some of the past Caodaism experiences he has had, both positive and negative. He appreciates being social with people and helping his family when he can.     Plan of Care - A  will continue to visit as able or per request by patient/family/staff.      Marvin Fowler MDiv, [facility]  /Manager [facility]  [phone]       Spiritual Health Services is available 24/7 for emergent requests and consults, either by paging the on-call  or by entering an ASAP/STAT consult in [employer], which will also page the on-call .

## 2025-01-07 NOTE — PLAN OF CARE
Goal Outcome Evaluation:  Patient arrived to unit around 2130. Alert and oriented x4. Denies pain. On tele monitoring. On high flow nasal cannula, shortness of breath with exertion. Used urinal at bedside. Assist of 1 with walker.     Cardiac diet. Insulin check ACHS. Patient requested suction at bedside for phlegm.    Took smaller pill whole with water, may need larger pills crushed.     Family involved, daughter Nancy is primary contact for care coordination.     Problem: Adult Inpatient Plan of Care  Goal: Optimal Comfort and Wellbeing  Outcome: Progressing     Problem: Gas Exchange Impaired  Goal: Optimal Gas Exchange  Outcome: Progressing  Intervention: Optimize Oxygenation and Ventilation  Recent Flowsheet Documentation  Taken 1/6/2025 2200 by Monica Silva RN  Head of Bed (HOB) Positioning: HOB at 60 degrees     Problem: Fall Injury Risk  Goal: Absence of Fall and Fall-Related Injury  Outcome: Progressing  Intervention: Identify and Manage Contributors  Recent Flowsheet Documentation  Taken 1/6/2025 2200 by Monica Silva RN  Medication Review/Management: medications reviewed  Intervention: Promote Injury-Free Environment  Recent Flowsheet Documentation  Taken 1/6/2025 2200 by Monica Silva RN  Safety Promotion/Fall Prevention:   activity supervised   nonskid shoes/slippers when out of bed   safety round/check completed   clutter free environment maintained

## 2025-01-07 NOTE — TREATMENT PLAN
RCAT Treatment Plan    Patient Score: 7  Patient Acuity: 4    Clinical Indication for Therapy: history of bronchospasm and productive cough    Therapy Ordered: Duo and Mucomyst Tid.Flutter Tid    Assessment Summary: Pt tolerating well on HFNC,see the flow sheet for the current settings,Clear Chest x-ray,B/S were coarse crackles pre and post treatment,RT  reassess asper RCAT.    Herlinda Osborne, RT  1/7/2025

## 2025-01-07 NOTE — PROGRESS NOTES
[facility]    Medicine Progress Note - Hospitalist Service    Date of Admission:  1/4/2025    Assessment & Plan   Raul Wilhelm a 79-year-old male with history of CAD status post PCI, type 2 diabetes on insulin, COPD (never smoked but had occupational exposure) on intermittent oral prednisone who was admitted on 1/4 for inferior STEMI status post coronary angiogram with stenting.  He was also found to have influenza A with acute respiratory failure on high flow nasal cannula.  Medicine service was consulted to manage noncardiac medical conditions.    # STEMI s/p PCI 1/4/25  Managed by cardiology.  Currently on aspirin and Brilinta.  Also 80mg atorvastatin and Coreg 6.25 mg twice daily.     # Acute respiratory  failure  # Influenza A infection  # Possible bacterial infection (positive sputum culture)  # COPD exacerbation (not on home O2)  -Tamiflu to complete 5-day course (started on 1/4)  -ceftriaxone 2 g daily and doxycycline for at least 5 days for empiric treatment of bacterial pneumonia  -Prednisone 40 mg daily for 5 days  -Continue high flow nasal cannula, wean O2 as able  -DuoNebs every 4 hours with RT    # Type 2 diabetes   -Lantus 22 units daily  -Sliding scale insulin.   -Hold PTA glipizide    # Recent fall with right-sided rib fractures 12/13  CT showed right-sided rib fractures 5-7. Cleared by general surgery at that time  - Scheduled Tylenol, add lidocaine patch    # GERD  - Continue daily PPI        Diet: Low Saturated Fat Na <2400 mg  Snacks/Supplements Adult: Ensure Enlive; With Meals  Snacks/Supplements Adult: Gelatein Plus; With Meals  Snacks/Supplements Adult: Magic Cup; With Meals    DVT Prophylaxis: Pneumatic Compression Devices  Diaz Catheter: Not present  Lines: None     Cardiac Monitoring: ACTIVE order. Indication: Post- PCI/Angiogram (24 hours)  Code Status: Full Code      Clinically Significant Risk Factors                   # Hypertension: Noted on problem list            # DMII: A1C = 8.1 % (Ref range: <5.7 %) within past 6 months    # Severe Malnutrition: based on nutrition assessment, PRESENT ON ADMISSION   # Asthma: noted on problem list        Social Drivers of Health    Housing Stability: High Risk (1/5/2025)    Housing Stability     Do you have housing? : Yes     Are you worried about losing your housing?: Yes          Disposition Plan     Medically Ready for Discharge: Anticipated in 2-4 Days         Jorge Cano DO  Hospitalist Service  [facility]  Securely message with [employer] (more info)  Text page via [employer] Paging/Directory   ______________________________________________________________________    Interval History     Physical Exam   Vital Signs: Temp: 98.3  F (36.8  C) Temp src: Oral BP: (!) 145/86 Pulse: 72   Resp: 17 SpO2: 95 % O2 Device: High Flow Nasal Cannula (HFNC) Oxygen Delivery: 35 LPM  Weight: 149 lbs 11.2 oz    GEN: NAD, A&Ox3, appropriate  HEENT: EOMI, PERRLA, MMM, hearing grossly intact  Heart: RRR,   Lungs: Slightly dyspneic, on high flow nasal cannula, coarse lung sound  Abdominal: soft, nontender, nondistended, no HSM, no rebound, no guarding, normoactive BS  Skin: no jaundice, no gross lesions on visible skin  Neuro: A&Ox3, grossly intact motor/sensation, gait intact  MSK: no gross deformity, moves arms/legs equally  Psych: normal affect, congruent with mood     Medical Decision Making       41 MINUTES SPENT BY ME on the date of service doing chart review, history, exam, documentation & further activities per the note.      Data

## 2025-01-07 NOTE — PROGRESS NOTES
Impression and Plan     Impression:   Inferior ST elevation myocardial infarction status post drug-eluting stenting x2 1/4/2025. He has severe residual disease noted in his left anterior descending artery system. He currently denies angina.  Coronary artery disease with history of inferior ST elevation myocardial infarction status post drug-eluting stenting x2 to the mid left circumflex and left posterior descending artery 11/14/2015.  Essential hypertension.  Hyperlipidemia. 72 mg/dL.  Insulin-dependent diabetes mellitus type 2. Last hemoglobin A1c 8.1%.  Acute hypoxic respiratory failure felt to be due to a combination of influenza A infection, possible superimposed bacterial pneumonia and acute exacerbation of chronic obstructive pulmonary disease. He was not requiring supplemental oxygen prior to admission.    Plan:  Continue dual antiplatelet therapy with aspirin 81 mg and ticagrelor 90 mg twice daily for at least 12 months total (end date 1/4/2026).  Atorvastatin 80 mg daily.  Carvedilol 6.25 mg twice daily  He will be evaluated further at our multidisciplinary heart conference to determine the best course of action for revascularization of his residual coronary artery disease which would be done as an outpatient  We will arrange for him to follow-up in our clinic in 2-4 weeks and readdress possible revascularization options  We will sign off at this time. Please do not hesitate to contact us with additional questions or concerns.    Primary Cardiologist: Barbara Bermudez PA-C     Subjective     - mild chest pain  - shortness of breath but maybe a bit better    Cardiac Diagnostics   Telemetry (personally reviewed): SR, no arrhythmia    ECG 1/5/2025 (personally reviewed and interpreted): SR with PACs, inferior infarct     Echocardiogram 1/5/2024 (results reviewed):   1. The left ventricle is normal in size. Left ventricular function is normal.The ejection fraction is 55-60%. There is mild concentric left  ventricular hypertrophy. No regional wall motion abnormalities noted.  2. Normal right ventricle size and systolic function.  3. IVC diameter <2.1 cm collapsing >50% with sniff suggests a normal RA pressure of 3 mmHg.   4.No hemodynamically significant valvular abnormalities on 2D or color flow imaging.     Cardiac Cath 1/4/2025 (results reviewed):   CONCLUSIONS:   Severe multivessel coronary artery disease involving the ostial to proximal LAD, the mid LAD, large diagonal branch, proximal left circumflex (culprit for current presentation), and large OM branch.  Moderate nonobstructive disease elsewhere.  Inferior ST elevation MI post successful percutaneous coronary artery intervention of the dominant proximal left circumflex with a 4.0 x 18 mm Resolute Jaya Millmont drug-eluting stent.  Successful percutaneous coronary artery intervention of the large OM branch with a 3.0 x 38 mm Resolute Jaya Millmont drug-eluting stent that was postdilated with a 3.5 mm NC balloon.     RECOMMENDATIONS:   Usual postprocedure cares, please see orders.  DAPT for minimum of 1 year, favor indefinitely if without increased risk of bleeding given multiple overlapping stents.  Recommend heart team discussion regarding management of residual coronary artery disease (will discuss at our next Thursday multidisciplinary conference).  Medical therapy versus percutaneous revascularization (will require multiple long stents extending into the left main given diffuse LAD disease) versus surgical revascularization.  Can be done as outpatient unless patient remains symptomatic.  Aggressive risk factor modification for secondary prevention.     Cardiac Stress Testing 12/21/2022 (results reviewed):     The nuclear stress test is abnormal.    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 No significant reversible ischemia.    Left ventricular function is normal.     The left ventricular ejection fraction at rest is 64%.  The left ventricular ejection fraction at stress is 67%.    LV cavity size small.    Stress to rest cavity ratio is 0.97.  TID is absent.    A prior study was conducted on 2/18/2019.  This study has no significant change when compared with the prior study.    Physical Examination       BP (!) 145/86 (BP Location: Left arm)   Pulse 72   Temp 98.3  F (36.8  C) (Oral)   Resp 17   Wt 67.9 kg (149 lb 11.2 oz)   SpO2 93%   BMI 22.76 kg/m          Wt Readings from Last 3 Encounters:   01/06/25 67.9 kg (149 lb 11.2 oz)   12/31/24 73.3 kg (161 lb 9.6 oz)   12/13/24 74.4 kg (164 lb)             Intake/Output Summary (Last 24 hours) at 1/7/2025 1610  Last data filed at 1/7/2025 0758  Gross per 24 hour   Intake 120 ml   Output 950 ml   Net -830 ml       General: pleasant male. No acute distress.  HENT: external ears normal. Nares patent. Mucous membranes moist.  Eyes: perrla, extraocular muscles intact. No scleral icterus.   Neck: No JVD  Lungs: diminished breath sounds with expiratory wheezing  COR:  regular rate and rhythm, No murmurs, rubs, or gallops  Abd: nondistended, BS present  Extrem: No edema         Imaging      CXR 1/4/2025 (results reviewed):  Few healing right rib fractures.   Chest otherwise negative.   Lungs clear.   No substantial change.     Lab Results   Lab Results   Component Value Date     01/06/2025     10/13/2020    CO2 27 01/06/2025    CO2 30 09/29/2021    CO2 30 10/13/2020    BUN 24.5 01/06/2025    BUN 21 09/29/2021    BUN 19 10/13/2020     Lab Results   Component Value Date    WBC 9.9 01/06/2025    WBC 8.0 10/13/2020    HGB 14.0 01/06/2025    HGB 12.9 11/03/2020    HCT 42.3 01/06/2025    HCT 48.1 10/13/2020    MCV 92 01/06/2025    MCV 90 10/13/2020     01/06/2025     10/13/2020     Lab Results   Component Value Date    CHOL 148 01/04/2025    CHOL 174 04/09/2021    TRIG 60 01/04/2025    TRIG 263 04/09/2021    HDL 64  01/04/2025    HDL 46 04/09/2021     Lab Results   Component Value Date    INR 1.02 01/04/2025    INR 0.87 11/14/2015     Lab Results   Component Value Date    TSH 4.81 01/10/2011           Current Inpatient Scheduled Medications   Scheduled Meds:  Current Facility-Administered Medications   Medication Dose Route Frequency Provider Last Rate Last Admin    acetaminophen (TYLENOL) tablet 650 mg  650 mg Oral TID Jacobo Montgomery MD   650 mg at 01/07/25 1357    Or    acetaminophen (TYLENOL) Suppository 650 mg  650 mg Rectal TID Jacobo Montgomery MD        acetylcysteine (MUCOMYST) 10 % nebulizer solution 4 mL  4 mL Nebulization TID Jacobo Montgomery MD   4 mL at 01/07/25 1318    aspirin EC tablet 81 mg  81 mg Oral Daily Jacobo Montgomery MD   81 mg at 01/07/25 0919    atorvastatin (LIPITOR) tablet 80 mg  80 mg Oral At Bedtime Phan Xie DO   80 mg at 01/06/25 2035    carvedilol (COREG) tablet 6.25 mg  6.25 mg Oral BID Jacobo Montgomery MD   6.25 mg at 01/07/25 0921    cefTRIAXone (ROCEPHIN) 2 g vial to attach to  ml bag for ADULTS or NS 50 ml bag for PEDS  2 g Intravenous Q24H Yamini Vyas MD   2 g at 01/07/25 0919    doxycycline monohydrate (MONODOX) capsule 100 mg  100 mg Oral Q12H [facility] (08/20) Yamini Vyas MD   100 mg at 01/07/25 0920    [Held by provider] fluticasone-vilanterol (BREO ELLIPTA) 100-25 MCG/ACT inhaler 1 puff  1 puff Inhalation Daily Jacobo Montgomery MD        [Held by provider] glipiZIDE (GLUCOTROL XL) 24 hr tablet 10 mg  10 mg Oral Daily Yan Paige MD   10 mg at 01/04/25 2103    insulin aspart (NovoLOG) injection (RAPID ACTING)  1-7 Units Subcutaneous TID AC Yan Paige MD   1 Units at 01/07/25 1238    insulin aspart (NovoLOG) injection (RAPID ACTING)  1-5 Units Subcutaneous At Bedtime Yan Paige MD   2 Units at 01/06/25 2033    insulin glargine (LANTUS PEN) injection 22 Units  22 Units Subcutaneous At Bedtime Jorge Cano DO   22 Units at 01/06/25 0570     ipratropium - albuterol 0.5 mg/2.5 mg/3 mL (DUONEB) neb solution 3 mL  3 mL Nebulization TID Jacobo Montgomery MD   3 mL at 01/07/25 1314    Lidocaine (LIDOCARE) 4 % Patch 1 patch  1 patch Transdermal Q24h Yan Paige MD   1 patch at 01/06/25 2027    losartan-hydrochlorothiazide (HYZAAR) 50-12.5 MG per tablet 1 tablet  1 tablet Oral Daily Jacobo Montgomery MD   1 tablet at 01/07/25 0919    montelukast (SINGULAIR) tablet 10 mg  10 mg Oral At Bedtime Jacobo Montgomery MD   10 mg at 01/06/25 2246    oseltamivir (TAMIFLU) capsule 75 mg  75 mg Oral BID Yamini Vyas MD   75 mg at 01/07/25 0920    pantoprazole (PROTONIX) EC tablet 40 mg  40 mg Oral QAM AC Jacobo Montgomery MD   40 mg at 01/07/25 0920    predniSONE (DELTASONE) tablet 40 mg  40 mg Oral Daily Yamini Vyas MD   40 mg at 01/07/25 0921    ticagrelor (BRILINTA) tablet 90 mg  90 mg Oral Q12H Jacobo Montgomery MD   90 mg at 01/07/25 0920    triamcinolone (KENALOG) 0.1 % cream   Topical BID Jacobo Montgomery MD   Given at 01/07/25 0923          Medications Prior to Admission   Prior to Admission medications    Medication Sig Start Date End Date Taking? Authorizing Provider   acetaminophen (TYLENOL) 325 MG tablet Take 2 tablets (650 mg) by mouth every 8 hours. 12/15/24  Yes Issac Golden MD   albuterol (PROAIR HFA/PROVENTIL HFA/VENTOLIN HFA) 108 (90 Base) MCG/ACT inhaler Inhale 1-2 puffs into the lungs every 4 hours as needed for shortness of breath or wheezing. 12/31/24  Yes Sarita Hennessy DO   albuterol (PROVENTIL) (2.5 MG/3ML) 0.083% neb solution TAKE 1 VIAL (2.5 MG) BY NEBULIZATION EVERY 6 HOURS AS NEEDED FOR SHORTNESS OF BREATH OR WHEEZING 11/8/24  Yes Sarita Hennessy,    aspirin (ASA) 81 MG chewable tablet Take 1 tablet (81 mg) by mouth daily. Starting tomorrow. 1/4/25  Yes Jacobo Montgomery MD   aspirin (ASA) 81 MG EC tablet Take 1 tablet (81 mg) by mouth daily 10/26/21  Yes Barbara Bermudez PA-C   atorvastatin (LIPITOR)  40 MG tablet Take 1 tablet (40 mg) by mouth daily 1/16/24  Yes Sarita Hennessy DO   budesonide-formoterol (SYMBICORT) 80-4.5 MCG/ACT Inhaler Inhale 2 puffs into the lungs 2 times daily. 12/31/24  Yes Sarita Hennessy DO   carvedilol (COREG) 6.25 MG tablet Take 1 tablet (6.25 mg) by mouth 2 times daily. 12/31/24  Yes Sarita Hennessy DO   glipiZIDE (GLUCOTROL XL) 10 MG 24 hr tablet TAKE 1 TABLET (10 MG) BY MOUTH DAILY 12/20/24  Yes Sarita Hennessy DO   insulin glargine (LANTUS PEN) 100 UNIT/ML pen Inject 20 Units subcutaneously at bedtime. 11/13/24  Yes Richard Sawant MD   losartan-hydrochlorothiazide (HYZAAR) 50-12.5 MG tablet Take 1 tablet by mouth daily. 12/31/24  Yes Sarita Hennessy DO   montelukast (SINGULAIR) 10 MG tablet TAKE ONE TABLET BY MOUTH AT BEDTIME 12/31/24  Yes Sarita Hennessy DO   omeprazole (PRILOSEC) 20 MG DR capsule Take 1 capsule (20 mg) by mouth daily. 12/31/24  Yes Sarita Hennessy DO   predniSONE (DELTASONE) 20 MG tablet Take 1 tablet (20 mg) by mouth daily. 12/30/24  Yes Rich Shetty MD   ticagrelor (BRILINTA) 90 MG tablet Take 1 tablet (90 mg) by mouth 2 times daily. Dose to start tomorrow morning. 1/5/25  Yes Jacobo Montgomery MD   triamcinolone (KENALOG) 0.1 % external cream APPLY TOPICALLY 2 TIMES DAILY TO LEFT ANKLE  Patient taking differently: Apply topically 2 times daily. 12/9/24  Yes Sarita Hennessy DO   vitamin E (TOCOPHEROL) 400 units (180 mg) capsule Take 400 Units by mouth daily.   Yes Reported, Patient   Alcohol Swabs (B-D SINGLE USE SWABS REGULAR) PADS USE TO SWAB AREA OF INJECTION / JENISE AS DIRECTED 6/8/23   Sarita Hennessy DO   blood glucose monitoring (NO BRAND SPECIFIED) meter device kit Use to test blood sugar 1 times daily or as directed.  Blood Glucose Monitor Brands: per insurance. 5/19/20   Sarita Hennessy,    insulin pen needle (BD LIZBETH U/F) 32G X 4 MM miscellaneous USE 1 PEN NEEDLE DAILY  10/2/24   Sarita Hennessy,    Lancets (ONETOUCH DELICA PLUS [id number]) MISC USE AS DIRECTED WITH LANCING DEVICE TO TEST ONCE DAILY 8/21/21   Sarita Hennessy, DO   ONETOUCH ULTRA test strip USE TO TEST BLOOD SUGAR 1 TIME DAILY OR AS DIRECTED 5/4/22   Sarita Hennessy, DO   order for DME Equipment being ordered: arm blood pressure cuff 11/19/15   Connor Anderson MD Brent E. White, MD [facility]  Non-Invasive Cardiologist  [facility]  Pager [phone]

## 2025-01-07 NOTE — PLAN OF CARE
"  Problem: Adult Inpatient Plan of Care  Goal: Plan of Care Review  Description: The Plan of Care Review/Shift note should be completed every shift.  The Outcome Evaluation is a brief statement about your assessment that the patient is improving, declining, or no change.  This information will be displayed automatically on your shift  note.  Outcome: Progressing  Goal: Patient-Specific Goal (Individualized)  Description: You can add care plan individualizations to a care plan. Examples of Individualization might be:  \"Parent requests to be called daily at 9am for status\", \"I have a hard time hearing out of my right ear\", or \"Do not touch me to wake me up as it startles  me\".  Outcome: Progressing  Goal: Absence of Hospital-Acquired Illness or Injury  Outcome: Progressing  Intervention: Identify and Manage Fall Risk  Recent Flowsheet Documentation  Taken 1/7/2025 0030 by Marley Anderson RN  Safety Promotion/Fall Prevention:   activity supervised   nonskid shoes/slippers when out of bed   safety round/check completed   clutter free environment maintained  Intervention: Prevent Skin Injury  Recent Flowsheet Documentation  Taken 1/7/2025 0030 by Marley Anderson RN  Body Position: position changed independently  Goal: Optimal Comfort and Wellbeing  Outcome: Progressing  Goal: Readiness for Transition of Care  Outcome: Progressing     Problem: Cardiac Catheterization (Diagnostic/Interventional)  Goal: Absence of Bleeding  Outcome: Progressing  Goal: Absence of Contrast-Induced Injury  Outcome: Progressing  Goal: Stable Heart Rate and Rhythm  Outcome: Progressing  Goal: Absence of Embolism Signs and Symptoms  Outcome: Progressing  Goal: Anesthesia/Sedation Recovery  Outcome: Progressing  Intervention: Optimize Anesthesia Recovery  Recent Flowsheet Documentation  Taken 1/7/2025 0030 by Marley Anderson RN  Safety Promotion/Fall Prevention:   activity supervised   nonskid shoes/slippers when out of bed   safety round/check " completed   clutter free environment maintained  Goal: Optimal Pain Control and Function  Outcome: Progressing  Goal: Absence of Vascular Access Complication  Outcome: Progressing  Intervention: Prevent and Manage Access Complications  Recent Flowsheet Documentation  Taken 1/7/2025 0030 by Marley Anderson RN  Activity Management: activity adjusted per tolerance  Head of Bed (HOB) Positioning: HOB at 60 degrees     Problem: Gas Exchange Impaired  Goal: Optimal Gas Exchange  Outcome: Progressing  Intervention: Optimize Oxygenation and Ventilation  Recent Flowsheet Documentation  Taken 1/7/2025 0030 by Marley Anderson RN  Head of Bed (HOB) Positioning: HOB at 60 degrees     Problem: Comorbidity Management  Goal: Maintenance of COPD Symptom Control  Outcome: Progressing  Intervention: Maintain COPD Symptom Control  Recent Flowsheet Documentation  Taken 1/7/2025 0030 by Marley Anderson RN  Medication Review/Management: medications reviewed  Goal: Blood Glucose Levels Within Targeted Range  Outcome: Progressing  Intervention: Monitor and Manage Glycemia  Recent Flowsheet Documentation  Taken 1/7/2025 0030 by Marley Anderson RN  Medication Review/Management: medications reviewed  Goal: Blood Pressure in Desired Range  Outcome: Progressing  Intervention: Maintain Blood Pressure Management  Recent Flowsheet Documentation  Taken 1/7/2025 0030 by Marley Anderson RN  Medication Review/Management: medications reviewed     Problem: Fall Injury Risk  Goal: Absence of Fall and Fall-Related Injury  Outcome: Progressing  Intervention: Identify and Manage Contributors  Recent Flowsheet Documentation  Taken 1/7/2025 0030 by Marley Anderson RN  Self-Care Promotion: independence encouraged  Medication Review/Management: medications reviewed  Intervention: Promote Injury-Free Environment  Recent Flowsheet Documentation  Taken 1/7/2025 0030 by Marley Anderson RN  Safety Promotion/Fall Prevention:   activity supervised   nonskid shoes/slippers  when out of bed   safety round/check completed   clutter free environment maintained     Problem: Infection  Goal: Absence of Infection Signs and Symptoms  Outcome: Progressing  Intervention: Prevent or Manage Infection  Recent Flowsheet Documentation  Taken 1/7/2025 0030 by Marley Anderson RN  Isolation Precautions: droplet precautions maintained   Goal Outcome Evaluation:  NURSING PROGRESS NOTE  Shift Summary      Date: January 7, 2025     Neuro/Musculoskeletal:  A&Ox4.   Cardiac:  SR.  VSS.  On telemetry   Respiratory:  Sating in the 90s on HIFlo .  GI/:  Adequate urine output.  LBM: 1/6  Diet/Appetite:  Tolerating cardiac diet.  Activity:  Assist of 1 with walker   Pain:  Denies.   Skin:  No new deficits noted.   LDAs + Drips/IVF:  PIV  Protocols/Labs:  0    Pertinent Shift Updates:        Plan:        Marley Anderson RN  ....................................................

## 2025-01-08 ENCOUNTER — APPOINTMENT (OUTPATIENT)
Dept: SPEECH THERAPY | Facility: HOSPITAL | Age: 80
DRG: 321 | End: 2025-01-08
Attending: INTERNAL MEDICINE
Payer: COMMERCIAL

## 2025-01-08 ENCOUNTER — APPOINTMENT (OUTPATIENT)
Dept: OCCUPATIONAL THERAPY | Facility: HOSPITAL | Age: 80
DRG: 321 | End: 2025-01-08
Attending: INTERNAL MEDICINE
Payer: COMMERCIAL

## 2025-01-08 LAB
GLUCOSE BLDC GLUCOMTR-MCNC: 167 MG/DL (ref 70–99)
GLUCOSE BLDC GLUCOMTR-MCNC: 255 MG/DL (ref 70–99)
GLUCOSE BLDC GLUCOMTR-MCNC: 256 MG/DL (ref 70–99)
GLUCOSE BLDC GLUCOMTR-MCNC: 266 MG/DL (ref 70–99)
GLUCOSE BLDC GLUCOMTR-MCNC: 266 MG/DL (ref 70–99)

## 2025-01-08 PROCEDURE — 999N000215 HC STATISTIC HFNC ADULT NON-CPAP

## 2025-01-08 PROCEDURE — 94640 AIRWAY INHALATION TREATMENT: CPT | Mod: 76

## 2025-01-08 PROCEDURE — 250N000013 HC RX MED GY IP 250 OP 250 PS 637: Performed by: INTERNAL MEDICINE

## 2025-01-08 PROCEDURE — 120N000001 HC R&B MED SURG/OB

## 2025-01-08 PROCEDURE — 250N000012 HC RX MED GY IP 250 OP 636 PS 637: Performed by: INTERNAL MEDICINE

## 2025-01-08 PROCEDURE — 250N000009 HC RX 250: Performed by: INTERNAL MEDICINE

## 2025-01-08 PROCEDURE — 250N000013 HC RX MED GY IP 250 OP 250 PS 637: Performed by: STUDENT IN AN ORGANIZED HEALTH CARE EDUCATION/TRAINING PROGRAM

## 2025-01-08 PROCEDURE — 99232 SBSQ HOSP IP/OBS MODERATE 35: CPT | Performed by: STUDENT IN AN ORGANIZED HEALTH CARE EDUCATION/TRAINING PROGRAM

## 2025-01-08 PROCEDURE — 94640 AIRWAY INHALATION TREATMENT: CPT

## 2025-01-08 PROCEDURE — 97110 THERAPEUTIC EXERCISES: CPT | Mod: GO

## 2025-01-08 PROCEDURE — 92526 ORAL FUNCTION THERAPY: CPT | Mod: GN

## 2025-01-08 PROCEDURE — 97535 SELF CARE MNGMENT TRAINING: CPT | Mod: GO

## 2025-01-08 PROCEDURE — 250N000011 HC RX IP 250 OP 636: Performed by: INTERNAL MEDICINE

## 2025-01-08 PROCEDURE — 999N000157 HC STATISTIC RCP TIME EA 10 MIN

## 2025-01-08 RX ORDER — IPRATROPIUM BROMIDE AND ALBUTEROL SULFATE 2.5; .5 MG/3ML; MG/3ML
3 SOLUTION RESPIRATORY (INHALATION) 2 TIMES DAILY
Status: DISCONTINUED | OUTPATIENT
Start: 2025-01-08 | End: 2025-01-11 | Stop reason: HOSPADM

## 2025-01-08 RX ORDER — ACETYLCYSTEINE 100 MG/ML
4 SOLUTION ORAL; RESPIRATORY (INHALATION) 2 TIMES DAILY PRN
Status: DISCONTINUED | OUTPATIENT
Start: 2025-01-08 | End: 2025-01-08

## 2025-01-08 RX ORDER — ACETYLCYSTEINE 100 MG/ML
4 SOLUTION ORAL; RESPIRATORY (INHALATION) 2 TIMES DAILY
Status: DISCONTINUED | OUTPATIENT
Start: 2025-01-08 | End: 2025-01-11 | Stop reason: HOSPADM

## 2025-01-08 RX ORDER — IPRATROPIUM BROMIDE AND ALBUTEROL SULFATE 2.5; .5 MG/3ML; MG/3ML
3 SOLUTION RESPIRATORY (INHALATION) EVERY 4 HOURS PRN
Status: DISCONTINUED | OUTPATIENT
Start: 2025-01-08 | End: 2025-01-08

## 2025-01-08 RX ADMIN — TICAGRELOR 90 MG: 90 TABLET ORAL at 08:19

## 2025-01-08 RX ADMIN — ATORVASTATIN CALCIUM 80 MG: 40 TABLET, FILM COATED ORAL at 21:48

## 2025-01-08 RX ADMIN — ACETYLCYSTEINE 4 ML: 100 INHALANT RESPIRATORY (INHALATION) at 08:29

## 2025-01-08 RX ADMIN — PANTOPRAZOLE SODIUM 40 MG: 40 TABLET, DELAYED RELEASE ORAL at 06:42

## 2025-01-08 RX ADMIN — LOSARTAN POTASSIUM AND HYDROCHLOROTHIAZIDE 1 TABLET: 50; 12.5 TABLET, FILM COATED ORAL at 08:18

## 2025-01-08 RX ADMIN — CEFTRIAXONE SODIUM 2 G: 2 INJECTION, POWDER, FOR SOLUTION INTRAMUSCULAR; INTRAVENOUS at 11:03

## 2025-01-08 RX ADMIN — MONTELUKAST 10 MG: 10 TABLET, FILM COATED ORAL at 21:48

## 2025-01-08 RX ADMIN — IPRATROPIUM BROMIDE AND ALBUTEROL SULFATE 3 ML: .5; 3 SOLUTION RESPIRATORY (INHALATION) at 08:29

## 2025-01-08 RX ADMIN — TRIAMCINOLONE ACETONIDE: 1 CREAM TOPICAL at 08:20

## 2025-01-08 RX ADMIN — ACETAMINOPHEN 650 MG: 325 TABLET ORAL at 14:46

## 2025-01-08 RX ADMIN — CARVEDILOL 6.25 MG: 6.25 TABLET, FILM COATED ORAL at 08:19

## 2025-01-08 RX ADMIN — ACETYLCYSTEINE 4 ML: 100 SOLUTION ORAL; RESPIRATORY (INHALATION) at 19:33

## 2025-01-08 RX ADMIN — DOXYCYCLINE 100 MG: 100 CAPSULE ORAL at 21:55

## 2025-01-08 RX ADMIN — OSELTAMIVIR PHOSPHATE 75 MG: 75 CAPSULE ORAL at 08:19

## 2025-01-08 RX ADMIN — TICAGRELOR 90 MG: 90 TABLET ORAL at 21:54

## 2025-01-08 RX ADMIN — INSULIN ASPART 2 UNITS: 100 INJECTION, SOLUTION INTRAVENOUS; SUBCUTANEOUS at 22:07

## 2025-01-08 RX ADMIN — DOXYCYCLINE 100 MG: 100 CAPSULE ORAL at 08:18

## 2025-01-08 RX ADMIN — OSELTAMIVIR PHOSPHATE 75 MG: 75 CAPSULE ORAL at 21:54

## 2025-01-08 RX ADMIN — CARVEDILOL 6.25 MG: 6.25 TABLET, FILM COATED ORAL at 21:48

## 2025-01-08 RX ADMIN — IPRATROPIUM BROMIDE AND ALBUTEROL SULFATE 3 ML: .5; 3 SOLUTION RESPIRATORY (INHALATION) at 19:34

## 2025-01-08 RX ADMIN — PREDNISONE 40 MG: 20 TABLET ORAL at 08:19

## 2025-01-08 RX ADMIN — ASPIRIN 81 MG: 81 TABLET, COATED ORAL at 08:18

## 2025-01-08 RX ADMIN — ACETAMINOPHEN 650 MG: 325 TABLET ORAL at 21:47

## 2025-01-08 RX ADMIN — ACETAMINOPHEN 650 MG: 325 TABLET ORAL at 08:19

## 2025-01-08 ASSESSMENT — ACTIVITIES OF DAILY LIVING (ADL)
ADLS_ACUITY_SCORE: 46

## 2025-01-08 NOTE — PROGRESS NOTES
[facility] Treatment Plan    Patient Score: 8  Patient Acuity: 4    Clinical Indication for Therapy: history of bronchospasm    Therapy Ordered: Duo/Muco BID, Aerobika QID    Assessment Summary: Pt admitted for STEMI. PMH includes COPD, asthma, and dysphagia. BS are crackles. No SOB reported. Changed to BID per [facility]AT protocol. RT will continue to follow.     RT Wiliam  1/8/2025

## 2025-01-08 NOTE — PLAN OF CARE
Goal Outcome Evaluation:  Alert and oriented x4. Denies pain. On tele monitoring. On high flow nasal cannula, experiences shortness of breath with exertion. Up to the bathroom with assist of 1 and gait belt as they were requesting to walk around the room; also used urinal at bedside. Declined to sit in the chair but frequently repositioned self in bed.      Cardiac diet. Blood sugar check ACHS. Patient has suction at bedside for phlegm. Took smaller pills whole with water, may need larger pills crushed.     Problem: Gas Exchange Impaired  Goal: Optimal Gas Exchange  Outcome: Not Progressing  Intervention: Optimize Oxygenation and Ventilation  Recent Flowsheet Documentation  Taken 1/7/2025 1745 by Monica Silva, RN  Head of Bed (HOB) Positioning: HOB at 45 degrees     Problem: Adult Inpatient Plan of Care  Goal: Optimal Comfort and Wellbeing  Outcome: Progressing     Problem: Fall Injury Risk  Goal: Absence of Fall and Fall-Related Injury  Outcome: Progressing

## 2025-01-08 NOTE — PROGRESS NOTES
[facility]    Medicine Progress Note - Hospitalist Service    Date of Admission:  1/4/2025    Assessment & Plan   Raul Wilhelm a 79-year-old male with history of CAD status post PCI, type 2 diabetes on insulin, COPD (never smoked but had occupational exposure) on intermittent oral prednisone who was admitted on 1/4 for inferior STEMI status post coronary angiogram with stenting.  He was also found to have influenza A with acute respiratory failure on high flow nasal cannula.  Medicine service was consulted to manage noncardiac medical conditions.    # STEMI s/p PCI 1/4/25  - Currently on aspirin and Brilinta.  Also 80mg atorvastatin and Coreg 6.25 mg twice daily. He will be evaluated further at the multidisciplinary heart conference to determine the best course of action for revascularization of his residual coronary artery disease which would be done as an outpatient, will have outpatient cardiology follow up.     # Acute respiratory  failure  # Influenza A infection  # Possible bacterial infection (positive sputum culture)  # COPD exacerbation (not on home O2)  -Tamiflu to complete 5-day course (started on 1/4)  -ceftriaxone 2 g daily and doxycycline for at least 5 days for empiric treatment of bacterial pneumonia  -Prednisone 40 mg daily for 5 days  -Continue high flow nasal cannula, wean O2 as able  -DuoNebs every 4 hours with RT    # Type 2 diabetes   -Lantus 22 units daily  -Sliding scale insulin.   -Hold PTA glipizide    # Recent fall with right-sided rib fractures 12/13  CT showed right-sided rib fractures 5-7. Cleared by general surgery at that time  - Scheduled Tylenol, add lidocaine patch    # GERD  - Continue daily PPI        Diet: Low Saturated Fat Na <2400 mg  Snacks/Supplements Adult: Ensure Enlive; With Meals  Snacks/Supplements Adult: Gelatein Plus; With Meals  Snacks/Supplements Adult: Magic Cup; With Meals    DVT Prophylaxis: Pneumatic Compression Devices  Emily  Catheter: Not present  Lines: None     Cardiac Monitoring: ACTIVE order. Indication: Post- PCI/Angiogram (24 hours)  Code Status: Full Code      Clinically Significant Risk Factors                   # Hypertension: Noted on problem list           # DMII: A1C = 8.1 % (Ref range: <5.7 %) within past 6 months    # Severe Malnutrition: based on nutrition assessment, PRESENT ON ADMISSION   # Asthma: noted on problem list        Social Drivers of Health    Housing Stability: High Risk (1/5/2025)    Housing Stability     Do you have housing? : Yes     Are you worried about losing your housing?: Yes          Disposition Plan     Medically Ready for Discharge: Anticipated in 2-4 Days       Jorge Cano DO  Hospitalist Service  [facility]  Securely message with [employer] (more info)  Text page via [employer] Paging/Directory   ______________________________________________________________________    Interval History     Physical Exam   Vital Signs: Temp: 97.8  F (36.6  C) Temp src: Oral BP: (!) 149/88 Pulse: 82   Resp: 16 SpO2: 95 % O2 Device: High Flow Nasal Cannula (HFNC) Oxygen Delivery: 35 LPM  Weight: 152 lbs 15.99 oz    GEN: NAD, A&Ox3, appropriate  HEENT: EOMI, PERRLA, MMM, hearing grossly intact  Heart: RRR,   Lungs: Slightly dyspneic, on high flow nasal cannula, coarse lung sound  Abdominal: soft, nontender, nondistended, no HSM, no rebound, no guarding, normoactive BS  Skin: no jaundice, no gross lesions on visible skin  Neuro: A&Ox3, grossly intact motor/sensation, gait intact  MSK: no gross deformity, moves arms/legs equally  Psych: normal affect, congruent with mood     Medical Decision Making       35 MINUTES SPENT BY ME on the date of service doing chart review, history, exam, documentation & further activities per the note.      Data

## 2025-01-08 NOTE — PLAN OF CARE
Goal Outcome Evaluation:           Problem: Gas Exchange Impaired  Goal: Optimal Gas Exchange  Outcome: Progressing  Intervention: Optimize Oxygenation and Ventilation  Recent Flowsheet Documentation  Taken 1/8/2025 0814 by Mariel Goyal, RN  Head of Bed (HOB) Positioning: HOB at 30 degrees     Problem: Comorbidity Management  Goal: Blood Glucose Levels Within Targeted Range  Outcome: Progressing       BG:'s elevated, on sliding scale insulin. Pt weaned to 4L NC to eat, oxymask.  Off of hiflo since mid to later am. Tolertating well. Pt states having phlegm and uses [place] at bedside.

## 2025-01-08 NOTE — PLAN OF CARE
"  Problem: Adult Inpatient Plan of Care  Goal: Plan of Care Review  Description: The Plan of Care Review/Shift note should be completed every shift.  The Outcome Evaluation is a brief statement about your assessment that the patient is improving, declining, or no change.  This information will be displayed automatically on your shift  note.  Outcome: Progressing  Goal: Patient-Specific Goal (Individualized)  Description: You can add care plan individualizations to a care plan. Examples of Individualization might be:  \"Parent requests to be called daily at 9am for status\", \"I have a hard time hearing out of my right ear\", or \"Do not touch me to wake me up as it startles  me\".  Outcome: Progressing  Goal: Absence of Hospital-Acquired Illness or Injury  Outcome: Progressing  Intervention: Identify and Manage Fall Risk  Recent Flowsheet Documentation  Taken 1/8/2025 0000 by Marley Anderson RN  Safety Promotion/Fall Prevention:   activity supervised   clutter free environment maintained   increased rounding and observation   increase visualization of patient   lighting adjusted   mobility aid in reach   nonskid shoes/slippers when out of bed   patient and family education   room door open   room near nurse's station   room organization consistent   safety round/check completed   supervised activity  Intervention: Prevent Skin Injury  Recent Flowsheet Documentation  Taken 1/8/2025 0000 by Marley Anderson RN  Body Position: position changed independently  Intervention: Prevent and Manage VTE (Venous Thromboembolism) Risk  Recent Flowsheet Documentation  Taken 1/8/2025 0000 by Marley Anderson RN  VTE Prevention/Management: SCDs on (sequential compression devices)  Goal: Optimal Comfort and Wellbeing  Outcome: Progressing  Goal: Readiness for Transition of Care  Outcome: Progressing     Problem: Cardiac Catheterization (Diagnostic/Interventional)  Goal: Absence of Bleeding  Outcome: Progressing  Goal: Absence of " Contrast-Induced Injury  Outcome: Progressing  Goal: Stable Heart Rate and Rhythm  Outcome: Progressing  Goal: Absence of Embolism Signs and Symptoms  Outcome: Progressing  Intervention: Prevent or Manage Embolism  Recent Flowsheet Documentation  Taken 1/8/2025 0000 by Marley Anderson RN  VTE Prevention/Management: SCDs on (sequential compression devices)  Goal: Anesthesia/Sedation Recovery  Outcome: Progressing  Intervention: Optimize Anesthesia Recovery  Recent Flowsheet Documentation  Taken 1/8/2025 0000 by Marley Anderson RN  Safety Promotion/Fall Prevention:   activity supervised   clutter free environment maintained   increased rounding and observation   increase visualization of patient   lighting adjusted   mobility aid in reach   nonskid shoes/slippers when out of bed   patient and family education   room door open   room near nurse's station   room organization consistent   safety round/check completed   supervised activity  Goal: Optimal Pain Control and Function  Outcome: Progressing  Goal: Absence of Vascular Access Complication  Outcome: Progressing  Intervention: Prevent and Manage Access Complications  Recent Flowsheet Documentation  Taken 1/8/2025 0000 by Marley Anderson RN  Activity Management:   ambulated to bathroom   back to bed  Head of Bed (HOB) Positioning: HOB at 20-30 degrees     Problem: Gas Exchange Impaired  Goal: Optimal Gas Exchange  Outcome: Progressing  Intervention: Optimize Oxygenation and Ventilation  Recent Flowsheet Documentation  Taken 1/8/2025 0000 by Marley Anderson RN  Head of Bed (HOB) Positioning: HOB at 20-30 degrees     Problem: Comorbidity Management  Goal: Maintenance of COPD Symptom Control  Outcome: Progressing  Goal: Blood Glucose Levels Within Targeted Range  Outcome: Progressing  Goal: Blood Pressure in Desired Range  Outcome: Progressing     Problem: Fall Injury Risk  Goal: Absence of Fall and Fall-Related Injury  Outcome: Progressing  Intervention: Promote  Injury-Free Environment  Recent Flowsheet Documentation  Taken 1/8/2025 0000 by Marley Anderson RN  Safety Promotion/Fall Prevention:   activity supervised   clutter free environment maintained   increased rounding and observation   increase visualization of patient   lighting adjusted   mobility aid in reach   nonskid shoes/slippers when out of bed   patient and family education   room door open   room near nurse's station   room organization consistent   safety round/check completed   supervised activity     Problem: Infection  Goal: Absence of Infection Signs and Symptoms  Outcome: Progressing  Intervention: Prevent or Manage Infection  Recent Flowsheet Documentation  Taken 1/8/2025 0000 by Marley Anderson RN  Isolation Precautions: droplet precautions maintained     Problem: Orthopaedic Fracture  Goal: Fracture Stability  Outcome: Progressing  Goal: Optimal Functional Ability  Outcome: Progressing  Intervention: Optimize Functional Ability  Recent Flowsheet Documentation  Taken 1/8/2025 0000 by Marley Anderson RN  Activity Management:   ambulated to bathroom   back to bed  Positioning/Transfer Devices:   pillows   in use   Goal Outcome Evaluation:  NURSING PROGRESS NOTE  Shift Summary      Date: January 8, 2025     Neuro/Musculoskeletal:  A&Ox4.   Cardiac:  SR.  VSS.  On telemetry monitoring   Respiratory:  Sating in the 90s on HIFLO 35L at 45%.  GI/:  Adequate urine output.  LBM: 1/7  Diet/Appetite:  Tolerating cardiac diet.  Activity:  Assist of 1 with walker   Pain:  Denies.   Skin:  No new deficits noted.   LDAs + Drips/IVF:  PIV  Protocols/Labs:  0    Pertinent Shift Updates:  pt has been sleeping well overnight      Plan:  to wean off of HFNC      Marley Anderson RN  ....................................................

## 2025-01-08 NOTE — PROGRESS NOTES
Care Management Follow Up    Length of Stay (days): 4    Expected Discharge Date: 01/10/2025    Anticipated Discharge Plan:       Transportation: TBD    PT Recommendations:    OT Recommendations:  Transitional Care Facility     Barriers to Discharge: medical stability    Prior Living Situation: house with      Discussed  Partnership in Safe Discharge Planning  document with patient/family: No     Handoff Completed: No, handoff not indicated or clinically appropriate    Patient/Spokesperson Updated: No    Additional Information:  Chart reviewed. Patient on HFNC. Not ready for discharge. PT eval pending     Next Steps: continue to follow     Sandra Espinoza RN

## 2025-01-09 ENCOUNTER — APPOINTMENT (OUTPATIENT)
Dept: RADIOLOGY | Facility: HOSPITAL | Age: 80
DRG: 321 | End: 2025-01-09
Attending: STUDENT IN AN ORGANIZED HEALTH CARE EDUCATION/TRAINING PROGRAM
Payer: COMMERCIAL

## 2025-01-09 ENCOUNTER — APPOINTMENT (OUTPATIENT)
Dept: OCCUPATIONAL THERAPY | Facility: HOSPITAL | Age: 80
DRG: 321 | End: 2025-01-09
Attending: INTERNAL MEDICINE
Payer: COMMERCIAL

## 2025-01-09 ENCOUNTER — APPOINTMENT (OUTPATIENT)
Dept: SPEECH THERAPY | Facility: HOSPITAL | Age: 80
DRG: 321 | End: 2025-01-09
Attending: INTERNAL MEDICINE
Payer: COMMERCIAL

## 2025-01-09 VITALS
BODY MASS INDEX: 23.26 KG/M2 | HEART RATE: 92 BPM | OXYGEN SATURATION: 92 % | TEMPERATURE: 97.5 F | SYSTOLIC BLOOD PRESSURE: 155 MMHG | DIASTOLIC BLOOD PRESSURE: 82 MMHG | RESPIRATION RATE: 26 BRPM | WEIGHT: 153 LBS

## 2025-01-09 LAB
ALBUMIN SERPL BCG-MCNC: 3.4 G/DL (ref 3.5–5.2)
ALP SERPL-CCNC: 95 U/L (ref 40–150)
ALT SERPL W P-5'-P-CCNC: 18 U/L (ref 0–70)
ANION GAP SERPL CALCULATED.3IONS-SCNC: 7 MMOL/L (ref 7–15)
AST SERPL W P-5'-P-CCNC: 12 U/L (ref 0–45)
BILIRUB SERPL-MCNC: 1.1 MG/DL
BUN SERPL-MCNC: 30.2 MG/DL (ref 8–23)
CALCIUM SERPL-MCNC: 9.6 MG/DL (ref 8.8–10.4)
CHLORIDE SERPL-SCNC: 101 MMOL/L (ref 98–107)
CREAT SERPL-MCNC: 1.17 MG/DL (ref 0.67–1.17)
EGFRCR SERPLBLD CKD-EPI 2021: 63 ML/MIN/1.73M2
ERYTHROCYTE [DISTWIDTH] IN BLOOD BY AUTOMATED COUNT: 11.9 % (ref 10–15)
GLUCOSE BLDC GLUCOMTR-MCNC: 132 MG/DL (ref 70–99)
GLUCOSE BLDC GLUCOMTR-MCNC: 140 MG/DL (ref 70–99)
GLUCOSE BLDC GLUCOMTR-MCNC: 263 MG/DL (ref 70–99)
GLUCOSE BLDC GLUCOMTR-MCNC: 270 MG/DL (ref 70–99)
GLUCOSE BLDC GLUCOMTR-MCNC: 312 MG/DL (ref 70–99)
GLUCOSE SERPL-MCNC: 117 MG/DL (ref 70–99)
HCO3 SERPL-SCNC: 33 MMOL/L (ref 22–29)
HCT VFR BLD AUTO: 44.1 % (ref 40–53)
HGB BLD-MCNC: 14.8 G/DL (ref 13.3–17.7)
MCH RBC QN AUTO: 30.6 PG (ref 26.5–33)
MCHC RBC AUTO-ENTMCNC: 33.6 G/DL (ref 31.5–36.5)
MCV RBC AUTO: 91 FL (ref 78–100)
PLATELET # BLD AUTO: 263 10E3/UL (ref 150–450)
POTASSIUM SERPL-SCNC: 4.8 MMOL/L (ref 3.4–5.3)
PROT SERPL-MCNC: 6.3 G/DL (ref 6.4–8.3)
RBC # BLD AUTO: 4.84 10E6/UL (ref 4.4–5.9)
SODIUM SERPL-SCNC: 141 MMOL/L (ref 135–145)
WBC # BLD AUTO: 12.4 10E3/UL (ref 4–11)

## 2025-01-09 PROCEDURE — 74220 X-RAY XM ESOPHAGUS 1CNTRST: CPT

## 2025-01-09 PROCEDURE — 97110 THERAPEUTIC EXERCISES: CPT | Mod: GO

## 2025-01-09 PROCEDURE — 94640 AIRWAY INHALATION TREATMENT: CPT

## 2025-01-09 PROCEDURE — 250N000013 HC RX MED GY IP 250 OP 250 PS 637: Performed by: INTERNAL MEDICINE

## 2025-01-09 PROCEDURE — 250N000013 HC RX MED GY IP 250 OP 250 PS 637: Performed by: STUDENT IN AN ORGANIZED HEALTH CARE EDUCATION/TRAINING PROGRAM

## 2025-01-09 PROCEDURE — 250N000009 HC RX 250: Performed by: INTERNAL MEDICINE

## 2025-01-09 PROCEDURE — 250N000011 HC RX IP 250 OP 636: Performed by: STUDENT IN AN ORGANIZED HEALTH CARE EDUCATION/TRAINING PROGRAM

## 2025-01-09 PROCEDURE — 80053 COMPREHEN METABOLIC PANEL: CPT | Performed by: STUDENT IN AN ORGANIZED HEALTH CARE EDUCATION/TRAINING PROGRAM

## 2025-01-09 PROCEDURE — 94640 AIRWAY INHALATION TREATMENT: CPT | Mod: 76

## 2025-01-09 PROCEDURE — 99232 SBSQ HOSP IP/OBS MODERATE 35: CPT | Performed by: STUDENT IN AN ORGANIZED HEALTH CARE EDUCATION/TRAINING PROGRAM

## 2025-01-09 PROCEDURE — 250N000012 HC RX MED GY IP 250 OP 636 PS 637: Performed by: STUDENT IN AN ORGANIZED HEALTH CARE EDUCATION/TRAINING PROGRAM

## 2025-01-09 PROCEDURE — 74230 X-RAY XM SWLNG FUNCJ C+: CPT

## 2025-01-09 PROCEDURE — 120N000001 HC R&B MED SURG/OB

## 2025-01-09 PROCEDURE — 999N000157 HC STATISTIC RCP TIME EA 10 MIN

## 2025-01-09 PROCEDURE — 92611 MOTION FLUOROSCOPY/SWALLOW: CPT | Mod: GN

## 2025-01-09 PROCEDURE — 36415 COLL VENOUS BLD VENIPUNCTURE: CPT | Performed by: STUDENT IN AN ORGANIZED HEALTH CARE EDUCATION/TRAINING PROGRAM

## 2025-01-09 PROCEDURE — 85027 COMPLETE CBC AUTOMATED: CPT | Performed by: STUDENT IN AN ORGANIZED HEALTH CARE EDUCATION/TRAINING PROGRAM

## 2025-01-09 RX ORDER — ATORVASTATIN CALCIUM 80 MG/1
80 TABLET, FILM COATED ORAL AT BEDTIME
Qty: 30 TABLET | Refills: 0 | Status: SHIPPED | OUTPATIENT
Start: 2025-01-09

## 2025-01-09 RX ORDER — CARVEDILOL 12.5 MG/1
12.5 TABLET ORAL 2 TIMES DAILY
Status: DISCONTINUED | OUTPATIENT
Start: 2025-01-09 | End: 2025-01-11 | Stop reason: HOSPADM

## 2025-01-09 RX ORDER — CEFTRIAXONE 2 G/1
2 INJECTION, POWDER, FOR SOLUTION INTRAMUSCULAR; INTRAVENOUS EVERY 24 HOURS
Status: COMPLETED | OUTPATIENT
Start: 2025-01-09 | End: 2025-01-10

## 2025-01-09 RX ADMIN — TICAGRELOR 90 MG: 90 TABLET ORAL at 21:50

## 2025-01-09 RX ADMIN — DOXYCYCLINE 100 MG: 100 CAPSULE ORAL at 08:14

## 2025-01-09 RX ADMIN — CARVEDILOL 12.5 MG: 12.5 TABLET, FILM COATED ORAL at 08:33

## 2025-01-09 RX ADMIN — IPRATROPIUM BROMIDE AND ALBUTEROL SULFATE 3 ML: .5; 3 SOLUTION RESPIRATORY (INHALATION) at 10:25

## 2025-01-09 RX ADMIN — ASPIRIN 81 MG: 81 TABLET, COATED ORAL at 08:08

## 2025-01-09 RX ADMIN — ACETAMINOPHEN 650 MG: 325 TABLET ORAL at 14:21

## 2025-01-09 RX ADMIN — CARVEDILOL 12.5 MG: 12.5 TABLET, FILM COATED ORAL at 21:40

## 2025-01-09 RX ADMIN — MONTELUKAST 10 MG: 10 TABLET, FILM COATED ORAL at 21:40

## 2025-01-09 RX ADMIN — ACETAMINOPHEN 650 MG: 325 TABLET ORAL at 08:08

## 2025-01-09 RX ADMIN — LOSARTAN POTASSIUM AND HYDROCHLOROTHIAZIDE 1 TABLET: 50; 12.5 TABLET, FILM COATED ORAL at 08:14

## 2025-01-09 RX ADMIN — TICAGRELOR 90 MG: 90 TABLET ORAL at 08:15

## 2025-01-09 RX ADMIN — INSULIN ASPART 3 UNITS: 100 INJECTION, SOLUTION INTRAVENOUS; SUBCUTANEOUS at 21:47

## 2025-01-09 RX ADMIN — IPRATROPIUM BROMIDE AND ALBUTEROL SULFATE 3 ML: .5; 3 SOLUTION RESPIRATORY (INHALATION) at 20:12

## 2025-01-09 RX ADMIN — CEFTRIAXONE SODIUM 2 G: 2 INJECTION, POWDER, FOR SOLUTION INTRAMUSCULAR; INTRAVENOUS at 10:13

## 2025-01-09 RX ADMIN — OSELTAMIVIR PHOSPHATE 75 MG: 75 CAPSULE ORAL at 08:14

## 2025-01-09 RX ADMIN — TRIAMCINOLONE ACETONIDE: 1 CREAM TOPICAL at 21:52

## 2025-01-09 RX ADMIN — ATORVASTATIN CALCIUM 80 MG: 40 TABLET, FILM COATED ORAL at 21:47

## 2025-01-09 RX ADMIN — PREDNISONE 40 MG: 20 TABLET ORAL at 08:09

## 2025-01-09 RX ADMIN — ACETYLCYSTEINE 4 ML: 100 SOLUTION ORAL; RESPIRATORY (INHALATION) at 10:28

## 2025-01-09 RX ADMIN — ACETYLCYSTEINE 4 ML: 100 SOLUTION ORAL; RESPIRATORY (INHALATION) at 20:12

## 2025-01-09 RX ADMIN — TRIAMCINOLONE ACETONIDE: 1 CREAM TOPICAL at 08:24

## 2025-01-09 RX ADMIN — PANTOPRAZOLE SODIUM 40 MG: 40 TABLET, DELAYED RELEASE ORAL at 08:08

## 2025-01-09 RX ADMIN — LIDOCAINE 1 PATCH: 4 PATCH TOPICAL at 21:40

## 2025-01-09 RX ADMIN — ACETAMINOPHEN 650 MG: 325 TABLET ORAL at 21:39

## 2025-01-09 ASSESSMENT — ACTIVITIES OF DAILY LIVING (ADL)
ADLS_ACUITY_SCORE: 48
ADLS_ACUITY_SCORE: 46
ADLS_ACUITY_SCORE: 48
ADLS_ACUITY_SCORE: 46
ADLS_ACUITY_SCORE: 48
ADLS_ACUITY_SCORE: 46
ADLS_ACUITY_SCORE: 48
ADLS_ACUITY_SCORE: 46
ADLS_ACUITY_SCORE: 48
ADLS_ACUITY_SCORE: 46

## 2025-01-09 NOTE — PROGRESS NOTES
SPEECH PATHOLOGY VIDEO SWALLOW STUDY       01/09/25 0900   Appointment Info   Signing Clinician's Name / Credentials (SLP) Scotty Hassan MA [facility] SLP   General Information   Onset of Illness/Injury or Date of Surgery 01/04/25   Referring Physician Jorge Cano DO   Patient/Family Therapy Goal Statement (SLP) find out why pills and food get stuck in his throat   Pertinent History of Current Problem 79 year old male who transferred from [facility] for acute onset chest pain radiating to the back.  Does have a history of CAD in the past.  Status post previous stent placement.  EKG at that time showed inferior STEMI, he was transferred to this facility for evaluation.  He is postprocedure, emergency angiography, please see epic for full report. Diagnosed with influenza, recovering from rib fractures after recent fall. Hx life long asthma and cough.   General Observations Alert, . Able to stand for both video swallow study and esophagram. 4L NC.   Type of Evaluation   Type of Evaluation Swallow Evaluation   General Swallowing Observations   Swallowing Evaluation Videofluoroscopic swallow study (VFSS)   VFSS Evaluation   Radiologist Dr. Jiménez   Views Taken left lateral;A/P   Physical Location of Procedure [facility]   VFSS Textures Trialed thin liquids;pureed   VFSS Eval: Thin Liquid Texture Trial   Mode of Presentation, Thin Liquid cup;self-fed   Order of Presentation 1, 2, 3   Preparatory Phase WFL   Oral Phase, Thin Liquid premature pharyngeal entry   Bolus Location When Swallow Triggered posterior laryngeal surface of epiglottis;pyriforms   Pharyngeal Phase, Thin Liquid impaired pharyngoesophageal segment opening;pharyngeal wall coating;residue in vallecula;residue in pyriform sinus   Rosenbek's Penetration Aspiration Scale: Thin Liquid Trial Results 1 - no aspiration, contrast does not enter airway   Diagnostic Statement slow epiglottic inversion, trace-min residual. Zenker's diverticulum noted.    VFSS Evaluation: Puree Solid Texture Trial   Mode of Presentation, Puree spoon   Order of Presentation 4, 5   Preparatory Phase WFL   Oral Phase, Puree premature pharyngeal entry   Bolus Location When Swallow Triggered valleculae   Pharyngeal Phase, Puree pharyngeal wall coating;residue in vallecula;residue in pyriform sinus;impaired pharyngoesophageal segment opening   Rosenbek's Penetration Aspiration Scale: Puree Food Trial Results 1 - no aspiration, contrast does not enter airway   Diagnostic Statement Min residual in vallculae, min-mod in pyriform sinuses and diverticulum   Esophageal Phase of Swallow   Esophageal comments see esophagram report   Swallowing Recommendations   Diet Consistency Recommendations regular diet;thin liquids (level 0)   Swallowing Maneuver Recommendations alternate food and liquid intake   Comment, Swallowing Recommendations Primary residual stasis in the pyriform sinuses and a Zenker's diverticulum. No aspiration. See radiologists esophagram report. Recommend regular diet w/thin liquids, chew well, alternate bites of food with sips of liquid. Consider ENT consult as an OP for Zenker's diverticulum.   Clinical Impression   SLP Diagnosis pharyngeal dysphagia, Zenker's diverticulum   Clinical Impression Comments Video swallow study completed. Patient had no aspiration with any consistency. Swallow response is delayed with thin liquids spilling beyond the epiglottis or to the  pyriforms before triggering swallow, to the valleculae with puree and solids. There is good hyolaryngeal movement, epiglottic inversion is slow and complete on initial swallows but incomplete during dry secondary swallows. Liquid boluses move symmetrically through the pharynx/pyriforms. There is trace-min stasis in the valleculae, min-mod stasis in the pyriform sinuses. There is a Zenker's diverticulum with min-mod retention of barium across consistencies, greater with puree and solids. Double swallows partially  clear pyriform sinuses, limited clearance of diverticulum. Some benefit from sips of liquid after bites of puree or solid.   SLP Total Evaluation Time   Evaluation, videofluoroscopic eval of swallow function Minutes (74245) 20   SLP Discharge Planning   SLP Plan Sun 4/5 1 follow up for strategies, chew food well, alternate solids/liquids to help clear some material from Zenkers and pyriforms.   SLP Rationale for DC Rec no ongoing SLP anticipated, Recommend ENT consult as OP for Zenker's diverticulum.   SLP Brief overview of current status  Primary residual stasis in the pyriform sinuses and a Zenker's diverticulum. No aspiration. See radiologists esophagram report. Recommend regular diet w/thin liquids, chew well, alternate bites of food with sips of liquid. Consider ENT consult as an OP for Zenker's diverticulum.   SLP Time and Intention   Total Session Time (sum of timed and untimed services) 20

## 2025-01-09 NOTE — PROGRESS NOTES
CLINICAL NUTRITION SERVICES - REASSESSMENT NOTE     RECOMMENDATIONS FOR MDs/PROVIDERS TO ORDER:  CHO diet restriction not warranted at this time. Pt is ordering </=70 g cho/meal.     Malnutrition Status:    Malnutrition Diagnosis: Severe malnutrition in the context of chronic illness  Malnutrition Present on Admission: Yes    Registered Dietitian Interventions:  Reviewed room service menu for healthy cardiac high protein diet options. Educated on cardiac healthy diet choices at home   Encouraged pt to order extra portions when it allows with in diet restriction goals    Future/Additional Recommendations:  -Monitor need for DM diet restriction  -Continue to  pt on cardiac/DM diet needs  -Strongly encourage supplement if intake continues to be inadequate, weight decreases       SUBJECTIVE INFORMATION  Assessed patient in room.  Pt reports his meals are going well and he feels he is eating at baseline.   He has not received any supplements and did not recall discussing this with RD. He is reluctant to try them as he has a sensitive stomach and does not want to risk upsetting his stomach and delaying his discharge.   Pt asking many questions about recommended foods  Pt cooks for himself and occasionally uses convenience foods, fast food    Discussed current inadequate intake compared to needs. Pt was surprised by this.     CURRENT NUTRITION ORDERS  Diet: Low Saturated Fat/2400 mg Sodium  Nutrition Support: ensure enlive, magic cup, gel plus - not being sent d/t ordering error    CURRENT INTAKE/TOLERANCE  Fair, inadequate  Pt eating 100% but ordering inadequately: 995-1125 kcal, 55-70 g protein meeting 55% of estimated kcal, % of estimated protein needs     NEW FINDINGS  Weight: stable  ate/Time Weight Weight Method   01/08/25 0405 69.4 kg (153 lb) Bed scale   01/06/25 0400 67.9 kg (149 lb 11.2 oz) Bed scale   01/05/25 0641 69.1 kg (152 lb 5.4 oz) Bed scale   4 L O2   GI symptoms: last BM 1/7 soft    Nutrition-relevant labs:  BuN 30.2 (H), increased . -266 mg/dl past 24 hours. In fair control. Not currently on CHO restriction, however pt does not order more than 70 g CHO/meal  Nutrition-relevant medications:  lipitor, iv abx, ssi, lantus daily, protonix    Dosing Weight: 67.9 kg, based on actual wt     ASSESSED NUTRITION NEEDS  Estimated Energy Needs: 1700+ kcals/day ( 25+ kcal/kg )  Justification: Maintenance  Estimated Protein Needs: 68+ grams protein/day ( 1+ g/kg )  Justification: Increased needs  Estimated Fluid Needs: 1700+ mL/day (1 mL/kcal)  Justification: Maintenance    MALNUTRITION  % Intake: </=75% for >/= 1 month (severe)  % Weight Loss: > 5% in 1 month (severe)   Subcutaneous Fat Loss: Orbital: Mild  Muscle Loss: Clavicles (pectoralis and deltoids): Mild  Fluid Accumulation/Edema: None noted  Malnutrition Diagnosis: Severe malnutrition in the context of chronic illness  Malnutrition Present on Admission: Yes      EVALUATION OF THE PROGRESS TOWARD GOALS     NUTRITION DIAGNOSIS  Malnutrition (undernutrition) related to chronic illness as evidenced by wt loss, decreased intake - improving    INTERVENTIONS  Nutrition counseling strategies  Nutrition education content    Goals  Patient to consume % of nutritionally adequate meal trays TID, or the equivalent with supplements/snacks. - progressing with protein, but not kcals     Monitoring/Evaluation      Progress toward goals will be monitored and evaluated per policy.

## 2025-01-09 NOTE — PROGRESS NOTES
Oxygen Documentation  I certify that this patient, Raul Wilhelm has been under my care (or a nurse practitioner or physican's assistant working with me). This is the face-to-face encounter for oxygen medical necessity.      At the time of this encounter, I have reviewed the qualifying testing and have determined that supplemental oxygen is reasonable and necessary and is expected to improve the patient's condition in a home setting.         Patient has continued oxygen desaturation due to COPD J44.9.    If portability is ordered, is the patient mobile within the home? yes    Was this visit performed as a telehealth visit: No

## 2025-01-09 NOTE — PLAN OF CARE
Problem: Gas Exchange Impaired  Goal: Optimal Gas Exchange  Intervention: Optimize Oxygenation and Ventilation  Recent Flowsheet Documentation  Taken 1/9/2025 0128 by Jose F Douglas RN  Head of Bed (HOB) Positioning: HOB at 30-45 degrees     Problem: Comorbidity Management  Goal: Blood Glucose Levels Within Targeted Range  Outcome: Progressing  Intervention: Monitor and Manage Glycemia  Recent Flowsheet Documentation  Taken 1/9/2025 0128 by Jose F Douglas RN  Medication Review/Management:   medications reviewed   high-risk medications identified   pharmacy consulted   Goal Outcome Evaluation:    Patient continues to be on droplet precautions. Afebrile. On 4 L Oxygen via NC, O2 sats 95-96%. Has shallow breathing pattern. Dyspnea on exertion noted. Lung sounds diminished. .

## 2025-01-09 NOTE — PLAN OF CARE
Problem: Adult Inpatient Plan of Care  Goal: Absence of Hospital-Acquired Illness or Injury  Intervention: Identify and Manage Fall Risk  Recent Flowsheet Documentation  Taken 1/8/2025 1600 by Sarmiento, Gabriela, RN  Safety Promotion/Fall Prevention:   activity supervised   lighting adjusted   nonskid shoes/slippers when out of bed   room organization consistent  Intervention: Prevent Skin Injury  Recent Flowsheet Documentation  Taken 1/8/2025 1600 by Gabriela Sarmiento RN  Body Position:   position changed independently   weight shifting     Problem: Cardiac Catheterization (Diagnostic/Interventional)  Goal: Anesthesia/Sedation Recovery  Intervention: Optimize Anesthesia Recovery  Recent Flowsheet Documentation  Taken 1/8/2025 1600 by Sarmiento, Gabriela, RN  Safety Promotion/Fall Prevention:   activity supervised   lighting adjusted   nonskid shoes/slippers when out of bed   room organization consistent  Goal: Absence of Vascular Access Complication  Intervention: Prevent and Manage Access Complications  Recent Flowsheet Documentation  Taken 1/8/2025 1600 by Gabriela Sarmiento RN  Activity Management:   activity adjusted per tolerance   activity encouraged  Head of Bed (HOB) Positioning: HOB at 30 degrees     Problem: Gas Exchange Impaired  Goal: Optimal Gas Exchange  Intervention: Optimize Oxygenation and Ventilation  Recent Flowsheet Documentation  Taken 1/8/2025 1600 by Gabriela Sarmiento RN  Head of Bed (HOB) Positioning: HOB at 30 degrees     Problem: Orthopaedic Fracture  Goal: Optimal Functional Ability  Intervention: Optimize Functional Ability  Recent Flowsheet Documentation  Taken 1/8/2025 1600 by Gabriela Sarmiento RN  Activity Management:   activity adjusted per tolerance   activity encouraged  Positioning/Transfer Devices:   pillows   in use   Goal Outcome Evaluation:  Patient alert and oriented x4. Droplet precautions maintained. Moving with stand by assist. Vitals stable on 4 L oxygen via  nasal cannula. Low fat and low sodium diet with adequate intake. Voiding adequately using urinal. Saline locked. Blood sugars stable. Denies pain. Using yonker to suction phlegm, frequent congested cough.

## 2025-01-09 NOTE — PLAN OF CARE
Problem: Gas Exchange Impaired  Goal: Optimal Gas Exchange  Outcome: Progressing  Intervention: Optimize Oxygenation and Ventilation  Recent Flowsheet Documentation  Taken 1/9/2025 0826 by Mariel Goyal, RN  Head of Bed (HOB) Positioning: HOB at 30 degrees   Goal Outcome Evaluation:         Pt weaned to 3LNC this afternoon, tolerating well. SOB with activity. Ambulated halls with PT and SBA with O2. Pt anxious about d/c'ing Friday now as dghtr not able to drive pt home until Saturday.

## 2025-01-09 NOTE — PROGRESS NOTES
[facility]    Medicine Progress Note - Hospitalist Service    Date of Admission:  1/4/2025    Assessment & Plan   Raul Wilhelm a 79-year-old male with history of CAD status post PCI, type 2 diabetes on insulin, COPD (never smoked but had occupational exposure) on intermittent oral prednisone who was admitted on 1/4 for inferior STEMI status post coronary angiogram with stenting.  He was also found to have influenza A with acute respiratory failure on high flow nasal cannula.  Medicine service was consulted to manage noncardiac medical conditions.    # STEMI s/p PCI 1/4/25  - Currently on aspirin and Brilinta.  Also 80mg atorvastatin and Coreg 6.25 mg twice daily. He will be evaluated further at the multidisciplinary heart conference to determine the best course of action for revascularization of his residual coronary artery disease which would be done as an outpatient, will have outpatient cardiology follow up.     # Acute respiratory  failure  # Influenza A infection  # Possible bacterial infection (positive sputum culture)  # COPD exacerbation (not on home O2)  -Tamiflu to complete 5-day course (started on 1/4)  -ceftriaxone 2 g daily and doxycycline for at least 5 days for empiric treatment of bacterial pneumonia  -Prednisone 40 mg daily for 5 days  -Continue high flow nasal cannula, wean O2 as able  -DuoNebs every 4 hours with RT    # Type 2 diabetes   -Lantus 22 units daily  -Sliding scale insulin.   -Hold PTA glipizide    # Recent fall with right-sided rib fractures 12/13  CT showed right-sided rib fractures 5-7. Cleared by general surgery at that time  - Scheduled Tylenol, add lidocaine patch    # GERD  - Continue daily PPI        Diet: Low Saturated Fat Na <2400 mg  Snacks/Supplements Adult: Ensure Enlive; With Meals  Snacks/Supplements Adult: Gelatein Plus; With Meals  Snacks/Supplements Adult: Magic Cup; With Meals    DVT Prophylaxis: Pneumatic Compression Devices  Emily  Catheter: Not present  Lines: None     Cardiac Monitoring: None  Code Status: Full Code      Clinically Significant Risk Factors               # Hypoalbuminemia: Lowest albumin = 3.4 g/dL at 1/9/2025  6:08 AM, will monitor as appropriate     # Hypertension: Noted on problem list           # DMII: A1C = 8.1 % (Ref range: <5.7 %) within past 6 months    # Severe Malnutrition: based on nutrition assessment    # Asthma: noted on problem list        Social Drivers of Health    Housing Stability: High Risk (1/5/2025)    Housing Stability     Do you have housing? : Yes     Are you worried about losing your housing?: Yes          Disposition Plan     Medically Ready for Discharge: Anticipated Tomorrow       Jorge Cano DO  Hospitalist Service  [facility]  Securely message with [employer] (more info)  Text page via [employer] Paging/Directory   ______________________________________________________________________    Interval History     Physical Exam   Vital Signs: Temp: 97.6  F (36.4  C) Temp src: Oral BP: (!) 168/80 Pulse: 73   Resp: 18 SpO2: 94 % O2 Device: Nasal cannula Oxygen Delivery: 4 LPM  Weight: 152 lbs 15.99 oz    GEN: NAD, A&Ox3, appropriate  HEENT: EOMI, PERRLA, MMM, hearing grossly intact  Heart: RRR,   Lungs: moving air well, on nasal cannula, coarse lung sound  Abdominal: soft, nontender, nondistended, no HSM, no rebound, no guarding, normoactive BS  Skin: no jaundice, no gross lesions on visible skin  Neuro: A&Ox3, grossly intact motor/sensation, gait intact  MSK: no gross deformity, moves arms/legs equally  Psych: normal affect, congruent with mood     Medical Decision Making       40 MINUTES SPENT BY ME on the date of service doing chart review, history, exam, documentation & further activities per the note.      Data

## 2025-01-10 ENCOUNTER — APPOINTMENT (OUTPATIENT)
Dept: SPEECH THERAPY | Facility: HOSPITAL | Age: 80
DRG: 321 | End: 2025-01-10
Attending: INTERNAL MEDICINE
Payer: COMMERCIAL

## 2025-01-10 ENCOUNTER — APPOINTMENT (OUTPATIENT)
Dept: OCCUPATIONAL THERAPY | Facility: HOSPITAL | Age: 80
DRG: 321 | End: 2025-01-10
Attending: INTERNAL MEDICINE
Payer: COMMERCIAL

## 2025-01-10 LAB
GLUCOSE BLDC GLUCOMTR-MCNC: 133 MG/DL (ref 70–99)
GLUCOSE BLDC GLUCOMTR-MCNC: 165 MG/DL (ref 70–99)
GLUCOSE BLDC GLUCOMTR-MCNC: 178 MG/DL (ref 70–99)
GLUCOSE BLDC GLUCOMTR-MCNC: 228 MG/DL (ref 70–99)
GLUCOSE BLDC GLUCOMTR-MCNC: 277 MG/DL (ref 70–99)

## 2025-01-10 PROCEDURE — 94640 AIRWAY INHALATION TREATMENT: CPT | Mod: 76

## 2025-01-10 PROCEDURE — 94799 UNLISTED PULMONARY SVC/PX: CPT

## 2025-01-10 PROCEDURE — 92526 ORAL FUNCTION THERAPY: CPT | Mod: GN | Performed by: SPEECH-LANGUAGE PATHOLOGIST

## 2025-01-10 PROCEDURE — 250N000013 HC RX MED GY IP 250 OP 250 PS 637: Performed by: INTERNAL MEDICINE

## 2025-01-10 PROCEDURE — 97535 SELF CARE MNGMENT TRAINING: CPT | Mod: GO

## 2025-01-10 PROCEDURE — 250N000009 HC RX 250: Performed by: INTERNAL MEDICINE

## 2025-01-10 PROCEDURE — 94640 AIRWAY INHALATION TREATMENT: CPT

## 2025-01-10 PROCEDURE — 999N000157 HC STATISTIC RCP TIME EA 10 MIN

## 2025-01-10 PROCEDURE — 250N000011 HC RX IP 250 OP 636: Performed by: STUDENT IN AN ORGANIZED HEALTH CARE EDUCATION/TRAINING PROGRAM

## 2025-01-10 PROCEDURE — 250N000013 HC RX MED GY IP 250 OP 250 PS 637: Performed by: STUDENT IN AN ORGANIZED HEALTH CARE EDUCATION/TRAINING PROGRAM

## 2025-01-10 PROCEDURE — 99232 SBSQ HOSP IP/OBS MODERATE 35: CPT | Performed by: INTERNAL MEDICINE

## 2025-01-10 PROCEDURE — 97110 THERAPEUTIC EXERCISES: CPT | Mod: GO

## 2025-01-10 PROCEDURE — 120N000001 HC R&B MED SURG/OB

## 2025-01-10 RX ADMIN — ACETYLCYSTEINE 4 ML: 100 SOLUTION ORAL; RESPIRATORY (INHALATION) at 07:53

## 2025-01-10 RX ADMIN — TICAGRELOR 90 MG: 90 TABLET ORAL at 08:30

## 2025-01-10 RX ADMIN — ACETAMINOPHEN 650 MG: 325 TABLET ORAL at 21:25

## 2025-01-10 RX ADMIN — ACETYLCYSTEINE 4 ML: 100 SOLUTION ORAL; RESPIRATORY (INHALATION) at 20:41

## 2025-01-10 RX ADMIN — CARVEDILOL 12.5 MG: 12.5 TABLET, FILM COATED ORAL at 08:29

## 2025-01-10 RX ADMIN — PANTOPRAZOLE SODIUM 40 MG: 40 TABLET, DELAYED RELEASE ORAL at 06:49

## 2025-01-10 RX ADMIN — ATORVASTATIN CALCIUM 80 MG: 40 TABLET, FILM COATED ORAL at 21:25

## 2025-01-10 RX ADMIN — ACETAMINOPHEN 650 MG: 325 TABLET ORAL at 15:00

## 2025-01-10 RX ADMIN — ASPIRIN 81 MG: 81 TABLET, COATED ORAL at 08:29

## 2025-01-10 RX ADMIN — IPRATROPIUM BROMIDE AND ALBUTEROL SULFATE 3 ML: .5; 3 SOLUTION RESPIRATORY (INHALATION) at 20:40

## 2025-01-10 RX ADMIN — CARVEDILOL 12.5 MG: 12.5 TABLET, FILM COATED ORAL at 21:26

## 2025-01-10 RX ADMIN — CEFTRIAXONE SODIUM 2 G: 2 INJECTION, POWDER, FOR SOLUTION INTRAMUSCULAR; INTRAVENOUS at 10:22

## 2025-01-10 RX ADMIN — LOSARTAN POTASSIUM AND HYDROCHLOROTHIAZIDE 1 TABLET: 50; 12.5 TABLET, FILM COATED ORAL at 08:29

## 2025-01-10 RX ADMIN — ACETAMINOPHEN 650 MG: 325 TABLET ORAL at 08:29

## 2025-01-10 RX ADMIN — IPRATROPIUM BROMIDE AND ALBUTEROL SULFATE 3 ML: .5; 3 SOLUTION RESPIRATORY (INHALATION) at 07:53

## 2025-01-10 RX ADMIN — TICAGRELOR 90 MG: 90 TABLET ORAL at 21:26

## 2025-01-10 RX ADMIN — MONTELUKAST 10 MG: 10 TABLET, FILM COATED ORAL at 21:26

## 2025-01-10 ASSESSMENT — ACTIVITIES OF DAILY LIVING (ADL)
ADLS_ACUITY_SCORE: 48
ADLS_ACUITY_SCORE: 47
ADLS_ACUITY_SCORE: 49
ADLS_ACUITY_SCORE: 48
ADLS_ACUITY_SCORE: 47
ADLS_ACUITY_SCORE: 48
ADLS_ACUITY_SCORE: 48
ADLS_ACUITY_SCORE: 47
ADLS_ACUITY_SCORE: 48
ADLS_ACUITY_SCORE: 48

## 2025-01-10 NOTE — PROGRESS NOTES
Speech Language Therapy Discharge Summary    Reason for therapy discharge:    All goals and outcomes met, no further needs identified.    Progress towards therapy goal(s). See goals on Care Plan in [facility] electronic health record for goal details.  Goals met    Therapy recommendation(s):    No further therapy is recommended.  Continue diet of regular solids/thin liquids with intake strategies.  ENT recommended as OP to address Zenker's.

## 2025-01-10 NOTE — PLAN OF CARE
"  Problem: Adult Inpatient Plan of Care  Goal: Plan of Care Review  Description: The Plan of Care Review/Shift note should be completed every shift.  The Outcome Evaluation is a brief statement about your assessment that the patient is improving, declining, or no change.  This information will be displayed automatically on your shift  note.  Outcome: Progressing  Goal: Patient-Specific Goal (Individualized)  Description: You can add care plan individualizations to a care plan. Examples of Individualization might be:  \"Parent requests to be called daily at 9am for status\", \"I have a hard time hearing out of my right ear\", or \"Do not touch me to wake me up as it startles  me\".  Outcome: Progressing  Goal: Absence of Hospital-Acquired Illness or Injury  Outcome: Progressing  Intervention: Identify and Manage Fall Risk  Recent Flowsheet Documentation  Taken 1/9/2025 1847 by Eduardo Chakraborty RN  Safety Promotion/Fall Prevention:   activity supervised   lighting adjusted   nonskid shoes/slippers when out of bed   room organization consistent  Intervention: Prevent Skin Injury  Recent Flowsheet Documentation  Taken 1/9/2025 1847 by Eduardo Chakraborty RN  Body Position:   position changed independently   weight shifting  Intervention: Prevent and Manage VTE (Venous Thromboembolism) Risk  Recent Flowsheet Documentation  Taken 1/9/2025 1847 by Eduardo Chakraborty RN  VTE Prevention/Management: SCDs on (sequential compression devices)  Intervention: Prevent Infection  Recent Flowsheet Documentation  Taken 1/9/2025 1847 by Eduardo Chakraborty RN  Infection Prevention:   rest/sleep promoted   hand hygiene promoted  Goal: Optimal Comfort and Wellbeing  Outcome: Progressing  Intervention: Provide Person-Centered Care  Recent Flowsheet Documentation  Taken 1/9/2025 1847 by Eduardo Chakraborty RN  Trust Relationship/Rapport: care explained  Goal: Readiness for Transition of Care  Outcome: Progressing     Problem: Cardiac " Catheterization (Diagnostic/Interventional)  Goal: Absence of Bleeding  Outcome: Progressing  Goal: Absence of Contrast-Induced Injury  Outcome: Progressing  Goal: Stable Heart Rate and Rhythm  Outcome: Progressing  Goal: Absence of Embolism Signs and Symptoms  Outcome: Progressing  Intervention: Prevent or Manage Embolism  Recent Flowsheet Documentation  Taken 1/9/2025 1847 by Eduardo Chakraborty RN  VTE Prevention/Management: SCDs on (sequential compression devices)  Goal: Anesthesia/Sedation Recovery  Outcome: Progressing  Intervention: Optimize Anesthesia Recovery  Recent Flowsheet Documentation  Taken 1/9/2025 1847 by Eduardo Chakraborty RN  Safety Promotion/Fall Prevention:   activity supervised   lighting adjusted   nonskid shoes/slippers when out of bed   room organization consistent  Goal: Optimal Pain Control and Function  Outcome: Progressing  Goal: Absence of Vascular Access Complication  Outcome: Progressing  Intervention: Prevent and Manage Access Complications  Recent Flowsheet Documentation  Taken 1/9/2025 1847 by Eduardo Chakraborty RN  Activity Management: activity adjusted per tolerance  Head of Bed (HOB) Positioning: HOB at 30 degrees     Problem: Gas Exchange Impaired  Goal: Optimal Gas Exchange  Outcome: Progressing  Intervention: Optimize Oxygenation and Ventilation  Recent Flowsheet Documentation  Taken 1/9/2025 1847 by Eduardo Chakraborty RN  Head of Bed (HOB) Positioning: HOB at 30 degrees     Problem: Comorbidity Management  Goal: Maintenance of COPD Symptom Control  Outcome: Progressing  Intervention: Maintain COPD Symptom Control  Recent Flowsheet Documentation  Taken 1/9/2025 1847 by Eduardo Chakraborty RN  Medication Review/Management: medications reviewed  Goal: Blood Glucose Levels Within Targeted Range  Outcome: Progressing  Intervention: Monitor and Manage Glycemia  Recent Flowsheet Documentation  Taken 1/9/2025 1847 by Eduardo Chakraborty RN  Medication Review/Management:  medications reviewed  Goal: Blood Pressure in Desired Range  Outcome: Progressing  Intervention: Maintain Blood Pressure Management  Recent Flowsheet Documentation  Taken 1/9/2025 1847 by Eduardo Chakraborty RN  Medication Review/Management: medications reviewed     Problem: Fall Injury Risk  Goal: Absence of Fall and Fall-Related Injury  Outcome: Progressing  Intervention: Identify and Manage Contributors  Recent Flowsheet Documentation  Taken 1/9/2025 1847 by Eduardo Chakraborty RN  Medication Review/Management: medications reviewed  Intervention: Promote Injury-Free Environment  Recent Flowsheet Documentation  Taken 1/9/2025 1847 by Eduardo Chakraborty RN  Safety Promotion/Fall Prevention:   activity supervised   lighting adjusted   nonskid shoes/slippers when out of bed   room organization consistent     Problem: Infection  Goal: Absence of Infection Signs and Symptoms  Outcome: Progressing  Intervention: Prevent or Manage Infection  Recent Flowsheet Documentation  Taken 1/9/2025 1847 by Eduardo Chakraborty RN  Isolation Precautions: droplet precautions maintained     Problem: Orthopaedic Fracture  Goal: Fracture Stability  Outcome: Progressing  Goal: Optimal Functional Ability  Outcome: Progressing  Intervention: Optimize Functional Ability  Recent Flowsheet Documentation  Taken 1/9/2025 1847 by Eduardo Chakraborty RN  Activity Management: activity adjusted per tolerance  Positioning/Transfer Devices:   pillows   in use     Problem: Pneumonia  Goal: Fluid Balance  Outcome: Progressing  Goal: Resolution of Infection Signs and Symptoms  Outcome: Progressing  Intervention: Prevent Infection Progression  Recent Flowsheet Documentation  Taken 1/9/2025 1847 by Eduardo Chakraborty RN  Isolation Precautions: droplet precautions maintained  Goal: Effective Oxygenation and Ventilation  Outcome: Progressing  Intervention: Promote Airway Secretion Clearance  Recent Flowsheet Documentation  Taken 1/9/2025 1847 by Mylene  Eduardo RODRÍGUEZ RN  Cough And Deep Breathing: done independently per patient  Activity Management: activity adjusted per tolerance  Intervention: Optimize Oxygenation and Ventilation  Recent Flowsheet Documentation  Taken 1/9/2025 1847 by Eduardo Chakraborty, RN  Head of Bed (HOB) Positioning: HOB at 30 degrees   Goal Outcome Evaluation:       Pt is alert and oriented x4, able to make his needs known, on 3 L oxygen NC. VSS and will continue to monitor.

## 2025-01-10 NOTE — PROGRESS NOTES
Care Management Follow Up    Length of Stay (days): 6    Expected Discharge Date: 01/10/2025     Concerns to be Addressed:       Patient plan of care discussed at interdisciplinary rounds: Yes    Anticipated Discharge Disposition:                Anticipated Discharge Services:    Anticipated Discharge DME:      Patient/family educated on Medicare website which has current facility and service quality ratings:  na  Education Provided on the Discharge Plan:  yes  Patient/Family in Agreement with the Plan:  na    Referrals Placed by CM/SW:    Private pay costs discussed: transportation costs    Discussed  Partnership in Safe Discharge Planning  document with patient/family: No     Handoff Completed: No, handoff not indicated or clinically appropriate    Additional Information:  Met with patient in room.  He is declining TCU  as he believes he is moving well enough to go home. He declines homecare. Patient having a hard time finding a ride. Offered to set one up for him but he states he cannot afford it. Gave patient resources to call for transport. Patient states his daughter can give him a ride home on Saturday.    CM was consulted for [facility] concern about housing. Spoke with patient and he does not have a concern regarding housing.  Consult completed  Next Steps: discharge when ready. Family to transport    Roma Handley RN

## 2025-01-10 NOTE — PLAN OF CARE
Problem: Gas Exchange Impaired  Goal: Optimal Gas Exchange  Intervention: Optimize Oxygenation and Ventilation  Recent Flowsheet Documentation  Taken 1/10/2025 0545 by Panda Gongora RN  Head of Bed (HOB) Positioning: HOB at 20-30 degrees  Taken 1/10/2025 0145 by Panda Gongora RN  Head of Bed (HOB) Positioning: HOB at 20-30 degrees  Taken 1/10/2025 0000 by Panda Gongora RN  Head of Bed (HOB) Positioning: HOB at 30 degrees     Problem: Comorbidity Management  Goal: Maintenance of COPD Symptom Control  Intervention: Maintain COPD Symptom Control  Recent Flowsheet Documentation  Taken 1/10/2025 0000 by Panda Gongora RN  Medication Review/Management: medications reviewed     Problem: Fall Injury Risk  Goal: Absence of Fall and Fall-Related Injury  Intervention: Identify and Manage Contributors  Recent Flowsheet Documentation  Taken 1/10/2025 0000 by Panda Gongora RN  Medication Review/Management: medications reviewed     Problem: Orthopaedic Fracture  Goal: Fracture Stability  Outcome: Progressing   Goal Outcome Evaluation:       Pt  A/Ox4, able to communicate needs,  VS on  on 3 L  NC. Pt takes pills whole, ambulates with a stand-by assist to the restroom. Pt slept good, no acute changes overnight.

## 2025-01-10 NOTE — PROGRESS NOTES
[facility]    Medicine Progress Note - Hospitalist Service    Date of Admission:  1/4/2025    Assessment & Plan   Raul Wilhelm a 79-year-old male with history of CAD status post PCI, type 2 diabetes on insulin, COPD (never smoked but had occupational exposure) on intermittent oral prednisone who was admitted on 1/4 for inferior STEMI status post coronary angiogram with stenting.  He was also found to have influenza A with acute respiratory failure on high flow nasal cannula.  Medicine service was consulted to manage noncardiac medical conditions.    STEMI s/p PCI 1/4/25  - Currently on aspirin and Brilinta.  Also 80mg atorvastatin and Coreg 6.25 mg twice daily. He will be evaluated further at the multidisciplinary heart conference to determine the best course of action for revascularization of his residual coronary artery disease which would be done as an outpatient, will have outpatient cardiology follow up.     Acute respiratory  failure  Influenza A infection  Possible bacterial infection (positive sputum culture)  COPD exacerbation (not on home O2)  -Tamiflu to complete 5-day course (started on 1/4)  -ceftriaxone 2 g daily and doxycycline for at least 5 days for empiric treatment of bacterial pneumonia  -Prednisone 40 mg daily for 5 days  -Continue high flow nasal cannula, wean O2 as able  -DuoNebs every 4 hours with RT    Type 2 diabetes   -Lantus 22 units daily  -Sliding scale insulin.   -Hold PTA glipizide    Recent fall with right-sided rib fractures 12/13  CT showed right-sided rib fractures 5-7. Cleared by general surgery at that time  - Scheduled Tylenol, add lidocaine patch    GERD  - Continue daily PPI          Diet: Low Saturated Fat Na <2400 mg  Diet    DVT Prophylaxis: DOAC  Diaz Catheter: Not present  Lines: None     Cardiac Monitoring: None  Code Status: Full Code      Clinically Significant Risk Factors               # Hypoalbuminemia: Lowest albumin = 3.4 g/dL at  1/9/2025  6:08 AM, will monitor as appropriate     # Hypertension: Noted on problem list           # DMII: A1C = 8.1 % (Ref range: <5.7 %) within past 6 months    # Severe Malnutrition: based on nutrition assessment    # Asthma: noted on problem list        Social Drivers of Health    Housing Stability: Low Risk  (1/10/2025)    Housing Stability     Do you have housing? : Yes     Are you worried about losing your housing?: No   Recent Concern: Housing Stability - High Risk (1/5/2025)    Housing Stability     Do you have housing? : Yes     Are you worried about losing your housing?: Yes          Disposition Plan     Medically Ready for Discharge: Anticipated Tomorrow             Susan Diez MD  Hospitalist Service  [facility]  Securely message with [employer] (more info)  Text page via [employer] Paging/Directory   ______________________________________________________________________    Interval History   Andra is new to me today.  He has no acute complaints.  Will discharge home tomorrow.    Physical Exam   Vital Signs: Temp: 97.6  F (36.4  C) Temp src: Oral BP: 134/74 Pulse: 80   Resp: 20 SpO2: 95 % O2 Device: Nasal cannula Oxygen Delivery: 1 LPM  Weight: 152 lbs 15.99 oz    General Appearance: Awake, alert, in no acute distress  Respiratory: CTAB, no wheeze  Cardiovascular: RRR, no murmur noted  GI: soft, nontender, non distended, normal bowel sounds  Skin: no jaundice, no rash      Medical Decision Making       45 MINUTES SPENT BY ME on the date of service doing chart review, history, exam, documentation & further activities per the note.      Data         Imaging results reviewed over the past 24 hrs:   No results found for this or any previous visit (from the past 24 hours).

## 2025-01-11 ENCOUNTER — APPOINTMENT (OUTPATIENT)
Dept: OCCUPATIONAL THERAPY | Facility: HOSPITAL | Age: 80
DRG: 321 | End: 2025-01-11
Attending: INTERNAL MEDICINE
Payer: COMMERCIAL

## 2025-01-11 VITALS
OXYGEN SATURATION: 92 % | WEIGHT: 153 LBS | SYSTOLIC BLOOD PRESSURE: 121 MMHG | TEMPERATURE: 97.6 F | BODY MASS INDEX: 23.26 KG/M2 | RESPIRATION RATE: 18 BRPM | HEART RATE: 84 BPM | DIASTOLIC BLOOD PRESSURE: 73 MMHG

## 2025-01-11 LAB
GLUCOSE BLDC GLUCOMTR-MCNC: 179 MG/DL (ref 70–99)
GLUCOSE BLDC GLUCOMTR-MCNC: 182 MG/DL (ref 70–99)
GLUCOSE BLDC GLUCOMTR-MCNC: 216 MG/DL (ref 70–99)
GLUCOSE BLDC GLUCOMTR-MCNC: 234 MG/DL (ref 70–99)

## 2025-01-11 PROCEDURE — 94640 AIRWAY INHALATION TREATMENT: CPT

## 2025-01-11 PROCEDURE — 250N000013 HC RX MED GY IP 250 OP 250 PS 637: Performed by: INTERNAL MEDICINE

## 2025-01-11 PROCEDURE — 97110 THERAPEUTIC EXERCISES: CPT | Mod: GO

## 2025-01-11 PROCEDURE — 99239 HOSP IP/OBS DSCHRG MGMT >30: CPT | Performed by: INTERNAL MEDICINE

## 2025-01-11 PROCEDURE — 97535 SELF CARE MNGMENT TRAINING: CPT | Mod: GO

## 2025-01-11 PROCEDURE — 250N000013 HC RX MED GY IP 250 OP 250 PS 637: Performed by: STUDENT IN AN ORGANIZED HEALTH CARE EDUCATION/TRAINING PROGRAM

## 2025-01-11 PROCEDURE — 94799 UNLISTED PULMONARY SVC/PX: CPT

## 2025-01-11 PROCEDURE — 250N000009 HC RX 250: Performed by: INTERNAL MEDICINE

## 2025-01-11 RX ADMIN — ACETAMINOPHEN 650 MG: 325 TABLET ORAL at 08:10

## 2025-01-11 RX ADMIN — ACETYLCYSTEINE 4 ML: 100 SOLUTION ORAL; RESPIRATORY (INHALATION) at 08:02

## 2025-01-11 RX ADMIN — IPRATROPIUM BROMIDE AND ALBUTEROL SULFATE 3 ML: .5; 3 SOLUTION RESPIRATORY (INHALATION) at 08:02

## 2025-01-11 RX ADMIN — TRIAMCINOLONE ACETONIDE: 1 CREAM TOPICAL at 08:10

## 2025-01-11 RX ADMIN — LOSARTAN POTASSIUM AND HYDROCHLOROTHIAZIDE 1 TABLET: 50; 12.5 TABLET, FILM COATED ORAL at 08:10

## 2025-01-11 RX ADMIN — CARVEDILOL 12.5 MG: 12.5 TABLET, FILM COATED ORAL at 08:10

## 2025-01-11 RX ADMIN — PANTOPRAZOLE SODIUM 40 MG: 40 TABLET, DELAYED RELEASE ORAL at 05:50

## 2025-01-11 RX ADMIN — ASPIRIN 81 MG: 81 TABLET, COATED ORAL at 08:09

## 2025-01-11 RX ADMIN — TICAGRELOR 90 MG: 90 TABLET ORAL at 08:10

## 2025-01-11 ASSESSMENT — ACTIVITIES OF DAILY LIVING (ADL)
ADLS_ACUITY_SCORE: 46
ADLS_ACUITY_SCORE: 51
ADLS_ACUITY_SCORE: 47
ADLS_ACUITY_SCORE: 46
ADLS_ACUITY_SCORE: 46
ADLS_ACUITY_SCORE: 47
ADLS_ACUITY_SCORE: 46
ADLS_ACUITY_SCORE: 46
ADLS_ACUITY_SCORE: 51

## 2025-01-11 NOTE — PLAN OF CARE
Problem: Adult Inpatient Plan of Care  Goal: Plan of Care Review  Description: The Plan of Care Review/Shift note should be completed every shift.  The Outcome Evaluation is a brief statement about your assessment that the patient is improving, declining, or no change.  This information will be displayed automatically on your shift  note.  Outcome: Progressing     Problem: Cardiac Catheterization (Diagnostic/Interventional)  Goal: Absence of Bleeding  Outcome: Progressing     Problem: Gas Exchange Impaired  Goal: Optimal Gas Exchange  Outcome: Progressing  Intervention: Optimize Oxygenation and Ventilation  Recent Flowsheet Documentation  Taken 1/10/2025 2133 by Radha Orlando RN  Head of Bed (HOB) Positioning: HOB at 30 degrees     Problem: Comorbidity Management  Goal: Maintenance of COPD Symptom Control  Outcome: Progressing  Intervention: Maintain COPD Symptom Control  Recent Flowsheet Documentation  Taken 1/10/2025 2133 by Radha Orlando RN  Medication Review/Management: medications reviewed     Problem: Fall Injury Risk  Goal: Absence of Fall and Fall-Related Injury  Outcome: Progressing  Intervention: Identify and Manage Contributors  Recent Flowsheet Documentation  Taken 1/10/2025 2133 by Radha Orlando RN  Medication Review/Management: medications reviewed  Intervention: Promote Injury-Free Environment  Recent Flowsheet Documentation  Taken 1/10/2025 2133 by Radha Orlando, RN  Safety Promotion/Fall Prevention:   activity supervised   lighting adjusted   nonskid shoes/slippers when out of bed   room organization consistent     Problem: Pneumonia  Goal: Fluid Balance  Outcome: Progressing     Problem: Adult Inpatient Plan of Care  Goal: Absence of Hospital-Acquired Illness or Injury  Intervention: Identify and Manage Fall Risk  Recent Flowsheet Documentation  Taken 1/10/2025 2133 by Radha Orlando RN  Safety Promotion/Fall Prevention:   activity supervised   lighting adjusted   nonskid  shoes/slippers when out of bed   room organization consistent  Intervention: Prevent Skin Injury  Recent Flowsheet Documentation  Taken 1/10/2025 2133 by Radha Orlando RN  Body Position:   position changed independently   weight shifting  Intervention: Prevent and Manage VTE (Venous Thromboembolism) Risk  Recent Flowsheet Documentation  Taken 1/10/2025 2133 by Radha Orlando RN  VTE Prevention/Management: SCDs on (sequential compression devices)     Problem: Cardiac Catheterization (Diagnostic/Interventional)  Goal: Absence of Embolism Signs and Symptoms  Intervention: Prevent or Manage Embolism  Recent Flowsheet Documentation  Taken 1/10/2025 2133 by Radha Orlando RN  VTE Prevention/Management: SCDs on (sequential compression devices)  Goal: Anesthesia/Sedation Recovery  Intervention: Optimize Anesthesia Recovery  Recent Flowsheet Documentation  Taken 1/10/2025 2133 by Radha Orlando RN  Safety Promotion/Fall Prevention:   activity supervised   lighting adjusted   nonskid shoes/slippers when out of bed   room organization consistent  Goal: Absence of Vascular Access Complication  Intervention: Prevent and Manage Access Complications  Recent Flowsheet Documentation  Taken 1/10/2025 2133 by Radha Orlando RN  Activity Management: activity adjusted per tolerance  Head of Bed (HOB) Positioning: HOB at 30 degrees     Problem: Comorbidity Management  Goal: Blood Glucose Levels Within Targeted Range  Intervention: Monitor and Manage Glycemia  Recent Flowsheet Documentation  Taken 1/10/2025 2133 by Radha Orlando RN  Medication Review/Management: medications reviewed  Goal: Blood Pressure in Desired Range  Intervention: Maintain Blood Pressure Management  Recent Flowsheet Documentation  Taken 1/10/2025 2133 by Radha Orlando RN  Medication Review/Management: medications reviewed     Problem: Infection  Goal: Absence of Infection Signs and Symptoms  Intervention: Prevent or Manage Infection  Recent Flowsheet  Documentation  Taken 1/10/2025 2133 by Radha Orlando RN  Isolation Precautions: droplet precautions maintained     Problem: Orthopaedic Fracture  Goal: Optimal Functional Ability  Intervention: Optimize Functional Ability  Recent Flowsheet Documentation  Taken 1/10/2025 2133 by Radha Orlando, RN  Activity Management: activity adjusted per tolerance  Positioning/Transfer Devices:   pillows   in use     Problem: Pneumonia  Goal: Resolution of Infection Signs and Symptoms  Intervention: Prevent Infection Progression  Recent Flowsheet Documentation  Taken 1/10/2025 2133 by Radha Orlando RN  Isolation Precautions: droplet precautions maintained  Goal: Effective Oxygenation and Ventilation  Intervention: Promote Airway Secretion Clearance  Recent Flowsheet Documentation  Taken 1/10/2025 2133 by Radha Orlando RN  Cough And Deep Breathing: done independently per patient  Activity Management: activity adjusted per tolerance  Intervention: Optimize Oxygenation and Ventilation  Recent Flowsheet Documentation  Taken 1/10/2025 2133 by Radha Orlando, RN  Head of Bed (HOB) Positioning: HOB at 30 degrees   Goal Outcome Evaluation:                    Pt. A&O. Denied pain this shift. Prefers meds whole with apple sauce. . On 1L NC.  Independent. Continue with POC.

## 2025-01-11 NOTE — PLAN OF CARE
Goal Outcome Evaluation:               Problem: Gas Exchange Impaired  Goal: Optimal Gas Exchange  Outcome: Progressing  Intervention: Optimize Oxygenation and Ventilation  Recent Flowsheet Documentation  Taken 1/10/2025 0830 by Mariel Goyal RN  Head of Bed (HOB) Positioning: HOB at 30 degrees     Problem: Comorbidity Management  Goal: Maintenance of COPD Symptom Control  Outcome: Progressing  Intervention: Maintain COPD Symptom Control  Recent Flowsheet Documentation  Taken 1/10/2025 0830 by Mariel Goyal RN  Medication Review/Management: medications reviewed  Goal: Blood Glucose Levels Within Targeted Range  Outcome: Progressing  Intervention: Monitor and Manage Glycemia  Recent Flowsheet Documentation  Taken 1/10/2025 0830 by Mariel Goyal RN  Medication Review/Management: medications reviewed  Goal: Blood Pressure in Desired Range  Outcome: Progressing  Intervention: Maintain Blood Pressure Management  Recent Flowsheet Documentation  Taken 1/10/2025 0830 by Mariel Goyal RN  Medication Review/Management: medications reviewed   Pt weaned to 1 LNC sating 92-94%, higher O2 level when talking. Tolerates diet well with no coughing noted. Ambulates presley well with SBA, up to br independently. Pt to discharge to home tomorrow.

## 2025-01-11 NOTE — PLAN OF CARE
Cardiac Rehab Discharge Summary    Reason for therapy discharge:    Discharged to home with outpatient therapy.    Progress towards therapy goal(s). See goals on Care Plan in [facility] electronic health record for goal details.  Goals partially met.  Barriers to achieving goals:   discharge from facility.    Therapy recommendation(s):    Continued therapy is recommended.  Rationale/Recommendations:  Outpt. Cardiac rehab and home walking program.

## 2025-01-11 NOTE — CARE PLAN
01/11/25 1017   Home Oxygen Assessment (RN/RT ONLY)   Does patient have oxygen at home? No   1. SpO2 on room air at rest while awake 92%       Oxygen LPM at rest 2   3. SpO2 on room air during Activity/with exercise 93   4. SpO2 with oxygen during activity/with exercise 92       Oxygen LPM during activity/with exercise 2   Does patient qualify for Home O2? No

## 2025-01-11 NOTE — PLAN OF CARE
"  Problem: Adult Inpatient Plan of Care  Goal: Plan of Care Review  Description: The Plan of Care Review/Shift note should be completed every shift.  The Outcome Evaluation is a brief statement about your assessment that the patient is improving, declining, or no change.  This information will be displayed automatically on your shift  note.  Outcome: Progressing  Goal: Patient-Specific Goal (Individualized)  Description: You can add care plan individualizations to a care plan. Examples of Individualization might be:  \"Parent requests to be called daily at 9am for status\", \"I have a hard time hearing out of my right ear\", or \"Do not touch me to wake me up as it startles  me\".  Outcome: Progressing  Goal: Absence of Hospital-Acquired Illness or Injury  Outcome: Progressing  Intervention: Identify and Manage Fall Risk  Recent Flowsheet Documentation  Taken 1/11/2025 0100 by Marley Anderson RN  Safety Promotion/Fall Prevention:   activity supervised   lighting adjusted   nonskid shoes/slippers when out of bed   room organization consistent  Intervention: Prevent Skin Injury  Recent Flowsheet Documentation  Taken 1/11/2025 0100 by Marley Anderson RN  Body Position:   position changed independently   weight shifting  Intervention: Prevent and Manage VTE (Venous Thromboembolism) Risk  Recent Flowsheet Documentation  Taken 1/11/2025 0100 by Marley Anderson RN  VTE Prevention/Management: SCDs on (sequential compression devices)  Goal: Optimal Comfort and Wellbeing  Outcome: Progressing  Goal: Readiness for Transition of Care  Outcome: Progressing     Problem: Cardiac Catheterization (Diagnostic/Interventional)  Goal: Absence of Bleeding  Outcome: Progressing  Goal: Absence of Contrast-Induced Injury  Outcome: Progressing  Goal: Stable Heart Rate and Rhythm  Outcome: Progressing  Goal: Absence of Embolism Signs and Symptoms  Outcome: Progressing  Intervention: Prevent or Manage Embolism  Recent Flowsheet Documentation  Taken " 1/11/2025 0100 by Marley Anderson RN  VTE Prevention/Management: SCDs on (sequential compression devices)  Goal: Anesthesia/Sedation Recovery  Outcome: Progressing  Intervention: Optimize Anesthesia Recovery  Recent Flowsheet Documentation  Taken 1/11/2025 0100 by Marley Anderson RN  Safety Promotion/Fall Prevention:   activity supervised   lighting adjusted   nonskid shoes/slippers when out of bed   room organization consistent  Goal: Optimal Pain Control and Function  Outcome: Progressing  Goal: Absence of Vascular Access Complication  Outcome: Progressing  Intervention: Prevent and Manage Access Complications  Recent Flowsheet Documentation  Taken 1/11/2025 0100 by Marley Anderson RN  Activity Management: activity adjusted per tolerance  Head of Bed (HOB) Positioning: HOB at 30 degrees     Problem: Gas Exchange Impaired  Goal: Optimal Gas Exchange  Outcome: Progressing  Intervention: Optimize Oxygenation and Ventilation  Recent Flowsheet Documentation  Taken 1/11/2025 0100 by Marley Anderson RN  Head of Bed (HOB) Positioning: HOB at 30 degrees     Problem: Comorbidity Management  Goal: Maintenance of COPD Symptom Control  Outcome: Progressing  Intervention: Maintain COPD Symptom Control  Recent Flowsheet Documentation  Taken 1/11/2025 0100 by Marley Anderson RN  Medication Review/Management: medications reviewed  Goal: Blood Glucose Levels Within Targeted Range  Outcome: Progressing  Intervention: Monitor and Manage Glycemia  Recent Flowsheet Documentation  Taken 1/11/2025 0100 by Marley Anderson RN  Medication Review/Management: medications reviewed  Goal: Blood Pressure in Desired Range  Outcome: Progressing  Intervention: Maintain Blood Pressure Management  Recent Flowsheet Documentation  Taken 1/11/2025 0100 by Marley Anderson RN  Medication Review/Management: medications reviewed     Problem: Fall Injury Risk  Goal: Absence of Fall and Fall-Related Injury  Outcome: Progressing  Intervention: Identify and Manage  Contributors  Recent Flowsheet Documentation  Taken 1/11/2025 0100 by Marley Anderson RN  Medication Review/Management: medications reviewed  Intervention: Promote Injury-Free Environment  Recent Flowsheet Documentation  Taken 1/11/2025 0100 by Marley Anderson RN  Safety Promotion/Fall Prevention:   activity supervised   lighting adjusted   nonskid shoes/slippers when out of bed   room organization consistent     Problem: Infection  Goal: Absence of Infection Signs and Symptoms  Outcome: Progressing  Intervention: Prevent or Manage Infection  Recent Flowsheet Documentation  Taken 1/11/2025 0100 by Marley Anderson RN  Isolation Precautions: droplet precautions maintained     Problem: Orthopaedic Fracture  Goal: Fracture Stability  Outcome: Progressing  Goal: Optimal Functional Ability  Outcome: Progressing  Intervention: Optimize Functional Ability  Recent Flowsheet Documentation  Taken 1/11/2025 0100 by Marley Anderson RN  Activity Management: activity adjusted per tolerance  Positioning/Transfer Devices:   pillows   in use     Problem: Pneumonia  Goal: Fluid Balance  Outcome: Progressing  Goal: Resolution of Infection Signs and Symptoms  Outcome: Progressing  Intervention: Prevent Infection Progression  Recent Flowsheet Documentation  Taken 1/11/2025 0100 by Marley Anderson RN  Isolation Precautions: droplet precautions maintained  Goal: Effective Oxygenation and Ventilation  Outcome: Progressing  Intervention: Promote Airway Secretion Clearance  Recent Flowsheet Documentation  Taken 1/11/2025 0100 by Marley Anderson RN  Cough And Deep Breathing: done independently per patient  Activity Management: activity adjusted per tolerance  Intervention: Optimize Oxygenation and Ventilation  Recent Flowsheet Documentation  Taken 1/11/2025 0100 by Marley Anderson RN  Head of Bed (HOB) Positioning: HOB at 30 degrees   Goal Outcome Evaluation:  NURSING PROGRESS NOTE  Shift Summary      Date: January 11, 2025     Neuro/Musculoskeletal:   A&Ox4.   Cardiac:  SR.  VSS.     Respiratory:  Sating in the 90s on 1L via NC.  GI/:  Adequate urine output.  LBM: 1/10  Diet/Appetite:  Tolerating Reg diet.  Activity:  Assist of independent or SBA   Pain:  Denies.   Skin:  No new deficits noted.   LDAs + Drips/IVF:  PIV  Protocols/Labs:  0 protocols, bmp and creatine to be drawn this am    Pertinent Shift Updates:  pt slept well overnight      Plan:  Discharge when ready, family to transport. Pt is refusing TCU or homecare.       Marley Anderson RN  ....................................................

## 2025-01-11 NOTE — PROGRESS NOTES
Care Management Discharge Note    Discharge Date: 01/11/2025       Discharge Disposition: Home    Discharge Services: Transportation Services    Discharge DME: None    Discharge Transportation: family or friend will provide    Private pay costs discussed: Not applicable    Does the patient's insurance plan have a 3 day qualifying hospital stay waiver?  No    PAS Confirmation Code:    Patient/family educated on Medicare website which has current facility and service quality ratings: no    Education Provided on the Discharge Plan: Yes  Persons Notified of Discharge Plans: Pt  Patient/Family in Agreement with the Plan: yes    Handoff Referral Completed: No, handoff not indicated or clinically appropriate    Additional Information:  Pt to discharge home today 1/11; family to transport. SW informed by MD that Pt is needing transportation resources for rides to medical appointments. SW provided list of transportation resources in [facility] (where Pt resides) to Pt. Pt states he might be able to drive himself but is appreciative of resources. No further anticipated or requested CM needs at time of discharge.     JUNE CarpenterW

## 2025-01-11 NOTE — PLAN OF CARE
Problem: Adult Inpatient Plan of Care  Goal: Optimal Comfort and Wellbeing  Intervention: Provide Person-Centered Care  Recent Flowsheet Documentation  Taken 1/11/2025 1017 by Tere Godwin RN  Trust Relationship/Rapport: care explained   Goal Outcome Evaluation:  Denied pain.  Ready for discharge.   Discharge paperwork reviewed with pt per charge RN.  IV removed.  Pt did not need home O2. Pt left via wheelchair with NA.

## 2025-01-11 NOTE — DISCHARGE SUMMARY
[facility]  Hospitalist Discharge Summary      Date of Admission:  1/4/2025  Date of Discharge:  1/11/2025  2:31 PM  Discharging Provider: Susan Diez MD  Discharge Service: Hospitalist Service    Discharge Diagnoses   STEMI status post PCI 1/4/2025 with stents x2  Severe multivessel CAD  Acute respiratory failure- resolved  Influenza A  Possible bacterial pneumonia  COPD  Diabetes mellitus type 2  Fall  Fractures of ribs 5 through 7  GERD    Clinically Significant Risk Factors     # DMII: A1C = 8.1 % (Ref range: <5.7 %) within past 6 months  # Severe Malnutrition: based on nutrition assessment      Follow-ups Needed After Discharge   Follow-up Appointments       Hospital Follow-up with Existing Primary Care Provider (PCP)      Please see details below         Schedule Primary Care visit within: 7 Days       Follow Up      Follow up with Cardiology                Unresulted Labs Ordered in the Past 30 Days of this Admission       No orders found from 12/5/2024 to 1/5/2025.        These results will be followed up by Susan Diez    Discharge Disposition   Discharged to home  Condition at discharge: Stable    Hospital Course   Raul Wilhelm a 79-year-old male with history of CAD status post PCI, type 2 diabetes on insulin, COPD (never smoked but had occupational exposure) on intermittent oral prednisone who was admitted on 1/4 for inferior STEMI status post coronary angiogram with stenting.  He was also found to have influenza A with acute respiratory failure on high flow nasal cannula.  He was still requiring oxygen at the time of discharge so was set up with home oxygen.     STEMI s/p PCI 1/4/25, stents x2  Severe multivessel CAD  -Continue aspirin and Brilinta.    -continue 80mg atorvastatin and Coreg 6.25 mg twice daily.   -He will be evaluated further at the multidisciplinary heart conference to determine the best course of action for revascularization of his residual  coronary artery disease which would be done as an outpatient, will have outpatient cardiology follow up.     Acute respiratory  failure- resolved  Influenza A infection  Possible bacterial infection (positive sputum culture)  COPD exacerbation (not on home O2)  -Tamiflu to complete 5-day course (started on 1/4)  -completed ceftriaxone 2 g daily and doxycycline for at least 5 days for empiric treatment of bacterial pneumonia  -completed Prednisone 40 mg daily for 5 days    Type 2 diabetes   -restarted on home medication at discharge     Recent fall with right-sided rib fractures 12/13  -CT showed right-sided rib fractures 5-7. Cleared by general surgery at that time    GERD  - Continue daily PPI    Consultations This Hospital Stay   HOSPITALIST IP CONSULT  NUTRITION SERVICES ADULT IP CONSULT  CARDIAC REHAB IP CONSULT  SPEECH LANGUAGE PATH ADULT IP CONSULT  SPEECH LANGUAGE PATH ADULT IP CONSULT  CARE MANAGEMENT / SOCIAL WORK IP CONSULT  SPIRITUAL HEALTH SERVICES IP CONSULT  CARE MANAGEMENT / SOCIAL WORK IP CONSULT  SMOKING CESSATION PROGRAM IP CONSULT    Code Status   Full Code    Time Spent on this Encounter   I, Susan Diez MD, personally saw the patient today and spent greater than 30 minutes discharging this patient.       Susan Diez MD  [place] [id number]  Phone: [phone]  Fax: [phone]  ______________________________________________________________________    Physical Exam   Vital Signs: Temp: 97.6  F (36.4  C) Temp src: Oral BP: 121/73 Pulse: 84   Resp: 18 SpO2: 92 % O2 Device: None (Room air) Oxygen Delivery: 1 LPM  Weight: 152 lbs 15.99 oz  General Appearance: Awake, alert, anxious   Respiratory: CTAB, no wheeze  Cardiovascular: RRR, no murmur noted  GI: soft, nontender, non distended, normal bowel sounds  Skin: no jaundice, no rash         Primary Care Physician   Sarita Hennessy    Discharge Orders      Lipid Profile     Schedule Lipid profile in 4 weeks     Cardiac Rehab  Referral      Ambulatory Cardiologist Referral      Follow-Up with Cardiology Ambulatory Heart Care [facility] Referral      Primary Care - Care Coordination Referral      Reason Lipid Lowering Medications not prescribed from this order set    Already on statin     Reason for your hospital stay    inferior STEMI status post coronary angiogram with stenting, found to have influenza A with acute respiratory failure     Activity    Your activity upon discharge: activity as tolerated     Follow Up    Follow up with Cardiology     Oxygen Adult/Peds    Oxygen Documentation  I certify that this patient, Raul Wilhelm has been under my care (or a nurse practitioner or physican's assistant working with me). This is the face-to-face encounter for oxygen medical necessity.      At the time of this encounter, I have reviewed the qualifying testing and have determined that supplemental oxygen is reasonable and necessary and is expected to improve the patient's condition in a home setting.         Patient has continued oxygen desaturation due to COPD J44.9  Influenza J10.1.    If portability is ordered, is the patient mobile within the home? yes    Was this visit performed as a telehealth visit: No     Diet    Follow this diet upon discharge: Current Diet:Orders Placed This Encounter      Snacks/Supplements Adult: Ensure Enlive; With Meals      Snacks/Supplements Adult: Gelatein Plus; With Meals      Snacks/Supplements Adult: Magic Cup; With Meals      Low Saturated Fat Na <2400 mg     Hospital Follow-up with Existing Primary Care Provider (PCP)    Please see details below            Significant Results and Procedures   Most Recent 3 CBC's:  Recent Labs   Lab Test 01/09/25  0608 01/06/25  0415 01/05/25  0425   WBC 12.4* 9.9 11.4*   HGB 14.8 14.0 13.6   MCV 91 92 90    205 217     Most Recent 3 BMP's:  Recent Labs   Lab Test 01/11/25  1219 01/11/25  0811  01/11/25  0716 01/09/25  0744 01/09/25  0608 01/06/25  0737 01/06/25  0415 01/05/25  0805 01/05/25  0425   NA  --   --   --   --  141  --  139  --  135   POTASSIUM  --   --   --   --  4.8  --  4.0  --  3.8   CHLORIDE  --   --   --   --  101  --  102  --  99   CO2  --   --   --   --  33*  --  27  --  26   BUN  --   --   --   --  30.2*  --  24.5*  --  16.9   CR  --   --   --   --  1.17  --  0.93  --  0.84   ANIONGAP  --   --   --   --  7  --  10  --  10   JOSE RAFAEL  --   --   --   --  9.6  --  9.6  --  9.3   * 234* 179*   < > 117*   < > 198*   < > 203*    < > = values in this interval not displayed.     Most Recent 3 Troponin's:  Recent Labs   Lab Test 03/29/18  0028   TROPI <0.015     Most Recent 3 BNP's:  Recent Labs   Lab Test 01/04/25 1954   NTBNPI 562   ,   Results for orders placed or performed during the hospital encounter of 01/04/25   XR Chest Port 1 View    Narrative    EXAM: XR CHEST PORT 1 VIEW  LOCATION: [facility]  DATE: 1/4/2025    INDICATION: Shortness of breath.  COMPARISON: 12/13/2024      Impression    IMPRESSION: Few healing right rib fractures. Chest otherwise negative. Lungs clear. No substantial change.   XR Video Swallow with SLP or OT    Narrative    EXAM: XR VIDEO SWALLOW WITH SLP OR OT  LOCATION: [facility]  DATE: 1/9/2025    INDICATION: Difficulty swallowing.. Regurgitate inferior to it.  COMPARISON: None.    TECHNIQUE: Routine swallow study with speech pathology using multiple barium thicknesses.    RADIATION DOSE: Total Air Kerma 76.23 mGy    FINDINGS:   Swallow study with Speech Pathology using multiple barium thicknesses.     There is a small Zenker's diverticula which measures 7 x 7 mm poorly distended. There is retention of barium within this even with multiple swallows.    Mild residua within the vallecula. No evidence of aspiration or penetration.    Please see speech pathology note.     XR Esophagram    Narrative    EXAM: XR  ESOPHAGRAM SINGLE CONTRAST  LOCATION: [facility]  DATE: 2025    INDICATION: dysphagia, globus sensation and solids feeling stuck  COMPARISON: None.  TECHNIQUE: Routine.    RADIATION DOSE: Total Air Kerma 379.53 mGy    FINDINGS: Modified esophagram was performed due to his notable shortness of breath, need for oxygen and pain from his recent rib fractures. He was only able to take small sips of barium at a time with incomplete distension of the esophagus.    ESOPHAGUS: A Zenker's diverticulum noted which measures 14 x 10 mm  when distended with the patient prone. This does not completely empty with multiple swallows. Primary stripping wave is normal given age with slight retention of barium in the upper   esophagus above the aortic arch. Few tertiary contractions are noted. No hiatal hernia.    Did not assess for reflux given posterior rib fractures pain.    Prompt passage of contrast into the stomach into the duodenum.      Impression    IMPRESSION:   1.  Patient has a Zenker's diverticulum which measures up to 1.4 x 1 cm when distended. This does not completely empty even with multiple swallows and would explain  his symptoms of regurgitating food/dysphagia.  2.  No hiatal hernia.   Echocardiogram Complete     Value    LVEF  55-60%    Narrative    [id number]  [id number]  [id number]  [id number]^HAMIDA^[place] [address]     Name: GIA NELSON  MRN: [id number]  : 1945  Study Date: 2025 09:23 AM  Age: 79 yrs  Gender: Male  Patient Location: [facility]  Reason For Study: CAD  Ordering Physician: DIANA MEI  Referring Physician: DIANA MEI  Performed By: BENTLEY     BSA: 1.8 m2  Height: 68 in  Weight: 152 lb  HR: 92  BP: 132/63 mmHg  ______________________________________________________________________________  Procedure  Echocardiogram with two-dimensional, color and spectral Doppler. Definity (NDC  #[id number]) given  intravenously.  ______________________________________________________________________________  Interpretation Summary     1. The left ventricle is normal in size. Left ventricular function is  normal.The ejection fraction is 55-60%. There is mild concentric left  ventricular hypertrophy. No regional wall motion abnormalities noted.  2. Normal right ventricle size and systolic function.  3. IVC diameter <2.1 cm collapsing >50% with sniff suggests a normal RA  pressure of 3 mmHg.  4.  No hemodynamically significant valvular abnormalities on 2D or color flow  imaging.  ______________________________________________________________________________  Left Ventricle  The left ventricle is normal in size. Left ventricular function is normal.The  ejection fraction is 55-60%. There is mild concentric left ventricular  hypertrophy. No regional wall motion abnormalities noted.     Right Ventricle  Normal right ventricle size and systolic function.     Atria  Normal left atrial size. Right atrial size is normal.     Mitral Valve  The mitral valve is not well visualized. There is no mitral regurgitation  noted. There is no mitral valve stenosis.     Tricuspid Valve  The tricuspid valve is not well visualized. Right ventricular systolic  pressure could not be approximated due to inadequate tricuspid regurgitation.  There is no tricuspid stenosis.     Aortic Valve  The aortic valve is not well visualized. There is physiologic aortic  regurgitation. No hemodynamically significant valvular aortic stenosis.     Pulmonic Valve  The pulmonic valve is not well visualized.     Vessels  The aorta root is normal. IVC diameter <2.1 cm collapsing >50% with sniff  suggests a normal RA pressure of 3 mmHg.     Pericardium  There is no pericardial effusion.     ______________________________________________________________________________  MMode/2D Measurements & Calculations  RVDd: 3.6 cm  IVSd: 1.1 cm  LVIDd: 3.9 cm  LVIDs: 2.7 cm  LVPWd: 0.83  cm  FS: 29.0 %  LV mass(C)d: 115.9 grams  LV mass(C)dI: 63.7 grams/m2     Ao root diam: 3.3 cm  LA dimension: 2.6 cm  asc Aorta Diam: 3.0 cm  LA/Ao: 0.79  LVOT diam: 2.0 cm  LVOT area: 3.1 cm2  Ao root diam index Ht(cm/m): 1.9  Ao root diam index BSA (cm/m2): 1.8  Asc Ao diam index BSA (cm/m2): 1.6  Asc Ao diam index Ht(cm/m): 1.7  EF Biplane: 61.2 %  LA Volume (BP): 22.5 ml  LA Volume Index (BP): 12.4 ml/m2     LA Volume Indexed (AL/bp): 13.0 ml/m2  RWT: 0.43  TAPSE: 1.5 cm     Time Measurements  MM HR: 83.0 BPM     Doppler Measurements & Calculations  MV E max srini: 63.0 cm/sec  MV A max srini: 55.5 cm/sec  MV E/A: 1.1     MV dec time: 0.23 sec  LV V1 max P.5 mmHg  LV V1 max: 79.0 cm/sec  LV V1 VTI: 11.5 cm  SV(LVOT): 36.1 ml  SI(LVOT): 19.9 ml/m2  E/E' av.1  Lateral E/e': 4.9  Medial E/e': 9.4  RV S Srini: 14.0 cm/sec     ______________________________________________________________________________  Report approved by: Franco Mayberry MD on 2025 11:18 AM         Cardiac Catheterization    Narrative    CARDIAC CATHETERIZATION AND INTERVENTION REPORT    PATIENT NAME:  Raul Wilhelm          MEDICAL RECORD NUMBER: [id number]  : 1945       PROCEDURE DATE: 2025        CONCLUSIONS:   Severe multivessel coronary artery disease involving the ostial to   proximal LAD, the mid LAD, large diagonal branch, proximal left circumflex   (culprit for current presentation), and large OM branch.  Moderate   nonobstructive disease elsewhere.  Inferior ST elevation MI post successful percutaneous coronary artery   intervention of the dominant proximal left circumflex with a 4.0 x 18 mm   Resolute [place] drug-eluting stent.  Successful percutaneous coronary artery intervention of the large OM   branch with a 3.0 x 38 mm Resolute [place] drug-eluting stent that   was postdilated with a 3.5 mm NC balloon.      RECOMMENDATIONS:   Usual postprocedure cares, please see orders.  DAPT for  minimum of 1 year, favor indefinitely if without increased risk   of bleeding given multiple overlapping stents.  Recommend heart team discussion regarding management of residual coronary   artery disease (will discuss at our next Thursday multidisciplinary   conference).  Medical therapy versus percutaneous revascularization (will   require multiple long stents extending into the left main given diffuse   LAD disease) versus surgical revascularization.  Can be done as outpatient   unless patient remains symptomatic.  Aggressive risk factor modification for secondary prevention.      PROCEDURE PERFORMED:   Selective right and left coronary angiography  PCI of the proximal left circumflex  PCI of the large OM branch    PRE-OP DIAGNOSIS:  Inferior ST elevation MI  Influenza A    POST-OP DIAGNOSIS:   Inferior ST elevation MI  Influenza A    PROCEDURALISTS: Jacobo Montgomery MD      DIAGNOSTIC PROCEDURAL DETAILS:   Signed, informed consent was obtained. The patient was placed on the table   and prepped and draped in the usual fashion. Time out and site   verification performed.   Access: Right radial artery  Catheters: EBU 3.75 guide, 3 DRC  Hemostasis: TR band    PROCEDURAL MEDICATIONS:  Conscious sedation - midazolam and fentanyl, please see procedure log for   dosing.   Local anesthesia - 1% lidocaine   Procedural anticoagulation - 75 units/kg of heparin and intermittent   boluses to maintain ACT above 250-300.  Patient was loaded with 180 mg of   Ticagrelor prior to presentation.    CONTRAST VOLUME: 189 mL Visipaque  BLOOD LOSS: minimal   FLUIDS: None   SPECIMENS: None  COMPLICATIONS: None    Coronary Angiography:  LEFT MAIN: Normal caliber vessel that bifurcates into left anterior   descending, ramus intermedius, left circumflex arteries.  The proximal   left main has mild disease followed by 30 to 40% calcific stenosis in the   distal portion.  LEFT ANTERIOR DESCENDING: Caliber vessel that gives rise to a large    diagonal branch and multiple septal perforators.  The LAD wraps around the   apex distally.  The ostial to proximal LAD has diffuse 50 to 70% stenosis   followed by diffuse 70% stenosis in the midportion to distal portion.  The   apical LAD has moderate diffuse disease.  The large diagonal branch has a   70% proximal stenosis followed by mild disease distally.  LEFT CIRCUMFLEX: Large dominant vessel that gives rise to a large OM1   branch, and terminates into a large posterior descending artery and   posterolateral system distally.  The ostial to proximal left circumflex   has a 99% in-stent restenosis followed by mild disease distally.  The OM1   branch has a 90% possible stenosis followed by moderate disease in the   midportion and mild disease distally.  The posterolateral branch has mild   disease.  The posterior descending artery stent is widely patent with mild   disease.  RIGHT CORONARY ARTERY: Small nondominant vessel with mild disease.  RAMUS: Small to medium size vessel with 95% ostial to proximal stenosis   followed by mild disease distally.    INTERVENTIONAL DETAILS:   Lesion # 1 : 99% in-stent stenosis of the proximal dominant left   circumflex  Lesion # 2 : 90% stenosis of the large OM1 branch    We considered left ventricular assist device placement and evaluation for   surgical revascularization given multivessel coronary artery disease, but   given patient's ST elevations and ongoing chest pain we elected to proceed   with PCI.  The left main coronary artery was engaged using an EBU 3.75   guide catheter that provide adequate coaptation and support.  The left   circumflex artery lesion was traversed with a Runthrough wire and a second   Runthrough was placed in the LAD branch.  The left circumflex lesion was   serially predilated with a 2.0, 2.5, 3.0, and 3.5 mm NC balloons.  The   lesion was then treated with a 4.0 x 18 mm Resolute [place]   drug-eluting stent that was further postdilated  with a 4.0 mm NC balloon   at high atmospheres.  Given ongoing chest pain, we elected to intervene on   the OM branch as well.  The OM branch lesion was predilated with a 2.5 mm   balloon and was treated with a 3.0 x 38 mm Resolute [place]   drug-eluting stent that was postdilated with a 3.5 mm NC balloon at high   atmospheres.  Preintervention the proximal left circumflex lesion length   was 14 mm with YESENIA III flow and the proximal OM lesion length was 30 mm   with YESENIA-3 flow.  Postintervention there is 0% residual stenosis within   treated segments with YESENIA-3 flow.      TIME SPENT: Due to the complex nature of the procedure an additional 60   minutes was spent to perform this procedure due to  difficulty with stent   advancement requiring multiple balloon inflations due to difficulty   dilating the lesion and multiple treated lesions.  A 22 modifier should be   applied.    Please do not hesitate to contact me if you have any questions or   concerns. I was present for the procedure in its entirety. Moderate   conscious sedation at level 3-4 with fentanyl and midazolam was   administered, and I spent continuous face to face time with the patient   which encompassed the duration of the procedure.  Please refer to the   sedation flow sheet for additional information.  I have reviewed and agree   with the documentation provided in the RN sedation flow sheet as outlined.   I concluded sedation and recovery was monitored by the trained independent   observer. I attest that I have ordered all medications administered,   equipment used, and tests performed during the procedure.        Jacobo Montgomery MD  Interventional Cardiology        Discharge Medications   Current Discharge Medication List        START taking these medications    Details   ticagrelor (BRILINTA) 90 MG tablet Take 1 tablet (90 mg) by mouth 2 times daily. Dose to start tomorrow morning.  Qty: 180 tablet, Refills: 3    Associated Diagnoses: ST  elevation myocardial infarction involving left circumflex coronary artery (H)           CONTINUE these medications which have CHANGED    Details   atorvastatin (LIPITOR) 80 MG tablet Take 1 tablet (80 mg) by mouth at bedtime.  Qty: 30 tablet, Refills: 0    Associated Diagnoses: ACS (acute coronary syndrome) (H)           CONTINUE these medications which have NOT CHANGED    Details   acetaminophen (TYLENOL) 325 MG tablet Take 2 tablets (650 mg) by mouth every 8 hours.    Associated Diagnoses: Closed fracture of multiple ribs of right side, initial encounter      albuterol (PROAIR HFA/PROVENTIL HFA/VENTOLIN HFA) 108 (90 Base) MCG/ACT inhaler Inhale 1-2 puffs into the lungs every 4 hours as needed for shortness of breath or wheezing.  Qty: 8.5 g, Refills: 10    Comments: Pharmacy may dispense brand covered by insurance (Proair, or proventil or ventolin or generic albuterol inhaler)  Associated Diagnoses: Severe persistent asthma without complication (H)      albuterol (PROVENTIL) (2.5 MG/3ML) 0.083% neb solution TAKE 1 VIAL (2.5 MG) BY NEBULIZATION EVERY 6 HOURS AS NEEDED FOR SHORTNESS OF BREATH OR WHEEZING  Qty: 90 mL, Refills: 11    Associated Diagnoses: Severe persistent asthma without complication (H)      aspirin (ASA) 81 MG EC tablet Take 1 tablet (81 mg) by mouth daily    Associated Diagnoses: Status post total right knee replacement      budesonide-formoterol (SYMBICORT) 80-4.5 MCG/ACT Inhaler Inhale 2 puffs into the lungs 2 times daily.  Qty: 10.2 g, Refills: 11    Associated Diagnoses: Severe persistent asthma without complication (H); Chronic obstructive pulmonary disease, unspecified COPD type (H)      carvedilol (COREG) 6.25 MG tablet Take 1 tablet (6.25 mg) by mouth 2 times daily.  Qty: 180 tablet, Refills: 3    Associated Diagnoses: Essential hypertension      glipiZIDE (GLUCOTROL XL) 10 MG 24 hr tablet TAKE 1 TABLET (10 MG) BY MOUTH DAILY  Qty: 90 tablet, Refills: 3    Associated Diagnoses: Type 2  diabetes mellitus without complication, without long-term current use of insulin (H)      insulin glargine (LANTUS PEN) 100 UNIT/ML pen Inject 20 Units subcutaneously at bedtime.  Qty: 15 mL, Refills: 3    Comments: If Lantus is not covered by insurance, may substitute Basaglar or Semglee or other insulin glargine product per insurance preference at same dose and frequency.    Associated Diagnoses: Type 2 diabetes mellitus without complication, without long-term current use of insulin (H)      losartan-hydrochlorothiazide (HYZAAR) 50-12.5 MG tablet Take 1 tablet by mouth daily.  Qty: 90 tablet, Refills: 3    Associated Diagnoses: Essential hypertension; Coronary artery disease involving native coronary artery of native heart without angina pectoris      montelukast (SINGULAIR) 10 MG tablet TAKE ONE TABLET BY MOUTH AT BEDTIME  Qty: 90 tablet, Refills: 3    Associated Diagnoses: Severe persistent asthma without complication (H)      omeprazole (PRILOSEC) 20 MG DR capsule Take 1 capsule (20 mg) by mouth daily.  Qty: 90 capsule, Refills: 3    Associated Diagnoses: Gastroesophageal reflux disease without esophagitis      triamcinolone (KENALOG) 0.1 % external cream APPLY TOPICALLY 2 TIMES DAILY TO LEFT ANKLE  Qty: 15 g, Refills: 1    Associated Diagnoses: Flexural eczema      Alcohol Swabs (B-D SINGLE USE SWABS REGULAR) PADS USE TO SWAB AREA OF INJECTION / JENISE AS DIRECTED  Qty: 100 each, Refills: 3    Associated Diagnoses: Type 2 diabetes mellitus without complication, without long-term current use of insulin (H)      blood glucose monitoring (NO BRAND SPECIFIED) meter device kit Use to test blood sugar 1 times daily or as directed.  Blood Glucose Monitor Brands: per insurance.  Qty: 1 kit, Refills: 0    Associated Diagnoses: Type 2 diabetes mellitus without complication, without long-term current use of insulin (H)      insulin pen needle (BD LIZBETH U/F) 32G X 4 MM miscellaneous USE 1 PEN NEEDLE DAILY  Qty: 100 each,  Refills: 1    Associated Diagnoses: Type 2 diabetes mellitus without complication, without long-term current use of insulin (H)      Lancets (ONETOUCH DELICA PLUS [id number]) MISC USE AS DIRECTED WITH LANCING DEVICE TO TEST ONCE DAILY  Qty: 100 each, Refills: 6    Associated Diagnoses: Type 2 diabetes mellitus without complication, without long-term current use of insulin (H)      ONETOUCH ULTRA test strip USE TO TEST BLOOD SUGAR 1 TIME DAILY OR AS DIRECTED  Qty: 100 strip, Refills: 6    Associated Diagnoses: Type 2 diabetes mellitus without complication, without long-term current use of insulin (H)      order for DME Equipment being ordered: arm blood pressure cuff  Qty: 1 Device, Refills: 0    Associated Diagnoses: Essential hypertension, benign           STOP taking these medications       predniSONE (DELTASONE) 20 MG tablet Comments:   Reason for Stopping:         vitamin E (TOCOPHEROL) 400 units (180 mg) capsule Comments:   Reason for Stopping:             Allergies   Allergies   Allergen Reactions    Simvastatin Swelling     Severe muscle pain, swelling, and bruising    Dust Mites

## 2025-01-13 ENCOUNTER — PATIENT OUTREACH (OUTPATIENT)
Dept: CARE COORDINATION | Facility: CLINIC | Age: 80
End: 2025-01-13
Payer: COMMERCIAL

## 2025-01-13 ASSESSMENT — ACTIVITIES OF DAILY LIVING (ADL): DEPENDENT_IADLS:: INDEPENDENT

## 2025-01-13 NOTE — LETTER
[facility] CARE COORDINATION  [address] [facility] [address]     January 14, 2025    Raul Wilhelm  [address] [place], [place] [address]  [place] [address]      Dear Raul,    I am a clinic care coordinator who works with Sarita Hennessy,  with the [facility]. I wanted to thank you for spending the time to talk with me.  Below is a description of clinic care coordination and how I can further assist you.       The clinic care coordination team is made up of a registered nurse, , financial resource worker and community health worker who understand the health care system. The goal of clinic care coordination is to help you manage your health and improve access to the health care system. Our team works alongside your provider to assist you in determining your health and social needs. We can help you obtain health care and community resources, providing you with necessary information and education. We can work with you through any barriers and develop a care plan that helps coordinate and strengthen the communication between you and your care team.  Our services are voluntary and are offered without charge to you personally.    Please feel free to contact me with any questions or concerns regarding care coordination and what we can offer.      We are focused on providing you with the highest-quality healthcare experience possible.    Sincerely,     [facility]   Roxanne Tapia RN, Care Coordinator   [facility]'s   E-mail [email or web address]   [phone]     Enclosed: I have enclosed a copy of the Patient Centered Plan of Care. This has helpful information and goals that we have talked about. Please keep this in an easy to access place to use as needed.

## 2025-01-13 NOTE — PROGRESS NOTES
Clinic Care Coordination Contact    1/4/2025 - 1/11/2025 (7 days)  [facility]    Inferior STEMI post PCI of dominant pLCx and large OM.  Residual disease of the ostial to distal LAD and large D1  Continue with DAPT therapy, statin, home beta-blocker  TTE ordered for tomorrow  Cardiology recommend heart team discussion regarding management of residual disease, formal cardiology consult placed    Acute hypoxic respiratory failure, on 2 L currently  Influenza A, negative COVID test  History of COPD/asthma overlap, recent exacerbation  Begin scheduled DuoNebs, check chest x-ray now  Increase home steroids to 40 mg daily  Add p.o. doxycycline, begin Tamiflu renal dose  RT consulted to manage, VBG if still hypoxic     UTC/Voicemail    Clinical Data: Care Coordinator Outreach    Outreach Documentation Number of Outreach Attempt   1/13/2025   1:15 PM 1       Left message on patient's voicemail with call back information and requested return call.      Plan: . Care Coordinator will try to reach patient again in 1-2 business days.    [facility]   Roxanne Tapia RN, Care Coordinator   [facility]'s   E-mail [email or web address]   [phone]

## 2025-01-13 NOTE — LETTER
[facility]  Patient Centered Plan of Care  About Me:        Patient Name:  Raul Nelson    YOB: 1945  Age:         79 year old   [place] MRN:    [id number] Telephone Information:  Home Phone [phone]   Mobile Not on file.       Address:  [address] [place] [address] [place] [address] Email address:  No e-mail address on record      Emergency Contact(s)    Name Relationship Lgl Grd Work Phone Home Phone Mobile Phone   1. BRYAN JARVIS Daughter  none [phone]    2. SHANE [address] Daughter  none [phone] none   3. BECK NELSON* Spouse  none [phone] none   4. SHYLA PIERRE Grandchild    [phone]           Primary language:  English     needed? No   [facility]:  [phone] op. 1  Other communication barriers:None    Preferred Method of Communication:  Mail  Current living arrangement: I live in a private home    Mobility Status/ Medical Equipment: Independent        Health Maintenance  Health Maintenance Reviewed:   Health Maintenance Due   Topic Date Due    DIABETIC FOOT EXAM  03/10/2024    COVID-19 Vaccine (8 - 2024-25 season) 09/01/2024    MEDICARE ANNUAL WELLNESS VISIT  10/13/2024    PHQ-2 (once per calendar year)  01/01/2025        My Access Plan  Medical Emergency 911   Primary Clinic Line [facility] [phone]   24 Hour Appointment Line [phone] or  [phone]) (toll-free)   24 Hour Nurse Line [phone] (toll-free)   Preferred Urgent Care [facility], [phone]     Preferred Hospital [facility]  [phone]     Preferred Pharmacy [facility] [place] [place] - [address] [facility]     Behavioral Health Crisis Line The National Suicide Prevention Lifeline at 1-745.303.9518 or Text/Call 998           My Care Team Members  Patient Care Team         Relationship Specialty Notifications Start End    Sarita Hennessy,  PCP - General  Internal Medicine Admissions 8/30/18     Referred to Dermatology    Phone: [phone] Pager: Use Vocera Fax: [phone]        [address] [facility] [address]    Sarita Hennessy DO Assigned PCP   10/21/23     Phone: [phone] Pager: Use Vocera Fax: [phone]        [address] [facility] [address]    Sara Huerta PA-C Physician Assistant Dermatology  11/10/23     Phone: [phone] Fax: [phone]         [address] [facility] [address]    Vinod Montgomery MD Assigned Surgical Provider   12/2/23     Phone: [phone] Pager: [phone] Fax: [phone]        [address] [facility] [address]    Roxanne Tapia RN Lead Care Coordinator Primary Care -  Admissions 1/13/25     Phone: [phone]                     My Care Plans  Self Management and Treatment Plan    Care Plan  Care Plan: Physical Activity       Problem: Patient is inactive       Goal: Increase Physical Activity in 1-2 months       Start Date: 1/14/2025 Expected End Date: 3/13/2025    This Visit's Progress: 20%    Priority: High    Note:     Barriers: Deconditioned   Strengths: supportive daughters   Patient expressed understanding of goal: Yes   Action steps to achieve this goal:  1. I will keep future Cardiac Rehabilitation appointments   2. I will request a home care order at 1/22 provider visit                                 Action Plans on File:   Asthma  Asthma  COPD             Advance Care Plans/Directives:   Advanced Care Plan/Directives on file: No    Discussed with patient/caregiver(s): No data recorded           My Medical and Care Information  Problem List   Patient Active Problem List   Diagnosis    Family hx of colon cancer    Glaucoma    Essential hypertension    History of arterial ischemic stroke    Hyperlipidemia LDL goal <70    Pneumonitis, hypersensitivity, naa (H)    Severe persistent asthma without complication (H)    COPD (chronic obstructive pulmonary disease) (H)     Hoarseness of voice    Eczema    Hypokalemia    Coronary artery disease involving native coronary artery of native heart without angina pectoris    Type 2 diabetes mellitus without complication, without long-term current use of insulin (H)    Status post total knee replacement    Fall, initial encounter    Closed fracture of multiple ribs of right side, initial encounter    Dysphagia, unspecified type    ACS (acute coronary syndrome) (H)    ST elevation MI (STEMI) (H)    Percutaneous transluminal coronary angioplasty status      Current Medications:    Current Outpatient Medications   Medication Sig Dispense Refill    acetaminophen (TYLENOL) 325 MG tablet Take 2 tablets (650 mg) by mouth every 8 hours.      albuterol (PROAIR HFA/PROVENTIL HFA/VENTOLIN HFA) 108 (90 Base) MCG/ACT inhaler Inhale 1-2 puffs into the lungs every 4 hours as needed for shortness of breath or wheezing. 8.5 g 10    albuterol (PROVENTIL) (2.5 MG/3ML) 0.083% neb solution TAKE 1 VIAL (2.5 MG) BY NEBULIZATION EVERY 6 HOURS AS NEEDED FOR SHORTNESS OF BREATH OR WHEEZING 90 mL 11    Alcohol Swabs (B-D SINGLE USE SWABS REGULAR) PADS USE TO SWAB AREA OF INJECTION / JENISE AS DIRECTED 100 each 3    aspirin (ASA) 81 MG EC tablet Take 1 tablet (81 mg) by mouth daily      atorvastatin (LIPITOR) 80 MG tablet Take 1 tablet (80 mg) by mouth at bedtime. 30 tablet 0    blood glucose monitoring (NO BRAND SPECIFIED) meter device kit Use to test blood sugar 1 times daily or as directed.  Blood Glucose Monitor Brands: per insurance. 1 kit 0    budesonide-formoterol (SYMBICORT) 80-4.5 MCG/ACT Inhaler Inhale 2 puffs into the lungs 2 times daily. 10.2 g 11    carvedilol (COREG) 6.25 MG tablet Take 1 tablet (6.25 mg) by mouth 2 times daily. 180 tablet 3    glipiZIDE (GLUCOTROL XL) 10 MG 24 hr tablet TAKE 1 TABLET (10 MG) BY MOUTH DAILY 90 tablet 3    insulin glargine (LANTUS PEN) 100 UNIT/ML pen Inject 20 Units subcutaneously at bedtime. 15 mL 3    insulin pen needle  (BD LIZBETH U/F) 32G X 4 MM miscellaneous USE 1 PEN NEEDLE DAILY 100 each 1    Lancets (ONETOUCH DELICA PLUS [id number]) MISC USE AS DIRECTED WITH LANCING DEVICE TO TEST ONCE DAILY 100 each 6    losartan-hydrochlorothiazide (HYZAAR) 50-12.5 MG tablet Take 1 tablet by mouth daily. 90 tablet 3    montelukast (SINGULAIR) 10 MG tablet TAKE ONE TABLET BY MOUTH AT BEDTIME 90 tablet 3    omeprazole (PRILOSEC) 20 MG DR capsule Take 1 capsule (20 mg) by mouth daily. 90 capsule 3    ONETOUCH ULTRA test strip USE TO TEST BLOOD SUGAR 1 TIME DAILY OR AS DIRECTED 100 strip 6    order for DME Equipment being ordered: arm blood pressure cuff 1 Device 0    ticagrelor (BRILINTA) 90 MG tablet Take 1 tablet (90 mg) by mouth 2 times daily. Dose to start tomorrow morning. 180 tablet 3    triamcinolone (KENALOG) 0.1 % external cream APPLY TOPICALLY 2 TIMES DAILY TO LEFT ANKLE (Patient taking differently: Apply topically 2 times daily.) 15 g 1     No current facility-administered medications for this visit.        Care Coordination Start Date: 1/13/2025   Frequency of Care Coordination: weekly, more frequently as needed     Form Last Updated: 01/14/2025

## 2025-01-14 ASSESSMENT — ACTIVITIES OF DAILY LIVING (ADL): DEPENDENT_IADLS:: INDEPENDENT

## 2025-01-14 NOTE — PROGRESS NOTES
Clinic Care Coordination Contact  Clinic Care Coordination Contact  OUTREACH    Referral Information:  Referral Source: IP Handoff    Primary Diagnosis: Acute MI (heart attack)    Chief Complaint   Patient presents with    Clinic Care Coordination - Post Hospital     Clinic Care Coordination RN         Universal Utilization:   [facility]  Hospitalist Discharge Summary       Date of Admission:  1/4/2025  Date of Discharge:  1/11/2025  2:31 PM  Discharging Provider: Susan Diez MD  Discharge Service: Hospitalist Service        Discharge Diagnoses  STEMI status post PCI 1/4/2025 with stents x2  Severe multivessel CAD  Acute respiratory failure- resolved  Influenza A  Possible bacterial pneumonia  COPD  Diabetes mellitus type 2  Fall  Fractures of ribs 5 through 7  GERD             Clinic Utilization  Difficulty keeping appointments:: No  Compliance Concerns: No  No-Show Concerns: No  No PCP office visit in Past Year: No  Utilization      No Show Count (past year)  0             ED Visits  4             Hospital Admissions  3                    Current as of: 1/14/2025 11:21 AM                Clinical Concerns:  Current Medical Concerns:  CC RN spoke to the patient briefly and then handed the phone to his daughter April Chen and her sister are taking turns caring for the patient .  April lives 3 and [place]heeraj hours away and her sister is in the next town over  Patient energy is low and poor appetite  Daughter tries to give him protein drinks and oatmeal but he eats half the portion    Granddaughter comes twice a week   Patient is not testing his blood sugar   CC encouraged patient to start testing   Cancelled CR tomorrow  due to weakness   Current Behavioral Concerns: No    Education Provided to patient: CC introductory letter and care plan mailed    Pain  Pain (GOAL):: No  Health Maintenance Reviewed: Not assessed  Clinical Pathway: None    Medication Management:  Medication review  status: Medications reviewed.  Changes noted per patient report.  Daughter is setting up the patients medications      Functional Status:  Dependent ADLs:: Independent  Dependent IADLs:: Independent  Mobility Status: Independent    Living Situation:  Current living arrangement:: I live in a private home    Lifestyle & Psychosocial Needs:    Social Drivers of Health     Food Insecurity: Low Risk  (1/5/2025)    Food Insecurity     Within the past 12 months, did you worry that your food would run out before you got money to buy more?: No     Within the past 12 months, did the food you bought just not last and you didn t have money to get more?: No   Depression: Not at risk (1/16/2024)    PHQ-2     PHQ-2 Score: 0   Housing Stability: Low Risk  (1/10/2025)    Housing Stability     Do you have housing? : Yes     Are you worried about losing your housing?: No   Recent Concern: Housing Stability - High Risk (1/5/2025)    Housing Stability     Do you have housing? : Yes     Are you worried about losing your housing?: Yes   Tobacco Use: Low Risk  (1/4/2025)    Patient History     Smoking Tobacco Use: Never     Smokeless Tobacco Use: Never     Passive Exposure: Not on file   Financial Resource Strain: Low Risk  (1/5/2025)    Financial Resource Strain     Within the past 12 months, have you or your family members you live with been unable to get utilities (heat, electricity) when it was really needed?: No   Alcohol Use: Not on file   Transportation Needs: Low Risk  (1/5/2025)    Transportation Needs     Within the past 12 months, has lack of transportation kept you from medical appointments, getting your medicines, non-medical meetings or appointments, work, or from getting things that you need?: No   Physical Activity: Not on file   Interpersonal Safety: Low Risk  (1/4/2025)    Interpersonal Safety     Do you feel physically and emotionally safe where you currently live?: Yes     Within the past 12 months, have you been hit,  slapped, kicked or otherwise physically hurt by someone?: No     Within the past 12 months, have you been humiliated or emotionally abused in other ways by your partner or ex-partner?: No   Stress: Not on file   Social Connections: Not on file   Health Literacy: Not on file     Diet:: Low saturated fat, No added salt  Inadequate nutrition (GOAL):: No  Tube Feeding: No  Inadequate activity/exercise (GOAL):: Yes  Significant changes in sleep pattern (GOAL): No  Transportation means:: Accessible car     Voodoo or spiritual beliefs that impact treatment:: No  Mental health DX:: No  Mental health management concern (GOAL):: No  Chemical Dependency Status: No Current Concerns  Informal Support system:: Children           Resources and Interventions:  Current Resources:      Community Resources: None  Supplies Currently Used at Home: None  Equipment Currently Used at Home: none  Employment Status: retired         Advance Care Plan/Directive  Advanced Care Plans/Directives on file:: No    Referrals Placed: None         Care Plan:  Care Plan: Physical Activity       Problem: Patient is inactive       Goal: Increase Physical Activity in 1-2 months       Start Date: 1/14/2025 Expected End Date: 3/13/2025    This Visit's Progress: 20%    Priority: High    Note:     Barriers: Deconditioned   Strengths: supportive daughters   Patient expressed understanding of goal: Yes   Action steps to achieve this goal:  1. I will keep future Cardiac Rehabilitation appointments   2. I will request a home care order at 1/22 provider visit                                 Patient/Caregiver understanding: Daughter expresses good understanding of discharge instructions     Outreach Frequency: weekly, more frequently as needed  Future Appointments                In 3 days Sarita Hennessy DO [facility]  Arrive at: Clinic A    In 1 week [facility] Cardiac and Pulmonary [facility] [place],  [place]    In 1 week [place]; [place] [facility] [place]    In 1 week Josselyn Marie SLP [facility]    In 1 week [facility]    In 1 month [place] [facility]    In 1 month Richard Sawant MD [facility]  Arrive at: Clinic A            Plan:   Patient will keep his hospital follow up with PCP 1/17/2025- will request a home care order at the visit  Patient will make a future Cardiology appointment   CC RN will make weekly outreach calls     [facility]   Roxanne Tapia RN, Care Coordinator   [facility]'s   E-mail [email or web address]   [phone]

## 2025-01-17 ENCOUNTER — APPOINTMENT (OUTPATIENT)
Dept: CT IMAGING | Facility: CLINIC | Age: 80
DRG: 871 | End: 2025-01-17
Attending: FAMILY MEDICINE
Payer: COMMERCIAL

## 2025-01-17 ENCOUNTER — HOSPITAL ENCOUNTER (INPATIENT)
Facility: CLINIC | Age: 80
LOS: 2 days | Discharge: HOME HEALTH | DRG: 871 | End: 2025-01-19
Attending: FAMILY MEDICINE | Admitting: STUDENT IN AN ORGANIZED HEALTH CARE EDUCATION/TRAINING PROGRAM
Payer: COMMERCIAL

## 2025-01-17 DIAGNOSIS — R00.0 TACHYCARDIA, UNSPECIFIED: ICD-10-CM

## 2025-01-17 DIAGNOSIS — I95.9 TRANSIENT HYPOTENSION: ICD-10-CM

## 2025-01-17 DIAGNOSIS — R77.8 ELEVATED TOTAL PROTEIN: ICD-10-CM

## 2025-01-17 DIAGNOSIS — R79.89 ELEVATED TROPONIN: ICD-10-CM

## 2025-01-17 DIAGNOSIS — J18.9 PNEUMONIA OF BOTH LUNGS DUE TO INFECTIOUS ORGANISM, UNSPECIFIED PART OF LUNG: ICD-10-CM

## 2025-01-17 DIAGNOSIS — E86.0 DEHYDRATION: ICD-10-CM

## 2025-01-17 DIAGNOSIS — J45.50 SEVERE PERSISTENT ASTHMA WITHOUT COMPLICATION (H): Primary | ICD-10-CM

## 2025-01-17 PROBLEM — R13.10 DYSPHAGIA: Status: ACTIVE | Noted: 2025-01-17

## 2025-01-17 PROBLEM — I25.10 CORONARY ARTERY DISEASE INVOLVING NATIVE CORONARY ARTERY OF NATIVE HEART WITHOUT ANGINA PECTORIS: Status: ACTIVE | Noted: 2020-05-19

## 2025-01-17 PROBLEM — K22.5 ZENKER DIVERTICULA: Status: ACTIVE | Noted: 2025-01-17

## 2025-01-17 PROBLEM — E11.9 TYPE 2 DIABETES MELLITUS WITHOUT COMPLICATION, WITHOUT LONG-TERM CURRENT USE OF INSULIN (H): Status: ACTIVE | Noted: 2020-05-19

## 2025-01-17 LAB
ALBUMIN SERPL BCG-MCNC: 3.8 G/DL (ref 3.5–5.2)
ALP SERPL-CCNC: 90 U/L (ref 40–150)
ALT SERPL W P-5'-P-CCNC: 24 U/L (ref 0–70)
ANION GAP SERPL CALCULATED.3IONS-SCNC: 10 MMOL/L (ref 7–15)
AST SERPL W P-5'-P-CCNC: 20 U/L (ref 0–45)
BASOPHILS # BLD AUTO: 0.1 10E3/UL (ref 0–0.2)
BASOPHILS NFR BLD AUTO: 1 %
BILIRUB SERPL-MCNC: 1.7 MG/DL
BUN SERPL-MCNC: 37 MG/DL (ref 8–23)
CALCIUM SERPL-MCNC: 9.3 MG/DL (ref 8.8–10.4)
CHLORIDE SERPL-SCNC: 100 MMOL/L (ref 98–107)
CREAT SERPL-MCNC: 1.38 MG/DL (ref 0.67–1.17)
CREAT SERPL-MCNC: 1.71 MG/DL (ref 0.67–1.17)
CREAT UR-MCNC: 58.9 MG/DL
EGFRCR SERPLBLD CKD-EPI 2021: 40 ML/MIN/1.73M2
EOSINOPHIL # BLD AUTO: 0.3 10E3/UL (ref 0–0.7)
EOSINOPHIL NFR BLD AUTO: 2 %
ERYTHROCYTE [DISTWIDTH] IN BLOOD BY AUTOMATED COUNT: 12 % (ref 10–15)
FLUAV RNA SPEC QL NAA+PROBE: NEGATIVE
FLUBV RNA RESP QL NAA+PROBE: NEGATIVE
FRACT EXCRET NA UR+SERPL-RTO: 1.1 %
GLUCOSE BLDC GLUCOMTR-MCNC: 297 MG/DL (ref 70–99)
GLUCOSE BLDC GLUCOMTR-MCNC: 357 MG/DL (ref 70–99)
GLUCOSE SERPL-MCNC: 147 MG/DL (ref 70–99)
HCO3 SERPL-SCNC: 26 MMOL/L (ref 22–29)
HCT VFR BLD AUTO: 44.6 % (ref 40–53)
HGB BLD-MCNC: 14.8 G/DL (ref 13.3–17.7)
HOLD SPECIMEN: NORMAL
IMM GRANULOCYTES # BLD: 0.2 10E3/UL
IMM GRANULOCYTES NFR BLD: 1 %
L PNEUMO1 AG UR QL IA: NEGATIVE
LACTATE SERPL-SCNC: 1.3 MMOL/L (ref 0.7–2)
LYMPHOCYTES # BLD AUTO: 1.5 10E3/UL (ref 0.8–5.3)
LYMPHOCYTES NFR BLD AUTO: 10 %
MCH RBC QN AUTO: 30.2 PG (ref 26.5–33)
MCHC RBC AUTO-ENTMCNC: 33.2 G/DL (ref 31.5–36.5)
MCV RBC AUTO: 91 FL (ref 78–100)
MONOCYTES # BLD AUTO: 1.1 10E3/UL (ref 0–1.3)
MONOCYTES NFR BLD AUTO: 7 %
MRSA DNA SPEC QL NAA+PROBE: POSITIVE
NEUTROPHILS # BLD AUTO: 11.7 10E3/UL (ref 1.6–8.3)
NEUTROPHILS NFR BLD AUTO: 79 %
NRBC # BLD AUTO: 0 10E3/UL
NRBC BLD AUTO-RTO: 0 /100
NT-PROBNP SERPL-MCNC: 467 PG/ML (ref 0–1800)
PLATELET # BLD AUTO: 363 10E3/UL (ref 150–450)
POTASSIUM SERPL-SCNC: 4.4 MMOL/L (ref 3.4–5.3)
PROCALCITONIN SERPL IA-MCNC: 0.12 NG/ML
PROT SERPL-MCNC: 6.7 G/DL (ref 6.4–8.3)
RBC # BLD AUTO: 4.9 10E6/UL (ref 4.4–5.9)
RSV RNA SPEC NAA+PROBE: NEGATIVE
S PNEUM AG SPEC QL: NEGATIVE
SA TARGET DNA: POSITIVE
SARS-COV-2 RNA RESP QL NAA+PROBE: NEGATIVE
SODIUM SERPL-SCNC: 135 MMOL/L (ref 135–145)
SODIUM SERPL-SCNC: 136 MMOL/L (ref 135–145)
SODIUM UR-SCNC: 64 MMOL/L
SPECIMEN TYPE: NORMAL
TROPONIN T SERPL HS-MCNC: 118 NG/L
TROPONIN T SERPL HS-MCNC: 78 NG/L
WBC # BLD AUTO: 14.9 10E3/UL (ref 4–11)

## 2025-01-17 PROCEDURE — 96365 THER/PROPH/DIAG IV INF INIT: CPT | Mod: 59 | Performed by: FAMILY MEDICINE

## 2025-01-17 PROCEDURE — 258N000003 HC RX IP 258 OP 636

## 2025-01-17 PROCEDURE — 250N000011 HC RX IP 250 OP 636: Performed by: FAMILY MEDICINE

## 2025-01-17 PROCEDURE — 99291 CRITICAL CARE FIRST HOUR: CPT | Mod: 25 | Performed by: FAMILY MEDICINE

## 2025-01-17 PROCEDURE — 87637 SARSCOV2&INF A&B&RSV AMP PRB: CPT | Performed by: FAMILY MEDICINE

## 2025-01-17 PROCEDURE — 93010 ELECTROCARDIOGRAM REPORT: CPT | Performed by: FAMILY MEDICINE

## 2025-01-17 PROCEDURE — 83605 ASSAY OF LACTIC ACID: CPT | Performed by: FAMILY MEDICINE

## 2025-01-17 PROCEDURE — 87641 MR-STAPH DNA AMP PROBE: CPT

## 2025-01-17 PROCEDURE — 93005 ELECTROCARDIOGRAM TRACING: CPT | Performed by: FAMILY MEDICINE

## 2025-01-17 PROCEDURE — 82947 ASSAY GLUCOSE BLOOD QUANT: CPT | Performed by: FAMILY MEDICINE

## 2025-01-17 PROCEDURE — 71275 CT ANGIOGRAPHY CHEST: CPT

## 2025-01-17 PROCEDURE — 99291 CRITICAL CARE FIRST HOUR: CPT | Performed by: FAMILY MEDICINE

## 2025-01-17 PROCEDURE — 99207 PR APP CREDIT; MD BILLING SHARED VISIT: CPT | Mod: FS | Performed by: STUDENT IN AN ORGANIZED HEALTH CARE EDUCATION/TRAINING PROGRAM

## 2025-01-17 PROCEDURE — 120N000001 HC R&B MED SURG/OB

## 2025-01-17 PROCEDURE — 84295 ASSAY OF SERUM SODIUM: CPT

## 2025-01-17 PROCEDURE — 96375 TX/PRO/DX INJ NEW DRUG ADDON: CPT | Performed by: FAMILY MEDICINE

## 2025-01-17 PROCEDURE — 82565 ASSAY OF CREATININE: CPT

## 2025-01-17 PROCEDURE — 84145 PROCALCITONIN (PCT): CPT

## 2025-01-17 PROCEDURE — 87186 SC STD MICRODIL/AGAR DIL: CPT

## 2025-01-17 PROCEDURE — 250N000013 HC RX MED GY IP 250 OP 250 PS 637

## 2025-01-17 PROCEDURE — 85004 AUTOMATED DIFF WBC COUNT: CPT | Performed by: FAMILY MEDICINE

## 2025-01-17 PROCEDURE — 250N000009 HC RX 250: Performed by: FAMILY MEDICINE

## 2025-01-17 PROCEDURE — 250N000012 HC RX MED GY IP 250 OP 636 PS 637

## 2025-01-17 PROCEDURE — 84484 ASSAY OF TROPONIN QUANT: CPT | Performed by: FAMILY MEDICINE

## 2025-01-17 PROCEDURE — 87899 AGENT NOS ASSAY W/OPTIC: CPT

## 2025-01-17 PROCEDURE — 250N000011 HC RX IP 250 OP 636

## 2025-01-17 PROCEDURE — 94640 AIRWAY INHALATION TREATMENT: CPT | Performed by: FAMILY MEDICINE

## 2025-01-17 PROCEDURE — 87040 BLOOD CULTURE FOR BACTERIA: CPT | Performed by: FAMILY MEDICINE

## 2025-01-17 PROCEDURE — 74177 CT ABD & PELVIS W/CONTRAST: CPT

## 2025-01-17 PROCEDURE — 85041 AUTOMATED RBC COUNT: CPT | Performed by: FAMILY MEDICINE

## 2025-01-17 PROCEDURE — 96361 HYDRATE IV INFUSION ADD-ON: CPT | Performed by: FAMILY MEDICINE

## 2025-01-17 PROCEDURE — 36415 COLL VENOUS BLD VENIPUNCTURE: CPT | Performed by: FAMILY MEDICINE

## 2025-01-17 PROCEDURE — 83880 ASSAY OF NATRIURETIC PEPTIDE: CPT | Performed by: FAMILY MEDICINE

## 2025-01-17 PROCEDURE — 87640 STAPH A DNA AMP PROBE: CPT

## 2025-01-17 PROCEDURE — 99223 1ST HOSP IP/OBS HIGH 75: CPT | Mod: FS

## 2025-01-17 PROCEDURE — 84450 TRANSFERASE (AST) (SGOT): CPT | Performed by: FAMILY MEDICINE

## 2025-01-17 PROCEDURE — 258N000003 HC RX IP 258 OP 636: Performed by: FAMILY MEDICINE

## 2025-01-17 PROCEDURE — 87077 CULTURE AEROBIC IDENTIFY: CPT

## 2025-01-17 PROCEDURE — 82570 ASSAY OF URINE CREATININE: CPT

## 2025-01-17 RX ORDER — LIDOCAINE 40 MG/G
CREAM TOPICAL
Status: DISCONTINUED | OUTPATIENT
Start: 2025-01-17 | End: 2025-01-19 | Stop reason: HOSPADM

## 2025-01-17 RX ORDER — SODIUM CHLORIDE, SODIUM LACTATE, POTASSIUM CHLORIDE, CALCIUM CHLORIDE 600; 310; 30; 20 MG/100ML; MG/100ML; MG/100ML; MG/100ML
INJECTION, SOLUTION INTRAVENOUS CONTINUOUS
Status: DISCONTINUED | OUTPATIENT
Start: 2025-01-17 | End: 2025-01-17

## 2025-01-17 RX ORDER — NALOXONE HYDROCHLORIDE 0.4 MG/ML
0.2 INJECTION, SOLUTION INTRAMUSCULAR; INTRAVENOUS; SUBCUTANEOUS
Status: DISCONTINUED | OUTPATIENT
Start: 2025-01-17 | End: 2025-01-19 | Stop reason: HOSPADM

## 2025-01-17 RX ORDER — CARVEDILOL 6.25 MG/1
6.25 TABLET ORAL 2 TIMES DAILY
Status: DISCONTINUED | OUTPATIENT
Start: 2025-01-17 | End: 2025-01-19 | Stop reason: HOSPADM

## 2025-01-17 RX ORDER — ENOXAPARIN SODIUM 100 MG/ML
40 INJECTION SUBCUTANEOUS EVERY 24 HOURS
Status: DISCONTINUED | OUTPATIENT
Start: 2025-01-17 | End: 2025-01-19 | Stop reason: HOSPADM

## 2025-01-17 RX ORDER — ATORVASTATIN CALCIUM 80 MG/1
80 TABLET, FILM COATED ORAL AT BEDTIME
Status: DISCONTINUED | OUTPATIENT
Start: 2025-01-17 | End: 2025-01-19 | Stop reason: HOSPADM

## 2025-01-17 RX ORDER — NICOTINE POLACRILEX 4 MG
15-30 LOZENGE BUCCAL
Status: DISCONTINUED | OUTPATIENT
Start: 2025-01-17 | End: 2025-01-19 | Stop reason: HOSPADM

## 2025-01-17 RX ORDER — ONDANSETRON 2 MG/ML
4 INJECTION INTRAMUSCULAR; INTRAVENOUS EVERY 6 HOURS PRN
Status: DISCONTINUED | OUTPATIENT
Start: 2025-01-17 | End: 2025-01-19 | Stop reason: HOSPADM

## 2025-01-17 RX ORDER — AMOXICILLIN 250 MG
1 CAPSULE ORAL 2 TIMES DAILY PRN
Status: DISCONTINUED | OUTPATIENT
Start: 2025-01-17 | End: 2025-01-19 | Stop reason: HOSPADM

## 2025-01-17 RX ORDER — ONDANSETRON 2 MG/ML
4 INJECTION INTRAMUSCULAR; INTRAVENOUS EVERY 6 HOURS PRN
Status: DISCONTINUED | OUTPATIENT
Start: 2025-01-17 | End: 2025-01-17

## 2025-01-17 RX ORDER — AMOXICILLIN 250 MG
2 CAPSULE ORAL 2 TIMES DAILY PRN
Status: DISCONTINUED | OUTPATIENT
Start: 2025-01-17 | End: 2025-01-19 | Stop reason: HOSPADM

## 2025-01-17 RX ORDER — PIPERACILLIN SODIUM, TAZOBACTAM SODIUM 3; .375 G/15ML; G/15ML
3.38 INJECTION, POWDER, LYOPHILIZED, FOR SOLUTION INTRAVENOUS EVERY 6 HOURS
Status: DISCONTINUED | OUTPATIENT
Start: 2025-01-17 | End: 2025-01-18

## 2025-01-17 RX ORDER — ONDANSETRON 4 MG/1
4 TABLET, ORALLY DISINTEGRATING ORAL EVERY 6 HOURS PRN
Status: DISCONTINUED | OUTPATIENT
Start: 2025-01-17 | End: 2025-01-19 | Stop reason: HOSPADM

## 2025-01-17 RX ORDER — ALBUTEROL SULFATE 90 UG/1
1-2 INHALANT RESPIRATORY (INHALATION) EVERY 4 HOURS PRN
Status: DISCONTINUED | OUTPATIENT
Start: 2025-01-17 | End: 2025-01-19 | Stop reason: HOSPADM

## 2025-01-17 RX ORDER — METHYLPREDNISOLONE SODIUM SUCCINATE 125 MG/2ML
125 INJECTION INTRAMUSCULAR; INTRAVENOUS ONCE
Status: COMPLETED | OUTPATIENT
Start: 2025-01-17 | End: 2025-01-17

## 2025-01-17 RX ORDER — SODIUM CHLORIDE 9 MG/ML
INJECTION, SOLUTION INTRAVENOUS CONTINUOUS
Status: DISCONTINUED | OUTPATIENT
Start: 2025-01-18 | End: 2025-01-17

## 2025-01-17 RX ORDER — ACETAMINOPHEN 325 MG/1
650 TABLET ORAL EVERY 4 HOURS PRN
Status: DISCONTINUED | OUTPATIENT
Start: 2025-01-17 | End: 2025-01-19 | Stop reason: HOSPADM

## 2025-01-17 RX ORDER — FLUTICASONE FUROATE AND VILANTEROL 100; 25 UG/1; UG/1
1 POWDER RESPIRATORY (INHALATION) DAILY
Status: DISCONTINUED | OUTPATIENT
Start: 2025-01-18 | End: 2025-01-19 | Stop reason: HOSPADM

## 2025-01-17 RX ORDER — NALOXONE HYDROCHLORIDE 0.4 MG/ML
0.4 INJECTION, SOLUTION INTRAMUSCULAR; INTRAVENOUS; SUBCUTANEOUS
Status: DISCONTINUED | OUTPATIENT
Start: 2025-01-17 | End: 2025-01-19 | Stop reason: HOSPADM

## 2025-01-17 RX ORDER — FLUTICASONE FUROATE AND VILANTEROL 100; 25 UG/1; UG/1
1 POWDER RESPIRATORY (INHALATION) 2 TIMES DAILY
Status: DISCONTINUED | OUTPATIENT
Start: 2025-01-17 | End: 2025-01-17

## 2025-01-17 RX ORDER — IOPAMIDOL 755 MG/ML
71 INJECTION, SOLUTION INTRAVASCULAR ONCE
Status: COMPLETED | OUTPATIENT
Start: 2025-01-17 | End: 2025-01-17

## 2025-01-17 RX ORDER — CEFTRIAXONE 2 G/1
2 INJECTION, POWDER, FOR SOLUTION INTRAMUSCULAR; INTRAVENOUS ONCE
Status: COMPLETED | OUTPATIENT
Start: 2025-01-17 | End: 2025-01-17

## 2025-01-17 RX ORDER — OXYCODONE HYDROCHLORIDE 5 MG/1
5 TABLET ORAL EVERY 4 HOURS PRN
Status: DISCONTINUED | OUTPATIENT
Start: 2025-01-17 | End: 2025-01-19 | Stop reason: HOSPADM

## 2025-01-17 RX ORDER — CEFTRIAXONE 2 G/1
2 INJECTION, POWDER, FOR SOLUTION INTRAMUSCULAR; INTRAVENOUS EVERY 24 HOURS
Status: DISCONTINUED | OUTPATIENT
Start: 2025-01-18 | End: 2025-01-17

## 2025-01-17 RX ORDER — LOSARTAN POTASSIUM AND HYDROCHLOROTHIAZIDE 12.5; 5 MG/1; MG/1
1 TABLET ORAL DAILY
Status: DISCONTINUED | OUTPATIENT
Start: 2025-01-17 | End: 2025-01-19 | Stop reason: HOSPADM

## 2025-01-17 RX ORDER — MONTELUKAST SODIUM 10 MG/1
10 TABLET ORAL AT BEDTIME
Status: DISCONTINUED | OUTPATIENT
Start: 2025-01-17 | End: 2025-01-19 | Stop reason: HOSPADM

## 2025-01-17 RX ORDER — SODIUM CHLORIDE, SODIUM LACTATE, POTASSIUM CHLORIDE, CALCIUM CHLORIDE 600; 310; 30; 20 MG/100ML; MG/100ML; MG/100ML; MG/100ML
INJECTION, SOLUTION INTRAVENOUS CONTINUOUS
Status: ACTIVE | OUTPATIENT
Start: 2025-01-17 | End: 2025-01-18

## 2025-01-17 RX ORDER — ONDANSETRON 4 MG/1
4 TABLET, ORALLY DISINTEGRATING ORAL EVERY 6 HOURS PRN
Status: DISCONTINUED | OUTPATIENT
Start: 2025-01-17 | End: 2025-01-17

## 2025-01-17 RX ORDER — PANTOPRAZOLE SODIUM 40 MG/1
40 TABLET, DELAYED RELEASE ORAL
Status: DISCONTINUED | OUTPATIENT
Start: 2025-01-18 | End: 2025-01-19 | Stop reason: HOSPADM

## 2025-01-17 RX ORDER — IPRATROPIUM BROMIDE AND ALBUTEROL SULFATE 2.5; .5 MG/3ML; MG/3ML
3 SOLUTION RESPIRATORY (INHALATION) ONCE
Status: COMPLETED | OUTPATIENT
Start: 2025-01-17 | End: 2025-01-17

## 2025-01-17 RX ORDER — DEXTROSE MONOHYDRATE, SODIUM CHLORIDE, AND POTASSIUM CHLORIDE 50; 1.49; 4.5 G/1000ML; G/1000ML; G/1000ML
INJECTION, SOLUTION INTRAVENOUS CONTINUOUS
Status: DISCONTINUED | OUTPATIENT
Start: 2025-01-17 | End: 2025-01-17

## 2025-01-17 RX ORDER — DEXTROSE MONOHYDRATE 25 G/50ML
25-50 INJECTION, SOLUTION INTRAVENOUS
Status: DISCONTINUED | OUTPATIENT
Start: 2025-01-17 | End: 2025-01-19 | Stop reason: HOSPADM

## 2025-01-17 RX ORDER — ASPIRIN 81 MG/1
81 TABLET ORAL DAILY
Status: DISCONTINUED | OUTPATIENT
Start: 2025-01-18 | End: 2025-01-19 | Stop reason: HOSPADM

## 2025-01-17 RX ORDER — CALCIUM CARBONATE 500 MG/1
1000 TABLET, CHEWABLE ORAL 4 TIMES DAILY PRN
Status: DISCONTINUED | OUTPATIENT
Start: 2025-01-17 | End: 2025-01-19 | Stop reason: HOSPADM

## 2025-01-17 RX ADMIN — INSULIN GLARGINE 20 UNITS: 100 INJECTION, SOLUTION SUBCUTANEOUS at 22:27

## 2025-01-17 RX ADMIN — AZITHROMYCIN MONOHYDRATE 500 MG: 500 INJECTION, POWDER, LYOPHILIZED, FOR SOLUTION INTRAVENOUS at 14:10

## 2025-01-17 RX ADMIN — IOPAMIDOL 71 ML: 755 INJECTION, SOLUTION INTRAVENOUS at 11:16

## 2025-01-17 RX ADMIN — MONTELUKAST 10 MG: 10 TABLET, FILM COATED ORAL at 21:11

## 2025-01-17 RX ADMIN — ENOXAPARIN SODIUM 40 MG: 40 INJECTION SUBCUTANEOUS at 18:08

## 2025-01-17 RX ADMIN — IPRATROPIUM BROMIDE AND ALBUTEROL SULFATE 3 ML: .5; 3 SOLUTION RESPIRATORY (INHALATION) at 10:30

## 2025-01-17 RX ADMIN — SODIUM CHLORIDE 1000 ML: 9 INJECTION, SOLUTION INTRAVENOUS at 10:17

## 2025-01-17 RX ADMIN — TICAGRELOR 90 MG: 90 TABLET ORAL at 17:15

## 2025-01-17 RX ADMIN — CEFTRIAXONE 2 G: 2 INJECTION, POWDER, FOR SOLUTION INTRAMUSCULAR; INTRAVENOUS at 13:25

## 2025-01-17 RX ADMIN — SODIUM CHLORIDE, POTASSIUM CHLORIDE, SODIUM LACTATE AND CALCIUM CHLORIDE: 600; 310; 30; 20 INJECTION, SOLUTION INTRAVENOUS at 18:09

## 2025-01-17 RX ADMIN — PIPERACILLIN AND TAZOBACTAM 3.38 G: 3; .375 INJECTION, POWDER, FOR SOLUTION INTRAVENOUS at 18:09

## 2025-01-17 RX ADMIN — VANCOMYCIN HYDROCHLORIDE 1500 MG: 500 INJECTION, POWDER, LYOPHILIZED, FOR SOLUTION INTRAVENOUS at 16:29

## 2025-01-17 RX ADMIN — SODIUM CHLORIDE, POTASSIUM CHLORIDE, SODIUM LACTATE AND CALCIUM CHLORIDE: 600; 310; 30; 20 INJECTION, SOLUTION INTRAVENOUS at 15:29

## 2025-01-17 RX ADMIN — INSULIN ASPART 4 UNITS: 100 INJECTION, SOLUTION INTRAVENOUS; SUBCUTANEOUS at 18:21

## 2025-01-17 RX ADMIN — SODIUM CHLORIDE 95 ML: 9 INJECTION, SOLUTION INTRAVENOUS at 11:17

## 2025-01-17 RX ADMIN — CARVEDILOL 6.25 MG: 6.25 TABLET, FILM COATED ORAL at 18:08

## 2025-01-17 RX ADMIN — ATORVASTATIN CALCIUM 80 MG: 80 TABLET, FILM COATED ORAL at 21:11

## 2025-01-17 RX ADMIN — METHYLPREDNISOLONE SODIUM SUCCINATE 125 MG: 125 INJECTION, POWDER, FOR SOLUTION INTRAMUSCULAR; INTRAVENOUS at 14:23

## 2025-01-17 ASSESSMENT — ACTIVITIES OF DAILY LIVING (ADL)
ADLS_ACUITY_SCORE: 61
ADLS_ACUITY_SCORE: 46
ADLS_ACUITY_SCORE: 46
ADLS_ACUITY_SCORE: 61
ADLS_ACUITY_SCORE: 60
ADLS_ACUITY_SCORE: 61
ADLS_ACUITY_SCORE: 46
ADLS_ACUITY_SCORE: 61
ADLS_ACUITY_SCORE: 46
ADLS_ACUITY_SCORE: 61
ADLS_ACUITY_SCORE: 46

## 2025-01-17 NOTE — ED PROVIDER NOTES
History     Chief Complaint   Patient presents with    Shortness of Breath    Dizziness     Light headed     HPI  Raul Wilhelm is a 79 year old male, past medical history significant for recent STEMI with PTCA 1/4/2025 at [facility], dysphagia, known ASCVD with for STEMI 11/2015, type 2 diabetes, severe persistent asthma, COPD, hyperlipidemia, hypertension, glaucoma, hospitalized at [facility] with above reference to NSTEMI per his daughter from 1/1 through 1/11 received 2 stents by her account and they were contemplating doing a third or potentially CABG and was released on the 11th.  He also apparently had influenza during his hospital stay by her account.  Return to home as referenced on the 11th and presented to primary care provider Dr. Sarita Hernandez this morning for follow-up from his recent hospitalization.  He became clammy and short of breath at the time of primary care visit although did not have chest pain at any time heart rate was apparently between 40 and 154 in triage and the patient was found to be hypotensive upon arrival here.  Triage nurse reported that the patient was trying to clear his throat and became unresponsive for a few seconds.  He was seen as a rapid assessment at the request of the nurse manager.  At the time I into the room as requested the patient is alert answers questions appropriately and is in the process of being transferred from wheelchair to the bed.  Minimal assistance he was able to stand up and then lay down in the bed.  He identifies shortness of breath but no chest pain or pressure no nausea or vomiting.  He does not feel well.  Reviewed discharge summary from 1/11/2025: STEMI PTCA stents x 2, influenza A, possible bacterial pneumonia, COPD, type 2 diabetes, subacute right sided fifth sixth and seventh rib fractures from mid December from a fall, GERD.    His primary care visit this morning:  As the visit went on (> 45 min spent), patient was  increasingly tachypneic and with more frequent coughing.  Twice during the visit he said he was going to pass out.  Palpated pulse during this time and it was regular.  Due to significant tachypnea, sent patient to the ER.      Allergies:  Allergies   Allergen Reactions    Simvastatin Swelling     Severe muscle pain, swelling, and bruising    Dust Mites        Problem List:    Patient Active Problem List    Diagnosis Date Noted    Dehydration 01/17/2025     Priority: Medium    Elevated troponin 01/17/2025     Priority: Medium    Transient hypotension 01/17/2025     Priority: Medium    Pneumonia of both lungs due to infectious organism, unspecified part of lung 01/17/2025     Priority: Medium    ACS (acute coronary syndrome) (H) 01/04/2025     Priority: Medium    ST elevation MI (STEMI) (H) 01/04/2025     Priority: Medium    Percutaneous transluminal coronary angioplasty status 01/04/2025     Priority: Medium    Dysphagia, unspecified type 12/26/2024     Priority: Medium    Fall, initial encounter 12/11/2024     Priority: Medium    Closed fracture of multiple ribs of right side, initial encounter 12/11/2024     Priority: Medium    Status post total knee replacement 05/28/2020     Priority: Medium    Coronary artery disease involving native coronary artery of native heart without angina pectoris 05/19/2020     Priority: Medium     STEMI 11/2015.  At that time, coronary angiography demonstrated a 99% stenosis in the mid circumflex as well as 100% thrombotic occlusion of the left-sided PDA.  He underwent successful revascularization of the left PDA as well as the mid circumflex with drug-eluting stents at the time.  Of note, he had an approximately 80% stenosis in the mid-LAD and a 70% stenosis in the first obtuse marginal.  These were not intervened on at the time, and a subsequent stress nuclear perfusion study demonstrated a large inferior and inferoseptal infarction with no evidence of rian-infarct ischemia.  Given  these findings and his complete lack of symptoms, we have elected to treat his residual coronary artery disease medically.  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  Patient had syncope with PFT and declines further PFT  FEV1 1.99 61% in 2009      History of arterial ischemic stroke 02/25/2011     Priority: Medium     Small infarct - seen on MRI  Diagnosis updated by automated process. Provider to review and confirm.      Hyperlipidemia LDL goal <70 02/25/2011     Priority: Medium    Essential hypertension 03/15/2010     Priority: Medium    Glaucoma 03/12/2010     Priority: Medium     Worsened pressures with prednisone use.   Recommended by eye doctor to stop inhaled steroids if possible, but has been on for so many years would potentially put him into more resp problems.   Follows with [facility].    Needs eye exam if prednisone course is longer than 10 days        Family hx of colon cancer 02/18/2009     Priority: Medium     (Problem list name updated by automated process. Provider to review and confirm.)          Past Medical History:    Past Medical History:   Diagnosis Date    Chronic airway obstruction, not elsewhere classified     Other and unspecified hyperlipidemia        Past  Surgical History:    Past Surgical History:   Procedure Laterality Date    ARTHROPLASTY KNEE Left 5/28/2020    Procedure: Left Total Knee Arthroplasty;  Surgeon: Sanjay Downey MD;  Location: [facility] OR    ARTHROPLASTY KNEE Right 11/2/2020    Procedure: ARTHROPLASTY, KNEE, TOTAL;  Surgeon: Sanjay Downey MD;  Location: [facility] OR    CV CORONARY ANGIOGRAM N/A 1/4/2025    Procedure: Coronary Angiogram;  Surgeon: Jacobo Montgomery MD;  Location: [facility] CV    CV PCI STENT DRUG ELUTING N/A 1/4/2025    Procedure: Percutaneous Coronary Intervention Stent;  Surgeon: Jacobo Montgomery MD;  Location: [facility] CV    ENT SURGERY  as a child    tonsillectomy       Family History:    Family History   Problem Relation Age of Onset    Diabetes Mother     Heart Disease Mother     C.A.D. Mother     Cancer Father     Hypertension Father     Cerebrovascular Disease Paternal Grandfather     Asthma Sister     Breast Cancer No family hx of     Cancer - colorectal No family hx of     Prostate Cancer No family hx of        Social History:  Marital Status:   [2]  Social History     Tobacco Use    Smoking status: Never    Smokeless tobacco: Never   Vaping Use    Vaping status: Never Used   Substance Use Topics    Alcohol use: Yes     Comment: Rare    Drug use: No        Medications:    acetaminophen (TYLENOL) 325 MG tablet  albuterol (PROAIR HFA/PROVENTIL HFA/VENTOLIN HFA) 108 (90 Base) MCG/ACT inhaler  albuterol (PROVENTIL) (2.5 MG/3ML) 0.083% neb solution  Alcohol Swabs (B-D SINGLE USE SWABS REGULAR) PADS  aspirin (ASA) 81 MG EC tablet  atorvastatin (LIPITOR) 80 MG tablet  blood glucose monitoring (NO BRAND SPECIFIED) meter device kit  budesonide-formoterol (SYMBICORT) 80-4.5 MCG/ACT Inhaler  carvedilol (COREG) 6.25 MG tablet  insulin glargine (LANTUS PEN) 100 UNIT/ML pen  insulin pen needle (BD LIZBETH U/F) 32G X 4 MM miscellaneous  Lancets (ONETOUCH DELICA PLUS [id number]) MISC  losartan-hydrochlorothiazide  "(HYZAAR) 50-12.5 MG tablet  montelukast (SINGULAIR) 10 MG tablet  omeprazole (PRILOSEC) 20 MG DR capsule  ONETOUCH ULTRA test strip  order for DME  semaglutide (OZEMPIC) 2 MG/3ML pen  Semaglutide, 1 MG/DOSE, (OZEMPIC) 4 MG/3ML pen  ticagrelor (BRILINTA) 90 MG tablet  triamcinolone (KENALOG) 0.1 % external cream          Review of Systems   All other systems reviewed and are negative.      Physical Exam   BP: (!) 62/41  Pulse: (!) 154  Temp: 97.9  F (36.6  C)  Resp: 28  Height: 172.7 cm (5' 8\")  Weight: 66.7 kg (147 lb)  SpO2: 90 %      Physical Exam  Vitals and nursing note reviewed.   Constitutional:       General: He is in acute distress.      Appearance: He is well-developed. He is ill-appearing.   HENT:      Head: Normocephalic and atraumatic.      Mouth/Throat:      Mouth: Mucous membranes are moist.      Pharynx: Oropharynx is clear.   Eyes:      Extraocular Movements: Extraocular movements intact.      Pupils: Pupils are equal, round, and reactive to light.   Cardiovascular:      Rate and Rhythm: Regular rhythm. Tachycardia present.   Pulmonary:      Effort: Pulmonary effort is normal.      Breath sounds: Normal breath sounds.   Abdominal:      General: Bowel sounds are normal.      Palpations: Abdomen is soft.   Musculoskeletal:         General: Normal range of motion.      Cervical back: Normal range of motion and neck supple.   Skin:     General: Skin is warm and dry.      Capillary Refill: Capillary refill takes 2 to 3 seconds.   Neurological:      General: No focal deficit present.      Mental Status: He is alert.   Psychiatric:         Mood and Affect: Mood normal.         Behavior: Behavior normal.         ED Course        Procedures              EKG Interpretation:      Interpreted by Jeffrey Cook MD  Time reviewed: Time obtained 10:13 AM time interpreted same sinus rhythm 74 bpm normal axis, normal intervals, small Q's are present in 3 and aVF.  No acute ST-T wave changes.    Critical Care " time:  was 37 minutes for this patient excluding procedures.         Results for orders placed or performed during the hospital encounter of 01/17/25 (from the past 24 hours)   [place] Draw    Narrative    The following orders were created for panel order [place] Draw.  Procedure                               Abnormality         Status                     ---------                               -----------         ------                     Extra Blue Top Tube[[id number]]                              Final result               Extra Red Top Tube[[id number]]                               Final result               Extra Green Top (Lithium...[[id number]]                                                 Extra Purple Top Tube[[id number]]                                                       Extra Heparinized Syringe[[id number]]                        Final result                 Please view results for these tests on the individual orders.   Extra Blue Top Tube   Result Value Ref Range    Hold Specimen JIC    Extra Red Top Tube   Result Value Ref Range    Hold Specimen JIC    Extra Heparinized Syringe   Result Value Ref Range    Hold Specimen JIC    CBC with platelets, differential    Narrative    The following orders were created for panel order CBC with platelets, differential.  Procedure                               Abnormality         Status                     ---------                               -----------         ------                     CBC with platelets and d...[[id number]]  Abnormal            Final result                 Please view results for these tests on the individual orders.   Comprehensive metabolic panel   Result Value Ref Range    Sodium 136 135 - 145 mmol/L    Potassium 4.4 3.4 - 5.3 mmol/L    Carbon Dioxide (CO2) 26 22 - 29 mmol/L    Anion Gap 10 7 - 15 mmol/L    Urea Nitrogen 37.0 (H) 8.0 - 23.0 mg/dL    Creatinine 1.71 (H) 0.67 - 1.17 mg/dL    GFR Estimate 40 (L) >60 mL/min/1.73m2    Calcium  9.3 8.8 - 10.4 mg/dL    Chloride 100 98 - 107 mmol/L    Glucose 147 (H) 70 - 99 mg/dL    Alkaline Phosphatase 90 40 - 150 U/L    AST 20 0 - 45 U/L    ALT 24 0 - 70 U/L    Protein Total 6.7 6.4 - 8.3 g/dL    Albumin 3.8 3.5 - 5.2 g/dL    Bilirubin Total 1.7 (H) <=1.2 mg/dL   Troponin T, High Sensitivity   Result Value Ref Range    Troponin T, High Sensitivity 118 (HH) <=22 ng/L   NT pro BNP   Result Value Ref Range    N terminal Pro BNP Inpatient 467 0 - 1,800 pg/mL   Influenza A/B, RSV and SARS-CoV2 PCR (COVID-19) Nose    Specimen: Nose; Swab   Result Value Ref Range    Influenza A PCR Negative Negative    Influenza B PCR Negative Negative    RSV PCR Negative Negative    SARS CoV2 PCR Negative Negative    Narrative    Testing was performed using the Xpert Xpress CoV2/Flu/RSV Assay on the Cepheid GeneXpert Instrument. This test should be ordered for the detection of SARS-CoV2, influenza, and RSV viruses in individuals with signs and symptoms of respiratory tract infection. This test is for in vitro diagnostic use under the [place] FDA for laboratories certified under CLIA to perform high or moderate complexity testing. This test has been [place] FDA cleared. A negative result does not rule out the presence of PCR inhibitors in the specimen or target RNA in concentration below the limit of detection for the assay. If only one viral target is positive but coinfection with multiple targets is suspected, the sample should be re-tested with another FDA cleared, approved, or authorized test, if coninfection would change clinical management. This test was validated by the [facility]. These laboratories are certified under the Clinical Laboratory Improvement Amendments of 1988 (CLIA-88) as qualified to perfom high complexity laboratory testing.   CBC with platelets and differential   Result Value Ref Range    WBC Count 14.9 (H) 4.0 - 11.0 10e3/uL    RBC Count 4.90 4.40 - 5.90 10e6/uL    Hemoglobin 14.8 13.3 - 17.7  g/dL    Hematocrit 44.6 40.0 - 53.0 %    MCV 91 78 - 100 fL    MCH 30.2 26.5 - 33.0 pg    MCHC 33.2 31.5 - 36.5 g/dL    RDW 12.0 10.0 - 15.0 %    Platelet Count 363 150 - 450 10e3/uL    % Neutrophils 79 %    % Lymphocytes 10 %    % Monocytes 7 %    % Eosinophils 2 %    % Basophils 1 %    % Immature Granulocytes 1 %    NRBCs per 100 WBC 0 <1 /100    Absolute Neutrophils 11.7 (H) 1.6 - 8.3 10e3/uL    Absolute Lymphocytes 1.5 0.8 - 5.3 10e3/uL    Absolute Monocytes 1.1 0.0 - 1.3 10e3/uL    Absolute Eosinophils 0.3 0.0 - 0.7 10e3/uL    Absolute Basophils 0.1 0.0 - 0.2 10e3/uL    Absolute Immature Granulocytes 0.2 <=0.4 10e3/uL    Absolute NRBCs 0.0 10e3/uL   CT Chest (PE) Abdomen Pelvis w Contrast    Narrative    EXAM: CT CHEST PE ABDOMEN PELVIS W CONTRAST  LOCATION: [facility]  DATE: 1/17/2025    INDICATION: Shortness of breath, lightheadedness, hypotension, recent PTCA with MI  COMPARISON: 12/11/2024 , 4/20/2009  TECHNIQUE: CT chest pulmonary angiogram and routine CT abdomen pelvis with IV contrast. Arterial phase through the chest and venous phase through the abdomen and pelvis. Multiplanar reformats and MIP reconstructions were performed. Dose reduction   techniques were used.   CONTRAST: 71mL Isovue 370    FINDINGS:  ANGIOGRAM CHEST: Pulmonary arteries are normal caliber and negative for pulmonary emboli. Thoracic aorta is negative for dissection. No CT evidence of right heart strain.     LUNGS AND PLEURA: Patent central airways. Right upper lobe calcified granuloma. Anterior right upper lobe solid nodule measuring 0.3 cm (series 7, image 129) is new. Bilateral lower lobe peribronchial thickening. A few reticulonodular opacities are   present along the medial and posterior left lower lobe. No pleural effusions. No pneumothorax.    MEDIASTINUM/AXILLAE: No intrathoracic lymphadenopathy. Normal heart size. No pericardial effusion. Normal esophagus.    CORONARY ARTERY CALCIFICATION: Previous  stenting    HEPATOBILIARY: No focal hepatic mass, biliary dilatation, or calcified gallstones.    PANCREAS: No focal pancreatic mass, peripancreatic inflammation, or pancreatic ductal dilatation.    SPLEEN: Normal size.    ADRENAL GLANDS: No nodules.    KIDNEYS/BLADDER: Bilateral renal cysts for which no follow-up is recommended. There are additional subcentimeter renal hypodensities that are too small to be characterized. No hydronephrosis or urinary bladder wall thickening.    BOWEL: Unremarkable appearance of the stomach. No dilated loops of bowel. Minimal herniation of the small bowel loop into a fat-containing periumbilical hernia. Colonic diverticulosis without pericolonic inflammation. No evidence for appendicitis.    LYMPH NODES: No abdominal or pelvic lymphadenopathy.    VASCULATURE: Normal caliber abdominal aorta. Multifocal atherosclerotic plaques. Patent proximal celiac trunk, SMA, and ESTELITA. Patent portal, splenic, and mesenteric veins. Patent bilateral renal veins.    PELVIC ORGANS: No large pelvic mass. Enlarged prostate.    MUSCULOSKELETAL: Healing lateral right fourth through seventh rib fractures. Degenerative changes of the spine.      Impression    IMPRESSION:  1.  No pulmonary embolism.  2.  Bilateral lower lobe bronchiolitis. The minimal airspace opacities in the left lower lobe likely reflects sequelae of bronchiolitis or early pneumonia.  3.  New 0.3 cm anterior right upper lobe solid nodule. See below for follow-up recommendations.  4.  Colonic diverticulosis.   5.  Unchanged appearance of the small bowel containing periumbilical hernia.    REFERENCE:  Guidelines for Management of Incidental Pulmonary Nodules Detected on CT Images: From the Fleischner Society 2017.   Guidelines apply to incidental nodules in patients who are 35 years or older.  Guidelines do not apply to lung cancer screening, patients with immunosuppression, or patients with known primary cancer.    SINGLE NODULE  Nodule size  <6 mm  Low-risk patients: No follow-up needed.  High-risk patients: Optional follow-up at 12 months.       Troponin T, High Sensitivity   Result Value Ref Range    Troponin T, High Sensitivity 78 (H) <=22 ng/L   Lactic Acid Whole Blood with 1X Repeat in 2 HR when >2   Result Value Ref Range    Lactic Acid, Initial 1.3 0.7 - 2.0 mmol/L     2:20 PM  Nonelevated lactate, initial cardiac troponin elevated at 118 with repeat every 2 hourl CT demonstrates no pulmonary embolism however bilateral lower lobe bronchiolitis minimal airspace opacities left lower lobe likely reflect sequelae of bronchiolitis or early pneumonia.  Pulmonary nodule discussed with patient.  White cell count that is trended upward some most recent available previous at 14.9, flu COVID RSV negative, BUN elevated at 37, creatinine 1.71 both higher than baseline.     2:21 PM  Discussed patient with Dr. Jc Allred for the hospitalist service who agrees to accept care of the patient to inpatient status.  Antibiotic therapy with Rocephin and Zithromax initiated in the emergency department.  Medications   azithromycin (ZITHROMAX) 500 mg in sodium chloride 0.9 % 250 mL intermittent infusion (500 mg Intravenous $New Bag 1/17/25 1410)   methylPREDNISolone Na Suc (solu-MEDROL) injection 125 mg (has no administration in time range)   sodium chloride 0.9% BOLUS 1,000 mL (0 mLs Intravenous Stopped 1/17/25 1116)   iopamidol (ISOVUE-370) solution 71 mL (71 mLs Intravenous $Given 1/17/25 1116)   sodium chloride 0.9 % bag 500mL for CT scan flush use (95 mLs Intravenous $Given 1/17/25 1117)   ipratropium - albuterol 0.5 mg/2.5 mg/3 mL (DUONEB) neb solution 3 mL (3 mLs Nebulization $Given 1/17/25 1030)   cefTRIAXone (ROCEPHIN) 2 g vial to attach to  ml bag for ADULTS or NS 50 ml bag for PEDS (0 g Intravenous Stopped 1/17/25 1410)       Assessments & Plan (with Medical Decision Making)   Assessment and plan with medical decision making at the time stamp  above.      Disclaimer: This note consists of symbols derived from keyboarding, dictation and/or voice recognition software. As a result, there may be errors in the script that have gone undetected. Please consider this when interpreting information found in this chart.      I have reviewed the nursing notes.    I have reviewed the findings, diagnosis, plan and need for follow up with the patient.    New Prescriptions    No medications on file       Final diagnoses:   Pneumonia of both lungs due to infectious organism, unspecified part of lung   Dehydration   Transient hypotension   Elevated troponin       1/17/2025   [facility] EMERGENCY DEPT       Jeffrey Cook MD  01/17/25 0028

## 2025-01-17 NOTE — PHARMACY-VANCOMYCIN DOSING SERVICE
"Pharmacy Vancomycin Initial Note  Date of Service 2025  Patient's  1945  79 year old, male    Indication: [facility]ated Pneumonia and Sepsis    Current estimated CrCl = Estimated Creatinine Clearance: 33 mL/min (A) (based on SCr of 1.71 mg/dL (H)).    Creatinine for last 3 days  2025: 10:15 AM Creatinine 1.71 mg/dL    Recent Vancomycin Level(s) for last 3 days  No results found for requested labs within last 3 days.      Vancomycin IV Administrations (past 72 hours)        No vancomycin orders with administrations in past 72 hours.                    Nephrotoxins and other renal medications (From now, onward)      Start     Dose/Rate Route Frequency Ordered Stop    25 1600  piperacillin-tazobactam (ZOSYN) 3.375 g vial to attach to  mL bag        Note to Pharmacy: For [facility], [facility] and [facility]: For Zosyn-naive patients, use the \"Zosyn initial dose + extended infusion\" order panel.    3.375 g  over 30 Minutes Intravenous EVERY 6 HOURS 25 1555      25 1545  vancomycin (VANCOCIN) 1,500 mg in 0.9% NaCl 265 mL intermittent infusion         1,500 mg  over 90 Minutes Intravenous ONCE 25 1543              Contrast Orders - past 72 hours (72h ago, onward)      Start     Dose/Rate Route Frequency Stop    25 1020  iopamidol (ISOVUE-370) solution 71 mL         71 mL Intravenous ONCE 25 1116            [employer] Prediction of Planned Initial Vancomycin Regimen  Loading dose: 1500 mg at 16:30 2025.  Regimen: 1000 mg IV every 24 hours.  Start time: 16:30 on 2025  Exposure target: AUC24 (range)400-600 mg/L.hr   AUC24,ss: 595 mg/L.hr  Probability of AUC24 > 400: 88 %  Ctrough,ss: 19.5 mg/L  Probability of Ctrough,ss > 20: 47 %  Probability of nephrotoxicity (Lodise ANNEMARIE ): 16 %          Plan:  Start vancomycin  1500 mg IV x 1 then 1000mg q24h.   Vancomycin monitoring method: AUC  Vancomycin therapeutic monitoring goal: 400-600 mg*h/L  Pharmacy will " check vancomycin levels as appropriate in 1-3 Days.    Serum creatinine levels will be ordered every 48 hours.      Jeimy Araujo RP

## 2025-01-17 NOTE — ED TRIAGE NOTES
Pt sent from clinic at clinic for follow up from heart attack. Pt became clammy and SOB. Pt denies chest pain. HR  in triage. Pt trying to clear throat and became unresponsive in triage. Pt to room 1.      Triage Assessment (Adult)       Row Name 01/17/25 1010          Triage Assessment    Airway WDL WDL        Respiratory WDL    Respiratory WDL X;rhythm/pattern     Rhythm/Pattern, Respiratory shortness of breath        Cardiac WDL    Cardiac WDL X;rhythm     Pulse Rate & Regularity tachycardic        Cognitive/Neuro/Behavioral WDL    Cognitive/Neuro/Behavioral WDL X;arousability  brief unresponsiveness during triage

## 2025-01-17 NOTE — H&P
[facility]    History and Physical - Hospitalist Service       Date of Admission:  1/17/2025    Assessment & Plan    Raul Wilhelm is a 79 year old male with past medical hx of CAD s/p REDD x2 1/4/25, T2DM, COPD, asthma, hx of CVA, HTN, HLD admitted on 1/17/2025. He presents for dyspnea and an episode of syncope in the ER waiting room.    Pneumonia, bilateral, hospital acquired vs aspiration  Sepsis without septic shock  Recent influenza A    During recent admission (see below) he had influenza treated with Tamiflu and Prednisone 40mg for 5 days. He also had possible superimposed bacterial pneumonia evidenced by sputum culture growing H. Flu and group B strep (no imaging performed). He was treated with Ceftriaxone and Doxycycline. During this he required HFNC, but weaned off prior to discharge. He did not discharge with antibiotics or home oxygen.    He presented to his hospital follow up 1/17 and PCP noted him to be clammy, dyspneic, and HR was . In ER waiting room he had a syncopal episode and SBP was found to be 50. Arrives afebrile, not tachycardic. Admitted on 2L NC. WBC 14.9. CT with bilateral lower lobe bronchiolitis. Minimal airspace opacities in the LLL likely reflect sequelae of bronchiolitis or early pneumonia.     Due to patient's recent admission with receipt of IV antibiotics along with hx of chronic dysphagia (see below) will treat as hospital acquired vs aspiration.     - Zosyn 3.375g IV Q6h  - Azithromycin 500mg IV daily for 3 days  - Vanc, pharmacy dosed. Discontinue if MRSA swab negative  - Sputum culture, legionella, strep pneumo pending  - Blood cultures pending  - Tele for report of HR bouncing between 40 and 155    KATY  Cr 1.71, baseline 0.9-1.1.   - LR 100ml/hr after initial sepsis bolus  - Held PTA Losartan-hydrochlorothiazide   - FeNa pending    CAD s/p REDD 2015, 1/4/25  Hyperlipidemia LDL goal <70  HTN    Recent admission 1/4-1/11 for STEMI s/p REDD x2  "on 1/4/25. Per cardio he still has severe residual disease in the LAD system, and is being evaluated at multidisciplinary heart conference to determine best course of action for revascularization of his residual CAD as an outpatient.     Troponin on admission 118 ?  78. Patient denies chest pain and EKG non-ischemic.  - Continued PTA Aspirin 81mg, Ticagrelor 90mg BID, Atorvastatin 80mg, Carvedilol 6.25mg BID  - Held PTA Losartan-hydrochlorothiazide 50-12.5mg    Zenker diverticulum  Chronic dysphagia  Zenker's found on recent admission swallow study and esophagram. It does not completely empty with multiple swallows and explains symptoms of regurgitation/dysphagia.  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 Never smoked. Patient no longer owns birds. 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  - Continued PTA PRN Albuterol, Symbicort BID, Montelukast 10mg    Type 2 diabetes mellitus without complication, without long-term current use of insulin  Recent A1c 8.1%.  - sliding scale insulin  - Consistent carb diet  - Continued PTA Glargine 20U Qpm  - Held PTA Semaglutide weekly injections    Elevated T-bili  1.7 on admission. Other LFTs WNL. CT unremarkable.  - CMP in am    History of arterial ischemic stroke  Noted. On anti-platelets as above.     Pulmonary nodule  Incidental finding on CT as new 0.3cm anterior RUL solid nodule.  - Per CT read, optional follow up at 12 months.     GERD  - Continued PTA Omeprazole 20mg     "      Diet: Combination Diet Moderate Consistent Carb (60 g CHO per Meal) Diet; Low Saturated Fat Na <2400mg Diet  DVT Prophylaxis: Enoxaparin (Lovenox) SQ  Diaz Catheter: Not present  Lines: None     Cardiac Monitoring: ACTIVE order. Indication: Tachyarrhythmias, acute (48 hours)  Code Status: Full Code    Clinically Significant Risk Factors Present on Admission                 # Drug Induced Platelet Defect: home medication list includes an antiplatelet medication  # Acute Kidney Injury, unspecified: based on a >150% or 0.3 mg/dL increase in last creatinine compared to past 90 day average, will monitor renal function  # Hypertension: Noted on problem list     # Acute Hypoxic Respiratory Failure: Documented O2 saturation < 90%. Continue supplemental oxygen as needed       # DMII: A1C = 8.1 % (Ref range: <5.7 %) within past 6 months        # Asthma: noted on problem list        Disposition Plan     Medically Ready for Discharge: Anticipated in 2-4 Days         The patient's care was discussed with the Attending Physician, Dr. Hylton, Patient, and Patient's Family.    Matthew Ocampo PA-C  Hospitalist Service  [facility]  Securely message with [employer] (more info)  Text page via [facility] Paging/Directory     ______________________________________________________________________    Chief Complaint   Cough    History is obtained from the patient, his daughter    History of Present Illness   Raul Wilhelm is a 79 year old male who presents for URI symptoms.    Patient presents from PCP where he was at for hospital follow-up.  During his visit he became clammy and short of breath and his heart rate was between 40 and 155 in triage.  In the waiting room he was frequently trying to clear his throat and became unresponsive for a few seconds and had an episode of syncope.  His systolic blood pressure was in the 50s during this episode.    Patient was recently admitted for 10 days at [facility]  for a STEMI and had 2 stents placed on the fourth.  He had influenza during his hospital stay requiring high flow nasal cannula.  He completed Tamiflu for this.  He had a possible superimposed bacterial pneumonia evidenced by sputum culture growing H. influenzae and group B strep and was treated with ceftriaxone and doxycycline.  He was also given prednisone 40 for 5 days during admission for this respiratory compromise.  He was not discharged on antibiotics or oxygen.    Patient states he has not felt better since recent admission.  He states that he is very sensitive to how he is doing medically and he has not felt any improvement since he was on the high flow nasal cannula in the hospital.  His daughter stayed with him for the first few days after discharge and noticed a worsening of his chronic cough that is productive but no dyspnea on exertion.    Patient has several respiratory issues.  He has severe asthma, COPD.  He is not a former smoker and has COPD from environmental exposures.  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  He used to own pigeons, however the doctor convinced him to get rid of them.  He no longer wants any pets or livestock.  He does live in a house that is not next to an industrial plant.  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 No recent travel.      Past Medical History    Past Medical History:   Diagnosis Date    Chronic airway obstruction, not elsewhere classified     Other and unspecified hyperlipidemia        Past Surgical History   Past Surgical History:   Procedure Laterality Date    ARTHROPLASTY KNEE Left 5/28/2020    Procedure: Left Total Knee Arthroplasty;  Surgeon: Sanjay Downey MD;  Location: [facility] OR    ARTHROPLASTY KNEE Right 11/2/2020    Procedure: ARTHROPLASTY, KNEE, TOTAL;  Surgeon: Sanjay Downey MD;  Location: [facility] OR    CV CORONARY ANGIOGRAM N/A 1/4/2025    Procedure:  Coronary Angiogram;  Surgeon: Jacobo Montgomery MD;  Location: [facility] CV    CV PCI STENT DRUG ELUTING N/A 1/4/2025    Procedure: Percutaneous Coronary Intervention Stent;  Surgeon: Jacobo Montgomery MD;  Location: [facility] CV    ENT SURGERY  as a child    tonsillectomy       Prior to Admission Medications   Prior to Admission Medications   Prescriptions Last Dose Informant Patient Reported? Taking?   Alcohol Swabs (B-D SINGLE USE SWABS REGULAR) PADS  Self No No   Sig: USE TO SWAB AREA OF INJECTION / JENISE AS DIRECTED   Lancets (ONETOUCH DELICA PLUS [id number]) MISC  Self No No   Sig: USE AS DIRECTED WITH LANCING DEVICE TO TEST ONCE DAILY   ONETOUCH ULTRA test strip  Self No No   Sig: USE TO TEST BLOOD SUGAR 1 TIME DAILY OR AS DIRECTED   Semaglutide, 1 MG/DOSE, (OZEMPIC) 4 MG/3ML pen   No No   Sig: Inject 1 mg subcutaneously every 7 days.   acetaminophen (TYLENOL) 325 MG tablet   Yes No   Sig: Take 2 tablets (650 mg) by mouth every 8 hours.   albuterol (PROAIR HFA/PROVENTIL HFA/VENTOLIN HFA) 108 (90 Base) MCG/ACT inhaler   No No   Sig: Inhale 1-2 puffs into the lungs every 4 hours as needed for shortness of breath or wheezing.   albuterol (PROVENTIL) (2.5 MG/3ML) 0.083% neb solution  Self No No   Sig: TAKE 1 VIAL (2.5 MG) BY NEBULIZATION EVERY 6 HOURS AS NEEDED FOR SHORTNESS OF BREATH OR WHEEZING   aspirin (ASA) 81 MG EC tablet  Self Yes No   Sig: Take 1 tablet (81 mg) by mouth daily   atorvastatin (LIPITOR) 80 MG tablet   No No   Sig: Take 1 tablet (80 mg) by mouth at bedtime.   blood glucose monitoring (NO BRAND SPECIFIED) meter device kit  Self No No   Sig: Use to test blood sugar 1 times daily or as directed.  Blood Glucose Monitor Brands: per insurance.   budesonide-formoterol (SYMBICORT) 80-4.5 MCG/ACT Inhaler   No No   Sig: Inhale 2 puffs into the lungs 2 times daily.   carvedilol (COREG) 6.25 MG tablet   No No   Sig: Take 1 tablet (6.25 mg) by mouth 2 times daily.   insulin glargine (LANTUS  PEN) 100 UNIT/ML pen  Self No No   Sig: Inject 20 Units subcutaneously at bedtime.   insulin pen needle (BD LIZBETH U/F) 32G X 4 MM miscellaneous  Self No No   Sig: USE 1 PEN NEEDLE DAILY   losartan-hydrochlorothiazide (HYZAAR) 50-12.5 MG tablet   No No   Sig: Take 1 tablet by mouth daily.   montelukast (SINGULAIR) 10 MG tablet   No No   Sig: TAKE ONE TABLET BY MOUTH AT BEDTIME   omeprazole (PRILOSEC) 20 MG DR capsule   No No   Sig: Take 1 capsule (20 mg) by mouth daily.   order for DME  Self No No   Sig: Equipment being ordered: arm blood pressure cuff   semaglutide (OZEMPIC) 2 MG/3ML pen   No No   Sig: Inject 0.25 mg subcutaneously every 7 days for 28 days, THEN 0.5 mg every 7 days for 28 days.   ticagrelor (BRILINTA) 90 MG tablet   No No   Sig: Take 1 tablet (90 mg) by mouth 2 times daily. Dose to start tomorrow morning.   triamcinolone (KENALOG) 0.1 % external cream  Self No No   Sig: APPLY TOPICALLY 2 TIMES DAILY TO LEFT ANKLE      Facility-Administered Medications: None      2023 UPDATE: these sections are not required for  billing, but can be added when medically relevant     Physical Exam   Vital Signs: Temp: 97.9  F (36.6  C) Temp src: Oral BP: 102/73 Pulse: 81   Resp: 17 SpO2: 96 % O2 Device: Nasal cannula Oxygen Delivery: 2 LPM  Weight: 147 lbs 0 oz    Constitutional: Alert, oriented, cooperative, in no acute distress, appears nontoxic.  HENT: Oropharynx is clear and moist. No evidence of cranial trauma. Normocephalic. Eyes anicteric.   Cardiovascular: Regular rate and rhythm, normal S1 and S2, and no murmur noted. Good peripheral pulses in wrists bilaterally. No lower extremity edema.  Respiratory: Conversational dyspnea on 2L NC. Decreased breath sounds in all fields, moreso on bases. Faint crackles in left base.   GI: Soft, non-tender, normal bowel sounds, no hepatosplenomegaly, no masses appreciated.  Musculoskeletal: Normal muscle bulk and tone. FROM in all extremities   Skin: Warm and dry, no rashes on  exposed skin.   Psych: Normal affect and speech.  Neurologic: Cranial nerves 2-12 are grossly intact.       Medical Decision Making       90 MINUTES SPENT BY ME on the date of service doing chart review, history, exam, documentation & further activities per the note.      Data     I have personally reviewed the following data over the past 24 hrs:    14.9 (H)  \   14.8   / 363     136 100 37.0 (H) /  147 (H)   4.4 26 1.71 (H) \     ALT: 24 AST: 20 AP: 90 TBILI: 1.7 (H)   ALB: 3.8 TOT PROTEIN: 6.7 LIPASE: N/A     Trop: 78 (H) BNP: 467     Procal: 0.12 CRP: N/A Lactic Acid: 1.3         Imaging results reviewed over the past 24 hrs:   Recent Results (from the past 24 hours)   CT Chest (PE) Abdomen Pelvis w Contrast    Narrative    EXAM: CT CHEST PE ABDOMEN PELVIS W CONTRAST  LOCATION: [facility]  DATE: 1/17/2025    INDICATION: Shortness of breath, lightheadedness, hypotension, recent PTCA with MI  COMPARISON: 12/11/2024 , 4/20/2009  TECHNIQUE: CT chest pulmonary angiogram and routine CT abdomen pelvis with IV contrast. Arterial phase through the chest and venous phase through the abdomen and pelvis. Multiplanar reformats and MIP reconstructions were performed. Dose reduction   techniques were used.   CONTRAST: 71mL Isovue 370    FINDINGS:  ANGIOGRAM CHEST: Pulmonary arteries are normal caliber and negative for pulmonary emboli. Thoracic aorta is negative for dissection. No CT evidence of right heart strain.     LUNGS AND PLEURA: Patent central airways. Right upper lobe calcified granuloma. Anterior right upper lobe solid nodule measuring 0.3 cm (series 7, image 129) is new. Bilateral lower lobe peribronchial thickening. A few reticulonodular opacities are   present along the medial and posterior left lower lobe. No pleural effusions. No pneumothorax.    MEDIASTINUM/AXILLAE: No intrathoracic lymphadenopathy. Normal heart size. No pericardial effusion. Normal esophagus.    CORONARY ARTERY  CALCIFICATION: Previous stenting    HEPATOBILIARY: No focal hepatic mass, biliary dilatation, or calcified gallstones.    PANCREAS: No focal pancreatic mass, peripancreatic inflammation, or pancreatic ductal dilatation.    SPLEEN: Normal size.    ADRENAL GLANDS: No nodules.    KIDNEYS/BLADDER: Bilateral renal cysts for which no follow-up is recommended. There are additional subcentimeter renal hypodensities that are too small to be characterized. No hydronephrosis or urinary bladder wall thickening.    BOWEL: Unremarkable appearance of the stomach. No dilated loops of bowel. Minimal herniation of the small bowel loop into a fat-containing periumbilical hernia. Colonic diverticulosis without pericolonic inflammation. No evidence for appendicitis.    LYMPH NODES: No abdominal or pelvic lymphadenopathy.    VASCULATURE: Normal caliber abdominal aorta. Multifocal atherosclerotic plaques. Patent proximal celiac trunk, SMA, and ESTELITA. Patent portal, splenic, and mesenteric veins. Patent bilateral renal veins.    PELVIC ORGANS: No large pelvic mass. Enlarged prostate.    MUSCULOSKELETAL: Healing lateral right fourth through seventh rib fractures. Degenerative changes of the spine.      Impression    IMPRESSION:  1.  No pulmonary embolism.  2.  Bilateral lower lobe bronchiolitis. The minimal airspace opacities in the left lower lobe likely reflects sequelae of bronchiolitis or early pneumonia.  3.  New 0.3 cm anterior right upper lobe solid nodule. See below for follow-up recommendations.  4.  Colonic diverticulosis.   5.  Unchanged appearance of the small bowel containing periumbilical hernia.    REFERENCE:  Guidelines for Management of Incidental Pulmonary Nodules Detected on CT Images: From the Fleischner Society 2017.   Guidelines apply to incidental nodules in patients who are 35 years or older.  Guidelines do not apply to lung cancer screening, patients with immunosuppression, or patients with known primary  cancer.    SINGLE NODULE  Nodule size <6 mm  Low-risk patients: No follow-up needed.  High-risk patients: Optional follow-up at 12 months.

## 2025-01-17 NOTE — ED NOTES
Pt given a number of sandwiches and snacks and beverages to drink.  Pt remains very comfortable and alert and cooperative.  Will continue to monitor and await bed placement.

## 2025-01-17 NOTE — ED NOTES
Pt continues to do well, sitting upright and very talkative.  Good strong, productive cough.  Pt denies CP or SOB at rest.  RR and WOB is increased with talking.  Pt remains on 2 lts per NC.   Pt reports some improvement after neb treatment.  1 lt NS infused and VSS.         Pt and daughter informed of lab results and CT scan and rational.   Pt ready for CT scan.  PLAN:  Will continue to monitor closely, repeat Troponin, and await CT results.

## 2025-01-17 NOTE — ED NOTES
Pt arrived via triage, in , Rapid Response called.  Per Triage RN, pt had a syncopal event while in triage.  SBP was in the 50-60's.   Pt on arrival was awake and alert and able to transfer from  to [facility].  Pt and daughter states he had a recent MI with stents; went to [place], also had Influenza at that time.

## 2025-01-17 NOTE — ED NOTES
Pt and daughter have been updated on CT results and lab results and admit status.  1 set of blood cultures ordered and kenny, lactate and repeat troponin.   Antibiotics hung.   Pt with frequent urination and taking off monitors, remains on 2 lt 02, productive cough continues.  PLAN:  Will continue to monitor and await admit status.

## 2025-01-17 NOTE — ED NOTES
".[facility]   Admission Handoff    The patient is Raul Wilhelm, 79 year old who arrived in the ED by CAR from home with a complaint of Shortness of Breath and Dizziness (Light headed)  . The patient's current symptoms are new and during this time the symptoms have remained the same. In the ED, patient was diagnosed with   Final diagnoses:   Pneumonia of both lungs due to infectious organism, unspecified part of lung   Dehydration   Transient hypotension   Elevated troponin         Needed?: No    Allergies:    Allergies   Allergen Reactions    Simvastatin Swelling     Severe muscle pain, swelling, and bruising    Dust Mites        Past Medical Hx:   Past Medical History:   Diagnosis Date    Chronic airway obstruction, not elsewhere classified     Other and unspecified hyperlipidemia        Initial vitals were: BP: (!) 62/41  Pulse: (!) 154  Temp: 97.9  F (36.6  C)  Resp: 28  Height: 172.7 cm (5' 8\")  Weight: 66.7 kg (147 lb)  SpO2: 90 %   Recent vital Signs: /73   Pulse 81   Temp 97.9  F (36.6  C) (Oral)   Resp 17   Ht 1.727 m (5' 8\")   Wt 66.7 kg (147 lb)   SpO2 96%   BMI 22.35 kg/m      Elimination Status: Continent: Yes     Activity Level: Independent, currently I have him on bed rest, but he moves indep in bed, stands at bedside to void independently.  No further issues with dizziness.  Does have increased RR with activity, that's why I am limiting him.    Fall Status: Reason for falls risk: Other- SOB and dehydration on arrival.  nonskid shoes/slippers when out of bed, arm band in place, and patient and family education    Baseline Mental status: WDL  Current Mental Status changes: at basesline    Infection present or suspected this encounter: yes respiratory  Sepsis suspected: No    Isolation type: no    Bariatric equipment needed?: No    In the ED these meds were given:   Medications   lactated ringers infusion (has no administration in time range) "   sodium chloride 0.9% BOLUS 1,000 mL (0 mLs Intravenous Stopped 1/17/25 1116)   iopamidol (ISOVUE-370) solution 71 mL (71 mLs Intravenous $Given 1/17/25 1116)   sodium chloride 0.9 % bag 500mL for CT scan flush use (95 mLs Intravenous $Given 1/17/25 1117)   ipratropium - albuterol 0.5 mg/2.5 mg/3 mL (DUONEB) neb solution 3 mL (3 mLs Nebulization $Given 1/17/25 1030)   cefTRIAXone (ROCEPHIN) 2 g vial to attach to  ml bag for ADULTS or NS 50 ml bag for PEDS (0 g Intravenous Stopped 1/17/25 1410)   azithromycin (ZITHROMAX) 500 mg in sodium chloride 0.9 % 250 mL intermittent infusion (0 mg Intravenous Stopped 1/17/25 1507)   methylPREDNISolone Na Suc (solu-MEDROL) injection 125 mg (125 mg Intravenous $Given 1/17/25 1423)       Drips running?  No    Home pump  No    Current LDAs: Peripheral IV: Site R AC; Gauge 18  lactated Ringer's     Results:   Labs/Imaging  Ordered and Resulted from Time of ED Arrival Up to the Time of Departure from the ED  Results for orders placed or performed during the hospital encounter of 01/17/25 (from the past 24 hours)   [place] Draw    Narrative    The following orders were created for panel order [place] Draw.  Procedure                               Abnormality         Status                     ---------                               -----------         ------                     Extra Blue Top Tube[[id number]]                              Final result               Extra Red Top Tube[[id number]]                               Final result               Extra Green Top (Lithium...[[id number]]                                                 Extra Purple Top Tube[[id number]]                                                       Extra Heparinized Syringe[[id number]]                        Final result                 Please view results for these tests on the individual orders.   Extra Blue Top Tube   Result Value Ref Range    Hold Specimen JIC    Extra Red Top Tube   Result Value Ref  Range    Hold Specimen JIC    Extra Heparinized Syringe   Result Value Ref Range    Hold Specimen JIC    CBC with platelets, differential    Narrative    The following orders were created for panel order CBC with platelets, differential.  Procedure                               Abnormality         Status                     ---------                               -----------         ------                     CBC with platelets and d...[[id number]]  Abnormal            Final result                 Please view results for these tests on the individual orders.   Comprehensive metabolic panel   Result Value Ref Range    Sodium 136 135 - 145 mmol/L    Potassium 4.4 3.4 - 5.3 mmol/L    Carbon Dioxide (CO2) 26 22 - 29 mmol/L    Anion Gap 10 7 - 15 mmol/L    Urea Nitrogen 37.0 (H) 8.0 - 23.0 mg/dL    Creatinine 1.71 (H) 0.67 - 1.17 mg/dL    GFR Estimate 40 (L) >60 mL/min/1.73m2    Calcium 9.3 8.8 - 10.4 mg/dL    Chloride 100 98 - 107 mmol/L    Glucose 147 (H) 70 - 99 mg/dL    Alkaline Phosphatase 90 40 - 150 U/L    AST 20 0 - 45 U/L    ALT 24 0 - 70 U/L    Protein Total 6.7 6.4 - 8.3 g/dL    Albumin 3.8 3.5 - 5.2 g/dL    Bilirubin Total 1.7 (H) <=1.2 mg/dL   Troponin T, High Sensitivity   Result Value Ref Range    Troponin T, High Sensitivity 118 (HH) <=22 ng/L   NT pro BNP   Result Value Ref Range    N terminal Pro BNP Inpatient 467 0 - 1,800 pg/mL   Influenza A/B, RSV and SARS-CoV2 PCR (COVID-19) Nose    Specimen: Nose; Swab   Result Value Ref Range    Influenza A PCR Negative Negative    Influenza B PCR Negative Negative    RSV PCR Negative Negative    SARS CoV2 PCR Negative Negative    Narrative    Testing was performed using the Xpert Xpress CoV2/Flu/RSV Assay on the Cepheid GeneXpert Instrument. This test should be ordered for the detection of SARS-CoV2, influenza, and RSV viruses in individuals with signs and symptoms of respiratory tract infection. This test is for in vitro diagnostic use under the [place] FDA for  laboratories certified under CLIA to perform high or moderate complexity testing. This test has been [place] FDA cleared. A negative result does not rule out the presence of PCR inhibitors in the specimen or target RNA in concentration below the limit of detection for the assay. If only one viral target is positive but coinfection with multiple targets is suspected, the sample should be re-tested with another FDA cleared, approved, or authorized test, if coninfection would change clinical management. This test was validated by the [facility]. These laboratories are certified under the Clinical Laboratory Improvement Amendments of 1988 (CLIA-88) as qualified to perfom high complexity laboratory testing.   CBC with platelets and differential   Result Value Ref Range    WBC Count 14.9 (H) 4.0 - 11.0 10e3/uL    RBC Count 4.90 4.40 - 5.90 10e6/uL    Hemoglobin 14.8 13.3 - 17.7 g/dL    Hematocrit 44.6 40.0 - 53.0 %    MCV 91 78 - 100 fL    MCH 30.2 26.5 - 33.0 pg    MCHC 33.2 31.5 - 36.5 g/dL    RDW 12.0 10.0 - 15.0 %    Platelet Count 363 150 - 450 10e3/uL    % Neutrophils 79 %    % Lymphocytes 10 %    % Monocytes 7 %    % Eosinophils 2 %    % Basophils 1 %    % Immature Granulocytes 1 %    NRBCs per 100 WBC 0 <1 /100    Absolute Neutrophils 11.7 (H) 1.6 - 8.3 10e3/uL    Absolute Lymphocytes 1.5 0.8 - 5.3 10e3/uL    Absolute Monocytes 1.1 0.0 - 1.3 10e3/uL    Absolute Eosinophils 0.3 0.0 - 0.7 10e3/uL    Absolute Basophils 0.1 0.0 - 0.2 10e3/uL    Absolute Immature Granulocytes 0.2 <=0.4 10e3/uL    Absolute NRBCs 0.0 10e3/uL   CT Chest (PE) Abdomen Pelvis w Contrast    Narrative    EXAM: CT CHEST PE ABDOMEN PELVIS W CONTRAST  LOCATION: [facility]  DATE: 1/17/2025    INDICATION: Shortness of breath, lightheadedness, hypotension, recent PTCA with MI  COMPARISON: 12/11/2024 , 4/20/2009  TECHNIQUE: CT chest pulmonary angiogram and routine CT abdomen pelvis with IV contrast. Arterial  phase through the chest and venous phase through the abdomen and pelvis. Multiplanar reformats and MIP reconstructions were performed. Dose reduction   techniques were used.   CONTRAST: 71mL Isovue 370    FINDINGS:  ANGIOGRAM CHEST: Pulmonary arteries are normal caliber and negative for pulmonary emboli. Thoracic aorta is negative for dissection. No CT evidence of right heart strain.     LUNGS AND PLEURA: Patent central airways. Right upper lobe calcified granuloma. Anterior right upper lobe solid nodule measuring 0.3 cm (series 7, image 129) is new. Bilateral lower lobe peribronchial thickening. A few reticulonodular opacities are   present along the medial and posterior left lower lobe. No pleural effusions. No pneumothorax.    MEDIASTINUM/AXILLAE: No intrathoracic lymphadenopathy. Normal heart size. No pericardial effusion. Normal esophagus.    CORONARY ARTERY CALCIFICATION: Previous stenting    HEPATOBILIARY: No focal hepatic mass, biliary dilatation, or calcified gallstones.    PANCREAS: No focal pancreatic mass, peripancreatic inflammation, or pancreatic ductal dilatation.    SPLEEN: Normal size.    ADRENAL GLANDS: No nodules.    KIDNEYS/BLADDER: Bilateral renal cysts for which no follow-up is recommended. There are additional subcentimeter renal hypodensities that are too small to be characterized. No hydronephrosis or urinary bladder wall thickening.    BOWEL: Unremarkable appearance of the stomach. No dilated loops of bowel. Minimal herniation of the small bowel loop into a fat-containing periumbilical hernia. Colonic diverticulosis without pericolonic inflammation. No evidence for appendicitis.    LYMPH NODES: No abdominal or pelvic lymphadenopathy.    VASCULATURE: Normal caliber abdominal aorta. Multifocal atherosclerotic plaques. Patent proximal celiac trunk, SMA, and ESTELITA. Patent portal, splenic, and mesenteric veins. Patent bilateral renal veins.    PELVIC ORGANS: No large pelvic mass. Enlarged  prostate.    MUSCULOSKELETAL: Healing lateral right fourth through seventh rib fractures. Degenerative changes of the spine.      Impression    IMPRESSION:  1.  No pulmonary embolism.  2.  Bilateral lower lobe bronchiolitis. The minimal airspace opacities in the left lower lobe likely reflects sequelae of bronchiolitis or early pneumonia.  3.  New 0.3 cm anterior right upper lobe solid nodule. See below for follow-up recommendations.  4.  Colonic diverticulosis.   5.  Unchanged appearance of the small bowel containing periumbilical hernia.    REFERENCE:  Guidelines for Management of Incidental Pulmonary Nodules Detected on CT Images: From the Fleischner Society 2017.   Guidelines apply to incidental nodules in patients who are 35 years or older.  Guidelines do not apply to lung cancer screening, patients with immunosuppression, or patients with known primary cancer.    SINGLE NODULE  Nodule size <6 mm  Low-risk patients: No follow-up needed.  High-risk patients: Optional follow-up at 12 months.       Troponin T, High Sensitivity   Result Value Ref Range    Troponin T, High Sensitivity 78 (H) <=22 ng/L   Lactic Acid Whole Blood with 1X Repeat in 2 HR when >2   Result Value Ref Range    Lactic Acid, Initial 1.3 0.7 - 2.0 mmol/L       For the majority of the shift this patient's behavior was Green     Cardiac Rhythm: Normal Sinus  Pt needs tele?   Skin/wound Issues: None, I have not done a full inspection    Code Status:     Pain control: pt had none    Nausea control: pt had none    Abnormal labs/tests/findings requiring intervention: see flowsheets for exact details.  Pneumonia on CT scan.    Patient tested for COVID 19 prior to admission: YES     OBS brochure/video discussed/provided to patient/family:      Family present during ED course? Yes daughter, has gone home for a few hours    Family Comments/Social Situation comments: no issues, daughter very involved and helpful and kind.    Tasks needing completion:  None    Beena Hylton, RN

## 2025-01-17 NOTE — ED NOTES
Report given to Med/Surg.  Pt medicated with Brilina prior to transfer.  No other medications available from Pharmacy at this time, RN aware.

## 2025-01-18 LAB
ALBUMIN SERPL BCG-MCNC: 3.4 G/DL (ref 3.5–5.2)
ALP SERPL-CCNC: 79 U/L (ref 40–150)
ALT SERPL W P-5'-P-CCNC: 22 U/L (ref 0–70)
ANION GAP SERPL CALCULATED.3IONS-SCNC: 13 MMOL/L (ref 7–15)
AST SERPL W P-5'-P-CCNC: 18 U/L (ref 0–45)
ATRIAL RATE - MUSE: 74 BPM
BILIRUB SERPL-MCNC: 1 MG/DL
BUN SERPL-MCNC: 23.5 MG/DL (ref 8–23)
CALCIUM SERPL-MCNC: 8.8 MG/DL (ref 8.8–10.4)
CHLORIDE SERPL-SCNC: 106 MMOL/L (ref 98–107)
CREAT SERPL-MCNC: 1.09 MG/DL (ref 0.67–1.17)
CREAT SERPL-MCNC: 1.09 MG/DL (ref 0.67–1.17)
DIASTOLIC BLOOD PRESSURE - MUSE: NORMAL MMHG
EGFRCR SERPLBLD CKD-EPI 2021: 69 ML/MIN/1.73M2
EGFRCR SERPLBLD CKD-EPI 2021: 69 ML/MIN/1.73M2
ERYTHROCYTE [DISTWIDTH] IN BLOOD BY AUTOMATED COUNT: 12 % (ref 10–15)
GLUCOSE BLDC GLUCOMTR-MCNC: 156 MG/DL (ref 70–99)
GLUCOSE BLDC GLUCOMTR-MCNC: 191 MG/DL (ref 70–99)
GLUCOSE BLDC GLUCOMTR-MCNC: 241 MG/DL (ref 70–99)
GLUCOSE BLDC GLUCOMTR-MCNC: 262 MG/DL (ref 70–99)
GLUCOSE SERPL-MCNC: 173 MG/DL (ref 70–99)
HCO3 SERPL-SCNC: 24 MMOL/L (ref 22–29)
HCT VFR BLD AUTO: 37.8 % (ref 40–53)
HGB BLD-MCNC: 12.5 G/DL (ref 13.3–17.7)
INTERPRETATION ECG - MUSE: NORMAL
MCH RBC QN AUTO: 30.1 PG (ref 26.5–33)
MCHC RBC AUTO-ENTMCNC: 33.1 G/DL (ref 31.5–36.5)
MCV RBC AUTO: 91 FL (ref 78–100)
P AXIS - MUSE: 48 DEGREES
PLATELET # BLD AUTO: 253 10E3/UL (ref 150–450)
POTASSIUM SERPL-SCNC: 4.7 MMOL/L (ref 3.4–5.3)
PR INTERVAL - MUSE: 146 MS
PROT SERPL-MCNC: 5.7 G/DL (ref 6.4–8.3)
QRS DURATION - MUSE: 74 MS
QT - MUSE: 382 MS
QTC - MUSE: 424 MS
R AXIS - MUSE: 31 DEGREES
RBC # BLD AUTO: 4.15 10E6/UL (ref 4.4–5.9)
SODIUM SERPL-SCNC: 143 MMOL/L (ref 135–145)
SYSTOLIC BLOOD PRESSURE - MUSE: NORMAL MMHG
T AXIS - MUSE: 24 DEGREES
VENTRICULAR RATE- MUSE: 74 BPM
WBC # BLD AUTO: 12.2 10E3/UL (ref 4–11)

## 2025-01-18 PROCEDURE — 250N000012 HC RX MED GY IP 250 OP 636 PS 637: Performed by: STUDENT IN AN ORGANIZED HEALTH CARE EDUCATION/TRAINING PROGRAM

## 2025-01-18 PROCEDURE — 36415 COLL VENOUS BLD VENIPUNCTURE: CPT

## 2025-01-18 PROCEDURE — 87040 BLOOD CULTURE FOR BACTERIA: CPT | Performed by: STUDENT IN AN ORGANIZED HEALTH CARE EDUCATION/TRAINING PROGRAM

## 2025-01-18 PROCEDURE — 250N000011 HC RX IP 250 OP 636

## 2025-01-18 PROCEDURE — 250N000013 HC RX MED GY IP 250 OP 250 PS 637: Performed by: STUDENT IN AN ORGANIZED HEALTH CARE EDUCATION/TRAINING PROGRAM

## 2025-01-18 PROCEDURE — 85014 HEMATOCRIT: CPT

## 2025-01-18 PROCEDURE — 258N000003 HC RX IP 258 OP 636

## 2025-01-18 PROCEDURE — 99232 SBSQ HOSP IP/OBS MODERATE 35: CPT | Performed by: STUDENT IN AN ORGANIZED HEALTH CARE EDUCATION/TRAINING PROGRAM

## 2025-01-18 PROCEDURE — 250N000011 HC RX IP 250 OP 636: Performed by: STUDENT IN AN ORGANIZED HEALTH CARE EDUCATION/TRAINING PROGRAM

## 2025-01-18 PROCEDURE — 80053 COMPREHEN METABOLIC PANEL: CPT

## 2025-01-18 PROCEDURE — 120N000001 HC R&B MED SURG/OB

## 2025-01-18 PROCEDURE — 250N000013 HC RX MED GY IP 250 OP 250 PS 637

## 2025-01-18 PROCEDURE — 82565 ASSAY OF CREATININE: CPT | Performed by: STUDENT IN AN ORGANIZED HEALTH CARE EDUCATION/TRAINING PROGRAM

## 2025-01-18 RX ORDER — PREDNISONE 20 MG/1
40 TABLET ORAL DAILY
Status: DISCONTINUED | OUTPATIENT
Start: 2025-01-18 | End: 2025-01-19 | Stop reason: HOSPADM

## 2025-01-18 RX ORDER — VANCOMYCIN HYDROCHLORIDE 1 G/200ML
1000 INJECTION, SOLUTION INTRAVENOUS EVERY 24 HOURS
Status: DISCONTINUED | OUTPATIENT
Start: 2025-01-18 | End: 2025-01-19

## 2025-01-18 RX ORDER — PIPERACILLIN SODIUM, TAZOBACTAM SODIUM 4; .5 G/20ML; G/20ML
4.5 INJECTION, POWDER, LYOPHILIZED, FOR SOLUTION INTRAVENOUS EVERY 6 HOURS
Status: DISCONTINUED | OUTPATIENT
Start: 2025-01-18 | End: 2025-01-19

## 2025-01-18 RX ORDER — AZITHROMYCIN 250 MG/1
500 TABLET, FILM COATED ORAL ONCE
Status: COMPLETED | OUTPATIENT
Start: 2025-01-19 | End: 2025-01-19

## 2025-01-18 RX ADMIN — INSULIN ASPART 1 UNITS: 100 INJECTION, SOLUTION INTRAVENOUS; SUBCUTANEOUS at 12:04

## 2025-01-18 RX ADMIN — PIPERACILLIN AND TAZOBACTAM 3.38 G: 3; .375 INJECTION, POWDER, FOR SOLUTION INTRAVENOUS at 06:25

## 2025-01-18 RX ADMIN — ATORVASTATIN CALCIUM 80 MG: 80 TABLET, FILM COATED ORAL at 21:54

## 2025-01-18 RX ADMIN — AZITHROMYCIN MONOHYDRATE 500 MG: 500 INJECTION, POWDER, LYOPHILIZED, FOR SOLUTION INTRAVENOUS at 13:52

## 2025-01-18 RX ADMIN — VANCOMYCIN HYDROCHLORIDE 1000 MG: 1 INJECTION, SOLUTION INTRAVENOUS at 16:42

## 2025-01-18 RX ADMIN — FLUTICASONE FUROATE AND VILANTEROL TRIFENATATE 1 PUFF: 100; 25 POWDER RESPIRATORY (INHALATION) at 08:23

## 2025-01-18 RX ADMIN — TICAGRELOR 90 MG: 90 TABLET ORAL at 17:33

## 2025-01-18 RX ADMIN — INSULIN ASPART 2 UNITS: 100 INJECTION, SOLUTION INTRAVENOUS; SUBCUTANEOUS at 17:33

## 2025-01-18 RX ADMIN — PREDNISONE 40 MG: 20 TABLET ORAL at 18:35

## 2025-01-18 RX ADMIN — INSULIN ASPART 1 UNITS: 100 INJECTION, SOLUTION INTRAVENOUS; SUBCUTANEOUS at 08:22

## 2025-01-18 RX ADMIN — MONTELUKAST 10 MG: 10 TABLET, FILM COATED ORAL at 21:54

## 2025-01-18 RX ADMIN — CARVEDILOL 6.25 MG: 6.25 TABLET, FILM COATED ORAL at 08:22

## 2025-01-18 RX ADMIN — ASPIRIN 81 MG: 81 TABLET, COATED ORAL at 08:22

## 2025-01-18 RX ADMIN — ALBUTEROL SULFATE 2 PUFF: 90 AEROSOL, METERED RESPIRATORY (INHALATION) at 17:42

## 2025-01-18 RX ADMIN — TICAGRELOR 90 MG: 90 TABLET ORAL at 08:22

## 2025-01-18 RX ADMIN — Medication 1 MG: at 01:10

## 2025-01-18 RX ADMIN — PIPERACILLIN AND TAZOBACTAM 3.38 G: 3; .375 INJECTION, POWDER, FOR SOLUTION INTRAVENOUS at 12:04

## 2025-01-18 RX ADMIN — PIPERACILLIN AND TAZOBACTAM 4.5 G: 4; .5 INJECTION, POWDER, FOR SOLUTION INTRAVENOUS at 23:14

## 2025-01-18 RX ADMIN — PIPERACILLIN AND TAZOBACTAM 3.38 G: 3; .375 INJECTION, POWDER, FOR SOLUTION INTRAVENOUS at 01:01

## 2025-01-18 RX ADMIN — PANTOPRAZOLE SODIUM 40 MG: 40 TABLET, DELAYED RELEASE ORAL at 06:25

## 2025-01-18 RX ADMIN — ENOXAPARIN SODIUM 40 MG: 40 INJECTION SUBCUTANEOUS at 17:33

## 2025-01-18 RX ADMIN — PIPERACILLIN AND TAZOBACTAM 4.5 G: 4; .5 INJECTION, POWDER, FOR SOLUTION INTRAVENOUS at 17:33

## 2025-01-18 RX ADMIN — CARVEDILOL 6.25 MG: 6.25 TABLET, FILM COATED ORAL at 17:33

## 2025-01-18 RX ADMIN — INSULIN GLARGINE 20 UNITS: 100 INJECTION, SOLUTION SUBCUTANEOUS at 21:57

## 2025-01-18 ASSESSMENT — ACTIVITIES OF DAILY LIVING (ADL)
ADLS_ACUITY_SCORE: 47
ADLS_ACUITY_SCORE: 46
ADLS_ACUITY_SCORE: 47
ADLS_ACUITY_SCORE: 48
ADLS_ACUITY_SCORE: 47
ADLS_ACUITY_SCORE: 51
ADLS_ACUITY_SCORE: 47
DEPENDENT_IADLS:: INDEPENDENT
ADLS_ACUITY_SCORE: 47
ADLS_ACUITY_SCORE: 47

## 2025-01-18 NOTE — PROGRESS NOTES
Skin affirmation note    Admitting nurse completed full skin assessment, Joseph score and Joseph interventions. This writer agrees with the initial skin assessment findings.

## 2025-01-18 NOTE — MEDICATION SCRIBE - ADMISSION MEDICATION HISTORY
Medication Scribe Admission Medication History    Admission medication history is complete. The information provided in this note is only as accurate as the sources available at the time of the update.    Information Source(s): Patient via phone    Pertinent Information: Patient confirms last doses. He reports he is not taking any other injections aside from the Lantus that he takes every night. He does not liking checking blood sugars so he only periodically checks.     Changes made to PTA medication list:  Added: None  Deleted: None  Changed: None    Allergies reviewed with patient and updates made in EHR: yes    Medication History Completed By: VERONICA STARK 1/18/2025 1:30 PM    PTA Med List   Medication Sig Note Last Dose/Taking    acetaminophen (TYLENOL) 325 MG tablet Take 2 tablets (650 mg) by mouth every 8 hours.  1/16/2025 Bedtime    albuterol (PROAIR HFA/PROVENTIL HFA/VENTOLIN HFA) 108 (90 Base) MCG/ACT inhaler Inhale 1-2 puffs into the lungs every 4 hours as needed for shortness of breath or wheezing.  1/16/2025 Evening    albuterol (PROVENTIL) (2.5 MG/3ML) 0.083% neb solution TAKE 1 VIAL (2.5 MG) BY NEBULIZATION EVERY 6 HOURS AS NEEDED FOR SHORTNESS OF BREATH OR WHEEZING  1/16/2025 at 10:00 PM    aspirin (ASA) 81 MG EC tablet Take 81 mg by mouth at bedtime.  1/16/2025 Bedtime    atorvastatin (LIPITOR) 80 MG tablet Take 1 tablet (80 mg) by mouth at bedtime.  1/16/2025 Bedtime    blood glucose monitoring (NO BRAND SPECIFIED) meter device kit Use to test blood sugar 1 times daily or as directed.  Blood Glucose Monitor Brands: per insurance.  More than a month    budesonide-formoterol (SYMBICORT) 80-4.5 MCG/ACT Inhaler Inhale 2 puffs into the lungs 2 times daily.  1/16/2025 Bedtime    carvedilol (COREG) 6.25 MG tablet Take 1 tablet (6.25 mg) by mouth 2 times daily.  1/17/2025 Morning    insulin glargine (LANTUS PEN) 100 UNIT/ML pen Inject 20 Units subcutaneously at bedtime.  1/16/2025 Bedtime    insulin pen  needle (BD LIZBETH U/F) 32G X 4 MM miscellaneous USE 1 PEN NEEDLE DAILY  1/16/2025 Bedtime    Lancets (ONETOUCH DELICA PLUS [id number]) MISC USE AS DIRECTED WITH LANCING DEVICE TO TEST ONCE DAILY  Past Month    losartan-hydrochlorothiazide (HYZAAR) 50-12.5 MG tablet Take 1 tablet by mouth daily.  1/17/2025 Morning    montelukast (SINGULAIR) 10 MG tablet TAKE ONE TABLET BY MOUTH AT BEDTIME (Patient taking differently: Take 10 mg by mouth at bedtime. TAKE ONE TABLET BY MOUTH AT BEDTIME)  1/16/2025 Bedtime    omeprazole (PRILOSEC) 20 MG DR capsule Take 1 capsule (20 mg) by mouth daily.  1/17/2025 Morning    ONETOUCH ULTRA test strip USE TO TEST BLOOD SUGAR 1 TIME DAILY OR AS DIRECTED  Past Month    ticagrelor (BRILINTA) 90 MG tablet Take 1 tablet (90 mg) by mouth 2 times daily. Dose to start tomorrow morning. 1/18/2025: Patient reports: Does not sound familiar; possibly not taking but cannot be sure  Unknown    triamcinolone (KENALOG) 0.1 % external cream APPLY TOPICALLY 2 TIMES DAILY TO LEFT ANKLE (Patient taking differently: Apply topically 2 times daily. Apply topically to L ankle)  1/17/2025 Morning

## 2025-01-18 NOTE — PROGRESS NOTES
[facility]    Medicine Progress Note - Hospitalist Service    Date of Admission:  1/17/2025    Assessment & Plan    Raul Wilhelm is a 79 year old male with past medical hx of CAD s/p REDD x2 1/4/25, T2DM, COPD, asthma, hx of CVA, HTN, HLD that presented to the ER with dyspnea and had an episode of syncope in the ER admitted 1/17/2025 with suspected pneumonia and concern for sepsis.      # Pneumonia, bilateral, hospital acquired vs aspiration  # Acute hypoxic respiratory failure  # Sepsis without septic shock  # Recent influenza A    During recent admission (1/4-1/11 for STEMI s/p REDD x2 on 1/4/25) he had influenza treated with Tamiflu and Prednisone 40mg for 5 days. He also had possible superimposed bacterial pneumonia evidenced by sputum culture growing H. Flu and group B strep (no imaging performed). He was treated with Ceftriaxone and Doxycycline. During this he required HFNC, but weaned off prior to discharge. He did not discharge with antibiotics or home oxygen.    He presented to his hospital follow up 1/17 and PCP noted him to be clammy, dyspneic, and HR was . In ER waiting room he had a syncopal episode and SBP was found to be 50. Arrives afebrile, not tachycardic. Admitted on 2L NC. WBC 14.9. CT with bilateral lower lobe bronchiolitis. Minimal airspace opacities in the LLL likely reflect sequelae of bronchiolitis or early pneumonia.     Due to patient's recent admission with receipt of IV antibiotics along with hx of chronic dysphagia (see below) will treat as hospital acquired vs aspiration.   - Continued pip-tazo pending culture data   - Azithromycin 500 mg for 3 days switched final dose to PO  - Vanc, pharmacy dosed, MRSA swab positive awaiting sensitivities   - Sputum culture unable to be collected so far   - legionella, and strep pneumo urine negative  - Blood cultures 1/17 NGTD     # Zenker diverticulum  # Chronic dysphagia  Zenker's found on recent admission  "swallow study and esophagram. It does not completely empty with multiple swallows and explains symptoms of regurgitation/dysphagia.  - Per previous SLP- regular diet, chew well, alternate bites of food with sips of liquid  - Consider ENT consult for Zenker's, likely outpatient     # Severe persistent asthma without complication    # COPD (chronic obstructive pulmonary disease) from environmental exposure, with acute exacerbation  # Hx of Pneumonitis, hypersensitivity, naa   No wheezing on exam. Never smoked. Patient no longer owns birds. 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 Previously raised pigeons but it has been many years since he has done so.   - Continued PTA PRN Albuterol, Symbicort BID, Montelukast 10mg  -On methylprednisolone in the ED continued steroids with prednisone 40 mg daily intend to complete burst    # CAD s/p REDD 2015, 1/4/25  # Hyperlipidemia LDL goal <70  # HTN    Recent admission 1/4-1/11 for STEMI s/p REDD x2 on 1/4/25. Per cardio he still has severe residual disease in the LAD system, and is being evaluated at multidisciplinary heart conference to determine best course of action for revascularization of his residual CAD as an outpatient.     Troponin on admission 118 ?  78. Patient denies chest pain and EKG non-ischemic.  - Continued PTA Aspirin 81mg, Ticagrelor 90mg BID, Atorvastatin 80mg, Carvedilol 6.25mg BID  - Holding PTA Losartan-hydrochlorothiazide 50-12.5 mg with KATY     # KATY, resolved   Cr 1.71, baseline 0.9-1.1. FENa 1.1% consistent with a prerenal etiology. Resolved with fluids.   - Stopped IV fluids   - Holding PTA Losartan-hydrochlorothiazide     # Type 2 diabetes mellitus   A1c 8.1% 1/4/2025. PTA on glargine and semaglutide.   - Continued PTA " glargine 20 units at bedtime  - Medium resistance sliding scale insulin while inpatient  - Consistent carb diet  - Holding PTA Semaglutide weekly injections while inpatient    # Elevated T-bili, resolved   1.7 on admission. Other LFTs WNL. CT unremarkable.  Resolved on recheck.    # History of arterial ischemic stroke  Noted I chart review. On anti-platelets as above.     # Pulmonary nodule  Incidental finding on CT as new 0.3cm anterior RUL solid nodule.  - Consider follow up CT in 12 months.     # GERD  - Continued PTA Omeprazole 20 mg        Diet: Combination Diet Moderate Consistent Carb (60 g CHO per Meal) Diet; Low Saturated Fat Na <2400mg Diet    DVT Prophylaxis: Enoxaparin (Lovenox) SQ  Diaz Catheter: Not present  Lines: None     Cardiac Monitoring: ACTIVE order. Indication: Tachyarrhythmias, acute (48 hours)  Code Status: Full Code      Clinically Significant Risk Factors               # Hypoalbuminemia: Lowest albumin = 3.4 g/dL at 1/18/2025  7:06 AM, will monitor as appropriate     # Hypertension: Noted on problem list           # DMII: A1C = 8.1 % (Ref range: <5.7 %) within past 6 months, PRESENT ON ADMISSION      # Financial/Environmental Concerns: none  # Asthma: noted on problem list        Social Drivers of Health    Housing Stability: Low Risk  (1/17/2025)    Housing Stability     Do you have housing? : Yes     Are you worried about losing your housing?: No   Recent Concern: Housing Stability - High Risk (1/5/2025)    Housing Stability     Do you have housing? : Yes     Are you worried about losing your housing?: Yes          Disposition Plan     Medically Ready for Discharge: Anticipated Tomorrow       Dewayne Hylton MD  Hospitalist Service  [facility]  Securely message with [employer] (more info)  Text page via [facility] Paging/Directory   ______________________________________________________________________    Interval History   Admitted yesterday, no acute events since  admission. No acute events overnight. Patient states that he has felt some improvement in his breathing but still feels weaker than his baseline.     Physical Exam   Vital Signs: Temp: 97.3  F (36.3  C) Temp src: Oral BP: (!) 146/66 Pulse: 73   Resp: 18 SpO2: 95 % O2 Device: None (Room air) Oxygen Delivery: 1 LPM  Weight: 147 lbs 0 oz    General Appearance: Awake and alert, sitting up in bed in no acute distress  Respiratory: Breathing easily on nasal canula   Cardiovascular: Extremities warm and well perfused   GI: Abdomen soft, non-tender, and non-distended   Other: Appropriate mood and affect      Medical Decision Making       45 MINUTES SPENT BY ME on the date of service doing chart review, history, exam, documentation & further activities per the note.      Data     I have personally reviewed the following data over the past 24 hrs:    12.2 (H)  \   12.5 (L)   / 253     143 106 23.5 (H) /  156 (H)   4.7 24 1.09; 1.09 \     ALT: 22 AST: 18 AP: 79 TBILI: 1.0   ALB: 3.4 (L) TOT PROTEIN: 5.7 (L) LIPASE: N/A       Imaging results reviewed over the past 24 hrs:   No results found for this or any previous visit (from the past 24 hours).

## 2025-01-18 NOTE — PLAN OF CARE
"Goal Outcome Evaluation:    Plan of Care Reviewed With: patient    Overall Patient Progress: improving    Problem: Adult Inpatient Plan of Care  Goal: Plan of Care Review    Outcome: Progressing  Flowsheets (Taken 1/18/2025 3928)  Plan of Care Reviewed With: patient  Overall Patient Progress: improving    Pt A&Ox4, VSS, able to make needs known, utilizing call light appropriately. Weaned down to RA today. Productive cough. IV abx as ordered. On TELE. Pt continues to ambulate with SBA. Bed Alarm on for safety, as pt is still generally weak. PRN Albuterol. Denied intervention for pain. Writer spoke with pts daughter this evening via telephone and daughter(s) are concerned about pt caring for himself after hospital discharge. Social work and hospitalist aware.       Vital signs:  Temp: 97.3  F (36.3  C) Temp src: Oral BP: (!) 146/66 Pulse: 73   Resp: 18 SpO2: 95 % O2 Device: None (Room air) Oxygen Delivery: 1 LPM Height: 172.7 cm (5' 8\") Weight: 66.7 kg (147 lb)  Estimated body mass index is 22.35 kg/m  as calculated from the following:    Height as of this encounter: 1.727 m (5' 8\").    Weight as of this encounter: 66.7 kg (147 lb).            "

## 2025-01-18 NOTE — PLAN OF CARE
"Goal Outcome Evaluation:      Plan of Care Reviewed With: patient    Overall Patient Progress: improvingOverall Patient Progress: improving    Outcome Evaluation: Pt A&Ox4. Able to make needs known.  Denies pain. States he is ready to go home.    Visit Vitals  /65 (BP Location: Left arm)   Pulse 98   Temp 98  F (36.7  C) (Oral)   Resp 20       Problem: Adult Inpatient Plan of Care  Goal: Plan of Care Review  Description: The Plan of Care Review/Shift note should be completed every shift.  The Outcome Evaluation is a brief statement about your assessment that the patient is improving, declining, or no change.  This information will be displayed automatically on your shift  note.  Outcome: Progressing  Flowsheets (Taken 1/18/2025 0155)  Outcome Evaluation: Pt A&Ox4. Able to make needs known.  Denies pain. States he is ready to go home.  Plan of Care Reviewed With: patient  Overall Patient Progress: improving  Goal: Patient-Specific Goal (Individualized)  Description: You can add care plan individualizations to a care plan. Examples of Individualization might be:  \"Parent requests to be called daily at 9am for status\", \"I have a hard time hearing out of my right ear\", or \"Do not touch me to wake me up as it startles  me\".  Outcome: Progressing  Goal: Absence of Hospital-Acquired Illness or Injury  Outcome: Progressing  Intervention: Identify and Manage Fall Risk  Recent Flowsheet Documentation  Taken 1/18/2025 0019 by Dina Tee RN  Safety Promotion/Fall Prevention:   activity supervised   assistive device/personal items within reach   clutter free environment maintained   mobility aid in reach   nonskid shoes/slippers when out of bed   patient and family education   safety round/check completed   supervised activity  Intervention: Prevent Infection  Recent Flowsheet Documentation  Taken 1/18/2025 0019 by Dina Tee RN  Infection Prevention: rest/sleep promoted  Goal: Optimal Comfort " and Wellbeing  Outcome: Progressing  Goal: Readiness for Transition of Care  Outcome: Progressing

## 2025-01-18 NOTE — PROGRESS NOTES
"[facility] ADMISSION NOTE    Patient admitted to room 2307 at approximately 1730 via cart from emergency room. Patient was accompanied by transport tech.     Verbal SBAR report received from RN prior to patient arrival.     Patient ambulated to bed with stand-by assist. Patient alert and oriented X 3. The patient is not having any pain.  . Admission vital signs: Blood pressure (!) 142/72, pulse 77, temperature 98.1  F (36.7  C), temperature source Oral, resp. rate 17, height 1.727 m (5' 8\"), weight 66.7 kg (147 lb), SpO2 95%. Patient was oriented to plan of care, call light, bed controls, tv, telephone, bathroom, and visiting hours.     Risk Assessment    The following safety risks were identified during admission: fall. Yellow risk band applied: YES.     Skin Initial Assessment    This writer admitted this patient and completed a full skin assessment and Joseph score in the Adult PCS flowsheet.   Photo documentation of skin problem and/or wound competed via [employer] application (located under Media):  N/A    Appropriate interventions initiated as needed.     Secondary skin check completed by Jeimy KISER.         Education    Patient has a [place] to Observation order: No  Observation education completed and documented: No      Manasa Gresham RN      "

## 2025-01-18 NOTE — CONSULTS
Care Management Initial Consult    General Information  Assessment completed with: Patient, VM-chart review,    Type of CM/SW Visit: Initial Assessment    Primary Care Provider verified and updated as needed: Yes   Readmission within the last 30 days: current reason for admission unrelated to previous admission      Reason for Consult: other (see comments) (Elevated risk for readmission)  Advance Care Planning: Advance Care Planning Reviewed: no concerns identified          Communication Assessment  Patient's communication style: spoken language (English or Bilingual)    Hearing Difficulty or Deaf: yes   Wear Glasses or Blind: yes    Cognitive  Cognitive/Neuro/Behavioral: WDL                      Living Environment:   People in home: alone     Current living Arrangements: house      Able to return to prior arrangements: yes       Family/Social Support:  Care provided by: self, child(petey), other (see comments) (granddaughter)  Provides care for: no one  Marital Status:   Support system: Children, Other (specify) (granddaughter)          Description of Support System: Supportive, Involved    Support Assessment: Adequate family and caregiver support, Adequate social supports    Current Resources:   Patient receiving home care services: No        Community Resources: None  Equipment currently used at home: none  Supplies currently used at home: None    Employment/Financial:  Employment Status: retired        Financial Concerns: none           Does the patient's insurance plan have a 3 day qualifying hospital stay waiver?  No    Lifestyle & Psychosocial Needs:  Social Drivers of Health     Food Insecurity: Low Risk  (1/17/2025)    Food Insecurity     Within the past 12 months, did you worry that your food would run out before you got money to buy more?: No     Within the past 12 months, did the food you bought just not last and you didn t have money to get more?: No   Depression: Not at risk (1/17/2025)    PHQ-2      PHQ-2 Score: 0   Housing Stability: Low Risk  (1/17/2025)    Housing Stability     Do you have housing? : Yes     Are you worried about losing your housing?: No   Recent Concern: Housing Stability - High Risk (1/5/2025)    Housing Stability     Do you have housing? : Yes     Are you worried about losing your housing?: Yes   Tobacco Use: Low Risk  (1/17/2025)    Patient History     Smoking Tobacco Use: Never     Smokeless Tobacco Use: Never     Passive Exposure: Not on file   Financial Resource Strain: Low Risk  (1/17/2025)    Financial Resource Strain     Within the past 12 months, have you or your family members you live with been unable to get utilities (heat, electricity) when it was really needed?: No   Alcohol Use: Not on file   Transportation Needs: Low Risk  (1/17/2025)    Transportation Needs     Within the past 12 months, has lack of transportation kept you from medical appointments, getting your medicines, non-medical meetings or appointments, work, or from getting things that you need?: No   Physical Activity: Not on file   Interpersonal Safety: Low Risk  (1/17/2025)    Interpersonal Safety     Do you feel physically and emotionally safe where you currently live?: Yes     Within the past 12 months, have you been hit, slapped, kicked or otherwise physically hurt by someone?: No     Within the past 12 months, have you been humiliated or emotionally abused in other ways by your partner or ex-partner?: No   Stress: Not on file   Social Connections: Not on file   Health Literacy: Not on file       Functional Status:  Prior to admission patient needed assistance:   Dependent ADLs:: Independent  Dependent IADLs:: Independent  Assesssment of Functional Status: Not at  functional baseline    Mental Health Status:  Mental Health Status: No Current Concerns       Chemical Dependency Status:  Chemical Dependency Status: No Current Concerns             Values/Beliefs:  Spiritual, Cultural Beliefs, Taoism  Practices, Values that affect care: no               Discussed  Partnership in Safe Discharge Planning  document with patient/family: No    Additional Information:  Care Management consulted due to elevated risk for readmission.      Writer is working remotely to assist the [facility] Care Management team.  Writer has called and spoke with the patient. Writer introduced self and role of care management.      Reviewed the information above. At baseline, when patient is feeling well, he is independent with all ADLs and IADLs.  Patient had a recent stay at [facility] from 1/4/25 to 1/11/25 for STEMI and Influenza A.  He attended his follow up on 1/17/25 and they recommended he go to the ED.      Per chart review, patient had a clinic care coordination phone follow up on 1/13/25.  At that time, recommendation was for patient to ask for a home care order at his follow up appointment.  The following information was in the CC note:  Clinical Concerns:  Current Medical Concerns:  CC RN spoke to the patient briefly and then handed the phone to his daughter April Chen and her sister are taking turns caring for the patient .  April lives 3 and [place]heeraj hours away and her sister is in the next town over  Patient energy is low and poor appetite  Daughter tries to give him protein drinks and oatmeal but he eats half the portion    Granddaughter comes twice a week   Patient is not testing his blood sugar   CC encouraged patient to start testing   Cancelled CR tomorrow  due to weakness   Barriers: Deconditioned   Strengths: supportive daughters   Patient expressed understanding of goal: Yes   Action steps to achieve this goal:  1. I will keep future Cardiac Rehabilitation appointments   2. I will request a home care order at 1/22 provider visit   Plan:   Patient will keep his hospital follow up with PCP 1/17/2025- will request a home care order at the visit  Patient will make a future Cardiology appointment   CC RN will  "make weekly outreach calls   [facility]   Roxanne Tapia RN, Care Coordinator   [facility]   E-mail [email or web address]   [phone]      Today, patient reported he does not feel ready for hospital discharge.  He stated he hasn't been out of bed yet today and is not sure how his walking will be. He also stated he does not want to leave the hospital until he knows he can drive, as he \"may have to go get groceries or something\". Discussed the recommendation for home health care. Discussed RN and PT services, as patient is feeling deconditioned. Patient in agreement. Discussed agencies.  Patient reported he has no agency preference.  Referral sent to the home care hub to secure an agency.      Patient wrote writer's name and number down, in case one of his daughter's wants to contact care management.        Next Steps: Care Management will continue to follow and assist with discharge planning.      JACEK Parra  [facility]   [phone]   *Remotely assisting the [facility] Care Management Team     "

## 2025-01-19 ENCOUNTER — APPOINTMENT (OUTPATIENT)
Dept: PHYSICAL THERAPY | Facility: CLINIC | Age: 80
DRG: 871 | End: 2025-01-19
Attending: STUDENT IN AN ORGANIZED HEALTH CARE EDUCATION/TRAINING PROGRAM
Payer: COMMERCIAL

## 2025-01-19 VITALS
RESPIRATION RATE: 20 BRPM | TEMPERATURE: 97.5 F | HEIGHT: 68 IN | BODY MASS INDEX: 23.15 KG/M2 | OXYGEN SATURATION: 95 % | WEIGHT: 152.78 LBS | DIASTOLIC BLOOD PRESSURE: 78 MMHG | SYSTOLIC BLOOD PRESSURE: 154 MMHG | HEART RATE: 70 BPM

## 2025-01-19 LAB
ANION GAP SERPL CALCULATED.3IONS-SCNC: 12 MMOL/L (ref 7–15)
BUN SERPL-MCNC: 24.3 MG/DL (ref 8–23)
CALCIUM SERPL-MCNC: 8.6 MG/DL (ref 8.8–10.4)
CHLORIDE SERPL-SCNC: 106 MMOL/L (ref 98–107)
CREAT SERPL-MCNC: 1.06 MG/DL (ref 0.67–1.17)
EGFRCR SERPLBLD CKD-EPI 2021: 71 ML/MIN/1.73M2
ERYTHROCYTE [DISTWIDTH] IN BLOOD BY AUTOMATED COUNT: 11.9 % (ref 10–15)
GLUCOSE BLDC GLUCOMTR-MCNC: 266 MG/DL (ref 70–99)
GLUCOSE BLDC GLUCOMTR-MCNC: 284 MG/DL (ref 70–99)
GLUCOSE BLDC GLUCOMTR-MCNC: 327 MG/DL (ref 70–99)
GLUCOSE SERPL-MCNC: 237 MG/DL (ref 70–99)
HCO3 SERPL-SCNC: 23 MMOL/L (ref 22–29)
HCT VFR BLD AUTO: 36.1 % (ref 40–53)
HGB BLD-MCNC: 11.8 G/DL (ref 13.3–17.7)
MCH RBC QN AUTO: 30 PG (ref 26.5–33)
MCHC RBC AUTO-ENTMCNC: 32.7 G/DL (ref 31.5–36.5)
MCV RBC AUTO: 92 FL (ref 78–100)
PLATELET # BLD AUTO: 237 10E3/UL (ref 150–450)
POTASSIUM SERPL-SCNC: 4.4 MMOL/L (ref 3.4–5.3)
RBC # BLD AUTO: 3.93 10E6/UL (ref 4.4–5.9)
SODIUM SERPL-SCNC: 141 MMOL/L (ref 135–145)
VANCOMYCIN SERPL-MCNC: 6.8 UG/ML
WBC # BLD AUTO: 11.8 10E3/UL (ref 4–11)

## 2025-01-19 PROCEDURE — 99207 PR NO BILLABLE SERVICE THIS VISIT: CPT | Performed by: STUDENT IN AN ORGANIZED HEALTH CARE EDUCATION/TRAINING PROGRAM

## 2025-01-19 PROCEDURE — 250N000011 HC RX IP 250 OP 636: Performed by: STUDENT IN AN ORGANIZED HEALTH CARE EDUCATION/TRAINING PROGRAM

## 2025-01-19 PROCEDURE — 258N000003 HC RX IP 258 OP 636: Performed by: STUDENT IN AN ORGANIZED HEALTH CARE EDUCATION/TRAINING PROGRAM

## 2025-01-19 PROCEDURE — 80202 ASSAY OF VANCOMYCIN: CPT | Performed by: STUDENT IN AN ORGANIZED HEALTH CARE EDUCATION/TRAINING PROGRAM

## 2025-01-19 PROCEDURE — 250N000013 HC RX MED GY IP 250 OP 250 PS 637

## 2025-01-19 PROCEDURE — 85018 HEMOGLOBIN: CPT | Performed by: STUDENT IN AN ORGANIZED HEALTH CARE EDUCATION/TRAINING PROGRAM

## 2025-01-19 PROCEDURE — 250N000012 HC RX MED GY IP 250 OP 636 PS 637: Performed by: STUDENT IN AN ORGANIZED HEALTH CARE EDUCATION/TRAINING PROGRAM

## 2025-01-19 PROCEDURE — 80048 BASIC METABOLIC PNL TOTAL CA: CPT | Performed by: STUDENT IN AN ORGANIZED HEALTH CARE EDUCATION/TRAINING PROGRAM

## 2025-01-19 PROCEDURE — 97530 THERAPEUTIC ACTIVITIES: CPT | Mod: GP | Performed by: PHYSICAL THERAPIST

## 2025-01-19 PROCEDURE — 250N000013 HC RX MED GY IP 250 OP 250 PS 637: Performed by: STUDENT IN AN ORGANIZED HEALTH CARE EDUCATION/TRAINING PROGRAM

## 2025-01-19 PROCEDURE — 36415 COLL VENOUS BLD VENIPUNCTURE: CPT | Performed by: STUDENT IN AN ORGANIZED HEALTH CARE EDUCATION/TRAINING PROGRAM

## 2025-01-19 PROCEDURE — 97161 PT EVAL LOW COMPLEX 20 MIN: CPT | Mod: GP | Performed by: PHYSICAL THERAPIST

## 2025-01-19 PROCEDURE — 99239 HOSP IP/OBS DSCHRG MGMT >30: CPT | Performed by: STUDENT IN AN ORGANIZED HEALTH CARE EDUCATION/TRAINING PROGRAM

## 2025-01-19 PROCEDURE — 82374 ASSAY BLOOD CARBON DIOXIDE: CPT | Performed by: STUDENT IN AN ORGANIZED HEALTH CARE EDUCATION/TRAINING PROGRAM

## 2025-01-19 RX ORDER — PREDNISONE 20 MG/1
40 TABLET ORAL DAILY
Qty: 4 TABLET | Refills: 0 | Status: SHIPPED | OUTPATIENT
Start: 2025-01-20

## 2025-01-19 RX ORDER — DOXYCYCLINE 100 MG/1
100 CAPSULE ORAL ONCE
Status: COMPLETED | OUTPATIENT
Start: 2025-01-19 | End: 2025-01-19

## 2025-01-19 RX ORDER — DOXYCYCLINE 100 MG/1
100 CAPSULE ORAL 2 TIMES DAILY
Qty: 10 CAPSULE | Refills: 0 | Status: SHIPPED | OUTPATIENT
Start: 2025-01-19

## 2025-01-19 RX ADMIN — PREDNISONE 40 MG: 20 TABLET ORAL at 08:33

## 2025-01-19 RX ADMIN — DOXYCYCLINE HYCLATE 100 MG: 100 CAPSULE ORAL at 17:55

## 2025-01-19 RX ADMIN — TICAGRELOR 90 MG: 90 TABLET ORAL at 17:18

## 2025-01-19 RX ADMIN — FLUTICASONE FUROATE AND VILANTEROL TRIFENATATE 1 PUFF: 100; 25 POWDER RESPIRATORY (INHALATION) at 08:33

## 2025-01-19 RX ADMIN — VANCOMYCIN HYDROCHLORIDE 750 MG: 500 INJECTION, POWDER, LYOPHILIZED, FOR SOLUTION INTRAVENOUS at 14:08

## 2025-01-19 RX ADMIN — INSULIN ASPART 3 UNITS: 100 INJECTION, SOLUTION INTRAVENOUS; SUBCUTANEOUS at 12:14

## 2025-01-19 RX ADMIN — ASPIRIN 81 MG: 81 TABLET, COATED ORAL at 08:33

## 2025-01-19 RX ADMIN — PANTOPRAZOLE SODIUM 40 MG: 40 TABLET, DELAYED RELEASE ORAL at 05:38

## 2025-01-19 RX ADMIN — INSULIN ASPART 4 UNITS: 100 INJECTION, SOLUTION INTRAVENOUS; SUBCUTANEOUS at 17:18

## 2025-01-19 RX ADMIN — CARVEDILOL 6.25 MG: 6.25 TABLET, FILM COATED ORAL at 17:18

## 2025-01-19 RX ADMIN — CARVEDILOL 6.25 MG: 6.25 TABLET, FILM COATED ORAL at 08:33

## 2025-01-19 RX ADMIN — TICAGRELOR 90 MG: 90 TABLET ORAL at 08:33

## 2025-01-19 RX ADMIN — ACETAMINOPHEN 650 MG: 325 TABLET, FILM COATED ORAL at 00:38

## 2025-01-19 RX ADMIN — AMOXICILLIN AND CLAVULANATE POTASSIUM 1 TABLET: 875; 125 TABLET, FILM COATED ORAL at 17:55

## 2025-01-19 RX ADMIN — PIPERACILLIN AND TAZOBACTAM 4.5 G: 4; .5 INJECTION, POWDER, FOR SOLUTION INTRAVENOUS at 12:12

## 2025-01-19 RX ADMIN — AZITHROMYCIN DIHYDRATE 500 MG: 250 TABLET ORAL at 12:14

## 2025-01-19 RX ADMIN — PIPERACILLIN AND TAZOBACTAM 4.5 G: 4; .5 INJECTION, POWDER, FOR SOLUTION INTRAVENOUS at 05:38

## 2025-01-19 RX ADMIN — INSULIN ASPART 2 UNITS: 100 INJECTION, SOLUTION INTRAVENOUS; SUBCUTANEOUS at 08:33

## 2025-01-19 ASSESSMENT — ACTIVITIES OF DAILY LIVING (ADL)
ADLS_ACUITY_SCORE: 51
ADLS_ACUITY_SCORE: 50
ADLS_ACUITY_SCORE: 51
ADLS_ACUITY_SCORE: 50

## 2025-01-19 NOTE — PROGRESS NOTES
01/19/25 0800   Appointment Info   Signing Clinician's Name / Credentials (PT) Neal Mathew, PT, DPT, OCS   Living Environment   People in Home alone   Current Living Arrangements house   Home Accessibility stairs to enter home   Number of Stairs, Main Entrance 3   Stair Railings, Main Entrance railing on left side (ascending)  (Vertical post.)   Transportation Anticipated family or friend will provide   Self-Care   Usual Activity Tolerance good   Current Activity Tolerance good   Equipment Currently Used at Home none   Fall history within last six months yes   Number of times patient has fallen within last six months 1   General Information   Onset of Illness/Injury or Date of Surgery 01/17/25   Referring Physician Dr. Hylton   Patient/Family Therapy Goals Statement (PT) Return home   Pertinent History of Current Problem (include personal factors and/or comorbidities that impact the POC) From H&P: Raul Wilhelm is a 79 year old male with past medical hx of CAD s/p REDD x2 1/4/25, T2DM, COPD, asthma, hx of CVA, HTN, HLD that presented to the ER with dyspnea and had an episode of syncope in the ER admitted 1/17/2025 with suspected pneumonia and concern for sepsis.   Weight-Bearing Status - LUE full weight-bearing   Weight-Bearing Status - RUE full weight-bearing   Weight-Bearing Status - LLE full weight-bearing   Weight-Bearing Status - RLE full weight-bearing   Heart Disease Risk Factors High blood pressure;Dislipidemia;Age   Cognition   Affect/Mental Status (Cognition) WNL   Orientation Status (Cognition) oriented x 3   Follows Commands (Cognition) WNL   Integumentary/Edema   Integumentary/Edema no deficits were identifed   Posture    Posture Not impaired   Range of Motion (ROM)   Range of Motion ROM is WFL   Strength (Manual Muscle Testing)   Strength (Manual Muscle Testing) strength is WFL   Transfers   Transfers sit-stand transfer   Sit-Stand Transfer   Sit-Stand [place] (Transfers) independent    Gait/Stairs (Locomotion)   [place] Level (Gait) independent   Distance in Feet (Gait) 150   Comment, (Gait/Stairs) Declined stair assessment, able to demonstrate single leg stance and appropriate ROM to clear step height.   Balance   Balance Comments SLS x 10 seconds   Sensory Examination   Sensory Perception patient reports no sensory changes   Coordination   Coordination no deficits were identified   Muscle Tone   Muscle Tone no deficits were identified   Clinical Impression   Criteria for Skilled Therapeutic Intervention Yes, treatment indicated   PT Diagnosis (PT) Generalized weakness   Influenced by the following impairments weakness, decreased activity tolerance   Functional limitations due to impairments ambulation   Clinical Presentation (PT Evaluation Complexity) stable   Clinical Presentation Rationale Clinical judgement   Clinical Decision Making (Complexity) low complexity   Planned Therapy Interventions (PT) balance training;bed mobility training;gait training;patient/family education;stair training;strengthening;stretching;progressive activity/exercise   Risk & Benefits of therapy have been explained evaluation/treatment results reviewed;care plan/treatment goals reviewed;risks/benefits reviewed;current/potential barriers reviewed;participants voiced agreement with care plan;participants included;patient   PT Total Evaluation Time   PT Eval, Low Complexity Minutes (27281) 15   Physical Therapy Goals   PT Frequency One time eval and treatment only   PT Predicted Duration/Target Date for Goal Attainment 01/19/25   PT Goals Gait   PT: Gait Independent;150 feet;Goal Met   Therapeutic Activity   Therapeutic Activities: dynamic activities to improve functional performance Minutes (51165) 10   Treatment Detail/Skilled Intervention Explained role of PT.  Educated on potential benefits of outpatient PT on improving general activity tolerance.  Pt declined at this time stating his is managing a recent  move of his wife to a nursing home.  Pt encouraged to perform continuous walking within his home to maintain and improve activity tolerance.  Pt able to demonstrate independence with transfers and ambulation up to 150 feet in room with no LOB.  Pt seated at edge of bed upon PT departure.   PT Discharge Planning   PT Plan DC   PT Discharge Recommendation (DC Rec) home   PT Rationale for DC Rec Pt demonstrated independence with mobility and no LOB.  Pt demonstrated appropriate mobility status to return to prior living environment.   PT Brief overview of current status independent transfers and ambulation   PT Total Distance Amb During Session (feet) 150   Physical Therapy Time and Intention   Timed Code Treatment Minutes 10   Total Session Time (sum of timed and untimed services) 25

## 2025-01-19 NOTE — PLAN OF CARE
Goal Outcome Evaluation:    Plan of Care Reviewed With: patient    Overall Patient Progress: improving    Problem: Adult Inpatient Plan of Care  Goal: Plan of Care Review    Outcome: Progressing  Flowsheets (Taken 1/19/2025 1012)  Plan of Care Reviewed With: patient  Overall Patient Progress: improving    [facility] [facility] DISCHARGE NOTE    Patient discharged to home at 6:13 PM via wheel chair. Accompanied by daughter and staff. Discharge instructions reviewed with patient and daughter, opportunity offered to ask questions. Prescriptions filled and sent with patient upon discharge. All belongings sent with patient.    Kyung Lackey RN

## 2025-01-19 NOTE — PHARMACY-VANCOMYCIN DOSING SERVICE
"Pharmacy Vancomycin Note  Date of Service 2025  Patient's  1945   79 year old, male    Indication: Healthcare-Associated Pneumonia and Sepsis  Day of Therapy: 3  Current vancomycin regimen:  1000 mg IV q24h  Current vancomycin monitoring method: AUC  Current vancomycin therapeutic monitoring goal: 400-600 mg*h/L    InsightRX Prediction of Current Vancomycin Regimen  Loading dose: N/A  Regimen: 1000 mg IV every 24 hours.  Start time: 16:42 on 2025  Exposure target: AUC24 (range)400-600 mg/L.hr   AUC24,ss: 291 mg/L.hr  Probability of AUC24 > 400: 3 %  Ctrough,ss: 7.1 mg/L  Probability of Ctrough,ss > 20: 0 %  Probability of nephrotoxicity (Lodise ANNEMARIE ): 4 %      Current estimated CrCl = Estimated Creatinine Clearance: 55.4 mL/min (based on SCr of 1.06 mg/dL).    Creatinine for last 3 days  2025: 10:15 AM Creatinine 1.71 mg/dL;  1:17 PM Creatinine 1.38 mg/dL  2025:  7:06 AM Creatinine 1.09 mg/dL;  7:06 AM Creatinine 1.09 mg/dL  2025:  7:13 AM Creatinine 1.06 mg/dL    Recent Vancomycin Levels (past 3 days)  2025: 12:12 PM Vancomycin 6.8 ug/mL    Vancomycin IV Administrations (past 72 hours)                     vancomycin (VANCOCIN) 1,000 mg in 200 mL dextrose intermittent infusion (mg) 1,000 mg New Bag 25 1642    vancomycin (VANCOCIN) 1,500 mg in 0.9% NaCl 265 mL intermittent infusion (mg) 1,500 mg New Bag 25 1629                    Nephrotoxins and other renal medications (From now, onward)      Start     Dose/Rate Route Frequency Ordered Stop    25 1300  vancomycin (VANCOCIN) 750 mg in 0.9% NaCl 257.5 mL intermittent infusion         750 mg  over 60 Minutes Intravenous EVERY 12 HOURS 25 1241      25 1800  piperacillin-tazobactam (ZOSYN) 4.5 g vial to attach to  mL bag        Note to Pharmacy: For [facility], [facility] and [facility]: For Zosyn-naive patients, use the \"Zosyn initial dose + extended infusion\" order panel.    4.5 g  over 30 Minutes " Intravenous EVERY 6 HOURS 01/18/25 1328                 Contrast Orders - past 72 hours (72h ago, onward)      Start     Dose/Rate Route Frequency Stop    01/17/25 1020  iopamidol (ISOVUE-370) solution 71 mL         71 mL Intravenous ONCE 01/17/25 1116            Interpretation of levels and current regimen:  Vancomycin level is reflective of AUC less than 400    Has serum creatinine changed greater than 50% in last 72 hours: No    Urine output:  unable to determine    Renal Function: Improving    InsightRX Prediction of Planned New Vancomycin Regimen  Loading dose: N/A  Regimen: 750 mg IV every 12 hours.  Start time: 16:42 on 01/19/2025  Exposure target: AUC24 (range)400-600 mg/L.hr   AUC24,ss: 430 mg/L.hr  Probability of AUC24 > 400: 68 %  Ctrough,ss: 13.2 mg/L  Probability of Ctrough,ss > 20: 2 %  Probability of nephrotoxicity (Lodise ANNEMARIE 2009): 8 %      Plan:  Increase Dose to 750 mg q12h  Vancomycin monitoring method: AUC  Vancomycin therapeutic monitoring goal: 400-600 mg*h/L  Pharmacy will check vancomycin levels as appropriate in 1-3 Days.  Serum creatinine levels will be ordered daily for the first week of therapy and at least twice weekly for subsequent weeks.    Rachana Tabor, [facility]

## 2025-01-19 NOTE — DISCHARGE SUMMARY
[facility]  Hospitalist Discharge Summary      Date of Admission:  1/17/2025  Date of Discharge:  1/19/2025  Discharging Provider: Dewayne Hylton MD  Discharge Service: Hospitalist Service    Discharge Diagnoses   # Pneumonia, bilateral, hospital acquired vs aspiration  # Acute hypoxic respiratory failure, resolved  # Sepsis without septic shock, Sepsis associated with KATY   # Recent influenza A  # Zenker diverticulum  # Chronic dysphagia  # Severe persistent asthma without complication    # COPD (chronic obstructive pulmonary disease) from environmental exposure, with acute exacerbation  # Hx of Pneumonitis, hypersensitivity, naa   # CAD s/p REDD 2015, 1/4/25  # Hyperlipidemia LDL goal <70  # HTN  # KATY, resolved   # Type 2 diabetes mellitus   # Elevated T-bili, resolved   # History of arterial ischemic stroke  # Pulmonary nodule  # GERD    Clinically Significant Risk Factors     # DMII: A1C = 8.1 % (Ref range: <5.7 %) within past 6 months       Follow-ups Needed After Discharge   Follow-up Appointments       Follow-up and recommended labs and tests       Follow up with primary care provider, Sarita Hennessy, within 7 days to evaluate medication change and for hospital follow- up.                Unresulted Labs Ordered in the Past 30 Days of this Admission       Date and Time Order Name Status Description    1/18/2025 12:01 AM Blood Culture Arm, Left Preliminary     1/17/2025 11:49 PM MRSA Culture Reflex Preliminary     1/17/2025  1:18 PM Blood Culture Peripheral Blood Preliminary         These results will be followed up by Dewayne Hylton MD.    Discharge Disposition   Discharged to home  Condition at discharge: Stable    Hospital Course   Raul Wilhelm is a 79 year old male with past medical hx of CAD s/p REDD x2 1/4/25, T2DM, COPD, asthma, hx of CVA, HTN, HLD that presented to the ER with dyspnea and had an episode of syncope in the ER admitted 1/17/2025 with  suspected pneumonia and concern for sepsis. Hypoxia resolved and weakness improved prior to discharge.     # Pneumonia, bilateral, hospital acquired vs aspiration  # Acute hypoxic respiratory failure, resolved  # Sepsis without septic shock, Sepsis associated with KATY   # Recent influenza A    During recent admission (1/4-1/11 for STEMI s/p REDD x2 on 1/4/25) he had influenza treated with Tamiflu and Prednisone 40mg for 5 days. He also had possible superimposed bacterial pneumonia evidenced by sputum culture growing H. Flu and group B strep (no imaging performed). He was treated with Ceftriaxone and Doxycycline. During this he required HFNC, but weaned off prior to discharge. He did not discharge with antibiotics or home oxygen.    He presented to his hospital follow up 1/17 and PCP noted him to be clammy, dyspneic, and HR was . In ER waiting room he had a syncopal episode and SBP was found to be 50. Arrives afebrile, not tachycardic. Admitted on 2L NC. WBC 14.9. CT with bilateral lower lobe bronchiolitis. Minimal airspace opacities in the LLL likely reflect sequelae of bronchiolitis or early pneumonia.     Due to patient's recent admission with receipt of IV antibiotics along with hx of chronic dysphagia (see below) will treat as hospital acquired vs aspiration. Symptoms and hypoxia resolved during hospitalization. Completed azithromycin and given vancomycin plus pip-tazo during hospitalization.   - Amoxicillin-clavulanate and doxycycline to complete course of antibiotics    - MRSA swab positive awaiting sensitivities   - Blood cultures 1/17 NGTD     # Zenker diverticulum  # Chronic dysphagia  Zenker's found on recent admission swallow study and esophagram. It does not completely empty with multiple swallows and explains symptoms of regurgitation/dysphagia. No issues with swallowing during this admission.   - Per previous SLP- regular diet, chew well, alternate bites of food with sips of liquid  - Consider ENT  "consult for Zenker's outpatient     # Severe persistent asthma without complication    # COPD (chronic obstructive pulmonary disease) from environmental exposure, with acute exacerbation  # Hx of Pneumonitis, hypersensitivity, naa   No wheezing on exam. Never smoked. Patient no longer owns birds. 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 Previously raised pigeons but it has been many years since he has done so. Given methylprednisolone on presentation will complete steroid burst with prednisone.   - Continued PTA PRN Albuterol, Symbicort BID, Montelukast 10mg  - Completing prednisone burst    # CAD s/p REDD 2015, 1/4/25  # Hyperlipidemia LDL goal <70  # HTN    Recent admission 1/4-1/11 for STEMI s/p REDD x2 on 1/4/25. Per cardio he still has severe residual disease in the LAD system, and is being evaluated at multidisciplinary heart conference to determine best course of action for revascularization of his residual CAD as an outpatient.     Troponin on admission 118 ?  78. Patient denies chest pain and EKG non-ischemic.  - Continued PTA Aspirin 81mg, Ticagrelor 90mg BID, Atorvastatin 80mg, Carvedilol 6.25mg BID  - Restarted PTA Losartan-hydrochlorothiazide 50-12.5 mg with resolution of KATY     # KATY, resolved   Cr 1.71, baseline 0.9-1.1. FENa 1.1% consistent with a prerenal etiology. Resolved with fluids.     # Type 2 diabetes mellitus   A1c 8.1% 1/4/2025. PTA on glargine and semaglutide. Continued. Suspect that hyperglycemia during admission is related to steroids.    # Elevated T-bili, resolved   1.7 on admission. Other LFTs WNL. CT unremarkable.  Resolved on recheck.    # History of arterial ischemic stroke  Noted I chart review. On anti-platelets as above.     # Pulmonary " nodule  Incidental finding on CT as new 0.3cm anterior RUL solid nodule.  - Consider follow up CT in 12 months.     # GERD  - Continued PTA Omeprazole 20 mg    Consultations This Hospital Stay   PHARMACY TO DOSE VANCO  CARE MANAGEMENT / SOCIAL WORK IP CONSULT  PHYSICAL THERAPY ADULT IP CONSULT    Code Status   Full Code    Time Spent on this Encounter   I, Dewayne Hylton MD, personally saw the patient today and spent greater than 30 minutes discharging this patient.     Dewayne Hylton MD  [facility] SURGICAL  [address] [address]  Phone: [phone]  Fax: [phone]  ______________________________________________________________________    Physical Exam   Vital Signs: Temp: 98.1  F (36.7  C) Temp src: Oral BP: (!) 142/76 Pulse: 65   Resp: 18 SpO2: 94 % O2 Device: None (Room air)    Weight: 152 lbs 12.46 oz  General Appearance:  Awake and alert, sitting up in bed in no acute distress  Respiratory: Breathing easily on room air   Cardiovascular: Extremities warm and well perfused   GI: Abdomen soft, non-tender, and non-distended   Other:  Appropriate mood and affect         Primary Care Physician   Sarita Hennessy    Discharge Orders      Primary Care - Care Coordination Referral      Reason for your hospital stay    You were hospitalized with a suspected pneumonia and COPD exacerbation that improved during your hospitalization. You are being discharged on medications to complete by mouth.     Follow-up and recommended labs and tests     Follow up with primary care provider, Sarita Hennessy, within 7 days to evaluate medication change and for hospital follow- up.     Activity    Your activity upon discharge: activity as tolerated     Diet    Follow this diet upon discharge: Regular adult diet       Significant Results and Procedures   Results for orders placed or performed during the hospital encounter of 01/17/25   CT Chest (PE) Abdomen Pelvis w Contrast     Narrative    EXAM: CT CHEST PE ABDOMEN PELVIS W CONTRAST  LOCATION: [facility]  DATE: 1/17/2025    INDICATION: Shortness of breath, lightheadedness, hypotension, recent PTCA with MI  COMPARISON: 12/11/2024 , 4/20/2009  TECHNIQUE: CT chest pulmonary angiogram and routine CT abdomen pelvis with IV contrast. Arterial phase through the chest and venous phase through the abdomen and pelvis. Multiplanar reformats and MIP reconstructions were performed. Dose reduction   techniques were used.   CONTRAST: 71mL Isovue 370    FINDINGS:  ANGIOGRAM CHEST: Pulmonary arteries are normal caliber and negative for pulmonary emboli. Thoracic aorta is negative for dissection. No CT evidence of right heart strain.     LUNGS AND PLEURA: Patent central airways. Right upper lobe calcified granuloma. Anterior right upper lobe solid nodule measuring 0.3 cm (series 7, image 129) is new. Bilateral lower lobe peribronchial thickening. A few reticulonodular opacities are   present along the medial and posterior left lower lobe. No pleural effusions. No pneumothorax.    MEDIASTINUM/AXILLAE: No intrathoracic lymphadenopathy. Normal heart size. No pericardial effusion. Normal esophagus.    CORONARY ARTERY CALCIFICATION: Previous stenting    HEPATOBILIARY: No focal hepatic mass, biliary dilatation, or calcified gallstones.    PANCREAS: No focal pancreatic mass, peripancreatic inflammation, or pancreatic ductal dilatation.    SPLEEN: Normal size.    ADRENAL GLANDS: No nodules.    KIDNEYS/BLADDER: Bilateral renal cysts for which no follow-up is recommended. There are additional subcentimeter renal hypodensities that are too small to be characterized. No hydronephrosis or urinary bladder wall thickening.    BOWEL: Unremarkable appearance of the stomach. No dilated loops of bowel. Minimal herniation of the small bowel loop into a fat-containing periumbilical hernia. Colonic diverticulosis without pericolonic inflammation.  No evidence for appendicitis.    LYMPH NODES: No abdominal or pelvic lymphadenopathy.    VASCULATURE: Normal caliber abdominal aorta. Multifocal atherosclerotic plaques. Patent proximal celiac trunk, SMA, and ESTELITA. Patent portal, splenic, and mesenteric veins. Patent bilateral renal veins.    PELVIC ORGANS: No large pelvic mass. Enlarged prostate.    MUSCULOSKELETAL: Healing lateral right fourth through seventh rib fractures. Degenerative changes of the spine.      Impression    IMPRESSION:  1.  No pulmonary embolism.  2.  Bilateral lower lobe bronchiolitis. The minimal airspace opacities in the left lower lobe likely reflects sequelae of bronchiolitis or early pneumonia.  3.  New 0.3 cm anterior right upper lobe solid nodule. See below for follow-up recommendations.  4.  Colonic diverticulosis.   5.  Unchanged appearance of the small bowel containing periumbilical hernia.    REFERENCE:  Guidelines for Management of Incidental Pulmonary Nodules Detected on CT Images: From the Fleischner Society 2017.   Guidelines apply to incidental nodules in patients who are 35 years or older.  Guidelines do not apply to lung cancer screening, patients with immunosuppression, or patients with known primary cancer.    SINGLE NODULE  Nodule size <6 mm  Low-risk patients: No follow-up needed.  High-risk patients: Optional follow-up at 12 months.           Discharge Medications   Current Discharge Medication List        START taking these medications    Details   amoxicillin-clavulanate (AUGMENTIN) 875-125 MG tablet Take 1 tablet by mouth 2 times daily.  Qty: 10 tablet, Refills: 0    Associated Diagnoses: Pneumonia of both lungs due to infectious organism, unspecified part of lung; Severe persistent asthma without complication (H)      doxycycline hyclate (VIBRAMYCIN) 100 MG capsule Take 1 capsule (100 mg) by mouth 2 times daily.  Qty: 10 capsule, Refills: 0    Associated Diagnoses: Pneumonia of both lungs due to infectious organism,  unspecified part of lung; Severe persistent asthma without complication (H)      predniSONE (DELTASONE) 20 MG tablet Take 2 tablets (40 mg) by mouth daily.  Qty: 4 tablet, Refills: 0    Associated Diagnoses: Pneumonia of both lungs due to infectious organism, unspecified part of lung; Severe persistent asthma without complication (H)           CONTINUE these medications which have NOT CHANGED    Details   acetaminophen (TYLENOL) 325 MG tablet Take 2 tablets (650 mg) by mouth every 8 hours.    Associated Diagnoses: Closed fracture of multiple ribs of right side, initial encounter      albuterol (PROAIR HFA/PROVENTIL HFA/VENTOLIN HFA) 108 (90 Base) MCG/ACT inhaler Inhale 1-2 puffs into the lungs every 4 hours as needed for shortness of breath or wheezing.  Qty: 8.5 g, Refills: 10    Comments: Pharmacy may dispense brand covered by insurance (Proair, or proventil or ventolin or generic albuterol inhaler)  Associated Diagnoses: Severe persistent asthma without complication (H)      albuterol (PROVENTIL) (2.5 MG/3ML) 0.083% neb solution TAKE 1 VIAL (2.5 MG) BY NEBULIZATION EVERY 6 HOURS AS NEEDED FOR SHORTNESS OF BREATH OR WHEEZING  Qty: 90 mL, Refills: 11    Associated Diagnoses: Severe persistent asthma without complication (H)      aspirin (ASA) 81 MG EC tablet Take 81 mg by mouth at bedtime.    Associated Diagnoses: Status post total right knee replacement      atorvastatin (LIPITOR) 80 MG tablet Take 1 tablet (80 mg) by mouth at bedtime.  Qty: 30 tablet, Refills: 0    Associated Diagnoses: ACS (acute coronary syndrome) (H)      blood glucose monitoring (NO BRAND SPECIFIED) meter device kit Use to test blood sugar 1 times daily or as directed.  Blood Glucose Monitor Brands: per insurance.  Qty: 1 kit, Refills: 0    Associated Diagnoses: Type 2 diabetes mellitus without complication, without long-term current use of insulin (H)      budesonide-formoterol (SYMBICORT) 80-4.5 MCG/ACT Inhaler Inhale 2 puffs into the lungs  2 times daily.  Qty: 10.2 g, Refills: 11    Associated Diagnoses: Severe persistent asthma without complication (H); Chronic obstructive pulmonary disease, unspecified COPD type (H)      carvedilol (COREG) 6.25 MG tablet Take 1 tablet (6.25 mg) by mouth 2 times daily.  Qty: 180 tablet, Refills: 3    Associated Diagnoses: Essential hypertension      insulin glargine (LANTUS PEN) 100 UNIT/ML pen Inject 20 Units subcutaneously at bedtime.  Qty: 15 mL, Refills: 3    Comments: If Lantus is not covered by insurance, may substitute Basaglar or Semglee or other insulin glargine product per insurance preference at same dose and frequency.    Associated Diagnoses: Type 2 diabetes mellitus without complication, without long-term current use of insulin (H)      insulin pen needle (BD LIZBETH U/F) 32G X 4 MM miscellaneous USE 1 PEN NEEDLE DAILY  Qty: 100 each, Refills: 1    Associated Diagnoses: Type 2 diabetes mellitus without complication, without long-term current use of insulin (H)      Lancets (ONETOUCH DELICA PLUS [id number]) MISC USE AS DIRECTED WITH LANCING DEVICE TO TEST ONCE DAILY  Qty: 100 each, Refills: 6    Associated Diagnoses: Type 2 diabetes mellitus without complication, without long-term current use of insulin (H)      losartan-hydrochlorothiazide (HYZAAR) 50-12.5 MG tablet Take 1 tablet by mouth daily.  Qty: 90 tablet, Refills: 3    Associated Diagnoses: Essential hypertension; Coronary artery disease involving native coronary artery of native heart without angina pectoris      montelukast (SINGULAIR) 10 MG tablet TAKE ONE TABLET BY MOUTH AT BEDTIME  Qty: 90 tablet, Refills: 3    Associated Diagnoses: Severe persistent asthma without complication (H)      omeprazole (PRILOSEC) 20 MG DR capsule Take 1 capsule (20 mg) by mouth daily.  Qty: 90 capsule, Refills: 3    Associated Diagnoses: Gastroesophageal reflux disease without esophagitis      ONETOUCH ULTRA test strip USE TO TEST BLOOD SUGAR 1 TIME DAILY OR AS  DIRECTED  Qty: 100 strip, Refills: 6    Associated Diagnoses: Type 2 diabetes mellitus without complication, without long-term current use of insulin (H)      ticagrelor (BRILINTA) 90 MG tablet Take 1 tablet (90 mg) by mouth 2 times daily. Dose to start tomorrow morning.  Qty: 180 tablet, Refills: 3    Associated Diagnoses: ST elevation myocardial infarction involving left circumflex coronary artery (H)      triamcinolone (KENALOG) 0.1 % external cream APPLY TOPICALLY 2 TIMES DAILY TO LEFT ANKLE  Qty: 15 g, Refills: 1    Associated Diagnoses: Flexural eczema      Alcohol Swabs (B-D SINGLE USE SWABS REGULAR) PADS USE TO SWAB AREA OF INJECTION / JENISE AS DIRECTED  Qty: 100 each, Refills: 3    Associated Diagnoses: Type 2 diabetes mellitus without complication, without long-term current use of insulin (H)      order for DME Equipment being ordered: arm blood pressure cuff  Qty: 1 Device, Refills: 0    Associated Diagnoses: Essential hypertension, benign      Semaglutide, 1 MG/DOSE, (OZEMPIC) 4 MG/3ML pen Inject 1 mg subcutaneously every 7 days.  Qty: 9 mL, Refills: 3    Associated Diagnoses: Type 2 diabetes mellitus without complication, without long-term current use of insulin (H)           STOP taking these medications       semaglutide (OZEMPIC) 2 MG/3ML pen Comments:   Reason for Stopping:             Allergies   Allergies   Allergen Reactions    Simvastatin Swelling     Severe muscle pain, swelling, and bruising    Dust Mites

## 2025-01-19 NOTE — PLAN OF CARE
Problem: Pneumonia  Goal: Fluid Balance  Outcome: Progressing  Goal: Resolution of Infection Signs and Symptoms  Outcome: Progressing  Intervention: Prevent Infection Progression  Recent Flowsheet Documentation  Taken 1/19/2025 0000 by Devick, Karen M, RN  Isolation Precautions:   contact precautions maintained   droplet precautions maintained  Taken 1/18/2025 2000 by Devick, Karen M, RN  Isolation Precautions:   contact precautions maintained   droplet precautions maintained  Goal: Effective Oxygenation and Ventilation  Outcome: Progressing  Intervention: Promote Airway Secretion Clearance  Recent Flowsheet Documentation  Taken 1/19/2025 0000 by Devick, Karen M, RN  Cough And Deep Breathing: done independently per patient  Activity Management:   activity adjusted per tolerance   back to bed   ambulated to bathroom  Taken 1/18/2025 2200 by Devick, Karen M, RN  Activity Management:   ambulated to bathroom   back to bed  Taken 1/18/2025 2000 by Devick, Karen M, RN  Cough And Deep Breathing: done independently per patient  Activity Management:   activity adjusted per tolerance   back to bed   ambulated to bathroom  Intervention: Optimize Oxygenation and Ventilation  Recent Flowsheet Documentation  Taken 1/19/2025 0000 by Devick, Karen M, RN  Head of Bed (HOB) Positioning: HOB at 20-30 degrees  Taken 1/18/2025 2000 by Devick, Karen M, RN  Head of Bed (HOB) Positioning: HOB at 20-30 degrees     Goal Outcome Evaluation: Patient alert and oriented. On Contact and Droplet Precautions. On Telemtery, Sinus rhythm heart rate in the 60's. Heart rate dropped to 49 overnight approximately 0230; patient had just returned to bed from using the bathroom. On room air. Congested, productive, frequent cough. IV antibiotics. Patient states he has had two episodes of diarrhea last evening. Up with standby assist.

## 2025-01-20 ENCOUNTER — PATIENT OUTREACH (OUTPATIENT)
Dept: CARE COORDINATION | Facility: CLINIC | Age: 80
End: 2025-01-20
Payer: COMMERCIAL

## 2025-01-20 DIAGNOSIS — Z09 HOSPITAL DISCHARGE FOLLOW-UP: ICD-10-CM

## 2025-01-20 LAB — BACTERIA SPEC CULT: ABNORMAL

## 2025-01-20 ASSESSMENT — ACTIVITIES OF DAILY LIVING (ADL): DEPENDENT_IADLS:: INDEPENDENT

## 2025-01-20 NOTE — PROGRESS NOTES
Clinic Care Coordination Contact  1/17/2025 - 1/19/2025 (2 days)  [facility]  Pneumonia of both lungs due to infectious organism, unspecified part of lung    UTC/Voicemail    Clinical Data: Care Coordinator Outreach    Outreach Documentation Number of Outreach Attempt   1/13/2025   1:15 PM 1   1/20/2025   1:03 PM 1     Home phone rings and disconnects times 3       Plan: . Care Coordinator will try to reach patient again in 1-2 business days.    [facility]   Roxanne Tapia RN, Care Coordinator   [facility]   E-mail [email or web address]   [phone]

## 2025-01-21 ENCOUNTER — TELEPHONE (OUTPATIENT)
Dept: FAMILY MEDICINE | Facility: CLINIC | Age: 80
End: 2025-01-21
Payer: COMMERCIAL

## 2025-01-21 NOTE — PROGRESS NOTES
Clinic Care Coordination Contact  Clinic Care Coordination Contact  OUTREACH    Referral Information:  Referral Source: IP Handoff    Primary Diagnosis: Pneumonia    Chief Complaint   Patient presents with    Clinic Care Coordination - Post Hospital     Clinic Care Coordination RN         Universal Utilization:   1/17/2025 - 1/19/2025 (2 days)  [facility]  Pneumonia of both lungs due to infectious organism, unspecified part of lung  Clinic Utilization  Difficulty keeping appointments:: No  Compliance Concerns: No  No-Show Concerns: No  No PCP office visit in Past Year: No  Utilization      No Show Count (past year)  0             ED Visits  5             Hospital Admissions  4                    Current as of: 1/21/2025  1:21 PM                Clinical Concerns:  Current Medical Concerns:  Patient reports he doesn't get much sleep being on the Prednisone   Daughters are assisting him with his needs but admits they can be overwhelming with instructions     Patient has CR tomorrow   Coughing up yellow sputum   Current Behavioral Concerns: No    Education Provided to patient: Instructed patient to take deep breaths and cough to exercise his lungs    Pain  Pain (GOAL):: No  Health Maintenance Reviewed: Not assessed  Clinical Pathway: Clinic Care Coordination - COPD Clinical Pathway     COPD Symptoms:    Shortness of breath:: No  Cough: Yes  Is your cough:: Loose  Wheezing: Yes  Anxiety: No  New, different, or worsening chest pain? : No  Chills: No  Fever: No  Fatigue:: Yes, improving  Overall your COPD symptoms are (GOAL):: Stable    COPD Management:   Taking COPD medications as prescribed?: Yes  Are your medications effective in controlling your symptoms?: Yes  Taking steroids for COPD?: Yes  Do you have a pulse oximeter?: No  Are you using oxygen?: No    Medication Management:  Medication review status: Medications reviewed.  Changes noted per patient report.       Functional  Status:  Dependent ADLs:: Independent  Dependent IADLs:: Independent  Mobility Status: Independent    Living Situation:  Current living arrangement:: I live in a private home    Lifestyle & Psychosocial Needs:    Social Drivers of Health     Food Insecurity: Low Risk  (1/17/2025)    Food Insecurity     Within the past 12 months, did you worry that your food would run out before you got money to buy more?: No     Within the past 12 months, did the food you bought just not last and you didn t have money to get more?: No   Depression: Not at risk (1/17/2025)    PHQ-2     PHQ-2 Score: 0   Housing Stability: Low Risk  (1/17/2025)    Housing Stability     Do you have housing? : Yes     Are you worried about losing your housing?: No   Recent Concern: Housing Stability - High Risk (1/5/2025)    Housing Stability     Do you have housing? : Yes     Are you worried about losing your housing?: Yes   Tobacco Use: Low Risk  (1/17/2025)    Patient History     Smoking Tobacco Use: Never     Smokeless Tobacco Use: Never     Passive Exposure: Not on file   Financial Resource Strain: Low Risk  (1/17/2025)    Financial Resource Strain     Within the past 12 months, have you or your family members you live with been unable to get utilities (heat, electricity) when it was really needed?: No   Alcohol Use: Not on file   Transportation Needs: Low Risk  (1/17/2025)    Transportation Needs     Within the past 12 months, has lack of transportation kept you from medical appointments, getting your medicines, non-medical meetings or appointments, work, or from getting things that you need?: No   Physical Activity: Not on file   Interpersonal Safety: Low Risk  (1/17/2025)    Interpersonal Safety     Do you feel physically and emotionally safe where you currently live?: Yes     Within the past 12 months, have you been hit, slapped, kicked or otherwise physically hurt by someone?: No     Within the past 12 months, have you been humiliated or  emotionally abused in other ways by your partner or ex-partner?: No   Stress: Not on file   Social Connections: Not on file   Health Literacy: Not on file     Diet:: Low saturated fat, No added salt  Inadequate nutrition (GOAL):: No  Tube Feeding: No  Inadequate activity/exercise (GOAL):: Yes  Significant changes in sleep pattern (GOAL): No  Transportation means:: Accessible car     Jew or spiritual beliefs that impact treatment:: No  Mental health DX:: No  Mental health management concern (GOAL):: No  Chemical Dependency Status: No Current Concerns  Informal Support system:: Children           Resources and Interventions:  Current Resources:   Skilled Home Care Services: Skilled Nursing, Home Health Aid, Physical Therapy  Community Resources: Home Care  Supplies Currently Used at Home: None  Equipment Currently Used at Home: none  Employment Status: retired         Advance Care Plan/Directive  Advanced Care Plans/Directives on file:: No    Referrals Placed: None         Care Plan:  Care Plan: Physical Activity       Problem: Patient is inactive       Goal: Increase Physical Activity in 1-2 months       Start Date: 1/14/2025 Expected End Date: 3/13/2025    This Visit's Progress: 20%    Priority: High    Note:     Barriers: Deconditioned   Strengths: supportive daughters   Patient expressed understanding of goal: Yes   Action steps to achieve this goal:  1. I will keep future Cardiac Rehabilitation appointments   2. I will request a home care order at 1/22 provider visit                                 Patient/Caregiver understanding: Patient expresses a good understanding of discharge instructions     Outreach Frequency: weekly, more frequently as needed  Future Appointments                Tomorrow [facility] 3 M [facility] Cardiac and Pulmonary Rehabilitation [facility]    In 2 weeks Sarita Hennessy DO M [facility] Clinic [facility]  Arrive at: Clinic A    In 4 weeks Kathy  Claudine Flanagan RPH [facility]    In 4 weeks [place] LAB [facility], [place]            Plan:   Patient will keep CR future appointments and hospital follow up with PCP 2/4/2025  Patient will stat home care services   CC RN will follow up in 1-2 weeks      [facility]   Roxanne Tapia RN, Care Coordinator   [facility]'s   E-mail [email or web address]   [phone]

## 2025-01-21 NOTE — TELEPHONE ENCOUNTER
General Call    Contacts       Contact Date/Time Type Contact Phone/Fax    01/21/2025 04:14 PM CST Phone (Incoming) Monica [phone]          Reason for Call:  Home Care    What are your questions or concerns:  Per Monica with Home Care, patient is requesting if he can start Home Care on 1/22/2025.    Date of last appointment with provider: 1/17/2025    Okay to leave a detailed message?: Yes at Other phone number:      Monica at Home Care: [phone]

## 2025-01-22 ENCOUNTER — MEDICAL CORRESPONDENCE (OUTPATIENT)
Dept: HEALTH INFORMATION MANAGEMENT | Facility: CLINIC | Age: 80
End: 2025-01-22

## 2025-01-22 ENCOUNTER — HOSPITAL ENCOUNTER (OUTPATIENT)
Dept: CARDIAC REHAB | Facility: CLINIC | Age: 80
Discharge: HOME | End: 2025-01-22
Attending: INTERNAL MEDICINE
Payer: COMMERCIAL

## 2025-01-22 ENCOUNTER — TELEPHONE (OUTPATIENT)
Dept: FAMILY MEDICINE | Facility: CLINIC | Age: 80
End: 2025-01-22
Payer: COMMERCIAL

## 2025-01-22 DIAGNOSIS — I21.21 ST ELEVATION MYOCARDIAL INFARCTION INVOLVING LEFT CIRCUMFLEX CORONARY ARTERY (H): ICD-10-CM

## 2025-01-22 LAB — BACTERIA BLD CULT: NO GROWTH

## 2025-01-22 PROCEDURE — 93798 PHYS/QHP OP CAR RHAB W/ECG: CPT | Performed by: REHABILITATION PRACTITIONER

## 2025-01-22 PROCEDURE — 93797 PHYS/QHP OP CAR RHAB WO ECG: CPT | Performed by: REHABILITATION PRACTITIONER

## 2025-01-22 NOTE — TELEPHONE ENCOUNTER
Left message of approval of orders on confidential/secure voicemail.    Prosper AUSTIN RN  [facility]

## 2025-01-22 NOTE — TELEPHONE ENCOUNTER
Home Health Care    Reason for call:  Requesting verbal orders from initial start of care    Orders are needed for this patient.  PT: Rochelle  Skilled Nursin time per week for 3 weeks and then 1 time every other week for 7 weeks.  Speech Therapy: Rochelle    Pt Provider: Dr. Hennessy    Phone Number Homecare Nurse can be reached at: [phone]      Okay to leave a detailed message?: Yes at Other phone number:  RASHEL Beatty with [facility] [phone]

## 2025-01-22 NOTE — TELEPHONE ENCOUNTER
Dr. Hennessy,    Please see home care order request and advise on approval.    Thank you  Prosper AUSTIN RN  [facility]

## 2025-01-22 NOTE — TELEPHONE ENCOUNTER
Left message of approval of orders on confidential/secure voicemail.    Prosper AUSTIN RN  [facility]

## 2025-01-23 LAB — BACTERIA BLD CULT: NO GROWTH

## 2025-01-27 NOTE — PATIENT INSTRUCTIONS
Diabetes:  1. Work to take medications consistently  2. Follow-up in 3 months with labs prior to visit  3. You are due for dilated eye exam.  Have the report sent to Dr. Hennessy's office.     Yes

## 2025-01-30 DIAGNOSIS — I24.9 ACS (ACUTE CORONARY SYNDROME) (H): ICD-10-CM

## 2025-01-30 RX ORDER — ATORVASTATIN CALCIUM 80 MG/1
80 TABLET, FILM COATED ORAL AT BEDTIME
Qty: 90 TABLET | Refills: 3 | Status: SHIPPED | OUTPATIENT
Start: 2025-01-30

## 2025-02-04 ENCOUNTER — OFFICE VISIT (OUTPATIENT)
Dept: FAMILY MEDICINE | Facility: CLINIC | Age: 80
End: 2025-02-04
Payer: COMMERCIAL

## 2025-02-04 ENCOUNTER — HOSPITAL ENCOUNTER (OUTPATIENT)
Dept: CARDIAC REHAB | Facility: CLINIC | Age: 80
Discharge: HOME | End: 2025-02-04
Attending: INTERNAL MEDICINE
Payer: COMMERCIAL

## 2025-02-04 VITALS
DIASTOLIC BLOOD PRESSURE: 60 MMHG | SYSTOLIC BLOOD PRESSURE: 116 MMHG | TEMPERATURE: 97.8 F | BODY MASS INDEX: 23.1 KG/M2 | HEIGHT: 68 IN | RESPIRATION RATE: 24 BRPM | HEART RATE: 85 BPM | WEIGHT: 152.4 LBS | OXYGEN SATURATION: 98 %

## 2025-02-04 DIAGNOSIS — E11.9 TYPE 2 DIABETES MELLITUS WITHOUT COMPLICATION, WITHOUT LONG-TERM CURRENT USE OF INSULIN (H): ICD-10-CM

## 2025-02-04 DIAGNOSIS — J18.9 PNEUMONIA OF BOTH LUNGS DUE TO INFECTIOUS ORGANISM, UNSPECIFIED PART OF LUNG: Primary | ICD-10-CM

## 2025-02-04 DIAGNOSIS — K22.5 ZENKER DIVERTICULUM: ICD-10-CM

## 2025-02-04 DIAGNOSIS — R91.8 PULMONARY NODULES: ICD-10-CM

## 2025-02-04 DIAGNOSIS — I25.10 CORONARY ARTERY DISEASE INVOLVING NATIVE CORONARY ARTERY OF NATIVE HEART WITHOUT ANGINA PECTORIS: ICD-10-CM

## 2025-02-04 DIAGNOSIS — J45.50 SEVERE PERSISTENT ASTHMA WITHOUT COMPLICATION (H): ICD-10-CM
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
## 2025-02-04 PROBLEM — E86.0 DEHYDRATION: Status: RESOLVED | Noted: 2025-01-17 | Resolved: 2025-02-04

## 2025-02-04 PROBLEM — I21.3 ST ELEVATION MI (STEMI) (H): Status: RESOLVED | Noted: 2025-01-04 | Resolved: 2025-02-04

## 2025-02-04 PROBLEM — S22.41XA CLOSED FRACTURE OF MULTIPLE RIBS OF RIGHT SIDE, INITIAL ENCOUNTER: Status: RESOLVED | Noted: 2024-12-11 | Resolved: 2025-02-04

## 2025-02-04 PROBLEM — R13.10 DYSPHAGIA, UNSPECIFIED TYPE: Status: RESOLVED | Noted: 2024-12-26 | Resolved: 2025-02-04

## 2025-02-04 PROBLEM — Z98.61 PERCUTANEOUS TRANSLUMINAL CORONARY ANGIOPLASTY STATUS: Status: RESOLVED | Noted: 2025-01-04 | Resolved: 2025-02-04

## 2025-02-04 PROBLEM — I24.9 ACS (ACUTE CORONARY SYNDROME) (H): Status: RESOLVED | Noted: 2025-01-04 | Resolved: 2025-02-04

## 2025-02-04 PROBLEM — R13.10 DYSPHAGIA: Status: RESOLVED | Noted: 2025-01-17 | Resolved: 2025-02-04

## 2025-02-04 PROBLEM — I95.9 TRANSIENT HYPOTENSION: Status: RESOLVED | Noted: 2025-01-17 | Resolved: 2025-02-04

## 2025-02-04 PROCEDURE — 99495 TRANSJ CARE MGMT MOD F2F 14D: CPT | Performed by: INTERNAL MEDICINE

## 2025-02-04 PROCEDURE — 93798 PHYS/QHP OP CAR RHAB W/ECG: CPT

## 2025-02-04 ASSESSMENT — PAIN SCALES - GENERAL: PAINLEVEL_OUTOF10: NO PAIN (0)

## 2025-02-04 NOTE — PATIENT INSTRUCTIONS
Zenker diverticulum  Chronic trouble swallowing  - Consider ENT consult once you are more recovered    Pneumonia  No further treatment needed  Will take more time for your breathing to return to normal. Continue treatment as you have been    Diabetes  Continue insulin  Stay off the glipizide  Start Semaglutide (Ozempic) 0.25 mg weekly x 1 month, then 0.5 mg weekly x 1 month, then 1 mg weekly  Follow-up with me in 3 months, non-fasting lab prior to visit    Recent Heart Attack  You are due to see cardiology  Continue with plan for cardiac rehab. Ok to stop home care per your preference  Diet - More fruits/veggies, lean meat over red meat, less fast food, less baked goods      Cardiology Scheduling~[phone]  Cardiology Clinic RN~[phone] (Elke RN, Krupa RN)

## 2025-02-04 NOTE — PROGRESS NOTES
Assessment & Plan     Pneumonia of both lungs due to infectious organism, unspecified part of lung  Still with some shortness of breath but he is much improved.  Advised I will take more time and continue to monitor    Type 2 diabetes mellitus without complication, without long-term current use of insulin (H)  Continue with plan to go off glipizide and start semaglutide.  Patient was hesitant at first but I explained the benefit of lowering blood sugar, risk of in-stent thrombosis with a new stent, as well as long-term cardiovascular benefit.  Follow-up with me in 3-4 months  - semaglutide (OZEMPIC) 2 MG/3ML pen; Inject 0.25 mg subcutaneously every 7 days for 28 days, THEN 0.5 mg every 7 days for 28 days.  - Semaglutide, 1 MG/DOSE, (OZEMPIC) 4 MG/3ML pen; Inject 1 mg subcutaneously every 7 days.  - Hemoglobin A1c; Future    Coronary artery disease involving native coronary artery of native heart without angina pectoris  Patient feels strongly that he does not need home care and wants to start cardiac rehab.  Referral placed  - Cardiac Rehab  Referral; Future    Zenker diverticulum  He feels he can manage with swallowing techniques that he was taught by the speech therapist.  Declines ENT referral     Pulmonary nodules  needs follow-up in 12 months    Severe persistent asthma without complication (H)  contributes to medical complexity.        MED REC REQUIRED  Post Medication Reconciliation Status: discharge medications reconciled and changed, per note/orders    Patient Instructions   Zenker diverticulum  Chronic trouble swallowing  - Consider ENT consult once you are more recovered    Pneumonia  No further treatment needed  Will take more time for your breathing to return to normal. Continue treatment as you have been    Diabetes  Continue insulin  Stay off the glipizide  Start Semaglutide (Ozempic) 0.25 mg weekly x 1 month, then 0.5 mg weekly x 1 month, then 1 mg weekly  Follow-up with me in 3 months,  non-fasting lab prior to visit    Recent Heart Attack  You are due to see cardiology  Continue with plan for cardiac rehab. Ok to stop home care per your preference  Diet - More fruits/veggies, lean meat over red meat, less fast food, less baked goods      Cardiology Scheduling~[phone]  Cardiology Clinic RN~[phone] (Elke RN, Krupa RN)    Jonathon Carter is a 79 year old, presenting for the following health issues:  Hospital F/U        2/4/2025    12:56 PM   Additional Questions   Roomed by ranjan   Accompanied by Self          2/4/2025    12:56 PM   Patient Reported Additional Medications   Patient reports taking the following new medications none     HPI     Chief Complaint   Patient presents with    Hospital F/U             Hospital Follow-up Visit:    Hospital/Nursing Home/IP Rehab Facility: [facility]  Date of Admission: 1/17/25  Date of Discharge: 1/19/25  Reason(s) for Admission: Pneumonia of both lungs due to infectious organism, unspecified part of lung   Was the patient in the ICU or did the patient experience delirium during hospitalization?  No  Do you have any other stressors you would like to discuss with your provider? Health Concerns    Problems taking medications regularly:  None  Medication changes since discharge:        START taking these medications     Details   amoxicillin-clavulanate (AUGMENTIN) 875-125 MG tablet Take 1 tablet by mouth 2 times daily.  Qty: 10 tablet, Refills: 0     Associated Diagnoses: Pneumonia of both lungs due to infectious organism, unspecified part of lung; Severe persistent asthma without complication (H)       doxycycline hyclate (VIBRAMYCIN) 100 MG capsule Take 1 capsule (100 mg) by mouth 2 times daily.  Qty: 10 capsule, Refills: 0     Associated Diagnoses: Pneumonia of both lungs due to infectious organism, unspecified part of lung; Severe persistent asthma without complication (H)       predniSONE (DELTASONE) 20 MG tablet  Take 2 tablets (40 mg) by mouth daily.  Qty: 4 tablet, Refills: 0     Associated Diagnoses: Pneumonia of both lungs due to infectious organism, unspecified part of lung; Severe persistent asthma without complication (H)       STOP taking these medications         semaglutide (OZEMPIC) 2 MG/3ML pen Comments:   Reason for Stopping:            Problems adhering to non-medication therapy:  having issues with [facility] home health care services, for cardic rehab  , would like to come to [place] for cardic rehab , would like to know who to go to for cardio DR , what type of heart healthy foods to eat.     Summary of hospitalization:  [facility] hospital discharge summary reviewed  Diagnostic Tests/Treatments reviewed.  Follow up needed: cardiology  Other Healthcare Providers Involved in Patient s Care:         Homecare  Update since discharge: improved.         Plan of care communicated with patient             Pneumonia  Sepsis  -- He was admitted for bilateral pneumonia hospital-acquired versus aspiration after recent influenza A infection  --He completed azithromycin, zinc, pip-tazo.  He was discharged with Augmentin and Doxy to complete a course of antibiotics.  MRSA swab was positive, not surprising given his recent influenza infection.  Blood cultures were negative  --home care was ordered on discharge.  [facility]  home care came out twice and he says they are 'worthless'  --he would strongly prefer to start Cardiac Rehab over home care  --doing light exercise himself at home    Syncope  -- He had a syncopal event in the ER waiting room and his blood pressureWas in the 50s  --no further fainting spells since discharge     # Zenker diverticulum  # Chronic dysphagia  Zenker's found on recent admission swallow study and esophagram. It does not completely empty with multiple swallows and explains symptoms of regurgitation/dysphagia. No issues with swallowing during this admission.   - Per previous SLP- regular  diet, chew well, alternate bites of food with sips of liquid  - Consider ENT consult for Zenker's outpatient     # Hyperlipidemia LDL goal <70  # HTN    Recent admission 1/4-1/11 for STEMI s/p REDD x2 on 1/4/25. Per cardio he still has severe residual disease in the LAD system, and is being evaluated at multidisciplinary heart conference to determine best course of action for revascularization of his residual CAD as an outpatient.     Pulmonary nodule  Incidental finding on CT as new 0.3cm anterior RUL solid nodule.  - Consider follow up CT in 12 months.     Diabetes  --last visit I had wanted him to switch from glipizide 10 mg daily to GLP1  --he is unsure if he is taking glipizide but he doesn't think he is  --he is taking insulin regularly  --no hypoglycemia  --he is hesitant to start another medication due to polypharmacy        Diabetes  Diet - focus most of checking blood sugar and  managing food for blood sugar   More fruits/veggies, lean meat over red meat, less fast food, less baked goods  Stop Glipizide  Continue Glargine (Lantus)  Start Semaglutide (ozempic) 0.25 mg weekly x 4 weeks, then 0.5 mg weekly x 4 weeks, then 1 mg weekly  5. Try to check blood sugar at least 1 x day    Current Outpatient Medications   Medication Sig Dispense Refill    acetaminophen (TYLENOL) 325 MG tablet Take 2 tablets (650 mg) by mouth every 8 hours.      albuterol (PROAIR HFA/PROVENTIL HFA/VENTOLIN HFA) 108 (90 Base) MCG/ACT inhaler Inhale 1-2 puffs into the lungs every 4 hours as needed for shortness of breath or wheezing. 8.5 g 10    albuterol (PROVENTIL) (2.5 MG/3ML) 0.083% neb solution TAKE 1 VIAL (2.5 MG) BY NEBULIZATION EVERY 6 HOURS AS NEEDED FOR SHORTNESS OF BREATH OR WHEEZING 90 mL 11    aspirin (ASA) 81 MG EC tablet Take 81 mg by mouth at bedtime.      atorvastatin (LIPITOR) 80 MG tablet Take 1 tablet (80 mg) by mouth at bedtime. 90 tablet 3    blood glucose monitoring (NO BRAND SPECIFIED) meter device kit Use to test  blood sugar 1 times daily or as directed.  Blood Glucose Monitor Brands: per insurance. 1 kit 0    budesonide-formoterol (SYMBICORT) 80-4.5 MCG/ACT Inhaler Inhale 2 puffs into the lungs 2 times daily. 10.2 g 11    carvedilol (COREG) 6.25 MG tablet Take 1 tablet (6.25 mg) by mouth 2 times daily. 180 tablet 3    insulin glargine (LANTUS PEN) 100 UNIT/ML pen Inject 20 Units subcutaneously at bedtime. 15 mL 3    insulin pen needle (BD LIZBETH U/F) 32G X 4 MM miscellaneous USE 1 PEN NEEDLE DAILY 100 each 1    Lancets (ONETOUCH DELICA PLUS NXTPPF07I) MISC USE AS DIRECTED WITH LANCING DEVICE TO TEST ONCE DAILY 100 each 6    losartan-hydrochlorothiazide (HYZAAR) 50-12.5 MG tablet Take 1 tablet by mouth daily. 90 tablet 3    montelukast (SINGULAIR) 10 MG tablet TAKE ONE TABLET BY MOUTH AT BEDTIME 90 tablet 3    omeprazole (PRILOSEC) 20 MG DR capsule Take 1 capsule (20 mg) by mouth daily. 90 capsule 3    ONETOUCH ULTRA test strip USE TO TEST BLOOD SUGAR 1 TIME DAILY OR AS DIRECTED 100 strip 6    order for DME Equipment being ordered: arm blood pressure cuff 1 Device 0    ticagrelor (BRILINTA) 90 MG tablet Take 1 tablet (90 mg) by mouth 2 times daily. Dose to start tomorrow morning. 180 tablet 3    triamcinolone (KENALOG) 0.1 % external cream APPLY TOPICALLY 2 TIMES DAILY TO LEFT ANKLE 15 g 1    Alcohol Swabs (B-D SINGLE USE SWABS REGULAR) PADS USE TO SWAB AREA OF INJECTION / JENISE AS DIRECTED 100 each 3    amoxicillin-clavulanate (AUGMENTIN) 875-125 MG tablet Take 1 tablet by mouth 2 times daily. 10 tablet 0    doxycycline hyclate (VIBRAMYCIN) 100 MG capsule Take 1 capsule (100 mg) by mouth 2 times daily. 10 capsule 0    predniSONE (DELTASONE) 20 MG tablet Take 2 tablets (40 mg) by mouth daily. 4 tablet 0    Semaglutide, 1 MG/DOSE, (OZEMPIC) 4 MG/3ML pen Inject 1 mg subcutaneously every 7 days. 9 mL 3           Review of Systems  Constitutional, neuro, ENT, endocrine, pulmonary, cardiac, gastrointestinal, genitourinary,  "musculoskeletal, integument and psychiatric systems are negative, except as otherwise noted.      Objective    /60 (BP Location: Right arm, Patient Position: Sitting, Cuff Size: Adult Regular)   Pulse 85   Temp 97.8  F (36.6  C) (Tympanic)   Resp 24   Ht 1.727 m (5' 8\")   Wt 69.1 kg (152 lb 6.4 oz)   SpO2 98%   BMI 23.17 kg/m    Body mass index is 23.17 kg/m .  Physical Exam   GENERAL: alert and no distress  RESP: decreased breath sounds throughout and no crackles, wheezes, rhonchi  CV: regular rate and rhythm, normal S1 S2, no S3 or S4, no murmur, click or rub, no peripheral edema             Signed Electronically by: Sarita Hennessy DO    "

## 2025-02-05 ENCOUNTER — TELEPHONE (OUTPATIENT)
Dept: FAMILY MEDICINE | Facility: CLINIC | Age: 80
End: 2025-02-05

## 2025-02-06 ENCOUNTER — TELEPHONE (OUTPATIENT)
Dept: CARDIOLOGY | Facility: CLINIC | Age: 80
End: 2025-02-06
Payer: COMMERCIAL

## 2025-02-06 NOTE — TELEPHONE ENCOUNTER
"Noted patient seen by Dr. Montgomery 1-4-25 as inpt consult for \"Inferior STEMI post PCI of dominant pLCx and large OM.  Residual disease of the ostial to distal LAD and large D1.\"    Per Dr. Rich's 1-7-25 inpt progress note:  He will be evaluated further at our multidisciplinary heart conference to determine the best course of action for revascularization of his residual coronary artery disease which would be done as an outpatient  We will arrange for him to follow-up in our clinic in 2-4 weeks and readdress possible revascularization options.    Return call to patient who inquired if he can follow-up at [facility] vs coming to [facility] - informed him that he would need to contact [facility] to determine if a cardiologist is available for him to follow-up with, otherwise he would need to sched follow-up at [facility] - patient verbalized understanding and provided call back # for scheduling.  mg  "

## 2025-02-06 NOTE — TELEPHONE ENCOUNTER
[facility] Call Center    Phone Message    May a detailed message be left on voicemail: yes    Reason for Call: Patient called requesting to speak with a member of his care team. Patient does not want to go through with his plan of care made by Dr. Rich to follow up with Catina. He would like  a new plan and to discuss next steps. Please call back to address.    Thank you!  [facility]

## 2025-02-07 ENCOUNTER — HOSPITAL ENCOUNTER (OUTPATIENT)
Dept: CARDIAC REHAB | Facility: CLINIC | Age: 80
Discharge: HOME | End: 2025-02-07
Attending: INTERNAL MEDICINE
Payer: COMMERCIAL

## 2025-02-07 PROCEDURE — 93798 PHYS/QHP OP CAR RHAB W/ECG: CPT

## 2025-02-10 ENCOUNTER — HOSPITAL ENCOUNTER (OUTPATIENT)
Dept: CARDIAC REHAB | Facility: CLINIC | Age: 80
Discharge: HOME | End: 2025-02-10
Attending: INTERNAL MEDICINE
Payer: COMMERCIAL

## 2025-02-10 PROCEDURE — 93798 PHYS/QHP OP CAR RHAB W/ECG: CPT

## 2025-02-12 ENCOUNTER — TRANSFERRED RECORDS (OUTPATIENT)
Dept: HEALTH INFORMATION MANAGEMENT | Facility: CLINIC | Age: 80
End: 2025-02-12

## 2025-02-12 ENCOUNTER — HOSPITAL ENCOUNTER (OUTPATIENT)
Dept: CARDIAC REHAB | Facility: CLINIC | Age: 80
Discharge: HOME | End: 2025-02-12
Attending: INTERNAL MEDICINE
Payer: COMMERCIAL

## 2025-02-12 PROCEDURE — 93798 PHYS/QHP OP CAR RHAB W/ECG: CPT

## 2025-02-14 ENCOUNTER — HOSPITAL ENCOUNTER (OUTPATIENT)
Dept: CARDIAC REHAB | Facility: CLINIC | Age: 80
Discharge: HOME | End: 2025-02-14
Attending: INTERNAL MEDICINE
Payer: COMMERCIAL

## 2025-02-14 PROCEDURE — 93798 PHYS/QHP OP CAR RHAB W/ECG: CPT

## 2025-02-18 ENCOUNTER — TELEPHONE (OUTPATIENT)
Dept: FAMILY MEDICINE | Facility: CLINIC | Age: 80
End: 2025-02-18
Payer: COMMERCIAL

## 2025-02-18 ENCOUNTER — OFFICE VISIT (OUTPATIENT)
Dept: PHARMACY | Facility: CLINIC | Age: 80
End: 2025-02-18
Attending: INTERNAL MEDICINE
Payer: COMMERCIAL

## 2025-02-18 VITALS — SYSTOLIC BLOOD PRESSURE: 125 MMHG | DIASTOLIC BLOOD PRESSURE: 75 MMHG

## 2025-02-18 DIAGNOSIS — J44.9 CHRONIC OBSTRUCTIVE PULMONARY DISEASE, UNSPECIFIED COPD TYPE (H): Primary | ICD-10-CM

## 2025-02-18 DIAGNOSIS — R52 PAIN: ICD-10-CM

## 2025-02-18 DIAGNOSIS — I10 ESSENTIAL HYPERTENSION: ICD-10-CM

## 2025-02-18 DIAGNOSIS — K21.00 GASTROESOPHAGEAL REFLUX DISEASE WITH ESOPHAGITIS WITHOUT HEMORRHAGE: ICD-10-CM

## 2025-02-18 DIAGNOSIS — I25.10 CORONARY ARTERY DISEASE INVOLVING NATIVE CORONARY ARTERY OF NATIVE HEART WITHOUT ANGINA PECTORIS: ICD-10-CM

## 2025-02-18 DIAGNOSIS — I25.10 CORONARY ARTERY DISEASE INVOLVING NATIVE CORONARY ARTERY OF NATIVE HEART WITHOUT ANGINA PECTORIS: Primary | ICD-10-CM

## 2025-02-18 DIAGNOSIS — E11.9 TYPE 2 DIABETES MELLITUS WITHOUT COMPLICATION, WITHOUT LONG-TERM CURRENT USE OF INSULIN (H): ICD-10-CM

## 2025-02-18 DIAGNOSIS — J45.50 SEVERE PERSISTENT ASTHMA WITHOUT COMPLICATION (H): ICD-10-CM

## 2025-02-18 DIAGNOSIS — E78.5 HYPERLIPIDEMIA LDL GOAL <70: ICD-10-CM

## 2025-02-18 PROCEDURE — 99605 MTMS BY PHARM NP 15 MIN: CPT | Performed by: PHARMACIST

## 2025-02-18 PROCEDURE — 99607 MTMS BY PHARM ADDL 15 MIN: CPT | Performed by: PHARMACIST

## 2025-02-18 NOTE — TELEPHONE ENCOUNTER
Raul's Nitrostat is  - he would like a new prescription. I pended an order for you to sign.     Thanks!  Claudine Chavez, PharmD  Medication Therapy Management

## 2025-02-18 NOTE — PATIENT INSTRUCTIONS
"Recommendations from today's MTM visit:                                                    MTM (medication therapy management) is a service provided by a clinical pharmacist designed to help you get the most of out of your medicines.   Today we reviewed what your medicines are for, how to know if they are working, that your medicines are safe and how to make your medicine regimen as easy as possible.      Raul,    It was a pleasure talking with you! We discussed the followin. Rinse your mouth out after using Symbicort inhaler.     2. I will reach out to Stefan Cottrell to see if you qualify for patient assistance for your medications.     3. I would like to switch you to a different medication for your breathing that may be better than the Symbicort - will see if you qualify for patient assistance.     4. Make sure you are taking losartan-hydrochlorothiazide in the morning.     5. I will reach out to Dr. Hennessy about getting Nitrostat refilled.     Follow-up:  at 1:30 PM    It was great speaking with you today.  I value your experience and would be very thankful for your time in providing feedback in our clinic survey. In the next few days, you may receive an email or text message from [employer] with a link to a survey related to your  clinical pharmacist.\"     To schedule another MTM appointment, please call the clinic directly or you may call the MTM scheduling line at [phone] or toll-free at [phone].     My Clinical Pharmacist's contact information:                                                      Please feel free to contact me with any questions or concerns you have.      Keep up the good work!!    Claudine Chavez, PharmD  [phone] in clinic on Tuesday and Wednesday        "

## 2025-02-18 NOTE — PROGRESS NOTES
Medication Therapy Management (MTM) Encounter    ASSESSMENT:                            Medication Adherence/Access: No issues identified.    Asthma/COPD would like to add LAMA to see if breathing improves. Will reach out to patient assistance to see if patient qualifies for Trelegy Ellipta.    Hyperlipidemia stable    Hypertension/CAD stable - will reach out to patient assistance to see if Brilinta has a patient assistance program. Reached out to patient's PCP for Nitrostat prescription. Recommend patient check to make sure that he is taking losartan-hydrochlorothiazide in the morning.     Type 2 Diabetes would benefit from addition of GLP-1 - recommended per PCP 2/4/2025 - will see if patient qualifies for assistance.     GERD stable    Pain stable    PLAN:                            1. Rinse your mouth out after using Symbicort inhaler.     2. I will reach out to Stefan Cottrell to see if you qualify for patient assistance for your medications.     3. I would like to switch you to a different medication for your breathing that may be better than the Symbicort - will see if you qualify for patient assistance.     4. Make sure you are taking losartan-hydrochlorothiazide in the morning.     5. I will reached out to Dr. Hennessy about getting Nitrostat refilled.     Follow-up: Wednesday, April 16th at 1:30 PM    SUBJECTIVE/OBJECTIVE:                          Raul Wilhelm is a 79 year old male seen for a transitions of care visit. He was discharged from [facility] on 1/19/2025 for Influenza A/pneumonia.      Reason for visit: transitions of care.    Allergies/ADRs: Reviewed in chart  Past Medical History: Reviewed in chart  Tobacco: He reports that he has never smoked. He has never used smokeless tobacco.  Alcohol: not currently using    Medication Adherence/Access: no issues reported. Patient has problems swallowing things at times.     Asthma/COPD   Symbicort 80-4.5 mcg/actuation 2 puffs  twice daily  Montelukast 10 mg at bedtime  Albuterol as needed - vary rarely  Albuterol nebulizer as needed      States breathing has been bad since the middle of summer - since July. Using albuterol nebulizer about once every 2 days - feels working well.     Patient does not rinse their mouth after using steroid inhaler.   Patient reports no current medication side effects.      Patient reports the following symptoms: none.    Hyperlipidemia   Atorvastatin 80 mg daily    Patient reports no current medication side effects.    Recent Labs   Lab Test 01/04/25  1709 11/13/24  0846   CHOL 148 124   HDL 64 41   LDL 72 59   TRIG 60 122     Hypertension/CAD  Aspirin 81 mg daily  Brilinta 90 mg twice daily   Carvedilol 6.25 mg twice daily  Losartan-hydrochlorothiazide 50-12.5 mg daily    Tolerating well. Brilinta is expensive for him - $300 per month. Patient needs a prescription for Nitrostat. He is not sure that he is taking losartan-hydrochlorothiazide in the morning.     BP Readings from Last 3 Encounters:   02/18/25 125/75   02/04/25 116/60   01/19/25 (!) 154/78     Type 2 Diabetes   Lantus 20 units daily    Not able to afford Ozempic $500 per month.     Last morning blood sugar reading 157 - occasionally checking blood sugars.     Patient reports no current medication side effects.    Eye exam: up to date  Foot exam: up to date    Hemoglobin A1C   Date Value Ref Range Status   02/19/2025 7.1 (H) 0.0 - 5.6 % Final     Comment:     Normal <5.7%   Prediabetes 5.7-6.4%    Diabetes 6.5% or higher     Note: Adopted from ADA consensus guidelines.   04/09/2021 8.5 (H) 0 - 5.6 % Final     Comment:     Normal <5.7% Prediabetes 5.7-6.4%  Diabetes 6.5% or higher - adopted from ADA   consensus guidelines.       GERD  Omeprazole 20 mg    Tolerating well. No heartburn.     Pain  Acetaminophen 325 mg at bedtime    Using Tylenol for pain now and then - knee pain, helpful. Taking every night at bedtime.       Today's Vitals: /75    ----------------  Post Discharge Medication Reconciliation Status: discharge medications reconciled, continue medications without change.    I spent 45 minutes with this patient today. All changes were made via collaborative practice agreement with Sarita Hennessy DO.     A summary of these recommendations was given to the patient.    Claudine Chavez, PharmD  Medication Therapy Management      Medication Therapy Recommendations  COPD (chronic obstructive pulmonary disease) (H)   1 Rationale: Cannot afford medication product - Cost - Adherence   Recommendation: Referral to Service    Status: Patient Agreed - Adherence/Education   Identified Date: 2/18/2025 Completed Date: 2/18/2025

## 2025-02-18 NOTE — LETTER
February 19, 2025  Raul MCKENZIE Choco  [address]  [place] [address]    Dear GEOVANNA Landers [facility]     Thank you for talking with me on Feb 18, 2025 about your health and medications. As a follow-up to our conversation, I have included two documents:      Your Recommended To-Do List has steps you should take to get the best results from your medications.  Your Medication List will help you keep track of your medications and how to take them.    If you want to talk about these documents, please call Claudine Chavez RPH at phone: [phone], Monday-Friday 8-4:30pm.    I look forward to working with you and your doctors to make sure your medications work well for you.    Sincerely,  Claudine Chavez RPH  [facility] Pharmacist, [facility]

## 2025-02-18 NOTE — LETTER
_  Medication List        Prepared on: Feb 18, 2025     Bring your Medication List when you go to the doctor, hospital, or   emergency room. And, share it with your family or caregivers.     Note any changes to how you take your medications.  Cross out medications when you no longer use them.    Medication How I take it Why I use it Prescriber   acetaminophen (TYLENOL) 325 MG tablet Take 2 tablets (650 mg) by mouth every 8 hours. Closed fracture of multiple ribs of right side, initial encounter Issac Godlen MD   albuterol (PROAIR HFA/PROVENTIL HFA/VENTOLIN HFA) 108 (90 Base) MCG/ACT inhaler Inhale 1-2 puffs into the lungs every 4 hours as needed for shortness of breath or wheezing. Severe persistent asthma without complication (H) Sarita Hennessy DO   albuterol (PROVENTIL) (2.5 MG/3ML) 0.083% neb solution TAKE 1 VIAL (2.5 MG) BY NEBULIZATION EVERY 6 HOURS AS NEEDED FOR SHORTNESS OF BREATH OR WHEEZING Severe persistent asthma without complication (H) Sarita Hennessy DO   aspirin (ASA) 81 MG EC tablet Take 81 mg by mouth at bedtime. Status post total right knee replacement Barbara Bermudez PA-C   atorvastatin (LIPITOR) 80 MG tablet Take 1 tablet (80 mg) by mouth at bedtime. ACS (Acute Coronary Syndrome) (H) Jordyn Iyer NP   budesonide-formoterol (SYMBICORT) 80-4.5 MCG/ACT Inhaler Inhale 2 puffs into the lungs 2 times daily. Severe persistent asthma without complication (H); Chronic obstructive pulmonary disease, unspecified COPD type (H) Sarita Hennessy DO   carvedilol (COREG) 6.25 MG tablet Take 1 tablet (6.25 mg) by mouth 2 times daily. Essential Hypertension Sarita Hennessy DO   insulin glargine (LANTUS PEN) 100 UNIT/ML pen Inject 20 Units subcutaneously at bedtime. Type 2 diabetes mellitus without complication, without long-term current use of insulin (H) Richard Sawant MD   losartan-hydrochlorothiazide (HYZAAR) 50-12.5 MG tablet Take 1 tablet by mouth daily.  Essential Hypertension; Coronary artery disease involving native coronary artery of native heart without angina pectoris Sarita Hennessy DO   montelukast (SINGULAIR) 10 MG tablet TAKE ONE TABLET BY MOUTH AT BEDTIME Severe persistent asthma without complication (H) Sarita Hennessy DO   nitroGLYcerin (NITROSTAT) 0.4 MG sublingual tablet For chest pain place 1 tablet under the tongue every 5 minutes for 3 doses. If symptoms persist 5 minutes after 1st dose call 911. Coronary artery disease involving native coronary artery of native heart without angina pectoris Sarita Hennessy DO   omeprazole (PRILOSEC) 20 MG DR capsule Take 1 capsule (20 mg) by mouth daily. Gastroesophageal Reflux Disease without Esophagitis Sarita Hennessy DO   semaglutide (OZEMPIC) 2 MG/3ML pen Inject 0.25 mg subcutaneously every 7 days for 28 days, THEN 0.5 mg every 7 days for 28 days. Type 2 diabetes mellitus without complication, without long-term current use of insulin (H) Sarita Hennessy DO   ticagrelor (BRILINTA) 90 MG tablet Take 1 tablet (90 mg) by mouth 2 times daily. Dose to start tomorrow morning. ST elevation myocardial infarction involving left circumflex coronary artery (H) Jorge Cano,    triamcinolone (KENALOG) 0.1 % external cream APPLY TOPICALLY 2 TIMES DAILY TO LEFT ANKLE Flexural Eczema Sarita Hennessy DO         Add new medications, over-the-counter drugs, herbals, vitamins, or  minerals in the blank rows below.    Medication How I take it Why I use it Prescriber                                      Allergies:      - Simvastatin - Swelling  - Dust Mites        Side effects I have had:      Not on File        Other Information:              My notes and questions:

## 2025-02-18 NOTE — LETTER
"Recommended To-Do List      Prepared on: Feb 18, 2025       You can get the best results from your medications by completing the items on this \"To-Do List.\"      Bring your To-Do List when you go to your doctor. And, share it with your family or caregivers.    My To-Do List:  What we talked about: What I should do:   The cost of your medication(s)    I will reach out to patient assistance - they will contact you to see if you qualify for patient assistance for your medications.           What we talked about: What I should do:                     "

## 2025-02-18 NOTE — LETTER
February 19, 2025  Raul MCKENZIE Choco  [address]  [place] [address]    Dear GEOVANNA Landers [facility]     Thank you for talking with me on Feb 18, 2025 about your health and medications. As a follow-up to our conversation, I have included two documents:      Your Recommended To-Do List has steps you should take to get the best results from your medications.  Your Medication List will help you keep track of your medications and how to take them.    If you want to talk about these documents, please call Claudine Chavez RPH at phone: [phone], Monday-Friday 8-4:30pm.    I look forward to working with you and your doctors to make sure your medications work well for you.    Sincerely,  Claudine Chavez RPH  [facility] Pharmacist, [facility]

## 2025-02-19 ENCOUNTER — HOSPITAL ENCOUNTER (OUTPATIENT)
Dept: CARDIAC REHAB | Facility: CLINIC | Age: 80
Discharge: HOME | End: 2025-02-19
Attending: INTERNAL MEDICINE
Payer: COMMERCIAL

## 2025-02-19 ENCOUNTER — LAB (OUTPATIENT)
Dept: LAB | Facility: CLINIC | Age: 80
End: 2025-02-19
Payer: COMMERCIAL

## 2025-02-19 DIAGNOSIS — E11.9 TYPE 2 DIABETES MELLITUS WITHOUT COMPLICATION, WITHOUT LONG-TERM CURRENT USE OF INSULIN (H): ICD-10-CM

## 2025-02-19 DIAGNOSIS — E11.9 TYPE 2 DIABETES MELLITUS WITHOUT COMPLICATION, WITHOUT LONG-TERM CURRENT USE OF INSULIN (H): Primary | ICD-10-CM

## 2025-02-19 DIAGNOSIS — I21.21 ST ELEVATION MYOCARDIAL INFARCTION INVOLVING LEFT CIRCUMFLEX CORONARY ARTERY (H): ICD-10-CM

## 2025-02-19 LAB
CHOLEST SERPL-MCNC: 111 MG/DL
EST. AVERAGE GLUCOSE BLD GHB EST-MCNC: 157 MG/DL
HBA1C MFR BLD: 7.1 % (ref 0–5.6)
HDLC SERPL-MCNC: 38 MG/DL
LDLC SERPL CALC-MCNC: 50 MG/DL
NONHDLC SERPL-MCNC: 73 MG/DL
TRIGL SERPL-MCNC: 116 MG/DL

## 2025-02-19 PROCEDURE — 93798 PHYS/QHP OP CAR RHAB W/ECG: CPT

## 2025-02-19 PROCEDURE — 83036 HEMOGLOBIN GLYCOSYLATED A1C: CPT

## 2025-02-19 RX ORDER — NITROGLYCERIN 0.4 MG/1
TABLET SUBLINGUAL
Qty: 25 TABLET | Refills: 3 | Status: SHIPPED | OUTPATIENT
Start: 2025-02-19

## 2025-02-19 NOTE — LETTER
February 19, 2025      Raul Wilhelm  [address]  [place] [address]        Dear ,    We are writing to inform you of your test results.    Your A1c shows your diabetes is almost at goal control.   Continue your medications/treatment as prescribed.   Continue carb controlled diet and stay as active as you are able to.     Repeat A1c in 3 months for closer surveillance for now until stable.     Resulted Orders   Hemoglobin A1c   Result Value Ref Range    Estimated Average Glucose 157 (H) <117 mg/dL    Hemoglobin A1C 7.1 (H) 0.0 - 5.6 %      Comment:      Normal <5.7%   Prediabetes 5.7-6.4%    Diabetes 6.5% or higher     Note: Adopted from ADA consensus guidelines.       If you have any questions or concerns, please call the clinic at the number listed above.       Sincerely,      Richard Sawant MD    Electronically signed

## 2025-02-19 NOTE — RESULT ENCOUNTER NOTE
Please send letter with result.    Your A1c shows your diabetes is almost at goal control.  Continue your medications/treatment as prescribed.  Continue carb controlled diet and stay as active as you are able to.    Repeat A1c in 3 months for closer surveillance for now until stable.

## 2025-02-21 ENCOUNTER — HOSPITAL ENCOUNTER (OUTPATIENT)
Dept: CARDIAC REHAB | Facility: CLINIC | Age: 80
Discharge: HOME | End: 2025-02-21
Attending: INTERNAL MEDICINE
Payer: COMMERCIAL

## 2025-02-21 PROCEDURE — 93798 PHYS/QHP OP CAR RHAB W/ECG: CPT

## 2025-02-24 ENCOUNTER — HOSPITAL ENCOUNTER (OUTPATIENT)
Dept: CARDIAC REHAB | Facility: CLINIC | Age: 80
Discharge: HOME | End: 2025-02-24
Attending: INTERNAL MEDICINE
Payer: COMMERCIAL

## 2025-02-24 PROCEDURE — 93798 PHYS/QHP OP CAR RHAB W/ECG: CPT

## 2025-02-26 ENCOUNTER — HOSPITAL ENCOUNTER (OUTPATIENT)
Dept: CARDIAC REHAB | Facility: CLINIC | Age: 80
Discharge: HOME | End: 2025-02-26
Attending: INTERNAL MEDICINE
Payer: COMMERCIAL

## 2025-02-26 PROCEDURE — 93797 PHYS/QHP OP CAR RHAB WO ECG: CPT

## 2025-02-26 PROCEDURE — 93798 PHYS/QHP OP CAR RHAB W/ECG: CPT

## 2025-02-28 ENCOUNTER — HOSPITAL ENCOUNTER (OUTPATIENT)
Dept: CARDIAC REHAB | Facility: CLINIC | Age: 80
Discharge: HOME | End: 2025-02-28
Attending: INTERNAL MEDICINE
Payer: COMMERCIAL

## 2025-02-28 PROCEDURE — 93798 PHYS/QHP OP CAR RHAB W/ECG: CPT | Performed by: REHABILITATION PRACTITIONER

## 2025-03-03 ENCOUNTER — HOSPITAL ENCOUNTER (OUTPATIENT)
Dept: CARDIAC REHAB | Facility: CLINIC | Age: 80
Discharge: HOME | End: 2025-03-03
Attending: INTERNAL MEDICINE
Payer: COMMERCIAL

## 2025-03-03 PROCEDURE — 93798 PHYS/QHP OP CAR RHAB W/ECG: CPT

## 2025-03-05 ENCOUNTER — HOSPITAL ENCOUNTER (OUTPATIENT)
Dept: CARDIAC REHAB | Facility: CLINIC | Age: 80
Discharge: HOME | End: 2025-03-05
Attending: INTERNAL MEDICINE
Payer: COMMERCIAL

## 2025-03-05 PROCEDURE — 93798 PHYS/QHP OP CAR RHAB W/ECG: CPT

## 2025-03-07 ENCOUNTER — HOSPITAL ENCOUNTER (OUTPATIENT)
Dept: CARDIAC REHAB | Facility: CLINIC | Age: 80
Discharge: HOME | End: 2025-03-07
Attending: INTERNAL MEDICINE
Payer: COMMERCIAL

## 2025-03-07 PROCEDURE — 93798 PHYS/QHP OP CAR RHAB W/ECG: CPT

## 2025-03-09 ENCOUNTER — OFFICE VISIT (OUTPATIENT)
Dept: URGENT CARE | Facility: URGENT CARE | Age: 80
End: 2025-03-09
Payer: COMMERCIAL

## 2025-03-09 VITALS
WEIGHT: 153 LBS | RESPIRATION RATE: 24 BRPM | HEART RATE: 71 BPM | BODY MASS INDEX: 23.26 KG/M2 | SYSTOLIC BLOOD PRESSURE: 146 MMHG | OXYGEN SATURATION: 99 % | DIASTOLIC BLOOD PRESSURE: 81 MMHG | TEMPERATURE: 97.5 F

## 2025-03-09 DIAGNOSIS — L02.31 ABSCESS OF BUTTOCK: Primary | ICD-10-CM

## 2025-03-09 PROCEDURE — 99213 OFFICE O/P EST LOW 20 MIN: CPT | Mod: 25 | Performed by: NURSE PRACTITIONER

## 2025-03-09 PROCEDURE — 87205 SMEAR GRAM STAIN: CPT | Performed by: NURSE PRACTITIONER

## 2025-03-09 PROCEDURE — 10060 I&D ABSCESS SIMPLE/SINGLE: CPT | Performed by: NURSE PRACTITIONER

## 2025-03-09 PROCEDURE — 87186 SC STD MICRODIL/AGAR DIL: CPT | Performed by: NURSE PRACTITIONER

## 2025-03-09 PROCEDURE — 87070 CULTURE OTHR SPECIMN AEROBIC: CPT | Performed by: NURSE PRACTITIONER

## 2025-03-09 PROCEDURE — 3079F DIAST BP 80-89 MM HG: CPT | Performed by: NURSE PRACTITIONER

## 2025-03-09 PROCEDURE — 87077 CULTURE AEROBIC IDENTIFY: CPT | Performed by: NURSE PRACTITIONER

## 2025-03-09 PROCEDURE — 3077F SYST BP >= 140 MM HG: CPT | Performed by: NURSE PRACTITIONER

## 2025-03-09 RX ORDER — DOXYCYCLINE HYCLATE 100 MG
100 TABLET ORAL 2 TIMES DAILY
Qty: 14 TABLET | Refills: 0 | Status: SHIPPED | OUTPATIENT
Start: 2025-03-09 | End: 2025-03-13

## 2025-03-09 NOTE — PROGRESS NOTES
SUBJECTIVE:   Raul Wilhelm is a 79 year old male presenting with a chief complaint of   Chief Complaint   Patient presents with    Cyst     Pt noticed a bump on butt 3-4 days ago, unable to sleep and sit - not sure if he has an infection. Pt had a heart attack 2 months ago is concerned.    Pt is concerned for infection as he has been hospitalized since his heart attack; he doesn't want to end back up in the hospital.       Past Medical History:   Diagnosis Date    Chronic airway obstruction, not elsewhere classified     Other and unspecified hyperlipidemia      Family History   Problem Relation Age of Onset    Diabetes Mother     Heart Disease Mother     C.A.D. Mother     Cancer Father     Hypertension Father     Cerebrovascular Disease Paternal Grandfather     Asthma Sister     Breast Cancer No family hx of     Cancer - colorectal No family hx of     Prostate Cancer No family hx of      Current Outpatient Medications   Medication Sig Dispense Refill    acetaminophen (TYLENOL) 325 MG tablet Take 2 tablets (650 mg) by mouth every 8 hours.      albuterol (PROVENTIL) (2.5 MG/3ML) 0.083% neb solution TAKE 1 VIAL (2.5 MG) BY NEBULIZATION EVERY 6 HOURS AS NEEDED FOR SHORTNESS OF BREATH OR WHEEZING 90 mL 11    Alcohol Swabs (B-D SINGLE USE SWABS REGULAR) PADS USE TO SWAB AREA OF INJECTION / JENISE AS DIRECTED 100 each 3    aspirin (ASA) 81 MG EC tablet Take 81 mg by mouth at bedtime.      atorvastatin (LIPITOR) 80 MG tablet Take 1 tablet (80 mg) by mouth at bedtime. 90 tablet 3    blood glucose monitoring (NO BRAND SPECIFIED) meter device kit Use to test blood sugar 1 times daily or as directed.  Blood Glucose Monitor Brands: per insurance. 1 kit 0    budesonide-formoterol (SYMBICORT) 80-4.5 MCG/ACT Inhaler Inhale 2 puffs into the lungs 2 times daily. 10.2 g 11    carvedilol (COREG) 6.25 MG tablet Take 1 tablet (6.25 mg) by mouth 2 times daily. 180 tablet 3    insulin glargine (LANTUS PEN) 100 UNIT/ML pen Inject 20  Units subcutaneously at bedtime. 15 mL 3    insulin pen needle (BD LIZBETH U/F) 32G X 4 MM miscellaneous USE 1 PEN NEEDLE DAILY 100 each 1    Lancets (ONETOUCH DELICA PLUS [id number]) MISC USE AS DIRECTED WITH LANCING DEVICE TO TEST ONCE DAILY 100 each 6    losartan-hydrochlorothiazide (HYZAAR) 50-12.5 MG tablet Take 1 tablet by mouth daily. 90 tablet 3    montelukast (SINGULAIR) 10 MG tablet TAKE ONE TABLET BY MOUTH AT BEDTIME 90 tablet 3    nitroGLYcerin (NITROSTAT) 0.4 MG sublingual tablet For chest pain place 1 tablet under the tongue every 5 minutes for 3 doses. If symptoms persist 5 minutes after 1st dose call 911. 25 tablet 3    omeprazole (PRILOSEC) 20 MG DR capsule Take 1 capsule (20 mg) by mouth daily. 90 capsule 3    ONETOUCH ULTRA test strip USE TO TEST BLOOD SUGAR 1 TIME DAILY OR AS DIRECTED 100 strip 6    order for DME Equipment being ordered: arm blood pressure cuff 1 Device 0    ticagrelor (BRILINTA) 90 MG tablet Take 1 tablet (90 mg) by mouth 2 times daily. Dose to start tomorrow morning. 180 tablet 3    triamcinolone (KENALOG) 0.1 % external cream APPLY TOPICALLY 2 TIMES DAILY TO LEFT ANKLE 15 g 1    albuterol (PROAIR HFA/PROVENTIL HFA/VENTOLIN HFA) 108 (90 Base) MCG/ACT inhaler Inhale 1-2 puffs into the lungs every 4 hours as needed for shortness of breath or wheezing. (Patient not taking: Reported on 2/18/2025) 8.5 g 10    semaglutide (OZEMPIC) 2 MG/3ML pen Inject 0.25 mg subcutaneously every 7 days for 28 days, THEN 0.5 mg every 7 days for 28 days. (Patient not taking: Reported on 3/9/2025) 3 mL 0    Semaglutide, 1 MG/DOSE, (OZEMPIC) 4 MG/3ML pen Inject 1 mg subcutaneously every 7 days. (Patient not taking: Reported on 3/9/2025) 9 mL 1     Social History     Tobacco Use    Smoking status: Never    Smokeless tobacco: Never   Substance Use Topics    Alcohol use: Yes     Comment: Rare       OBJECTIVE  BP (!) 146/81 (BP Location: Right arm, Patient Position: Sitting, Cuff Size: Adult Regular)   Pulse  71   Temp 97.5  F (36.4  C) (Tympanic)   Resp 24   Wt 69.4 kg (153 lb)   SpO2 99%   BMI 23.26 kg/m      Physical Exam  Constitutional:       Appearance: Normal appearance.   HENT:      Head: Normocephalic.   Cardiovascular:      Rate and Rhythm: Normal rate and regular rhythm.      Heart sounds: Normal heart sounds.   Pulmonary:      Effort: Pulmonary effort is normal.      Breath sounds: Normal breath sounds.   Musculoskeletal:        Legs:       Comments: Approx 4x5 raised red area to left buttock with central white area.   Skin:     General: Skin is warm and dry.   Neurological:      Mental Status: He is alert.   Psychiatric:         Mood and Affect: Mood normal.       Effected area cleaned with betadine x 3 then wiped away with alcoholol.  1 pecent lidocaine with epineprhine used to infiltrate the area with good anethesia.  Number 11 scapel used to make a stab incion.  Purlent drainage was removed . No gauze was needed as area was small. Appropraite wound care dressing applied.  Pt tolerated preocedure well.  Swab collected of drainage for culture.    Wound culture: pending      ASSESSMENT:  1. Abscess of buttock (Primary)    - doxycycline hyclate (VIBRA-TABS) 100 MG tablet; Take 1 tablet (100 mg) by mouth 2 times daily for 7 days.  Dispense: 14 tablet; Refill: 0  - Abscess Aerobic Bacterial Culture Routine With Gram Stain  - DRAIN SKIN ABSCESS SIMPLE/SINGLE      PLAN:  -Complete antibiotics as ordered. Take with food to help prevent stomach upset.  -Keep the infected area clean; warm water soak with mild soap for 10-15 minutes 3 times a day. Pat dry.  -Tylenol as needed for discomfort  -If develop a fever, increased redness, pain, drainage or swelling from the area, should contact primary care clinic/doctor.  -Symptoms should be improving within 48 hours and resolved in next 7-10 days.   Be seen again for recheck with your primary in one week.

## 2025-03-09 NOTE — PATIENT INSTRUCTIONS
-Complete antibiotics as ordered. Take with food to help prevent stomach upset.  -Keep the infected area clean; warm water soak (or warm wash cloth) with mild soap for 10-15 minutes 3 times a day. Pat dry.  -Tylenol as needed for discomfort  -If develop a fever, increased redness, pain, drainage or swelling from the area, should contact primary care clinic/doctor.  -Symptoms should be improving within 48 hours and resolved in next 7-10 days.   Be seen again for recheck with your primary in one week.      We will call you if we need to change the antibiotic.

## 2025-03-10 ENCOUNTER — HOSPITAL ENCOUNTER (OUTPATIENT)
Dept: CARDIAC REHAB | Facility: CLINIC | Age: 80
Discharge: HOME | End: 2025-03-10
Attending: INTERNAL MEDICINE
Payer: COMMERCIAL

## 2025-03-10 PROCEDURE — 93798 PHYS/QHP OP CAR RHAB W/ECG: CPT

## 2025-03-11 LAB
BACTERIA ABSC ANAEROBE+AEROBE CULT: ABNORMAL
BACTERIA ABSC ANAEROBE+AEROBE CULT: ABNORMAL
GRAM STAIN RESULT: ABNORMAL
GRAM STAIN RESULT: ABNORMAL

## 2025-03-12 ENCOUNTER — HOSPITAL ENCOUNTER (OUTPATIENT)
Dept: CARDIAC REHAB | Facility: CLINIC | Age: 80
Discharge: HOME | End: 2025-03-12
Attending: INTERNAL MEDICINE
Payer: COMMERCIAL

## 2025-03-12 PROCEDURE — 93798 PHYS/QHP OP CAR RHAB W/ECG: CPT

## 2025-03-13 ENCOUNTER — TELEPHONE (OUTPATIENT)
Dept: FAMILY MEDICINE | Facility: CLINIC | Age: 80
End: 2025-03-13

## 2025-03-13 ENCOUNTER — OFFICE VISIT (OUTPATIENT)
Dept: FAMILY MEDICINE | Facility: CLINIC | Age: 80
End: 2025-03-13
Payer: COMMERCIAL

## 2025-03-13 VITALS
HEIGHT: 68 IN | OXYGEN SATURATION: 90 % | TEMPERATURE: 96.1 F | SYSTOLIC BLOOD PRESSURE: 132 MMHG | WEIGHT: 153.8 LBS | RESPIRATION RATE: 16 BRPM | BODY MASS INDEX: 23.31 KG/M2 | DIASTOLIC BLOOD PRESSURE: 72 MMHG | HEART RATE: 65 BPM

## 2025-03-13 DIAGNOSIS — J44.9 CHRONIC OBSTRUCTIVE PULMONARY DISEASE, UNSPECIFIED COPD TYPE (H): ICD-10-CM

## 2025-03-13 DIAGNOSIS — L02.31 ABSCESS OF BUTTOCK, RIGHT: Primary | ICD-10-CM

## 2025-03-13 RX ORDER — SULFAMETHOXAZOLE AND TRIMETHOPRIM 800; 160 MG/1; MG/1
1 TABLET ORAL 2 TIMES DAILY
Qty: 20 TABLET | Refills: 0 | Status: SHIPPED | OUTPATIENT
Start: 2025-03-13 | End: 2025-03-23

## 2025-03-13 RX ORDER — PREDNISONE 20 MG/1
20 TABLET ORAL 2 TIMES DAILY
Qty: 14 TABLET | Refills: 0 | Status: SHIPPED | OUTPATIENT
Start: 2025-03-13 | End: 2025-03-20

## 2025-03-13 NOTE — PROGRESS NOTES
"  Assessment & Plan     Abscess of buttock, right  Incision and drainage was performed.  The area was cleaned with iodine and numbed with 1% lidocaine.  Scapel was used to make a 1cm incision over the area and a large amount of purulent discharge was expressed.  Wound culture was not taken since patient was already on antibiotic (started Doxycycline in urgent clinic).  The incision was packed with about 2-3 inches of 1/4\" width iodoform packing strip and was bandaged.    The patient will return on Monday with in person visit for follow-up for re-evaluation.   - start sulfamethoxazole-trimethoprim (BACTRIM DS) 800-160 MG tablet; Take 1 tablet by mouth 2 times daily for 10 days.    Chronic obstructive pulmonary disease, unspecified COPD type (H)  -he is having some wheezing, worsened shortness of breath and would like to try Prednisone, he states he took it before and it helped significantly    - predniSONE (DELTASONE) 20 MG tablet; Take 1 tablet (20 mg) by mouth 2 times daily for 7 days.      Jonathon Carter is a 79 year old, presenting for the following health issues:  RECHECK (Seen in urgent care)        3/13/2025     8:54 AM   Additional Questions   Roomed by tonio   Accompanied by self         3/13/2025     8:54 AM   Patient Reported Additional Medications   Patient reports taking the following new medications Doxy     HPI          Patient calling regarding abscess on buttock  States he was in UC Sunday and had it lanced  Initially it did not drain, last night it did drain serosanguinous fluid  Small amounts of drainage at times  Denies fever, increasing pain or foul odor  Patient is on doxycycline        ED/UC Followup:    Facility:  [facility] [facility]   Date of visit: 3/9/25   Reason for visit: Abscess of buttock   Current Status: started to drain last night some       Review of Systems  Constitutional, HEENT, cardiovascular, pulmonary, gi and gu systems are negative, except as otherwise noted.      Objective " "   /72   Pulse 65   Temp (!) 96.1  F (35.6  C) (Tympanic)   Resp 16   Ht 1.727 m (5' 8\")   Wt 69.8 kg (153 lb 12.8 oz)   SpO2 (!) 90%   BMI 23.39 kg/m    Body mass index is 23.39 kg/m .  Physical Exam   GENERAL: alert and no distress  RESP: lungs clear to auscultation - no rales, rhonchi or wheezes  CV: regular rate and rhythm, normal S1 S2, no S3 or S4, no murmur, click or rub, no peripheral edema   SKIN: abscess about 1.5 inches, tender, that was open before, but now filled again with puss on the right side gluteal area              Signed Electronically by: OSCAR Alvarado CNP    "

## 2025-03-13 NOTE — TELEPHONE ENCOUNTER
Patient calling regarding abscess on buttock  States he was in UC Sunday and had it lanced  Initially it did not drain, last night it did drain serosanguinous fluid  Small amounts of drainage at times  Denies fever, increasing pain or foul odor  Patient is on doxycycline  He is concerned because he had a heart attack and was hospitalized and does not want to end up in the hospital again  Scheduled for recheck today per patient request    Sasha Mathew RN on 3/13/2025 at 8:17 AM

## 2025-03-14 ENCOUNTER — HOSPITAL ENCOUNTER (OUTPATIENT)
Dept: CARDIAC REHAB | Facility: CLINIC | Age: 80
Discharge: HOME | End: 2025-03-14
Attending: INTERNAL MEDICINE
Payer: COMMERCIAL

## 2025-03-14 PROCEDURE — 93798 PHYS/QHP OP CAR RHAB W/ECG: CPT

## 2025-03-17 ENCOUNTER — HOSPITAL ENCOUNTER (OUTPATIENT)
Dept: CARDIAC REHAB | Facility: CLINIC | Age: 80
Discharge: HOME | End: 2025-03-17
Attending: INTERNAL MEDICINE
Payer: COMMERCIAL

## 2025-03-17 ENCOUNTER — OFFICE VISIT (OUTPATIENT)
Dept: FAMILY MEDICINE | Facility: CLINIC | Age: 80
End: 2025-03-17
Payer: COMMERCIAL

## 2025-03-17 VITALS
BODY MASS INDEX: 23.19 KG/M2 | RESPIRATION RATE: 16 BRPM | HEIGHT: 68 IN | WEIGHT: 153 LBS | SYSTOLIC BLOOD PRESSURE: 110 MMHG | DIASTOLIC BLOOD PRESSURE: 70 MMHG | TEMPERATURE: 95.8 F | OXYGEN SATURATION: 90 % | HEART RATE: 58 BPM

## 2025-03-17 DIAGNOSIS — L02.31 ABSCESS OF BUTTOCK: Primary | ICD-10-CM

## 2025-03-17 PROCEDURE — 3078F DIAST BP <80 MM HG: CPT | Performed by: NURSE PRACTITIONER

## 2025-03-17 PROCEDURE — 99213 OFFICE O/P EST LOW 20 MIN: CPT | Performed by: NURSE PRACTITIONER

## 2025-03-17 PROCEDURE — 93798 PHYS/QHP OP CAR RHAB W/ECG: CPT

## 2025-03-17 PROCEDURE — 3074F SYST BP LT 130 MM HG: CPT | Performed by: NURSE PRACTITIONER

## 2025-03-17 NOTE — PROGRESS NOTES
"  Assessment & Plan     Abscess of buttock  -significantly improved, no more drainage, erythema went down and there is no more edema  -continue and finish Bactrim as prescribed, he does not have any side effects, he has 5 days lefts of antibiotic     Subjective   Raul is a 79 year old, presenting for the following health issues:  RECHECK (Abscess of right buttock, has improved )        3/17/2025     8:35 AM   Additional Questions   Roomed by tonio   Accompanied by self         3/17/2025     8:35 AM   Patient Reported Additional Medications   Patient reports taking the following new medications none     HPI          Review of Systems  Constitutional, HEENT, cardiovascular, pulmonary, gi and gu systems are negative, except as otherwise noted.      Objective    /70   Pulse 58   Temp (!) 95.8  F (35.4  C) (Tympanic)   Resp 16   Ht 1.727 m (5' 8\")   Wt 69.4 kg (153 lb)   SpO2 (!) 90%   BMI 23.26 kg/m    Body mass index is 23.26 kg/m .  Physical Exam   GENERAL: alert and no distress  SKIN: right buttock abscess significantly improved, no more drainage, erythema went down and there is no more surrounding edema  NEURO: Normal strength and tone, mentation intact and speech normal  PSYCH: mentation appears normal, affect normal/bright          Signed Electronically by: OSCAR Alvarado CNP    "

## 2025-03-19 ENCOUNTER — HOSPITAL ENCOUNTER (OUTPATIENT)
Dept: CARDIAC REHAB | Facility: CLINIC | Age: 80
Discharge: HOME | End: 2025-03-19
Attending: INTERNAL MEDICINE
Payer: COMMERCIAL

## 2025-03-19 PROCEDURE — 93798 PHYS/QHP OP CAR RHAB W/ECG: CPT

## 2025-03-21 ENCOUNTER — HOSPITAL ENCOUNTER (OUTPATIENT)
Dept: CARDIAC REHAB | Facility: CLINIC | Age: 80
Discharge: HOME | End: 2025-03-21
Attending: INTERNAL MEDICINE
Payer: COMMERCIAL

## 2025-03-21 PROCEDURE — 93798 PHYS/QHP OP CAR RHAB W/ECG: CPT

## 2025-03-26 ENCOUNTER — ALLIED HEALTH/NURSE VISIT (OUTPATIENT)
Dept: FAMILY MEDICINE | Facility: CLINIC | Age: 80
End: 2025-03-26
Payer: COMMERCIAL

## 2025-03-26 ENCOUNTER — TELEPHONE (OUTPATIENT)
Dept: FAMILY MEDICINE | Facility: CLINIC | Age: 80
End: 2025-03-26

## 2025-03-26 ENCOUNTER — HOSPITAL ENCOUNTER (OUTPATIENT)
Dept: CARDIAC REHAB | Facility: CLINIC | Age: 80
Discharge: HOME | End: 2025-03-26
Attending: INTERNAL MEDICINE
Payer: COMMERCIAL

## 2025-03-26 DIAGNOSIS — T88.7XXA MEDICATION SIDE EFFECTS: Primary | ICD-10-CM

## 2025-03-26 DIAGNOSIS — T50.905A MEDICATION ADVERSE EFFECT: Primary | ICD-10-CM

## 2025-03-26 PROCEDURE — 93798 PHYS/QHP OP CAR RHAB W/ECG: CPT

## 2025-03-26 PROCEDURE — 99207 PR NO CHARGE NURSE ONLY: CPT

## 2025-03-26 NOTE — PROGRESS NOTES
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  He said he knocked off a scab on his left shin and it bled a lot. He said it stopped bleeding pretty quickly and I looked at it and the Band-Aid is dry and intact.  He had his teeth deep cleaned today and his gums did not bleed.  I promised him I would tell Dr Hennessy this.

## 2025-03-27 NOTE — TELEPHONE ENCOUNTER
Yes, he is good to have more bleeding while on the Brilinta.  However, it is very important that he continue this.  If he were to stop it, he would have a very high risk of clotting off the stents that were placed in January which would lead to a heart attack.  I strongly recommend he continue. please give typical RN advice about holding pressure if bleeding, typical nosebleed treatment if it were to occur, etc.

## 2025-03-28 ENCOUNTER — HOSPITAL ENCOUNTER (OUTPATIENT)
Dept: CARDIAC REHAB | Facility: CLINIC | Age: 80
Discharge: HOME | End: 2025-03-28
Attending: INTERNAL MEDICINE
Payer: COMMERCIAL

## 2025-03-28 PROCEDURE — 93798 PHYS/QHP OP CAR RHAB W/ECG: CPT

## 2025-03-28 NOTE — TELEPHONE ENCOUNTER
Routing to [facility] pharmacy who saw patient a few weeks ago and cardiology team - ie: cost of Brillinta

## 2025-03-28 NOTE — TELEPHONE ENCOUNTER
Contacted the patient and he agrees and understands.  The patient states the medication is getting expensive.  He does not know if he will be able to afford it much longer. The patient was advised not to quit taking for the risks mentioned below. The patient understands.  He will contact the doctor if he can not afford it and refuses to take it.  Sending FYI to provider.    Thank you    Jordyn THOMAS RN

## 2025-03-31 ENCOUNTER — TELEPHONE (OUTPATIENT)
Dept: FAMILY MEDICINE | Facility: CLINIC | Age: 80
End: 2025-03-31
Payer: COMMERCIAL

## 2025-03-31 ENCOUNTER — HOSPITAL ENCOUNTER (OUTPATIENT)
Dept: CARDIAC REHAB | Facility: CLINIC | Age: 80
Discharge: HOME | End: 2025-03-31
Attending: INTERNAL MEDICINE
Payer: COMMERCIAL

## 2025-03-31 PROCEDURE — 93798 PHYS/QHP OP CAR RHAB W/ECG: CPT

## 2025-04-01 ENCOUNTER — TELEPHONE (OUTPATIENT)
Dept: PHARMACY | Facility: CLINIC | Age: 80
End: 2025-04-01

## 2025-04-01 ENCOUNTER — OFFICE VISIT (OUTPATIENT)
Dept: FAMILY MEDICINE | Facility: CLINIC | Age: 80
End: 2025-04-01
Payer: COMMERCIAL

## 2025-04-01 VITALS
SYSTOLIC BLOOD PRESSURE: 110 MMHG | TEMPERATURE: 97.1 F | HEIGHT: 69 IN | WEIGHT: 152 LBS | RESPIRATION RATE: 22 BRPM | DIASTOLIC BLOOD PRESSURE: 68 MMHG | HEART RATE: 82 BPM | BODY MASS INDEX: 22.51 KG/M2 | OXYGEN SATURATION: 97 %

## 2025-04-01 DIAGNOSIS — J44.9 CHRONIC OBSTRUCTIVE PULMONARY DISEASE, UNSPECIFIED COPD TYPE (H): Primary | ICD-10-CM

## 2025-04-01 DIAGNOSIS — L02.31 ABSCESS OF BUTTOCK: Primary | ICD-10-CM

## 2025-04-01 DIAGNOSIS — E11.9 TYPE 2 DIABETES MELLITUS WITHOUT COMPLICATION, WITHOUT LONG-TERM CURRENT USE OF INSULIN (H): ICD-10-CM

## 2025-04-01 PROCEDURE — 3078F DIAST BP <80 MM HG: CPT | Performed by: INTERNAL MEDICINE

## 2025-04-01 PROCEDURE — 99207 PR FIRST ORDER ACUTE REFERRAL: CPT | Performed by: INTERNAL MEDICINE

## 2025-04-01 PROCEDURE — 91320 SARSCV2 VAC 30MCG TRS-SUC IM: CPT | Performed by: INTERNAL MEDICINE

## 2025-04-01 PROCEDURE — 1125F AMNT PAIN NOTED PAIN PRSNT: CPT | Performed by: INTERNAL MEDICINE

## 2025-04-01 PROCEDURE — 90480 ADMN SARSCOV2 VAC 1/ONLY CMP: CPT | Performed by: INTERNAL MEDICINE

## 2025-04-01 PROCEDURE — 3074F SYST BP LT 130 MM HG: CPT | Performed by: INTERNAL MEDICINE

## 2025-04-01 RX ORDER — BUDESONIDE, GLYCOPYRROLATE, AND FORMOTEROL FUMARATE 160; 9; 4.8 UG/1; UG/1; UG/1
2 AEROSOL, METERED RESPIRATORY (INHALATION) 2 TIMES DAILY
Status: SHIPPED
Start: 2025-04-01

## 2025-04-01 ASSESSMENT — PAIN SCALES - GENERAL: PAINLEVEL_OUTOF10: MODERATE PAIN (4)

## 2025-04-01 NOTE — TELEPHONE ENCOUNTER
"I put \"No Print Out\" prescriptions for Ozempic and Breztri on patient's medication list for Patient Assistance. I let Patient Assistance know to apply for Breztri instead of Trelegy for patient.       Claudine Chavez PharmD  Medication Therapy Management           Gisela Bradford Carolyn Marie, [facility]  Brilinta is with [employer], no out of pocket required. Typically, this program is much easier to work with.    [place] & [place]:  Bevespi  Breztri  Brilinta  Farxiga    All popular medications we do applications for and have success. I hope this helps.  Gisela      ----- Message -----  From: Claudine Chavez [facility]  Sent: 4/1/2025   8:40 AM CDT  To: Gisela Bradford; Sarita Hennessy, DO    Thank you! What about coverage for Breztri instead of Trelegy?    Claudine  ----- Message -----  From: Gisela Bradford  Sent: 4/1/2025   8:32 AM CDT  To: Claudine Chavez [facility]; [facility]    Hello,    I completed an application for Brilinta and Ozempic for the patient yesterday (there were several patients ahead of him). An epic enc. from Dr. Hennessy stated she did not want him to stop the Brilinta treatment at this time, and I noticed the copay was quite high in [place].    Did you still wish for me to add Trelegy? Please keep in mind there is a $600 out of pocket to meet for all [employer] products. I can easily add it to his packet, regardless.    Gisela Bradford  Prescription   Please send responses to [facility]  ----- Message -----  From: Claudine Chavez [facility]  Sent: 4/1/2025   8:16 AM CDT  To: [facility]    I reached out to you in February about reaching out to Raul about patient assistance - I don't see any updates in Epic. Do you have an update for me as to whether you were able to get a hold of him and if he qualifies for assistance?    Thanks!!  Claudine Chavez PharmD  Medication Therapy Management  "

## 2025-04-01 NOTE — TELEPHONE ENCOUNTER
I reached out to patient assistance when I met with patient in February. No updates yet and no notes in Epic. I reached out to patient assistance for an update today.    Claudine Chavez, PharmD  Medication Therapy Management

## 2025-04-01 NOTE — PROGRESS NOTES
Assessment & Plan     Abscess of buttock  Sent to [facility] for I&D            Patient Instructions   Cost of medications  Pharmacist Claudine Chavez is checking back with patient assistance about cost of Brillinta     Boil on buttocks  Sent to [facility]  No supplemental iodine is needed  Sources of iodine - table salt, fish, seafood, kelp, some drinking water, and vegetables grown in iodine-sufficient soil. Cow's milk is a source of iodine owing to iodine in cattle feed     Jonathon Carter is a 79 year old, presenting for the following health issues:  Cyst        4/1/2025     8:38 AM   Additional Questions   Roomed by CLEMENTE Dejesus CMA     [facility]ospit[facility] in [place]        Concern - Cyst  Onset: 1 week  Description: on right bottox  Intensity: severe  Progression of Symptoms:  worsening  Accompanying Signs & Symptoms: none  Previous history of similar problem: yes  Precipitating factors:        Worsened by: sitting on it  Alleviating factors:        Improved by: nothing  Therapies tried and outcome: None    -- He was seen on 3/9 3/13 and follow-up on 3/17 for abscess of left buttock.  I&D was done on 3/9 and was given doxycycline for 1 week.  On 3/13 at follow-up another I&D was done and packing was performed.  He was also given Bactrim in addition to the doxycycline that was previously prescribed  --At follow-up in 3/17 the abscess had significantly improved  --he reports several days of worsening pain, erythema in the right buttock area    --he reports he is not taking in 'any salt' and wants to be Rx iodine.  Thinks he has a blood infection or low iodine leading to the infections  --he is not checking his blood sugar.  He is taking insulin but not taking ozempic due to cost    Current Outpatient Medications   Medication Sig Dispense Refill    acetaminophen (TYLENOL) 325 MG tablet Take 2 tablets (650 mg) by mouth every 8 hours.      albuterol (PROAIR HFA/PROVENTIL HFA/VENTOLIN HFA) 108 (90 Base) MCG/ACT inhaler Inhale 1-2 puffs into the lungs  every 4 hours as needed for shortness of breath or wheezing. 8.5 g 10    albuterol (PROVENTIL) (2.5 MG/3ML) 0.083% neb solution TAKE 1 VIAL (2.5 MG) BY NEBULIZATION EVERY 6 HOURS AS NEEDED FOR SHORTNESS OF BREATH OR WHEEZING 90 mL 11    Alcohol Swabs (B-D SINGLE USE SWABS REGULAR) PADS USE TO SWAB AREA OF INJECTION / JENISE AS DIRECTED 100 each 3    aspirin (ASA) 81 MG EC tablet Take 81 mg by mouth at bedtime.      atorvastatin (LIPITOR) 80 MG tablet Take 1 tablet (80 mg) by mouth at bedtime. 90 tablet 3    blood glucose monitoring (NO BRAND SPECIFIED) meter device kit Use to test blood sugar 1 times daily or as directed.  Blood Glucose Monitor Brands: per insurance. 1 kit 0    budesonide-formoterol (SYMBICORT) 80-4.5 MCG/ACT Inhaler Inhale 2 puffs into the lungs 2 times daily. 10.2 g 11    carvedilol (COREG) 6.25 MG tablet Take 1 tablet (6.25 mg) by mouth 2 times daily. 180 tablet 3    insulin glargine (LANTUS PEN) 100 UNIT/ML pen Inject 20 Units subcutaneously at bedtime. 15 mL 3    insulin pen needle (BD LIZBETH U/F) 32G X 4 MM miscellaneous USE 1 PEN NEEDLE DAILY 100 each 1    Lancets (ONETOUCH DELICA PLUS [id number]) MISC USE AS DIRECTED WITH LANCING DEVICE TO TEST ONCE DAILY 100 each 6    losartan-hydrochlorothiazide (HYZAAR) 50-12.5 MG tablet Take 1 tablet by mouth daily. 90 tablet 3    montelukast (SINGULAIR) 10 MG tablet TAKE ONE TABLET BY MOUTH AT BEDTIME 90 tablet 3    omeprazole (PRILOSEC) 20 MG DR capsule Take 1 capsule (20 mg) by mouth daily. 90 capsule 3    ONETOUCH ULTRA test strip USE TO TEST BLOOD SUGAR 1 TIME DAILY OR AS DIRECTED 100 strip 6    order for DME Equipment being ordered: arm blood pressure cuff 1 Device 0    ticagrelor (BRILINTA) 90 MG tablet Take 1 tablet (90 mg) by mouth 2 times daily. Dose to start tomorrow morning. 180 tablet 3    triamcinolone (KENALOG) 0.1 % external cream APPLY TOPICALLY 2 TIMES DAILY TO LEFT ANKLE 15 g 1    nitroGLYcerin (NITROSTAT) 0.4 MG sublingual tablet For  "chest pain place 1 tablet under the tongue every 5 minutes for 3 doses. If symptoms persist 5 minutes after 1st dose call 911. (Patient not taking: Reported on 3/13/2025) 25 tablet 3           Review of Systems  Constitutional, HEENT, cardiovascular, pulmonary, gi and gu systems are negative, except as otherwise noted.      Objective    /68 (BP Location: Left arm, Patient Position: Sitting, Cuff Size: Adult Regular)   Pulse 82   Temp 97.1  F (36.2  C) (Oral)   Resp 22   Ht 1.74 m (5' 8.5\")   Wt 68.9 kg (152 lb)   SpO2 97%   BMI 22.78 kg/m    Body mass index is 22.78 kg/m .  Physical Exam   GENERAL: alert and no distress  SKIN: in right buttock, not near anal/perineal area there is a ~ 3 cm warm, exquisitely tender, erythematous mass with fluctuance             Signed Electronically by: Sarita Hennessy DO    "

## 2025-04-01 NOTE — PATIENT INSTRUCTIONS
Cost of medications  Pharmacist Claudine Chavez is checking back with patient assistance about cost of Brillinta     Boil on buttocks  Sent to [facility]  No supplemental iodine is needed  Sources of iodine - table salt, fish, seafood, kelp, some drinking water, and vegetables grown in iodine-sufficient soil. Cow's milk is a source of iodine owing to iodine in cattle feed

## 2025-04-03 ENCOUNTER — OFFICE VISIT (OUTPATIENT)
Dept: FAMILY MEDICINE | Facility: CLINIC | Age: 80
End: 2025-04-03
Payer: COMMERCIAL

## 2025-04-03 VITALS
HEIGHT: 69 IN | TEMPERATURE: 97 F | OXYGEN SATURATION: 91 % | DIASTOLIC BLOOD PRESSURE: 72 MMHG | WEIGHT: 152.2 LBS | BODY MASS INDEX: 22.54 KG/M2 | SYSTOLIC BLOOD PRESSURE: 124 MMHG | RESPIRATION RATE: 20 BRPM | HEART RATE: 61 BPM

## 2025-04-03 DIAGNOSIS — L02.31 ABSCESS OF RIGHT BUTTOCK: Primary | ICD-10-CM

## 2025-04-03 ASSESSMENT — PAIN SCALES - GENERAL: PAINLEVEL_OUTOF10: MODERATE PAIN (5)

## 2025-04-03 NOTE — PROGRESS NOTES
"  Assessment & Plan     Abscess of right buttock  Packing removed, appears improved. Prelim culture results of SA, however sensitivity not yet complete. He will continue both abx for now.            See Patient Instructions    Jonathon Carter is a 79 year old, presenting for the following health issues:  Abscess    HPI        Patient here to follow up and have packing removed on Abscess of Left Buttocks I&D completed on 4/1/25. Patient is on ABX.       I&D with packing completed by me on 4/1, started on doxycycline and ceftin which he is taking as prescribed. Still has some pain but much improved. No fevers. No significant drainage.      Review of Systems  Constitutional, neuro, ENT, endocrine, pulmonary, cardiac, gastrointestinal, genitourinary, musculoskeletal, integument and psychiatric systems are negative, except as otherwise noted.      Objective    /72   Pulse 61   Temp 97  F (36.1  C) (Tympanic)   Resp 20   Ht 1.74 m (5' 8.5\")   Wt 69 kg (152 lb 3.2 oz)   SpO2 (!) 91%   BMI 22.81 kg/m    Body mass index is 22.81 kg/m .  Physical Exam  Vitals and nursing note reviewed.   Constitutional:       Appearance: Normal appearance.   HENT:      Head: Normocephalic and atraumatic.      Mouth/Throat:      Mouth: Mucous membranes are moist.   Cardiovascular:      Rate and Rhythm: Normal rate.   Pulmonary:      Effort: Pulmonary effort is normal.   Musculoskeletal:      Cervical back: Neck supple.   Skin:     General: Skin is warm and dry.      Comments: Packing removed from I&D site to right buttock, no significant drainage. Area of erythema improved from last exam.   Neurological:      General: No focal deficit present.      Mental Status: He is alert.   Psychiatric:         Mood and Affect: Mood normal.         Behavior: Behavior normal.                    Signed Electronically by: OSCAR Regan CNP    "

## 2025-04-04 ENCOUNTER — HOSPITAL ENCOUNTER (OUTPATIENT)
Dept: CARDIAC REHAB | Facility: CLINIC | Age: 80
Discharge: HOME | End: 2025-04-04
Attending: INTERNAL MEDICINE
Payer: COMMERCIAL

## 2025-04-04 PROCEDURE — 93798 PHYS/QHP OP CAR RHAB W/ECG: CPT | Performed by: REHABILITATION PRACTITIONER

## 2025-04-07 ENCOUNTER — HOSPITAL ENCOUNTER (OUTPATIENT)
Dept: CARDIAC REHAB | Facility: CLINIC | Age: 80
Discharge: HOME | End: 2025-04-07
Attending: INTERNAL MEDICINE
Payer: COMMERCIAL

## 2025-04-07 PROCEDURE — 93798 PHYS/QHP OP CAR RHAB W/ECG: CPT

## 2025-04-09 ENCOUNTER — HOSPITAL ENCOUNTER (OUTPATIENT)
Dept: CARDIAC REHAB | Facility: CLINIC | Age: 80
Discharge: HOME | End: 2025-04-09
Attending: INTERNAL MEDICINE
Payer: COMMERCIAL

## 2025-04-09 PROCEDURE — 93798 PHYS/QHP OP CAR RHAB W/ECG: CPT

## 2025-04-10 ENCOUNTER — TELEPHONE (OUTPATIENT)
Dept: FAMILY MEDICINE | Facility: CLINIC | Age: 80
End: 2025-04-10
Payer: COMMERCIAL

## 2025-04-10 DIAGNOSIS — J44.9 CHRONIC OBSTRUCTIVE PULMONARY DISEASE, UNSPECIFIED COPD TYPE (H): ICD-10-CM

## 2025-04-10 RX ORDER — PREDNISONE 20 MG/1
20 TABLET ORAL 2 TIMES DAILY
Qty: 14 TABLET | Refills: 0 | OUTPATIENT
Start: 2025-04-10 | End: 2025-04-17

## 2025-04-10 NOTE — TELEPHONE ENCOUNTER
assistance application(s) for Ozempic, Breztri and Brilinta have been sent via inter-office to the [facility] and mailed to the patient.     THIS MAY TAKE A COUPLE DAYS TO ARRIVE AT YOUR CLINIC.    Please review, complete medication,strength, daily directions and formulation and sign where indicated. This is extremely important, the patient's medication will not be approved, otherwise.     PLEASE DO NOT DATE AND PLEASE DO NOT FILL IN THE QUANTITY; THIS IS DIFFERENT FOR EVERY  AND EVERY MEDICATION.    Then return in the self addressed, inter-office envelope provided to the Prescription Assistance Office.    We will keep you informed during the process.    Thank you very much,  Gisela Bradford  Prescription   Please send responses to [facility].

## 2025-04-11 ENCOUNTER — OFFICE VISIT (OUTPATIENT)
Dept: FAMILY MEDICINE | Facility: CLINIC | Age: 80
End: 2025-04-11
Payer: COMMERCIAL

## 2025-04-11 ENCOUNTER — HOSPITAL ENCOUNTER (OUTPATIENT)
Dept: CARDIAC REHAB | Facility: CLINIC | Age: 80
Discharge: HOME | End: 2025-04-11
Attending: INTERNAL MEDICINE
Payer: COMMERCIAL

## 2025-04-11 VITALS
SYSTOLIC BLOOD PRESSURE: 122 MMHG | WEIGHT: 154.8 LBS | HEIGHT: 69 IN | RESPIRATION RATE: 21 BRPM | OXYGEN SATURATION: 94 % | BODY MASS INDEX: 22.93 KG/M2 | DIASTOLIC BLOOD PRESSURE: 68 MMHG | HEART RATE: 73 BPM | TEMPERATURE: 98.1 F

## 2025-04-11 DIAGNOSIS — J44.1 COPD EXACERBATION (H): Primary | ICD-10-CM

## 2025-04-11 PROCEDURE — 3074F SYST BP LT 130 MM HG: CPT | Performed by: NURSE PRACTITIONER

## 2025-04-11 PROCEDURE — G2211 COMPLEX E/M VISIT ADD ON: HCPCS | Performed by: NURSE PRACTITIONER

## 2025-04-11 PROCEDURE — 99214 OFFICE O/P EST MOD 30 MIN: CPT | Performed by: NURSE PRACTITIONER

## 2025-04-11 PROCEDURE — 3078F DIAST BP <80 MM HG: CPT | Performed by: NURSE PRACTITIONER

## 2025-04-11 PROCEDURE — 93798 PHYS/QHP OP CAR RHAB W/ECG: CPT

## 2025-04-11 PROCEDURE — 1126F AMNT PAIN NOTED NONE PRSNT: CPT | Performed by: NURSE PRACTITIONER

## 2025-04-11 RX ORDER — PREDNISONE 20 MG/1
20 TABLET ORAL 2 TIMES DAILY
Qty: 20 TABLET | Refills: 0 | Status: SHIPPED | OUTPATIENT
Start: 2025-04-11

## 2025-04-11 RX ORDER — PREDNISONE 20 MG/1
20 TABLET ORAL 2 TIMES DAILY
Qty: 14 TABLET | Refills: 0 | Status: SHIPPED | OUTPATIENT
Start: 2025-04-11 | End: 2025-04-11

## 2025-04-11 ASSESSMENT — PAIN SCALES - GENERAL: PAINLEVEL_OUTOF10: NO PAIN (0)

## 2025-04-11 NOTE — PATIENT INSTRUCTIONS
Lab Results   Component Value Date    A1C 7.1 02/19/2025    A1C 8.1 01/04/2025    A1C 8.7 11/13/2024    A1C 9.3 05/02/2024    A1C 8.0 01/16/2024    A1C 8.5 04/09/2021    A1C 7.2 08/27/2020    A1C 8.7 05/19/2020    A1C 5.5 01/15/2016    A1C 6.1 11/15/2015

## 2025-04-11 NOTE — PROGRESS NOTES
"  Assessment & Plan     COPD exacerbation (H)    - recommended to add tiotropium (SPIRIVA RESPIMAT) 2.5 MCG/ACT inhaler; Inhale 2 puffs into the lungs daily.  - predniSONE (DELTASONE) 20 MG tablet; Take 1 tablet (20 mg) by mouth 2 times daily.  -continue Albuterol as needed   -continue Symbicort   -follow up in 5-7  days if no improvement     Jonathon Carter is a 79 year old, presenting for the following health issues:  Medication Request (Prednisone Script) and Breathing Problem (Having a hard time with breathing while doing daily activities )        4/11/2025    12:52 PM   Additional Questions   Roomed by Yuliya JAUREGUI   Accompanied by Self     History of Present Illness       Reason for visit:  Having some trouble with breathing. Looking to get a prednisone script to help with it  Symptom onset:  1-2 weeks ago  Symptoms include:  Shortness of breathe with some wheezing  Symptom intensity:  Moderate  Symptom progression:  Staying the same  Had these symptoms before:  Yes  Has tried/received treatment for these symptoms:  Yes  Previous treatment was successful:  Yes  Prior treatment description:  Taking Prednisone  What makes it worse:  Any Activity  What makes it better:  Prednisone and resting He is missing 2 dose(s) of medications per week.  He is not taking prescribed medications regularly due to remembering to take.      Looking to see if he can get a script for prednisone for his breathing problem because that is what has helped in the past. Otherwise he is open to try anything to help.           Review of Systems  Constitutional, HEENT, cardiovascular, pulmonary, gi and gu systems are negative, except as otherwise noted.      Objective    /68 (BP Location: Left arm, Patient Position: Sitting, Cuff Size: Adult Large)   Pulse 73   Temp 98.1  F (36.7  C) (Tympanic)   Resp 21   Ht 1.74 m (5' 8.5\")   Wt 70.2 kg (154 lb 12.8 oz)   SpO2 94%   BMI 23.19 kg/m    Body mass index is 23.19 kg/m .  Physical " Exam   GENERAL: alert and no distress  EYES: Eyes grossly normal to inspection, PERRL and conjunctivae and sclerae normal  RESP: lungs clear to auscultation - no rales, rhonchi or wheezes and expiratory wheezes bibasilar and bilateral  CV: regular rate and rhythm, normal S1 S2, no S3 or S4, no murmur, click or rub, no peripheral edema   NEURO: Normal strength and tone, mentation intact and speech normal  PSYCH: mentation appears normal, affect normal/bright            Signed Electronically by: OSCAR Alvarado CNP

## 2025-04-14 ENCOUNTER — HOSPITAL ENCOUNTER (EMERGENCY)
Facility: CLINIC | Age: 80
Discharge: HOME | End: 2025-04-14
Payer: COMMERCIAL

## 2025-04-14 ENCOUNTER — TELEPHONE (OUTPATIENT)
Dept: FAMILY MEDICINE | Facility: CLINIC | Age: 80
End: 2025-04-14
Payer: COMMERCIAL

## 2025-04-14 ENCOUNTER — HOSPITAL ENCOUNTER (OUTPATIENT)
Dept: CARDIAC REHAB | Facility: CLINIC | Age: 80
Discharge: HOME | End: 2025-04-14
Attending: INTERNAL MEDICINE
Payer: COMMERCIAL

## 2025-04-14 VITALS
DIASTOLIC BLOOD PRESSURE: 81 MMHG | HEART RATE: 76 BPM | SYSTOLIC BLOOD PRESSURE: 191 MMHG | OXYGEN SATURATION: 96 % | RESPIRATION RATE: 20 BRPM | TEMPERATURE: 97.4 F

## 2025-04-14 DIAGNOSIS — L02.11 ABSCESS OF SKIN OF NECK: ICD-10-CM

## 2025-04-14 PROCEDURE — 93798 PHYS/QHP OP CAR RHAB W/ECG: CPT

## 2025-04-14 PROCEDURE — G0463 HOSPITAL OUTPT CLINIC VISIT: HCPCS

## 2025-04-14 PROCEDURE — 99214 OFFICE O/P EST MOD 30 MIN: CPT

## 2025-04-14 RX ORDER — CLINDAMYCIN HYDROCHLORIDE 150 MG/1
450 CAPSULE ORAL 3 TIMES DAILY
Qty: 45 CAPSULE | Refills: 0 | Status: SHIPPED | OUTPATIENT
Start: 2025-04-14 | End: 2025-04-19

## 2025-04-14 ASSESSMENT — COLUMBIA-SUICIDE SEVERITY RATING SCALE - C-SSRS
2. HAVE YOU ACTUALLY HAD ANY THOUGHTS OF KILLING YOURSELF IN THE PAST MONTH?: NO
1. IN THE PAST MONTH, HAVE YOU WISHED YOU WERE DEAD OR WISHED YOU COULD GO TO SLEEP AND NOT WAKE UP?: NO
6. HAVE YOU EVER DONE ANYTHING, STARTED TO DO ANYTHING, OR PREPARED TO DO ANYTHING TO END YOUR LIFE?: NO

## 2025-04-14 NOTE — DISCHARGE INSTRUCTIONS
- We did not drain your abscess today due to the location and potential risk of severe effects.  -Take antibiotics as directed.  Use warm compresses to help with swelling.  You can also take ibuprofen or Tylenol to aid with any pain.  -Follow-up with general surgery.  Call the number below to schedule your appointment and they will drain this for you.  -Return to ER if you develop severe pain, trouble moving your neck, fevers or anything else that is concerning to you.

## 2025-04-14 NOTE — TELEPHONE ENCOUNTER
Patient was called and triaged:    S-(situation): abscess on the right side of neck behind the ear    B-(background): developed over the weekend    A-(assessment): patient reports he has a history of fluid filled abscesses, says he had recent abscesses drained on his bottom. Then over the weekend the patient developed a half egg sized possible abscess behind the right ear, says it is red, warm to the touch, and painful to the touch. Does not know if he has a fever. He tried to squeeze the area yesterday and got out a very small amount of blood and thick pus.    R-(recommendations): messaged Graciela Jackmanjess and she is not able to drain on the neck and advises [facility] today, patient has appointment at 3 pm with cardiology. He is open to being seen in the [facility] if able to today.    Called [facility], spoke with Lizbeth and she discussed with Heather Robertson in [facility] and due to area and vasculature advised to have patient present to the Urgent Care/ER today in [place].      Patient called and notified, he is fine with this and will come to Urgent Care in [place] at 12 pm today.      RASHEL Walsh

## 2025-04-15 NOTE — ED PROVIDER NOTES
History     Chief Complaint   Patient presents with    Cyst     HPI  Raul Wilhelm is a 79 year old male who presents for evaluation of an abscess concern to the right side of his neck.  This developed acutely within the last 3 days.  It has slowly increased in size and tenderness.  Patient reports he tried to poke it on his own and was able to squeeze out a small amount of blood and what appeared to be purulent drainage.  He contacted his primary office today who advised that he be seen here due to location of the abscess for potential draining.  Patient has had abscesses in the past and other areas of his body.  He currently denies trouble with moving his neck, shortness of breath or trouble with breathing, fever, chills, lightheadedness, sweating, nausea, vomiting or abdominal pain.    Allergies:  Allergies   Allergen Reactions    Simvastatin Swelling     Severe muscle pain, swelling, and bruising    Dust Mites        Problem List:    Patient Active Problem List    Diagnosis Date Noted    Pulmonary nodules 02/04/2025     Priority: Medium     Needs follow-up CT 1/2026      Pneumonia of both lungs due to infectious organism, unspecified part of lung 01/17/2025     Priority: Medium    Zenker diverticula 01/17/2025     Priority: Medium     2025 - Zenker's found on recent admission swallow study and esophagram. It does not completely empty with multiple swallows and explains symptoms of regurgitation/dysphagia      Status post total knee replacement 05/28/2020     Priority: Medium    Coronary artery disease involving native coronary artery of native heart without angina pectoris 05/19/2020     Priority: Medium     STEMI 11/2015.  At that time, coronary angiography demonstrated a 99% stenosis in the mid circumflex as well as 100% thrombotic occlusion of the left-sided PDA.  He underwent successful revascularization of the left PDA as well as the mid circumflex with drug-eluting stents at the time.  Of note, he  had an approximately 80% stenosis in the mid-LAD and a 70% stenosis in the first obtuse marginal.  These were not intervened on at the time, and a subsequent stress nuclear perfusion study demonstrated a large inferior and inferoseptal infarction with no evidence of rian-infarct ischemia.  Given these findings and his complete lack of symptoms, we have elected to treat his residual coronary artery disease medically.  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  Patient had syncope with PFT and declines further PFT  FEV1 1.99 61% in 2009      History of arterial ischemic stroke 02/25/2011     Priority: Medium     Small infarct - seen on MRI  Diagnosis updated by automated process. Provider to review and confirm.      Hyperlipidemia LDL goal <70 02/25/2011     Priority: Medium    Essential hypertension 03/15/2010     Priority: Medium    Glaucoma 03/12/2010     Priority: Medium     Worsened pressures with prednisone use.   Recommended by eye doctor to stop inhaled steroids if possible, but has been on for so many years would potentially put him into more resp problems.   Follows with [facility].    Needs eye exam if prednisone course is longer than 10 days        Family hx of colon cancer 02/18/2009     Priority:  Medium     (Problem list name updated by automated process. Provider to review and confirm.)          Past Medical History:    Past Medical History:   Diagnosis Date    Chronic airway obstruction, not elsewhere classified     Other and unspecified hyperlipidemia        Past Surgical History:    Past Surgical History:   Procedure Laterality Date    ARTHROPLASTY KNEE Left 5/28/2020    Procedure: Left Total Knee Arthroplasty;  Surgeon: Sanjay Downey MD;  Location: [facility] OR    ARTHROPLASTY KNEE Right 11/2/2020    Procedure: ARTHROPLASTY, KNEE, TOTAL;  Surgeon: Sanjay Downey MD;  Location: [facility] OR    CV CORONARY ANGIOGRAM N/A 1/4/2025    Procedure: Coronary Angiogram;  Surgeon: Jacobo Montgomery MD;  Location: [facility] CV    CV PCI STENT DRUG ELUTING N/A 1/4/2025    Procedure: Percutaneous Coronary Intervention Stent;  Surgeon: Jacobo Montgomery MD;  Location: [facility] CV    ENT SURGERY  as a child    tonsillectomy       Family History:    Family History   Problem Relation Age of Onset    Diabetes Mother     Heart Disease Mother     C.A.D. Mother     Cancer Father     Hypertension Father     Cerebrovascular Disease Paternal Grandfather     Asthma Sister     Breast Cancer No family hx of     Cancer - colorectal No family hx of     Prostate Cancer No family hx of        Social History:  Marital Status:   [2]  Social History     Tobacco Use    Smoking status: Never    Smokeless tobacco: Never   Vaping Use    Vaping status: Never Used   Substance Use Topics    Alcohol use: Yes     Comment: Rare    Drug use: No        Medications:    clindamycin (CLEOCIN) 150 MG capsule  acetaminophen (TYLENOL) 325 MG tablet  albuterol (PROAIR HFA/PROVENTIL HFA/VENTOLIN HFA) 108 (90 Base) MCG/ACT inhaler  albuterol (PROVENTIL) (2.5 MG/3ML) 0.083% neb solution  Alcohol Swabs (B-D SINGLE USE SWABS REGULAR) PADS  aspirin (ASA) 81 MG EC tablet  atorvastatin (LIPITOR) 80 MG tablet  blood glucose monitoring (NO  BRAND SPECIFIED) meter device kit  budeson-glycopyrrol-formoterol (BREZTRI AEROSPHERE) 160-9-4.8 MCG/ACT AERO inhaler  budesonide-formoterol (SYMBICORT) 80-4.5 MCG/ACT Inhaler  carvedilol (COREG) 6.25 MG tablet  insulin glargine (LANTUS PEN) 100 UNIT/ML pen  insulin pen needle (BD LIZBETH U/F) 32G X 4 MM miscellaneous  Lancets (ONETOUCH DELICA PLUS ACKNKE51N) MISC  losartan-hydrochlorothiazide (HYZAAR) 50-12.5 MG tablet  montelukast (SINGULAIR) 10 MG tablet  nitroGLYcerin (NITROSTAT) 0.4 MG sublingual tablet  omeprazole (PRILOSEC) 20 MG DR capsule  ONETOUCH ULTRA test strip  order for DME  predniSONE (DELTASONE) 20 MG tablet  semaglutide (OZEMPIC) 2 MG/3ML pen  ticagrelor (BRILINTA) 90 MG tablet  tiotropium (SPIRIVA RESPIMAT) 2.5 MCG/ACT inhaler  triamcinolone (KENALOG) 0.1 % external cream          Review of Systems  Pertinent review of systems as documented per HPI above.    Physical Exam   BP: (!) 191/81  Pulse: 76  Temp: 97.4  F (36.3  C)  Resp: 20  SpO2: 96 %      Physical Exam  Vitals and nursing note reviewed.   Constitutional:       General: He is not in acute distress.     Appearance: Normal appearance. He is not ill-appearing, toxic-appearing or diaphoretic.   HENT:      Head: Atraumatic.   Neck:        Comments: Erythematous, warm and tender mass to the skin along the right side of the neck.  There is fluctuance, no active drainage.  Cardiovascular:      Rate and Rhythm: Normal rate.   Pulmonary:      Effort: Pulmonary effort is normal. No respiratory distress.   Musculoskeletal:      Cervical back: Full passive range of motion without pain. No rigidity. No pain with movement, spinous process tenderness or muscular tenderness. Normal range of motion.   Lymphadenopathy:      Cervical: No cervical adenopathy.   Skin:     General: Skin is warm and dry.   Neurological:      General: No focal deficit present.      Mental Status: He is alert and oriented to person, place, and time.             Assessments & Plan  (with Medical Decision Making)     I have reviewed the nursing notes.    I have reviewed the findings, diagnosis, plan and need for follow up with the patient.  79-year-old female male who presents for evaluation of an abscess concern to the skin of his right neck that developed acutely within the last 3 days.  Patient denies fevers, chills, lightheadedness, sweating, nausea or vomiting.    Patient well-appearing on arrival, afebrile.  Examination notable for an erythematous, warm and tender mass to the skin along the right side of his neck.  There is fluctuance, no active drainage.  He is able to fully move his neck without pain.  No associated lymphadenopathy.  Remainder of exam reassuring as above.  Given the location of the abscess and risk for potential complications, I&D was not performed today.  I placed a referral with general surgery for him to be seen in the next 3 to 5 days to have this drained.  Supportive cares were also discussed.  Strict return precautions to UC/ED were discussed in detail including development of severe pain, trouble with moving the neck, fevers, or anything else that is concerning to him. All questions answered.  Patient verbalizes understanding and agreement with the above plan.    Disclaimer: This note consists of symbols derived from keyboarding, dictation, and/or voice recognition software. As a result, there may be errors in the script that have gone undetected.  Please consider this when interpreting information found in the chart.      Discharge Medication List as of 4/14/2025 12:32 PM        START taking these medications    Details   clindamycin (CLEOCIN) 150 MG capsule Take 3 capsules (450 mg) by mouth 3 times daily for 5 days., Disp-45 capsule, R-0, E-Prescribe             Final diagnoses:   Abscess of skin of neck       4/14/2025   [facility] EMERGENCY DEPT       Beti Mckeon PA-C  04/14/25 2013

## 2025-04-16 ENCOUNTER — OFFICE VISIT (OUTPATIENT)
Dept: SURGERY | Facility: CLINIC | Age: 80
End: 2025-04-16
Payer: COMMERCIAL

## 2025-04-16 ENCOUNTER — HOSPITAL ENCOUNTER (OUTPATIENT)
Dept: CARDIAC REHAB | Facility: CLINIC | Age: 80
Discharge: HOME | End: 2025-04-16
Attending: INTERNAL MEDICINE
Payer: COMMERCIAL

## 2025-04-16 ENCOUNTER — OFFICE VISIT (OUTPATIENT)
Dept: PHARMACY | Facility: CLINIC | Age: 80
End: 2025-04-16
Payer: COMMERCIAL

## 2025-04-16 VITALS
HEIGHT: 69 IN | TEMPERATURE: 96.7 F | HEART RATE: 101 BPM | WEIGHT: 154.76 LBS | SYSTOLIC BLOOD PRESSURE: 123 MMHG | BODY MASS INDEX: 22.92 KG/M2 | DIASTOLIC BLOOD PRESSURE: 72 MMHG

## 2025-04-16 DIAGNOSIS — E11.9 TYPE 2 DIABETES MELLITUS WITHOUT COMPLICATION, WITHOUT LONG-TERM CURRENT USE OF INSULIN (H): ICD-10-CM

## 2025-04-16 DIAGNOSIS — J44.9 CHRONIC OBSTRUCTIVE PULMONARY DISEASE, UNSPECIFIED COPD TYPE (H): Primary | ICD-10-CM

## 2025-04-16 DIAGNOSIS — J45.50 SEVERE PERSISTENT ASTHMA WITHOUT COMPLICATION (H): ICD-10-CM

## 2025-04-16 DIAGNOSIS — J44.9 CHRONIC OBSTRUCTIVE PULMONARY DISEASE, UNSPECIFIED COPD TYPE (H): ICD-10-CM

## 2025-04-16 DIAGNOSIS — K21.00 GASTROESOPHAGEAL REFLUX DISEASE WITH ESOPHAGITIS WITHOUT HEMORRHAGE: ICD-10-CM

## 2025-04-16 DIAGNOSIS — L02.11 ABSCESS OF SKIN OF NECK: Primary | ICD-10-CM

## 2025-04-16 DIAGNOSIS — I10 ESSENTIAL HYPERTENSION: ICD-10-CM

## 2025-04-16 DIAGNOSIS — Z95.5 HISTORY OF HEART ARTERY STENT: ICD-10-CM

## 2025-04-16 DIAGNOSIS — I25.10 CORONARY ARTERY DISEASE INVOLVING NATIVE CORONARY ARTERY OF NATIVE HEART WITHOUT ANGINA PECTORIS: ICD-10-CM

## 2025-04-16 PROCEDURE — 93798 PHYS/QHP OP CAR RHAB W/ECG: CPT

## 2025-04-16 PROCEDURE — 99607 MTMS BY PHARM ADDL 15 MIN: CPT | Performed by: PHARMACIST

## 2025-04-16 PROCEDURE — 99606 MTMS BY PHARM EST 15 MIN: CPT | Performed by: PHARMACIST

## 2025-04-16 RX ORDER — IPRATROPIUM BROMIDE AND ALBUTEROL SULFATE 2.5; .5 MG/3ML; MG/3ML
1 SOLUTION RESPIRATORY (INHALATION) EVERY 6 HOURS PRN
Qty: 270 ML | Refills: 5 | Status: SHIPPED | OUTPATIENT
Start: 2025-04-16

## 2025-04-16 NOTE — PATIENT INSTRUCTIONS
"Recommendations from today's MTM visit:                                                         Raul,    It was a pleasure talking with you! We discussed the followin. If Brilinta becomes too expensive - don't just stop it, reach out to cardiology or discuss a cheaper option with them at your follow up visit (clopidogrel may be an option for you).    2. Use ipratropium-albuterol nebulizer (Duonebs) one vial in your nebulizer three times daily. I sent a prescription to your pharmacy.     3. Try taking omeprazole every other day - monitor if you get heartburn or reflux.     Follow-up:  1:30 PM    It was great speaking with you today.  I value your experience and would be very thankful for your time in providing feedback in our clinic survey. In the next few days, you may receive an email or text message from [employer] with a link to a survey related to your  clinical pharmacist.\"     To schedule another MTM appointment, please call the clinic directly or you may call the MTM scheduling line at [phone] or toll-free at [phone].     My Clinical Pharmacist's contact information:                                                      Please feel free to contact me with any questions or concerns you have.      Keep up the good work!!    Claudine hCavez, PharmD  [phone] in clinic on Tuesday and Wednesday        "

## 2025-04-16 NOTE — PROGRESS NOTES
Assessment & Plan   Problem List Items Addressed This Visit          Respiratory    COPD (chronic obstructive pulmonary disease) (H)       Endocrine    Type 2 diabetes mellitus without complication, without long-term current use of insulin (H)     Other Visit Diagnoses       Abscess of skin of neck, right    -  Primary    History of heart artery stent               78 yo M with resolving right lateral neck abscess  -patient stated he was able to squeezed some purulent material out this last weekend.   -Area is much smaller now.   -I recommend an excision of this area once he's done with abx; however, with his recent STEMI with REDD in Jan 2025; he won't be able to get off his brilinta for at least 6 months.    -I recommend he call the clinic for excision if the area is still present when his cardiologist is ok stopping brilinta for at least 5 days.   -Pt is understanding of this; all questions were answered.     Face to Face/patient Contact total time: 15 minutes  Pre Charting time: 5 minutes; Post charting time, communication and other activities: 10 minutes;   Total time:  30 minutes        Subjective   Raul is a 79 year old, presenting for the following health issues:  Consult (Right side of neck abscess)    I was asked to see Raul Choco by Beti Mckeon, PAC for right lateral neck infected skin lesion    Right lateral neck infected skin lesion   Have had similar lesions 2 other places; thse resolved on there own.  STEMI s/p REDD x2 on 1/4/2025 - on brilinta  The one on the neck recent was at least 2x the size that it is now.   Pt was able to get some purulent material out when he got home from .   Tolerating clindamycin   No fevers; no chills; no pain at the infected site.               Review of Systems  Constitutional, neuro, ENT, endocrine, pulmonary, cardiac, gastrointestinal, genitourinary, musculoskeletal, integument and psychiatric systems are negative, except as otherwise noted.     "  Objective    /72 (BP Location: Left arm, Patient Position: Sitting, Cuff Size: Adult Regular)   Pulse 101   Temp (!) 96.7  F (35.9  C) (Tympanic)   Ht 1.74 m (5' 8.5\")   Wt 70.2 kg (154 lb 12.2 oz)   BMI 23.19 kg/m    Body mass index is 23.19 kg/m .  Physical Exam  Vitals reviewed.   Eyes:      Conjunctiva/sclera: Conjunctivae normal.   Neck:     Pulmonary:      Effort: Pulmonary effort is normal.   Abdominal:      Palpations: Abdomen is soft.   Skin:     General: Skin is warm.      Capillary Refill: Capillary refill takes less than 2 seconds.   Neurological:      General: No focal deficit present.      Mental Status: He is alert.   Psychiatric:         Mood and Affect: Mood normal.            Signed Electronically by: Romulo Swain MD    "

## 2025-04-16 NOTE — LETTER
4/16/2025      Raul Wilhelm  [address]  [place] [address]      Dear Colleague,    Thank you for referring your patient, Raul Wilhelm, to the [facility]. Please see a copy of my visit note below.      Assessment & Plan  Problem List Items Addressed This Visit          Respiratory    COPD (chronic obstructive pulmonary disease) (H)       Endocrine    Type 2 diabetes mellitus without complication, without long-term current use of insulin (H)     Other Visit Diagnoses       Abscess of skin of neck, right    -  Primary    History of heart artery stent               80 yo M with resolving right lateral neck abscess  -patient stated he was able to squeezed some purulent material out this last weekend.   -Area is much smaller now.   -I recommend an excision of this area once he's done with abx; however, with his recent STEMI with REDD in Jan 2025; he won't be able to get off his brilinta for at least 6 months.    -I recommend he call the clinic for excision if the area is still present when his cardiologist is ok stopping brilinta for at least 5 days.   -Pt is understanding of this; all questions were answered.     Face to Face/patient Contact total time: 15 minutes  Pre Charting time: 5 minutes; Post charting time, communication and other activities: 10 minutes;   Total time:  30 minutes        Subjective  Raul is a 79 year old, presenting for the following health issues:  Consult (Right side of neck abscess)    I was asked to see Raul Wilhelm by Beti Mckeon, PAC for right lateral neck infected skin lesion    Right lateral neck infected skin lesion   Have had similar lesions 2 other places; thse resolved on there own.  STEMI s/p REDD x2 on 1/4/2025 - on brilinta  The one on the neck recent was at least 2x the size that it is now.   Pt was able to get some purulent material out when he got home from .   Tolerating clindamycin   No fevers; no chills; no pain at the  "infected site.               Review of Systems  Constitutional, neuro, ENT, endocrine, pulmonary, cardiac, gastrointestinal, genitourinary, musculoskeletal, integument and psychiatric systems are negative, except as otherwise noted.      Objective   /72 (BP Location: Left arm, Patient Position: Sitting, Cuff Size: Adult Regular)   Pulse 101   Temp (!) 96.7  F (35.9  C) (Tympanic)   Ht 1.74 m (5' 8.5\")   Wt 70.2 kg (154 lb 12.2 oz)   BMI 23.19 kg/m    Body mass index is 23.19 kg/m .  Physical Exam  Vitals reviewed.   Eyes:      Conjunctiva/sclera: Conjunctivae normal.   Neck:     Pulmonary:      Effort: Pulmonary effort is normal.   Abdominal:      Palpations: Abdomen is soft.   Skin:     General: Skin is warm.      Capillary Refill: Capillary refill takes less than 2 seconds.   Neurological:      General: No focal deficit present.      Mental Status: He is alert.   Psychiatric:         Mood and Affect: Mood normal.            Signed Electronically by: Romulo Swain MD        Again, thank you for allowing me to participate in the care of your patient.        Sincerely,        Romulo Swain MD    Electronically signed"

## 2025-04-16 NOTE — PROGRESS NOTES
Medication Therapy Management (MTM) Encounter    ASSESSMENT:                            Medication Adherence/Access: No issues identified.    Asthma/COPD start Duonebs three times daily - copay is around $5 for 1 month. Sent prescription to patient's pharmacy.     Hypertension/CAD reviewed with patient if not able to afford Brilinta - to discuss with cardiology first (see Plan).    GERD try taking omeprazole every other day to decrease pill burden - monitor heartburn/reflux.     PLAN:                            1. If Brilinta becomes too expensive - don't just stop it, reach out to cardiology or discuss a cheaper option with them at your follow up visit (clopidogrel may be an option for you).    2. Use ipratropium-albuterol nebulizer (Duonebs) one vial in your nebulizer three times daily. I sent a prescription to your pharmacy.     3. Try taking omeprazole every other day - monitor if you get heartburn or reflux.     Follow-up: Wednesday, June 4th 1:30 PM    SUBJECTIVE/OBJECTIVE:                          Raul Wilhelm is a 79 year old male seen for a follow-up visit from 2/18/2025.       Reason for visit: follow up MTM visit.    Tobacco: He reports that he has never smoked. He has never used smokeless tobacco.  Alcohol: not currently using    Medication Adherence/Access: no issues reported. Patient received paperwork from patient assistance - he states too much paperwork, his wife is in a nursing home and he states his income is not as it seems - he declines filling out the paperwork for patient assistance.    Asthma/COPD   Symbicort 80-4.5 mcg/actuation 2 puffs twice daily   Montelukast 10 mg at bedtime  Albuterol as needed   Albuterol nebulizer as needed     Using albuterol nebulizer two to three times daily. Increased use of albuterol inhaler - has been struggling the last few days. Normally not using albuterol inhaler - last couple of days has been using once to twice a day. Patient not signing up for patient  assistance - he is not able to afford more expensive inhalers.     Patient rinsing their mouth after using steroid inhaler.   Patient reports no current medication side effects.      Patient reports the following symptoms: none.    Hypertension/CAD  Aspirin 81 mg daily  Brilinta 90 mg twice daily   Carvedilol 6.25 mg twice daily  Losartan-hydrochlorothiazide 50-12.5 mg daily     Started taking losartan-hydrochlorothiazide in the morning - feels not having as much nocturia. Tolerating medications well. Brilinta is expensive for patient - he declines signing up for patient assistance.     BP Readings from Last 3 Encounters:   04/16/25 123/72   04/14/25 (!) 191/81   04/11/25 122/68     GERD  Omeprazole 20 mg daily    Tolerating well - not having heartburn. Patient would like to decrease pill burden.   ----------------      I spent 30 minutes with this patient today. All changes were made via collaborative practice agreement with Sarita Hennessy DO.     A summary of these recommendations was given to the patient.    Claudine Chavez, PharmD  Medication Therapy Management        Medication Therapy Recommendations  COPD (chronic obstructive pulmonary disease) (H)   1 Current Medication: ipratropium - albuterol 0.5 mg/2.5 mg/3 mL (DUONEB) 0.5-2.5 (3) MG/3ML neb solution   Current Medication Sig: Take 1 vial (3 mLs) by nebulization every 6 hours as needed for shortness of breath, wheezing or cough.   Rationale: Synergistic therapy - Needs additional medication therapy - Indication   Recommendation: Start Medication   Status: Accepted per CPA   Identified Date: 4/16/2025 Completed Date: 4/16/2025         Gastroesophageal reflux disease with esophagitis without hemorrhage   1 Current Medication: omeprazole (PRILOSEC) 20 MG DR capsule   Current Medication Sig: Take 1 capsule (20 mg) by mouth daily.   Rationale: Patient prefers not to take - Adherence - Adherence   Recommendation: Decrease Dose   Status: Accepted per CPA    Identified Date: 4/16/2025 Completed Date: 4/16/2025

## 2025-04-18 ENCOUNTER — HOSPITAL ENCOUNTER (OUTPATIENT)
Dept: CARDIAC REHAB | Facility: CLINIC | Age: 80
Discharge: HOME | End: 2025-04-18
Attending: INTERNAL MEDICINE
Payer: COMMERCIAL

## 2025-04-18 PROCEDURE — 93798 PHYS/QHP OP CAR RHAB W/ECG: CPT

## 2025-04-23 ENCOUNTER — HOSPITAL ENCOUNTER (OUTPATIENT)
Dept: CARDIAC REHAB | Facility: CLINIC | Age: 80
Discharge: HOME | End: 2025-04-23
Attending: INTERNAL MEDICINE
Payer: COMMERCIAL

## 2025-04-23 PROCEDURE — 93798 PHYS/QHP OP CAR RHAB W/ECG: CPT

## 2025-04-25 ENCOUNTER — MEDICAL CORRESPONDENCE (OUTPATIENT)
Dept: CARDIAC REHAB | Facility: CLINIC | Age: 80
End: 2025-04-25

## 2025-04-25 ENCOUNTER — HOSPITAL ENCOUNTER (EMERGENCY)
Facility: CLINIC | Age: 80
Discharge: NURSING, REHAB OR LONG-TERM CARE | End: 2025-04-25
Attending: EMERGENCY MEDICINE | Admitting: EMERGENCY MEDICINE
Payer: COMMERCIAL

## 2025-04-25 ENCOUNTER — APPOINTMENT (OUTPATIENT)
Dept: GENERAL RADIOLOGY | Facility: CLINIC | Age: 80
End: 2025-04-25
Attending: EMERGENCY MEDICINE
Payer: COMMERCIAL

## 2025-04-25 ENCOUNTER — APPOINTMENT (OUTPATIENT)
Dept: CARDIOLOGY | Facility: CLINIC | Age: 80
DRG: 233 | End: 2025-04-25
Attending: HOSPITALIST
Payer: COMMERCIAL

## 2025-04-25 ENCOUNTER — APPOINTMENT (OUTPATIENT)
Dept: ULTRASOUND IMAGING | Facility: CLINIC | Age: 80
DRG: 233 | End: 2025-04-25
Attending: STUDENT IN AN ORGANIZED HEALTH CARE EDUCATION/TRAINING PROGRAM
Payer: COMMERCIAL

## 2025-04-25 ENCOUNTER — HOSPITAL ENCOUNTER (INPATIENT)
Facility: CLINIC | Age: 80
DRG: 233 | End: 2025-04-25
Attending: INTERNAL MEDICINE | Admitting: HOSPITALIST
Payer: COMMERCIAL

## 2025-04-25 VITALS
OXYGEN SATURATION: 95 % | DIASTOLIC BLOOD PRESSURE: 68 MMHG | SYSTOLIC BLOOD PRESSURE: 130 MMHG | RESPIRATION RATE: 21 BRPM | TEMPERATURE: 98.1 F | HEART RATE: 77 BPM

## 2025-04-25 DIAGNOSIS — J44.9 CHRONIC OBSTRUCTIVE PULMONARY DISEASE, UNSPECIFIED COPD TYPE (H): ICD-10-CM

## 2025-04-25 DIAGNOSIS — R07.9 CHEST PAIN, UNSPECIFIED TYPE: ICD-10-CM

## 2025-04-25 DIAGNOSIS — Z86.14 HX MRSA INFECTION: ICD-10-CM

## 2025-04-25 DIAGNOSIS — I21.4 NSTEMI (NON-ST ELEVATED MYOCARDIAL INFARCTION) (H): ICD-10-CM

## 2025-04-25 DIAGNOSIS — Z95.1 S/P CABG X 2: Primary | ICD-10-CM

## 2025-04-25 DIAGNOSIS — E11.9 TYPE 2 DIABETES MELLITUS WITHOUT COMPLICATION, WITHOUT LONG-TERM CURRENT USE OF INSULIN (H): ICD-10-CM

## 2025-04-25 LAB
ALBUMIN SERPL BCG-MCNC: 4 G/DL (ref 3.5–5.2)
ALBUMIN UR-MCNC: NEGATIVE MG/DL
ALP SERPL-CCNC: 140 U/L (ref 40–150)
ALT SERPL W P-5'-P-CCNC: 39 U/L (ref 0–70)
ANION GAP SERPL CALCULATED.3IONS-SCNC: 9 MMOL/L (ref 7–15)
APPEARANCE UR: CLEAR
AST SERPL W P-5'-P-CCNC: 23 U/L (ref 0–45)
ATRIAL RATE - MUSE: 65 BPM
BASOPHILS # BLD AUTO: 0.1 10E3/UL (ref 0–0.2)
BASOPHILS NFR BLD AUTO: 1 %
BILIRUB SERPL-MCNC: 0.8 MG/DL
BILIRUB UR QL STRIP: NEGATIVE
BUN SERPL-MCNC: 21.7 MG/DL (ref 8–23)
CALCIUM SERPL-MCNC: 9.4 MG/DL (ref 8.8–10.4)
CHLORIDE SERPL-SCNC: 104 MMOL/L (ref 98–107)
COLOR UR AUTO: ABNORMAL
CREAT SERPL-MCNC: 0.92 MG/DL (ref 0.67–1.17)
DIASTOLIC BLOOD PRESSURE - MUSE: NORMAL MMHG
EGFRCR SERPLBLD CKD-EPI 2021: 85 ML/MIN/1.73M2
EOSINOPHIL # BLD AUTO: 0.8 10E3/UL (ref 0–0.7)
EOSINOPHIL NFR BLD AUTO: 6 %
ERYTHROCYTE [DISTWIDTH] IN BLOOD BY AUTOMATED COUNT: 12.4 % (ref 10–15)
ERYTHROCYTE [DISTWIDTH] IN BLOOD BY AUTOMATED COUNT: 12.4 % (ref 10–15)
GLUCOSE BLDC GLUCOMTR-MCNC: 161 MG/DL (ref 70–99)
GLUCOSE BLDC GLUCOMTR-MCNC: 166 MG/DL (ref 70–99)
GLUCOSE BLDC GLUCOMTR-MCNC: 168 MG/DL (ref 70–99)
GLUCOSE BLDC GLUCOMTR-MCNC: 279 MG/DL (ref 70–99)
GLUCOSE SERPL-MCNC: 261 MG/DL (ref 70–99)
GLUCOSE UR STRIP-MCNC: NEGATIVE MG/DL
HCO3 SERPL-SCNC: 28 MMOL/L (ref 22–29)
HCT VFR BLD AUTO: 46.3 % (ref 40–53)
HCT VFR BLD AUTO: 46.4 % (ref 40–53)
HGB BLD-MCNC: 15 G/DL (ref 13.3–17.7)
HGB BLD-MCNC: 15.4 G/DL (ref 13.3–17.7)
HGB UR QL STRIP: NEGATIVE
HOLD SPECIMEN: NORMAL
IMM GRANULOCYTES # BLD: 0.1 10E3/UL
IMM GRANULOCYTES NFR BLD: 1 %
INTERPRETATION ECG - MUSE: NORMAL
KETONES UR STRIP-MCNC: NEGATIVE MG/DL
LEUKOCYTE ESTERASE UR QL STRIP: NEGATIVE
LVEF ECHO: NORMAL
LYMPHOCYTES # BLD AUTO: 2.8 10E3/UL (ref 0.8–5.3)
LYMPHOCYTES NFR BLD AUTO: 23 %
MCH RBC QN AUTO: 29.7 PG (ref 26.5–33)
MCH RBC QN AUTO: 30 PG (ref 26.5–33)
MCHC RBC AUTO-ENTMCNC: 32.3 G/DL (ref 31.5–36.5)
MCHC RBC AUTO-ENTMCNC: 33.3 G/DL (ref 31.5–36.5)
MCV RBC AUTO: 90 FL (ref 78–100)
MCV RBC AUTO: 92 FL (ref 78–100)
MONOCYTES # BLD AUTO: 0.9 10E3/UL (ref 0–1.3)
MONOCYTES NFR BLD AUTO: 8 %
NEUTROPHILS # BLD AUTO: 7.6 10E3/UL (ref 1.6–8.3)
NEUTROPHILS NFR BLD AUTO: 62 %
NITRATE UR QL: NEGATIVE
NRBC # BLD AUTO: 0 10E3/UL
NRBC BLD AUTO-RTO: 0 /100
P AXIS - MUSE: 34 DEGREES
PH UR STRIP: 8 [PH] (ref 5–7)
PLATELET # BLD AUTO: 233 10E3/UL (ref 150–450)
PLATELET # BLD AUTO: 236 10E3/UL (ref 150–450)
POTASSIUM SERPL-SCNC: 4.8 MMOL/L (ref 3.4–5.3)
PR INTERVAL - MUSE: 144 MS
PROT SERPL-MCNC: 6.4 G/DL (ref 6.4–8.3)
QRS DURATION - MUSE: 80 MS
QT - MUSE: 426 MS
QTC - MUSE: 443 MS
R AXIS - MUSE: 68 DEGREES
RBC # BLD AUTO: 5.05 10E6/UL (ref 4.4–5.9)
RBC # BLD AUTO: 5.14 10E6/UL (ref 4.4–5.9)
RBC URINE: 0 /HPF
SODIUM SERPL-SCNC: 141 MMOL/L (ref 135–145)
SP GR UR STRIP: 1 (ref 1–1.03)
SYSTOLIC BLOOD PRESSURE - MUSE: NORMAL MMHG
T AXIS - MUSE: 63 DEGREES
TROPONIN T SERPL HS-MCNC: 18 NG/L
TROPONIN T SERPL HS-MCNC: 40 NG/L
TROPONIN T SERPL HS-MCNC: 81 NG/L
TROPONIN T SERPL HS-MCNC: 95 NG/L
UFH PPP CHRO-ACNC: 0.52 IU/ML
UFH PPP CHRO-ACNC: 1.05 IU/ML
UFH PPP CHRO-ACNC: <0.1 IU/ML
UROBILINOGEN UR STRIP-MCNC: NORMAL MG/DL
VENTRICULAR RATE- MUSE: 65 BPM
WBC # BLD AUTO: 11.6 10E3/UL (ref 4–11)
WBC # BLD AUTO: 12.3 10E3/UL (ref 4–11)
WBC URINE: <1 /HPF

## 2025-04-25 PROCEDURE — 99153 MOD SED SAME PHYS/QHP EA: CPT | Performed by: INTERNAL MEDICINE

## 2025-04-25 PROCEDURE — 250N000011 HC RX IP 250 OP 636: Mod: JZ | Performed by: INTERNAL MEDICINE

## 2025-04-25 PROCEDURE — 99152 MOD SED SAME PHYS/QHP 5/>YRS: CPT | Performed by: INTERNAL MEDICINE

## 2025-04-25 PROCEDURE — 93308 TTE F-UP OR LMTD: CPT | Mod: 26 | Performed by: INTERNAL MEDICINE

## 2025-04-25 PROCEDURE — 99222 1ST HOSP IP/OBS MODERATE 55: CPT | Mod: 25 | Performed by: INTERNAL MEDICINE

## 2025-04-25 PROCEDURE — 71046 X-RAY EXAM CHEST 2 VIEWS: CPT

## 2025-04-25 PROCEDURE — 93325 DOPPLER ECHO COLOR FLOW MAPG: CPT

## 2025-04-25 PROCEDURE — C1887 CATHETER, GUIDING: HCPCS | Performed by: INTERNAL MEDICINE

## 2025-04-25 PROCEDURE — 85004 AUTOMATED DIFF WBC COUNT: CPT | Performed by: EMERGENCY MEDICINE

## 2025-04-25 PROCEDURE — B2111ZZ FLUOROSCOPY OF MULTIPLE CORONARY ARTERIES USING LOW OSMOLAR CONTRAST: ICD-10-PCS | Performed by: INTERNAL MEDICINE

## 2025-04-25 PROCEDURE — 210N000001 HC R&B IMCU HEART CARE

## 2025-04-25 PROCEDURE — 85014 HEMATOCRIT: CPT | Performed by: HOSPITALIST

## 2025-04-25 PROCEDURE — 255N000002 HC RX 255 OP 636: Performed by: HOSPITALIST

## 2025-04-25 PROCEDURE — 250N000013 HC RX MED GY IP 250 OP 250 PS 637: Performed by: INTERNAL MEDICINE

## 2025-04-25 PROCEDURE — 250N000012 HC RX MED GY IP 250 OP 636 PS 637: Performed by: HOSPITALIST

## 2025-04-25 PROCEDURE — 93454 CORONARY ARTERY ANGIO S&I: CPT | Mod: 26 | Performed by: INTERNAL MEDICINE

## 2025-04-25 PROCEDURE — 85520 HEPARIN ASSAY: CPT | Performed by: INTERNAL MEDICINE

## 2025-04-25 PROCEDURE — C1769 GUIDE WIRE: HCPCS | Performed by: INTERNAL MEDICINE

## 2025-04-25 PROCEDURE — 250N000011 HC RX IP 250 OP 636: Performed by: EMERGENCY MEDICINE

## 2025-04-25 PROCEDURE — 99285 EMERGENCY DEPT VISIT HI MDM: CPT | Mod: 25 | Performed by: EMERGENCY MEDICINE

## 2025-04-25 PROCEDURE — 250N000009 HC RX 250: Performed by: INTERNAL MEDICINE

## 2025-04-25 PROCEDURE — C9460 INJECTION, CANGRELOR: HCPCS | Performed by: INTERNAL MEDICINE

## 2025-04-25 PROCEDURE — 93005 ELECTROCARDIOGRAM TRACING: CPT | Performed by: EMERGENCY MEDICINE

## 2025-04-25 PROCEDURE — 999N000157 HC STATISTIC RCP TIME EA 10 MIN

## 2025-04-25 PROCEDURE — 94640 AIRWAY INHALATION TREATMENT: CPT

## 2025-04-25 PROCEDURE — 250N000009 HC RX 250: Performed by: HOSPITALIST

## 2025-04-25 PROCEDURE — 93321 DOPPLER ECHO F-UP/LMTD STD: CPT | Mod: 26 | Performed by: INTERNAL MEDICINE

## 2025-04-25 PROCEDURE — 93325 DOPPLER ECHO COLOR FLOW MAPG: CPT | Mod: 26 | Performed by: INTERNAL MEDICINE

## 2025-04-25 PROCEDURE — 80053 COMPREHEN METABOLIC PANEL: CPT | Performed by: EMERGENCY MEDICINE

## 2025-04-25 PROCEDURE — 258N000003 HC RX IP 258 OP 636: Performed by: INTERNAL MEDICINE

## 2025-04-25 PROCEDURE — 96366 THER/PROPH/DIAG IV INF ADDON: CPT | Performed by: EMERGENCY MEDICINE

## 2025-04-25 PROCEDURE — 96365 THER/PROPH/DIAG IV INF INIT: CPT | Performed by: EMERGENCY MEDICINE

## 2025-04-25 PROCEDURE — 36415 COLL VENOUS BLD VENIPUNCTURE: CPT | Performed by: EMERGENCY MEDICINE

## 2025-04-25 PROCEDURE — C1894 INTRO/SHEATH, NON-LASER: HCPCS | Performed by: INTERNAL MEDICINE

## 2025-04-25 PROCEDURE — 250N000013 HC RX MED GY IP 250 OP 250 PS 637: Performed by: HOSPITALIST

## 2025-04-25 PROCEDURE — 250N000011 HC RX IP 250 OP 636: Performed by: INTERNAL MEDICINE

## 2025-04-25 PROCEDURE — 84484 ASSAY OF TROPONIN QUANT: CPT | Performed by: EMERGENCY MEDICINE

## 2025-04-25 PROCEDURE — 36415 COLL VENOUS BLD VENIPUNCTURE: CPT | Performed by: HOSPITALIST

## 2025-04-25 PROCEDURE — 93454 CORONARY ARTERY ANGIO S&I: CPT | Performed by: INTERNAL MEDICINE

## 2025-04-25 PROCEDURE — 93880 EXTRACRANIAL BILAT STUDY: CPT

## 2025-04-25 PROCEDURE — 81001 URINALYSIS AUTO W/SCOPE: CPT | Performed by: STUDENT IN AN ORGANIZED HEALTH CARE EDUCATION/TRAINING PROGRAM

## 2025-04-25 PROCEDURE — 84484 ASSAY OF TROPONIN QUANT: CPT | Performed by: HOSPITALIST

## 2025-04-25 PROCEDURE — 36415 COLL VENOUS BLD VENIPUNCTURE: CPT | Performed by: INTERNAL MEDICINE

## 2025-04-25 PROCEDURE — 93010 ELECTROCARDIOGRAM REPORT: CPT | Performed by: EMERGENCY MEDICINE

## 2025-04-25 PROCEDURE — 250N000013 HC RX MED GY IP 250 OP 250 PS 637: Performed by: EMERGENCY MEDICINE

## 2025-04-25 PROCEDURE — 99223 1ST HOSP IP/OBS HIGH 75: CPT | Mod: AI | Performed by: HOSPITALIST

## 2025-04-25 PROCEDURE — 93005 ELECTROCARDIOGRAM TRACING: CPT

## 2025-04-25 PROCEDURE — 250N000012 HC RX MED GY IP 250 OP 636 PS 637: Performed by: INTERNAL MEDICINE

## 2025-04-25 PROCEDURE — 94640 AIRWAY INHALATION TREATMENT: CPT | Mod: 76

## 2025-04-25 PROCEDURE — 99285 EMERGENCY DEPT VISIT HI MDM: CPT | Performed by: EMERGENCY MEDICINE

## 2025-04-25 PROCEDURE — 272N000001 HC OR GENERAL SUPPLY STERILE: Performed by: INTERNAL MEDICINE

## 2025-04-25 PROCEDURE — 99222 1ST HOSP IP/OBS MODERATE 55: CPT | Mod: FS | Performed by: PHYSICIAN ASSISTANT

## 2025-04-25 RX ORDER — MAGNESIUM HYDROXIDE/ALUMINUM HYDROXICE/SIMETHICONE 120; 1200; 1200 MG/30ML; MG/30ML; MG/30ML
30 SUSPENSION ORAL ONCE
Status: COMPLETED | OUTPATIENT
Start: 2025-04-25 | End: 2025-04-25

## 2025-04-25 RX ORDER — NICOTINE POLACRILEX 4 MG
15-30 LOZENGE BUCCAL
Status: DISCONTINUED | OUTPATIENT
Start: 2025-04-25 | End: 2025-05-01

## 2025-04-25 RX ORDER — POTASSIUM CHLORIDE 1500 MG/1
20 TABLET, EXTENDED RELEASE ORAL
Status: DISCONTINUED | OUTPATIENT
Start: 2025-04-25 | End: 2025-04-25 | Stop reason: HOSPADM

## 2025-04-25 RX ORDER — ONDANSETRON 2 MG/ML
4 INJECTION INTRAMUSCULAR; INTRAVENOUS EVERY 6 HOURS PRN
Status: DISCONTINUED | OUTPATIENT
Start: 2025-04-25 | End: 2025-05-01

## 2025-04-25 RX ORDER — POLYETHYLENE GLYCOL 3350 17 G/17G
17 POWDER, FOR SOLUTION ORAL 2 TIMES DAILY PRN
Status: DISCONTINUED | OUTPATIENT
Start: 2025-04-25 | End: 2025-05-01

## 2025-04-25 RX ORDER — HEPARIN SODIUM 10000 [USP'U]/100ML
0-5000 INJECTION, SOLUTION INTRAVENOUS CONTINUOUS
Status: DISCONTINUED | OUTPATIENT
Start: 2025-04-25 | End: 2025-05-01

## 2025-04-25 RX ORDER — LIDOCAINE 40 MG/G
CREAM TOPICAL
Status: DISCONTINUED | OUTPATIENT
Start: 2025-04-25 | End: 2025-04-25 | Stop reason: HOSPADM

## 2025-04-25 RX ORDER — NITROGLYCERIN 5 MG/ML
VIAL (ML) INTRAVENOUS
Status: DISCONTINUED | OUTPATIENT
Start: 2025-04-25 | End: 2025-04-25 | Stop reason: HOSPADM

## 2025-04-25 RX ORDER — NICOTINE POLACRILEX 4 MG
15-30 LOZENGE BUCCAL
Status: DISCONTINUED | OUTPATIENT
Start: 2025-04-25 | End: 2025-04-25

## 2025-04-25 RX ORDER — ACETAMINOPHEN 325 MG/1
650 TABLET ORAL EVERY 4 HOURS PRN
Status: DISCONTINUED | OUTPATIENT
Start: 2025-04-25 | End: 2025-04-25

## 2025-04-25 RX ORDER — MONTELUKAST SODIUM 10 MG/1
10 TABLET ORAL AT BEDTIME
Status: DISCONTINUED | OUTPATIENT
Start: 2025-04-25 | End: 2025-05-01

## 2025-04-25 RX ORDER — PROCHLORPERAZINE MALEATE 5 MG/1
5 TABLET ORAL EVERY 6 HOURS PRN
Status: DISCONTINUED | OUTPATIENT
Start: 2025-04-25 | End: 2025-05-01

## 2025-04-25 RX ORDER — FLUTICASONE FUROATE AND VILANTEROL 100; 25 UG/1; UG/1
1 POWDER RESPIRATORY (INHALATION) DAILY
Status: DISCONTINUED | OUTPATIENT
Start: 2025-04-25 | End: 2025-05-01

## 2025-04-25 RX ORDER — OXYCODONE HYDROCHLORIDE 5 MG/1
5 TABLET ORAL EVERY 4 HOURS PRN
Status: DISCONTINUED | OUTPATIENT
Start: 2025-04-25 | End: 2025-05-01

## 2025-04-25 RX ORDER — IPRATROPIUM BROMIDE AND ALBUTEROL SULFATE 2.5; .5 MG/3ML; MG/3ML
3 SOLUTION RESPIRATORY (INHALATION) 3 TIMES DAILY
Status: DISCONTINUED | OUTPATIENT
Start: 2025-04-25 | End: 2025-04-28

## 2025-04-25 RX ORDER — ACETAMINOPHEN 325 MG/1
650 TABLET ORAL EVERY 4 HOURS PRN
Status: DISCONTINUED | OUTPATIENT
Start: 2025-04-25 | End: 2025-05-01

## 2025-04-25 RX ORDER — ASPIRIN 81 MG/1
324 TABLET, CHEWABLE ORAL ONCE
Status: COMPLETED | OUTPATIENT
Start: 2025-04-25 | End: 2025-04-25

## 2025-04-25 RX ORDER — SODIUM CHLORIDE 9 MG/ML
75 INJECTION, SOLUTION INTRAVENOUS CONTINUOUS
Status: ACTIVE | OUTPATIENT
Start: 2025-04-25 | End: 2025-04-25

## 2025-04-25 RX ORDER — ASPIRIN 81 MG/1
81 TABLET ORAL DAILY
Status: DISCONTINUED | OUTPATIENT
Start: 2025-04-26 | End: 2025-05-01

## 2025-04-25 RX ORDER — ACETAMINOPHEN 650 MG/1
650 SUPPOSITORY RECTAL EVERY 4 HOURS PRN
Status: DISCONTINUED | OUTPATIENT
Start: 2025-04-25 | End: 2025-05-01

## 2025-04-25 RX ORDER — FENTANYL CITRATE 50 UG/ML
INJECTION, SOLUTION INTRAMUSCULAR; INTRAVENOUS
Status: DISCONTINUED | OUTPATIENT
Start: 2025-04-25 | End: 2025-04-25 | Stop reason: HOSPADM

## 2025-04-25 RX ORDER — DEXTROSE MONOHYDRATE 25 G/50ML
25-50 INJECTION, SOLUTION INTRAVENOUS
Status: DISCONTINUED | OUTPATIENT
Start: 2025-04-25 | End: 2025-05-01

## 2025-04-25 RX ORDER — LOSARTAN POTASSIUM AND HYDROCHLOROTHIAZIDE 12.5; 5 MG/1; MG/1
1 TABLET ORAL DAILY
Status: DISCONTINUED | OUTPATIENT
Start: 2025-04-26 | End: 2025-05-01

## 2025-04-25 RX ORDER — OMEPRAZOLE 20 MG/1
20 CAPSULE, DELAYED RELEASE ORAL DAILY
Status: DISCONTINUED | OUTPATIENT
Start: 2025-04-25 | End: 2025-04-25 | Stop reason: ALTCHOICE

## 2025-04-25 RX ORDER — AMOXICILLIN 250 MG
1 CAPSULE ORAL 2 TIMES DAILY PRN
Status: DISCONTINUED | OUTPATIENT
Start: 2025-04-25 | End: 2025-05-01

## 2025-04-25 RX ORDER — HEPARIN SODIUM 10000 [USP'U]/100ML
0-5000 INJECTION, SOLUTION INTRAVENOUS CONTINUOUS
Status: DISCONTINUED | OUTPATIENT
Start: 2025-04-25 | End: 2025-04-25 | Stop reason: HOSPADM

## 2025-04-25 RX ORDER — LIDOCAINE 40 MG/G
CREAM TOPICAL
Status: DISCONTINUED | OUTPATIENT
Start: 2025-04-25 | End: 2025-05-01

## 2025-04-25 RX ORDER — DEXTROSE MONOHYDRATE 25 G/50ML
25-50 INJECTION, SOLUTION INTRAVENOUS
Status: DISCONTINUED | OUTPATIENT
Start: 2025-04-25 | End: 2025-04-25

## 2025-04-25 RX ORDER — LORAZEPAM 0.5 MG/1
0.5 TABLET ORAL
Status: DISCONTINUED | OUTPATIENT
Start: 2025-04-25 | End: 2025-04-25 | Stop reason: HOSPADM

## 2025-04-25 RX ORDER — LORAZEPAM 2 MG/ML
0.5 INJECTION INTRAMUSCULAR
Status: DISCONTINUED | OUTPATIENT
Start: 2025-04-25 | End: 2025-04-25 | Stop reason: HOSPADM

## 2025-04-25 RX ORDER — CARVEDILOL 6.25 MG/1
6.25 TABLET ORAL 2 TIMES DAILY
Status: DISCONTINUED | OUTPATIENT
Start: 2025-04-25 | End: 2025-05-01

## 2025-04-25 RX ORDER — AMOXICILLIN 250 MG
2 CAPSULE ORAL 2 TIMES DAILY PRN
Status: DISCONTINUED | OUTPATIENT
Start: 2025-04-25 | End: 2025-05-01

## 2025-04-25 RX ORDER — SODIUM CHLORIDE 9 MG/ML
INJECTION, SOLUTION INTRAVENOUS CONTINUOUS
Status: DISCONTINUED | OUTPATIENT
Start: 2025-04-25 | End: 2025-04-25 | Stop reason: HOSPADM

## 2025-04-25 RX ORDER — ATORVASTATIN CALCIUM 40 MG/1
80 TABLET, FILM COATED ORAL AT BEDTIME
Status: DISCONTINUED | OUTPATIENT
Start: 2025-04-25 | End: 2025-05-01

## 2025-04-25 RX ORDER — NALOXONE HYDROCHLORIDE 0.4 MG/ML
0.4 INJECTION, SOLUTION INTRAMUSCULAR; INTRAVENOUS; SUBCUTANEOUS
Status: ACTIVE | OUTPATIENT
Start: 2025-04-25 | End: 2025-04-25

## 2025-04-25 RX ORDER — OXYCODONE HYDROCHLORIDE 5 MG/1
10 TABLET ORAL EVERY 4 HOURS PRN
Status: DISCONTINUED | OUTPATIENT
Start: 2025-04-25 | End: 2025-05-01

## 2025-04-25 RX ORDER — NITROGLYCERIN 0.4 MG/1
0.4 TABLET SUBLINGUAL EVERY 5 MIN PRN
Status: DISCONTINUED | OUTPATIENT
Start: 2025-04-25 | End: 2025-05-01

## 2025-04-25 RX ORDER — HEPARIN SODIUM 1000 [USP'U]/ML
INJECTION, SOLUTION INTRAVENOUS; SUBCUTANEOUS
Status: DISCONTINUED | OUTPATIENT
Start: 2025-04-25 | End: 2025-04-25 | Stop reason: HOSPADM

## 2025-04-25 RX ORDER — NALOXONE HYDROCHLORIDE 0.4 MG/ML
0.2 INJECTION, SOLUTION INTRAMUSCULAR; INTRAVENOUS; SUBCUTANEOUS
Status: ACTIVE | OUTPATIENT
Start: 2025-04-25 | End: 2025-04-25

## 2025-04-25 RX ORDER — CALCIUM CARBONATE 500 MG/1
1000 TABLET, CHEWABLE ORAL 4 TIMES DAILY PRN
Status: DISCONTINUED | OUTPATIENT
Start: 2025-04-25 | End: 2025-05-01

## 2025-04-25 RX ORDER — FAMOTIDINE 20 MG/1
40 TABLET, FILM COATED ORAL ONCE
Status: COMPLETED | OUTPATIENT
Start: 2025-04-25 | End: 2025-04-25

## 2025-04-25 RX ORDER — ASPIRIN 81 MG/1
243 TABLET, CHEWABLE ORAL ONCE
Status: COMPLETED | OUTPATIENT
Start: 2025-04-25 | End: 2025-04-25

## 2025-04-25 RX ORDER — IOPAMIDOL 755 MG/ML
INJECTION, SOLUTION INTRAVASCULAR
Status: DISCONTINUED | OUTPATIENT
Start: 2025-04-25 | End: 2025-04-25 | Stop reason: HOSPADM

## 2025-04-25 RX ORDER — FENTANYL CITRATE 50 UG/ML
25 INJECTION, SOLUTION INTRAMUSCULAR; INTRAVENOUS
Status: DISCONTINUED | OUTPATIENT
Start: 2025-04-25 | End: 2025-04-26

## 2025-04-25 RX ORDER — FLUMAZENIL 0.1 MG/ML
0.2 INJECTION, SOLUTION INTRAVENOUS
Status: ACTIVE | OUTPATIENT
Start: 2025-04-25 | End: 2025-04-25

## 2025-04-25 RX ORDER — VERAPAMIL HYDROCHLORIDE 2.5 MG/ML
INJECTION INTRAVENOUS
Status: DISCONTINUED | OUTPATIENT
Start: 2025-04-25 | End: 2025-04-25 | Stop reason: HOSPADM

## 2025-04-25 RX ORDER — PANTOPRAZOLE SODIUM 40 MG/1
40 TABLET, DELAYED RELEASE ORAL
Status: DISCONTINUED | OUTPATIENT
Start: 2025-04-25 | End: 2025-05-01

## 2025-04-25 RX ORDER — ASPIRIN 325 MG
325 TABLET ORAL ONCE
Status: COMPLETED | OUTPATIENT
Start: 2025-04-25 | End: 2025-04-25

## 2025-04-25 RX ORDER — TICAGRELOR 90 MG/1
90 TABLET, FILM COATED ORAL 2 TIMES DAILY
Status: DISCONTINUED | OUTPATIENT
Start: 2025-04-25 | End: 2025-04-25

## 2025-04-25 RX ORDER — LOSARTAN POTASSIUM 50 MG/1
50 TABLET ORAL DAILY
Status: COMPLETED | OUTPATIENT
Start: 2025-04-25 | End: 2025-04-25

## 2025-04-25 RX ORDER — ATROPINE SULFATE 0.1 MG/ML
0.5 INJECTION INTRAVENOUS
Status: ACTIVE | OUTPATIENT
Start: 2025-04-25 | End: 2025-04-25

## 2025-04-25 RX ORDER — ONDANSETRON 4 MG/1
4 TABLET, ORALLY DISINTEGRATING ORAL EVERY 6 HOURS PRN
Status: DISCONTINUED | OUTPATIENT
Start: 2025-04-25 | End: 2025-05-01

## 2025-04-25 RX ADMIN — CARVEDILOL 6.25 MG: 6.25 TABLET, FILM COATED ORAL at 21:40

## 2025-04-25 RX ADMIN — LOSARTAN POTASSIUM 50 MG: 50 TABLET, FILM COATED ORAL at 10:38

## 2025-04-25 RX ADMIN — INSULIN GLARGINE 16 UNITS: 100 INJECTION, SOLUTION SUBCUTANEOUS at 23:35

## 2025-04-25 RX ADMIN — INSULIN ASPART 1 UNITS: 100 INJECTION, SOLUTION INTRAVENOUS; SUBCUTANEOUS at 10:38

## 2025-04-25 RX ADMIN — ASPIRIN 325 MG ORAL TABLET 325 MG: 325 PILL ORAL at 10:37

## 2025-04-25 RX ADMIN — PERFLUTREN 1.5 ML: 6.52 INJECTION, SUSPENSION INTRAVENOUS at 12:01

## 2025-04-25 RX ADMIN — ASPIRIN 81 MG CHEWABLE TABLET 324 MG: 81 TABLET CHEWABLE at 01:30

## 2025-04-25 RX ADMIN — ALUMINUM HYDROXIDE, MAGNESIUM HYDROXIDE, AND SIMETHICONE 30 ML: 200; 200; 20 SUSPENSION ORAL at 01:32

## 2025-04-25 RX ADMIN — FLUTICASONE FUROATE AND VILANTEROL TRIFENATATE 1 PUFF: 100; 25 POWDER RESPIRATORY (INHALATION) at 12:40

## 2025-04-25 RX ADMIN — INSULIN ASPART 1 UNITS: 100 INJECTION, SOLUTION INTRAVENOUS; SUBCUTANEOUS at 13:56

## 2025-04-25 RX ADMIN — CANGRELOR 0.75 MCG/KG/MIN: 50 INJECTION, POWDER, LYOPHILIZED, FOR SOLUTION INTRAVENOUS at 21:00

## 2025-04-25 RX ADMIN — PANTOPRAZOLE SODIUM 40 MG: 40 TABLET, DELAYED RELEASE ORAL at 10:37

## 2025-04-25 RX ADMIN — TICAGRELOR 90 MG: 90 TABLET ORAL at 10:38

## 2025-04-25 RX ADMIN — IPRATROPIUM BROMIDE AND ALBUTEROL SULFATE 3 ML: .5; 3 SOLUTION RESPIRATORY (INHALATION) at 13:13

## 2025-04-25 RX ADMIN — MONTELUKAST 10 MG: 10 TABLET, FILM COATED ORAL at 21:39

## 2025-04-25 RX ADMIN — ATORVASTATIN CALCIUM 80 MG: 80 TABLET, FILM COATED ORAL at 21:39

## 2025-04-25 RX ADMIN — IPRATROPIUM BROMIDE AND ALBUTEROL SULFATE 3 ML: .5; 3 SOLUTION RESPIRATORY (INHALATION) at 10:00

## 2025-04-25 RX ADMIN — CARVEDILOL 6.25 MG: 6.25 TABLET, FILM COATED ORAL at 10:37

## 2025-04-25 RX ADMIN — FAMOTIDINE 40 MG: 20 TABLET, FILM COATED ORAL at 01:31

## 2025-04-25 RX ADMIN — IPRATROPIUM BROMIDE AND ALBUTEROL SULFATE 3 ML: .5; 3 SOLUTION RESPIRATORY (INHALATION) at 19:46

## 2025-04-25 RX ADMIN — HEPARIN SODIUM 850 UNITS/HR: 10000 INJECTION, SOLUTION INTRAVENOUS at 03:00

## 2025-04-25 RX ADMIN — SODIUM CHLORIDE: 0.9 INJECTION, SOLUTION INTRAVENOUS at 10:34

## 2025-04-25 ASSESSMENT — ACTIVITIES OF DAILY LIVING (ADL)
ADLS_ACUITY_SCORE: 41
ADLS_ACUITY_SCORE: 62
ADLS_ACUITY_SCORE: 62
ADLS_ACUITY_SCORE: 41
ADLS_ACUITY_SCORE: 41
ADLS_ACUITY_SCORE: 58
ADLS_ACUITY_SCORE: 41
ADLS_ACUITY_SCORE: 41
ADLS_ACUITY_SCORE: 58
ADLS_ACUITY_SCORE: 59
ADLS_ACUITY_SCORE: 58
ADLS_ACUITY_SCORE: 41
ADLS_ACUITY_SCORE: 58
ADLS_ACUITY_SCORE: 41

## 2025-04-25 ASSESSMENT — COLUMBIA-SUICIDE SEVERITY RATING SCALE - C-SSRS
6. HAVE YOU EVER DONE ANYTHING, STARTED TO DO ANYTHING, OR PREPARED TO DO ANYTHING TO END YOUR LIFE?: NO
1. IN THE PAST MONTH, HAVE YOU WISHED YOU WERE DEAD OR WISHED YOU COULD GO TO SLEEP AND NOT WAKE UP?: NO
2. HAVE YOU ACTUALLY HAD ANY THOUGHTS OF KILLING YOURSELF IN THE PAST MONTH?: NO

## 2025-04-25 NOTE — PROGRESS NOTES
"CLINICAL NUTRITION SERVICES - ASSESSMENT NOTE    Registered Dietitian Interventions:  Continue cardiac diet once no longer NPO     REASON FOR ASSESSMENT  Positive admission nutrition risk screen    INFORMATION OBTAINED  Patient not available for interview due to as pt wasn't in room when attempted to visit    NUTRITION HISTORY  - Unable to obtain nutrition history    CURRENT NUTRITION ORDERS  Diet:Orders Placed This Encounter      NPO for Procedure/Surgery per Anesthesia Guidelines Except for: Meds; Clear liquids before procedure/surgery: ADULT (Age GREATER than or Equal to 18 years) - Clear liquids 2 hours before procedure/surgery      NPO for Procedure/Surgery per Anesthesia Guidelines Except for: Meds; Clear liquids before procedure/surgery: ADULT (Age GREATER than or Equal to 18 years) - Clear liquids 2 hours before procedure/surgery  Snacks/Supplements:       CURRENT INTAKE/TOLERANCE  - Flowsheets show a no appetite or intakes yet     LABS  Nutrition-relevant labs: Reviewed    MEDICATIONS  Nutrition-relevant medications: medium sliding scale insulin, Lantus 16 unit    ANTHROPOMETRICS  Height: 174 cm (5' 8.5\")  Admission Weight: 67.8 kg (149 lb 6.4 oz) (Pt had jeans and belt on) (04/25/25 0806)   Most Recent Weight: 67.8 kg (149 lb 6.4 oz) (Pt had jeans and belt on) (04/25/25 0806)  IBW: 69.6 kg (97%)  BMI: Body mass index is 22.39 kg/m .   Weight History: 7% in 5 months, 10% in 10 months  Wt Readings from Last 20 Encounters:   04/25/25 67.8 kg (149 lb 6.4 oz)   04/16/25 70.2 kg (154 lb 12.2 oz)   04/11/25 70.2 kg (154 lb 12.8 oz)   04/03/25 69 kg (152 lb 3.2 oz)   04/01/25 68.9 kg (152 lb)   04/01/25 68.9 kg (152 lb)   03/17/25 69.4 kg (153 lb)   03/13/25 69.8 kg (153 lb 12.8 oz)   03/09/25 69.4 kg (153 lb)   02/04/25 69.1 kg (152 lb 6.4 oz)   01/19/25 69.3 kg (152 lb 12.5 oz)   01/17/25 66.7 kg (147 lb)   01/08/25 69.4 kg (153 lb)   12/31/24 73.3 kg (161 lb 9.6 oz)   12/13/24 74.4 kg (164 lb)   12/12/24 74.4 " kg (164 lb 0.3 oz)   06/03/24 76 kg (167 lb 9.6 oz)   05/03/24 74.8 kg (165 lb)   05/02/24 74.4 kg (164 lb)   04/18/24 74.6 kg (164 lb 6.4 oz)     Dosing Weight: 68 kg, based on actual wt    ASSESSED NUTRITION NEEDS  Estimated Energy Needs: 4996-6887 kcals (25-30 Kcal/Kg)  Justification: maintenance  Estimated Protein Needs: 68-82 grams protein (1-1.2 g pro/Kg)  Justification: maintenance  Estimated Fluid Needs: 9077-2751  mL (1 mL/Kcal)  Justification: maintenance     SYSTEM FINDINGS  GI symptoms: Reviewed  Skin/wounds: Reviewed    MALNUTRITION  % Intake: Unable to assess  % Weight Loss: Weight loss does not meet criteria   Subcutaneous Fat Loss: Unable to assess  Muscle Loss: Unable to assess  Fluid Accumulation/Edema: None noted  Malnutrition Diagnosis: Unable to determine due to lack of NFPE and Hx  Malnutrition Present on Admission: Unable to assess    NUTRITION DIAGNOSIS  No nutrition diagnosis at this time     INTERVENTIONS  See nutrition interventions above    GOALS  Patient to consume % of nutritionally adequate meal trays TID, or the equivalent with supplements/snacks.     MONITORING/EVALUATION  Progress toward goals will be monitored and evaluated per policy.

## 2025-04-25 NOTE — PHARMACY-ADMISSION MEDICATION HISTORY
"Pharmacist Admission Medication History    Admission medication history is complete. The information provided in this note is only as accurate as the sources available at the time of the update.    Information Source(s): Patient via in-person    Pertinent Information: Pt does not remember if he is currently taking symbicort & tiotropium or Breztri at the moment, he says he has both at home.  Per clinic note on 4/10 \" assistance application(s) for Ozempic, Breztri and Brilinta have been sent via inter-office to the [facility] and mailed to the patient.\"    Pt says he is not taking Ozempic right now as it is $500 a month    Additionally he is not currently on prednisone. He states it is the only thing that helps with asthma exacerbations but he has to \"fight tooth and nail to get it.\"     Lastly, patient missed all his meds yesterday     Changes made to PTA medication list:  Added: None  Deleted: None  Changed: None    Allergies reviewed with patient and updates made in EHR: no    Medication History Completed By: Demetria Urrutia RPH 4/25/2025 10:08 AM    PTA Med List   Medication Sig Last Dose/Taking    acetaminophen (TYLENOL) 325 MG tablet Take 2 tablets (650 mg) by mouth every 8 hours. (Patient taking differently: Take 325-650 mg by mouth every 6 hours as needed for mild pain.) Taking Differently    albuterol (PROAIR HFA/PROVENTIL HFA/VENTOLIN HFA) 108 (90 Base) MCG/ACT inhaler Inhale 1-2 puffs into the lungs every 4 hours as needed for shortness of breath or wheezing. Unknown    aspirin (ASA) 81 MG EC tablet Take 81 mg by mouth at bedtime. 4/23/2025    atorvastatin (LIPITOR) 80 MG tablet Take 1 tablet (80 mg) by mouth at bedtime. 4/23/2025    budesonide-formoterol (SYMBICORT) 80-4.5 MCG/ACT Inhaler Inhale 2 puffs into the lungs 2 times daily. Unknown    carvedilol (COREG) 6.25 MG tablet Take 1 tablet (6.25 mg) by mouth 2 times daily. 4/23/2025    insulin glargine (LANTUS PEN) 100 UNIT/ML pen " Inject 20 Units subcutaneously at bedtime. 4/23/2025    ipratropium - albuterol 0.5 mg/2.5 mg/3 mL (DUONEB) 0.5-2.5 (3) MG/3ML neb solution Take 1 vial (3 mLs) by nebulization every 6 hours as needed for shortness of breath, wheezing or cough. Unknown    losartan-hydrochlorothiazide (HYZAAR) 50-12.5 MG tablet Take 1 tablet by mouth daily. 4/23/2025    montelukast (SINGULAIR) 10 MG tablet TAKE ONE TABLET BY MOUTH AT BEDTIME 4/23/2025    nitroGLYcerin (NITROSTAT) 0.4 MG sublingual tablet For chest pain place 1 tablet under the tongue every 5 minutes for 3 doses. If symptoms persist 5 minutes after 1st dose call 911. Taking    omeprazole (PRILOSEC) 20 MG DR capsule Take 1 capsule (20 mg) by mouth daily. 4/23/2025    ticagrelor (BRILINTA) 90 MG tablet Take 1 tablet (90 mg) by mouth 2 times daily. Dose to start tomorrow morning. 4/23/2025    tiotropium (SPIRIVA RESPIMAT) 2.5 MCG/ACT inhaler Inhale 2 puffs into the lungs daily. Unknown    triamcinolone (KENALOG) 0.1 % external cream APPLY TOPICALLY 2 TIMES DAILY TO LEFT ANKLE (Patient taking differently: 2 times daily as needed for irritation (ankle).) Unknown

## 2025-04-25 NOTE — ED PROVIDER NOTES
History     Chief Complaint   Patient presents with    Chest Pain     HPI  Raul Wilhelm is a 79 year old male with a history of coronary artery disease who presents for chest pain.  The patient says that this feels like indigestion, started about 2 hours prior to arrival, started while he was sitting on the couch watching television.  Pain is located in the center of his chest and radiates down into his abdomen.  No nausea or vomiting.  He does not feel more short of breath than his norm.  No diaphoresis.  He tried taking some Tums and Pepto-Bismol with maybe some mild improvement.  No bloody stools or diarrhea.    Allergies:  Allergies   Allergen Reactions    Simvastatin Swelling     Severe muscle pain, swelling, and bruising    Dust Mites        Problem List:    Patient Active Problem List    Diagnosis Date Noted    Pulmonary nodules 02/04/2025     Priority: Medium     Needs follow-up CT 1/2026      Pneumonia of both lungs due to infectious organism, unspecified part of lung 01/17/2025     Priority: Medium    Zenker diverticula 01/17/2025     Priority: Medium     2025 - Zenker's found on recent admission swallow study and esophagram. It does not completely empty with multiple swallows and explains symptoms of regurgitation/dysphagia      Status post total knee replacement 05/28/2020     Priority: Medium    Coronary artery disease involving native coronary artery of native heart without angina pectoris 05/19/2020     Priority: Medium     STEMI 11/2015.  At that time, coronary angiography demonstrated a 99% stenosis in the mid circumflex as well as 100% thrombotic occlusion of the left-sided PDA.  He underwent successful revascularization of the left PDA as well as the mid circumflex with drug-eluting stents at the time.  Of note, he had an approximately 80% stenosis in the mid-LAD and a 70% stenosis in the first obtuse marginal.  These were not intervened on at the time, and a subsequent stress nuclear  perfusion study demonstrated a large inferior and inferoseptal infarction with no evidence of rian-infarct ischemia.  Given these findings and his complete lack of symptoms, we have elected to treat his residual coronary artery disease medically.  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  Patient had syncope with PFT and declines further PFT  FEV1 1.99 61% in 2009      History of arterial ischemic stroke 02/25/2011     Priority: Medium     Small infarct - seen on MRI  Diagnosis updated by automated process. Provider to review and confirm.      Hyperlipidemia LDL goal <70 02/25/2011     Priority: Medium    Essential hypertension 03/15/2010     Priority: Medium    Glaucoma 03/12/2010     Priority: Medium     Worsened pressures with prednisone use.   Recommended by eye doctor to stop inhaled steroids if possible, but has been on for so many years would potentially put him into more resp problems.   Follows with [facility].    Needs eye exam if prednisone course is longer than 10 days        Family hx of colon cancer 02/18/2009     Priority: Medium     (Problem list name updated by automated process. Provider to review and confirm.)          Past Medical History:    Past Medical History:   Diagnosis Date     Chronic airway obstruction, not elsewhere classified     Other and unspecified hyperlipidemia        Past Surgical History:    Past Surgical History:   Procedure Laterality Date    ARTHROPLASTY KNEE Left 5/28/2020    Procedure: Left Total Knee Arthroplasty;  Surgeon: Sanjay Downey MD;  Location: [facility] OR    ARTHROPLASTY KNEE Right 11/2/2020    Procedure: ARTHROPLASTY, KNEE, TOTAL;  Surgeon: Sanjay Downey MD;  Location: [facility] OR    CV CORONARY ANGIOGRAM N/A 1/4/2025    Procedure: Coronary Angiogram;  Surgeon: Jacobo Montgomery MD;  Location: [facility] CV    CV PCI STENT DRUG ELUTING N/A 1/4/2025    Procedure: Percutaneous Coronary Intervention Stent;  Surgeon: Jacobo Montgomery MD;  Location: [facility] CV    ENT SURGERY  as a child    tonsillectomy       Family History:    Family History   Problem Relation Age of Onset    Diabetes Mother     Heart Disease Mother     C.A.D. Mother     Cancer Father     Hypertension Father     Cerebrovascular Disease Paternal Grandfather     Asthma Sister     Breast Cancer No family hx of     Cancer - colorectal No family hx of     Prostate Cancer No family hx of        Social History:  Marital Status:   [2]  Social History     Tobacco Use    Smoking status: Never    Smokeless tobacco: Never   Vaping Use    Vaping status: Never Used   Substance Use Topics    Alcohol use: Yes     Comment: Rare    Drug use: No        Medications:    acetaminophen (TYLENOL) 325 MG tablet  albuterol (PROAIR HFA/PROVENTIL HFA/VENTOLIN HFA) 108 (90 Base) MCG/ACT inhaler  Alcohol Swabs (B-D SINGLE USE SWABS REGULAR) PADS  aspirin (ASA) 81 MG EC tablet  atorvastatin (LIPITOR) 80 MG tablet  blood glucose monitoring (NO BRAND SPECIFIED) meter device kit  budeson-glycopyrrol-formoterol (BREZTRI AEROSPHERE) 160-9-4.8 MCG/ACT AERO inhaler  budesonide-formoterol (SYMBICORT) 80-4.5 MCG/ACT Inhaler  carvedilol (COREG) 6.25 MG tablet  insulin glargine (LANTUS PEN) 100 UNIT/ML  pen  insulin pen needle (BD LIZBETH U/F) 32G X 4 MM miscellaneous  ipratropium - albuterol 0.5 mg/2.5 mg/3 mL (DUONEB) 0.5-2.5 (3) MG/3ML neb solution  Lancets (ONETOUCH DELICA PLUS [id number]) MISC  losartan-hydrochlorothiazide (HYZAAR) 50-12.5 MG tablet  montelukast (SINGULAIR) 10 MG tablet  nitroGLYcerin (NITROSTAT) 0.4 MG sublingual tablet  omeprazole (PRILOSEC) 20 MG DR capsule  ONETOUCH ULTRA test strip  order for DME  predniSONE (DELTASONE) 20 MG tablet  semaglutide (OZEMPIC) 2 MG/3ML pen  ticagrelor (BRILINTA) 90 MG tablet  tiotropium (SPIRIVA RESPIMAT) 2.5 MCG/ACT inhaler  triamcinolone (KENALOG) 0.1 % external cream          Review of Systems    Physical Exam   BP: (!) 176/98  Pulse: 83  Temp: 98.1  F (36.7  C)  Resp: 16  SpO2: 96 %      Physical Exam  Constitutional:       General: He is not in acute distress.     Appearance: He is well-developed.   HENT:      Head: Normocephalic and atraumatic.   Cardiovascular:      Rate and Rhythm: Normal rate.      Heart sounds: No murmur heard.  Pulmonary:      Effort: No respiratory distress.      Breath sounds: No stridor.   Abdominal:      General: There is no distension.      Palpations: Abdomen is soft.      Tenderness: There is no abdominal tenderness.   Musculoskeletal:      Right lower leg: No edema.      Left lower leg: No edema.   Skin:     General: Skin is warm and dry.   Neurological:      Mental Status: He is alert.         ED Course        Procedures              EKG Interpretation:      Interpreted by Nilay Miaer MD  Time reviewed: 0122  Symptoms at time of EKG: chest pain   Rhythm: normal sinus   Rate: normal  Axis: normal  Ectopy: none  Conduction: normal  ST Segments/ T Waves: No ST-T wave changes  Q Waves: none  Comparison to prior: Unchanged    Clinical Impression: normal EKG      Critical Care time:  none     None         Results for orders placed or performed during the hospital encounter of 04/25/25 (from the past 24 hours)   EKG 12 lead    Result Value Ref Range    Systolic Blood Pressure  mmHg    Diastolic Blood Pressure  mmHg    Ventricular Rate 82 BPM    Atrial Rate 82 BPM    ME Interval 154 ms    QRS Duration 66 ms     ms    QTc 425 ms    P Axis 36 degrees    R AXIS 32 degrees    T Axis 58 degrees    Interpretation ECG       Sinus rhythm with Premature atrial complexes  Otherwise normal ECG  When compared with ECG of 17-Jan-2025 10:13,  Premature atrial complexes are now Present  Nonspecific T wave abnormality no longer evident in Inferior leads     Comprehensive metabolic panel   Result Value Ref Range    Sodium 141 135 - 145 mmol/L    Potassium 4.8 3.4 - 5.3 mmol/L    Carbon Dioxide (CO2) 28 22 - 29 mmol/L    Anion Gap 9 7 - 15 mmol/L    Urea Nitrogen 21.7 8.0 - 23.0 mg/dL    Creatinine 0.92 0.67 - 1.17 mg/dL    GFR Estimate 85 >60 mL/min/1.73m2    Calcium 9.4 8.8 - 10.4 mg/dL    Chloride 104 98 - 107 mmol/L    Glucose 261 (H) 70 - 99 mg/dL    Alkaline Phosphatase 140 40 - 150 U/L    AST 23 0 - 45 U/L    ALT 39 0 - 70 U/L    Protein Total 6.4 6.4 - 8.3 g/dL    Albumin 4.0 3.5 - 5.2 g/dL    Bilirubin Total 0.8 <=1.2 mg/dL   CBC with platelets, differential    Narrative    The following orders were created for panel order CBC with platelets, differential.  Procedure                               Abnormality         Status                     ---------                               -----------         ------                     CBC with platelets and ...[[id number]]  Abnormal            Final result                 Please view results for these tests on the individual orders.   Troponin T, High Sensitivity   Result Value Ref Range    Troponin T, High Sensitivity 18 <=22 ng/L   [place] Draw    Narrative    The following orders were created for panel order [place] Draw.  Procedure                               Abnormality         Status                     ---------                               -----------         ------                      Extra Blue Top Tube[[id number]]                             Final result                 Please view results for these tests on the individual orders.   CBC with platelets and differential   Result Value Ref Range    WBC Count 12.3 (H) 4.0 - 11.0 10e3/uL    RBC Count 5.05 4.40 - 5.90 10e6/uL    Hemoglobin 15.0 13.3 - 17.7 g/dL    Hematocrit 46.4 40.0 - 53.0 %    MCV 92 78 - 100 fL    MCH 29.7 26.5 - 33.0 pg    MCHC 32.3 31.5 - 36.5 g/dL    RDW 12.4 10.0 - 15.0 %    Platelet Count 236 150 - 450 10e3/uL    % Neutrophils 62 %    % Lymphocytes 23 %    % Monocytes 8 %    % Eosinophils 6 %    % Basophils 1 %    % Immature Granulocytes 1 %    NRBCs per 100 WBC 0 <1 /100    Absolute Neutrophils 7.6 1.6 - 8.3 10e3/uL    Absolute Lymphocytes 2.8 0.8 - 5.3 10e3/uL    Absolute Monocytes 0.9 0.0 - 1.3 10e3/uL    Absolute Eosinophils 0.8 (H) 0.0 - 0.7 10e3/uL    Absolute Basophils 0.1 0.0 - 0.2 10e3/uL    Absolute Immature Granulocytes 0.1 <=0.4 10e3/uL    Absolute NRBCs 0.0 10e3/uL   Extra Blue Top Tube   Result Value Ref Range    Hold Specimen JIC    Chest XR,  PA & LAT    Narrative    EXAM: XR CHEST 2 VIEWS  LOCATION: [facility]  DATE: 4/25/2025    INDICATION: chest pain  COMPARISON: 1/4/2025      Impression    IMPRESSION:     No focal airspace disease. No pleural effusion or pneumothorax.    The cardiomediastinal silhouette is unremarkable. Coronary artery stent.    Multilevel degenerative changes of the spine.   Troponin T, High Sensitivity   Result Value Ref Range    Troponin T, High Sensitivity 40 (H) <=22 ng/L       Medications   heparin loading dose for LOW INTENSITY TREATMENT * Give BEFORE starting heparin infusion (has no administration in time range)   heparin 25,000 units in 0.45% NaCl 250 mL ANTICOAGULANT infusion (has no administration in time range)   aspirin (ASA) chewable tablet 324 mg (324 mg Oral $Given 4/25/25 0130)   alum & mag hydroxide-simethicone (MAALOX) suspension 30 mL (30  mLs Oral $Given 4/25/25 0132)   famotidine (PEPCID) tablet 40 mg (40 mg Oral $Given 4/25/25 0131)       Assessments & Plan (with Medical Decision Making)   79-year-old male with a history of coronary artery disease who presents with chest pain.  High risk presentation concerning for the possibility of coronary artery disease as a cause of his symptoms.  EKG sinus rhythm without signs of acute ischemia or dysrhythmia at this time.  He is given aspirin, Maalox, and famotidine for his symptoms.  Chest x-ray obtained, images interpreted independently as well as radiology read reviewed, no signs of pneumonia or heart failure.  Initial troponin is normal.  Electrolytes are within normal limits.  On recheck the patient says that he is symptom-free currently.  Repeat troponin now is more than doubled and is elevated at 40.  This is concerning for a cardiac event and a possible NSTEMI as a cause of his symptoms.  Therefore he is started on IV heparin.  I believe the patient warrants a transfer to a hospital with cardiology services.  There is a bed available at [facility].  I discussed the case with the on-call hospitalist there, Dr. Rodriguez.  We discussed ongoing management the patient and need for close observation and cardiac monitoring with cardiology evaluation.  He is agreed to accept the patient in transfer    I have reviewed the nursing notes.    I have reviewed the findings, diagnosis, plan and need for follow up with the patient.           Medical Decision Making  The patient's presentation was of high complexity (a chronic illness severe exacerbation, progression, or side effect of treatment).    The patient's evaluation involved:  ordering and/or review of 3+ test(s) in this encounter (see separate area of note for details)  independent interpretation of testing performed by another health professional (see separate area of note for details)    The patient's management necessitated high risk (a decision  regarding hospitalization).        New Prescriptions    No medications on file       Final diagnoses:   NSTEMI (non-ST elevated myocardial infarction) (H)       4/25/2025   [facility] EMERGENCY DEPT       Nilay Maier MD  04/25/25 0914

## 2025-04-25 NOTE — PRE-PROCEDURE
GENERAL PRE-PROCEDURE:   Procedure:  Coronary angiogram possible percutaneous coronary intervention  Date/Time:  4/25/2025 2:38 PM    Verbal consent obtained?: Yes    Written consent obtained?: Yes    Risks and benefits: Risks, benefits and alternatives were discussed    DC Plan: Appropriate discharge home plan in place for patients who are going home after procedure   Consent given by:  Patient  Patient states understanding of procedure being performed: Yes    Patient's understanding of procedure matches consent: Yes    Procedure consent matches procedure scheduled: Yes    Expected level of sedation:  Moderate  Appropriately NPO:  Yes  ASA Class:  2  Mallampati  :  Grade 2- soft palate, base of uvula, tonsillar pillars, and portion of posterior pharyngeal wall visible  Lungs:  Lungs clear with good breath sounds bilaterally  Heart:  Normal heart sounds and rate  History & Physical reviewed:  History and physical reviewed and no updates needed  Statement of review:  I have reviewed the lab findings, diagnostic data, medications, and the plan for sedation  I have examined the patient, reviewed the history, medications and pre procedural tests. I have explained to the patient the risks of death, MI, stroke, hematoma, possible urgent bypass surgery for failed PCI, use of stents, thienopyridine agents, possible peripheral vascular complications, arrhythmia, the use of FFR in clinical decision-making and alternative of medical therapy alone in regards to left heart catheterization, left ventriculography, coronary angiography, and possible percutaneous coronary intervention. The patient voiced understanding and wishes to proceed. The patient has a good right radial pulse, normal ulnar pulse and a normal Clement's sign.

## 2025-04-25 NOTE — PROCEDURES
[facility]    Procedure: CAG    Date/Time: 4/25/2025 3:19 PM    Performed by: Vinod Lobato MD  Authorized by: Vinod Lobato MD      UNIVERSAL PROTOCOL   Site Marked: Yes  Prior Images Obtained and Reviewed:  Yes  Required items: Required blood products, implants, devices and special equipment available    Patient identity confirmed:  Verbally with patient  Patient was reevaluated immediately before administering moderate or deep sedation or anesthesia  Confirmation Checklist:  Patient's identity using two indicators  Time out: Immediately prior to the procedure a time out was called    Universal Protocol: the Joint Commission Universal Protocol was followed    Preparation: Patient was prepped and draped in usual sterile fashion       ANESTHESIA    Local Anesthetic:  Lidocaine 1% without epinephrine      SEDATION  Patient Sedated: Yes    Sedation:  Fentanyl and midazolam  Vital signs: Vital signs monitored during sedation      PROCEDURE  Describe Procedure: Procedure  1)  CAG  2) attempt to perform  IFR LMCA/LAD  unable to perform as CX stent obstructs  orifice of LAD and could  not place wire into mid LAD  Complications none  Indication unstable angina  old IMI with PCI in ostial/proximal CX  and marginal branch of dominant CX ( RCA small and nondominant)    LMCA ostial 60% diffuse 50% mid  LAD ostium covered by CX stent, appears to be up to 80% on some views, Diffuse proximal 75% narrowing  first diagonal 75%  CX stents widely patent No restenosis    After review  of his last procedure,I elected to not reinject  small nondominant RCA and used guide  to cannulate LMCA . There was mild catheter dampening . The LAD had an ostial 60% lesion then diffuse 50% narrowing up to ostium of LAD. The CX stent was deployed over ostium of LAD. The  ostial LAD appears up to 80% narrowed in some views ( especially Prydeinig cranial)    I was unable to pass an IFR wire into proximal LAD due to  stent struts impinging in lumen. I then passed a 0.14 run through wire into the LAD, with plans to use a microcatheter exchange to place the IFR wire, but even a low profile microcatheter would not deliver into LAD, thus abandoned any attempts at physiologic assessment.     I took repeat views before and after removing run through wire to confirm no change in coronary anatomy after instrumentation    Assessment  He has very diffuse disease in LMCA, CX stent  struts obstruct orifice of LAD and there is significant  diffuse disease in proximal LAD.Practically speaking, even if PCI LMCA  to LAD  were to be considered, it would be very challenging to cross into LAD, require crushing of CX stent struts. It would seem  CAB with LIMA to LAD/D1 is best option for revascularization if patient agreeable    Plan  1)  surgery consulted    Luis Alfredo      Patient Tolerance:  Patient tolerated the procedure well with no immediate complications  Length of time physician/provider present for 1:1 monitoring during sedation: 25

## 2025-04-25 NOTE — H&P
[facility]    History and Physical - Hospitalist Service       Date of Admission:  04/25/2025    Assessment & Plan      Raul Wilhelm is a 79 year old male admitted on 4/252/25 as transfer from [facility] with report of chest pain that felt like indigestion, given significant cardiac history and elevated troponin, sent to [facility] for cardiology evaluation.    STEMI s/p 1/4/25, REDD x2 to prox LCxand OM branch  STEMI 11/2015, REDD x2 to LPDA, mid LCx  Severe multivessel CAD  HLD  HTN  *1/4/25 Cardiac cath: severe multivessel CAD ostial-prox LAD, mid LAD, prox L circ, and large OM branch. Stents placed to left circ and OM branch. There was plan for outpatient discussion regarding surgical revascularization vs long stents for LAD disease, he has not yet had this follow-up  *1/5/25 Echo: EF 55-60%, mild concentric LVH. No regional WMA. No significant valvular abnormalities.  *4/25 EKG: sinus with PACs,  no ST changes.  *4/25 CXR: no infiltrates or effusions  *trop 18--40--95  *PTA meds: ASA 81mg, brilinta 90mg BID, atorvastatin 80mg daily, coreg 6.25mg BID, losartan-hydrochlorothiazide 50-12.5mg (missed all on 4/24)  - heparin gtt  - NPO for cath  - received 324 ASA early 4/25 AM, resume PTA 81mg ASA on 4/26  - continue PTA atorvastatin 80mg daily  - continue PTA losartan 50mg on 4/25, add back hydrochlorothiazide component on 4/26  - resume coreg 6.25mg BID  - PRN nitroglycerin if chest pain  - EKG if chest pain  - limited echo  - telemetry  - cardiology consult, plan for cardiac cath today    Leukocytosis  WBC 12.3 on presentation, improved to 11.6. suspect stress demargination  - trend, watch for fevers    Zenkers Diverticulum  GERD  Chronic Dysphagia  Noted during Jan 2025 admit. Diverticulum does not empty fully with multiple swallows and gives him regurgitation/dysphagia symptoms. He elected not to pursue ENT outpatient.  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 PTA glargine and semaglutide  - medium resistance sliding scale insulin q4h while NPO  - continue PTA glargine at reduced dose of 16u qpm 4/25 pending intake  - not taking PTA ozempic due to cost    Hx frequent skin abscesses  +MRSA  Most recent at right neck Mid April, appears to be resolving. March 2025 with abscess of right buttock that was +MRSA    Hx arterial ischemic stroke  noted    Pulmonary nodule RUL, 0.3cm, noted on CT Jan 2025  - follow up CT chest due Jan 2026          Diet: NPO for Procedure/Surgery per Anesthesia Guidelines Except for: Meds; Clear liquids before procedure/surgery: ADULT (Age GREATER than or Equal to 18 years) - Clear liquids 2 hours before procedure/surgery  DVT Prophylaxis: heparin gtt  Diaz Catheter: Not present  Lines: None     Cardiac Monitoring: ACTIVE order. Indication: Chest pain/ ACS rule out (24 hours)  Code Status: Full Code    Clinically Significant Risk Factors Present on Admission                 # Drug Induced Platelet Defect: home medication list includes an antiplatelet medication   # Hypertension: Noted on problem list          # DMII: A1C = 7.1 % (Ref range: 0.0 - 5.6 %) within past 6 months          # Financial/Environmental Concerns:    # COPD: noted on problem list        Disposition Plan     Medically Ready for Discharge: Anticipated in 2-4 Days  Suspect likely discharge over weekend pending cardiac work-up         Delilah Dejesus DO  Hospitalist Service  [facility]  Securely message with [employer] (more info)  Text page via [employer] Paging/Directory      ______________________________________________________________________    Chief Complaint   Chest pain    History is obtained from the patient, prior record review    History of Present Illness   Raul Wilhelm is a 79 year old male who reports chest pain that was in center of chest and radiated into his belly. He thought it was indigestion due to some grapes or maybe some apple that he had earlier in the day. He states that he gets food stuck in his diverticulum and has to cough hard to get it out. He felt he didn't tolerate the grapes he ate earlier (he removed the skins), so thought maybe by eating an apple later that it might help some of his discomfort. He took the skin off the apple, ate the apple around 10:30pm and then started to feel more discomfort. He had 2 hours of discomfort and then he came to ED for eval with his daughter. He tried tums and pepto at home with mild improvement. He had maalox, famotidine and aspirin at [facility] and reports that since then he hasn't had any pain. He arrives to [facility]. He does report he always has some trouble with his breathing, but nothing acute at this time. No trouble taking deep breaths. He does not currently feel he has indigestion, no trouble swallowing. He missed his medications on 4/24 and he had a conversation with pharmacy on 4/16 where they discussed reducing his omeprazole to every other day to decrease pill burden.   Sounds like brilinta is expensive, but he hasn't stopped taking.  Has been working with cardiac rehab and using the bike, denies chest pain symptoms with this activity and has been tolerating. He has also been doing more yard work, but is limited by his breathing, not by chest pain.    Past Medical History    Past Medical History:   Diagnosis Date    Chronic airway obstruction, not elsewhere classified     Other and unspecified hyperlipidemia        Past Surgical History   Past Surgical History:   Procedure Laterality Date     ARTHROPLASTY KNEE Left 5/28/2020    Procedure: Left Total Knee Arthroplasty;  Surgeon: Sanjay Downey MD;  Location: [facility] OR    ARTHROPLASTY KNEE Right 11/2/2020    Procedure: ARTHROPLASTY, KNEE, TOTAL;  Surgeon: Sanjay Downey MD;  Location: [facility] OR    CV CORONARY ANGIOGRAM N/A 1/4/2025    Procedure: Coronary Angiogram;  Surgeon: Jacobo Montgomery MD;  Location: [facility] CV    CV PCI STENT DRUG ELUTING N/A 1/4/2025    Procedure: Percutaneous Coronary Intervention Stent;  Surgeon: Jacobo Montgomery MD;  Location: [facility] CV    ENT SURGERY  as a child    tonsillectomy       Prior to Admission Medications   Prior to Admission Medications   Prescriptions Last Dose Informant Patient Reported? Taking?   Alcohol Swabs (B-D SINGLE USE SWABS REGULAR) PADS  Self No No   Sig: USE TO SWAB AREA OF INJECTION / JENISE AS DIRECTED   Lancets (ONETOUCH DELICA PLUS [id number]) MISC  Self No No   Sig: USE AS DIRECTED WITH LANCING DEVICE TO TEST ONCE DAILY   ONETOUCH ULTRA test strip  Self No No   Sig: USE TO TEST BLOOD SUGAR 1 TIME DAILY OR AS DIRECTED   acetaminophen (TYLENOL) 325 MG tablet  Self Yes No   Sig: Take 2 tablets (650 mg) by mouth every 8 hours.   Patient not taking: Reported on 4/16/2025   albuterol (PROAIR HFA/PROVENTIL HFA/VENTOLIN HFA) 108 (90 Base) MCG/ACT inhaler  Self No No   Sig: Inhale 1-2 puffs into the lungs every 4 hours as needed for shortness of breath or wheezing.   aspirin (ASA) 81 MG EC tablet  Self Yes No   Sig: Take 81 mg by mouth at bedtime.   atorvastatin (LIPITOR) 80 MG tablet   No No   Sig: Take 1 tablet (80 mg) by mouth at bedtime.   blood glucose monitoring (NO BRAND SPECIFIED) meter device kit  Self No No   Sig: Use to test blood sugar 1 times daily or as directed.  Blood Glucose Monitor Brands: per insurance.   budeson-glycopyrrol-formoterol (BREZTRI AEROSPHERE) 160-9-4.8 MCG/ACT AERO inhaler   No No   Sig: Inhale 2 puffs into the lungs 2 times daily.   Patient  not taking: Reported on 4/16/2025   budesonide-formoterol (SYMBICORT) 80-4.5 MCG/ACT Inhaler  Self No No   Sig: Inhale 2 puffs into the lungs 2 times daily.   carvedilol (COREG) 6.25 MG tablet  Self No No   Sig: Take 1 tablet (6.25 mg) by mouth 2 times daily.   insulin glargine (LANTUS PEN) 100 UNIT/ML pen  Self No No   Sig: Inject 20 Units subcutaneously at bedtime.   insulin pen needle (BD LIZBETH U/F) 32G X 4 MM miscellaneous  Self No No   Sig: USE 1 PEN NEEDLE DAILY   ipratropium - albuterol 0.5 mg/2.5 mg/3 mL (DUONEB) 0.5-2.5 (3) MG/3ML neb solution   No No   Sig: Take 1 vial (3 mLs) by nebulization every 6 hours as needed for shortness of breath, wheezing or cough.   losartan-hydrochlorothiazide (HYZAAR) 50-12.5 MG tablet  Self No No   Sig: Take 1 tablet by mouth daily.   montelukast (SINGULAIR) 10 MG tablet  Self No No   Sig: TAKE ONE TABLET BY MOUTH AT BEDTIME   nitroGLYcerin (NITROSTAT) 0.4 MG sublingual tablet   No No   Sig: For chest pain place 1 tablet under the tongue every 5 minutes for 3 doses. If symptoms persist 5 minutes after 1st dose call 911.   Patient not taking: Reported on 4/16/2025   omeprazole (PRILOSEC) 20 MG DR capsule  Self No No   Sig: Take 1 capsule (20 mg) by mouth daily.   order for DME  Self No No   Sig: Equipment being ordered: arm blood pressure cuff   predniSONE (DELTASONE) 20 MG tablet   No No   Sig: Take 1 tablet (20 mg) by mouth 2 times daily.   Patient not taking: Reported on 4/16/2025   semaglutide (OZEMPIC) 2 MG/3ML pen   No No   Sig: Inject 0.25 mg subcutaneously every 7 days for 28 days, THEN 0.5 mg every 7 days for 28 days.   Patient not taking: Reported on 4/16/2025   ticagrelor (BRILINTA) 90 MG tablet  Self No No   Sig: Take 1 tablet (90 mg) by mouth 2 times daily. Dose to start tomorrow morning.   tiotropium (SPIRIVA RESPIMAT) 2.5 MCG/ACT inhaler   No No   Sig: Inhale 2 puffs into the lungs daily.   Patient not taking: Reported on 4/16/2025   triamcinolone (KENALOG) 0.1  % external cream  Self No No   Sig: APPLY TOPICALLY 2 TIMES DAILY TO LEFT ANKLE      Facility-Administered Medications: None           Physical Exam   Vital Signs: Temp: 97.6  F (36.4  C) Temp src: Oral BP: (!) 143/84     Resp: 18 SpO2: 94 % O2 Device: None (Room air)    Weight: 0 lbs 0 oz    Constitutional: Awake, alert, cooperative, no apparent distress  Respiratory: Clear to auscultation bilaterally, no crackles. Wheezing all lung fields  Cardiovascular: Regular rate and rhythm, normal S1 and S2, and no murmur noted, nontender chest wall  GI: Normal bowel sounds, soft, non-distended, non-tender  Skin/Integumen: No rashes, no cyanosis, no edema. Right neck abscess (resolving)  Other:      Medical Decision Making       80 MINUTES SPENT BY ME on the date of service doing chart review, history, exam, documentation & further activities per the note.      Data     I have personally reviewed the following data over the past 24 hrs:    11.6 (H)  \   15.4   / 233     141 104 21.7 /  261 (H)   4.8 28 0.92 \     ALT: 39 AST: 23 AP: 140 TBILI: 0.8   ALB: 4.0 TOT PROTEIN: 6.4 LIPASE: N/A     Trop: 40 (H) BNP: N/A       Imaging results reviewed over the past 24 hrs:   Recent Results (from the past 24 hours)   Chest XR,  PA & LAT    Narrative    EXAM: XR CHEST 2 VIEWS  LOCATION: [facility]  DATE: 4/25/2025    INDICATION: chest pain  COMPARISON: 1/4/2025      Impression    IMPRESSION:     No focal airspace disease. No pleural effusion or pneumothorax.    The cardiomediastinal silhouette is unremarkable. Coronary artery stent.    Multilevel degenerative changes of the spine.

## 2025-04-25 NOTE — H&P (VIEW-ONLY)
CARDIOTHORACIC SURGERY CONSULT NOTE  4/25/2025      Reason for Consult: LMCA disease, proximal LAD disease      ASSESSMENT/PLAN:   Raul Wilhelm is a 79 year old male with a past medical history of HTN, HLD, GERD, Zenkers Diverticulum, COPD, Asthma, DMII, CVA, frequent MRSA abscesses, CAD with STEMI s/p REDD to LPDA, mid CX in 2015 and REDD to prox LCx and OM in January 2025 at [facility] and on Brilinta, with known residual LAD and D1 disease. Patient presented on 4/25/25 with chest pain in the center of his chest that radiated to his belly. He underwent TTE with severe hypokinesis of mid to distal aneroseptal and apical walls, EF 45-50% and coronary angiogram which revealed residual LMCA and proximal LAD disease. CV surgery consulted for consideration of surgical revascularization of LMCA disease and proximal LAD disease.      - Recommend CABG  - Discussed with Dr. Jimenez with cardiology regarding holding Brilinta for surgery- surgeon ok with cangrelor infusion. Will defer to cardiology to order.   - Will obtain vein mapping, bilateral carotid duplex US  - Other cares per primary team  - Thank you for the opportunity to participate in the care of this patient.    STS Risk Score for isolated CABG    Procedure Type: Isolated CABG  Perioperative Outcome  Estimate %  Operative Mortality  1.39%  Morbidity & Mortality  5.46%  Stroke    0.959%  Renal Failure   0.68%  Reoperation   2.4%  Prolonged Ventilation  2.62%  Deep Sternal Wound Infection 0.108%  Long Hospital Stay (>14 days) 2.82%  Short Hospital Stay (<6 days)* 58.3%      Patient and plan discussed with attending, Dr. Michael Mulvihill.      Abby Clark PA-C  Cardiothoracic Surgery  Pager [phone]        ________________________________________________________________________________________________    HPI:   Raul Wilhelm is a 79 year old male with a past medical history of HTN, HLD, GERD, Zenkers Diverticulum, COPD, Asthma, DMII, CVA, frequent MRSA  abscesses, CAD with STEMI s/p REDD to LPDA, mid CX in 2015 and REDD to prox LCx and OM in January 2025 at [facility] and on [facility], with known residual LAD and D1 disease. Patient presented on 4/25/25 with chest pain in the center of his chest that radiated to his belly. He underwent TTE with severe hypokinesis of mid to distal aneroseptal and apical walls, EF 45-50% and coronary angiogram which revealed residual LMCA and proximal LAD disease. CV surgery consulted for consideration of surgical revascularization of LMCA disease and proximal LAD disease.      He otherwise denies any current chest pain, SOB, lightheadedness, dizziness, palpitations, LE edema.     PMH:  Past Medical History:   Diagnosis Date    Chronic airway obstruction, not elsewhere classified     Other and unspecified hyperlipidemia        PSH:  Past Surgical History:   Procedure Laterality Date    ARTHROPLASTY KNEE Left 5/28/2020    Procedure: Left Total Knee Arthroplasty;  Surgeon: Sanjay Downey MD;  Location: [facility] OR    ARTHROPLASTY KNEE Right 11/2/2020    Procedure: ARTHROPLASTY, KNEE, TOTAL;  Surgeon: Sanjay Downey MD;  Location: [facility] OR    CV CORONARY ANGIOGRAM N/A 1/4/2025    Procedure: Coronary Angiogram;  Surgeon: Jacobo Montgomery MD;  Location: [facility] CATH LAB CV    CV PCI STENT DRUG ELUTING N/A 1/4/2025    Procedure: Percutaneous Coronary Intervention Stent;  Surgeon: Jacobo Montgomery MD;  Location: [facility] LAB CV    ENT SURGERY  as a child    tonsillectomy       FH:  Family History   Problem Relation Age of Onset    Diabetes Mother     Heart Disease Mother     C.A.D. Mother     Cancer Father     Hypertension Father     Cerebrovascular Disease Paternal Grandfather     Asthma Sister     Breast Cancer No family hx of     Cancer - colorectal No family hx of     Prostate Cancer No family hx of        SH:  Social History     Socioeconomic History    Marital status:    Tobacco Use    Smoking status: Never     Smokeless tobacco: Never   Vaping Use    Vaping status: Never Used   Substance and Sexual Activity    Alcohol use: Yes     Comment: Rare    Drug use: No    Sexual activity: Yes     Partners: Female   Other Topics Concern    Occupational Exposure Yes     Comment: worked for years as  exposed to carbon dust and other chemicals    Hobby Hazards Yes     Comment: Raises pigeons    Parent/sibling w/ CABG, MI or angioplasty before 65F 55M? No     Social Drivers of Health     Financial Resource Strain: High Risk (4/25/2025)    Financial Resource Strain     Within the past 12 months, have you or your family members you live with been unable to get utilities (heat, electricity) when it was really needed?: Yes   Food Insecurity: Low Risk  (4/25/2025)    Food Insecurity     Within the past 12 months, did you worry that your food would run out before you got money to buy more?: No     Within the past 12 months, did the food you bought just not last and you didn t have money to get more?: No   Transportation Needs: Low Risk  (4/25/2025)    Transportation Needs     Within the past 12 months, has lack of transportation kept you from medical appointments, getting your medicines, non-medical meetings or appointments, work, or from getting things that you need?: No   Interpersonal Safety: Low Risk  (4/25/2025)    Interpersonal Safety     Do you feel physically and emotionally safe where you currently live?: Yes     Within the past 12 months, have you been hit, slapped, kicked or otherwise physically hurt by someone?: No     Within the past 12 months, have you been humiliated or emotionally abused in other ways by your partner or ex-partner?: No   Housing Stability: Low Risk  (4/25/2025)    Housing Stability     Do you have housing? : Yes     Are you worried about losing your housing?: No       Home Meds:  Medications Prior to Admission   Medication Sig Dispense Refill Last Dose/Taking    acetaminophen (TYLENOL) 325 MG tablet  Take 2 tablets (650 mg) by mouth every 8 hours. (Patient taking differently: Take 325-650 mg by mouth every 6 hours as needed for mild pain.)   Taking Differently    albuterol (PROAIR HFA/PROVENTIL HFA/VENTOLIN HFA) 108 (90 Base) MCG/ACT inhaler Inhale 1-2 puffs into the lungs every 4 hours as needed for shortness of breath or wheezing. 8.5 g 10 Unknown    aspirin (ASA) 81 MG EC tablet Take 81 mg by mouth at bedtime.   4/23/2025    atorvastatin (LIPITOR) 80 MG tablet Take 1 tablet (80 mg) by mouth at bedtime. 90 tablet 3 4/23/2025    budesonide-formoterol (SYMBICORT) 80-4.5 MCG/ACT Inhaler Inhale 2 puffs into the lungs 2 times daily. 10.2 g 11 Unknown    carvedilol (COREG) 6.25 MG tablet Take 1 tablet (6.25 mg) by mouth 2 times daily. 180 tablet 3 4/23/2025    insulin glargine (LANTUS PEN) 100 UNIT/ML pen Inject 20 Units subcutaneously at bedtime. 15 mL 3 4/23/2025    ipratropium - albuterol 0.5 mg/2.5 mg/3 mL (DUONEB) 0.5-2.5 (3) MG/3ML neb solution Take 1 vial (3 mLs) by nebulization every 6 hours as needed for shortness of breath, wheezing or cough. 270 mL 5 Unknown    losartan-hydrochlorothiazide (HYZAAR) 50-12.5 MG tablet Take 1 tablet by mouth daily. 90 tablet 3 4/23/2025    montelukast (SINGULAIR) 10 MG tablet TAKE ONE TABLET BY MOUTH AT BEDTIME 90 tablet 3 4/23/2025    nitroGLYcerin (NITROSTAT) 0.4 MG sublingual tablet For chest pain place 1 tablet under the tongue every 5 minutes for 3 doses. If symptoms persist 5 minutes after 1st dose call 911. 25 tablet 3 Taking    omeprazole (PRILOSEC) 20 MG DR capsule Take 1 capsule (20 mg) by mouth daily. 90 capsule 3 4/23/2025    ticagrelor (BRILINTA) 90 MG tablet Take 1 tablet (90 mg) by mouth 2 times daily. Dose to start tomorrow morning. 180 tablet 3 4/23/2025    tiotropium (SPIRIVA RESPIMAT) 2.5 MCG/ACT inhaler Inhale 2 puffs into the lungs daily. 4 g 1 Unknown    triamcinolone (KENALOG) 0.1 % external cream APPLY TOPICALLY 2 TIMES DAILY TO LEFT ANKLE (Patient  taking differently: 2 times daily as needed for irritation (ankle).) 15 g 1 Unknown    Alcohol Swabs (B-D SINGLE USE SWABS REGULAR) PADS USE TO SWAB AREA OF INJECTION / JENISE AS DIRECTED 100 each 3     blood glucose monitoring (NO BRAND SPECIFIED) meter device kit Use to test blood sugar 1 times daily or as directed.  Blood Glucose Monitor Brands: per insurance. 1 kit 0     budeson-glycopyrrol-formoterol (BREZTRI AEROSPHERE) 160-9-4.8 MCG/ACT AERO inhaler Inhale 2 puffs into the lungs 2 times daily. (Patient not taking: Reported on 4/16/2025)   Unknown    insulin pen needle (BD LIZBETH U/F) 32G X 4 MM miscellaneous USE 1 PEN NEEDLE DAILY 100 each 1     Lancets (ONETOUCH DELICA PLUS [id number]) MISC USE AS DIRECTED WITH LANCING DEVICE TO TEST ONCE DAILY 100 each 6     ONETOUCH ULTRA test strip USE TO TEST BLOOD SUGAR 1 TIME DAILY OR AS DIRECTED 100 strip 6     order for DME Equipment being ordered: arm blood pressure cuff 1 Device 0     predniSONE (DELTASONE) 20 MG tablet Take 1 tablet (20 mg) by mouth 2 times daily. (Patient not taking: Reported on 4/16/2025) 20 tablet 0     semaglutide (OZEMPIC) 2 MG/3ML pen Inject 0.25 mg subcutaneously every 7 days for 28 days, THEN 0.5 mg every 7 days for 28 days. (Patient not taking: Reported on 4/16/2025)        Allergies:  Allergies   Allergen Reactions    Simvastatin Swelling     Severe muscle pain, swelling, and bruising    Dust Mites      ROS: 10 point ROS neg other than the symptoms noted above in the HPI.      Physical Exam:  Temp:  [97.6  F (36.4  C)-98.1  F (36.7  C)] 97.6  F (36.4  C)  Pulse:  [76-87] 77  Resp:  [15-27] 18  BP: (126-176)/(67-98) 143/89  SpO2:  [93 %-96 %] 96 %  Gen: NAD, resting in bed  CV: RRR, S1S2 normal, no murmurs, rubs, or gallops. JVP not elevated  Pulm: clear to auscultation bilaterally, no rhonchi or wheezes  Abd: soft, non-tender, no guarding, +BM  Ext: no lower extremity edema  Neuro: grossly normal  Psych: calm, cooperative       Labs:  ABG No  lab results found in last 7 days.  CBC  Recent Labs   Lab 04/25/25  0822 04/25/25  0124   WBC 11.6* 12.3*   HGB 15.4 15.0    236     BMP  Recent Labs   Lab 04/25/25  1351 04/25/25  0959 04/25/25  0124   NA  --   --  141   POTASSIUM  --   --  4.8   CHLORIDE  --   --  104   CO2  --   --  28   BUN  --   --  21.7   CR  --   --  0.92   * 166* 261*     LFT  Recent Labs   Lab 04/25/25  0124   AST 23   ALT 39   ALKPHOS 140   BILITOTAL 0.8   ALBUMIN 4.0     PancreasNo lab results found in last 7 days.    Imaging:  Recent Results (from the past 24 hours)   Chest XR,  PA & LAT    Narrative    EXAM: XR CHEST 2 VIEWS  LOCATION: [facility]  DATE: 4/25/2025    INDICATION: chest pain  COMPARISON: 1/4/2025      Impression    IMPRESSION:     No focal airspace disease. No pleural effusion or pneumothorax.    The cardiomediastinal silhouette is unremarkable. Coronary artery stent.    Multilevel degenerative changes of the spine.

## 2025-04-25 NOTE — CONSULTS
[facility]    Cardiology Consultation     Date of Admission:  4/25/2025    Review of the result(s) of each unique test - Last ECG echocardiogram CBC BMP.     Assessment & Plan   Raul Wilhelm is a 79 year old male who was admitted on 4/25/2025.    Non-ST elevation myocardial infarction  Severe multivessel coronary artery disease    Recommendations  Patient has typical symptoms of ACS.  ECG is nonspecific.  Symptoms have resolved at this time.  Agree with medical therapy at this time including IV heparin.  Recommend coronary angiography today with further intent to revascularize percutaneously or surgically.  Risk and benefits of the angiogram explained to the patient and he is willing to proceed.  Keep NPO.  We will follow along.  Very likely he is going to need surgical consultation.    High complexity     JENNIFER Mike  Staff Cardiologist  [facility]    History of Present Illness   Raul Wilhelm is a 79 year old male who presents with heartburn sensation.  This patient lives in [place].  He is 79 years old.  He presented in January of this year with inferior ST elevation myocardial infarction and underwent stenting of his proximal circumflex mid circumflex OM and LPDA.  He has residual LAD and left main disease.  Per notes it seems this was going to be addressed as an outpatient but I do not see any notes in that regard.  However it seems like patient has not had follow-up with cardiology since his discharge in January.  His EF is normal.  He has been taking his medications at home.  Now presented with heartburn sensation and was noted to have elevated troponin.  Cardiology was consulted for non-ST elevation myocardial infarction.    Past Medical History   Past Medical History:   Diagnosis Date    Chronic airway obstruction, not elsewhere classified     Other and unspecified hyperlipidemia        Past Surgical History   Past Surgical History:   Procedure  Laterality Date    ARTHROPLASTY KNEE Left 5/28/2020    Procedure: Left Total Knee Arthroplasty;  Surgeon: Sanjay Downey MD;  Location: [facility] OR    ARTHROPLASTY KNEE Right 11/2/2020    Procedure: ARTHROPLASTY, KNEE, TOTAL;  Surgeon: Sanajy Downey MD;  Location: [facility] OR    CV CORONARY ANGIOGRAM N/A 1/4/2025    Procedure: Coronary Angiogram;  Surgeon: Jacobo Montgomery MD;  Location: [facility] CV    CV PCI STENT DRUG ELUTING N/A 1/4/2025    Procedure: Percutaneous Coronary Intervention Stent;  Surgeon: Jacobo Montgomery MD;  Location: [facility] CV    ENT SURGERY  as a child    tonsillectomy       Prior to Admission Medications   Prior to Admission Medications   Prescriptions Last Dose Informant Patient Reported? Taking?   Alcohol Swabs (B-D SINGLE USE SWABS REGULAR) PADS  Self No No   Sig: USE TO SWAB AREA OF INJECTION / JENISE AS DIRECTED   Lancets (ONETOUCH DELICA PLUS [id number]) MISC  Self No No   Sig: USE AS DIRECTED WITH LANCING DEVICE TO TEST ONCE DAILY   ONETOUCH ULTRA test strip  Self No No   Sig: USE TO TEST BLOOD SUGAR 1 TIME DAILY OR AS DIRECTED   acetaminophen (TYLENOL) 325 MG tablet  Self Yes Yes   Sig: Take 2 tablets (650 mg) by mouth every 8 hours.   Patient taking differently: Take 325-650 mg by mouth every 6 hours as needed for mild pain.   albuterol (PROAIR HFA/PROVENTIL HFA/VENTOLIN HFA) 108 (90 Base) MCG/ACT inhaler Unknown Self No Yes   Sig: Inhale 1-2 puffs into the lungs every 4 hours as needed for shortness of breath or wheezing.   aspirin (ASA) 81 MG EC tablet 4/23/2025 Self Yes Yes   Sig: Take 81 mg by mouth at bedtime.   atorvastatin (LIPITOR) 80 MG tablet 4/23/2025  No Yes   Sig: Take 1 tablet (80 mg) by mouth at bedtime.   blood glucose monitoring (NO BRAND SPECIFIED) meter device kit  Self No No   Sig: Use to test blood sugar 1 times daily or as directed.  Blood Glucose Monitor Brands: per insurance.   budeson-glycopyrrol-formoterol (BREZTRI AEROSPHERE)  160-9-4.8 MCG/ACT AERO inhaler Unknown  No No   Sig: Inhale 2 puffs into the lungs 2 times daily.   Patient not taking: Reported on 4/16/2025   budesonide-formoterol (SYMBICORT) 80-4.5 MCG/ACT Inhaler Unknown Self No Yes   Sig: Inhale 2 puffs into the lungs 2 times daily.   carvedilol (COREG) 6.25 MG tablet 4/23/2025 Self No Yes   Sig: Take 1 tablet (6.25 mg) by mouth 2 times daily.   insulin glargine (LANTUS PEN) 100 UNIT/ML pen 4/23/2025 Self No Yes   Sig: Inject 20 Units subcutaneously at bedtime.   insulin pen needle (BD LIZBETH U/F) 32G X 4 MM miscellaneous  Self No No   Sig: USE 1 PEN NEEDLE DAILY   ipratropium - albuterol 0.5 mg/2.5 mg/3 mL (DUONEB) 0.5-2.5 (3) MG/3ML neb solution Unknown  No Yes   Sig: Take 1 vial (3 mLs) by nebulization every 6 hours as needed for shortness of breath, wheezing or cough.   losartan-hydrochlorothiazide (HYZAAR) 50-12.5 MG tablet 4/23/2025 Self No Yes   Sig: Take 1 tablet by mouth daily.   montelukast (SINGULAIR) 10 MG tablet 4/23/2025 Self No Yes   Sig: TAKE ONE TABLET BY MOUTH AT BEDTIME   nitroGLYcerin (NITROSTAT) 0.4 MG sublingual tablet   No Yes   Sig: For chest pain place 1 tablet under the tongue every 5 minutes for 3 doses. If symptoms persist 5 minutes after 1st dose call 911.   omeprazole (PRILOSEC) 20 MG DR capsule 4/23/2025 Self No Yes   Sig: Take 1 capsule (20 mg) by mouth daily.   order for DME  Self No No   Sig: Equipment being ordered: arm blood pressure cuff   predniSONE (DELTASONE) 20 MG tablet   No No   Sig: Take 1 tablet (20 mg) by mouth 2 times daily.   Patient not taking: Reported on 4/16/2025   semaglutide (OZEMPIC) 2 MG/3ML pen   No No   Sig: Inject 0.25 mg subcutaneously every 7 days for 28 days, THEN 0.5 mg every 7 days for 28 days.   Patient not taking: Reported on 4/16/2025   ticagrelor (BRILINTA) 90 MG tablet 4/23/2025 Self No Yes   Sig: Take 1 tablet (90 mg) by mouth 2 times daily. Dose to start tomorrow morning.   tiotropium (SPIRIVA RESPIMAT) 2.5  MCG/ACT inhaler Unknown  No Yes   Sig: Inhale 2 puffs into the lungs daily.   triamcinolone (KENALOG) 0.1 % external cream Unknown Self No Yes   Sig: APPLY TOPICALLY 2 TIMES DAILY TO LEFT ANKLE   Patient taking differently: 2 times daily as needed for irritation (ankle).      Facility-Administered Medications: None     Current Facility-Administered Medications   Medication Dose Route Frequency Provider Last Rate Last Admin    acetaminophen (TYLENOL) tablet 650 mg  650 mg Oral Q4H PRN Delilah Dejesus DO        Or    acetaminophen (TYLENOL) Suppository 650 mg  650 mg Rectal Q4H PRN Delilah Dejesus DO        [START ON 4/26/2025] aspirin EC tablet 81 mg  81 mg Oral Daily Delilah Dejesus DO        atorvastatin (LIPITOR) tablet 80 mg  80 mg Oral At Bedtime Delilah Dejesus DO        calcium carbonate (TUMS) chewable tablet 1,000 mg  1,000 mg Oral 4x Daily PRN eDlilah Dejesus DO        carvedilol (COREG) tablet 6.25 mg  6.25 mg Oral BID Delilah Dejesus DO        glucose gel 15-30 g  15-30 g Oral Q15 Min PRN Delilah Dejesus DO        Or    dextrose 50 % injection 25-50 mL  25-50 mL Intravenous Q15 Min PRN Delilah Dejesus DO        Or    glucagon injection 1 mg  1 mg Subcutaneous Q15 Min PRN Delilah Dejesus DO        heparin 25,000 units in 0.45% NaCl 250 mL ANTICOAGULANT infusion  0-5,000 Units/hr Intravenous Continuous Delilah Dejesus DO 8.5 mL/hr at 04/25/25 0905 850 Units/hr at 04/25/25 0905    insulin aspart (NovoLOG) injection (RAPID ACTING)  1-7 Units Subcutaneous Q4H Delilah Dejesus DO        ipratropium - albuterol 0.5 mg/2.5 mg/3 mL (DUONEB) neb solution 3 mL  3 mL Nebulization TID Delilah Dejesus DO   3 mL at 04/25/25 1000    lidocaine (LMX4) cream   Topical Q1H PRN Delilah Dejesus DO        lidocaine 1 % 0.1-1 mL  0.1-1 mL Other Q1H PRN Delilah Dejesus, DO         losartan (COZAAR) tablet 50 mg  50 mg Oral Daily Delilah Dejesus DO        [START ON 4/26/2025] losartan-hydrochlorothiazide (HYZAAR) 50-12.5 MG per tablet 1 tablet  1 tablet Oral Daily Delilah Dejesus DO        montelukast (SINGULAIR) tablet 10 mg  10 mg Oral At Bedtime Delilah Dejesus DO        nitroGLYcerin (NITROSTAT) sublingual tablet 0.4 mg  0.4 mg Sublingual Q5 Min PRN Delilah Dejesus DO        ondansetron (ZOFRAN ODT) ODT tab 4 mg  4 mg Oral Q6H PRN Delilah Dejesus DO        Or    ondansetron (ZOFRAN) injection 4 mg  4 mg Intravenous Q6H PRN Delilah Dejesus DO        pantoprazole (PROTONIX) EC tablet 40 mg  40 mg Oral QAM AC Delilah Dejesus DO        Patient is already receiving anticoagulation with heparin, enoxaparin (LOVENOX), warfarin (COUMADIN)  or other anticoagulant medication   Does not apply Continuous PRN Delilah Dejesus DO        polyethylene glycol (MIRALAX) Packet 17 g  17 g Oral BID PRN Delilah Dejesus DO        prochlorperazine (COMPAZINE) injection 5 mg  5 mg Intravenous Q6H PRN Delilah Dejesus DO        Or    prochlorperazine (COMPAZINE) tablet 5 mg  5 mg Oral Q6H PRN Delilah Dejesus DO        senna-docusate (SENOKOT-S/PERICOLACE) 8.6-50 MG per tablet 1 tablet  1 tablet Oral BID PRN Delilah Dejesus DO        Or    senna-docusate (SENOKOT-S/PERICOLACE) 8.6-50 MG per tablet 2 tablet  2 tablet Oral BID PRN Delilah Dejesus DO        sodium chloride (PF) 0.9% PF flush 3 mL  3 mL Intracatheter Q8H ZHANG Delilah Dejesus DO        sodium chloride (PF) 0.9% PF flush 3 mL  3 mL Intracatheter q1 min prn Delilah Dejesus DO        ticagrelor (BRILINTA) tablet 90 mg  90 mg Oral BID Delilah Dejesus DO         Current Facility-Administered Medications   Medication Dose Route Frequency Provider Last Rate Last Admin    acetaminophen  (TYLENOL) tablet 650 mg  650 mg Oral Q4H PRN Delilah Dejesus DO        Or    acetaminophen (TYLENOL) Suppository 650 mg  650 mg Rectal Q4H PRN Delilah Dejesus DO        [START ON 4/26/2025] aspirin EC tablet 81 mg  81 mg Oral Daily Delilah Dejesus DO        atorvastatin (LIPITOR) tablet 80 mg  80 mg Oral At Bedtime Delilah Dejesus DO        calcium carbonate (TUMS) chewable tablet 1,000 mg  1,000 mg Oral 4x Daily PRN Delilah Dejesus DO        carvedilol (COREG) tablet 6.25 mg  6.25 mg Oral BID Delilah Dejesus DO        glucose gel 15-30 g  15-30 g Oral Q15 Min PRN Delilah Dejesus DO        Or    dextrose 50 % injection 25-50 mL  25-50 mL Intravenous Q15 Min PRN Delilah Dejesus DO        Or    glucagon injection 1 mg  1 mg Subcutaneous Q15 Min PRN Delilah Dejesus DO        heparin 25,000 units in 0.45% NaCl 250 mL ANTICOAGULANT infusion  0-5,000 Units/hr Intravenous Continuous Delilah Dejesus DO 8.5 mL/hr at 04/25/25 0905 850 Units/hr at 04/25/25 0905    insulin aspart (NovoLOG) injection (RAPID ACTING)  1-7 Units Subcutaneous Q4H Delilah Dejesus DO        ipratropium - albuterol 0.5 mg/2.5 mg/3 mL (DUONEB) neb solution 3 mL  3 mL Nebulization TID Delilah Dejesus DO   3 mL at 04/25/25 1000    lidocaine (LMX4) cream   Topical Q1H PRN Delilah Dejesus DO        lidocaine 1 % 0.1-1 mL  0.1-1 mL Other Q1H PRN Delilah Dejesus DO        losartan (COZAAR) tablet 50 mg  50 mg Oral Daily Delilah Dejesus DO        [START ON 4/26/2025] losartan-hydrochlorothiazide (HYZAAR) 50-12.5 MG per tablet 1 tablet  1 tablet Oral Daily Delilah Dejesus DO        montelukast (SINGULAIR) tablet 10 mg  10 mg Oral At Bedtime Delilah Dejesus DO        nitroGLYcerin (NITROSTAT) sublingual tablet 0.4 mg  0.4 mg Sublingual Q5 Min PRN Delilah Dejesus DO         ondansetron (ZOFRAN ODT) ODT tab 4 mg  4 mg Oral Q6H PRN Delilah Dejesus DO        Or    ondansetron (ZOFRAN) injection 4 mg  4 mg Intravenous Q6H PRN Delilah Dejesus DO        pantoprazole (PROTONIX) EC tablet 40 mg  40 mg Oral QAM AC Delilah Dejesus DO        Patient is already receiving anticoagulation with heparin, enoxaparin (LOVENOX), warfarin (COUMADIN)  or other anticoagulant medication   Does not apply Continuous PRN Delilah Dejesus DO        polyethylene glycol (MIRALAX) Packet 17 g  17 g Oral BID PRN Delilah Dejesus DO        prochlorperazine (COMPAZINE) injection 5 mg  5 mg Intravenous Q6H PRN Delilah Dejesus DO        Or    prochlorperazine (COMPAZINE) tablet 5 mg  5 mg Oral Q6H PRN Delilah Dejesus DO        senna-docusate (SENOKOT-S/PERICOLACE) 8.6-50 MG per tablet 1 tablet  1 tablet Oral BID PRN Delilah Dejesus DO        Or    senna-docusate (SENOKOT-S/PERICOLACE) 8.6-50 MG per tablet 2 tablet  2 tablet Oral BID PRN Delilah Dejesus DO        sodium chloride (PF) 0.9% PF flush 3 mL  3 mL Intracatheter Q8H ZHANG Delilah Dejesus DO        sodium chloride (PF) 0.9% PF flush 3 mL  3 mL Intracatheter q1 min prn Delilah Dejesus DO        ticagrelor (BRILINTA) tablet 90 mg  90 mg Oral BID Delilah Dejesus DO         Allergies   Allergies   Allergen Reactions    Simvastatin Swelling     Severe muscle pain, swelling, and bruising    Dust Mites        Social History    reports that he has never smoked. He has never used smokeless tobacco. He reports current alcohol use. He reports that he does not use drugs.    Family History   I have reviewed this patient's family history and updated it with pertinent information if needed.  Family History   Problem Relation Age of Onset    Diabetes Mother     Heart Disease Mother     C.A.D. Mother     Cancer Father     Hypertension Father      "Cerebrovascular Disease Paternal Grandfather     Asthma Sister     Breast Cancer No family hx of     Cancer - colorectal No family hx of     Prostate Cancer No family hx of           Review of Systems   A comprehensive review of system was performed and is negative other than that noted in the HPI or here.     Physical Exam   Vital Signs with Ranges  Temp:  [97.6  F (36.4  C)-98.1  F (36.7  C)] 97.6  F (36.4  C)  Pulse:  [76-87] 77  Resp:  [15-27] 18  BP: (126-176)/(67-98) 143/84  SpO2:  [93 %-96 %] 94 %  Wt Readings from Last 4 Encounters:   04/16/25 70.2 kg (154 lb 12.2 oz)   04/11/25 70.2 kg (154 lb 12.8 oz)   04/03/25 69 kg (152 lb 3.2 oz)   04/01/25 68.9 kg (152 lb)     No intake/output data recorded.      Vitals: BP (!) 143/84 (BP Location: Left arm)   Temp 97.6  F (36.4  C) (Oral)   Resp 18   SpO2 94%     Physical Exam:   General - Alert and in no acute distress  Respiratory -clear to auscultation  Cardiovascular -normal regular heart sounds no murmur rubs or gallops no edema  Gastrointestinal - Non distended  Psych - Appropriate affect     No lab results found in last 7 days.    Invalid input(s): \"TROPONINIES\"    Recent Labs   Lab 04/25/25  0822 04/25/25  0124   WBC 11.6* 12.3*   HGB 15.4 15.0   MCV 90 92    236   NA  --  141   POTASSIUM  --  4.8   CHLORIDE  --  104   CO2  --  28   BUN  --  21.7   CR  --  0.92   GFRESTIMATED  --  85   ANIONGAP  --  9   JOSE RAFAEL  --  9.4   GLC  --  261*   ALBUMIN  --  4.0   PROTTOTAL  --  6.4   BILITOTAL  --  0.8   ALKPHOS  --  140   ALT  --  39   AST  --  23     Recent Labs   Lab Test 02/19/25  0832 01/04/25  1709   CHOL 111 148   HDL 38* 64   LDL 50 72   TRIG 116 60     Recent Labs   Lab 04/25/25  0822 04/25/25  0124   WBC 11.6* 12.3*   HGB 15.4 15.0   HCT 46.3 46.4   MCV 90 92    236     No results for input(s): \"PH\", \"PHV\", \"PO2\", \"PO2V\", \"SAT\", \"PCO2\", \"PCO2V\", \"HCO3\", \"HCO3V\" in the last 168 hours.  No results for input(s): \"NTBNPI\", \"NTBNP\" in the last 168 " "hours.  No results for input(s): \"DD\" in the last 168 hours.  No results for input(s): \"SED\", \"CRP\" in the last 168 hours.  Recent Labs   Lab 04/25/25  0822 04/25/25  0124    236     No results for input(s): \"TSH\" in the last 168 hours.  No results for input(s): \"COLOR\", \"APPEARANCE\", \"URINEGLC\", \"URINEBILI\", \"URINEKETONE\", \"SG\", \"UBLD\", \"URINEPH\", \"PROTEIN\", \"UROBILINOGEN\", \"NITRITE\", \"LEUKEST\", \"RBCU\", \"WBCU\" in the last 168 hours.    Imaging:  Recent Results (from the past 48 hours)   Chest XR,  PA & LAT    Narrative    EXAM: XR CHEST 2 VIEWS  LOCATION: [facility]  DATE: 4/25/2025    INDICATION: chest pain  COMPARISON: 1/4/2025      Impression    IMPRESSION:     No focal airspace disease. No pleural effusion or pneumothorax.    The cardiomediastinal silhouette is unremarkable. Coronary artery stent.    Multilevel degenerative changes of the spine.       Echo:  No results found for this or any previous visit (from the past 4320 hours).      This note was completed in part using dictation via the Dragon voice recognition software. Some word and grammatical errors may occur and must be interpreted in the appropriate clinical context.  If there are any questions pertaining to this issue, please contact me for further clarification.    Clinically Significant Risk Factors Present on Admission                 # Drug Induced Platelet Defect: home medication list includes an antiplatelet medication   # Hypertension: Noted on problem list          # DMII: A1C = 7.1 % (Ref range: 0.0 - 5.6 %) within past 6 months        # Financial/Environmental Concerns:    # COPD: noted on problem list       Native vessel CAD                                            "

## 2025-04-25 NOTE — ED TRIAGE NOTES
Pt presents for eval of chest pain that started two hours PTA. Hx of MI in December (on eliquis). Tried pepto and tums at home without relief.     Triage Assessment (Adult)       Row Name 04/25/25 0119          Triage Assessment    Airway WDL WDL        Respiratory WDL    Respiratory WDL WDL        Skin Circulation/Temperature WDL    Skin Circulation/Temperature WDL WDL        Cardiac WDL    Cardiac WDL chest pain        Chest Pain Assessment    Chest Pain Location epigastric        Peripheral/Neurovascular WDL    Peripheral Neurovascular WDL WDL        Cognitive/Neuro/Behavioral WDL    Cognitive/Neuro/Behavioral WDL WDL

## 2025-04-25 NOTE — PLAN OF CARE
Goal Outcome Evaluation:      Plan of Care Reviewed With: patient        A&O x 4, forgetful. VSS, O2 stable on RA. Up SBA in room. NPO for angiogram this afternoon. One episode of right sided chest pain, EKG obtained, pt declined use of nitroglycerin.     Down to cath lab this afternoon, R radial access. Started TR band removal at 1645, slow process. Cardiothoracic surgery consult, vein mapping and carotid U.S. ordered. Tolerating current diet, good appetite. Continent of bowel and bladder. Plan of care ongoing.

## 2025-04-25 NOTE — PROGRESS NOTES
RECEIVING UNIT ED HANDOFF REVIEW    ED Nurse Handoff Report was reviewed by: Damon Moya RN on April 25, 2025 at 4:45 AM

## 2025-04-25 NOTE — CONSULTS
CARDIOTHORACIC SURGERY CONSULT NOTE  4/25/2025      Reason for Consult: LMCA disease, proximal LAD disease      ASSESSMENT/PLAN:   Raul Wilhelm is a 79 year old male with a past medical history of HTN, HLD, GERD, Zenkers Diverticulum, COPD, Asthma, DMII, CVA, frequent MRSA abscesses, CAD with STEMI s/p REDD to LPDA, mid CX in 2015 and REDD to prox LCx and OM in January 2025 at [facility] and on Brilinta, with known residual LAD and D1 disease. Patient presented on 4/25/25 with chest pain in the center of his chest that radiated to his belly. He underwent TTE with severe hypokinesis of mid to distal aneroseptal and apical walls, EF 45-50% and coronary angiogram which revealed residual LMCA and proximal LAD disease. CV surgery consulted for consideration of surgical revascularization of LMCA disease and proximal LAD disease.      - Recommend CABG  - Discussed with Dr. Jimenez with cardiology regarding holding Brilinta for surgery- surgeon ok with cangrelor infusion. Will defer to cardiology to order.   - Will obtain vein mapping, bilateral carotid duplex US  - Other cares per primary team  - Thank you for the opportunity to participate in the care of this patient.    STS Risk Score for isolated CABG    Procedure Type: Isolated CABG  Perioperative Outcome  Estimate %  Operative Mortality  1.39%  Morbidity & Mortality  5.46%  Stroke    0.959%  Renal Failure   0.68%  Reoperation   2.4%  Prolonged Ventilation  2.62%  Deep Sternal Wound Infection 0.108%  Long Hospital Stay (>14 days) 2.82%  Short Hospital Stay (<6 days)* 58.3%      Patient and plan discussed with attending, Dr. Michael Mulvihill.      Abby Clark PA-C  Cardiothoracic Surgery  Pager [phone]        ________________________________________________________________________________________________    HPI:   Raul Wilhelm is a 79 year old male with a past medical history of HTN, HLD, GERD, Zenkers Diverticulum, COPD, Asthma, DMII, CVA, frequent MRSA  abscesses, CAD with STEMI s/p REDD to LPDA, mid CX in 2015 and REDD to prox LCx and OM in January 2025 at [facility] and on [facility], with known residual LAD and D1 disease. Patient presented on 4/25/25 with chest pain in the center of his chest that radiated to his belly. He underwent TTE with severe hypokinesis of mid to distal aneroseptal and apical walls, EF 45-50% and coronary angiogram which revealed residual LMCA and proximal LAD disease. CV surgery consulted for consideration of surgical revascularization of LMCA disease and proximal LAD disease.      He otherwise denies any current chest pain, SOB, lightheadedness, dizziness, palpitations, LE edema.     PMH:  Past Medical History:   Diagnosis Date    Chronic airway obstruction, not elsewhere classified     Other and unspecified hyperlipidemia        PSH:  Past Surgical History:   Procedure Laterality Date    ARTHROPLASTY KNEE Left 5/28/2020    Procedure: Left Total Knee Arthroplasty;  Surgeon: Sanjay Downey MD;  Location: [facility] OR    ARTHROPLASTY KNEE Right 11/2/2020    Procedure: ARTHROPLASTY, KNEE, TOTAL;  Surgeon: Sanjay Downey MD;  Location: [facility] OR    CV CORONARY ANGIOGRAM N/A 1/4/2025    Procedure: Coronary Angiogram;  Surgeon: Jacobo Montgomery MD;  Location: [facility] CATH LAB CV    CV PCI STENT DRUG ELUTING N/A 1/4/2025    Procedure: Percutaneous Coronary Intervention Stent;  Surgeon: Jacobo Montgomery MD;  Location: [facility] LAB CV    ENT SURGERY  as a child    tonsillectomy       FH:  Family History   Problem Relation Age of Onset    Diabetes Mother     Heart Disease Mother     C.A.D. Mother     Cancer Father     Hypertension Father     Cerebrovascular Disease Paternal Grandfather     Asthma Sister     Breast Cancer No family hx of     Cancer - colorectal No family hx of     Prostate Cancer No family hx of        SH:  Social History     Socioeconomic History    Marital status:    Tobacco Use    Smoking status: Never     Smokeless tobacco: Never   Vaping Use    Vaping status: Never Used   Substance and Sexual Activity    Alcohol use: Yes     Comment: Rare    Drug use: No    Sexual activity: Yes     Partners: Female   Other Topics Concern    Occupational Exposure Yes     Comment: worked for years as  exposed to carbon dust and other chemicals    Hobby Hazards Yes     Comment: Raises pigeons    Parent/sibling w/ CABG, MI or angioplasty before 65F 55M? No     Social Drivers of Health     Financial Resource Strain: High Risk (4/25/2025)    Financial Resource Strain     Within the past 12 months, have you or your family members you live with been unable to get utilities (heat, electricity) when it was really needed?: Yes   Food Insecurity: Low Risk  (4/25/2025)    Food Insecurity     Within the past 12 months, did you worry that your food would run out before you got money to buy more?: No     Within the past 12 months, did the food you bought just not last and you didn t have money to get more?: No   Transportation Needs: Low Risk  (4/25/2025)    Transportation Needs     Within the past 12 months, has lack of transportation kept you from medical appointments, getting your medicines, non-medical meetings or appointments, work, or from getting things that you need?: No   Interpersonal Safety: Low Risk  (4/25/2025)    Interpersonal Safety     Do you feel physically and emotionally safe where you currently live?: Yes     Within the past 12 months, have you been hit, slapped, kicked or otherwise physically hurt by someone?: No     Within the past 12 months, have you been humiliated or emotionally abused in other ways by your partner or ex-partner?: No   Housing Stability: Low Risk  (4/25/2025)    Housing Stability     Do you have housing? : Yes     Are you worried about losing your housing?: No       Home Meds:  Medications Prior to Admission   Medication Sig Dispense Refill Last Dose/Taking    acetaminophen (TYLENOL) 325 MG tablet  Take 2 tablets (650 mg) by mouth every 8 hours. (Patient taking differently: Take 325-650 mg by mouth every 6 hours as needed for mild pain.)   Taking Differently    albuterol (PROAIR HFA/PROVENTIL HFA/VENTOLIN HFA) 108 (90 Base) MCG/ACT inhaler Inhale 1-2 puffs into the lungs every 4 hours as needed for shortness of breath or wheezing. 8.5 g 10 Unknown    aspirin (ASA) 81 MG EC tablet Take 81 mg by mouth at bedtime.   4/23/2025    atorvastatin (LIPITOR) 80 MG tablet Take 1 tablet (80 mg) by mouth at bedtime. 90 tablet 3 4/23/2025    budesonide-formoterol (SYMBICORT) 80-4.5 MCG/ACT Inhaler Inhale 2 puffs into the lungs 2 times daily. 10.2 g 11 Unknown    carvedilol (COREG) 6.25 MG tablet Take 1 tablet (6.25 mg) by mouth 2 times daily. 180 tablet 3 4/23/2025    insulin glargine (LANTUS PEN) 100 UNIT/ML pen Inject 20 Units subcutaneously at bedtime. 15 mL 3 4/23/2025    ipratropium - albuterol 0.5 mg/2.5 mg/3 mL (DUONEB) 0.5-2.5 (3) MG/3ML neb solution Take 1 vial (3 mLs) by nebulization every 6 hours as needed for shortness of breath, wheezing or cough. 270 mL 5 Unknown    losartan-hydrochlorothiazide (HYZAAR) 50-12.5 MG tablet Take 1 tablet by mouth daily. 90 tablet 3 4/23/2025    montelukast (SINGULAIR) 10 MG tablet TAKE ONE TABLET BY MOUTH AT BEDTIME 90 tablet 3 4/23/2025    nitroGLYcerin (NITROSTAT) 0.4 MG sublingual tablet For chest pain place 1 tablet under the tongue every 5 minutes for 3 doses. If symptoms persist 5 minutes after 1st dose call 911. 25 tablet 3 Taking    omeprazole (PRILOSEC) 20 MG DR capsule Take 1 capsule (20 mg) by mouth daily. 90 capsule 3 4/23/2025    ticagrelor (BRILINTA) 90 MG tablet Take 1 tablet (90 mg) by mouth 2 times daily. Dose to start tomorrow morning. 180 tablet 3 4/23/2025    tiotropium (SPIRIVA RESPIMAT) 2.5 MCG/ACT inhaler Inhale 2 puffs into the lungs daily. 4 g 1 Unknown    triamcinolone (KENALOG) 0.1 % external cream APPLY TOPICALLY 2 TIMES DAILY TO LEFT ANKLE (Patient  taking differently: 2 times daily as needed for irritation (ankle).) 15 g 1 Unknown    Alcohol Swabs (B-D SINGLE USE SWABS REGULAR) PADS USE TO SWAB AREA OF INJECTION / JENISE AS DIRECTED 100 each 3     blood glucose monitoring (NO BRAND SPECIFIED) meter device kit Use to test blood sugar 1 times daily or as directed.  Blood Glucose Monitor Brands: per insurance. 1 kit 0     budeson-glycopyrrol-formoterol (BREZTRI AEROSPHERE) 160-9-4.8 MCG/ACT AERO inhaler Inhale 2 puffs into the lungs 2 times daily. (Patient not taking: Reported on 4/16/2025)   Unknown    insulin pen needle (BD LIZBETH U/F) 32G X 4 MM miscellaneous USE 1 PEN NEEDLE DAILY 100 each 1     Lancets (ONETOUCH DELICA PLUS [id number]) MISC USE AS DIRECTED WITH LANCING DEVICE TO TEST ONCE DAILY 100 each 6     ONETOUCH ULTRA test strip USE TO TEST BLOOD SUGAR 1 TIME DAILY OR AS DIRECTED 100 strip 6     order for DME Equipment being ordered: arm blood pressure cuff 1 Device 0     predniSONE (DELTASONE) 20 MG tablet Take 1 tablet (20 mg) by mouth 2 times daily. (Patient not taking: Reported on 4/16/2025) 20 tablet 0     semaglutide (OZEMPIC) 2 MG/3ML pen Inject 0.25 mg subcutaneously every 7 days for 28 days, THEN 0.5 mg every 7 days for 28 days. (Patient not taking: Reported on 4/16/2025)        Allergies:  Allergies   Allergen Reactions    Simvastatin Swelling     Severe muscle pain, swelling, and bruising    Dust Mites      ROS: 10 point ROS neg other than the symptoms noted above in the HPI.      Physical Exam:  Temp:  [97.6  F (36.4  C)-98.1  F (36.7  C)] 97.6  F (36.4  C)  Pulse:  [76-87] 77  Resp:  [15-27] 18  BP: (126-176)/(67-98) 143/89  SpO2:  [93 %-96 %] 96 %  Gen: NAD, resting in bed  CV: RRR, S1S2 normal, no murmurs, rubs, or gallops. JVP not elevated  Pulm: clear to auscultation bilaterally, no rhonchi or wheezes  Abd: soft, non-tender, no guarding, +BM  Ext: no lower extremity edema  Neuro: grossly normal  Psych: calm, cooperative       Labs:  ABG No  lab results found in last 7 days.  CBC  Recent Labs   Lab 04/25/25  0822 04/25/25  0124   WBC 11.6* 12.3*   HGB 15.4 15.0    236     BMP  Recent Labs   Lab 04/25/25  1351 04/25/25  0959 04/25/25  0124   NA  --   --  141   POTASSIUM  --   --  4.8   CHLORIDE  --   --  104   CO2  --   --  28   BUN  --   --  21.7   CR  --   --  0.92   * 166* 261*     LFT  Recent Labs   Lab 04/25/25  0124   AST 23   ALT 39   ALKPHOS 140   BILITOTAL 0.8   ALBUMIN 4.0     PancreasNo lab results found in last 7 days.    Imaging:  Recent Results (from the past 24 hours)   Chest XR,  PA & LAT    Narrative    EXAM: XR CHEST 2 VIEWS  LOCATION: [facility]  DATE: 4/25/2025    INDICATION: chest pain  COMPARISON: 1/4/2025      Impression    IMPRESSION:     No focal airspace disease. No pleural effusion or pneumothorax.    The cardiomediastinal silhouette is unremarkable. Coronary artery stent.    Multilevel degenerative changes of the spine.

## 2025-04-26 ENCOUNTER — APPOINTMENT (OUTPATIENT)
Dept: ULTRASOUND IMAGING | Facility: CLINIC | Age: 80
DRG: 233 | End: 2025-04-26
Attending: STUDENT IN AN ORGANIZED HEALTH CARE EDUCATION/TRAINING PROGRAM
Payer: COMMERCIAL

## 2025-04-26 LAB
ATRIAL RATE - MUSE: 82 BPM
DIASTOLIC BLOOD PRESSURE - MUSE: NORMAL MMHG
GLUCOSE BLDC GLUCOMTR-MCNC: 157 MG/DL (ref 70–99)
GLUCOSE BLDC GLUCOMTR-MCNC: 168 MG/DL (ref 70–99)
GLUCOSE BLDC GLUCOMTR-MCNC: 183 MG/DL (ref 70–99)
GLUCOSE BLDC GLUCOMTR-MCNC: 224 MG/DL (ref 70–99)
INTERPRETATION ECG - MUSE: NORMAL
P AXIS - MUSE: 36 DEGREES
PR INTERVAL - MUSE: 154 MS
QRS DURATION - MUSE: 66 MS
QT - MUSE: 364 MS
QTC - MUSE: 425 MS
R AXIS - MUSE: 32 DEGREES
SYSTOLIC BLOOD PRESSURE - MUSE: NORMAL MMHG
T AXIS - MUSE: 58 DEGREES
UFH PPP CHRO-ACNC: 0.11 IU/ML
VENTRICULAR RATE- MUSE: 82 BPM

## 2025-04-26 PROCEDURE — 250N000013 HC RX MED GY IP 250 OP 250 PS 637: Performed by: INTERNAL MEDICINE

## 2025-04-26 PROCEDURE — 36415 COLL VENOUS BLD VENIPUNCTURE: CPT | Performed by: HOSPITALIST

## 2025-04-26 PROCEDURE — 999N000157 HC STATISTIC RCP TIME EA 10 MIN

## 2025-04-26 PROCEDURE — C9460 INJECTION, CANGRELOR: HCPCS | Mod: JZ | Performed by: INTERNAL MEDICINE

## 2025-04-26 PROCEDURE — 250N000009 HC RX 250: Performed by: INTERNAL MEDICINE

## 2025-04-26 PROCEDURE — 250N000011 HC RX IP 250 OP 636: Mod: JZ | Performed by: INTERNAL MEDICINE

## 2025-04-26 PROCEDURE — 99232 SBSQ HOSP IP/OBS MODERATE 35: CPT | Performed by: HOSPITALIST

## 2025-04-26 PROCEDURE — 85520 HEPARIN ASSAY: CPT | Performed by: HOSPITALIST

## 2025-04-26 PROCEDURE — 93970 EXTREMITY STUDY: CPT

## 2025-04-26 PROCEDURE — 94640 AIRWAY INHALATION TREATMENT: CPT

## 2025-04-26 PROCEDURE — 120N000001 HC R&B MED SURG/OB

## 2025-04-26 PROCEDURE — 258N000003 HC RX IP 258 OP 636: Performed by: INTERNAL MEDICINE

## 2025-04-26 PROCEDURE — 99232 SBSQ HOSP IP/OBS MODERATE 35: CPT | Mod: 25 | Performed by: INTERNAL MEDICINE

## 2025-04-26 PROCEDURE — 250N000011 HC RX IP 250 OP 636: Performed by: INTERNAL MEDICINE

## 2025-04-26 PROCEDURE — 94640 AIRWAY INHALATION TREATMENT: CPT | Mod: 76

## 2025-04-26 RX ORDER — NALOXONE HYDROCHLORIDE 0.4 MG/ML
0.4 INJECTION, SOLUTION INTRAMUSCULAR; INTRAVENOUS; SUBCUTANEOUS
Status: DISCONTINUED | OUTPATIENT
Start: 2025-04-26 | End: 2025-05-01

## 2025-04-26 RX ORDER — NALOXONE HYDROCHLORIDE 0.4 MG/ML
0.2 INJECTION, SOLUTION INTRAMUSCULAR; INTRAVENOUS; SUBCUTANEOUS
Status: DISCONTINUED | OUTPATIENT
Start: 2025-04-26 | End: 2025-05-01

## 2025-04-26 RX ADMIN — IPRATROPIUM BROMIDE AND ALBUTEROL SULFATE 3 ML: .5; 3 SOLUTION RESPIRATORY (INHALATION) at 18:39

## 2025-04-26 RX ADMIN — IPRATROPIUM BROMIDE AND ALBUTEROL SULFATE 3 ML: .5; 3 SOLUTION RESPIRATORY (INHALATION) at 12:46

## 2025-04-26 RX ADMIN — HEPARIN SODIUM 650 UNITS/HR: 10000 INJECTION, SOLUTION INTRAVENOUS at 01:25

## 2025-04-26 RX ADMIN — IPRATROPIUM BROMIDE AND ALBUTEROL SULFATE 3 ML: .5; 3 SOLUTION RESPIRATORY (INHALATION) at 08:04

## 2025-04-26 RX ADMIN — CANGRELOR 0.75 MCG/KG/MIN: 50 INJECTION, POWDER, LYOPHILIZED, FOR SOLUTION INTRAVENOUS at 14:05

## 2025-04-26 RX ADMIN — ACETAMINOPHEN 650 MG: 325 TABLET, FILM COATED ORAL at 21:44

## 2025-04-26 RX ADMIN — ASPIRIN 81 MG: 81 TABLET, COATED ORAL at 10:25

## 2025-04-26 RX ADMIN — MONTELUKAST 10 MG: 10 TABLET, FILM COATED ORAL at 21:44

## 2025-04-26 RX ADMIN — FLUTICASONE FUROATE AND VILANTEROL TRIFENATATE 1 PUFF: 100; 25 POWDER RESPIRATORY (INHALATION) at 13:45

## 2025-04-26 RX ADMIN — LOSARTAN POTASSIUM AND HYDROCHLOROTHIAZIDE 1 TABLET: 50; 12.5 TABLET, FILM COATED ORAL at 10:24

## 2025-04-26 RX ADMIN — CARVEDILOL 6.25 MG: 6.25 TABLET, FILM COATED ORAL at 10:25

## 2025-04-26 RX ADMIN — PANTOPRAZOLE SODIUM 40 MG: 40 TABLET, DELAYED RELEASE ORAL at 10:27

## 2025-04-26 RX ADMIN — ATORVASTATIN CALCIUM 80 MG: 80 TABLET, FILM COATED ORAL at 21:44

## 2025-04-26 RX ADMIN — CARVEDILOL 6.25 MG: 6.25 TABLET, FILM COATED ORAL at 21:44

## 2025-04-26 ASSESSMENT — ACTIVITIES OF DAILY LIVING (ADL)
ADLS_ACUITY_SCORE: 41
ADLS_ACUITY_SCORE: 42
ADLS_ACUITY_SCORE: 41
ADLS_ACUITY_SCORE: 42
ADLS_ACUITY_SCORE: 42
ADLS_ACUITY_SCORE: 41
ADLS_ACUITY_SCORE: 42
ADLS_ACUITY_SCORE: 41

## 2025-04-26 NOTE — PROGRESS NOTES
[facility]    Medicine Progress Note - Hospitalist Service    Date of Admission:  4/25/2025    Assessment & Plan   Raul Wilhelm is a 79 year old male admitted on 4/252/25 as transfer from [facility] with report of chest pain that felt like indigestion, given significant cardiac history and elevated troponin, sent to [facility] for cardiology evaluation.     STEMI s/p 1/4/25, REDD x2 to prox LCxand OM branch  STEMI 11/2015, REDD x2 to LPDA, mid LCx  Severe multivessel CAD  HLD  HTN  *1/4/25 Cardiac cath: severe multivessel CAD ostial-prox LAD, mid LAD, prox L circ, and large OM branch. Stents placed to left circ and OM branch. There was plan for outpatient discussion regarding surgical revascularization vs long stents for LAD disease, he has not yet had this follow-up  *1/5/25 Echo: EF 55-60%, mild concentric LVH. No regional WMA. No significant valvular abnormalities.  *4/25 EKG: sinus with PACs,  no ST changes.  *4/25 CXR: no infiltrates or effusions  *4/25 trop 18--40--95  *4/25 Echo: severe hypokinesis mid-distal anteroseptal and apical walls. EF 45-50%  *4/25 cardiac cath: diffuse disease LMCA, CX stents obstruct orifice of LAD, surgical revascularization recommended  *PTA meds: ASA 81mg, brilinta 90mg BID, atorvastatin 80mg daily, coreg 6.25mg BID, losartan-hydrochlorothiazide 50-12.5mg (missed all on 4/24)  - heparin gtt  - ASA 81mg daily  - continue PTA atorvastatin 80mg daily  - continue PTA losartan-hydrochlorothiazide, coreg 6.25mg BID  - PRN nitroglycerin if chest pain  - hold PTA brilinta, cangrelor continued  - telemetry  - cardiology and CV surgery appreciated  - likely CAB on 4/30 after brilinta wash out     Leukocytosis  WBC 12.3 on presentation, improved to 11.6. suspect stress demargination. afebrile  - trend (refused labs 4/26)     Zenkers Diverticulum  GERD  Chronic Dysphagia  Noted during Jan 2025 admit. Diverticulum does not empty fully with multiple swallows and gives him  regurgitation/dysphagia symptoms. He elected not to pursue ENT outpatient.  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 PTA glargine and semaglutide  - medium resistance sliding scale insulin  - continue PTA glargine at 20u qpm  - not taking PTA ozempic due to cost     Hx frequent skin abscesses  +MRSA  Most recent at right neck Mid April, appears to be resolving. March 2025 with abscess of right buttock that was +MRSA     Hx arterial ischemic stroke  noted     Pulmonary nodule RUL, 0.3cm, noted on CT Jan 2025  - follow up CT chest due Jan 2026          Diet: Combination Diet Moderate Consistent Carb (60 g CHO per Meal) Diet; Low Saturated Fat Na <2400mg Diet    DVT Prophylaxis: heparin gtt  Diaz Catheter: Not present  Lines: None     Cardiac Monitoring: ACTIVE order. Indication: Post- PCI/Angiogram (24 hours)  Code Status: Full Code      Clinically Significant Risk Factors                   # Hypertension: Noted on problem list           # DMII: A1C = 7.1 % (Ref range: 0.0 - 5.6 %) within past 6 months, PRESENT ON ADMISSION        # Financial/Environmental Concerns:    # COPD: noted on problem list        Social Drivers of Health    Financial Resource Strain: High Risk (4/25/2025)    Financial Resource Strain     Within the past 12 months, have you or your family members you live with been unable to get utilities (heat, electricity) when it was really needed?: Yes          Disposition Plan     Medically Ready for Discharge: Anticipated in 5+ Days  Awaiting surgical timing (likely 4/30) then recovery after            Delilah Dejesus, DO  Hospitalist Service  [facility]  Securely message with [employer] (more info)  Text page via [employer] Paging/Directory   ______________________________________________________________________    Interval History   Patient see and examined. He is feeling a little frustrated. Doesn't understand what is going to happen with his heart and what surgery means. Doesn't remember me from yesterday or half of our conversation. Calmed him down, spoke about general plans for surgical intervention. Discussed briefly with CV surgery.  No shortness of breath or wheezing today. Only complaint is hunger.    Physical Exam   Vital Signs: Temp: 97.9  F (36.6  C) Temp src: Oral BP: 116/90 Pulse: 91   Resp: 13 SpO2: 98 % O2 Device: None (Room air) Oxygen Delivery: 2 LPM  Weight: 145 lbs 4.53 oz    Constitutional: Awake, alert, cooperative, no apparent distress  Respiratory: Clear to auscultation bilaterally, no crackles or wheeze  Cardiovascular: Regular rate and rhythm, normal S1 and S2, and no murmur noted, nontender chest wall  GI: Normal bowel sounds, soft, non-distended, non-tender  Skin/Integumen: No rashes, no cyanosis, no edema. Right neck abscess (resolving)  Other:       Medical Decision Making       35 MINUTES SPENT BY ME on the date of service doing chart review, history, exam, documentation & further activities per the note.      Data         Imaging results reviewed over the past 24 hrs:   Recent Results (from the past 24 hours)   Cardiac Catheterization    Narrative    He has very diffuse disease in LMCA, CX stent struts obstruct orifice of   LAD and there is significant diffuse disease in proximal LAD.Practically   speaking, even if PCI LMCA to LAD were to be considered, it would be very   challenging to cross into LAD, require crushing of CX stent struts. It   would seem CAB with LIMA to LAD/D1 is best option for revascularization if   patient agreeable       LMCA ostial 60%  diffuse 50% mid  LAD ostium covered by CX stent, appears to be up to 80% on some views,   Diffuse proximal 75% narrowing  first diagonal 75%  CX stents widely patent No restenosis      US Carotid Bilateral    Narrative    EXAM: US CAROTID BILATERAL  LOCATION: [facility]  DATE: 4/26/2025    INDICATION: Pre op CABG  COMPARISON: 1/15/2016  TECHNIQUE: Duplex exam performed utilizing 2D gray-scale imaging, Doppler interrogation with color-flow and spectral waveform analysis. The percent diameter stenosis is determined using Updated Recommendations for Carotid Stenosis Interpretation Criteria   from [employer] [facility].    FINDINGS:    RIGHT: Mild plaque at the bifurcation. The peak systolic velocity in the ICA is less than 180 cm/sec, consistent with less than 50% stenosis. Normal velocities in the ECA. Antegrade flow within the vertebral artery. On grayscale imaging there appears to   be a vascular communication between proximal ICA and ECA which should not be in anatomic consideration. This most likely represent an ECA branch which traverses adjacent to the ICA. This explanation could be confirmed with CT angiography. Additionally   subclavian velocity elevation on the right is noted.    LEFT: Mild plaque at the bifurcation. The peak systolic velocity in the ICA is less than 180 cm/sec, consistent with less than 50% stenosis. Normal velocities in the ECA. Antegrade flow within the vertebral artery.    VELOCITY CHART:  CCA   Right: 88 cm/s   Left: 69 cm/s  ICA   Right: 92 cm/s   Left: 74 cm/s  ECA   Right: 90 cm/s   Left: 77 cm/s  ICA/CCA PSV Ratio   Right: 0.9   Left: 0.9      Impression    IMPRESSION:  1.  Mild plaque formation, velocities consistent with less than 50% stenosis in the right internal carotid artery.  2.  Mild plaque formation, velocities consistent with less than 50% stenosis in the left internal carotid artery.  3.  Flow within the vertebral arteries is antegrade.  4.  Right  subclavian velocity elevation could be related to stenosis.  5.  Vascular communication between the proximal ICA and ECA on the right is likely an ECA branch traversing adjacent to the ICA. This should not be in anatomic consideration absent instrumentation or surgery. If there is clinical uncertainty, CT   angiography could be considered.     US Lower Extremity Venous Mapping Bilateral    Narrative    EXAM: US LOWER EXTREMITY VENOUS MAPPING BILATERAL  LOCATION: [facility]  DATE: 4/26/2025    INDICATION: pre op CABG  COMPARISON: None.  TECHNIQUE: Ultrasound examination of the lower extremity veins was performed, including gray-scale and compression imaging.     LOWER  EXTREMITY FINDINGS:       VENOUS DIAMETERS  RIGHT GREAT SAPHENOUS VEIN  Upper Thigh: 2.6 mm  Mid Thigh: 2.0 mm  Lower Thigh: 2.6 mm  Knee: 2.2 mm  Upper Calf: 1.6 mm  Mid Calf: 1.7 mm  Lower Calf: 2.2 mm  Ankle: 2.3 mm    Small cutaneous varicosity from the GSV distal thigh/knee region is noted.    LEFT GREAT SAPHENOUS VEIN  Upper Thigh: 1.9 mm  Mid Thigh: 2.5 mm  Lower Thigh: 3.1 mm  Knee: 2.0 mm  Upper Calf: 1.4 mm  Mid Calf: 2.2 mm  Lower Calf: 2.0 mm  Ankle: 2.0 mm        Impression    IMPRESSION:  1.  Patent saphenous veins bilaterally with representative measurements above.

## 2025-04-26 NOTE — PROGRESS NOTES
CT surgery Progress Note    Talked with pt about surgery.  Tentatively looking at surgery on Wednesday after brilinta washout.  Continues on heparin gtt.  Ok to get eat  Answered questions/concerns.  D/w pt that we should have a plan in place on Monday with surgeon/timing of surgery.  On Monday will plan to go through surgery/hospital course/risks/benefits.   Pt is agreeable.  Continue to monitor.     Arthur Reynaga PA-C

## 2025-04-26 NOTE — PLAN OF CARE
VS:  Stable  Assist by:  SB      Cardiac: RRR ; CP free overnight   Neuro: A&Ox4  CMS: Denies paresthesias   Respi: diminished to auscultation; on RA   : Continent; voiding freely   GI: Abdo soft, non- tender; BS audible;      Strip/Tele: SR; BBB    Pressure areas/Skin:    - PA's intact    - R radial acces WDL          LDA's:     PIVC to  R arm  Cangrelor gtt   Heparin gtt to restart at 0130h    Plans:  > heparin gtt 6 hrs post hemostasis, no bolus,   > CTSx workup     > CTSx:   - Vein mapping, bilateral carotid duplex US           Problem: Adult Inpatient Plan of Care  Goal: Absence of Hospital-Acquired Illness or Injury  Intervention: Identify and Manage Fall Risk  Recent Flowsheet Documentation  Taken 4/25/2025 2017 by Le Singleton RN  Safety Promotion/Fall Prevention:   safety round/check completed   supervised activity   room organization consistent   room near nurse's station   room door open   patient and family education   nonskid shoes/slippers when out of bed   mobility aid in reach   lighting adjusted   increase visualization of patient   clutter free environment maintained  Intervention: Prevent Skin Injury  Recent Flowsheet Documentation  Taken 4/25/2025 2017 by Le Singleton, RN  Body Position: position changed independently  Intervention: Prevent Infection  Recent Flowsheet Documentation  Taken 4/25/2025 2017 by Le Singleton, RN  Infection Prevention:   rest/sleep promoted   single patient room provided   personal protective equipment utilized   hand hygiene promoted     Problem: Delirium  Goal: Improved Behavioral Control  Intervention: Minimize Safety Risk  Recent Flowsheet Documentation  Taken 4/25/2025 2017 by Le Singleton, RN  Enhanced Safety Measures:   room near unit station   review medications for side effects with activity     Problem: Comorbidity Management  Goal: Blood Glucose Levels Within Targeted Range  Intervention: Monitor  and Manage Glycemia  Recent Flowsheet Documentation  Taken 4/25/2025 2017 by Le Singleton, RN  Medication Review/Management: medications reviewed  Goal: Blood Pressure in Desired Range  Intervention: Maintain Blood Pressure Management  Recent Flowsheet Documentation  Taken 4/25/2025 2017 by Le Singleton, RN  Medication Review/Management: medications reviewed

## 2025-04-26 NOTE — PROGRESS NOTES
[facility] Cardiology Progress Note    Date of Admission:  4/25/2025      Subjective   Mr. Wilhelm is doing well, we discussed role of CABG    Objective   INTAKE / OUTPUT     Intake/Output Summary (Last 24 hours) at 4/26/2025 0823  Last data filed at 4/26/2025 0500  Gross per 24 hour   Intake 150 ml   Output 740 ml   Net -590 ml       VITAL SIGNS  Temp:  [97.4  F (36.3  C)-98.1  F (36.7  C)] 97.4  F (36.3  C)  Pulse:  [61-81] 75  Resp:  [13-20] 13  BP: (110-148)/(61-91) 120/74  SpO2:  [93 %-98 %] 95 %  145 lbs 4.53 oz    PHYSICAL EXAM   Constitutional: Appears stated age, well nourished, NAD.    Neck: Supple.  JVD not visualized.   Respiratory: Non-labored. Lungs CTAB.  Cardiovascular: RRR. Bilateral lower extremities with trace edema.   GI: Soft, non-distended, non-tender.  Skin: Warm and dry.   Musculoskeletal/Extremities: Symmetrical movement.  Neurologic: No gross focal deficits. Alert, awake.  Psychiatric: Affect appropriate. Mentation normal.      Data   Most Recent 3 CBC's:  Recent Labs   Lab Test 04/25/25  0822 04/25/25  0124 04/01/25  0938   WBC 11.6* 12.3* 11.1*   HGB 15.4 15.0 13.9   MCV 90 92 90    236 332     Most Recent 3  BMP's:  Recent Labs   Lab Test 04/26/25  0745 04/25/25  2106 04/25/25  1634 04/25/25  0959 04/25/25  0124 04/01/25  0938 01/19/25  1204 01/19/25  0713   NA  --   --   --   --  141 144  --  141   POTASSIUM  --   --   --   --  4.8 3.9  --  4.4   CHLORIDE  --   --   --   --  104 104  --  106   CO2  --   --   --   --  28 28  --  23   BUN  --   --   --   --  21.7 17.3  --  24.3*   CR  --   --   --   --  0.92 1.01  --  1.06   ANIONGAP  --   --   --   --  9 12  --  12   JOSE RAFAEL  --   --   --   --  9.4 9.6  --  8.6*   * 279* 161*   < > 261* 209*   < > 237*    < > = values in this interval not displayed.     Most Recent 3 BNP's:  Recent Labs   Lab Test 01/17/25  1015 01/04/25 1954   NTBNPI 462 800      Most Recent Cholesterol  Panel:  Recent Labs   Lab Test 02/19/25  0832   CHOL 111   LDL 50   HDL 38*   TRIG 116       Most Recent Transthoracic Echocardiogram:  Echo result w/o MOPS: Interpretation Summary Severe hypokinesis of the mid to distal anteroseptal and apicall walls.Left ventricular systolic function is mildly reduced.The visual ejection fraction is 45-50%. Compared to the previous study, the WMA are new.        Most Recent Cardiac Catheterization:  Cardiac Catheterization    Result Date: 4/25/2025  He has very diffuse disease in LMCA, CX stent struts obstruct orifice of   LAD and there is significant diffuse disease in proximal LAD.Practically   speaking, even if PCI LMCA to LAD were to be considered, it would be very   challenging to cross into LAD, require crushing of CX stent struts. It   would seem CAB with LIMA to LAD/D1 is best option for revascularization if   patient agreeable       LMCA ostial 60% diffuse 50% mid  LAD ostium covered by CX stent, appears to be up to 80% on some views,   Diffuse proximal 75% narrowing  first diagonal 75%  CX stents widely patent No restenosis           Assessment & Plan     Assessment     Non-ST elevation myocardial infarction  Severe multivessel coronary artery disease    Raul Wilhelm is a 79 year old male who presents with heartburn sensation.  This patient lives in [place].  He is 79 years old.  He presented in January of this year with inferior ST elevation myocardial infarction and underwent stenting of his proximal circumflex mid circumflex OM and LPDA.  He has residual LAD and left main disease.  Per notes it seems this was going to be addressed as an outpatient but I do not see any notes in that regard.  However it seems like patient has not had follow-up with cardiology since his discharge in January.  His EF is normal.  He has been taking his medications at home.  Now presented with heartburn sensation and was noted to have elevated troponin.  Cardiology was consulted  for non-ST elevation myocardial infarction. coronary angiogram which revealed residual LMCA and proximal LAD disease. CV surgery consulted for consideration of surgical revascularization of LMCA disease and proximal LAD disease.      Plan     Cangrelor + heparin infusions   Pending CABG    Thank you for involving us in the care of this patient.  We will follow    Total time spent today 45 minutes    Silvio Zuñiga MD

## 2025-04-26 NOTE — PLAN OF CARE
[id number]    Goal Outcome Evaluation:  Plan of Care Reviewed With: patient, family  Overall Patient Progress: no change  Outcome Evaluation: Will need CABG    Orientations: A&Ox4  Vitals/Pain: AVSS, on RA.  Denies chest pain.  Tele: SR  Resp: LS dim, on RA.   Lines/Drains: Hep drip at 650units/hr, HepXa pending.  Cangrelor at 15.3ml/hr.   Skin/Wounds: Blanchable redness, patient independent on repositioning and sitting at side of bed.   GI/: Tolerating oral intake. (+)BM.  Voiding without difficulty.  Labs: See chart.   Ambulation/Assist: Assist x 1/SBA   Sleep Quality: Some in between cares  Plan: Will need CABG, per notes, timing/schedule will be discussed on Monday.

## 2025-04-26 NOTE — PLAN OF CARE
Vital signs stable on room air. Alert and oriented. Lung sounds diminished. Bowel sounds active, flatus present. Right wrist access site clean dry and intact. Patient denies any further chest pain this shift. Up assist of one. Tolerating NPO. CMS/neuros intact. Tele. Heparin gtt and cangrelor gtt running. Recheck this am. Voiding adequately to urinal.

## 2025-04-27 LAB
ANION GAP SERPL CALCULATED.3IONS-SCNC: 11 MMOL/L (ref 7–15)
BUN SERPL-MCNC: 19.5 MG/DL (ref 8–23)
CALCIUM SERPL-MCNC: 9 MG/DL (ref 8.8–10.4)
CHLORIDE SERPL-SCNC: 104 MMOL/L (ref 98–107)
CREAT SERPL-MCNC: 1 MG/DL (ref 0.67–1.17)
EGFRCR SERPLBLD CKD-EPI 2021: 77 ML/MIN/1.73M2
ERYTHROCYTE [DISTWIDTH] IN BLOOD BY AUTOMATED COUNT: 12.4 % (ref 10–15)
GLUCOSE BLDC GLUCOMTR-MCNC: 146 MG/DL (ref 70–99)
GLUCOSE BLDC GLUCOMTR-MCNC: 158 MG/DL (ref 70–99)
GLUCOSE BLDC GLUCOMTR-MCNC: 159 MG/DL (ref 70–99)
GLUCOSE BLDC GLUCOMTR-MCNC: 182 MG/DL (ref 70–99)
GLUCOSE SERPL-MCNC: 159 MG/DL (ref 70–99)
HCO3 SERPL-SCNC: 23 MMOL/L (ref 22–29)
HCT VFR BLD AUTO: 42.5 % (ref 40–53)
HGB BLD-MCNC: 14 G/DL (ref 13.3–17.7)
MAGNESIUM SERPL-MCNC: 2 MG/DL (ref 1.7–2.3)
MCH RBC QN AUTO: 29.8 PG (ref 26.5–33)
MCHC RBC AUTO-ENTMCNC: 32.9 G/DL (ref 31.5–36.5)
MCV RBC AUTO: 90 FL (ref 78–100)
PHOSPHATE SERPL-MCNC: 3.4 MG/DL (ref 2.5–4.5)
PLATELET # BLD AUTO: 193 10E3/UL (ref 150–450)
POTASSIUM SERPL-SCNC: 4 MMOL/L (ref 3.4–5.3)
RBC # BLD AUTO: 4.7 10E6/UL (ref 4.4–5.9)
SODIUM SERPL-SCNC: 138 MMOL/L (ref 135–145)
UFH PPP CHRO-ACNC: 0.29 IU/ML
UFH PPP CHRO-ACNC: 0.29 IU/ML
WBC # BLD AUTO: 8.8 10E3/UL (ref 4–11)

## 2025-04-27 PROCEDURE — 250N000011 HC RX IP 250 OP 636: Performed by: INTERNAL MEDICINE

## 2025-04-27 PROCEDURE — 99232 SBSQ HOSP IP/OBS MODERATE 35: CPT | Performed by: HOSPITALIST

## 2025-04-27 PROCEDURE — 250N000009 HC RX 250: Performed by: INTERNAL MEDICINE

## 2025-04-27 PROCEDURE — 36415 COLL VENOUS BLD VENIPUNCTURE: CPT | Performed by: INTERNAL MEDICINE

## 2025-04-27 PROCEDURE — 84100 ASSAY OF PHOSPHORUS: CPT | Performed by: HOSPITALIST

## 2025-04-27 PROCEDURE — 85520 HEPARIN ASSAY: CPT | Performed by: INTERNAL MEDICINE

## 2025-04-27 PROCEDURE — 120N000001 HC R&B MED SURG/OB

## 2025-04-27 PROCEDURE — 94640 AIRWAY INHALATION TREATMENT: CPT | Mod: 76

## 2025-04-27 PROCEDURE — 85041 AUTOMATED RBC COUNT: CPT | Performed by: HOSPITALIST

## 2025-04-27 PROCEDURE — 250N000011 HC RX IP 250 OP 636: Mod: JZ | Performed by: INTERNAL MEDICINE

## 2025-04-27 PROCEDURE — C9460 INJECTION, CANGRELOR: HCPCS | Mod: JZ | Performed by: INTERNAL MEDICINE

## 2025-04-27 PROCEDURE — 36415 COLL VENOUS BLD VENIPUNCTURE: CPT | Performed by: HOSPITALIST

## 2025-04-27 PROCEDURE — 258N000003 HC RX IP 258 OP 636: Performed by: INTERNAL MEDICINE

## 2025-04-27 PROCEDURE — 94640 AIRWAY INHALATION TREATMENT: CPT

## 2025-04-27 PROCEDURE — 250N000013 HC RX MED GY IP 250 OP 250 PS 637: Performed by: INTERNAL MEDICINE

## 2025-04-27 PROCEDURE — 999N000157 HC STATISTIC RCP TIME EA 10 MIN

## 2025-04-27 PROCEDURE — 83735 ASSAY OF MAGNESIUM: CPT | Performed by: HOSPITALIST

## 2025-04-27 PROCEDURE — 250N000012 HC RX MED GY IP 250 OP 636 PS 637: Performed by: HOSPITALIST

## 2025-04-27 PROCEDURE — 82947 ASSAY GLUCOSE BLOOD QUANT: CPT | Performed by: HOSPITALIST

## 2025-04-27 RX ADMIN — HEPARIN SODIUM 800 UNITS/HR: 10000 INJECTION, SOLUTION INTRAVENOUS at 07:13

## 2025-04-27 RX ADMIN — ASPIRIN 81 MG: 81 TABLET, COATED ORAL at 08:10

## 2025-04-27 RX ADMIN — CANGRELOR 0.75 MCG/KG/MIN: 50 INJECTION, POWDER, LYOPHILIZED, FOR SOLUTION INTRAVENOUS at 22:18

## 2025-04-27 RX ADMIN — MONTELUKAST 10 MG: 10 TABLET, FILM COATED ORAL at 21:48

## 2025-04-27 RX ADMIN — FLUTICASONE FUROATE AND VILANTEROL TRIFENATATE 1 PUFF: 100; 25 POWDER RESPIRATORY (INHALATION) at 08:11

## 2025-04-27 RX ADMIN — CANGRELOR 0.75 MCG/KG/MIN: 50 INJECTION, POWDER, LYOPHILIZED, FOR SOLUTION INTRAVENOUS at 07:13

## 2025-04-27 RX ADMIN — LOSARTAN POTASSIUM AND HYDROCHLOROTHIAZIDE 1 TABLET: 50; 12.5 TABLET, FILM COATED ORAL at 08:10

## 2025-04-27 RX ADMIN — IPRATROPIUM BROMIDE AND ALBUTEROL SULFATE 3 ML: .5; 3 SOLUTION RESPIRATORY (INHALATION) at 19:33

## 2025-04-27 RX ADMIN — INSULIN GLARGINE 20 UNITS: 100 INJECTION, SOLUTION SUBCUTANEOUS at 21:51

## 2025-04-27 RX ADMIN — PANTOPRAZOLE SODIUM 40 MG: 40 TABLET, DELAYED RELEASE ORAL at 07:12

## 2025-04-27 RX ADMIN — INSULIN GLARGINE 20 UNITS: 100 INJECTION, SOLUTION SUBCUTANEOUS at 00:33

## 2025-04-27 RX ADMIN — CARVEDILOL 6.25 MG: 6.25 TABLET, FILM COATED ORAL at 21:48

## 2025-04-27 RX ADMIN — IPRATROPIUM BROMIDE AND ALBUTEROL SULFATE 3 ML: .5; 3 SOLUTION RESPIRATORY (INHALATION) at 12:13

## 2025-04-27 RX ADMIN — ATORVASTATIN CALCIUM 80 MG: 80 TABLET, FILM COATED ORAL at 21:48

## 2025-04-27 RX ADMIN — CARVEDILOL 6.25 MG: 6.25 TABLET, FILM COATED ORAL at 08:10

## 2025-04-27 RX ADMIN — IPRATROPIUM BROMIDE AND ALBUTEROL SULFATE 3 ML: .5; 3 SOLUTION RESPIRATORY (INHALATION) at 07:40

## 2025-04-27 ASSESSMENT — ACTIVITIES OF DAILY LIVING (ADL)
ADLS_ACUITY_SCORE: 39
ADLS_ACUITY_SCORE: 42
ADLS_ACUITY_SCORE: 42
ADLS_ACUITY_SCORE: 39
ADLS_ACUITY_SCORE: 42
ADLS_ACUITY_SCORE: 38
ADLS_ACUITY_SCORE: 39
ADLS_ACUITY_SCORE: 38
ADLS_ACUITY_SCORE: 38
ADLS_ACUITY_SCORE: 42
ADLS_ACUITY_SCORE: 42
ADLS_ACUITY_SCORE: 38
ADLS_ACUITY_SCORE: 42
ADLS_ACUITY_SCORE: 38
ADLS_ACUITY_SCORE: 42
ADLS_ACUITY_SCORE: 38
ADLS_ACUITY_SCORE: 38
ADLS_ACUITY_SCORE: 42
ADLS_ACUITY_SCORE: 42

## 2025-04-27 NOTE — PLAN OF CARE
"Goal Outcome Evaluation:  Patient Name: Louise  MRN: [id number]  Date of Admission: 4/25/2025  Reason for Admission: Heartburn sensation   Level of Care: acute     Vitals:   BP Readings from Last 1 Encounters:   04/27/25 109/69     Pulse Readings from Last 1 Encounters:   04/27/25 71     Wt Readings from Last 1 Encounters:   04/26/25 65.9 kg (145 lb 4.5 oz)     Ht Readings from Last 1 Encounters:   04/25/25 1.74 m (5' 8.5\")     Estimated body mass index is 21.77 kg/m  as calculated from the following:    Height as of this encounter: 1.74 m (5' 8.5\").    Weight as of this encounter: 65.9 kg (145 lb 4.5 oz).  Temp Readings from Last 1 Encounters:   04/27/25 97.5  F (36.4  C) (Oral)       Pain: Pain goal 0 Pain Rating 0 Effective pain medication/regimen 0    Assessment    Resp: RRR  Telemetry: SR  Neuro: intact   GI/: WDL  Skin/Wounds: scattered bruises, red blanchable sacrum/coccyx   Lines/Drains: Heparin iv gtts @800 units/hr, Cangrelor @ 15.3 ml/hr   Activity: up w/SBA  Sleep: fair   Abnormal Labs: awaits am labs    Aggression Stop Light: Green          Patient Care Plan: Tentative plans for CABG on Wednesday with timing/hospital surgery course/risks/benefits to be discuss ed with provider tomorrow-Monday per notes. Will continue to monitor.     "

## 2025-04-27 NOTE — PROGRESS NOTES
[facility]    Medicine Progress Note - Hospitalist Service    Date of Admission:  4/25/2025    Assessment & Plan   Raul Wilhelm is a 79 year old male admitted on 4/252/25 as transfer from [facility] with report of chest pain that felt like indigestion, given significant cardiac history and elevated troponin, sent to [facility] for cardiology evaluation.     STEMI s/p 1/4/25, REDD x2 to prox LCxand OM branch  STEMI 11/2015, REDD x2 to LPDA, mid LCx  Severe multivessel CAD  HLD  HTN  *1/4/25 Cardiac cath: severe multivessel CAD ostial-prox LAD, mid LAD, prox L circ, and large OM branch. Stents placed to left circ and OM branch. There was plan for outpatient discussion regarding surgical revascularization vs long stents for LAD disease, he has not yet had this follow-up  *1/5/25 Echo: EF 55-60%, mild concentric LVH. No regional WMA. No significant valvular abnormalities.  *4/25 EKG: sinus with PACs,  no ST changes.  *4/25 CXR: no infiltrates or effusions  *4/25 trop 18--40--95  *4/25 Echo: severe hypokinesis mid-distal anteroseptal and apical walls. EF 45-50%  *4/25 cardiac cath: diffuse disease LMCA, CX stents obstruct orifice of LAD, surgical revascularization recommended  *PTA meds: ASA 81mg, brilinta 90mg BID, atorvastatin 80mg daily, coreg 6.25mg BID, losartan-hydrochlorothiazide 50-12.5mg (missed all on 4/24)  - heparin gtt  - ASA 81mg daily  - continue PTA atorvastatin 80mg daily  - continue PTA losartan-hydrochlorothiazide, coreg 6.25mg BID  - PRN nitroglycerin if chest pain  - hold PTA brilinta, cangrelor continued  - telemetry  - cardiology and CV surgery appreciated  - likely CAB on 4/30 after brilinta wash out--surgical plan Monday  - patient suspects he will want TCU after surgery as he lives alone--he is interested in the following TCUs ([facility], [facility] [place] and [facility])   -CC/SW consulted     Leukocytosis- resolved  WBC 12.3 on presentation, improved to 11.6.  suspect stress demargination. Afebrile. Improved to 8.8 on 4/27     Zenkers Diverticulum  GERD  Chronic Dysphagia  Noted during Jan 2025 admit. Diverticulum does not empty fully with multiple swallows and gives him regurgitation/dysphagia symptoms. He elected not to pursue ENT outpatient.  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 PTA glargine and semaglutide  - medium resistance sliding scale insulin  - continue PTA glargine at 20u qpm  - not taking PTA ozempic due to cost     Hx frequent skin abscesses  +MRSA  Most recent at right neck Mid April, appears to be resolving. March 2025 with abscess of right buttock that was +MRSA     Hx arterial ischemic stroke  noted     Pulmonary nodule RUL, 0.3cm, noted on CT Jan 2025  - follow up CT chest due Jan 2026          Diet: Combination Diet Moderate Consistent Carb (60 g CHO per Meal) Diet; Low Saturated Fat Na <2400mg Diet    DVT Prophylaxis: heparin gtt  Diaz Catheter: Not present  Lines: None     Cardiac Monitoring: ACTIVE order. Indication: Chest pain/ ACS rule out (24 hours)  Code Status: Full Code      Clinically Significant Risk Factors                   # Hypertension: Noted on problem list           # DMII: A1C = 7.1 % (Ref range: 0.0 - 5.6 %) within past 6 months, PRESENT ON ADMISSION        # Financial/Environmental Concerns:    # COPD: noted on problem list        Social Drivers of Health    Financial Resource Strain: High Risk (4/25/2025)    Financial Resource Strain     Within the past 12 months, have you or your family members you live with been unable to get utilities (heat, electricity) when it was really needed?: Yes          Disposition Plan     Medically Ready for Discharge: Anticipated in 5+ Days  Awaiting surgery then recovery after            Delilah Dejesus, DO  Hospitalist Service  [facility]  Securely message with [employer] (more info)  Text page via [employer] Paging/Directory   ______________________________________________________________________    Interval History   Patient see and examined. He is a little less frustrated, but has  been able to eat--which I think helps his mood. He is worried that he has things to do and can't do them while he is here. He again states he is thinking about leaving--urged him to stay again. Breathing is comfortable--no wheezing. We talked briefly about post-op course--he thinks he will need TCU.    Physical Exam   Vital Signs: Temp: 97.4  F (36.3  C) Temp src: Oral BP: 130/72 Pulse: 106   Resp: 24 SpO2: 97 % O2 Device: None (Room air)    Weight: 145 lbs 4.53 oz    Constitutional: Awake, alert, cooperative, no apparent distress  Respiratory: Clear to auscultation bilaterally, no crackles or wheeze  Cardiovascular: Regular rate and rhythm, normal S1 and S2, and no murmur noted, nontender chest wall  GI: Normal bowel sounds, soft, non-distended, non-tender  Skin/Integumen: No rashes, no cyanosis, no edema. Right neck abscess/lump (resolving)  Other:       Medical Decision Making       35 MINUTES SPENT BY ME on the date of service doing chart review, history, exam, documentation & further activities per the note.      Data     I have personally reviewed the following data over the past 24 hrs:    8.8  \   14.0   / 193     138 104 19.5 /  146 (H)   4.0 23 1.00 \       Imaging results reviewed over the past 24 hrs:   No results found for this or any previous visit (from the past 24 hours).

## 2025-04-27 NOTE — PROGRESS NOTES
[facility]    Cardiology Follow up note    Assessment & Plan     Assessment     Non-ST elevation myocardial infarction  Severe multivessel coronary artery disease    Raul Wilhelm is a 79 year old male who presents with heartburn sensation.  This patient lives in [place].  He is 79 years old.  He presented in January of this year with inferior ST elevation myocardial infarction and underwent stenting of his proximal circumflex mid circumflex OM and LPDA.  He has residual LAD and left main disease.  Per notes it seems this was going to be addressed as an outpatient but I do not see any notes in that regard.  However it seems like patient has not had follow-up with cardiology since his discharge in January.  His EF is normal.  He has been taking his medications at home.  Now presented with heartburn sensation and was noted to have elevated troponin.  Cardiology was consulted for non-ST elevation myocardial infarction. coronary angiogram which revealed residual LMCA and proximal LAD disease. CV surgery consulted for consideration of surgical revascularization of LMCA disease and proximal LAD disease.      Plan     Cangrelor + heparin infusions   Pending CABG  Remotely, please reach out if needed    Thank you for involving us in the care of this patient.  We will follow    Total time spent today 45 minutes    Silvio Zuñiga MD

## 2025-04-27 NOTE — PROGRESS NOTES
CT surgery Progress Note    Talked with pt in [place].  Ambulating with his heparin gtt.   Tentatively looking at surgery on Wednesday after brilinta washout.  D/w pt that we should have a plan in place on Monday with surgeon/timing of surgery.  On Monday will plan to go through surgery/hospital course/risks/benefits.   Pt is agreeable.  Continue to monitor.     Arthur Reynaga PA-C

## 2025-04-27 NOTE — PLAN OF CARE
4/26/2025 [id number]  Goal Outcome Evaluation:  Plan of Care Reviewed With: patient, family  Overall Patient Progress: no change  Outcome Evaluation: Will need CABG     Orientations: A&Ox4  Vitals/Pain: AVSS, on RA.  Denies chest pain.pt c/o pain to the R flank, PRN tylenol given x1  Tele: SR  Resp: LS dim, on RA.   Lines/Drains: Hep drip at 800units/hr, HepXa recheck at 1AM. Cangrelor at 15.3ml/hr.   Skin/Wounds: Blanchable redness, patient independent on repositioning and sitting at side of bed. SBA ambulates to the [place] x2 this shift  GI/: Tolerating oral intake. (+)BM.  Voiding without difficulty.  Labs: See chart.   Ambulation/Assist: Assist x 1/SBA   Sleep Quality: Some in between cares  Plan: Will need CABG, per notes, timing/schedule will be discussed on Monday. Tentatively looking at surgery on Wednesday after brilinta washout.

## 2025-04-27 NOTE — PLAN OF CARE
Orientations: A&Ox4  Vitals/Pain: VSS, RA, LS diminished, Denies pain   Tele: SR (72)  Lines/Drains: PIV R infusing   Skin/Wounds: WDL x scattered bruising.   GI/: WDL BM x1. Mod carb diet.   Labs: Abnormal/Trends: BG (159, 146, 158)  Electrolyte Replacement- N/A   Ambulation/Assist: Ind   Sleep Quality: On/Off   Plan: Plan for CABG next week, CV surg and family meeting tomorrow for official plan. On heparin and platelet gtts. Hep Xa recheck in AM. Cards, CV surg and hospitalist following.

## 2025-04-28 ENCOUNTER — PREP FOR PROCEDURE (OUTPATIENT)
Dept: CARDIOLOGY | Facility: CLINIC | Age: 80
End: 2025-04-28
Payer: COMMERCIAL

## 2025-04-28 DIAGNOSIS — I25.10 CAD (CORONARY ARTERY DISEASE): Primary | ICD-10-CM

## 2025-04-28 LAB
ERYTHROCYTE [DISTWIDTH] IN BLOOD BY AUTOMATED COUNT: 12.5 % (ref 10–15)
GLUCOSE BLDC GLUCOMTR-MCNC: 147 MG/DL (ref 70–99)
GLUCOSE BLDC GLUCOMTR-MCNC: 155 MG/DL (ref 70–99)
GLUCOSE BLDC GLUCOMTR-MCNC: 164 MG/DL (ref 70–99)
GLUCOSE BLDC GLUCOMTR-MCNC: 164 MG/DL (ref 70–99)
GLUCOSE BLDC GLUCOMTR-MCNC: 195 MG/DL (ref 70–99)
HCT VFR BLD AUTO: 44.9 % (ref 40–53)
HGB BLD-MCNC: 15.2 G/DL (ref 13.3–17.7)
MCH RBC QN AUTO: 30.6 PG (ref 26.5–33)
MCHC RBC AUTO-ENTMCNC: 33.9 G/DL (ref 31.5–36.5)
MCV RBC AUTO: 91 FL (ref 78–100)
PLATELET # BLD AUTO: 224 10E3/UL (ref 150–450)
RBC # BLD AUTO: 4.96 10E6/UL (ref 4.4–5.9)
UFH PPP CHRO-ACNC: 0.22 IU/ML
UFH PPP CHRO-ACNC: 0.38 IU/ML
UFH PPP CHRO-ACNC: >1.1 IU/ML
WBC # BLD AUTO: 9.3 10E3/UL (ref 4–11)

## 2025-04-28 PROCEDURE — 94640 AIRWAY INHALATION TREATMENT: CPT | Mod: 76

## 2025-04-28 PROCEDURE — 99232 SBSQ HOSP IP/OBS MODERATE 35: CPT | Performed by: INTERNAL MEDICINE

## 2025-04-28 PROCEDURE — 85520 HEPARIN ASSAY: CPT | Performed by: INTERNAL MEDICINE

## 2025-04-28 PROCEDURE — 36415 COLL VENOUS BLD VENIPUNCTURE: CPT | Performed by: INTERNAL MEDICINE

## 2025-04-28 PROCEDURE — C9460 INJECTION, CANGRELOR: HCPCS | Mod: JZ | Performed by: INTERNAL MEDICINE

## 2025-04-28 PROCEDURE — 999N000157 HC STATISTIC RCP TIME EA 10 MIN

## 2025-04-28 PROCEDURE — 250N000013 HC RX MED GY IP 250 OP 250 PS 637: Performed by: INTERNAL MEDICINE

## 2025-04-28 PROCEDURE — 250N000011 HC RX IP 250 OP 636: Mod: JZ | Performed by: INTERNAL MEDICINE

## 2025-04-28 PROCEDURE — 120N000001 HC R&B MED SURG/OB

## 2025-04-28 PROCEDURE — 250N000011 HC RX IP 250 OP 636: Performed by: INTERNAL MEDICINE

## 2025-04-28 PROCEDURE — 258N000003 HC RX IP 258 OP 636: Performed by: INTERNAL MEDICINE

## 2025-04-28 PROCEDURE — 250N000009 HC RX 250: Performed by: INTERNAL MEDICINE

## 2025-04-28 PROCEDURE — 85520 HEPARIN ASSAY: CPT | Performed by: HOSPITALIST

## 2025-04-28 PROCEDURE — 85027 COMPLETE CBC AUTOMATED: CPT | Performed by: INTERNAL MEDICINE

## 2025-04-28 RX ORDER — CEFAZOLIN SODIUM 2 G/50ML
2 SOLUTION INTRAVENOUS SEE ADMIN INSTRUCTIONS
Status: CANCELLED | OUTPATIENT
Start: 2025-04-28

## 2025-04-28 RX ORDER — ASPIRIN 81 MG/1
81 TABLET, CHEWABLE ORAL
Status: CANCELLED | OUTPATIENT
Start: 2025-04-28

## 2025-04-28 RX ORDER — CEFAZOLIN SODIUM 2 G/50ML
2 SOLUTION INTRAVENOUS
Status: CANCELLED | OUTPATIENT
Start: 2025-04-28

## 2025-04-28 RX ORDER — VANCOMYCIN HYDROCHLORIDE 1 G/200ML
1000 INJECTION, SOLUTION INTRAVENOUS
Status: CANCELLED | OUTPATIENT
Start: 2025-04-28

## 2025-04-28 RX ORDER — IPRATROPIUM BROMIDE AND ALBUTEROL SULFATE 2.5; .5 MG/3ML; MG/3ML
3 SOLUTION RESPIRATORY (INHALATION) EVERY 6 HOURS PRN
Status: DISCONTINUED | OUTPATIENT
Start: 2025-04-28 | End: 2025-05-01

## 2025-04-28 RX ORDER — ACETAMINOPHEN 325 MG/1
975 TABLET ORAL ONCE
Status: CANCELLED | OUTPATIENT
Start: 2025-04-28 | End: 2025-04-28

## 2025-04-28 RX ORDER — CHLORHEXIDINE GLUCONATE ORAL RINSE 1.2 MG/ML
10 SOLUTION DENTAL ONCE
Status: CANCELLED | OUTPATIENT
Start: 2025-04-28 | End: 2025-04-28

## 2025-04-28 RX ORDER — LIDOCAINE 40 MG/G
CREAM TOPICAL
Status: CANCELLED | OUTPATIENT
Start: 2025-04-28

## 2025-04-28 RX ORDER — HEPARIN SOD,PORCINE/0.9 % NACL 30K/1000ML
INTRAVENOUS SOLUTION INTRAVENOUS
Status: CANCELLED | OUTPATIENT
Start: 2025-04-28

## 2025-04-28 RX ORDER — ALBUTEROL SULFATE 90 UG/1
2 INHALANT RESPIRATORY (INHALATION) EVERY 4 HOURS PRN
Status: DISCONTINUED | OUTPATIENT
Start: 2025-04-28 | End: 2025-04-28

## 2025-04-28 RX ORDER — FAMOTIDINE 20 MG/1
20 TABLET, FILM COATED ORAL
Status: CANCELLED | OUTPATIENT
Start: 2025-04-28

## 2025-04-28 RX ORDER — ASPIRIN 81 MG/1
162 TABLET, CHEWABLE ORAL
Status: CANCELLED | OUTPATIENT
Start: 2025-04-28

## 2025-04-28 RX ADMIN — CANGRELOR 0.75 MCG/KG/MIN: 50 INJECTION, POWDER, LYOPHILIZED, FOR SOLUTION INTRAVENOUS at 15:56

## 2025-04-28 RX ADMIN — CARVEDILOL 6.25 MG: 6.25 TABLET, FILM COATED ORAL at 10:17

## 2025-04-28 RX ADMIN — ATORVASTATIN CALCIUM 80 MG: 80 TABLET, FILM COATED ORAL at 21:51

## 2025-04-28 RX ADMIN — HEPARIN SODIUM 950 UNITS/HR: 10000 INJECTION, SOLUTION INTRAVENOUS at 12:05

## 2025-04-28 RX ADMIN — IPRATROPIUM BROMIDE AND ALBUTEROL SULFATE 3 ML: .5; 3 SOLUTION RESPIRATORY (INHALATION) at 07:25

## 2025-04-28 RX ADMIN — HEPARIN SODIUM 450 UNITS/HR: 10000 INJECTION, SOLUTION INTRAVENOUS at 23:38

## 2025-04-28 RX ADMIN — PANTOPRAZOLE SODIUM 40 MG: 40 TABLET, DELAYED RELEASE ORAL at 06:58

## 2025-04-28 RX ADMIN — CARVEDILOL 6.25 MG: 6.25 TABLET, FILM COATED ORAL at 21:51

## 2025-04-28 RX ADMIN — LOSARTAN POTASSIUM AND HYDROCHLOROTHIAZIDE 1 TABLET: 50; 12.5 TABLET, FILM COATED ORAL at 10:17

## 2025-04-28 RX ADMIN — ASPIRIN 81 MG: 81 TABLET, COATED ORAL at 10:17

## 2025-04-28 RX ADMIN — FLUTICASONE FUROATE AND VILANTEROL TRIFENATATE 1 PUFF: 100; 25 POWDER RESPIRATORY (INHALATION) at 10:19

## 2025-04-28 RX ADMIN — INSULIN GLARGINE 20 UNITS: 100 INJECTION, SOLUTION SUBCUTANEOUS at 21:55

## 2025-04-28 RX ADMIN — IPRATROPIUM BROMIDE AND ALBUTEROL SULFATE 3 ML: .5; 3 SOLUTION RESPIRATORY (INHALATION) at 14:09

## 2025-04-28 RX ADMIN — MONTELUKAST 10 MG: 10 TABLET, FILM COATED ORAL at 21:51

## 2025-04-28 ASSESSMENT — ACTIVITIES OF DAILY LIVING (ADL)
ADLS_ACUITY_SCORE: 38

## 2025-04-28 NOTE — PROGRESS NOTES
[facility]    Internal Medicine Hospitalist Progress Note  04/28/2025  I evaluated patient on the above date.    Kerwin Kwon Jr., MD  [phone] (p)  Text Page  Vocera      [New actions/orders today (04/28/2025) are underlined in the assessment and plan. All lab results in the assessment and plan were reviewed.]  Assessment & Plan   Raul Wilhelm is a 79 year old male with history including asthma; DM2; HTN; known severe multivessel CAD with prior stents (1/2025); h/o CVA; who presented to [facility] 4/25/2025 with chest pain and found with NSTEMI. Transferred to [facility] 4/25/2025 for management.         NSTEMI with known severe multivessel CAD and recent stents (1/2025).  Ischemic cardiomyopathy.  HTN (benign essential).  HLD.  [PTA: asa 81 mg daily; atorvastatin 80 mg at bedtime; carvedilol 6.25 mg BID; losartan-HCTZ 50-12.5 mg daily; ticagrelor 90 mg BID; PRN NTG.]  * H/o STEMI 11/2025 s/p LPDA and LCx stents. H/o STEMI 1/2025. Heart catheterization 1/2025 showed severe multivessel CAD involving ostial-prox LAD, mid LAD, prox LCX, and large OM branch; stents placed to LCx and OM branch and there was plan for outpatient discussion regarding surgical revascularization vs long stents for LAD disease, but he had not yet had this follow-up. Echo 1/2025 showed LVEF 55-60%, mild LVH, no regional wall motion abnormalities; no significant valve disease.  * Initial presentation to outside hospital as above. Started on heparin gtt.  * Transferred to [facility] 4/25. Cardiology consulted on admit.  * Later 4/25: Echo showed LVEF 45-50% with WMA's, grade 1 diastolic dysfunction; RV OK. Angiogram showed LM with ostial 60%; mid LAD with diffuse 50%, LAD ostium covered by CX stent, appeared to be up to 80% on some views, diffuse proximal 75% narrowing first diagonal 75%; LCx stents widely patent, no restenosis; CABG recommended. CVS consulted; ticagrelor held and started on cangrelor gtt  bridging.  - Plan CABG 5/1/2025 after ticagrelor washout.  - Continue heparin gtt.  - Continue cangrelor gtt (holding PTA ticagrelor).  - Continue ASA 81 mg daily, atorvastatin 80 mg daily, PRN NTG.  - Continue carvedilol 6.25 mg BID and losartan-HCTZ 50-12.5 mg daily.  - Continue to monitor i/o's, daily wts.  - Patient suspects he will want TCU after surgery as he lives alone - he is interested in the following TCUs ([facility], [facility] [place] and [facility]), SW consulted.     Leukocytosis, suspect reactive, resolved.  * WBC 12.3 on admit. Pt afebrile, no clear signs of infection on admission.  * WBC normalized 4/27.     Zenkers diverticulum.  GERD.  Chronic Dysphagia.  * Noted during Jan 2025 admit. Noted diverticulum does not empty fully with multiple swallows and gives him regurgitation/dysphagia symptoms. He elected not to pursue ENT outpatient. PTA on BID omeprazole.  - Continue PPI.     Asthma.  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  - Continue fluticasone-vilanterol and montelukast, PRN albuterol.  - Change scheduled nebs to PRN.     DM2.   [PTA: glargine 20U at bedtime; semaglutide (pt not taking due to cost).]  * HgbA1C 7.1 2/2025.   Recent Labs   Lab 04/28/25  1200 04/28/25  0759 04/28/25  0149 04/27/25  2147 04/27/25  1639 04/27/25  1144   * 164* 195* 182* 158* 146*     Recent Labs   Lab Test 02/19/25  0832 01/04/25  1709   A1C 7.1* 8.1*   - Continue moderate CHO diet.  - Continue glargine 20U at bedtime.  - Continue aspart ISS (medium).  - Continue PRN hypoglycemia protocol.    Back pain, suspect musculoskeletal.  * 4/28: Pt c/o back pain suspected due to the hospital bed.  - Continue PRN acetaminophen.     H/o frequent skin abscesses.  +MRSA.  * 3/2025 with abscess of right buttock that was +MRSA. Most recent at right neck Mid 4/2025, appears to be resolving.    - Continue to monitor clinically.    H/o arterial ischemic stroke.  - Continue antiplatelets and statin as  "noted.     Pulmonary nodule RUL, 0.3cm, noted on CT (1/2025).  - Follow-up CT chest due Jan 2026      Clinically Significant Risk Factors                   # Hypertension: Noted on problem list           # DMII: A1C = 7.1 % (Ref range: 0.0 - 5.6 %) within past 6 months, PRESENT ON ADMISSION      # Financial/Environmental Concerns:    # COPD: noted on problem list        Diet: Combination Diet Moderate Consistent Carb (60 g CHO per Meal) Diet; Low Saturated Fat Na <2400mg Diet    Prophylaxis: PCD's, ambulation, and is on anticoagulation as above.  Diaz Catheter: Not present  Lines: None     Code Status: Full Code    Disposition Plan   Medically Ready for Discharge: Anticipated in 5+ Days  Expected discharge to transitional care unit (TCU) pending above.    Entered: Kerwin Kwon MD 04/28/2025, 12:37 PM               Interval History   Pt c/o back pain suspected due to the hospital bed. Does not want to take anything for it now.  No chest pain or dyspnea.    * Data reviewed today: I reviewed all new labs and imaging over the last 24 hours. I personally reviewed no images or ECG's today.    Physical Exam   Most recent vitals:   , Blood pressure 131/72, pulse 68, temperature 97.6  F (36.4  C), temperature source Oral, resp. rate 20, height 1.74 m (5' 8.5\"), weight 65.9 kg (145 lb 4.5 oz), SpO2 97%. O2 Device: None (Room air)    Vitals:    04/25/25 0806 04/26/25 0700   Weight: 67.8 kg (149 lb 6.4 oz) 65.9 kg (145 lb 4.5 oz)     Vital signs with ranges:  Temp:  [97.6  F (36.4  C)-97.7  F (36.5  C)] 97.6  F (36.4  C)  Pulse:  [68-83] 68  Resp:  [20] 20  BP: (125-131)/(64-91) 131/72  SpO2:  [91 %-100 %] 97 %  Patient Vitals for the past 24 hrs:   BP Temp Temp src Pulse Resp SpO2   04/28/25 1158 131/72 97.6  F (36.4  C) Oral 68 20 97 %   04/28/25 0400 125/67 -- -- 70 -- 93 %   04/27/25 1949 129/64 97.7  F (36.5  C) Oral 80 20 100 %   04/27/25 1744 (!) 127/91 -- -- 83 -- (!) 91 %     I/O's last 24 hours:  I/O last 3 " completed shifts:  In: 1550 [P.O.:1550]  Out: 830 [Urine:830]    Constitutional: awake, alert, oriented, pleasant, conversant   Head:   Eyes:   ENT:   Neck:   Cardiovascular: regular rate and rhythm; no murmurs, rubs or gallops  Lungs: diminished in the bases, no crackles or wheezes  Gastrointestinal/Abdomen: soft, non-tender, non-distended, positive bowel sounds  :   Musculoskeletal:   Skin/Extremities: no bilateral lower extremity pitting edema  Neurologic:   Psychiatric:   Hematologic/Lymphatic/Immunologic:        Labs reviewed.  Recent Labs   Lab 04/28/25  1200 04/28/25  0759 04/28/25  0639 04/28/25  0149 04/27/25  1144 04/27/25  0613 04/25/25  0959 04/25/25  0822 04/25/25  0124   WBC  --   --  9.3  --   --  8.8  --  11.6* 12.3*   HGB  --   --  15.2  --   --  14.0  --  15.4 15.0   MCV  --   --  91  --   --  90  --  90 92   PLT  --   --  224  --   --  193  --  233 236   NA  --   --   --   --   --  138  --   --  141   POTASSIUM  --   --   --   --   --  4.0  --   --  4.8   CHLORIDE  --   --   --   --   --  104  --   --  104   CO2  --   --   --   --   --  23  --   --  28   BUN  --   --   --   --   --  19.5  --   --  21.7   CR  --   --   --   --   --  1.00  --   --  0.92   ANIONGAP  --   --   --   --   --  11  --   --  9   JOSE RAFAEL  --   --   --   --   --  9.0  --   --  9.4   * 164*  --  195*   < > 159*   < >  --  261*   ALBUMIN  --   --   --   --   --   --   --   --  4.0   PROTTOTAL  --   --   --   --   --   --   --   --  6.4   BILITOTAL  --   --   --   --   --   --   --   --  0.8   ALKPHOS  --   --   --   --   --   --   --   --  140   ALT  --   --   --   --   --   --   --   --  39   AST  --   --   --   --   --   --   --   --  23    < > = values in this interval not displayed.     Recent Labs   Lab Test 04/25/25  1032 04/25/25  0822 04/25/25  0226 01/17/25  1317 01/17/25  1015 01/04/25  1954   NT-PROBNP, INPATIENT  --   --   --   --  461 562   TROPONIN T HIGH SENSITIVITY 81* 95* 40*   < > 118*  --     < > =  "values in this interval not displayed.     Recent Labs   Lab 04/28/25  1200 04/28/25  0759 04/28/25  0149 04/27/25  2147 04/27/25  1639 04/27/25  1144   * 164* 195* 182* 158* 146*     Recent Labs   Lab Test 02/19/25  0832 01/04/25  1709   A1C 7.1* 8.1*       No results for input(s): \"INR\", \"[id number]\" in the last 168 hours.  Recent Labs   Lab 04/28/25  0639 04/27/25  0613 04/25/25  0822 04/25/25  0124   WBC 9.3 8.8 11.6* 12.3*       MICRO:  Cultures (including blood and urine):  No lab results found in last 7 days.    No results found for this or any previous visit (from the past 24 hours).    Medications   All medications were reviewed. MAR.    Infusions:  Current Facility-Administered Medications   Medication Dose Route Frequency Provider Last Rate Last Admin    cangrelor ANTIPLATELET (KENGREAL) 50 mg in sodium chloride 0.9 % 250 mL infusion  0.75 mcg/kg/min Intravenous Continuous Abdulaziz Jimenez MD 15.3 mL/hr at 04/28/25 0813 0.75 mcg/kg/min at 04/28/25 0813    heparin 25,000 units in 0.45% NaCl 250 mL ANTICOAGULANT infusion  0-5,000 Units/hr Intravenous Continuous Vinod Lobato MD 9.5 mL/hr at 04/28/25 1205 950 Units/hr at 04/28/25 1205    Patient is already receiving anticoagulation with heparin, enoxaparin (LOVENOX), warfarin (COUMADIN)  or other anticoagulant medication   Does not apply Continuous PRN Vinod Lobato MD         Scheduled Medications:  Current Facility-Administered Medications   Medication Dose Route Frequency Provider Last Rate Last Admin    aspirin EC tablet 81 mg  81 mg Oral Daily Vinod Lobato MD   81 mg at 04/28/25 1017    atorvastatin (LIPITOR) tablet 80 mg  80 mg Oral At Bedtime Vinod Lobato MD   80 mg at 04/27/25 2148    carvedilol (COREG) tablet 6.25 mg  6.25 mg Oral BID Vinod Lobato MD   6.25 mg at 04/28/25 1017    fluticasone-vilanterol (BREO ELLIPTA) 100-25 MCG/ACT inhaler 1 puff  1 puff Inhalation Daily Vinod Lobato MD   1 " puff at 04/28/25 1019    insulin aspart (NovoLOG) injection (RAPID ACTING)  1-7 Units Subcutaneous TID AC Delilah Dejesus DO   1 Units at 04/28/25 1201    insulin aspart (NovoLOG) injection (RAPID ACTING)  1-5 Units Subcutaneous At Bedtime Delilah Dejesus DO   2 Units at 04/25/25 2140    insulin glargine (LANTUS PEN) injection 20 Units  20 Units Subcutaneous At Bedtime Delilah Dejesus DO   20 Units at 04/27/25 2151    ipratropium - albuterol 0.5 mg/2.5 mg/3 mL (DUONEB) neb solution 3 mL  3 mL Nebulization TID Vinod Lobato MD   3 mL at 04/28/25 0725    losartan-hydrochlorothiazide (HYZAAR) 50-12.5 MG per tablet 1 tablet  1 tablet Oral Daily Vinod Lobato MD   1 tablet at 04/28/25 1017    montelukast (SINGULAIR) tablet 10 mg  10 mg Oral At Bedtime Vinod Lobato MD   10 mg at 04/27/25 2148    pantoprazole (PROTONIX) EC tablet 40 mg  40 mg Oral QAM AC Vinod Lobato MD   40 mg at 04/28/25 0658    sodium chloride (PF) 0.9% PF flush 3 mL  3 mL Intracatheter Q8H [facility] Vinod Lobato MD   3 mL at 04/25/25 2104     PRN Medications:  Current Facility-Administered Medications   Medication Dose Route Frequency Provider Last Rate Last Admin    acetaminophen (TYLENOL) Suppository 650 mg  650 mg Rectal Q4H PRN Vinod Lobato MD        acetaminophen (TYLENOL) tablet 650 mg  650 mg Oral Q4H PRN Vinod Lobato MD   650 mg at 04/26/25 2144    calcium carbonate (TUMS) chewable tablet 1,000 mg  1,000 mg Oral 4x Daily PRN Vinod Lobato MD        glucose gel 15-30 g  15-30 g Oral Q15 Min PRN Delilah Dejesus DO        Or    dextrose 50 % injection 25-50 mL  25-50 mL Intravenous Q15 Min PRN Delilah Dejesus DO        Or    glucagon injection 1 mg  1 mg Subcutaneous Q15 Min PRN Delilah Dejesus DO        lidocaine (LMX4) cream   Topical Q1H PRN Vinod Lobato MD        lidocaine 1 % 0.1-1 mL  0.1-1 mL Other Q1H  PRN Vinod Lobato MD        naloxone (NARCAN) injection 0.2 mg  0.2 mg Intravenous Q2 Min PRN Aureliano Rodriguez MD        Or    naloxone (NARCAN) injection 0.4 mg  0.4 mg Intravenous Q2 Min PRN Aureliano Rodriguez MD        Or    naloxone (NARCAN) injection 0.2 mg  0.2 mg Intramuscular Q2 Min PRN Aureliano Rodriguez MD        Or    naloxone (NARCAN) injection 0.4 mg  0.4 mg Intramuscular Q2 Min PRN Aureliano Rodriguez MD        nitroGLYcerin (NITROSTAT) sublingual tablet 0.4 mg  0.4 mg Sublingual Q5 Min PRN Vinod Lobato MD        ondansetron (ZOFRAN ODT) ODT tab 4 mg  4 mg Oral Q6H PRN Vinod Lobato MD        Or    ondansetron (ZOFRAN) injection 4 mg  4 mg Intravenous Q6H PRN Vinod Lobato MD        oxyCODONE (ROXICODONE) tablet 5 mg  5 mg Oral Q4H PRN Vinod Lobato MD        Or    oxyCODONE (ROXICODONE) tablet 10 mg  10 mg Oral Q4H PRN Vinod Lobato MD        Patient is already receiving anticoagulation with heparin, enoxaparin (LOVENOX), warfarin (COUMADIN)  or other anticoagulant medication   Does not apply Continuous PRN Vinod Lobato MD        polyethylene glycol (MIRALAX) Packet 17 g  17 g Oral BID PRN Vinod Lobato MD        prochlorperazine (COMPAZINE) injection 5 mg  5 mg Intravenous Q6H PRN Vinod Lobato MD        Or    prochlorperazine (COMPAZINE) tablet 5 mg  5 mg Oral Q6H PRN Vinod Lobato MD        senna-docusate (SENOKOT-S/PERICOLACE) 8.6-50 MG per tablet 1 tablet  1 tablet Oral BID PRN Vinod Lobato MD        Or    senna-docusate (SENOKOT-S/PERICOLACE) 8.6-50 MG per tablet 2 tablet  2 tablet Oral BID PRN Vinod Lobato MD        sodium chloride (PF) 0.9% PF flush 3 mL  3 mL Intracatheter q1 min prn Vinod Lobato, MD

## 2025-04-28 NOTE — PROGRESS NOTES
CVTS    Reason for Consult: LMCA disease, proximal LAD disease        ASSESSMENT/PLAN:   Raul Wilhelm is a 79 year old male with a past medical history of HTN, HLD, GERD, Zenkers Diverticulum, COPD, Asthma, DMII, CVA, frequent MRSA abscesses, CAD with STEMI s/p REDD to LPDA, mid CX in 2015 and REDD to prox LCx and OM in January 2025 at [facility] and on Brilinta, with known residual LAD and D1 disease. Patient presented on 4/25/25 with chest pain in the center of his chest that radiated to his belly. He underwent TTE with severe hypokinesis of mid to distal aneroseptal and apical walls, EF 45-50% and coronary angiogram which revealed residual LMCA and proximal LAD disease. CV surgery consulted for consideration of surgical revascularization of LMCA disease and proximal LAD disease.      Last dose of Brilinta 1038am on 4/25  Awaiting Brilinta washout  Stable on cangrelor and heparin infusions.   Tentatively planning CABG on Thursday with Dr. Mulvihill  Reviewed plan for surgery with patient.  All questions answered, encouraged patient to share plans with his family and will come back later today to follow up any questions they may have.    Abby Clark PA-C  CV surgery   [phone]

## 2025-04-28 NOTE — PLAN OF CARE
Goal Outcome Evaluation:  Plan of Care Reviewed With: patient  Overall Patient Progress: improving  Outcome Evaluation: Per notes planning CABG on 5/1    Patient Name: Louise  MRN: [id number]  Date of Admission: 4/25/2025  Reason for Admission: Chest pain, now for CABG  Level of Care: Acute    Vitals:   BP Readings from Last 1 Encounters:   04/28/25 (P) 123/79     Pulse Readings from Last 1 Encounters:   04/28/25 (P) 77     Wt Readings from Last 1 Encounters:   04/26/25 65.9 kg (145 lb 4.5 oz)     Temp Readings from Last 1 Encounters:   04/28/25 (P) 97.5  F (36.4  C) ((P) Oral)       Pain: Denies    CV Surgery Patient: No    Assessment    Resp: LS dim, on RA. IS instructed/performed.   Telemetry: SR  Neuro: A&Ox4  GI/: Tolerating oral intake.    Skin/Wounds: Scattered bruising. Right radial site WDL.   Lines/Drains: Hep gtt at 950units/hr, therapeutic x 1, HepXa recheck ~2200. Cangrelor at 15.3ml/hr, both infusing on new left FA PIV.  Other PIV SL.   Activity: Activity encouraged, up independent in room and hallway.   Sleep: Some in between cares.  Abnormal Labs: See chart.    Aggression Stop Light: Green          Patient Care Plan: Heart surgery education handbook given. CABG tentatively on 5/1.

## 2025-04-28 NOTE — PLAN OF CARE
"Goal Outcome Evaluation:  Patient Name: Louise  MRN: [id number]  Date of Admission: 4/25/2025        Vitals:   BP Readings from Last 1 Encounters:   04/28/25 125/67     Pulse Readings from Last 1 Encounters:   04/28/25 70     Wt Readings from Last 1 Encounters:   04/26/25 65.9 kg (145 lb 4.5 oz)     Ht Readings from Last 1 Encounters:   04/25/25 1.74 m (5' 8.5\")     Estimated body mass index is 21.77 kg/m  as calculated from the following:    Height as of this encounter: 1.74 m (5' 8.5\").    Weight as of this encounter: 65.9 kg (145 lb 4.5 oz).  Temp Readings from Last 1 Encounters:   04/27/25 97.7  F (36.5  C) (Oral)       Pain: Pain goal 0/10 Pain Rating 2/10 Effective pain medication/regimen pt refuses intervention.  CV Surgery Patient: No  Assessment  Resp: LS clear, on RA   Telemetry: SR w/ inverted T wave  Neuro: A&Ox4  GI/: BS active, x0 BM, Pt having adequate urine output throughout shift   Skin/Wounds: scattered bruising.   Lines/Drains: 1 PIV infusing   Activity: IND  Sleep: adequate  Protocols Hep Xa  Aggression Stop Light: Green        Patient Care Plan: Plan for CABG on Wednesday. Plan of care ongoing, call light within reach.               "

## 2025-04-28 NOTE — PROGRESS NOTES
Brief cardiology note (non-billable)    Chart reviewed.    Plan for CABG tentatively Thursday awaiting Brilinta wash out (last dose 4/25).  Continues on IV heparin and cangrelor infusion. Continue aspirin, atorvastatin, coreg. PPI.    Vitals stable, no pain, CBC normal.     Will follow peripherally. Please page via Vocera with any acute questions or concerns.       Yenni Hernandez CNP  / Dr. Higgins (team 2)  [facility]

## 2025-04-29 LAB
ABO + RH BLD: NORMAL
BLD GP AB SCN SERPL QL: NEGATIVE
GLUCOSE BLDC GLUCOMTR-MCNC: 134 MG/DL (ref 70–99)
GLUCOSE BLDC GLUCOMTR-MCNC: 137 MG/DL (ref 70–99)
GLUCOSE BLDC GLUCOMTR-MCNC: 149 MG/DL (ref 70–99)
GLUCOSE BLDC GLUCOMTR-MCNC: 153 MG/DL (ref 70–99)
GLUCOSE BLDC GLUCOMTR-MCNC: 161 MG/DL (ref 70–99)
GLUCOSE BLDC GLUCOMTR-MCNC: 174 MG/DL (ref 70–99)
SPECIMEN EXP DATE BLD: NORMAL
UFH PPP CHRO-ACNC: 0.24 IU/ML
UFH PPP CHRO-ACNC: 0.35 IU/ML
UFH PPP CHRO-ACNC: <0.1 IU/ML

## 2025-04-29 PROCEDURE — C9460 INJECTION, CANGRELOR: HCPCS | Mod: JZ | Performed by: INTERNAL MEDICINE

## 2025-04-29 PROCEDURE — 86923 COMPATIBILITY TEST ELECTRIC: CPT

## 2025-04-29 PROCEDURE — 36415 COLL VENOUS BLD VENIPUNCTURE: CPT | Performed by: INTERNAL MEDICINE

## 2025-04-29 PROCEDURE — 258N000003 HC RX IP 258 OP 636: Performed by: INTERNAL MEDICINE

## 2025-04-29 PROCEDURE — 250N000011 HC RX IP 250 OP 636: Performed by: INTERNAL MEDICINE

## 2025-04-29 PROCEDURE — 36415 COLL VENOUS BLD VENIPUNCTURE: CPT | Performed by: STUDENT IN AN ORGANIZED HEALTH CARE EDUCATION/TRAINING PROGRAM

## 2025-04-29 PROCEDURE — 85520 HEPARIN ASSAY: CPT | Performed by: INTERNAL MEDICINE

## 2025-04-29 PROCEDURE — 86900 BLOOD TYPING SEROLOGIC ABO: CPT | Performed by: STUDENT IN AN ORGANIZED HEALTH CARE EDUCATION/TRAINING PROGRAM

## 2025-04-29 PROCEDURE — 99232 SBSQ HOSP IP/OBS MODERATE 35: CPT | Performed by: INTERNAL MEDICINE

## 2025-04-29 PROCEDURE — 120N000001 HC R&B MED SURG/OB

## 2025-04-29 PROCEDURE — 250N000013 HC RX MED GY IP 250 OP 250 PS 637: Performed by: INTERNAL MEDICINE

## 2025-04-29 PROCEDURE — 250N000011 HC RX IP 250 OP 636: Mod: JZ | Performed by: INTERNAL MEDICINE

## 2025-04-29 RX ADMIN — ASPIRIN 81 MG: 81 TABLET, COATED ORAL at 09:10

## 2025-04-29 RX ADMIN — CANGRELOR 0.75 MCG/KG/MIN: 50 INJECTION, POWDER, LYOPHILIZED, FOR SOLUTION INTRAVENOUS at 09:10

## 2025-04-29 RX ADMIN — PANTOPRAZOLE SODIUM 40 MG: 40 TABLET, DELAYED RELEASE ORAL at 08:04

## 2025-04-29 RX ADMIN — MONTELUKAST 10 MG: 10 TABLET, FILM COATED ORAL at 23:00

## 2025-04-29 RX ADMIN — INSULIN GLARGINE 20 UNITS: 100 INJECTION, SOLUTION SUBCUTANEOUS at 23:09

## 2025-04-29 RX ADMIN — LOSARTAN POTASSIUM AND HYDROCHLOROTHIAZIDE 1 TABLET: 50; 12.5 TABLET, FILM COATED ORAL at 09:10

## 2025-04-29 RX ADMIN — CARVEDILOL 6.25 MG: 6.25 TABLET, FILM COATED ORAL at 09:10

## 2025-04-29 RX ADMIN — FLUTICASONE FUROATE AND VILANTEROL TRIFENATATE 1 PUFF: 100; 25 POWDER RESPIRATORY (INHALATION) at 09:19

## 2025-04-29 RX ADMIN — HEPARIN SODIUM 900 UNITS/HR: 10000 INJECTION, SOLUTION INTRAVENOUS at 17:14

## 2025-04-29 RX ADMIN — CARVEDILOL 6.25 MG: 6.25 TABLET, FILM COATED ORAL at 23:06

## 2025-04-29 RX ADMIN — ATORVASTATIN CALCIUM 80 MG: 80 TABLET, FILM COATED ORAL at 23:00

## 2025-04-29 ASSESSMENT — ACTIVITIES OF DAILY LIVING (ADL)
ADLS_ACUITY_SCORE: 38
DEPENDENT_IADLS:: INDEPENDENT

## 2025-04-29 NOTE — PLAN OF CARE
Goal Outcome Evaluation:    Orientations: A/O x4  Vitals/Pain: VSS on RA  Tele: SR  Lines/Drains: R PIV infusing heparin at 4.5 ml/hr; L PIV infusing  antiplatelets at 15.3 ml/hr  Skin/Wounds: scattered bruising, dry rash on buttocks, and redness blancheable on coccyx  GI/: cont BB, no BM this shift, bedside urinal overnight  Labs Abnormal/Trends: Hep unfractionated anti Xa level 1.1  Ambulation/Assist: Independent  Sleep Quality: between cares  Plan: plan CABG 5/1 after cagrelor washout, continue hep gtt, eventual discharge to TCU

## 2025-04-29 NOTE — CONSULTS
Care Management Initial Consult    General Information  Assessment completed with: Patient,    Type of CM/SW Visit: Initial Assessment    Primary Care Provider verified and updated as needed: Yes   Readmission within the last 30 days:        Reason for Consult: discharge planning  Advance Care Planning: Advance Care Planning Reviewed: present on chart          Communication Assessment  Patient's communication style: spoken language (English or Bilingual)    Hearing Difficulty or Deaf: yes   Wear Glasses or Blind: yes    Cognitive  Cognitive/Neuro/Behavioral: WDL  Level of Consciousness: alert  Arousal Level: opens eyes spontaneously  Orientation: oriented x 4  Mood/Behavior: calm  Best Language: 0 - No aphasia  Speech: clear, spontaneous, logical    Living Environment:   People in home: alone     Current living Arrangements: house      Able to return to prior arrangements:         Family/Social Support:  Care provided by: self  Provides care for: no one  Marital Status:  (wife lives in nursing home)  Support system: Children, Other (specify) (grandchildren)          Description of Support System: Supportive, Involved         Current Resources:   Patient receiving home care services: No        Community Resources:    Equipment currently used at home: none  Supplies currently used at home:      Employment/Financial:  Employment Status: retired        Financial Concerns: none   Referral to Financial Worker: No       Does the patient's insurance plan have a 3 day qualifying hospital stay waiver?  No    Lifestyle & Psychosocial Needs:  Social Drivers of Health     Food Insecurity: Low Risk  (4/25/2025)    Food Insecurity     Within the past 12 months, did you worry that your food would run out before you got money to buy more?: No     Within the past 12 months, did the food you bought just not last and you didn t have money to get more?: No   Depression: Not at risk (1/17/2025)    PHQ-2     PHQ-2 Score: 0    Housing Stability: Low Risk  (4/25/2025)    Housing Stability     Do you have housing? : Yes     Are you worried about losing your housing?: No   Tobacco Use: Low Risk  (4/22/2025)    Patient History     Smoking Tobacco Use: Never     Smokeless Tobacco Use: Never     Passive Exposure: Not on file   Financial Resource Strain: High Risk (4/25/2025)    Financial Resource Strain     Within the past 12 months, have you or your family members you live with been unable to get utilities (heat, electricity) when it was really needed?: Yes   Alcohol Use: Not on file   Transportation Needs: Low Risk  (4/25/2025)    Transportation Needs     Within the past 12 months, has lack of transportation kept you from medical appointments, getting your medicines, non-medical meetings or appointments, work, or from getting things that you need?: No   Physical Activity: Not on file   Interpersonal Safety: Low Risk  (4/25/2025)    Interpersonal Safety     Do you feel physically and emotionally safe where you currently live?: Yes     Within the past 12 months, have you been hit, slapped, kicked or otherwise physically hurt by someone?: No     Within the past 12 months, have you been humiliated or emotionally abused in other ways by your partner or ex-partner?: No   Stress: Not on file   Social Connections: Not on file   Health Literacy: Not on file       Functional Status:  Prior to admission patient needed assistance:   Dependent ADLs:: Independent  Dependent IADLs:: Independent       Mental Health Status:  Mental Health Status: No Current Concerns       Chemical Dependency Status:  Chemical Dependency Status: No Current Concerns             Values/Beliefs:  Spiritual, Cultural Beliefs, Denominational Practices, Values that affect care: no               Discussed  Partnership in Safe Discharge Planning  document with patient/family: Yes: patient    Additional Information:  Consult for discharge planning.  Met with pt and introduced self and  role.  Pt shared he lives alone independently in his home.  Shared that his wife is in a nursing home.  Pt shared his daughter, April lives 3 hours away but his daughter, Nancy lives in [place] and granddaughter, Araceli lives in [place].      Pt currently scheduled for surgery 5/1.  Discussed discharge planning when medically ready.  Discussed therapy will work with patient post op and CM will follow for recommendations.  Pt lives alone and anticipate will go to TCU at discharge.  Pt listed the following TCU's in order of preference:  [facility] [place]    CM will follow for discharge planning and send referrals closer to known discharge time.    Next Steps: Follow for discharge planning    Shirin Perez RN, BS  Care Coordinator  [email or web address]

## 2025-04-29 NOTE — PLAN OF CARE
Goal Outcome Evaluation:      Plan of Care Reviewed With: patient          Outcome Evaluation: Anticipate TCU at discharge post CABG

## 2025-04-29 NOTE — PROGRESS NOTES
CLINICAL NUTRITION SERVICES - REASSESSMENT NOTE     Registered Dietitian Interventions:  Encouraged adequate PO intakes in anticipation of surgery       INFORMATION OBTAINED  Assessed patient in room.    CURRENT NUTRITION ORDERS  Diet: Moderate Consistent Carbohydrate and Low Saturated Fat/2400 mg Sodium    CURRENT INTAKE/TOLERANCE  - Pt is eating well w/ good appetite. 100% intakes recorded.      NEW FINDINGS  GI symptoms: BM x1 today.     Skin/wounds: Reviewed    Nutrition-relevant labs: Reviewed  Nutrition-relevant medications:   Medium sliding scale insulin + Lantus 20 units q HS  Weight:   Date/Time Weight Weight Method   04/26/25 0700 65.9 kg (145 lb 4.5 oz) Standing scale   04/25/25 0806 67.8 kg (149 lb 6.4 oz)        ASSESSED NUTRITION NEEDS  Dosing Weight: 68 kg, based on actual wt  Estimated Energy Needs: 1196-8879 kcals (25-30 Kcal/Kg)  Justification: maintenance  Estimated Protein Needs:  grams protein (1.2-1.5 g pro/Kg)  Justification: maintenance, planned surgery   Estimated Fluid Needs: 7549-6176  mL (1 mL/Kcal)  Justification: maintenance     MALNUTRITION  % Intake: No decreased intake noted  % Weight Loss: Weight loss does not meet criteria   Subcutaneous Fat Loss: None observed  Muscle Loss: None observed  Fluid Accumulation/Edema: None noted  Malnutrition Diagnosis: Patient does not meet two of the established criteria necessary for diagnosing malnutrition but is at risk for malnutrition  Malnutrition Present on Admission: Unable to assess    EVALUATION OF THE PROGRESS TOWARD GOALS   Previous Goals  Patient to consume % of nutritionally adequate meal trays TID, or the equivalent with supplements/snacks.   Evaluation: Met    Previous Nutrition Diagnosis  No nutrition diagnosis at this time   Evaluation: No change    NUTRITION DIAGNOSIS  No nutrition diagnosis at this time     INTERVENTIONS  Encouraged adequate PO intakes in anticipation of surgery    GOALS  Patient to consume % of  nutritionally adequate meal trays TID, or the equivalent with supplements/snacks.     MONITORING/EVALUATION  Progress toward goals will be monitored and evaluated per policy.

## 2025-04-29 NOTE — PROGRESS NOTES
[facility]    Internal Medicine Hospitalist Progress Note  04/29/2025  I evaluated patient on the above date.    Kerwin Kwon Jr., MD  [phone] (p)  Text Page  Vocera      [New actions/orders today (04/29/2025) are underlined in the assessment and plan. All lab results in the assessment and plan were reviewed.]  Assessment & Plan   Raul Wilhelm is a 79 year old male with history including asthma; DM2; HTN; known severe multivessel CAD with prior stents (1/2025); h/o CVA; who presented to [facility] 4/25/2025 with chest pain and found with NSTEMI. Transferred to [facility] 4/25/2025 for management.       NSTEMI with known severe multivessel CAD and recent stents (1/2025).  Ischemic cardiomyopathy.  HTN (benign essential).  HLD.  [PTA: asa 81 mg daily; atorvastatin 80 mg at bedtime; carvedilol 6.25 mg BID; losartan-HCTZ 50-12.5 mg daily; ticagrelor 90 mg BID; PRN NTG.]  * H/o STEMI 11/2025 s/p LPDA and LCx stents. H/o STEMI 1/2025. Heart catheterization 1/2025 showed severe multivessel CAD involving ostial-prox LAD, mid LAD, prox LCX, and large OM branch; stents placed to LCx and OM branch and there was plan for outpatient discussion regarding surgical revascularization vs long stents for LAD disease, but he had not yet had this follow-up. Echo 1/2025 showed LVEF 55-60%, mild LVH, no regional wall motion abnormalities; no significant valve disease.  * Initial presentation to outside hospital as above. Started on heparin gtt.  * Transferred to [facility] 4/25. Cardiology consulted on admit.  * Later 4/25: Echo showed LVEF 45-50% with WMA's, grade 1 diastolic dysfunction; RV OK. Angiogram showed LM with ostial 60%; mid LAD with diffuse 50%, LAD ostium covered by CX stent, appeared to be up to 80% on some views, diffuse proximal 75% narrowing first diagonal 75%; LCx stents widely patent, no restenosis; CABG recommended. CVS consulted; ticagrelor held and started on cangrelor gtt bridging.  -  Plan CABG 5/1/2025 after ticagrelor washed out.  - Continue heparin gtt.  - Continue cangrelor gtt (holding PTA ticagrelor).  - Continue ASA 81 mg daily, atorvastatin 80 mg daily, PRN NTG.  - Continue carvedilol 6.25 mg BID and losartan-HCTZ 50-12.5 mg daily.  - Continue to monitor i/o's, daily wts.  - Patient suspects he will want TCU after surgery as he lives alone - he is interested in the following TCUs ([facility] [place] and [facility]), SW consulted.     Leukocytosis, suspect reactive, resolved.  * WBC 12.3 on admit. Pt afebrile, no clear signs of infection on admission.  * WBC normalized 4/27.     Zenkers diverticulum.  GERD.  Chronic Dysphagia.  * Noted during Jan 2025 admit. Noted diverticulum does not empty fully with multiple swallows and gives him regurgitation/dysphagia symptoms. He elected not to pursue ENT outpatient. PTA on BID omeprazole.  - Continue PPI.     Asthma.  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  - Continue fluticasone-vilanterol and montelukast, PRN inhalers/nebs     DM2.   [PTA: glargine 20U at bedtime; semaglutide (pt not taking due to cost).]  * HgbA1C 7.1 2/2025.   Recent Labs   Lab 04/29/25  1107 04/29/25  0758 04/29/25  0230 04/28/25  2154 04/28/25  1659 04/28/25  1200   * 161* 134* 164* 155* 147*     Recent Labs   Lab Test 02/19/25  0832 01/04/25  1709   A1C 7.1* 8.1*   - Continue moderate CHO diet.  - Continue glargine 20U at bedtime.  - Continue aspart ISS (medium).  - Continue PRN hypoglycemia protocol.    Back pain, suspect musculoskeletal.  * 4/28: Pt c/o back pain suspected due to the hospital bed.  - Continue PRN acetaminophen.     H/o frequent skin abscesses.  +MRSA.  * 3/2025 with abscess of right buttock that was +MRSA. Most recent at right neck Mid 4/2025, appears to be resolving.    - Continue to monitor clinically.    H/o arterial ischemic stroke.  - Continue antiplatelets and statin as noted.     Pulmonary nodule RUL, 0.3cm, noted  "on CT (1/2025).  - Follow-up CT chest due Jan 2026      Clinically Significant Risk Factors                   # Hypertension: Noted on problem list           # DMII: A1C = 7.1 % (Ref range: 0.0 - 5.6 %) within past 6 months         # Financial/Environmental Concerns:    # COPD: noted on problem list        Diet: Combination Diet Moderate Consistent Carb (60 g CHO per Meal) Diet; Low Saturated Fat Na <2400mg Diet    Prophylaxis: PCD's, ambulation, and is on anticoagulation as above.  Diaz Catheter: Not present  Lines: None     Code Status: Full Code    Disposition Plan   Medically Ready for Discharge: Anticipated in 5+ Days  Expected discharge to transitional care unit (TCU) pending above.    Entered: Kerwin Kwon MD 04/29/2025, 2:09 PM           Interval History   Doing OK.  No chest pain.  Breathing/asthma feels better here.    * Data reviewed today: I reviewed all new labs and imaging over the last 24 hours. I personally reviewed no images or ECG's today.    Physical Exam   Most recent vitals:   , Blood pressure 135/74, pulse 83, temperature 97.7  F (36.5  C), temperature source Oral, resp. rate 20, height 1.74 m (5' 8.5\"), weight 65.9 kg (145 lb 4.5 oz), SpO2 95%. O2 Device: Nasal cannula    Vitals:    04/25/25 0806 04/26/25 0700   Weight: 67.8 kg (149 lb 6.4 oz) 65.9 kg (145 lb 4.5 oz)     Vital signs with ranges:  Temp:  [97.5  F (36.4  C)-98.1  F (36.7  C)] 97.7  F (36.5  C)  Pulse:  [75-89] 83  Resp:  [18-20] 20  BP: (108-135)/(55-79) 135/74  SpO2:  [95 %-98 %] 95 %  Patient Vitals for the past 24 hrs:   BP Temp Temp src Pulse Resp SpO2   04/29/25 0910 135/74 97.7  F (36.5  C) Oral 83 20 95 %   04/28/25 2334 120/66 98  F (36.7  C) Oral 89 18 95 %   04/28/25 2006 108/55 98.1  F (36.7  C) Oral 75 18 95 %   04/28/25 1538 123/79 97.5  F (36.4  C) Oral 77 20 98 %     I/O's last 24 hours:  I/O last 3 completed shifts:  In: 973 [P.O.:720; I.V.:253]  Out: 700 [Urine:700]    Constitutional: awake, alert, " oriented, pleasant, conversant   Head:   Eyes:   ENT:   Neck:   Cardiovascular: regular rate and rhythm; no murmurs, rubs or gallops  Lungs: diminished in the bases, no crackles or wheezes  Gastrointestinal/Abdomen: soft, non-tender, non-distended, positive bowel sounds  :   Musculoskeletal:   Skin/Extremities:  Neurologic:   Psychiatric:   Hematologic/Lymphatic/Immunologic:        Labs reviewed.  Recent Labs   Lab 04/29/25  1107 04/29/25  0758 04/29/25  0230 04/28/25  0759 04/28/25  0639 04/27/25  1144 04/27/25  0613 04/25/25  0959 04/25/25  0822 04/25/25  0124   WBC  --   --   --   --  9.3  --  8.8  --  11.6* 12.3*   HGB  --   --   --   --  15.2  --  14.0  --  15.4 15.0   MCV  --   --   --   --  91  --  90  --  90 92   PLT  --   --   --   --  224  --  193  --  233 236   NA  --   --   --   --   --   --  138  --   --  141   POTASSIUM  --   --   --   --   --   --  4.0  --   --  4.8   CHLORIDE  --   --   --   --   --   --  104  --   --  104   CO2  --   --   --   --   --   --  23  --   --  28   BUN  --   --   --   --   --   --  19.5  --   --  21.7   CR  --   --   --   --   --   --  1.00  --   --  0.92   ANIONGAP  --   --   --   --   --   --  11  --   --  9   JOSE RAFAEL  --   --   --   --   --   --  9.0  --   --  9.4   * 161* 134*   < >  --    < > 159*   < >  --  261*   ALBUMIN  --   --   --   --   --   --   --   --   --  4.0   PROTTOTAL  --   --   --   --   --   --   --   --   --  6.4   BILITOTAL  --   --   --   --   --   --   --   --   --  0.8   ALKPHOS  --   --   --   --   --   --   --   --   --  140   ALT  --   --   --   --   --   --   --   --   --  39   AST  --   --   --   --   --   --   --   --   --  23    < > = values in this interval not displayed.     Recent Labs   Lab Test 04/25/25  1032 04/25/25  0822 04/25/25  0226 01/17/25  1317 01/17/25  1015 01/04/25  1954   NT-PROBNP, INPATIENT  --   --   --   --  467 562   TROPONIN T HIGH SENSITIVITY 81* 95* 40*   < > 118*  --     < > = values in this interval not  "displayed.     Recent Labs   Lab 04/29/25  1107 04/29/25  0758 04/29/25  0230 04/28/25  2154 04/28/25  1659 04/28/25  1200   * 161* 134* 164* 155* 147*     Recent Labs   Lab Test 02/19/25  0832 01/04/25  1709   A1C 7.1* 8.1*       No results for input(s): \"INR\", \"[id number]\" in the last 168 hours.  Recent Labs   Lab 04/28/25  0639 04/27/25  0613 04/25/25  0822 04/25/25  0124   WBC 9.3 8.8 11.6* 12.3*       MICRO:  Cultures (including blood and urine):  No lab results found in last 7 days.    No results found for this or any previous visit (from the past 24 hours).    Medications   All medications were reviewed. MAR.    Infusions:  Current Facility-Administered Medications   Medication Dose Route Frequency Provider Last Rate Last Admin    cangrelor ANTIPLATELET (KENGREAL) 50 mg in sodium chloride 0.9 % 250 mL infusion  0.75 mcg/kg/min Intravenous Continuous Abdulaziz Jimenez MD 15.3 mL/hr at 04/29/25 0910 0.75 mcg/kg/min at 04/29/25 0910    heparin 25,000 units in 0.45% NaCl 250 mL ANTICOAGULANT infusion  0-5,000 Units/hr Intravenous Continuous Vinod Lobato MD 7.5 mL/hr at 04/29/25 0714 750 Units/hr at 04/29/25 0714    Patient is already receiving anticoagulation with heparin, enoxaparin (LOVENOX), warfarin (COUMADIN)  or other anticoagulant medication   Does not apply Continuous PRN Vinod Lobato MD         Scheduled Medications:  Current Facility-Administered Medications   Medication Dose Route Frequency Provider Last Rate Last Admin    aspirin EC tablet 81 mg  81 mg Oral Daily Vinod Lobato MD   81 mg at 04/29/25 0910    atorvastatin (LIPITOR) tablet 80 mg  80 mg Oral At Bedtime Vinod Lobato MD   80 mg at 04/28/25 2151    carvedilol (COREG) tablet 6.25 mg  6.25 mg Oral BID Vinod Lobato MD   6.25 mg at 04/29/25 0910    fluticasone-vilanterol (BREO ELLIPTA) 100-25 MCG/ACT inhaler 1 puff  1 puff Inhalation Daily Vinod Lobato MD   1 puff at 04/29/25 0919    " insulin aspart (NovoLOG) injection (RAPID ACTING)  1-7 Units Subcutaneous TID AC Delilah Dejesus DO   1 Units at 04/29/25 1300    insulin aspart (NovoLOG) injection (RAPID ACTING)  1-5 Units Subcutaneous At Bedtime Delilah Dejesus DO   2 Units at 04/25/25 2140    insulin glargine (LANTUS PEN) injection 20 Units  20 Units Subcutaneous At Bedtime Delilah Dejesus DO   20 Units at 04/28/25 2155    losartan-hydrochlorothiazide (HYZAAR) 50-12.5 MG per tablet 1 tablet  1 tablet Oral Daily Vinod Lobato MD   1 tablet at 04/29/25 0910    montelukast (SINGULAIR) tablet 10 mg  10 mg Oral At Bedtime Vinod Lobato MD   10 mg at 04/28/25 2151    pantoprazole (PROTONIX) EC tablet 40 mg  40 mg Oral QAM AC Vinod Lobato MD   40 mg at 04/29/25 0804    sodium chloride (PF) 0.9% PF flush 3 mL  3 mL Intracatheter Q8H [facility] Vinod Lobato MD   3 mL at 04/29/25 0612     PRN Medications:  Current Facility-Administered Medications   Medication Dose Route Frequency Provider Last Rate Last Admin    acetaminophen (TYLENOL) Suppository 650 mg  650 mg Rectal Q4H PRN Vinod Lobato MD        acetaminophen (TYLENOL) tablet 650 mg  650 mg Oral Q4H PRN Vinod Lobato MD   650 mg at 04/26/25 2144    calcium carbonate (TUMS) chewable tablet 1,000 mg  1,000 mg Oral 4x Daily PRN Vinod Lobato MD        glucose gel 15-30 g  15-30 g Oral Q15 Min PRN Delilah Dejesus DO        Or    dextrose 50 % injection 25-50 mL  25-50 mL Intravenous Q15 Min PRN Delilah Dejesus DO        Or    glucagon injection 1 mg  1 mg Subcutaneous Q15 Min PRN Delilah Dejesus DO        ipratropium - albuterol 0.5 mg/2.5 mg/3 mL (DUONEB) neb solution 3 mL  3 mL Nebulization Q6H PRN Kerwin Kwon MD        lidocaine (LMX4) cream   Topical Q1H PRN Vinod Lobato MD        lidocaine 1 % 0.1-1 mL  0.1-1 mL Other Q1H PRN Vinod Lobato MD         naloxone (NARCAN) injection 0.2 mg  0.2 mg Intravenous Q2 Min PRN Aureliano Rodriguez MD        Or    naloxone (NARCAN) injection 0.4 mg  0.4 mg Intravenous Q2 Min PRN Aureliano Rodriguez MD        Or    naloxone (NARCAN) injection 0.2 mg  0.2 mg Intramuscular Q2 Min PRN Aureliano Rodriguez MD        Or    naloxone (NARCAN) injection 0.4 mg  0.4 mg Intramuscular Q2 Min PRN Aureliano Rodriguez MD        nitroGLYcerin (NITROSTAT) sublingual tablet 0.4 mg  0.4 mg Sublingual Q5 Min PRN Vinod Lobato MD        ondansetron (ZOFRAN ODT) ODT tab 4 mg  4 mg Oral Q6H PRN Vinod Lobato MD        Or    ondansetron (ZOFRAN) injection 4 mg  4 mg Intravenous Q6H PRN Vinod Lobato MD        oxyCODONE (ROXICODONE) tablet 5 mg  5 mg Oral Q4H PRN Vinod Lobato MD        Or    oxyCODONE (ROXICODONE) tablet 10 mg  10 mg Oral Q4H PRN Vinod Lobato MD        Patient is already receiving anticoagulation with heparin, enoxaparin (LOVENOX), warfarin (COUMADIN)  or other anticoagulant medication   Does not apply Continuous PRN Vinod Lobato MD        polyethylene glycol (MIRALAX) Packet 17 g  17 g Oral BID PRN Vinod Lobato MD        prochlorperazine (COMPAZINE) injection 5 mg  5 mg Intravenous Q6H PRN Vinod Lobato MD        Or    prochlorperazine (COMPAZINE) tablet 5 mg  5 mg Oral Q6H PRN Vinod Lobato MD        senna-docusate (SENOKOT-S/PERICOLACE) 8.6-50 MG per tablet 1 tablet  1 tablet Oral BID PRN Vinod Lobato MD        Or    senna-docusate (SENOKOT-S/PERICOLACE) 8.6-50 MG per tablet 2 tablet  2 tablet Oral BID PRN Vinod Lobato MD        sodium chloride (PF) 0.9% PF flush 3 mL  3 mL Intracatheter q1 min prn Vinod Lobato MD

## 2025-04-29 NOTE — PLAN OF CARE
[id number]  Pt here with NSTEMI. A&Ox4. VSS, RA. MOD CHO diet, thin liquids. Takes pills whole. Up with IND. Denies pain. Heparin paused from 2219 for 60 minutes, recheck at 0516. Cangrelor infusing at 15.3 ml/hr. Pt scoring green on the Aggression Stop Light Tool. Plan for possible CABG on 5/1/25. Discharge pending.

## 2025-04-29 NOTE — CONSULTS
"SPIRITUAL HEALTH SERVICES - Consult Note  [facility]  Referral Source/Reason for Visit: Emotional support    Summary and Recommendations - Chaitanya has a jules background in the [employer] but is not currently attending due to frustration regarding lack of support for his wife and for himself. He reflected on his decision to visit and support his close friend as a form of service and care.    Plan: Will plan to follow up in 5-7 days for support. Please consult if more urgent needs arise or as requested.    Vero Krueger M.Div.  Staff     [facility] available 24/7 for emergent requests/referrals, either by paging the on-call  or by entering an ASAP/STAT consult in [employer], which will also page the on-call .    Assessment    Saw pt Raul \"Chaitanya\" JONAH Wilhelm per consult for emotional support.    Patient/Family Understanding of Illness and Goals of Care - Chaitanya is awaiting heart bypass surgery on Thursday.    Distress and Loss -  Chaitanya shared that it's \"hard to sit and wait for surgery\" saying he has \"a lot of things going on\" in his life including supporting his wife who is in a longterm care facility and has \"short term memory loss.\" He also regularly visits a friend who is in the dying process.  Chaitanya reflected on the \"complicated parts of life that none of us can avoid,\" naming some of the challenges he and his family have experienced over the years.    Strengths, Coping, and Resources   Family support, naming especially his 2 daughters and 2 of his granddaughters. One of his granddaughters is a nurse at [employer] in [facility].  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    Meaning, Beliefs, and Spirituality - Chaitanya has a jules background in the [employer] but is not currently attending due to frustration regarding lack of support for his wife and for himself. He " reflected on his decision to visit and support his close friend as a form of service and care.

## 2025-04-30 ENCOUNTER — ANESTHESIA EVENT (OUTPATIENT)
Dept: SURGERY | Facility: CLINIC | Age: 80
End: 2025-04-30
Payer: COMMERCIAL

## 2025-04-30 LAB
ANION GAP SERPL CALCULATED.3IONS-SCNC: 12 MMOL/L (ref 7–15)
BASOPHILS # BLD AUTO: 0.1 10E3/UL (ref 0–0.2)
BASOPHILS NFR BLD AUTO: 1 %
BUN SERPL-MCNC: 17.4 MG/DL (ref 8–23)
CALCIUM SERPL-MCNC: 9.1 MG/DL (ref 8.8–10.4)
CHLORIDE SERPL-SCNC: 104 MMOL/L (ref 98–107)
CREAT SERPL-MCNC: 0.97 MG/DL (ref 0.67–1.17)
EGFRCR SERPLBLD CKD-EPI 2021: 79 ML/MIN/1.73M2
EOSINOPHIL # BLD AUTO: 0.4 10E3/UL (ref 0–0.7)
EOSINOPHIL NFR BLD AUTO: 4 %
ERYTHROCYTE [DISTWIDTH] IN BLOOD BY AUTOMATED COUNT: 12.6 % (ref 10–15)
GLUCOSE BLDC GLUCOMTR-MCNC: 117 MG/DL (ref 70–99)
GLUCOSE BLDC GLUCOMTR-MCNC: 127 MG/DL (ref 70–99)
GLUCOSE BLDC GLUCOMTR-MCNC: 131 MG/DL (ref 70–99)
GLUCOSE BLDC GLUCOMTR-MCNC: 142 MG/DL (ref 70–99)
GLUCOSE BLDC GLUCOMTR-MCNC: 150 MG/DL (ref 70–99)
GLUCOSE SERPL-MCNC: 118 MG/DL (ref 70–99)
HCO3 SERPL-SCNC: 22 MMOL/L (ref 22–29)
HCT VFR BLD AUTO: 42.8 % (ref 40–53)
HGB BLD-MCNC: 14.1 G/DL (ref 13.3–17.7)
IMM GRANULOCYTES # BLD: 0.1 10E3/UL
IMM GRANULOCYTES NFR BLD: 1 %
LYMPHOCYTES # BLD AUTO: 2.3 10E3/UL (ref 0.8–5.3)
LYMPHOCYTES NFR BLD AUTO: 25 %
MCH RBC QN AUTO: 29.9 PG (ref 26.5–33)
MCHC RBC AUTO-ENTMCNC: 32.9 G/DL (ref 31.5–36.5)
MCV RBC AUTO: 91 FL (ref 78–100)
MONOCYTES # BLD AUTO: 0.6 10E3/UL (ref 0–1.3)
MONOCYTES NFR BLD AUTO: 7 %
NEUTROPHILS # BLD AUTO: 5.7 10E3/UL (ref 1.6–8.3)
NEUTROPHILS NFR BLD AUTO: 63 %
NRBC # BLD AUTO: 0 10E3/UL
NRBC BLD AUTO-RTO: 0 /100
PLATELET # BLD AUTO: 220 10E3/UL (ref 150–450)
POTASSIUM SERPL-SCNC: 3.8 MMOL/L (ref 3.4–5.3)
RBC # BLD AUTO: 4.71 10E6/UL (ref 4.4–5.9)
SODIUM SERPL-SCNC: 138 MMOL/L (ref 135–145)
UFH PPP CHRO-ACNC: 0.35 IU/ML
WBC # BLD AUTO: 9.1 10E3/UL (ref 4–11)

## 2025-04-30 PROCEDURE — 36415 COLL VENOUS BLD VENIPUNCTURE: CPT | Performed by: INTERNAL MEDICINE

## 2025-04-30 PROCEDURE — 250N000011 HC RX IP 250 OP 636: Performed by: PHYSICIAN ASSISTANT

## 2025-04-30 PROCEDURE — 250N000011 HC RX IP 250 OP 636: Mod: JZ | Performed by: PHYSICIAN ASSISTANT

## 2025-04-30 PROCEDURE — 85520 HEPARIN ASSAY: CPT | Performed by: INTERNAL MEDICINE

## 2025-04-30 PROCEDURE — C9460 INJECTION, CANGRELOR: HCPCS | Mod: JZ | Performed by: PHYSICIAN ASSISTANT

## 2025-04-30 PROCEDURE — 258N000003 HC RX IP 258 OP 636: Performed by: INTERNAL MEDICINE

## 2025-04-30 PROCEDURE — 93010 ELECTROCARDIOGRAM REPORT: CPT | Performed by: INTERNAL MEDICINE

## 2025-04-30 PROCEDURE — 258N000003 HC RX IP 258 OP 636: Performed by: PHYSICIAN ASSISTANT

## 2025-04-30 PROCEDURE — C9460 INJECTION, CANGRELOR: HCPCS | Mod: JZ | Performed by: INTERNAL MEDICINE

## 2025-04-30 PROCEDURE — 85004 AUTOMATED DIFF WBC COUNT: CPT | Performed by: INTERNAL MEDICINE

## 2025-04-30 PROCEDURE — 250N000009 HC RX 250: Performed by: PHYSICIAN ASSISTANT

## 2025-04-30 PROCEDURE — 120N000001 HC R&B MED SURG/OB

## 2025-04-30 PROCEDURE — 99232 SBSQ HOSP IP/OBS MODERATE 35: CPT | Performed by: INTERNAL MEDICINE

## 2025-04-30 PROCEDURE — 250N000013 HC RX MED GY IP 250 OP 250 PS 637: Performed by: INTERNAL MEDICINE

## 2025-04-30 PROCEDURE — 93005 ELECTROCARDIOGRAM TRACING: CPT

## 2025-04-30 PROCEDURE — 80051 ELECTROLYTE PANEL: CPT | Performed by: INTERNAL MEDICINE

## 2025-04-30 PROCEDURE — 250N000011 HC RX IP 250 OP 636: Mod: JZ | Performed by: INTERNAL MEDICINE

## 2025-04-30 RX ORDER — MUPIROCIN 20 MG/G
OINTMENT TOPICAL 2 TIMES DAILY
Status: DISCONTINUED | OUTPATIENT
Start: 2025-04-30 | End: 2025-05-01

## 2025-04-30 RX ADMIN — MUPIROCIN: 20 OINTMENT TOPICAL at 11:03

## 2025-04-30 RX ADMIN — CANGRELOR 0.75 MCG/KG/MIN: 50 INJECTION, POWDER, LYOPHILIZED, FOR SOLUTION INTRAVENOUS at 02:55

## 2025-04-30 RX ADMIN — INSULIN GLARGINE 20 UNITS: 100 INJECTION, SOLUTION SUBCUTANEOUS at 21:47

## 2025-04-30 RX ADMIN — CALCIUM CARBONATE (ANTACID) CHEW TAB 500 MG 1000 MG: 500 CHEW TAB at 09:00

## 2025-04-30 RX ADMIN — MUPIROCIN: 20 OINTMENT TOPICAL at 21:46

## 2025-04-30 RX ADMIN — CANGRELOR 0.75 MCG/KG/MIN: 50 INJECTION, POWDER, LYOPHILIZED, FOR SOLUTION INTRAVENOUS at 19:53

## 2025-04-30 RX ADMIN — CARVEDILOL 6.25 MG: 6.25 TABLET, FILM COATED ORAL at 21:44

## 2025-04-30 RX ADMIN — MONTELUKAST 10 MG: 10 TABLET, FILM COATED ORAL at 21:44

## 2025-04-30 RX ADMIN — HEPARIN SODIUM 900 UNITS/HR: 10000 INJECTION, SOLUTION INTRAVENOUS at 21:43

## 2025-04-30 RX ADMIN — CARVEDILOL 6.25 MG: 6.25 TABLET, FILM COATED ORAL at 08:51

## 2025-04-30 RX ADMIN — ASPIRIN 81 MG: 81 TABLET, COATED ORAL at 08:51

## 2025-04-30 RX ADMIN — ATORVASTATIN CALCIUM 80 MG: 80 TABLET, FILM COATED ORAL at 21:44

## 2025-04-30 RX ADMIN — LOSARTAN POTASSIUM AND HYDROCHLOROTHIAZIDE 1 TABLET: 50; 12.5 TABLET, FILM COATED ORAL at 08:51

## 2025-04-30 RX ADMIN — PANTOPRAZOLE SODIUM 40 MG: 40 TABLET, DELAYED RELEASE ORAL at 06:36

## 2025-04-30 ASSESSMENT — ACTIVITIES OF DAILY LIVING (ADL)
ADLS_ACUITY_SCORE: 38
ADLS_ACUITY_SCORE: 39
ADLS_ACUITY_SCORE: 38
ADLS_ACUITY_SCORE: 38
ADLS_ACUITY_SCORE: 39
ADLS_ACUITY_SCORE: 38

## 2025-04-30 ASSESSMENT — COPD QUESTIONNAIRES: COPD: 1

## 2025-04-30 NOTE — PLAN OF CARE
"Patient Name: Louise  MRN: [id number]  Date of Admission: 4/25/2025  Reason for Admission: Chest pain.   Level of Care: Heart.     Vitals:   BP Readings from Last 1 Encounters:   04/30/25 (!) 137/34     Pulse Readings from Last 1 Encounters:   04/30/25 86     Wt Readings from Last 1 Encounters:   04/26/25 65.9 kg (145 lb 4.5 oz)     Ht Readings from Last 1 Encounters:   04/25/25 1.74 m (5' 8.5\")     Estimated body mass index is 21.77 kg/m  as calculated from the following:    Height as of this encounter: 1.74 m (5' 8.5\").    Weight as of this encounter: 65.9 kg (145 lb 4.5 oz).  Temp Readings from Last 1 Encounters:   04/30/25 97.7  F (36.5  C) (Oral)     Pain: Pain goal 0. Pain Rating 0/10. Effective pain medication/regimen N/A, denies pain.     CV Surgery Patient: Yes Post Op Day #: planning for CABG tomorrow    Resp: LS clear. On RA. Denies SOB and chest pain.   Telemetry: SR w/ frequent PACs. AVSS.   Neuro: A&O x4, pleasant. [place].   GI/: Adequate urine output. BM x1, passing flatus. Denies nausea, tums x1 given for heartburn after breakfast. Tolerating mod carb diet. To be NPO at midnight.   Skin/Wounds: Scattered bruising. Rash and dry scabs on bilateral buttocks. Needs surgical scrub shower this evening.   Lines/Drains: PIV infusing heparin @ 900 units/hr and cangrelor @ 15.3 mL/hr. See orders for stop times.   Activity: Up ad zane.   Abnormal Labs: BS ACHS. Hep Xa recheck tomorrow AM.     Aggression Stop Light: Green          Patient Care Plan: Encourage activity as tolerated. Monitor for chest pain. Plan for CABG tomorrow AM 5/1.  "

## 2025-04-30 NOTE — PROGRESS NOTES
"Addendum: scheduled cangrelor gtt to stop at 0100 on 5/1/25 and heparin gtt to stop at 0500 on 5/1/25    CT surgery Progress Note    Plans for surgery on 5/1/2025 at 0730 for CABG x 2 with Dr Mulvihill  Discussed surgery, hospital course, risks and benefits.  All questions/concerns were addressed.   Pt would be willing to take blood if needed.  Pt continues on cangrelor and heparin gtt.   Pre op orders placed.   Will plan to stop gtt at 0500 on 5/1/2025.  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  Will start mupirocin to the nares bid x 10 days.  Reviewed carotid US and angiogram with EF of 45-50%.  No vein mapping which will be ordered.     Up at side of bed, comfortable, -O2  BP (!) 137/34   Pulse 86   Temp 97.7  F (36.5  C) (Oral)   Resp 16   Ht 1.74 m (5' 8.5\")   Wt 65.9 kg (145 lb 4.5 oz)   SpO2 96%   BMI 21.77 kg/m    CV - RRR, telemetry SR 80s, -edema LE  Pulm - CTA  Derm - no rash or acne on exposed skin of chest  Cangrelor gtt  Heparin gtt    Continue to monitor.    Arthur Reynaga PA-C  [phone]  "

## 2025-04-30 NOTE — PROGRESS NOTES
[facility]    Internal Medicine Hospitalist Progress Note  04/30/2025  I evaluated patient on the above date.    Kerwin Kwon Jr., MD  [phone] (p)  Text Page  Vocera      [New actions/orders today (04/30/2025) are underlined in the assessment and plan. All lab results in the assessment and plan were reviewed.]  Assessment & Plan   Raul Wilhelm is a 79 year old male with history including asthma; DM2; HTN; known severe multivessel CAD with prior stents (1/2025); h/o CVA; who presented to [facility] 4/25/2025 with chest pain and found with NSTEMI. Transferred to [facility] 4/25/2025 for management.       NSTEMI with known severe multivessel CAD and recent stents (1/2025).  Ischemic cardiomyopathy.  HTN (benign essential).  HLD.  [PTA: asa 81 mg daily; atorvastatin 80 mg at bedtime; carvedilol 6.25 mg BID; losartan-HCTZ 50-12.5 mg daily; ticagrelor 90 mg BID; PRN NTG.]  * H/o STEMI 11/2025 s/p LPDA and LCx stents. H/o STEMI 1/2025. Heart catheterization 1/2025 showed severe multivessel CAD involving ostial-prox LAD, mid LAD, prox LCX, and large OM branch; stents placed to LCx and OM branch and there was plan for outpatient discussion regarding surgical revascularization vs long stents for LAD disease, but he had not yet had this follow-up. Echo 1/2025 showed LVEF 55-60%, mild LVH, no regional wall motion abnormalities; no significant valve disease.  * Initial presentation to outside hospital as above. Started on heparin gtt.  * Transferred to [facility] 4/25. Cardiology consulted on admit.  * Later 4/25: Echo showed LVEF 45-50% with WMA's, grade 1 diastolic dysfunction; RV OK. Angiogram showed LM with ostial 60%; mid LAD with diffuse 50%, LAD ostium covered by CX stent, appeared to be up to 80% on some views, diffuse proximal 75% narrowing first diagonal 75%; LCx stents widely patent, no restenosis; CABG recommended. CVS consulted; ticagrelor held and started on cangrelor gtt bridging.  *  4/30: Some chest pain, but seemed more musculoskeletal type.  - Check ECG and CXR.  - Plan CABG 5/1/2025 after ticagrelor washed out.  - Continue heparin gtt.  - Continue cangrelor gtt (holding PTA ticagrelor).  - Continue ASA 81 mg daily, atorvastatin 80 mg daily, PRN NTG.  - Continue carvedilol 6.25 mg BID and losartan-HCTZ 50-12.5 mg daily.  - Continue to monitor i/o's, daily wts.  - Patient suspects he will want TCU after surgery as he lives alone - he is interested in the following TCUs ([facility], [facility] [place] and [facility]), SW consulted.     Leukocytosis, suspect reactive, resolved.  * WBC 12.3 on admit. Pt afebrile, no clear signs of infection on admission.  * WBC normalized 4/27.     Zenkers diverticulum.  GERD.  Chronic Dysphagia.  * Noted during Jan 2025 admit. Noted diverticulum does not empty fully with multiple swallows and gives him regurgitation/dysphagia symptoms. He elected not to pursue ENT outpatient. PTA on BID omeprazole.  - Continue PPI.     Asthma.  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  - Continue fluticasone-vilanterol, tiotropium, montelukast, PRN inhalers/nebs     DM2.   [PTA: glargine 20U at bedtime; semaglutide (pt not taking due to cost).]  * HgbA1C 7.1 2/2025.   Recent Labs   Lab 04/30/25  1147 04/30/25  0751 04/30/25  0230 04/29/25  2242 04/29/25  1813 04/29/25  1555   * 142* 150* 137* 174* 153*     Recent Labs   Lab Test 02/19/25  0832 01/04/25  1709   A1C 7.1* 8.1*   - Continue moderate CHO diet.  - Continue glargine 20U at bedtime.  - Continue aspart ISS (medium).  - Continue PRN hypoglycemia protocol.    Back pain, suspect musculoskeletal.  * 4/28: Pt c/o back pain suspected due to the hospital bed.  - Continue PRN acetaminophen.     H/o frequent skin abscesses.  +MRSA.  * 3/2025 with abscess of right buttock that was +MRSA. Most recent at right neck Mid 4/2025, appears to be resolving.    * 4/29: CVS ordered 10d course of mupirocin ointment on  "nares.  - Continue to monitor clinically.  - Continue mupirocin oint to nares (started 4/30) for 10d.    H/o arterial ischemic stroke.  - Continue antiplatelets and statin as noted.     Pulmonary nodule RUL, 0.3cm, noted on CT (1/2025).  - Follow-up CT chest due Jan 2026      Clinically Significant Risk Factors                   # Hypertension: Noted on problem list           # DMII: A1C = 7.1 % (Ref range: 0.0 - 5.6 %) within past 6 months         # Financial/Environmental Concerns: none  # COPD: noted on problem list        Diet: Combination Diet Moderate Consistent Carb (60 g CHO per Meal) Diet; Low Saturated Fat Na <2400mg Diet    Prophylaxis: PCD's, ambulation, and is on anticoagulation as above.  Diaz Catheter: Not present  Lines: None     Code Status: Full Code    Disposition Plan   Medically Ready for Discharge: Anticipated in 5+ Days  Expected discharge to transitional care unit (TCU) pending above.    Entered: Kerwin Kwon MD 04/30/2025, 11:54 AM           Interval History   Noted some pain sternal region, felt related to prior rib fracture.  Otherwise, doing OK.    * Data reviewed today: I reviewed all new labs and imaging over the last 24 hours. I personally reviewed no images or ECG's today.    Physical Exam   Most recent vitals:   , Blood pressure 130/66, pulse 74, temperature 97.2  F (36.2  C), temperature source Axillary, resp. rate 16, height 1.74 m (5' 8.5\"), weight 65.9 kg (145 lb 4.5 oz), SpO2 98%. O2 Device: None (Room air)    Vitals:    04/25/25 0806 04/26/25 0700   Weight: 67.8 kg (149 lb 6.4 oz) 65.9 kg (145 lb 4.5 oz)     Vital signs with ranges:  Temp:  [97.2  F (36.2  C)-98.2  F (36.8  C)] 97.2  F (36.2  C)  Pulse:  [66-86] 74  Resp:  [16-18] 16  BP: ()/(34-89) 130/66  SpO2:  [95 %-99 %] 98 %  Patient Vitals for the past 24 hrs:   BP Temp Temp src Pulse Resp SpO2   04/30/25 1106 130/66 97.2  F (36.2  C) Axillary 74 16 98 %   04/30/25 0847 (!) 137/34 97.7  F (36.5  C) Oral 86 " 16 96 %   04/30/25 0301 90/59 -- -- 66 16 99 %   04/29/25 2304 124/75 98.2  F (36.8  C) Oral 70 18 98 %   04/29/25 1600 113/89 -- -- 79 -- 95 %   04/29/25 1549 113/89 97.5  F (36.4  C) Oral 83 18 95 %     I/O's last 24 hours:  I/O last 3 completed shifts:  In: 2400 [P.O.:2400]  Out: -     Constitutional: awake, alert, oriented, pleasant, conversant   Head:   Eyes:   ENT:   Neck:   Cardiovascular: regular rate and rhythm; no murmurs, rubs or gallops  Lungs: clear to auscultation bilaterally without crackles or wheezes  Gastrointestinal/Abdomen: soft, non-tender, non-distended, positive bowel sounds  :   Musculoskeletal:   Skin/Extremities:  Neurologic:   Psychiatric:   Hematologic/Lymphatic/Immunologic:        Labs reviewed.  Recent Labs   Lab 04/30/25  1147 04/30/25  0751 04/30/25  0230 04/28/25  0759 04/28/25  0639 04/27/25  1144 04/27/25  0613 04/25/25  0959 04/25/25  0822 04/25/25  0124   WBC  --   --   --   --  9.3  --  8.8  --  11.6* 12.3*   HGB  --   --   --   --  15.2  --  14.0  --  15.4 15.0   MCV  --   --   --   --  91  --  90  --  90 92   PLT  --   --   --   --  224  --  193  --  233 236   NA  --   --   --   --   --   --  138  --   --  141   POTASSIUM  --   --   --   --   --   --  4.0  --   --  4.8   CHLORIDE  --   --   --   --   --   --  104  --   --  104   CO2  --   --   --   --   --   --  23  --   --  28   BUN  --   --   --   --   --   --  19.5  --   --  21.7   CR  --   --   --   --   --   --  1.00  --   --  0.92   ANIONGAP  --   --   --   --   --   --  11  --   --  9   JOSE RAFAEL  --   --   --   --   --   --  9.0  --   --  9.4   * 142* 150*   < >  --    < > 159*   < >  --  261*   ALBUMIN  --   --   --   --   --   --   --   --   --  4.0   PROTTOTAL  --   --   --   --   --   --   --   --   --  6.4   BILITOTAL  --   --   --   --   --   --   --   --   --  0.8   ALKPHOS  --   --   --   --   --   --   --   --   --  140   ALT  --   --   --   --   --   --   --   --   --  39   AST  --   --   --   --   --   --  "  --   --   --  23    < > = values in this interval not displayed.     Recent Labs   Lab Test 04/25/25  1032 04/25/25  0822 04/25/25  0226 01/17/25  1317 01/17/25  1015 01/04/25  1954   NT-PROBNP, INPATIENT  --   --   --   --  467 562   TROPONIN T HIGH SENSITIVITY 81* 95* 40*   < > 118*  --     < > = values in this interval not displayed.     Recent Labs   Lab 04/30/25  1147 04/30/25  0751 04/30/25  0230 04/29/25  2242 04/29/25  1813 04/29/25  1555   * 142* 150* 137* 174* 153*     Recent Labs   Lab Test 02/19/25  0832 01/04/25  1709   A1C 7.1* 8.1*       No results for input(s): \"INR\", \"[id number]\" in the last 168 hours.  Recent Labs   Lab 04/28/25  0639 04/27/25  0613 04/25/25  0822 04/25/25  0124   WBC 9.3 8.8 11.6* 12.3*       MICRO:  Cultures (including blood and urine):  No lab results found in last 7 days.    No results found for this or any previous visit (from the past 24 hours).    Medications   All medications were reviewed. MAR.    Infusions:  Current Facility-Administered Medications   Medication Dose Route Frequency Provider Last Rate Last Admin    cangrelor ANTIPLATELET (KENGREAL) 50 mg in sodium chloride 0.9 % 250 mL infusion  0.75 mcg/kg/min Intravenous Continuous Arthur eRynaga PA-C 15.3 mL/hr at 04/30/25 0853 0.75 mcg/kg/min at 04/30/25 0853    heparin 25,000 units in 0.45% NaCl 250 mL ANTICOAGULANT infusion  0-5,000 Units/hr Intravenous Continuous Arthur Reynaga PA-C 9 mL/hr at 04/30/25 0853 900 Units/hr at 04/30/25 0853    Patient is already receiving anticoagulation with heparin, enoxaparin (LOVENOX), warfarin (COUMADIN)  or other anticoagulant medication   Does not apply Continuous PRN Vinod Lobato MD         Scheduled Medications:  Current Facility-Administered Medications   Medication Dose Route Frequency Provider Last Rate Last Admin    aspirin EC tablet 81 mg  81 mg Oral Daily Vinod Lobato MD   81 mg at 04/30/25 0851    atorvastatin (LIPITOR) tablet 80 mg  80 " mg Oral At Bedtime Vinod Lobato MD   80 mg at 04/29/25 2300    carvedilol (COREG) tablet 6.25 mg  6.25 mg Oral BID Vinod Lobato MD   6.25 mg at 04/30/25 0851    fluticasone-vilanterol (BREO ELLIPTA) 100-25 MCG/ACT inhaler 1 puff  1 puff Inhalation Daily Vinod Lobato MD   1 puff at 04/29/25 0919    insulin aspart (NovoLOG) injection (RAPID ACTING)  1-7 Units Subcutaneous TID  Delilah Dejesus DO   1 Units at 04/30/25 0854    insulin aspart (NovoLOG) injection (RAPID ACTING)  1-5 Units Subcutaneous At Bedtime Delilah Dejesus DO   2 Units at 04/25/25 2140    insulin glargine (LANTUS PEN) injection 20 Units  20 Units Subcutaneous At Bedtime Delilah Dejesus DO   20 Units at 04/29/25 2309    losartan-hydrochlorothiazide (HYZAAR) 50-12.5 MG per tablet 1 tablet  1 tablet Oral Daily Vinod Lobato MD   1 tablet at 04/30/25 0851    montelukast (SINGULAIR) tablet 10 mg  10 mg Oral At Bedtime Vinod Lobato MD   10 mg at 04/29/25 2300    mupirocin (BACTROBAN) 2 % ointment   Topical BID Arthur Reynaga PA-C   Given at 04/30/25 1103    pantoprazole (PROTONIX) EC tablet 40 mg  40 mg Oral QAM AC Vinod Lobato MD   40 mg at 04/30/25 0636    sodium chloride (PF) 0.9% PF flush 3 mL  3 mL Intracatheter Q8H [facility] Vinod Lobato MD   3 mL at 04/29/25 0612     PRN Medications:  Current Facility-Administered Medications   Medication Dose Route Frequency Provider Last Rate Last Admin    acetaminophen (TYLENOL) Suppository 650 mg  650 mg Rectal Q4H PRN Vinod Lobato MD        acetaminophen (TYLENOL) tablet 650 mg  650 mg Oral Q4H PRN Vinod Lobato MD   650 mg at 04/26/25 2144    calcium carbonate (TUMS) chewable tablet 1,000 mg  1,000 mg Oral 4x Daily PRN Vinod Lobato MD   1,000 mg at 04/30/25 0900    glucose gel 15-30 g  15-30 g Oral Q15 Min PRN Delilah Dejesus DO        Or    dextrose 50 % injection 25-50 mL  25-50  mL Intravenous Q15 Min PRN Dleilah Dejesus DO        Or    glucagon injection 1 mg  1 mg Subcutaneous Q15 Min PRN Delilah Dejesus DO        ipratropium - albuterol 0.5 mg/2.5 mg/3 mL (DUONEB) neb solution 3 mL  3 mL Nebulization Q6H PRN Kerwin Kwon MD        lidocaine (LMX4) cream   Topical Q1H PRN Vinod Lobato MD        lidocaine 1 % 0.1-1 mL  0.1-1 mL Other Q1H PRN Vinod Lobato MD        naloxone (NARCAN) injection 0.2 mg  0.2 mg Intravenous Q2 Min PRN Aureliano Rodriguez MD        Or    naloxone (NARCAN) injection 0.4 mg  0.4 mg Intravenous Q2 Min PRN Aureliano Rodriguez MD        Or    naloxone (NARCAN) injection 0.2 mg  0.2 mg Intramuscular Q2 Min PRN Aureliano Rodriguez MD        Or    naloxone (NARCAN) injection 0.4 mg  0.4 mg Intramuscular Q2 Min PRN Aureliano Rodriguez MD        nitroGLYcerin (NITROSTAT) sublingual tablet 0.4 mg  0.4 mg Sublingual Q5 Min PRN Vinod Lobato MD        ondansetron (ZOFRAN ODT) ODT tab 4 mg  4 mg Oral Q6H PRN Vinod Lobato MD        Or    ondansetron (ZOFRAN) injection 4 mg  4 mg Intravenous Q6H PRN Vinod Lobato MD        oxyCODONE (ROXICODONE) tablet 5 mg  5 mg Oral Q4H PRN Vinod Lobato MD        Or    oxyCODONE (ROXICODONE) tablet 10 mg  10 mg Oral Q4H PRN Vinod Lobato MD        Patient is already receiving anticoagulation with heparin, enoxaparin (LOVENOX), warfarin (COUMADIN)  or other anticoagulant medication   Does not apply Continuous PRN Vinod Lobato MD        polyethylene glycol (MIRALAX) Packet 17 g  17 g Oral BID PRN Vinod Lobato MD        prochlorperazine (COMPAZINE) injection 5 mg  5 mg Intravenous Q6H PRN Vinod Lobato MD        Or    prochlorperazine (COMPAZINE) tablet 5 mg  5 mg Oral Q6H PRN Vinod Lobato MD        senna-docusate (SENOKOT-S/PERICOLACE) 8.6-50 MG per tablet 1 tablet  1 tablet Oral BID PRN Vinod Lobato,  MD        Or    senna-docusate (SENOKOT-S/PERICOLACE) 8.6-50 MG per tablet 2 tablet  2 tablet Oral BID PRN Vinod Lobato MD        sodium chloride (PF) 0.9% PF flush 3 mL  3 mL Intracatheter q1 min prn Vinod Lobato MD

## 2025-04-30 NOTE — PLAN OF CARE
Goal Outcome Evaluation:  Plan of Care Reviewed With: patient  Overall Patient Progress: no change  Outcome Evaluation: Awaiting CABG, per note 5/1    Patient Name: Louise  MRN: [id number]  Date of Admission: 4/25/2025  Reason for Admission: Chest pain, now for CABG on 5/1  Level of Care: Acute    Vitals:   BP Readings from Last 1 Encounters:   04/29/25 113/89     Pulse Readings from Last 1 Encounters:   04/29/25 79     Wt Readings from Last 1 Encounters:   04/26/25 65.9 kg (145 lb 4.5 oz)     Temp Readings from Last 1 Encounters:   04/29/25 97.5  F (36.4  C) (Oral)       Pain: Denies    CV Surgery Patient: No    Assessment    Resp: LS dim, on RA, no SOB.  Doing IS independently.  Telemetry: SR  Neuro: A&Ox4  GI/: Tolerating oral intake. (+)BM, voiding without difficulty.   Skin/Wounds: Scattered bruising. Right radial site WDL.   Lines/Drains: PIVs infusing, heparin gtt at 900units/hr, HepXa recheck ~2153.  Cangrelor at 15.3ml/hr.  Activity: Up independent  Sleep: Minimal on days.   Abnormal Labs: See chart.    Aggression Stop Light: Green          Patient Care Plan: CABG on 5/1, scheduled.

## 2025-04-30 NOTE — PLAN OF CARE
[id number]    Orientation: A&Ox4  Vitals/Pain: VSS on RA. Denies pain  Resp: LS clear. Using IS independently, 1300ml. Denies SOB or GARCIA  Telemetry: SR with inverted T wave  GI/: Tolerating mod carb diet. Denies nausea. +BS, +flatus, -BM this shift. Pt having  AUO throughout shift up to bathroom.   Skin/Wounds: Scattered bruising. R radial site WDL. Rash/dry areas on buttocks.   Lines/Drains: R PIV infusing heparin gtt @ 900 units/hr. L PIV infusing cangrelor gtt @ 50.85 mcg/min or 15.3ml/hr.   Activity: Independent. Walks halls frequently.   Sleep: 5-6 hours between cares   Labs: Abnormal/Trends, Electrolyte Replacement- HepXa 0.35 @ 0500, second in range, recheck tomorrow AM    Aggression Stop Light: Green          Patient Care Plan: CABG planned for tomorrow 5/1 @ 0730 with Dr. Mulvihill.

## 2025-05-01 ENCOUNTER — ANESTHESIA (OUTPATIENT)
Dept: SURGERY | Facility: CLINIC | Age: 80
End: 2025-05-01
Payer: COMMERCIAL

## 2025-05-01 ENCOUNTER — APPOINTMENT (OUTPATIENT)
Dept: GENERAL RADIOLOGY | Facility: CLINIC | Age: 80
DRG: 233 | End: 2025-05-01
Payer: COMMERCIAL

## 2025-05-01 DIAGNOSIS — Z95.1 S/P CABG (CORONARY ARTERY BYPASS GRAFT): Primary | ICD-10-CM

## 2025-05-01 LAB
ALBUMIN SERPL BCG-MCNC: 3.4 G/DL (ref 3.5–5.2)
ALLEN'S TEST: ABNORMAL
ALP SERPL-CCNC: 57 U/L (ref 40–150)
ALT SERPL W P-5'-P-CCNC: 54 U/L (ref 0–70)
ANION GAP SERPL CALCULATED.3IONS-SCNC: 12 MMOL/L (ref 7–15)
ANION GAP SERPL CALCULATED.3IONS-SCNC: 9 MMOL/L (ref 7–15)
APTT PPP: 32 SECONDS (ref 22–38)
APTT PPP: 36 SECONDS (ref 22–38)
AST SERPL W P-5'-P-CCNC: 56 U/L (ref 0–45)
ATRIAL RATE - MUSE: 51 BPM
ATRIAL RATE - MUSE: 73 BPM
BASE EXCESS BLDA CALC-SCNC: -0.3 MMOL/L (ref -3–3)
BASE EXCESS BLDA CALC-SCNC: -0.4 MMOL/L (ref -3–3)
BASE EXCESS BLDA CALC-SCNC: -0.9 MMOL/L (ref -3–3)
BASE EXCESS BLDA CALC-SCNC: -1.7 MMOL/L (ref -3–3)
BASE EXCESS BLDA CALC-SCNC: -2.2 MMOL/L (ref -3–3)
BASE EXCESS BLDA CALC-SCNC: -2.3 MMOL/L (ref -3–3)
BASE EXCESS BLDA CALC-SCNC: 0.6 MMOL/L (ref -3–3)
BASE EXCESS BLDV CALC-SCNC: -0.6 MMOL/L (ref -3–3)
BILIRUB SERPL-MCNC: 1.3 MG/DL
BLD PROD TYP BPU: NORMAL
BLOOD COMPONENT TYPE: NORMAL
BUN SERPL-MCNC: 14.3 MG/DL (ref 8–23)
BUN SERPL-MCNC: 14.8 MG/DL (ref 8–23)
CA-I BLD-MCNC: 3.7 MG/DL (ref 4.4–5.2)
CA-I BLD-MCNC: 3.8 MG/DL (ref 4.4–5.2)
CA-I BLD-MCNC: 3.8 MG/DL (ref 4.4–5.2)
CA-I BLD-MCNC: 4 MG/DL (ref 4.4–5.2)
CA-I BLD-MCNC: 4.5 MG/DL (ref 4.4–5.2)
CA-I BLD-MCNC: 4.7 MG/DL (ref 4.4–5.2)
CA-I BLD-MCNC: 4.8 MG/DL (ref 4.4–5.2)
CA-I BLD-MCNC: 5.2 MG/DL (ref 4.4–5.2)
CALCIUM SERPL-MCNC: 8.2 MG/DL (ref 8.8–10.4)
CALCIUM SERPL-MCNC: 8.8 MG/DL (ref 8.8–10.4)
CHLORIDE SERPL-SCNC: 107 MMOL/L (ref 98–107)
CHLORIDE SERPL-SCNC: 109 MMOL/L (ref 98–107)
CLOT INIT KAOL IND TO POST HEP NEUT TRTO: 1 {RATIO}
CLOT INIT KAOL IND TO POST HEP NEUT TRTO: 1 {RATIO}
CLOT INIT KAOLIN IND BLD US: 141 SEC (ref 113–166)
CLOT INIT KAOLIN IND BLD US: 168 SEC (ref 113–166)
CLOT INIT KAOLIN IND P HEP NEUT BLD US: 138 SEC (ref 103–153)
CLOT INIT KAOLIN IND P HEP NEUT BLD US: 169 SEC (ref 103–153)
CLOT STIFF PLT CONT BLD CALC: 11.5 HPA (ref 11.9–29.8)
CLOT STIFF PLT CONT BLD CALC: 19.4 HPA (ref 11.9–29.8)
CLOT STIFF TF IND P HEP NEUT BLD US: 13.4 HPA (ref 13–33.2)
CLOT STIFF TF IND P HEP NEUT BLD US: 21.9 HPA (ref 13–33.2)
CLOT STIFF TF IND+IIB-IIIA INH P HEP NEU: 1.9 HPA (ref 1–3.7)
CLOT STIFF TF IND+IIB-IIIA INH P HEP NEU: 2.5 HPA (ref 1–3.7)
CODING SYSTEM: NORMAL
CREAT SERPL-MCNC: 0.86 MG/DL (ref 0.67–1.17)
CREAT SERPL-MCNC: 0.87 MG/DL (ref 0.67–1.17)
CROSSMATCH: NORMAL
CROSSMATCH: NORMAL
DIASTOLIC BLOOD PRESSURE - MUSE: NORMAL MMHG
DIASTOLIC BLOOD PRESSURE - MUSE: NORMAL MMHG
EGFRCR SERPLBLD CKD-EPI 2021: 88 ML/MIN/1.73M2
EGFRCR SERPLBLD CKD-EPI 2021: 88 ML/MIN/1.73M2
ERYTHROCYTE [DISTWIDTH] IN BLOOD BY AUTOMATED COUNT: 12.5 % (ref 10–15)
ERYTHROCYTE [DISTWIDTH] IN BLOOD BY AUTOMATED COUNT: 12.6 % (ref 10–15)
ERYTHROCYTE [DISTWIDTH] IN BLOOD BY AUTOMATED COUNT: 12.6 % (ref 10–15)
FIBRINOGEN PPP-MCNC: 240 MG/DL (ref 170–510)
GLUCOSE BLD-MCNC: 113 MG/DL (ref 70–99)
GLUCOSE BLD-MCNC: 117 MG/DL (ref 70–99)
GLUCOSE BLD-MCNC: 122 MG/DL (ref 70–99)
GLUCOSE BLD-MCNC: 131 MG/DL (ref 70–99)
GLUCOSE BLD-MCNC: 143 MG/DL (ref 70–99)
GLUCOSE BLD-MCNC: 162 MG/DL (ref 70–99)
GLUCOSE BLD-MCNC: 162 MG/DL (ref 70–99)
GLUCOSE BLDC GLUCOMTR-MCNC: 126 MG/DL (ref 70–99)
GLUCOSE BLDC GLUCOMTR-MCNC: 133 MG/DL (ref 70–99)
GLUCOSE BLDC GLUCOMTR-MCNC: 133 MG/DL (ref 70–99)
GLUCOSE BLDC GLUCOMTR-MCNC: 134 MG/DL (ref 70–99)
GLUCOSE BLDC GLUCOMTR-MCNC: 137 MG/DL (ref 70–99)
GLUCOSE BLDC GLUCOMTR-MCNC: 149 MG/DL (ref 70–99)
GLUCOSE BLDC GLUCOMTR-MCNC: 164 MG/DL (ref 70–99)
GLUCOSE SERPL-MCNC: 152 MG/DL (ref 70–99)
GLUCOSE SERPL-MCNC: 159 MG/DL (ref 70–99)
HCO3 BLD-SCNC: 23 MMOL/L (ref 21–28)
HCO3 BLDA-SCNC: 23 MMOL/L (ref 21–28)
HCO3 BLDA-SCNC: 24 MMOL/L (ref 21–28)
HCO3 BLDA-SCNC: 25 MMOL/L (ref 21–28)
HCO3 BLDA-SCNC: 25 MMOL/L (ref 21–28)
HCO3 BLDV-SCNC: 25 MMOL/L (ref 21–28)
HCO3 SERPL-SCNC: 21 MMOL/L (ref 22–29)
HCO3 SERPL-SCNC: 22 MMOL/L (ref 22–29)
HCT VFR BLD AUTO: 28.8 % (ref 40–53)
HCT VFR BLD AUTO: 32.4 % (ref 40–53)
HCT VFR BLD AUTO: 43.6 % (ref 40–53)
HGB BLD-MCNC: 10.9 G/DL (ref 13.3–17.7)
HGB BLD-MCNC: 12.7 G/DL (ref 13.3–17.7)
HGB BLD-MCNC: 13.4 G/DL (ref 13.3–17.7)
HGB BLD-MCNC: 14.3 G/DL (ref 13.3–17.7)
HGB BLD-MCNC: 8.5 G/DL (ref 13.3–17.7)
HGB BLD-MCNC: 8.8 G/DL (ref 13.3–17.7)
HGB BLD-MCNC: 8.8 G/DL (ref 13.3–17.7)
HGB BLD-MCNC: 9.3 G/DL (ref 13.3–17.7)
HGB BLD-MCNC: 9.6 G/DL (ref 13.3–17.7)
HGB BLD-MCNC: 9.8 G/DL (ref 13.3–17.7)
HOLD SPECIMEN: NORMAL
INR PPP: 1.22 (ref 0.85–1.15)
INR PPP: 1.52 (ref 0.85–1.15)
INTERPRETATION ECG - MUSE: NORMAL
INTERPRETATION ECG - MUSE: NORMAL
ISSUE DATE AND TIME: NORMAL
LACTATE BLD-SCNC: 0.7 MMOL/L (ref 0.7–2)
LACTATE BLD-SCNC: 0.7 MMOL/L (ref 0.7–2)
LACTATE BLD-SCNC: 0.8 MMOL/L (ref 0.7–2)
LACTATE BLD-SCNC: 0.9 MMOL/L (ref 0.7–2)
LACTATE BLD-SCNC: 1.2 MMOL/L (ref 0.7–2)
LACTATE SERPL-SCNC: 0.9 MMOL/L (ref 0.7–2)
MAGNESIUM SERPL-MCNC: 2.6 MG/DL (ref 1.7–2.3)
MCH RBC QN AUTO: 29.7 PG (ref 26.5–33)
MCH RBC QN AUTO: 30.4 PG (ref 26.5–33)
MCH RBC QN AUTO: 30.5 PG (ref 26.5–33)
MCHC RBC AUTO-ENTMCNC: 32.8 G/DL (ref 31.5–36.5)
MCHC RBC AUTO-ENTMCNC: 33.3 G/DL (ref 31.5–36.5)
MCHC RBC AUTO-ENTMCNC: 33.6 G/DL (ref 31.5–36.5)
MCV RBC AUTO: 91 FL (ref 78–100)
O2/TOTAL GAS SETTING VFR VENT: 100 %
O2/TOTAL GAS SETTING VFR VENT: 40 %
O2/TOTAL GAS SETTING VFR VENT: 60 %
O2/TOTAL GAS SETTING VFR VENT: 60 %
O2/TOTAL GAS SETTING VFR VENT: 80 %
O2/TOTAL GAS SETTING VFR VENT: 80 %
OXYHGB MFR BLDA: 100 % (ref 92–100)
OXYHGB MFR BLDA: 98 % (ref 92–100)
OXYHGB MFR BLDA: 99 % (ref 92–100)
OXYHGB MFR BLDV: 81 % (ref 70–75)
P AXIS - MUSE: 31 DEGREES
P AXIS - MUSE: 42 DEGREES
PCO2 BLD: 38 MM HG (ref 35–45)
PCO2 BLDA: 37 MM HG (ref 35–45)
PCO2 BLDA: 38 MM HG (ref 35–45)
PCO2 BLDA: 40 MM HG (ref 35–45)
PCO2 BLDA: 40 MM HG (ref 35–45)
PCO2 BLDA: 41 MM HG (ref 35–45)
PCO2 BLDA: 44 MM HG (ref 35–45)
PCO2 BLDV: 45 MM HG (ref 40–50)
PH BLD: 7.38 [PH] (ref 7.35–7.45)
PH BLDA: 7.34 [PH] (ref 7.35–7.45)
PH BLDA: 7.37 [PH] (ref 7.35–7.45)
PH BLDA: 7.4 [PH] (ref 7.35–7.45)
PH BLDA: 7.4 [PH] (ref 7.35–7.45)
PH BLDA: 7.41 [PH] (ref 7.35–7.45)
PH BLDA: 7.42 [PH] (ref 7.35–7.45)
PH BLDV: 7.35 [PH] (ref 7.32–7.43)
PHOSPHATE SERPL-MCNC: 2.7 MG/DL (ref 2.5–4.5)
PLATELET # BLD AUTO: 104 10E3/UL (ref 150–450)
PLATELET # BLD AUTO: 159 10E3/UL (ref 150–450)
PLATELET # BLD AUTO: 213 10E3/UL (ref 150–450)
PO2 BLD: 138 MM HG (ref 80–105)
PO2 BLDA: 257 MM HG (ref 80–105)
PO2 BLDA: 265 MM HG (ref 80–105)
PO2 BLDA: 267 MM HG (ref 80–105)
PO2 BLDA: 418 MM HG (ref 80–105)
PO2 BLDA: 445 MM HG (ref 80–105)
PO2 BLDA: 460 MM HG (ref 80–105)
PO2 BLDV: 46 MM HG (ref 25–47)
POTASSIUM BLD-SCNC: 3.6 MMOL/L (ref 3.4–5.3)
POTASSIUM BLD-SCNC: 3.8 MMOL/L (ref 3.4–5.3)
POTASSIUM BLD-SCNC: 3.8 MMOL/L (ref 3.4–5.3)
POTASSIUM BLD-SCNC: 4 MMOL/L (ref 3.4–5.3)
POTASSIUM BLD-SCNC: 4.1 MMOL/L (ref 3.4–5.3)
POTASSIUM BLD-SCNC: 4.6 MMOL/L (ref 3.4–5.3)
POTASSIUM BLD-SCNC: 4.7 MMOL/L (ref 3.4–5.3)
POTASSIUM SERPL-SCNC: 4.1 MMOL/L (ref 3.4–5.3)
POTASSIUM SERPL-SCNC: 4.3 MMOL/L (ref 3.4–5.3)
PR INTERVAL - MUSE: 162 MS
PR INTERVAL - MUSE: 168 MS
PROT SERPL-MCNC: 4.9 G/DL (ref 6.4–8.3)
PROTHROMBIN TIME: 15.2 SECONDS (ref 11.8–14.8)
PROTHROMBIN TIME: 17.9 SECONDS (ref 11.8–14.8)
QRS DURATION - MUSE: 66 MS
QRS DURATION - MUSE: 82 MS
QT - MUSE: 364 MS
QT - MUSE: 444 MS
QTC - MUSE: 401 MS
QTC - MUSE: 409 MS
R AXIS - MUSE: 21 DEGREES
R AXIS - MUSE: 64 DEGREES
RBC # BLD AUTO: 3.15 10E6/UL (ref 4.4–5.9)
RBC # BLD AUTO: 3.58 10E6/UL (ref 4.4–5.9)
RBC # BLD AUTO: 4.81 10E6/UL (ref 4.4–5.9)
SAO2 % BLDA: 99.9 % (ref 95–96)
SAO2 % BLDA: >100 % (ref 95–96)
SAO2 % BLDV: 82 % (ref 70–75)
SODIUM BLD-SCNC: 138 MMOL/L (ref 135–145)
SODIUM BLD-SCNC: 139 MMOL/L (ref 135–145)
SODIUM BLD-SCNC: 139 MMOL/L (ref 135–145)
SODIUM BLD-SCNC: 140 MMOL/L (ref 135–145)
SODIUM BLD-SCNC: 141 MMOL/L (ref 135–145)
SODIUM SERPL-SCNC: 140 MMOL/L (ref 135–145)
SODIUM SERPL-SCNC: 140 MMOL/L (ref 135–145)
SYSTOLIC BLOOD PRESSURE - MUSE: NORMAL MMHG
SYSTOLIC BLOOD PRESSURE - MUSE: NORMAL MMHG
T AXIS - MUSE: 69 DEGREES
T AXIS - MUSE: 94 DEGREES
UFH PPP CHRO-ACNC: 0.18 IU/ML
UNIT ABO/RH: NORMAL
UNIT NUMBER: NORMAL
UNIT STATUS: NORMAL
UNIT TYPE ISBT: 7300
VENTRICULAR RATE- MUSE: 51 BPM
VENTRICULAR RATE- MUSE: 73 BPM
WBC # BLD AUTO: 17.2 10E3/UL (ref 4–11)
WBC # BLD AUTO: 21.4 10E3/UL (ref 4–11)
WBC # BLD AUTO: 9.2 10E3/UL (ref 4–11)

## 2025-05-01 PROCEDURE — P9035 PLATELET PHERES LEUKOREDUCED: HCPCS

## 2025-05-01 PROCEDURE — 250N000012 HC RX MED GY IP 250 OP 636 PS 637: Performed by: STUDENT IN AN ORGANIZED HEALTH CARE EDUCATION/TRAINING PROGRAM

## 2025-05-01 PROCEDURE — 02100Z9 BYPASS CORONARY ARTERY, ONE ARTERY FROM LEFT INTERNAL MAMMARY, OPEN APPROACH: ICD-10-PCS | Performed by: STUDENT IN AN ORGANIZED HEALTH CARE EDUCATION/TRAINING PROGRAM

## 2025-05-01 PROCEDURE — 85014 HEMATOCRIT: CPT | Performed by: INTERNAL MEDICINE

## 2025-05-01 PROCEDURE — P9016 RBC LEUKOCYTES REDUCED: HCPCS

## 2025-05-01 PROCEDURE — P9045 ALBUMIN (HUMAN), 5%, 250 ML: HCPCS | Performed by: NURSE ANESTHETIST, CERTIFIED REGISTERED

## 2025-05-01 PROCEDURE — 250N000011 HC RX IP 250 OP 636: Mod: JZ | Performed by: NURSE ANESTHETIST, CERTIFIED REGISTERED

## 2025-05-01 PROCEDURE — 250N000012 HC RX MED GY IP 250 OP 636 PS 637: Performed by: NURSE ANESTHETIST, CERTIFIED REGISTERED

## 2025-05-01 PROCEDURE — 258N000003 HC RX IP 258 OP 636: Performed by: INTERNAL MEDICINE

## 2025-05-01 PROCEDURE — 85610 PROTHROMBIN TIME: CPT

## 2025-05-01 PROCEDURE — 258N000003 HC RX IP 258 OP 636

## 2025-05-01 PROCEDURE — 999N000157 HC STATISTIC RCP TIME EA 10 MIN

## 2025-05-01 PROCEDURE — 250N000013 HC RX MED GY IP 250 OP 250 PS 637: Performed by: STUDENT IN AN ORGANIZED HEALTH CARE EDUCATION/TRAINING PROGRAM

## 2025-05-01 PROCEDURE — 250N000009 HC RX 250: Performed by: INTERNAL MEDICINE

## 2025-05-01 PROCEDURE — 85730 THROMBOPLASTIN TIME PARTIAL: CPT | Performed by: STUDENT IN AN ORGANIZED HEALTH CARE EDUCATION/TRAINING PROGRAM

## 2025-05-01 PROCEDURE — 021009W BYPASS CORONARY ARTERY, ONE ARTERY FROM AORTA WITH AUTOLOGOUS VENOUS TISSUE, OPEN APPROACH: ICD-10-PCS | Performed by: STUDENT IN AN ORGANIZED HEALTH CARE EDUCATION/TRAINING PROGRAM

## 2025-05-01 PROCEDURE — 272N000001 HC OR GENERAL SUPPLY STERILE: Performed by: STUDENT IN AN ORGANIZED HEALTH CARE EDUCATION/TRAINING PROGRAM

## 2025-05-01 PROCEDURE — 84295 ASSAY OF SERUM SODIUM: CPT

## 2025-05-01 PROCEDURE — 258N000003 HC RX IP 258 OP 636: Performed by: STUDENT IN AN ORGANIZED HEALTH CARE EDUCATION/TRAINING PROGRAM

## 2025-05-01 PROCEDURE — 76984 DX INTRAOP THORACIC AORTA US: CPT | Mod: 26 | Performed by: STUDENT IN AN ORGANIZED HEALTH CARE EDUCATION/TRAINING PROGRAM

## 2025-05-01 PROCEDURE — 83605 ASSAY OF LACTIC ACID: CPT

## 2025-05-01 PROCEDURE — 250N000009 HC RX 250

## 2025-05-01 PROCEDURE — 85041 AUTOMATED RBC COUNT: CPT

## 2025-05-01 PROCEDURE — 33517 CABG ARTERY-VEIN SINGLE: CPT | Performed by: STUDENT IN AN ORGANIZED HEALTH CARE EDUCATION/TRAINING PROGRAM

## 2025-05-01 PROCEDURE — 82330 ASSAY OF CALCIUM: CPT

## 2025-05-01 PROCEDURE — 250N000011 HC RX IP 250 OP 636: Performed by: STUDENT IN AN ORGANIZED HEALTH CARE EDUCATION/TRAINING PROGRAM

## 2025-05-01 PROCEDURE — 85520 HEPARIN ASSAY: CPT | Performed by: INTERNAL MEDICINE

## 2025-05-01 PROCEDURE — 999N000141 HC STATISTIC PRE-PROCEDURE NURSING ASSESSMENT: Performed by: STUDENT IN AN ORGANIZED HEALTH CARE EDUCATION/TRAINING PROGRAM

## 2025-05-01 PROCEDURE — 93005 ELECTROCARDIOGRAM TRACING: CPT

## 2025-05-01 PROCEDURE — 370N000017 HC ANESTHESIA TECHNICAL FEE, PER MIN: Performed by: STUDENT IN AN ORGANIZED HEALTH CARE EDUCATION/TRAINING PROGRAM

## 2025-05-01 PROCEDURE — 360N000079 HC SURGERY LEVEL 6, PER MIN: Performed by: STUDENT IN AN ORGANIZED HEALTH CARE EDUCATION/TRAINING PROGRAM

## 2025-05-01 PROCEDURE — P9045 ALBUMIN (HUMAN), 5%, 250 ML: HCPCS

## 2025-05-01 PROCEDURE — 85396 CLOTTING ASSAY WHOLE BLOOD: CPT

## 2025-05-01 PROCEDURE — 258N000003 HC RX IP 258 OP 636: Performed by: NURSE ANESTHETIST, CERTIFIED REGISTERED

## 2025-05-01 PROCEDURE — 410N000005 HC PER-PERFUSION, SH ONLY, 1ST 30 MIN: Performed by: STUDENT IN AN ORGANIZED HEALTH CARE EDUCATION/TRAINING PROGRAM

## 2025-05-01 PROCEDURE — 999N000253 HC STATISTIC WEANING TRIALS

## 2025-05-01 PROCEDURE — 250N000011 HC RX IP 250 OP 636

## 2025-05-01 PROCEDURE — 250N000024 HC ISOFLURANE, PER MIN: Performed by: STUDENT IN AN ORGANIZED HEALTH CARE EDUCATION/TRAINING PROGRAM

## 2025-05-01 PROCEDURE — 250N000009 HC RX 250: Performed by: STUDENT IN AN ORGANIZED HEALTH CARE EDUCATION/TRAINING PROGRAM

## 2025-05-01 PROCEDURE — 250N000009 HC RX 250: Performed by: NURSE ANESTHETIST, CERTIFIED REGISTERED

## 2025-05-01 PROCEDURE — 84100 ASSAY OF PHOSPHORUS: CPT

## 2025-05-01 PROCEDURE — 99207 PR NO BILLABLE SERVICE THIS VISIT: CPT | Performed by: INTERNAL MEDICINE

## 2025-05-01 PROCEDURE — 5A1221Z PERFORMANCE OF CARDIAC OUTPUT, CONTINUOUS: ICD-10-PCS | Performed by: STUDENT IN AN ORGANIZED HEALTH CARE EDUCATION/TRAINING PROGRAM

## 2025-05-01 PROCEDURE — 99291 CRITICAL CARE FIRST HOUR: CPT | Mod: 24 | Performed by: INTERNAL MEDICINE

## 2025-05-01 PROCEDURE — 33533 CABG ARTERIAL SINGLE: CPT | Performed by: STUDENT IN AN ORGANIZED HEALTH CARE EDUCATION/TRAINING PROGRAM

## 2025-05-01 PROCEDURE — 94002 VENT MGMT INPAT INIT DAY: CPT

## 2025-05-01 PROCEDURE — 83735 ASSAY OF MAGNESIUM: CPT

## 2025-05-01 PROCEDURE — 999N000259 HC STATISTIC EXTUBATION

## 2025-05-01 PROCEDURE — 06BQ4ZZ EXCISION OF LEFT SAPHENOUS VEIN, PERCUTANEOUS ENDOSCOPIC APPROACH: ICD-10-PCS

## 2025-05-01 PROCEDURE — 250N000025 HC SEVOFLURANE, PER MIN: Performed by: STUDENT IN AN ORGANIZED HEALTH CARE EDUCATION/TRAINING PROGRAM

## 2025-05-01 PROCEDURE — 82805 BLOOD GASES W/O2 SATURATION: CPT

## 2025-05-01 PROCEDURE — 200N000001 HC R&B ICU

## 2025-05-01 PROCEDURE — 250N000009 HC RX 250: Performed by: ANESTHESIOLOGY

## 2025-05-01 PROCEDURE — 85384 FIBRINOGEN ACTIVITY: CPT | Performed by: STUDENT IN AN ORGANIZED HEALTH CARE EDUCATION/TRAINING PROGRAM

## 2025-05-01 PROCEDURE — 85730 THROMBOPLASTIN TIME PARTIAL: CPT

## 2025-05-01 PROCEDURE — 250N000013 HC RX MED GY IP 250 OP 250 PS 637

## 2025-05-01 PROCEDURE — 84132 ASSAY OF SERUM POTASSIUM: CPT

## 2025-05-01 PROCEDURE — 85041 AUTOMATED RBC COUNT: CPT | Performed by: STUDENT IN AN ORGANIZED HEALTH CARE EDUCATION/TRAINING PROGRAM

## 2025-05-01 PROCEDURE — C1898 LEAD, PMKR, OTHER THAN TRANS: HCPCS | Performed by: STUDENT IN AN ORGANIZED HEALTH CARE EDUCATION/TRAINING PROGRAM

## 2025-05-01 PROCEDURE — 82040 ASSAY OF SERUM ALBUMIN: CPT

## 2025-05-01 PROCEDURE — 85610 PROTHROMBIN TIME: CPT | Performed by: STUDENT IN AN ORGANIZED HEALTH CARE EDUCATION/TRAINING PROGRAM

## 2025-05-01 PROCEDURE — 258N000003 HC RX IP 258 OP 636: Performed by: NURSE PRACTITIONER

## 2025-05-01 PROCEDURE — 410N000006: Performed by: STUDENT IN AN ORGANIZED HEALTH CARE EDUCATION/TRAINING PROGRAM

## 2025-05-01 PROCEDURE — 71045 X-RAY EXAM CHEST 1 VIEW: CPT

## 2025-05-01 PROCEDURE — 258N000003 HC RX IP 258 OP 636: Performed by: ANESTHESIOLOGY

## 2025-05-01 PROCEDURE — 36415 COLL VENOUS BLD VENIPUNCTURE: CPT | Performed by: INTERNAL MEDICINE

## 2025-05-01 PROCEDURE — 82565 ASSAY OF CREATININE: CPT | Performed by: STUDENT IN AN ORGANIZED HEALTH CARE EDUCATION/TRAINING PROGRAM

## 2025-05-01 RX ORDER — PROPOFOL 10 MG/ML
5-75 INJECTION, EMULSION INTRAVENOUS CONTINUOUS
Status: DISCONTINUED | OUTPATIENT
Start: 2025-05-01 | End: 2025-05-02

## 2025-05-01 RX ORDER — CEFAZOLIN SODIUM 1 G/3ML
1 INJECTION, POWDER, FOR SOLUTION INTRAMUSCULAR; INTRAVENOUS EVERY 8 HOURS
Status: COMPLETED | OUTPATIENT
Start: 2025-05-01 | End: 2025-05-02

## 2025-05-01 RX ORDER — NALOXONE HYDROCHLORIDE 0.4 MG/ML
0.4 INJECTION, SOLUTION INTRAMUSCULAR; INTRAVENOUS; SUBCUTANEOUS
Status: DISCONTINUED | OUTPATIENT
Start: 2025-05-01 | End: 2025-05-07 | Stop reason: HOSPADM

## 2025-05-01 RX ORDER — LIDOCAINE HYDROCHLORIDE 10 MG/ML
INJECTION, SOLUTION INFILTRATION; PERINEURAL
Status: COMPLETED | OUTPATIENT
Start: 2025-05-01 | End: 2025-05-01

## 2025-05-01 RX ORDER — CHLORHEXIDINE GLUCONATE ORAL RINSE 1.2 MG/ML
15 SOLUTION DENTAL EVERY 12 HOURS
Status: DISCONTINUED | OUTPATIENT
Start: 2025-05-01 | End: 2025-05-01

## 2025-05-01 RX ORDER — HEPARIN SODIUM 5000 [USP'U]/.5ML
5000 INJECTION, SOLUTION INTRAVENOUS; SUBCUTANEOUS EVERY 8 HOURS
Status: DISCONTINUED | OUTPATIENT
Start: 2025-05-02 | End: 2025-05-07 | Stop reason: HOSPADM

## 2025-05-01 RX ORDER — METHOCARBAMOL 500 MG/1
250 TABLET ORAL EVERY 6 HOURS PRN
Status: DISCONTINUED | OUTPATIENT
Start: 2025-05-01 | End: 2025-05-07 | Stop reason: HOSPADM

## 2025-05-01 RX ORDER — NICOTINE POLACRILEX 4 MG
15-30 LOZENGE BUCCAL
Status: DISCONTINUED | OUTPATIENT
Start: 2025-05-01 | End: 2025-05-07 | Stop reason: HOSPADM

## 2025-05-01 RX ORDER — VECURONIUM BROMIDE 1 MG/ML
INJECTION, POWDER, LYOPHILIZED, FOR SOLUTION INTRAVENOUS PRN
Status: DISCONTINUED | OUTPATIENT
Start: 2025-05-01 | End: 2025-05-01

## 2025-05-01 RX ORDER — ASPIRIN 81 MG/1
162 TABLET, CHEWABLE ORAL
Status: COMPLETED | OUTPATIENT
Start: 2025-05-01 | End: 2025-05-01

## 2025-05-01 RX ORDER — FUROSEMIDE 10 MG/ML
20 INJECTION INTRAMUSCULAR; INTRAVENOUS
Status: DISCONTINUED | OUTPATIENT
Start: 2025-05-01 | End: 2025-05-07 | Stop reason: HOSPADM

## 2025-05-01 RX ORDER — SODIUM CHLORIDE, SODIUM LACTATE, POTASSIUM CHLORIDE, CALCIUM CHLORIDE 600; 310; 30; 20 MG/100ML; MG/100ML; MG/100ML; MG/100ML
INJECTION, SOLUTION INTRAVENOUS CONTINUOUS
Status: DISCONTINUED | OUTPATIENT
Start: 2025-05-01 | End: 2025-05-02

## 2025-05-01 RX ORDER — LIDOCAINE 40 MG/G
CREAM TOPICAL
Status: DISCONTINUED | OUTPATIENT
Start: 2025-05-01 | End: 2025-05-01 | Stop reason: HOSPADM

## 2025-05-01 RX ORDER — AMOXICILLIN 250 MG
1 CAPSULE ORAL 2 TIMES DAILY
Status: DISCONTINUED | OUTPATIENT
Start: 2025-05-01 | End: 2025-05-07 | Stop reason: HOSPADM

## 2025-05-01 RX ORDER — CEFAZOLIN SODIUM/WATER 2 G/20 ML
2 SYRINGE (ML) INTRAVENOUS
Status: COMPLETED | OUTPATIENT
Start: 2025-05-01 | End: 2025-05-01

## 2025-05-01 RX ORDER — NITROGLYCERIN 10 MG/100ML
INJECTION INTRAVENOUS PRN
Status: DISCONTINUED | OUTPATIENT
Start: 2025-05-01 | End: 2025-05-01

## 2025-05-01 RX ORDER — ACETAMINOPHEN 325 MG/1
975 TABLET ORAL ONCE
Status: COMPLETED | OUTPATIENT
Start: 2025-05-01 | End: 2025-05-01

## 2025-05-01 RX ORDER — HYDROMORPHONE HCL IN WATER/PF 6 MG/30 ML
0.4 PATIENT CONTROLLED ANALGESIA SYRINGE INTRAVENOUS
Status: DISCONTINUED | OUTPATIENT
Start: 2025-05-01 | End: 2025-05-02

## 2025-05-01 RX ORDER — CEFAZOLIN SODIUM/WATER 2 G/20 ML
2 SYRINGE (ML) INTRAVENOUS SEE ADMIN INSTRUCTIONS
Status: DISCONTINUED | OUTPATIENT
Start: 2025-05-01 | End: 2025-05-01 | Stop reason: HOSPADM

## 2025-05-01 RX ORDER — BISACODYL 10 MG
10 SUPPOSITORY, RECTAL RECTAL DAILY PRN
Status: DISCONTINUED | OUTPATIENT
Start: 2025-05-04 | End: 2025-05-03

## 2025-05-01 RX ORDER — VANCOMYCIN HYDROCHLORIDE 1 G/20ML
INJECTION, POWDER, LYOPHILIZED, FOR SOLUTION INTRAVENOUS PRN
Status: DISCONTINUED | OUTPATIENT
Start: 2025-05-01 | End: 2025-05-01 | Stop reason: HOSPADM

## 2025-05-01 RX ORDER — VANCOMYCIN HYDROCHLORIDE 1 G/200ML
1000 INJECTION, SOLUTION INTRAVENOUS
Status: COMPLETED | OUTPATIENT
Start: 2025-05-01 | End: 2025-05-01

## 2025-05-01 RX ORDER — OXYCODONE HYDROCHLORIDE 5 MG/1
5 TABLET ORAL EVERY 4 HOURS PRN
Status: DISCONTINUED | OUTPATIENT
Start: 2025-05-01 | End: 2025-05-07 | Stop reason: HOSPADM

## 2025-05-01 RX ORDER — EPINEPHRINE IN 0.9 % SOD CHLOR 5 MG/250ML
.01-.1 PLASTIC BAG, INJECTION (ML) INTRAVENOUS CONTINUOUS PRN
Status: DISCONTINUED | OUTPATIENT
Start: 2025-05-01 | End: 2025-05-02

## 2025-05-01 RX ORDER — HYDRALAZINE HYDROCHLORIDE 20 MG/ML
10 INJECTION INTRAMUSCULAR; INTRAVENOUS EVERY 30 MIN PRN
Status: DISCONTINUED | OUTPATIENT
Start: 2025-05-01 | End: 2025-05-07 | Stop reason: HOSPADM

## 2025-05-01 RX ORDER — LIDOCAINE 40 MG/G
CREAM TOPICAL
Status: DISCONTINUED | OUTPATIENT
Start: 2025-05-01 | End: 2025-05-07 | Stop reason: HOSPADM

## 2025-05-01 RX ORDER — SODIUM CHLORIDE, SODIUM LACTATE, POTASSIUM CHLORIDE, CALCIUM CHLORIDE 600; 310; 30; 20 MG/100ML; MG/100ML; MG/100ML; MG/100ML
INJECTION, SOLUTION INTRAVENOUS CONTINUOUS PRN
Status: DISCONTINUED | OUTPATIENT
Start: 2025-05-01 | End: 2025-05-01

## 2025-05-01 RX ORDER — FENTANYL CITRATE 50 UG/ML
INJECTION, SOLUTION INTRAMUSCULAR; INTRAVENOUS PRN
Status: DISCONTINUED | OUTPATIENT
Start: 2025-05-01 | End: 2025-05-01

## 2025-05-01 RX ORDER — HEPARIN SOD,PORCINE/0.9 % NACL 30K/1000ML
INTRAVENOUS SOLUTION INTRAVENOUS
Status: DISCONTINUED | OUTPATIENT
Start: 2025-05-01 | End: 2025-05-01 | Stop reason: HOSPADM

## 2025-05-01 RX ORDER — PROTAMINE SULFATE 10 MG/ML
INJECTION, SOLUTION INTRAVENOUS PRN
Status: DISCONTINUED | OUTPATIENT
Start: 2025-05-01 | End: 2025-05-01

## 2025-05-01 RX ORDER — CALCIUM CARBONATE 500 MG/1
500 TABLET, CHEWABLE ORAL 4 TIMES DAILY PRN
Status: DISCONTINUED | OUTPATIENT
Start: 2025-05-01 | End: 2025-05-07 | Stop reason: HOSPADM

## 2025-05-01 RX ORDER — HEPARIN SODIUM 1000 [USP'U]/ML
INJECTION, SOLUTION INTRAVENOUS; SUBCUTANEOUS PRN
Status: DISCONTINUED | OUTPATIENT
Start: 2025-05-01 | End: 2025-05-01

## 2025-05-01 RX ORDER — NALOXONE HYDROCHLORIDE 0.4 MG/ML
0.2 INJECTION, SOLUTION INTRAMUSCULAR; INTRAVENOUS; SUBCUTANEOUS
Status: DISCONTINUED | OUTPATIENT
Start: 2025-05-01 | End: 2025-05-07 | Stop reason: HOSPADM

## 2025-05-01 RX ORDER — PROCHLORPERAZINE MALEATE 5 MG/1
5 TABLET ORAL EVERY 6 HOURS PRN
Status: DISCONTINUED | OUTPATIENT
Start: 2025-05-01 | End: 2025-05-07 | Stop reason: HOSPADM

## 2025-05-01 RX ORDER — POLYETHYLENE GLYCOL 3350 17 G/17G
17 POWDER, FOR SOLUTION ORAL DAILY
Status: DISCONTINUED | OUTPATIENT
Start: 2025-05-02 | End: 2025-05-02

## 2025-05-01 RX ORDER — ONDANSETRON 2 MG/ML
INJECTION INTRAMUSCULAR; INTRAVENOUS PRN
Status: DISCONTINUED | OUTPATIENT
Start: 2025-05-01 | End: 2025-05-01

## 2025-05-01 RX ORDER — ASPIRIN 81 MG/1
162 TABLET, CHEWABLE ORAL DAILY
Status: DISCONTINUED | OUTPATIENT
Start: 2025-05-01 | End: 2025-05-04

## 2025-05-01 RX ORDER — FAMOTIDINE 20 MG/1
20 TABLET, FILM COATED ORAL
Status: COMPLETED | OUTPATIENT
Start: 2025-05-01 | End: 2025-05-01

## 2025-05-01 RX ORDER — CHLORHEXIDINE GLUCONATE ORAL RINSE 1.2 MG/ML
10 SOLUTION DENTAL ONCE
Status: COMPLETED | OUTPATIENT
Start: 2025-05-01 | End: 2025-05-01

## 2025-05-01 RX ORDER — HYDROMORPHONE HCL IN WATER/PF 6 MG/30 ML
0.2 PATIENT CONTROLLED ANALGESIA SYRINGE INTRAVENOUS
Status: DISCONTINUED | OUTPATIENT
Start: 2025-05-01 | End: 2025-05-07 | Stop reason: HOSPADM

## 2025-05-01 RX ORDER — NOREPINEPHRINE BITARTRATE 0.02 MG/ML
.01-.15 INJECTION, SOLUTION INTRAVENOUS CONTINUOUS PRN
Status: DISCONTINUED | OUTPATIENT
Start: 2025-05-01 | End: 2025-05-02

## 2025-05-01 RX ORDER — ASPIRIN 81 MG/1
81 TABLET, CHEWABLE ORAL
Status: COMPLETED | OUTPATIENT
Start: 2025-05-01 | End: 2025-05-01

## 2025-05-01 RX ORDER — CALCIUM GLUCONATE 20 MG/ML
1 INJECTION, SOLUTION INTRAVENOUS EVERY 6 HOURS PRN
Status: DISCONTINUED | OUTPATIENT
Start: 2025-05-01 | End: 2025-05-02

## 2025-05-01 RX ORDER — ONDANSETRON 4 MG/1
4 TABLET, ORALLY DISINTEGRATING ORAL EVERY 6 HOURS PRN
Status: DISCONTINUED | OUTPATIENT
Start: 2025-05-01 | End: 2025-05-07 | Stop reason: HOSPADM

## 2025-05-01 RX ORDER — DEXMEDETOMIDINE HYDROCHLORIDE 4 UG/ML
.2-.7 INJECTION, SOLUTION INTRAVENOUS CONTINUOUS
Status: DISCONTINUED | OUTPATIENT
Start: 2025-05-01 | End: 2025-05-02

## 2025-05-01 RX ORDER — SODIUM CHLORIDE, SODIUM LACTATE, POTASSIUM CHLORIDE, CALCIUM CHLORIDE 600; 310; 30; 20 MG/100ML; MG/100ML; MG/100ML; MG/100ML
INJECTION, SOLUTION INTRAVENOUS CONTINUOUS
Status: DISCONTINUED | OUTPATIENT
Start: 2025-05-01 | End: 2025-05-01 | Stop reason: HOSPADM

## 2025-05-01 RX ORDER — PANTOPRAZOLE SODIUM 40 MG/1
40 TABLET, DELAYED RELEASE ORAL DAILY
Status: DISCONTINUED | OUTPATIENT
Start: 2025-05-02 | End: 2025-05-07 | Stop reason: HOSPADM

## 2025-05-01 RX ORDER — SODIUM CHLORIDE 9 MG/ML
INJECTION, SOLUTION INTRAVENOUS CONTINUOUS PRN
Status: DISCONTINUED | OUTPATIENT
Start: 2025-05-01 | End: 2025-05-01

## 2025-05-01 RX ORDER — OXYCODONE HYDROCHLORIDE 5 MG/1
10 TABLET ORAL EVERY 4 HOURS PRN
Status: DISCONTINUED | OUTPATIENT
Start: 2025-05-01 | End: 2025-05-07 | Stop reason: HOSPADM

## 2025-05-01 RX ORDER — ACETAMINOPHEN 325 MG/1
975 TABLET ORAL EVERY 8 HOURS
Status: DISCONTINUED | OUTPATIENT
Start: 2025-05-01 | End: 2025-05-07 | Stop reason: HOSPADM

## 2025-05-01 RX ORDER — DEXTROSE MONOHYDRATE 25 G/50ML
25-50 INJECTION, SOLUTION INTRAVENOUS
Status: DISCONTINUED | OUTPATIENT
Start: 2025-05-01 | End: 2025-05-07 | Stop reason: HOSPADM

## 2025-05-01 RX ORDER — SODIUM CHLORIDE 9 MG/ML
INJECTION, SOLUTION INTRAVENOUS CONTINUOUS
Status: DISCONTINUED | OUTPATIENT
Start: 2025-05-01 | End: 2025-05-02

## 2025-05-01 RX ORDER — CALCIUM GLUCONATE 20 MG/ML
2 INJECTION, SOLUTION INTRAVENOUS EVERY 6 HOURS PRN
Status: DISCONTINUED | OUTPATIENT
Start: 2025-05-01 | End: 2025-05-02

## 2025-05-01 RX ORDER — PROPOFOL 10 MG/ML
INJECTION, EMULSION INTRAVENOUS CONTINUOUS PRN
Status: DISCONTINUED | OUTPATIENT
Start: 2025-05-01 | End: 2025-05-01

## 2025-05-01 RX ORDER — ONDANSETRON 2 MG/ML
4 INJECTION INTRAMUSCULAR; INTRAVENOUS EVERY 6 HOURS PRN
Status: DISCONTINUED | OUTPATIENT
Start: 2025-05-01 | End: 2025-05-07 | Stop reason: HOSPADM

## 2025-05-01 RX ORDER — PROPOFOL 10 MG/ML
INJECTION, EMULSION INTRAVENOUS PRN
Status: DISCONTINUED | OUTPATIENT
Start: 2025-05-01 | End: 2025-05-01

## 2025-05-01 RX ADMIN — ALBUMIN (HUMAN): 12.5 SOLUTION INTRAVENOUS at 11:20

## 2025-05-01 RX ADMIN — FENTANYL CITRATE 50 MCG: 50 INJECTION INTRAMUSCULAR; INTRAVENOUS at 09:06

## 2025-05-01 RX ADMIN — NITROGLYCERIN 5 MCG: 10 INJECTION INTRAVENOUS at 09:06

## 2025-05-01 RX ADMIN — CEFAZOLIN 1 G: 1 INJECTION, POWDER, FOR SOLUTION INTRAMUSCULAR; INTRAVENOUS at 19:33

## 2025-05-01 RX ADMIN — SODIUM PHOSPHATE, MONOBASIC, MONOHYDRATE AND SODIUM PHOSPHATE, DIBASIC, ANHYDROUS 9 MMOL: 142; 276 INJECTION, SOLUTION INTRAVENOUS at 14:51

## 2025-05-01 RX ADMIN — NOREPINEPHRINE BITARTRATE 6.4 MCG: 1 INJECTION, SOLUTION, CONCENTRATE INTRAVENOUS at 09:01

## 2025-05-01 RX ADMIN — VECURONIUM BROMIDE 3 MG: 1 INJECTION, POWDER, LYOPHILIZED, FOR SOLUTION INTRAVENOUS at 10:34

## 2025-05-01 RX ADMIN — NOREPINEPHRINE BITARTRATE 0.02 MCG/KG/MIN: 1 INJECTION, SOLUTION, CONCENTRATE INTRAVENOUS at 07:54

## 2025-05-01 RX ADMIN — HEPARIN SODIUM 29000 UNITS: 1000 INJECTION INTRAVENOUS; SUBCUTANEOUS at 08:55

## 2025-05-01 RX ADMIN — SODIUM CHLORIDE, POTASSIUM CHLORIDE, SODIUM LACTATE AND CALCIUM CHLORIDE: 600; 310; 30; 20 INJECTION, SOLUTION INTRAVENOUS at 06:33

## 2025-05-01 RX ADMIN — CHLORHEXIDINE GLUCONATE 10 ML: 1.2 SOLUTION ORAL at 05:04

## 2025-05-01 RX ADMIN — ROCURONIUM BROMIDE 50 MG: 50 INJECTION, SOLUTION INTRAVENOUS at 07:32

## 2025-05-01 RX ADMIN — SODIUM CHLORIDE: 900 INJECTION INTRAVENOUS at 07:45

## 2025-05-01 RX ADMIN — FENTANYL CITRATE 100 MCG: 50 INJECTION INTRAMUSCULAR; INTRAVENOUS at 11:00

## 2025-05-01 RX ADMIN — NOREPINEPHRINE BITARTRATE 6.4 MCG: 1 INJECTION, SOLUTION, CONCENTRATE INTRAVENOUS at 07:54

## 2025-05-01 RX ADMIN — INSULIN HUMAN 3 UNITS: 100 INJECTION, SOLUTION PARENTERAL at 10:33

## 2025-05-01 RX ADMIN — VANCOMYCIN HYDROCHLORIDE 1000 MG: 1 INJECTION, SOLUTION INTRAVENOUS at 06:29

## 2025-05-01 RX ADMIN — OXYCODONE 10 MG: 5 TABLET ORAL at 15:31

## 2025-05-01 RX ADMIN — ASPIRIN 81 MG CHEWABLE TABLET 162 MG: 81 TABLET CHEWABLE at 05:04

## 2025-05-01 RX ADMIN — VECURONIUM BROMIDE 2 MG: 1 INJECTION, POWDER, LYOPHILIZED, FOR SOLUTION INTRAVENOUS at 11:24

## 2025-05-01 RX ADMIN — MIDAZOLAM HYDROCHLORIDE 2 MG: 1 INJECTION, SOLUTION INTRAMUSCULAR; INTRAVENOUS at 07:27

## 2025-05-01 RX ADMIN — NOREPINEPHRINE BITARTRATE 6.4 MCG: 1 INJECTION, SOLUTION, CONCENTRATE INTRAVENOUS at 09:23

## 2025-05-01 RX ADMIN — PHENYLEPHRINE HYDROCHLORIDE 150 MCG: 10 INJECTION INTRAVENOUS at 07:36

## 2025-05-01 RX ADMIN — PHENYLEPHRINE HYDROCHLORIDE 150 MCG: 10 INJECTION INTRAVENOUS at 07:42

## 2025-05-01 RX ADMIN — NOREPINEPHRINE BITARTRATE 6.4 MCG: 1 INJECTION, SOLUTION, CONCENTRATE INTRAVENOUS at 09:17

## 2025-05-01 RX ADMIN — AMINOCAPROIC ACID 5 G/HR: 250 INJECTION, SOLUTION INTRAVENOUS at 07:54

## 2025-05-01 RX ADMIN — Medication 2 G: at 07:39

## 2025-05-01 RX ADMIN — HYDROMORPHONE HYDROCHLORIDE 0.5 MG: 1 INJECTION, SOLUTION INTRAMUSCULAR; INTRAVENOUS; SUBCUTANEOUS at 11:51

## 2025-05-01 RX ADMIN — SODIUM CHLORIDE, SODIUM LACTATE, POTASSIUM CHLORIDE, AND CALCIUM CHLORIDE: .6; .31; .03; .02 INJECTION, SOLUTION INTRAVENOUS at 07:45

## 2025-05-01 RX ADMIN — PHENYLEPHRINE HYDROCHLORIDE 150 MCG: 10 INJECTION INTRAVENOUS at 07:30

## 2025-05-01 RX ADMIN — LIDOCAINE HYDROCHLORIDE 2 ML: 10 INJECTION, SOLUTION INFILTRATION; PERINEURAL at 07:27

## 2025-05-01 RX ADMIN — NOREPINEPHRINE BITARTRATE 6.4 MCG: 1 INJECTION, SOLUTION, CONCENTRATE INTRAVENOUS at 09:18

## 2025-05-01 RX ADMIN — ONDANSETRON 4 MG: 2 INJECTION INTRAMUSCULAR; INTRAVENOUS at 12:11

## 2025-05-01 RX ADMIN — OXYCODONE 10 MG: 5 TABLET ORAL at 19:03

## 2025-05-01 RX ADMIN — PROPOFOL 50 MCG/KG/MIN: 10 INJECTION, EMULSION INTRAVENOUS at 11:12

## 2025-05-01 RX ADMIN — EPINEPHRINE 0.02 MCG/KG/MIN: 1 INJECTION INTRAMUSCULAR; INTRAVENOUS; SUBCUTANEOUS at 10:38

## 2025-05-01 RX ADMIN — INSULIN HUMAN 1 UNITS/HR: 1 INJECTION, SOLUTION INTRAVENOUS at 13:26

## 2025-05-01 RX ADMIN — FENTANYL CITRATE 250 MCG: 50 INJECTION INTRAMUSCULAR; INTRAVENOUS at 07:28

## 2025-05-01 RX ADMIN — SODIUM CHLORIDE, SODIUM LACTATE, POTASSIUM CHLORIDE, AND CALCIUM CHLORIDE 1000 ML: .6; .31; .03; .02 INJECTION, SOLUTION INTRAVENOUS at 15:44

## 2025-05-01 RX ADMIN — HYDROMORPHONE HYDROCHLORIDE 0.4 MG: 0.2 INJECTION, SOLUTION INTRAMUSCULAR; INTRAVENOUS; SUBCUTANEOUS at 17:23

## 2025-05-01 RX ADMIN — PROPOFOL 50 MG: 10 INJECTION, EMULSION INTRAVENOUS at 07:31

## 2025-05-01 RX ADMIN — HYDROMORPHONE HYDROCHLORIDE 0.2 MG: 0.2 INJECTION, SOLUTION INTRAMUSCULAR; INTRAVENOUS; SUBCUTANEOUS at 15:31

## 2025-05-01 RX ADMIN — PHENYLEPHRINE HYDROCHLORIDE 150 MCG: 10 INJECTION INTRAVENOUS at 07:39

## 2025-05-01 RX ADMIN — ACETAMINOPHEN 975 MG: 325 TABLET, FILM COATED ORAL at 21:13

## 2025-05-01 RX ADMIN — ROCURONIUM BROMIDE 50 MG: 50 INJECTION, SOLUTION INTRAVENOUS at 07:56

## 2025-05-01 RX ADMIN — ACETAMINOPHEN 975 MG: 325 TABLET, FILM COATED ORAL at 14:18

## 2025-05-01 RX ADMIN — NOREPINEPHRINE BITARTRATE 6.4 MCG: 1 INJECTION, SOLUTION, CONCENTRATE INTRAVENOUS at 07:49

## 2025-05-01 RX ADMIN — Medication 2 G: at 11:39

## 2025-05-01 RX ADMIN — MIDAZOLAM HYDROCHLORIDE 2 MG: 1 INJECTION, SOLUTION INTRAMUSCULAR; INTRAVENOUS at 10:34

## 2025-05-01 RX ADMIN — FENTANYL CITRATE 50 MCG: 50 INJECTION INTRAMUSCULAR; INTRAVENOUS at 08:18

## 2025-05-01 RX ADMIN — Medication 200 MG: at 12:11

## 2025-05-01 RX ADMIN — AMINOCAPROIC ACID 1 G/HR: 250 INJECTION, SOLUTION INTRAVENOUS at 08:46

## 2025-05-01 RX ADMIN — ACETAMINOPHEN 975 MG: 325 TABLET, FILM COATED ORAL at 05:04

## 2025-05-01 RX ADMIN — FENTANYL CITRATE 50 MCG: 50 INJECTION INTRAMUSCULAR; INTRAVENOUS at 08:16

## 2025-05-01 RX ADMIN — ASPIRIN 81 MG CHEWABLE TABLET 162 MG: 81 TABLET CHEWABLE at 17:30

## 2025-05-01 RX ADMIN — VECURONIUM BROMIDE 5 MG: 1 INJECTION, POWDER, LYOPHILIZED, FOR SOLUTION INTRAVENOUS at 08:58

## 2025-05-01 RX ADMIN — INSULIN HUMAN 4 UNITS: 100 INJECTION, SOLUTION PARENTERAL at 08:07

## 2025-05-01 RX ADMIN — PROTAMINE SULFATE 250 MG: 10 INJECTION, SOLUTION INTRAVENOUS at 10:58

## 2025-05-01 RX ADMIN — FAMOTIDINE 20 MG: 20 TABLET, FILM COATED ORAL at 05:04

## 2025-05-01 RX ADMIN — MIDAZOLAM HYDROCHLORIDE 1 MG: 1 INJECTION, SOLUTION INTRAMUSCULAR; INTRAVENOUS at 08:18

## 2025-05-01 RX ADMIN — SENNOSIDES AND DOCUSATE SODIUM 1 TABLET: 50; 8.6 TABLET ORAL at 21:13

## 2025-05-01 ASSESSMENT — ACTIVITIES OF DAILY LIVING (ADL)
ADLS_ACUITY_SCORE: 39
ADLS_ACUITY_SCORE: 39
ADLS_ACUITY_SCORE: 48
ADLS_ACUITY_SCORE: 39
ADLS_ACUITY_SCORE: 39
ADLS_ACUITY_SCORE: 48
ADLS_ACUITY_SCORE: 48
ADLS_ACUITY_SCORE: 40
ADLS_ACUITY_SCORE: 39
ADLS_ACUITY_SCORE: 48
ADLS_ACUITY_SCORE: 39
ADLS_ACUITY_SCORE: 48
ADLS_ACUITY_SCORE: 39
ADLS_ACUITY_SCORE: 48
ADLS_ACUITY_SCORE: 39
ADLS_ACUITY_SCORE: 48

## 2025-05-01 NOTE — CONSULTS
CARDIAC SURGERY NUTRITION CONSULT    Received standing order to assess and educate patient.  Patient is being followed by our services -- see 4/29/25 note for details.     Will continue to follow per protocol once patient is extubated and/or is transferred to medical unit.    Patient will receive nutrition education during the Outpatient Cardiac Rehab Program (nutrition classes/dietitian counseling).    Karely Johnson RD, LD, [facility]   Clinical Dietitian - [facility]

## 2025-05-01 NOTE — PROGRESS NOTES
Pt extubated to 3L NC per MD order.  LS are diminished t/o and no stridor noted.  Will continue to follow    Shay Anne, RT  5/1/2025

## 2025-05-01 NOTE — ANESTHESIA PREPROCEDURE EVALUATION
Anesthesia Pre-Procedure Evaluation    Patient: Raul Wilhelm   MRN: [id number] : 1945        Procedure : Procedure(s):  CORONARY ARTERY BYPASS GRAFT          Past Medical History:   Diagnosis Date    Chronic airway obstruction, not elsewhere classified     Other and unspecified hyperlipidemia       Past Surgical History:   Procedure Laterality Date    ARTHROPLASTY KNEE Left 2020    Procedure: Left Total Knee Arthroplasty;  Surgeon: Sanjay Downey MD;  Location: [facility] OR    ARTHROPLASTY KNEE Right 2020    Procedure: ARTHROPLASTY, KNEE, TOTAL;  Surgeon: Sanjay Downey MD;  Location: [facility] OR    CV CORONARY ANGIOGRAM N/A 2025    Procedure: Coronary Angiogram;  Surgeon: Jacobo Montgomery MD;  Location: [facility] CATH LAB     CV CORONARY ANGIOGRAM N/A 2025    Procedure: Coronary Angiogram;  Surgeon: Vinod Lobato MD;  Location: [facility] CARDIAC CATH LAB    CV PCI STENT DRUG ELUTING N/A 2025    Procedure: Percutaneous Coronary Intervention Stent;  Surgeon: Jacobo Montgomery MD;  Location: [facility]    ENT SURGERY  as a child    tonsillectomy      Allergies   Allergen Reactions    Simvastatin Swelling     Severe muscle pain, swelling, and bruising    Dust Mites       Social History     Tobacco Use    Smoking status: Never    Smokeless tobacco: Never   Substance Use Topics    Alcohol use: Yes     Comment: Rare      Wt Readings from Last 1 Encounters:   25 65.9 kg (145 lb 4.5 oz)        Anesthesia Evaluation            ROS/MED HX  ENT/Pulmonary:     (+)                      asthma    COPD,              Neurologic:     (+)          CVA,                      Cardiovascular: Comment: STEMI s/p 25, REDD x2 to prox LCxand OM branch  STEMI 2015, REDD x2 to LPDA, mid LCx  Severe multivessel CAD  HLD  HTN    (+) Dyslipidemia hypertension- -  CAD -  - -                                      METS/Exercise Tolerance:     Hematologic:       Musculoskeletal:        GI/Hepatic: Comment: Zenkers Diverticulum, GERD, Chronic Dysphagia. Noted during Jan 2025 admit. Diverticulum does not empty fully with multiple swallows and gives him regurgitation/dysphagia symptoms. He elected not to pursue ENT outpatient.    (+) GERD,  esophageal disease, Zenker's,                Renal/Genitourinary:       Endo:     (+)  type II DM,                    Psychiatric/Substance Use:       Infectious Disease:       Malignancy:       Other:               OUTSIDE LABS:  CBC:   Lab Results   Component Value Date    WBC 9.1 04/30/2025    WBC 9.3 04/28/2025    HGB 14.1 04/30/2025    HGB 15.2 04/28/2025    HCT 42.8 04/30/2025    HCT 44.9 04/28/2025     04/30/2025     04/28/2025     BMP:   Lab Results   Component Value Date     04/30/2025     04/27/2025    POTASSIUM 3.8 04/30/2025    POTASSIUM 4.0 04/27/2025    CHLORIDE 104 04/30/2025    CHLORIDE 104 04/27/2025    CO2 22 04/30/2025    CO2 23 04/27/2025    BUN 17.4 04/30/2025    BUN 19.5 04/27/2025    CR 0.97 04/30/2025    CR 1.00 04/27/2025     (H) 04/30/2025     (H) 04/30/2025     COAGS:   Lab Results   Component Value Date    PTT 25 01/04/2025    INR 1.02 01/04/2025     POC:   Lab Results   Component Value Date     (H) 11/02/2020     HEPATIC:   Lab Results   Component Value Date    ALBUMIN 4.0 04/25/2025    PROTTOTAL 6.4 04/25/2025    ALT 39 04/25/2025    AST 23 04/25/2025    ALKPHOS 140 04/25/2025    BILITOTAL 0.8 04/25/2025     OTHER:   Lab Results   Component Value Date    PH 7.35 12/11/2024    LACT 1.3 01/17/2025    A1C 7.1 (H) 02/19/2025    JOSE RAFAEL 9.1 04/30/2025    PHOS 3.4 04/27/2025    MAG 2.0 04/27/2025    LIPASE 193 11/14/2015    TSH 4.81 01/10/2011    CRP 5.5 01/10/2011    SED 1 01/10/2011       Anesthesia Plan    ASA Status:  4    NPO Status:  NPO Appropriate    Anesthesia Type: General.     - Airway: ETT   Induction: Intravenous.   Maintenance: Balanced.   Techniques and Equipment:     - Airway:  Video-Laryngoscope     - Lines/Monitors: Arterial Line, Central Line, CVP     - Blood: Blood in Room, T&C     - Drips/Meds: Norepi, Epinephrine     Consents    Anesthesia Plan(s) and associated risks, benefits, and realistic alternatives discussed. Questions answered and patient/representative(s) expressed understanding.     - Discussed:     - Discussed with:  Patient      - Extended Intubation/Ventilatory Support Discussed: No.      - Patient is DNR/DNI Status: No     Use of blood products discussed: Yes.     - Discussed with: Patient.     - Consented: consented to blood products            Reason for refusal: other.     Postoperative Care    Pain management: IV analgesics, Oral pain medications, Multi-modal analgesia.        Comments:    Other Comments: Pre-induction arterial line. Video laryngoscope for intubation in OR. Post induction central venous catheter placement with ultrasound guidance.     No CYDNEY (patient has a history of esophageal disease (Zenker's diverticulum)    Epinephrine and Norepinephrine infusions for separation from cardiopulmonary bypass.                    Chris Kerns, DO    Clinically Significant Risk Factors                   # Hypertension: Noted on problem list           # DMII: A1C = 7.1 % (Ref range: 0.0 - 5.6 %) within past 6 months       # Financial/Environmental Concerns: none  # COPD: noted on problem list

## 2025-05-01 NOTE — ANESTHESIA CARE TRANSFER NOTE
Patient: Raul Wilhelm    Procedure: Procedure(s):  MEDIAN STERNOTOMY; CORONARY ARTERY BYPASS GRAFT x 2 (LEFT INTERNAL MAMMARY ARTERY - LEFT ANTERIOR DESCENDING ARTERY; SAPHENOUS VEIN - DIAGONAL ARTERY) WITH ENDOSCOPIC SAPHENOUS VEIN HARVEST ON THE LEFT LOWER EXTREMITY, AND ON CARDIOPULMONARY PUMP OXYGENATOR       Diagnosis: CAD (coronary artery disease) [I25.10]  Diagnosis Additional Information: No value filed.    Anesthesia Type:   General     Note:    Oropharynx: endotracheal tube in place, oral gastic tube in place and ventilatory support  Level of Consciousness: iatrogenic sedation          Vital Signs Stable: post-procedure vital signs reviewed and stable  Report to RN Given: handoff report given  Patient transferred to: ICU  Comments: Patient connected to SpO2, EKG, and arterial blood pressure transport monitors and accompanied by CRNA, anesthesiologist, circulating RN, surgeon (fellow) to ICU room. Patient ventilated by anesthesiologist with ambu via ETT with O2 at 15 liters per minute during transport.     On arrival to ICU, endotracheal tube position unchanged, equal, bilateral breath sounds auscultated in ICU room, patient placed on ICU ventilator by respiratory therapist, teeth and oral mucosa intact and unchanged at handoff of care. At anesthesia handoff of care, clinical monitors and alarms on and functioning, report on patient's clinical status given to ICU RN, report on patient's clinical status given to intensivist, ICU staff questions answered  ICU Handoff: Call for PAUSE to initiate/utilize ICU HANDOFF, Identified Patient, Identified Responsible Provider, Reviewed the Pertinent Medical History, Discussed Surgical Course, Reviewed Intra-OP Anesthesia Management and Issues during Anesthesia, Set Expectations for Post Procedure Period and Allowed Opportunity for Questions and Acknowledgement of Understanding  Vitals:  Vitals Value Taken Time   BP     Temp     Pulse 80 05/01/25 1235   Resp      SpO2 99 % 05/01/25 1235   Vitals shown include unfiled device data.    Electronically Signed By: OSCAR Rios CRNA  May 1, 2025  12:37 PM

## 2025-05-01 NOTE — PROGRESS NOTES
[facility]    Internal Medicine Hospitalist Chart Check  05/01/2025  I evaluated patient on the above date.    Kerwin Kwon Jr., MD  [phone] (p)  Text Page  Vocera      [New actions/orders today (05/01/2025) are underlined in the assessment and plan. All lab results in the assessment and plan were reviewed.]  Assessment & Plan   Raul Wilhelm is a 79 year old male with history including asthma; DM2; HTN; known severe multivessel CAD with prior stents (1/2025); h/o CVA; who presented to [facility] 4/25/2025 with chest pain and found with NSTEMI. Transferred to [facility] 4/25/2025 for management.       NSTEMI with known severe multivessel CAD and recent stents (1/2025).  Ischemic cardiomyopathy.  HTN (benign essential).  HLD.  [PTA: asa 81 mg daily; atorvastatin 80 mg at bedtime; carvedilol 6.25 mg BID; losartan-HCTZ 50-12.5 mg daily; ticagrelor 90 mg BID; PRN NTG.]  * H/o STEMI 11/2025 s/p LPDA and LCx stents. H/o STEMI 1/2025. Heart catheterization 1/2025 showed severe multivessel CAD involving ostial-prox LAD, mid LAD, prox LCX, and large OM branch; stents placed to LCx and OM branch and there was plan for outpatient discussion regarding surgical revascularization vs long stents for LAD disease, but he had not yet had this follow-up. Echo 1/2025 showed LVEF 55-60%, mild LVH, no regional wall motion abnormalities; no significant valve disease.  * Initial presentation to outside hospital as above. Started on heparin gtt.  * Transferred to [facility] 4/25. Cardiology consulted on admit.  * Later 4/25: Echo showed LVEF 45-50% with WMA's, grade 1 diastolic dysfunction; RV OK. Angiogram showed LM with ostial 60%; mid LAD with diffuse 50%, LAD ostium covered by CX stent, appeared to be up to 80% on some views, diffuse proximal 75% narrowing first diagonal 75%; LCx stents widely patent, no restenosis; CABG recommended. CVS consulted; ticagrelor held and started on cangrelor gtt bridging.  *  4/30: Some chest pain, but seemed more musculoskeletal type. ECG w/o acute ischemic changes.   - Plan CABG today 5/1/2025.  - Continue heparin gtt.  - Continue cangrelor gtt (holding PTA ticagrelor).  - Continue ASA 81 mg daily, atorvastatin 80 mg daily, PRN NTG.  - Continue carvedilol 6.25 mg BID and losartan-HCTZ 50-12.5 mg daily.  - Continue to monitor i/o's, daily wts.  - Patient suspects he will want TCU after surgery as he lives alone - he is interested in the following TCUs ([facility], [facility] in [place] and [facility]), SW consulted.     Leukocytosis, suspect reactive, resolved.  * WBC 12.3 on admit. Pt afebrile, no clear signs of infection on admission.  * WBC normalized 4/27.     Zenkers diverticulum.  GERD.  Chronic Dysphagia.  * Noted during Jan 2025 admit. Noted diverticulum does not empty fully with multiple swallows and gives him regurgitation/dysphagia symptoms. He elected not to pursue ENT outpatient. PTA on BID omeprazole.  - Continue PPI.     Asthma.  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  - Continue fluticasone-vilanterol, tiotropium, montelukast, PRN inhalers/nebs     DM2.   [PTA: glargine 20U at bedtime; semaglutide (pt not taking due to cost).]  * HgbA1C 7.1 2/2025.   Recent Labs   Lab 05/01/25  1111 05/01/25  1031 05/01/25  1003 05/01/25  0934 05/01/25  0933 05/01/25  0901   * 162* 131* 122* 117* 113*     Recent Labs   Lab Test 02/19/25  0832 01/04/25  1709   A1C 7.1* 8.1*   - Continue moderate CHO diet.  - Continue glargine 20U at bedtime.  - Continue aspart ISS (medium).  - Continue PRN hypoglycemia protocol.    Back pain, suspect musculoskeletal.  * 4/28: Pt c/o back pain suspected due to the hospital bed.  - Continue PRN acetaminophen.     H/o frequent skin abscesses.  +MRSA.  * 3/2025 with abscess of right buttock that was +MRSA. Most recent at right neck Mid 4/2025, appears to be resolving.    * 4/29: CVS ordered 10d course of mupirocin ointment on nares.  -  "Continue to monitor clinically.  - Continue mupirocin oint to nares (started 4/30) for 10d.    H/o arterial ischemic stroke.  - Continue antiplatelets and statin as noted.     Pulmonary nodule RUL, 0.3cm, noted on CT (1/2025).  - Follow-up CT chest due Jan 2026      Clinically Significant Risk Factors           # Hypocalcemia: Lowest iCa = 3.7 mg/dL in last 2 days, will monitor and replace as appropriate      # Coagulation Defect: INR = 1.52 (Ref range: 0.85 - 1.15) and/or PTT = 32 Seconds (Ref range: 22 - 38 Seconds), will monitor for bleeding  # Thrombocytopenia: Lowest platelets = 104 in last 2 days, will monitor for bleeding   # Hypertension: Noted on problem list           # DMII: A1C = 7.1 % (Ref range: 0.0 - 5.6 %) within past 6 months         # Financial/Environmental Concerns: none  # COPD: noted on problem list        Diet: NPO for Procedure/Surgery per Anesthesia Guidelines    Prophylaxis: PCD's, ambulation, and is on anticoagulation as above.  Diaz Catheter: PRESENT, indication:    Lines: PRESENT           Code Status: Full Code    Disposition Plan   Medically Ready for Discharge: Anticipated in 5+ Days  Expected discharge to transitional care unit (TCU) pending above.    Entered: Kerwin Kwon MD 05/01/2025, 11:38 AM       No charge.    Will follow-up in am.      Interval History   In OR for surgery.    * Data reviewed today: I reviewed all new labs and imaging over the last 24 hours. I personally reviewed no images or ECG's today.    Physical Exam   Most recent vitals:   , Blood pressure (!) 141/90, pulse 65, temperature 97.3  F (36.3  C), resp. rate 16, height 1.74 m (5' 8.5\"), weight 65.9 kg (145 lb 4.5 oz), SpO2 98%. O2 Device: None (Room air)    Vitals:    04/25/25 0806 04/26/25 0700   Weight: 67.8 kg (149 lb 6.4 oz) 65.9 kg (145 lb 4.5 oz)     Vital signs with ranges:  Temp:  [97.1  F (36.2  C)-97.9  F (36.6  C)] 97.3  F (36.3  C)  Pulse:  [65-74] 65  Resp:  [16] 16  BP: (110-141)/(68-90) " 141/90  SpO2:  [93 %-98 %] 98 %  Patient Vitals for the past 24 hrs:   BP Temp Temp src Pulse Resp SpO2   05/01/25 0635 (!) 141/90 97.3  F (36.3  C) -- 65 16 98 %   04/30/25 2327 110/76 97.5  F (36.4  C) Oral 66 16 93 %   04/30/25 1918 125/73 97.1  F (36.2  C) Oral 74 16 96 %   04/30/25 1527 111/68 97.9  F (36.6  C) Oral 73 16 96 %     I/O's last 24 hours:  I/O last 3 completed shifts:  In: 840 [P.O.:840]  Out: -     Constitutional:    Head:   Eyes:   ENT:   Neck:   Cardiovascular:   Lungs:   Gastrointestinal/Abdomen:   :   Musculoskeletal:   Skin/Extremities:  Neurologic:   Psychiatric:   Hematologic/Lymphatic/Immunologic:        Labs reviewed.  Recent Labs   Lab 05/01/25  1111 05/01/25  1110 05/01/25  1031 05/01/25  1003 05/01/25  0800 05/01/25  0554 04/30/25  1618 04/30/25  1219 04/27/25  1144 04/27/25  0613 04/25/25  0822 04/25/25  0124   WBC  --  21.4*  --   --   --  9.2  --  9.1   < > 8.8   < > 12.3*   HGB 9.8* 9.6* 9.3* 8.8*   < > 14.3  --  14.1   < > 14.0   < > 15.0   MCV  --  91  --   --   --  91  --  91   < > 90   < > 92   PLT  --  104*  --   --   --  213  --  220   < > 193   < > 236   INR  --  1.52*  --   --   --   --   --   --   --   --   --   --      --  138 138   < >  --   --  138  --  138  --  141   POTASSIUM 4.1  --  4.6 4.7   < >  --   --  3.8  --  4.0  --  4.8   CHLORIDE  --   --   --   --   --   --   --  104  --  104  --  104   CO2  --   --   --   --   --   --   --  22  --  23  --  28   BUN  --   --   --   --   --   --   --  17.4  --  19.5  --  21.7   CR  --   --   --   --   --   --   --  0.97  --  1.00  --  0.92   ANIONGAP  --   --   --   --   --   --   --  12  --  11  --  9   JOSE RAFAEL  --   --   --   --   --   --   --  9.1  --  9.0  --  9.4   *  --  162* 131*   < >  --    < > 118*   < > 159*   < > 261*   ALBUMIN  --   --   --   --   --   --   --   --   --   --   --  4.0   PROTTOTAL  --   --   --   --   --   --   --   --   --   --   --  6.4   BILITOTAL  --   --   --   --   --   --   --    --   --   --   --  0.8   ALKPHOS  --   --   --   --   --   --   --   --   --   --   --  140   ALT  --   --   --   --   --   --   --   --   --   --   --  39   AST  --   --   --   --   --   --   --   --   --   --   --  23    < > = values in this interval not displayed.     Recent Labs   Lab Test 04/25/25  1032 04/25/25  0822 04/25/25  0226 01/17/25  1317 01/17/25  1015 01/04/25  1954   NT-PROBNP, INPATIENT  --   --   --   --  467 562   TROPONIN T HIGH SENSITIVITY 81* 95* 40*   < > 118*  --     < > = values in this interval not displayed.     Recent Labs   Lab 05/01/25  1111 05/01/25  1031 05/01/25  1003 05/01/25  0934 05/01/25  0933 05/01/25  0901   * 162* 131* 122* 117* 113*     Recent Labs   Lab Test 02/19/25  0832 01/04/25  1709   A1C 7.1* 8.1*       Recent Labs   Lab 05/01/25  1110   INR 1.52*     Recent Labs   Lab 05/01/25  1111 05/01/25  1110 05/01/25  1031 05/01/25  1003 05/01/25  0934 05/01/25  0933 05/01/25  0901 05/01/25  0800 05/01/25  0554 04/30/25  1219 04/28/25  0639 04/27/25  0613 04/25/25  0822   WBC  --  21.4*  --   --   --   --   --   --  9.2 9.1 9.3 8.8 11.6*   FIBR  --  240  --   --   --   --   --   --   --   --   --   --   --    LACT 1.2  --  0.9 0.9 0.7 0.7 0.8   < >  --   --   --   --   --     < > = values in this interval not displayed.       MICRO:  Cultures (including blood and urine):  No lab results found in last 7 days.    No results found for this or any previous visit (from the past 24 hours).    Medications   All medications were reviewed. MAR.    Infusions:  Current Facility-Administered Medications   Medication Dose Route Frequency Provider Last Rate Last Admin    insulin regular (MYXREDLIN) 1 unit/mL infusion  0-24 Units/hr Intravenous Continuous Mulvihill, Michael, MD        lactated ringers infusion   Intravenous Continuous Ronen Dennis MD 10 mL/hr at 05/01/25 0633 Restarted at 05/01/25 0715    Patient already taking scheduled Beta Blocker   Does not apply DOES NOT GO  TO MAR Mulvihill, Michael, MD        Patient is already receiving anticoagulation with heparin, enoxaparin (LOVENOX), warfarin (COUMADIN)  or other anticoagulant medication   Does not apply Continuous PRN Vinod Lobato MD         Scheduled Medications:  Current Facility-Administered Medications   Medication Dose Route Frequency Provider Last Rate Last Admin    [Auto Hold] aspirin EC tablet 81 mg  81 mg Oral Daily Vinod Lobato MD   81 mg at 04/30/25 0851    [Auto Hold] atorvastatin (LIPITOR) tablet 80 mg  80 mg Oral At Bedtime Vinod Lobato MD   80 mg at 04/30/25 2144    [Auto Hold] carvedilol (COREG) tablet 6.25 mg  6.25 mg Oral BID Vinod Lobato MD   6.25 mg at 04/30/25 2144    ceFAZolin Sodium (ANCEF) injection 2 g  2 g Intravenous See Admin Instructions Mulvihill, Michael, MD        [Auto Hold] fluticasone-vilanterol (BREO ELLIPTA) 100-25 MCG/ACT inhaler 1 puff  1 puff Inhalation Daily Vinod Lobato MD   1 puff at 04/29/25 0919    heparin (porcine) 10,000 Units, magnesium sulfate 1 g, mannitol 12.5 g, sodium bicarbonate 50 mEq in sodium chloride 0.9 % 239.5 mL PUMP PRIME solution   PERFUSION On Call to OR Mulvihill, Michael, MD        heparin (porcine) 30,000 Units in sodium chloride 0.9% 1000 mL PERFUSION solution (cell saver)   PERFUSION On Call to OR Mulvihill, Michael, MD        [Auto Hold] insulin aspart (NovoLOG) injection (RAPID ACTING)  1-7 Units Subcutaneous TID AC Delilah Dejesus DO   1 Units at 04/30/25 0854    [Auto Hold] insulin aspart (NovoLOG) injection (RAPID ACTING)  1-5 Units Subcutaneous At Bedtime Delilah Dejesus DO   2 Units at 04/25/25 2140    [Auto Hold] insulin glargine (LANTUS PEN) injection 20 Units  20 Units Subcutaneous At Bedtime Delilah Dejesus DO   20 Units at 04/30/25 2147    lidocaine 200 mg, magnesium sulfate 1 g, mannitol 12.5 g, sodium bicarbonate 20 mEq CARDIOPLEGIA (Hot Shot) solution   PERFUSION On  Call to OR Mulvihill, Michael, MD        [Auto Hold] losartan-hydrochlorothiazide (HYZAAR) 50-12.5 MG per tablet 1 tablet  1 tablet Oral Daily Vinod Lobato MD   1 tablet at 04/30/25 0851    [Auto Hold] montelukast (SINGULAIR) tablet 10 mg  10 mg Oral At Bedtime Vinod Lobato MD   10 mg at 04/30/25 2144    [Auto Hold] mupirocin (BACTROBAN) 2 % ointment   Topical BID Arthur Reynaga PA-C   Given at 04/30/25 2146    [Auto Hold] pantoprazole (PROTONIX) EC tablet 40 mg  40 mg Oral QAM AC Vinod Lobato MD   40 mg at 04/30/25 0636    potassium chloride 11.6 mEq, mannitol 25 % 7.2 g, magnesium sulfate 1.2 g, sodium bicarbonate 14.5 mEq in sodium chloride 0.9 % CARDIOPLEGIA solution   PERFUSION On Call to OR Mulvihill, Michael, MD        potassium chloride 60 mEq, mannitol 25 % 7.5 g, magnesium sulfate 1.2 g, sodium bicarbonate 15 mEq in sodium chloride 0.9 % CARDIOPLEGIA solution   PERFUSION On Call to OR Mulvihill, Michael, MD        sodium chloride (PF) 0.9% PF flush 3 mL  3 mL Intracatheter Q8H Mulvihill, Michael, MD        [Auto Hold] sodium chloride (PF) 0.9% PF flush 3 mL  3 mL Intracatheter Q8H [facility] Vinod Lobato MD   3 mL at 04/29/25 0612    [Auto Hold] umeclidinium (INCRUSE ELLIPTA) 62.5 MCG/ACT inhaler 1 puff  1 puff Inhalation Daily Kerwin Kwon MD         PRN Medications:  Current Facility-Administered Medications   Medication Dose Route Frequency Provider Last Rate Last Admin    [Auto Hold] acetaminophen (TYLENOL) Suppository 650 mg  650 mg Rectal Q4H PRN Vinod Lobato MD        [Auto Hold] acetaminophen (TYLENOL) tablet 650 mg  650 mg Oral Q4H PRN Vinod Lobato MD   650 mg at 04/26/25 2144    [Auto Hold] calcium carbonate (TUMS) chewable tablet 1,000 mg  1,000 mg Oral 4x Daily PRN Vinod Lobato MD   1,000 mg at 04/30/25 0900    ceFAZolin (ANCEF) 1g in 1000 mL NS irrigation bottle    PRN Mulvihill, Michael, MD   1 g at 05/01/25 0838    [Auto  Hold] glucose gel 15-30 g  15-30 g Oral Q15 Min PRN Delilah Dejesus DO        Or    [Auto Hold] dextrose 50 % injection 25-50 mL  25-50 mL Intravenous Q15 Min PRN Delilah Dejesus DO        Or    [Auto Hold] glucagon injection 1 mg  1 mg Subcutaneous Q15 Min PRN Delilah Dejesus DO        HOLD:  All AM Medications   Does not apply HOLD Mulvihill, Michael, MD        HOLD: heparin   Does not apply HOLD Mulvihill, Michael, MD        [Auto Hold] ipratropium - albuterol 0.5 mg/2.5 mg/3 mL (DUONEB) neb solution 3 mL  3 mL Nebulization Q6H PRN Kerwin Kwon MD        lidocaine (LMX4) cream   Topical Q1H PRN Mulvihill, Michael, MD        [Auto Hold] lidocaine (LMX4) cream   Topical Q1H PRN Vinod Lobato MD        lidocaine 1 % 0.1-1 mL  0.1-1 mL Other Q1H PRN Mulvihill, Michael, MD        lidocaine 1 % 0.1-1 mL  0.1-1 mL Other Q1H PRN Ronen Dennis MD        [Auto Hold] lidocaine 1 % 0.1-1 mL  0.1-1 mL Other Q1H PRN Vinod Lobato MD        [Auto Hold] naloxone (NARCAN) injection 0.2 mg  0.2 mg Intravenous Q2 Min PRN Aureliano Rodriguez MD        Or    [Auto Hold] naloxone (NARCAN) injection 0.4 mg  0.4 mg Intravenous Q2 Min PRN Aureliano Rodriguez MD        Or    [Auto Hold] naloxone (NARCAN) injection 0.2 mg  0.2 mg Intramuscular Q2 Min PRN Aureliano Rodriguez MD        Or    [Auto Hold] naloxone (NARCAN) injection 0.4 mg  0.4 mg Intramuscular Q2 Min PRN Aureliano Rodriguez MD        [Auto Hold] nitroGLYcerin (NITROSTAT) sublingual tablet 0.4 mg  0.4 mg Sublingual Q5 Min PRN Vinod Lobato MD        [Auto Hold] ondansetron (ZOFRAN ODT) ODT tab 4 mg  4 mg Oral Q6H PRN Vinod Lobato MD        Or    [Auto Hold] ondansetron (ZOFRAN) injection 4 mg  4 mg Intravenous Q6H PRN Vinod Lobato MD        [Auto Hold] oxyCODONE (ROXICODONE) tablet 5 mg  5 mg Oral Q4H PRN Vinod Lobato MD        Or    [Auto Hold] oxyCODONE (ROXICODONE)  tablet 10 mg  10 mg Oral Q4H PRN Vinod Lobato MD        papaverine 60 mg in 20 mL 0.9% NaCl    PRN Mulvihill, Michael, MD   40 mL at 05/01/25 0838    Patient already taking scheduled Beta Blocker   Does not apply DOES NOT GO TO MAR Mulvihill, Michael, MD        Patient is already receiving anticoagulation with heparin, enoxaparin (LOVENOX), warfarin (COUMADIN)  or other anticoagulant medication   Does not apply Continuous PRN Vinod Lobato MD        Plasma-Lyte A (electrolyte A) 1,000 mL with heparin 10,000 Units    PRN Mulvihill, Michael, MD   100 mL at 05/01/25 0838    [Auto Hold] polyethylene glycol (MIRALAX) Packet 17 g  17 g Oral BID PRN Vinod Lobato MD        [Auto Hold] prochlorperazine (COMPAZINE) injection 5 mg  5 mg Intravenous Q6H PRN Vinod Lobato MD        Or    [Auto Hold] prochlorperazine (COMPAZINE) tablet 5 mg  5 mg Oral Q6H PRN Vinod Lobato MD        [Auto Hold] senna-docusate (SENOKOT-S/PERICOLACE) 8.6-50 MG per tablet 1 tablet  1 tablet Oral BID PRN Vinod Lobato MD        Or    [Auto Hold] senna-docusate (SENOKOT-S/PERICOLACE) 8.6-50 MG per tablet 2 tablet  2 tablet Oral BID PRN Vinod Lobato MD        sodium chloride (PF) 0.9% PF flush 3 mL  3 mL Intracatheter q1 min prn Mulvihill, Michael, MD        sodium chloride (PF) 0.9% PF flush 3 mL  3 mL Intracatheter q1 min prn Ronen Dennis MD        [Auto Hold] sodium chloride (PF) 0.9% PF flush 3 mL  3 mL Intracatheter q1 min prn Vinod Lobato MD        vancomycin (VANCOCIN) topical powder    PRN Mulvihill, Michael, MD   4 g at 05/01/25 0838

## 2025-05-01 NOTE — PLAN OF CARE
"Goal Outcome Evaluation:      Plan of Care Reviewed With: patient    Overall Patient Progress: no changeOverall Patient Progress: no change  Patient Name: Louise  MRN: [id number]  Date of Admission: 4/25/2025  Reason for Admission: CABG work-up   Level of Care: Med-surge     Vitals:   BP Readings from Last 1 Encounters:   04/30/25 110/76     Pulse Readings from Last 1 Encounters:   04/30/25 66     Wt Readings from Last 1 Encounters:   04/26/25 65.9 kg (145 lb 4.5 oz)     Ht Readings from Last 1 Encounters:   04/25/25 1.74 m (5' 8.5\")     Estimated body mass index is 21.77 kg/m  as calculated from the following:    Height as of this encounter: 1.74 m (5' 8.5\").    Weight as of this encounter: 65.9 kg (145 lb 4.5 oz).  Temp Readings from Last 1 Encounters:   04/30/25 97.5  F (36.4  C) (Oral)       Pain: Pt denies pain     CV Surgery Patient: No    Assessment    Resp: LSC, RA   Telemetry: NSR w PACs   Neuro: Aox4, anxious   GI/: Mod carb diet, NPO at midnight, good UO, BM-   Skin/Wounds: Bottom redness, scattered bruising   Lines/Drains: R PIV SL, L PIV SL   Activity: SBA/ Ind   Sleep: Good     Aggression Stop Light: Green          Patient Care Plan: Monitor, CABG today     "

## 2025-05-01 NOTE — ANESTHESIA PROCEDURE NOTES
Arterial Line Procedure Note    Pre-Procedure   Staff -        Anesthesiologist:  Chris Kerns DO       Performed By: anesthesiologist       Location: pre-op       Pre-Anesthestic Checklist: patient identified, IV checked, site marked, risks and benefits discussed, informed consent, monitors and equipment checked, pre-op evaluation and at physician/surgeon's request  Timeout:       Correct Patient: Yes        Correct Procedure: Yes        Correct Site: Yes        Correct Position: Yes   Line Placement:   This line was placed Pre Induction starting at 5/1/2025 7:27 AM and ending at 5/1/2025 7:32 AM  Procedure   Procedure: arterial line       Laterality: left       Insertion Site: radial.  Sterile Prep        All elements of maximal sterile barrier technique followed       Patient Prep/Sterile Barriers: hand hygiene, sterile gloves, hat, mask, draped, sterile gown, draped       Skin prep: Chloraprep  Insertion/Injection        Technique: Seldinger Technique and ultrasound guided        1. Ultrasound was used to evaluate the access site.       2. Artery evaluated via ultrasound for patency/adequacy.       3. Using real-time ultrasound the needle/catheter was observed entering the artery/vein.       4. Permanent image was captured and entered into the patient's record.       5. The visualized structures were anatomically normal.       6. There were no apparent abnormal pathologic findings.       Catheter Type/Size: 20 gauge, 1.75 in/4.5 cm quick cath (integral wire)  Narrative         Secured by: other       Tegaderm dressing used.       Complications: None apparent,        Arterial waveform: Yes        IBP within 10% of NIBP: Yes   Comments:  Ultrasound was used to place arterial line due to vascular disease.

## 2025-05-01 NOTE — BRIEF OP NOTE
[facility]    Brief Operative Note    Pre-operative diagnosis: CAD (coronary artery disease) [I25.10]  Post-operative diagnosis Same as pre-operative diagnosis    Procedure: MEDIAN STERNOTOMY; CORONARY ARTERY BYPASS GRAFT x 2 (LEFT INTERNAL MAMMARY ARTERY - LEFT ANTERIOR DESCENDING ARTERY; SAPHENOUS VEIN - DIAGONAL ARTERY) WITH ENDOSCOPIC SAPHENOUS VEIN HARVEST ON THE LEFT LOWER EXTREMITY, AND ON CARDIOPULMONARY PUMP OXYGENATOR, N/A - Heart    Surgeon: Surgeons and Role:     * Mulvihill, Michael, MD - Primary     * Leela Toure PA-C - Assisting  Anesthesia: General   Estimated Blood Loss: 200cc    Drains: Meds x2, left pleural chest tube  Specimens:   ID Type Source Tests Collected by Time Destination   A : STAT LABS (POST CARDIOPULMONARY BYPASS) Blood Line, arterial CBC WITH PLATELETS, BASIC METABOLIC PANEL, FIBRINOGEN ACTIVITY, PARTIAL THROMBOPLASTIN TIME, INR Sanjay Espinal APRN CRNA 5/1/2025 11:10 AM      Findings:   None.  Complications: None.  Implants: * No implants in log *

## 2025-05-01 NOTE — CONSULTS
"Critical Care Services Progress Note:  I have evaluated all laboratory values and imaging studies from the past 24 hours.  We are consulted by Dr. Mulvihill of the CV surgery service for management post-procedure.  CC:  s/p cab2  HPI: 79M pmh HLD, CAD, DM2, zenkers diverticulum, prior STEMI s/p stenting now presented 4/25 for chest pain, workup found to be c/w proximal LAD lesion which was not amenable to stenting.  Went for cabx2 5/1.  No untoward events per cv surgery and anesthesiology teams.  He does come to the icu with AV pacer wires in use.  Got 1 unit platelets during the case (ooziness) in addition to cellsaver.  Unable to do CYDNEY during case 2/2 Zenkers diverticulum, but EF before case was 45-50%.    The patient is intubated and sedated which precludes direct history taking.  PMH/PSH/meds/all/SH/FH reviewed and as noted below.  ROS:  Unable as pt is intubated post procedure.  Exam:  BP (!) 141/90 (BP Location: Right arm, Cuff Size: Adult Regular)   Pulse 65   Temp 97.3  F (36.3  C)   Resp 16   Ht 1.74 m (5' 8.5\")   Wt 65.9 kg (145 lb 4.5 oz)   SpO2 99%   BMI 21.77 kg/m    GEN: no acute distress, intubated/sedated  HEENT: head ncat, sclera anicteric, OP patent, trachea midline, PERRL  PULM: unlabored synchronous with vent, clear anteriorly    CV/COR: RRR S1S2 no gallop,  No rub, no murmur.  Midline incision dressed, c/d/i.  ABD: soft nontender, hypoactive bowel sounds, no mass  EXT:   No Edema,   Warm x4  NEURO: sedated but no gross deficit apparent  SKIN: no obvious rash  LINES: clean, dry intact  Data  Labs (personally reviewed/interpreted): lytes ok.  Creat 0.86.  magnesium 2.6.  Tbili 1.3 mildly elevated.  Abg 7.38/38/138 ok.  Wbc 17.2 elevated but downtrending leukocytosis.  Hgb 10.9.  pltlts 159. INR 1.22 coagulopathy likely consumptive.  EKG (personally reviewed/interpreted): sinus 51 (vs av paced), axis nl, int nl, non-specific twave changes  CXR (personally reviewed/interpreted): lungs clear. "  Et tube in good position.  Line in good position.      Assessment/plan:  79 year old male s/p cab2.    Neurology/Psychiatry:   1. Analgesia -- tylenol, prn narcotics  2.  Anxiety -- wean sedative drips to extubate.  No acute anxiety needs at this time.    Cardiovascular:   1. Shock, vasoplegic: congruent with the magnitude of procedure which the patient has undergone. Norepi and epinephrine as needed.  2.  S/p CAB -- ASA    Pulmonary/Ventilator Management:   1. Airway -- intubated post-op.  Weaning to extubate  2. No underlying pulmonary problems on medical history or cxr.     GI and Nutrition :   1. Diet when cleared by CV surgery   2. Acid suppression for PUD prophy    Renal/Fluids/Electrolytes:   1. Monitor UOP/creatinine post-op.  2. Replete electrolytes prn  3. IVF and albumin administration for volume prn per protocol    Infectious Disease:   1. Prophylactic abx post-op per CV surgery    Endocrine:   1. Monitor for stress induced hyperglycemia. ICU insulin protocol, goal sugar <180     Hematology/Oncology:   1. Acute blood loss anemia, appropriate to procedure:  transfusions as needed for hgb goal 7.0 or as specified by cv surgery.       IV/Access:   1. Venous access - Central access from OR  2. Arterial access - remove A-line when off vasoactives and extubated    ICU Prophylaxis:   1. DVT: Hep Subq/mechanical  2. VAP: HOB 30 degrees, chlorhexidine rinse  3. Stress Ulcer: PPI/H2 blocker  4. Restraints: Nonviolent soft two point restraints required and necessary for patient safety and continued cares and good effect as patient continues to pull at necessary lines, tubes despite education and distraction. Will readdress daily.   5. IV Access - central access required and necessary for continued patient cares  6. Disposition - ICU    D/w CATRACHITO Mesfin of CV surgery and PHILIPPE Kerns of anesthesiology.  I spent a total of 40 minutes (excluding procedure time) personally providing and directing critical care services  at the bedside and on the critical care unit for this patient.     Chano Juarez     Clinically Significant Risk Factors          # Hyperchloremia: Highest Cl = 109 mmol/L in last 2 days, will monitor as appropriate      # Hypocalcemia: Lowest iCa = 3.7 mg/dL in last 2 days, will monitor and replace as appropriate      # Coagulation Defect: INR = 1.52 (Ref range: 0.85 - 1.15) and/or PTT = 32 Seconds (Ref range: 22 - 38 Seconds), will monitor for bleeding  # Thrombocytopenia: Lowest platelets = 104 in last 2 days, will monitor for bleeding   # Hypertension: Noted on problem list           # DMII: A1C = 7.1 % (Ref range: 0.0 - 5.6 %) within past 6 months       # Financial/Environmental Concerns: none  # COPD: noted on problem list  # History of CABG: noted on surgical history              PMH:  Past Medical History:   Diagnosis Date    Chronic airway obstruction, not elsewhere classified     Other and unspecified hyperlipidemia        PSH:  Past Surgical History:   Procedure Laterality Date    ARTHROPLASTY KNEE Left 5/28/2020    Procedure: Left Total Knee Arthroplasty;  Surgeon: Sanjay Downey MD;  Location: [facility] OR    ARTHROPLASTY KNEE Right 11/2/2020    Procedure: ARTHROPLASTY, KNEE, TOTAL;  Surgeon: Sanjay Downey MD;  Location: [facility] OR    BYPASS GRAFT ARTERY CORONARY N/A 5/1/2025    Procedure: MEDIAN STERNOTOMY; CORONARY ARTERY BYPASS GRAFT x 2 (LEFT INTERNAL MAMMARY ARTERY - LEFT ANTERIOR DESCENDING ARTERY; SAPHENOUS VEIN - DIAGONAL ARTERY) WITH ENDOSCOPIC SAPHENOUS VEIN HARVEST ON THE LEFT LOWER EXTREMITY, AND ON CARDIOPULMONARY PUMP OXYGENATOR;  Surgeon: Mulvihill, Michael, MD;  Location:  OR    CV CORONARY ANGIOGRAM N/A 1/4/2025    Procedure: Coronary Angiogram;  Surgeon: Jacobo Montgomery MD;  Location: [facility] CATH LAB CV    CV CORONARY ANGIOGRAM N/A 4/25/2025    Procedure: Coronary Angiogram;  Surgeon: Vinod Lobato MD;  Location:  [facility]    CV PCI STENT DRUG  ELUTING N/A 1/4/2025    Procedure: Percutaneous Coronary Intervention Stent;  Surgeon: Jacobo Montgomery MD;  Location: [facility] CV    ENT SURGERY  as a child    tonsillectomy       Meds:  Current Facility-Administered Medications   Medication Dose Route Frequency Provider Last Rate Last Admin    acetaminophen (TYLENOL) tablet 975 mg  975 mg Oral or Feeding Tube Q8H Leela Toure PA-C        aminocaproic acid (AMICAR) 5 g in sodium chloride 0.9 % 100 mL infusion  1 g/hr Intravenous Continuous Leela Toure PA-C        aspirin (ASA) chewable tablet 162 mg  162 mg Oral or NG Tube Daily Leela Toure PA-C        [START ON 5/4/2025] bisacodyl (DULCOLAX) suppository 10 mg  10 mg Rectal Daily PRN Leela Toure PA-C        calcium carbonate (TUMS) chewable tablet 500 mg  500 mg Oral 4x Daily PRN Leela Toure PA-C        calcium gluconate 1 g in 50 mL in sodium chloride intermittent infusion  1 g Intravenous Q6H PRN Leela Toure PA-C        calcium gluconate 2 g in  mL intermittent infusion  2 g Intravenous Q6H PRN Leela Toure PA-C        calcium gluconate 3 g in sodium chloride 0.9 % 100 mL intermittent infusion  3 g Intravenous Q6H PRN Leela Toure PA-C        ceFAZolin (ANCEF) 1 g vial to attach to  ml bag for ADULT or 50 ml bag for PEDS  1 g Intravenous Q8H Leela Toure PA-C        dexmedeTOMIDine (PRECEDEX) 4 mcg/mL in sodium chloride 0.9 % 100 mL infusion  0.2-0.7 mcg/kg/hr Intravenous Continuous Leela Toure PA-C        glucose gel 15-30 g  15-30 g Oral Q15 Min PRN Leela Toure PA-C        Or    dextrose 50 % injection 25-50 mL  25-50 mL Intravenous Q15 Min PRN Leela Toure PA-C        Or    glucagon injection 1 mg  1 mg Subcutaneous Q15 Min PRN Leela Toure PA-C        EPINEPHrine (ADRENALIN) 5 mg in  mL infusion  0.01-0.1 mcg/kg/min Intravenous Continuous PRN Leela Toure PA-C        furosemide (LASIX)  injection 20 mg  20 mg Intravenous Once PRN Leela Toure PA-C        [START ON 5/2/2025] heparin ANTICOAGULANT injection 5,000 Units  5,000 Units Subcutaneous Q8H Leela Toure PA-C        hydrALAZINE (APRESOLINE) injection 10 mg  10 mg Intravenous Q30 Min PRN Leela Toure PA-C        HYDROmorphone (DILAUDID) injection 0.2 mg  0.2 mg Intravenous Q2H PRN Leela Toure PA-C        Or    HYDROmorphone (DILAUDID) injection 0.4 mg  0.4 mg Intravenous Q2H PRN Leela Toure PA-C        insulin regular (MYXREDLIN) 1 unit/mL infusion  0-24 Units/hr Intravenous Continuous Leela Toure PA-C        lidocaine (LMX4) cream   Topical Q1H PRN Leela Toure PA-C        lidocaine 1 % 0.1-1 mL  0.1-1 mL Other Q1H PRN Leela Toure PA-C        [START ON 5/3/2025] magnesium hydroxide (MILK OF MAGNESIA) suspension 30 mL  30 mL Oral Daily PRN Leela Toure PA-C        methocarbamol (ROBAXIN) half-tab 250 mg  250 mg Oral Q6H PRN Leela Toure PA-C        naloxone (NARCAN) injection 0.2 mg  0.2 mg Intravenous Q2 Min PRN Aureliano Rodriguez MD        Or    naloxone (NARCAN) injection 0.4 mg  0.4 mg Intravenous Q2 Min PRN Aureliano Rodriguez MD        Or    naloxone (NARCAN) injection 0.2 mg  0.2 mg Intramuscular Q2 Min PRN Aureliano Rodriguez MD        Or    naloxone (NARCAN) injection 0.4 mg  0.4 mg Intramuscular Q2 Min PRN Aureliano Rodriguez MD        norepinephrine (LEVOPHED) 4 mg in  mL infusion PREMIX  0.01-0.15 mcg/kg/min Intravenous Continuous PRN Leela Toure PA-C        ondansetron (ZOFRAN ODT) ODT tab 4 mg  4 mg Oral Q6H PRN Leela Toure PA-C        Or    ondansetron (ZOFRAN) injection 4 mg  4 mg Intravenous Q6H PRN Leela Toure PA-C        oxyCODONE (ROXICODONE) tablet 5 mg  5 mg Oral Q4H PRN Leela Toure PA-C        Or    oxyCODONE (ROXICODONE) tablet 10 mg  10 mg Oral Q4H PRN Leela Toure PA-C        [START ON 5/2/2025]  pantoprazole (PROTONIX) 2 mg/mL suspension 40 mg  40 mg Oral or NG Tube Daily Leela Toure PA-C        Or    [START ON 5/2/2025] pantoprazole (PROTONIX) EC tablet 40 mg  40 mg Oral Daily Leela Toure PA-C        [START ON 5/2/2025] polyethylene glycol (MIRALAX) Packet 17 g  17 g Oral or Feeding Tube Daily Leela Toure PA-C        prochlorperazine (COMPAZINE) injection 5 mg  5 mg Intravenous Q6H PRN Leela Toure PA-C        Or    prochlorperazine (COMPAZINE) tablet 5 mg  5 mg Oral Q6H PRN Leela Toure PA-C        Reason beta blocker order not selected   Does not apply DOES NOT GO TO Leela Bowman PA-C        senna-docusate (SENOKOT-S/PERICOLACE) 8.6-50 MG per tablet 1 tablet  1 tablet Oral or Feeding Tube BID Leela Toure PA-C        sodium chloride (PF) 0.9% PF flush 3 mL  3 mL Intracatheter Q8H ZHANG Leela Toure PA-C        sodium chloride (PF) 0.9% PF flush 3 mL  3 mL Intracatheter q1 min prn Leela Toure PA-C        vasopressin 0.2 units/mL in NS (PITRESSIN) standard conc infusion  0.5-4 Units/hr Intravenous Continuous PRN Leela Toure PA-C           Allergies:  Allergies   Allergen Reactions    Simvastatin Swelling     Severe muscle pain, swelling, and bruising    Dust Mites        Social Hx:  Social History     Socioeconomic History    Marital status:      Spouse name: Not on file    Number of children: Not on file    Years of education: Not on file    Highest education level: Not on file   Occupational History    Not on file   Tobacco Use    Smoking status: Never    Smokeless tobacco: Never   Vaping Use    Vaping status: Never Used   Substance and Sexual Activity    Alcohol use: Yes     Comment: Rare    Drug use: No    Sexual activity: Yes     Partners: Female   Other Topics Concern     Service Not Asked    Blood Transfusions Not Asked    Caffeine Concern Not Asked    Occupational Exposure Yes     Comment: 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 No   Social History Narrative    Not on file     Social Drivers of Health     Financial Resource Strain: High Risk (4/25/2025)    Financial Resource Strain     Within the past 12 months, have you or your family members you live with been unable to get utilities (heat, electricity) when it was really needed?: Yes   Food Insecurity: Low Risk  (4/25/2025)    Food Insecurity     Within the past 12 months, did you worry that your food would run out before you got money to buy more?: No     Within the past 12 months, did the food you bought just not last and you didn t have money to get more?: No   Transportation Needs: Low Risk  (4/25/2025)    Transportation Needs     Within the past 12 months, has lack of transportation kept you from medical appointments, getting your medicines, non-medical meetings or appointments, work, or from getting things that you need?: No   Physical Activity: Not on file   Stress: Not on file   Social Connections: Not on file   Interpersonal Safety: Low Risk  (4/25/2025)    Interpersonal Safety     Do you feel physically and emotionally safe where you currently live?: Yes     Within the past 12 months, have you been hit, slapped, kicked or otherwise physically hurt by someone?: No     Within the past 12 months, have you been humiliated or emotionally abused in other ways by your partner or ex-partner?: No   Housing Stability: Low Risk  (4/25/2025)    Housing Stability     Do you have housing? : Yes     Are you worried about losing your housing?: No       Family Hx:  Family History   Problem Relation Age of Onset     Diabetes Mother     Heart Disease Mother     C.A.D. Mother     Cancer Father     Hypertension Father     Cerebrovascular Disease Paternal Grandfather     Asthma Sister     Breast Cancer No family hx of     Cancer - colorectal No family hx of     Prostate Cancer No family hx of

## 2025-05-01 NOTE — ANESTHESIA PROCEDURE NOTES
Central Line/PA Catheter Placement    Pre-Procedure   Staff -        Anesthesiologist:  Chris Kerns DO       Performed By: anesthesiologist       Location: OR       Pre-Anesthestic Checklist: patient identified, IV checked, site marked, risks and benefits discussed, informed consent, monitors and equipment checked, pre-op evaluation and at physician/surgeon's request  Timeout:       Correct Patient: Yes        Correct Procedure: Yes        Correct Site: Yes        Correct Position: Yes        Correct Laterality: Yes   Line Placement:   This line was placed Post Induction    Procedure   Procedure: central line       Laterality: right       Insertion Site: internal jugular, right.       Patient Position: Trendelenburg  Sterile Prep        All elements of maximal sterile barrier technique followed       Patient Prep/Sterile Barriers: draped, hand hygiene, gloves , hat , mask , draped, gown, sterile gel and probe cover       Skin prep: Chloraprep  Insertion/Injection        Technique: ultrasound guided and Seldinger Technique        1. Ultrasound was used to evaluate the access site.       2. Vein evaluated via ultrasound for patency/adequacy.       3. Using real-time ultrasound the needle/catheter was observed entering the artery/vein.       4. Permanent image was captured and entered into the patient's record.       5. The visualized structures were anatomically normal.       6. There were no apparent abnormal pathologic findings.       Introducer Type: 9 Fr, 2-lumen MAC        Type: Introducer  Narrative         Secured by: suture       Tegaderm and Biopatch dressing used.       blood aspirated from all lumens,        All lumens flushed: Yes   Comments:  The patient was induced and put under general anesthesia. Airway was secured as documented. The right neck was prepped with 2% chlorhexidine and the patient was draped with a full length sterile sheet in the usual fashion. The right internal jugular vein  was accessed with ultrasound guidance using an 18 gauge thin wall needle, a 1.75 inch catheter was advanced over the needle and the needle was removed. A wire was then advanced through the catheter. Once this was verified, the catheter was removed. After a small skin nick with scalpel, a 9 Turkmen introducer was advanced over the wire via the seldinger technique. Blood was withdrawn from all lumens and flushed with normal saline.  The catheter was sutured in place and a sterile dressing was applied over the site prior to removal of drapes. This was atraumatic and there were no complications.

## 2025-05-01 NOTE — ANESTHESIA PROCEDURE NOTES
Airway       Patient location during procedure: OR       Procedure Start/Stop Times: 5/1/2025 7:34 AM  Staff -        Anesthesiologist:  Chris Kerns DO       CRNA: Sanjay Espinal APRN CRNA       Performed By: CRNA  Consent for Airway        Urgency: elective  Indications and Patient Condition       Indications for airway management: rian-procedural       Induction type:intravenous       Mask difficulty assessment: 1 - vent by mask    Final Airway Details       Final airway type: endotracheal airway       Successful airway: ETT - single  Endotracheal Airway Details        ETT size (mm): 8.0       Cuffed: yes       Cuff volume (mL): 7       Successful intubation technique: direct laryngoscopy       DL Blade Type: Tolbert 2       Grade View of Cords: 1       Adjucts: stylet       Position: Center       Measured from: lips       Secured at (cm): 21    Post intubation assessment        Placement verified by: capnometry, equal breath sounds and chest rise        Number of attempts at approach: 1       Secured with: tape       Ease of procedure: easy       Dentition: Intact and Unchanged    Medication(s) Administered   Medication Administration Time: 5/1/2025 7:34 AM

## 2025-05-01 NOTE — PLAN OF CARE
"Goal Outcome Evaluation:      Plan of Care Reviewed With: patient    Overall Patient Progress: improvingOverall Patient Progress: improving    Outcome Evaluation: Patient to ICU at 1230. Weaned off of pressors. Weaned off temp pacer and intrinsic rythm SB/SR. Extubated at 1720 and up to chair at 1845. Chest tube and urine output adequte. 1L LR given for low CVP/high SVR. Oxy and dilaudid given for pain with relief. Family updated at bedside this evening.      Problem: Adult Inpatient Plan of Care  Goal: Patient-Specific Goal (Individualized)  Description: You can add care plan individualizations to a care plan. Examples of Individualization might be:  \"Parent requests to be called daily at 9am for status\", \"I have a hard time hearing out of my right ear\", or \"Do not touch me to wake me up as it startlesme\".  Outcome: Progressing     Problem: Adult Inpatient Plan of Care  Goal: Optimal Comfort and Wellbeing  Outcome: Progressing  Intervention: Monitor Pain and Promote Comfort  Recent Flowsheet Documentation  Taken 5/1/2025 1600 by Karely Olivas RN  Pain Management Interventions: medication (see MAR)  Taken 5/1/2025 1300 by Karely Olivas, RN  Pain Management Interventions:   rest   repositioned   relaxation techniques promoted  Intervention: Provide Person-Centered Care  Recent Flowsheet Documentation  Taken 5/1/2025 1600 by Karely Olivas, RN  Trust Relationship/Rapport:   care explained   choices provided   questions answered   questions encouraged   thoughts/feelings acknowledged  Taken 5/1/2025 1300 by Karely Olivas, RN  Trust Relationship/Rapport:   care explained   choices provided   questions answered   questions encouraged   thoughts/feelings acknowledged     "

## 2025-05-01 NOTE — PROGRESS NOTES
[facility]  Cardiovascular and Thoracic Surgery Daily Note      Assessment and Plan  Raul Wilhelm is a 79 year old male with a past medical history of HTN, HLD, GERD, Zenkers Diverticulum, COPD, Asthma, DMII, CVA, frequent MRSA abscesses, CAD with STEMI s/p REDD to LPDA, mid CX in 2015 and REDD to prox LCx and OM in January 2025 at [facility] and on Brilinta, with known residual LAD and D1 disease. Patient presented on 4/25/25 with chest pain in the center of his chest that radiated to his belly. He underwent TTE with severe hypokinesis of mid to distal aneroseptal and apical walls, EF 45-50% and coronary angiogram which revealed residual LMCA and proximal LAD disease. CV surgery consulted for consideration of surgical revascularization of LMCA disease and proximal LAD disease.      POD # 1 s/p CABG x2 (LIMA-LAD, V-Diagonal) on 5/1/25 with Dr. Michael Mulvihill    - CVS: Pre-op TTE with LVEF 45-50%, severe hypokinesis of the mid to distal anteroseptal and apical walls, preserved RV function.   HD stable overnight in NSR.***  Postop vasoplegic ***shock, ***.   Continue ASA ***mg daily, metoprolol tartrate ***, ***statin  Hypervolemic, ***  Chest tubes: ***. TPW: ***   PTA meds on hold: carvedilol, hyzaar, brilinta, atorvastatin    - Resp: Hx COPD and asthma. Postoperative mechanical ventilation, ***. Extubated ***. IS, pulmonary toilet. PTA inhalers resumed : spiriva, breztri aerosphere, albuterol***. PTA singulair resumed***.    - Neuro: Hx CVA. Neuros intact, pain controlled*** on current regimen    - Renal: ***No history of significant renal disease. Cr ***. Trend BMP. Volume management as above.  Recent Labs   Lab 05/01/25  1110 04/30/25  1219 04/27/25  0613   CR 0.87 0.97 1.00       - GI: ***BM, ***flatus, continue bowel regimen    - : ***Diaz    - Endo: DM II. Postop stress hyperglycemia, ***. Insulin infusion ***. PTA meds on hold: lantus 20 units at HS, ozempic  Hemoglobin A1C   Date  Value Ref Range Status   02/19/2025 7.1 (H) 0.0 - 5.6 % Final     Comment:     Normal <5.7%   Prediabetes 5.7-6.4%    Diabetes 6.5% or higher     Note: Adopted from ADA consensus guidelines.   04/09/2021 8.5 (H) 0 - 5.6 % Final     Comment:     Normal <5.7% Prediabetes 5.7-6.4%  Diabetes 6.5% or higher - adopted from ADA   consensus guidelines.          - FEN: Replace electrolytes as needed. ***ADAT.     - ID: Postop leukocytosis, ***. Afebrile ***. Periop abx prophylaxis ***. Trend CBC and fever curve.   Recent Labs   Lab 05/01/25  1110 05/01/25  0554 04/30/25  1219   WBC 21.4* 9.2 9.1       - Heme: Acute blood loss anemia and ***thrombocytopenia due to surgery. Hgb and PLT ***. Trend CBC, transfuse PRN.   Recent Labs   Lab 05/01/25  1111 05/01/25  1110 05/01/25  1031 05/01/25  0800 05/01/25  0554 04/30/25  1219   HGB 9.8* 9.6* 9.3*   < > 14.3 14.1   PLT  --  104*  --   --  213 220    < > = values in this interval not displayed.       - Proph: ***SCD, subcutaneous heparin, PPI    - Other:  Clinically Significant Risk Factors   { TIP  Diagnoses will continue to appear throughout the admission.  :[id number]}       # Hyperchloremia: Highest Cl = 109 mmol/L in last 2 days, will monitor as appropriate      # Hypocalcemia: Lowest iCa = 3.7 mg/dL in last 2 days, will monitor and replace as appropriate      # Coagulation Defect: INR = 1.22 (Ref range: 0.85 - 1.15) and/or PTT = 36 Seconds (Ref range: 22 - 38 Seconds), will monitor for bleeding  # Thrombocytopenia: Lowest platelets = 104 in last 2 days, will monitor for bleeding   # Hypertension: Noted on problem list           # DMII: A1C = 7.1 % (Ref range: 0.0 - 5.6 %) within past 6 months       # Financial/Environmental Concerns: none  # COPD: noted on problem list  # History of CABG: noted on surgical history       - Dispo: ***. Therapies recommending discharge to *** when medically ready. Medically Ready for Discharge: {TIME; MEDICALLY READY FOR DISCHARGE:50821519}  ,  dispo pending ***.        Interval History  No acute events overnight. States pain is well managed on current regimen, slept ***well. Breathing is stable on ***, working with IS. Tolerating diet, is passing flatus, ***BM, no n/v. Ambulating in the halls with assistance. Denies chest pain, palpitations, dizziness, syncopal symptoms, chills, myalgias, sternal popping/clicking.    Medications  Current Facility-Administered Medications   Medication Dose Route Frequency Provider Last Rate Last Admin    acetaminophen (TYLENOL) tablet 975 mg  975 mg Oral or Feeding Tube Q8H Leela Toure PA-C        aspirin (ASA) chewable tablet 162 mg  162 mg Oral or NG Tube Daily Leela Toure PA-C        ceFAZolin (ANCEF) 1 g vial to attach to  ml bag for ADULT or 50 ml bag for PEDS  1 g Intravenous Q8H Leela Toure PA-C        chlorhexidine (PERIDEX) 0.12 % solution 15 mL  15 mL Mouth/Throat Q12H Chano Juarez MD        [START ON 5/2/2025] heparin ANTICOAGULANT injection 5,000 Units  5,000 Units Subcutaneous Q8H Leela Toure PA-C        [START ON 5/2/2025] pantoprazole (PROTONIX) 2 mg/mL suspension 40 mg  40 mg Oral or NG Tube Daily Leela Toure PA-C        Or    [START ON 5/2/2025] pantoprazole (PROTONIX) EC tablet 40 mg  40 mg Oral Daily Leela Toure PA-C        [START ON 5/2/2025] pantoprazole (PROTONIX) IV push injection 40 mg  40 mg Intravenous QAM AC Chano Juarez MD        [START ON 5/2/2025] polyethylene glycol (MIRALAX) Packet 17 g  17 g Oral or Feeding Tube Daily Leela Toure PA-C        senna-docusate (SENOKOT-S/PERICOLACE) 8.6-50 MG per tablet 1 tablet  1 tablet Oral or Feeding Tube BID Leela Toure PA-C        sodium chloride (PF) 0.9% PF flush 3 mL  3 mL Intracatheter Q8H ZHANG Leela Toure PA-C   3 mL at 05/01/25 1313     Current Facility-Administered Medications   Medication Dose Route Frequency Provider Last Rate Last Admin    [START ON 5/4/2025] bisacodyl  (DULCOLAX) suppository 10 mg  10 mg Rectal Daily PRN Leela Toure PA-C        calcium carbonate (TUMS) chewable tablet 500 mg  500 mg Oral 4x Daily PRN Leela Toure PA-C        calcium gluconate 1 g in 50 mL in sodium chloride intermittent infusion  1 g Intravenous Q6H PRN Leela Toure PA-C        calcium gluconate 2 g in  mL intermittent infusion  2 g Intravenous Q6H PRN Leela Toure PA-C        calcium gluconate 3 g in sodium chloride 0.9 % 100 mL intermittent infusion  3 g Intravenous Q6H PRN Leela Toure PA-C        glucose gel 15-30 g  15-30 g Oral Q15 Min PRN Leela Toure PA-C        Or    dextrose 50 % injection 25-50 mL  25-50 mL Intravenous Q15 Min PRN Leela Toure PA-C        Or    glucagon injection 1 mg  1 mg Subcutaneous Q15 Min PRN Leela Toure PA-C        EPINEPHrine (ADRENALIN) 5 mg in  mL infusion  0.01-0.1 mcg/kg/min Intravenous Continuous PRN Leela Toure PA-C 2 mL/hr at 05/01/25 1335 0.01 mcg/kg/min at 05/01/25 1335    furosemide (LASIX) injection 20 mg  20 mg Intravenous Once PRN Leela Toure PA-C        hydrALAZINE (APRESOLINE) injection 10 mg  10 mg Intravenous Q30 Min PRN Leela Toure PA-C        HYDROmorphone (DILAUDID) injection 0.2 mg  0.2 mg Intravenous Q2H PRN Leela Toure PA-C        Or    HYDROmorphone (DILAUDID) injection 0.4 mg  0.4 mg Intravenous Q2H PRN Leela Toure PA-C        lidocaine (LMX4) cream   Topical Q1H PRN Leela Toure PA-C        lidocaine 1 % 0.1-1 mL  0.1-1 mL Other Q1H PRN Leela Toure PA-C        [START ON 5/3/2025] magnesium hydroxide (MILK OF MAGNESIA) suspension 30 mL  30 mL Oral Daily PRN Leela Toure PA-C        methocarbamol (ROBAXIN) half-tab 250 mg  250 mg Oral Q6H PRN Leela Toure PA-C        naloxone (NARCAN) injection 0.2 mg  0.2 mg Intravenous Q2 Min PRN Aureliano Rodriguez MD        Or    naloxone (NARCAN) injection 0.4 mg  0.4 mg  "Intravenous Q2 Min PRN Aureliano Rodriguez MD        Or    naloxone (NARCAN) injection 0.2 mg  0.2 mg Intramuscular Q2 Min PRN Aureliano Rodriguez MD        Or    naloxone (NARCAN) injection 0.4 mg  0.4 mg Intramuscular Q2 Min PRN Aureliano Rodriguez MD        norepinephrine (LEVOPHED) 4 mg in  mL infusion PREMIX  0.01-0.15 mcg/kg/min Intravenous Continuous PRN Leela Toure PA-C   Stopped at 05/01/25 1316    ondansetron (ZOFRAN ODT) ODT tab 4 mg  4 mg Oral Q6H PRN Leela Toure PA-C        Or    ondansetron (ZOFRAN) injection 4 mg  4 mg Intravenous Q6H PRN Leela Toure PA-C        oxyCODONE (ROXICODONE) tablet 5 mg  5 mg Oral Q4H PRN Leela Toure PA-C        Or    oxyCODONE (ROXICODONE) tablet 10 mg  10 mg Oral Q4H PRN Leela Toure PA-C        prochlorperazine (COMPAZINE) injection 5 mg  5 mg Intravenous Q6H PRN Leela Toure PA-C        Or    prochlorperazine (COMPAZINE) tablet 5 mg  5 mg Oral Q6H PRN Leela Toure PA-C        Reason beta blocker order not selected   Does not apply DOES NOT GO TO Leela Bowman PA-C        sodium chloride (PF) 0.9% PF flush 3 mL  3 mL Intracatheter q1 min prn Leela Toure PA-C        vasopressin 0.2 units/mL in NS (PITRESSIN) standard conc infusion  0.5-4 Units/hr Intravenous Continuous PRN Leela Toure PA-C             Physical Exam  Vitals were reviewed  Blood pressure 101/66, pulse 80, temperature 96.8  F (36  C), resp. rate 16, height 1.74 m (5' 8.5\"), weight 65.9 kg (145 lb 4.5 oz), SpO2 100%.  Rhythm: ***NSR    Lungs: ***diminished throughout on ***NC    Cardiovascular: ***Distant, S1, S2, RRR, no m/r/g    Abdomen: ***soft, NT, ND, +BS    Extremeties: ***warm, no LE edema    Incision: ***CDI    CT: ***serosang output ***, no air leak    Weight:   Vitals:    04/25/25 0806 04/26/25 0700   Weight: 67.8 kg (149 lb 6.4 oz) 65.9 kg (145 lb 4.5 oz)         Data  Recent Labs   Lab 05/01/25  1305 05/01/25  1258 " 05/01/25  1111 05/01/25  1110 05/01/25  1031 05/01/25  0800 05/01/25  0554 04/30/25  1618 04/30/25  1219 04/27/25  1144 04/27/25  0613 04/25/25  0822 04/25/25  0124   WBC  --   --   --  21.4*  --   --  9.2  --  9.1   < > 8.8   < > 12.3*   HGB  --   --  9.8* 9.6* 9.3*   < > 14.3  --  14.1   < > 14.0   < > 15.0   MCV  --   --   --  91  --   --  91  --  91   < > 90   < > 92   PLT  --   --   --  104*  --   --  213  --  220   < > 193   < > 236   INR  --  1.22*  --  1.52*  --   --   --   --   --   --   --   --   --    NA  --   --  139 140 138   < >  --   --  138  --  138  --  141   POTASSIUM  --   --  4.1 4.1 4.6   < >  --   --  3.8  --  4.0  --  4.8   CHLORIDE  --   --   --  109*  --   --   --   --  104  --  104  --  104   CO2  --   --   --  22  --   --   --   --  22  --  23  --  28   BUN  --   --   --  14.8  --   --   --   --  17.4  --  19.5  --  21.7   CR  --   --   --  0.87  --   --   --   --  0.97  --  1.00  --  0.92   ANIONGAP  --   --   --  9  --   --   --   --  12  --  11  --  9   JOSE RAFAEL  --   --   --  8.8  --   --   --   --  9.1  --  9.0  --  9.4   *  --  143* 152* 162*   < >  --    < > 118*   < > 159*   < > 261*   ALBUMIN  --   --   --   --   --   --   --   --   --   --   --   --  4.0   PROTTOTAL  --   --   --   --   --   --   --   --   --   --   --   --  6.4   BILITOTAL  --   --   --   --   --   --   --   --   --   --   --   --  0.8   ALKPHOS  --   --   --   --   --   --   --   --   --   --   --   --  140   ALT  --   --   --   --   --   --   --   --   --   --   --   --  39   AST  --   --   --   --   --   --   --   --   --   --   --   --  23    < > = values in this interval not displayed.       Imaging:  Recent Results (from the past 24 hours)   XR Chest Port 1 View    Narrative    EXAM: XR CHEST PORT 1 VIEW  LOCATION: [facility]  DATE: 5/1/2025    INDICATION: Post Op CVTS Surgery.  COMPARISON: Chest x-ray 4/25/2025.      Impression    IMPRESSION: ET tube is 3.7 cm proximal to the  serina. Right neck central venous line tip at the SVC. Nasogastric tube at least in the stomach. Left chest base chest tube identified. Suggestion of trace left lateral pneumothorax with a thickness of   approximately 2 mm. Mild left base atelectasis. No new airspace disease identified. Normal cardiac silhouette. Sternotomy and hypoinflated lungs.         Patient seen and discussed with ***    OSCAR Lopez, ACNASH-AG, CCRN  Nurse Practitioner  Cardiothoracic Surgery  Pager: [phone]    CV Surgery Rounding Pager: [phone]  After hours please page surgeon on-call

## 2025-05-01 NOTE — OP NOTE
Date of Surgery: May 1, 2025    Preop Diagnosis: NSTEMI in the setting of severe mvCAD  Postop Diagnosis: same    Surgeon: Michael S. Mulvihill, M.D.  Assistant(s): Halina Toure PA-C.    A first assistant actively participated and was necessary for one or more of the following: opening, exposure and visualization during the case, maintaining hemostasis, wound closure resulting in its safe and expeditious completion.     Procedures:  1. CABG x2 (LIMA-LAD, V-Diagonal)  2. Endoscopic Vein Pengilly  3. Epiaortic Echo    Anesthesia: GETA  Approach: Median sternotomy  Drains: 1 left pleural 28 fr, 2x mediastinal 32 fr  Pacing Wires: atrial and ventricular    Findings of Procedure:  severe CAD  CYDNEY was deferred on account of GI pathology    Estimated Blood Loss: 200 ml  Blood Products Administered: 1 PLT    Disposition: ICU  Specimens: none  Indication: mvCAD    GRAFT MATRIX:  Distal: Graft Source: Target Quality: Conduit Quality: Distal Suture: Proximal Suture:  LAD   CROW  Good  Good  7-0  N/A  Diagonal RSVG  Good  Good  7-0  6-0    Operative Indications / Brief History of Present Illness:     Raul Wilhelm is a 79 year old male with a past medical history of HTN, HLD, GERD, Zenkers Diverticulum, COPD, Asthma, DMII, CVA, frequent MRSA abscesses, CAD with STEMI s/p REDD to LPDA, mid CX in 2015 and REDD to prox LCx and OM in January 2025 at [facility] and on [facility], with known residual LAD and D1 disease. Patient presented on 4/25/25 with chest pain in the center of his chest that radiated to his belly. He underwent TTE with severe hypokinesis of mid to distal aneroseptal and apical walls, EF 45-50% and coronary angiogram which revealed residual LMCA and proximal LAD disease. CV surgery consulted for consideration of surgical revascularization of LMCA disease and proximal LAD disease.      Procedure in Detail:  The risks, benefits, complications, treatment options, and expected outcomes were discussed with the patient. The  possibilities of reaction to medication, pulmonary aspiration, perforation of viscus, bleeding, recurrent infection, the need for additional procedures, failure to diagnose a condition, and creating a complication requiring transfusion or operation were discussed with the patient. The patient concurred with the proposed plan, giving informed consent. The patient was taken to the operating room, identified as Raul Wilhelm and the procedure verified as Coronary Artery Bypass Grafting. A Time Out was held and the above information confirmed.    Standard monitoring lines and Diaz catheter were placed. General anesthesia was induced. The patient was prepped and draped in a sterile fashion.     An endoscopic vein harvesting was carried out by Halina Toure PA-C.     A standard median sternotomy was performed. The left internal mammary artery was taken down using cautery and clips. Intravenous heparin was given at the appropriate dose to obtain anticoagulation sufficient for CPB. The LIMA was then transected distally. The flow was excellent.    The pericardium was then opened and the pericardial well was created.     Epiaortic echo was then completed. Images of the site for cannulation, cross clamp application and proximal anastomoses were recorded and personally interpreted by me. There was no significant plaque in any of these sites.    Concentric purse strings were placed and the aorta was cannulated with a 18fr EOPA cannula. After adequate de-airing this cannula was connected to the CPB circuit. A purse string was placed around the right atrial appendage and it was cannulated with a dual stage venous cannula.     Prior to initiating CPB the LIMA was fashioned to the appropriate length.    Cardiopulmonary bypass was then initiated.     The distal targets (LAD, diagonal) were then evaluated and marked.    An antegrade vent/cardioplegia cannula was then placed. The aortic cross clamp was then applied and the heart was  arrested with 1200 cc antegrade [place] cardioplegia.     The distal anastomosis to the diagonal was completed first. After positioning the heart, the artery was opened and RSV was ananstomosed to the artery in an end-to-side fashion using 7-0 prolene in a running fashion. Prior to completing the anastomosis it was probed proximally and distally to ensure patency.    Next the LIMA to LAD anastomosis was completed in a similar an end-to-side fashion with 7-0 prolene. Prior to completing the anastomosis it was probed to ensure patency.    The proximal anastomoses were then completed in the standard fashion using 6-0 prolene in a running fashion.     After adequate deairing maneuvers, the cross clamp was removed and the heart was reperfused.    Examination of all surgical sites revealed good hemostasis. Atrial and ventricular epicardial pacing wires were then placed. The patient was fully ventilated and successfully weaned off of CPB. After a test dose of protamine, the patient was fully decannulated. The heparin was fully reversed with protamine. Again, examination of all surgical sites were evaluated for hemostasis which was good.    Doppler flow assessment was performed to ascertain good flow through each of the conduits.    Left pleural as well as mediastinal drains were placed.   The sternum was closed using stainless steel wires.   The dermal and subcutaneous tissues were closed in layers and the skin was sewn closed with a subcuticular stitch.    At the end of the operation, all sponge, instruments, and needle counts were correct.    The patient tolerated the procedure without complication and was transported to the CTICU in stable condition.    I was personally present and scrubbed for the entire procedure.     Michael Mulvihill, MD  5/1/2025 @ 1:06 PM

## 2025-05-01 NOTE — ANESTHESIA POSTPROCEDURE EVALUATION
Patient: Raul Wilhelm    Procedure: Procedure(s):  MEDIAN STERNOTOMY; CORONARY ARTERY BYPASS GRAFT x 2 (LEFT INTERNAL MAMMARY ARTERY - LEFT ANTERIOR DESCENDING ARTERY; SAPHENOUS VEIN - DIAGONAL ARTERY) WITH ENDOSCOPIC SAPHENOUS VEIN HARVEST ON THE LEFT LOWER EXTREMITY, AND ON CARDIOPULMONARY PUMP OXYGENATOR       Anesthesia Type:  General    Note:  Disposition: ICU            ICU Sign Out: Anesthesiologist/ICU physician sign out WAS performed   Postop Pain Control: Uneventful            Sign Out: Patient intubated and sedated.   PONV: No   Neuro/Psych: Uneventful            Sign Out: PLANNED postop sedation   Airway/Respiratory: Uneventful            Sign Out: AIRWAY IN SITU/Resp. Support               Airway in situ/Resp. Support: ETT                 Reason: Planned Pre-op   CV/Hemodynamics: Uneventful            Sign Out: Acceptable CV status; No obvious hypovolemia; No obvious fluid overload   Other NRE: NONE   DID A NON-ROUTINE EVENT OCCUR? No    Event details/Postop Comments:  Stable transport from OR 14 to .             Last vitals:  Vitals:    05/01/25 1530 05/01/25 1545 05/01/25 1600   BP:   114/60   Pulse: 60 54 55   Resp:      Temp: 36.4  C (97.5  F) 36.5  C (97.7  F) 36.5  C (97.7  F)   SpO2: 100% 100% 100%       Electronically Signed By: Chris Kerns DO  May 1, 2025  4:05 PM

## 2025-05-02 ENCOUNTER — APPOINTMENT (OUTPATIENT)
Dept: GENERAL RADIOLOGY | Facility: CLINIC | Age: 80
DRG: 233 | End: 2025-05-02
Payer: COMMERCIAL

## 2025-05-02 ENCOUNTER — APPOINTMENT (OUTPATIENT)
Dept: PHYSICAL THERAPY | Facility: CLINIC | Age: 80
DRG: 233 | End: 2025-05-02
Payer: COMMERCIAL

## 2025-05-02 VITALS
RESPIRATION RATE: 14 BRPM | DIASTOLIC BLOOD PRESSURE: 64 MMHG | TEMPERATURE: 98.8 F | OXYGEN SATURATION: 93 % | HEIGHT: 69 IN | HEART RATE: 89 BPM | WEIGHT: 145.28 LBS | BODY MASS INDEX: 21.52 KG/M2 | SYSTOLIC BLOOD PRESSURE: 110 MMHG

## 2025-05-02 LAB
ALBUMIN SERPL BCG-MCNC: 3.3 G/DL (ref 3.5–5.2)
ALP SERPL-CCNC: 60 U/L (ref 40–150)
ALT SERPL W P-5'-P-CCNC: 35 U/L (ref 0–70)
ANION GAP SERPL CALCULATED.3IONS-SCNC: 14 MMOL/L (ref 7–15)
AST SERPL W P-5'-P-CCNC: 47 U/L (ref 0–45)
ATRIAL RATE - MUSE: 106 BPM
ATRIAL RATE - MUSE: 51 BPM
BASOPHILS # BLD AUTO: 0.1 10E3/UL (ref 0–0.2)
BASOPHILS NFR BLD AUTO: 0 %
BILIRUB DIRECT SERPL-MCNC: 0.28 MG/DL (ref 0–0.3)
BILIRUB SERPL-MCNC: 0.9 MG/DL
BUN SERPL-MCNC: 15 MG/DL (ref 8–23)
CA-I BLD-MCNC: 4.5 MG/DL (ref 4.4–5.2)
CALCIUM SERPL-MCNC: 8 MG/DL (ref 8.8–10.4)
CHLORIDE SERPL-SCNC: 103 MMOL/L (ref 98–107)
CREAT SERPL-MCNC: 0.88 MG/DL (ref 0.67–1.17)
DIASTOLIC BLOOD PRESSURE - MUSE: NORMAL MMHG
DIASTOLIC BLOOD PRESSURE - MUSE: NORMAL MMHG
EGFRCR SERPLBLD CKD-EPI 2021: 87 ML/MIN/1.73M2
EOSINOPHIL # BLD AUTO: 0 10E3/UL (ref 0–0.7)
EOSINOPHIL NFR BLD AUTO: 0 %
ERYTHROCYTE [DISTWIDTH] IN BLOOD BY AUTOMATED COUNT: 13 % (ref 10–15)
GLUCOSE BLDC GLUCOMTR-MCNC: 113 MG/DL (ref 70–99)
GLUCOSE BLDC GLUCOMTR-MCNC: 127 MG/DL (ref 70–99)
GLUCOSE BLDC GLUCOMTR-MCNC: 129 MG/DL (ref 70–99)
GLUCOSE BLDC GLUCOMTR-MCNC: 156 MG/DL (ref 70–99)
GLUCOSE BLDC GLUCOMTR-MCNC: 159 MG/DL (ref 70–99)
GLUCOSE BLDC GLUCOMTR-MCNC: 174 MG/DL (ref 70–99)
GLUCOSE BLDC GLUCOMTR-MCNC: 190 MG/DL (ref 70–99)
GLUCOSE SERPL-MCNC: 132 MG/DL (ref 70–99)
HCO3 SERPL-SCNC: 20 MMOL/L (ref 22–29)
HCT VFR BLD AUTO: 34.8 % (ref 40–53)
HGB BLD-MCNC: 11.4 G/DL (ref 13.3–17.7)
IMM GRANULOCYTES # BLD: 0.1 10E3/UL
IMM GRANULOCYTES NFR BLD: 1 %
INTERPRETATION ECG - MUSE: NORMAL
INTERPRETATION ECG - MUSE: NORMAL
LYMPHOCYTES # BLD AUTO: 0.8 10E3/UL (ref 0.8–5.3)
LYMPHOCYTES NFR BLD AUTO: 5 %
MAGNESIUM SERPL-MCNC: 2.1 MG/DL (ref 1.7–2.3)
MCH RBC QN AUTO: 30.2 PG (ref 26.5–33)
MCHC RBC AUTO-ENTMCNC: 32.8 G/DL (ref 31.5–36.5)
MCV RBC AUTO: 92 FL (ref 78–100)
MONOCYTES # BLD AUTO: 1 10E3/UL (ref 0–1.3)
MONOCYTES NFR BLD AUTO: 7 %
NEUTROPHILS # BLD AUTO: 13.1 10E3/UL (ref 1.6–8.3)
NEUTROPHILS NFR BLD AUTO: 88 %
NRBC # BLD AUTO: 0 10E3/UL
NRBC BLD AUTO-RTO: 0 /100
P AXIS - MUSE: 42 DEGREES
P AXIS - MUSE: 65 DEGREES
PHOSPHATE SERPL-MCNC: 3.4 MG/DL (ref 2.5–4.5)
PLATELET # BLD AUTO: 185 10E3/UL (ref 150–450)
POTASSIUM SERPL-SCNC: 4 MMOL/L (ref 3.4–5.3)
PR INTERVAL - MUSE: 168 MS
PR INTERVAL - MUSE: 168 MS
PROT SERPL-MCNC: 5 G/DL (ref 6.4–8.3)
QRS DURATION - MUSE: 82 MS
QRS DURATION - MUSE: 86 MS
QT - MUSE: 338 MS
QT - MUSE: 444 MS
QTC - MUSE: 409 MS
QTC - MUSE: 448 MS
R AXIS - MUSE: 58 DEGREES
R AXIS - MUSE: 64 DEGREES
RBC # BLD AUTO: 3.78 10E6/UL (ref 4.4–5.9)
SODIUM SERPL-SCNC: 137 MMOL/L (ref 135–145)
SYSTOLIC BLOOD PRESSURE - MUSE: NORMAL MMHG
SYSTOLIC BLOOD PRESSURE - MUSE: NORMAL MMHG
T AXIS - MUSE: 59 DEGREES
T AXIS - MUSE: 69 DEGREES
VENTRICULAR RATE- MUSE: 106 BPM
VENTRICULAR RATE- MUSE: 51 BPM
WBC # BLD AUTO: 15 10E3/UL (ref 4–11)

## 2025-05-02 PROCEDURE — C9460 INJECTION, CANGRELOR: HCPCS | Performed by: NURSE PRACTITIONER

## 2025-05-02 PROCEDURE — 250N000011 HC RX IP 250 OP 636: Performed by: NURSE PRACTITIONER

## 2025-05-02 PROCEDURE — 250N000012 HC RX MED GY IP 250 OP 636 PS 637: Performed by: INTERNAL MEDICINE

## 2025-05-02 PROCEDURE — 250N000011 HC RX IP 250 OP 636

## 2025-05-02 PROCEDURE — 85025 COMPLETE CBC W/AUTO DIFF WBC: CPT | Performed by: INTERNAL MEDICINE

## 2025-05-02 PROCEDURE — 82248 BILIRUBIN DIRECT: CPT | Performed by: INTERNAL MEDICINE

## 2025-05-02 PROCEDURE — 82330 ASSAY OF CALCIUM: CPT

## 2025-05-02 PROCEDURE — 84450 TRANSFERASE (AST) (SGOT): CPT

## 2025-05-02 PROCEDURE — 250N000013 HC RX MED GY IP 250 OP 250 PS 637

## 2025-05-02 PROCEDURE — 250N000012 HC RX MED GY IP 250 OP 636 PS 637: Performed by: NURSE PRACTITIONER

## 2025-05-02 PROCEDURE — 93005 ELECTROCARDIOGRAM TRACING: CPT

## 2025-05-02 PROCEDURE — 250N000013 HC RX MED GY IP 250 OP 250 PS 637: Performed by: NURSE PRACTITIONER

## 2025-05-02 PROCEDURE — 250N000013 HC RX MED GY IP 250 OP 250 PS 637: Performed by: STUDENT IN AN ORGANIZED HEALTH CARE EDUCATION/TRAINING PROGRAM

## 2025-05-02 PROCEDURE — 120N000013 HC R&B IMCU

## 2025-05-02 PROCEDURE — 99233 SBSQ HOSP IP/OBS HIGH 50: CPT | Mod: 24 | Performed by: INTERNAL MEDICINE

## 2025-05-02 PROCEDURE — 99233 SBSQ HOSP IP/OBS HIGH 50: CPT | Performed by: INTERNAL MEDICINE

## 2025-05-02 PROCEDURE — 97161 PT EVAL LOW COMPLEX 20 MIN: CPT | Mod: GP

## 2025-05-02 PROCEDURE — 83735 ASSAY OF MAGNESIUM: CPT

## 2025-05-02 PROCEDURE — 97530 THERAPEUTIC ACTIVITIES: CPT | Mod: GP

## 2025-05-02 PROCEDURE — 258N000003 HC RX IP 258 OP 636: Performed by: NURSE PRACTITIONER

## 2025-05-02 PROCEDURE — 84100 ASSAY OF PHOSPHORUS: CPT

## 2025-05-02 PROCEDURE — 71045 X-RAY EXAM CHEST 1 VIEW: CPT

## 2025-05-02 RX ORDER — FLUTICASONE FUROATE AND VILANTEROL 100; 25 UG/1; UG/1
1 POWDER RESPIRATORY (INHALATION) DAILY
Status: DISCONTINUED | OUTPATIENT
Start: 2025-05-02 | End: 2025-05-07 | Stop reason: HOSPADM

## 2025-05-02 RX ORDER — ATORVASTATIN CALCIUM 80 MG/1
80 TABLET, FILM COATED ORAL AT BEDTIME
Status: DISCONTINUED | OUTPATIENT
Start: 2025-05-02 | End: 2025-05-07 | Stop reason: HOSPADM

## 2025-05-02 RX ORDER — MONTELUKAST SODIUM 10 MG/1
10 TABLET ORAL AT BEDTIME
Status: DISCONTINUED | OUTPATIENT
Start: 2025-05-02 | End: 2025-05-07 | Stop reason: HOSPADM

## 2025-05-02 RX ORDER — IPRATROPIUM BROMIDE AND ALBUTEROL SULFATE 2.5; .5 MG/3ML; MG/3ML
1 SOLUTION RESPIRATORY (INHALATION) EVERY 6 HOURS PRN
Status: DISCONTINUED | OUTPATIENT
Start: 2025-05-02 | End: 2025-05-07 | Stop reason: HOSPADM

## 2025-05-02 RX ORDER — POLYETHYLENE GLYCOL 3350 17 G/17G
17 POWDER, FOR SOLUTION ORAL 2 TIMES DAILY
Status: DISCONTINUED | OUTPATIENT
Start: 2025-05-02 | End: 2025-05-07 | Stop reason: HOSPADM

## 2025-05-02 RX ORDER — DEXTROSE MONOHYDRATE 25 G/50ML
25-50 INJECTION, SOLUTION INTRAVENOUS
Status: DISCONTINUED | OUTPATIENT
Start: 2025-05-02 | End: 2025-05-02

## 2025-05-02 RX ORDER — NICOTINE POLACRILEX 4 MG
15-30 LOZENGE BUCCAL
Status: DISCONTINUED | OUTPATIENT
Start: 2025-05-02 | End: 2025-05-02

## 2025-05-02 RX ADMIN — HYDRALAZINE HYDROCHLORIDE 10 MG: 20 INJECTION INTRAMUSCULAR; INTRAVENOUS at 01:56

## 2025-05-02 RX ADMIN — INSULIN ASPART 2 UNITS: 100 INJECTION, SOLUTION INTRAVENOUS; SUBCUTANEOUS at 13:03

## 2025-05-02 RX ADMIN — INSULIN ASPART 1 UNITS: 100 INJECTION, SOLUTION INTRAVENOUS; SUBCUTANEOUS at 17:42

## 2025-05-02 RX ADMIN — HYDROMORPHONE HYDROCHLORIDE 0.2 MG: 0.2 INJECTION, SOLUTION INTRAMUSCULAR; INTRAVENOUS; SUBCUTANEOUS at 01:03

## 2025-05-02 RX ADMIN — SENNOSIDES AND DOCUSATE SODIUM 1 TABLET: 50; 8.6 TABLET ORAL at 08:03

## 2025-05-02 RX ADMIN — METHOCARBAMOL 250 MG: 500 TABLET ORAL at 00:40

## 2025-05-02 RX ADMIN — POLYETHYLENE GLYCOL 3350 17 G: 17 POWDER, FOR SOLUTION ORAL at 21:00

## 2025-05-02 RX ADMIN — HYDRALAZINE HYDROCHLORIDE 10 MG: 20 INJECTION INTRAMUSCULAR; INTRAVENOUS at 05:33

## 2025-05-02 RX ADMIN — ASPIRIN 81 MG CHEWABLE TABLET 162 MG: 81 TABLET CHEWABLE at 08:03

## 2025-05-02 RX ADMIN — METOPROLOL TARTRATE 12.5 MG: 25 TABLET, FILM COATED ORAL at 21:00

## 2025-05-02 RX ADMIN — OXYCODONE 10 MG: 5 TABLET ORAL at 04:43

## 2025-05-02 RX ADMIN — HYDRALAZINE HYDROCHLORIDE 10 MG: 20 INJECTION INTRAMUSCULAR; INTRAVENOUS at 00:59

## 2025-05-02 RX ADMIN — UMECLIDINIUM 1 PUFF: 62.5 AEROSOL, POWDER ORAL at 12:20

## 2025-05-02 RX ADMIN — HEPARIN SODIUM 5000 UNITS: 5000 INJECTION, SOLUTION INTRAVENOUS; SUBCUTANEOUS at 12:20

## 2025-05-02 RX ADMIN — CEFAZOLIN 1 G: 1 INJECTION, POWDER, FOR SOLUTION INTRAMUSCULAR; INTRAVENOUS at 12:59

## 2025-05-02 RX ADMIN — HYDROMORPHONE HYDROCHLORIDE 0.2 MG: 0.2 INJECTION, SOLUTION INTRAMUSCULAR; INTRAVENOUS; SUBCUTANEOUS at 08:03

## 2025-05-02 RX ADMIN — METHOCARBAMOL 250 MG: 500 TABLET ORAL at 17:36

## 2025-05-02 RX ADMIN — HYDRALAZINE HYDROCHLORIDE 10 MG: 20 INJECTION INTRAMUSCULAR; INTRAVENOUS at 04:10

## 2025-05-02 RX ADMIN — METOPROLOL TARTRATE 12.5 MG: 25 TABLET, FILM COATED ORAL at 12:20

## 2025-05-02 RX ADMIN — ATORVASTATIN CALCIUM 80 MG: 80 TABLET, FILM COATED ORAL at 21:07

## 2025-05-02 RX ADMIN — HEPARIN SODIUM 5000 UNITS: 5000 INJECTION, SOLUTION INTRAVENOUS; SUBCUTANEOUS at 21:00

## 2025-05-02 RX ADMIN — PANTOPRAZOLE SODIUM 40 MG: 40 TABLET, DELAYED RELEASE ORAL at 08:03

## 2025-05-02 RX ADMIN — CANGRELOR 0.75 MCG/KG/MIN: 50 INJECTION, POWDER, LYOPHILIZED, FOR SOLUTION INTRAVENOUS at 09:16

## 2025-05-02 RX ADMIN — ACETAMINOPHEN 975 MG: 325 TABLET, FILM COATED ORAL at 04:44

## 2025-05-02 RX ADMIN — SENNOSIDES AND DOCUSATE SODIUM 1 TABLET: 50; 8.6 TABLET ORAL at 21:00

## 2025-05-02 RX ADMIN — INSULIN GLARGINE 10 UNITS: 100 INJECTION, SOLUTION SUBCUTANEOUS at 23:12

## 2025-05-02 RX ADMIN — CEFAZOLIN 1 G: 1 INJECTION, POWDER, FOR SOLUTION INTRAMUSCULAR; INTRAVENOUS at 03:21

## 2025-05-02 RX ADMIN — METHOCARBAMOL 250 MG: 500 TABLET ORAL at 08:03

## 2025-05-02 RX ADMIN — HYDROMORPHONE HYDROCHLORIDE 0.2 MG: 0.2 INJECTION, SOLUTION INTRAMUSCULAR; INTRAVENOUS; SUBCUTANEOUS at 01:39

## 2025-05-02 RX ADMIN — ACETAMINOPHEN 975 MG: 325 TABLET, FILM COATED ORAL at 14:53

## 2025-05-02 RX ADMIN — INSULIN HUMAN 5 UNITS: 100 INJECTION, SUSPENSION SUBCUTANEOUS at 14:53

## 2025-05-02 RX ADMIN — POLYETHYLENE GLYCOL 3350 17 G: 17 POWDER, FOR SOLUTION ORAL at 08:03

## 2025-05-02 RX ADMIN — OXYCODONE 5 MG: 5 TABLET ORAL at 00:40

## 2025-05-02 RX ADMIN — HYDRALAZINE HYDROCHLORIDE 10 MG: 20 INJECTION INTRAMUSCULAR; INTRAVENOUS at 00:13

## 2025-05-02 RX ADMIN — MONTELUKAST 10 MG: 10 TABLET, FILM COATED ORAL at 21:01

## 2025-05-02 RX ADMIN — ACETAMINOPHEN 975 MG: 325 TABLET, FILM COATED ORAL at 21:00

## 2025-05-02 RX ADMIN — OXYCODONE 10 MG: 5 TABLET ORAL at 15:57

## 2025-05-02 ASSESSMENT — ACTIVITIES OF DAILY LIVING (ADL)
ADLS_ACUITY_SCORE: 48
ADLS_ACUITY_SCORE: 49
ADLS_ACUITY_SCORE: 48
ADLS_ACUITY_SCORE: 49
ADLS_ACUITY_SCORE: 48
ADLS_ACUITY_SCORE: 49
ADLS_ACUITY_SCORE: 48
ADLS_ACUITY_SCORE: 49
ADLS_ACUITY_SCORE: 48

## 2025-05-02 NOTE — PROGRESS NOTES
05/02/25 1108   Appointment Info   Signing Clinician's Name / Credentials (PT) Merissa Conrad, PT, DPT   Living Environment   People in Home alone   Current Living Arrangements house   Home Accessibility stairs to enter home   Number of Stairs, Main Entrance 3   Stair Railings, Main Entrance railing on left side (ascending)   Transportation Anticipated car, drives self;family or friend will provide   Living Environment Comments Pt has main floor set up, spouse  lives at [facility]   Self-Care   Usual Activity Tolerance moderate   Current Activity Tolerance fair   Regular Exercise No   Equipment Currently Used at Home none   Fall history within last six months yes   Number of times patient has fallen within last six months 1   Activity/Exercise/Self-Care Comment Pt is independent at baseline without use of assistive device.   General Information   Onset of Illness/Injury or Date of Surgery 04/25/25   Referring Physician Leela Toure PA-C   Patient/Family Therapy Goals Statement (PT) To get stronger   Pertinent History of Current Problem (include personal factors and/or comorbidities that impact the POC) Pt is 79 year old male adm on 4/25/25 as a transfer [facility] for further evaluation of chest pain. Pt has significant cardiac history with STEMI and stents placed in 2015 and January of 2025. 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 Pt underwent CABG x2 on 5/1/25 after Brilinta washout, was extubated POD #0.   Existing Precautions/Restrictions fall;sternal   Cognition   Affect/Mental Status (Cognition) WFL   Orientation Status (Cognition) oriented x 3   Follows Commands (Cognition) WFL   Cognitive Status Comments Pt needing increased processing time   Pain Assessment   Patient Currently in Pain   (mild c/o incisional pain)   Integumentary/Edema   Integumentary/Edema Comments As expected post-op changes   Posture    Posture Forward head  position;Protracted shoulders   Range of Motion (ROM)   Range of Motion ROM is WFL   Strength (Manual Muscle Testing)   Strength (Manual Muscle Testing) Deficits observed during functional mobility   Strength Comments General deconditioning, no focal weakness   Bed Mobility   Comment, (Bed Mobility) Min assist of 1   Transfers   Comment, (Transfers) Min assist   Gait/Stairs (Locomotion)   Comment, (Gait/Stairs) Min assist   Balance   Balance Comments Close supervision for sitting balance, min assist for standing   Sensory Examination   Sensory Perception Comments Pt reports numbness/tingling B hands   Clinical Impression   Criteria for Skilled Therapeutic Intervention Yes, treatment indicated   PT Diagnosis (PT) Impaired mobility   Influenced by the following impairments Decreased strength, decreased balance, decreased activity tolerance   Functional limitations due to impairments Decreased independence with functional mobility tasks   Clinical Presentation (PT Evaluation Complexity) stable   Clinical Presentation Rationale Current presentation, [facility]   Clinical Decision Making (Complexity) low complexity   Planned Therapy Interventions (PT) balance training;bed mobility training;gait training;patient/family education;stair training;strengthening;progressive activity/exercise;risk factor education;home program guidelines   Risk & Benefits of therapy have been explained evaluation/treatment results reviewed;care plan/treatment goals reviewed;risks/benefits reviewed;current/potential barriers reviewed;participants voiced agreement with care plan;participants included;patient   PT Total Evaluation Time   PT Eval, Low Complexity Minutes (73652) 10   Physical Therapy Goals   PT Frequency 2x/day   PT Predicted Duration/Target Date for Goal Attainment 05/09/25   PT Goals Bed Mobility;Cardiac Phase 1   PT: Bed Mobility Modified independent;Supine to/from sit;Rolling;Within precautions   PT: Understanding of cardiac education  to maximize quality of life, condition management, and health outcomes Patient;Verbalize;Demonstrate   PT: Perform aerobic activity with stable cardiovascular response continuous;10 minutes;ambulation;treadmill   PT: Functional/aerobic ambulation tolerance with stable cardiovascular response in order to return to home and community environment Modified independent;Greater than 300 feet;Assistive device   PT: Navigation of stairs simulating home set up with stable cardiovascular response in order to return to home and community environment Supervision/SBA;5 stairs;Rail on both sides   Interventions   Interventions Quick Adds Gait Training;Therapeutic Activity;Therapeutic Procedure;Cardiac Rehab   Therapeutic Activity   Therapeutic Activities: dynamic activities to improve functional performance Minutes (59984) 23   Symptoms Noted During/After Treatment Fatigue;Increased pain   Treatment Detail/Skilled Intervention Pt supine in bed on arrival, lines removed this AM, cleared for OOB activity. Increased time needed for room set up and line management, pt has chest tubes, abarca cathter, peripheral IV, supplemental O2 via nasal cannula 2 LPM. Briefly reviewed sternal precautions, pillow used throughout. Pt min to mod assist of 1 for supine to sit at EOB, will need continued education on logroll technique. Pt does endorse lightheadedness with position change, BP assessed and stable. HR and O2 sats stable throughout session. Pt completes 10 reps ankle pumps to prepare for increased activity. Pt sit to stand with min assist of 1 and assist of another to manage lines. Pt able to ambulate 15' to bedside chair with min assist of 1, shuffling gait pattern, no overt LOB but overall unsteady. Increased activity held due to continued c/o lightheadedness. Pt positioned for comfort in chair, needs within reach, RN  updated.   Cardiac Rehab Phase II Plan   Phase II Order Received Yes   Phase II Appointment Status Scheduled  (Will  likely  need to cancel as likely to go to TCU on discharge)   Date/Time 5/13/25 @12:45pm   Location [place]   PT Discharge Planning   PT Plan General strengthening, progress activity as cherelle, education packet when out of ICU   PT Discharge Recommendation (DC Rec) Transitional Care Facility   PT Rationale for DC Rec Pt is below baseline level of function, lives alone, has limited support, anticipate need for continued inpatient rehab when medically stable for discharge from hospital   PT Brief overview of current status Goals of therapy will be to address safe mobility and make recs for d/c to next level of care. Pt and RN will continue to follow all falls risk precautions as documented by RN staff while hospitalized   PT Total Distance Amb During Session (feet) 15   Physical Therapy Time and Intention   Timed Code Treatment Minutes 23   Total Session Time (sum of timed and untimed services) 33

## 2025-05-02 NOTE — PROGRESS NOTES
[facility]    Internal Medicine Hospitalist Progress Note  05/02/2025  I evaluated patient on the above date.    Kerwin Kwon Jr., MD  [phone] (p)  Text Page  Vocera      [New actions/orders today (05/02/2025) are underlined in the assessment and plan. All lab results in the assessment and plan were reviewed.]  Assessment & Plan   Raul Wilhelm is a 79 year old male with history including asthma; DM2; HTN; known severe multivessel CAD with prior stents (1/2025); h/o CVA; who presented to [facility] 4/25/2025 with chest pain and found with NSTEMI. Transferred to [facility] 4/25/2025 for management.       NSTEMI with severe multivessel CAD - s/p CABG 2v 5/1/2025 (LIMA to LAD and SVG to diagonal).  Ischemic cardiomyopathy.  Recent LCx and OM REDD (1/2025).   H/o HTN (benign essential).  HLD.  [PTA: asa 81 mg daily; atorvastatin 80 mg at bedtime; carvedilol 6.25 mg BID; losartan-HCTZ 50-12.5 mg daily; ticagrelor 90 mg BID; PRN NTG.]  * H/o STEMI 11/2025 s/p LPDA and LCx stents. H/o STEMI 1/2025. Heart catheterization 1/2025 showed severe multivessel CAD involving ostial-prox LAD, mid LAD, prox LCX, and large OM branch; stents placed to LCx and OM branch and there was plan for outpatient discussion regarding surgical revascularization vs long stents for LAD disease, but he had not yet had this follow-up. Echo 1/2025 showed LVEF 55-60%, mild LVH, no regional wall motion abnormalities; no significant valve disease.  * Initial presentation to outside hospital as above. Started on heparin gtt.  * Transferred to [facility] 4/25. Cardiology consulted on admit.  * Later 4/25: Echo showed LVEF 45-50% with WMA's, grade 1 diastolic dysfunction; RV OK. Angiogram showed LM with ostial 60%; mid LAD with diffuse 50%, LAD ostium covered by CX stent, appeared to be up to 80% on some views, diffuse proximal 75% narrowing first diagonal 75%; LCx stents widely patent, no restenosis; CABG recommended. [facility]S  consulted; ticagrelor held and started on cangrelor gtt bridging.  * 4/30: Some chest pain, but seemed more musculoskeletal type. ECG w/o acute ischemic changes.   * 5/1: Underwent CABG as noted. Subsequently extubated.  * 5/2: Stable, off pressors, on 2L O2.  - Post-op management per CVS.  - Continue ASA, plan restart cangrelor gtt per [facility], transition back to PTA ticagrelor when able per [facility].  - Restart statin, beta-blocker and ARB when able per [facility].  - Continue scheduled acetaminophen, PRN methocarbamol, PRN oxycodone, PRN IV hydromorphone; minimize opioids as able.  - Continue to monitor i/o's, daily wts.  - Planning TCU after surgery (per pt preference) as he lives alone - he is interested in the following TCUs ([facility], [facility] [place] and [facility]), SW following.     DM2.   [PTA: glargine 20U at bedtime; semaglutide (pt not taking due to cost).]  * HgbA1C 7.1 2/2025.   * 5/1: On insulin gtt post-op   * 5/2: Off insulin gtt. Tolerating some oral intake.  Recent Labs   Lab 05/02/25  0627 05/02/25  0406 05/02/25  0405 05/02/25  0203 05/01/25  2357 05/01/25  2211   * 132* 129* 127* 113* 126*     Recent Labs   Lab Test 02/19/25  0832 01/04/25  1709   A1C 7.1* 8.1*   - Continue moderate CHO diet.  - NPH 8U x1.  - Restart glargine at 10U at bedtime for now.  - Order aspart 1U/CHO U with meals and snacks.  - Continue aspart ISS (medium).  - Continue PRN hypoglycemia protocol.    Blood loss anemia.  * Hgb normal on admit.  * Hgb decreased related to CABG, not requiring prbc's.  Recent Labs   Lab 05/02/25  0406 05/01/25  1258 05/01/25  1111 05/01/25  1110 05/01/25  1031 05/01/25  1003   HGB 11.4* 10.9* 9.8* 9.6* 9.3* 8.8*   - Continue to monitor CBC - repeat in am.  - Consider prbc transfusion if hgb </= 7.0 or if significant bleeding with hemodynamic instability or if symptomatic.    Thrombocytopenia, suspect consumptive related to surgery, resolved.  * Platelets normal on admit.  * Platelets  decreased during surgery requiring platelet transfusion.  * Platelets normal 5/2.    Leukocytosis, suspect reactive.  * WBC 12.3 on admit. Pt afebrile, no clear signs of infection on admission.  * WBC normalized 4/27.  * WBC increased after CABG.  Recent Labs   Lab 05/02/25  0406 05/01/25  1258 05/01/25  1110 05/01/25  0554 04/30/25  1219 04/28/25  0639   WBC 15.0* 17.2* 21.4* 9.2 9.1 9.3   - Continue to monitor for signs of infection.  - Continue to monitor CBC - repeat in am.     Asthma.  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  - Continue fluticasone-vilanterol, tiotropium, montelukast, PRN inhalers/nebs     Zenkers diverticulum.  GERD.  Chronic Dysphagia.  * Noted during Jan 2025 admit. Noted diverticulum does not empty fully with multiple swallows and gives him regurgitation/dysphagia symptoms. He elected not to pursue ENT outpatient. PTA on BID omeprazole.  - Continue pantoprazole.    Back pain, suspect musculoskeletal.  * 4/28: Pt c/o back pain suspected due to the hospital bed.  - Continue PRN acetaminophen.     H/o frequent skin abscesses.  +MRSA.  * 3/2025 with abscess of right buttock that was +MRSA. Most recent at right neck Mid 4/2025, appears to be resolving.    * 4/29: CVS ordered 10d course of mupirocin ointment on nares.  - Continue to monitor clinically.  - Continue mupirocin oint to nares (started 4/30) for 10d (stop after 5/9).    H/o arterial ischemic stroke.  - Continue antiplatelets and statin as noted.     Pulmonary nodule RUL, 0.3cm, noted on CT (1/2025).  - Follow-up CT chest due Jan 2026.      Clinically Significant Risk Factors          # Hyperchloremia: Highest Cl = 109 mmol/L in last 2 days, will monitor as appropriate      # Hypocalcemia: Lowest Ca = 8 mg/dL in last 2 days, will monitor and replace as appropriate     # Hypoalbuminemia: Lowest albumin = 3.3 g/dL at 5/2/2025  4:06 AM, will monitor as appropriate  # Coagulation Defect: INR = 1.22 (Ref range: 0.85 - 1.15) and/or PTT =  "36 Seconds (Ref range: 22 - 38 Seconds), will monitor for bleeding  # Thrombocytopenia: Lowest platelets = 104 in last 2 days, will monitor for bleeding   # Hypertension: Noted on problem list           # DMII: A1C = 7.1 % (Ref range: 0.0 - 5.6 %) within past 6 months         # Financial/Environmental Concerns: none  # COPD: noted on problem list  # History of CABG: noted on surgical history       Diet: Combination Diet Moderate Consistent Carb (60 g CHO per Meal) Diet; Low Saturated Fat Na <2400mg Diet    Prophylaxis: PCD's, ambulation, and heparin SQ  Diaz Catheter: PRESENT, indication: ICU only: hourly urine output needed for patient care  Lines: PRESENT      CVC Right Internal jugular-Site Assessment: WDL  Arterial Line 05/01/25 Radial-Site Assessment: WDL    Code Status: Full Code    Disposition Plan   Medically Ready for Discharge: Anticipated in 5+ Days  Expected discharge to transitional care unit (TCU) pending above.    Entered: Kerwin Kwon MD 05/02/2025, 8:04 AM         Interval History   Doing OK, feels \"tired\".  Pain tolerable presently.  Ate some breakfast.    * Data reviewed today: I reviewed all new labs and imaging over the last 24 hours. I personally reviewed no images or ECG's today.    Physical Exam   Most recent vitals:   , Blood pressure 110/64, pulse 105, temperature 99  F (37.2  C), temperature source Bladder, resp. rate 14, height 1.74 m (5' 8.5\"), weight 72.8 kg (160 lb 7.9 oz), SpO2 95%. O2 Device: Nasal cannula Oxygen Delivery: 2 LPM  Vitals:    04/25/25 0806 04/26/25 0700 05/02/25 0400   Weight: 67.8 kg (149 lb 6.4 oz) 65.9 kg (145 lb 4.5 oz) 72.8 kg (160 lb 7.9 oz)     Vital signs with ranges:  Temp:  [96.6  F (35.9  C)-99  F (37.2  C)] 99  F (37.2  C)  Pulse:  [] 105  Resp:  [14] 14  BP: ()/(55-69) 110/64  MAP:  [58 mmHg-84 mmHg] 60 mmHg  Arterial Line BP: (101-163)/(-11-62) 128/42  FiO2 (%):  [35 %-45 %] 35 %  SpO2:  [86 %-100 %] 95 %  Patient Vitals for the past " 24 hrs:   BP Temp Temp src Pulse Resp SpO2 Weight   05/02/25 0645 -- -- -- 105 -- -- --   05/02/25 0630 -- -- -- 110 -- -- --   05/02/25 0615 -- -- -- 107 -- 95 % --   05/02/25 0600 -- -- -- 109 -- 98 % --   05/02/25 0545 -- -- -- 107 -- 96 % --   05/02/25 0530 -- -- -- 104 -- 95 % --   05/02/25 0515 -- -- -- 108 -- 96 % --   05/02/25 0500 -- -- -- 109 -- (!) 88 % --   05/02/25 0445 -- -- -- 105 -- 96 % --   05/02/25 0430 -- -- -- 104 -- (!) 90 % --   05/02/25 0415 -- -- -- 101 -- (!) 91 % --   05/02/25 0400 -- 99  F (37.2  C) Bladder 104 -- (!) 86 % 72.8 kg (160 lb 7.9 oz)   05/02/25 0345 -- -- -- 101 -- 92 % --   05/02/25 0330 -- -- -- 104 -- (!) 91 % --   05/02/25 0315 -- -- -- 102 -- 95 % --   05/02/25 0300 -- -- -- 104 -- 96 % --   05/02/25 0245 -- -- -- 102 -- 95 % --   05/02/25 0230 -- -- -- 102 -- 93 % --   05/02/25 0215 -- -- -- 105 -- 95 % --   05/02/25 0200 -- 98.8  F (37.1  C) Bladder 101 -- 94 % --   05/02/25 0145 -- -- -- 101 -- 94 % --   05/02/25 0130 -- -- -- 101 -- 94 % --   05/02/25 0115 -- -- -- 100 -- 95 % --   05/02/25 0100 -- -- -- 98 -- 95 % --   05/02/25 0045 -- -- -- 97 -- 97 % --   05/02/25 0030 -- -- -- 94 -- 96 % --   05/02/25 0015 -- -- -- 83 -- 96 % --   05/02/25 0000 -- 98.8  F (37.1  C) Bladder 89 -- 93 % --   05/01/25 2345 -- -- -- 93 -- 95 % --   05/01/25 2330 -- -- -- 90 -- 94 % --   05/01/25 2315 -- -- -- 86 -- 95 % --   05/01/25 2300 -- -- -- 88 -- 95 % --   05/01/25 2245 -- -- -- 86 -- 95 % --   05/01/25 2230 -- -- -- 86 -- 94 % --   05/01/25 2215 -- -- -- 84 -- 93 % --   05/01/25 2200 -- -- -- 61 -- 93 % --   05/01/25 2145 -- -- -- 82 -- 94 % --   05/01/25 2130 -- -- -- 64 -- 95 % --   05/01/25 2115 -- -- -- 81 -- 95 % --   05/01/25 2100 -- -- -- 61 -- 97 % --   05/01/25 2045 -- -- -- 59 -- 97 % --   05/01/25 2030 -- -- -- 60 -- 98 % --   05/01/25 2015 -- -- -- 62 -- 97 % --   05/01/25 2000 -- 97.6  F (36.4  C) Axillary 86 14 97 % --   05/1945 -- -- -- 59 -- 98 % --    05/01/25 1930 -- -- -- 57 -- 98 % --   05/01/25 1915 -- -- -- 61 -- 97 % --   05/01/25 1903 -- -- -- 80 -- (!) 90 % --   05/01/25 1900 -- -- -- 67 -- (!) 91 % --   05/01/25 1845 110/64 -- -- 60 -- 94 % --   05/01/25 1830 -- 98.2  F (36.8  C) -- 60 -- 95 % --   05/01/25 1815 -- 98.4  F (36.9  C) -- 65 -- 99 % --   05/01/25 1800 -- 98.2  F (36.8  C) -- 65 -- (!) 91 % --   05/01/25 1745 -- 98.2  F (36.8  C) -- 61 -- 96 % --   05/01/25 1730 -- 98.2  F (36.8  C) -- 55 -- 94 % --   05/01/25 1723 -- 98.1  F (36.7  C) -- 58 -- 98 % --   05/01/25 1715 -- 98.1  F (36.7  C) -- 58 -- 100 % --   05/01/25 1700 -- 98.1  F (36.7  C) -- 58 -- 100 % --   05/01/25 1653 127/55 97.9  F (36.6  C) -- 62 -- 100 % --   05/01/25 1645 -- 97.9  F (36.6  C) -- 57 -- 100 % --   05/01/25 1630 -- 97.9  F (36.6  C) -- 56 -- 100 % --   05/01/25 1615 -- 97.7  F (36.5  C) -- 61 -- 100 % --   05/01/25 1600 114/60 97.7  F (36.5  C) Bladder 55 -- 100 % --   05/01/25 1545 -- 97.7  F (36.5  C) -- 54 -- 100 % --   05/01/25 1530 -- 97.5  F (36.4  C) -- 60 -- 100 % --   05/01/25 1515 -- 97.3  F (36.3  C) -- 60 -- 100 % --   05/01/25 1504 -- 97.3  F (36.3  C) -- 60 -- 100 % --   05/01/25 1500 -- 97.2  F (36.2  C) -- 65 -- 100 % --   05/01/25 1445 -- 97.2  F (36.2  C) -- 71 -- 100 % --   05/01/25 1430 -- 97  F (36.1  C) -- 72 -- 100 % --   05/01/25 1415 -- 97  F (36.1  C) -- 75 -- 100 % --   05/01/25 1400 -- 96.8  F (36  C) -- 75 -- 100 % --   05/01/25 1345 -- 96.8  F (36  C) -- 75 -- 100 % --   05/01/25 1330 -- 96.8  F (36  C) -- 75 -- 100 % --   05/01/25 1315 101/66 96.8  F (36  C) -- 80 -- 100 % --   05/01/25 1300 113/69 (!) 96.6  F (35.9  C) Bladder 80 -- 100 % --   05/01/25 1249 95/69 (!) 96.6  F (35.9  C) -- 80 -- 99 % --   05/01/25 1245 -- (!) 96.6  F (35.9  C) -- 80 -- 100 % --   05/01/25 1235 -- -- -- -- -- 99 % --     I/O's last 24 hours:  I/O last 3 completed shifts:  In: 5368.76 [I.V.:3028.76; Other:360; NG/GT:60; IV Piggyback:1450]  Out: 8308  [Urine:1680; Blood:200; Chest Tube:650]    Constitutional: awake, alert, oriented, fatigued appearing   Head:   Eyes:   ENT:   Neck:   Cardiovascular: regular rate and rhythm; no murmurs, rubs or gallops  Lungs: diminished in the bases, no crackles or wheezes  Gastrointestinal/Abdomen: soft, non-tender, non-distended, positive bowel sounds  :   Musculoskeletal:   Skin/Extremities:   Neurologic:   Psychiatric:   Hematologic/Lymphatic/Immunologic:          Labs reviewed.  Recent Labs   Lab 05/02/25  0627 05/02/25  0406 05/02/25  0405 05/01/25  1305 05/01/25  1258 05/01/25  1111 05/01/25  1110   WBC  --  15.0*  --   --  17.2*  --  21.4*   HGB  --  11.4*  --   --  10.9* 9.8* 9.6*   MCV  --  92  --   --  91  --  91   PLT  --  185  --   --  159  --  104*   INR  --   --   --   --  1.22*  --  1.52*   NA  --  137  --   --  140 139 140   POTASSIUM  --  4.0  --   --  4.3 4.1 4.1   CHLORIDE  --  103  --   --  107  --  109*   CO2  --  20*  --   --  21*  --  22   BUN  --  15.0  --   --  14.3  --  14.8   CR  --  0.88  --   --  0.86  --  0.87   ANIONGAP  --  14  --   --  12  --  9   JOSE RAFAEL  --  8.0*  --   --  8.2*  --  8.8   * 132* 129*   < > 159* 143* 152*   ALBUMIN  --  3.3*  --   --  3.4*  --   --    PROTTOTAL  --  5.0*  --   --  4.9*  --   --    BILITOTAL  --  0.9  --   --  1.3*  --   --    ALKPHOS  --  60  --   --  57  --   --    ALT  --  35  --   --  54  --   --    AST  --  47*  --   --  56*  --   --     < > = values in this interval not displayed.     Recent Labs   Lab Test 04/25/25  1032 04/25/25  0822 04/25/25  0226 01/17/25  1317 01/17/25  1015 01/04/25  1954   NT-PROBNP, INPATIENT  --   --   --   --  467 562   TROPONIN T HIGH SENSITIVITY 81* 95* 40*   < > 118*  --     < > = values in this interval not displayed.     Recent Labs   Lab 05/02/25  0627 05/02/25  0406 05/02/25  0405 05/02/25  0203 05/01/25  2357 05/01/25  2211   * 132* 129* 127* 113* 126*     Recent Labs   Lab Test 02/19/25  0832 01/04/25  1709    A1C 7.1* 8.1*       Recent Labs   Lab 05/01/25  1258 05/01/25  1110   INR 1.22* 1.52*     Recent Labs   Lab 05/02/25  0406 05/01/25  1258 05/01/25  1111 05/01/25  1110 05/01/25  1031 05/01/25  1003 05/01/25  0934 05/01/25  0933 05/01/25  0800 05/01/25  0554 04/30/25  1219 04/28/25  0639   WBC 15.0* 17.2*  --  21.4*  --   --   --   --   --  9.2 9.1 9.3   FIBR  --   --   --  240  --   --   --   --   --   --   --   --    LACT  --  0.9 1.2  --  0.9 0.9 0.7 0.7   < >  --   --   --     < > = values in this interval not displayed.       MICRO:  Cultures (including blood and urine):  No lab results found in last 7 days.    Recent Results (from the past 24 hours)   XR Chest Port 1 View    Narrative    EXAM: XR CHEST PORT 1 VIEW  LOCATION: [facility]  DATE: 5/1/2025    INDICATION: Post Op CVTS Surgery.  COMPARISON: Chest x-ray 4/25/2025.      Impression    IMPRESSION: ET tube is 3.7 cm proximal to the serina. Right neck central venous line tip at the SVC. Nasogastric tube at least in the stomach. Left chest base chest tube identified. Suggestion of trace left lateral pneumothorax with a thickness of   approximately 2 mm. Mild left base atelectasis. No new airspace disease identified. Normal cardiac silhouette. Sternotomy and hypoinflated lungs.   XR Chest Port 1 View    Narrative    EXAM: XR CHEST PORT 1 VIEW  LOCATION: [facility]  DATE: 5/2/2025    INDICATION: Post Op CVTS Surgery  COMPARISON: May 1, 2025.      Impression    IMPRESSION: Interval removal of the endotracheal and nasogastric tubes. Right IJ central line remains in place as do mediastinal and left chest drains. Vascular volume is normal. No pneumothorax.       Medications   All medications were reviewed. MAR.    Infusions:  Current Facility-Administered Medications   Medication Dose Route Frequency Provider Last Rate Last Admin    dexmedeTOMIDine (PRECEDEX) 4 mcg/mL in sodium chloride 0.9 % 100 mL infusion   0.2-0.7 mcg/kg/hr Intravenous Continuous Leela Toure PA-C        EPINEPHrine (ADRENALIN) 5 mg in  mL infusion  0.01-0.1 mcg/kg/min Intravenous Continuous PRN Leela Toure PA-C   Stopped at 05/01/25 1443    lactated ringers infusion   Intravenous Continuous Aureliano Rodriguez MD 20 mL/hr at 05/01/25 1332 Rate Verify at 05/01/25 1332    propofol (DIPRIVAN) infusion  5-75 mcg/kg/min Intravenous Continuous Chano Juarez MD   Stopped at 05/01/25 1500    And    Medication Instruction   Does not apply Continuous PRN Chano Juarez MD        norepinephrine (LEVOPHED) 4 mg in  mL infusion PREMIX  0.01-0.15 mcg/kg/min Intravenous Continuous PRN Leela Toure PA-C   Stopped at 05/01/25 1316    Reason beta blocker order not selected   Does not apply DOES NOT GO TO Leela Bowman PA-C        sodium chloride 0.9 % infusion   Intravenous Continuous Aureliano Rodriguez MD 10 mL/hr at 05/01/25 1333 Rate Verify at 05/01/25 1333    vasopressin 0.2 units/mL in NS (PITRESSIN) standard conc infusion  0.5-4 Units/hr Intravenous Continuous PRN Leela Toure PA-C         Scheduled Medications:  Current Facility-Administered Medications   Medication Dose Route Frequency Provider Last Rate Last Admin    acetaminophen (TYLENOL) tablet 975 mg  975 mg Oral or Feeding Tube Q8H Leela Toure PA-C   975 mg at 05/02/25 0444    aspirin (ASA) chewable tablet 162 mg  162 mg Oral or NG Tube Daily Leela Toure PA-C   162 mg at 05/02/25 0803    ceFAZolin (ANCEF) 1 g vial to attach to  ml bag for ADULT or 50 ml bag for PEDS  1 g Intravenous Q8H Leela Tuore PA-C   1 g at 05/02/25 0321    heparin ANTICOAGULANT injection 5,000 Units  5,000 Units Subcutaneous Q8H Leela Toure PA-C        insulin aspart (NovoLOG) injection (RAPID ACTING)  1-7 Units Subcutaneous TID AC Evelin Martin, NP        insulin aspart (NovoLOG) injection (RAPID ACTING)  1-5 Units Subcutaneous At Bedtime  Evelin Martin, NP        pantoprazole (PROTONIX) 2 mg/mL suspension 40 mg  40 mg Oral or NG Tube Daily Leela Toure PA-C        Or    pantoprazole (PROTONIX) EC tablet 40 mg  40 mg Oral Daily Leela Toure PA-C   40 mg at 05/02/25 0803    pantoprazole (PROTONIX) IV push injection 40 mg  40 mg Intravenous QAM AC Chano Juarez MD        polyethylene glycol (MIRALAX) Packet 17 g  17 g Oral or Feeding Tube Daily Leela Toure PA-C   17 g at 05/02/25 0803    senna-docusate (SENOKOT-S/PERICOLACE) 8.6-50 MG per tablet 1 tablet  1 tablet Oral or Feeding Tube BID Leela Toure PA-C   1 tablet at 05/02/25 0803    sodium chloride (PF) 0.9% PF flush 3 mL  3 mL Intracatheter Q8H ZHANG Leela Toure PA-C   3 mL at 05/02/25 0532     PRN Medications:  Current Facility-Administered Medications   Medication Dose Route Frequency Provider Last Rate Last Admin    [START ON 5/4/2025] bisacodyl (DULCOLAX) suppository 10 mg  10 mg Rectal Daily PRN Leela Toure PA-C        calcium carbonate (TUMS) chewable tablet 500 mg  500 mg Oral 4x Daily PRN Leela Toure PA-C        calcium gluconate 1 g in 50 mL in sodium chloride intermittent infusion  1 g Intravenous Q6H PRN Leela Toure PA-C        calcium gluconate 2 g in  mL intermittent infusion  2 g Intravenous Q6H PRN Leela Toure PA-C        calcium gluconate 3 g in sodium chloride 0.9 % 100 mL intermittent infusion  3 g Intravenous Q6H PRN Leela Toure PA-C        glucose gel 15-30 g  15-30 g Oral Q15 Min PRN Leela Toure PA-C        Or    dextrose 50 % injection 25-50 mL  25-50 mL Intravenous Q15 Min PRN Leela Toure PA-C        Or    glucagon injection 1 mg  1 mg Subcutaneous Q15 Min PRN Leela Toure PA-C        EPINEPHrine (ADRENALIN) 5 mg in  mL infusion  0.01-0.1 mcg/kg/min Intravenous Continuous PRN Leela Toure PA-C   Stopped at 05/01/25 1443    furosemide (LASIX) injection 20 mg  20 mg  Intravenous Once PRN Leela Toure PA-C        hydrALAZINE (APRESOLINE) injection 10 mg  10 mg Intravenous Q30 Min PRN Leela Toure PA-C   10 mg at 05/02/25 0533    HYDROmorphone (DILAUDID) injection 0.2 mg  0.2 mg Intravenous Q2H PRN Leela Toure PA-C   0.2 mg at 05/02/25 0803    Or    HYDROmorphone (DILAUDID) injection 0.4 mg  0.4 mg Intravenous Q2H PRN Leela Toure PA-C   0.4 mg at 05/01/25 1723    lidocaine (LMX4) cream   Topical Q1H PRN Leela Toure PA-C        lidocaine 1 % 0.1-1 mL  0.1-1 mL Other Q1H PRN Leela Touer PA-C        [START ON 5/3/2025] magnesium hydroxide (MILK OF MAGNESIA) suspension 30 mL  30 mL Oral Daily PRN Leela Toure PA-C        propofol (DIPRIVAN) bolus from bag or syringe pump  10 mg Intravenous Q15 Min PRN Chano Juarez MD        And    Medication Instruction   Does not apply Continuous PRN Chano Juarez MD        methocarbamol (ROBAXIN) half-tab 250 mg  250 mg Oral Q6H PRN Leela Toure PA-C   250 mg at 05/02/25 0803    naloxone (NARCAN) injection 0.2 mg  0.2 mg Intravenous Q2 Min PRN Aureliano Rodriguez MD        Or    naloxone (NARCAN) injection 0.4 mg  0.4 mg Intravenous Q2 Min PRN Aureliano Rodriguez MD        Or    naloxone (NARCAN) injection 0.2 mg  0.2 mg Intramuscular Q2 Min PRN Aureliano Rodriguez MD        Or    naloxone (NARCAN) injection 0.4 mg  0.4 mg Intramuscular Q2 Min PRN Aureliano Rodriguez MD        norepinephrine (LEVOPHED) 4 mg in  mL infusion PREMIX  0.01-0.15 mcg/kg/min Intravenous Continuous PRN Leela Toure PA-C   Stopped at 05/01/25 1316    ondansetron (ZOFRAN ODT) ODT tab 4 mg  4 mg Oral Q6H PRN Leela Toure PA-C        Or    ondansetron (ZOFRAN) injection 4 mg  4 mg Intravenous Q6H PRN Leela Toure PA-C        oxyCODONE (ROXICODONE) tablet 5 mg  5 mg Oral Q4H PRN Leela Toure PA-C   5 mg at 05/02/25 0040    Or    oxyCODONE (ROXICODONE) tablet 10 mg  10 mg Oral Q4H  PRN Leela Toure PA-C   10 mg at 05/02/25 0443    prochlorperazine (COMPAZINE) injection 5 mg  5 mg Intravenous Q6H PRN Leela Toure PA-C        Or    prochlorperazine (COMPAZINE) tablet 5 mg  5 mg Oral Q6H PRN Leela Toure PA-C        Reason beta blocker order not selected   Does not apply DOES NOT GO TO Leela Bowman PA-C        sodium chloride (PF) 0.9% PF flush 3 mL  3 mL Intracatheter q1 min prn Leela Toure PA-C        vasopressin 0.2 units/mL in NS (PITRESSIN) standard conc infusion  0.5-4 Units/hr Intravenous Continuous PRN Leela Toure PA-C

## 2025-05-02 NOTE — PLAN OF CARE
CV  Pressors (which pressors and any increase/decrease in pressor needs): Off pressors overnight  HR range: 60-100s  Chest tube output: 20-70 every two hours, bloody output    Neuro  Orientation: AxO  Delirium present?(y/n): N  Sleep: Resting some between cares overnight  Pain: Given PRN oxy, robaxin, dilaudid and scheduled tylenol    GI/  BM? (y/n): N, bowel meds given as scheduled  Urine output: >30/hr      Lines: Left radial ART line, CVC via introducer and PIV in place        Problem: Adult Inpatient Plan of Care  Goal: Plan of Care Review  Description: The Plan of Care Review/Shift note should be completed every shift.  The Outcome Evaluation is a brief statement about your assessment that the patient is improving, declining, or no change.  This information will be displayed automatically on your shiftnote.  Outcome: Progressing  Flowsheets (Taken 5/2/2025 7857)  Outcome Evaluation: Patient alert and oriented overnight, tolerating PO, pacer capped, SR. Periods of hypertension overnight, given hydralazine as ordered. Chest tubes and abarca in place, output charted. Given PRN pain ,medication as requested for chest, shoulder pain. Daughter at bedside this evening, updated.  Plan of Care Reviewed With:   patient   child  Overall Patient Progress: improving   Goal Outcome Evaluation:      Plan of Care Reviewed With: patient, child    Overall Patient Progress: improvingOverall Patient Progress: improving    Outcome Evaluation: Patient alert and oriented overnight, tolerating PO, pacer capped, SR. Periods of hypertension overnight, given hydralazine as ordered. Chest tubes and abarca in place, output charted. Given PRN pain ,medication as requested for chest, shoulder pain. Daughter at bedside this evening, updated.

## 2025-05-02 NOTE — PROGRESS NOTES
"Crit care consult:    S:  No complaints this morning.  Chest pain appropriate to procedure; controlled by analgesia.  Denies SOB.  No dizziness/lightheadedness.    O:  /64   Pulse 105   Temp 99  F (37.2  C) (Bladder)   Resp 14   Ht 1.74 m (5' 8.5\")   Wt 72.8 kg (160 lb 7.9 oz)   SpO2 95%   BMI 24.05 kg/m   2L nc  Gen:  No acute distress // HEENT:  PERRL, nose/ears grossly normal // Neck:  Supple // Lymph : no cervical adenopathy // chest : CTA-B, unlabored, chest wound c/d/i // cor:  rrr no m/r/g // abd s/nt/nd // extr:  wwp x4, no edema // neuro:  Awake, alert, rapid fluent appropriate speech, good str/sens x4 // skin:  No obvious rash      Labs (personally reviewed): lytes ok.  Creat 0.88.  ast mildly elevated 47, leukocytosis improving 15.  Acute blood loss anemia appropriate to procedure 11.4, pllts 185 ok.       CXR (personally reviewed): lungs clear.  Line in good position    EKG (personally reviewed): sinus 106, nl axis, nl int, no acute ischemic changes.    A/P:  79M pod 1 s/p cab2.  Overall doing well.  Cardiac:  Shock, post-procedure, unspecified:  resolved.  Now stable off vaso-actives.         CAD:  Continue asa  Pulm:  Breathing comfortably extubated.        Hypoxemia:  Requiring low level nasal cannula.  Likely due to atelectasis which would be expected after a procedure of this magnitude.  Wean nasal cannula as tolerated.   Neuro: Analgesia:  Continue prn tylenol/opiates for post-op pain.  GI:  Advance diet as tolerated.  Endo:  Hyperglycemia:  Stress-related post-op as would be expected after a procedure of this magnitude.  Continue prn insulin per protocol.  Prophylaxis:  Continue subcutaneous heparin.      Clinically Significant Risk Factors          # Hyperchloremia: Highest Cl = 109 mmol/L in last 2 days, will monitor as appropriate      # Hypocalcemia: Lowest Ca = 8 mg/dL in last 2 days, will monitor and replace as appropriate     # Hypoalbuminemia: Lowest albumin = 3.3 g/dL at " 5/2/2025  4:06 AM, will monitor as appropriate  # Coagulation Defect: INR = 1.22 (Ref range: 0.85 - 1.15) and/or PTT = 36 Seconds (Ref range: 22 - 38 Seconds), will monitor for bleeding  # Thrombocytopenia: Lowest platelets = 104 in last 2 days, will monitor for bleeding   # Hypertension: Noted on problem list           # DMII: A1C = 7.1 % (Ref range: 0.0 - 5.6 %) within past 6 months       # Financial/Environmental Concerns: none  # COPD: noted on problem list  # History of CABG: noted on surgical history                [facility] ICU rounding checklist  Central/arterial lines needed?    Per CV surgery--likely removing today  Diaz present/needed?  Per CV surgery  DVT prophylaxis?  Hep Sq    Intensivist service will sign off.  Please re-consult as needed.

## 2025-05-03 ENCOUNTER — APPOINTMENT (OUTPATIENT)
Dept: PHYSICAL THERAPY | Facility: CLINIC | Age: 80
DRG: 233 | End: 2025-05-03
Attending: INTERNAL MEDICINE
Payer: COMMERCIAL

## 2025-05-03 LAB
ANION GAP SERPL CALCULATED.3IONS-SCNC: 14 MMOL/L (ref 7–15)
BUN SERPL-MCNC: 14.4 MG/DL (ref 8–23)
CALCIUM SERPL-MCNC: 8.1 MG/DL (ref 8.8–10.4)
CHLORIDE SERPL-SCNC: 106 MMOL/L (ref 98–107)
CREAT SERPL-MCNC: 0.75 MG/DL (ref 0.67–1.17)
EGFRCR SERPLBLD CKD-EPI 2021: >90 ML/MIN/1.73M2
ERYTHROCYTE [DISTWIDTH] IN BLOOD BY AUTOMATED COUNT: 13.4 % (ref 10–15)
GLUCOSE BLDC GLUCOMTR-MCNC: 128 MG/DL (ref 70–99)
GLUCOSE BLDC GLUCOMTR-MCNC: 135 MG/DL (ref 70–99)
GLUCOSE BLDC GLUCOMTR-MCNC: 141 MG/DL (ref 70–99)
GLUCOSE BLDC GLUCOMTR-MCNC: 174 MG/DL (ref 70–99)
GLUCOSE SERPL-MCNC: 142 MG/DL (ref 70–99)
HCO3 SERPL-SCNC: 16 MMOL/L (ref 22–29)
HCT VFR BLD AUTO: 34.4 % (ref 40–53)
HGB BLD-MCNC: 10.6 G/DL (ref 13.3–17.7)
MAGNESIUM SERPL-MCNC: 2.2 MG/DL (ref 1.7–2.3)
MCH RBC QN AUTO: 30.1 PG (ref 26.5–33)
MCHC RBC AUTO-ENTMCNC: 30.8 G/DL (ref 31.5–36.5)
MCV RBC AUTO: 98 FL (ref 78–100)
PHOSPHATE SERPL-MCNC: 2.2 MG/DL (ref 2.5–4.5)
PLATELET # BLD AUTO: 148 10E3/UL (ref 150–450)
POTASSIUM SERPL-SCNC: 3.9 MMOL/L (ref 3.4–5.3)
RBC # BLD AUTO: 3.52 10E6/UL (ref 4.4–5.9)
SODIUM SERPL-SCNC: 136 MMOL/L (ref 135–145)
WBC # BLD AUTO: 11.1 10E3/UL (ref 4–11)

## 2025-05-03 PROCEDURE — 99232 SBSQ HOSP IP/OBS MODERATE 35: CPT | Performed by: HOSPITALIST

## 2025-05-03 PROCEDURE — 258N000003 HC RX IP 258 OP 636: Performed by: NURSE PRACTITIONER

## 2025-05-03 PROCEDURE — 258N000003 HC RX IP 258 OP 636: Performed by: STUDENT IN AN ORGANIZED HEALTH CARE EDUCATION/TRAINING PROGRAM

## 2025-05-03 PROCEDURE — 250N000013 HC RX MED GY IP 250 OP 250 PS 637: Performed by: NURSE PRACTITIONER

## 2025-05-03 PROCEDURE — 97110 THERAPEUTIC EXERCISES: CPT | Mod: GP

## 2025-05-03 PROCEDURE — C9460 INJECTION, CANGRELOR: HCPCS | Mod: JZ | Performed by: NURSE PRACTITIONER

## 2025-05-03 PROCEDURE — 120N000013 HC R&B IMCU

## 2025-05-03 PROCEDURE — 250N000009 HC RX 250: Performed by: STUDENT IN AN ORGANIZED HEALTH CARE EDUCATION/TRAINING PROGRAM

## 2025-05-03 PROCEDURE — 82374 ASSAY BLOOD CARBON DIOXIDE: CPT | Performed by: NURSE PRACTITIONER

## 2025-05-03 PROCEDURE — 84100 ASSAY OF PHOSPHORUS: CPT | Performed by: NURSE PRACTITIONER

## 2025-05-03 PROCEDURE — 250N000011 HC RX IP 250 OP 636: Performed by: NURSE PRACTITIONER

## 2025-05-03 PROCEDURE — 250N000011 HC RX IP 250 OP 636: Performed by: PHYSICIAN ASSISTANT

## 2025-05-03 PROCEDURE — 85027 COMPLETE CBC AUTOMATED: CPT | Performed by: NURSE PRACTITIONER

## 2025-05-03 PROCEDURE — 36415 COLL VENOUS BLD VENIPUNCTURE: CPT | Performed by: NURSE PRACTITIONER

## 2025-05-03 PROCEDURE — 83735 ASSAY OF MAGNESIUM: CPT | Performed by: NURSE PRACTITIONER

## 2025-05-03 PROCEDURE — 97530 THERAPEUTIC ACTIVITIES: CPT | Mod: GP

## 2025-05-03 PROCEDURE — 250N000011 HC RX IP 250 OP 636: Mod: JZ | Performed by: NURSE PRACTITIONER

## 2025-05-03 PROCEDURE — 250N000013 HC RX MED GY IP 250 OP 250 PS 637: Performed by: PHYSICIAN ASSISTANT

## 2025-05-03 RX ORDER — METOPROLOL TARTRATE 25 MG/1
25 TABLET, FILM COATED ORAL 2 TIMES DAILY
Status: DISCONTINUED | OUTPATIENT
Start: 2025-05-03 | End: 2025-05-07 | Stop reason: HOSPADM

## 2025-05-03 RX ORDER — FUROSEMIDE 10 MG/ML
20 INJECTION INTRAMUSCULAR; INTRAVENOUS ONCE
Status: COMPLETED | OUTPATIENT
Start: 2025-05-03 | End: 2025-05-03

## 2025-05-03 RX ORDER — BISACODYL 10 MG
10 SUPPOSITORY, RECTAL RECTAL DAILY PRN
Status: DISCONTINUED | OUTPATIENT
Start: 2025-05-03 | End: 2025-05-07 | Stop reason: HOSPADM

## 2025-05-03 RX ADMIN — ACETAMINOPHEN 975 MG: 325 TABLET, FILM COATED ORAL at 20:12

## 2025-05-03 RX ADMIN — ACETAMINOPHEN 975 MG: 325 TABLET, FILM COATED ORAL at 05:47

## 2025-05-03 RX ADMIN — ACETAMINOPHEN 975 MG: 325 TABLET, FILM COATED ORAL at 14:13

## 2025-05-03 RX ADMIN — HEPARIN SODIUM 5000 UNITS: 5000 INJECTION, SOLUTION INTRAVENOUS; SUBCUTANEOUS at 03:41

## 2025-05-03 RX ADMIN — METHOCARBAMOL 250 MG: 500 TABLET ORAL at 01:59

## 2025-05-03 RX ADMIN — METOPROLOL TARTRATE 12.5 MG: 25 TABLET, FILM COATED ORAL at 09:09

## 2025-05-03 RX ADMIN — INSULIN ASPART 1 UNITS: 100 INJECTION, SOLUTION INTRAVENOUS; SUBCUTANEOUS at 14:14

## 2025-05-03 RX ADMIN — SENNOSIDES AND DOCUSATE SODIUM 1 TABLET: 50; 8.6 TABLET ORAL at 09:09

## 2025-05-03 RX ADMIN — POLYETHYLENE GLYCOL 3350 17 G: 17 POWDER, FOR SOLUTION ORAL at 20:13

## 2025-05-03 RX ADMIN — BISACODYL 10 MG: 10 SUPPOSITORY RECTAL at 12:09

## 2025-05-03 RX ADMIN — OXYCODONE 5 MG: 5 TABLET ORAL at 11:49

## 2025-05-03 RX ADMIN — CALCIUM CARBONATE (ANTACID) CHEW TAB 500 MG 500 MG: 500 CHEW TAB at 05:47

## 2025-05-03 RX ADMIN — SODIUM PHOSPHATE, MONOBASIC, MONOHYDRATE AND SODIUM PHOSPHATE, DIBASIC, ANHYDROUS 9 MMOL: 142; 276 INJECTION, SOLUTION INTRAVENOUS at 11:46

## 2025-05-03 RX ADMIN — METOPROLOL TARTRATE 25 MG: 25 TABLET, FILM COATED ORAL at 20:13

## 2025-05-03 RX ADMIN — ATORVASTATIN CALCIUM 80 MG: 80 TABLET, FILM COATED ORAL at 22:12

## 2025-05-03 RX ADMIN — CANGRELOR 0.75 MCG/KG/MIN: 50 INJECTION, POWDER, LYOPHILIZED, FOR SOLUTION INTRAVENOUS at 18:29

## 2025-05-03 RX ADMIN — CANGRELOR 0.75 MCG/KG/MIN: 50 INJECTION, POWDER, LYOPHILIZED, FOR SOLUTION INTRAVENOUS at 00:32

## 2025-05-03 RX ADMIN — UMECLIDINIUM 1 PUFF: 62.5 AEROSOL, POWDER ORAL at 09:07

## 2025-05-03 RX ADMIN — INSULIN GLARGINE 10 UNITS: 100 INJECTION, SOLUTION SUBCUTANEOUS at 22:12

## 2025-05-03 RX ADMIN — PANTOPRAZOLE SODIUM 40 MG: 40 INJECTION, POWDER, FOR SOLUTION INTRAVENOUS at 05:46

## 2025-05-03 RX ADMIN — MONTELUKAST 10 MG: 10 TABLET, FILM COATED ORAL at 22:12

## 2025-05-03 RX ADMIN — HEPARIN SODIUM 5000 UNITS: 5000 INJECTION, SOLUTION INTRAVENOUS; SUBCUTANEOUS at 20:13

## 2025-05-03 RX ADMIN — POLYETHYLENE GLYCOL 3350 17 G: 17 POWDER, FOR SOLUTION ORAL at 09:09

## 2025-05-03 RX ADMIN — METOPROLOL TARTRATE 12.5 MG: 25 TABLET, FILM COATED ORAL at 14:13

## 2025-05-03 RX ADMIN — PANTOPRAZOLE SODIUM 40 MG: 40 TABLET, DELAYED RELEASE ORAL at 09:08

## 2025-05-03 RX ADMIN — HEPARIN SODIUM 5000 UNITS: 5000 INJECTION, SOLUTION INTRAVENOUS; SUBCUTANEOUS at 11:46

## 2025-05-03 RX ADMIN — FUROSEMIDE 20 MG: 10 INJECTION, SOLUTION INTRAVENOUS at 11:46

## 2025-05-03 RX ADMIN — SENNOSIDES AND DOCUSATE SODIUM 1 TABLET: 50; 8.6 TABLET ORAL at 20:12

## 2025-05-03 RX ADMIN — FLUTICASONE FUROATE AND VILANTEROL TRIFENATATE 1 PUFF: 100; 25 POWDER RESPIRATORY (INHALATION) at 12:09

## 2025-05-03 RX ADMIN — ASPIRIN 81 MG CHEWABLE TABLET 162 MG: 81 TABLET CHEWABLE at 09:10

## 2025-05-03 ASSESSMENT — ACTIVITIES OF DAILY LIVING (ADL)
ADLS_ACUITY_SCORE: 48
ADLS_ACUITY_SCORE: 43
ADLS_ACUITY_SCORE: 44
ADLS_ACUITY_SCORE: 48
ADLS_ACUITY_SCORE: 44
ADLS_ACUITY_SCORE: 43
ADLS_ACUITY_SCORE: 44
ADLS_ACUITY_SCORE: 45
ADLS_ACUITY_SCORE: 43
ADLS_ACUITY_SCORE: 48
ADLS_ACUITY_SCORE: 44
ADLS_ACUITY_SCORE: 43
ADLS_ACUITY_SCORE: 44
ADLS_ACUITY_SCORE: 43
ADLS_ACUITY_SCORE: 44
ADLS_ACUITY_SCORE: 43
ADLS_ACUITY_SCORE: 43
ADLS_ACUITY_SCORE: 48

## 2025-05-03 NOTE — PROGRESS NOTES
[facility]    Internal Medicine Hospitalist Progress Note  05/03/2025  I evaluated patient on the above date.    Assessment & Plan   Raul Wilhelm is a 79 year old male with history including asthma; DM2; HTN; known severe multivessel CAD with prior stents (1/2025); h/o CVA; who presented to [facility] 4/25/2025 with chest pain and found with NSTEMI. Transferred to [facility] 4/25/2025 for management.       NSTEMI with severe multivessel CAD - s/p CABG 2v 5/1/2025 (LIMA to LAD and SVG to diagonal).  Ischemic cardiomyopathy.  Recent LCx and OM REDD (1/2025).   H/o HTN (benign essential).  HLD.  [PTA: asa 81 mg daily; atorvastatin 80 mg at bedtime; carvedilol 6.25 mg BID; losartan-HCTZ 50-12.5 mg daily; ticagrelor 90 mg BID; PRN NTG.]  * H/o STEMI 11/2025 s/p LPDA and LCx stents. H/o STEMI 1/2025. Heart catheterization 1/2025 showed severe multivessel CAD involving ostial-prox LAD, mid LAD, prox LCX, and large OM branch; stents placed to LCx and OM branch and there was plan for outpatient discussion regarding surgical revascularization vs long stents for LAD disease, but he had not yet had this follow-up. Echo 1/2025 showed LVEF 55-60%, mild LVH, no regional wall motion abnormalities; no significant valve disease.  * Initial presentation to outside hospital as above. Started on heparin gtt.  * Transferred to [facility] 4/25. Cardiology consulted on admit.  * Later 4/25: Echo showed LVEF 45-50% with WMA's, grade 1 diastolic dysfunction; RV OK. Angiogram showed LM with ostial 60%; mid LAD with diffuse 50%, LAD ostium covered by CX stent, appeared to be up to 80% on some views, diffuse proximal 75% narrowing first diagonal 75%; LCx stents widely patent, no restenosis; CABG recommended. CVS consulted; ticagrelor held and started on cangrelor gtt bridging.  * 4/30: Some chest pain, but seemed more musculoskeletal type. ECG w/o acute ischemic changes.   * 5/1: Underwent CABG as noted. Subsequently  extubated.  * 5/2: Stable, off pressors, on 2L O2. Transferred to the floor..  - Post-op management per CVS.  - Continue BB, ASA, cangrelor gtt, transition back to PTA ticagrelor when able per CVS.  - Restart statin and ARB when able per CVS.  - Continue scheduled acetaminophen, PRN methocarbamol, PRN oxycodone, PRN IV hydromorphone; minimize opioids as able.  - Continue to monitor i/o's, daily wts.  - Planning TCU after surgery (per pt preference) as he lives alone - he is interested in the following TCUs ([facility] [place] [facility] [place] and [facility]), SW following.     DM2.   [PTA: glargine 20U at bedtime; semaglutide (pt not taking due to cost).]  * HgbA1C 7.1 2/2025.   * 5/1: On insulin gtt post-op   * 5/2: Off insulin gtt. Tolerating some oral intake.  Recent Labs   Lab 05/03/25  0541 05/03/25  0152 05/02/25  2107 05/02/25  1735 05/02/25  1302 05/02/25  0627   * 128* 159* 174* 190* 156*     Recent Labs   Lab Test 02/19/25  0832 01/04/25  1709   A1C 7.1* 8.1*   - Continue moderate CHO diet.  - Cont glargine at 10U at bedtime..  - Cont aspart 1U/CHO U with meals and snacks.  - Adjust regimen as indicated.   - Continue aspart ISS (medium).  - Continue PRN hypoglycemia protocol.    Blood loss anemia.  * Hgb normal on admit.  * Hgb decreased related to CABG, not requiring prbc's.  Recent Labs   Lab 05/03/25  0541 05/02/25  0406 05/01/25  1258 05/01/25  1111 05/01/25  1110 05/01/25  1031   HGB 10.6* 11.4* 10.9* 9.8* 9.6* 9.3*   - Continue to monitor CBC - repeat in am.  - Consider prbc transfusion if hgb </= 7.0 or if significant bleeding with hemodynamic instability or if symptomatic.    Thrombocytopenia, suspect consumptive related to surgery, resolved.  * Platelets normal on admit.  * Platelets decreased during surgery requiring platelet transfusion.  * Platelets normal 5/2.    Leukocytosis, suspect reactive.  * WBC 12.3 on admit. Pt afebrile, no clear signs of infection on admission.  * WBC  normalized 4/27.  * WBC increased after CABG.  Recent Labs   Lab 05/03/25  0541 05/02/25  0406 05/01/25  1258 05/01/25  1110 05/01/25  0554 04/30/25  1219   WBC 11.1* 15.0* 17.2* 21.4* 9.2 9.1   - Continue to monitor for signs of infection.  - Continue to monitor CBC.     Asthma.  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  - Continue fluticasone-vilanterol, tiotropium, montelukast, PRN inhalers/nebs     Zenkers diverticulum.  GERD.  Chronic Dysphagia.  * Noted during Jan 2025 admit. Noted diverticulum does not empty fully with multiple swallows and gives him regurgitation/dysphagia symptoms. He elected not to pursue ENT outpatient. PTA on BID omeprazole.  - Continue pantoprazole.    Back pain, suspect musculoskeletal.  * 4/28: Pt c/o back pain suspected due to the hospital bed.  - Continue PRN acetaminophen.     H/o frequent skin abscesses.  +MRSA.  * 3/2025 with abscess of right buttock that was +MRSA. Most recent at right neck Mid 4/2025, appears to be resolving.    * 4/29: CVS ordered 10d course of mupirocin ointment on nares.  - Continue to monitor clinically.  - Continue mupirocin oint to nares (started 4/30) for 10d (stop after 5/9).    H/o arterial ischemic stroke.  - Continue antiplatelets and statin as noted.     Pulmonary nodule RUL, 0.3cm, noted on CT (1/2025).  - Follow-up CT chest due Jan 2026.    Constipation:  - Bowel regimen.    Diet: Combination Diet Moderate Consistent Carb (60 g CHO per Meal) Diet; Low Saturated Fat Na <2400mg Diet    Prophylaxis: PCD's, ambulation, and heparin SQ  Diaz Catheter: PRESENT, indication: Surgical procedure  Lines: None     Code Status: Full Code    Disposition Plan   Medically Ready for Discharge: Anticipated in 5+ Days  Expected discharge to transitional care unit (TCU) pending above.    Entered: Jospeh Whittaker MD 05/03/2025, 8:00 AM         Interval History   Denies cp/sob.  Reports that he is constipated.   Oriented x3.    * Data reviewed today: I reviewed all  "new labs and imaging over the last 24 hours. I personally reviewed no images or ECG's today.    Physical Exam   Most recent vitals:   , Blood pressure 123/76, pulse 105, temperature 98.3  F (36.8  C), temperature source Oral, resp. rate 22, height 1.74 m (5' 8.5\"), weight 70.8 kg (156 lb), SpO2 95%. O2 Device: None (Room air) Oxygen Delivery: 2 LPM  Vitals:    04/26/25 0700 05/02/25 0400 05/03/25 0337   Weight: 65.9 kg (145 lb 4.5 oz) 72.8 kg (160 lb 7.9 oz) 70.8 kg (156 lb)     Vital signs with ranges:  Temp:  [97.7  F (36.5  C)-98.8  F (37.1  C)] 98.3  F (36.8  C)  Pulse:  [] 105  Resp:  [16-22] 22  BP: ()/(53-76) 123/76  SpO2:  [90 %-98 %] 95 %  Patient Vitals for the past 24 hrs:   BP Temp Temp src Pulse Resp SpO2 Weight   05/03/25 0731 123/76 98.3  F (36.8  C) Oral 105 22 95 % --   05/03/25 0337 104/59 98  F (36.7  C) Oral 101 18 93 % 70.8 kg (156 lb)   05/02/25 2332 94/53 98.4  F (36.9  C) Oral 95 18 95 % --   05/02/25 1947 110/65 98.2  F (36.8  C) Oral 78 18 96 % --   05/02/25 1724 114/56 97.7  F (36.5  C) Oral 86 16 96 % --   05/02/25 1600 103/53 -- -- 91 -- 96 % --   05/02/25 1500 122/64 -- -- 92 -- 96 % --   05/02/25 1400 113/56 -- -- 92 -- 98 % --   05/02/25 1300 106/53 -- -- 98 -- 97 % --   05/02/25 1200 122/62 98.8  F (37.1  C) Oral 96 -- 93 % --   05/02/25 1100 119/59 -- -- 101 -- 98 % --   05/02/25 1000 116/59 -- -- 92 -- 97 % --   05/02/25 0900 105/57 -- -- 99 -- (!) 90 % --     I/O's last 24 hours:  I/O last 3 completed shifts:  In: 1601.27 [P.O.:170; I.V.:1431.27]  Out: 1920 [Urine:1370; Chest Tube:550]    Constitutional: awake, alert, oriented, fatigued appearing   Head:   Eyes:   ENT:   Neck:   Cardiovascular: regular rate and rhythm; no murmurs, rubs or gallops  Lungs: diminished in the bases, no crackles or wheezes  Gastrointestinal/Abdomen: soft, non-tender, non-distended, positive bowel sounds  :   Musculoskeletal:   Skin/Extremities:   Neurologic:   Psychiatric: "   Hematologic/Lymphatic/Immunologic:          Labs reviewed.  Recent Labs   Lab 05/03/25  0541 05/03/25  0152 05/02/25  2107 05/02/25  0627 05/02/25  0406 05/01/25  1305 05/01/25  1258 05/01/25  1111 05/01/25  1110   WBC 11.1*  --   --   --  15.0*  --  17.2*  --  21.4*   HGB 10.6*  --   --   --  11.4*  --  10.9*   < > 9.6*   MCV 98  --   --   --  92  --  91  --  91   *  --   --   --  185  --  159  --  104*   INR  --   --   --   --   --   --  1.22*  --  1.52*     --   --   --  137  --  140   < > 140   POTASSIUM 3.9  --   --   --  4.0  --  4.3   < > 4.1   CHLORIDE 106  --   --   --  103  --  107  --  109*   CO2 16*  --   --   --  20*  --  21*  --  22   BUN 14.4  --   --   --  15.0  --  14.3  --  14.8   CR 0.75  --   --   --  0.88  --  0.86  --  0.87   ANIONGAP 14  --   --   --  14  --  12  --  9   JOSE RAFAEL 8.1*  --   --   --  8.0*  --  8.2*  --  8.8   * 128* 159*   < > 132*   < > 159*   < > 152*   ALBUMIN  --   --   --   --  3.3*  --  3.4*  --   --    PROTTOTAL  --   --   --   --  5.0*  --  4.9*  --   --    BILITOTAL  --   --   --   --  0.9  --  1.3*  --   --    ALKPHOS  --   --   --   --  60  --  57  --   --    ALT  --   --   --   --  35  --  54  --   --    AST  --   --   --   --  47*  --  56*  --   --     < > = values in this interval not displayed.     Recent Labs   Lab Test 04/25/25  1032 04/25/25  0822 04/25/25  0226 01/17/25  1317 01/17/25  1015 01/04/25  1954   NT-PROBNP, INPATIENT  --   --   --   --  467 562   TROPONIN T HIGH SENSITIVITY 81* 95* 40*   < > 118*  --     < > = values in this interval not displayed.     Recent Labs   Lab 05/03/25  0541 05/03/25  0152 05/02/25  2107 05/02/25  1735 05/02/25  1302 05/02/25  0627   * 128* 159* 174* 190* 156*     Recent Labs   Lab Test 02/19/25  0832 01/04/25  1709   A1C 7.1* 8.1*       Recent Labs   Lab 05/01/25  1258 05/01/25  1110   INR 1.22* 1.52*     Recent Labs   Lab 05/03/25  0541 05/02/25  0406 05/01/25  1258 05/01/25  1111 05/01/25  1110  05/01/25  1031 05/01/25  1003 05/01/25  0934 05/01/25  0933 05/01/25  0800 05/01/25  0554 04/30/25  1219   WBC 11.1* 15.0* 17.2*  --  21.4*  --   --   --   --   --  9.2 9.1   FIBR  --   --   --   --  240  --   --   --   --   --   --   --    LACT  --   --  0.9 1.2  --  0.9 0.9 0.7 0.7   < >  --   --     < > = values in this interval not displayed.       MICRO:  Cultures (including blood and urine):  No lab results found in last 7 days.    No results found for this or any previous visit (from the past 24 hours).      Medications   All medications were reviewed. MAR.    Infusions:  Current Facility-Administered Medications   Medication Dose Route Frequency Provider Last Rate Last Admin    cangrelor ANTIPLATELET (KENGREAL) 50 mg in sodium chloride 0.9 % 250 mL infusion  0.75 mcg/kg/min Intravenous Continuous vEelin Martin NP 16.4 mL/hr at 05/03/25 0032 0.75 mcg/kg/min at 05/03/25 0032     Scheduled Medications:  Current Facility-Administered Medications   Medication Dose Route Frequency Provider Last Rate Last Admin    acetaminophen (TYLENOL) tablet 975 mg  975 mg Oral or Feeding Tube Q8H Evelin Martin NP   975 mg at 05/03/25 0547    aspirin (ASA) chewable tablet 162 mg  162 mg Oral or NG Tube Daily Evelin Martin NP   162 mg at 05/02/25 0803    atorvastatin (LIPITOR) tablet 80 mg  80 mg Oral At Bedtime Evelin Martin NP   80 mg at 05/02/25 2107    fluticasone-vilanterol (BREO ELLIPTA) 100-25 MCG/ACT inhaler 1 puff  1 puff Inhalation Daily Evelin Martin NP        heparin ANTICOAGULANT injection 5,000 Units  5,000 Units Subcutaneous Q8H Evelin Martin NP   5,000 Units at 05/03/25 0341    insulin aspart (NovoLOG) injection (RAPID ACTING)  1-7 Units Subcutaneous TID AC Evelin Martin NP   1 Units at 05/02/25 1742    insulin aspart (NovoLOG) injection (RAPID ACTING)  1-5 Units Subcutaneous At Bedtime Evelin Martin, SURESH        insulin aspart (NovoLOG) injection (RAPID ACTING)   Subcutaneous TID w/meals Evelin Martin  SURESH MCKENZIE   1 Units at 05/02/25 1303    insulin glargine (LANTUS PEN) injection 10 Units  10 Units Subcutaneous At Bedtime Evelin Martin NP   10 Units at 05/02/25 2312    metoprolol tartrate (LOPRESSOR) half-tab 12.5 mg  12.5 mg Oral BID Evelin Martin NP   12.5 mg at 05/02/25 2100    montelukast (SINGULAIR) tablet 10 mg  10 mg Oral At Bedtime Evelin Martin NP   10 mg at 05/02/25 2101    pantoprazole (PROTONIX) 2 mg/mL suspension 40 mg  40 mg Oral or NG Tube Daily Evelin Martin NP        Or    pantoprazole (PROTONIX) EC tablet 40 mg  40 mg Oral Daily Evelin Martin NP   40 mg at 05/02/25 0803    pantoprazole (PROTONIX) IV push injection 40 mg  40 mg Intravenous QAM AC Evelin Martin NP   40 mg at 05/03/25 0546    polyethylene glycol (MIRALAX) Packet 17 g  17 g Oral or Feeding Tube BID Evelin Martin NP   17 g at 05/02/25 2100    senna-docusate (SENOKOT-S/PERICOLACE) 8.6-50 MG per tablet 1 tablet  1 tablet Oral or Feeding Tube BID Evelin Martin NP   1 tablet at 05/02/25 2100    sodium chloride (PF) 0.9% PF flush 3 mL  3 mL Intracatheter Q8H ZHANG Evelin Martin NP   3 mL at 05/03/25 0547    umeclidinium (INCRUSE ELLIPTA) 62.5 MCG/ACT inhaler 1 puff  1 puff Inhalation Daily Evelin Martin NP   1 puff at 05/02/25 1220     PRN Medications:  Current Facility-Administered Medications   Medication Dose Route Frequency Provider Last Rate Last Admin    [START ON 5/4/2025] bisacodyl (DULCOLAX) suppository 10 mg  10 mg Rectal Daily PRN Evelin Martin NP        calcium carbonate (TUMS) chewable tablet 500 mg  500 mg Oral 4x Daily PRN Evelin Martin NP   500 mg at 05/03/25 0547    glucose gel 15-30 g  15-30 g Oral Q15 Min PRN Evelin Martin NP        Or    dextrose 50 % injection 25-50 mL  25-50 mL Intravenous Q15 Min PRN Evelin Martin NP        Or    glucagon injection 1 mg  1 mg Subcutaneous Q15 Min PRN Evelin Martin NP        furosemide (LASIX) injection 20 mg  20 mg Intravenous Once PRN Evelin Martin, NP         hydrALAZINE (APRESOLINE) injection 10 mg  10 mg Intravenous Q30 Min PRN Evelin Martin NP   10 mg at 05/02/25 0533    HYDROmorphone (DILAUDID) injection 0.2 mg  0.2 mg Intravenous Q2H PRN Evelin Martin NP   0.2 mg at 05/02/25 0803    insulin aspart (NovoLOG) injection (RAPID ACTING)   Subcutaneous With Snacks or Supplements Evelin Martin NP        ipratropium - albuterol 0.5 mg/2.5 mg/3 mL (DUONEB) neb solution 3 mL  1 vial Nebulization Q6H PRN Evelin Martin NP        lidocaine (LMX4) cream   Topical Q1H PRN Evelin Martin NP        lidocaine 1 % 0.1-1 mL  0.1-1 mL Other Q1H PRN Evelin Martin NP        magnesium hydroxide (MILK OF MAGNESIA) suspension 30 mL  30 mL Oral Daily PRN Evelin Martin NP        methocarbamol (ROBAXIN) half-tab 250 mg  250 mg Oral Q6H PRN Evelin Martin NP   250 mg at 05/03/25 0159    naloxone (NARCAN) injection 0.2 mg  0.2 mg Intravenous Q2 Min PRN Evelin Martin NP        Or    naloxone (NARCAN) injection 0.4 mg  0.4 mg Intravenous Q2 Min PRN Evelin Martin NP        Or    naloxone (NARCAN) injection 0.2 mg  0.2 mg Intramuscular Q2 Min PRN Evelin Martin NP        Or    naloxone (NARCAN) injection 0.4 mg  0.4 mg Intramuscular Q2 Min PRN Evelin Martin NP        ondansetron (ZOFRAN ODT) ODT tab 4 mg  4 mg Oral Q6H PRN Evelin Martin NP        Or    ondansetron (ZOFRAN) injection 4 mg  4 mg Intravenous Q6H PRN Evelin Martin NP        oxyCODONE (ROXICODONE) tablet 5 mg  5 mg Oral Q4H PRN Evelin Martin NP   5 mg at 05/02/25 0040    Or    oxyCODONE (ROXICODONE) tablet 10 mg  10 mg Oral Q4H PRN Evelin Martin NP   10 mg at 05/02/25 1557    prochlorperazine (COMPAZINE) injection 5 mg  5 mg Intravenous Q6H PRN Evelin Martin, NP        Or    prochlorperazine (COMPAZINE) tablet 5 mg  5 mg Oral Q6H PRN Evelin Martin, NP        sodium chloride (PF) 0.9% PF flush 3 mL  3 mL Intracatheter q1 min prn Evelin Martin, NP

## 2025-05-03 NOTE — PLAN OF CARE
"Patient Name: Louise  MRN: [id number]  Date of Admission: 4/25/2025  Reason for Admission: CABG x2  Level of Care: [facility] [place]    Vitals:   BP Readings from Last 1 Encounters:   05/03/25 104/59     Pulse Readings from Last 1 Encounters:   05/03/25 101     Wt Readings from Last 1 Encounters:   05/03/25 70.8 kg (156 lb)     Ht Readings from Last 1 Encounters:   04/25/25 1.74 m (5' 8.5\")     Estimated body mass index is 23.37 kg/m  as calculated from the following:    Height as of this encounter: 1.74 m (5' 8.5\").    Weight as of this encounter: 70.8 kg (156 lb).  Temp Readings from Last 1 Encounters:   05/03/25 98  F (36.7  C) (Oral)       Pain: Pain goal 0 Pain Rating 4 Effective pain medication/regimen robaxin, tylenol     CV Surgery Patient: Yes Post Op Day #: 2    Assessment    General: Afebrile yes  Rhythm: premature atrial contractions (PAC)  Resp: Oxygen Status: RA  Patient slept last night Yes Approx hours slept 4  Incentive Spirometry Q 1-2 hour when awake: yes   Neuro: Alert yes Orientation: self, person, time, and place  GI/:          Bowel Activity: no           Bowel Medications: not applicable          Urinary Catheter: yes  Skin:          Incision: Incision status: healing well          Epicardial Pacing Wires: yes  Chest Tubes   Pleural: yes Draining: yes               Suction: yes              Mediastinal: yes Draining: yes               Suction: yes   Dressing Change Daily: yes     Assessment    Resp: Lung sounds clear. On room air  Telemetry: SR w/ PACs  Neuro: A+Ox4 but intermittently confused and anxious  GI/: abarca w/ adequate output. Bowels active, flatus+, no BM  Skin/Wounds: Sternotomy, LLE harvest sites, rash on buttocks and perineum  Lines/Drains: CTx3 to suction, dressing CDI, no air leak. PIV x2  Activity: up A1  Sleep: fair  Abnormal Labs: BG ACHS K,Mg,phos protocols    Aggression Stop Light: Green          Patient Care Plan: continue plan of care   "

## 2025-05-03 NOTE — PROGRESS NOTES
[facility]  Cardiovascular and Thoracic Surgery Daily Note      Assessment and Plan  Raul Wilhelm is a 79 year old male with a past medical history of HTN, HLD, GERD, Zenkers Diverticulum, COPD, Asthma, DMII, CVA, frequent MRSA abscesses, CAD with STEMI s/p REDD to LPDA, mid CX in 2015 and REDD to prox LCx and OM in January 2025 at [facility] and on Brilinta, with known residual LAD and D1 disease. Patient presented on 4/25/25 with chest pain in the center of his chest that radiated to his belly. He underwent TTE with severe hypokinesis of mid to distal aneroseptal and apical walls, EF 45-50% and coronary angiogram which revealed residual LMCA and proximal LAD disease. CV surgery consulted for consideration of surgical revascularization of LMCA disease and proximal LAD disease.      POD # 2 s/p CABG x2 (LIMA-LAD, V-Diagonal) on 5/1/25 with Dr. Michael Mulvihill    - CVS: Pre-op TTE with LVEF 45-50%, severe hypokinesis of the mid to distal anteroseptal and apical walls, preserved RV function.   HD stable overnight in NSR.  Postop vasoplegic shock, resolved.   Continue  mg daily (drop to 81 mg after brilinta resumed), increase metoprolol tartrate to 25 mg BID with hold parameters, atorvastatin 80 mg daily  Hypervolemic- will trial diuresis today with 20mg IV lasix   Had REDD placed in Jan 2025, continue cangrelor until pacer wires/CTs out then resume PTA brilinta   Chest tubes: in place to suction with ssang output, leave in. TPW: cap   PTA meds on hold: carvedilol, hyzaar, brilinta    - Resp: Hx COPD and asthma. Postoperative mechanical ventilation, resolved. Extubated POD 0 now saturating well on 2 LPM. IS, pulmonary toilet. PTA inhalers and duonebs resumed : spiriva, breztri aerosphere, albuterol. PTA singulair resumed.    - Neuro: Hx CVA. Neuros intact, pain controlled on current regimen    - Renal: No history of significant renal disease. Cr stable. Trend BMP. Volume management as  above.  Recent Labs   Lab 05/03/25  0541 05/02/25  0406 05/01/25  1258   CR 0.75 0.88 0.86       - GI: no BM, + flatus, continue bowel regimen    - : ok to remove abarca today    - Endo: DM II. Postop stress hyperglycemia. Insulin infusion transitioned to sliding scale insulin. PTA meds on hold: lantus 20 units at HS, ozempic. Lantus 10 units daily resumed per hospitalist. Hospitalist following for glycemic control  Hemoglobin A1C   Date Value Ref Range Status   02/19/2025 7.1 (H) 0.0 - 5.6 % Final     Comment:     Normal <5.7%   Prediabetes 5.7-6.4%    Diabetes 6.5% or higher     Note: Adopted from ADA consensus guidelines.   04/09/2021 8.5 (H) 0 - 5.6 % Final     Comment:     Normal <5.7% Prediabetes 5.7-6.4%  Diabetes 6.5% or higher - adopted from ADA   consensus guidelines.          - FEN: Replace electrolytes as needed. ADAT.     - ID: Postop leukocytosis. Afebrile. Periop abx prophylaxis completing. Trend CBC and fever curve.   Recent Labs   Lab 05/03/25  0541 05/02/25  0406 05/01/25  1258   WBC 11.1* 15.0* 17.2*       - Heme: Acute blood loss anemia and thrombocytopenia due to surgery. Hgb and PLT stable. Trend CBC, transfuse PRN.   Recent Labs   Lab 05/03/25  0541 05/02/25  0406 05/01/25  1258   HGB 10.6* 11.4* 10.9*   * 185 159       - Proph: SCD, subcutaneous heparin, PPI    - Other:  Clinically Significant Risk Factors          # Hyperchloremia: Highest Cl = 109 mmol/L in last 2 days, will monitor as appropriate      # Hypocalcemia: Lowest Ca = 8 mg/dL in last 2 days, will monitor and replace as appropriate     # Hypoalbuminemia: Lowest albumin = 3.3 g/dL at 5/2/2025  4:06 AM, will monitor as appropriate    # Coagulation Defect: INR = 1.22 (Ref range: 0.85 - 1.15) and/or PTT = 36 Seconds (Ref range: 22 - 38 Seconds), will monitor for bleeding  # Thrombocytopenia: Lowest platelets = 104 in last 2 days, will monitor for bleeding   # Hypertension: Noted on problem list           # DMII: A1C = 7.1 %  (Ref range: 0.0 - 5.6 %) within past 6 months         # Financial/Environmental Concerns: none  # COPD: noted on problem list  # History of CABG: noted on surgical history       - Dispo: IMC. Continue chest tubes. Continue therapies- anticipate need for TCU at discharge. Medically Ready for Discharge: Anticipated in 2-4 Days Dispo pending chest tube removal, diuresis, return of bowel and bladder function, safe dispo plan       Interval History  No acute events overnight.  Pain controlled. Tolerating diet. +flatus. Feels urge for BM. Saturating well on room air.     Medications  Current Facility-Administered Medications   Medication Dose Route Frequency Provider Last Rate Last Admin    acetaminophen (TYLENOL) tablet 975 mg  975 mg Oral or Feeding Tube Q8H Evelin Martin NP   975 mg at 05/03/25 0547    aspirin (ASA) chewable tablet 162 mg  162 mg Oral or NG Tube Daily Evelin Martin NP   162 mg at 05/02/25 0803    atorvastatin (LIPITOR) tablet 80 mg  80 mg Oral At Bedtime Evelin Martin NP   80 mg at 05/02/25 2107    fluticasone-vilanterol (BREO ELLIPTA) 100-25 MCG/ACT inhaler 1 puff  1 puff Inhalation Daily Evelin Martin NP        heparin ANTICOAGULANT injection 5,000 Units  5,000 Units Subcutaneous Q8H Evelin Martin NP   5,000 Units at 05/03/25 0341    insulin aspart (NovoLOG) injection (RAPID ACTING)  1-7 Units Subcutaneous TID AC Evelin Martin NP   1 Units at 05/02/25 1742    insulin aspart (NovoLOG) injection (RAPID ACTING)  1-5 Units Subcutaneous At Bedtime Evelin Martin NP        insulin aspart (NovoLOG) injection (RAPID ACTING)   Subcutaneous TID w/meals Evelin Martin NP   1 Units at 05/02/25 1303    insulin glargine (LANTUS PEN) injection 10 Units  10 Units Subcutaneous At Bedtime Evelni Martin NP   10 Units at 05/02/25 2312    metoprolol tartrate (LOPRESSOR) half-tab 12.5 mg  12.5 mg Oral BID Evelin Martin NP   12.5 mg at 05/02/25 2100    montelukast (SINGULAIR) tablet 10 mg  10 mg Oral At  Bedtime Evelin Martin NP   10 mg at 05/02/25 2101    pantoprazole (PROTONIX) 2 mg/mL suspension 40 mg  40 mg Oral or NG Tube Daily Evelin Martin NP        Or    pantoprazole (PROTONIX) EC tablet 40 mg  40 mg Oral Daily Evelin Maritn NP   40 mg at 05/02/25 0803    pantoprazole (PROTONIX) IV push injection 40 mg  40 mg Intravenous QAM AC Evelin Martin NP   40 mg at 05/03/25 0546    polyethylene glycol (MIRALAX) Packet 17 g  17 g Oral or Feeding Tube BID Evelin Martin NP   17 g at 05/02/25 2100    senna-docusate (SENOKOT-S/PERICOLACE) 8.6-50 MG per tablet 1 tablet  1 tablet Oral or Feeding Tube BID Evelin Martin NP   1 tablet at 05/02/25 2100    sodium chloride (PF) 0.9% PF flush 3 mL  3 mL Intracatheter Q8H ZHANG Evelin Martin NP   3 mL at 05/03/25 0547    umeclidinium (INCRUSE ELLIPTA) 62.5 MCG/ACT inhaler 1 puff  1 puff Inhalation Daily Evelin Martin NP   1 puff at 05/02/25 1220     Current Facility-Administered Medications   Medication Dose Route Frequency Provider Last Rate Last Admin    [START ON 5/4/2025] bisacodyl (DULCOLAX) suppository 10 mg  10 mg Rectal Daily PRN Evelin Martin NP        calcium carbonate (TUMS) chewable tablet 500 mg  500 mg Oral 4x Daily PRN Evelin Martin NP   500 mg at 05/03/25 0547    glucose gel 15-30 g  15-30 g Oral Q15 Min PRN Evelin Martin NP        Or    dextrose 50 % injection 25-50 mL  25-50 mL Intravenous Q15 Min PRN Evelin Martin NP        Or    glucagon injection 1 mg  1 mg Subcutaneous Q15 Min PRN Evelin Martin NP        furosemide (LASIX) injection 20 mg  20 mg Intravenous Once PRN Evelin Martin NP        hydrALAZINE (APRESOLINE) injection 10 mg  10 mg Intravenous Q30 Min PRN Evelin Martin NP   10 mg at 05/02/25 0533    HYDROmorphone (DILAUDID) injection 0.2 mg  0.2 mg Intravenous Q2H PRN Evelin Martin NP   0.2 mg at 05/02/25 0803    insulin aspart (NovoLOG) injection (RAPID ACTING)   Subcutaneous With Snacks or Supplements Evelin Martin, NP      "   ipratropium - albuterol 0.5 mg/2.5 mg/3 mL (DUONEB) neb solution 3 mL  1 vial Nebulization Q6H PRN Evelin Martin NP        lidocaine (LMX4) cream   Topical Q1H PRN Evelin Martin NP        lidocaine 1 % 0.1-1 mL  0.1-1 mL Other Q1H PRN Evelin Martin NP        magnesium hydroxide (MILK OF MAGNESIA) suspension 30 mL  30 mL Oral Daily PRN Evelin Martin NP        methocarbamol (ROBAXIN) half-tab 250 mg  250 mg Oral Q6H PRN Evelin Martin NP   250 mg at 05/03/25 0159    naloxone (NARCAN) injection 0.2 mg  0.2 mg Intravenous Q2 Min PRN Evelin Martin NP        Or    naloxone (NARCAN) injection 0.4 mg  0.4 mg Intravenous Q2 Min PRN Evelin Martin NP        Or    naloxone (NARCAN) injection 0.2 mg  0.2 mg Intramuscular Q2 Min PRN Evelin Martin NP        Or    naloxone (NARCAN) injection 0.4 mg  0.4 mg Intramuscular Q2 Min PRN Evelin Martin NP        ondansetron (ZOFRAN ODT) ODT tab 4 mg  4 mg Oral Q6H PRN Evelin Martin NP        Or    ondansetron (ZOFRAN) injection 4 mg  4 mg Intravenous Q6H PRN Evelin Martin NP        oxyCODONE (ROXICODONE) tablet 5 mg  5 mg Oral Q4H PRN Evelin Martin NP   5 mg at 05/02/25 0040    Or    oxyCODONE (ROXICODONE) tablet 10 mg  10 mg Oral Q4H PRN Evelin Martin NP   10 mg at 05/02/25 1557    prochlorperazine (COMPAZINE) injection 5 mg  5 mg Intravenous Q6H PRN Evelin Martin NP        Or    prochlorperazine (COMPAZINE) tablet 5 mg  5 mg Oral Q6H PRN Evelin Martin NP        sodium chloride (PF) 0.9% PF flush 3 mL  3 mL Intracatheter q1 min prn Evelin Martin NP             Physical Exam  Vitals were reviewed  Blood pressure 104/59, pulse 101, temperature 98  F (36.7  C), temperature source Oral, resp. rate 18, height 1.74 m (5' 8.5\"), weight 70.8 kg (156 lb), SpO2 93%.  Rhythm: NSR    Lungs: diminished throughout on room air    Cardiovascular: Distant, S1, S2, RRR, no m/r/g    Abdomen: soft, NT, ND, +BS    Extremeties: warm, no LE edema    Incision: Sternal incision " CDI, eccymotic, LLE incisions CDI    CT: serosang output, no air leak    Weight:   Vitals:    04/25/25 0806 04/26/25 0700 05/02/25 0400 05/03/25 0337   Weight: 67.8 kg (149 lb 6.4 oz) 65.9 kg (145 lb 4.5 oz) 72.8 kg (160 lb 7.9 oz) 70.8 kg (156 lb)         Data  Recent Labs   Lab 05/03/25  0541 05/03/25  0152 05/02/25  2107 05/02/25  0627 05/02/25  0406 05/01/25  1305 05/01/25  1258 05/01/25  1111 05/01/25  1110   WBC 11.1*  --   --   --  15.0*  --  17.2*  --  21.4*   HGB 10.6*  --   --   --  11.4*  --  10.9*   < > 9.6*   MCV 98  --   --   --  92  --  91  --  91   *  --   --   --  185  --  159  --  104*   INR  --   --   --   --   --   --  1.22*  --  1.52*     --   --   --  137  --  140   < > 140   POTASSIUM 3.9  --   --   --  4.0  --  4.3   < > 4.1   CHLORIDE 106  --   --   --  103  --  107  --  109*   CO2 16*  --   --   --  20*  --  21*  --  22   BUN 14.4  --   --   --  15.0  --  14.3  --  14.8   CR 0.75  --   --   --  0.88  --  0.86  --  0.87   ANIONGAP 14  --   --   --  14  --  12  --  9   JOSE RAFAEL 8.1*  --   --   --  8.0*  --  8.2*  --  8.8   * 128* 159*   < > 132*   < > 159*   < > 152*   ALBUMIN  --   --   --   --  3.3*  --  3.4*  --   --    PROTTOTAL  --   --   --   --  5.0*  --  4.9*  --   --    BILITOTAL  --   --   --   --  0.9  --  1.3*  --   --    ALKPHOS  --   --   --   --  60  --  57  --   --    ALT  --   --   --   --  35  --  54  --   --    AST  --   --   --   --  47*  --  56*  --   --     < > = values in this interval not displayed.       Imaging:  No results found for this or any previous visit (from the past 24 hours).        Patient seen and discussed with Dr. Mulvihill Brittany Smith, PA-C  Cardiothoracic Surgery  Pager: [phone]    CV Surgery Rounding Pager: [phone]  After hours please page surgeon on-call

## 2025-05-03 NOTE — PROGRESS NOTES
A&O. VSS on RA. Tele SR. CMS intact. Lungs clear. PIV infusing antiplatelet gtt. Given 20mg IV lasix. Pain managed with tylenol, given oxy 5mg x1. Ax1 ambulating. Voiding adequate. Had multiple BM's today after suppository. Tolerating mod carb diet, BG checks.

## 2025-05-03 NOTE — PLAN OF CARE
Problem: Cardiovascular Surgery  Goal: Absence of Bleeding  Outcome: Progressing  Intervention: Monitor and Manage Bleeding  Recent Flowsheet Documentation  Taken 5/2/2025 1724 by Tiffany Olvera RN  Bleeding Management: dressing monitored  Goal: Acceptable Pain Control  Outcome: Progressing  Intervention: Prevent or Manage Pain  Recent Flowsheet Documentation  Taken 5/2/2025 1736 by Tiffany Olvera RN  Pain Management Interventions: medication (see MAR)  Taken 5/2/2025 1724 by Tiffany Olvera RN  Complementary Therapy: music therapy provided     Problem: Cardiovascular Surgery  Goal: Optimal Cerebral Tissue Perfusion  Outcome: Met  Intervention: Protect and Optimize Cerebral Perfusion  Recent Flowsheet Documentation  Taken 5/2/2025 1845 by Tiffany Olvera, RN  Head of Bed (HOB) Positioning: HOB at 30-45 degrees  Taken 5/2/2025 1724 by Tiffany Olvera RN  Sensory Stimulation Regulation:   lighting decreased   care clustered   music on   quiet environment promoted  Head of Bed (HOB) Positioning: HOB at 30-45 degrees  Goal: Anesthesia/Sedation Recovery  Outcome: Met  Intervention: Optimize Anesthesia Recovery  Recent Flowsheet Documentation  Taken 5/2/2025 1724 by Tiffany Olvera RN  Safety Promotion/Fall Prevention:   activity supervised   clutter free environment maintained   increased rounding and observation   increase visualization of patient   lighting adjusted   mobility aid in reach   nonskid shoes/slippers when out of bed  Administration (IS):   instruction provided, follow-up   refused  Reorientation Measures: reorientation provided  Goal: Nausea and Vomiting Relief  Outcome: Met  Goal: Effective Oxygenation and Ventilation  Outcome: Met  Intervention: Promote Airway Secretion Clearance  Recent Flowsheet Documentation  Taken 5/2/2025 1724 by Tiffany Olvera, RN  Administration (IS):   instruction provided, follow-up   refused  Cough And Deep Breathing: done with encouragement   Goal Outcome Evaluation:    Orientations: X4, forgetful -  "more confused as patient became drowsy at bedtime.  Vitals/Pain: /65 (BP Location: Right arm, Cuff Size: Adult Regular)   Pulse 86   Temp 98.2  F (36.8  C) (Oral)   Resp 18   Ht 1.74 m (5' 8.5\")   Wt 72.8 kg (160 lb 7.9 oz)   SpO2 96%   BMI 24.05 kg/m    Reports moderate pain and reports pain goal is for mild, tolerable pain. Accepted PRN robaxin and scheduled tylenol.   Tele: SR  Lines/Drains: PIV in left forearm and right AC  Skin/Wounds: Pale, scattered bruising and scabs on extremities, Rash on buttocks and perineum. Midsternal incision site, 3 Mid chest CT insertion sites, left thigh and left tibial graft sites - all surgical sites are clean dry intact with slight redness/bruising and covered with liquid bandage.   GI/: Abdominal fullness, passing gas. Diaz catheter in place.   Labs: WBC 15.   Electrolyte Replacement- no replacement needed today  Ambulation/Assist: SBA to 1 assist gait belt walker. Up to chair for dinner for only 20 minutes than insisted back to bed.   Sleep Quality: reports poor sleep and trouble falling asleep  Plan: continue to evaluate chest tubes. Encourage activity/rest. Pain MGMT. Monitor BG.       Tiffany Olvera RN on 5/2/2025 at 10:49 PM    "

## 2025-05-04 ENCOUNTER — APPOINTMENT (OUTPATIENT)
Dept: PHYSICAL THERAPY | Facility: CLINIC | Age: 80
DRG: 233 | End: 2025-05-04
Attending: INTERNAL MEDICINE
Payer: COMMERCIAL

## 2025-05-04 LAB
ANION GAP SERPL CALCULATED.3IONS-SCNC: 10 MMOL/L (ref 7–15)
BUN SERPL-MCNC: 14.3 MG/DL (ref 8–23)
CALCIUM SERPL-MCNC: 8.1 MG/DL (ref 8.8–10.4)
CHLORIDE SERPL-SCNC: 103 MMOL/L (ref 98–107)
CREAT SERPL-MCNC: 0.79 MG/DL (ref 0.67–1.17)
EGFRCR SERPLBLD CKD-EPI 2021: 90 ML/MIN/1.73M2
ERYTHROCYTE [DISTWIDTH] IN BLOOD BY AUTOMATED COUNT: 13.1 % (ref 10–15)
GLUCOSE BLDC GLUCOMTR-MCNC: 102 MG/DL (ref 70–99)
GLUCOSE BLDC GLUCOMTR-MCNC: 109 MG/DL (ref 70–99)
GLUCOSE BLDC GLUCOMTR-MCNC: 127 MG/DL (ref 70–99)
GLUCOSE BLDC GLUCOMTR-MCNC: 186 MG/DL (ref 70–99)
GLUCOSE BLDC GLUCOMTR-MCNC: 96 MG/DL (ref 70–99)
GLUCOSE SERPL-MCNC: 108 MG/DL (ref 70–99)
HCO3 SERPL-SCNC: 25 MMOL/L (ref 22–29)
HCT VFR BLD AUTO: 29.9 % (ref 40–53)
HGB BLD-MCNC: 9.7 G/DL (ref 13.3–17.7)
MAGNESIUM SERPL-MCNC: 2.1 MG/DL (ref 1.7–2.3)
MCH RBC QN AUTO: 30 PG (ref 26.5–33)
MCHC RBC AUTO-ENTMCNC: 32.4 G/DL (ref 31.5–36.5)
MCV RBC AUTO: 93 FL (ref 78–100)
PHOSPHATE SERPL-MCNC: 2.2 MG/DL (ref 2.5–4.5)
PLATELET # BLD AUTO: 158 10E3/UL (ref 150–450)
POTASSIUM SERPL-SCNC: 3.3 MMOL/L (ref 3.4–5.3)
POTASSIUM SERPL-SCNC: 3.7 MMOL/L (ref 3.4–5.3)
RBC # BLD AUTO: 3.23 10E6/UL (ref 4.4–5.9)
SODIUM SERPL-SCNC: 138 MMOL/L (ref 135–145)
WBC # BLD AUTO: 9.2 10E3/UL (ref 4–11)

## 2025-05-04 PROCEDURE — 250N000013 HC RX MED GY IP 250 OP 250 PS 637: Performed by: NURSE PRACTITIONER

## 2025-05-04 PROCEDURE — 97530 THERAPEUTIC ACTIVITIES: CPT | Mod: GP

## 2025-05-04 PROCEDURE — 250N000011 HC RX IP 250 OP 636: Performed by: NURSE PRACTITIONER

## 2025-05-04 PROCEDURE — 83735 ASSAY OF MAGNESIUM: CPT | Performed by: STUDENT IN AN ORGANIZED HEALTH CARE EDUCATION/TRAINING PROGRAM

## 2025-05-04 PROCEDURE — C9460 INJECTION, CANGRELOR: HCPCS | Mod: JZ | Performed by: NURSE PRACTITIONER

## 2025-05-04 PROCEDURE — 97110 THERAPEUTIC EXERCISES: CPT | Mod: GP

## 2025-05-04 PROCEDURE — 99232 SBSQ HOSP IP/OBS MODERATE 35: CPT | Performed by: HOSPITALIST

## 2025-05-04 PROCEDURE — 36415 COLL VENOUS BLD VENIPUNCTURE: CPT | Performed by: HOSPITALIST

## 2025-05-04 PROCEDURE — 258N000003 HC RX IP 258 OP 636: Performed by: STUDENT IN AN ORGANIZED HEALTH CARE EDUCATION/TRAINING PROGRAM

## 2025-05-04 PROCEDURE — 82947 ASSAY GLUCOSE BLOOD QUANT: CPT | Performed by: HOSPITALIST

## 2025-05-04 PROCEDURE — 250N000013 HC RX MED GY IP 250 OP 250 PS 637: Performed by: PHYSICIAN ASSISTANT

## 2025-05-04 PROCEDURE — 250N000009 HC RX 250: Performed by: STUDENT IN AN ORGANIZED HEALTH CARE EDUCATION/TRAINING PROGRAM

## 2025-05-04 PROCEDURE — 36415 COLL VENOUS BLD VENIPUNCTURE: CPT | Performed by: STUDENT IN AN ORGANIZED HEALTH CARE EDUCATION/TRAINING PROGRAM

## 2025-05-04 PROCEDURE — 250N000011 HC RX IP 250 OP 636: Mod: JZ | Performed by: NURSE PRACTITIONER

## 2025-05-04 PROCEDURE — 85018 HEMOGLOBIN: CPT | Performed by: HOSPITALIST

## 2025-05-04 PROCEDURE — 120N000013 HC R&B IMCU

## 2025-05-04 PROCEDURE — 84100 ASSAY OF PHOSPHORUS: CPT | Performed by: STUDENT IN AN ORGANIZED HEALTH CARE EDUCATION/TRAINING PROGRAM

## 2025-05-04 PROCEDURE — 84132 ASSAY OF SERUM POTASSIUM: CPT | Performed by: STUDENT IN AN ORGANIZED HEALTH CARE EDUCATION/TRAINING PROGRAM

## 2025-05-04 PROCEDURE — 250N000013 HC RX MED GY IP 250 OP 250 PS 637: Performed by: STUDENT IN AN ORGANIZED HEALTH CARE EDUCATION/TRAINING PROGRAM

## 2025-05-04 PROCEDURE — 258N000003 HC RX IP 258 OP 636: Performed by: NURSE PRACTITIONER

## 2025-05-04 RX ORDER — TICAGRELOR 90 MG/1
90 TABLET, FILM COATED ORAL 2 TIMES DAILY
Status: DISCONTINUED | OUTPATIENT
Start: 2025-05-04 | End: 2025-05-07 | Stop reason: HOSPADM

## 2025-05-04 RX ORDER — POTASSIUM CHLORIDE 1500 MG/1
20 TABLET, EXTENDED RELEASE ORAL ONCE
Status: COMPLETED | OUTPATIENT
Start: 2025-05-04 | End: 2025-05-04

## 2025-05-04 RX ORDER — ASPIRIN 81 MG/1
81 TABLET, CHEWABLE ORAL DAILY
Status: DISCONTINUED | OUTPATIENT
Start: 2025-05-05 | End: 2025-05-07 | Stop reason: HOSPADM

## 2025-05-04 RX ORDER — POTASSIUM CHLORIDE 1500 MG/1
40 TABLET, EXTENDED RELEASE ORAL ONCE
Status: COMPLETED | OUTPATIENT
Start: 2025-05-04 | End: 2025-05-04

## 2025-05-04 RX ADMIN — PANTOPRAZOLE SODIUM 40 MG: 40 TABLET, DELAYED RELEASE ORAL at 08:59

## 2025-05-04 RX ADMIN — ACETAMINOPHEN 975 MG: 325 TABLET, FILM COATED ORAL at 04:10

## 2025-05-04 RX ADMIN — POTASSIUM CHLORIDE 20 MEQ: 1500 TABLET, EXTENDED RELEASE ORAL at 16:37

## 2025-05-04 RX ADMIN — UMECLIDINIUM 1 PUFF: 62.5 AEROSOL, POWDER ORAL at 09:12

## 2025-05-04 RX ADMIN — ATORVASTATIN CALCIUM 80 MG: 80 TABLET, FILM COATED ORAL at 21:30

## 2025-05-04 RX ADMIN — TICAGRELOR 90 MG: 90 TABLET ORAL at 16:37

## 2025-05-04 RX ADMIN — MONTELUKAST 10 MG: 10 TABLET, FILM COATED ORAL at 21:30

## 2025-05-04 RX ADMIN — ACETAMINOPHEN 975 MG: 325 TABLET, FILM COATED ORAL at 12:25

## 2025-05-04 RX ADMIN — FLUTICASONE FUROATE AND VILANTEROL TRIFENATATE 1 PUFF: 100; 25 POWDER RESPIRATORY (INHALATION) at 09:12

## 2025-05-04 RX ADMIN — PANTOPRAZOLE SODIUM 40 MG: 40 INJECTION, POWDER, FOR SOLUTION INTRAVENOUS at 06:31

## 2025-05-04 RX ADMIN — HEPARIN SODIUM 5000 UNITS: 5000 INJECTION, SOLUTION INTRAVENOUS; SUBCUTANEOUS at 20:39

## 2025-05-04 RX ADMIN — METOPROLOL TARTRATE 25 MG: 25 TABLET, FILM COATED ORAL at 20:38

## 2025-05-04 RX ADMIN — HEPARIN SODIUM 5000 UNITS: 5000 INJECTION, SOLUTION INTRAVENOUS; SUBCUTANEOUS at 12:25

## 2025-05-04 RX ADMIN — HEPARIN SODIUM 5000 UNITS: 5000 INJECTION, SOLUTION INTRAVENOUS; SUBCUTANEOUS at 03:48

## 2025-05-04 RX ADMIN — POTASSIUM CHLORIDE 40 MEQ: 1500 TABLET, EXTENDED RELEASE ORAL at 08:59

## 2025-05-04 RX ADMIN — ACETAMINOPHEN 975 MG: 325 TABLET, FILM COATED ORAL at 20:38

## 2025-05-04 RX ADMIN — METOPROLOL TARTRATE 25 MG: 25 TABLET, FILM COATED ORAL at 08:59

## 2025-05-04 RX ADMIN — SENNOSIDES AND DOCUSATE SODIUM 1 TABLET: 50; 8.6 TABLET ORAL at 08:59

## 2025-05-04 RX ADMIN — CANGRELOR 0.75 MCG/KG/MIN: 50 INJECTION, POWDER, LYOPHILIZED, FOR SOLUTION INTRAVENOUS at 10:29

## 2025-05-04 RX ADMIN — INSULIN ASPART 1 UNITS: 100 INJECTION, SOLUTION INTRAVENOUS; SUBCUTANEOUS at 16:39

## 2025-05-04 RX ADMIN — ASPIRIN 81 MG CHEWABLE TABLET 162 MG: 81 TABLET CHEWABLE at 08:59

## 2025-05-04 RX ADMIN — INSULIN GLARGINE 10 UNITS: 100 INJECTION, SOLUTION SUBCUTANEOUS at 21:31

## 2025-05-04 RX ADMIN — SODIUM PHOSPHATE, MONOBASIC, MONOHYDRATE AND SODIUM PHOSPHATE, DIBASIC, ANHYDROUS 9 MMOL: 142; 276 INJECTION, SOLUTION INTRAVENOUS at 09:08

## 2025-05-04 ASSESSMENT — ACTIVITIES OF DAILY LIVING (ADL)
ADLS_ACUITY_SCORE: 42
ADLS_ACUITY_SCORE: 43
ADLS_ACUITY_SCORE: 42
ADLS_ACUITY_SCORE: 43
ADLS_ACUITY_SCORE: 43
ADLS_ACUITY_SCORE: 42
ADLS_ACUITY_SCORE: 42
ADLS_ACUITY_SCORE: 44
ADLS_ACUITY_SCORE: 44
ADLS_ACUITY_SCORE: 42
ADLS_ACUITY_SCORE: 42
ADLS_ACUITY_SCORE: 43
ADLS_ACUITY_SCORE: 42
ADLS_ACUITY_SCORE: 43
ADLS_ACUITY_SCORE: 42
ADLS_ACUITY_SCORE: 43

## 2025-05-04 NOTE — PROGRESS NOTES
[facility]  Cardiovascular and Thoracic Surgery Daily Note      Assessment and Plan  Raul Wilhelm is a 79 year old male with a past medical history of HTN, HLD, GERD, Zenkers Diverticulum, COPD, Asthma, DMII, CVA, frequent MRSA abscesses, CAD with STEMI s/p REDD to LPDA, mid CX in 2015 and REDD to prox LCx and OM in January 2025 at [facility] and on Brilinta, with known residual LAD and D1 disease. Patient presented on 4/25/25 with chest pain in the center of his chest that radiated to his belly. He underwent TTE with severe hypokinesis of mid to distal aneroseptal and apical walls, EF 45-50% and coronary angiogram which revealed residual LMCA and proximal LAD disease. CV surgery consulted for consideration of surgical revascularization of LMCA disease and proximal LAD disease.      POD # 3 s/p CABG x2 (LIMA-LAD, V-Diagonal) on 5/1/25 with Dr. Michael Mulvihill    - CVS: Pre-op TTE with LVEF 45-50%, severe hypokinesis of the mid to distal anteroseptal and apical walls, preserved RV function.   HD stable overnight in NSR.  Postop vasoplegic shock, resolved.   Continue  mg daily (drop to 81 mg after brilinta resumed), increase metoprolol tartrate to 25 mg BID with hold parameters, atorvastatin 80 mg daily  Appears Euvolemic on exam will defer diuretics.   Had REDD placed in Jan 2025, will resume Brilinta today  Chest tubes:  with decreasing output, chest tubes and pacing wires removed today. Repeat CXR tomorrow.   PTA meds on hold: carvedilol, hyzaar    - Resp: Hx COPD and asthma. Postoperative mechanical ventilation, resolved. Extubated POD 0 now saturating well on 2 LPM. IS, pulmonary toilet. PTA inhalers and duonebs resumed : spiriva, breztri aerosphere, albuterol. PTA singulair resumed.    - Neuro: Hx CVA. Neuros intact, pain controlled on current regimen    - Renal: No history of significant renal disease. Cr stable. Trend BMP. Volume management as above.  Recent Labs   Lab 05/04/25  0531  05/03/25  0541 05/02/25  0406   CR 0.79 0.75 0.88       - GI: + BM, + flatus, continue bowel regimen    - : voiding well on own    - Endo: DM II. Postop stress hyperglycemia. Insulin infusion transitioned to sliding scale insulin. PTA meds on hold: lantus 20 units at HS, ozempic. Lantus 10 units daily resumed per hospitalist. Hospitalist following for glycemic control  Hemoglobin A1C   Date Value Ref Range Status   02/19/2025 7.1 (H) 0.0 - 5.6 % Final     Comment:     Normal <5.7%   Prediabetes 5.7-6.4%    Diabetes 6.5% or higher     Note: Adopted from ADA consensus guidelines.   04/09/2021 8.5 (H) 0 - 5.6 % Final     Comment:     Normal <5.7% Prediabetes 5.7-6.4%  Diabetes 6.5% or higher - adopted from ADA   consensus guidelines.          - FEN: Replace electrolytes as needed. ADAT.     - ID: Postop leukocytosis. Afebrile. Periop abx prophylaxis completing. Trend CBC and fever curve.   Recent Labs   Lab 05/04/25  0531 05/03/25  0541 05/02/25  0406   WBC 9.2 11.1* 15.0*       - Heme: Acute blood loss anemia and thrombocytopenia due to surgery. Hgb and PLT stable. Trend CBC, transfuse PRN.   Recent Labs   Lab 05/04/25  0531 05/03/25  0541 05/02/25  0406   HGB 9.7* 10.6* 11.4*    148* 185       - Proph: SCD, subcutaneous heparin, PPI    - Other:  Clinically Significant Risk Factors        # Hypokalemia: Lowest K = 3.3 mmol/L in last 2 days, will replace as needed        # Hypoalbuminemia: Lowest albumin = 3.3 g/dL at 5/2/2025  4:06 AM, will monitor as appropriate       # Hypertension: Noted on problem list           # DMII: A1C = 7.1 % (Ref range: 0.0 - 5.6 %) within past 6 months         # Financial/Environmental Concerns: none  # COPD: noted on problem list  # History of CABG: noted on surgical history       - Dispo: [facility]. Continue therapies- anticipate need for TCU at discharge. Chest tubes removed today. Medically Ready for Discharge: 1-2 days. Will consult social work for TCU placement      Interval  History  No acute events overnight.  Pain controlled. Saturating well on room air. Tolerating diet. +flatus. +BM    Medications  Current Facility-Administered Medications   Medication Dose Route Frequency Provider Last Rate Last Admin    acetaminophen (TYLENOL) tablet 975 mg  975 mg Oral or Feeding Tube Q8H Evelin Martin NP   975 mg at 05/04/25 1225    [START ON 5/5/2025] aspirin (ASA) chewable tablet 81 mg  81 mg Oral or NG Tube Daily Abby Clark PA-C        atorvastatin (LIPITOR) tablet 80 mg  80 mg Oral At Bedtime Evelin Martin NP   80 mg at 05/03/25 2212    fluticasone-vilanterol (BREO ELLIPTA) 100-25 MCG/ACT inhaler 1 puff  1 puff Inhalation Daily Evelin Martin NP   1 puff at 05/04/25 0912    heparin ANTICOAGULANT injection 5,000 Units  5,000 Units Subcutaneous Q8H Evelin Martin NP   5,000 Units at 05/04/25 1225    insulin aspart (NovoLOG) injection (RAPID ACTING)  1-7 Units Subcutaneous TID AC Evelin Martin NP   1 Units at 05/04/25 1639    insulin aspart (NovoLOG) injection (RAPID ACTING)  1-5 Units Subcutaneous At Bedtime Evelin Martin NP        insulin aspart (NovoLOG) injection (RAPID ACTING)   Subcutaneous TID w/meals Evelin Martin NP   3 Units at 05/04/25 1702    insulin glargine (LANTUS PEN) injection 10 Units  10 Units Subcutaneous At Bedtime Evelin Martin NP   10 Units at 05/03/25 2212    metoprolol tartrate (LOPRESSOR) tablet 25 mg  25 mg Oral BID Abby Clark PA-C   25 mg at 05/04/25 0859    montelukast (SINGULAIR) tablet 10 mg  10 mg Oral At Bedtime Evelin Martin NP   10 mg at 05/03/25 2212    pantoprazole (PROTONIX) 2 mg/mL suspension 40 mg  40 mg Oral or NG Tube Daily Evelin Martin NP        Or    pantoprazole (PROTONIX) EC tablet 40 mg  40 mg Oral Daily Evelin Martin NP   40 mg at 05/04/25 0859    pantoprazole (PROTONIX) IV push injection 40 mg  40 mg Intravenous QAM AC Mario, Evelin J, SURESH   40 mg at 05/04/25 0631    polyethylene glycol (MIRALAX) Packet 17 g  17  g Oral or Feeding Tube BID Evelin Martin NP   17 g at 05/03/25 2013    senna-docusate (SENOKOT-S/PERICOLACE) 8.6-50 MG per tablet 1 tablet  1 tablet Oral or Feeding Tube BID Evelin Martin NP   1 tablet at 05/04/25 0859    sodium chloride (PF) 0.9% PF flush 3 mL  3 mL Intracatheter Q8H ZHANG Evelin Martin NP   3 mL at 05/04/25 1318    ticagrelor (BRILINTA) tablet 90 mg  90 mg Oral BID Abby Clark PA-C   90 mg at 05/04/25 1637    umeclidinium (INCRUSE ELLIPTA) 62.5 MCG/ACT inhaler 1 puff  1 puff Inhalation Daily Evelin Martni NP   1 puff at 05/04/25 0912     Current Facility-Administered Medications   Medication Dose Route Frequency Provider Last Rate Last Admin    bisacodyl (DULCOLAX) suppository 10 mg  10 mg Rectal Daily PRN Abby Clark PA-C   10 mg at 05/03/25 1209    calcium carbonate (TUMS) chewable tablet 500 mg  500 mg Oral 4x Daily PRN Evelin Martin NP   500 mg at 05/03/25 0547    glucose gel 15-30 g  15-30 g Oral Q15 Min PRN Evelin Martin NP        Or    dextrose 50 % injection 25-50 mL  25-50 mL Intravenous Q15 Min PRN Evelin Martin NP        Or    glucagon injection 1 mg  1 mg Subcutaneous Q15 Min PRN Evelin Martin NP        furosemide (LASIX) injection 20 mg  20 mg Intravenous Once PRN Evelin Martin NP        hydrALAZINE (APRESOLINE) injection 10 mg  10 mg Intravenous Q30 Min PRN Evelin Martin NP   10 mg at 05/02/25 0533    HYDROmorphone (DILAUDID) injection 0.2 mg  0.2 mg Intravenous Q2H PRN Evelin Martin NP   0.2 mg at 05/02/25 0803    insulin aspart (NovoLOG) injection (RAPID ACTING)   Subcutaneous With Snacks or Supplements Evelin Martin NP        ipratropium - albuterol 0.5 mg/2.5 mg/3 mL (DUONEB) neb solution 3 mL  1 vial Nebulization Q6H PRN Evelin Martin, NP        lidocaine (LMX4) cream   Topical Q1H PRN Evelin Martin, NP        lidocaine 1 % 0.1-1 mL  0.1-1 mL Other Q1H PRN Evelin Martin, NP        magnesium hydroxide (MILK OF MAGNESIA) suspension 30 mL  30  "mL Oral Daily PRN Evelin Martin NP        methocarbamol (ROBAXIN) half-tab 250 mg  250 mg Oral Q6H PRN Evelin Martin NP   250 mg at 05/03/25 0159    naloxone (NARCAN) injection 0.2 mg  0.2 mg Intravenous Q2 Min PRN Evelin Martin NP        Or    naloxone (NARCAN) injection 0.4 mg  0.4 mg Intravenous Q2 Min PRN Evelin Martin NP        Or    naloxone (NARCAN) injection 0.2 mg  0.2 mg Intramuscular Q2 Min PRN Evelin Martin NP        Or    naloxone (NARCAN) injection 0.4 mg  0.4 mg Intramuscular Q2 Min PRN Evelin Martin NP        ondansetron (ZOFRAN ODT) ODT tab 4 mg  4 mg Oral Q6H PRN Evelin Martin NP        Or    ondansetron (ZOFRAN) injection 4 mg  4 mg Intravenous Q6H PRN Evelin Martin NP        oxyCODONE (ROXICODONE) tablet 5 mg  5 mg Oral Q4H PRN Evelin Martin NP   5 mg at 05/03/25 1149    Or    oxyCODONE (ROXICODONE) tablet 10 mg  10 mg Oral Q4H PRN Evelin Martin NP   10 mg at 05/02/25 1557    prochlorperazine (COMPAZINE) injection 5 mg  5 mg Intravenous Q6H PRN Evelin Martin NP        Or    prochlorperazine (COMPAZINE) tablet 5 mg  5 mg Oral Q6H PRN Evelin Martin NP        sodium chloride (PF) 0.9% PF flush 3 mL  3 mL Intracatheter q1 min prn Evelin Martin NP             Physical Exam  Vitals were reviewed  Blood pressure 101/63, pulse 92, temperature 97.8  F (36.6  C), temperature source Oral, resp. rate 16, height 1.74 m (5' 8.5\"), weight 68.8 kg (151 lb 11.2 oz), SpO2 96%.  Rhythm: NSR    Lungs: diminished throughout on room air    Cardiovascular: Distant, S1, S2, RRR, no m/r/g    Abdomen: soft, NT, ND, +BS    Extremeties: warm, no LE edema    Incision: Sternal incision CDI, eccymotic, LLE incisions CDI    CT: serosang output, no air leak    Weight:   Vitals:    04/25/25 0806 04/26/25 0700 05/02/25 0400 05/03/25 0337   Weight: 67.8 kg (149 lb 6.4 oz) 65.9 kg (145 lb 4.5 oz) 72.8 kg (160 lb 7.9 oz) 70.8 kg (156 lb)    05/04/25 0344   Weight: 68.8 kg (151 lb 11.2 oz) "         Data  Recent Labs   Lab 05/04/25  1603 05/04/25  1225 05/04/25  1214 05/04/25  0634 05/04/25  0531 05/03/25  1245 05/03/25  0541 05/02/25  0627 05/02/25  0406 05/01/25  1305 05/01/25  1258 05/01/25  1111 05/01/25  1110   WBC  --   --   --   --  9.2  --  11.1*  --  15.0*  --  17.2*  --  21.4*   HGB  --   --   --   --  9.7*  --  10.6*  --  11.4*  --  10.9*   < > 9.6*   MCV  --   --   --   --  93  --  98  --  92  --  91  --  91   PLT  --   --   --   --  158  --  148*  --  185  --  159  --  104*   INR  --   --   --   --   --   --   --   --   --   --  1.22*  --  1.52*   NA  --   --   --   --  138  --  136  --  137  --  140   < > 140   POTASSIUM  --  3.7  --   --  3.3*  --  3.9  --  4.0  --  4.3   < > 4.1   CHLORIDE  --   --   --   --  103  --  106  --  103  --  107  --  109*   CO2  --   --   --   --  25  --  16*  --  20*  --  21*  --  22   BUN  --   --   --   --  14.3  --  14.4  --  15.0  --  14.3  --  14.8   CR  --   --   --   --  0.79  --  0.75  --  0.88  --  0.86  --  0.87   ANIONGAP  --   --   --   --  10  --  14  --  14  --  12  --  9   JOSE RAFAEL  --   --   --   --  8.1*  --  8.1*  --  8.0*  --  8.2*  --  8.8   *  --  102* 109* 108*   < > 142*   < > 132*   < > 159*   < > 152*   ALBUMIN  --   --   --   --   --   --   --   --  3.3*  --  3.4*  --   --    PROTTOTAL  --   --   --   --   --   --   --   --  5.0*  --  4.9*  --   --    BILITOTAL  --   --   --   --   --   --   --   --  0.9  --  1.3*  --   --    ALKPHOS  --   --   --   --   --   --   --   --  60  --  57  --   --    ALT  --   --   --   --   --   --   --   --  35  --  54  --   --    AST  --   --   --   --   --   --   --   --  47*  --  56*  --   --     < > = values in this interval not displayed.       Imaging:  No results found for this or any previous visit (from the past 24 hours).        Patient seen and discussed with Dr. Mulvihill Brittany Smith, PA-C  Cardiothoracic Surgery  Pager: [phone]    CV Surgery Rounding Pager: [phone]  After  hours please page surgeon on-call

## 2025-05-04 NOTE — PLAN OF CARE
Goal Outcome Evaluation:  1900-0730       POD 3 CABG X 2  Pt is alert, oriented X 4. AVSS on RA. Tele is Sr. Pain is controlled with Tylenol and PRN Oxycodone. LS clear, diminished. Up to the bathroom with assist of 1. Voiding adequately, no BM this shift. CT 3:1, minimal output, (-) for air leak. Tolerating mod carb diet, BS checks before meals and at bedtime.    Plan to remove chest tube today. Encourage activity out of bed.

## 2025-05-04 NOTE — PLAN OF CARE
"Goal Outcome Evaluation:    Patient Name: Louise  MRN: [id number]  Date of Admission: 4/25/2025  Reason for Admission: POD #3 CABG x 2 with Dr. Mulvihill  Level of [place]: [facility] [place]    Vitals:   BP Readings from Last 1 Encounters:   05/04/25 108/62     Pulse Readings from Last 1 Encounters:   05/04/25 99     Wt Readings from Last 1 Encounters:   05/04/25 68.8 kg (151 lb 11.2 oz)     Ht Readings from Last 1 Encounters:   04/25/25 1.74 m (5' 8.5\")     Estimated body mass index is 22.73 kg/m  as calculated from the following:    Height as of this encounter: 1.74 m (5' 8.5\").    Weight as of this encounter: 68.8 kg (151 lb 11.2 oz).  Temp Readings from Last 1 Encounters:   05/04/25 97.8  F (36.6  C) (Oral)       Pain: Pain goal: Denies pain    CV Surgery Patient: Yes Post Op Day #: 3    Assessment    General: Afebrile yes  Rhythm: normal sinus rhythm  Blood Pressure Medications given/held: Amio Held Beta blocker Given   Resp: Oxygen Status: RA  Neuro: Alert yes Orientation: self, person, time, and place  GI/:          Bowel Activity: yes if yes indicate when: 5/4/20252          Bowel Medications: yes          Urinary Catheter: no  Skin:          Incision: Incision status: healing well  CT removed 5/4/2025    Assessment    Resp: VSS on RA, LS clear and equal bilaterally  Diet: Tolerating mod carb diet, BG ACHS  Telemetry: SR/ST w/ PAC's  Neuro: A/O x 4, forgetful. [place]  GI/: UOP adeqaute. BM x 2. Passing gas frequently  Skin/Wounds: Midline sternal incision, CT removal sites, LLE harvest sites  CMS intact.  Lines/Drains: PIV x 2 SL  Activity: A x 1 GB/W  Sleep: good  Abnormal Labs: K & phos replaced, recheck in AM    Aggression Stop Light: Green          Patient Care Plan: Encourage ambulation/ OOB activity. TCU at discharge       "

## 2025-05-04 NOTE — PROGRESS NOTES
[facility]    Internal Medicine Hospitalist Progress Note  05/04/2025  I evaluated patient on the above date.    Assessment & Plan   Raul Wilhelm is a 79 year old male with history including asthma; DM2; HTN; known severe multivessel CAD with prior stents (1/2025); h/o CVA; who presented to [facility] 4/25/2025 with chest pain and found with NSTEMI. Transferred to [facility] 4/25/2025 for management.       NSTEMI with severe multivessel CAD - s/p CABG 2v 5/1/2025 (LIMA to LAD and SVG to diagonal).  Ischemic cardiomyopathy.  Recent LCx and OM REDD (1/2025).   H/o HTN (benign essential).  HLD.  [PTA: asa 81 mg daily; atorvastatin 80 mg at bedtime; carvedilol 6.25 mg BID; losartan-HCTZ 50-12.5 mg daily; ticagrelor 90 mg BID; PRN NTG.]  * H/o STEMI 11/2025 s/p LPDA and LCx stents. H/o STEMI 1/2025. Heart catheterization 1/2025 showed severe multivessel CAD involving ostial-prox LAD, mid LAD, prox LCX, and large OM branch; stents placed to LCx and OM branch and there was plan for outpatient discussion regarding surgical revascularization vs long stents for LAD disease, but he had not yet had this follow-up. Echo 1/2025 showed LVEF 55-60%, mild LVH, no regional wall motion abnormalities; no significant valve disease.  * Initial presentation to outside hospital as above. Started on heparin gtt.  * Transferred to [facility] 4/25. Cardiology consulted on admit.  * Later 4/25: Echo showed LVEF 45-50% with WMA's, grade 1 diastolic dysfunction; RV OK. Angiogram showed LM with ostial 60%; mid LAD with diffuse 50%, LAD ostium covered by CX stent, appeared to be up to 80% on some views, diffuse proximal 75% narrowing first diagonal 75%; LCx stents widely patent, no restenosis; CABG recommended. CVS consulted; ticagrelor held and started on cangrelor gtt bridging.  * 4/30: Some chest pain, but seemed more musculoskeletal type. ECG w/o acute ischemic changes.   * 5/1: Underwent CABG as noted. Subsequently  extubated.  * 5/2: Stable, off pressors, on 2L O2. Transferred to the floor..  - Post-op management per [facility].  - Continue BB, ASA, cangrelor gtt, transition back to PTA ticagrelor when able per [facility].  - Restart statin and ARB when able per [facility].  - Continue scheduled acetaminophen, PRN methocarbamol, PRN oxycodone, PRN IV hydromorphone; minimize opioids as able.  - Continue to monitor i/o's, daily wts.  - Planning TCU after surgery (per pt preference) as he lives alone - he is interested in the following TCUs ([facility] [place] and [facility]), SW following.     DM2.   [PTA: glargine 20U at bedtime; semaglutide (pt not taking due to cost).]  * HgbA1C 7.1 2/2025.   * 5/1: On insulin gtt post-op   * 5/2: Off insulin gtt. Tolerating some oral intake.  Recent Labs   Lab 05/04/25  0634 05/04/25  0531 05/04/25  0208 05/03/25  2137 05/03/25  1606 05/03/25  1245   * 108* 96 174* 135* 141*     Recent Labs   Lab Test 02/19/25  0832 01/04/25  1709   A1C 7.1* 8.1*   - Continue moderate CHO diet.  - Cont glargine at 10U at bedtime..  - Cont aspart 1U/CHO U with meals and snacks.  - Adjust regimen as indicated.   - Continue aspart ISS (medium).  - Continue PRN hypoglycemia protocol.    Blood loss anemia.  * Hgb normal on admit.  * Hgb decreased related to CABG, not requiring prbc's.  Recent Labs   Lab 05/04/25  0531 05/03/25  0541 05/02/25  0406 05/01/25  1258 05/01/25  1111 05/01/25  1110   HGB 9.7* 10.6* 11.4* 10.9* 9.8* 9.6*   - Continue to monitor CBC - repeat in am.  - Consider prbc transfusion if hgb </= 7.0 or if significant bleeding with hemodynamic instability or if symptomatic.    Thrombocytopenia, suspect consumptive related to surgery, resolved.  * Platelets normal on admit.  * Platelets decreased during surgery requiring platelet transfusion.  * Platelets normal 5/2.    Leukocytosis, suspect reactive. Resolved.  * WBC 12.3 on admit. Pt afebrile, no clear signs of infection on admission.  *  WBC normalized 4/27.  * WBC increased after CABG.  Recent Labs   Lab 05/04/25  0531 05/03/25  0541 05/02/25  0406 05/01/25  1258 05/01/25  1110 05/01/25  0554   WBC 9.2 11.1* 15.0* 17.2* 21.4* 9.2   - Continue to monitor for signs of infection.  - Continue to monitor CBC.     Asthma.  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  - Continue fluticasone-vilanterol, tiotropium, montelukast, PRN inhalers/nebs     Zenkers diverticulum.  GERD.  Chronic Dysphagia.  * Noted during Jan 2025 admit. Noted diverticulum does not empty fully with multiple swallows and gives him regurgitation/dysphagia symptoms. He elected not to pursue ENT outpatient. PTA on BID omeprazole.  - Continue pantoprazole.    Back pain, suspect musculoskeletal.  * 4/28: Pt c/o back pain suspected due to the hospital bed.  - Continue PRN acetaminophen.     H/o frequent skin abscesses.  +MRSA.  * 3/2025 with abscess of right buttock that was +MRSA. Most recent at right neck Mid 4/2025, appears to be resolving.    * 4/29: [facility]S ordered 10d course of mupirocin ointment on nares.  - Continue to monitor clinically.  - Continue mupirocin oint to nares (started 4/30) for 10d (stop after 5/9).    H/o arterial ischemic stroke.  - Continue antiplatelets and statin as noted.     Pulmonary nodule RUL, 0.3cm, noted on CT (1/2025).  - Follow-up CT chest due Jan 2026.    Constipation:  Had a BM yesterday 5/3.  - Bowel regimen.    Hypokalemia:  - Replace.    Diet: Combination Diet Moderate Consistent Carb (60 g CHO per Meal) Diet; Low Saturated Fat Na <2400mg Diet  Snacks/Supplements Adult: Ensure Enlive; Between Meals    Prophylaxis: PCD's, ambulation, and heparin SQ  Diaz Catheter: Not present  Lines: None     Code Status: Full Code    Disposition Plan   Medically Ready for Discharge: when ok with [facility].  Expected discharge to transitional care unit (TCU) pending above.    Entered: Joseph Whittaker MD 05/04/2025, 8:00 AM         Interval History   Denies cp/sob.  Oriented  "x3.  BM yesterday.    Physical Exam   Most recent vitals:   , Blood pressure 125/71, pulse 90, temperature 98.5  F (36.9  C), temperature source Oral, resp. rate 19, height 1.74 m (5' 8.5\"), weight 68.8 kg (151 lb 11.2 oz), SpO2 95%. O2 Device: None (Room air)    Vitals:    05/02/25 0400 05/03/25 0337 05/04/25 0344   Weight: 72.8 kg (160 lb 7.9 oz) 70.8 kg (156 lb) 68.8 kg (151 lb 11.2 oz)     Vital signs with ranges:  Temp:  [97.9  F (36.6  C)-98.5  F (36.9  C)] 98.5  F (36.9  C)  Pulse:  [90-99] 90  Resp:  [16-20] 19  BP: ()/(63-79) 125/71  SpO2:  [95 %-96 %] 95 %  Patient Vitals for the past 24 hrs:   BP Temp Temp src Pulse Resp SpO2 Weight   05/04/25 0344 125/71 98.5  F (36.9  C) Oral 90 19 95 % 68.8 kg (151 lb 11.2 oz)   05/04/25 0030 106/79 98.2  F (36.8  C) Oral 91 20 96 % --   05/03/25 2013 113/75 97.9  F (36.6  C) Oral 99 18 95 % --   05/03/25 1557 97/63 98.2  F (36.8  C) Oral -- 18 -- --   05/03/25 1230 -- -- -- -- 16 -- --   05/03/25 1126 -- 98.1  F (36.7  C) Oral -- -- -- --   05/03/25 1035 132/75 -- -- -- -- -- --   05/03/25 1020 133/76 -- -- -- -- -- --   05/03/25 0830 -- -- -- -- 20 -- --     I/O's last 24 hours:  I/O last 3 completed shifts:  In: 1330 [P.O.:1080; I.V.:250]  Out: 2050 [Urine:1850; Chest Tube:200]    Constitutional: awake, alert, oriented, fatigued appearing   Head:   Eyes:   ENT:   Neck:   Cardiovascular: regular rate and rhythm; no murmurs, rubs or gallops  Lungs: diminished in the bases, no crackles or wheezes  Gastrointestinal/Abdomen: soft, non-tender, non-distended, positive bowel sounds  :   Musculoskeletal:   Skin/Extremities:   Neurologic:   Psychiatric:   Hematologic/Lymphatic/Immunologic:          Labs reviewed.  Recent Labs   Lab 05/04/25  0634 05/04/25  0531 05/04/25  0208 05/03/25  1245 05/03/25  0541 05/02/25  0627 05/02/25  0406 05/01/25  1305 05/01/25  1258 05/01/25  1111 05/01/25  1110   WBC  --  9.2  --   --  11.1*  --  15.0*  --  17.2*  --  21.4*   HGB  --  " 9.7*  --   --  10.6*  --  11.4*  --  10.9*   < > 9.6*   MCV  --  93  --   --  98  --  92  --  91  --  91   PLT  --  158  --   --  148*  --  185  --  159  --  104*   INR  --   --   --   --   --   --   --   --  1.22*  --  1.52*   NA  --  138  --   --  136  --  137  --  140   < > 140   POTASSIUM  --  3.3*  --   --  3.9  --  4.0  --  4.3   < > 4.1   CHLORIDE  --  103  --   --  106  --  103  --  107  --  109*   CO2  --  25  --   --  16*  --  20*  --  21*  --  22   BUN  --  14.3  --   --  14.4  --  15.0  --  14.3  --  14.8   CR  --  0.79  --   --  0.75  --  0.88  --  0.86  --  0.87   ANIONGAP  --  10  --   --  14  --  14  --  12  --  9   JOSE RAFAEL  --  8.1*  --   --  8.1*  --  8.0*  --  8.2*  --  8.8   * 108* 96   < > 142*   < > 132*   < > 159*   < > 152*   ALBUMIN  --   --   --   --   --   --  3.3*  --  3.4*  --   --    PROTTOTAL  --   --   --   --   --   --  5.0*  --  4.9*  --   --    BILITOTAL  --   --   --   --   --   --  0.9  --  1.3*  --   --    ALKPHOS  --   --   --   --   --   --  60  --  57  --   --    ALT  --   --   --   --   --   --  35  --  54  --   --    AST  --   --   --   --   --   --  47*  --  56*  --   --     < > = values in this interval not displayed.     Recent Labs   Lab Test 04/25/25  1032 04/25/25  0822 04/25/25  0226 01/17/25  1317 01/17/25  1015 01/04/25  1954   NT-PROBNP, INPATIENT  --   --   --   --  467 562   TROPONIN T HIGH SENSITIVITY 81* 95* 40*   < > 118*  --     < > = values in this interval not displayed.     Recent Labs   Lab 05/04/25  0634 05/04/25  0531 05/04/25  0208 05/03/25  2137 05/03/25  1606 05/03/25  1245   * 108* 96 174* 135* 141*     Recent Labs   Lab Test 02/19/25  0832 01/04/25  1709   A1C 7.1* 8.1*       Recent Labs   Lab 05/01/25  1258 05/01/25  1110   INR 1.22* 1.52*     Recent Labs   Lab 05/04/25  0531 05/03/25  0541 05/02/25  0406 05/01/25  1258 05/01/25  1111 05/01/25  1110 05/01/25  1031 05/01/25  1003 05/01/25  0934 05/01/25  0933 05/01/25  0800  05/01/25  0554   WBC 9.2 11.1* 15.0* 17.2*  --  21.4*  --   --   --   --   --  9.2   FIBR  --   --   --   --   --  240  --   --   --   --   --   --    LACT  --   --   --  0.9 1.2  --  0.9 0.9 0.7 0.7   < >  --     < > = values in this interval not displayed.       MICRO:  Cultures (including blood and urine):  No lab results found in last 7 days.    No results found for this or any previous visit (from the past 24 hours).      Medications   All medications were reviewed. MAR.    Infusions:  Current Facility-Administered Medications   Medication Dose Route Frequency Provider Last Rate Last Admin    cangrelor ANTIPLATELET (KENGREAL) 50 mg in sodium chloride 0.9 % 250 mL infusion  0.75 mcg/kg/min Intravenous Continuous Evelin Martin NP 16.4 mL/hr at 05/03/25 1829 0.75 mcg/kg/min at 05/03/25 1829     Scheduled Medications:  Current Facility-Administered Medications   Medication Dose Route Frequency Provider Last Rate Last Admin    acetaminophen (TYLENOL) tablet 975 mg  975 mg Oral or Feeding Tube Q8H Evelin Martin NP   975 mg at 05/04/25 0410    aspirin (ASA) chewable tablet 162 mg  162 mg Oral or NG Tube Daily Evelin Martin NP   162 mg at 05/03/25 0910    atorvastatin (LIPITOR) tablet 80 mg  80 mg Oral At Bedtime Evelin Martin NP   80 mg at 05/03/25 2212    fluticasone-vilanterol (BREO ELLIPTA) 100-25 MCG/ACT inhaler 1 puff  1 puff Inhalation Daily Evelin Martin NP   1 puff at 05/03/25 1209    heparin ANTICOAGULANT injection 5,000 Units  5,000 Units Subcutaneous Q8H Evelin Martin NP   5,000 Units at 05/04/25 0348    insulin aspart (NovoLOG) injection (RAPID ACTING)  1-7 Units Subcutaneous TID AC Evelin Martin NP   1 Units at 05/03/25 1414    insulin aspart (NovoLOG) injection (RAPID ACTING)  1-5 Units Subcutaneous At Bedtime Evelin Martin NP        insulin aspart (NovoLOG) injection (RAPID ACTING)   Subcutaneous TID w/meals Evelin Martin, NP   1 Units at 05/03/25 1829    insulin glargine (LANTUS PEN)  injection 10 Units  10 Units Subcutaneous At Bedtime Evelin Martin NP   10 Units at 05/03/25 2212    metoprolol tartrate (LOPRESSOR) tablet 25 mg  25 mg Oral BID Abby Clark PA-C   25 mg at 05/03/25 2013    montelukast (SINGULAIR) tablet 10 mg  10 mg Oral At Bedtime Evelin Martin NP   10 mg at 05/03/25 2212    pantoprazole (PROTONIX) 2 mg/mL suspension 40 mg  40 mg Oral or NG Tube Daily Evelin Martin NP        Or    pantoprazole (PROTONIX) EC tablet 40 mg  40 mg Oral Daily Evelin Martin NP   40 mg at 05/03/25 0908    pantoprazole (PROTONIX) IV push injection 40 mg  40 mg Intravenous QAM AC Evelin Martin NP   40 mg at 05/04/25 0631    polyethylene glycol (MIRALAX) Packet 17 g  17 g Oral or Feeding Tube BID Evelin Martin NP   17 g at 05/03/25 2013    potassium chloride jennifer ER (KLOR-CON M20) CR tablet 40 mEq  40 mEq Oral Once Mulvihill, Michael, MD        senna-docusate (SENOKOT-S/PERICOLACE) 8.6-50 MG per tablet 1 tablet  1 tablet Oral or Feeding Tube BID Evelin Martin NP   1 tablet at 05/03/25 2012    sodium chloride (PF) 0.9% PF flush 3 mL  3 mL Intracatheter Q8H ZHANG Evelin Martin NP   3 mL at 05/04/25 0410    sodium phosphate 9 mmol in 250 mL NS intermittent infusion  9 mmol Intravenous Once Mulvihill, Michael, MD        umeclidinium (INCRUSE ELLIPTA) 62.5 MCG/ACT inhaler 1 puff  1 puff Inhalation Daily Evelin Martin NP   1 puff at 05/03/25 0907     PRN Medications:  Current Facility-Administered Medications   Medication Dose Route Frequency Provider Last Rate Last Admin    bisacodyl (DULCOLAX) suppository 10 mg  10 mg Rectal Daily PRN Abby Clark PA-C   10 mg at 05/03/25 1209    calcium carbonate (TUMS) chewable tablet 500 mg  500 mg Oral 4x Daily PRN Evelin Martin NP   500 mg at 05/03/25 0547    glucose gel 15-30 g  15-30 g Oral Q15 Min PRN Evelin Martin, NP        Or    dextrose 50 % injection 25-50 mL  25-50 mL Intravenous Q15 Min PRN Evelin Martin NP        Or    glucagon  injection 1 mg  1 mg Subcutaneous Q15 Min PRN Evelin Martin NP        furosemide (LASIX) injection 20 mg  20 mg Intravenous Once PRN Evelin Martin NP        hydrALAZINE (APRESOLINE) injection 10 mg  10 mg Intravenous Q30 Min PRN Evelin Martin NP   10 mg at 05/02/25 0533    HYDROmorphone (DILAUDID) injection 0.2 mg  0.2 mg Intravenous Q2H PRN Evelin Martin NP   0.2 mg at 05/02/25 0803    insulin aspart (NovoLOG) injection (RAPID ACTING)   Subcutaneous With Snacks or Supplements Evelin Martin NP        ipratropium - albuterol 0.5 mg/2.5 mg/3 mL (DUONEB) neb solution 3 mL  1 vial Nebulization Q6H PRN Evelin Martin NP        lidocaine (LMX4) cream   Topical Q1H PRN Evelin Martin NP        lidocaine 1 % 0.1-1 mL  0.1-1 mL Other Q1H PRN Evelin Martin NP        magnesium hydroxide (MILK OF MAGNESIA) suspension 30 mL  30 mL Oral Daily PRN Evelin Martin NP        methocarbamol (ROBAXIN) half-tab 250 mg  250 mg Oral Q6H PRN Evelin Martin NP   250 mg at 05/03/25 0159    naloxone (NARCAN) injection 0.2 mg  0.2 mg Intravenous Q2 Min PRN Evelin Martin NP        Or    naloxone (NARCAN) injection 0.4 mg  0.4 mg Intravenous Q2 Min PRN Evelin Martin NP        Or    naloxone (NARCAN) injection 0.2 mg  0.2 mg Intramuscular Q2 Min PRN Evelin Martin NP        Or    naloxone (NARCAN) injection 0.4 mg  0.4 mg Intramuscular Q2 Min PRN Evelin Martin NP        ondansetron (ZOFRAN ODT) ODT tab 4 mg  4 mg Oral Q6H PRN Evelin Martin NP        Or    ondansetron (ZOFRAN) injection 4 mg  4 mg Intravenous Q6H PRN Evelin Martin NP        oxyCODONE (ROXICODONE) tablet 5 mg  5 mg Oral Q4H PRN Evelin Martin NP   5 mg at 05/03/25 1149    Or    oxyCODONE (ROXICODONE) tablet 10 mg  10 mg Oral Q4H PRN Eevlin Martin NP   10 mg at 05/02/25 1557    prochlorperazine (COMPAZINE) injection 5 mg  5 mg Intravenous Q6H PRN Evelin Martin NP        Or    prochlorperazine (COMPAZINE) tablet 5 mg  5 mg Oral Q6H PRN Evelin Martin,  NP        sodium chloride (PF) 0.9% PF flush 3 mL  3 mL Intracatheter q1 min prn Evelin Martin, NP

## 2025-05-05 ENCOUNTER — APPOINTMENT (OUTPATIENT)
Dept: PHYSICAL THERAPY | Facility: CLINIC | Age: 80
DRG: 233 | End: 2025-05-05
Attending: INTERNAL MEDICINE
Payer: COMMERCIAL

## 2025-05-05 ENCOUNTER — APPOINTMENT (OUTPATIENT)
Dept: GENERAL RADIOLOGY | Facility: CLINIC | Age: 80
DRG: 233 | End: 2025-05-05
Attending: PHYSICIAN ASSISTANT
Payer: COMMERCIAL

## 2025-05-05 LAB
ANION GAP SERPL CALCULATED.3IONS-SCNC: 10 MMOL/L (ref 7–15)
BUN SERPL-MCNC: 14 MG/DL (ref 8–23)
CALCIUM SERPL-MCNC: 8.3 MG/DL (ref 8.8–10.4)
CHLORIDE SERPL-SCNC: 107 MMOL/L (ref 98–107)
CREAT SERPL-MCNC: 0.78 MG/DL (ref 0.67–1.17)
EGFRCR SERPLBLD CKD-EPI 2021: >90 ML/MIN/1.73M2
ERYTHROCYTE [DISTWIDTH] IN BLOOD BY AUTOMATED COUNT: 13.1 % (ref 10–15)
GLUCOSE BLDC GLUCOMTR-MCNC: 104 MG/DL (ref 70–99)
GLUCOSE BLDC GLUCOMTR-MCNC: 105 MG/DL (ref 70–99)
GLUCOSE BLDC GLUCOMTR-MCNC: 161 MG/DL (ref 70–99)
GLUCOSE BLDC GLUCOMTR-MCNC: 175 MG/DL (ref 70–99)
GLUCOSE BLDC GLUCOMTR-MCNC: 198 MG/DL (ref 70–99)
GLUCOSE SERPL-MCNC: 109 MG/DL (ref 70–99)
HCO3 SERPL-SCNC: 24 MMOL/L (ref 22–29)
HCT VFR BLD AUTO: 31.3 % (ref 40–53)
HGB BLD-MCNC: 10.2 G/DL (ref 13.3–17.7)
MAGNESIUM SERPL-MCNC: 2 MG/DL (ref 1.7–2.3)
MCH RBC QN AUTO: 30.3 PG (ref 26.5–33)
MCHC RBC AUTO-ENTMCNC: 32.6 G/DL (ref 31.5–36.5)
MCV RBC AUTO: 93 FL (ref 78–100)
PHOSPHATE SERPL-MCNC: 2.4 MG/DL (ref 2.5–4.5)
PLATELET # BLD AUTO: 184 10E3/UL (ref 150–450)
POTASSIUM SERPL-SCNC: 3.5 MMOL/L (ref 3.4–5.3)
RBC # BLD AUTO: 3.37 10E6/UL (ref 4.4–5.9)
SODIUM SERPL-SCNC: 141 MMOL/L (ref 135–145)
WBC # BLD AUTO: 8.6 10E3/UL (ref 4–11)

## 2025-05-05 PROCEDURE — 83735 ASSAY OF MAGNESIUM: CPT | Performed by: STUDENT IN AN ORGANIZED HEALTH CARE EDUCATION/TRAINING PROGRAM

## 2025-05-05 PROCEDURE — 250N000013 HC RX MED GY IP 250 OP 250 PS 637: Performed by: STUDENT IN AN ORGANIZED HEALTH CARE EDUCATION/TRAINING PROGRAM

## 2025-05-05 PROCEDURE — 250N000009 HC RX 250: Performed by: PHYSICIAN ASSISTANT

## 2025-05-05 PROCEDURE — 250N000011 HC RX IP 250 OP 636: Performed by: NURSE PRACTITIONER

## 2025-05-05 PROCEDURE — 250N000013 HC RX MED GY IP 250 OP 250 PS 637: Performed by: NURSE PRACTITIONER

## 2025-05-05 PROCEDURE — 120N000001 HC R&B MED SURG/OB

## 2025-05-05 PROCEDURE — 36415 COLL VENOUS BLD VENIPUNCTURE: CPT | Performed by: HOSPITALIST

## 2025-05-05 PROCEDURE — 71046 X-RAY EXAM CHEST 2 VIEWS: CPT

## 2025-05-05 PROCEDURE — 82435 ASSAY OF BLOOD CHLORIDE: CPT | Performed by: HOSPITALIST

## 2025-05-05 PROCEDURE — 250N000011 HC RX IP 250 OP 636: Performed by: PHYSICIAN ASSISTANT

## 2025-05-05 PROCEDURE — 85027 COMPLETE CBC AUTOMATED: CPT | Performed by: HOSPITALIST

## 2025-05-05 PROCEDURE — 97530 THERAPEUTIC ACTIVITIES: CPT | Mod: GP

## 2025-05-05 PROCEDURE — 250N000013 HC RX MED GY IP 250 OP 250 PS 637: Performed by: PHYSICIAN ASSISTANT

## 2025-05-05 PROCEDURE — 84100 ASSAY OF PHOSPHORUS: CPT | Performed by: STUDENT IN AN ORGANIZED HEALTH CARE EDUCATION/TRAINING PROGRAM

## 2025-05-05 PROCEDURE — 99233 SBSQ HOSP IP/OBS HIGH 50: CPT | Performed by: PHYSICIAN ASSISTANT

## 2025-05-05 PROCEDURE — 97110 THERAPEUTIC EXERCISES: CPT | Mod: GP

## 2025-05-05 RX ORDER — FUROSEMIDE 10 MG/ML
20 INJECTION INTRAMUSCULAR; INTRAVENOUS ONCE
Status: COMPLETED | OUTPATIENT
Start: 2025-05-05 | End: 2025-05-05

## 2025-05-05 RX ORDER — MUPIROCIN 20 MG/G
OINTMENT TOPICAL 2 TIMES DAILY
Status: DISCONTINUED | OUTPATIENT
Start: 2025-05-05 | End: 2025-05-07 | Stop reason: HOSPADM

## 2025-05-05 RX ORDER — MAGNESIUM OXIDE 400 MG/1
400 TABLET ORAL EVERY 4 HOURS
Status: COMPLETED | OUTPATIENT
Start: 2025-05-05 | End: 2025-05-05

## 2025-05-05 RX ORDER — POTASSIUM CHLORIDE 1500 MG/1
20 TABLET, EXTENDED RELEASE ORAL ONCE
Status: COMPLETED | OUTPATIENT
Start: 2025-05-05 | End: 2025-05-05

## 2025-05-05 RX ADMIN — ATORVASTATIN CALCIUM 80 MG: 80 TABLET, FILM COATED ORAL at 22:15

## 2025-05-05 RX ADMIN — INSULIN GLARGINE 10 UNITS: 100 INJECTION, SOLUTION SUBCUTANEOUS at 22:14

## 2025-05-05 RX ADMIN — TICAGRELOR 90 MG: 90 TABLET ORAL at 17:23

## 2025-05-05 RX ADMIN — TICAGRELOR 90 MG: 90 TABLET ORAL at 06:31

## 2025-05-05 RX ADMIN — INSULIN ASPART 1 UNITS: 100 INJECTION, SOLUTION INTRAVENOUS; SUBCUTANEOUS at 07:31

## 2025-05-05 RX ADMIN — UMECLIDINIUM 1 PUFF: 62.5 AEROSOL, POWDER ORAL at 08:23

## 2025-05-05 RX ADMIN — MUPIROCIN: 20 OINTMENT TOPICAL at 22:13

## 2025-05-05 RX ADMIN — POTASSIUM & SODIUM PHOSPHATES POWDER PACK 280-160-250 MG 1 PACKET: 280-160-250 PACK at 08:20

## 2025-05-05 RX ADMIN — METOPROLOL TARTRATE 25 MG: 25 TABLET, FILM COATED ORAL at 20:43

## 2025-05-05 RX ADMIN — SENNOSIDES AND DOCUSATE SODIUM 1 TABLET: 50; 8.6 TABLET ORAL at 20:43

## 2025-05-05 RX ADMIN — MUPIROCIN: 20 OINTMENT TOPICAL at 12:46

## 2025-05-05 RX ADMIN — FLUTICASONE FUROATE AND VILANTEROL TRIFENATATE 1 PUFF: 100; 25 POWDER RESPIRATORY (INHALATION) at 08:23

## 2025-05-05 RX ADMIN — PANTOPRAZOLE SODIUM 40 MG: 40 TABLET, DELAYED RELEASE ORAL at 08:20

## 2025-05-05 RX ADMIN — MONTELUKAST 10 MG: 10 TABLET, FILM COATED ORAL at 22:13

## 2025-05-05 RX ADMIN — ACETAMINOPHEN 975 MG: 325 TABLET, FILM COATED ORAL at 20:42

## 2025-05-05 RX ADMIN — POTASSIUM & SODIUM PHOSPHATES POWDER PACK 280-160-250 MG 1 PACKET: 280-160-250 PACK at 17:23

## 2025-05-05 RX ADMIN — INSULIN ASPART 1 UNITS: 100 INJECTION, SOLUTION INTRAVENOUS; SUBCUTANEOUS at 16:36

## 2025-05-05 RX ADMIN — PANTOPRAZOLE SODIUM 40 MG: 40 INJECTION, POWDER, FOR SOLUTION INTRAVENOUS at 06:31

## 2025-05-05 RX ADMIN — FUROSEMIDE 20 MG: 10 INJECTION, SOLUTION INTRAMUSCULAR; INTRAVENOUS at 12:47

## 2025-05-05 RX ADMIN — ASPIRIN 81 MG CHEWABLE TABLET 81 MG: 81 TABLET CHEWABLE at 08:20

## 2025-05-05 RX ADMIN — HEPARIN SODIUM 5000 UNITS: 5000 INJECTION, SOLUTION INTRAVENOUS; SUBCUTANEOUS at 20:42

## 2025-05-05 RX ADMIN — POTASSIUM CHLORIDE 20 MEQ: 1500 TABLET, EXTENDED RELEASE ORAL at 08:20

## 2025-05-05 RX ADMIN — HEPARIN SODIUM 5000 UNITS: 5000 INJECTION, SOLUTION INTRAVENOUS; SUBCUTANEOUS at 11:46

## 2025-05-05 RX ADMIN — HEPARIN SODIUM 5000 UNITS: 5000 INJECTION, SOLUTION INTRAVENOUS; SUBCUTANEOUS at 04:05

## 2025-05-05 RX ADMIN — ACETAMINOPHEN 975 MG: 325 TABLET, FILM COATED ORAL at 04:06

## 2025-05-05 RX ADMIN — Medication 400 MG: at 11:45

## 2025-05-05 RX ADMIN — METOPROLOL TARTRATE 25 MG: 25 TABLET, FILM COATED ORAL at 08:21

## 2025-05-05 RX ADMIN — POTASSIUM & SODIUM PHOSPHATES POWDER PACK 280-160-250 MG 1 PACKET: 280-160-250 PACK at 11:45

## 2025-05-05 RX ADMIN — Medication 400 MG: at 08:20

## 2025-05-05 ASSESSMENT — ACTIVITIES OF DAILY LIVING (ADL)
ADLS_ACUITY_SCORE: 42
ADLS_ACUITY_SCORE: 45
ADLS_ACUITY_SCORE: 45
ADLS_ACUITY_SCORE: 42
ADLS_ACUITY_SCORE: 45
ADLS_ACUITY_SCORE: 42
ADLS_ACUITY_SCORE: 45
ADLS_ACUITY_SCORE: 42
ADLS_ACUITY_SCORE: 45
ADLS_ACUITY_SCORE: 42
ADLS_ACUITY_SCORE: 42
ADLS_ACUITY_SCORE: 45
ADLS_ACUITY_SCORE: 42
ADLS_ACUITY_SCORE: 45
ADLS_ACUITY_SCORE: 42
ADLS_ACUITY_SCORE: 45
ADLS_ACUITY_SCORE: 42

## 2025-05-05 NOTE — PLAN OF CARE
"Patient Name: Louise  MRN: [id number]  Date of Admission: 4/25/2025  Reason for Admission: CABGx2  Level of Care: Cardiac    Vitals:   BP Readings from Last 1 Encounters:   05/05/25 121/70     Pulse Readings from Last 1 Encounters:   05/05/25 97     Wt Readings from Last 1 Encounters:   05/05/25 68.9 kg (151 lb 12.8 oz)     Ht Readings from Last 1 Encounters:   04/25/25 1.74 m (5' 8.5\")     Estimated body mass index is 22.75 kg/m  as calculated from the following:    Height as of this encounter: 1.74 m (5' 8.5\").    Weight as of this encounter: 68.9 kg (151 lb 12.8 oz).  Temp Readings from Last 1 Encounters:   05/05/25 99.6  F (37.6  C) (Axillary)       Pain: Pain goal 0 Pain Rating 0-2 Effective pain medication/regimen PRN meds    CV Surgery Patient: Yes Post Op Day #: 4    Assessment    General: Afebrile yes  Rhythm: normal sinus rhythm  Blood Pressure Medications given/held: Amio Held Beta blocker Given Other -  Resp: Oxygen Status: RA  Patient slept last night Yes Approx hours slept 5  Incentive Spirometry Q 1-2 hour when awake: yes Volume: 1200  Neuro: Alert yes Orientation: self, person, time, and place  GI/:          Bowel Activity: yes if yes indicate when: 5/5          Bowel Medications: yes          Urinary Catheter: no  Skin:          Incision: Incision status: healing well          Epicardial Pacing Wires: no  Chest Tubes   Pleural: no Draining: not applicable               Suction: not applicable              Mediastinal: no Draining: not applicable               Suction: not applicable   Dressing Change Daily: yes If no, why? -    Assessment    Resp: LSD - RA  Telemetry: SR/ST  Neuro: A/Ox4  GI/: Adequate UO via urinal/bathroom, BS audible  Skin/Wounds: Sternal incision, CT sites, Graft sites  Lines/Drains: 1 PIV - SL  Activity: SBA  Sleep: -  Abnormal Labs: PHos    Aggression Stop Light: Green          Patient Care Plan: TCU tomorrow or Wednesday      Goal Outcome Evaluation:             "

## 2025-05-05 NOTE — PROGRESS NOTES
[facility]  Cardiovascular and Thoracic Surgery Daily Note      Assessment and Plan  Raul Wilhelm is a 79 year old male with a past medical history of HTN, HLD, GERD, Zenkers Diverticulum, COPD, Asthma, DMII, CVA, frequent MRSA abscesses, CAD with STEMI s/p REDD to LPDA, mid CX in 2015 and REDD to prox LCx and OM in January 2025 at [facility] and on Brilinta, with known residual LAD and D1 disease. Patient presented on 4/25/25 with chest pain in the center of his chest that radiated to his belly. He underwent TTE with severe hypokinesis of mid to distal aneroseptal and apical walls, EF 45-50% and coronary angiogram which revealed residual LMCA and proximal LAD disease. CV surgery consulted for consideration of surgical revascularization of LMCA disease and proximal LAD disease.      POD # 4 s/p CABG x2 (LIMA-LAD, V-Diagonal) on 5/1/25 with Dr. Michael Mulvihill    - CVS: Pre-op TTE with LVEF 45-50%, severe hypokinesis of the mid to distal anteroseptal and apical walls, preserved RV function.   HD stable overnight in NSR.  Postop vasoplegic shock, resolved.   Continue  mg daily (drop to 81 mg after brilinta resumed), metoprolol tartrate to 25 mg BID with hold parameters, atorvastatin 80 mg daily  Mild Hypervolemia will diurese with 20mg IV lasix today  Had REDD placed in Jan 2025, PTA brilinta resumed  Chest tubes:  removed 5/4, post removal CXR stable   PTA meds on hold: carvedilol, hyzaar    - Resp: Hx COPD and asthma. Postoperative mechanical ventilation, resolved. Extubated POD 0 now saturating well on 2 LPM. IS, pulmonary toilet. PTA inhalers and duonebs resumed : spiriva, breztri aerosphere, albuterol. PTA singulair resumed.    - Neuro: Hx CVA. Neuros intact, pain controlled on current regimen    - Renal: No history of significant renal disease. Cr stable. Trend BMP. Volume management as above.  Recent Labs   Lab 05/05/25  0548 05/04/25  0531 05/03/25  0541   CR 0.78 0.79 0.75       -  GI: + BM, + flatus, continue bowel regimen    - : voiding well on own    - Endo: DM II. Postop stress hyperglycemia. Insulin infusion transitioned to sliding scale insulin. PTA meds on hold: lantus 20 units at HS, ozempic. Lantus 10 units daily resumed per hospitalist. Hospitalist following for glycemic control  Hemoglobin A1C   Date Value Ref Range Status   02/19/2025 7.1 (H) 0.0 - 5.6 % Final     Comment:     Normal <5.7%   Prediabetes 5.7-6.4%    Diabetes 6.5% or higher     Note: Adopted from ADA consensus guidelines.   04/09/2021 8.5 (H) 0 - 5.6 % Final     Comment:     Normal <5.7% Prediabetes 5.7-6.4%  Diabetes 6.5% or higher - adopted from ADA   consensus guidelines.          - FEN: Replace electrolytes as needed. ADAT.     - ID: Postop leukocytosis. Afebrile. Periop abx prophylaxis completing. Trend CBC and fever curve.   Recent Labs   Lab 05/05/25  0548 05/04/25  0531 05/03/25  0541   WBC 8.6 9.2 11.1*       - Heme: Acute blood loss anemia and thrombocytopenia due to surgery. Hgb and PLT stable. Trend CBC, transfuse PRN.   Recent Labs   Lab 05/05/25  0548 05/04/25  0531 05/03/25  0541   HGB 10.2* 9.7* 10.6*    158 148*       - Proph: SCD, subcutaneous heparin, PPI    - Other:  Clinically Significant Risk Factors        # Hypokalemia: Lowest K = 3.3 mmol/L in last 2 days, will replace as needed        # Hypoalbuminemia: Lowest albumin = 3.3 g/dL at 5/2/2025  4:06 AM, will monitor as appropriate       # Hypertension: Noted on problem list           # DMII: A1C = 7.1 % (Ref range: 0.0 - 5.6 %) within past 6 months         # Financial/Environmental Concerns: none  # COPD: noted on problem list  # History of CABG: noted on surgical history       - Dispo: IMC. Continue therapies- anticipate need for TCU at discharge. Medically Ready for Discharge: tomorrow if TCU bed available      Interval History  No acute events overnight.  Pain controlled. Saturating well on room air. Tolerating diet. +flatus.  +BM    Medications  Current Facility-Administered Medications   Medication Dose Route Frequency Provider Last Rate Last Admin    acetaminophen (TYLENOL) tablet 975 mg  975 mg Oral or Feeding Tube Q8H Evelin Martin NP   975 mg at 05/05/25 0406    aspirin (ASA) chewable tablet 81 mg  81 mg Oral or NG Tube Daily Abby Clark PA-C   81 mg at 05/05/25 0820    atorvastatin (LIPITOR) tablet 80 mg  80 mg Oral At Bedtime Evelin Martin NP   80 mg at 05/04/25 2130    fluticasone-vilanterol (BREO ELLIPTA) 100-25 MCG/ACT inhaler 1 puff  1 puff Inhalation Daily Evelin Martin NP   1 puff at 05/05/25 0823    heparin ANTICOAGULANT injection 5,000 Units  5,000 Units Subcutaneous Q8H Evelin Martin NP   5,000 Units at 05/05/25 0405    insulin aspart (NovoLOG) injection (RAPID ACTING)  1-7 Units Subcutaneous TID AC Evelin Martin NP   1 Units at 05/05/25 0731    insulin aspart (NovoLOG) injection (RAPID ACTING)  1-5 Units Subcutaneous At Bedtime Evelin Martin NP        insulin aspart (NovoLOG) injection (RAPID ACTING)   Subcutaneous TID w/meals Evelin Martin NP   2 Units at 05/05/25 0823    insulin glargine (LANTUS PEN) injection 10 Units  10 Units Subcutaneous At Bedtime Evelin Martin NP   10 Units at 05/04/25 2131    magnesium oxide (MAG-OX) tablet 400 mg  400 mg Oral Q4H Mulvihill, Michael, MD   400 mg at 05/05/25 0820    metoprolol tartrate (LOPRESSOR) tablet 25 mg  25 mg Oral BID Abby Clark PA-C   25 mg at 05/05/25 0821    montelukast (SINGULAIR) tablet 10 mg  10 mg Oral At Bedtime Evelin Martin NP   10 mg at 05/04/25 2130    mupirocin (BACTROBAN) 2 % ointment   Topical BID Abby Clark PA-C        pantoprazole (PROTONIX) 2 mg/mL suspension 40 mg  40 mg Oral or NG Tube Daily Evelin Martin NP        Or    pantoprazole (PROTONIX) EC tablet 40 mg  40 mg Oral Daily Evelin Martin, NP   40 mg at 05/05/25 0820    pantoprazole (PROTONIX) IV push injection 40 mg  [facility] Evelin [facility]  SURESH MCKENZIE   40 mg at 05/05/25 0631    polyethylene glycol (MIRALAX) Packet 17 g  17 g Oral or Feeding Tube BID Evelin Martin NP   17 g at 05/03/25 2013    potassium & sodium phosphates (NEUTRA-PHOS) Packet 1 packet  1 packet Oral or Feeding Tube Q4H Mulvihill, Michael, MD   1 packet at 05/05/25 0820    senna-docusate (SENOKOT-S/PERICOLACE) 8.6-50 MG per tablet 1 tablet  1 tablet Oral or Feeding Tube BID Evelin Martin NP   1 tablet at 05/04/25 0859    sodium chloride (PF) 0.9% PF flush 3 mL  3 mL Intracatheter Q8H ZHANG Evelin Martin NP   3 mL at 05/04/25 1318    ticagrelor (BRILINTA) tablet 90 mg  90 mg Oral BID Abby Clark PA-C   90 mg at 05/05/25 0631    umeclidinium (INCRUSE ELLIPTA) 62.5 MCG/ACT inhaler 1 puff  1 puff Inhalation Daily Evelin Martin NP   1 puff at 05/05/25 0823     Current Facility-Administered Medications   Medication Dose Route Frequency Provider Last Rate Last Admin    bisacodyl (DULCOLAX) suppository 10 mg  10 mg Rectal Daily PRN Abby Clark PA-C   10 mg at 05/03/25 1209    calcium carbonate (TUMS) chewable tablet 500 mg  500 mg Oral 4x Daily PRN Evelin Martin NP   500 mg at 05/03/25 0547    glucose gel 15-30 g  15-30 g Oral Q15 Min PRN Evelin Martin NP        Or    dextrose 50 % injection 25-50 mL  25-50 mL Intravenous Q15 Min PRN Evelin Martin NP        Or    glucagon injection 1 mg  1 mg Subcutaneous Q15 Min PRN Evelin Martin NP        furosemide (LASIX) injection 20 mg  20 mg Intravenous Once PRN Evelin Martin NP        hydrALAZINE (APRESOLINE) injection 10 mg  10 mg Intravenous Q30 Min PRN Evelin Martin NP   10 mg at 05/02/25 0533    HYDROmorphone (DILAUDID) injection 0.2 mg  0.2 mg Intravenous Q2H PRN Evelin Martin NP   0.2 mg at 05/02/25 0803    insulin aspart (NovoLOG) injection (RAPID ACTING)   Subcutaneous With Snacks or Supplements Evelin Martin, SURESH        ipratropium - albuterol 0.5 mg/2.5 mg/3 mL (DUONEB) neb solution 3 mL  1 vial Nebulization Q6H  "PRN Evelin Martin NP        lidocaine (LMX4) cream   Topical Q1H PRN Evelin Martin NP        lidocaine 1 % 0.1-1 mL  0.1-1 mL Other Q1H PRN Evelin Martin NP        magnesium hydroxide (MILK OF MAGNESIA) suspension 30 mL  30 mL Oral Daily PRN Evelin Martin NP        methocarbamol (ROBAXIN) half-tab 250 mg  250 mg Oral Q6H PRN Evelin Martin NP   250 mg at 05/03/25 0159    naloxone (NARCAN) injection 0.2 mg  0.2 mg Intravenous Q2 Min PRN Evelin Martin NP        Or    naloxone (NARCAN) injection 0.4 mg  0.4 mg Intravenous Q2 Min PRN Evelin Martin NP        Or    naloxone (NARCAN) injection 0.2 mg  0.2 mg Intramuscular Q2 Min PRN Evelin Martin NP        Or    naloxone (NARCAN) injection 0.4 mg  0.4 mg Intramuscular Q2 Min PRN Evelin Martin NP        ondansetron (ZOFRAN ODT) ODT tab 4 mg  4 mg Oral Q6H PRN Evelin Martin NP        Or    ondansetron (ZOFRAN) injection 4 mg  4 mg Intravenous Q6H PRN Evelin Martin NP        oxyCODONE (ROXICODONE) tablet 5 mg  5 mg Oral Q4H PRN Evelin Martin NP   5 mg at 05/03/25 1149    Or    oxyCODONE (ROXICODONE) tablet 10 mg  10 mg Oral Q4H PRN Evelin Martin NP   10 mg at 05/02/25 1557    prochlorperazine (COMPAZINE) injection 5 mg  5 mg Intravenous Q6H PRN Evelin Martin NP        Or    prochlorperazine (COMPAZINE) tablet 5 mg  5 mg Oral Q6H PRN Evelin Martin NP        sodium chloride (PF) 0.9% PF flush 3 mL  3 mL Intracatheter q1 min prn Evelin Martin NP             Physical Exam  Vitals were reviewed  Blood pressure 112/61, pulse 108, temperature 98.9  F (37.2  C), temperature source Oral, resp. rate 14, height 1.74 m (5' 8.5\"), weight 68.9 kg (151 lb 12.8 oz), SpO2 97%.  Rhythm: NSR    Lungs: diminished throughout on room air    Cardiovascular: Distant, S1, S2, RRR, no m/r/g    Abdomen: soft, NT, ND, +BS    Extremeties: warm, no LE edema    Incision: Sternal incision CDI, eccymotic, LLE incisions CDI    CT: n/a    Weight:   Vitals:    04/26/25 0700 " 05/02/25 0400 05/03/25 0337 05/04/25 0344   Weight: 65.9 kg (145 lb 4.5 oz) 72.8 kg (160 lb 7.9 oz) 70.8 kg (156 lb) 68.8 kg (151 lb 11.2 oz)    05/05/25 0609   Weight: 68.9 kg (151 lb 12.8 oz)         Data  Recent Labs   Lab 05/05/25  0730 05/05/25  0548 05/05/25  0155 05/04/25  1603 05/04/25  1225 05/04/25  0634 05/04/25  0531 05/03/25  1245 05/03/25  0541 05/02/25  0627 05/02/25  0406 05/01/25  1305 05/01/25  1258 05/01/25  1111 05/01/25  1110   WBC  --  8.6  --   --   --   --  9.2  --  11.1*  --  15.0*  --  17.2*  --  21.4*   HGB  --  10.2*  --   --   --   --  9.7*  --  10.6*  --  11.4*  --  10.9*   < > 9.6*   MCV  --  93  --   --   --   --  93  --  98  --  92  --  91  --  91   PLT  --  184  --   --   --   --  158  --  148*  --  185  --  159  --  104*   INR  --   --   --   --   --   --   --   --   --   --   --   --  1.22*  --  1.52*   NA  --  141  --   --   --   --  138  --  136  --  137  --  140   < > 140   POTASSIUM  --  3.5  --   --  3.7  --  3.3*  --  3.9  --  4.0  --  4.3   < > 4.1   CHLORIDE  --  107  --   --   --   --  103  --  106  --  103  --  107  --  109*   CO2  --  24  --   --   --   --  25  --  16*  --  20*  --  21*  --  22   BUN  --  14.0  --   --   --   --  14.3  --  14.4  --  15.0  --  14.3  --  14.8   CR  --  0.78  --   --   --   --  0.79  --  0.75  --  0.88  --  0.86  --  0.87   ANIONGAP  --  10  --   --   --   --  10  --  14  --  14  --  12  --  9   JOSE RAFAEL  --  8.3*  --   --   --   --  8.1*  --  8.1*  --  8.0*  --  8.2*  --  8.8   * 109* 104*   < >  --    < > 108*   < > 142*   < > 132*   < > 159*   < > 152*   ALBUMIN  --   --   --   --   --   --   --   --   --   --  3.3*  --  3.4*  --   --    PROTTOTAL  --   --   --   --   --   --   --   --   --   --  5.0*  --  4.9*  --   --    BILITOTAL  --   --   --   --   --   --   --   --   --   --  0.9  --  1.3*  --   --    ALKPHOS  --   --   --   --   --   --   --   --   --   --  60  --  57  --   --    ALT  --   --   --   --   --   --   --   --   --    --  35  --  54  --   --    AST  --   --   --   --   --   --   --   --   --   --  47*  --  56*  --   --     < > = values in this interval not displayed.       Imaging:  Recent Results (from the past 24 hours)   XR Chest 2 Views    Narrative    EXAM: XR CHEST 2 VIEWS  LOCATION: [facility]  DATE: 5/5/2025    INDICATION: s p chest tube removal  COMPARISON: Chest radiograph 5/2/2025.      Impression    IMPRESSION: Interval removal of the left chest tube, mediastinal drains and right central venous catheter.    Probable tiny left apical pneumothorax measuring 3-4 mm. Small bilateral pleural effusions. Cardiomediastinal silhouette is unchanged. Coronary stent.               Patient seen and discussed with Dr. Mulvihill Brittany Smith, PA-C  Cardiothoracic Surgery  Pager: [phone]    CV Surgery Rounding Pager: [phone]  After hours please page surgeon on-call

## 2025-05-05 NOTE — PROGRESS NOTES
Care Management Follow Up    Length of Stay (days): 10    Expected Discharge Date: 05/06/2025     Concerns to be Addressed: discharge planning  Patient plan of care discussed at interdisciplinary rounds: Yes    Anticipated Discharge Disposition: Transitional Care       Anticipated Discharge Services:    Anticipated Discharge DME:      Patient/family educated on Medicare website which has current facility and service quality ratings: yes  Education Provided on the Discharge Plan: Yes  Patient/Family in Agreement with the Plan: yes    Referrals Placed by CM/SW: Post Acute Facilities  Private pay costs discussed: private room/amenity fees and transportation costs    Discussed  Partnership in Safe Discharge Planning  document with patient/family: No     Handoff Completed: No, handoff not indicated or clinically appropriate    Additional Information:  SW reviewed chart. Noted possible discharge to TCU in 1-2 days and sent referrals. Pt accepted to [facility] and [facility] on the [place]. [facility] is full. SW updated pt. He would the bed at [facility]. He would prefer a private room, if available and is willing to pay an additional daily fee for this. He would like transportation arranged and SW informed him he will get billed for the cost of the ride. [facility] ride set for Tuesday with  between 2913-1752. PA updated.     Next Steps: PAT following for discharge planning.     EMILY Cruz, LICSW  [phone] Desk phone  [phone] Cell/text (Preferred)  [facility]

## 2025-05-05 NOTE — PROGRESS NOTES
CLINICAL NUTRITION SERVICES - REASSESSMENT NOTE     Registered Dietitian Interventions:  - Changed supplement to Glucerna in alignment with Mod CHO diet order  - Encouraged adequate PO, emphasis on protein, post op CABG     INFORMATION OBTAINED  Assessed patient in room.    CURRENT NUTRITION ORDERS  Diet: Moderate Consistent Carbohydrate and Low Saturated Fat/2400 mg Sodium  Snacks/Supplements: Ensure Enlive BID      CURRENT INTAKE/TOLERANCE  - Pt reported that he has not been receiving his protein shakes.   - He notes reduced appetite since his surgery.   - Receiving meals TID, though they are smaller. Eating %.      NEW FINDINGS  GI symptoms:  BM x3 yesterday      Skin/wounds: Reviewed    Nutrition-relevant labs: Reviewed  Nutrition-relevant medications: Reviewed  Weight: 68.9 kg - stable, slightly up since surgery.     ASSESSED NUTRITION NEEDS  Dosing Weight: 68 kg, based on actual wt  Estimated Energy Needs: 0764-0244 kcals (25-30 Kcal/Kg)  Justification: maintenance  Estimated Protein Needs:  grams protein (1.2-1.5 g pro/Kg)  Justification: maintenance, planned surgery   Estimated Fluid Needs: 2347-8825  mL (1 mL/Kcal)  Justification: maintenance     MALNUTRITION  % Intake: Decreased intake does not meet criteria for malnutrition   % Weight Loss: Weight loss does not meet criteria   Subcutaneous Fat Loss: None observed  Muscle Loss: None observed  Fluid Accumulation/Edema: None noted  Malnutrition Diagnosis: Patient does not meet two of the established criteria necessary for diagnosing malnutrition but is at risk for malnutrition  Malnutrition Present on Admission: Unable to assess    EVALUATION OF THE PROGRESS TOWARD GOALS   Previous Goals  Patient to consume % of nutritionally adequate meal trays TID, or the equivalent with supplements/snacks.   Evaluation: Progressing    Previous Nutrition Diagnosis  No nutrition diagnosis at this time   Evaluation: Resolved    NUTRITION  DIAGNOSIS  Increased nutrient needs (protein/energy)  related to recovery as evidenced by post op CABG    INTERVENTIONS  Medical food supplement therapy - Glucerna BID  Nutrition education content - Discussed protein sources of food, nutrition needs post op.     GOALS  Patient to consume % of nutritionally adequate meal trays TID, or the equivalent with supplements/snacks.     MONITORING/EVALUATION  Progress toward goals will be monitored and evaluated per policy.    Rayne Palma RD, LD  Pager: [phone]  Available on [employer]

## 2025-05-05 NOTE — PROGRESS NOTES
[facility]    Medicine Progress Note - Hospitalist Service    Date of Admission:  4/25/2025    Assessment & Plan   Raul Wilhelm is a 79 year old male with history including asthma; DM2; HTN; known severe multivessel CAD with prior stents (1/2025); h/o CVA; who presented to [facility] 4/25/2025 with chest pain and found with NSTEMI. Transferred to [facility] 4/25/2025 for management.     NSTEMI with severe multivessel CAD - s/p CABG 2v 5/1/2025 (LIMA to LAD and SVG to diagonal).  Ischemic cardiomyopathy  Recent LCx and OM REDD (1/2025).   H/o HTN (benign essential).  HLD.  [PTA: asa 81 mg daily; atorvastatin 80 mg at bedtime; carvedilol 6.25 mg BID; losartan-HCTZ 50-12.5 mg daily; ticagrelor 90 mg BID; PRN NTG.]  * H/o STEMI 11/2025 s/p LPDA and LCx stents. H/o STEMI 1/2025. Heart catheterization 1/2025 showed severe multivessel CAD involving ostial-prox LAD, mid LAD, prox LCX, and large OM branch; stents placed to LCx and OM branch and there was plan for outpatient discussion regarding surgical revascularization vs long stents for LAD disease, but he had not yet had this follow-up. Echo 1/2025 showed LVEF 55-60%, mild LVH, no regional wall motion abnormalities; no significant valve disease.  * Initial presentation to outside hospital as above. Started on heparin gtt.  * Transferred to [facility] 4/25. Cardiology consulted on admit.  * Later 4/25: Echo showed LVEF 45-50% with WMA's, grade 1 diastolic dysfunction; RV OK. Angiogram showed LM with ostial 60%; mid LAD with diffuse 50%, LAD ostium covered by CX stent, appeared to be up to 80% on some views, diffuse proximal 75% narrowing first diagonal 75%; LCx stents widely patent, no restenosis; CABG recommended. CVS consulted; ticagrelor held and started on cangrelor gtt bridging.  * 5/1: Underwent CABG as noted. Subsequently extubated.  * 5/2: Stable, off pressors, on 2L O2. Transferred to the floor..  - Post-op management per CVS.  -  Continue BB, ASA, cangrelor gtt, transition back to PTA ticagrelor 5/4 per CTS  - Restart ARB when able per CVS.  - Continue scheduled acetaminophen, PRN methocarbamol, PRN oxycodone, PRN IV hydromorphone; minimize opioids as able.  - Continue to monitor i/o's, daily wts.  --5\5- discussed new SOB with CT surgery who will address. Could try a neb although no wheezing on exam. May give some diuresis as he is ~+5L   - Planning TCU after surgery- SW following. From IM standpoint would recommend TCU given his reduced insulin dosing at present and need for up titration post discharge. This would be more appropriate for SNF setting given lack of support at home.      DM2.   [PTA: glargine 20U at bedtime; semaglutide (pt not taking due to cost).]  * HgbA1C 7.1 2/2025.   * 5/1: On insulin gtt post-op   * 5/2: Off insulin gtt. Tolerating some oral intake.    Recent Labs   Lab Test 02/19/25  0832 01/04/25  1709   A1C 7.1* 8.1*   - Continue moderate CHO diet.  - Cont glargine at 10U at bedtime..  - Cont aspart 1U/CHO U with meals and snacks.  - Adjust regimen as indicated.   - Continue aspart ISS (medium).  - Continue PRN hypoglycemia protocol.  -will need ongoing adjustments at TCU as diet increases     Blood loss anemia.  * Hgb normal on admit.  * Hgb decreased related to CABG, not requiring prbc's.  - Continue to monitor CBC   - Consider prbc transfusion if hgb </= 7.0 or if significant bleeding with hemodynamic instability or if symptomatic.    Thrombocytopenia, suspect consumptive related to surgery, resolved.  * Platelets normal on admit.  * Platelets decreased during surgery requiring platelet transfusion.  * Platelets normal 5/2.    Leukocytosis, suspect reactive. Resolved.     Asthma.  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  - Continue fluticasone-vilanterol, tiotropium, montelukast, PRN inhalers/nebs     Zenkers diverticulum.  GERD.  Chronic Dysphagia.  * Noted during Jan 2025 admit. Noted diverticulum does not  empty fully with multiple swallows and gives him regurgitation/dysphagia symptoms. He elected not to pursue ENT outpatient. PTA on BID omeprazole.  - Continue pantoprazole.    Back pain, suspect musculoskeletal.  * 4/28: Pt c/o back pain suspected due to the hospital bed.  - Continue PRN acetaminophen.     H/o frequent skin abscesses.  +MRSA.  * 3/2025 with abscess of right buttock that was +MRSA. Most recent at right neck Mid 4/2025, appears to be resolving.    * 4/29: CVS ordered 10d course of mupirocin ointment on nares.  - Continue to monitor clinically.  - Continue mupirocin oint to nares starting 5/5    H/o arterial ischemic stroke.  - Continue antiplatelets and statin as noted.     Pulmonary nodule RUL, 0.3cm, noted on CT (1/2025).  - Follow-up CT chest due Jan 2026.    Constipation:  Had a BM yesterday 5/3.  - Bowel regimen.    Hypokalemia:  - Replace.        Diet: Combination Diet Moderate Consistent Carb (60 g CHO per Meal) Diet; Low Saturated Fat Na <2400mg Diet  Snacks/Supplements Adult: Ensure Enlive; Between Meals    DVT Prophylaxis: Heparin SQ  Diaz Catheter: Not present  Lines: None     Cardiac Monitoring: ACTIVE order. Indication: Open heart surgery (7 days)  Code Status: Full Code      Clinically Significant Risk Factors        # Hypokalemia: Lowest K = 3.3 mmol/L in last 2 days, will replace as needed        # Hypoalbuminemia: Lowest albumin = 3.3 g/dL at 5/2/2025  4:06 AM, will monitor as appropriate     # Hypertension: Noted on problem list           # DMII: A1C = 7.1 % (Ref range: 0.0 - 5.6 %) within past 6 months       # Financial/Environmental Concerns: none  # COPD: noted on problem list  # History of CABG: noted on surgical history       Social Drivers of Health    Financial Resource Strain: High Risk (4/25/2025)    Financial Resource Strain     Within the past 12 months, have you or your family members you live with been unable to get utilities (heat, electricity) when it was really  needed?: Yes          Disposition Plan     Medically Ready for Discharge: per CTS, hospitalist following along          The patient's care was discussed with Dr. Chatterjee who agrees with the above plan     Rosario Valderrama PA-C  Hospitalist Service  [facility]  Securely message with [employer] (more info)  Text page via [facility] Paging/Directory   ______________________________________________________________________    Interval History   Patient reports for the past 45 minutes or so he has been feeling moderately SOB. No CP, dizziness, palpitations. He has chronic phlegm production which is unchanged. No fevers, chills. Tolerating po but eating much less than normal. Not drinking much fluids.   Looking forward to a shower and shaving today. Plan to discharge to TCU as he does not have support at home.     Physical Exam   Temp: 98.9  F (37.2  C) Temp src: Oral BP: 98/60 Pulse: 108   Resp: 14 SpO2: 97 % O2 Device: None (Room air)    Vitals:    05/03/25 0337 05/04/25 0344 05/05/25 0609   Weight: 70.8 kg (156 lb) 68.8 kg (151 lb 11.2 oz) 68.9 kg (151 lb 12.8 oz)     Vital Signs with Ranges  Temp:  [97.8  F (36.6  C)-98.9  F (37.2  C)] 98.9  F (37.2  C)  Pulse:  [] 108  Resp:  [14-18] 14  BP: ()/(60-81) 98/60  SpO2:  [93 %-97 %] 97 %  I/O last 3 completed shifts:  In: 1560 [P.O.:1560]  Out: 2310 [Urine:1000; Chest Tube:1310]    Constitutional: Alert and oriented, sitting up in bed. Appears comfortable and is pleasantly conversant   ENT: moist mucous membranes  Respiratory: Lungs somewhat diminished but clear overall. No increased work of breathing or wheezing   Cardiovascular: distant heart tones. Regular rate and rhythm, no significant LE edema   GI: active bowel sounds, abdomen mildly distended, soft, non-tender  Skin/Integumen: sternal incision without significant erythema or drainage  MSK: moves all four extremities  Neuro: speech is clear. Face symmetric. Follows commands         Medical Decision Making       50 MINUTES SPENT BY ME on the date of service doing chart review, history, exam, documentation & further activities per the note.      Data     I have personally reviewed the following data over the past 24 hrs:    8.6  \   10.2 (L)   / 184     141 107 14.0 /  161 (H)   3.5 24 0.78 \       Imaging results reviewed over the past 24 hrs:   Recent Results (from the past 24 hours)   XR Chest 2 Views    Narrative    EXAM: XR CHEST 2 VIEWS  LOCATION: [facility]  DATE: 5/5/2025    INDICATION: s p chest tube removal  COMPARISON: Chest radiograph 5/2/2025.      Impression    IMPRESSION: Interval removal of the left chest tube, mediastinal drains and right central venous catheter.    Probable tiny left apical pneumothorax measuring 3-4 mm. Small bilateral pleural effusions. Cardiomediastinal silhouette is unchanged. Coronary stent.

## 2025-05-05 NOTE — PLAN OF CARE
"Goal Outcome Evaluation:      Plan of Care Reviewed With: patient    Overall Patient Progress: improvingOverall Patient Progress: improving  Patient Name: Louise  MRN: [id number]  Date of Admission: 4/25/2025  Reason for Admission: CABG x2   Level of Care: Heart    Vitals:   BP Readings from Last 1 Encounters:   05/05/25 121/71     Pulse Readings from Last 1 Encounters:   05/05/25 90     Wt Readings from Last 1 Encounters:   05/04/25 68.8 kg (151 lb 11.2 oz)     Ht Readings from Last 1 Encounters:   04/25/25 1.74 m (5' 8.5\")     Estimated body mass index is 22.73 kg/m  as calculated from the following:    Height as of this encounter: 1.74 m (5' 8.5\").    Weight as of this encounter: 68.8 kg (151 lb 11.2 oz).  Temp Readings from Last 1 Encounters:   05/05/25 98.1  F (36.7  C) (Oral)       Pain: Pain goal 0 Pain Rating 2 Effective pain medication/regimen Scheduled meds     CV Surgery Patient: Yes Post Op Day #: 4    Assessment    General: Afebrile yes  Rhythm: normal sinus rhythm  Blood Pressure Medications given/held: Amio Held Beta blocker Given   Resp: Oxygen Status: RA  Patient slept last night Yes Approx hours slept 6  Neuro: Alert yes Orientation: self, person, time, and place  GI/:          Bowel Activity: yes           Bowel Medications: no          Urinary Catheter: no  Skin:          Incision: Incision status: healing well and opened          Epicardial Pacing Wires: no  Chest Tubes   Pleural: no Draining: not applicable               Suction: not applicable              Mediastinal: no Draining: not applicable               Suction: not applicable   Dressing Change Daily: yes     Assessment    Resp: LSC  GI/: Good UO, Mod carb diet, 2 Bms 5-4  Skin/Wounds: Sternal incision, CT sites, harvest sites   Lines/Drains: R PIV SL, L PIV SL   Activity: A1 WGB   Abnormal Labs: K, Mg, P protocol     Aggression Stop Light: Green          Patient Care Plan: Monitor, shower today, increase physical activity, " possible discharge

## 2025-05-06 ENCOUNTER — DOCUMENTATION ONLY (OUTPATIENT)
Dept: GERIATRICS | Facility: CLINIC | Age: 80
End: 2025-05-06
Payer: COMMERCIAL

## 2025-05-06 LAB
GLUCOSE BLDC GLUCOMTR-MCNC: 123 MG/DL (ref 70–99)
GLUCOSE BLDC GLUCOMTR-MCNC: 138 MG/DL (ref 70–99)
GLUCOSE BLDC GLUCOMTR-MCNC: 185 MG/DL (ref 70–99)
MAGNESIUM SERPL-MCNC: 1.9 MG/DL (ref 1.7–2.3)
PHOSPHATE SERPL-MCNC: 2.9 MG/DL (ref 2.5–4.5)
POTASSIUM SERPL-SCNC: 3.6 MMOL/L (ref 3.4–5.3)

## 2025-05-06 PROCEDURE — 36415 COLL VENOUS BLD VENIPUNCTURE: CPT | Performed by: STUDENT IN AN ORGANIZED HEALTH CARE EDUCATION/TRAINING PROGRAM

## 2025-05-06 PROCEDURE — 250N000013 HC RX MED GY IP 250 OP 250 PS 637: Performed by: NURSE PRACTITIONER

## 2025-05-06 PROCEDURE — 250N000011 HC RX IP 250 OP 636: Performed by: NURSE PRACTITIONER

## 2025-05-06 PROCEDURE — 97530 THERAPEUTIC ACTIVITIES: CPT | Mod: GP

## 2025-05-06 PROCEDURE — 250N000013 HC RX MED GY IP 250 OP 250 PS 637: Performed by: STUDENT IN AN ORGANIZED HEALTH CARE EDUCATION/TRAINING PROGRAM

## 2025-05-06 PROCEDURE — 250N000013 HC RX MED GY IP 250 OP 250 PS 637: Performed by: PHYSICIAN ASSISTANT

## 2025-05-06 PROCEDURE — 83735 ASSAY OF MAGNESIUM: CPT | Performed by: STUDENT IN AN ORGANIZED HEALTH CARE EDUCATION/TRAINING PROGRAM

## 2025-05-06 PROCEDURE — 99233 SBSQ HOSP IP/OBS HIGH 50: CPT | Performed by: PHYSICIAN ASSISTANT

## 2025-05-06 PROCEDURE — 97110 THERAPEUTIC EXERCISES: CPT | Mod: GP

## 2025-05-06 PROCEDURE — 999N000128 HC STATISTIC PERIPHERAL IV START W/O US GUIDANCE

## 2025-05-06 PROCEDURE — 120N000001 HC R&B MED SURG/OB

## 2025-05-06 PROCEDURE — 999N000040 HC STATISTIC CONSULT NO CHARGE VASC ACCESS

## 2025-05-06 PROCEDURE — 84100 ASSAY OF PHOSPHORUS: CPT | Performed by: STUDENT IN AN ORGANIZED HEALTH CARE EDUCATION/TRAINING PROGRAM

## 2025-05-06 PROCEDURE — 84132 ASSAY OF SERUM POTASSIUM: CPT | Performed by: STUDENT IN AN ORGANIZED HEALTH CARE EDUCATION/TRAINING PROGRAM

## 2025-05-06 RX ORDER — POLYETHYLENE GLYCOL 3350 17 G/17G
17 POWDER, FOR SOLUTION ORAL 2 TIMES DAILY PRN
DISCHARGE
Start: 2025-05-06 | End: 2025-05-13

## 2025-05-06 RX ORDER — MAGNESIUM OXIDE 400 MG/1
400 TABLET ORAL EVERY 4 HOURS
Status: COMPLETED | OUTPATIENT
Start: 2025-05-06 | End: 2025-05-06

## 2025-05-06 RX ORDER — METHOCARBAMOL 500 MG/1
250 TABLET, FILM COATED ORAL EVERY 6 HOURS PRN
DISCHARGE
Start: 2025-05-06 | End: 2025-05-13

## 2025-05-06 RX ORDER — ACETAMINOPHEN 500 MG
1000 TABLET ORAL EVERY 6 HOURS PRN
DISCHARGE
Start: 2025-05-06

## 2025-05-06 RX ORDER — POTASSIUM CHLORIDE 20MEQ/15ML
20 LIQUID (ML) ORAL ONCE
Status: COMPLETED | OUTPATIENT
Start: 2025-05-06 | End: 2025-05-06

## 2025-05-06 RX ORDER — AMOXICILLIN 250 MG
1 CAPSULE ORAL 2 TIMES DAILY PRN
DISCHARGE
Start: 2025-05-06 | End: 2025-05-13

## 2025-05-06 RX ORDER — MUPIROCIN 20 MG/G
OINTMENT TOPICAL 2 TIMES DAILY
DISCHARGE
Start: 2025-05-06 | End: 2025-05-15

## 2025-05-06 RX ORDER — METOPROLOL TARTRATE 25 MG/1
25 TABLET, FILM COATED ORAL 2 TIMES DAILY
DISCHARGE
Start: 2025-05-06 | End: 2025-05-14

## 2025-05-06 RX ORDER — OXYCODONE HYDROCHLORIDE 5 MG/1
5 TABLET ORAL EVERY 6 HOURS PRN
Qty: 10 TABLET | Refills: 0 | Status: SHIPPED | OUTPATIENT
Start: 2025-05-06 | End: 2025-05-13

## 2025-05-06 RX ORDER — BUDESONIDE, GLYCOPYRROLATE, AND FORMOTEROL FUMARATE 160; 9; 4.8 UG/1; UG/1; UG/1
2 AEROSOL, METERED RESPIRATORY (INHALATION) 2 TIMES DAILY
DISCHARGE
Start: 2025-05-06 | End: 2025-05-13

## 2025-05-06 RX ADMIN — POTASSIUM CHLORIDE 20 MEQ: 1.5 SOLUTION ORAL at 09:02

## 2025-05-06 RX ADMIN — TICAGRELOR 90 MG: 90 TABLET ORAL at 18:12

## 2025-05-06 RX ADMIN — MUPIROCIN: 20 OINTMENT TOPICAL at 20:16

## 2025-05-06 RX ADMIN — ACETAMINOPHEN 975 MG: 325 TABLET, FILM COATED ORAL at 04:37

## 2025-05-06 RX ADMIN — HEPARIN SODIUM 5000 UNITS: 5000 INJECTION, SOLUTION INTRAVENOUS; SUBCUTANEOUS at 11:29

## 2025-05-06 RX ADMIN — TICAGRELOR 90 MG: 90 TABLET ORAL at 06:44

## 2025-05-06 RX ADMIN — PANTOPRAZOLE SODIUM 40 MG: 40 INJECTION, POWDER, FOR SOLUTION INTRAVENOUS at 06:44

## 2025-05-06 RX ADMIN — ACETAMINOPHEN 975 MG: 325 TABLET, FILM COATED ORAL at 12:54

## 2025-05-06 RX ADMIN — FLUTICASONE FUROATE AND VILANTEROL TRIFENATATE 1 PUFF: 100; 25 POWDER RESPIRATORY (INHALATION) at 09:06

## 2025-05-06 RX ADMIN — MONTELUKAST 10 MG: 10 TABLET, FILM COATED ORAL at 22:28

## 2025-05-06 RX ADMIN — Medication 400 MG: at 11:29

## 2025-05-06 RX ADMIN — Medication 400 MG: at 09:02

## 2025-05-06 RX ADMIN — HEPARIN SODIUM 5000 UNITS: 5000 INJECTION, SOLUTION INTRAVENOUS; SUBCUTANEOUS at 04:37

## 2025-05-06 RX ADMIN — ATORVASTATIN CALCIUM 80 MG: 80 TABLET, FILM COATED ORAL at 22:28

## 2025-05-06 RX ADMIN — METOPROLOL TARTRATE 25 MG: 25 TABLET, FILM COATED ORAL at 20:12

## 2025-05-06 RX ADMIN — ASPIRIN 81 MG CHEWABLE TABLET 81 MG: 81 TABLET CHEWABLE at 09:03

## 2025-05-06 RX ADMIN — UMECLIDINIUM 1 PUFF: 62.5 AEROSOL, POWDER ORAL at 09:06

## 2025-05-06 RX ADMIN — MUPIROCIN: 20 OINTMENT TOPICAL at 09:10

## 2025-05-06 RX ADMIN — HEPARIN SODIUM 5000 UNITS: 5000 INJECTION, SOLUTION INTRAVENOUS; SUBCUTANEOUS at 20:12

## 2025-05-06 RX ADMIN — METOPROLOL TARTRATE 25 MG: 25 TABLET, FILM COATED ORAL at 09:03

## 2025-05-06 RX ADMIN — PANTOPRAZOLE SODIUM 40 MG: 40 TABLET, DELAYED RELEASE ORAL at 09:03

## 2025-05-06 RX ADMIN — SENNOSIDES AND DOCUSATE SODIUM 1 TABLET: 50; 8.6 TABLET ORAL at 20:13

## 2025-05-06 RX ADMIN — ACETAMINOPHEN 650 MG: 325 TABLET, FILM COATED ORAL at 20:11

## 2025-05-06 RX ADMIN — INSULIN GLARGINE 10 UNITS: 100 INJECTION, SOLUTION SUBCUTANEOUS at 22:29

## 2025-05-06 RX ADMIN — SENNOSIDES AND DOCUSATE SODIUM 1 TABLET: 50; 8.6 TABLET ORAL at 09:02

## 2025-05-06 ASSESSMENT — ACTIVITIES OF DAILY LIVING (ADL)
ADLS_ACUITY_SCORE: 42
ADLS_ACUITY_SCORE: 44
ADLS_ACUITY_SCORE: 42
ADLS_ACUITY_SCORE: 44
ADLS_ACUITY_SCORE: 42
ADLS_ACUITY_SCORE: 44
ADLS_ACUITY_SCORE: 42
ADLS_ACUITY_SCORE: 44
ADLS_ACUITY_SCORE: 42

## 2025-05-06 NOTE — PLAN OF CARE
"Goal Outcome Evaluation:Improving   VSS, scheduled plain tylenol effective for pain. Takes meds whole , one at a time with pudding due to chronic swallowing issues. Tele=SR w/ occ pac's and inverted T-waves. Up indep to bathroom, good use of sternal precautions. C/O poor appetite, \"I hope I feel like eating today\". To TCU about noon today.                        "

## 2025-05-06 NOTE — PROGRESS NOTES
[facility]  Cardiovascular and Thoracic Surgery Daily Note      Assessment and Plan  Raul Wilhelm is a 79 year old male with a past medical history of HTN, HLD, GERD, Zenkers Diverticulum, COPD, Asthma, DMII, CVA, frequent MRSA abscesses, CAD with STEMI s/p REDD to LPDA, mid CX in 2015 and REDD to prox LCx and OM in January 2025 at [facility] and on Brilinta, with known residual LAD and D1 disease. Patient presented on 4/25/25 with chest pain in the center of his chest that radiated to his belly. He underwent TTE with severe hypokinesis of mid to distal aneroseptal and apical walls, EF 45-50% and coronary angiogram which revealed residual LMCA and proximal LAD disease. CV surgery consulted for consideration of surgical revascularization of LMCA disease and proximal LAD disease.      POD # 5 s/p CABG x2 (LIMA-LAD, V-Diagonal) on 5/1/25 with Dr. Michael Mulvihill    - CVS: Pre-op TTE with LVEF 45-50%, severe hypokinesis of the mid to distal anteroseptal and apical walls, preserved RV function.   HD stable overnight in NSR.  Postop vasoplegic shock, resolved.   Continue  mg daily (drop to 81 mg after brilinta resumed), metoprolol tartrate to 25 mg BID with hold parameters, atorvastatin 80 mg daily  Euvolemic, defer on further diuresis  Had REDD placed in Jan 2025, PTA brilinta resumed  Chest tubes:  removed 5/4, post removal CXR stable   PTA meds on hold: carvedilol, hyzaar    - Resp: Hx COPD and asthma. Postoperative mechanical ventilation, resolved. Extubated POD 0 now saturating well on room air IS, pulmonary toilet. PTA inhalers and duonebs resumed : spiriva, breztri aerosphere, albuterol. PTA singulair resumed.    - Neuro: Hx CVA. Neuros intact, pain controlled on current regimen    - Renal: No history of significant renal disease. Cr stable. Trend BMP. Volume management as above.  Recent Labs   Lab 05/05/25  0548 05/04/25  0531 05/03/25  0541   CR 0.78 0.79 0.75       - GI: + BM, + flatus,  continue bowel regimen    - : voiding well on own    - Endo: DM II. Postop stress hyperglycemia. Insulin infusion transitioned to sliding scale insulin. PTA meds on hold: lantus 20 units at HS, ozempic. Lantus 10 units daily resumed per hospitalist. Hospitalist following for glycemic control  Hemoglobin A1C   Date Value Ref Range Status   02/19/2025 7.1 (H) 0.0 - 5.6 % Final     Comment:     Normal <5.7%   Prediabetes 5.7-6.4%    Diabetes 6.5% or higher     Note: Adopted from ADA consensus guidelines.   04/09/2021 8.5 (H) 0 - 5.6 % Final     Comment:     Normal <5.7% Prediabetes 5.7-6.4%  Diabetes 6.5% or higher - adopted from ADA   consensus guidelines.          - FEN: Replace electrolytes as needed. ADAT.     - ID: Postop leukocytosis. Afebrile. Periop abx prophylaxis completing. Trend CBC and fever curve.   Recent Labs   Lab 05/05/25  0548 05/04/25  0531 05/03/25  0541   WBC 8.6 9.2 11.1*       - Heme: Acute blood loss anemia and thrombocytopenia due to surgery. Hgb and PLT stable. Trend CBC, transfuse PRN.   Recent Labs   Lab 05/05/25  0548 05/04/25  0531 05/03/25  0541   HGB 10.2* 9.7* 10.6*    158 148*       - Proph: SCD, subcutaneous heparin, PPI    - Other:  Clinically Significant Risk Factors               # Hypoalbuminemia: Lowest albumin = 3.3 g/dL at 5/2/2025  4:06 AM, will monitor as appropriate       # Hypertension: Noted on problem list           # DMII: A1C = 7.1 % (Ref range: 0.0 - 5.6 %) within past 6 months         # Financial/Environmental Concerns: none  # COPD: noted on problem list  # History of CABG: noted on surgical history       - Dispo: [facility]. Continue therapies- anticipate need for TCU at discharge. Medically Ready for Discharge: today but prior authorization hasn't went through      Interval History  No acute events overnight. States pain is well managed on current regimen, slept well. Breathing is stable on room air, working with IS. Tolerating diet, is passing flatus, + BM, no  n/v. Ambulating in the halls with assistance. Denies chest pain, palpitations, dizziness, syncopal symptoms, chills, myalgias, sternal popping/clicking.    Medications  Current Facility-Administered Medications   Medication Dose Route Frequency Provider Last Rate Last Admin    acetaminophen (TYLENOL) tablet 975 mg  975 mg Oral or Feeding Tube Q8H Evelin Martin NP   975 mg at 05/06/25 1254    aspirin (ASA) chewable tablet 81 mg  81 mg Oral or NG Tube Daily Abby Clark PA-C   81 mg at 05/06/25 0903    atorvastatin (LIPITOR) tablet 80 mg  80 mg Oral At Bedtime Evelin Martin NP   80 mg at 05/05/25 2215    fluticasone-vilanterol (BREO ELLIPTA) 100-25 MCG/ACT inhaler 1 puff  1 puff Inhalation Daily Evelin Martin NP   1 puff at 05/06/25 0906    heparin ANTICOAGULANT injection 5,000 Units  5,000 Units Subcutaneous Q8H Evelin Martin NP   5,000 Units at 05/06/25 1129    insulin aspart (NovoLOG) injection (RAPID ACTING)  1-7 Units Subcutaneous TID AC Evelin Martin NP   1 Units at 05/05/25 1636    insulin aspart (NovoLOG) injection (RAPID ACTING)  1-5 Units Subcutaneous At Bedtime Evelin Martin NP        insulin aspart (NovoLOG) injection (RAPID ACTING)   Subcutaneous TID w/meals Evelin Martin NP   2 Units at 05/06/25 0904    insulin glargine (LANTUS PEN) injection 10 Units  10 Units Subcutaneous At Bedtime Evelin Martin NP   10 Units at 05/05/25 2214    metoprolol tartrate (LOPRESSOR) tablet 25 mg  25 mg Oral BID Abby Clark PA-C   25 mg at 05/06/25 0903    montelukast (SINGULAIR) tablet 10 mg  10 mg Oral At Bedtime Evelin Martin NP   10 mg at 05/05/25 2213    mupirocin (BACTROBAN) 2 % ointment   Topical BID Abby Clark PA-C   Given at 05/06/25 0910    pantoprazole (PROTONIX) 2 mg/mL suspension 40 mg  40 mg Oral or NG Tube Daily Evelin Martin NP        Or    pantoprazole (PROTONIX) EC tablet 40 mg  40 mg Oral Daily Evelin Martin NP   40 mg at 05/06/25 0903    pantoprazole (PROTONIX)  IV push injection 40 mg  40 mg Intravenous QAM AC Evelin Martin NP   40 mg at 05/06/25 0644    polyethylene glycol (MIRALAX) Packet 17 g  17 g Oral or Feeding Tube BID Evelin Martin NP   17 g at 05/03/25 2013    senna-docusate (SENOKOT-S/PERICOLACE) 8.6-50 MG per tablet 1 tablet  1 tablet Oral or Feeding Tube BID Evelin Martin NP   1 tablet at 05/06/25 0902    sodium chloride (PF) 0.9% PF flush 3 mL  3 mL Intracatheter Q8H ZHANG Evelin Martin NP   3 mL at 05/06/25 0645    ticagrelor (BRILINTA) tablet 90 mg  90 mg Oral BID Abby Clark PA-C   90 mg at 05/06/25 0644    umeclidinium (INCRUSE ELLIPTA) 62.5 MCG/ACT inhaler 1 puff  1 puff Inhalation Daily Evelin Martin NP   1 puff at 05/06/25 0906     Current Facility-Administered Medications   Medication Dose Route Frequency Provider Last Rate Last Admin    bisacodyl (DULCOLAX) suppository 10 mg  10 mg Rectal Daily PRN Abby Clark PA-C   10 mg at 05/03/25 1209    calcium carbonate (TUMS) chewable tablet 500 mg  500 mg Oral 4x Daily PRN Evelin Martin NP   500 mg at 05/03/25 0547    glucose gel 15-30 g  15-30 g Oral Q15 Min PRN Evelin Martin NP        Or    dextrose 50 % injection 25-50 mL  25-50 mL Intravenous Q15 Min PRN Evelin Martin NP        Or    glucagon injection 1 mg  1 mg Subcutaneous Q15 Min PRN Evelin Martin NP        furosemide (LASIX) injection 20 mg  20 mg Intravenous Once PRN Evelin Martin NP        hydrALAZINE (APRESOLINE) injection 10 mg  10 mg Intravenous Q30 Min PRN Evelin Martin NP   10 mg at 05/02/25 0533    HYDROmorphone (DILAUDID) injection 0.2 mg  0.2 mg Intravenous Q2H PRN Evelin Martin NP   0.2 mg at 05/02/25 0803    insulin aspart (NovoLOG) injection (RAPID ACTING)   Subcutaneous With Snacks or Supplements Evelin Martin, NP        ipratropium - albuterol 0.5 mg/2.5 mg/3 mL (DUONEB) neb solution 3 mL  1 vial Nebulization Q6H PRN Evelin Martin, NP        lidocaine (LMX4) cream   Topical Q1H PRN Evelin Martin,  "NP        lidocaine 1 % 0.1-1 mL  0.1-1 mL Other Q1H PRN Evelin Martin NP        magnesium hydroxide (MILK OF MAGNESIA) suspension 30 mL  30 mL Oral Daily PRN Evelin Martin NP        methocarbamol (ROBAXIN) half-tab 250 mg  250 mg Oral Q6H PRN Evelin Martin NP   250 mg at 05/03/25 0159    naloxone (NARCAN) injection 0.2 mg  0.2 mg Intravenous Q2 Min PRN Evelin Martin NP        Or    naloxone (NARCAN) injection 0.4 mg  0.4 mg Intravenous Q2 Min PRN Evelin Martin NP        Or    naloxone (NARCAN) injection 0.2 mg  0.2 mg Intramuscular Q2 Min PRN Evelin Martin NP        Or    naloxone (NARCAN) injection 0.4 mg  0.4 mg Intramuscular Q2 Min PRN Evelin Martin NP        ondansetron (ZOFRAN ODT) ODT tab 4 mg  4 mg Oral Q6H PRN Evelin Martin NP        Or    ondansetron (ZOFRAN) injection 4 mg  4 mg Intravenous Q6H PRN Evelin Martin NP        oxyCODONE (ROXICODONE) tablet 5 mg  5 mg Oral Q4H PRN Evelin Martin NP   5 mg at 05/03/25 1149    Or    oxyCODONE (ROXICODONE) tablet 10 mg  10 mg Oral Q4H PRN Evelin Martin NP   10 mg at 05/02/25 1557    prochlorperazine (COMPAZINE) injection 5 mg  5 mg Intravenous Q6H PRN Evelin Martin NP        Or    prochlorperazine (COMPAZINE) tablet 5 mg  5 mg Oral Q6H PRN Evelin Martin NP        sodium chloride (PF) 0.9% PF flush 3 mL  3 mL Intracatheter q1 min prn Evelin Martin NP   3 mL at 05/05/25 5009         Physical Exam  Vitals were reviewed  Blood pressure 124/73, pulse 92, temperature 97.8  F (36.6  C), temperature source Oral, resp. rate 16, height 1.74 m (5' 8.5\"), weight 66.8 kg (147 lb 3.2 oz), SpO2 97%.  Rhythm: NSR    Lungs: diminished throughout on room air    Cardiovascular: Distant, S1, S2, RRR, no m/r/g    Abdomen: soft, NT, ND, +BS    Extremeties: warm, no LE edema    Incision: Sternal incision CDI, eccymotic, LLE incisions CDI    CT: n/a    Weight:   Vitals:    05/02/25 0400 05/03/25 0337 05/04/25 0344 05/05/25 0609   Weight: 72.8 kg (160 lb 7.9 oz) " 70.8 kg (156 lb) 68.8 kg (151 lb 11.2 oz) 68.9 kg (151 lb 12.8 oz)    05/06/25 0426   Weight: 66.8 kg (147 lb 3.2 oz)         Data  Recent Labs   Lab 05/06/25  0757 05/06/25  0536 05/05/25  2219 05/05/25  1636 05/05/25  0730 05/05/25  0548 05/04/25  1603 05/04/25  1225 05/04/25  0634 05/04/25  0531 05/03/25  1245 05/03/25  0541 05/02/25  0627 05/02/25  0406 05/01/25  1305 05/01/25  1258 05/01/25  1111 05/01/25  1110   WBC  --   --   --   --   --  8.6  --   --   --  9.2  --  11.1*  --  15.0*  --  17.2*  --  21.4*   HGB  --   --   --   --   --  10.2*  --   --   --  9.7*  --  10.6*  --  11.4*  --  10.9*   < > 9.6*   MCV  --   --   --   --   --  93  --   --   --  93  --  98  --  92  --  91  --  91   PLT  --   --   --   --   --  184  --   --   --  158  --  148*  --  185  --  159  --  104*   INR  --   --   --   --   --   --   --   --   --   --   --   --   --   --   --  1.22*  --  1.52*   NA  --   --   --   --   --  141  --   --   --  138  --  136  --  137  --  140   < > 140   POTASSIUM  --  3.6  --   --   --  3.5  --  3.7  --  3.3*  --  3.9  --  4.0  --  4.3   < > 4.1   CHLORIDE  --   --   --   --   --  107  --   --   --  103  --  106  --  103  --  107  --  109*   CO2  --   --   --   --   --  24  --   --   --  25  --  16*  --  20*  --  21*  --  22   BUN  --   --   --   --   --  14.0  --   --   --  14.3  --  14.4  --  15.0  --  14.3  --  14.8   CR  --   --   --   --   --  0.78  --   --   --  0.79  --  0.75  --  0.88  --  0.86  --  0.87   ANIONGAP  --   --   --   --   --  10  --   --   --  10  --  14  --  14  --  12  --  9   JOSE RAFAEL  --   --   --   --   --  8.3*  --   --   --  8.1*  --  8.1*  --  8.0*  --  8.2*  --  8.8   *  --  198* 175*   < > 109*   < >  --    < > 108*   < > 142*   < > 132*   < > 159*   < > 152*   ALBUMIN  --   --   --   --   --   --   --   --   --   --   --   --   --  3.3*  --  3.4*  --   --    PROTTOTAL  --   --   --   --   --   --   --   --   --   --   --   --   --  5.0*  --  4.9*  --   --     BILITOTAL  --   --   --   --   --   --   --   --   --   --   --   --   --  0.9  --  1.3*  --   --    ALKPHOS  --   --   --   --   --   --   --   --   --   --   --   --   --  60  --  57  --   --    ALT  --   --   --   --   --   --   --   --   --   --   --   --   --  35  --  54  --   --    AST  --   --   --   --   --   --   --   --   --   --   --   --   --  47*  --  56*  --   --     < > = values in this interval not displayed.       Imaging:  No results found for this or any previous visit (from the past 24 hours).          Patient seen and discussed with Dr. Mulvihill Leah Jensen, OSCAR, [facility]-, CCRN  Nurse Practitioner  Cardiothoracic Surgery  Pager: [phone]      CV Surgery Rounding Pager: [phone]  After hours please page surgeon on-call

## 2025-05-06 NOTE — PLAN OF CARE
"Goal Outcome Evaluation:  Patient Name: Louise  MRN: [id number]  Date of Admission: 4/25/2025  Reason for Admission: POD #5 CABG x 2 with Dr. Mulvihill  Level of Care: medical    Vitals:   BP Readings from Last 1 Encounters:   05/06/25 128/82     Pulse Readings from Last 1 Encounters:   05/06/25 92     Wt Readings from Last 1 Encounters:   05/06/25 66.8 kg (147 lb 3.2 oz)     Ht Readings from Last 1 Encounters:   04/25/25 1.74 m (5' 8.5\")     Estimated body mass index is 22.06 kg/m  as calculated from the following:    Height as of this encounter: 1.74 m (5' 8.5\").    Weight as of this encounter: 66.8 kg (147 lb 3.2 oz).  Temp Readings from Last 1 Encounters:   05/06/25 97.8  F (36.6  C) (Oral)       Pain: Pain goal: Denies pain    Assessment    Resp: VSS on RA, LS clear/equal  Telemetry: SR w/ PAC's  Diet: Tolerating mod carb diet with BG checks ACHS and carb coverage  Neuro: A/O x 4  GI/: UOP adequate. BM x 1 per pt  Skin/Wounds: Sternal incision, CT removal sites [id number]/WNL. LLE harvest sites  Lines/Drains: PIV x 1 SL  Activity: SBA   Sleep: good  Abnormal Labs: K and Mg replaced recheck in AM    Aggression Stop Light: Green          Patient Care Plan: [facility]   "

## 2025-05-06 NOTE — PROGRESS NOTES
[facility]    Medicine Progress Note - Hospitalist Service    Date of Admission:  4/25/2025    Assessment & Plan   Raul Wilhelm is a 79 year old male with history including asthma; DM2; HTN; known severe multivessel CAD with prior stents (1/2025); h/o CVA; who presented to [facility] 4/25/2025 with chest pain and found with NSTEMI. Transferred to [facility] 4/25/2025 for management.     NSTEMI with severe multivessel CAD - s/p CABG 2v 5/1/2025 (LIMA to LAD and SVG to diagonal).  Ischemic cardiomyopathy  Recent LCx and OM REDD (1/2025).   H/o HTN (benign essential).  HLD.  [PTA: asa 81 mg daily; atorvastatin 80 mg at bedtime; carvedilol 6.25 mg BID; losartan-HCTZ 50-12.5 mg daily; ticagrelor 90 mg BID; PRN NTG.]  * H/o STEMI 11/2025 s/p LPDA and LCx stents. H/o STEMI 1/2025. Heart catheterization 1/2025 showed severe multivessel CAD involving ostial-prox LAD, mid LAD, prox LCX, and large OM branch; stents placed to LCx and OM branch and there was plan for outpatient discussion regarding surgical revascularization vs long stents for LAD disease, but he had not yet had this follow-up. Echo 1/2025 showed LVEF 55-60%, mild LVH, no regional wall motion abnormalities; no significant valve disease.  * Initial presentation to outside hospital as above. Started on heparin gtt.  * Transferred to [facility] 4/25. Cardiology consulted on admit.  * Later 4/25: Echo showed LVEF 45-50% with WMA's, grade 1 diastolic dysfunction; RV OK. Angiogram showed LM with ostial 60%; mid LAD with diffuse 50%, LAD ostium covered by CX stent, appeared to be up to 80% on some views, diffuse proximal 75% narrowing first diagonal 75%; LCx stents widely patent, no restenosis; CABG recommended. [facility] consulted; ticagrelor held and started on cangrelor gtt bridging.  * 5/1: Underwent CABG as noted. Subsequently extubated.  * 5/2: Stable, off pressors, on 2L O2. Transferred to the floor..  - Post-op management per [facility].  -  Continue BB, ASA, cangrelor gtt, transition back to PTA ticagrelor 5/4 per CTS  - Restart ARB when able per [facility].  - Continue scheduled acetaminophen, PRN methocarbamol, PRN oxycodone, PRN IV hydromorphone; minimize opioids as able.  - Continue to monitor i/o's, daily wts.  --received IV lasix 5/5 with improvement in SOB, defer further diuresis to CRS   - Planning TCU today- ok from medicine standpoint      DM2.   [PTA: glargine 20U at bedtime; semaglutide (pt not taking due to cost).]  BG primarily at goal <180  * HgbA1C 7.1 2/2025.   * 5/1: On insulin gtt post-op   * 5/2: Off insulin gtt. Tolerating some oral intake.    Recent Labs   Lab Test 02/19/25  0832 01/04/25  1709   A1C 7.1* 8.1*   - Continue moderate CHO diet.  - Cont glargine at 10U at bedtime..  - Cont aspart 1U/CHO U with meals and snacks.  - Adjust regimen as indicated.   - Continue aspart ISS (medium).  - Continue PRN hypoglycemia protocol.  -will need ongoing adjustments at TCU as diet increases; discharge on reduced regimen as above     Blood loss anemia.  * Hgb normal on admit.  * Hgb decreased related to CABG, not requiring prbc's.  - Continue to monitor CBC   - Consider prbc transfusion if hgb </= 7.0 or if significant bleeding with hemodynamic instability or if symptomatic.    Thrombocytopenia, suspect consumptive related to surgery, resolved.  * Platelets normal on admit.  * Platelets decreased during surgery requiring platelet transfusion.  * Platelets normal 5/2.    Leukocytosis, suspect reactive. Resolved.     Asthma.  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  - Continue fluticasone-vilanterol, tiotropium, montelukast, PRN inhalers/nebs     Zenkers diverticulum.  GERD.  Chronic Dysphagia.  * Noted during Jan 2025 admit. Noted diverticulum does not empty fully with multiple swallows and gives him regurgitation/dysphagia symptoms. He elected not to pursue ENT outpatient. PTA on BID omeprazole.  - Continue pantoprazole.    Back pain,  suspect musculoskeletal.  * 4/28: Pt c/o back pain suspected due to the hospital bed.  - Continue PRN acetaminophen.     H/o frequent skin abscesses.  +MRSA.  * 3/2025 with abscess of right buttock that was +MRSA. Most recent at right neck Mid 4/2025, appears to be resolving.    * 4/29: [facility] ordered 10d course of mupirocin ointment on nares.  - Continue to monitor clinically.  - Continue mupirocin oint to nares starting 5/5    H/o arterial ischemic stroke.  - Continue antiplatelets and statin as noted.     Pulmonary nodule RUL, 0.3cm, noted on CT (1/2025).  - Follow-up CT chest due Jan 2026.    Constipation  Had a BM yesterday 5/3.  - Bowel regimen.    Hypokalemia:  - Replaced per protocol           Diet: Combination Diet Moderate Consistent Carb (60 g CHO per Meal) Diet; Low Saturated Fat Na <2400mg Diet  Snacks/Supplements Adult: Glucerna; Between Meals    DVT Prophylaxis: Defer to primary service  Diaz Catheter: Not present  Lines: None     Cardiac Monitoring: ACTIVE order. Indication: Open heart surgery (7 days)  Code Status: Full Code      Clinically Significant Risk Factors               # Hypoalbuminemia: Lowest albumin = 3.3 g/dL at 5/2/2025  4:06 AM, will monitor as appropriate     # Hypertension: Noted on problem list           # DMII: A1C = 7.1 % (Ref range: 0.0 - 5.6 %) within past 6 months       # Financial/Environmental Concerns: none  # COPD: noted on problem list  # History of CABG: noted on surgical history       Social Drivers of Health    Financial Resource Strain: High Risk (4/25/2025)    Financial Resource Strain     Within the past 12 months, have you or your family members you live with been unable to get utilities (heat, electricity) when it was really needed?: Yes          Disposition Plan     Medically Ready for Discharge: Anticipated Today ok from medicine standpoint            The patient's care was discussed with Dr. Chatterjee who agrees with the above plan     Rosario Valderrama,  HUDSON  Hospitalist Service  [facility]  Securely message with [employer] (more info)  Text page via [employer] Paging/Directory   ______________________________________________________________________    Interval History   Doing well today. Breathing improved. Pain controlled. Tolerated full breakfast though appetite still down.  Denies dizziness. +flatus    Physical Exam   Temp: 97.8  F (36.6  C) Temp src: Oral BP: 124/73 Pulse: 92   Resp: 16 SpO2: 97 % O2 Device: None (Room air)    Vitals:    05/04/25 0344 05/05/25 0609 05/06/25 0426   Weight: 68.8 kg (151 lb 11.2 oz) 68.9 kg (151 lb 12.8 oz) 66.8 kg (147 lb 3.2 oz)     Vital Signs with Ranges  Temp:  [97.8  F (36.6  C)-99.6  F (37.6  C)] 97.8  F (36.6  C)  Pulse:  [] 92  Resp:  [14-22] 16  BP: ()/(60-80) 124/73  SpO2:  [95 %-97 %] 97 %  I/O last 3 completed shifts:  In: 1050 [P.O.:1050]  Out: 2195 [Urine:2195]    Constitutional: Alert and oriented, sitting up in chair.  Appears comfortable and is pleasantly conversant   ENT: moist mucous membranes  Respiratory: Lungs somewhat diminished but clear overall. No increased work of breathing or wheezing   Cardiovascular: distant heart tones. Regular rate and rhythm, no significant LE edema   GI: active bowel sounds, abdomen mildly distended, soft, non-tender  Skin/Integumen: sternal incision without significant erythema or drainage  MSK: moves all four extremities  Neuro: speech is clear. Face symmetric. Follows commands      Medical Decision Making       45 MINUTES SPENT BY ME on the date of service doing chart review, history, exam, documentation & further activities per the note.      Data     I have personally reviewed the following data over the past 24 hrs:    N/A  \   N/A   / N/A     N/A N/A N/A /  198 (H)   3.6 N/A N/A \       Imaging results reviewed over the past 24 hrs:   Recent Results (from the past 24 hours)   XR Chest 2 Views    Narrative    EXAM: XR CHEST 2 VIEWS  LOCATION: [facility]  [facility]  DATE: 5/5/2025    INDICATION: s p chest tube removal  COMPARISON: Chest radiograph 5/2/2025.      Impression    IMPRESSION: Interval removal of the left chest tube, mediastinal drains and right central venous catheter.    Probable tiny left apical pneumothorax measuring 3-4 mm. Small bilateral pleural effusions. Cardiomediastinal silhouette is unchanged. Coronary stent.

## 2025-05-06 NOTE — PROGRESS NOTES
Care Management Follow Up    Length of Stay (days): 11    Expected Discharge Date: 05/06/2025     Concerns to be Addressed: discharge planning   Patient plan of care discussed at interdisciplinary rounds: Yes    Anticipated Discharge Disposition: Transitional Care       Anticipated Discharge Services:    Anticipated Discharge DME:      Patient/family educated on Medicare website which has current facility and service quality ratings: yes  Education Provided on the Discharge Plan: Yes  Patient/Family in Agreement with the Plan: yes    Referrals Placed by CM/SW: Senior Linkage Line, Post Acute Facilities, Transportation  Private pay costs discussed: private room/amenity fees and transportation costs    Discussed  Partnership in Safe Discharge Planning  document with patient/family: No     Handoff Completed: Yes, [facility] PCP: Internal handoff referral completed    Additional Information:  [facility] has not received prior-auth from [facility], so they can not admit pt today. [facility] WC ride changed to tomorrow with  between [id number]. Pt updated and in agreement. Pt reports he has updated his daughter. PA-C and nursing updated.     Next Steps: Secure prior-auth for discharge tomorrow. SW follow for discharge planning.     EMILY Cruz, [facility] Hospital[facility]  [phone] Desk phone  [phone] Cell/text (Preferred)  [facility]    PAS-RR    D: Per DHS regulation, SW completed and submitted PAS-RR to [place] Board on Aging Direct Connect via the Senior LinkAge Line.  PAS-RR confirmation # is : [id number]      P: Further questions may be directed to [facility] Line at #[phone], option #4 for PAS-RR staff.

## 2025-05-06 NOTE — DISCHARGE INSTRUCTIONS
AFTER YOU GO HOME FROM YOUR HEART SURGERY    From the date of surgery: avoid lifting anything greater than ten pounds for 8 weeks after surgery and then less than 20 pounds for an additional 4 weeks.   Do not reach backwards or use arms to push out of chair.   Do not let people pull on your arms to assist with standing.   Avoid twisting or reaching too far across your body.    Avoid strenuous activities such as bowling, vacuuming, raking, shoveling, golf or tennis for 12 weeks after your surgery.   It is okay to resume sex if you feel comfortable in doing so. You may have to try different positions with your partner.    Splint your chest incision by hugging a pillow or bringing your arms across your chest when coughing or sneezing.     No driving for 4 weeks from the date of surgery or while on pain medication.    Shower or wash your incisions daily with soap and water (or as instructed), pat dry.   Keep wound clean and dry, showers are okay after discharge, but don't let spray hit directly on incision.   No baths or swimming for 1 month.   Cover chest tube sites with dry gauze until they stop draining, then leave open to air. It is not abnormal for chest tube sites to drain yellowish/clear fluid for up to 2-3 weeks after surgery.   Watch for signs of infection: increased redness, tenderness, warmth or any drainage that appears infected (pus like) or is persistent.  Also a temperature > 100.5 F or chills. Call your surgeon or primary care provider's office immediately.   Remove any skin glue left on incisions after 10-14 days. This will not affect your incision and can speed up healing.    Exercise is very important in your recovery. Please follow the guidelines set up for you in your cardiac rehab classes at the hospital. If outpatient cardiac rehab was ordered for you, we highly recommend you participate. If you have problems arranging your cardiac rehab, please call [phone] for all locations, with the  exception of [place], please call [phone] and [place], please call [phone].    Avoid sitting for prolonged periods of time, try to walk every hour during the day. If you have a leg incision, elevate your leg often when you are not walking.    Check your weight when you get home from the hospital and continue to check it daily through your recovery for at least a month. If you notice a weight gain of 2-3 pounds in a week, notify your primary care physician, cardiologist or surgeon.    Bowel activity may be slow after surgery. If necessary, you may take an over the counter laxative such as Milk of Magnesia or Miralax. You may have stool softeners prescribed (docusate sodium, Senokot). We recommend using stool softeners while using narcotics for pain (oxycodone/percocet, hydrocodone/vicodin, hydromorphone/dilaudid).      Wean OFF of narcotics (oxycodone, dilaudid, hydrocodone) as soon as possible. You should continue taking acetaminophen as long as you have any surgical pain as the first choice for pain control and add narcotics as necessary for pain to be tolerable.      DO NOT SMOKE.  IF YOU NEED HELP QUITTING, PLEASE TALK WITH YOUR CARDIOLOGIST OR PRIMARY DOCTOR.    REGARDING PRESCRIPTION REFILLS.  If you need a refill on your pain medication contact us to discuss your pain and a possible one time refill.   All other medications will be adjusted, discontinued and re-filled by your primary care physician and/or your cardiologist as they were prior to your surgery. We have given you enough for one to three month with possibly one refill.    POST-OPERATIVE CLINIC VISITS  You have a follow up visit with [facility] Surgery Advance Care Practitioners as scheduled.  You will then return to the care of your primary provider and your cardiologist. Future medication refills should come from your primary care provider or Cardiologist.   You should see your primary care provider in 2-4 weeks after discharge.   It is  important to see your cardiologist about 4-6 weeks after discharge.    If you do not hear from a  in 7 days, please call [phone] (choose option 1) and request to be seen with a general cardiologist or someone that you have seen in the past.   If there is a need to return to see CT Surgery please call our  at [phone].    SURGICAL QUESTIONS  Please call Joyce Pham, Roseline Mcclain, Halina Quinn or Ami Alejandre with surgical recovery and medication questions. They will assist you with your needs and contact other surgery care team members as indicated.    On weekends or after hours, please call [phone] and ask the  to page the Cardiothoracic Surgery fellow on call.      Thank you,    Your Cardiothoracic Surgery Team   Roseline Mcclain, RN Care Coordinator - [phone]  Halina Quinn RN Care Coordinator - [phone]

## 2025-05-06 NOTE — DISCHARGE SUMMARY
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  Surgeon: Dr. Michael Mulvihill  1. CABG x2 (LIMA-LAD, V-Diagonal)  2. Endoscopic Vein Eagle  3. Epiaortic Echo    INTRAOPERATIVE FINDINGS:    severe CAD  CYDNEY was deferred on account of GI pathology    GRAFT MATRIX:  Distal: Graft Source: Target Quality: Conduit Quality: Distal Suture: Proximal Suture:  LAD                   CROW                  Good                  Good                  7-0                     N/A  Diagonal            RSVG                Good                  Good                  7-0                     6-0    PATHOLOGY RESULTS:    none     CULTURE RESULTS:    none    CONSULTS:    PT/OT  Intensivist    BRIEF HISTORY OF ILLNESS:  Raul Wilhelm is a 79 year old male with a past medical history of HTN, HLD, GERD, Zenkers Diverticulum, COPD, Asthma, DMII, CVA, frequent MRSA abscesses, CAD with STEMI s/p REDD to LPDA, mid CX in 2015 and REDD to prox LCx and OM in January 2025 at [facility] and on [facility], with known residual LAD and D1 disease. Patient presented on 4/25/25 with  chest pain in the center of his chest that radiated to his belly. He underwent TTE with severe hypokinesis of mid to distal aneroseptal and apical walls, EF 45-50% and coronary angiogram which revealed residual LMCA and proximal LAD disease. CV surgery consulted for consideration of surgical revascularization of LMCA disease and proximal LAD disease.     HOSPITAL COURSE: Raul Wilhelm is a 79 year old male who on 4/25/2025 underwent the above-named procedures and tolerated the operation well.     Postoperatively was admitted to the ICU.  Patient was extubated within protocol on POD #0.  Blood pressure and cardiac index were managed with vasopressors and inotropic agents which were continuously weaned until no longer needed.  Patient was subsequently  transferred to the surgical telemetry floor.    While on the surgical unit, the patient continued to progress well. Chest tubes and temporary pacemaker wires were removed when deemed appropriate. Heart rhythm remained stable.     Patient was fluid overloaded and treated with diuretics. He is below his preoperative weight and will not discharge with diuretic therapy; will re-evaluate need in clinic follow-up.      Patient was transiently hyperglycemic and treated with insulin infusion then transitioned to sliding scale insulin per protocol. Blood sugars remained stable. Is a known diabetic and the hospitalist team managed postoperative diabetic regimen. He is discharging on a modified regimen of his PTA lantus and sliding scale insulin.     Prior to discharge, his pain was controlled well, he was working well with therapies, able to perform most ADLs, ambulate without difficulty, and had full return of bowel and bladder function.  On May 7, 2025, he was discharged to TCU in stable condition. Follow up with cardiology and cardiac surgery have been arranged. Pt encouraged to follow up with PCP and cardiac rehab upon discharge.    Patient discharged on aspirin:  Yes 81  "mg  Patient discharged on beta blocker: yes    Patient discharged on ACE Inhibitor/ARB: no      Patient discharged on statin: yes          Discharge Disposition:     Discharged to transitional care            Condition on Discharge:     Discharge condition: Stable   Discharge vitals: Blood pressure 124/73, pulse 92, temperature 97.8  F (36.6  C), temperature source Oral, resp. rate 16, height 1.74 m (5' 8.5\"), weight 66.8 kg (147 lb 3.2 oz), SpO2 97%.   Code status on discharge: Full Code     Vitals:    05/04/25 0344 05/05/25 0609 05/06/25 0426   Weight: 68.8 kg (151 lb 11.2 oz) 68.9 kg (151 lb 12.8 oz) 66.8 kg (147 lb 3.2 oz)       DAY OF DISCHARGE PHYSICAL EXAM:    Gen: A&Ox4, NAD  Neuro: Intact with no focal deficits   CV: RRR, normal S1 S2, no murmurs, rubs or gallops. no JVD  Pulm: CTA, no wheezing or rhonchi, normal breathing on RA  Abd: nondistended, normal BS, soft, nontender  Ext: no peripheral edema, no pitting  Incision: clean, dry, intact, no erythema, sternum stable  Tubes/drain sites: dressing clean and dry    LABS  Most Recent 3 CBC's:  Recent Labs   Lab Test 05/05/25  0548 05/04/25  0531 05/03/25  0541   WBC 8.6 9.2 11.1*   HGB 10.2* 9.7* 10.6*   MCV 93 93 98    158 148*      Most Recent 3 BMP's:  Recent Labs   Lab Test 05/06/25  0757 05/06/25  0536 05/05/25  2219 05/05/25  1636 05/05/25  0730 05/05/25  0548 05/04/25  1603 05/04/25  1225 05/04/25  0634 05/04/25  0531 05/03/25  1245 05/03/25  0541   NA  --   --   --   --   --  141  --   --   --  138  --  136   POTASSIUM  --  3.6  --   --   --  3.5  --  3.7  --  3.3*  --  3.9   CHLORIDE  --   --   --   --   --  107  --   --   --  103  --  106   CO2  --   --   --   --   --  24  --   --   --  25  --  16*   BUN  --   --   --   --   --  14.0  --   --   --  14.3  --  14.4   CR  --   --   --   --   --  0.78  --   --   --  0.79  --  0.75   ANIONGAP  --   --   --   --   --  10  --   --   --  10  --  14   JOSE RAFAEL  --   --   --   --   --  8.3*  --   --   --  " 8.1*  --  8.1*   *  --  198* 175*   < > 109*   < >  --    < > 108*   < > 142*    < > = values in this interval not displayed.     Most Recent 2 LFT's:  Recent Labs   Lab Test 05/02/25  0406 05/01/25  1258   AST 47* 56*   ALT 35 54   ALKPHOS 60 57   BILITOTAL 0.9 1.3*     Most Recent INR's and Anticoagulation Dosing History:  Anticoagulation Dose History          Latest Ref Rng & Units 11/14/2015 1/4/2025 5/1/2025   Recent Dosing and Labs   INR 0.85 - 1.15 0.87  1.02  1.22  1.52       Details          Multiple values from one day are sorted in reverse-chronological order             Most Recent 3 Troponin's:  Recent Labs   Lab Test 03/29/18  0028   TROPI <0.015     Most Recent Cholesterol Panel:  Recent Labs   Lab Test 02/19/25  0832   CHOL 111   LDL 50   HDL 38*   TRIG 116     Most Recent 6 Bacteria Isolates From Any Culture (See EPIC Reports for Culture Details):No lab results found.  Most Recent TSH, T4 and A1c Labs:  Recent Labs   Lab Test 02/19/25  0832   A1C 7.1*      Recent Labs   Lab 05/06/25  0757 05/05/25  2219 05/05/25  1636 05/05/25  1144 05/05/25  0730 05/05/25  0548   * 198* 175* 105* 161* 109*       Imaging:  Results for orders placed or performed during the hospital encounter of 04/25/25   US Carotid Bilateral    Narrative    EXAM: US CAROTID BILATERAL  LOCATION: [facility]  DATE: 4/26/2025    INDICATION: Pre op CABG  COMPARISON: 1/15/2016  TECHNIQUE: Duplex exam performed utilizing 2D gray-scale imaging, Doppler interrogation with color-flow and spectral waveform analysis. The percent diameter stenosis is determined using Updated Recommendations for Carotid Stenosis Interpretation Criteria   from [employer].    FINDINGS:    RIGHT: Mild plaque at the bifurcation. The peak systolic velocity in the ICA is less than 180 cm/sec, consistent with less than 50% stenosis. Normal velocities in the ECA. Antegrade flow within the vertebral artery. On grayscale  imaging there appears to   be a vascular communication between proximal ICA and ECA which should not be in anatomic consideration. This most likely represent an ECA branch which traverses adjacent to the ICA. This explanation could be confirmed with CT angiography. Additionally   subclavian velocity elevation on the right is noted.    LEFT: Mild plaque at the bifurcation. The peak systolic velocity in the ICA is less than 180 cm/sec, consistent with less than 50% stenosis. Normal velocities in the ECA. Antegrade flow within the vertebral artery.    VELOCITY CHART:  CCA   Right: 88 cm/s   Left: 69 cm/s  ICA   Right: 92 cm/s   Left: 74 cm/s  ECA   Right: 90 cm/s   Left: 77 cm/s  ICA/CCA PSV Ratio   Right: 0.9   Left: 0.9      Impression    IMPRESSION:  1.  Mild plaque formation, velocities consistent with less than 50% stenosis in the right internal carotid artery.  2.  Mild plaque formation, velocities consistent with less than 50% stenosis in the left internal carotid artery.  3.  Flow within the vertebral arteries is antegrade.  4.  Right subclavian velocity elevation could be related to stenosis.  5.  Vascular communication between the proximal ICA and ECA on the right is likely an ECA branch traversing adjacent to the ICA. This should not be in anatomic consideration absent instrumentation or surgery. If there is clinical uncertainty, CT   angiography could be considered.     US Lower Extremity Venous Mapping Bilateral    Narrative    EXAM: US LOWER EXTREMITY VENOUS MAPPING BILATERAL  LOCATION: [facility]  DATE: 4/26/2025    INDICATION: pre op CABG  COMPARISON: None.  TECHNIQUE: Ultrasound examination of the lower extremity veins was performed, including gray-scale and compression imaging.     LOWER  EXTREMITY FINDINGS:       VENOUS DIAMETERS  RIGHT GREAT SAPHENOUS VEIN  Upper Thigh: 2.6 mm  Mid Thigh: 2.0 mm  Lower Thigh: 2.6 mm  Knee: 2.2 mm  Upper Calf: 1.6 mm  Mid Calf: 1.7 mm  Lower  Calf: 2.2 mm  Ankle: 2.3 mm    Small cutaneous varicosity from the GSV distal thigh/knee region is noted.    LEFT GREAT SAPHENOUS VEIN  Upper Thigh: 1.9 mm  Mid Thigh: 2.5 mm  Lower Thigh: 3.1 mm  Knee: 2.0 mm  Upper Calf: 1.4 mm  Mid Calf: 2.2 mm  Lower Calf: 2.0 mm  Ankle: 2.0 mm        Impression    IMPRESSION:  1.  Patent saphenous veins bilaterally with representative measurements above.     XR Chest Port 1 View    Narrative    EXAM: XR CHEST PORT 1 VIEW  LOCATION: [facility]  DATE: 5/1/2025    INDICATION: Post Op CVTS Surgery.  COMPARISON: Chest x-ray 4/25/2025.      Impression    IMPRESSION: ET tube is 3.7 cm proximal to the serina. Right neck central venous line tip at the SVC. Nasogastric tube at least in the stomach. Left chest base chest tube identified. Suggestion of trace left lateral pneumothorax with a thickness of   approximately 2 mm. Mild left base atelectasis. No new airspace disease identified. Normal cardiac silhouette. Sternotomy and hypoinflated lungs.   XR Chest Port 1 View    Narrative    EXAM: XR CHEST PORT 1 VIEW  LOCATION: [facility]  DATE: 5/2/2025    INDICATION: Post Op CVTS Surgery  COMPARISON: May 1, 2025.      Impression    IMPRESSION: Interval removal of the endotracheal and nasogastric tubes. Right IJ central line remains in place as do mediastinal and left chest drains. Vascular volume is normal. No pneumothorax.   XR Chest 2 Views    Narrative    EXAM: XR CHEST 2 VIEWS  LOCATION: [facility]  DATE: 5/5/2025    INDICATION: s p chest tube removal  COMPARISON: Chest radiograph 5/2/2025.      Impression    IMPRESSION: Interval removal of the left chest tube, mediastinal drains and right central venous catheter.    Probable tiny left apical pneumothorax measuring 3-4 mm. Small bilateral pleural effusions. Cardiomediastinal silhouette is unchanged. Coronary stent.       Echocardiogram Limited     Value    LVEF   45-50%    [facility]    [id number]  [id number]  [id number]  [id number]^GIOVANNI^MATT^LISA     [facility]  Echocardiography Laboratory  [address] [place] [address]     Name: GIA NELSON  MRN: [id number]  : 1945  Study Date: 2025 11:24 AM  Age: 79 yrs  Gender: Male  Patient Location: [facility]  Reason For Study: Chest Pain  Ordering Physician: MATT DAVILA  Referring Physician: REJI BAXTER  Performed By: MADELINE Baker     BSA: 1.8 m2  Height: 68 in  Weight: 154 lb  HR: 53  BP: 130/68 mmHg  ______________________________________________________________________________  Procedure  Limited Echocardiogram with two-dimensional, color and spectral Doppler.  Definity (NDC #33117-150) given intravenously. Contrast Definity. Technically  difficult study.  ______________________________________________________________________________  Interpretation Summary     Severe hypokinesis of the mid to distal anteroseptal and apicall walls.  Left ventricular systolic function is mildly reduced.  The visual ejection fraction is 45-50%.     Compared to the previous study, the WMA are new.  ______________________________________________________________________________  Left Ventricle  The left ventricle is normal in size. There is normal left ventricular wall  thickness. Grade I or early diastolic dysfunction. Diastolic Doppler findings  (E/E' ratio and/or other parameters) suggest left ventricular filling  pressures are indeterminate. Left ventricular systolic function is mildly  reduced. The visual ejection fraction is 45-50%. Severe hypokinesis of the mid  to distal anteroseptal and apicall walls.     Right Ventricle  The right ventricle is normal in structure, function and size.     Atria  Normal left atrial size. Right atrial size is normal. There is no atrial shunt  seen.     Mitral Valve  The mitral valve is normal in structure and function. There is trace  mitral  regurgitation.     Tricuspid Valve  The tricuspid valve is normal in structure and function. There is trace  tricuspid regurgitation. Right ventricular systolic pressure could not be  approximated due to inadequate tricuspid regurgitation. IVC diameter <2.1 cm  collapsing >50% with sniff suggests a normal RA pressure of 3 mmHg.     Aortic Valve  The aortic valve is normal in structure and function. No aortic regurgitation  is present. No aortic stenosis is present.     Pulmonic Valve  The pulmonic valve is not well seen, but is grossly normal. There is trace  pulmonic valvular regurgitation.     Vessels  Normal size aorta.     Pericardium  There is no pericardial effusion.     Rhythm  Sinus rhythm was noted.  ______________________________________________________________________________  MMode/2D Measurements & Calculations  Ao root diam: 3.4 cm     asc Aorta Diam: 3.3 cm  LVOT diam: 2.3 cm  LVOT area: 4.0 cm2  Ao root diam index Ht(cm/m): 2.0  Ao root diam index BSA (cm/m2): 1.9  Asc Ao diam index BSA (cm/m2): 1.8  Asc Ao diam index Ht(cm/m): 1.9  EF Biplane: 48.4 %     Doppler Measurements & Calculations  MV E max fahad: 52.3 cm/sec  MV A max fahad: 87.0 cm/sec  MV E/A: 0.60  MV dec slope: 148.5 cm/sec2  MV dec time: 0.35 sec  E/E' av.4  Lateral E/e': 6.8  Medial E/e': 10.0     ______________________________________________________________________________  Report approved by: Jared Ruby MD on 2025 04:43 PM         Cardiac Catheterization    Narrative    He has very diffuse disease in LMCA, CX stent struts obstruct orifice of   LAD and there is significant diffuse disease in proximal LAD.Practically   speaking, even if PCI LMCA to LAD were to be considered, it would be very   challenging to cross into LAD, require crushing of CX stent struts. It   would seem CAB with LIMA to LAD/D1 is best option for revascularization if   patient agreeable       LMCA ostial 60% diffuse 50% mid  LAD ostium covered by  CX stent, appears to be up to 80% on some views,   Diffuse proximal 75% narrowing  first diagonal 75%  CX stents widely patent No restenosis             PRE-ADMISSION MEDICATIONS:  Medications Prior to Admission   Medication Sig Dispense Refill Last Dose/Taking    albuterol (PROAIR HFA/PROVENTIL HFA/VENTOLIN HFA) 108 (90 Base) MCG/ACT inhaler Inhale 1-2 puffs into the lungs every 4 hours as needed for shortness of breath or wheezing. 8.5 g 10 Unknown    aspirin (ASA) 81 MG EC tablet Take 81 mg by mouth at bedtime.   4/23/2025    atorvastatin (LIPITOR) 80 MG tablet Take 1 tablet (80 mg) by mouth at bedtime. 90 tablet 3 4/23/2025    budesonide-formoterol (SYMBICORT) 80-4.5 MCG/ACT Inhaler Inhale 2 puffs into the lungs 2 times daily. 10.2 g 11 Unknown    ipratropium - albuterol 0.5 mg/2.5 mg/3 mL (DUONEB) 0.5-2.5 (3) MG/3ML neb solution Take 1 vial (3 mLs) by nebulization every 6 hours as needed for shortness of breath, wheezing or cough. 270 mL 5 Unknown    montelukast (SINGULAIR) 10 MG tablet TAKE ONE TABLET BY MOUTH AT BEDTIME 90 tablet 3 4/23/2025    omeprazole (PRILOSEC) 20 MG DR capsule Take 1 capsule (20 mg) by mouth daily. 90 capsule 3 4/23/2025    ticagrelor (BRILINTA) 90 MG tablet Take 1 tablet (90 mg) by mouth 2 times daily. Dose to start tomorrow morning. 180 tablet 3 4/23/2025    tiotropium (SPIRIVA RESPIMAT) 2.5 MCG/ACT inhaler Inhale 2 puffs into the lungs daily. 4 g 1 Unknown    triamcinolone (KENALOG) 0.1 % external cream APPLY TOPICALLY 2 TIMES DAILY TO LEFT ANKLE (Patient taking differently: 2 times daily as needed for irritation (ankle).) 15 g 1 Unknown    Alcohol Swabs (B-D SINGLE USE SWABS REGULAR) PADS USE TO SWAB AREA OF INJECTION / JENISE AS DIRECTED 100 each 3     blood glucose monitoring (NO BRAND SPECIFIED) meter device kit Use to test blood sugar 1 times daily or as directed.  Blood Glucose Monitor Brands: per insurance. 1 kit 0     insulin pen needle (BD LIZBETH U/F) 32G X 4 MM miscellaneous  USE 1 PEN NEEDLE DAILY 100 each 1     Lancets (ONETOUCH DELICA PLUS [id number]) MISC USE AS DIRECTED WITH LANCING DEVICE TO TEST ONCE DAILY 100 each 6     ONETOUCH ULTRA test strip USE TO TEST BLOOD SUGAR 1 TIME DAILY OR AS DIRECTED 100 strip 6     order for DME Equipment being ordered: arm blood pressure cuff 1 Device 0     [DISCONTINUED] acetaminophen (TYLENOL) 325 MG tablet Take 2 tablets (650 mg) by mouth every 8 hours. (Patient taking differently: Take 325-650 mg by mouth every 6 hours as needed for mild pain.)       [DISCONTINUED] budeson-glycopyrrol-formoterol (BREZTRI AEROSPHERE) 160-9-4.8 MCG/ACT AERO inhaler Inhale 2 puffs into the lungs 2 times daily. (Patient not taking: Reported on 4/16/2025)   Unknown    [DISCONTINUED] carvedilol (COREG) 6.25 MG tablet Take 1 tablet (6.25 mg) by mouth 2 times daily. 180 tablet 3 4/23/2025    [DISCONTINUED] insulin glargine (LANTUS PEN) 100 UNIT/ML pen Inject 20 Units subcutaneously at bedtime. 15 mL 3 4/23/2025    [DISCONTINUED] losartan-hydrochlorothiazide (HYZAAR) 50-12.5 MG tablet Take 1 tablet by mouth daily. 90 tablet 3 4/23/2025    [DISCONTINUED] nitroGLYcerin (NITROSTAT) 0.4 MG sublingual tablet For chest pain place 1 tablet under the tongue every 5 minutes for 3 doses. If symptoms persist 5 minutes after 1st dose call 911. 25 tablet 3     [DISCONTINUED] predniSONE (DELTASONE) 20 MG tablet Take 1 tablet (20 mg) by mouth 2 times daily. (Patient not taking: Reported on 4/16/2025) 20 tablet 0     [DISCONTINUED] semaglutide (OZEMPIC) 2 MG/3ML pen Inject 0.25 mg subcutaneously every 7 days for 28 days, THEN 0.5 mg every 7 days for 28 days. (Patient not taking: Reported on 4/16/2025)          DISCHARGE MEDICATIONS:   Current Discharge Medication List        START taking these medications    Details   !! insulin aspart (NOVOLOG PEN) 100 UNIT/ML pen Inject 1-7 Units subcutaneously 3 times daily (before meals). Per sliding scale    Associated Diagnoses: Type 2 diabetes  mellitus without complication, without long-term current use of insulin (H)      !! insulin aspart (NOVOLOG PEN) 100 UNIT/ML pen Inject 1-5 Units subcutaneously at bedtime.    Associated Diagnoses: Type 2 diabetes mellitus without complication, without long-term current use of insulin (H)      !! insulin aspart (NOVOLOG PEN) 100 UNIT/ML pen Inject 1-5 Units subcutaneously 3 times daily (with meals). Give 1u per 15g carbohydrate. Do not give if pre-meal glucose <60    Associated Diagnoses: Type 2 diabetes mellitus without complication, without long-term current use of insulin (H)      methocarbamol (ROBAXIN) 500 MG tablet Take 0.5 tablets (250 mg) by mouth every 6 hours as needed for muscle spasms.    Associated Diagnoses: S/P CABG x 2      metoprolol tartrate (LOPRESSOR) 25 MG tablet Take 1 tablet (25 mg) by mouth 2 times daily.    Associated Diagnoses: S/P CABG x 2      mupirocin (BACTROBAN) 2 % external ointment Apply topically 2 times daily for 9 days.    Associated Diagnoses: Hx MRSA infection      oxyCODONE (ROXICODONE) 5 MG tablet Take 1 tablet (5 mg) by mouth every 6 hours as needed for severe pain.  Qty: 10 tablet, Refills: 0    Associated Diagnoses: S/P CABG x 2      polyethylene glycol (MIRALAX) 17 GM/Dose powder Take 17 g by mouth 2 times daily as needed for constipation.    Associated Diagnoses: S/P CABG x 2      senna-docusate (SENOKOT-S/PERICOLACE) 8.6-50 MG tablet Take 1 tablet by mouth or Feeding Tube 2 times daily as needed for constipation.    Associated Diagnoses: S/P CABG x 2      umeclidinium (INCRUSE ELLIPTA) 62.5 MCG/ACT inhaler Inhale 1 puff into the lungs daily.    Associated Diagnoses: Chronic obstructive pulmonary disease, unspecified COPD type (H)       !! - Potential duplicate medications found. Please discuss with provider.        CONTINUE these medications which have CHANGED    Details   acetaminophen (TYLENOL) 500 MG tablet Take 2 tablets (1,000 mg) by mouth every 6 hours as needed  for mild pain.    Associated Diagnoses: S/P CABG x 2      budeson-glycopyrrol-formoterol (BREZTRI AEROSPHERE) 160-9-4.8 MCG/ACT AERO inhaler Inhale 2 puffs into the lungs 2 times daily. HOLD until PCP clarification    Associated Diagnoses: Chronic obstructive pulmonary disease, unspecified COPD type (H)      insulin glargine (LANTUS PEN) 100 UNIT/ML pen Inject 10 Units subcutaneously at bedtime.    Comments: If Lantus is not covered by insurance, may substitute Basaglar or Semglee or other insulin glargine product per insurance preference at same dose and frequency.    Associated Diagnoses: Type 2 diabetes mellitus without complication, without long-term current use of insulin (H)      semaglutide (OZEMPIC) 2 MG/3ML pen Inject 0.25 mg subcutaneously every 7 days, THEN 0.5 mg every 7 days. HOLD until PCP discussion.    Associated Diagnoses: Type 2 diabetes mellitus without complication, without long-term current use of insulin (H)           CONTINUE these medications which have NOT CHANGED    Details   albuterol (PROAIR HFA/PROVENTIL HFA/VENTOLIN HFA) 108 (90 Base) MCG/ACT inhaler Inhale 1-2 puffs into the lungs every 4 hours as needed for shortness of breath or wheezing.  Qty: 8.5 g, Refills: 10    Comments: Pharmacy may dispense brand covered by insurance (Proair, or proventil or ventolin or generic albuterol inhaler)  Associated Diagnoses: Severe persistent asthma without complication (H)      aspirin (ASA) 81 MG EC tablet Take 81 mg by mouth at bedtime.    Associated Diagnoses: Status post total right knee replacement      atorvastatin (LIPITOR) 80 MG tablet Take 1 tablet (80 mg) by mouth at bedtime.  Qty: 90 tablet, Refills: 3    Associated Diagnoses: ACS (acute coronary syndrome) (H)      budesonide-formoterol (SYMBICORT) 80-4.5 MCG/ACT Inhaler Inhale 2 puffs into the lungs 2 times daily.  Qty: 10.2 g, Refills: 11    Associated Diagnoses: Severe persistent asthma without complication (H); Chronic obstructive  pulmonary disease, unspecified COPD type (H)      ipratropium - albuterol 0.5 mg/2.5 mg/3 mL (DUONEB) 0.5-2.5 (3) MG/3ML neb solution Take 1 vial (3 mLs) by nebulization every 6 hours as needed for shortness of breath, wheezing or cough.  Qty: 270 mL, Refills: 5    Associated Diagnoses: Chronic obstructive pulmonary disease, unspecified COPD type (H)      montelukast (SINGULAIR) 10 MG tablet TAKE ONE TABLET BY MOUTH AT BEDTIME  Qty: 90 tablet, Refills: 3    Associated Diagnoses: Severe persistent asthma without complication (H)      omeprazole (PRILOSEC) 20 MG DR capsule Take 1 capsule (20 mg) by mouth daily.  Qty: 90 capsule, Refills: 3    Associated Diagnoses: Gastroesophageal reflux disease without esophagitis      ticagrelor (BRILINTA) 90 MG tablet Take 1 tablet (90 mg) by mouth 2 times daily. Dose to start tomorrow morning.  Qty: 180 tablet, Refills: 3    Associated Diagnoses: ST elevation myocardial infarction involving left circumflex coronary artery (H)      tiotropium (SPIRIVA RESPIMAT) 2.5 MCG/ACT inhaler Inhale 2 puffs into the lungs daily.  Qty: 4 g, Refills: 1    Associated Diagnoses: COPD exacerbation (H)      triamcinolone (KENALOG) 0.1 % external cream APPLY TOPICALLY 2 TIMES DAILY TO LEFT ANKLE  Qty: 15 g, Refills: 1    Associated Diagnoses: Flexural eczema      Alcohol Swabs (B-D SINGLE USE SWABS REGULAR) PADS USE TO SWAB AREA OF INJECTION / JENISE AS DIRECTED  Qty: 100 each, Refills: 3    Associated Diagnoses: Type 2 diabetes mellitus without complication, without long-term current use of insulin (H)      blood glucose monitoring (NO BRAND SPECIFIED) meter device kit Use to test blood sugar 1 times daily or as directed.  Blood Glucose Monitor Brands: per insurance.  Qty: 1 kit, Refills: 0    Associated Diagnoses: Type 2 diabetes mellitus without complication, without long-term current use of insulin (H)      insulin pen needle (BD LIZBETH U/F) 32G X 4 MM miscellaneous USE 1 PEN NEEDLE DAILY  Qty: 100  each, Refills: 1    Associated Diagnoses: Type 2 diabetes mellitus without complication, without long-term current use of insulin (H)      Lancets (ONETOUCH DELICA PLUS [id number]) MISC USE AS DIRECTED WITH LANCING DEVICE TO TEST ONCE DAILY  Qty: 100 each, Refills: 6    Associated Diagnoses: Type 2 diabetes mellitus without complication, without long-term current use of insulin (H)      ONETOUCH ULTRA test strip USE TO TEST BLOOD SUGAR 1 TIME DAILY OR AS DIRECTED  Qty: 100 strip, Refills: 6    Associated Diagnoses: Type 2 diabetes mellitus without complication, without long-term current use of insulin (H)      order for DME Equipment being ordered: arm blood pressure cuff  Qty: 1 Device, Refills: 0    Associated Diagnoses: Essential hypertension, benign           STOP taking these medications       carvedilol (COREG) 6.25 MG tablet Comments:   Reason for Stopping:         losartan-hydrochlorothiazide (HYZAAR) 50-12.5 MG tablet Comments:   Reason for Stopping:         nitroGLYcerin (NITROSTAT) 0.4 MG sublingual tablet Comments:   Reason for Stopping:         predniSONE (DELTASONE) 20 MG tablet Comments:   Reason for Stopping:                CC:Sarita Weiss, APRN, ACNPC-AG, CCRN  Nurse Practitioner  Cardiothoracic Surgery  Pager: [phone]    Time spent in total on discharge including coordination of care: 45 minutes    [facility]   Cardiothoracic Surgery  Office phone: [phone]  Office fax: [phone]

## 2025-05-07 VITALS
OXYGEN SATURATION: 98 % | DIASTOLIC BLOOD PRESSURE: 86 MMHG | HEIGHT: 69 IN | TEMPERATURE: 97.9 F | RESPIRATION RATE: 16 BRPM | HEART RATE: 90 BPM | BODY MASS INDEX: 21.42 KG/M2 | WEIGHT: 144.6 LBS | SYSTOLIC BLOOD PRESSURE: 128 MMHG

## 2025-05-07 LAB
GLUCOSE BLDC GLUCOMTR-MCNC: 123 MG/DL (ref 70–99)
GLUCOSE BLDC GLUCOMTR-MCNC: 139 MG/DL (ref 70–99)
GLUCOSE BLDC GLUCOMTR-MCNC: 161 MG/DL (ref 70–99)
HOLD SPECIMEN: NORMAL
MAGNESIUM SERPL-MCNC: 1.8 MG/DL (ref 1.7–2.3)
PHOSPHATE SERPL-MCNC: 3.3 MG/DL (ref 2.5–4.5)
POTASSIUM SERPL-SCNC: 3.8 MMOL/L (ref 3.4–5.3)

## 2025-05-07 PROCEDURE — 36415 COLL VENOUS BLD VENIPUNCTURE: CPT | Performed by: STUDENT IN AN ORGANIZED HEALTH CARE EDUCATION/TRAINING PROGRAM

## 2025-05-07 PROCEDURE — 250N000013 HC RX MED GY IP 250 OP 250 PS 637: Performed by: STUDENT IN AN ORGANIZED HEALTH CARE EDUCATION/TRAINING PROGRAM

## 2025-05-07 PROCEDURE — 83735 ASSAY OF MAGNESIUM: CPT | Performed by: STUDENT IN AN ORGANIZED HEALTH CARE EDUCATION/TRAINING PROGRAM

## 2025-05-07 PROCEDURE — 84100 ASSAY OF PHOSPHORUS: CPT | Performed by: STUDENT IN AN ORGANIZED HEALTH CARE EDUCATION/TRAINING PROGRAM

## 2025-05-07 PROCEDURE — 250N000013 HC RX MED GY IP 250 OP 250 PS 637: Performed by: NURSE PRACTITIONER

## 2025-05-07 PROCEDURE — 250N000013 HC RX MED GY IP 250 OP 250 PS 637: Performed by: PHYSICIAN ASSISTANT

## 2025-05-07 PROCEDURE — 97110 THERAPEUTIC EXERCISES: CPT | Mod: GP

## 2025-05-07 PROCEDURE — 97530 THERAPEUTIC ACTIVITIES: CPT | Mod: GP

## 2025-05-07 PROCEDURE — 84132 ASSAY OF SERUM POTASSIUM: CPT | Performed by: STUDENT IN AN ORGANIZED HEALTH CARE EDUCATION/TRAINING PROGRAM

## 2025-05-07 PROCEDURE — 99232 SBSQ HOSP IP/OBS MODERATE 35: CPT | Performed by: PHYSICIAN ASSISTANT

## 2025-05-07 PROCEDURE — 250N000011 HC RX IP 250 OP 636: Performed by: NURSE PRACTITIONER

## 2025-05-07 RX ORDER — POTASSIUM CHLORIDE 1500 MG/1
20 TABLET, EXTENDED RELEASE ORAL ONCE
Status: COMPLETED | OUTPATIENT
Start: 2025-05-07 | End: 2025-05-07

## 2025-05-07 RX ORDER — MAGNESIUM OXIDE 400 MG/1
400 TABLET ORAL EVERY 4 HOURS
Status: COMPLETED | OUTPATIENT
Start: 2025-05-07 | End: 2025-05-07

## 2025-05-07 RX ADMIN — METOPROLOL TARTRATE 25 MG: 25 TABLET, FILM COATED ORAL at 08:04

## 2025-05-07 RX ADMIN — ACETAMINOPHEN 975 MG: 325 TABLET, FILM COATED ORAL at 13:13

## 2025-05-07 RX ADMIN — Medication 400 MG: at 13:13

## 2025-05-07 RX ADMIN — PANTOPRAZOLE SODIUM 40 MG: 40 TABLET, DELAYED RELEASE ORAL at 08:05

## 2025-05-07 RX ADMIN — HEPARIN SODIUM 5000 UNITS: 5000 INJECTION, SOLUTION INTRAVENOUS; SUBCUTANEOUS at 13:13

## 2025-05-07 RX ADMIN — Medication 400 MG: at 10:04

## 2025-05-07 RX ADMIN — MUPIROCIN: 20 OINTMENT TOPICAL at 08:10

## 2025-05-07 RX ADMIN — HEPARIN SODIUM 5000 UNITS: 5000 INJECTION, SOLUTION INTRAVENOUS; SUBCUTANEOUS at 05:01

## 2025-05-07 RX ADMIN — INSULIN ASPART 1 UNITS: 100 INJECTION, SOLUTION INTRAVENOUS; SUBCUTANEOUS at 08:09

## 2025-05-07 RX ADMIN — TICAGRELOR 90 MG: 90 TABLET ORAL at 07:11

## 2025-05-07 RX ADMIN — POTASSIUM CHLORIDE 20 MEQ: 1500 TABLET, EXTENDED RELEASE ORAL at 10:04

## 2025-05-07 RX ADMIN — ASPIRIN 81 MG CHEWABLE TABLET 81 MG: 81 TABLET CHEWABLE at 08:05

## 2025-05-07 ASSESSMENT — ACTIVITIES OF DAILY LIVING (ADL)
ADLS_ACUITY_SCORE: 39
ADLS_ACUITY_SCORE: 40
ADLS_ACUITY_SCORE: 40
ADLS_ACUITY_SCORE: 38
ADLS_ACUITY_SCORE: 39
ADLS_ACUITY_SCORE: 40
ADLS_ACUITY_SCORE: 39
ADLS_ACUITY_SCORE: 40

## 2025-05-07 NOTE — PLAN OF CARE
Goal Outcome Evaluation:  [id number]             Procedure: CABG x2  on 05/01  Orientations: A&O x4  Vitals/Pain: VSS on RA, denies pain  Tele: SR w/ inverted T wave  Lines/Drains: PIV x1 SL  Skin/Wounds: Sternal incision and LLE harvest sites  GI/: AUOP, loose BM x1  Labs: K and Mg replaced,   Ambulation/Assist: SBA  Plan: Discharged to TCU today

## 2025-05-07 NOTE — PROGRESS NOTES
"    BRIEF CVTS PROGRESS NOTE    S:  Raul Wilhelm is a 79 year old male with a past medical history of HTN, HLD, GERD, Zenkers Diverticulum, COPD, Asthma, DMII, CVA, frequent MRSA abscesses, CAD with STEMI s/p REDD to LPDA, mid CX in 2015 and REDD to prox LCx and OM in January 2025 at [facility] and on [facility], with known residual LAD and D1 disease. Patient presented on 4/25/25 with chest pain in the center of his chest that radiated to his belly. He underwent TTE with severe hypokinesis of mid to distal aneroseptal and apical walls, EF 45-50% and coronary angiogram which revealed residual LMCA and proximal LAD disease. CV Surgery consulted for consideration of surgical revascularization of LMCA disease and proximal LAD disease.     Was cleared for discharge yesterday however insurance authorization had not been finalized and so the discharge was deferred.  NAEO, feeling good, ready for discharge.  Thankful for the care he's received and looking forward to a new beginning.  Does admit to having trouble sleeping.    O:  /82 (BP Location: Right arm)   Pulse 109   Temp 97.6  F (36.4  C) (Oral)   Resp 16   Ht 1.74 m (5' 8.5\")   Wt 65.6 kg (144 lb 9.6 oz)   SpO2 98%   BMI 21.67 kg/m      Intake/Output Summary (Last 24 hours) at 5/7/2025 0858  Last data filed at 5/7/2025 0812  Gross per 24 hour   Intake 1130 ml   Output 200 ml   Net 930 ml       CBC RESULTS:   Recent Labs   Lab Test 05/05/25  0548 05/04/25  0531 05/03/25  0541   WBC 8.6 9.2 11.1*   HGB 10.2* 9.7* 10.6*   HCT 31.3* 29.9* 34.4*    158 148*     CMP RESULTS:  Recent Labs   Lab Test 05/07/25  0825 05/07/25  0538 05/07/25  0206 05/06/25  2207 05/06/25  0757 05/06/25  0536 05/05/25  0730 05/05/25  0548 05/04/25  0634 05/04/25  0531 05/03/25  1245 05/03/25  0541 05/02/25  0627 05/02/25  0406 05/01/25  1305 05/01/25  1258 04/25/25  0959 04/25/25  0124   NA  --   --   --   --   --   --   --  141  --  138  --  136  --  137  --  140   < > 141 "   POTASSIUM  --  3.8  --   --   --  3.6  --  3.5   < > 3.3*  --  3.9  --  4.0  --  4.3   < > 4.8   CHLORIDE  --   --   --   --   --   --   --  107  --  103  --  106  --  103  --  107   < > 104   CO2  --   --   --   --   --   --   --  24  --  25  --  16*  --  20*  --  21*   < > 28   ANIONGAP  --   --   --   --   --   --   --  10  --  10  --  14  --  14  --  12   < > 9   *  --  123* 185*   < >  --    < > 109*   < > 108*   < > 142*   < > 132*   < > 159*   < > 261*   BUN  --   --   --   --   --   --   --  14.0  --  14.3  --  14.4  --  15.0  --  14.3   < > 21.7   CR  --   --   --   --   --   --   --  0.78  --  0.79  --  0.75  --  0.88  --  0.86   < > 0.92   GFRESTIMATED  --   --   --   --   --   --   --  >90  --  90  --  >90  --  87  --  88   < > 85   JOSE RAFAEL  --   --   --   --   --   --   --  8.3*  --  8.1*  --  8.1*  --  8.0*  --  8.2*   < > 9.4   BILITOTAL  --   --   --   --   --   --   --   --   --   --   --   --   --  0.9  --  1.3*  --  0.8   ALKPHOS  --   --   --   --   --   --   --   --   --   --   --   --   --  60  --  57  --  140   ALT  --   --   --   --   --   --   --   --   --   --   --   --   --  35  --  54  --  39   AST  --   --   --   --   --   --   --   --   --   --   --   --   --  47*  --  56*  --  23    < > = values in this interval not displayed.     Recent Labs   Lab Test 05/01/25  1258 05/01/25  1110 01/04/25  1435   INR 1.22* 1.52* 1.02       Recent Results (from the past 48 hours)   XR Chest 2 Views    Narrative    EXAM: XR CHEST 2 VIEWS  LOCATION: [facility]  DATE: 5/5/2025    INDICATION: s p chest tube removal  COMPARISON: Chest radiograph 5/2/2025.      Impression    IMPRESSION: Interval removal of the left chest tube, mediastinal drains and right central venous catheter.    Probable tiny left apical pneumothorax measuring 3-4 mm. Small bilateral pleural effusions. Cardiomediastinal silhouette is unchanged. Coronary stent.           PHYSICAL EXAM  Gen: NAD, resting in  bed, pleasant, conversant, calm, cooperative on exam  Neuro: A&Ox4, no focal deficits, face symmetric, speech clear, GREEN  CV: RRR on monitor, WWP  Pulm: unlabored breathing on RA  Abd: SNDNT  Ext: no peripheral edema, LLE incision C/D/I without erythema or induration, ecchymosis along thigh EVH tract  Incision: clean, dry, intact, no erythema or induration, sternum stable, mildly TTP  Tubes/drain sites: YOLETTE, scabbed over, C/D/I, no surrounding erythema or induration      A/P:  Raul Wilhelm is a 79 year old who is s/p CABG x2 and HFmrEF with recent REDD PCI.    Discharge to TCU; please see full discharge summary dated 5/7/2025.  No changes made to discharge orders.    Nathan Lopez, CNP, ACNPC-AG  Cardiothoracic Surgery  Pager [phone]

## 2025-05-07 NOTE — PROGRESS NOTES
"SPIRITUAL HEALTH SERVICES  SPIRITUAL ASSESSMENT Progress Note  [facility]     REFERRAL SOURCE: Follow up visit    Reviewed documentation. Reflective conversation shared with Raul which integrated elements of illness and family narratives.     Raul shared that he'll be discharging later today and is hopeful that with more time for strengthening, he'll be able to return home in next couple of weeks.   He is grateful for the support of his family and for the staff who have been caring for him in the hospital.   Raul named that he believes that \"God guides us\" in different times and ways in peoples' lives by opening doors and that we can take to the opportunity to \"listen and follow or just keep going.\" He feels that he has followed God's leading and expresses his hope that he will continue to do so and simply be \"kind to others along the way.\"    I offered spiritual and emotional support through reflective listening that affirmed emotions, experience, and meaning.     PLAN: Nothing further at this time due to impending discharge.    Vero Krueger  Associate      SHS available 24/7 for emergent requests/referrals, either by paging the on-call  or by entering an ASAP/STAT consult in Epic (this will also page the on-call ).     "

## 2025-05-07 NOTE — PROGRESS NOTES
Orientations: A&O x4  Vitals/Pain: VSS on RA, denies pain  Tele: SR w/ inverted T wave  Lines/Drains: PIV x1 SL  Skin/Wounds: Sternal incision and LLE harvest sites  GI/: AUOP, loose BM x2  Labs: K/mag/phos protocols  Ambulation/Assist: SBA  Sleep Quality: fair  [id number]

## 2025-05-07 NOTE — PROGRESS NOTES
Care Management Discharge Note    Discharge Date: 05/07/2025       Discharge Disposition: Transitional Care    Discharge Services:      Discharge DME:      Discharge Transportation: car, drives self, family or friend will provide    Private pay costs discussed: private room/amenity fees and transportation costs    Does the patient's insurance plan have a 3 day qualifying hospital stay waiver?  No    PAS Confirmation Code: [id number]  Patient/family educated on Medicare website which has current facility and service quality ratings: yes    Education Provided on the Discharge Plan: Yes  Persons Notified of Discharge Plans: Pt, RN, PAZOILA, facility  Patient/Family in Agreement with the Plan: yes    Handoff Referral Completed: Yes, DALIA PCP: Internal handoff referral completed    Additional Information:  [facility] 2880-9994. Facility has received prior-auth from Dionna, ashley Ulrich in admissions. Orders faxed and facility updated. Pt updated and in agreement. Nursing aware.     EMILY Cruz, [facility]  [phone] Desk phone  [phone] Cell/text (Preferred)  [facility]    PAS-RR    D: Per DHS regulation, SW completed and submitted PAS-RR to [place] Board on Aging Direct Connect via the Senior LinkAge Line.  PAS-RR confirmation # is : [id number]      P: Further questions may be directed to [facility] LinkAge Line at #[phone], option #4 for PAS-RR staff.

## 2025-05-07 NOTE — PROGRESS NOTES
[facility]    Medicine Progress Note - Hospitalist Service    Date of Admission:  4/25/2025    Assessment & Plan   Raul Wilhelm is a 79 year old male with history including asthma; DM2; HTN; known severe multivessel CAD with prior stents (1/2025); h/o CVA; who presented to [facility] 4/25/2025 with chest pain and found with NSTEMI. Transferred to [facility] 4/25/2025 for management.    Medically stable for discharge. Awaiting insurance auth      NSTEMI with severe multivessel CAD - s/p CABG 2v 5/1/2025 (LIMA to LAD and SVG to diagonal).  Ischemic cardiomyopathy  Recent LCx and OM REDD (1/2025).   H/o HTN (benign essential).  HLD.  [PTA: asa 81 mg daily; atorvastatin 80 mg at bedtime; carvedilol 6.25 mg BID; losartan-HCTZ 50-12.5 mg daily; ticagrelor 90 mg BID; PRN NTG.]  * H/o STEMI 11/2025 s/p LPDA and LCx stents. H/o STEMI 1/2025. Heart catheterization 1/2025 showed severe multivessel CAD involving ostial-prox LAD, mid LAD, prox LCX, and large OM branch; stents placed to LCx and OM branch and there was plan for outpatient discussion regarding surgical revascularization vs long stents for LAD disease, but he had not yet had this follow-up. Echo 1/2025 showed LVEF 55-60%, mild LVH, no regional wall motion abnormalities; no significant valve disease.  * Initial presentation to outside hospital as above. Started on heparin gtt.  * Transferred to [facility] 4/25. Cardiology consulted on admit.  * Later 4/25: Echo showed LVEF 45-50% with WMA's, grade 1 diastolic dysfunction; RV OK. Angiogram showed LM with ostial 60%; mid LAD with diffuse 50%, LAD ostium covered by CX stent, appeared to be up to 80% on some views, diffuse proximal 75% narrowing first diagonal 75%; LCx stents widely patent, no restenosis; CABG recommended. CVS consulted; ticagrelor held and started on cangrelor gtt bridging.  * 5/1: Underwent CABG as noted. Subsequently extubated.  * 5/2: Stable, off pressors, on 2L O2.  Transferred to the floor..  - Post-op management per CVS.  - Continue BB, ASA, cangrelor gtt, transition back to PTA ticagrelor 5/4 per CTS  - Restart ARB when able per CVS.  - Continue scheduled acetaminophen, PRN methocarbamol, PRN oxycodone  - Continue to monitor i/o's, daily wts.  --received IV lasix 5/5 with improvement in SOB, defer further diuresis to CRS   - Planning TCU today- ok from medicine standpoint      DM2.   [PTA: glargine 20U at bedtime; semaglutide (pt not taking due to cost).]  BG primarily at goal <180  * HgbA1C 7.1 2/2025.   * 5/1: On insulin gtt post-op   * 5/2: Off insulin gtt. Tolerating some oral intake.    Recent Labs   Lab Test 02/19/25  0832 01/04/25  1709   A1C 7.1* 8.1*   - Continue moderate CHO diet.  - Cont glargine at 10U at bedtime..  - will replace carb count insulin with 2u per meal for now while at TCU, can uptitrate as needed at TCU  - Adjust regimen as indicated.   - Continue aspart ISS (medium).  - Continue PRN hypoglycemia protocol.  -will need ongoing adjustments at TCU as diet increases; discharge on reduced regimen as above     Blood loss anemia.  * Hgb normal on admit.  * Hgb decreased related to CABG, not requiring prbc's.    Thrombocytopenia, suspect consumptive related to surgery, resolved.  * Platelets normal on admit.  * Platelets decreased during surgery requiring platelet transfusion.  * Platelets normal 5/2.    Leukocytosis, suspect reactive. Resolved.     Asthma.  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  - Continue fluticasone-vilanterol, tiotropium, montelukast, PRN inhalers/nebs     Zenkers diverticulum.  GERD.  Chronic Dysphagia.  * Noted during Jan 2025 admit. Noted diverticulum does not empty fully with multiple swallows and gives him regurgitation/dysphagia symptoms. He elected not to pursue ENT outpatient. PTA on BID omeprazole.  - Continue pantoprazole.    Back pain, suspect musculoskeletal.  * 4/28: Pt c/o back pain suspected due to the hospital  bed.  - Continue PRN acetaminophen.     H/o frequent skin abscesses.  +MRSA.  * 3/2025 with abscess of right buttock that was +MRSA. Most recent at right neck Mid 4/2025, appears to be resolving.    * 4/29: CVS ordered 10d course of mupirocin ointment on nares.  - Continue to monitor clinically.  - Continue mupirocin oint to nares starting 5/5    H/o arterial ischemic stroke.  - Continue antiplatelets and statin as noted.     Pulmonary nodule RUL, 0.3cm, noted on CT (1/2025).  - Follow-up CT chest due Jan 2026.    Constipation  Had a BM day of discharge  - Bowel regimen.    Hypokalemia:  - Replaced per protocol           Diet: Combination Diet Moderate Consistent Carb (60 g CHO per Meal) Diet; Low Saturated Fat Na <2400mg Diet  Snacks/Supplements Adult: Glucerna; Between Meals  Diet  Discharge Instruction - Cardiac Diet    DVT Prophylaxis: Defer to primary service  Diaz Catheter: Not present  Lines: None     Cardiac Monitoring: ACTIVE order. Indication: Open heart surgery (7 days)  Code Status: Full Code      Clinically Significant Risk Factors               # Hypoalbuminemia: Lowest albumin = 3.3 g/dL at 5/2/2025  4:06 AM, will monitor as appropriate     # Hypertension: Noted on problem list           # DMII: A1C = 7.1 % (Ref range: 0.0 - 5.6 %) within past 6 months       # Financial/Environmental Concerns: none  # COPD: noted on problem list  # History of CABG: noted on surgical history       Social Drivers of Health    Financial Resource Strain: High Risk (4/25/2025)    Financial Resource Strain     Within the past 12 months, have you or your family members you live with been unable to get utilities (heat, electricity) when it was really needed?: Yes          Disposition Plan     Medically Ready for Discharge: Ready Now       The patient's care was discussed with Dr. Chatterjee who agrees with the above plan     Rosario Valderrama PA-C  Hospitalist Service  [facility]  Securely message  with [employer] (more info)  Text page via [facility] Paging/Directory   ______________________________________________________________________    Interval History   Patient reports doing well today. Pain controlled. Denies SOB or dizziness. Appetite is still very up and down and overall eating much less than baseline. No nausea. Last BM morning of discharge and a little loose.     Physical Exam   Vital Signs: Temp: 97.6  F (36.4  C) Temp src: Oral BP: 125/82 Pulse: 109   Resp: 16 SpO2: 98 % O2 Device: None (Room air)    Weight: 144 lbs 9.6 oz    Constitutional: Alert and oriented, sitting up in bed.  Appears comfortable and is pleasantly conversant   ENT: moist mucous membranes  Respiratory: Lungs somewhat diminished but clear overall. No increased work of breathing or wheezing   Cardiovascular: distant heart tones. Regular rate and rhythm, no significant LE edema   GI: active bowel sounds, abdomen mildly distended, soft, non-tender  Skin/Integumen: sternal incision without significant erythema or drainage  MSK: moves all four extremities  Neuro: speech is clear. Face symmetric. Follows commands         Medical Decision Making       35 MINUTES SPENT BY ME on the date of service doing chart review, history, exam, documentation & further activities per the note.      Data     I have personally reviewed the following data over the past 24 hrs:    N/A  \   N/A   / N/A     N/A N/A N/A /  123 (H)   3.8 N/A N/A \       Imaging results reviewed over the past 24 hrs:   No results found for this or any previous visit (from the past 24 hours).

## 2025-05-08 ENCOUNTER — PATIENT OUTREACH (OUTPATIENT)
Dept: CARE COORDINATION | Facility: CLINIC | Age: 80
End: 2025-05-08
Payer: COMMERCIAL

## 2025-05-08 PROBLEM — I50.20 HEART FAILURE WITH REDUCED EJECTION FRACTION (H): Status: ACTIVE | Noted: 2025-05-08

## 2025-05-08 NOTE — PROGRESS NOTES
[facility] GERIATRICS  INITIAL VISIT NOTE      PRIMARY CARE PROVIDER AND CLINIC: Sarita Hennessy [address] [facility] / [facility] [address]    [facility] Medical Record Number: [id number]  Place of Service where encounter took place: [facility] ([facility]) [[id number]]    Chief Complaint   Patient presents with    Hospital F/U     [facility] 4/25/2025 - 5/7/2025      HPI:    Raul Wilhelm is a 79 year old (1945) male was admitted to the above facility from [facility]. Hospital stay 4/25/25 through 5/7/25 where they were admitted for NSTEMI s/p CABG, CHF. Now admitted to this facility for rehab, medical management, and nursing care.      History obtained from: facility chart records, facility staff, patient report, and [facility] chart review.      Brief Hospital Course: PMH of Dm2, COPD, asthma, HTH, HLD, GERD, CVA, MRSA abscesses, CAD with previous NSTEMI 1/2025 who presented with chest pain. Found to have NSTEMI, underwent CABG x2 on 5/1. Surgery without complication. Did have some fluid overload, treated with diuretics. Weight improved and diuretics stopped. Did require insulin gtt for hyperglycemia. Transitioned to lantus and sliding scale insulin. When medically stable was discharged to [facility] for further rehab and medical management.       [facility] Course: ***    CODE STATUS/ADVANCE DIRECTIVES: {CODE STATUS:379728}    ALLERGIES:  Allergies   Allergen Reactions    Simvastatin Swelling     Severe muscle pain, swelling, and bruising    Dust Mites Unknown     PAST MEDICAL HISTORY:   Past Medical History:   Diagnosis Date    Chronic airway obstruction, not elsewhere classified     Other and unspecified hyperlipidemia      PAST SURGICAL HISTORY:   Past Surgical History:   Procedure Laterality Date    ARTHROPLASTY KNEE Left 5/28/2020    Procedure: Left Total Knee Arthroplasty;  Surgeon: Sanjay Downey MD;  Location: [facility] OR    ARTHROPLASTY KNEE Right 11/2/2020     Procedure: ARTHROPLASTY, KNEE, TOTAL;  Surgeon: Sanjay Downey MD;  Location: [facility] OR    BYPASS GRAFT ARTERY CORONARY N/A 5/1/2025    Procedure: MEDIAN STERNOTOMY; CORONARY ARTERY BYPASS GRAFT x 2 (LEFT INTERNAL MAMMARY ARTERY - LEFT ANTERIOR DESCENDING ARTERY; SAPHENOUS VEIN - DIAGONAL ARTERY) WITH ENDOSCOPIC SAPHENOUS VEIN HARVEST ON THE LEFT LOWER EXTREMITY, AND ON CARDIOPULMONARY PUMP OXYGENATOR;  Surgeon: Mulvihill, Michael, MD;  Location:  OR    CV CORONARY ANGIOGRAM N/A 1/4/2025    Procedure: Coronary Angiogram;  Surgeon: Jacobo Montgomery MD;  Location: [facility] CATH LAB CV    CV CORONARY ANGIOGRAM N/A 4/25/2025    Procedure: Coronary Angiogram;  Surgeon: Vinod Lobato MD;  Location:  HEART CARDIAC CATH LAB    CV PCI STENT DRUG ELUTING N/A 1/4/2025    Procedure: Percutaneous Coronary Intervention Stent;  Surgeon: Jacobo Montgomery MD;  Location: [facility] CV    ENT SURGERY  as a child    tonsillectomy     FAMILY HISTORY:   Family History   Problem Relation Age of Onset    Diabetes Mother     Heart Disease Mother     C.A.D. Mother     Cancer Father     Hypertension Father     Cerebrovascular Disease Paternal Grandfather     Asthma Sister     Breast Cancer No family hx of     Cancer - colorectal No family hx of     Prostate Cancer No family hx of      Unable to review due to cognitive impairment***    SOCIAL HISTORY:   Patient's living condition: lives alone    MEDICATIONS  Post Discharge Medication Reconciliation Status: {ACO Med Rec (Provider):241013}.  Current Outpatient Medications   Medication Sig Dispense Refill    acetaminophen (TYLENOL) 500 MG tablet Take 2 tablets (1,000 mg) by mouth every 6 hours as needed for mild pain.      albuterol (PROAIR HFA/PROVENTIL HFA/VENTOLIN HFA) 108 (90 Base) MCG/ACT inhaler Inhale 1-2 puffs into the lungs every 4 hours as needed for shortness of breath or wheezing. 8.5 g 10    Alcohol Swabs (B-D SINGLE USE SWABS REGULAR) PADS USE TO SWAB  AREA OF INJECTION / JENISE AS DIRECTED 100 each 3    aspirin (ASA) 81 MG EC tablet Take 81 mg by mouth at bedtime.      atorvastatin (LIPITOR) 80 MG tablet Take 1 tablet (80 mg) by mouth at bedtime. 90 tablet 3    blood glucose monitoring (NO BRAND SPECIFIED) meter device kit Use to test blood sugar 1 times daily or as directed.  Blood Glucose Monitor Brands: per insurance. 1 kit 0    budeson-glycopyrrol-formoterol (BREZTRI AEROSPHERE) 160-9-4.8 MCG/ACT AERO inhaler Inhale 2 puffs into the lungs 2 times daily. HOLD until PCP clarification      budesonide-formoterol (SYMBICORT) 80-4.5 MCG/ACT Inhaler Inhale 2 puffs into the lungs 2 times daily. 10.2 g 11    insulin aspart (NOVOLOG PEN) 100 UNIT/ML pen Inject 2 Units subcutaneously 3 times daily (with meals). Along with sliding scale.  Hold for BG <100      insulin aspart (NOVOLOG PEN) 100 UNIT/ML pen Inject 1-7 Units subcutaneously 3 times daily (before meals). Per sliding scale      insulin aspart (NOVOLOG PEN) 100 UNIT/ML pen Inject 1-5 Units subcutaneously at bedtime.      insulin glargine (LANTUS PEN) 100 UNIT/ML pen Inject 10 Units subcutaneously at bedtime.      insulin pen needle (BD LIZBETH U/F) 32G X 4 MM miscellaneous USE 1 PEN NEEDLE DAILY 100 each 1    ipratropium - albuterol 0.5 mg/2.5 mg/3 mL (DUONEB) 0.5-2.5 (3) MG/3ML neb solution Take 1 vial (3 mLs) by nebulization every 6 hours as needed for shortness of breath, wheezing or cough. 270 mL 5    Lancets (ONETOUCH DELICA PLUS [id number]) MISC USE AS DIRECTED WITH LANCING DEVICE TO TEST ONCE DAILY 100 each 6    methocarbamol (ROBAXIN) 500 MG tablet Take 0.5 tablets (250 mg) by mouth every 6 hours as needed for muscle spasms.      metoprolol tartrate (LOPRESSOR) 25 MG tablet Take 1 tablet (25 mg) by mouth 2 times daily.      montelukast (SINGULAIR) 10 MG tablet TAKE ONE TABLET BY MOUTH AT BEDTIME 90 tablet 3    mupirocin (BACTROBAN) 2 % external ointment Apply topically 2 times daily for 9 days.       omeprazole (PRILOSEC) 20 MG DR capsule Take 1 capsule (20 mg) by mouth daily. 90 capsule 3    ONETOUCH ULTRA test strip USE TO TEST BLOOD SUGAR 1 TIME DAILY OR AS DIRECTED 100 strip 6    order for DME Equipment being ordered: arm blood pressure cuff 1 Device 0    oxyCODONE (ROXICODONE) 5 MG tablet Take 1 tablet (5 mg) by mouth every 6 hours as needed for severe pain. 10 tablet 0    polyethylene glycol (MIRALAX) 17 GM/Dose powder Take 17 g by mouth 2 times daily as needed for constipation.      semaglutide (OZEMPIC) 2 MG/3ML pen Inject 0.25 mg subcutaneously every 7 days, THEN 0.5 mg every 7 days. HOLD until PCP discussion.      senna-docusate (SENOKOT-S/PERICOLACE) 8.6-50 MG tablet Take 1 tablet by mouth or Feeding Tube 2 times daily as needed for constipation.      ticagrelor (BRILINTA) 90 MG tablet Take 1 tablet (90 mg) by mouth 2 times daily. Dose to start tomorrow morning. 180 tablet 3    tiotropium (SPIRIVA RESPIMAT) 2.5 MCG/ACT inhaler Inhale 2 puffs into the lungs daily. 4 g 1    triamcinolone (KENALOG) 0.1 % external cream APPLY TOPICALLY 2 TIMES DAILY TO LEFT ANKLE (Patient taking differently: 2 times daily as needed for irritation (ankle).) 15 g 1     ROS:  10 point ROS neg other than the symptoms noted above in the HPI.***  Unable to obtain due to cognitive impairment or aphasia  ROS    PHYSICAL EXAM:  There were no vitals taken for this visit.  Physical Exam     LABORATORY/IMAGING DATA:  Reviewed as per [facility] and/or [facility]    ASSESSMENT/PLAN:  {FGS DX INITIAL:651622}    Orders:   ***    {FGS TIME SPENT:601295}    Electronically signed by:  Merissa Castillo

## 2025-05-08 NOTE — LETTER
[facility]   To:   [facility]           Please give to facility    From: Care Coordinator with [facility]     Patient Name:  Raul Wilhelm YOB: 1945   Admit date: 5/7/25      *Information Needed:  Please contact me when the patient will discharge (or if they will move to long term care)- include the discharge date, disposition, and main diagnosis. We work with patients after discharge from their primary care clinic setting.   If the patient is discharged with home care services, please provide the name of the agency    Ok to Phone or Email with information       Thank You,   Roxanne Tapia, RASHEL  Care Coordinator  [facility]  [email or web address]  [phone]

## 2025-05-08 NOTE — PROGRESS NOTES
Clinic Care Coordination Contact  Care Coordination Transition Communication    Clinical Data: Patient was hospitalized at [facility] from 4/25/25 to 5/7/25 with diagnosis of NSTEMI.     Assessment: Patient has transitioned to TCU for short term rehabilitation:    Facility Name: [facility]  Transition Communication:  Notified facility of Primary Care- Care Coordination support via Epic fax.    Plan: Care Coordinator will await notification from facility staff informing of patient's discharge plans/needs. Care Coordinator will review chart and outreach to facility staff every 2-4 weeks and as needed.     JACEK Olivares   Social Work Primary Care Clinic Care Coordinator   [facility]   Covering for Roxanne Tapia RN CC

## 2025-05-08 NOTE — PLAN OF CARE
Physical Therapy Discharge Summary    Reason for therapy discharge:    Discharged to transitional care facility.    Progress towards therapy goal(s). See goals on Care Plan in [facility] electronic health record for goal details.  Goals partially met.  Barriers to achieving goals:   discharge from facility.    Therapy recommendation(s):    Continued therapy is recommended.  Rationale/Recommendations:  Pt tolerated session well. Pt is below baseline functional independence. Pt limited by pain, decreased activity tolerance, sternal precautions. Pt at baseline is ind, has stairs at home, lives with alone at this time as spouse is at a TCU, only has intermittent support available from family. Would benefit from TCU stay to maximize safety and IND with mobility. Would recommend 24h assist if pt were to discharge to home..

## 2025-05-09 ENCOUNTER — TRANSITIONAL CARE UNIT VISIT (OUTPATIENT)
Dept: GERIATRICS | Facility: CLINIC | Age: 80
End: 2025-05-09
Payer: COMMERCIAL

## 2025-05-09 DIAGNOSIS — I50.20 HEART FAILURE WITH REDUCED EJECTION FRACTION (H): ICD-10-CM

## 2025-05-09 DIAGNOSIS — E78.5 HYPERLIPIDEMIA LDL GOAL <70: ICD-10-CM

## 2025-05-09 DIAGNOSIS — K22.5 ZENKER DIVERTICULA: ICD-10-CM

## 2025-05-09 DIAGNOSIS — R53.81 PHYSICAL DECONDITIONING: ICD-10-CM

## 2025-05-09 DIAGNOSIS — D62 ABLA (ACUTE BLOOD LOSS ANEMIA): ICD-10-CM

## 2025-05-09 DIAGNOSIS — I21.4 NSTEMI (NON-ST ELEVATED MYOCARDIAL INFARCTION) (H): ICD-10-CM

## 2025-05-09 DIAGNOSIS — I25.10 CORONARY ARTERY DISEASE INVOLVING NATIVE CORONARY ARTERY OF NATIVE HEART WITHOUT ANGINA PECTORIS: Primary | ICD-10-CM

## 2025-05-09 DIAGNOSIS — I10 ESSENTIAL HYPERTENSION: ICD-10-CM

## 2025-05-09 DIAGNOSIS — Z86.73 HISTORY OF ARTERIAL ISCHEMIC STROKE: ICD-10-CM

## 2025-05-09 DIAGNOSIS — Z95.1 S/P CABG (CORONARY ARTERY BYPASS GRAFT): ICD-10-CM

## 2025-05-09 DIAGNOSIS — J45.50 SEVERE PERSISTENT ASTHMA WITHOUT COMPLICATION (H): ICD-10-CM

## 2025-05-09 DIAGNOSIS — E11.9 TYPE 2 DIABETES MELLITUS WITHOUT COMPLICATION, WITHOUT LONG-TERM CURRENT USE OF INSULIN (H): ICD-10-CM

## 2025-05-09 DIAGNOSIS — J44.9 CHRONIC OBSTRUCTIVE PULMONARY DISEASE, UNSPECIFIED COPD TYPE (H): ICD-10-CM

## 2025-05-09 NOTE — LETTER
5/9/2025      Raul Wilhelm  [address]  [place] [address]        [facility] GERIATRICS  INITIAL VISIT NOTE  May 8, 2025    PRIMARY CARE PROVIDER AND CLINIC: Sarita Hennessy [address] [facility] / [place] [address]    [facility] Medical Record Number: [id number]  Place of Service where encounter took place: [facility] (TCU) [[id number]]    Chief Complaint   Patient presents with    Hospital F/U     [facility] 4/25/2025 - 5/7/2025      HPI:    Raul Wilhelm is a 79 year old (1945) male was admitted to the above facility from [facility]. Hospital stay 4/25/25 through 5/7/25 where they were admitted for ***. Now admitted to this facility for {FGS ADMISSION REASONS:115888}.      History obtained from: {FGS HPI:574477}.      Brief Hospital Course: ***    TCU Course: ***    CODE STATUS/ADVANCE DIRECTIVES: {CODE STATUS:256987}    ALLERGIES:  Allergies   Allergen Reactions    Simvastatin Swelling     Severe muscle pain, swelling, and bruising    Dust Mites Unknown     PAST MEDICAL HISTORY:   Past Medical History:   Diagnosis Date    Chronic airway obstruction, not elsewhere classified     Other and unspecified hyperlipidemia      PAST SURGICAL HISTORY:   Past Surgical History:   Procedure Laterality Date    ARTHROPLASTY KNEE Left 5/28/2020    Procedure: Left Total Knee Arthroplasty;  Surgeon: Sanjay Downey MD;  Location: [facility] OR    ARTHROPLASTY KNEE Right 11/2/2020    Procedure: ARTHROPLASTY, KNEE, TOTAL;  Surgeon: Sanjay Downey MD;  Location: [facility] OR    BYPASS GRAFT ARTERY CORONARY N/A 5/1/2025    Procedure: MEDIAN STERNOTOMY; CORONARY ARTERY BYPASS GRAFT x 2 (LEFT INTERNAL MAMMARY ARTERY - LEFT ANTERIOR DESCENDING ARTERY; SAPHENOUS VEIN - DIAGONAL ARTERY) WITH ENDOSCOPIC SAPHENOUS VEIN HARVEST ON THE LEFT LOWER EXTREMITY, AND ON CARDIOPULMONARY PUMP OXYGENATOR;  Surgeon: Mulvihill, Michael, MD;  Location:  OR    CV CORONARY  ANGIOGRAM N/A 1/4/2025    Procedure: Coronary Angiogram;  Surgeon: Jacobo Montgomery MD;  Location: [facility] CV    CV CORONARY ANGIOGRAM N/A 4/25/2025    Procedure: Coronary Angiogram;  Surgeon: Vinod Lobato MD;  Location: [facility] CARDIAC CATH LAB    CV PCI STENT DRUG ELUTING N/A 1/4/2025    Procedure: Percutaneous Coronary Intervention Stent;  Surgeon: Jacobo Montgomery MD;  Location: [facility] CV    ENT SURGERY  as a child    tonsillectomy     FAMILY HISTORY:   Family History   Problem Relation Age of Onset    Diabetes Mother     Heart Disease Mother     C.A.D. Mother     Cancer Father     Hypertension Father     Cerebrovascular Disease Paternal Grandfather     Asthma Sister     Breast Cancer No family hx of     Cancer - colorectal No family hx of     Prostate Cancer No family hx of      Unable to review due to cognitive impairment***    SOCIAL HISTORY:   Patient's living condition: lives alone    MEDICATIONS  Post Discharge Medication Reconciliation Status: {ACO Med Rec (Provider):631814}.  Current Outpatient Medications   Medication Sig Dispense Refill    acetaminophen (TYLENOL) 500 MG tablet Take 2 tablets (1,000 mg) by mouth every 6 hours as needed for mild pain.      albuterol (PROAIR HFA/PROVENTIL HFA/VENTOLIN HFA) 108 (90 Base) MCG/ACT inhaler Inhale 1-2 puffs into the lungs every 4 hours as needed for shortness of breath or wheezing. 8.5 g 10    Alcohol Swabs (B-D SINGLE USE SWABS REGULAR) PADS USE TO SWAB AREA OF INJECTION / JENISE AS DIRECTED 100 each 3    aspirin (ASA) 81 MG EC tablet Take 81 mg by mouth at bedtime.      atorvastatin (LIPITOR) 80 MG tablet Take 1 tablet (80 mg) by mouth at bedtime. 90 tablet 3    blood glucose monitoring (NO BRAND SPECIFIED) meter device kit Use to test blood sugar 1 times daily or as directed.  Blood Glucose Monitor Brands: per insurance. 1 kit 0    budeson-glycopyrrol-formoterol (BREZTRI AEROSPHERE) 160-9-4.8 MCG/ACT AERO inhaler Inhale 2  puffs into the lungs 2 times daily. HOLD until PCP clarification      budesonide-formoterol (SYMBICORT) 80-4.5 MCG/ACT Inhaler Inhale 2 puffs into the lungs 2 times daily. 10.2 g 11    insulin aspart (NOVOLOG PEN) 100 UNIT/ML pen Inject 2 Units subcutaneously 3 times daily (with meals). Along with sliding scale.  Hold for BG <100      insulin aspart (NOVOLOG PEN) 100 UNIT/ML pen Inject 1-7 Units subcutaneously 3 times daily (before meals). Per sliding scale      insulin aspart (NOVOLOG PEN) 100 UNIT/ML pen Inject 1-5 Units subcutaneously at bedtime.      insulin glargine (LANTUS PEN) 100 UNIT/ML pen Inject 10 Units subcutaneously at bedtime.      insulin pen needle (BD LIZBETH U/F) 32G X 4 MM miscellaneous USE 1 PEN NEEDLE DAILY 100 each 1    ipratropium - albuterol 0.5 mg/2.5 mg/3 mL (DUONEB) 0.5-2.5 (3) MG/3ML neb solution Take 1 vial (3 mLs) by nebulization every 6 hours as needed for shortness of breath, wheezing or cough. 270 mL 5    Lancets (ONETOUCH DELICA PLUS [id number]) MISC USE AS DIRECTED WITH LANCING DEVICE TO TEST ONCE DAILY 100 each 6    methocarbamol (ROBAXIN) 500 MG tablet Take 0.5 tablets (250 mg) by mouth every 6 hours as needed for muscle spasms.      metoprolol tartrate (LOPRESSOR) 25 MG tablet Take 1 tablet (25 mg) by mouth 2 times daily.      montelukast (SINGULAIR) 10 MG tablet TAKE ONE TABLET BY MOUTH AT BEDTIME 90 tablet 3    mupirocin (BACTROBAN) 2 % external ointment Apply topically 2 times daily for 9 days.      omeprazole (PRILOSEC) 20 MG DR capsule Take 1 capsule (20 mg) by mouth daily. 90 capsule 3    ONETOUCH ULTRA test strip USE TO TEST BLOOD SUGAR 1 TIME DAILY OR AS DIRECTED 100 strip 6    order for DME Equipment being ordered: arm blood pressure cuff 1 Device 0    oxyCODONE (ROXICODONE) 5 MG tablet Take 1 tablet (5 mg) by mouth every 6 hours as needed for severe pain. 10 tablet 0    polyethylene glycol (MIRALAX) 17 GM/Dose powder Take 17 g by mouth 2 times daily as needed for  constipation.      semaglutide (OZEMPIC) 2 MG/3ML pen Inject 0.25 mg subcutaneously every 7 days, THEN 0.5 mg every 7 days. HOLD until PCP discussion.      senna-docusate (SENOKOT-S/PERICOLACE) 8.6-50 MG tablet Take 1 tablet by mouth or Feeding Tube 2 times daily as needed for constipation.      ticagrelor (BRILINTA) 90 MG tablet Take 1 tablet (90 mg) by mouth 2 times daily. Dose to start tomorrow morning. 180 tablet 3    tiotropium (SPIRIVA RESPIMAT) 2.5 MCG/ACT inhaler Inhale 2 puffs into the lungs daily. 4 g 1    triamcinolone (KENALOG) 0.1 % external cream APPLY TOPICALLY 2 TIMES DAILY TO LEFT ANKLE (Patient taking differently: 2 times daily as needed for irritation (ankle).) 15 g 1     ROS:  10 point ROS neg other than the symptoms noted above in the HPI.***  Unable to obtain due to cognitive impairment or aphasia  ROS    PHYSICAL EXAM:  There were no vitals taken for this visit.  Physical Exam     LABORATORY/IMAGING DATA:  [facility]    ASSESSMENT/PLAN:  {FGS DX INITIAL:690779}    Orders:   ***    {FGS TIME SPENT:941261}    Electronically signed by:  Merissa Castillo       Sincerely,        OSCAR Naranjo CNP    Electronically signed

## 2025-05-11 NOTE — PROGRESS NOTES
HPI and Plan:     I had the pleasure of seeing Mr. Wilhelm in followup at the [facility] today.  He is a very pleasant 79-year-old gentleman who I last saw in 2019.      He has a history of an acute inferior/inferolateral ST elevation myocardial infarction in 11/2015.  At that time, coronary angiography demonstrated a 99% stenosis in the mid circumflex as well as 100% thrombotic occlusion of the left-sided PDA.  He underwent successful revascularization of the left PDA as well as the mid circumflex with drug-eluting stents at the time.  Of note, he had an approximately 80% stenosis in the mid-LAD and a 70% stenosis in the first obtuse marginal.  These were not intervened on at the time, and a subsequent stress nuclear perfusion study demonstrated a large inferior and inferoseptal infarction with no evidence of rian-infarct ischemia.     Unfortunately he was admitted to [facility] in January of 2025 with an inferior ST elevation myocardial infarction.  He underwent PCI to the obtuse marginal and circumflex but was also noted to have significant residual coronary artery disease involving the ostial to mid LAD and diagonal.  He was then readmitted to [facility] in April of this year with a non-ST elevation myocardial infarction.  Ultimately he underwent coronary bypass grafting x 2 on 5/1/2025.  Specifically he underwent a LIMA to the LAD and saphenous venous grafting to the diagonal.    He was discharged on 5/7/2025 to a transitional care facility.  Echocardiography during his hospitalization had demonstrated a mild ischemic cardiomyopathy with an LVEF of 45 to 50%.    Today he feels well overall from a cardiovascular standpoint.  He feels that his functional status is improving day by day, and is able to walk for 10 to 15 minutes without any exertional limitations.  He denies any chest discomfort, dyspnea, syncope or presyncope.  He denies any PND or orthopnea.  Of note,  Spoke neri Guerra and she say's, they have no previous orders. They think the pt just called requesting this order.   he remains on dual antiplatelet therapy with aspirin and ticagrelor.     PHYSICAL EXAMINATION: Dictated below.    Labs on 2/19/2025 demonstrate total cholesterol 111, HDL 38, LDL 50 and triglycerides of 116.       IMPRESSION:     1.  Coronary artery disease status recent inferior ST elevation myocardial infarction and REDD x 2 to the obtuse marginal and circumflex in January 2025.  He then underwent subsequent coronary bypass grafting x 2 on 5/1/2025.  2.  Mild ischemic cardiomyopathy.  3.  Dyslipidemia.    PLAN    Mr. Wilhelm doing remarkably well from a cardiovascular standpoint after his recent bypass surgery.  He appears to be euvolemic on examination without evidence of angina or congestive heart failure.    I would like to optimize his medical regimen given his mild ischemic cardiomyopathy and have switched him from metoprolol to tartrate to metoprolol succinate once daily.  I have also started him on lisinopril 2.5 mg daily.    It was a pleasure seeing him in follow-up today.  I will have him follow-up with my advanced practice provider colleagues in approximately 3 months or sooner if his clinical condition dictates otherwise.    The longitudinal plan of care for the diagnosis(es)/condition(s) as documented were addressed during this visit. Due to the added complexity in care, I will continue to support Raul in the subsequent management and with ongoing continuity of care.        CURRENT MEDICATIONS:  Current Outpatient Medications   Medication Sig Dispense Refill    acetaminophen (TYLENOL) 500 MG tablet Take 2 tablets (1,000 mg) by mouth every 6 hours as needed for mild pain.      albuterol (PROAIR HFA/PROVENTIL HFA/VENTOLIN HFA) 108 (90 Base) MCG/ACT inhaler Inhale 1-2 puffs into the lungs every 4 hours as needed for shortness of breath or wheezing. 8.5 g 10    Alcohol Swabs (B-D SINGLE USE SWABS REGULAR) PADS USE TO SWAB AREA OF INJECTION / JENISE AS DIRECTED 100 each 3    aspirin (ASA) 81 MG EC tablet  Take 81 mg by mouth at bedtime.      atorvastatin (LIPITOR) 80 MG tablet Take 1 tablet (80 mg) by mouth at bedtime. 90 tablet 3    blood glucose monitoring (NO BRAND SPECIFIED) meter device kit Use to test blood sugar 1 times daily or as directed.  Blood Glucose Monitor Brands: per insurance. 1 kit 0    budeson-glycopyrrol-formoterol (BREZTRI AEROSPHERE) 160-9-4.8 MCG/ACT AERO inhaler Inhale 2 puffs into the lungs 2 times daily. HOLD until PCP clarification      budesonide-formoterol (SYMBICORT) 80-4.5 MCG/ACT Inhaler Inhale 2 puffs into the lungs 2 times daily. 10.2 g 11    insulin aspart (NOVOLOG PEN) 100 UNIT/ML pen Inject 2 Units subcutaneously 3 times daily (with meals). Along with sliding scale.  Hold for BG <100      insulin aspart (NOVOLOG PEN) 100 UNIT/ML pen Inject 1-7 Units subcutaneously 3 times daily (before meals). Per sliding scale      insulin aspart (NOVOLOG PEN) 100 UNIT/ML pen Inject 1-5 Units subcutaneously at bedtime.      insulin glargine (LANTUS PEN) 100 UNIT/ML pen Inject 10 Units subcutaneously at bedtime.      insulin pen needle (BD LIZBETH U/F) 32G X 4 MM miscellaneous USE 1 PEN NEEDLE DAILY 100 each 1    ipratropium - albuterol 0.5 mg/2.5 mg/3 mL (DUONEB) 0.5-2.5 (3) MG/3ML neb solution Take 1 vial (3 mLs) by nebulization every 6 hours as needed for shortness of breath, wheezing or cough. 270 mL 5    Lancets (ONETOUCH DELICA PLUS [id number]) MISC USE AS DIRECTED WITH LANCING DEVICE TO TEST ONCE DAILY 100 each 6    methocarbamol (ROBAXIN) 500 MG tablet Take 0.5 tablets (250 mg) by mouth every 6 hours as needed for muscle spasms.      metoprolol tartrate (LOPRESSOR) 25 MG tablet Take 1 tablet (25 mg) by mouth 2 times daily.      montelukast (SINGULAIR) 10 MG tablet TAKE ONE TABLET BY MOUTH AT BEDTIME 90 tablet 3    mupirocin (BACTROBAN) 2 % external ointment Apply topically 2 times daily for 9 days.      omeprazole (PRILOSEC) 20 MG DR capsule Take 1 capsule (20 mg) by mouth daily. 90 capsule 3     ONETOUCH ULTRA test strip USE TO TEST BLOOD SUGAR 1 TIME DAILY OR AS DIRECTED 100 strip 6    order for DME Equipment being ordered: arm blood pressure cuff 1 Device 0    oxyCODONE (ROXICODONE) 5 MG tablet Take 1 tablet (5 mg) by mouth every 6 hours as needed for severe pain. 10 tablet 0    polyethylene glycol (MIRALAX) 17 GM/Dose powder Take 17 g by mouth 2 times daily as needed for constipation.      semaglutide (OZEMPIC) 2 MG/3ML pen Inject 0.25 mg subcutaneously every 7 days, THEN 0.5 mg every 7 days. HOLD until PCP discussion.      senna-docusate (SENOKOT-S/PERICOLACE) 8.6-50 MG tablet Take 1 tablet by mouth or Feeding Tube 2 times daily as needed for constipation.      ticagrelor (BRILINTA) 90 MG tablet Take 1 tablet (90 mg) by mouth 2 times daily. Dose to start tomorrow morning. 180 tablet 3    tiotropium (SPIRIVA RESPIMAT) 2.5 MCG/ACT inhaler Inhale 2 puffs into the lungs daily. 4 g 1    triamcinolone (KENALOG) 0.1 % external cream APPLY TOPICALLY 2 TIMES DAILY TO LEFT ANKLE (Patient taking differently: 2 times daily as needed for irritation (ankle).) 15 g 1       ALLERGIES     Allergies   Allergen Reactions    Simvastatin Swelling     Severe muscle pain, swelling, and bruising    Dust Mites Unknown       PAST MEDICAL HISTORY:  Past Medical History:   Diagnosis Date    Chronic airway obstruction, not elsewhere classified     Other and unspecified hyperlipidemia        PAST SURGICAL HISTORY:  Past Surgical History:   Procedure Laterality Date    ARTHROPLASTY KNEE Left 5/28/2020    Procedure: Left Total Knee Arthroplasty;  Surgeon: Sanjay Downey MD;  Location: [facility] OR    ARTHROPLASTY KNEE Right 11/2/2020    Procedure: ARTHROPLASTY, KNEE, TOTAL;  Surgeon: Sanjay Downey MD;  Location: [facility] OR    BYPASS GRAFT ARTERY CORONARY N/A 5/1/2025    Procedure: MEDIAN STERNOTOMY; CORONARY ARTERY BYPASS GRAFT x 2 (LEFT INTERNAL MAMMARY ARTERY - LEFT ANTERIOR DESCENDING ARTERY; SAPHENOUS VEIN - DIAGONAL  ARTERY) WITH ENDOSCOPIC SAPHENOUS VEIN HARVEST ON THE LEFT LOWER EXTREMITY, AND ON CARDIOPULMONARY PUMP OXYGENATOR;  Surgeon: Mulvihill, Michael, MD;  Location:  OR    CV CORONARY ANGIOGRAM N/A 1/4/2025    Procedure: Coronary Angiogram;  Surgeon: Jacobo Montgomery MD;  Location: [facility] CATH LAB CV    CV CORONARY ANGIOGRAM N/A 4/25/2025    Procedure: Coronary Angiogram;  Surgeon: Vinod Lobato MD;  Location:  HEART CARDIAC CATH LAB    CV PCI STENT DRUG ELUTING N/A 1/4/2025    Procedure: Percutaneous Coronary Intervention Stent;  Surgeon: Jacobo Montgomery MD;  Location: [facility] CV    ENT SURGERY  as a child    tonsillectomy       FAMILY HISTORY:  Family History   Problem Relation Age of Onset    Diabetes Mother     Heart Disease Mother     C.A.D. Mother     Cancer Father     Hypertension Father     Cerebrovascular Disease Paternal Grandfather     Asthma Sister     Breast Cancer No family hx of     Cancer - colorectal No family hx of     Prostate Cancer No family hx of        SOCIAL HISTORY:  Social History     Socioeconomic History    Marital status:    Tobacco Use    Smoking status: Never    Smokeless tobacco: Never   Vaping Use    Vaping status: Never Used   Substance and Sexual Activity    Alcohol use: Yes     Comment: Rare    Drug use: No    Sexual activity: Yes     Partners: Female   Other Topics Concern    Occupational Exposure Yes     Comment: worked for years as  exposed to carbon dust and other chemicals    Hobby Hazards Yes     Comment: Raises pigeons    Parent/sibling w/ CABG, MI or angioplasty before 65F 55M? No     Social Drivers of Health     Financial Resource Strain: High Risk (4/25/2025)    Financial Resource Strain     Within the past 12 months, have you or your family members you live with been unable to get utilities (heat, electricity) when it was really needed?: Yes   Food Insecurity: Low Risk  (4/25/2025)    Food Insecurity     Within the past 12 months,  did you worry that your food would run out before you got money to buy more?: No     Within the past 12 months, did the food you bought just not last and you didn t have money to get more?: No   Transportation Needs: Low Risk  (4/25/2025)    Transportation Needs     Within the past 12 months, has lack of transportation kept you from medical appointments, getting your medicines, non-medical meetings or appointments, work, or from getting things that you need?: No   Interpersonal Safety: Low Risk  (4/25/2025)    Interpersonal Safety     Do you feel physically and emotionally safe where you currently live?: Yes     Within the past 12 months, have you been hit, slapped, kicked or otherwise physically hurt by someone?: No     Within the past 12 months, have you been humiliated or emotionally abused in other ways by your partner or ex-partner?: No   Housing Stability: Low Risk  (4/25/2025)    Housing Stability     Do you have housing? : Yes     Are you worried about losing your housing?: No       Review of Systems:  Skin:          Eyes:         ENT:         Respiratory:          Cardiovascular:         Gastroenterology:        Genitourinary:         Musculoskeletal:         Neurologic:         Psychiatric:         Heme/Lymph/Imm:         Endocrine:           Physical Exam:  Vitals: There were no vitals taken for this visit.    Constitutional:           Skin:             Head:           Eyes:           Lymph:      ENT:           Neck:           Respiratory:        Clear to auscultation.  Sternal incision appears well-healed.    Cardiac:                Regular rate and rhythm                                           GI:           Extremities and Muscular Skeletal:                 Neurological:           Psych:           CC  Neal Rich MD  [address]  [place] [address]

## 2025-05-13 ENCOUNTER — TRANSITIONAL CARE UNIT VISIT (OUTPATIENT)
Dept: GERIATRICS | Facility: CLINIC | Age: 80
End: 2025-05-13
Payer: COMMERCIAL

## 2025-05-13 VITALS
TEMPERATURE: 97.9 F | RESPIRATION RATE: 16 BRPM | DIASTOLIC BLOOD PRESSURE: 75 MMHG | WEIGHT: 148.6 LBS | BODY MASS INDEX: 22.01 KG/M2 | HEIGHT: 69 IN | OXYGEN SATURATION: 98 % | SYSTOLIC BLOOD PRESSURE: 131 MMHG | HEART RATE: 86 BPM

## 2025-05-13 DIAGNOSIS — E11.9 TYPE 2 DIABETES MELLITUS WITHOUT COMPLICATION, WITHOUT LONG-TERM CURRENT USE OF INSULIN (H): ICD-10-CM

## 2025-05-13 DIAGNOSIS — I21.4 NSTEMI (NON-ST ELEVATED MYOCARDIAL INFARCTION) (H): ICD-10-CM

## 2025-05-13 DIAGNOSIS — I25.10 CORONARY ARTERY DISEASE INVOLVING NATIVE CORONARY ARTERY OF NATIVE HEART WITHOUT ANGINA PECTORIS: Primary | ICD-10-CM

## 2025-05-13 DIAGNOSIS — J44.9 CHRONIC OBSTRUCTIVE PULMONARY DISEASE, UNSPECIFIED COPD TYPE (H): ICD-10-CM

## 2025-05-13 DIAGNOSIS — D62 ABLA (ACUTE BLOOD LOSS ANEMIA): ICD-10-CM

## 2025-05-13 DIAGNOSIS — Z86.73 HISTORY OF ARTERIAL ISCHEMIC STROKE: ICD-10-CM

## 2025-05-13 DIAGNOSIS — I50.20 HEART FAILURE WITH REDUCED EJECTION FRACTION (H): ICD-10-CM

## 2025-05-13 DIAGNOSIS — R53.81 PHYSICAL DECONDITIONING: ICD-10-CM

## 2025-05-13 DIAGNOSIS — E78.5 HYPERLIPIDEMIA LDL GOAL <70: ICD-10-CM

## 2025-05-13 DIAGNOSIS — J45.50 SEVERE PERSISTENT ASTHMA WITHOUT COMPLICATION (H): ICD-10-CM

## 2025-05-13 DIAGNOSIS — Z95.1 S/P CABG (CORONARY ARTERY BYPASS GRAFT): ICD-10-CM

## 2025-05-13 DIAGNOSIS — I10 ESSENTIAL HYPERTENSION: ICD-10-CM

## 2025-05-13 PROCEDURE — 99309 SBSQ NF CARE MODERATE MDM 30: CPT | Performed by: NURSE PRACTITIONER

## 2025-05-13 NOTE — PROGRESS NOTES
[facility] GERIATRICS  ACUTE/EPISODIC VISIT    [facility] Medical Record Number: [id number]  Place of Service where encounter took place: [facility] ([facility]) [[id number]]    Chief Complaint   Patient presents with    RECHECK     HPI:    Raul Wilhelm is a 79 year old (1945), who is being seen today for an episodic care visit. HPI information obtained from: facility chart records, facility staff, patient report, and [facility] chart review.    Today's concern is: Recent hospitalization with NSTEMI, s/p CABG. He has been doing well in Therapy. Able to walk a few hundred feet. Denies any pain, incision is sore at times. Denies any SOB. Appetite is good. Having bowel movements. He is sleeping well.     ALLERGIES:   Allergies   Allergen Reactions    Simvastatin Swelling     Severe muscle pain, swelling, and bruising    Dust Mites Unknown      MEDICATIONS:  Post Discharge Medication Reconciliation Status: medication reconcilation previously completed during another office visit.     Current Outpatient Medications   Medication Sig Dispense Refill    acetaminophen (TYLENOL) 500 MG tablet Take 2 tablets (1,000 mg) by mouth every 6 hours as needed for mild pain.      albuterol (PROAIR HFA/PROVENTIL HFA/VENTOLIN HFA) 108 (90 Base) MCG/ACT inhaler Inhale 1-2 puffs into the lungs every 4 hours as needed for shortness of breath or wheezing. 8.5 g 10    Alcohol Swabs (B-D SINGLE USE SWABS REGULAR) PADS USE TO SWAB AREA OF INJECTION / JENISE AS DIRECTED 100 each 3    aspirin (ASA) 81 MG EC tablet Take 81 mg by mouth at bedtime.      atorvastatin (LIPITOR) 80 MG tablet Take 1 tablet (80 mg) by mouth at bedtime. 90 tablet 3    blood glucose monitoring (NO BRAND SPECIFIED) meter device kit Use to test blood sugar 1 times daily or as directed.  Blood Glucose Monitor Brands: per insurance. 1 kit 0    budesonide-formoterol (SYMBICORT) 80-4.5 MCG/ACT Inhaler Inhale 2 puffs into the lungs 2 times daily. 10.2  g 11    insulin aspart (NOVOLOG PEN) 100 UNIT/ML pen Inject 2 Units subcutaneously 3 times daily (with meals). Along with sliding scale.  Hold for BG <100      insulin glargine (LANTUS PEN) 100 UNIT/ML pen Inject 10 Units subcutaneously at bedtime.      insulin pen needle (BD LIZBETH U/F) 32G X 4 MM miscellaneous USE 1 PEN NEEDLE DAILY 100 each 1    ipratropium - albuterol 0.5 mg/2.5 mg/3 mL (DUONEB) 0.5-2.5 (3) MG/3ML neb solution Take 1 vial (3 mLs) by nebulization every 6 hours as needed for shortness of breath, wheezing or cough. 270 mL 5    Lancets (ONETOUCH DELICA PLUS [id number]) MISC USE AS DIRECTED WITH LANCING DEVICE TO TEST ONCE DAILY 100 each 6    metoprolol tartrate (LOPRESSOR) 25 MG tablet Take 1 tablet (25 mg) by mouth 2 times daily.      montelukast (SINGULAIR) 10 MG tablet TAKE ONE TABLET BY MOUTH AT BEDTIME 90 tablet 3    mupirocin (BACTROBAN) 2 % external ointment Apply topically 2 times daily for 9 days.      omeprazole (PRILOSEC) 20 MG DR capsule Take 1 capsule (20 mg) by mouth daily. 90 capsule 3    ONETOUCH ULTRA test strip USE TO TEST BLOOD SUGAR 1 TIME DAILY OR AS DIRECTED 100 strip 6    order for DME Equipment being ordered: arm blood pressure cuff 1 Device 0    semaglutide (OZEMPIC) 2 MG/3ML pen Inject 0.25 mg subcutaneously every 7 days, THEN 0.5 mg every 7 days. HOLD until PCP discussion.      ticagrelor (BRILINTA) 90 MG tablet Take 1 tablet (90 mg) by mouth 2 times daily. Dose to start tomorrow morning. 180 tablet 3     Medications reviewed:  Medications reconciled to facility chart and changes were made to reflect current medications as identified as above med list. Below are the changes that were made:   Medications stopped since last EPIC medication reconciliation:   There are no discontinued medications.    Medications started since last [facility] medication reconciliation:  No orders of the defined types were placed in this encounter.        REVIEW OF SYSTEMS:  4 point ROS neg other than  "the symptoms noted above in the HPI.      PHYSICAL EXAM:  /75   Pulse 86   Temp 97.9  F (36.6  C)   Resp 16   Ht 1.74 m (5' 8.5\")   Wt 67.4 kg (148 lb 9.6 oz)   SpO2 98%   BMI 22.27 kg/m    Physical Exam  Cardiovascular:      Rate and Rhythm: Normal rate and regular rhythm.      Heart sounds: Normal heart sounds.   Pulmonary:      Effort: Pulmonary effort is normal.      Breath sounds: Normal breath sounds.   Abdominal:      General: Bowel sounds are normal.      Palpations: Abdomen is soft.   Musculoskeletal:      Right lower leg: No edema.      Left lower leg: No edema.   Skin:     Comments: Sternal incision CDI, YOLETTE   Neurological:      Mental Status: He is alert.   Psychiatric:         Mood and Affect: Mood normal.         Thought Content: Thought content normal.         ASSESSMENT / PLAN:  Coronary artery disease involving native coronary artery of native heart without angina pectoris  NSTEMI (non-ST elevated myocardial infarction) (H)  S/P CABG (coronary artery bypass graft)  Heart failure with reduced ejection fraction (H)  Essential hypertension  Hyperlipidemia LDL goal <70  S/p CABG x2 (LIMA-LAD, V-diagonal), incisions healing well. Appears euvolemic. Denies SOB, no chest pain.  Reviewed wound care and sternal precautions with patient. -140, HR 80-90, weight stable ~150lbs. Oxycodone and robaxin not used  - sternal precautions  - PT/OT  - analgesia with APAP PRN; discontinue oxycodone PRN and robaxin PRN  - continue metoprolol BID, atorvastatin, ticagrelor BID, ASA  - daily weights  - follow-up with CV surgery, cardiology    Type 2 diabetes mellitus without complication, without long-term current use of insulin (H)  A1C 7.1%, -240  - discontinue sliding scale insulin as will not use at home  - continue aspart 2 units TID with meals and glargine  - monitor and adjust   - ozempic on hold     Severe persistent asthma without complication (H)  Chronic obstructive pulmonary disease, " unspecified COPD type (H)  Reviewed inhalers with patient, only wants to take Symbicort  - continue Symbicort, discontinue Bretzi and Spiriva  - continue albuterol PRN, duonebs PRN    History of arterial ischemic stroke  No residual   - continue atorvastatin, ticagrelor, ASA    Physical deconditioning  Making progress with therapy  - PT/OT    ABLA (acute blood loss anemia)  Hgb 14.3->8.5->10.2, no s/s of beedling       Orders:  Discontinue oxycodone, robaxin, sliding scale insulin, senna-s, miralax    Electronically signed by:  OSCAR Naranjo CNP

## 2025-05-13 NOTE — LETTER
5/13/2025      Raul Wilhelm  [address]  [place] [address]        [facility] GERIATRICS  ACUTE/EPISODIC VISIT    [facility] Medical Record Number: [id number]  Place of Service where encounter took place: [facility] ([facility]) [[id number]]    Chief Complaint   Patient presents with     RECHECK     HPI:    Raul Wilhelm is a 79 year old (1945), who is being seen today for an episodic care visit. HPI information obtained from: facility chart records, facility staff, patient report, and [facility] chart review.    Today's concern is: Recent hospitalization with NSTEMI, s/p CABG. He has been doing well in Therapy. Able to walk a few hundred feet. Denies any pain, incision is sore at times. Denies any SOB. Appetite is good. Having bowel movements. He is sleeping well.     ALLERGIES:   Allergies   Allergen Reactions     Simvastatin Swelling     Severe muscle pain, swelling, and bruising     Dust Mites Unknown      MEDICATIONS:  Post Discharge Medication Reconciliation Status: medication reconcilation previously completed during another office visit.     Current Outpatient Medications   Medication Sig Dispense Refill     acetaminophen (TYLENOL) 500 MG tablet Take 2 tablets (1,000 mg) by mouth every 6 hours as needed for mild pain.       albuterol (PROAIR HFA/PROVENTIL HFA/VENTOLIN HFA) 108 (90 Base) MCG/ACT inhaler Inhale 1-2 puffs into the lungs every 4 hours as needed for shortness of breath or wheezing. 8.5 g 10     Alcohol Swabs (B-D SINGLE USE SWABS REGULAR) PADS USE TO SWAB AREA OF INJECTION / JENISE AS DIRECTED 100 each 3     aspirin (ASA) 81 MG EC tablet Take 81 mg by mouth at bedtime.       atorvastatin (LIPITOR) 80 MG tablet Take 1 tablet (80 mg) by mouth at bedtime. 90 tablet 3     blood glucose monitoring (NO BRAND SPECIFIED) meter device kit Use to test blood sugar 1 times daily or as directed.  Blood Glucose Monitor Brands: per insurance. 1 kit 0      budesonide-formoterol (SYMBICORT) 80-4.5 MCG/ACT Inhaler Inhale 2 puffs into the lungs 2 times daily. 10.2 g 11     insulin aspart (NOVOLOG PEN) 100 UNIT/ML pen Inject 2 Units subcutaneously 3 times daily (with meals). Along with sliding scale.  Hold for BG <100       insulin glargine (LANTUS PEN) 100 UNIT/ML pen Inject 10 Units subcutaneously at bedtime.       insulin pen needle (BD LIZBETH U/F) 32G X 4 MM miscellaneous USE 1 PEN NEEDLE DAILY 100 each 1     ipratropium - albuterol 0.5 mg/2.5 mg/3 mL (DUONEB) 0.5-2.5 (3) MG/3ML neb solution Take 1 vial (3 mLs) by nebulization every 6 hours as needed for shortness of breath, wheezing or cough. 270 mL 5     Lancets (ONETOUCH DELICA PLUS [id number]) MISC USE AS DIRECTED WITH LANCING DEVICE TO TEST ONCE DAILY 100 each 6     metoprolol tartrate (LOPRESSOR) 25 MG tablet Take 1 tablet (25 mg) by mouth 2 times daily.       montelukast (SINGULAIR) 10 MG tablet TAKE ONE TABLET BY MOUTH AT BEDTIME 90 tablet 3     mupirocin (BACTROBAN) 2 % external ointment Apply topically 2 times daily for 9 days.       omeprazole (PRILOSEC) 20 MG DR capsule Take 1 capsule (20 mg) by mouth daily. 90 capsule 3     ONETOUCH ULTRA test strip USE TO TEST BLOOD SUGAR 1 TIME DAILY OR AS DIRECTED 100 strip 6     order for DME Equipment being ordered: arm blood pressure cuff 1 Device 0     semaglutide (OZEMPIC) 2 MG/3ML pen Inject 0.25 mg subcutaneously every 7 days, THEN 0.5 mg every 7 days. HOLD until PCP discussion.       ticagrelor (BRILINTA) 90 MG tablet Take 1 tablet (90 mg) by mouth 2 times daily. Dose to start tomorrow morning. 180 tablet 3     Medications reviewed:  Medications reconciled to facility chart and changes were made to reflect current medications as identified as above med list. Below are the changes that were made:   Medications stopped since last EPIC medication reconciliation:   There are no discontinued medications.    Medications started since last EPIC medication  "reconciliation:  No orders of the defined types were placed in this encounter.        REVIEW OF SYSTEMS:  4 point ROS neg other than the symptoms noted above in the HPI.      PHYSICAL EXAM:  /75   Pulse 86   Temp 97.9  F (36.6  C)   Resp 16   Ht 1.74 m (5' 8.5\")   Wt 67.4 kg (148 lb 9.6 oz)   SpO2 98%   BMI 22.27 kg/m    Physical Exam  Cardiovascular:      Rate and Rhythm: Normal rate and regular rhythm.      Heart sounds: Normal heart sounds.   Pulmonary:      Effort: Pulmonary effort is normal.      Breath sounds: Normal breath sounds.   Abdominal:      General: Bowel sounds are normal.      Palpations: Abdomen is soft.   Musculoskeletal:      Right lower leg: No edema.      Left lower leg: No edema.   Skin:     Comments: Sternal incision CDI, YOLETTE   Neurological:      Mental Status: He is alert.   Psychiatric:         Mood and Affect: Mood normal.         Thought Content: Thought content normal.         ASSESSMENT / PLAN:  Coronary artery disease involving native coronary artery of native heart without angina pectoris  NSTEMI (non-ST elevated myocardial infarction) (H)  S/P CABG (coronary artery bypass graft)  Heart failure with reduced ejection fraction (H)  Essential hypertension  Hyperlipidemia LDL goal <70  S/p CABG x2 (LIMA-LAD, V-diagonal), incisions healing well. Appears euvolemic. Denies SOB, no chest pain.  Reviewed wound care and sternal precautions with patient. -140, HR 80-90, weight stable ~150lbs. Oxycodone and robaxin not used  - sternal precautions  - PT/OT  - analgesia with APAP PRN; discontinue oxycodone PRN and robaxin PRN  - continue metoprolol BID, atorvastatin, ticagrelor BID, ASA  - daily weights  - follow-up with CV surgery, cardiology    Type 2 diabetes mellitus without complication, without long-term current use of insulin (H)  A1C 7.1%, -240  - discontinue sliding scale insulin as will not use at home  - continue aspart 2 units TID with meals and glargine  - " monitor and adjust   - ozempic on hold     Severe persistent asthma without complication (H)  Chronic obstructive pulmonary disease, unspecified COPD type (H)  Reviewed inhalers with patient, only wants to take Symbicort  - continue Symbicort, discontinue Bretzi and Spiriva  - continue albuterol PRN, duonebs PRN    History of arterial ischemic stroke  No residual   - continue atorvastatin, ticagrelor, ASA    Physical deconditioning  Making progress with therapy  - PT/OT    ABLA (acute blood loss anemia)  Hgb 14.3->8.5->10.2, no s/s of beedling       Orders:  Discontinue oxycodone, robaxin, sliding scale insulin, senna-s, miralax    Electronically signed by:  OSCAR Naranjo CNP      Sincerely,        OSCAR Naranjo CNP    Electronically signed

## 2025-05-14 ENCOUNTER — OFFICE VISIT (OUTPATIENT)
Dept: CARDIOLOGY | Facility: CLINIC | Age: 80
End: 2025-05-14
Attending: GENERAL ACUTE CARE HOSPITAL
Payer: COMMERCIAL

## 2025-05-14 VITALS
HEIGHT: 69 IN | DIASTOLIC BLOOD PRESSURE: 73 MMHG | WEIGHT: 149.8 LBS | OXYGEN SATURATION: 97 % | RESPIRATION RATE: 20 BRPM | BODY MASS INDEX: 22.19 KG/M2 | SYSTOLIC BLOOD PRESSURE: 115 MMHG | HEART RATE: 100 BPM

## 2025-05-14 DIAGNOSIS — I25.119 CORONARY ARTERY DISEASE INVOLVING NATIVE CORONARY ARTERY OF NATIVE HEART WITH ANGINA PECTORIS: ICD-10-CM

## 2025-05-14 RX ORDER — METOPROLOL SUCCINATE 25 MG/1
25 TABLET, EXTENDED RELEASE ORAL DAILY
Qty: 90 TABLET | Refills: 3 | Status: SHIPPED | OUTPATIENT
Start: 2025-05-14

## 2025-05-14 RX ORDER — LISINOPRIL 2.5 MG/1
2.5 TABLET ORAL DAILY
Qty: 90 TABLET | Refills: 3 | Status: SHIPPED | OUTPATIENT
Start: 2025-05-14

## 2025-05-14 NOTE — LETTER
5/14/2025    Sarita Hennessy, DO  [address] [facility] [address]    RE: Raul JONAH Wilhelm       Dear Colleague,     I had the pleasure of seeing Raul Wilhelm in the [facility].  HPI and Plan:     I had the pleasure of seeing Mr. Wilhelm in followup at the [facility] today.  He is a very pleasant 79-year-old gentleman who I last saw in 2019.      He has a history of an acute inferior/inferolateral ST elevation myocardial infarction in 11/2015.  At that time, coronary angiography demonstrated a 99% stenosis in the mid circumflex as well as 100% thrombotic occlusion of the left-sided PDA.  He underwent successful revascularization of the left PDA as well as the mid circumflex with drug-eluting stents at the time.  Of note, he had an approximately 80% stenosis in the mid-LAD and a 70% stenosis in the first obtuse marginal.  These were not intervened on at the time, and a subsequent stress nuclear perfusion study demonstrated a large inferior and inferoseptal infarction with no evidence of rian-infarct ischemia.     Unfortunately he was admitted to  in January of 2025 with an inferior ST elevation myocardial infarction.  He underwent PCI to the obtuse marginal and circumflex but was also noted to have significant residual coronary artery disease involving the ostial to mid LAD and diagonal.  He was then readmitted to [facility] in April of this year with a non-ST elevation myocardial infarction.  Ultimately he underwent coronary bypass grafting x 2 on 5/1/2025.  Specifically he underwent a LIMA to the LAD and saphenous venous grafting to the diagonal.    He was discharged on 5/7/2025 to a transitional care facility.  Echocardiography during his hospitalization had demonstrated a mild ischemic cardiomyopathy with an LVEF of 45 to 50%.    Today he feels well overall from a cardiovascular standpoint.  He feels that his functional  status is improving day by day, and is able to walk for 10 to 15 minutes without any exertional limitations.  He denies any chest discomfort, dyspnea, syncope or presyncope.  He denies any PND or orthopnea.  Of note, he remains on dual antiplatelet therapy with aspirin and ticagrelor.     PHYSICAL EXAMINATION: Dictated below.    Labs on 2/19/2025 demonstrate total cholesterol 111, HDL 38, LDL 50 and triglycerides of 116.       IMPRESSION:     1.  Coronary artery disease status recent inferior ST elevation myocardial infarction and REDD x 2 to the obtuse marginal and circumflex in January 2025.  He then underwent subsequent coronary bypass grafting x 2 on 5/1/2025.  2.  Mild ischemic cardiomyopathy.  3.  Dyslipidemia.    PLAN    Mr. Wilhelm doing remarkably well from a cardiovascular standpoint after his recent bypass surgery.  He appears to be euvolemic on examination without evidence of angina or congestive heart failure.    I would like to optimize his medical regimen given his mild ischemic cardiomyopathy and have switched him from metoprolol to tartrate to metoprolol succinate once daily.  I have also started him on lisinopril 2.5 mg daily.    It was a pleasure seeing him in follow-up today.  I will have him follow-up with my advanced practice provider colleagues in approximately 3 months or sooner if his clinical condition dictates otherwise.    The longitudinal plan of care for the diagnosis(es)/condition(s) as documented were addressed during this visit. Due to the added complexity in care, I will continue to support Raul in the subsequent management and with ongoing continuity of care.        CURRENT MEDICATIONS:  Current Outpatient Medications   Medication Sig Dispense Refill     acetaminophen (TYLENOL) 500 MG tablet Take 2 tablets (1,000 mg) by mouth every 6 hours as needed for mild pain.       albuterol (PROAIR HFA/PROVENTIL HFA/VENTOLIN HFA) 108 (90 Base) MCG/ACT inhaler Inhale 1-2 puffs into the lungs  every 4 hours as needed for shortness of breath or wheezing. 8.5 g 10     Alcohol Swabs (B-D SINGLE USE SWABS REGULAR) PADS USE TO SWAB AREA OF INJECTION / JENISE AS DIRECTED 100 each 3     aspirin (ASA) 81 MG EC tablet Take 81 mg by mouth at bedtime.       atorvastatin (LIPITOR) 80 MG tablet Take 1 tablet (80 mg) by mouth at bedtime. 90 tablet 3     blood glucose monitoring (NO BRAND SPECIFIED) meter device kit Use to test blood sugar 1 times daily or as directed.  Blood Glucose Monitor Brands: per insurance. 1 kit 0     budeson-glycopyrrol-formoterol (BREZTRI AEROSPHERE) 160-9-4.8 MCG/ACT AERO inhaler Inhale 2 puffs into the lungs 2 times daily. HOLD until PCP clarification       budesonide-formoterol (SYMBICORT) 80-4.5 MCG/ACT Inhaler Inhale 2 puffs into the lungs 2 times daily. 10.2 g 11     insulin aspart (NOVOLOG PEN) 100 UNIT/ML pen Inject 2 Units subcutaneously 3 times daily (with meals). Along with sliding scale.  Hold for BG <100       insulin aspart (NOVOLOG PEN) 100 UNIT/ML pen Inject 1-7 Units subcutaneously 3 times daily (before meals). Per sliding scale       insulin aspart (NOVOLOG PEN) 100 UNIT/ML pen Inject 1-5 Units subcutaneously at bedtime.       insulin glargine (LANTUS PEN) 100 UNIT/ML pen Inject 10 Units subcutaneously at bedtime.       insulin pen needle (BD LIZBETH U/F) 32G X 4 MM miscellaneous USE 1 PEN NEEDLE DAILY 100 each 1     ipratropium - albuterol 0.5 mg/2.5 mg/3 mL (DUONEB) 0.5-2.5 (3) MG/3ML neb solution Take 1 vial (3 mLs) by nebulization every 6 hours as needed for shortness of breath, wheezing or cough. 270 mL 5     Lancets (ONETOUCH DELICA PLUS [id number]) MISC USE AS DIRECTED WITH LANCING DEVICE TO TEST ONCE DAILY 100 each 6     methocarbamol (ROBAXIN) 500 MG tablet Take 0.5 tablets (250 mg) by mouth every 6 hours as needed for muscle spasms.       metoprolol tartrate (LOPRESSOR) 25 MG tablet Take 1 tablet (25 mg) by mouth 2 times daily.       montelukast (SINGULAIR) 10 MG tablet  TAKE ONE TABLET BY MOUTH AT BEDTIME 90 tablet 3     mupirocin (BACTROBAN) 2 % external ointment Apply topically 2 times daily for 9 days.       omeprazole (PRILOSEC) 20 MG DR capsule Take 1 capsule (20 mg) by mouth daily. 90 capsule 3     ONETOUCH ULTRA test strip USE TO TEST BLOOD SUGAR 1 TIME DAILY OR AS DIRECTED 100 strip 6     order for DME Equipment being ordered: arm blood pressure cuff 1 Device 0     oxyCODONE (ROXICODONE) 5 MG tablet Take 1 tablet (5 mg) by mouth every 6 hours as needed for severe pain. 10 tablet 0     polyethylene glycol (MIRALAX) 17 GM/Dose powder Take 17 g by mouth 2 times daily as needed for constipation.       semaglutide (OZEMPIC) 2 MG/3ML pen Inject 0.25 mg subcutaneously every 7 days, THEN 0.5 mg every 7 days. HOLD until PCP discussion.       senna-docusate (SENOKOT-S/PERICOLACE) 8.6-50 MG tablet Take 1 tablet by mouth or Feeding Tube 2 times daily as needed for constipation.       ticagrelor (BRILINTA) 90 MG tablet Take 1 tablet (90 mg) by mouth 2 times daily. Dose to start tomorrow morning. 180 tablet 3     tiotropium (SPIRIVA RESPIMAT) 2.5 MCG/ACT inhaler Inhale 2 puffs into the lungs daily. 4 g 1     triamcinolone (KENALOG) 0.1 % external cream APPLY TOPICALLY 2 TIMES DAILY TO LEFT ANKLE (Patient taking differently: 2 times daily as needed for irritation (ankle).) 15 g 1       ALLERGIES     Allergies   Allergen Reactions     Simvastatin Swelling     Severe muscle pain, swelling, and bruising     Dust Mites Unknown       PAST MEDICAL HISTORY:  Past Medical History:   Diagnosis Date     Chronic airway obstruction, not elsewhere classified      Other and unspecified hyperlipidemia        PAST SURGICAL HISTORY:  Past Surgical History:   Procedure Laterality Date     ARTHROPLASTY KNEE Left 5/28/2020    Procedure: Left Total Knee Arthroplasty;  Surgeon: Sanjay Downey MD;  Location: [facility] OR     ARTHROPLASTY KNEE Right 11/2/2020    Procedure: ARTHROPLASTY, KNEE, TOTAL;  Surgeon:  Sanjay Downey MD;  Location: [facility] OR     BYPASS GRAFT ARTERY CORONARY N/A 5/1/2025    Procedure: MEDIAN STERNOTOMY; CORONARY ARTERY BYPASS GRAFT x 2 (LEFT INTERNAL MAMMARY ARTERY - LEFT ANTERIOR DESCENDING ARTERY; SAPHENOUS VEIN - DIAGONAL ARTERY) WITH ENDOSCOPIC SAPHENOUS VEIN HARVEST ON THE LEFT LOWER EXTREMITY, AND ON CARDIOPULMONARY PUMP OXYGENATOR;  Surgeon: Mulvihill, Michael, MD;  Location:  OR     CV CORONARY ANGIOGRAM N/A 1/4/2025    Procedure: Coronary Angiogram;  Surgeon: Jacobo Montgomery MD;  Location: [facility] CV     CV CORONARY ANGIOGRAM N/A 4/25/2025    Procedure: Coronary Angiogram;  Surgeon: Vinod Lobato MD;  Location:  HEART CARDIAC CATH LAB     CV PCI STENT DRUG ELUTING N/A 1/4/2025    Procedure: Percutaneous Coronary Intervention Stent;  Surgeon: Jacobo Montgomery MD;  Location: [facility] CV     ENT SURGERY  as a child    tonsillectomy       FAMILY HISTORY:  Family History   Problem Relation Age of Onset     Diabetes Mother      Heart Disease Mother      C.A.D. Mother      Cancer Father      Hypertension Father      Cerebrovascular Disease Paternal Grandfather      Asthma Sister      Breast Cancer No family hx of      Cancer - colorectal No family hx of      Prostate Cancer No family hx of        SOCIAL HISTORY:  Social History     Socioeconomic History     Marital status:    Tobacco Use     Smoking status: Never     Smokeless tobacco: Never   Vaping Use     Vaping status: Never Used   Substance and Sexual Activity     Alcohol use: Yes     Comment: Rare     Drug use: No     Sexual activity: Yes     Partners: Female   Other Topics Concern     Occupational Exposure Yes     Comment: worked for years as  exposed to carbon dust and other chemicals     Hobby Hazards Yes     Comment: Raises pigeons     Parent/sibling w/ CABG, MI or angioplasty before 65F 55M? No     Social Drivers of Health     Financial Resource Strain: High Risk (4/25/2025)     Financial Resource Strain      Within the past 12 months, have you or your family members you live with been unable to get utilities (heat, electricity) when it was really needed?: Yes   Food Insecurity: Low Risk  (4/25/2025)    Food Insecurity      Within the past 12 months, did you worry that your food would run out before you got money to buy more?: No      Within the past 12 months, did the food you bought just not last and you didn t have money to get more?: No   Transportation Needs: Low Risk  (4/25/2025)    Transportation Needs      Within the past 12 months, has lack of transportation kept you from medical appointments, getting your medicines, non-medical meetings or appointments, work, or from getting things that you need?: No   Interpersonal Safety: Low Risk  (4/25/2025)    Interpersonal Safety      Do you feel physically and emotionally safe where you currently live?: Yes      Within the past 12 months, have you been hit, slapped, kicked or otherwise physically hurt by someone?: No      Within the past 12 months, have you been humiliated or emotionally abused in other ways by your partner or ex-partner?: No   Housing Stability: Low Risk  (4/25/2025)    Housing Stability      Do you have housing? : Yes      Are you worried about losing your housing?: No       Review of Systems:  Skin:          Eyes:         ENT:         Respiratory:          Cardiovascular:         Gastroenterology:        Genitourinary:         Musculoskeletal:         Neurologic:         Psychiatric:         Heme/Lymph/Imm:         Endocrine:           Physical Exam:  Vitals: There were no vitals taken for this visit.    Constitutional:           Skin:             Head:           Eyes:           Lymph:      ENT:           Neck:           Respiratory:        Clear to auscultation.  Sternal incision appears well-healed.    Cardiac:                Regular rate and rhythm                                           GI:           Extremities  and Muscular Skeletal:                 Neurological:           Psych:           CC  Neal Rich MD  [address]  [place] [address]                  Thank you for allowing me to participate in the care of your patient.      Sincerely,     Alcides Galindo MD     [facility]  cc:   Neal Rich MD  [address]  [place] [address]

## 2025-05-15 NOTE — PROGRESS NOTES
[facility] GERIATRICS DISCHARGE SUMMARY  Patient Name: Raul Wilhelm  YOB: 1945  [facility] Medical Record Number: [id number]  Place of Service Where Encounter Took Place: [facility] ([facility]) [[id number]]    PRIMARY CARE PROVIDER AND CLINIC RESPONSIBLE AFTER TRANSFER: Sarita Hennessy DO, [address] [place] [address]; G Provider     Transferring providers: OSCAR Sharma CNP; Edwin Samuel MD  Recent Hospitalization/ED: [facility] Hospital stay 25 to 25.  Date of SNF Admission: May 07, 2025  Date of SNF (anticipated) Discharge: May 19, 2025  Discharged to: {fgsdischargeto1:527763}  Cognitive Scores: {fgscog1:797347}  Physical Function: {fgsphysicalfunction:009670}  DME: {fgsdmedc:995578}    CODE STATUS/ADVANCE DIRECTIVES DISCUSSION: Full Code {Provider, verify code status is accurate as an order in Epic}  ALLERGIES: Simvastatin and Dust mites    NURSING FACILITY COURSE:  Medication Changes/Rationale:   ***    Summary of nursing facility stay:   {fgsdia}    Discharge Medications:  MED REC REQUIRED{TIP  Click the link below to document or use med rec list, use list to pull in response :923828}  Post Medication Reconciliation Status: {MED REC LIST:924317}    Current Outpatient Medications   Medication Sig Dispense Refill    acetaminophen (TYLENOL) 500 MG tablet Take 2 tablets (1,000 mg) by mouth every 6 hours as needed for mild pain. (Patient not taking: Reported on 2025)      albuterol (PROAIR HFA/PROVENTIL HFA/VENTOLIN HFA) 108 (90 Base) MCG/ACT inhaler Inhale 1-2 puffs into the lungs every 4 hours as needed for shortness of breath or wheezing. (Patient not taking: Reported on 2025) 8.5 g 10    Alcohol Swabs (B-D SINGLE USE SWABS REGULAR) PADS USE TO SWAB AREA OF INJECTION / JENISE AS DIRECTED 100 each 3    aspirin (ASA) 81 MG EC tablet Take 81 mg by mouth at bedtime.      atorvastatin (LIPITOR) 80 MG tablet  Take 1 tablet (80 mg) by mouth at bedtime. 90 tablet 3    blood glucose monitoring (NO BRAND SPECIFIED) meter device kit Use to test blood sugar 1 times daily or as directed.  Blood Glucose Monitor Brands: per insurance. 1 kit 0    budesonide-formoterol (SYMBICORT) 80-4.5 MCG/ACT Inhaler Inhale 2 puffs into the lungs 2 times daily. 10.2 g 11    insulin aspart (NOVOLOG PEN) 100 UNIT/ML pen Inject 2 Units subcutaneously 3 times daily (with meals). Along with sliding scale.  Hold for BG <100      insulin glargine (LANTUS PEN) 100 UNIT/ML pen Inject 10 Units subcutaneously at bedtime.      insulin pen needle (BD LIZBETH U/F) 32G X 4 MM miscellaneous USE 1 PEN NEEDLE DAILY 100 each 1    ipratropium - albuterol 0.5 mg/2.5 mg/3 mL (DUONEB) 0.5-2.5 (3) MG/3ML neb solution Take 1 vial (3 mLs) by nebulization every 6 hours as needed for shortness of breath, wheezing or cough. 270 mL 5    Lancets (ONETOUCH DELICA PLUS [id number]) MISC USE AS DIRECTED WITH LANCING DEVICE TO TEST ONCE DAILY 100 each 6    lisinopril (ZESTRIL) 2.5 MG tablet Take 1 tablet (2.5 mg) by mouth daily. 90 tablet 3    metoprolol succinate ER (TOPROL XL) 25 MG 24 hr tablet Take 1 tablet (25 mg) by mouth daily. 90 tablet 3    montelukast (SINGULAIR) 10 MG tablet TAKE ONE TABLET BY MOUTH AT BEDTIME 90 tablet 3    mupirocin (BACTROBAN) 2 % external ointment Apply topically 2 times daily for 9 days.      omeprazole (PRILOSEC) 20 MG DR capsule Take 1 capsule (20 mg) by mouth daily. 90 capsule 3    ONETOUCH ULTRA test strip USE TO TEST BLOOD SUGAR 1 TIME DAILY OR AS DIRECTED 100 strip 6    order for DME Equipment being ordered: arm blood pressure cuff 1 Device 0    semaglutide (OZEMPIC) 2 MG/3ML pen Inject 0.25 mg subcutaneously every 7 days, THEN 0.5 mg every 7 days. HOLD until PCP discussion. (Patient not taking: No sig reported)      ticagrelor (BRILINTA) 90 MG tablet Take 1 tablet (90 mg) by mouth 2 times daily. Dose to start tomorrow morning. 180 tablet 3      Controlled medications:   {fgscontrolledmeds1:043492}     Past Medical History:   Past Medical History:   Diagnosis Date    Chronic airway obstruction, not elsewhere classified     Other and unspecified hyperlipidemia      Physical Exam:   Vitals: There were no vitals taken for this visit.  BMI: There is no height or weight on file to calculate BMI.  {NURSING HOME PHYSICAL EXAM:670858}     SNF Labs: {fgslabdischarge:988635}    DISCHARGE PLAN:  Follow up labs: {fgsfuturelabs1:376236}  Medical Follow Up:   {fgsdischargefollowup:836487}  Current [facility] scheduled appointments:   Next 5 appointments (look out 90 days)      Jun 04, 2025 1:30 PM  Pharmacist Visit with Claudine Chavez [facility] ([address] [address]  [phone]           Discharge Services: {DC therapy:746550}  Discharge Instructions Verbalized to Patient at Discharge:   {fgsdischargeinstructions1:602640}    TOTAL DISCHARGE TIME: {TIME:453632}  Electronically signed by:  Merissa Castillo***    {fgshomecare F2F:003352}

## 2025-05-16 ENCOUNTER — DISCHARGE SUMMARY NURSING HOME (OUTPATIENT)
Dept: GERIATRICS | Facility: CLINIC | Age: 80
End: 2025-05-16
Payer: COMMERCIAL

## 2025-05-16 NOTE — LETTER
5/16/2025      Raul Wilhelm  [address]  [place] [address]        No notes on file      Sincerely,        OSCAR Naranjo CNP    Electronically signed

## 2025-05-20 ENCOUNTER — OFFICE VISIT (OUTPATIENT)
Dept: FAMILY MEDICINE | Facility: CLINIC | Age: 80
End: 2025-05-20
Payer: COMMERCIAL

## 2025-05-20 VITALS
HEIGHT: 69 IN | TEMPERATURE: 97.8 F | RESPIRATION RATE: 12 BRPM | OXYGEN SATURATION: 96 % | HEART RATE: 108 BPM | SYSTOLIC BLOOD PRESSURE: 136 MMHG | BODY MASS INDEX: 22.36 KG/M2 | WEIGHT: 151 LBS | DIASTOLIC BLOOD PRESSURE: 70 MMHG

## 2025-05-20 DIAGNOSIS — J44.9 CHRONIC OBSTRUCTIVE PULMONARY DISEASE, UNSPECIFIED COPD TYPE (H): ICD-10-CM

## 2025-05-20 DIAGNOSIS — I25.10 CORONARY ARTERY DISEASE INVOLVING NATIVE CORONARY ARTERY OF NATIVE HEART WITHOUT ANGINA PECTORIS: ICD-10-CM

## 2025-05-20 DIAGNOSIS — J45.50 SEVERE PERSISTENT ASTHMA WITHOUT COMPLICATION (H): ICD-10-CM

## 2025-05-20 DIAGNOSIS — E11.9 TYPE 2 DIABETES MELLITUS WITHOUT COMPLICATION, WITHOUT LONG-TERM CURRENT USE OF INSULIN (H): Primary | ICD-10-CM

## 2025-05-20 DIAGNOSIS — I50.20 HEART FAILURE WITH REDUCED EJECTION FRACTION (H): ICD-10-CM

## 2025-05-20 PROBLEM — R07.9 CHEST PAIN: Status: RESOLVED | Noted: 2025-04-25 | Resolved: 2025-05-20

## 2025-05-20 PROBLEM — J18.9 PNEUMONIA OF BOTH LUNGS DUE TO INFECTIOUS ORGANISM, UNSPECIFIED PART OF LUNG: Status: RESOLVED | Noted: 2025-01-17 | Resolved: 2025-05-20

## 2025-05-20 PROCEDURE — 3075F SYST BP GE 130 - 139MM HG: CPT | Performed by: INTERNAL MEDICINE

## 2025-05-20 PROCEDURE — 3078F DIAST BP <80 MM HG: CPT | Performed by: INTERNAL MEDICINE

## 2025-05-20 PROCEDURE — 99495 TRANSJ CARE MGMT MOD F2F 14D: CPT | Performed by: INTERNAL MEDICINE

## 2025-05-20 PROCEDURE — 1125F AMNT PAIN NOTED PAIN PRSNT: CPT | Performed by: INTERNAL MEDICINE

## 2025-05-20 ASSESSMENT — PAIN SCALES - GENERAL: PAINLEVEL_OUTOF10: MILD PAIN (1)

## 2025-05-20 NOTE — PATIENT INSTRUCTIONS
Review med list from after visit summary and update your pillbox, inhalers, etc  Resume cardiac rehab.  Resume checking blood sugar  You should be checking blood sugar 2 x day - fasting in AM and before either lunch or dinner.  Goal blood sugar is < 180  Having high blood sugar after bypass surgery increases risk of graft failure   No new medications today  Driving restrictions to come from heart surgeons

## 2025-05-20 NOTE — PROGRESS NOTES
Assessment & Plan     Type 2 diabetes mellitus without complication, without long-term current use of insulin (H)  Discussed good blood sugar control to ensure graft patency and prevent graft failure.  Recommend he resume routine of checking blood sugar twice daily.  Follow-up for diabetes check in 3 months.  - Albumin Random Urine Quantitative with Creat Ratio; Future  - HEMOGLOBIN A1C; Future    Coronary artery disease involving native coronary artery of native heart without angina pectoris  With polypharmacy.  Patient is dismissive of his confusion surrounding the polypharmacy.  Validated how polypharmacy can be confusing and contribute to medication errors.  Patient declines further intervention but reports he will continue to have his daughter provide oversight for his medications and keep his upcoming appointment with [facility] pharmacy in a few weeks.  Printed off an updated medicine list and encouraged patient to double check his current medications at home    Chronic obstructive pulmonary disease, unspecified COPD type (H)  See discussion for polypharmacy as above    Heart failure with reduced ejection fraction (H)  See discussion for polypharmacy as above    Severe persistent asthma without complication (H)  See above         MED REC REQUIRED  Post Medication Reconciliation Status: unable to reconcile discharge medications due to patient confusion on his meds        Jonathon Carter is a 79 year old, presenting for the following health issues:  Hospital F/U        5/20/2025     9:16 AM   Additional Questions   Roomed by ranjan   Accompanied by self         5/20/2025     9:16 AM   Patient Reported Additional Medications   Patient reports taking the following new medications Ozempic not sure how much is on. - later patient states he is not taking it.  Tiotropium 2 puffs daily  Triamcinolone PRN    See list below      HPI      He was discharged from TCU yesterday  He brought hosp discharge med list not TCU  "med list; \"I'm taking what's on the list\"  He doesn't know what inhalers/neb he is using    Hospital Follow-up Visit:    Hospital/Nursing Home/IP Rehab Facility: [facility]  Most Recent Admission Date: 4/25/2025   Most Recent Admission Diagnosis: Chest pain - R07.9     Most Recent Discharge Date: 5/7/2025   Most Recent Discharge Diagnosis: Chest pain, unspecified type - R07.9  S/P CABG x 2 - Z95.1  Type 2 diabetes mellitus without complication, without long-term current use of insulin (H) - E11.9  Chronic obstructive pulmonary disease, unspecified COPD type (H) - J44.9  Hx MRSA infection - Z86.14   Was the patient in the ICU or did the patient experience delirium during hospitalization?  No  Do you have any other stressors you would like to discuss with your provider? Health Concerns exercise     Problems taking medications regularly:  None  Medication changes since discharge:   START taking these medications     Details   !! insulin aspart (NOVOLOG PEN) 100 UNIT/ML pen Inject 1-7 Units subcutaneously 3 times daily (before meals). Per sliding scale     Associated Diagnoses: Type 2 diabetes mellitus without complication, without long-term current use of insulin (H)       !! insulin aspart (NOVOLOG PEN) 100 UNIT/ML pen Inject 1-5 Units subcutaneously at bedtime.     Associated Diagnoses: Type 2 diabetes mellitus without complication, without long-term current use of insulin (H)       !! insulin aspart (NOVOLOG PEN) 100 UNIT/ML pen Inject 1-5 Units subcutaneously 3 times daily (with meals). Give 1u per 15g carbohydrate. Do not give if pre-meal glucose <60     Associated Diagnoses: Type 2 diabetes mellitus without complication, without long-term current use of insulin (H)       methocarbamol (ROBAXIN) 500 MG tablet Take 0.5 tablets (250 mg) by mouth every 6 hours as needed for muscle spasms.     Associated Diagnoses: S/P CABG x 2       metoprolol tartrate (LOPRESSOR) 25 MG tablet Take 1 tablet " (25 mg) by mouth 2 times daily.     Associated Diagnoses: S/P CABG x 2       mupirocin (BACTROBAN) 2 % external ointment Apply topically 2 times daily for 9 days.     Associated Diagnoses: Hx MRSA infection       oxyCODONE (ROXICODONE) 5 MG tablet Take 1 tablet (5 mg) by mouth every 6 hours as needed for severe pain.  Qty: 10 tablet, Refills: 0     Associated Diagnoses: S/P CABG x 2       polyethylene glycol (MIRALAX) 17 GM/Dose powder Take 17 g by mouth 2 times daily as needed for constipation.     Associated Diagnoses: S/P CABG x 2       senna-docusate (SENOKOT-S/PERICOLACE) 8.6-50 MG tablet Take 1 tablet by mouth or Feeding Tube 2 times daily as needed for constipation.     Associated Diagnoses: S/P CABG x 2       umeclidinium (INCRUSE ELLIPTA) 62.5 MCG/ACT inhaler Inhale 1 puff into the lungs daily.     Associated Diagnoses: Chronic obstructive pulmonary disease, unspecified COPD type (H)                CONTINUE these medications which have CHANGED     Details   acetaminophen (TYLENOL) 500 MG tablet Take 2 tablets (1,000 mg) by mouth every 6 hours as needed for mild pain.     Associated Diagnoses: S/P CABG x 2       budeson-glycopyrrol-formoterol (BREZTRI AEROSPHERE) 160-9-4.8 MCG/ACT AERO inhaler Inhale 2 puffs into the lungs 2 times daily. HOLD until PCP clarification     Associated Diagnoses: Chronic obstructive pulmonary disease, unspecified COPD type (H)       insulin glargine (LANTUS PEN) 100 UNIT/ML pen Inject 10 Units subcutaneously at bedtime.     Comments: If Lantus is not covered by insurance, may substitute Basaglar or Semglee or other insulin glargine product per insurance preference at same dose and frequency.    Associated Diagnoses: Type 2 diabetes mellitus without complication, without long-term current use of insulin (H)       semaglutide (OZEMPIC) 2 MG/3ML pen Inject 0.25 mg subcutaneously every 7 days, THEN 0.5 mg every 7 days. HOLD until PCP discussion.     Associated Diagnoses: Type 2  diabetes mellitus without complication, without long-term current use of insulin (H)          STOP taking these medications         carvedilol (COREG) 6.25 MG tablet Comments:   Reason for Stopping:            losartan-hydrochlorothiazide (HYZAAR) 50-12.5 MG tablet Comments:   Reason for Stopping:            nitroGLYcerin (NITROSTAT) 0.4 MG sublingual tablet Comments:   Reason for Stopping:            predniSONE (DELTASONE) 20 MG tablet Comments:   Reason for Stopping:            Problems adhering to non-medication therapy:  None    Summary of hospitalization:  [facility] discharge summary reviewed  Diagnostic Tests/Treatments reviewed.  Follow up needed: cardiology, MTM, cardiac rehab  Other Healthcare Providers Involved in Patient s Care:         None  Update since discharge: improved.         Plan of care communicated with patient             CABG  CAD  --S/p CABG x2 (LIMA-LAD, V-diagonal), incisions healing well. Echo with EF of 45-50%. Was started on lisinopril  --Was seen by cardiology on 5/14.  Due for follow-up in 3 months  --needs cardiac rehab  --continue atorvastatin, ticagrelor BID, ASA, lisinopril 2.5mg daily   -- Metoprolol tartrate was switched to metoprolol succinate once daily and lisinopril was started  -- He has been working with patient assistance and [facility] pharmacy [place] about the cost of Brilinta but he declined to sign up through the patient assistance plan  --he plans to resume cardiac rehab when he can drive  --he wonders when he can drive, was told 4 weeks.  --no leg swelling  --has nitroglycerin pills at home    Diabetes-2  -- Was on insulin sliding scale at the TCU which was stopped at discharge  --A1C 7.1%, -2670. Report was not using Ozempic at home so this was discontinued. Was on sliding scale insulin while IP, was discontinued on discharge.   - continue glargine  -- I had previously prescribed him Ozempic but it was too expensive so he is not  "taking    COPD/Asthma  -No concerns in TCU  - continue Symbicort, Breo Ellipta   - albuterol PRN, duonebs PRN there was talk of adding Trelegy to see if this would help with breathing and to see if he would qualify under patient assistance  -- On 5/7 he was dispensed Spiriva and Incruse and on 5/8 he was dispensed Breztri    Polypharmacy  -- He has an appointment with the pharmacist on 6/4  --he and daughter filled pillbox yesterday;  \"we emptied the pillbox yesterday, putt he pills back into the bottles and filled up new pill boxes\".  --my CMA marked he is not taking lisinopril or metoprolol    Current Outpatient Medications   Medication Sig Dispense Refill    acetaminophen (TYLENOL) 500 MG tablet Take 2 tablets (1,000 mg) by mouth every 6 hours as needed for mild pain.      albuterol (PROAIR HFA/PROVENTIL HFA/VENTOLIN HFA) 108 (90 Base) MCG/ACT inhaler Inhale 1-2 puffs into the lungs every 4 hours as needed for shortness of breath or wheezing. 8.5 g 10    Alcohol Swabs (B-D SINGLE USE SWABS REGULAR) PADS USE TO SWAB AREA OF INJECTION / JENISE AS DIRECTED 100 each 3    aspirin (ASA) 81 MG EC tablet Take 81 mg by mouth at bedtime.      atorvastatin (LIPITOR) 80 MG tablet Take 1 tablet (80 mg) by mouth at bedtime. 90 tablet 3    blood glucose monitoring (NO BRAND SPECIFIED) meter device kit Use to test blood sugar 1 times daily or as directed.  Blood Glucose Monitor Brands: per insurance. 1 kit 0    budesonide-formoterol (SYMBICORT) 80-4.5 MCG/ACT Inhaler Inhale 2 puffs into the lungs 2 times daily. 10.2 g 11    insulin glargine (LANTUS PEN) 100 UNIT/ML pen Inject 10 Units subcutaneously at bedtime.      insulin pen needle (BD LIZBETH U/F) 32G X 4 MM miscellaneous USE 1 PEN NEEDLE DAILY 100 each 1    ipratropium - albuterol 0.5 mg/2.5 mg/3 mL (DUONEB) 0.5-2.5 (3) MG/3ML neb solution Take 1 vial (3 mLs) by nebulization every 6 hours as needed for shortness of breath, wheezing or cough. 270 mL 5    Lancets (ONETOUCH DELICA " "PLUS [id number]) MISC USE AS DIRECTED WITH LANCING DEVICE TO TEST ONCE DAILY 100 each 6    montelukast (SINGULAIR) 10 MG tablet TAKE ONE TABLET BY MOUTH AT BEDTIME 90 tablet 3    omeprazole (PRILOSEC) 20 MG DR capsule Take 1 capsule (20 mg) by mouth daily. 90 capsule 3    ONETOUCH ULTRA test strip USE TO TEST BLOOD SUGAR 1 TIME DAILY OR AS DIRECTED 100 strip 6    order for DME Equipment being ordered: arm blood pressure cuff 1 Device 0    ticagrelor (BRILINTA) 90 MG tablet Take 1 tablet (90 mg) by mouth 2 times daily. Dose to start tomorrow morning. 180 tablet 3    lisinopril (ZESTRIL) 2.5 MG tablet Take 1 tablet (2.5 mg) by mouth daily. (Patient not taking: Reported on 5/20/2025) 90 tablet 3    metoprolol succinate ER (TOPROL XL) 25 MG 24 hr tablet Take 1 tablet (25 mg) by mouth daily. (Patient not taking: Reported on 5/20/2025) 30 tablet 0    umeclidinium (INCRUSE ELLIPTA) 62.5 MCG/ACT inhaler Inhale 1 puff into the lungs daily. (Patient not taking: Reported on 5/20/2025)             Review of Systems  Constitutional, neuro, ENT, endocrine, pulmonary, cardiac, gastrointestinal, genitourinary, musculoskeletal, integument and psychiatric systems are negative, except as otherwise noted.      Objective    /70 (BP Location: Right arm, Patient Position: Sitting, Cuff Size: Adult Regular)   Pulse 108   Temp 97.8  F (36.6  C) (Tympanic)   Resp 12   Ht 1.74 m (5' 8.5\")   Wt 68.5 kg (151 lb)   SpO2 96%   BMI 22.62 kg/m    Body mass index is 22.62 kg/m .  Physical Exam   GENERAL: alert and no distress  BREAST: normal without masses, tenderness or nipple discharge and no palpable axillary masses or adenopathy  CV: regular rate and rhythm, normal S1 S2, no S3 or S4, no murmur, click or rub, no peripheral edema   SKIN:  well healing sternal incision            Signed Electronically by: Sarita Hennessy DO    "

## 2025-05-21 ENCOUNTER — OFFICE VISIT (OUTPATIENT)
Dept: CARDIOLOGY | Facility: CLINIC | Age: 80
End: 2025-05-21
Payer: COMMERCIAL

## 2025-05-21 VITALS
OXYGEN SATURATION: 95 % | BODY MASS INDEX: 22.59 KG/M2 | SYSTOLIC BLOOD PRESSURE: 132 MMHG | WEIGHT: 152.5 LBS | DIASTOLIC BLOOD PRESSURE: 76 MMHG | HEIGHT: 69 IN | HEART RATE: 98 BPM

## 2025-05-21 DIAGNOSIS — Z95.1 S/P CABG (CORONARY ARTERY BYPASS GRAFT): Primary | ICD-10-CM

## 2025-05-21 DIAGNOSIS — Z09 HOSPITAL DISCHARGE FOLLOW-UP: ICD-10-CM

## 2025-05-21 PROCEDURE — 3078F DIAST BP <80 MM HG: CPT | Performed by: PHYSICIAN ASSISTANT

## 2025-05-21 PROCEDURE — 99024 POSTOP FOLLOW-UP VISIT: CPT | Performed by: PHYSICIAN ASSISTANT

## 2025-05-21 PROCEDURE — 3075F SYST BP GE 130 - 139MM HG: CPT | Performed by: PHYSICIAN ASSISTANT

## 2025-05-21 RX ORDER — METOPROLOL SUCCINATE 25 MG/1
50 TABLET, EXTENDED RELEASE ORAL DAILY
Qty: 60 TABLET | Refills: 3 | Status: SHIPPED | OUTPATIENT
Start: 2025-05-21

## 2025-05-21 NOTE — PROGRESS NOTES
CV Surgery Post Op Follow Up Note:     S:  From discharge summary:  Raul Wilhelm is a 79 year old male who on 4/25/2025 underwent the above-named procedures and tolerated the operation well.      Postoperatively was admitted to the ICU.  Patient was extubated within protocol on POD #0.  Blood pressure and cardiac index were managed with vasopressors and inotropic agents which were continuously weaned until no longer needed.  Patient was subsequently  transferred to the surgical telemetry floor.     While on the surgical unit, the patient continued to progress well. Chest tubes and temporary pacemaker wires were removed when deemed appropriate. Heart rhythm remained stable.     Patient was fluid overloaded and treated with diuretics. He is below his preoperative weight and will not discharge with diuretic therapy; will re-evaluate need in clinic follow-up.      Patient was transiently hyperglycemic and treated with insulin infusion then transitioned to sliding scale insulin per protocol. Blood sugars remained stable. Is a known diabetic and the hospitalist team managed postoperative diabetic regimen. He is discharging on a modified regimen of his PTA lantus and sliding scale insulin.     Prior to discharge, his pain was controlled well, he was working well with therapies, able to perform most ADLs, ambulate without difficulty, and had full return of bowel and bladder function.  On May 7, 2025, he was discharged to TCU in stable condition. Follow up with cardiology and cardiac surgery have been arranged. Pt encouraged to follow up with PCP and cardiac rehab upon discharge.     Patient discharged on aspirin:  Yes 81 mg  Patient discharged on beta blocker: yes    Patient discharged on ACE Inhibitor/ARB: no      Patient discharged on statin: yes     Since being discharged from hospital, patient is recovering at TCU and now home.     Allergies:   Allergies   Allergen Reactions    Simvastatin Swelling     Severe  muscle pain, swelling, and bruising    Dust Mites Unknown       Medications:   Current Outpatient Medications:     acetaminophen (TYLENOL) 500 MG tablet, Take 2 tablets (1,000 mg) by mouth every 6 hours as needed for mild pain., Disp: , Rfl:     albuterol (PROAIR HFA/PROVENTIL HFA/VENTOLIN HFA) 108 (90 Base) MCG/ACT inhaler, Inhale 1-2 puffs into the lungs every 4 hours as needed for shortness of breath or wheezing., Disp: 8.5 g, Rfl: 10    Alcohol Swabs (B-D SINGLE USE SWABS REGULAR) PADS, USE TO SWAB AREA OF INJECTION / JENISE AS DIRECTED, Disp: 100 each, Rfl: 3    aspirin (ASA) 81 MG EC tablet, Take 81 mg by mouth at bedtime., Disp: , Rfl:     atorvastatin (LIPITOR) 80 MG tablet, Take 1 tablet (80 mg) by mouth at bedtime., Disp: 90 tablet, Rfl: 3    blood glucose monitoring (NO BRAND SPECIFIED) meter device kit, Use to test blood sugar 1 times daily or as directed.  Blood Glucose Monitor Brands: per insurance., Disp: 1 kit, Rfl: 0    budesonide-formoterol (SYMBICORT) 80-4.5 MCG/ACT Inhaler, Inhale 2 puffs into the lungs 2 times daily., Disp: 10.2 g, Rfl: 11    insulin glargine (LANTUS PEN) 100 UNIT/ML pen, Inject 10 Units subcutaneously at bedtime., Disp: , Rfl:     insulin pen needle (BD LIZBETH U/F) 32G X 4 MM miscellaneous, USE 1 PEN NEEDLE DAILY, Disp: 100 each, Rfl: 1    ipratropium - albuterol 0.5 mg/2.5 mg/3 mL (DUONEB) 0.5-2.5 (3) MG/3ML neb solution, Take 1 vial (3 mLs) by nebulization every 6 hours as needed for shortness of breath, wheezing or cough., Disp: 270 mL, Rfl: 5    Lancets (ONETOUCH DELICA PLUS [id number]) MISC, USE AS DIRECTED WITH LANCING DEVICE TO TEST ONCE DAILY, Disp: 100 each, Rfl: 6    lisinopril (ZESTRIL) 2.5 MG tablet, Take 1 tablet (2.5 mg) by mouth daily., Disp: 90 tablet, Rfl: 3    metoprolol succinate ER (TOPROL XL) 25 MG 24 hr tablet, Take 1 tablet (25 mg) by mouth daily., Disp: 30 tablet, Rfl: 0    montelukast (SINGULAIR) 10 MG tablet, TAKE ONE TABLET BY MOUTH AT BEDTIME, Disp: 90  tablet, Rfl: 3    omeprazole (PRILOSEC) 20 MG DR capsule, Take 1 capsule (20 mg) by mouth daily., Disp: 90 capsule, Rfl: 3    ONETOUCH ULTRA test strip, USE TO TEST BLOOD SUGAR 1 TIME DAILY OR AS DIRECTED, Disp: 100 strip, Rfl: 6    ticagrelor (BRILINTA) 90 MG tablet, Take 1 tablet (90 mg) by mouth 2 times daily. Dose to start tomorrow morning., Disp: 180 tablet, Rfl: 3    umeclidinium (INCRUSE ELLIPTA) 62.5 MCG/ACT inhaler, Inhale 1 puff into the lungs daily., Disp: , Rfl:     Physical Exam:   Gen: NAD   CV: s1s2, no m/r/g  Lungs: course, slt diminished on L   Sternum: cdi  Extremities: no LE edema     Assessment/Plan:     Mr. Wilhelm is doing well in their post operative period.   SBP/HR at home: not using it.   Resp: breathing, using IS couple times/day, achieving 1500  Incisions: healing, washing with antibacterial soap  Extremities/weight: does not feel to be carrying around additional fluid  Rehab plans: walking daily and will start Cardiac rehab in a few weeks.  Reports blood sugars have been elevated over 200-encouraged him to reach out to PCP for CGM as he states he has a hard time getting enough blood to test his glucose.      Follow up apts: 8/15/25 at 0855 with Rachana Faustin NP     All of the patient's questions were answered. Our contact information was given to him if they should have any further questions/concerns. No further follow-up is needed with us.      Rachel Paulino PA-C  CV Surgery  [facility]  Pager: [phone]

## 2025-05-21 NOTE — PATIENT INSTRUCTIONS
Date of Surgery: 25     Drivin25 with no narcotic pain medication in your system    Weight restriction: 25    Follow up apt: 8/15/25 at 0855 with Rachana Faustin NP     Take blood pressure 30-60 mins after morning medications. Call [phone] if readings are consistently greater than 140 mmHg    [phone] nurse line, call with any questions

## 2025-05-22 ENCOUNTER — PATIENT OUTREACH (OUTPATIENT)
Dept: CARE COORDINATION | Facility: CLINIC | Age: 80
End: 2025-05-22
Payer: COMMERCIAL

## 2025-05-22 ASSESSMENT — ACTIVITIES OF DAILY LIVING (ADL): DEPENDENT_IADLS:: INDEPENDENT

## 2025-05-22 NOTE — PROGRESS NOTES
Clinic Care Coordination Contact  Patient was hospitalized at [facility] from 4/25/25 to 5/7/25 with diagnosis of NSTEMI. CABG X2 5/1/2025     Assessment: Patient has transitioned to TCU for short term rehabilitation:     Facility Name: [facility] discharged 5/19/2025   UTC/Voicemail    Clinical Data: Care Coordinator Outreach    Outreach Documentation Number of Outreach Attempt   5/22/2025  10:10 AM 1       Called home # and it rings and then disconnects times 3       Plan: . Care Coordinator will try to reach patient again in 1-2 business days.    [facility]   Roxanne Tapia RN, Care Coordinator   [facility]   E-mail [email or web address]   [phone]

## 2025-05-23 ENCOUNTER — TELEPHONE (OUTPATIENT)
Dept: GERIATRICS | Facility: CLINIC | Age: 80
End: 2025-05-23
Payer: COMMERCIAL

## 2025-05-23 NOTE — TELEPHONE ENCOUNTER
Prior Authorization required on Incse Ellipta  Insurance Phone [phone]  Patient ID [id number]  Please contact the pharmacy with Prior Auth status (approved/denied)    Thank you  Malka Rader [facility]  [phone]

## 2025-05-28 NOTE — TELEPHONE ENCOUNTER
Central Prior Authorization Team   Phone: [phone]    PA Initiation    Medication: Prior auth needed on Incse Ellipta  Insurance Company: MED[employer] - Phone [phone] Fax [phone]  Pharmacy Filling the Rx: [facility] [place], [place] - [facility]  Filling Pharmacy Phone: [phone]  Filling Pharmacy Fax:    Start Date: 5/28/2025    [facility] KEY:  [id number]

## 2025-05-29 NOTE — TELEPHONE ENCOUNTER
Prior Authorization Approval    Authorization Effective Date: 4/28/2025  Authorization Expiration Date: 5/28/2026  Medication: Prior auth needed on Incruse Ellipta  Reference #:     Insurance Company: MED[employer] - [employer] [phone] Fax [phone]  Which Pharmacy is filling the prescription (Not needed for infusion/clinic administered): [facility] [address]  Pharmacy Notified: Yes  Patient Notified: Instructed pharmacy to notify patient when script is ready to /ship.  Approved on May 28 by [employer] 2017  CaseId:[id number];Status:Approved;Review Type:Prior Auth;Coverage Start Date:04/28/2025;Coverage End Date:05/28/2026;  Effective Date: 4/28/2025  Authorization Expiration Date: 5/28/2026

## 2025-06-04 ENCOUNTER — HOSPITAL ENCOUNTER (OUTPATIENT)
Dept: CARDIAC REHAB | Facility: CLINIC | Age: 80
Discharge: HOME | End: 2025-06-04
Attending: STUDENT IN AN ORGANIZED HEALTH CARE EDUCATION/TRAINING PROGRAM
Payer: COMMERCIAL

## 2025-06-04 ENCOUNTER — OFFICE VISIT (OUTPATIENT)
Dept: PHARMACY | Facility: CLINIC | Age: 80
End: 2025-06-04
Attending: INTERNAL MEDICINE
Payer: COMMERCIAL

## 2025-06-04 VITALS — DIASTOLIC BLOOD PRESSURE: 57 MMHG | SYSTOLIC BLOOD PRESSURE: 102 MMHG | HEART RATE: 88 BPM

## 2025-06-04 DIAGNOSIS — K21.00 GASTROESOPHAGEAL REFLUX DISEASE WITH ESOPHAGITIS WITHOUT HEMORRHAGE: ICD-10-CM

## 2025-06-04 DIAGNOSIS — J44.9 CHRONIC OBSTRUCTIVE PULMONARY DISEASE, UNSPECIFIED COPD TYPE (H): Primary | ICD-10-CM

## 2025-06-04 DIAGNOSIS — J45.50 SEVERE PERSISTENT ASTHMA WITHOUT COMPLICATION (H): ICD-10-CM

## 2025-06-04 DIAGNOSIS — I10 ESSENTIAL HYPERTENSION: ICD-10-CM

## 2025-06-04 DIAGNOSIS — E11.9 TYPE 2 DIABETES MELLITUS WITHOUT COMPLICATION, WITHOUT LONG-TERM CURRENT USE OF INSULIN (H): ICD-10-CM

## 2025-06-04 DIAGNOSIS — I21.4 NSTEMI (NON-ST ELEVATED MYOCARDIAL INFARCTION) (H): ICD-10-CM

## 2025-06-04 DIAGNOSIS — Z95.1 S/P CABG (CORONARY ARTERY BYPASS GRAFT): ICD-10-CM

## 2025-06-04 DIAGNOSIS — I25.10 CORONARY ARTERY DISEASE INVOLVING NATIVE CORONARY ARTERY OF NATIVE HEART WITHOUT ANGINA PECTORIS: ICD-10-CM

## 2025-06-04 PROCEDURE — 99606 MTMS BY PHARM EST 15 MIN: CPT | Performed by: PHARMACIST

## 2025-06-04 PROCEDURE — 99607 MTMS BY PHARM ADDL 15 MIN: CPT | Performed by: PHARMACIST

## 2025-06-04 PROCEDURE — 3078F DIAST BP <80 MM HG: CPT | Performed by: PHARMACIST

## 2025-06-04 PROCEDURE — 3074F SYST BP LT 130 MM HG: CPT | Performed by: PHARMACIST

## 2025-06-04 PROCEDURE — 93798 PHYS/QHP OP CAR RHAB W/ECG: CPT | Performed by: REHABILITATION PRACTITIONER

## 2025-06-04 PROCEDURE — 93797 PHYS/QHP OP CAR RHAB WO ECG: CPT | Performed by: REHABILITATION PRACTITIONER

## 2025-06-04 PROCEDURE — 1111F DSCHRG MED/CURRENT MED MERGE: CPT | Performed by: PHARMACIST

## 2025-06-04 NOTE — PROGRESS NOTES
Medication Therapy Management (MTM) Encounter    ASSESSMENT:                            Medication Adherence/Access: No issues identified.    Asthma/COPD reviewed with patient to start using Symbicort twice daily. Will hold on Incruse Ellipta for now and revisit addition of LAMA at our follow up visit.     Type 2 Diabetes at goal A1c of <8% - patient prefers to wait to get his A1c lab checked.     Hypertension/CAD stable - home blood pressure monitor appears to be accurate.     GERD review from our last visit - okay to try taking omeprazole every other day to decrease pill burden.     PLAN:                            1. Use your albuterol inhaler for your rescue inhaler. Keep this one with you.     2. Start using your Symbicort inhaler twice daily (2 puffs twice daily). Stop using Incruse Ellipta inhaler for now.     3. Try taking your blood pressure 30 minutes after taking your morning medications.     4. Okay to wait to have your A1c lab rechecked until August - there is a future lab order in [facility].     5. Your blood pressure monitor appears to be accurate - today's blood pressure with clinic monitor is: 102/57 compared to your monitor 108/73.    Future considerations: Breztri or Spiriva Respimat may be options - check price.     Follow-up: Wednesday, July 9th at 11:00 AM    SUBJECTIVE/OBJECTIVE:                          Raul Wilhelm is a 79 year old male seen for a follow-up visit from 4/16/2025.  Dischaged from May 19th [facility].      Reason for visit: follow up MTM visit.    Tobacco: He reports that he has never smoked. He has never used smokeless tobacco.  Alcohol: not currently using    Medication Adherence/Access: no issues reported.    Asthma/COPD   Symbicort 80-4.5 mcg/actuation 2 puffs twice daily   Montelukast 10 mg at bedtime  Albuterol as needed   Duonebs as needed    Not using Symbicort twice daily - only using once daily. Patient does not like using Incruse Ellipta - makes him cough.  Patient is asking today which inhaler is his rescue inhaler. Patient feels breathing could be improved.     Tolerating medications well.  Rinsing mouth out after Symbicort use.    Type 2 Diabetes   Lantus 10 units daily    Morning blood sugars are running 150.     Patient reports no current medication side effects.  Eye exam: up to date  Foot exam: up to date    Hypertension/CAD   Aspirin 81 mg daily  Brilinta 90 mg twice daily   Lisinopril 2.5 mg once daily  Metoprolol XL 25 mg twice daily    Tolerating medications well. Brought in home blood pressure cuff to see if accurate.     Blood pressure today: patient's home blood pressure monitor: 108/73 pulse 93.   Initial in clinic blood pressure: 131/60 pulse 84. Second in clinic blood pressure taken right after patient used his cuff: 102/57 pulse 88.     GERD  Omeprazole 20 mg daily    Tolerating well - not having heartburn. Patient forgot to try taking omeprazole every other day to decrease pill burden - he would still like to try tapering down on his dose of omeprazole.      Today's Vitals: /57   Pulse 88   ----------------  Post Discharge Medication Reconciliation Status: discharge medications reconciled, continue medications without change.    I spent 40 minutes with this patient today. All changes were made via collaborative practice agreement with Sarita Hennessy DO.     A summary of these recommendations was given to the patient.    Claudine Chavez, PharmD  Medication Therapy Management      Medication Therapy Recommendations  Severe persistent asthma without complication (H)   1 Current Medication: budesonide-formoterol (SYMBICORT) 80-4.5 MCG/ACT Inhaler   Current Medication Sig: Inhale 2 puffs into the lungs 2 times daily.   Rationale: Dose too low - Dosage too low - Effectiveness   Recommendation: Increase Frequency   Status: Patient Agreed - Adherence/Education   Identified Date: 6/4/2025 Completed Date: 6/4/2025

## 2025-06-04 NOTE — PATIENT INSTRUCTIONS
"Recommendations from today's MTM visit:                                                       Raul,    It was a pleasure talking with you! We discussed the followin. Use your albuterol inhaler for your rescue inhaler. Keep this one with you.     2. Start using your Symbicort inhaler twice daily (2 puffs twice daily). Stop using Incruse Ellipta inhaler for now.     3. Try taking your blood pressure 30 minutes after taking your morning medications.     4. Okay to wait to have your A1c lab rechecked until August - there is a future lab order in [facility].     5. Your blood pressure monitor appears to be accurate - today's blood pressure with clinic monitor is: 102/57 compared to your monitor 108/73.    Follow-up:  at 11:00 AM    It was great speaking with you today.  I value your experience and would be very thankful for your time in providing feedback in our clinic survey. In the next few days, you may receive an email or text message from [employer] with a link to a survey related to your  clinical pharmacist.\"     To schedule another MTM appointment, please call the clinic directly or you may call the MTM scheduling line at [phone] or toll-free at [phone].     My Clinical Pharmacist's contact information:                                                      Please feel free to contact me with any questions or concerns you have.      Keep up the good work!!    Claudine Chavez, PharmD  [phone] in clinic on Tuesday and Wednesday        "

## 2025-06-09 ENCOUNTER — HOSPITAL ENCOUNTER (OUTPATIENT)
Dept: CARDIAC REHAB | Facility: CLINIC | Age: 80
Discharge: HOME | End: 2025-06-09
Attending: INTERNAL MEDICINE
Payer: COMMERCIAL

## 2025-06-09 PROCEDURE — 93798 PHYS/QHP OP CAR RHAB W/ECG: CPT

## 2025-06-16 ENCOUNTER — HOSPITAL ENCOUNTER (OUTPATIENT)
Dept: CARDIAC REHAB | Facility: CLINIC | Age: 80
Discharge: HOME | End: 2025-06-16
Attending: INTERNAL MEDICINE
Payer: COMMERCIAL

## 2025-06-16 PROCEDURE — 93798 PHYS/QHP OP CAR RHAB W/ECG: CPT

## 2025-06-24 ENCOUNTER — HOSPITAL ENCOUNTER (OUTPATIENT)
Dept: CARDIAC REHAB | Facility: CLINIC | Age: 80
Discharge: HOME | End: 2025-06-24
Attending: INTERNAL MEDICINE
Payer: COMMERCIAL

## 2025-06-24 PROCEDURE — 93798 PHYS/QHP OP CAR RHAB W/ECG: CPT

## 2025-07-01 ENCOUNTER — HOSPITAL ENCOUNTER (OUTPATIENT)
Dept: CARDIAC REHAB | Facility: CLINIC | Age: 80
Discharge: HOME | End: 2025-07-01
Attending: INTERNAL MEDICINE
Payer: COMMERCIAL

## 2025-07-01 PROCEDURE — 93798 PHYS/QHP OP CAR RHAB W/ECG: CPT

## 2025-07-08 ENCOUNTER — HOSPITAL ENCOUNTER (OUTPATIENT)
Dept: CARDIAC REHAB | Facility: CLINIC | Age: 80
Discharge: HOME | End: 2025-07-08
Attending: INTERNAL MEDICINE
Payer: COMMERCIAL

## 2025-07-08 PROCEDURE — 93798 PHYS/QHP OP CAR RHAB W/ECG: CPT

## 2025-07-09 ENCOUNTER — VIRTUAL VISIT (OUTPATIENT)
Dept: PHARMACY | Facility: CLINIC | Age: 80
End: 2025-07-09
Payer: COMMERCIAL

## 2025-07-09 DIAGNOSIS — J45.50 SEVERE PERSISTENT ASTHMA WITHOUT COMPLICATION (H): ICD-10-CM

## 2025-07-09 DIAGNOSIS — J44.9 CHRONIC OBSTRUCTIVE PULMONARY DISEASE, UNSPECIFIED COPD TYPE (H): Primary | ICD-10-CM

## 2025-07-09 PROCEDURE — 99606 MTMS BY PHARM EST 15 MIN: CPT | Mod: 93 | Performed by: PHARMACIST

## 2025-07-09 PROCEDURE — 99607 MTMS BY PHARM ADDL 15 MIN: CPT | Mod: 93 | Performed by: PHARMACIST

## 2025-07-09 NOTE — PATIENT INSTRUCTIONS
"Recommendations from today's MTM visit:                                                       Chaitanya,    It was a pleasure talking with you! We discussed the followin. Try putting your Symbicort inhaler on top of your pill box to help you remember to use it. Rinse your mouth out after using.     2. You ar due for your A1c lab in August.     Follow-up: 6 months    It was great speaking with you today.  I value your experience and would be very thankful for your time in providing feedback in our clinic survey. In the next few days, you may receive an email or text message from [employer] with a link to a survey related to your  clinical pharmacist.\"     To schedule another MTM appointment, please call the clinic directly or you may call the MTM scheduling line at [phone] or toll-free at [phone].     My Clinical Pharmacist's contact information:                                                      Please feel free to contact me with any questions or concerns you have.      Keep up the good work!!    Claudine Chavez, PharmD  [phone] in clinic on Tuesday and Wednesday        "

## 2025-07-09 NOTE — PROGRESS NOTES
Medication Therapy Management (MTM) Encounter    ASSESSMENT:                            Medication Adherence/Access: See below for considerations.    Asthma/COPD reviewed with patient to leave his Symbicort inhaler on top of his pill container to help remind him to use it. He agreed this would help.    PLAN:                            1. Try putting your Symbicort inhaler on top of your pill box to help you remember to use it. Rinse your mouth out after using.     2. You ar due for your A1c lab in August.     Follow-up: 6 months    SUBJECTIVE/OBJECTIVE:                          Chaitanya Wilhelm is a 79 year old male seen for a follow-up visit from 6/4/2025.       Reason for visit: follow up MTM visit.    Tobacco: He reports that he has never smoked. He has never used smokeless tobacco.  Alcohol: not currently using    Medication Adherence/Access: no issues reported.    Asthma/COPD   Symbicort 80-4.5 mcg/actuation 2 puffs twice daily   Montelukast 10 mg at bedtime  Albuterol as needed   Duonebs as needed    Patient was to start using his Symbicort twice daily per our last visit. He states today - he is not using any of his inhalers - he is caring his rescue inhaler (albuterol) with him and is using about once a month. He stopped his inhalers about 1 month ago - states because it's not in the pill container he is forgetting to use it. Last visit we stopped Incruse Ellipta with consideration of possibly switching to Breztri or Spiriva Respimat if affordable - will hold off for now to see if improved compliance with Symbicort improves breathing.     Patient feels breathing is currently good.     Per last visit: Not using Symbicort twice daily - only using once daily. Patient does not like using Incruse Ellipta - makes him cough.     Tolerating medications well.    ----------------    I spent 30 minutes with this patient today. All changes were made via collaborative practice agreement with Sarita Hennessy DO.     A  summary of these recommendations was mailed to the patient.    Claudine Chavez, PharmD  Medication Therapy Management     Telemedicine Visit Details  The patient's medications can be safely assessed via a telemedicine encounter.  Type of service:  Telephone visit  Originating Location (pt. Location): Home    Distant Location (provider location):  On-site  Start Time: 11:00 AM  End Time: 11:30 AM     Medication Therapy Recommendations  Severe persistent asthma without complication (H)   1 Current Medication: budesonide-formoterol (SYMBICORT) 80-4.5 MCG/ACT Inhaler   Current Medication Sig: Inhale 2 puffs into the lungs 2 times daily.   Rationale: Patient forgets to take - Adherence - Adherence   Recommendation: Provide Adherence Intervention   Status: Patient Agreed - Adherence/Education   Identified Date: 7/9/2025 Completed Date: 7/9/2025

## 2025-07-15 ENCOUNTER — HOSPITAL ENCOUNTER (OUTPATIENT)
Dept: CARDIAC REHAB | Facility: CLINIC | Age: 80
Discharge: HOME | End: 2025-07-15
Attending: INTERNAL MEDICINE
Payer: COMMERCIAL

## 2025-07-15 PROCEDURE — 93798 PHYS/QHP OP CAR RHAB W/ECG: CPT

## 2025-07-22 ENCOUNTER — HOSPITAL ENCOUNTER (OUTPATIENT)
Dept: CARDIAC REHAB | Facility: CLINIC | Age: 80
Discharge: HOME | End: 2025-07-22
Attending: INTERNAL MEDICINE
Payer: COMMERCIAL

## 2025-07-22 PROCEDURE — 93798 PHYS/QHP OP CAR RHAB W/ECG: CPT

## 2025-07-29 ENCOUNTER — HOSPITAL ENCOUNTER (OUTPATIENT)
Dept: CARDIAC REHAB | Facility: CLINIC | Age: 80
Discharge: HOME | End: 2025-07-29
Attending: INTERNAL MEDICINE
Payer: COMMERCIAL

## 2025-07-29 PROCEDURE — 93798 PHYS/QHP OP CAR RHAB W/ECG: CPT

## 2025-08-05 ENCOUNTER — TELEPHONE (OUTPATIENT)
Dept: FAMILY MEDICINE | Facility: CLINIC | Age: 80
End: 2025-08-05
Payer: COMMERCIAL

## 2025-08-05 ENCOUNTER — HOSPITAL ENCOUNTER (OUTPATIENT)
Dept: CARDIAC REHAB | Facility: CLINIC | Age: 80
Discharge: HOME | End: 2025-08-05
Attending: INTERNAL MEDICINE
Payer: COMMERCIAL

## 2025-08-05 PROCEDURE — 93798 PHYS/QHP OP CAR RHAB W/ECG: CPT

## 2025-08-12 ENCOUNTER — HOSPITAL ENCOUNTER (OUTPATIENT)
Dept: CARDIAC REHAB | Facility: CLINIC | Age: 80
Discharge: HOME | End: 2025-08-12
Attending: NURSE PRACTITIONER
Payer: COMMERCIAL

## 2025-08-12 PROCEDURE — 93798 PHYS/QHP OP CAR RHAB W/ECG: CPT

## 2025-08-19 ENCOUNTER — HOSPITAL ENCOUNTER (OUTPATIENT)
Dept: CARDIAC REHAB | Facility: CLINIC | Age: 80
Discharge: HOME | End: 2025-08-19
Attending: NURSE PRACTITIONER
Payer: COMMERCIAL

## 2025-08-19 PROCEDURE — 93798 PHYS/QHP OP CAR RHAB W/ECG: CPT

## 2025-08-26 ENCOUNTER — HOSPITAL ENCOUNTER (OUTPATIENT)
Dept: CARDIAC REHAB | Facility: CLINIC | Age: 80
Discharge: HOME | End: 2025-08-26
Attending: NURSE PRACTITIONER
Payer: COMMERCIAL

## 2025-08-26 PROCEDURE — 93798 PHYS/QHP OP CAR RHAB W/ECG: CPT

## 2025-09-02 ENCOUNTER — HOSPITAL ENCOUNTER (OUTPATIENT)
Dept: CARDIAC REHAB | Facility: CLINIC | Age: 80
Discharge: HOME | End: 2025-09-02
Attending: NURSE PRACTITIONER
Payer: COMMERCIAL

## 2025-09-02 PROCEDURE — 93798 PHYS/QHP OP CAR RHAB W/ECG: CPT

## 2025-09-03 ENCOUNTER — OFFICE VISIT (OUTPATIENT)
Dept: FAMILY MEDICINE | Facility: CLINIC | Age: 80
End: 2025-09-03
Payer: COMMERCIAL

## 2025-09-03 VITALS
WEIGHT: 152 LBS | DIASTOLIC BLOOD PRESSURE: 80 MMHG | HEART RATE: 76 BPM | SYSTOLIC BLOOD PRESSURE: 132 MMHG | TEMPERATURE: 98 F | OXYGEN SATURATION: 97 % | RESPIRATION RATE: 20 BRPM | HEIGHT: 69 IN | BODY MASS INDEX: 22.51 KG/M2

## 2025-09-03 DIAGNOSIS — E11.9 TYPE 2 DIABETES MELLITUS WITHOUT COMPLICATION, WITHOUT LONG-TERM CURRENT USE OF INSULIN (H): ICD-10-CM

## 2025-09-03 DIAGNOSIS — J44.9 CHRONIC OBSTRUCTIVE PULMONARY DISEASE, UNSPECIFIED COPD TYPE (H): Primary | ICD-10-CM

## 2025-09-03 DIAGNOSIS — J45.50 SEVERE PERSISTENT ASTHMA WITHOUT COMPLICATION (H): ICD-10-CM

## 2025-09-03 DIAGNOSIS — H90.3 BILATERAL SENSORINEURAL HEARING LOSS: ICD-10-CM

## 2025-09-03 DIAGNOSIS — I10 ESSENTIAL HYPERTENSION: ICD-10-CM

## 2025-09-03 LAB
CREAT UR-MCNC: 236 MG/DL
EST. AVERAGE GLUCOSE BLD GHB EST-MCNC: 197 MG/DL
HBA1C MFR BLD: 8.5 % (ref 0–5.6)
MICROALBUMIN UR-MCNC: 16.5 MG/L
MICROALBUMIN/CREAT UR: 6.99 MG/G CR (ref 0–17)

## 2025-09-03 PROCEDURE — 3079F DIAST BP 80-89 MM HG: CPT | Performed by: INTERNAL MEDICINE

## 2025-09-03 PROCEDURE — 3075F SYST BP GE 130 - 139MM HG: CPT | Performed by: INTERNAL MEDICINE

## 2025-09-03 PROCEDURE — 82043 UR ALBUMIN QUANTITATIVE: CPT | Performed by: INTERNAL MEDICINE

## 2025-09-03 PROCEDURE — 99214 OFFICE O/P EST MOD 30 MIN: CPT | Performed by: INTERNAL MEDICINE

## 2025-09-03 PROCEDURE — 3052F HG A1C>EQUAL 8.0%<EQUAL 9.0%: CPT | Performed by: INTERNAL MEDICINE

## 2025-09-03 PROCEDURE — 83036 HEMOGLOBIN GLYCOSYLATED A1C: CPT | Performed by: INTERNAL MEDICINE

## 2025-09-03 PROCEDURE — G2211 COMPLEX E/M VISIT ADD ON: HCPCS | Performed by: INTERNAL MEDICINE

## 2025-09-03 PROCEDURE — G0008 ADMIN INFLUENZA VIRUS VAC: HCPCS | Performed by: INTERNAL MEDICINE

## 2025-09-03 PROCEDURE — 36415 COLL VENOUS BLD VENIPUNCTURE: CPT | Performed by: INTERNAL MEDICINE

## 2025-09-03 PROCEDURE — 1126F AMNT PAIN NOTED NONE PRSNT: CPT | Performed by: INTERNAL MEDICINE

## 2025-09-03 PROCEDURE — 82570 ASSAY OF URINE CREATININE: CPT | Performed by: INTERNAL MEDICINE

## 2025-09-03 PROCEDURE — 90662 IIV NO PRSV INCREASED AG IM: CPT | Performed by: INTERNAL MEDICINE

## 2025-09-03 RX ORDER — IPRATROPIUM BROMIDE AND ALBUTEROL SULFATE 2.5; .5 MG/3ML; MG/3ML
1 SOLUTION RESPIRATORY (INHALATION) EVERY 6 HOURS PRN
Qty: 270 ML | Refills: 11 | Status: SHIPPED | OUTPATIENT
Start: 2025-09-03

## 2025-09-03 RX ORDER — ALBUTEROL SULFATE 90 UG/1
1-2 INHALANT RESPIRATORY (INHALATION) EVERY 4 HOURS PRN
Qty: 8.5 G | Refills: 10 | Status: SHIPPED | OUTPATIENT
Start: 2025-09-03

## 2025-09-03 RX ORDER — BUDESONIDE AND FORMOTEROL FUMARATE DIHYDRATE 80; 4.5 UG/1; UG/1
2 AEROSOL RESPIRATORY (INHALATION) 2 TIMES DAILY
Qty: 10.2 G | Refills: 11 | Status: SHIPPED | OUTPATIENT
Start: 2025-09-03

## 2025-09-03 RX ORDER — MONTELUKAST SODIUM 10 MG/1
10 TABLET ORAL AT BEDTIME
Qty: 90 TABLET | Refills: 3 | Status: SHIPPED | OUTPATIENT
Start: 2025-09-03

## 2025-09-03 ASSESSMENT — PAIN SCALES - GENERAL: PAINLEVEL_OUTOF10: NO PAIN (0)

## (undated) DEVICE — STENT CORONARY DES SYNERGY XD MR US 4.00X20MM H7493941820400
Type: IMPLANTABLE DEVICE | Status: NON-FUNCTIONAL
Removed: 2025-01-04

## (undated) DEVICE — SLEEVE TR BAND RADIAL COMPRESSION DEVICE 24CM TRB24-REG

## (undated) DEVICE — SUCTION IRR SYSTEM W/TIP INTERPULSE

## (undated) DEVICE — SYR ANGIOGRAPHY MULTIUSE KIT ACIST 014612

## (undated) DEVICE — PREP CHLORAPREP 26ML TINTED ORANGE  260815

## (undated) DEVICE — CANNULA PERFUSION AORTIC ROOT 22FR 20012

## (undated) DEVICE — ESU PENCIL SMOKE EVAC W/ROCKER SWITCH 0703-047-000

## (undated) DEVICE — BLADE SAW OSCIL/SAG STRK 11X90X1.19MM 4/2000 4111-119-090

## (undated) DEVICE — LEAD ELEC MYOCARDIO PACING TEMPORARY MEDTRONIC

## (undated) DEVICE — DRSG AQUACEL AG 3.5X9.75" HYDROFIBER 412011

## (undated) DEVICE — CANNULA PERFUSION 28/36FR 2 STG VNS 91228

## (undated) DEVICE — SU MONOCRYL 3-0 PS-2 18" UND Y497G

## (undated) DEVICE — KIT LG BORE TOUHY ACCESS PLUS MAP152

## (undated) DEVICE — DRAPE STERI U 1015

## (undated) DEVICE — CONNECTOR PERFUSION 3/8X3/8" W/O LL 6023

## (undated) DEVICE — LINEN TOWEL PACK X6 WHITE 5487

## (undated) DEVICE — KIT HAND CONTROL ANGIOTOUCH ACIST 65CM AT-P65

## (undated) DEVICE — BLOWER/MISTER CLEARVIEW 22150

## (undated) DEVICE — COVER TABLE POLY 65X90" 8186

## (undated) DEVICE — SU PROLENE 7-0 BV175-8 24" M8747

## (undated) DEVICE — CANNULA PERFUSION ARTERIAL EOPA 18FR 12" 77418

## (undated) DEVICE — CATH BALLOON EMERGE 3.5X15MM H7493918915350

## (undated) DEVICE — CATH LAUNCHER 6FR LA6EBU375

## (undated) DEVICE — CATH DIAGNOSTIC RADIAL 5FR TIG 4.0

## (undated) DEVICE — SU PROLENE 4-0 RB-1DA 36" 8557H

## (undated) DEVICE — PUMP CP37 QUANTUM CENTRIFUGAL BLOOD CP37V-V0

## (undated) DEVICE — SU PROLENE 4-0 SHDA 36" 8521H

## (undated) DEVICE — SU VICRYL 1 CT-1 36" UND J947H

## (undated) DEVICE — DEVICE ASSEMBLY SUCTION/ANTI COAG BTC93

## (undated) DEVICE — BNDG COBAN 6"X5YDS STERILE

## (undated) DEVICE — GUIDEWIRE VASC 0.014INX180CM RUNTHROUGH 25-1011

## (undated) DEVICE — SOL WATER IRRIG 1000ML BOTTLE 2F7114

## (undated) DEVICE — BONE CLEANING TIP INTERPULSE  0210-010-000

## (undated) DEVICE — SYR BULB IRRIG 50ML LATEX FREE 0035280

## (undated) DEVICE — GLOVE PROTEXIS W/NEU-THERA 8.0  2D73TE80

## (undated) DEVICE — SU ETHIBOND 2-0 SHDA 30" X563H

## (undated) DEVICE — STOCKING SLEEVE COMPRESSION CALF MED

## (undated) DEVICE — OXYGENATOR PERFUSION AFFINITY FUSION BB841

## (undated) DEVICE — PUNCH AORTIC 4.0MMX8" RCB40

## (undated) DEVICE — SHTH INTRO 0.021IN ID 6FR DIA

## (undated) DEVICE — GOWN XLG DISP 9545

## (undated) DEVICE — CATH TURNPIKE LP 150CM

## (undated) DEVICE — TONGUE DEPRESSOR STERILE 6023

## (undated) DEVICE — PACK OPEN HEART PV12OH524

## (undated) DEVICE — SHIELD STERADIAN ATTENUATION PAD

## (undated) DEVICE — GLOVE BIOGEL PI ULTRATOUCH SZ 7.5 41175

## (undated) DEVICE — CABLE MYO/LEAD PACING BLUE DISP 019-535

## (undated) DEVICE — BLADE SAW SAGITTAL STRK 21X90X1.19MM HD SYS 6 6221-119-090

## (undated) DEVICE — GLOVE PROTEXIS W/NEU-THERA 6.5  2D73TE65

## (undated) DEVICE — CATH ANGIO INFINITI 3DRC 6FRX100CM 534676T

## (undated) DEVICE — SU PLEDGET SOFT TFE 3/8"X3/26"X1/16" PCP40

## (undated) DEVICE — SU PROLENE 5-0 RB-2DA 30" 8710H

## (undated) DEVICE — SU MYOSTERNAL WIRE  046-267

## (undated) DEVICE — TOTE ANGIO CORP PC15AT SAN32CC83O

## (undated) DEVICE — SOMASENSOR CEREBRAL OXIMETER ADULT SAFB-SM

## (undated) DEVICE — Device

## (undated) DEVICE — SU VICRYL 2-0 CT-1 27" UND J259H

## (undated) DEVICE — CATH BALLOON EMERGE 2.5X15MM H7493918915250

## (undated) DEVICE — RESERVOIR CELL SAVING BLOOD COLLECTION EL2120

## (undated) DEVICE — SU ETHIBOND 3-0 BBDA 36" X588H

## (undated) DEVICE — BONE CEMENT KIT BOWL AND SPATULA STRK 6201-3-410

## (undated) DEVICE — LEAD PACER MYOCARDIAL BIPOLAR TEMPORARY 53CM 6495F

## (undated) DEVICE — SU STEEL 6 CCS 4X18" M654G

## (undated) DEVICE — PACK SURGICAL PROCEDURE CUSTOM 3/8IN X 3/8IN BB11W21R

## (undated) DEVICE — SOL NACL 0.9% IRRIG 3000ML BAG 07972-08

## (undated) DEVICE — GOWN IMPERVIOUS SPECIALTY XLG/XLONG 32474

## (undated) DEVICE — SOL NACL 0.9% IRRIG 1000ML BOTTLE 2F7124

## (undated) DEVICE — SU MONOCRYL 2-0 CT-1 36" UND Y945H

## (undated) DEVICE — DRSG KERLIX 4 1/2"X4YDS ROLL 6715

## (undated) DEVICE — INTRO GLIDESHEATH SLENDER 6FR 10X45CM 60-1060

## (undated) DEVICE — MANIFOLD KIT ANGIO AUTOMATED 014613

## (undated) DEVICE — CATH BALLOON NC EMERGE 3.50X15MM H7493926715350

## (undated) DEVICE — DRAPE SHEET REV FOLD 3/4 9349

## (undated) DEVICE — LINEN TOWEL PACK X5 5464

## (undated) DEVICE — SU PDO 1 STRATAFIX 36X36CM CTX TAPERPOINT SXPD2B405

## (undated) DEVICE — SOL WATER IRRIG 1000ML BOTTLE 07139-09

## (undated) DEVICE — DRAPE NEW SLUSH/WARMER HUSHSLUSH 2.0 ESD340 ESD340

## (undated) DEVICE — KIT WASH CELL SAVING ATL2001

## (undated) DEVICE — KIT HAND CONTROL ACIST 014644 AR-P54

## (undated) DEVICE — CATH BALLOON EMERGE 3.0X15MM H7493918915300

## (undated) DEVICE — CATH BALLOON NC EMERGE 3.50X12MM H7493926712350

## (undated) DEVICE — DEFIB PRO-PADZ LVP LQD GEL ADULT 8900-2105-01

## (undated) DEVICE — GLOVE PROTEXIS BLUE W/NEU-THERA 6.5  2D73EB65

## (undated) DEVICE — ELECTRODE DEFIB CADENCE 22550R

## (undated) DEVICE — POSITIONER ASSIST ESTECH 3S T401210S

## (undated) DEVICE — CATH GD VISTA BRITE BLU YLW 100CM 6FR XB3.5 6700540E

## (undated) DEVICE — DEVICE TISSUE STABILIZATION OCTOBASE 28707

## (undated) DEVICE — CATH BALLOON NC EMERGE 4.00X12MM H7493926712400

## (undated) DEVICE — BAG DECANTER STERILE WHITE DYNJDEC09

## (undated) DEVICE — CATH BALLOON EMERGE 2.0X15MM H7493918915200

## (undated) DEVICE — SU MONOCRYL 4-0 PS-2 18" UND Y496G

## (undated) DEVICE — CABLE MYO/LEAD PACING WHITE DISP 019-530

## (undated) DEVICE — SU PROLENE 6-0 C-1DA 30" M8706

## (undated) DEVICE — GW VASC OMNIWIRE J L185CM PRESSURE 89185J

## (undated) DEVICE — DRAIN CHEST TUBE 32FR STR 8032

## (undated) DEVICE — BLADE KNIFE BEAVER MINI STR BEAVER6900

## (undated) DEVICE — DEVICE INFLATION SYR W/ HEMOSTASIS VALVE 12IN EXT IN4904

## (undated) DEVICE — LINEN TOWEL PACK X30 5481

## (undated) DEVICE — WIRE GUIDE EXT 0.014-.018"X145CM 22260

## (undated) DEVICE — DRAIN CHEST TUBE 28FR STR 8028

## (undated) DEVICE — CUSTOM PACK CORONARY SAN5BCRHEA

## (undated) DEVICE — SUCTION CANISTER MEDIVAC LINER 3000ML W/LID 65651-530

## (undated) DEVICE — PREP CHLORAPREP W/ORANGE TINT 10.5ML 930715

## (undated) DEVICE — KIT ENDO VASOVIEW HEMOPRO 2 VH-4000

## (undated) DEVICE — RX SURGIFLO HEMOSTATIC MATRIX W/THROMBIN 8ML 2994

## (undated) DEVICE — VALVE HEMOSTASIS .096" COPILOT MECH 1003331

## (undated) DEVICE — PROTECTOR ARM ONE-STEP TRENDELENBURG 40418

## (undated) DEVICE — LINEN LEG ROLL 5489

## (undated) RX ORDER — CELECOXIB 200 MG/1
CAPSULE ORAL
Status: DISPENSED
Start: 2020-05-28

## (undated) RX ORDER — ROPIVACAINE HYDROCHLORIDE 5 MG/ML
INJECTION, SOLUTION EPIDURAL; INFILTRATION; PERINEURAL
Status: DISPENSED
Start: 2020-11-02

## (undated) RX ORDER — CEFAZOLIN SODIUM 2 G/100ML
INJECTION, SOLUTION INTRAVENOUS
Status: DISPENSED
Start: 2020-11-02

## (undated) RX ORDER — CEFAZOLIN SODIUM 2 G/100ML
INJECTION, SOLUTION INTRAVENOUS
Status: DISPENSED
Start: 2020-05-28

## (undated) RX ORDER — PHENYLEPHRINE HCL IN 0.9% NACL 1 MG/10 ML
SYRINGE (ML) INTRAVENOUS
Status: DISPENSED

## (undated) RX ORDER — PROTAMINE SULFATE 10 MG/ML
INJECTION, SOLUTION INTRAVENOUS
Status: DISPENSED
Start: 2025-05-01

## (undated) RX ORDER — BUPIVACAINE HYDROCHLORIDE AND EPINEPHRINE 2.5; 5 MG/ML; UG/ML
INJECTION, SOLUTION EPIDURAL; INFILTRATION; INTRACAUDAL; PERINEURAL
Status: DISPENSED
Start: 2024-12-11

## (undated) RX ORDER — HEPARIN SODIUM 1000 [USP'U]/ML
INJECTION, SOLUTION INTRAVENOUS; SUBCUTANEOUS
Status: DISPENSED
Start: 2025-05-01

## (undated) RX ORDER — PROPOFOL 10 MG/ML
INJECTION, EMULSION INTRAVENOUS
Status: DISPENSED
Start: 2020-11-02

## (undated) RX ORDER — TRANEXAMIC ACID 10 MG/ML
INJECTION, SOLUTION INTRAVENOUS
Status: DISPENSED
Start: 2020-05-28

## (undated) RX ORDER — CALCIUM CHLORIDE 100 MG/ML
INJECTION INTRAVENOUS; INTRAVENTRICULAR
Status: DISPENSED

## (undated) RX ORDER — LIDOCAINE HCL/EPINEPHRINE/PF 2%-1:200K
VIAL (ML) INJECTION
Status: DISPENSED
Start: 2020-11-02

## (undated) RX ORDER — FENTANYL CITRATE-0.9 % NACL/PF 10 MCG/ML
PLASTIC BAG, INJECTION (ML) INTRAVENOUS
Status: DISPENSED
Start: 2020-11-02

## (undated) RX ORDER — CELECOXIB 200 MG/1
CAPSULE ORAL
Status: DISPENSED
Start: 2020-11-02

## (undated) RX ORDER — KETOROLAC TROMETHAMINE 30 MG/ML
INJECTION, SOLUTION INTRAMUSCULAR; INTRAVENOUS
Status: DISPENSED
Start: 2020-11-02

## (undated) RX ORDER — SODIUM CHLORIDE, SODIUM GLUCONATE, SODIUM ACETATE, POTASSIUM CHLORIDE AND MAGNESIUM CHLORIDE 526; 502; 368; 37; 30 MG/100ML; MG/100ML; MG/100ML; MG/100ML; MG/100ML
INJECTION, SOLUTION INTRAVENOUS
Status: DISPENSED
Start: 2025-05-01

## (undated) RX ORDER — ALBUTEROL SULFATE 0.83 MG/ML
SOLUTION RESPIRATORY (INHALATION)
Status: DISPENSED
Start: 2020-05-28

## (undated) RX ORDER — DEXAMETHASONE SODIUM PHOSPHATE 10 MG/ML
INJECTION, SOLUTION INTRAMUSCULAR; INTRAVENOUS
Status: DISPENSED
Start: 2020-11-02

## (undated) RX ORDER — CEFAZOLIN SODIUM 1 G/3ML
INJECTION, POWDER, FOR SOLUTION INTRAMUSCULAR; INTRAVENOUS
Status: DISPENSED
Start: 2025-05-01

## (undated) RX ORDER — VANCOMYCIN HYDROCHLORIDE 1 G/200ML
INJECTION, SOLUTION INTRAVENOUS
Status: DISPENSED
Start: 2025-05-01

## (undated) RX ORDER — IPRATROPIUM BROMIDE AND ALBUTEROL SULFATE 2.5; .5 MG/3ML; MG/3ML
SOLUTION RESPIRATORY (INHALATION)
Status: DISPENSED
Start: 2025-05-01

## (undated) RX ORDER — PROPOFOL 10 MG/ML
INJECTION, EMULSION INTRAVENOUS
Status: DISPENSED
Start: 2020-05-28

## (undated) RX ORDER — EPHEDRINE SULFATE 50 MG/ML
INJECTION, SOLUTION INTRAMUSCULAR; INTRAVENOUS; SUBCUTANEOUS
Status: DISPENSED
Start: 2020-11-02

## (undated) RX ORDER — SODIUM CHLORIDE 9 MG/ML
INJECTION, SOLUTION INTRAVENOUS
Status: DISPENSED

## (undated) RX ORDER — LIDOCAINE HYDROCHLORIDE 10 MG/ML
INJECTION, SOLUTION EPIDURAL; INFILTRATION; INTRACAUDAL; PERINEURAL
Status: DISPENSED
Start: 2020-11-02

## (undated) RX ORDER — SODIUM CHLORIDE, SODIUM GLUCONATE, SODIUM ACETATE, POTASSIUM CHLORIDE AND MAGNESIUM CHLORIDE 526; 502; 368; 37; 30 MG/100ML; MG/100ML; MG/100ML; MG/100ML; MG/100ML
INJECTION, SOLUTION INTRAVENOUS
Status: DISPENSED

## (undated) RX ORDER — FENTANYL CITRATE 50 UG/ML
INJECTION, SOLUTION INTRAMUSCULAR; INTRAVENOUS
Status: DISPENSED
Start: 2025-01-04

## (undated) RX ORDER — FENTANYL CITRATE 50 UG/ML
INJECTION, SOLUTION INTRAMUSCULAR; INTRAVENOUS
Status: DISPENSED
Start: 2020-11-02

## (undated) RX ORDER — GABAPENTIN 300 MG/1
CAPSULE ORAL
Status: DISPENSED
Start: 2020-05-28

## (undated) RX ORDER — DEXAMETHASONE SODIUM PHOSPHATE 4 MG/ML
INJECTION, SOLUTION INTRA-ARTICULAR; INTRALESIONAL; INTRAMUSCULAR; INTRAVENOUS; SOFT TISSUE
Status: DISPENSED
Start: 2024-12-13

## (undated) RX ORDER — HEPARIN SODIUM 1000 [USP'U]/ML
INJECTION, SOLUTION INTRAVENOUS; SUBCUTANEOUS
Status: DISPENSED

## (undated) RX ORDER — FENTANYL CITRATE 50 UG/ML
INJECTION, SOLUTION INTRAMUSCULAR; INTRAVENOUS
Status: DISPENSED
Start: 2025-05-01

## (undated) RX ORDER — FENTANYL CITRATE 50 UG/ML
INJECTION, SOLUTION INTRAMUSCULAR; INTRAVENOUS
Status: DISPENSED
Start: 2024-12-11

## (undated) RX ORDER — VECURONIUM BROMIDE 1 MG/ML
INJECTION, POWDER, LYOPHILIZED, FOR SOLUTION INTRAVENOUS
Status: DISPENSED
Start: 2025-05-01

## (undated) RX ORDER — DEXMEDETOMIDINE HYDROCHLORIDE 100 UG/ML
INJECTION, SOLUTION INTRAVENOUS
Status: DISPENSED
Start: 2024-12-13

## (undated) RX ORDER — FENTANYL CITRATE 50 UG/ML
INJECTION, SOLUTION INTRAMUSCULAR; INTRAVENOUS
Status: DISPENSED
Start: 2024-12-13

## (undated) RX ORDER — ACETAMINOPHEN 325 MG/1
TABLET ORAL
Status: DISPENSED
Start: 2020-11-02

## (undated) RX ORDER — TRANEXAMIC ACID 650 MG/1
TABLET ORAL
Status: DISPENSED
Start: 2020-11-02

## (undated) RX ORDER — HYDROMORPHONE HYDROCHLORIDE 1 MG/ML
INJECTION, SOLUTION INTRAMUSCULAR; INTRAVENOUS; SUBCUTANEOUS
Status: DISPENSED
Start: 2025-05-01

## (undated) RX ORDER — PROPOFOL 10 MG/ML
INJECTION, EMULSION INTRAVENOUS
Status: DISPENSED
Start: 2025-05-01

## (undated) RX ORDER — ONDANSETRON 2 MG/ML
INJECTION INTRAMUSCULAR; INTRAVENOUS
Status: DISPENSED
Start: 2020-11-02

## (undated) RX ORDER — PAPAVERINE HYDROCHLORIDE 30 MG/ML
INJECTION INTRAMUSCULAR; INTRAVENOUS
Status: DISPENSED
Start: 2025-05-01

## (undated) RX ORDER — VANCOMYCIN HYDROCHLORIDE 1 G/20ML
INJECTION, POWDER, LYOPHILIZED, FOR SOLUTION INTRAVENOUS
Status: DISPENSED
Start: 2025-05-01

## (undated) RX ORDER — ONDANSETRON 2 MG/ML
INJECTION INTRAMUSCULAR; INTRAVENOUS
Status: DISPENSED
Start: 2025-05-01

## (undated) RX ORDER — FENTANYL CITRATE 50 UG/ML
INJECTION, SOLUTION INTRAMUSCULAR; INTRAVENOUS
Status: DISPENSED
Start: 2020-05-28

## (undated) RX ORDER — ACETAMINOPHEN 325 MG/1
TABLET ORAL
Status: DISPENSED
Start: 2020-05-28

## (undated) RX ORDER — GABAPENTIN 300 MG/1
CAPSULE ORAL
Status: DISPENSED
Start: 2020-11-02